# Patient Record
Sex: FEMALE | Race: OTHER | NOT HISPANIC OR LATINO
[De-identification: names, ages, dates, MRNs, and addresses within clinical notes are randomized per-mention and may not be internally consistent; named-entity substitution may affect disease eponyms.]

---

## 2017-07-17 ENCOUNTER — APPOINTMENT (OUTPATIENT)
Dept: PLASTIC SURGERY | Facility: CLINIC | Age: 67
End: 2017-07-17

## 2017-07-17 VITALS — WEIGHT: 185 LBS | BODY MASS INDEX: 33.84 KG/M2

## 2017-07-17 DIAGNOSIS — N62 HYPERTROPHY OF BREAST: ICD-10-CM

## 2019-04-06 ENCOUNTER — EMERGENCY (EMERGENCY)
Facility: HOSPITAL | Age: 69
LOS: 0 days | Discharge: HOME | End: 2019-04-07
Attending: STUDENT IN AN ORGANIZED HEALTH CARE EDUCATION/TRAINING PROGRAM
Payer: COMMERCIAL

## 2019-04-06 VITALS
OXYGEN SATURATION: 99 % | HEART RATE: 98 BPM | DIASTOLIC BLOOD PRESSURE: 74 MMHG | SYSTOLIC BLOOD PRESSURE: 151 MMHG | RESPIRATION RATE: 18 BRPM | TEMPERATURE: 97 F | HEIGHT: 62 IN | WEIGHT: 203.05 LBS

## 2019-04-06 VITALS
TEMPERATURE: 98 F | DIASTOLIC BLOOD PRESSURE: 75 MMHG | OXYGEN SATURATION: 96 % | RESPIRATION RATE: 16 BRPM | SYSTOLIC BLOOD PRESSURE: 127 MMHG | HEART RATE: 90 BPM

## 2019-04-06 DIAGNOSIS — N80.9 ENDOMETRIOSIS, UNSPECIFIED: ICD-10-CM

## 2019-04-06 DIAGNOSIS — E78.00 PURE HYPERCHOLESTEROLEMIA, UNSPECIFIED: ICD-10-CM

## 2019-04-06 DIAGNOSIS — R05 COUGH: ICD-10-CM

## 2019-04-06 DIAGNOSIS — I10 ESSENTIAL (PRIMARY) HYPERTENSION: ICD-10-CM

## 2019-04-06 DIAGNOSIS — F41.9 ANXIETY DISORDER, UNSPECIFIED: ICD-10-CM

## 2019-04-06 DIAGNOSIS — Z88.0 ALLERGY STATUS TO PENICILLIN: ICD-10-CM

## 2019-04-06 DIAGNOSIS — N39.0 URINARY TRACT INFECTION, SITE NOT SPECIFIED: ICD-10-CM

## 2019-04-06 DIAGNOSIS — R42 DIZZINESS AND GIDDINESS: ICD-10-CM

## 2019-04-06 DIAGNOSIS — R11.0 NAUSEA: ICD-10-CM

## 2019-04-06 LAB
ALBUMIN SERPL ELPH-MCNC: 3.9 G/DL — SIGNIFICANT CHANGE UP (ref 3.3–5)
ALP SERPL-CCNC: 102 U/L — SIGNIFICANT CHANGE UP (ref 40–120)
ALT FLD-CCNC: 27 U/L — SIGNIFICANT CHANGE UP (ref 12–78)
AMYLASE P1 CFR SERPL: 58 U/L — SIGNIFICANT CHANGE UP (ref 25–115)
ANION GAP SERPL CALC-SCNC: 9 MMOL/L — SIGNIFICANT CHANGE UP (ref 5–17)
APPEARANCE UR: CLEAR — SIGNIFICANT CHANGE UP
AST SERPL-CCNC: 16 U/L — SIGNIFICANT CHANGE UP (ref 15–37)
BACTERIA # UR AUTO: ABNORMAL
BASOPHILS # BLD AUTO: 0.02 K/UL — SIGNIFICANT CHANGE UP (ref 0–0.2)
BASOPHILS NFR BLD AUTO: 0.2 % — SIGNIFICANT CHANGE UP (ref 0–2)
BILIRUB SERPL-MCNC: 0.6 MG/DL — SIGNIFICANT CHANGE UP (ref 0.2–1.2)
BILIRUB UR-MCNC: NEGATIVE — SIGNIFICANT CHANGE UP
BUN SERPL-MCNC: 12 MG/DL — SIGNIFICANT CHANGE UP (ref 7–23)
CALCIUM SERPL-MCNC: 9.1 MG/DL — SIGNIFICANT CHANGE UP (ref 8.5–10.1)
CHLORIDE SERPL-SCNC: 94 MMOL/L — LOW (ref 96–108)
CO2 SERPL-SCNC: 36 MMOL/L — HIGH (ref 22–31)
COLOR SPEC: YELLOW — SIGNIFICANT CHANGE UP
CREAT SERPL-MCNC: 1.13 MG/DL — SIGNIFICANT CHANGE UP (ref 0.5–1.3)
DIFF PNL FLD: ABNORMAL
EOSINOPHIL # BLD AUTO: 0.1 K/UL — SIGNIFICANT CHANGE UP (ref 0–0.5)
EOSINOPHIL NFR BLD AUTO: 1.1 % — SIGNIFICANT CHANGE UP (ref 0–6)
EPI CELLS # UR: ABNORMAL
GLUCOSE SERPL-MCNC: 121 MG/DL — HIGH (ref 70–99)
GLUCOSE UR QL: NEGATIVE MG/DL — SIGNIFICANT CHANGE UP
HCT VFR BLD CALC: 45.9 % — HIGH (ref 34.5–45)
HGB BLD-MCNC: 15.5 G/DL — SIGNIFICANT CHANGE UP (ref 11.5–15.5)
IMM GRANULOCYTES NFR BLD AUTO: 0.2 % — SIGNIFICANT CHANGE UP (ref 0–1.5)
KETONES UR-MCNC: NEGATIVE — SIGNIFICANT CHANGE UP
LEUKOCYTE ESTERASE UR-ACNC: ABNORMAL
LIDOCAIN IGE QN: 197 U/L — SIGNIFICANT CHANGE UP (ref 73–393)
LYMPHOCYTES # BLD AUTO: 2.21 K/UL — SIGNIFICANT CHANGE UP (ref 1–3.3)
LYMPHOCYTES # BLD AUTO: 23.6 % — SIGNIFICANT CHANGE UP (ref 13–44)
MCHC RBC-ENTMCNC: 27.7 PG — SIGNIFICANT CHANGE UP (ref 27–34)
MCHC RBC-ENTMCNC: 33.8 GM/DL — SIGNIFICANT CHANGE UP (ref 32–36)
MCV RBC AUTO: 82 FL — SIGNIFICANT CHANGE UP (ref 80–100)
MONOCYTES # BLD AUTO: 0.57 K/UL — SIGNIFICANT CHANGE UP (ref 0–0.9)
MONOCYTES NFR BLD AUTO: 6.1 % — SIGNIFICANT CHANGE UP (ref 2–14)
NEUTROPHILS # BLD AUTO: 6.46 K/UL — SIGNIFICANT CHANGE UP (ref 1.8–7.4)
NEUTROPHILS NFR BLD AUTO: 68.8 % — SIGNIFICANT CHANGE UP (ref 43–77)
NITRITE UR-MCNC: NEGATIVE — SIGNIFICANT CHANGE UP
NRBC # BLD: 0 /100 WBCS — SIGNIFICANT CHANGE UP (ref 0–0)
PH UR: 7 — SIGNIFICANT CHANGE UP (ref 5–8)
PLATELET # BLD AUTO: 27 K/UL — LOW (ref 150–400)
POTASSIUM SERPL-MCNC: 2.6 MMOL/L — CRITICAL LOW (ref 3.5–5.3)
POTASSIUM SERPL-SCNC: 2.6 MMOL/L — CRITICAL LOW (ref 3.5–5.3)
PROT SERPL-MCNC: 7.2 GM/DL — SIGNIFICANT CHANGE UP (ref 6–8.3)
PROT UR-MCNC: NEGATIVE MG/DL — SIGNIFICANT CHANGE UP
RBC # BLD: 5.6 M/UL — HIGH (ref 3.8–5.2)
RBC # FLD: 12 % — SIGNIFICANT CHANGE UP (ref 10.3–14.5)
RBC CASTS # UR COMP ASSIST: ABNORMAL /HPF (ref 0–4)
SODIUM SERPL-SCNC: 139 MMOL/L — SIGNIFICANT CHANGE UP (ref 135–145)
SP GR SPEC: 1 — LOW (ref 1.01–1.02)
UROBILINOGEN FLD QL: NEGATIVE MG/DL — SIGNIFICANT CHANGE UP
WBC # BLD: 9.38 K/UL — SIGNIFICANT CHANGE UP (ref 3.8–10.5)
WBC # FLD AUTO: 9.38 K/UL — SIGNIFICANT CHANGE UP (ref 3.8–10.5)
WBC UR QL: ABNORMAL

## 2019-04-06 PROCEDURE — 99284 EMERGENCY DEPT VISIT MOD MDM: CPT

## 2019-04-06 PROCEDURE — 74177 CT ABD & PELVIS W/CONTRAST: CPT | Mod: 26

## 2019-04-06 PROCEDURE — 70450 CT HEAD/BRAIN W/O DYE: CPT | Mod: 26

## 2019-04-06 PROCEDURE — 93010 ELECTROCARDIOGRAM REPORT: CPT

## 2019-04-06 RX ORDER — MOXIFLOXACIN HYDROCHLORIDE TABLETS, 400 MG 400 MG/1
1 TABLET, FILM COATED ORAL
Qty: 14 | Refills: 0
Start: 2019-04-06 | End: 2019-04-12

## 2019-04-06 RX ORDER — SODIUM CHLORIDE 9 MG/ML
1000 INJECTION INTRAMUSCULAR; INTRAVENOUS; SUBCUTANEOUS ONCE
Qty: 0 | Refills: 0 | Status: COMPLETED | OUTPATIENT
Start: 2019-04-06 | End: 2019-04-06

## 2019-04-06 RX ORDER — POTASSIUM CHLORIDE 20 MEQ
10 PACKET (EA) ORAL
Qty: 0 | Refills: 0 | Status: COMPLETED | OUTPATIENT
Start: 2019-04-06 | End: 2019-04-06

## 2019-04-06 RX ORDER — CIPROFLOXACIN LACTATE 400MG/40ML
500 VIAL (ML) INTRAVENOUS ONCE
Qty: 0 | Refills: 0 | Status: COMPLETED | OUTPATIENT
Start: 2019-04-06 | End: 2019-04-06

## 2019-04-06 RX ORDER — METOCLOPRAMIDE HCL 10 MG
10 TABLET ORAL ONCE
Qty: 0 | Refills: 0 | Status: COMPLETED | OUTPATIENT
Start: 2019-04-06 | End: 2019-04-06

## 2019-04-06 RX ORDER — NITROFURANTOIN MACROCRYSTAL 50 MG
100 CAPSULE ORAL ONCE
Qty: 0 | Refills: 0 | Status: DISCONTINUED | OUTPATIENT
Start: 2019-04-06 | End: 2019-04-06

## 2019-04-06 RX ORDER — ONDANSETRON 8 MG/1
1 TABLET, FILM COATED ORAL
Qty: 6 | Refills: 0
Start: 2019-04-06 | End: 2019-04-08

## 2019-04-06 RX ORDER — ONDANSETRON 8 MG/1
8 TABLET, FILM COATED ORAL ONCE
Qty: 0 | Refills: 0 | Status: COMPLETED | OUTPATIENT
Start: 2019-04-06 | End: 2019-04-06

## 2019-04-06 RX ADMIN — Medication 10 MILLIEQUIVALENT(S): at 20:40

## 2019-04-06 RX ADMIN — Medication 100 MILLIEQUIVALENT(S): at 18:48

## 2019-04-06 RX ADMIN — Medication 10 MILLIGRAM(S): at 20:40

## 2019-04-06 RX ADMIN — Medication 100 MILLIEQUIVALENT(S): at 20:40

## 2019-04-06 RX ADMIN — SODIUM CHLORIDE 1000 MILLILITER(S): 9 INJECTION INTRAMUSCULAR; INTRAVENOUS; SUBCUTANEOUS at 18:13

## 2019-04-06 RX ADMIN — SODIUM CHLORIDE 1000 MILLILITER(S): 9 INJECTION INTRAMUSCULAR; INTRAVENOUS; SUBCUTANEOUS at 20:41

## 2019-04-06 NOTE — ED ADULT NURSE NOTE - OBJECTIVE STATEMENT
68 received in bed A c/o dizziness,  nausea, vomiting, weakness, and loss of appetite. pt states symptoms started after she took Zpack for bronchitis. x wednesday denies chest pain. history of anxiety, and htn. PCP prescribed Zofran states she still feels nauseous. Able to complete full sentences without any discomfort. No s/s of acute distress noted. Will continue to monitor and provide care as needed.

## 2019-04-06 NOTE — ED ADULT NURSE NOTE - ED STAT RN HANDOFF DETAILS
Pt laying comfortably on stretcher and in no distress. Pt receiving 1st potassium IV supplementation. Hand off given to KAZ Talavera

## 2019-04-06 NOTE — ED PROVIDER NOTE - OBJECTIVE STATEMENT
68 year old female presents today c/o nausea and decreased PO intake since Tuesday, she states that last week she developed a cough, she did contact her PMD at that time who prescribed her zithromax, the day she finished the medication her symptoms developed, +vomiting  +lack of appetite +vomiting, she was seen at an urgent care center twice and given zofran which she feels made her nausea worse, had an ekg which she was told was normal, (-) chest pain (-) sick contact s (-) abdominal pain

## 2019-04-06 NOTE — ED ADULT TRIAGE NOTE - CHIEF COMPLAINT QUOTE
pt complaining of nausea, vomiting, weakness, dizziness and loss of appetite. pt states symptoms started after she took Zpack for bronchitis. denies chest pain. history of anxiety, and htn.

## 2019-04-07 LAB
CULTURE RESULTS: SIGNIFICANT CHANGE UP
SPECIMEN SOURCE: SIGNIFICANT CHANGE UP

## 2019-04-07 RX ADMIN — Medication 100 MILLIEQUIVALENT(S): at 00:24

## 2019-04-07 RX ADMIN — Medication 500 MILLIGRAM(S): at 00:25

## 2019-04-07 RX ADMIN — ONDANSETRON 8 MILLIGRAM(S): 8 TABLET, FILM COATED ORAL at 00:26

## 2019-09-18 ENCOUNTER — EMERGENCY (EMERGENCY)
Facility: HOSPITAL | Age: 69
LOS: 1 days | Discharge: HOME | End: 2019-09-18
Attending: EMERGENCY MEDICINE
Payer: COMMERCIAL

## 2019-09-18 VITALS
SYSTOLIC BLOOD PRESSURE: 123 MMHG | HEART RATE: 73 BPM | OXYGEN SATURATION: 99 % | TEMPERATURE: 97 F | DIASTOLIC BLOOD PRESSURE: 73 MMHG

## 2019-09-18 VITALS
RESPIRATION RATE: 18 BRPM | DIASTOLIC BLOOD PRESSURE: 95 MMHG | HEIGHT: 62 IN | SYSTOLIC BLOOD PRESSURE: 160 MMHG | HEART RATE: 73 BPM | TEMPERATURE: 98 F | WEIGHT: 197.09 LBS | OXYGEN SATURATION: 97 %

## 2019-09-18 PROBLEM — I10 ESSENTIAL (PRIMARY) HYPERTENSION: Chronic | Status: ACTIVE | Noted: 2019-04-06

## 2019-09-18 PROBLEM — F41.9 ANXIETY DISORDER, UNSPECIFIED: Chronic | Status: ACTIVE | Noted: 2019-04-06

## 2019-09-18 PROBLEM — E78.00 PURE HYPERCHOLESTEROLEMIA, UNSPECIFIED: Chronic | Status: ACTIVE | Noted: 2019-04-06

## 2019-09-18 PROBLEM — N80.9 ENDOMETRIOSIS, UNSPECIFIED: Chronic | Status: ACTIVE | Noted: 2019-04-06

## 2019-09-18 LAB
ALBUMIN SERPL ELPH-MCNC: 4.8 G/DL — SIGNIFICANT CHANGE UP (ref 3.3–5)
ALP SERPL-CCNC: 101 U/L — SIGNIFICANT CHANGE UP (ref 40–120)
ALT FLD-CCNC: 16 U/L — SIGNIFICANT CHANGE UP (ref 10–45)
ANION GAP SERPL CALC-SCNC: 12 MMOL/L — SIGNIFICANT CHANGE UP (ref 5–17)
APAP SERPL-MCNC: <15 UG/ML — SIGNIFICANT CHANGE UP (ref 10–30)
APPEARANCE UR: CLEAR — SIGNIFICANT CHANGE UP
AST SERPL-CCNC: 11 U/L — SIGNIFICANT CHANGE UP (ref 10–40)
BASOPHILS # BLD AUTO: 0.1 K/UL — SIGNIFICANT CHANGE UP (ref 0–0.2)
BASOPHILS NFR BLD AUTO: 0.6 % — SIGNIFICANT CHANGE UP (ref 0–2)
BILIRUB SERPL-MCNC: 0.5 MG/DL — SIGNIFICANT CHANGE UP (ref 0.2–1.2)
BILIRUB UR-MCNC: NEGATIVE — SIGNIFICANT CHANGE UP
BUN SERPL-MCNC: 10 MG/DL — SIGNIFICANT CHANGE UP (ref 7–23)
CALCIUM SERPL-MCNC: 10.5 MG/DL — SIGNIFICANT CHANGE UP (ref 8.4–10.5)
CHLORIDE SERPL-SCNC: 100 MMOL/L — SIGNIFICANT CHANGE UP (ref 96–108)
CO2 SERPL-SCNC: 30 MMOL/L — SIGNIFICANT CHANGE UP (ref 22–31)
COLOR SPEC: COLORLESS — SIGNIFICANT CHANGE UP
CREAT SERPL-MCNC: 0.78 MG/DL — SIGNIFICANT CHANGE UP (ref 0.5–1.3)
DIFF PNL FLD: NEGATIVE — SIGNIFICANT CHANGE UP
EOSINOPHIL # BLD AUTO: 0.1 K/UL — SIGNIFICANT CHANGE UP (ref 0–0.5)
EOSINOPHIL NFR BLD AUTO: 1 % — SIGNIFICANT CHANGE UP (ref 0–6)
ETHANOL SERPL-MCNC: SIGNIFICANT CHANGE UP MG/DL (ref 0–10)
GLUCOSE SERPL-MCNC: 105 MG/DL — HIGH (ref 70–99)
GLUCOSE UR QL: NEGATIVE — SIGNIFICANT CHANGE UP
HCT VFR BLD CALC: 46.4 % — HIGH (ref 34.5–45)
HGB BLD-MCNC: 15.6 G/DL — HIGH (ref 11.5–15.5)
KETONES UR-MCNC: NEGATIVE — SIGNIFICANT CHANGE UP
LEUKOCYTE ESTERASE UR-ACNC: ABNORMAL
LYMPHOCYTES # BLD AUTO: 2.6 K/UL — SIGNIFICANT CHANGE UP (ref 1–3.3)
LYMPHOCYTES # BLD AUTO: 23.9 % — SIGNIFICANT CHANGE UP (ref 13–44)
MCHC RBC-ENTMCNC: 29.1 PG — SIGNIFICANT CHANGE UP (ref 27–34)
MCHC RBC-ENTMCNC: 33.6 GM/DL — SIGNIFICANT CHANGE UP (ref 32–36)
MCV RBC AUTO: 86.7 FL — SIGNIFICANT CHANGE UP (ref 80–100)
MONOCYTES # BLD AUTO: 0.5 K/UL — SIGNIFICANT CHANGE UP (ref 0–0.9)
MONOCYTES NFR BLD AUTO: 4.9 % — SIGNIFICANT CHANGE UP (ref 2–14)
NEUTROPHILS # BLD AUTO: 7.5 K/UL — HIGH (ref 1.8–7.4)
NEUTROPHILS NFR BLD AUTO: 69.6 % — SIGNIFICANT CHANGE UP (ref 43–77)
NITRITE UR-MCNC: NEGATIVE — SIGNIFICANT CHANGE UP
PCP SPEC-MCNC: SIGNIFICANT CHANGE UP
PH UR: 7.5 — SIGNIFICANT CHANGE UP (ref 5–8)
PLATELET # BLD AUTO: 169 K/UL — SIGNIFICANT CHANGE UP (ref 150–400)
POTASSIUM SERPL-MCNC: 3.9 MMOL/L — SIGNIFICANT CHANGE UP (ref 3.5–5.3)
POTASSIUM SERPL-SCNC: 3.9 MMOL/L — SIGNIFICANT CHANGE UP (ref 3.5–5.3)
PROT SERPL-MCNC: 7.4 G/DL — SIGNIFICANT CHANGE UP (ref 6–8.3)
PROT UR-MCNC: NEGATIVE — SIGNIFICANT CHANGE UP
RBC # BLD: 5.35 M/UL — HIGH (ref 3.8–5.2)
RBC # FLD: 11.9 % — SIGNIFICANT CHANGE UP (ref 10.3–14.5)
SALICYLATES SERPL-MCNC: <2 MG/DL — LOW (ref 15–30)
SODIUM SERPL-SCNC: 142 MMOL/L — SIGNIFICANT CHANGE UP (ref 135–145)
SP GR SPEC: 1.01 — LOW (ref 1.01–1.02)
TSH SERPL-MCNC: 1.57 UIU/ML — SIGNIFICANT CHANGE UP (ref 0.27–4.2)
UROBILINOGEN FLD QL: NEGATIVE — SIGNIFICANT CHANGE UP
WBC # BLD: 10.8 K/UL — HIGH (ref 3.8–10.5)
WBC # FLD AUTO: 10.8 K/UL — HIGH (ref 3.8–10.5)

## 2019-09-18 PROCEDURE — 84484 ASSAY OF TROPONIN QUANT: CPT

## 2019-09-18 PROCEDURE — 80053 COMPREHEN METABOLIC PANEL: CPT

## 2019-09-18 PROCEDURE — 93010 ELECTROCARDIOGRAM REPORT: CPT

## 2019-09-18 PROCEDURE — 80307 DRUG TEST PRSMV CHEM ANLYZR: CPT

## 2019-09-18 PROCEDURE — 99284 EMERGENCY DEPT VISIT MOD MDM: CPT

## 2019-09-18 PROCEDURE — 84443 ASSAY THYROID STIM HORMONE: CPT

## 2019-09-18 PROCEDURE — 85027 COMPLETE CBC AUTOMATED: CPT

## 2019-09-18 PROCEDURE — 81001 URINALYSIS AUTO W/SCOPE: CPT

## 2019-09-18 PROCEDURE — 93005 ELECTROCARDIOGRAM TRACING: CPT

## 2019-09-18 PROCEDURE — 99283 EMERGENCY DEPT VISIT LOW MDM: CPT

## 2019-09-18 RX ORDER — DIVALPROEX SODIUM 500 MG/1
250 TABLET, DELAYED RELEASE ORAL ONCE
Refills: 0 | Status: COMPLETED | OUTPATIENT
Start: 2019-09-18 | End: 2019-09-18

## 2019-09-18 RX ORDER — DIVALPROEX SODIUM 500 MG/1
1 TABLET, DELAYED RELEASE ORAL
Qty: 14 | Refills: 0
Start: 2019-09-18 | End: 2019-10-01

## 2019-09-18 RX ORDER — ONDANSETRON 8 MG/1
4 TABLET, FILM COATED ORAL ONCE
Refills: 0 | Status: COMPLETED | OUTPATIENT
Start: 2019-09-18 | End: 2019-09-18

## 2019-09-18 RX ORDER — QUETIAPINE FUMARATE 200 MG/1
1 TABLET, FILM COATED ORAL
Qty: 14 | Refills: 0
Start: 2019-09-18 | End: 2019-10-01

## 2019-09-18 RX ORDER — ALPRAZOLAM 0.25 MG
1 TABLET ORAL ONCE
Refills: 0 | Status: DISCONTINUED | OUTPATIENT
Start: 2019-09-18 | End: 2019-09-18

## 2019-09-18 RX ADMIN — DIVALPROEX SODIUM 250 MILLIGRAM(S): 500 TABLET, DELAYED RELEASE ORAL at 17:40

## 2019-09-18 RX ADMIN — ONDANSETRON 4 MILLIGRAM(S): 8 TABLET, FILM COATED ORAL at 16:55

## 2019-09-18 NOTE — ED PROVIDER NOTE - PATIENT PORTAL LINK FT
You can access the FollowMyHealth Patient Portal offered by [facility] by registering at the following website: [email or web address]. By joining [employer]’s FollowMyHealth portal, you will also be able to view your health information using other applications (apps) compatible with our system.
You can access the FollowMyHealth Patient Portal offered by [facility] by registering at the following website: [email or web address]. By joining [employer]’s FollowMyHealth portal, you will also be able to view your health information using other applications (apps) compatible with our system.

## 2019-09-18 NOTE — ED PROVIDER NOTE - ATTENDING CONTRIBUTION TO CARE
Pt with sister who collaborates with anxiety ongoing for years, worsening over past few weeks but also with insomnia.  No etoh, no SI/HI, 10lb weight loss due to poor appetite of late, had blood work recently but not sure about thyroid.  Exam unremarkable.  Will see PCP in few days and psychiatrist in 10 days.
Pt with insomnia and increasing anxiety, no SI/HI/AH/VH, no headache, fever, vomiting.  Appears anxious but stable, good insight, normal mood, clear lungs, RRR.  IN coordination with patient's outpt psychiatrist, will adjust medications, educate about sleep hygeine, and add mood stabilizer.

## 2019-09-18 NOTE — ED PROVIDER NOTE - CARE PLAN
Principal Discharge DX:	Anxiety  Secondary Diagnosis:	Insomnia
Principal Discharge DX:	Anxiety disorder

## 2019-09-18 NOTE — ED PROVIDER NOTE - PMH
Anxiety    Endometriosis    High cholesterol    HTN (hypertension)
Anxiety    Endometriosis    High cholesterol    HTN (hypertension)

## 2019-09-18 NOTE — ED PROVIDER NOTE - CHIEF COMPLAINT
The patient is a 69y Female complaining of anxiety.
The patient is a 69y Female complaining of anxiety.

## 2019-09-18 NOTE — ED PROVIDER NOTE - OBJECTIVE STATEMENT
68 y/o F with PMHx of HTN, anxiety and panic d/o on hydroxyzine, diazepam, and Effexor presents c/o anxiety and panic attacks. Pt poor historian in regards to timing of events, notes she has had about 3 weeks of worsening panic attacks seen by PCP and by her psychiatrist, pt psychiatrist changed meds from clonazepam and trazodone for sleep to diazepam and hydroxyzine. Pt notes episodes of nausea, diaphoresis, lightheadedness, and palpitations described as "feeling her heart beat" occurring frequently at 3am-5am and lasting most of the day. Pt notes frequently calling her psychiatrist and having the schedule of her meds altered to try and better control her sxs. Pt also worried she is withdrawing because she was not tapered off clonazepam before starting diazepam despite being told by her psychiatrist that they are in the same drug class. Pt denies any CP, f/c, recent illness, abd pain, vomiting, change in urine or stools, h/o cardiac dz. Pt had stress test 3 months ago for similar sxs which was negative, also notes prior ED visits for multiple somatic sxs getting "full work-ups" including "head to toe CAT scans" that are reportedly unremarkable. Pt denies SI/HI, AH/VH, delusions. Pt friend at bedside confirms hx and denies change in pt behavior oo disorganized speech. Pt denies etoh, smoking, drugs.  Psych: Dr. Ramos [phone]

## 2019-09-18 NOTE — ED ADULT NURSE NOTE - OBJECTIVE STATEMENT
69 year old female domiciled with  was BIB by her friend to the ER due to increasing symptoms of anxiety. Pt denied suicidal and homicidal ideations, auditory and visual hallucinations, Pt stated she has been waking up very anxious and it only gets better in the afternoon./ Pt reports she has been going to her psychiatrist if needed but today, the anxiety was intolerable. PMHx of HTN and high cholesterol.

## 2019-09-18 NOTE — ED PROVIDER NOTE - CLINICAL SUMMARY MEDICAL DECISION MAKING FREE TEXT BOX
Dr. Merida Note: anxiety d/o with insomnia without emergency psych condition...treat and outpt f/u, including outpt thyroid check
Dr. Merida Note: acute on chronic anxiety disorder without need for emergency psych consult/inpt, adjust medications and refer to outpt

## 2019-09-18 NOTE — ED PROVIDER NOTE - PHYSICAL EXAMINATION
GEN: Pt in NAD, A&O x3. Answers questions appropriately GCS 15.  PSYCH: Anxious, occasionally has issues with temporal order of events, otherwise clear sensorium, organized speech, no evidence of hallucinations or delusions, not endorsing suicidality or homicidal thoughts.  EYES: Sclera white w/o injection, PERRLA, EOMI.   ENT: Head NCAT. Nose w/o deformity. No auricular TTP. MMM. Neck supple FROM.   RESP: No chest wall tenderness, CTA b/l, no wheezes, rales, or rhonchi.   CARDIAC: RRR, clear distinct S1, S2, no appreciable murmurs.  ABD: Abdomen soft, non-tender. No CVAT b/l.  VASC: 2+ radial and dorsalis pedis pulses b/l. No edema or calf tenderness.  NEURO: Speech clear and coherent. CN2-12 grossly intact. Normal and equal sensation and 5/5 strength UE and LE b/l. Pronator drift negative. Normal gross cerebellar function.   SKIN: No rashes on the trunk. GEN: Pt in NAD, A&O x3. Answers questions appropriately GCS 15.  PSYCH: Anxious, occasionally has issues with temporal order of events, pt fixated on sxs, otherwise clear sensorium, organized speech, no evidence of hallucinations or delusions, not endorsing suicidality or homicidal thoughts.   EYES: Sclera white w/o injection, PERRLA, EOMI.   ENT: Head NCAT. Nose w/o deformity. No auricular TTP. MMM. Neck supple FROM.   RESP: No chest wall tenderness, CTA b/l, no wheezes, rales, or rhonchi.   CARDIAC: RRR, clear distinct S1, S2, no appreciable murmurs.  ABD: Abdomen soft, non-tender. No CVAT b/l.  VASC: 2+ radial and dorsalis pedis pulses b/l. No edema or calf tenderness.  NEURO: Speech clear and coherent. CN2-12 grossly intact. Normal and equal sensation and 5/5 strength UE and LE b/l. Pronator drift negative. Normal gross cerebellar function.   SKIN: No rashes on the trunk.

## 2019-09-18 NOTE — ED PROVIDER NOTE - NSFOLLOWUPINSTRUCTIONS_ED_ALL_ED_FT
1. Follow up PCP and psychiatrist for further care.  Recommend thyroid check if not done so recently.  2. Trial Seroquel 50mg every night along with melatonin 3-4mg and benadryl 25-50mg every night.  Practice good sleep hygeine: no caffeine after 3pm, use bed only for sleep, develop a sleep routine 30minutes before bed, no tv/screens/phones 1 hour before bed, if unable to sleep after 30 minutes, get up and read for 30 minutes and then try again, avoid any day time sleeping.  3. Return for any concerns.
1) Follow-up with your primary care provider in 1-2 days.     Follow-up with your psychiatrist within 1 week as scheduled and discuss further management.    2) Take Depakote daily as directed. Continue to take all medications as prescribed.    3) Rest and drink plenty of fluids.     4) Return to the ER for any new or worsening symptoms or concerns.

## 2019-09-18 NOTE — ED PROVIDER NOTE - PHYSICAL EXAMINATION
GEN: Pt in NAD, A&O x3.  PSYCH: Anxious, no obvious delusions or hallucinations, organized speech, clear sensorium, has some difficulty in determining the temporal order of events.  EYES: Sclera white w/o injection, PERRLA, EOMI.  ENT: Head NCAT. Nose w/o deformity. No auricular TTP. MMM. Neck supple FROM.   RESP: No chest wall tenderness, CTA b/l, no wheezes, rales, or rhonchi.   CARDIAC: RRR, clear distinct S1, S2, no appreciable murmurs.  ABD: Abdomen soft, non-tender. No CVAT b/l.  VASC: No edema or calf tenderness.  NEURO: CN2-12 grossly intact. Normal and equal sensation and 5/5 strength UE and LE b/l. Pronator drift negative. Normal gait and gross cerebellar function.   SKIN: No rashes on the trunk.

## 2019-09-18 NOTE — ED PROVIDER NOTE - INTERPRETATION
normal sinus rhythm normal sinus rhythm, Normal axis, Normal CT interval and QRS complex. There are no acute ischemic ST or T-wave changes.

## 2019-09-18 NOTE — ED ADULT NURSE NOTE - GROOMING
At [facility], we strive to deliver an exceptional experience to you, every time we see you.  If you receive a survey in the mail, please send us back your thoughts. We really do value your feedback.    Based on your medical history, these are the current health maintenance/preventive care services that you are due for (some may have been done at this visit.)  Health Maintenance Due   Topic Date Due     EYE EXAM Q1 YEAR  10/22/2013     ADVANCE DIRECTIVE PLANNING Q5 YRS  10/04/2016     A1C Q6 MO  04/25/2018     CREATININE Q1 YEAR  05/10/2018     LIPID MONITORING Q1 YEAR  05/10/2018     MICROALBUMIN Q1 YEAR  05/10/2018         Suggested websites for health information:  Www.[employer].org : Up to date and easily searchable information on multiple topics.  Www.[employer].gov : medication info, interactive tutorials, watch real surgeries online  Www.familydoctor.org : good info from the Academy of Family Physicians  Www.cdc.gov : public health info, travel advisories, epidemics (H1N1)  Www.aap.org : children's health info, normal development, vaccinations  Www.health.[facility].[place].us : [place] dept of health, public health issues in [place], N1N1    Your care team:                            Family Medicine Internal Medicine   MD Bishop Otto MD Shantel Branch-Fleming, MD Katya Georgiev PA-C Nam Ho, MD Pediatrics   LESLIE Santiago, MD Dinorah Sequeira CNP, MD Deborah Mielke, MD Kim Thein, APRN CNP      Clinic hours: Monday - Thursday 7 am-7 pm; Fridays 7 am-5 pm.   Urgent care: Monday - Friday 11 am-9 pm; Saturday and Sunday 9 am-5 pm.  Pharmacy : Monday -Thursday 8 am-8 pm; Friday 8 am-6 pm; Saturday and Sunday 9 am-5 pm.     Clinic: [phone]   Pharmacy: [phone]    
Good

## 2019-09-18 NOTE — ED PROVIDER NOTE - PROGRESS NOTE DETAILS
d/w pt psychiatrist, states pt has ANSON with panic d/o, frequently fixates on these somatic sxs, if medical w/u is negative he is ok with altering pt drug regimen just do not increase dose of benzodiazepines, pt can be seen in the office for f/u within 1 week, I gave phone to pt to d/w  who will schedule appointment. Will start pt on Depakote. -Raoul Hurst PA-C

## 2019-11-12 ENCOUNTER — EMERGENCY (EMERGENCY)
Facility: HOSPITAL | Age: 69
LOS: 1 days | Discharge: HOME | End: 2019-11-12
Attending: EMERGENCY MEDICINE | Admitting: EMERGENCY MEDICINE
Payer: COMMERCIAL

## 2019-11-12 VITALS
DIASTOLIC BLOOD PRESSURE: 67 MMHG | OXYGEN SATURATION: 100 % | RESPIRATION RATE: 16 BRPM | HEART RATE: 76 BPM | SYSTOLIC BLOOD PRESSURE: 133 MMHG | TEMPERATURE: 98 F

## 2019-11-12 VITALS
DIASTOLIC BLOOD PRESSURE: 73 MMHG | OXYGEN SATURATION: 97 % | HEART RATE: 65 BPM | SYSTOLIC BLOOD PRESSURE: 134 MMHG | TEMPERATURE: 98 F | RESPIRATION RATE: 16 BRPM

## 2019-11-12 DIAGNOSIS — F43.21 ADJUSTMENT DISORDER WITH DEPRESSED MOOD: ICD-10-CM

## 2019-11-12 LAB
ALBUMIN SERPL ELPH-MCNC: 4.4 G/DL — SIGNIFICANT CHANGE UP (ref 3.3–5)
ALBUMIN SERPL ELPH-MCNC: 4.6 G/DL — SIGNIFICANT CHANGE UP (ref 3.3–5)
ALP SERPL-CCNC: 103 U/L — SIGNIFICANT CHANGE UP (ref 40–120)
ALP SERPL-CCNC: 97 U/L — SIGNIFICANT CHANGE UP (ref 40–120)
ALT FLD-CCNC: 41 U/L — HIGH (ref 4–33)
ALT FLD-CCNC: 44 U/L — HIGH (ref 4–33)
ANION GAP SERPL CALC-SCNC: 11 MMO/L — SIGNIFICANT CHANGE UP (ref 7–14)
ANION GAP SERPL CALC-SCNC: 13 MMO/L — SIGNIFICANT CHANGE UP (ref 7–14)
APAP SERPL-MCNC: < 15 UG/ML — LOW (ref 15–25)
APPEARANCE UR: SIGNIFICANT CHANGE UP
AST SERPL-CCNC: 182 U/L — HIGH (ref 4–32)
AST SERPL-CCNC: 189 U/L — HIGH (ref 4–32)
BACTERIA # UR AUTO: HIGH
BASOPHILS # BLD AUTO: 0.03 K/UL — SIGNIFICANT CHANGE UP (ref 0–0.2)
BASOPHILS NFR BLD AUTO: 0.3 % — SIGNIFICANT CHANGE UP (ref 0–2)
BILIRUB SERPL-MCNC: 0.4 MG/DL — SIGNIFICANT CHANGE UP (ref 0.2–1.2)
BILIRUB SERPL-MCNC: 0.5 MG/DL — SIGNIFICANT CHANGE UP (ref 0.2–1.2)
BILIRUB UR-MCNC: NEGATIVE — SIGNIFICANT CHANGE UP
BLOOD UR QL VISUAL: SIGNIFICANT CHANGE UP
BUN SERPL-MCNC: 12 MG/DL — SIGNIFICANT CHANGE UP (ref 7–23)
BUN SERPL-MCNC: 12 MG/DL — SIGNIFICANT CHANGE UP (ref 7–23)
CALCIUM SERPL-MCNC: 9.5 MG/DL — SIGNIFICANT CHANGE UP (ref 8.4–10.5)
CALCIUM SERPL-MCNC: 9.9 MG/DL — SIGNIFICANT CHANGE UP (ref 8.4–10.5)
CHLORIDE SERPL-SCNC: 98 MMOL/L — SIGNIFICANT CHANGE UP (ref 98–107)
CHLORIDE SERPL-SCNC: 98 MMOL/L — SIGNIFICANT CHANGE UP (ref 98–107)
CO2 SERPL-SCNC: 30 MMOL/L — SIGNIFICANT CHANGE UP (ref 22–31)
CO2 SERPL-SCNC: 33 MMOL/L — HIGH (ref 22–31)
COLOR SPEC: YELLOW — SIGNIFICANT CHANGE UP
CREAT SERPL-MCNC: 0.77 MG/DL — SIGNIFICANT CHANGE UP (ref 0.5–1.3)
CREAT SERPL-MCNC: 0.81 MG/DL — SIGNIFICANT CHANGE UP (ref 0.5–1.3)
EOSINOPHIL # BLD AUTO: 0.25 K/UL — SIGNIFICANT CHANGE UP (ref 0–0.5)
EOSINOPHIL NFR BLD AUTO: 2.5 % — SIGNIFICANT CHANGE UP (ref 0–6)
ETHANOL BLD-MCNC: < 10 MG/DL — SIGNIFICANT CHANGE UP
GLUCOSE SERPL-MCNC: 121 MG/DL — HIGH (ref 70–99)
GLUCOSE SERPL-MCNC: 99 MG/DL — SIGNIFICANT CHANGE UP (ref 70–99)
GLUCOSE UR-MCNC: NEGATIVE — SIGNIFICANT CHANGE UP
HCT VFR BLD CALC: 44.6 % — SIGNIFICANT CHANGE UP (ref 34.5–45)
HGB BLD-MCNC: 14.3 G/DL — SIGNIFICANT CHANGE UP (ref 11.5–15.5)
HYALINE CASTS # UR AUTO: HIGH
IMM GRANULOCYTES NFR BLD AUTO: 0.4 % — SIGNIFICANT CHANGE UP (ref 0–1.5)
KETONES UR-MCNC: NEGATIVE — SIGNIFICANT CHANGE UP
LEUKOCYTE ESTERASE UR-ACNC: SIGNIFICANT CHANGE UP
LYMPHOCYTES # BLD AUTO: 3.06 K/UL — SIGNIFICANT CHANGE UP (ref 1–3.3)
LYMPHOCYTES # BLD AUTO: 30.6 % — SIGNIFICANT CHANGE UP (ref 13–44)
MCHC RBC-ENTMCNC: 28.3 PG — SIGNIFICANT CHANGE UP (ref 27–34)
MCHC RBC-ENTMCNC: 32.1 % — SIGNIFICANT CHANGE UP (ref 32–36)
MCV RBC AUTO: 88.1 FL — SIGNIFICANT CHANGE UP (ref 80–100)
MONOCYTES # BLD AUTO: 0.5 K/UL — SIGNIFICANT CHANGE UP (ref 0–0.9)
MONOCYTES NFR BLD AUTO: 5 % — SIGNIFICANT CHANGE UP (ref 2–14)
NEUTROPHILS # BLD AUTO: 6.11 K/UL — SIGNIFICANT CHANGE UP (ref 1.8–7.4)
NEUTROPHILS NFR BLD AUTO: 61.2 % — SIGNIFICANT CHANGE UP (ref 43–77)
NITRITE UR-MCNC: NEGATIVE — SIGNIFICANT CHANGE UP
NRBC # FLD: 0 K/UL — SIGNIFICANT CHANGE UP (ref 0–0)
PH UR: 6 — SIGNIFICANT CHANGE UP (ref 5–8)
PLATELET # BLD AUTO: 194 K/UL — SIGNIFICANT CHANGE UP (ref 150–400)
PMV BLD: 12.1 FL — SIGNIFICANT CHANGE UP (ref 7–13)
POTASSIUM SERPL-MCNC: 3.6 MMOL/L — SIGNIFICANT CHANGE UP (ref 3.5–5.3)
POTASSIUM SERPL-MCNC: 5.1 MMOL/L — SIGNIFICANT CHANGE UP (ref 3.5–5.3)
POTASSIUM SERPL-SCNC: 3.6 MMOL/L — SIGNIFICANT CHANGE UP (ref 3.5–5.3)
POTASSIUM SERPL-SCNC: 5.1 MMOL/L — SIGNIFICANT CHANGE UP (ref 3.5–5.3)
PROT SERPL-MCNC: 7 G/DL — SIGNIFICANT CHANGE UP (ref 6–8.3)
PROT SERPL-MCNC: 7.2 G/DL — SIGNIFICANT CHANGE UP (ref 6–8.3)
PROT UR-MCNC: 30 — SIGNIFICANT CHANGE UP
RBC # BLD: 5.06 M/UL — SIGNIFICANT CHANGE UP (ref 3.8–5.2)
RBC # FLD: 12.3 % — SIGNIFICANT CHANGE UP (ref 10.3–14.5)
RBC CASTS # UR COMP ASSIST: SIGNIFICANT CHANGE UP (ref 0–?)
SALICYLATES SERPL-MCNC: < 5 MG/DL — LOW (ref 15–30)
SODIUM SERPL-SCNC: 141 MMOL/L — SIGNIFICANT CHANGE UP (ref 135–145)
SODIUM SERPL-SCNC: 142 MMOL/L — SIGNIFICANT CHANGE UP (ref 135–145)
SP GR SPEC: 1.03 — SIGNIFICANT CHANGE UP (ref 1–1.04)
SQUAMOUS # UR AUTO: SIGNIFICANT CHANGE UP
TSH SERPL-MCNC: 1.54 UIU/ML — SIGNIFICANT CHANGE UP (ref 0.27–4.2)
UROBILINOGEN FLD QL: SIGNIFICANT CHANGE UP
WBC # BLD: 9.99 K/UL — SIGNIFICANT CHANGE UP (ref 3.8–10.5)
WBC # FLD AUTO: 9.99 K/UL — SIGNIFICANT CHANGE UP (ref 3.8–10.5)
WBC UR QL: >50 — HIGH (ref 0–?)

## 2019-11-12 PROCEDURE — 90792 PSYCH DIAG EVAL W/MED SRVCS: CPT | Mod: GC

## 2019-11-12 PROCEDURE — 99285 EMERGENCY DEPT VISIT HI MDM: CPT

## 2019-11-12 NOTE — ED ADULT NURSE NOTE - NSIMPLEMENTINTERV_GEN_ALL_ED
Implemented All Fall Risk Interventions:  [place] to call system. Call bell, personal items and telephone within reach. Instruct patient to call for assistance. Room bathroom lighting operational. Non-slip footwear when patient is off stretcher. Physically safe environment: no spills, clutter or unnecessary equipment. Stretcher in lowest position, wheels locked, appropriate side rails in place. Provide visual cue, wrist band, yellow gown, etc. Monitor gait and stability. Monitor for mental status changes and reorient to person, place, and time. Review medications for side effects contributing to fall risk. Reinforce activity limits and safety measures with patient and family.

## 2019-11-12 NOTE — ED PROVIDER NOTE - PATIENT PORTAL LINK FT
You can access the FollowMyHealth Patient Portal offered by [facility] by registering at the following website: [email or web address]. By joining [employer]’s FollowMyHealth portal, you will also be able to view your health information using other applications (apps) compatible with our system.

## 2019-11-12 NOTE — ED BEHAVIORAL HEALTH ASSESSMENT NOTE - SUICIDE RISK FACTORS
Hopelessness or despair/Cluster B Personality disorders or traits current/past/Mood Disorder current/past

## 2019-11-12 NOTE — ED ADULT TRIAGE NOTE - CHIEF COMPLAINT QUOTE
Pt took overdose of prescribed medication yesterday morning.  Spoke to psychiatrist today and she told the doctor who advised pt to go to ER.  On diazepam, venlafaxine, hydroxyzine, HCTZ, metoprolol, atorvastatin

## 2019-11-12 NOTE — ED BEHAVIORAL HEALTH ASSESSMENT NOTE - SUICIDE PROTECTIVE FACTORS
Has future plans/Cultural, spiritual and/or moral attitudes against suicide/Responsibility to family and others/Supportive social network of family or friends/Identifies reasons for living/Positive therapeutic relationships

## 2019-11-12 NOTE — ED BEHAVIORAL HEALTH ASSESSMENT NOTE - HPI (INCLUDE ILLNESS QUALITY, SEVERITY, DURATION, TIMING, CONTEXT, MODIFYING FACTORS, ASSOCIATED SIGNS AND SYMPTOMS)
Pt is a 70yo woman, , no dependents, retired , domiciled; no PMH; PPH of anxiety, depression, no previous hospitalizations, in outpt tx, no SAs, no SIB; no substance use, no legal/arrest/aggression hx, BIB self referred by psychiatrist after telling him she overdosed on 9 pills of unknown medication.     Pt is able to engage in the interview but is a poor historian. Pt reports numerous losses recently, including her parents a few years ago and her best friend last week. States she was incredibly close with her parents and has been affected since then. Pt states she was at a party on Sunday night and was struck by how lonely she feels. Reports her  is a workaholic and so feels she has lost her primary support systems, although she has many other friends. States she cannot remember whether she took the pills on Sunday after the party or last night but states she does not know why she did it or what pills they were. Pt reports having felt depressed and anxious on and off over the last few weeks. Otherwise has been sleeping appropriately, no changes in concentration, no changes in energy, still has been able to see her friends, do errands throughout the day. States that she has never attempted suicide and had not been thinking about overdosing previous to doing it, felt that it was an impulsive action. Emphatically denies feeling suicidal now, states that she does not want to shame the memory of her parents, and has friends and her  that she wants to live for. When asked if she has future plans, pt states "I want to get better." Denies HI. Denies VAH, manic symptoms. Pt declines voluntary admission.     Pt fully oriented to self, time, place, registration 3/3, able to spell WORLD forwards and backwards, able to repeat "no ifs, ands, or buts, recall 1/3.    Per psychiatrist, pt called him this morning and said that she had taken the pills because she didn't want to be alive anymore. Psychiatrist reports pt has no suicide history and is a very histrionic, dramatic personality. Psychiatrist wanted her to come to the ED fur further evaluation she has no suicidal history. Pt is a 68yo woman, , no dependents, retired , domiciled; no PMH; PPH of anxiety, depression, no previous hospitalizations, in outpt tx, no SAs, no SIB; no substance use, no legal/arrest/aggression hx, BIB self referred by psychiatrist after telling him she overdosed on 9 pills of unknown medication.     Pt is able to engage in the interview but is a poor historian. Pt reports numerous losses recently, including her parents a few years ago and her best friend last week. States she was incredibly close with her parents and has been affected since then. Pt states she was at a party on Sunday night and was struck by how lonely she feels. Reports her  is a workaholic and so feels she has lost her primary support systems, although she has many other friends. States she cannot remember whether she took the pills on Sunday after the party or last night but states she does not know why she did it or what pills they were. Pt reports having felt depressed and anxious on and off over the last few weeks. Otherwise has been sleeping appropriately, no changes in concentration, no changes in energy, still has been able to see her friends, do errands throughout the day. States that she has never attempted suicide and had not been thinking about overdosing previous to doing it, felt that it was an impulsive action. Emphatically denies feeling suicidal now, states that she does not want to shame the memory of her parents, and has friends and her  that she wants to live for. When asked if she has future plans, pt states "I want to get better." Denies HI. Denies VAH, manic symptoms. Pt declines voluntary admission.     Pt fully oriented to self, time, place, registration 3/3, able to spell WORLD forwards and backwards, able to repeat "no ifs, ands, or buts, recall 1/3.    Per psychiatrist, pt called him this morning and said that she had taken the pills because she didn't want to be alive anymore. Psychiatrist reports pt has no suicide history and is a very histrionic, dramatic personality. Psychiatrist wanted her to come to the ED fur further evaluation she has no suicidal history.    Per , pt has never endorsed suicidality to him. States pt normally goes out during the day to spend time with her friends, was surprised when he found her home yesterday. States he came home and she was on the floor, however was conscious. Did not explain to him how she came to be on the floor,  was not concerned. Went to work today, then came home when wife called him to take her to the ED.  states that pt "likes to play with pills sometimes," also feels that new medications have been making her more forgetful and confused.  does not have safety concerns.

## 2019-11-12 NOTE — ED PROVIDER NOTE - OBJECTIVE STATEMENT
70 y/o F w/ hx anxiety/depression sent in by psychiatrist to ER s/p suicide attempt yesterday (over 24 hours ago). Yesterday around 9 or 10am, patient took 9 extra pills, unsure of which ones. This was a suicide attempt due to patient's depression (argument with /not spending enough time with him, patient's friend recently passed away from cancer, patient's mother and father passed away a few years ago). Home meds include diazepam, venlafaxine, hydroxyzine, HCTZ, metoprolol, atorvastatin. In her purse 3 bottles were empty--hydroxyzine, metoprolol and diazepam. Denies nausea/vomiting, visual changes, abd pain, chest pain, shortness of breath, dizziness, fevers/chills, hyperthermia, tremors, difficulty ambulating. Denies prior SI attempt, no prior psychiatric admission, no hx of incarceration/arrest. Used to work as an , retired. Lives with , feels safe at home. Denies illicit drug use, alcohol abuse or cigarettes.

## 2019-11-12 NOTE — ED BEHAVIORAL HEALTH ASSESSMENT NOTE - DESCRIPTION
Pt is calm and cooperative in the ED.    Vital Signs Last 24 Hrs  T(C): 36.6 (12 Nov 2019 15:55), Max: 36.6 (12 Nov 2019 12:28)  T(F): 97.9 (12 Nov 2019 15:55), Max: 97.9 (12 Nov 2019 12:28)  HR: 76 (12 Nov 2019 15:55) (65 - 76)  BP: 133/67 (12 Nov 2019 15:55) (133/67 - 134/73)  BP(mean): --  RR: 16 (12 Nov 2019 15:55) (16 - 16)  SpO2: 100% (12 Nov 2019 15:55) (97% - 100%) HTN, HLD--HCTZ 25mg qAM, metoprolol 50mg qAM, atorvastatin 10mg qAM, losartan 100mg qAM retired , , no children

## 2019-11-12 NOTE — ED BEHAVIORAL HEALTH ASSESSMENT NOTE - RISK ASSESSMENT
Static risk factors include hx of depression, advanced age. Modifiable risk factors include recent loss. Protective factors include , domiciled, engaged in treatment, med compliant, no substance use. Overall low acute suicide risk, does not warrant involuntary psych admission. Low Acute Suicide Risk

## 2019-11-12 NOTE — ED BEHAVIORAL HEALTH ASSESSMENT NOTE - CURRENT ACTIVE IDEATION
None known
decreased weight-shifting ability/decreased jason/decreased velocity of limb motion/decreased step length/decreased stride length

## 2019-11-12 NOTE — ED PROVIDER NOTE - ATTENDING CONTRIBUTION TO CARE
I performed a face to face bedside interview with patient regarding history of present illness, review of symptoms and past medical history. I completed an independent physical exam.  I have discussed patient's plan of care.   I agree with note as stated above, having amended the EMR as needed to reflect my findings. I have discussed the assessment and plan of care.  This includes during the time I functioned as the attending physician for this patient.  Attending Contribution to Care: agree with plan of resident. 68 y/o F w/ hx anxiety/depression sent in by psychiatrist to ER s/p suicide attempt yesterday (over 24 hours ago). Yesterday around 9 or 10am, patient took 9 extra pills, unsure of which ones. This was a suicide attempt due to patient's depression (argument with /not spending enough time with him, patient's friend recently passed away from cancer, patient's mother and father passed away a few years ago). Home meds include diazepam, venlafaxine, hydroxyzine, HCTZ, metoprolol, atorvastatin. In her purse 3 bottles were empty--hydroxyzine, metoprolol and diazepam. Denies nausea/vomiting, visual changes, abd pain, chest pain, shortness of breath, dizziness, fevers/chills, hyperthermia, tremors, difficulty ambulating. Denies prior SI attempt, no prior psychiatric admission, no hx of incarceration/arrest. Used to work as an , retired. Lives with , feels safe at home. Denies illicit drug use, alcohol abuse or cigarettes.  pt self observed for 24 hours, medically cleared for psych eval. pt with extensive hx of psych history, will be evaluated by psych for dispo

## 2019-11-12 NOTE — ED BEHAVIORAL HEALTH ASSESSMENT NOTE - ACTIVATING EVENTS/STRESSORS
Current or pending social isolation/Triggering events leading to humiliation, shame, and/or despair (e.g. Loss of relationship, financial or health status) (real or anticipated)/Inadequate social supports

## 2019-11-12 NOTE — ED PROVIDER NOTE - PHYSICAL EXAMINATION
[Const] well-appearing, resting comfortably, no acute distress  [HEENT] PERRL, EOM, MMM  [Neck] Supple, trachea midline  [CV] +S1/S2, no m/r/g appreciated  [Lungs] CTABL, no adventitious lung sounds  [Abd] soft, non-tender, nondistended in all 4 quadrants  [MSK] 5/5 UE and LR str BL  [Skin] warm, dry, well-perfused  [Neuro] A&Ox3, CN II-XII intact  [Psych] Active SI, denies HI/AVH, speech is directable, rate is normal tone is appropriate, patient is cooperative but depressed, intact attention/concentration

## 2019-11-12 NOTE — ED ADULT NURSE REASSESSMENT NOTE - NS ED NURSE REASSESS COMMENT FT1
Pt a&ox4, ambulatory, with family at bedside. Pt has no complaints at this time. Pt states that "I didn't know what I was trying to do when I took the pills, I felt very lonely last night I cant get over my parents death:" Pt appears comfortable in bed. VSS. NSR on cardiac monitor.

## 2019-11-12 NOTE — ED BEHAVIORAL HEALTH ASSESSMENT NOTE - CASE SUMMARY
Pt is a 68yo woman, , no dependents, retired , domiciled; no PMH; PPH of anxiety, depression, no previous hospitalizations, in outpt tx, no SAs, no SIB; no substance use, no legal/arrest/aggression hx, BIB self referred by psychiatrist after telling him she overdosed on 9 pills of unknown medication. Pt reports no history of suicidality, impulsively took medication without previous intent or plan in context of multiple psychosocial stressors, including recent death of best friend. Pt also with histrionic traits per psychiatrist. Pt now emphatically denying suicidality, lists multiple protective factors, including friends, , spirituality, memory of her parents, and being future oriented. Pt declines voluntary psych admission. Pt not currently acute risk to herself or others, does not warrant inpt psychiatric admission. Pt can follow-up with outpt psychiatrist, safety plan discussed with pt and .

## 2019-11-12 NOTE — ED ADULT NURSE NOTE - OBJECTIVE STATEMENT
Patient presents to room 11 for possible overdose. Pt admits to and states "I took 9 of my pills I don't remember which one" in attempt to harm herself last night. Pt states she hasn't been able to cope with the passing of her parents and her friend and states she thinks she's bipolar "one day I'm  happy and the next day I'm really sad".  pt states she has been depressed since the passing of her parents a few years ago and states that before she came to the ER, her and her  got into an argument.  Pt placed on constant observation for safety, belongings secure with .  Vital signs as noted, pt placed on cardiac monitor NSR, breathing equal and unlabored no signs of distress at this time. EKG done, labs drawn and sent, 20G left forearm placed, flushes without difficulty. Will continue to monitor.

## 2019-11-12 NOTE — ED PROVIDER NOTE - PROGRESS NOTE DETAILS
Melly, PGY-1: Spoke with toxicology. Discussed patient's medication list and possible syndromes from each med. Patient medically stable from their perspective, recommend repeating LFT's given specimen was hemolyzed. stewart:  I spoke with pt. She is depressed, chronic and appears to have taken the meds in despair. At present, pt denies wanting to hurt self. She states that she took the medications in mistake and would never do this again. She states she will follow up with her psychiatrist. I discussed this with the ED psychiatrist and psychiatry resident.  They spoke with her psychiatrist in addition and pt given crisis center information.

## 2019-11-13 ENCOUNTER — INPATIENT (INPATIENT)
Facility: HOSPITAL | Age: 69
LOS: 8 days | Discharge: HOME | End: 2019-11-22
Attending: PSYCHIATRY & NEUROLOGY | Admitting: PSYCHIATRY & NEUROLOGY
Payer: COMMERCIAL

## 2019-11-13 VITALS
TEMPERATURE: 99 F | SYSTOLIC BLOOD PRESSURE: 116 MMHG | OXYGEN SATURATION: 100 % | HEART RATE: 87 BPM | DIASTOLIC BLOOD PRESSURE: 78 MMHG | RESPIRATION RATE: 16 BRPM

## 2019-11-13 DIAGNOSIS — F33.2 MAJOR DEPRESSIVE DISORDER, RECURRENT SEVERE WITHOUT PSYCHOTIC FEATURES: ICD-10-CM

## 2019-11-13 DIAGNOSIS — R69 ILLNESS, UNSPECIFIED: ICD-10-CM

## 2019-11-13 LAB
ALBUMIN SERPL ELPH-MCNC: 4.7 G/DL — SIGNIFICANT CHANGE UP (ref 3.3–5)
ALP SERPL-CCNC: 106 U/L — SIGNIFICANT CHANGE UP (ref 40–120)
ALT FLD-CCNC: 46 U/L — HIGH (ref 4–33)
ANION GAP SERPL CALC-SCNC: 16 MMO/L — HIGH (ref 7–14)
APAP SERPL-MCNC: < 15 UG/ML — LOW (ref 15–25)
AST SERPL-CCNC: 128 U/L — HIGH (ref 4–32)
BASOPHILS # BLD AUTO: 0.07 K/UL — SIGNIFICANT CHANGE UP (ref 0–0.2)
BASOPHILS NFR BLD AUTO: 0.6 % — SIGNIFICANT CHANGE UP (ref 0–2)
BILIRUB SERPL-MCNC: 0.3 MG/DL — SIGNIFICANT CHANGE UP (ref 0.2–1.2)
BUN SERPL-MCNC: 15 MG/DL — SIGNIFICANT CHANGE UP (ref 7–23)
CALCIUM SERPL-MCNC: 9.8 MG/DL — SIGNIFICANT CHANGE UP (ref 8.4–10.5)
CHLORIDE SERPL-SCNC: 99 MMOL/L — SIGNIFICANT CHANGE UP (ref 98–107)
CO2 SERPL-SCNC: 30 MMOL/L — SIGNIFICANT CHANGE UP (ref 22–31)
CREAT SERPL-MCNC: 0.71 MG/DL — SIGNIFICANT CHANGE UP (ref 0.5–1.3)
EOSINOPHIL # BLD AUTO: 0.29 K/UL — SIGNIFICANT CHANGE UP (ref 0–0.5)
EOSINOPHIL NFR BLD AUTO: 2.5 % — SIGNIFICANT CHANGE UP (ref 0–6)
ETHANOL BLD-MCNC: < 10 MG/DL — SIGNIFICANT CHANGE UP
GLUCOSE SERPL-MCNC: 69 MG/DL — LOW (ref 70–99)
HCG SERPL-ACNC: < 5 MIU/ML — SIGNIFICANT CHANGE UP
HCT VFR BLD CALC: 44.5 % — SIGNIFICANT CHANGE UP (ref 34.5–45)
HGB BLD-MCNC: 14.3 G/DL — SIGNIFICANT CHANGE UP (ref 11.5–15.5)
IMM GRANULOCYTES NFR BLD AUTO: 0.5 % — SIGNIFICANT CHANGE UP (ref 0–1.5)
LYMPHOCYTES # BLD AUTO: 2.89 K/UL — SIGNIFICANT CHANGE UP (ref 1–3.3)
LYMPHOCYTES # BLD AUTO: 24.9 % — SIGNIFICANT CHANGE UP (ref 13–44)
MCHC RBC-ENTMCNC: 27.8 PG — SIGNIFICANT CHANGE UP (ref 27–34)
MCHC RBC-ENTMCNC: 32.1 % — SIGNIFICANT CHANGE UP (ref 32–36)
MCV RBC AUTO: 86.4 FL — SIGNIFICANT CHANGE UP (ref 80–100)
MONOCYTES # BLD AUTO: 0.63 K/UL — SIGNIFICANT CHANGE UP (ref 0–0.9)
MONOCYTES NFR BLD AUTO: 5.4 % — SIGNIFICANT CHANGE UP (ref 2–14)
NEUTROPHILS # BLD AUTO: 7.68 K/UL — HIGH (ref 1.8–7.4)
NEUTROPHILS NFR BLD AUTO: 66.1 % — SIGNIFICANT CHANGE UP (ref 43–77)
NRBC # FLD: 0 K/UL — SIGNIFICANT CHANGE UP (ref 0–0)
PLATELET # BLD AUTO: 225 K/UL — SIGNIFICANT CHANGE UP (ref 150–400)
PMV BLD: 11.7 FL — SIGNIFICANT CHANGE UP (ref 7–13)
POTASSIUM SERPL-MCNC: 3.4 MMOL/L — LOW (ref 3.5–5.3)
POTASSIUM SERPL-SCNC: 3.4 MMOL/L — LOW (ref 3.5–5.3)
PROT SERPL-MCNC: 7.3 G/DL — SIGNIFICANT CHANGE UP (ref 6–8.3)
RBC # BLD: 5.15 M/UL — SIGNIFICANT CHANGE UP (ref 3.8–5.2)
RBC # FLD: 12.2 % — SIGNIFICANT CHANGE UP (ref 10.3–14.5)
SALICYLATES SERPL-MCNC: < 5 MG/DL — LOW (ref 15–30)
SODIUM SERPL-SCNC: 145 MMOL/L — SIGNIFICANT CHANGE UP (ref 135–145)
TSH SERPL-MCNC: 1.97 UIU/ML — SIGNIFICANT CHANGE UP (ref 0.27–4.2)
WBC # BLD: 11.62 K/UL — HIGH (ref 3.8–10.5)
WBC # FLD AUTO: 11.62 K/UL — HIGH (ref 3.8–10.5)

## 2019-11-13 PROCEDURE — 99285 EMERGENCY DEPT VISIT HI MDM: CPT

## 2019-11-13 RX ORDER — HYDROCHLOROTHIAZIDE 25 MG
25 TABLET ORAL AT BEDTIME
Refills: 0 | Status: DISCONTINUED | OUTPATIENT
Start: 2019-11-13 | End: 2019-11-13

## 2019-11-13 RX ORDER — HYDROCHLOROTHIAZIDE 25 MG
25 TABLET ORAL DAILY
Refills: 0 | Status: DISCONTINUED | OUTPATIENT
Start: 2019-11-13 | End: 2019-11-22

## 2019-11-13 RX ORDER — HYDROXYZINE HCL 10 MG
50 TABLET ORAL ONCE
Refills: 0 | Status: COMPLETED | OUTPATIENT
Start: 2019-11-13 | End: 2019-11-13

## 2019-11-13 RX ORDER — METOPROLOL TARTRATE 50 MG
1 TABLET ORAL
Qty: 0 | Refills: 0 | DISCHARGE

## 2019-11-13 RX ORDER — LOSARTAN POTASSIUM 100 MG/1
100 TABLET, FILM COATED ORAL DAILY
Refills: 0 | Status: DISCONTINUED | OUTPATIENT
Start: 2019-11-13 | End: 2019-11-22

## 2019-11-13 RX ORDER — METOPROLOL TARTRATE 50 MG
50 TABLET ORAL DAILY
Refills: 0 | Status: DISCONTINUED | OUTPATIENT
Start: 2019-11-13 | End: 2019-11-22

## 2019-11-13 RX ORDER — ATORVASTATIN CALCIUM 80 MG/1
10 TABLET, FILM COATED ORAL DAILY
Refills: 0 | Status: DISCONTINUED | OUTPATIENT
Start: 2019-11-13 | End: 2019-11-22

## 2019-11-13 RX ORDER — ONDANSETRON 8 MG/1
0 TABLET, FILM COATED ORAL
Qty: 0 | Refills: 0 | DISCHARGE

## 2019-11-13 RX ORDER — HYDROXYZINE HCL 10 MG
50 TABLET ORAL AT BEDTIME
Refills: 0 | Status: DISCONTINUED | OUTPATIENT
Start: 2019-11-13 | End: 2019-11-22

## 2019-11-13 RX ORDER — POTASSIUM CHLORIDE 20 MEQ
20 PACKET (EA) ORAL ONCE
Refills: 0 | Status: COMPLETED | OUTPATIENT
Start: 2019-11-13 | End: 2019-11-13

## 2019-11-13 RX ORDER — DIAZEPAM 5 MG
5 TABLET ORAL
Refills: 0 | Status: DISCONTINUED | OUTPATIENT
Start: 2019-11-13 | End: 2019-11-18

## 2019-11-13 RX ORDER — VENLAFAXINE HCL 75 MG
100 CAPSULE, EXT RELEASE 24 HR ORAL DAILY
Refills: 0 | Status: DISCONTINUED | OUTPATIENT
Start: 2019-11-13 | End: 2019-11-14

## 2019-11-13 RX ADMIN — Medication 20 MILLIEQUIVALENT(S): at 17:56

## 2019-11-13 RX ADMIN — Medication 5 MILLIGRAM(S): at 21:03

## 2019-11-13 RX ADMIN — Medication 50 MILLIGRAM(S): at 16:55

## 2019-11-13 NOTE — ED PROVIDER NOTE - ATTENDING CONTRIBUTION TO CARE
Patient is a 70 yo F with history of HTN, hyperlipidemia, anxiety, depression and panic attacks here for persistent anxiety. Patient was here yesterday for evaluation after overdose of her home meds, offered admission at that time but declined. She came back today because she continued to have symptoms of anxiety, palpitations and states she feels like she can't do anything at home. She states she feels trapped in her home because she is too anxious to leave. Denies chest pain, shortness of breath, nausea, vomiting, fevers, chills. No abdominal pain. Denies any ingestions today. No SI/HI/ hallucinations.     VS noted  Gen: anxious, tearful  HEENT: EOMI, mmm  Lungs: CTAB/L no C/ W /R   CVS: RRR   Abd; Soft non tender, non distended   Ext: no edema  Skin: no rash  Neuro AAOx3 non focal clear speech  a/p: anxiety/ panic attack - pt was offered psych admission yesterday but came back feeling as if she would benefit as she is not functioning well at home. Plan for labs, ekg, psych eval and likely psych admission.   - Nixon HARMAN

## 2019-11-13 NOTE — ED BEHAVIORAL HEALTH ASSESSMENT NOTE - CURRENT MEDICATION
diazepam 5mg BID, venlafaxine 100mg qAM, fluoxetine 10mg qAM, hydroxyzine 50mg qHS PRN diazepam 5mg BID, venlafaxine 100mg qAM, hydroxyzine 50mg qHS PRN, Hydrochlorothiazide 25 mg po QHS, Metoprolol ER 50 mg po daily, Losartan 100 mg po daily, Atorvastatin 10 mg po daily

## 2019-11-13 NOTE — ED BEHAVIORAL HEALTH ASSESSMENT NOTE - PRIMARY DX
Adjustment disorder with depressed mood Severe episode of recurrent major depressive disorder, without psychotic features Deferred condition on axis II

## 2019-11-13 NOTE — ED BEHAVIORAL HEALTH NOTE - BEHAVIORAL HEALTH NOTE
Worker met with  in the waiting room who provided medication list to worker. Worker provided medication list to EBER Brown.

## 2019-11-13 NOTE — ED BEHAVIORAL HEALTH ASSESSMENT NOTE - ACTIVATING EVENTS/STRESSORS
Inadequate social supports/Triggering events leading to humiliation, shame, and/or despair (e.g. Loss of relationship, financial or health status) (real or anticipated)/Current or pending social isolation

## 2019-11-13 NOTE — ED BEHAVIORAL HEALTH ASSESSMENT NOTE - HPI (INCLUDE ILLNESS QUALITY, SEVERITY, DURATION, TIMING, CONTEXT, MODIFYING FACTORS, ASSOCIATED SIGNS AND SYMPTOMS)
Pt is a 68yo woman, , no dependents, retired , domiciled; no PMH; PPH of anxiety, depression, no previous hospitalizations, in outpt tx, no SAs, no SIB; no substance use, no legal/arrest/aggression hx, self-presents for worsening anxiety and depression after being evaluated in [facility] ED yesterday (11/12/19) s/p overdose on 9 pills of unknown medication two days ago.      Pt is able to engage in the interview but is a poor historian. Pt reports numerous losses recently, including her parents a few years ago and her best friend last week. States she was incredibly close with her parents and has been affected since then. Pt states she was at a party on Sunday night and was struck by how lonely she feels. Reports her  is a workaholic and so feels she has lost her primary support systems, although she has many other friends. States she cannot remember whether she took the pills on Sunday after the party or last night but states she does not know why she did it or what pills they were. Pt reports having felt depressed and anxious on and off over the last few weeks. Otherwise has been sleeping appropriately, no changes in concentration, no changes in energy, still has been able to see her friends, do errands throughout the day. States that she has never attempted suicide and had not been thinking about overdosing previous to doing it, felt that it was an impulsive action. Emphatically denies feeling suicidal now, states that she does not want to shame the memory of her parents, and has friends and her  that she wants to live for. When asked if she has future plans, pt states "I want to get better." Denies HI. Denies VAH, manic symptoms. Pt declines voluntary admission.     Pt fully oriented to self, time, place, registration 3/3, able to spell WORLD forwards and backwards, able to repeat "no ifs, ands, or buts, recall 1/3.    Per psychiatrist, pt called him this morning and said that she had taken the pills because she didn't want to be alive anymore. Psychiatrist reports pt has no suicide history and is a very histrionic, dramatic personality. Psychiatrist wanted her to come to the ED fur further evaluation she has no suicidal history.    Per , pt has never endorsed suicidality to him. States pt normally goes out during the day to spend time with her friends, was surprised when he found her home yesterday. States he came home and she was on the floor, however was conscious. Did not explain to him how she came to be on the floor,  was not concerned. Went to work today, then came home when wife called him to take her to the ED.  states that pt "likes to play with pills sometimes," also feels that new medications have been making her more forgetful and confused.  does not have safety concerns. Pt is a 68yo woman, , no dependents, retired , domiciled; no PMH; PPH of anxiety, depression, no previous hospitalizations, in outpt tx, no SAs, no SIB; no substance use, no legal/arrest/aggression hx, self-presents for worsening anxiety and depression after being evaluated in [facility] ED yesterday (11/12/19) s/p overdose on 9 pills of unknown medication two days ago.      Patient provides history similar to yesterday (11/12/19): she reports numerous losses recently, including her parents a few years ago and her best friend last week. States she was incredibly close with her parents and has been affected since then. Pt states she was at a party on Sunday night and was struck by how lonely she feels. Reports her  is a workaholic and so feels she has lost her primary support systems, although she has many other friends. Pt reports feeling depressed and anxious on and off over the last few weeks. Today, she woke up feeling "incredibly anxious." Unknown acute triggers. She reports racing heart, panic attack, decreased energy, difficulty performing her daily activities. She does not want to leave her house or talk to or see anyone. Although she denies suicidal ideation, she feels she needs to be admitted to the hospital for her worsening anxiety and depression. She also reports memory problems and feels she needs her medications adjusted.   She denies manic or psychotic symptoms. She denies homicidal or violent ideation, intent, or plan. She reports medication compliance, denies substance use.

## 2019-11-13 NOTE — ED BEHAVIORAL HEALTH NOTE - BEHAVIORAL HEALTH NOTE
High Risk follow up:     Writer made outreach call to  left voicemail requesting a callback to social work phone.

## 2019-11-13 NOTE — ED PROVIDER NOTE - CLINICAL SUMMARY MEDICAL DECISION MAKING FREE TEXT BOX
This is a 69 yr old F, pmh anxiety, panic attack, htn, hld, with increased depression and panic attack.   Screening basic lab, ua tox serum.  Psych consult requested This is a 69 yr old F, pmh anxiety, panic attack, htn, hld, with increased depression and panic attack.   Screening basic lab, ua tox serum.- results unremarkable   Psych consult requested- inpatient treatment suggested.

## 2019-11-13 NOTE — ED PROVIDER NOTE - OBJECTIVE STATEMENT
This is a 69 yr old F, pmh anxiety, panic attack, htn, hld, with increased depression and panic attack. Pt was here yesterday due to od on her prescribed medications. Today pt came back, c/o racing heart, panic attack, less energy, unable to do daily activities, does not want to leave her house or to go out to the community, does not want to talk to and see anyone. Denies SI/HI/AH/VH. Denies falling, punching or kicking any objects. Denies pain, SOB, fever, chills, chest and abdominal discomfort. Denies recent use of alcohol or illicit drug. No evidence of physical injuries.

## 2019-11-13 NOTE — ED BEHAVIORAL HEALTH ASSESSMENT NOTE - SUICIDE PROTECTIVE FACTORS
Identifies reasons for living/Has future plans/Cultural, spiritual and/or moral attitudes against suicide/Supportive social network of family or friends/Responsibility to family and others/Positive therapeutic relationships Cultural, spiritual and/or moral attitudes against suicide/Responsibility to family and others/Supportive social network of family or friends/Identifies reasons for living

## 2019-11-13 NOTE — ED ADULT TRIAGE NOTE - CHIEF COMPLAINT QUOTE
Seen here yesterday after suicide attempt, took pills, was cleared medically and asked if she wants to stay but chose to go home, today woke up feeling very depressed and anxious. Denies SI today. Is calm and cooperative

## 2019-11-13 NOTE — ED BEHAVIORAL HEALTH ASSESSMENT NOTE - DESCRIPTION
Pt is calm and cooperative in the ED.    Vital Signs Last 24 Hrs  T(C): 36.6 (12 Nov 2019 15:55), Max: 36.6 (12 Nov 2019 12:28)  T(F): 97.9 (12 Nov 2019 15:55), Max: 97.9 (12 Nov 2019 12:28)  HR: 76 (12 Nov 2019 15:55) (65 - 76)  BP: 133/67 (12 Nov 2019 15:55) (133/67 - 134/73)  BP(mean): --  RR: 16 (12 Nov 2019 15:55) (16 - 16)  SpO2: 100% (12 Nov 2019 15:55) (97% - 100%) HTN, HLD--HCTZ 25mg qAM, metoprolol 50mg qAM, atorvastatin 10mg qAM, losartan 100mg qAM retired , , no children in behavioral control    Vital Signs Last 24 Hrs  T(C): 37 (13 Nov 2019 14:33), Max: 37 (13 Nov 2019 14:33)  T(F): 98.6 (13 Nov 2019 14:33), Max: 98.6 (13 Nov 2019 14:33)  HR: 87 (13 Nov 2019 14:33) (87 - 87)  BP: 116/78 (13 Nov 2019 14:33) (116/78 - 116/78)  BP(mean): --  RR: 16 (13 Nov 2019 14:33) (16 - 16)  SpO2: 100% (13 Nov 2019 14:33) (100% - 100%) HTN, HLD

## 2019-11-13 NOTE — ED BEHAVIORAL HEALTH ASSESSMENT NOTE - SUICIDE RISK FACTORS
Mood Disorder current/past/Cluster B Personality disorders or traits current/past/Hopelessness or despair Access to lethal methods (pills, firearm, etc.: Ask specifically about presence or absence of a firearm in the home or ease of accessing/Mood Disorder current/past/Cluster B Personality disorders or traits current/past/Current mood episode/Hopelessness or despair

## 2019-11-13 NOTE — ED BEHAVIORAL HEALTH ASSESSMENT NOTE - SUMMARY
Pt is a 70yo woman, , no dependents, retired , domiciled; no PMH; PPH of anxiety, depression, no previous hospitalizations, in outpt tx, no SAs, no SIB; no substance use, no legal/arrest/aggression hx, self-presents for worsening anxiety and depression after being evaluated in [facility] ED yesterday (11/12/19) s/p overdose on 9 pills of unknown medication two days ago. Pt is a 68yo woman, , no dependents, retired , domiciled; no PMH; PPH of anxiety, depression, no previous hospitalizations, in outpt tx, no SAs, no SIB; no substance use, no legal/arrest/aggression hx, self-presents for worsening anxiety and depression after being evaluated in [facility] ED yesterday (11/12/19) s/p overdose on 9 pills of unknown medication two days ago.  Patient requests and meets criteria for voluntary psychiatric admission for stabilization.

## 2019-11-13 NOTE — ED ADULT NURSE NOTE - OBJECTIVE STATEMENT
pt is in bed A and Ox  3 in NAD resting comfortably, pt reports woke up this morning and realized she has no pleasure in captivities of daily living, feels depressed and having shouts of suicide. denies any attempts today,  states sought help immediately. denies AVH or HI, denies drug or ETOH use PTA.  calm and cooperative, thought process is logical able to express her feeling and thoughts. Orders noted and completed.

## 2019-11-13 NOTE — ED BEHAVIORAL HEALTH ASSESSMENT NOTE - RISK ASSESSMENT
Static risk factors include hx of depression, advanced age. Modifiable risk factors include recent loss. Protective factors include , domiciled, engaged in treatment, med compliant, no substance use. Overall low acute suicide risk, does not warrant involuntary psych admission. Low Acute Suicide Risk pt is at acutely elevated risk of harm to self

## 2019-11-13 NOTE — ED BEHAVIORAL HEALTH ASSESSMENT NOTE - PSYCHIATRIC ISSUES AND PLAN (INCLUDE STANDING AND PRN MEDICATION)
Defer medication changes to inpatient team; continue diazepam 5mg BID, venlafaxine 100mg qAM, hydroxyzine 50mg qHS PRN

## 2019-11-14 PROCEDURE — 99222 1ST HOSP IP/OBS MODERATE 55: CPT

## 2019-11-14 RX ORDER — ESCITALOPRAM OXALATE 10 MG/1
5 TABLET, FILM COATED ORAL DAILY
Refills: 0 | Status: DISCONTINUED | OUTPATIENT
Start: 2019-11-14 | End: 2019-11-18

## 2019-11-14 RX ORDER — VENLAFAXINE HCL 75 MG
75 CAPSULE, EXT RELEASE 24 HR ORAL DAILY
Refills: 0 | Status: DISCONTINUED | OUTPATIENT
Start: 2019-11-15 | End: 2019-11-18

## 2019-11-14 RX ADMIN — Medication 100 MILLIGRAM(S): at 09:00

## 2019-11-14 RX ADMIN — ATORVASTATIN CALCIUM 10 MILLIGRAM(S): 80 TABLET, FILM COATED ORAL at 09:00

## 2019-11-14 RX ADMIN — ESCITALOPRAM OXALATE 5 MILLIGRAM(S): 10 TABLET, FILM COATED ORAL at 12:47

## 2019-11-14 RX ADMIN — Medication 5 MILLIGRAM(S): at 21:04

## 2019-11-14 RX ADMIN — Medication 25 MILLIGRAM(S): at 09:00

## 2019-11-14 RX ADMIN — Medication 5 MILLIGRAM(S): at 09:00

## 2019-11-14 RX ADMIN — Medication 50 MILLIGRAM(S): at 09:00

## 2019-11-14 RX ADMIN — LOSARTAN POTASSIUM 100 MILLIGRAM(S): 100 TABLET, FILM COATED ORAL at 09:00

## 2019-11-15 PROCEDURE — 99232 SBSQ HOSP IP/OBS MODERATE 35: CPT

## 2019-11-15 RX ADMIN — Medication 5 MILLIGRAM(S): at 20:21

## 2019-11-15 RX ADMIN — Medication 50 MILLIGRAM(S): at 09:32

## 2019-11-15 RX ADMIN — Medication 75 MILLIGRAM(S): at 09:32

## 2019-11-15 RX ADMIN — LOSARTAN POTASSIUM 100 MILLIGRAM(S): 100 TABLET, FILM COATED ORAL at 09:32

## 2019-11-15 RX ADMIN — Medication 25 MILLIGRAM(S): at 09:32

## 2019-11-15 RX ADMIN — ESCITALOPRAM OXALATE 5 MILLIGRAM(S): 10 TABLET, FILM COATED ORAL at 09:32

## 2019-11-15 RX ADMIN — Medication 5 MILLIGRAM(S): at 09:32

## 2019-11-15 RX ADMIN — ATORVASTATIN CALCIUM 10 MILLIGRAM(S): 80 TABLET, FILM COATED ORAL at 20:21

## 2019-11-16 PROCEDURE — 99231 SBSQ HOSP IP/OBS SF/LOW 25: CPT

## 2019-11-16 RX ADMIN — Medication 5 MILLIGRAM(S): at 09:14

## 2019-11-16 RX ADMIN — Medication 75 MILLIGRAM(S): at 09:14

## 2019-11-16 RX ADMIN — Medication 5 MILLIGRAM(S): at 21:24

## 2019-11-16 RX ADMIN — LOSARTAN POTASSIUM 100 MILLIGRAM(S): 100 TABLET, FILM COATED ORAL at 09:14

## 2019-11-16 RX ADMIN — ESCITALOPRAM OXALATE 5 MILLIGRAM(S): 10 TABLET, FILM COATED ORAL at 09:14

## 2019-11-16 RX ADMIN — Medication 50 MILLIGRAM(S): at 09:14

## 2019-11-16 RX ADMIN — Medication 25 MILLIGRAM(S): at 09:14

## 2019-11-16 RX ADMIN — ATORVASTATIN CALCIUM 10 MILLIGRAM(S): 80 TABLET, FILM COATED ORAL at 21:24

## 2019-11-17 PROCEDURE — 99231 SBSQ HOSP IP/OBS SF/LOW 25: CPT

## 2019-11-17 RX ADMIN — LOSARTAN POTASSIUM 100 MILLIGRAM(S): 100 TABLET, FILM COATED ORAL at 08:45

## 2019-11-17 RX ADMIN — Medication 5 MILLIGRAM(S): at 21:00

## 2019-11-17 RX ADMIN — Medication 5 MILLIGRAM(S): at 08:45

## 2019-11-17 RX ADMIN — ATORVASTATIN CALCIUM 10 MILLIGRAM(S): 80 TABLET, FILM COATED ORAL at 21:17

## 2019-11-17 RX ADMIN — Medication 75 MILLIGRAM(S): at 08:45

## 2019-11-17 RX ADMIN — Medication 5 MILLIGRAM(S): at 21:17

## 2019-11-17 RX ADMIN — ESCITALOPRAM OXALATE 5 MILLIGRAM(S): 10 TABLET, FILM COATED ORAL at 08:45

## 2019-11-17 RX ADMIN — Medication 25 MILLIGRAM(S): at 08:45

## 2019-11-17 RX ADMIN — Medication 50 MILLIGRAM(S): at 08:45

## 2019-11-18 PROCEDURE — 99232 SBSQ HOSP IP/OBS MODERATE 35: CPT

## 2019-11-18 RX ORDER — ESCITALOPRAM OXALATE 10 MG/1
10 TABLET, FILM COATED ORAL DAILY
Refills: 0 | Status: DISCONTINUED | OUTPATIENT
Start: 2019-11-18 | End: 2019-11-22

## 2019-11-18 RX ORDER — DIAZEPAM 5 MG
2.5 TABLET ORAL
Refills: 0 | Status: DISCONTINUED | OUTPATIENT
Start: 2019-11-18 | End: 2019-11-20

## 2019-11-18 RX ORDER — VENLAFAXINE HCL 75 MG
37.5 CAPSULE, EXT RELEASE 24 HR ORAL DAILY
Refills: 0 | Status: DISCONTINUED | OUTPATIENT
Start: 2019-11-18 | End: 2019-11-20

## 2019-11-18 RX ADMIN — ATORVASTATIN CALCIUM 10 MILLIGRAM(S): 80 TABLET, FILM COATED ORAL at 21:57

## 2019-11-18 RX ADMIN — Medication 75 MILLIGRAM(S): at 09:32

## 2019-11-18 RX ADMIN — Medication 2.5 MILLIGRAM(S): at 21:57

## 2019-11-18 RX ADMIN — Medication 50 MILLIGRAM(S): at 09:32

## 2019-11-18 RX ADMIN — Medication 25 MILLIGRAM(S): at 09:32

## 2019-11-18 RX ADMIN — ESCITALOPRAM OXALATE 5 MILLIGRAM(S): 10 TABLET, FILM COATED ORAL at 09:32

## 2019-11-18 RX ADMIN — LOSARTAN POTASSIUM 100 MILLIGRAM(S): 100 TABLET, FILM COATED ORAL at 09:32

## 2019-11-18 RX ADMIN — Medication 5 MILLIGRAM(S): at 21:57

## 2019-11-18 RX ADMIN — Medication 5 MILLIGRAM(S): at 09:32

## 2019-11-19 LAB
ALBUMIN SERPL ELPH-MCNC: 4.5 G/DL — SIGNIFICANT CHANGE UP (ref 3.3–5)
ALP SERPL-CCNC: 100 U/L — SIGNIFICANT CHANGE UP (ref 40–120)
ALT FLD-CCNC: 24 U/L — SIGNIFICANT CHANGE UP (ref 4–33)
ANION GAP SERPL CALC-SCNC: 11 MMO/L — SIGNIFICANT CHANGE UP (ref 7–14)
AST SERPL-CCNC: 20 U/L — SIGNIFICANT CHANGE UP (ref 4–32)
BILIRUB SERPL-MCNC: 0.3 MG/DL — SIGNIFICANT CHANGE UP (ref 0.2–1.2)
BUN SERPL-MCNC: 14 MG/DL — SIGNIFICANT CHANGE UP (ref 7–23)
CALCIUM SERPL-MCNC: 9.7 MG/DL — SIGNIFICANT CHANGE UP (ref 8.4–10.5)
CHLORIDE SERPL-SCNC: 99 MMOL/L — SIGNIFICANT CHANGE UP (ref 98–107)
CO2 SERPL-SCNC: 31 MMOL/L — SIGNIFICANT CHANGE UP (ref 22–31)
CREAT SERPL-MCNC: 0.77 MG/DL — SIGNIFICANT CHANGE UP (ref 0.5–1.3)
GLUCOSE SERPL-MCNC: 109 MG/DL — HIGH (ref 70–99)
POTASSIUM SERPL-MCNC: 4 MMOL/L — SIGNIFICANT CHANGE UP (ref 3.5–5.3)
POTASSIUM SERPL-SCNC: 4 MMOL/L — SIGNIFICANT CHANGE UP (ref 3.5–5.3)
PROT SERPL-MCNC: 6.9 G/DL — SIGNIFICANT CHANGE UP (ref 6–8.3)
SODIUM SERPL-SCNC: 141 MMOL/L — SIGNIFICANT CHANGE UP (ref 135–145)

## 2019-11-19 PROCEDURE — 99232 SBSQ HOSP IP/OBS MODERATE 35: CPT

## 2019-11-19 RX ORDER — ACETAMINOPHEN 500 MG
650 TABLET ORAL EVERY 6 HOURS
Refills: 0 | Status: DISCONTINUED | OUTPATIENT
Start: 2019-11-19 | End: 2019-11-22

## 2019-11-19 RX ADMIN — ATORVASTATIN CALCIUM 10 MILLIGRAM(S): 80 TABLET, FILM COATED ORAL at 20:49

## 2019-11-19 RX ADMIN — Medication 25 MILLIGRAM(S): at 08:32

## 2019-11-19 RX ADMIN — Medication 650 MILLIGRAM(S): at 14:45

## 2019-11-19 RX ADMIN — ESCITALOPRAM OXALATE 10 MILLIGRAM(S): 10 TABLET, FILM COATED ORAL at 08:24

## 2019-11-19 RX ADMIN — Medication 5 MILLIGRAM(S): at 20:49

## 2019-11-19 RX ADMIN — Medication 50 MILLIGRAM(S): at 08:32

## 2019-11-19 RX ADMIN — Medication 650 MILLIGRAM(S): at 14:10

## 2019-11-19 RX ADMIN — Medication 5 MILLIGRAM(S): at 08:24

## 2019-11-19 RX ADMIN — Medication 37.5 MILLIGRAM(S): at 08:24

## 2019-11-19 RX ADMIN — Medication 2.5 MILLIGRAM(S): at 08:24

## 2019-11-19 RX ADMIN — Medication 2.5 MILLIGRAM(S): at 20:49

## 2019-11-19 RX ADMIN — LOSARTAN POTASSIUM 100 MILLIGRAM(S): 100 TABLET, FILM COATED ORAL at 08:32

## 2019-11-20 PROCEDURE — 99232 SBSQ HOSP IP/OBS MODERATE 35: CPT

## 2019-11-20 RX ORDER — DIAZEPAM 5 MG
2.5 TABLET ORAL
Refills: 0 | Status: DISCONTINUED | OUTPATIENT
Start: 2019-11-20 | End: 2019-11-22

## 2019-11-20 RX ADMIN — ESCITALOPRAM OXALATE 10 MILLIGRAM(S): 10 TABLET, FILM COATED ORAL at 09:02

## 2019-11-20 RX ADMIN — LOSARTAN POTASSIUM 100 MILLIGRAM(S): 100 TABLET, FILM COATED ORAL at 09:02

## 2019-11-20 RX ADMIN — Medication 50 MILLIGRAM(S): at 09:02

## 2019-11-20 RX ADMIN — Medication 2.5 MILLIGRAM(S): at 21:25

## 2019-11-20 RX ADMIN — ATORVASTATIN CALCIUM 10 MILLIGRAM(S): 80 TABLET, FILM COATED ORAL at 21:25

## 2019-11-20 RX ADMIN — Medication 5 MILLIGRAM(S): at 09:01

## 2019-11-20 RX ADMIN — Medication 5 MILLIGRAM(S): at 21:25

## 2019-11-20 RX ADMIN — Medication 25 MILLIGRAM(S): at 09:02

## 2019-11-20 RX ADMIN — Medication 37.5 MILLIGRAM(S): at 09:02

## 2019-11-20 RX ADMIN — Medication 2.5 MILLIGRAM(S): at 09:02

## 2019-11-21 PROCEDURE — 99231 SBSQ HOSP IP/OBS SF/LOW 25: CPT

## 2019-11-21 RX ORDER — METOPROLOL TARTRATE 50 MG
1 TABLET ORAL
Qty: 30 | Refills: 0
Start: 2019-11-21 | End: 2019-12-20

## 2019-11-21 RX ORDER — ESCITALOPRAM OXALATE 10 MG/1
1 TABLET, FILM COATED ORAL
Qty: 30 | Refills: 0
Start: 2019-11-21 | End: 2019-12-20

## 2019-11-21 RX ORDER — POLYETHYLENE GLYCOL 3350 17 G/17G
17 POWDER, FOR SOLUTION ORAL ONCE
Refills: 0 | Status: COMPLETED | OUTPATIENT
Start: 2019-11-21 | End: 2019-11-21

## 2019-11-21 RX ORDER — LOSARTAN POTASSIUM 100 MG/1
1 TABLET, FILM COATED ORAL
Qty: 30 | Refills: 0
Start: 2019-11-21 | End: 2019-12-20

## 2019-11-21 RX ORDER — ATORVASTATIN CALCIUM 80 MG/1
1 TABLET, FILM COATED ORAL
Qty: 30 | Refills: 0
Start: 2019-11-21 | End: 2019-12-20

## 2019-11-21 RX ORDER — VENLAFAXINE HCL 75 MG
0 CAPSULE, EXT RELEASE 24 HR ORAL
Qty: 0 | Refills: 0 | DISCHARGE

## 2019-11-21 RX ORDER — ATORVASTATIN CALCIUM 80 MG/1
1 TABLET, FILM COATED ORAL
Qty: 0 | Refills: 0 | DISCHARGE

## 2019-11-21 RX ORDER — HYDROXYZINE HCL 10 MG
0 TABLET ORAL
Qty: 0 | Refills: 0 | DISCHARGE

## 2019-11-21 RX ORDER — LOSARTAN POTASSIUM 100 MG/1
1 TABLET, FILM COATED ORAL
Qty: 0 | Refills: 0 | DISCHARGE

## 2019-11-21 RX ORDER — METOPROLOL TARTRATE 50 MG
1 TABLET ORAL
Qty: 0 | Refills: 0 | DISCHARGE

## 2019-11-21 RX ORDER — DIAZEPAM 5 MG
0 TABLET ORAL
Qty: 0 | Refills: 0 | DISCHARGE

## 2019-11-21 RX ORDER — DIAZEPAM 5 MG
0.5 TABLET ORAL
Qty: 30 | Refills: 0
Start: 2019-11-21 | End: 2019-12-20

## 2019-11-21 RX ADMIN — Medication 2.5 MILLIGRAM(S): at 21:19

## 2019-11-21 RX ADMIN — ESCITALOPRAM OXALATE 10 MILLIGRAM(S): 10 TABLET, FILM COATED ORAL at 09:14

## 2019-11-21 RX ADMIN — ATORVASTATIN CALCIUM 10 MILLIGRAM(S): 80 TABLET, FILM COATED ORAL at 21:19

## 2019-11-21 RX ADMIN — LOSARTAN POTASSIUM 100 MILLIGRAM(S): 100 TABLET, FILM COATED ORAL at 09:14

## 2019-11-21 RX ADMIN — POLYETHYLENE GLYCOL 3350 17 GRAM(S): 17 POWDER, FOR SOLUTION ORAL at 17:34

## 2019-11-21 RX ADMIN — Medication 25 MILLIGRAM(S): at 09:14

## 2019-11-21 RX ADMIN — Medication 650 MILLIGRAM(S): at 09:57

## 2019-11-21 RX ADMIN — Medication 5 MILLIGRAM(S): at 21:19

## 2019-11-21 RX ADMIN — Medication 5 MILLIGRAM(S): at 09:14

## 2019-11-21 RX ADMIN — Medication 50 MILLIGRAM(S): at 09:14

## 2019-11-21 RX ADMIN — Medication 650 MILLIGRAM(S): at 07:53

## 2019-11-21 RX ADMIN — Medication 2.5 MILLIGRAM(S): at 09:14

## 2019-11-22 VITALS — TEMPERATURE: 99 F

## 2019-11-22 PROCEDURE — 99238 HOSP IP/OBS DSCHRG MGMT 30/<: CPT

## 2019-11-22 RX ADMIN — Medication 5 MILLIGRAM(S): at 09:04

## 2019-11-22 RX ADMIN — Medication 50 MILLIGRAM(S): at 09:05

## 2019-11-22 RX ADMIN — Medication 25 MILLIGRAM(S): at 09:05

## 2019-11-22 RX ADMIN — Medication 2.5 MILLIGRAM(S): at 09:05

## 2019-11-22 RX ADMIN — LOSARTAN POTASSIUM 100 MILLIGRAM(S): 100 TABLET, FILM COATED ORAL at 09:05

## 2019-11-22 RX ADMIN — ESCITALOPRAM OXALATE 10 MILLIGRAM(S): 10 TABLET, FILM COATED ORAL at 09:05

## 2019-12-18 ENCOUNTER — OUTPATIENT (OUTPATIENT)
Dept: OUTPATIENT SERVICES | Facility: HOSPITAL | Age: 69
LOS: 1 days | Discharge: STILL ADMITTED OR ADMINISTRATIVE | End: 2019-12-18
Payer: COMMERCIAL

## 2019-12-19 DIAGNOSIS — F41.9 ANXIETY DISORDER, UNSPECIFIED: ICD-10-CM

## 2020-01-06 ENCOUNTER — EMERGENCY (EMERGENCY)
Facility: HOSPITAL | Age: 70
LOS: 1 days | Discharge: HOME | End: 2020-01-06
Attending: EMERGENCY MEDICINE | Admitting: EMERGENCY MEDICINE
Payer: COMMERCIAL

## 2020-01-06 VITALS
OXYGEN SATURATION: 99 % | DIASTOLIC BLOOD PRESSURE: 90 MMHG | TEMPERATURE: 99 F | SYSTOLIC BLOOD PRESSURE: 157 MMHG | RESPIRATION RATE: 17 BRPM | HEART RATE: 99 BPM

## 2020-01-06 VITALS
TEMPERATURE: 100 F | SYSTOLIC BLOOD PRESSURE: 158 MMHG | RESPIRATION RATE: 18 BRPM | OXYGEN SATURATION: 98 % | HEART RATE: 97 BPM | DIASTOLIC BLOOD PRESSURE: 91 MMHG

## 2020-01-06 DIAGNOSIS — F33.0 MAJOR DEPRESSIVE DISORDER, RECURRENT, MILD: ICD-10-CM

## 2020-01-06 DIAGNOSIS — F41.1 GENERALIZED ANXIETY DISORDER: ICD-10-CM

## 2020-01-06 LAB
ALBUMIN SERPL ELPH-MCNC: 4.3 G/DL — SIGNIFICANT CHANGE UP (ref 3.3–5)
ALP SERPL-CCNC: 86 U/L — SIGNIFICANT CHANGE UP (ref 40–120)
ALT FLD-CCNC: 17 U/L — SIGNIFICANT CHANGE UP (ref 4–33)
ANION GAP SERPL CALC-SCNC: 13 MMO/L — SIGNIFICANT CHANGE UP (ref 7–14)
APAP SERPL-MCNC: < 15 UG/ML — LOW (ref 15–25)
AST SERPL-CCNC: 18 U/L — SIGNIFICANT CHANGE UP (ref 4–32)
BASOPHILS # BLD AUTO: 0.04 K/UL — SIGNIFICANT CHANGE UP (ref 0–0.2)
BASOPHILS NFR BLD AUTO: 0.3 % — SIGNIFICANT CHANGE UP (ref 0–2)
BILIRUB SERPL-MCNC: 0.5 MG/DL — SIGNIFICANT CHANGE UP (ref 0.2–1.2)
BUN SERPL-MCNC: 10 MG/DL — SIGNIFICANT CHANGE UP (ref 7–23)
CALCIUM SERPL-MCNC: 9.5 MG/DL — SIGNIFICANT CHANGE UP (ref 8.4–10.5)
CHLORIDE SERPL-SCNC: 102 MMOL/L — SIGNIFICANT CHANGE UP (ref 98–107)
CO2 SERPL-SCNC: 25 MMOL/L — SIGNIFICANT CHANGE UP (ref 22–31)
CREAT SERPL-MCNC: 0.67 MG/DL — SIGNIFICANT CHANGE UP (ref 0.5–1.3)
EOSINOPHIL # BLD AUTO: 0.06 K/UL — SIGNIFICANT CHANGE UP (ref 0–0.5)
EOSINOPHIL NFR BLD AUTO: 0.5 % — SIGNIFICANT CHANGE UP (ref 0–6)
ETHANOL BLD-MCNC: < 10 MG/DL — SIGNIFICANT CHANGE UP
GLUCOSE SERPL-MCNC: 113 MG/DL — HIGH (ref 70–99)
HCT VFR BLD CALC: 41.1 % — SIGNIFICANT CHANGE UP (ref 34.5–45)
HGB BLD-MCNC: 13.7 G/DL — SIGNIFICANT CHANGE UP (ref 11.5–15.5)
IMM GRANULOCYTES NFR BLD AUTO: 0.4 % — SIGNIFICANT CHANGE UP (ref 0–1.5)
LYMPHOCYTES # BLD AUTO: 1.44 K/UL — SIGNIFICANT CHANGE UP (ref 1–3.3)
LYMPHOCYTES # BLD AUTO: 12.3 % — LOW (ref 13–44)
MCHC RBC-ENTMCNC: 28.9 PG — SIGNIFICANT CHANGE UP (ref 27–34)
MCHC RBC-ENTMCNC: 33.3 % — SIGNIFICANT CHANGE UP (ref 32–36)
MCV RBC AUTO: 86.7 FL — SIGNIFICANT CHANGE UP (ref 80–100)
MONOCYTES # BLD AUTO: 0.58 K/UL — SIGNIFICANT CHANGE UP (ref 0–0.9)
MONOCYTES NFR BLD AUTO: 5 % — SIGNIFICANT CHANGE UP (ref 2–14)
NEUTROPHILS # BLD AUTO: 9.54 K/UL — HIGH (ref 1.8–7.4)
NEUTROPHILS NFR BLD AUTO: 81.5 % — HIGH (ref 43–77)
NRBC # FLD: 0 K/UL — SIGNIFICANT CHANGE UP (ref 0–0)
PLATELET # BLD AUTO: 223 K/UL — SIGNIFICANT CHANGE UP (ref 150–400)
PMV BLD: 12.2 FL — SIGNIFICANT CHANGE UP (ref 7–13)
POTASSIUM SERPL-MCNC: 4.6 MMOL/L — SIGNIFICANT CHANGE UP (ref 3.5–5.3)
POTASSIUM SERPL-SCNC: 4.6 MMOL/L — SIGNIFICANT CHANGE UP (ref 3.5–5.3)
PROT SERPL-MCNC: 6.9 G/DL — SIGNIFICANT CHANGE UP (ref 6–8.3)
RBC # BLD: 4.74 M/UL — SIGNIFICANT CHANGE UP (ref 3.8–5.2)
RBC # FLD: 13.6 % — SIGNIFICANT CHANGE UP (ref 10.3–14.5)
SALICYLATES SERPL-MCNC: < 5 MG/DL — LOW (ref 15–30)
SODIUM SERPL-SCNC: 140 MMOL/L — SIGNIFICANT CHANGE UP (ref 135–145)
TSH SERPL-MCNC: 1.06 UIU/ML — SIGNIFICANT CHANGE UP (ref 0.27–4.2)
WBC # BLD: 11.71 K/UL — HIGH (ref 3.8–10.5)
WBC # FLD AUTO: 11.71 K/UL — HIGH (ref 3.8–10.5)

## 2020-01-06 PROCEDURE — 93010 ELECTROCARDIOGRAM REPORT: CPT

## 2020-01-06 PROCEDURE — 99283 EMERGENCY DEPT VISIT LOW MDM: CPT | Mod: 25,GC

## 2020-01-06 PROCEDURE — 90792 PSYCH DIAG EVAL W/MED SRVCS: CPT

## 2020-01-06 NOTE — ED PROVIDER NOTE - PATIENT PORTAL LINK FT
You can access the FollowMyHealth Patient Portal offered by [facility] by registering at the following website: [email or web address]. By joining [employer]’s FollowMyHealth portal, you will also be able to view your health information using other applications (apps) compatible with our system.

## 2020-01-06 NOTE — ED PROVIDER NOTE - PROGRESS NOTE DETAILS
Resident Will: psych recommendations reviewed.  patient is re-evaluated. no new complaints. Labs results are discussed. All questions are answered. Discharge plan is discussed with the patient: patient reports understanding to follow up with his/her PCP within 5 days and psychiatrist tomorrow as scheduled, and to return to ER if patient develops chest pain, shortness of breath or any other alarming symptoms.

## 2020-01-06 NOTE — ED PROVIDER NOTE - CLINICAL SUMMARY MEDICAL DECISION MAKING FREE TEXT BOX
70 yo F with pmhx of HTN, HLD, anxiety, panic attack presents with 1 week of palpitations. EKG NSR. labs. Likely anxiety, eval for thyroid disease, anemia, toxicology. Psych consult. Will reassess.

## 2020-01-06 NOTE — ED BEHAVIORAL HEALTH ASSESSMENT NOTE - HPI (INCLUDE ILLNESS QUALITY, SEVERITY, DURATION, TIMING, CONTEXT, MODIFYING FACTORS, ASSOCIATED SIGNS AND SYMPTOMS)
70 y/o woman, , no dependents, retired , domiciled; PMH: HTN; PPH of anxiety, depression, 1 prior inpatient at [facility] Nov 2019; in outpt tx  with Dr Nagel, 1 prior suicide attempt Nov 13 via taking 9 pills; no SIB; no substance use, no legal/arrest/aggression hx, self-presents for worsening anxiety. She is not suicidal, homicidal, psychotic or manic. She is seeking medication management, despite having a psychiatrist apt tomorrow and another intake at [facility] Feb 11. No Safety concerns. Was fully medically cleared with ECHO/EKG/Doppler/CXR/Lab work.    Patient reports she is compliant on Lexapro 10 mg; Buspar 5 mg BID; Diazepam 2.5 mg BID; reports depression is completely resolved, but still anxious. Underlying death fear is triggering anxiety; she reports numerous losses recently, including her parents a few years ago and her best friend last week. States she was incredibly close with her parents and has been affected since then. Reports her  is a workaholic and so feels she has lost her primary support systems, although she has many other friends. Past few days she has been feeling "incredibly anxious." Unknown acute triggers. She reports racing heart, panic attack, and difficulty sleeping when worried.  She denies any suicidal ideation or depression currently. She denies manic or psychotic symptoms. She denies homicidal or violent ideation, intent, or plan. She reports medication compliance, denies substance use. Reports memory problems and feels she needs her medications adjusted.     Patient has a psychiatrist already Dr Nagel.  Has apt tmrw. Also has intake with [facility] Feb 11. Went over med recs: to take Buspar 5 mg q D, 10 mg q HS and titrate up to 10 mg BID by next week. Can also raise Lexapro to 15 mg q D next week. Has psychiatrist who can fill these medications. No firearm at home or intent to die. She is not in therapy; would benefit also to take diazepam 5 mg at night instead of 2.5 mg in AM as it is leading to memory issues.     Collateral from psychiatrist: Dr NAGEL: No safety concerns. Will see patient tmrw and refill medication with titrations in doses.  has no safety concerns.

## 2020-01-06 NOTE — ED BEHAVIORAL HEALTH ASSESSMENT NOTE - SAFETY PLAN DETAILS
Safety planning done with patient. Patient denies having any firearms at home. Advised to call 911 or return to the nearest ER if patient's behavior worsened or if there are any safety concerns. Patient verbalized understanding.

## 2020-01-06 NOTE — ED BEHAVIORAL HEALTH ASSESSMENT NOTE - DESCRIPTION
Patient was calm and cooperative in the ED and did not exhibit any aggression. Pt did not require any prn medications or any physical restraints.    Vital Signs Last 24 Hrs  T(C): 37.4 (06 Jan 2020 16:41), Max: 37.5 (06 Jan 2020 11:25)  T(F): 99.3 (06 Jan 2020 16:41), Max: 99.5 (06 Jan 2020 11:25)  HR: 99 (06 Jan 2020 16:41) (91 - 99)  BP: 157/90 (06 Jan 2020 16:41) (138/86 - 158/91)  BP(mean): --  RR: 17 (06 Jan 2020 16:41) (17 - 18)  SpO2: 99% (06 Jan 2020 16:41) (96% - 99%) HTN, HLD retired , , no children

## 2020-01-06 NOTE — ED BEHAVIORAL HEALTH ASSESSMENT NOTE - SUMMARY
70 y/o woman, , no dependents, retired , domiciled; PMH: HTN; PPH of anxiety, depression, 1 prior inpatient at [facility] Nov 2019; in outpt tx  with Dr Nagel, 1 prior suicide attempt Nov 13 via taking 9 pills; no SIB; no substance use, no legal/arrest/aggression hx, self-presents for worsening anxiety. She is not suicidal, homicidal, psychotic or manic. She is seeking medication management, despite having a psychiatrist apt tomorrow and another intake [facility]. No Safety concerns. Was fully medically cleared with ECHO/EKG/Doppler/CXR/Lab work.    Patient is not presenting as an imminent risk for harm to self, and does not meet criteria for involuntary in-patient hospitalization. Patient and  agreeable to discharge plan, and engaged in safety planning. Patient to follow-up with out-patient provider tomorrow.

## 2020-01-06 NOTE — ED PROVIDER NOTE - NSFOLLOWUPINSTRUCTIONS_ED_ALL_ED_FT
1. TAKE ALL MEDICATIONS AS DIRECTED.    2. FOR PAIN OR FEVER YOU CAN TAKE IBUPROFEN (MOTRIN, ADVIL) OR ACETAMINOPHEN (TYLENOL) AS NEEDED, AS DIRECTED ON PACKAGING.  3. FOLLOW UP WITH YOUR PRIMARY DOCTOR WITHIN 5 DAYS AS DIRECTED.  4. IF YOU HAD LABS OR IMAGING DONE, YOU WERE GIVEN COPIES OF ALL LABS AND/OR IMAGING RESULTS FROM YOUR ER VISIT--PLEASE TAKE THEM WITH YOU TO YOUR FOLLOW UP APPOINTMENTS.  5. IF NEEDED, CALL PATIENT ACCESS SERVICES AT [phone] TO FIND A PRIMARY CARE PHYSICIAN.  OR CALL [phone] TO MAKE AN APPOINTMENT WITH THE CLINIC.  6. RETURN TO THE ER FOR ANY WORSENING SYMPTOMS OR CONCERNS.

## 2020-01-06 NOTE — ED BEHAVIORAL HEALTH ASSESSMENT NOTE - SUICIDE RISK FACTORS
Current mood episode/Agitation/Severe Anxiety/Panic/Mood Disorder current/past/Cluster B Personality disorders or traits current/past

## 2020-01-06 NOTE — ED PROVIDER NOTE - PHYSICAL EXAMINATION
Gen: well developed and well nourished, NAD  Eyes: PERRL  ENT: airway patent, mmm  CV: RRR, +S1/S2, no M/R/G  Resp: CTAB, symmetric breath sounds, no W/R/R  GI: abdomen soft non-distended, NTTP  Back: no CVA tenderness  Extremities - no edema  Neuro: A&Ox3, following commands, speech clear, moving all four extremities spontaneously  Psych: appropriate mood, normal insight

## 2020-01-06 NOTE — ED BEHAVIORAL HEALTH ASSESSMENT NOTE - SUICIDE PROTECTIVE FACTORS
Cultural, spiritual and/or moral attitudes against suicide/Identifies reasons for living/Responsibility to family and others/Supportive social network of family or friends

## 2020-01-06 NOTE — ED BEHAVIORAL HEALTH ASSESSMENT NOTE - ADDITIONAL DETAILS ALL
1. I was told the name of the doctor(s) who took care of my child while in the hospital.    2. I have been told about any important findings on my child's physical exam and my child’s plan of care.    3. The doctor clearly explained my child's diagnosis and other possible diagnoses that were considered.    4. My child's doctor explained all the tests that were done and their results (if available). I understand that some of the test results may not be ready before we go home and I was told how I can get these results. I understand that a summary of my child's hospitalization and important test results will be shared with my child's outpatient doctor.    5. My child's doctor talked to me about what I need to do when we go home.    6. I understand what signs and symptoms to watch for. I understand what symptoms I would need to call my doctor for and/or return to the hospital.    7. I have the phone number to call the hospital for results and/or questions after I leave the hospital. impulsive OD on 9 pills from anxiety (11/11/19)

## 2020-01-06 NOTE — ED BEHAVIORAL HEALTH ASSESSMENT NOTE - RISK ASSESSMENT
Risk factors: past overdose; severe anxiety; limited social supports; loss of parents & best friend; death fear    Protective factors: no current suicidality; in treatment; treatment seeking; sense of responsibility to family, reality testing ability, does not want to die. The patient does not present an imminent risk of suicide at this time.    On homicidal risk assessment the patient denies assaultive or homicidal ideation/urges/lethal plan or history of such, denies access to weapons, denies history of homicide attempts or impulsive acting out, does not present with verbalized vindictiveness, denies history of current or recent substance abuse; satisfactory support system. Low Acute Suicide Risk

## 2020-01-06 NOTE — ED BEHAVIORAL HEALTH NOTE - BEHAVIORAL HEALTH NOTE
Worker called patient's spouse Yehuda Gross ([phone]) and left a message for call back on spectra link.

## 2020-01-06 NOTE — ED BEHAVIORAL HEALTH ASSESSMENT NOTE - OTHER PAST PSYCHIATRIC HISTORY (INCLUDE DETAILS REGARDING ONSET, COURSE OF ILLNESS, INPATIENT/OUTPATIENT TREATMENT)
1 inpatient for depression [facility] Nov 2019, in outpt tx on and off since teenage years

## 2020-01-06 NOTE — ED ADULT TRIAGE NOTE - CHIEF COMPLAINT QUOTE
Pt c/o palpitations this past week with nausea and generalized weakness. Hx: HTN, anxiety. Pt afebrile but warm to touch in triage.

## 2020-01-28 ENCOUNTER — OUTPATIENT (OUTPATIENT)
Dept: OUTPATIENT SERVICES | Facility: HOSPITAL | Age: 70
LOS: 1 days | Discharge: HOME | End: 2020-01-28

## 2020-01-29 DIAGNOSIS — F32.2 MAJOR DEPRESSIVE DISORDER, SINGLE EPISODE, SEVERE WITHOUT PSYCHOTIC FEATURES: ICD-10-CM

## 2020-01-29 DIAGNOSIS — I10 ESSENTIAL (PRIMARY) HYPERTENSION: ICD-10-CM

## 2020-02-12 NOTE — ED ADULT TRIAGE NOTE - NS ED NOTE AC HIGH RISK COUNTRIES
----- Message from Shannan Blanc sent at 2/12/2020 12:22 PM CST -----  Contact: Pt wife Rut  Pt having problems with medication furosemide (LASIX) 80 MG tablet,carvediloL (COREG) 12.5 MG tablet. Also he's very short tempered. Lv 1/31/20. L Heytens. Please call pt wife Rut @ [phone]. Thank you.      
Patient states feels temperamental with mood changes and feels related to increase of Lasix and Coreg. Patient described is losing focus and states lashes out at people. Denies dizziness, denies increase of shortness of breath, denies chest pain. Wt Sunday 221lb. Reports LE edema slightly better per wife. Patient has completed lab work today for Endocrinology post medication adjustment at last visit. Message routed to DANGELO Sosa NP for review and endo dept.   
No

## 2020-02-18 ENCOUNTER — INPATIENT (INPATIENT)
Facility: HOSPITAL | Age: 70
LOS: 8 days | Discharge: HOME | End: 2020-02-27
Attending: PSYCHIATRY & NEUROLOGY | Admitting: PSYCHIATRY & NEUROLOGY
Payer: COMMERCIAL

## 2020-02-18 VITALS
SYSTOLIC BLOOD PRESSURE: 150 MMHG | RESPIRATION RATE: 18 BRPM | OXYGEN SATURATION: 99 % | TEMPERATURE: 98 F | HEART RATE: 77 BPM | DIASTOLIC BLOOD PRESSURE: 100 MMHG

## 2020-02-18 DIAGNOSIS — F32.9 MAJOR DEPRESSIVE DISORDER, SINGLE EPISODE, UNSPECIFIED: ICD-10-CM

## 2020-02-18 DIAGNOSIS — F41.1 GENERALIZED ANXIETY DISORDER: ICD-10-CM

## 2020-02-18 DIAGNOSIS — F33.2 MAJOR DEPRESSIVE DISORDER, RECURRENT SEVERE WITHOUT PSYCHOTIC FEATURES: ICD-10-CM

## 2020-02-18 LAB
ALBUMIN SERPL ELPH-MCNC: 4.7 G/DL — SIGNIFICANT CHANGE UP (ref 3.3–5)
ALP SERPL-CCNC: 85 U/L — SIGNIFICANT CHANGE UP (ref 40–120)
ALT FLD-CCNC: 12 U/L — SIGNIFICANT CHANGE UP (ref 4–33)
AMPHET UR-MCNC: NEGATIVE — SIGNIFICANT CHANGE UP
ANION GAP SERPL CALC-SCNC: 14 MMO/L — SIGNIFICANT CHANGE UP (ref 7–14)
APAP SERPL-MCNC: < 15 UG/ML — LOW (ref 15–25)
APPEARANCE UR: CLEAR — SIGNIFICANT CHANGE UP
AST SERPL-CCNC: 15 U/L — SIGNIFICANT CHANGE UP (ref 4–32)
BACTERIA # UR AUTO: SIGNIFICANT CHANGE UP
BARBITURATES UR SCN-MCNC: NEGATIVE — SIGNIFICANT CHANGE UP
BASOPHILS # BLD AUTO: 0.03 K/UL — SIGNIFICANT CHANGE UP (ref 0–0.2)
BASOPHILS NFR BLD AUTO: 0.3 % — SIGNIFICANT CHANGE UP (ref 0–2)
BENZODIAZ UR-MCNC: POSITIVE — SIGNIFICANT CHANGE UP
BILIRUB SERPL-MCNC: 0.5 MG/DL — SIGNIFICANT CHANGE UP (ref 0.2–1.2)
BILIRUB UR-MCNC: NEGATIVE — SIGNIFICANT CHANGE UP
BLOOD UR QL VISUAL: SIGNIFICANT CHANGE UP
BUN SERPL-MCNC: 9 MG/DL — SIGNIFICANT CHANGE UP (ref 7–23)
CALCIUM SERPL-MCNC: 10 MG/DL — SIGNIFICANT CHANGE UP (ref 8.4–10.5)
CANNABINOIDS UR-MCNC: NEGATIVE — SIGNIFICANT CHANGE UP
CHLORIDE SERPL-SCNC: 98 MMOL/L — SIGNIFICANT CHANGE UP (ref 98–107)
CO2 SERPL-SCNC: 28 MMOL/L — SIGNIFICANT CHANGE UP (ref 22–31)
COCAINE METAB.OTHER UR-MCNC: NEGATIVE — SIGNIFICANT CHANGE UP
COLOR SPEC: YELLOW — SIGNIFICANT CHANGE UP
CREAT SERPL-MCNC: 0.79 MG/DL — SIGNIFICANT CHANGE UP (ref 0.5–1.3)
EOSINOPHIL # BLD AUTO: 0.08 K/UL — SIGNIFICANT CHANGE UP (ref 0–0.5)
EOSINOPHIL NFR BLD AUTO: 0.9 % — SIGNIFICANT CHANGE UP (ref 0–6)
ETHANOL BLD-MCNC: < 10 MG/DL — SIGNIFICANT CHANGE UP
GLUCOSE SERPL-MCNC: 128 MG/DL — HIGH (ref 70–99)
GLUCOSE UR-MCNC: NEGATIVE — SIGNIFICANT CHANGE UP
HCT VFR BLD CALC: 47.4 % — HIGH (ref 34.5–45)
HGB BLD-MCNC: 15.2 G/DL — SIGNIFICANT CHANGE UP (ref 11.5–15.5)
HYALINE CASTS # UR AUTO: SIGNIFICANT CHANGE UP
IMM GRANULOCYTES NFR BLD AUTO: 0.2 % — SIGNIFICANT CHANGE UP (ref 0–1.5)
KETONES UR-MCNC: NEGATIVE — SIGNIFICANT CHANGE UP
LEUKOCYTE ESTERASE UR-ACNC: SIGNIFICANT CHANGE UP
LYMPHOCYTES # BLD AUTO: 2.56 K/UL — SIGNIFICANT CHANGE UP (ref 1–3.3)
LYMPHOCYTES # BLD AUTO: 28.6 % — SIGNIFICANT CHANGE UP (ref 13–44)
MCHC RBC-ENTMCNC: 28 PG — SIGNIFICANT CHANGE UP (ref 27–34)
MCHC RBC-ENTMCNC: 32.1 % — SIGNIFICANT CHANGE UP (ref 32–36)
MCV RBC AUTO: 87.3 FL — SIGNIFICANT CHANGE UP (ref 80–100)
METHADONE UR-MCNC: NEGATIVE — SIGNIFICANT CHANGE UP
MONOCYTES # BLD AUTO: 0.42 K/UL — SIGNIFICANT CHANGE UP (ref 0–0.9)
MONOCYTES NFR BLD AUTO: 4.7 % — SIGNIFICANT CHANGE UP (ref 2–14)
NEUTROPHILS # BLD AUTO: 5.85 K/UL — SIGNIFICANT CHANGE UP (ref 1.8–7.4)
NEUTROPHILS NFR BLD AUTO: 65.3 % — SIGNIFICANT CHANGE UP (ref 43–77)
NITRITE UR-MCNC: NEGATIVE — SIGNIFICANT CHANGE UP
NRBC # FLD: 0 K/UL — SIGNIFICANT CHANGE UP (ref 0–0)
OPIATES UR-MCNC: NEGATIVE — SIGNIFICANT CHANGE UP
OXYCODONE UR-MCNC: NEGATIVE — SIGNIFICANT CHANGE UP
PCP UR-MCNC: NEGATIVE — SIGNIFICANT CHANGE UP
PH UR: 6.5 — SIGNIFICANT CHANGE UP (ref 5–8)
PLATELET # BLD AUTO: 213 K/UL — SIGNIFICANT CHANGE UP (ref 150–400)
PMV BLD: 11.8 FL — SIGNIFICANT CHANGE UP (ref 7–13)
POTASSIUM SERPL-MCNC: 3.5 MMOL/L — SIGNIFICANT CHANGE UP (ref 3.5–5.3)
POTASSIUM SERPL-SCNC: 3.5 MMOL/L — SIGNIFICANT CHANGE UP (ref 3.5–5.3)
PROT SERPL-MCNC: 7.3 G/DL — SIGNIFICANT CHANGE UP (ref 6–8.3)
PROT UR-MCNC: 10 — SIGNIFICANT CHANGE UP
RBC # BLD: 5.43 M/UL — HIGH (ref 3.8–5.2)
RBC # FLD: 12.7 % — SIGNIFICANT CHANGE UP (ref 10.3–14.5)
RBC CASTS # UR COMP ASSIST: SIGNIFICANT CHANGE UP (ref 0–?)
SALICYLATES SERPL-MCNC: < 5 MG/DL — LOW (ref 15–30)
SODIUM SERPL-SCNC: 140 MMOL/L — SIGNIFICANT CHANGE UP (ref 135–145)
SP GR SPEC: 1.01 — SIGNIFICANT CHANGE UP (ref 1–1.04)
SQUAMOUS # UR AUTO: SIGNIFICANT CHANGE UP
TSH SERPL-MCNC: 1.34 UIU/ML — SIGNIFICANT CHANGE UP (ref 0.27–4.2)
UROBILINOGEN FLD QL: NORMAL — SIGNIFICANT CHANGE UP
WBC # BLD: 8.96 K/UL — SIGNIFICANT CHANGE UP (ref 3.8–10.5)
WBC # FLD AUTO: 8.96 K/UL — SIGNIFICANT CHANGE UP (ref 3.8–10.5)
WBC UR QL: HIGH (ref 0–?)

## 2020-02-18 PROCEDURE — 99285 EMERGENCY DEPT VISIT HI MDM: CPT

## 2020-02-18 RX ORDER — ONDANSETRON 8 MG/1
4 TABLET, FILM COATED ORAL ONCE
Refills: 0 | Status: COMPLETED | OUTPATIENT
Start: 2020-02-18 | End: 2020-02-18

## 2020-02-18 RX ORDER — METOPROLOL TARTRATE 50 MG
50 TABLET ORAL DAILY
Refills: 0 | Status: DISCONTINUED | OUTPATIENT
Start: 2020-02-18 | End: 2020-02-27

## 2020-02-18 RX ORDER — CLONAZEPAM 1 MG
1 TABLET ORAL
Refills: 0 | Status: DISCONTINUED | OUTPATIENT
Start: 2020-02-18 | End: 2020-02-20

## 2020-02-18 RX ORDER — CLONAZEPAM 1 MG
1 TABLET ORAL ONCE
Refills: 0 | Status: DISCONTINUED | OUTPATIENT
Start: 2020-02-18 | End: 2020-02-18

## 2020-02-18 RX ORDER — LOSARTAN POTASSIUM 100 MG/1
100 TABLET, FILM COATED ORAL DAILY
Refills: 0 | Status: DISCONTINUED | OUTPATIENT
Start: 2020-02-18 | End: 2020-02-27

## 2020-02-18 RX ORDER — HYDROCHLOROTHIAZIDE 25 MG
25 TABLET ORAL DAILY
Refills: 0 | Status: DISCONTINUED | OUTPATIENT
Start: 2020-02-18 | End: 2020-02-27

## 2020-02-18 RX ADMIN — ONDANSETRON 4 MILLIGRAM(S): 8 TABLET, FILM COATED ORAL at 15:10

## 2020-02-18 RX ADMIN — Medication 1 MILLIGRAM(S): at 21:42

## 2020-02-18 NOTE — ED BEHAVIORAL HEALTH ASSESSMENT NOTE - HPI (INCLUDE ILLNESS QUALITY, SEVERITY, DURATION, TIMING, CONTEXT, MODIFYING FACTORS, ASSOCIATED SIGNS AND SYMPTOMS)
70 y/o woman, , no dependents, retired , domiciled; PMH: HTN; PPH of anxiety, depression, 1 prior inpatient at [facility] unit Nov 2019; in outpt tx  with Dr Nagel, also saw Dr. Saez at [facility] geriatric clinic on 2/11/20, 1 prior suicide attempt Nov 13 via taking 9 pills; no SIB; no substance use, no legal/arrest/aggression hx, self-presents for worsening anxiety, depression, and SI.     On assessment, pt is anxious and tearful. States she came to the ED today because "I need help." Pt reports severe anxiety over the past few weeks. Pt is unable to identify acute stressors. States she previously experienced anxiety primarily in the morning but now feels anxious all day. She reports frequent palpitations, nausea, and lightheadedness. She wakes up at 4:00 AM with palpitations. She reports reduced appetite, low energy, low motivation, difficulty concentrating. She is isolating and reports anhedonia. She reports intermittent SI with thoughts of overdosing or jumping off a bridge. States "I'm trying to push the thoughts aside." States she wants to act on these thoughts if she doesn't feel better. She denies current SI, intent, or plan. She reports intermittently depressed mood. She denies manic or psychotic symptoms. She denies homicidal or violent ideation, intent, or plan. She reports medication compliance but states she feels worse since Lexapro dose has been increased. She has been taking Klonopin 2-2.5 mg po daily rather than prescribed 1-1.5 mg po daily in an attempt to alleviate her anxiety. She denies illicit drug use.   See  note for collateral from .

## 2020-02-18 NOTE — ED BEHAVIORAL HEALTH ASSESSMENT NOTE - ELEVATED CHRONIC RISK
Kimo Christy is a 36 year old male presenting for CPX.  Discuss anxiety.    Denies known Latex allergy or symptoms of Latex sensitivity.  Medications verified, no changes.  History   Smoking Status   • Never Smoker   Smokeless Tobacco   • Never Used     Comment: works for [employer] Summary     Topic Due On Due Status Completed On    IMMUNIZATION - DTaP/Tdap/Td Oct 23, 2025 Not Due Oct 23, 2015    Immunization-Influenza  Completed Nov 18, 2017          Patient is up to date, no discussion needed .  Patient would like communication of their results via:        Cell Phone:   Telephone Information:   Mobile [phone]     Okay to leave a message containing results? Yes               No Yes

## 2020-02-18 NOTE — ED BEHAVIORAL HEALTH NOTE - BEHAVIORAL HEALTH NOTE
SW met with pt.’s , Yehuda Gross ([phone]), in order to obtain collateral on pt.  reports that he and wife live together and mentioned that he works a lot at “weird hours.”  reports that pt. came to hospital because, “With the medicines she’s on, she doesn’t feel right.” When SW asked for more clarification on changes in pt.’s behavior,  just kept stating that he works a lot. When further questioned,  stated that he has been home the past 4 days and noticed that pt. has not been going out (pt. typically goes shopping or out to lunch with her girlfriends) and has not been putting on her make up every day. He does report that he “believes” pt.’s hygiene and sleeping are at baseline and did state that pt. has been eating less.  denies any AH/VH/HI/SI/SA violence, aggression, agitation, and/or self-harm.  suggested that SW speak with pt. directly for more information. Team is aware of above. JOANN met with pt.’s , Yehuda Gross ([phone]), in order to obtain collateral on pt.  reports that he and wife live together and mentioned that he works a lot at “weird hours.”  reports that pt. came to hospital because, “With the medicines she’s on, she doesn’t feel right.” When SW asked for more clarification on changes in pt.’s behavior,  just kept stating that he works a lot. When further questioned,  stated that he has been home the past 4 days and noticed that pt. has not been going out (pt. typically goes shopping or out to lunch with her girlfriends) and has not been putting on her make up every day. He does report that he “believes” pt.’s hygiene and sleeping are at baseline and did state that pt. has been eating less.  denies any AH/VH/HI/SI/SA violence, aggression, agitation, and/or self-harm.  suggested that SW speak with pt. directly for more information. Team is aware of above.  JOANN informed by Team that pt. is for admission and is currently waiting for geriatric female bed- none available at [facility] or [facility]. SW informed pt.'s spouse of this.

## 2020-02-18 NOTE — ED BEHAVIORAL HEALTH ASSESSMENT NOTE - SUMMARY
68 y/o woman, , no dependents, retired , domiciled; PMH: HTN; PPH of anxiety, depression, 1 prior inpatient at [facility] unit Nov 2019; in outpt tx  with Dr Nagel, also saw Dr. Saez at [facility] geriatric clinic on 2/11/20, 1 prior suicide attempt Nov 13 via taking 9 pills; no SIB; no substance use, no legal/arrest/aggression hx, self-presents for worsening anxiety, depression, and SI.     She reports intermittent SI with thoughts of overdosing or jumping off a bridge. States "I'm trying to push the thoughts aside." States she wants to act on these thoughts if she doesn't feel better. 68 y/o woman, , no dependents, retired , domiciled; PMH: HTN; PPH of anxiety, depression, 1 prior inpatient at [facility] unit Nov 2019; in outpt tx  with Dr Nagel, also saw Dr. Saez at [facility] geriatric clinic on 2/11/20, 1 prior suicide attempt Nov 13 via taking 9 pills; no SIB; no substance use, no legal/arrest/aggression hx, self-presents for worsening anxiety, depression, and SI.   She reports intermittent SI with thoughts of overdosing or jumping off a bridge. States "I'm trying to push the thoughts aside." States she wants to act on these thoughts if she doesn't feel better.  Pt requests and meets criteria for voluntary psychiatric admission for safety and stabilization.

## 2020-02-18 NOTE — ED BEHAVIORAL HEALTH ASSESSMENT NOTE - DESCRIPTION
Patient was calm and cooperative in the ED and did not exhibit any aggression. Pt did not require any prn medications or any physical restraints.    Vital Signs Last 24 Hrs  T(C): 36.7 (18 Feb 2020 12:05), Max: 36.7 (18 Feb 2020 12:05)  T(F): 98.1 (18 Feb 2020 12:05), Max: 98.1 (18 Feb 2020 12:05)  HR: 77 (18 Feb 2020 12:05) (77 - 77)  BP: 150/100 (18 Feb 2020 12:05) (150/100 - 150/100)  BP(mean): --  RR: 18 (18 Feb 2020 12:05) (18 - 18)  SpO2: 99% (18 Feb 2020 12:05) (99% - 99%) HTN, HLD retired , , no children

## 2020-02-18 NOTE — ED ADULT NURSE NOTE - OBJECTIVE STATEMENT
PT received into  A&Ox4, ambulatory C/O increased depression, anxiety since November. PT currently denies SI but states she has previous suicide attempt in November 2019 and this feels similar. Was started on Lexapro at this time, feels like symptoms are worsening since then and is afraid she may hurt herself if continues. PT presently denies SI, HI, AH, VH, ETOH or drug use. PHX: HTN, anxiety and depression. MD evaluated, Awaiting Psych eval at this time. PT calm and cooperative on exam, appears anxious. Will continue to monitor for safety and comfort.

## 2020-02-18 NOTE — ED ADULT NURSE NOTE - CHIEF COMPLAINT QUOTE
Pt c/o she feels "sick all the time" after an increased dosage in her lexapro and states she has suicidal thoughts, denies a plan currently.

## 2020-02-18 NOTE — ED ADULT TRIAGE NOTE - CHIEF COMPLAINT QUOTE
pt SW called 911 because pt has not been going to his programs and is non-compliant with meds. Pt c/o she feels "sick all the time" after an increased dosage in her lexapro and states she has suicidal thoughts, denies a plan currently.

## 2020-02-18 NOTE — ED ADULT NURSE NOTE - NSIMPLEMENTINTERV_GEN_ALL_ED
Implemented All Universal Safety Interventions:  [place] to call system. Call bell, personal items and telephone within reach. Instruct patient to call for assistance. Room bathroom lighting operational. Non-slip footwear when patient is off stretcher. Physically safe environment: no spills, clutter or unnecessary equipment. Stretcher in lowest position, wheels locked, appropriate side rails in place.

## 2020-02-18 NOTE — ED PROVIDER NOTE - CLINICAL SUMMARY MEDICAL DECISION MAKING FREE TEXT BOX
pt with chronic depression/anxiety, worsening recently, inpatient [facility] admission 3 months ago, with plan to harm self; will send labs and re-eval

## 2020-02-18 NOTE — ED BEHAVIORAL HEALTH ASSESSMENT NOTE - DETAILS
impulsive OD on 9 pills from anxiety (11/11/19) father-depression informed  father-depression, maternal grandmother committed suicide EUFEMIA

## 2020-02-18 NOTE — ED BEHAVIORAL HEALTH ASSESSMENT NOTE - SUICIDE PROTECTIVE FACTORS
Identifies reasons for living/Supportive social network of family or friends/Cultural, spiritual and/or moral attitudes against suicide/Responsibility to family and others Supportive social network of family or friends/Identifies reasons for living/Responsibility to family and others

## 2020-02-18 NOTE — ED BEHAVIORAL HEALTH ASSESSMENT NOTE - RISK ASSESSMENT
Risk factors: past overdose; severe anxiety; limited social supports; loss of parents & best friend; death fear    Protective factors: no current suicidality; in treatment; treatment seeking; sense of responsibility to family, reality testing ability, does not want to die. The patient does not present an imminent risk of suicide at this time.    On homicidal risk assessment the patient denies assaultive or homicidal ideation/urges/lethal plan or history of such, denies access to weapons, denies history of homicide attempts or impulsive acting out, does not present with verbalized vindictiveness, denies history of current or recent substance abuse; satisfactory support system. Low Acute Suicide Risk Moderate Acute Suicide Risk

## 2020-02-18 NOTE — ED PROVIDER NOTE - OBJECTIVE STATEMENT
70 yo female with anxiety, depression, HTN, sees Dr. Ramos psychiatrist (on Lexapro 20 mg, recently titrated from 10 mg), inpatient [facility] admission 3 months ago after suicide attempt by overdose on 9 tabs clonazepam, in ED with worsening anxiety and depression, and desire to hurt self.  Pt states that because she did not die after recent overdose, she is thinking about driving to a bridge and jumping off.  She is concerned that someone would find her car and stop her before she jumped.  Today pt was brought to ED by her  because she is having decline in function--not eating, laying on couch for extended periods of time.  Pt endorses nausea and dizziness since starting Lexapro, but otherwise has no complaints.

## 2020-02-18 NOTE — ED PROVIDER NOTE - PROGRESS NOTE DETAILS
Dr. Mcelroy: EKG NSR, no ST/T changes, QTc 441 Dr. Mcelroy: pt seen by psychiatry and recommend admission for psychiatric care; awaiting bed TWAN:  Patient signed to me pending  evaluation.   plans to admit patient.

## 2020-02-18 NOTE — ED BEHAVIORAL HEALTH ASSESSMENT NOTE - SUICIDE RISK FACTORS
Mood Disorder current/past/Current mood episode/Cluster B Personality disorders or traits current/past/Agitation/Severe Anxiety/Panic/Anhedonia

## 2020-02-18 NOTE — ED BEHAVIORAL HEALTH ASSESSMENT NOTE - PRIMARY DX
ANSON (generalized anxiety disorder) Deferred condition on axis II Severe episode of recurrent major depressive disorder, without psychotic features

## 2020-02-18 NOTE — ED BEHAVIORAL HEALTH ASSESSMENT NOTE - PSYCHIATRIC ISSUES AND PLAN (INCLUDE STANDING AND PRN MEDICATION)
Defer medication changes to inpatient team; continue Lexapro 20 mg po QHS, Buspar 10 mg po BID, Klonopin 1 mg po BID Defer medication changes to inpatient team; will hold Lexapro tonight as pt declining; continue Buspar 10 mg po BID, Klonopin 1 mg po BID

## 2020-02-18 NOTE — ED BEHAVIORAL HEALTH ASSESSMENT NOTE - OTHER PAST PSYCHIATRIC HISTORY (INCLUDE DETAILS REGARDING ONSET, COURSE OF ILLNESS, INPATIENT/OUTPATIENT TREATMENT)
1 inpatient for depression [facility] Nov 2019, in outpt tx on and off since teenage years

## 2020-02-18 NOTE — ED BEHAVIORAL HEALTH ASSESSMENT NOTE - CURRENT MEDICATION
diazepam 5mg BID, Lexapro 10 mg; Buspar 5 mg BID; Hydrochlorothiazide 25 mg po QHS, Metoprolol ER 50 mg po daily, Losartan 100 mg po daily, Atorvastatin 10 mg po daily Lexapro 20 mg po QHS, Buspar 10 mg po BID, Klonopin 1 mg po daily, Klonopin 0.5 mg po daily prn anxiety, Metoprolol ER 50 mg po daily, Losartan 100 mg po daily, HCTZ 25 mg po daily

## 2020-02-19 PROCEDURE — 99222 1ST HOSP IP/OBS MODERATE 55: CPT | Mod: GC

## 2020-02-19 RX ORDER — HYDROXYZINE HCL 10 MG
25 TABLET ORAL EVERY 6 HOURS
Refills: 0 | Status: DISCONTINUED | OUTPATIENT
Start: 2020-02-19 | End: 2020-02-27

## 2020-02-19 RX ORDER — MIRTAZAPINE 45 MG/1
7.5 TABLET, ORALLY DISINTEGRATING ORAL AT BEDTIME
Refills: 0 | Status: DISCONTINUED | OUTPATIENT
Start: 2020-02-19 | End: 2020-02-21

## 2020-02-19 RX ADMIN — LOSARTAN POTASSIUM 100 MILLIGRAM(S): 100 TABLET, FILM COATED ORAL at 08:40

## 2020-02-19 RX ADMIN — Medication 50 MILLIGRAM(S): at 08:40

## 2020-02-19 RX ADMIN — Medication 25 MILLIGRAM(S): at 08:40

## 2020-02-19 RX ADMIN — MIRTAZAPINE 7.5 MILLIGRAM(S): 45 TABLET, ORALLY DISINTEGRATING ORAL at 20:30

## 2020-02-19 RX ADMIN — Medication 10 MILLIGRAM(S): at 20:30

## 2020-02-19 RX ADMIN — Medication 10 MILLIGRAM(S): at 08:40

## 2020-02-19 RX ADMIN — Medication 1 MILLIGRAM(S): at 20:30

## 2020-02-19 RX ADMIN — Medication 25 MILLIGRAM(S): at 14:37

## 2020-02-19 RX ADMIN — Medication 1 MILLIGRAM(S): at 08:40

## 2020-02-20 PROCEDURE — 90853 GROUP PSYCHOTHERAPY: CPT

## 2020-02-20 PROCEDURE — 99232 SBSQ HOSP IP/OBS MODERATE 35: CPT | Mod: GC

## 2020-02-20 RX ORDER — CLONAZEPAM 1 MG
0.5 TABLET ORAL EVERY 24 HOURS
Refills: 0 | Status: DISCONTINUED | OUTPATIENT
Start: 2020-02-20 | End: 2020-02-25

## 2020-02-20 RX ORDER — CLONAZEPAM 1 MG
0.5 TABLET ORAL
Refills: 0 | Status: DISCONTINUED | OUTPATIENT
Start: 2020-02-20 | End: 2020-02-24

## 2020-02-20 RX ADMIN — Medication 0.5 MILLIGRAM(S): at 21:11

## 2020-02-20 RX ADMIN — Medication 25 MILLIGRAM(S): at 08:52

## 2020-02-20 RX ADMIN — Medication 10 MILLIGRAM(S): at 08:52

## 2020-02-20 RX ADMIN — MIRTAZAPINE 7.5 MILLIGRAM(S): 45 TABLET, ORALLY DISINTEGRATING ORAL at 21:11

## 2020-02-20 RX ADMIN — Medication 1 MILLIGRAM(S): at 08:52

## 2020-02-20 RX ADMIN — Medication 50 MILLIGRAM(S): at 08:53

## 2020-02-20 RX ADMIN — Medication 10 MILLIGRAM(S): at 21:11

## 2020-02-20 RX ADMIN — LOSARTAN POTASSIUM 100 MILLIGRAM(S): 100 TABLET, FILM COATED ORAL at 08:52

## 2020-02-21 PROCEDURE — 99232 SBSQ HOSP IP/OBS MODERATE 35: CPT | Mod: GC

## 2020-02-21 PROCEDURE — 90853 GROUP PSYCHOTHERAPY: CPT

## 2020-02-21 RX ORDER — MIRTAZAPINE 45 MG/1
15 TABLET, ORALLY DISINTEGRATING ORAL AT BEDTIME
Refills: 0 | Status: DISCONTINUED | OUTPATIENT
Start: 2020-02-21 | End: 2020-02-22

## 2020-02-21 RX ADMIN — Medication 10 MILLIGRAM(S): at 08:36

## 2020-02-21 RX ADMIN — Medication 0.5 MILLIGRAM(S): at 20:34

## 2020-02-21 RX ADMIN — MIRTAZAPINE 15 MILLIGRAM(S): 45 TABLET, ORALLY DISINTEGRATING ORAL at 20:34

## 2020-02-21 RX ADMIN — Medication 25 MILLIGRAM(S): at 11:04

## 2020-02-21 RX ADMIN — Medication 25 MILLIGRAM(S): at 08:36

## 2020-02-21 RX ADMIN — Medication 10 MILLIGRAM(S): at 20:34

## 2020-02-21 RX ADMIN — Medication 0.5 MILLIGRAM(S): at 08:36

## 2020-02-21 RX ADMIN — LOSARTAN POTASSIUM 100 MILLIGRAM(S): 100 TABLET, FILM COATED ORAL at 08:37

## 2020-02-21 RX ADMIN — Medication 50 MILLIGRAM(S): at 08:37

## 2020-02-22 PROCEDURE — 99232 SBSQ HOSP IP/OBS MODERATE 35: CPT

## 2020-02-22 RX ORDER — MIRTAZAPINE 45 MG/1
22.5 TABLET, ORALLY DISINTEGRATING ORAL AT BEDTIME
Refills: 0 | Status: DISCONTINUED | OUTPATIENT
Start: 2020-02-22 | End: 2020-02-27

## 2020-02-22 RX ADMIN — Medication 50 MILLIGRAM(S): at 08:52

## 2020-02-22 RX ADMIN — Medication 25 MILLIGRAM(S): at 08:52

## 2020-02-22 RX ADMIN — Medication 0.5 MILLIGRAM(S): at 08:52

## 2020-02-22 RX ADMIN — Medication 10 MILLIGRAM(S): at 08:52

## 2020-02-22 RX ADMIN — Medication 0.5 MILLIGRAM(S): at 21:23

## 2020-02-22 RX ADMIN — Medication 10 MILLIGRAM(S): at 21:23

## 2020-02-22 RX ADMIN — LOSARTAN POTASSIUM 100 MILLIGRAM(S): 100 TABLET, FILM COATED ORAL at 08:52

## 2020-02-22 RX ADMIN — MIRTAZAPINE 22.5 MILLIGRAM(S): 45 TABLET, ORALLY DISINTEGRATING ORAL at 21:23

## 2020-02-23 PROCEDURE — 99232 SBSQ HOSP IP/OBS MODERATE 35: CPT

## 2020-02-23 RX ADMIN — Medication 0.5 MILLIGRAM(S): at 20:44

## 2020-02-23 RX ADMIN — Medication 50 MILLIGRAM(S): at 09:42

## 2020-02-23 RX ADMIN — LOSARTAN POTASSIUM 100 MILLIGRAM(S): 100 TABLET, FILM COATED ORAL at 09:42

## 2020-02-23 RX ADMIN — Medication 25 MILLIGRAM(S): at 09:42

## 2020-02-23 RX ADMIN — Medication 10 MILLIGRAM(S): at 09:42

## 2020-02-23 RX ADMIN — MIRTAZAPINE 22.5 MILLIGRAM(S): 45 TABLET, ORALLY DISINTEGRATING ORAL at 20:44

## 2020-02-23 RX ADMIN — Medication 10 MILLIGRAM(S): at 20:44

## 2020-02-23 RX ADMIN — Medication 0.5 MILLIGRAM(S): at 09:42

## 2020-02-24 PROCEDURE — 90853 GROUP PSYCHOTHERAPY: CPT

## 2020-02-24 PROCEDURE — 99232 SBSQ HOSP IP/OBS MODERATE 35: CPT

## 2020-02-24 RX ORDER — CLONAZEPAM 1 MG
0.5 TABLET ORAL AT BEDTIME
Refills: 0 | Status: DISCONTINUED | OUTPATIENT
Start: 2020-02-24 | End: 2020-02-27

## 2020-02-24 RX ADMIN — Medication 50 MILLIGRAM(S): at 08:44

## 2020-02-24 RX ADMIN — LOSARTAN POTASSIUM 100 MILLIGRAM(S): 100 TABLET, FILM COATED ORAL at 08:44

## 2020-02-24 RX ADMIN — Medication 0.5 MILLIGRAM(S): at 21:31

## 2020-02-24 RX ADMIN — Medication 0.5 MILLIGRAM(S): at 08:44

## 2020-02-24 RX ADMIN — Medication 10 MILLIGRAM(S): at 21:31

## 2020-02-24 RX ADMIN — MIRTAZAPINE 22.5 MILLIGRAM(S): 45 TABLET, ORALLY DISINTEGRATING ORAL at 21:32

## 2020-02-24 RX ADMIN — Medication 0.5 MILLIGRAM(S): at 14:12

## 2020-02-24 RX ADMIN — Medication 10 MILLIGRAM(S): at 08:44

## 2020-02-24 RX ADMIN — Medication 25 MILLIGRAM(S): at 08:44

## 2020-02-25 PROCEDURE — 99231 SBSQ HOSP IP/OBS SF/LOW 25: CPT

## 2020-02-25 RX ORDER — ACETAMINOPHEN 500 MG
650 TABLET ORAL EVERY 6 HOURS
Refills: 0 | Status: DISCONTINUED | OUTPATIENT
Start: 2020-02-25 | End: 2020-02-27

## 2020-02-25 RX ORDER — CLONAZEPAM 1 MG
0.5 TABLET ORAL EVERY 24 HOURS
Refills: 0 | Status: DISCONTINUED | OUTPATIENT
Start: 2020-02-25 | End: 2020-02-27

## 2020-02-25 RX ADMIN — Medication 50 MILLIGRAM(S): at 09:28

## 2020-02-25 RX ADMIN — LOSARTAN POTASSIUM 100 MILLIGRAM(S): 100 TABLET, FILM COATED ORAL at 09:28

## 2020-02-25 RX ADMIN — Medication 25 MILLIGRAM(S): at 09:28

## 2020-02-25 RX ADMIN — Medication 650 MILLIGRAM(S): at 15:45

## 2020-02-25 RX ADMIN — Medication 10 MILLIGRAM(S): at 09:28

## 2020-02-25 RX ADMIN — Medication 10 MILLIGRAM(S): at 20:59

## 2020-02-25 RX ADMIN — Medication 0.5 MILLIGRAM(S): at 20:59

## 2020-02-25 RX ADMIN — Medication 650 MILLIGRAM(S): at 15:06

## 2020-02-25 RX ADMIN — MIRTAZAPINE 22.5 MILLIGRAM(S): 45 TABLET, ORALLY DISINTEGRATING ORAL at 20:59

## 2020-02-26 PROCEDURE — 90853 GROUP PSYCHOTHERAPY: CPT

## 2020-02-26 PROCEDURE — 99231 SBSQ HOSP IP/OBS SF/LOW 25: CPT

## 2020-02-26 RX ORDER — CLONAZEPAM 1 MG
1 TABLET ORAL
Qty: 30 | Refills: 0
Start: 2020-02-26 | End: 2020-03-26

## 2020-02-26 RX ORDER — HYDROXYZINE HCL 10 MG
1 TABLET ORAL
Qty: 30 | Refills: 0
Start: 2020-02-26 | End: 2020-03-26

## 2020-02-26 RX ORDER — CLONAZEPAM 1 MG
1 TABLET ORAL
Qty: 0 | Refills: 0 | DISCHARGE

## 2020-02-26 RX ORDER — LOSARTAN POTASSIUM 100 MG/1
1 TABLET, FILM COATED ORAL
Qty: 14 | Refills: 0
Start: 2020-02-26 | End: 2020-03-10

## 2020-02-26 RX ORDER — METOPROLOL TARTRATE 50 MG
1 TABLET ORAL
Qty: 14 | Refills: 0
Start: 2020-02-26 | End: 2020-03-10

## 2020-02-26 RX ORDER — ESCITALOPRAM OXALATE 10 MG/1
1 TABLET, FILM COATED ORAL
Qty: 0 | Refills: 0 | DISCHARGE

## 2020-02-26 RX ORDER — MIRTAZAPINE 45 MG/1
1 TABLET, ORALLY DISINTEGRATING ORAL
Qty: 30 | Refills: 0
Start: 2020-02-26 | End: 2020-03-26

## 2020-02-26 RX ADMIN — LOSARTAN POTASSIUM 100 MILLIGRAM(S): 100 TABLET, FILM COATED ORAL at 09:44

## 2020-02-26 RX ADMIN — Medication 0.5 MILLIGRAM(S): at 20:39

## 2020-02-26 RX ADMIN — Medication 10 MILLIGRAM(S): at 20:39

## 2020-02-26 RX ADMIN — MIRTAZAPINE 22.5 MILLIGRAM(S): 45 TABLET, ORALLY DISINTEGRATING ORAL at 20:39

## 2020-02-26 RX ADMIN — Medication 10 MILLIGRAM(S): at 09:44

## 2020-02-26 RX ADMIN — Medication 50 MILLIGRAM(S): at 09:44

## 2020-02-26 RX ADMIN — Medication 25 MILLIGRAM(S): at 09:44

## 2020-02-27 VITALS — RESPIRATION RATE: 17 BRPM | TEMPERATURE: 98 F

## 2020-02-27 LAB
CHOLEST SERPL-MCNC: 256 MG/DL — HIGH (ref 120–199)
HBA1C BLD-MCNC: 5.5 % — SIGNIFICANT CHANGE UP (ref 4–5.6)
HDLC SERPL-MCNC: 38 MG/DL — LOW (ref 45–65)
LIPID PNL WITH DIRECT LDL SERPL: 186 MG/DL — SIGNIFICANT CHANGE UP
TRIGL SERPL-MCNC: 234 MG/DL — HIGH (ref 10–149)

## 2020-02-27 PROCEDURE — 99238 HOSP IP/OBS DSCHRG MGMT 30/<: CPT

## 2020-02-27 RX ADMIN — Medication 10 MILLIGRAM(S): at 08:41

## 2020-02-27 RX ADMIN — Medication 50 MILLIGRAM(S): at 08:41

## 2020-02-27 RX ADMIN — Medication 25 MILLIGRAM(S): at 08:41

## 2020-02-27 RX ADMIN — LOSARTAN POTASSIUM 100 MILLIGRAM(S): 100 TABLET, FILM COATED ORAL at 08:41

## 2020-02-28 ENCOUNTER — OUTPATIENT (OUTPATIENT)
Dept: OUTPATIENT SERVICES | Facility: HOSPITAL | Age: 70
LOS: 1 days | Discharge: HOME | End: 2020-02-28
Payer: COMMERCIAL

## 2020-03-01 DIAGNOSIS — E78.5 HYPERLIPIDEMIA, UNSPECIFIED: ICD-10-CM

## 2020-03-01 DIAGNOSIS — F33.2 MAJOR DEPRESSIVE DISORDER, RECURRENT SEVERE WITHOUT PSYCHOTIC FEATURES: ICD-10-CM

## 2020-03-01 DIAGNOSIS — I10 ESSENTIAL (PRIMARY) HYPERTENSION: ICD-10-CM

## 2020-05-20 ENCOUNTER — EMERGENCY (EMERGENCY)
Facility: HOSPITAL | Age: 70
LOS: 1 days | Discharge: HOME | End: 2020-05-20
Attending: EMERGENCY MEDICINE | Admitting: EMERGENCY MEDICINE
Payer: COMMERCIAL

## 2020-05-20 VITALS
SYSTOLIC BLOOD PRESSURE: 154 MMHG | DIASTOLIC BLOOD PRESSURE: 70 MMHG | OXYGEN SATURATION: 100 % | RESPIRATION RATE: 16 BRPM | HEART RATE: 64 BPM

## 2020-05-20 VITALS
HEART RATE: 69 BPM | DIASTOLIC BLOOD PRESSURE: 90 MMHG | SYSTOLIC BLOOD PRESSURE: 163 MMHG | TEMPERATURE: 98 F | RESPIRATION RATE: 16 BRPM | OXYGEN SATURATION: 99 %

## 2020-05-20 LAB
ALBUMIN SERPL ELPH-MCNC: 4.2 G/DL — SIGNIFICANT CHANGE UP (ref 3.3–5)
ALP SERPL-CCNC: 68 U/L — SIGNIFICANT CHANGE UP (ref 40–120)
ALT FLD-CCNC: 22 U/L — SIGNIFICANT CHANGE UP (ref 4–33)
AMORPH URATE CRY # URNS: SIGNIFICANT CHANGE UP
AMPHET UR-MCNC: NEGATIVE — SIGNIFICANT CHANGE UP
ANION GAP SERPL CALC-SCNC: 9 MMO/L — SIGNIFICANT CHANGE UP (ref 7–14)
APAP SERPL-MCNC: < 15 UG/ML — LOW (ref 15–25)
APPEARANCE UR: CLEAR — SIGNIFICANT CHANGE UP
AST SERPL-CCNC: 18 U/L — SIGNIFICANT CHANGE UP (ref 4–32)
BARBITURATES UR SCN-MCNC: NEGATIVE — SIGNIFICANT CHANGE UP
BASOPHILS # BLD AUTO: 0.03 K/UL — SIGNIFICANT CHANGE UP (ref 0–0.2)
BASOPHILS NFR BLD AUTO: 0.3 % — SIGNIFICANT CHANGE UP (ref 0–2)
BENZODIAZ UR-MCNC: NEGATIVE — SIGNIFICANT CHANGE UP
BILIRUB SERPL-MCNC: 0.5 MG/DL — SIGNIFICANT CHANGE UP (ref 0.2–1.2)
BILIRUB UR-MCNC: NEGATIVE — SIGNIFICANT CHANGE UP
BLOOD UR QL VISUAL: NEGATIVE — SIGNIFICANT CHANGE UP
BUN SERPL-MCNC: 10 MG/DL — SIGNIFICANT CHANGE UP (ref 7–23)
CALCIUM SERPL-MCNC: 9.4 MG/DL — SIGNIFICANT CHANGE UP (ref 8.4–10.5)
CANNABINOIDS UR-MCNC: NEGATIVE — SIGNIFICANT CHANGE UP
CHLORIDE SERPL-SCNC: 106 MMOL/L — SIGNIFICANT CHANGE UP (ref 98–107)
CO2 SERPL-SCNC: 27 MMOL/L — SIGNIFICANT CHANGE UP (ref 22–31)
COCAINE METAB.OTHER UR-MCNC: NEGATIVE — SIGNIFICANT CHANGE UP
COLOR SPEC: SIGNIFICANT CHANGE UP
CREAT SERPL-MCNC: 0.95 MG/DL — SIGNIFICANT CHANGE UP (ref 0.5–1.3)
EOSINOPHIL # BLD AUTO: 0.12 K/UL — SIGNIFICANT CHANGE UP (ref 0–0.5)
EOSINOPHIL NFR BLD AUTO: 1.3 % — SIGNIFICANT CHANGE UP (ref 0–6)
EPI CELLS # UR: SIGNIFICANT CHANGE UP
ETHANOL BLD-MCNC: < 10 MG/DL — SIGNIFICANT CHANGE UP
GLUCOSE SERPL-MCNC: 110 MG/DL — HIGH (ref 70–99)
GLUCOSE UR-MCNC: NEGATIVE — SIGNIFICANT CHANGE UP
GRAN CASTS # UR COMP ASSIST: SIGNIFICANT CHANGE UP
HCT VFR BLD CALC: 41.9 % — SIGNIFICANT CHANGE UP (ref 34.5–45)
HGB BLD-MCNC: 13.5 G/DL — SIGNIFICANT CHANGE UP (ref 11.5–15.5)
HYALINE CASTS # UR AUTO: SIGNIFICANT CHANGE UP
IMM GRANULOCYTES NFR BLD AUTO: 0.4 % — SIGNIFICANT CHANGE UP (ref 0–1.5)
KETONES UR-MCNC: NEGATIVE — SIGNIFICANT CHANGE UP
LEUKOCYTE ESTERASE UR-ACNC: NEGATIVE — SIGNIFICANT CHANGE UP
LYMPHOCYTES # BLD AUTO: 2.65 K/UL — SIGNIFICANT CHANGE UP (ref 1–3.3)
LYMPHOCYTES # BLD AUTO: 29.4 % — SIGNIFICANT CHANGE UP (ref 13–44)
MAGNESIUM SERPL-MCNC: 2.3 MG/DL — SIGNIFICANT CHANGE UP (ref 1.6–2.6)
MCHC RBC-ENTMCNC: 28.1 PG — SIGNIFICANT CHANGE UP (ref 27–34)
MCHC RBC-ENTMCNC: 32.2 % — SIGNIFICANT CHANGE UP (ref 32–36)
MCV RBC AUTO: 87.3 FL — SIGNIFICANT CHANGE UP (ref 80–100)
METHADONE UR-MCNC: NEGATIVE — SIGNIFICANT CHANGE UP
MONOCYTES # BLD AUTO: 0.4 K/UL — SIGNIFICANT CHANGE UP (ref 0–0.9)
MONOCYTES NFR BLD AUTO: 4.4 % — SIGNIFICANT CHANGE UP (ref 2–14)
NEUTROPHILS # BLD AUTO: 5.78 K/UL — SIGNIFICANT CHANGE UP (ref 1.8–7.4)
NEUTROPHILS NFR BLD AUTO: 64.2 % — SIGNIFICANT CHANGE UP (ref 43–77)
NITRITE UR-MCNC: NEGATIVE — SIGNIFICANT CHANGE UP
NRBC # FLD: 0 K/UL — SIGNIFICANT CHANGE UP (ref 0–0)
OPIATES UR-MCNC: NEGATIVE — SIGNIFICANT CHANGE UP
OXYCODONE UR-MCNC: NEGATIVE — SIGNIFICANT CHANGE UP
PCP UR-MCNC: NEGATIVE — SIGNIFICANT CHANGE UP
PH UR: 6.5 — SIGNIFICANT CHANGE UP (ref 5–8)
PHOSPHATE SERPL-MCNC: 2.7 MG/DL — SIGNIFICANT CHANGE UP (ref 2.5–4.5)
PLATELET # BLD AUTO: 205 K/UL — SIGNIFICANT CHANGE UP (ref 150–400)
PMV BLD: 10.5 FL — SIGNIFICANT CHANGE UP (ref 7–13)
POTASSIUM SERPL-MCNC: 4.4 MMOL/L — SIGNIFICANT CHANGE UP (ref 3.5–5.3)
POTASSIUM SERPL-SCNC: 4.4 MMOL/L — SIGNIFICANT CHANGE UP (ref 3.5–5.3)
PROT SERPL-MCNC: 6.9 G/DL — SIGNIFICANT CHANGE UP (ref 6–8.3)
PROT UR-MCNC: 20 — SIGNIFICANT CHANGE UP
RBC # BLD: 4.8 M/UL — SIGNIFICANT CHANGE UP (ref 3.8–5.2)
RBC # FLD: 12.9 % — SIGNIFICANT CHANGE UP (ref 10.3–14.5)
RBC CASTS # UR COMP ASSIST: SIGNIFICANT CHANGE UP (ref 0–?)
SALICYLATES SERPL-MCNC: < 5 MG/DL — LOW (ref 15–30)
SODIUM SERPL-SCNC: 142 MMOL/L — SIGNIFICANT CHANGE UP (ref 135–145)
SP GR SPEC: 1.01 — SIGNIFICANT CHANGE UP (ref 1–1.04)
TSH SERPL-MCNC: 1.13 UIU/ML — SIGNIFICANT CHANGE UP (ref 0.27–4.2)
UROBILINOGEN FLD QL: NORMAL — SIGNIFICANT CHANGE UP
WBC # BLD: 9.02 K/UL — SIGNIFICANT CHANGE UP (ref 3.8–10.5)
WBC # FLD AUTO: 9.02 K/UL — SIGNIFICANT CHANGE UP (ref 3.8–10.5)
WBC UR QL: SIGNIFICANT CHANGE UP (ref 0–?)

## 2020-05-20 PROCEDURE — 99284 EMERGENCY DEPT VISIT MOD MDM: CPT

## 2020-05-20 RX ORDER — METOCLOPRAMIDE HCL 10 MG
10 TABLET ORAL ONCE
Refills: 0 | Status: COMPLETED | OUTPATIENT
Start: 2020-05-20 | End: 2020-05-20

## 2020-05-20 RX ORDER — SODIUM CHLORIDE 9 MG/ML
1000 INJECTION INTRAMUSCULAR; INTRAVENOUS; SUBCUTANEOUS ONCE
Refills: 0 | Status: COMPLETED | OUTPATIENT
Start: 2020-05-20 | End: 2020-05-20

## 2020-05-20 RX ORDER — METOCLOPRAMIDE HCL 10 MG
1 TABLET ORAL
Qty: 21 | Refills: 0
Start: 2020-05-20 | End: 2020-05-26

## 2020-05-20 RX ADMIN — Medication 10 MILLIGRAM(S): at 14:21

## 2020-05-20 RX ADMIN — SODIUM CHLORIDE 1000 MILLILITER(S): 9 INJECTION INTRAMUSCULAR; INTRAVENOUS; SUBCUTANEOUS at 14:20

## 2020-05-20 NOTE — ED PROVIDER NOTE - PATIENT PORTAL LINK FT
You can access the FollowMyHealth Patient Portal offered by [facility] by registering at the following website: [email or web address]. By joining [facility]’s FollowMyHealth portal, you will also be able to view your health information using other applications (apps) compatible with our system.

## 2020-05-20 NOTE — ED PROVIDER NOTE - NSFOLLOWUPINSTRUCTIONS_ED_ALL_ED_FT
See your primary care doctor within 24-48 hours. Follow up with your psychiatrist this week for further evaluation. STOP taking Zofran. Take Reglan 1 tablet every 8 hours as needed for nausea. Drink plenty of fluids to stay hydrated. Eat multiple small meals throughout the day. Return to the ER for worsening symptoms or any other concerns.

## 2020-05-20 NOTE — ED ADULT TRIAGE NOTE - CHIEF COMPLAINT QUOTE
Pt c/o nausea, poor appetite, and loose bowel x 1 week.  Denies cough , abd pain,  chills.  Pt stated ,she was tested for Covid on Monday [id number]  Job#:  [id number]

## 2020-05-20 NOTE — ED PROVIDER NOTE - NS ED ROS FT
ROS:  GENERAL: No fever, no chills, +anorexia   EYES: no change in vision  HEENT: no trouble swallowing, no trouble speaking  CARDIAC: no chest pain  PULMONARY: no cough, no shortness of breath  GI: no abdominal pain, +nausea, no vomiting, no diarrhea, no constipation  : No dysuria, no frequency, no change in appearance, or odor of urine  SKIN: no rashes  NEURO: no headache, no weakness  MSK: No joint pain .  PSYCH: No homicidal or suicidal ideation.

## 2020-05-20 NOTE — ED PROVIDER NOTE - OBJECTIVE STATEMENT
71 y/o F PMHx HTN and ANSON w/ suicide attempt in the past here c/o nausea, anorexia and loose stool x 1-2 weeks. States she has had similar sx twice in the past month. Had her potassium repleted during the first episode, then was given abx by a GI doctor about 1 month ago. Pt has since finished the abx, however sx have recurred. Was seen by her medical doctor 1 week ago, given Zofran 8mg, Tums and Prilosec, which she has been taking w/o relief of sx. Has been drinking water, but unable to eat. Has 1 loose bowel movement a day. Questions whether these sx are due to her anxiety or new psych meds, but unable to f/u w/ her psychiatrist. Denies SI/HI. No fevers, chills, cough, shortness of breath, chest pain or vomiting.

## 2020-05-20 NOTE — ED PROVIDER NOTE - ATTENDING CONTRIBUTION TO CARE
I, Jennifer Cabot, MD, have performed a history and physical exam of the patient and discussed their management with the ACP.  I reviewed the PA's note and agree with the documented findings and plan of care. My medical decision making and observations are found above.    Cabot: 70F with PMH of ANSON with a prior suicide attempt, who comes in with 2 weeks of nausea similar to another episode 1-2 months ago.  She denies F/C/cough/SOB/CP/abd pain/V/D/urinary sx.  She was given zofran and H2B without effect.  She only started the H2b today.  SHe was given abx for the prior episode by a GI doctor, and she thinks this helped.  On exam, HDS, NAD, MMM, eyes clear, lungs CTAB, heart sounds normal, abd soft, NT, ND, no CVAT, LEs without edema, wwp, skin normal temperature and color, neuro: alert and oriented, no focal deficits.  Likely anxiety but will check basic labs, give reglan, reassess.

## 2020-05-20 NOTE — ED ADULT NURSE NOTE - CHIEF COMPLAINT QUOTE
Pt c/o nausea, poor appetite, and loose bowel x 1 week.  Denies cough , abd pain,  chills.  Pt stated ,she was tested for Covid on Monday

## 2020-05-20 NOTE — ED PROVIDER NOTE - PROGRESS NOTE DETAILS
DYLON Madsen: Pt reassessed, feeling better sp fluids and Reglan. Drinking water and eating crackers. States she feels better with the Reglan rather then Zofran. Would prefer stopping Zofran and trying Reglan instead. Has a follow up with her psychiatrist next week. No SI/HI at present time. Pleasant affect.  is waiting for the pt in the parking lot.

## 2020-05-20 NOTE — ED PROVIDER NOTE - CLINICAL SUMMARY MEDICAL DECISION MAKING FREE TEXT BOX
69 y/o F PMHx HTN and ANSON w/ suicide attempt in the past here c/o nausea, anorexia and loose stool x 1-2 weeks. Exam is unremarkable. VITALS normal. Will do labs to ensure no electrolyte abnl. Pt appears anxious, sx likely 2/2 depression. No SI/HI. Can f/u w/ crisis center.

## 2020-06-16 ENCOUNTER — APPOINTMENT (OUTPATIENT)
Dept: GASTROENTEROLOGY | Facility: CLINIC | Age: 70
End: 2020-06-16
Payer: COMMERCIAL

## 2020-06-16 VITALS
DIASTOLIC BLOOD PRESSURE: 85 MMHG | BODY MASS INDEX: 36.62 KG/M2 | HEIGHT: 62 IN | WEIGHT: 199 LBS | OXYGEN SATURATION: 92 % | SYSTOLIC BLOOD PRESSURE: 139 MMHG | HEART RATE: 76 BPM | TEMPERATURE: 98.5 F

## 2020-06-16 DIAGNOSIS — Z86.79 PERSONAL HISTORY OF OTHER DISEASES OF THE CIRCULATORY SYSTEM: ICD-10-CM

## 2020-06-16 DIAGNOSIS — Z83.3 FAMILY HISTORY OF DIABETES MELLITUS: ICD-10-CM

## 2020-06-16 DIAGNOSIS — Z82.69 FAMILY HISTORY OF OTHER DISEASES OF THE MUSCULOSKELETAL SYSTEM AND CONNECTIVE TISSUE: ICD-10-CM

## 2020-06-16 DIAGNOSIS — Z78.9 OTHER SPECIFIED HEALTH STATUS: ICD-10-CM

## 2020-06-16 DIAGNOSIS — R11.10 VOMITING, UNSPECIFIED: ICD-10-CM

## 2020-06-16 DIAGNOSIS — R19.7 DIARRHEA, UNSPECIFIED: ICD-10-CM

## 2020-06-16 PROCEDURE — 99204 OFFICE O/P NEW MOD 45 MIN: CPT

## 2020-06-16 RX ORDER — HYDROXYZINE PAMOATE 50 MG/1
50 CAPSULE ORAL
Refills: 0 | Status: ACTIVE | COMMUNITY

## 2020-06-16 RX ORDER — MIRTAZAPINE 15 MG/1
15 TABLET, FILM COATED ORAL
Refills: 0 | Status: ACTIVE | COMMUNITY

## 2020-06-16 NOTE — PHYSICAL EXAM
[General Appearance - Alert] : alert [General Appearance - In No Acute Distress] : in no acute distress [Sclera] : the sclera and conjunctiva were normal [PERRL With Normal Accommodation] : pupils were equal in size, round, and reactive to light [Extraocular Movements] : extraocular movements were intact [Outer Ear] : the ears and nose were normal in appearance [Oropharynx] : the oropharynx was normal [Neck Appearance] : the appearance of the neck was normal [Neck Cervical Mass (___cm)] : no neck mass was observed [Jugular Venous Distention Increased] : there was no jugular-venous distention [Thyroid Diffuse Enlargement] : the thyroid was not enlarged [Thyroid Nodule] : there were no palpable thyroid nodules [Auscultation Breath Sounds / Voice Sounds] : lungs were clear to auscultation bilaterally [Heart Rate And Rhythm] : heart rate was normal and rhythm regular [Heart Sounds] : normal S1 and S2 [Murmurs] : no murmurs [Heart Sounds Gallop] : no gallops [Bowel Sounds] : normal bowel sounds [Heart Sounds Pericardial Friction Rub] : no pericardial rub [] : no hepato-splenomegaly [Abdomen Soft] : soft [Abdomen Tenderness] : non-tender [Abdomen Mass (___ Cm)] : no abdominal mass palpated [No CVA Tenderness] : no ~M costovertebral angle tenderness [No Spinal Tenderness] : no spinal tenderness [Abnormal Walk] : normal gait [Musculoskeletal - Swelling] : no joint swelling seen [Nail Clubbing] : no clubbing  or cyanosis of the fingernails [Motor Tone] : muscle strength and tone were normal [Oriented To Time, Place, And Person] : oriented to person, place, and time [Affect] : the affect was normal [Impaired Insight] : insight and judgment were intact

## 2020-06-16 NOTE — HISTORY OF PRESENT ILLNESS
[FreeTextEntry1] : Patient is a 70-year-old female referred by Dr. Grover for complaints of severe nausea especially in the morning.  She has a marked generalized anxiety disorder for which she takes several medications.  She also takes Zofran on a as needed basis.  The symptoms are associated with decreased appetite.  Yesterday, she had some regurgitation and substernal burning.  She denies any dysphagia.\par Her bowel movements are also somewhat irregular with alternating diarrhea and constipation.  She denies seeing any blood in the stool and has no abdominal pain.  She apparently underwent a colonoscopy last year which was normal.

## 2020-06-16 NOTE — ASSESSMENT
[FreeTextEntry1] : Patient with anxiety disorder and symptoms of nausea usually in the morning associated with anorexia.  She does have bouts of regurgitation and substernal burning.  She will be started on famotidine 40 mg in the evening and Gaviscon at bedtime.  She will be scheduled for an upper endoscopy and an abdominal sonogram.

## 2020-06-21 ENCOUNTER — APPOINTMENT (OUTPATIENT)
Dept: DISASTER EMERGENCY | Facility: CLINIC | Age: 70
End: 2020-06-21

## 2020-06-21 DIAGNOSIS — Z01.818 ENCOUNTER FOR OTHER PREPROCEDURAL EXAMINATION: ICD-10-CM

## 2020-06-21 LAB — SARS-COV-2 N GENE NPH QL NAA+PROBE: NOT DETECTED

## 2020-06-24 ENCOUNTER — RESULT REVIEW (OUTPATIENT)
Age: 70
End: 2020-06-24

## 2020-06-24 ENCOUNTER — OUTPATIENT (OUTPATIENT)
Dept: OUTPATIENT SERVICES | Facility: HOSPITAL | Age: 70
LOS: 1 days | Discharge: HOME | End: 2020-06-24
Payer: COMMERCIAL

## 2020-06-24 ENCOUNTER — APPOINTMENT (OUTPATIENT)
Dept: GASTROENTEROLOGY | Facility: HOSPITAL | Age: 70
End: 2020-06-24
Payer: COMMERCIAL

## 2020-06-24 VITALS
DIASTOLIC BLOOD PRESSURE: 90 MMHG | SYSTOLIC BLOOD PRESSURE: 154 MMHG | OXYGEN SATURATION: 96 % | RESPIRATION RATE: 19 BRPM | HEART RATE: 66 BPM

## 2020-06-24 VITALS
DIASTOLIC BLOOD PRESSURE: 80 MMHG | OXYGEN SATURATION: 97 % | TEMPERATURE: 98 F | RESPIRATION RATE: 21 BRPM | SYSTOLIC BLOOD PRESSURE: 160 MMHG | HEART RATE: 70 BPM

## 2020-06-24 DIAGNOSIS — R11.0 NAUSEA: ICD-10-CM

## 2020-06-24 PROCEDURE — 88305 TISSUE EXAM BY PATHOLOGIST: CPT | Mod: 26

## 2020-06-24 PROCEDURE — 43239 EGD BIOPSY SINGLE/MULTIPLE: CPT

## 2020-06-24 PROCEDURE — 88312 SPECIAL STAINS GROUP 1: CPT | Mod: 26

## 2020-06-24 RX ORDER — SODIUM CHLORIDE 9 MG/ML
1000 INJECTION, SOLUTION INTRAVENOUS
Refills: 0 | Status: DISCONTINUED | OUTPATIENT
Start: 2020-06-24 | End: 2020-07-09

## 2020-06-24 NOTE — ASU PATIENT PROFILE, ADULT - PRO INTERPRETER NEED 2
Agree with assessment and plan as above by Dr. Gaines. Reviewed all pertinent labs, glucose values, and imaging studies. Modifications made as indicated above.     Drew Mendiola D.O  [phone] Patient seen/examined and agree with above. English

## 2020-06-26 ENCOUNTER — TRANSCRIPTION ENCOUNTER (OUTPATIENT)
Age: 70
End: 2020-06-26

## 2020-06-29 LAB — SURGICAL PATHOLOGY STUDY: SIGNIFICANT CHANGE UP

## 2020-07-14 ENCOUNTER — APPOINTMENT (OUTPATIENT)
Dept: GASTROENTEROLOGY | Facility: CLINIC | Age: 70
End: 2020-07-14

## 2020-08-23 ENCOUNTER — INPATIENT (INPATIENT)
Facility: HOSPITAL | Age: 70
LOS: 11 days | Discharge: HOME | End: 2020-09-04
Attending: PSYCHIATRY & NEUROLOGY | Admitting: PSYCHIATRY & NEUROLOGY
Payer: COMMERCIAL

## 2020-08-23 VITALS
RESPIRATION RATE: 16 BRPM | DIASTOLIC BLOOD PRESSURE: 99 MMHG | OXYGEN SATURATION: 99 % | TEMPERATURE: 98 F | HEART RATE: 89 BPM | SYSTOLIC BLOOD PRESSURE: 172 MMHG

## 2020-08-23 DIAGNOSIS — F32.9 MAJOR DEPRESSIVE DISORDER, SINGLE EPISODE, UNSPECIFIED: ICD-10-CM

## 2020-08-23 LAB
ALBUMIN SERPL ELPH-MCNC: 4.4 G/DL — SIGNIFICANT CHANGE UP (ref 3.3–5)
ALP SERPL-CCNC: 81 U/L — SIGNIFICANT CHANGE UP (ref 40–120)
ALT FLD-CCNC: 20 U/L — SIGNIFICANT CHANGE UP (ref 4–33)
ANION GAP SERPL CALC-SCNC: 14 MMO/L — SIGNIFICANT CHANGE UP (ref 7–14)
APAP SERPL-MCNC: < 15 UG/ML — LOW (ref 15–25)
APPEARANCE UR: CLEAR — SIGNIFICANT CHANGE UP
AST SERPL-CCNC: 20 U/L — SIGNIFICANT CHANGE UP (ref 4–32)
BACTERIA # UR AUTO: NEGATIVE — SIGNIFICANT CHANGE UP
BASOPHILS # BLD AUTO: 0.04 K/UL — SIGNIFICANT CHANGE UP (ref 0–0.2)
BASOPHILS NFR BLD AUTO: 0.4 % — SIGNIFICANT CHANGE UP (ref 0–2)
BILIRUB SERPL-MCNC: 0.5 MG/DL — SIGNIFICANT CHANGE UP (ref 0.2–1.2)
BILIRUB UR-MCNC: NEGATIVE — SIGNIFICANT CHANGE UP
BLOOD UR QL VISUAL: NEGATIVE — SIGNIFICANT CHANGE UP
BUN SERPL-MCNC: 8 MG/DL — SIGNIFICANT CHANGE UP (ref 7–23)
CALCIUM SERPL-MCNC: 9.7 MG/DL — SIGNIFICANT CHANGE UP (ref 8.4–10.5)
CHLORIDE SERPL-SCNC: 100 MMOL/L — SIGNIFICANT CHANGE UP (ref 98–107)
CO2 SERPL-SCNC: 27 MMOL/L — SIGNIFICANT CHANGE UP (ref 22–31)
COLOR SPEC: COLORLESS — SIGNIFICANT CHANGE UP
CREAT SERPL-MCNC: 0.84 MG/DL — SIGNIFICANT CHANGE UP (ref 0.5–1.3)
EOSINOPHIL # BLD AUTO: 0.05 K/UL — SIGNIFICANT CHANGE UP (ref 0–0.5)
EOSINOPHIL NFR BLD AUTO: 0.5 % — SIGNIFICANT CHANGE UP (ref 0–6)
ETHANOL BLD-MCNC: < 10 MG/DL — SIGNIFICANT CHANGE UP
GLUCOSE SERPL-MCNC: 123 MG/DL — HIGH (ref 70–99)
GLUCOSE UR-MCNC: NEGATIVE — SIGNIFICANT CHANGE UP
HCT VFR BLD CALC: 44.6 % — SIGNIFICANT CHANGE UP (ref 34.5–45)
HGB BLD-MCNC: 14.7 G/DL — SIGNIFICANT CHANGE UP (ref 11.5–15.5)
HYALINE CASTS # UR AUTO: NEGATIVE — SIGNIFICANT CHANGE UP
IMM GRANULOCYTES NFR BLD AUTO: 0.6 % — SIGNIFICANT CHANGE UP (ref 0–1.5)
KETONES UR-MCNC: NEGATIVE — SIGNIFICANT CHANGE UP
LEUKOCYTE ESTERASE UR-ACNC: SIGNIFICANT CHANGE UP
LYMPHOCYTES # BLD AUTO: 19.8 % — SIGNIFICANT CHANGE UP (ref 13–44)
LYMPHOCYTES # BLD AUTO: 2 K/UL — SIGNIFICANT CHANGE UP (ref 1–3.3)
MCHC RBC-ENTMCNC: 28.1 PG — SIGNIFICANT CHANGE UP (ref 27–34)
MCHC RBC-ENTMCNC: 33 % — SIGNIFICANT CHANGE UP (ref 32–36)
MCV RBC AUTO: 85.3 FL — SIGNIFICANT CHANGE UP (ref 80–100)
MONOCYTES # BLD AUTO: 0.36 K/UL — SIGNIFICANT CHANGE UP (ref 0–0.9)
MONOCYTES NFR BLD AUTO: 3.6 % — SIGNIFICANT CHANGE UP (ref 2–14)
NEUTROPHILS # BLD AUTO: 7.57 K/UL — HIGH (ref 1.8–7.4)
NEUTROPHILS NFR BLD AUTO: 75.1 % — SIGNIFICANT CHANGE UP (ref 43–77)
NITRITE UR-MCNC: NEGATIVE — SIGNIFICANT CHANGE UP
NRBC # FLD: 0 K/UL — SIGNIFICANT CHANGE UP (ref 0–0)
PH UR: 7 — SIGNIFICANT CHANGE UP (ref 5–8)
PLATELET # BLD AUTO: 228 K/UL — SIGNIFICANT CHANGE UP (ref 150–400)
PMV BLD: 10.4 FL — SIGNIFICANT CHANGE UP (ref 7–13)
POTASSIUM SERPL-MCNC: 4.2 MMOL/L — SIGNIFICANT CHANGE UP (ref 3.5–5.3)
POTASSIUM SERPL-SCNC: 4.2 MMOL/L — SIGNIFICANT CHANGE UP (ref 3.5–5.3)
PROT SERPL-MCNC: 6.6 G/DL — SIGNIFICANT CHANGE UP (ref 6–8.3)
PROT UR-MCNC: NEGATIVE — SIGNIFICANT CHANGE UP
RBC # BLD: 5.23 M/UL — HIGH (ref 3.8–5.2)
RBC # FLD: 12.4 % — SIGNIFICANT CHANGE UP (ref 10.3–14.5)
RBC CASTS # UR COMP ASSIST: SIGNIFICANT CHANGE UP (ref 0–?)
SALICYLATES SERPL-MCNC: < 5 MG/DL — LOW (ref 15–30)
SARS-COV-2 RNA SPEC QL NAA+PROBE: SIGNIFICANT CHANGE UP
SODIUM SERPL-SCNC: 141 MMOL/L — SIGNIFICANT CHANGE UP (ref 135–145)
SP GR SPEC: 1.01 — SIGNIFICANT CHANGE UP (ref 1–1.04)
SQUAMOUS # UR AUTO: SIGNIFICANT CHANGE UP
TSH SERPL-MCNC: 0.9 UIU/ML — SIGNIFICANT CHANGE UP (ref 0.27–4.2)
UROBILINOGEN FLD QL: NORMAL — SIGNIFICANT CHANGE UP
WBC # BLD: 10.08 K/UL — SIGNIFICANT CHANGE UP (ref 3.8–10.5)
WBC # FLD AUTO: 10.08 K/UL — SIGNIFICANT CHANGE UP (ref 3.8–10.5)
WBC UR QL: HIGH (ref 0–?)

## 2020-08-23 RX ORDER — ZOLPIDEM TARTRATE 10 MG/1
5 TABLET ORAL AT BEDTIME
Refills: 0 | Status: DISCONTINUED | OUTPATIENT
Start: 2020-08-23 | End: 2020-08-24

## 2020-08-23 RX ORDER — METOPROLOL TARTRATE 50 MG
50 TABLET ORAL ONCE
Refills: 0 | Status: COMPLETED | OUTPATIENT
Start: 2020-08-23 | End: 2020-08-23

## 2020-08-23 RX ORDER — LOSARTAN POTASSIUM 100 MG/1
100 TABLET, FILM COATED ORAL DAILY
Refills: 0 | Status: DISCONTINUED | OUTPATIENT
Start: 2020-08-23 | End: 2020-09-04

## 2020-08-23 RX ORDER — LOSARTAN POTASSIUM 100 MG/1
100 TABLET, FILM COATED ORAL ONCE
Refills: 0 | Status: DISCONTINUED | OUTPATIENT
Start: 2020-08-23 | End: 2020-08-23

## 2020-08-23 RX ORDER — HALOPERIDOL DECANOATE 100 MG/ML
2 INJECTION INTRAMUSCULAR EVERY 6 HOURS
Refills: 0 | Status: DISCONTINUED | OUTPATIENT
Start: 2020-08-23 | End: 2020-08-24

## 2020-08-23 RX ORDER — SODIUM CHLORIDE 9 MG/ML
1000 INJECTION INTRAMUSCULAR; INTRAVENOUS; SUBCUTANEOUS ONCE
Refills: 0 | Status: COMPLETED | OUTPATIENT
Start: 2020-08-23 | End: 2020-08-23

## 2020-08-23 RX ORDER — HYDROCHLOROTHIAZIDE 25 MG
12.5 TABLET ORAL DAILY
Refills: 0 | Status: DISCONTINUED | OUTPATIENT
Start: 2020-08-23 | End: 2020-09-04

## 2020-08-23 RX ORDER — ONDANSETRON 8 MG/1
4 TABLET, FILM COATED ORAL EVERY 8 HOURS
Refills: 0 | Status: DISCONTINUED | OUTPATIENT
Start: 2020-08-23 | End: 2020-08-24

## 2020-08-23 RX ORDER — CLONAZEPAM 1 MG
0.5 TABLET ORAL
Refills: 0 | Status: DISCONTINUED | OUTPATIENT
Start: 2020-08-23 | End: 2020-08-25

## 2020-08-23 RX ORDER — ONDANSETRON 8 MG/1
4 TABLET, FILM COATED ORAL ONCE
Refills: 0 | Status: COMPLETED | OUTPATIENT
Start: 2020-08-23 | End: 2020-08-23

## 2020-08-23 RX ORDER — HYDROXYZINE HCL 10 MG
25 TABLET ORAL ONCE
Refills: 0 | Status: COMPLETED | OUTPATIENT
Start: 2020-08-23 | End: 2020-08-23

## 2020-08-23 RX ORDER — METOCLOPRAMIDE HCL 10 MG
10 TABLET ORAL ONCE
Refills: 0 | Status: COMPLETED | OUTPATIENT
Start: 2020-08-23 | End: 2020-08-23

## 2020-08-23 RX ORDER — DIPHENHYDRAMINE HCL 50 MG
50 CAPSULE ORAL EVERY 6 HOURS
Refills: 0 | Status: DISCONTINUED | OUTPATIENT
Start: 2020-08-23 | End: 2020-08-24

## 2020-08-23 RX ORDER — METOPROLOL TARTRATE 50 MG
50 TABLET ORAL DAILY
Refills: 0 | Status: DISCONTINUED | OUTPATIENT
Start: 2020-08-23 | End: 2020-09-04

## 2020-08-23 RX ADMIN — ONDANSETRON 4 MILLIGRAM(S): 8 TABLET, FILM COATED ORAL at 12:39

## 2020-08-23 RX ADMIN — Medication 25 MILLIGRAM(S): at 22:42

## 2020-08-23 RX ADMIN — Medication 50 MILLIGRAM(S): at 22:41

## 2020-08-23 RX ADMIN — SODIUM CHLORIDE 1000 MILLILITER(S): 9 INJECTION INTRAMUSCULAR; INTRAVENOUS; SUBCUTANEOUS at 12:30

## 2020-08-23 RX ADMIN — Medication 10 MILLIGRAM(S): at 16:00

## 2020-08-23 RX ADMIN — Medication 0.5 MILLIGRAM(S): at 21:38

## 2020-08-23 NOTE — ED BEHAVIORAL HEALTH ASSESSMENT NOTE - ACTIVATING EVENTS/STRESSORS
Triggering events leading to humiliation, shame, and/or despair (e.g. Loss of relationship, financial or health status) (real or anticipated)/Current or pending social isolation/Change in provider or treatment (i.e., medications, psychotherapy, milieu)

## 2020-08-23 NOTE — ED PROVIDER NOTE - NS_EDPROVIDERDISPOUSERTYPE_ED_A_ED
Patient returned phone call. States she had a message left on her voicemail and wanted clarification. Office staff-  Could you please contact the patient. Thank you. Attending Attestation (For Attendings USE Only)...

## 2020-08-23 NOTE — ED ADULT NURSE NOTE - OBJECTIVE STATEMENT
Pt received to room 12 complaining of worsening anxiety. Pt states her psych doctor recently changed her from Mirtazepine to Effexor. Pt states she has been feeling more anxious and depressed. Pt states she has not been eating or drinking. Pt also complaining of intermittent nausea and palpitations. Pt denies chest pain, sob, fever or chills. Pt denies SI/HI at this time. Pt states in the past she has had thoughts of harming herself but does not at this time. IV access obtained, labs drawn and sent.

## 2020-08-23 NOTE — ED BEHAVIORAL HEALTH ASSESSMENT NOTE - OTHER PAST PSYCHIATRIC HISTORY (INCLUDE DETAILS REGARDING ONSET, COURSE OF ILLNESS, INPATIENT/OUTPATIENT TREATMENT)
Patient states that she has had a long history of depression and anxiety treated with multiple medications. Her first inpatient psychiatric admission was in November 2019 at [facility] for a suicide attempt via OD on unknown pills. She was hospitalized again at [facility] in Feb 2020 for worsening anxiety and depression and was discharged on Buspar, Mirtazapine, and Klonopin. She discontinued Buspar herself a month ago. She endorsed minimal improvement of her anxiety and her private outpatient psychiatrist Dr. Ramos advised her to discontinue Mirtazapine 15 mg 8/13/2020 for Effexor 50 mg. She endorses taking 1.5 mg Klonopin per day along with leftover Hydroxyzine 50 mg for symptom relief. Per chart review Dr. Ramos has noted that the patient has some dependent traits and is prone to dysphoria after fights with her . Denies any sadia symptoms, no HI/I/P, no AVT hallucinations, no substance abuse.

## 2020-08-23 NOTE — ED PROVIDER NOTE - OBJECTIVE STATEMENT
70F hx htn, ANSON, MDD (On Effexor, PRN Klonopin), p/w complaint of worsening depression and "wishing it would all just end".    Per pt, her psych doc recently switched her from Mirtazepine to Effexor after she complained about worsening anxiety. Pt says that since then her anxiety and depression have 'skyrocketed' - describes not eating or drinking, having intermittent nausea, and palpitations, mostly in the AM, no assoc cp/sob. Describes sometimes taking an 'extra' klonopin or hydroxyzine pill 'knowing full well' it 'could hurt me'. Pt concerned it's bc her doc did not ween her off the mirtazapine, instead stopped her 'cold turkey'. Denies etoh use, hallucinations. Smoked a joint 2 days ago.    Psychiatrist: Dr. Richard HARMAN

## 2020-08-23 NOTE — ED PROVIDER NOTE - CLINICAL SUMMARY MEDICAL DECISION MAKING FREE TEXT BOX
70F hx depression p/f worseing symptoms and suicidal ideation. AOx3. cooperative. will obtain psych labs and psych consult.

## 2020-08-23 NOTE — ED BEHAVIORAL HEALTH ASSESSMENT NOTE - SUICIDE RISK FACTORS
Family history of suicide/Anhedonia/Current mood episode/Family History of Suicidal behavior/Mood Disorder current/past/Access to lethal methods (pills, firearm, etc.: Ask specifically about presence or absence of a firearm in the home or ease of accessing/Insomnia/Agitation/Severe Anxiety/Panic

## 2020-08-23 NOTE — ED PROVIDER NOTE - ATTENDING CONTRIBUTION TO CARE
Awake, Alert, Conversant.  Resting comfortably.  Breath sounds clear in all lung fields.  Normal and regular heart rate without murmurs, rubs, or gallops.  Normal S1/S2.  Abdomen soft and nontender.  No lower extremity swelling or tenderness.  Oriented and conversant with fluent speech, moving all extremities with good strength.  Nervous affect, poor eye contact, does not appear to be responding to internal stimuli during exam.  Denies SI/HI but does endorse thoughts of "wishing it would all end."    Dr. Mendiola: I agree with the above provided history and exam and addend/modify it as follows.  70F hx HTN, ANSON, MDD, p/w anxiety which at times wakes her from sleep.  Denies attempts at self harm today.  Notes episodes of nausea when anxious, no chest pain.  No recent fever or chills.  Hypertensive.  Likely exacerbation of anxiety in the setting of change to psychiatric medications.  Will R/O ACS though unlikely.  Screen for pheochromocytoma although this is unlikely.    Plan for labs, EKG, psychiatric consult.    DEEPALI Mendiola MD performed a history and physical exam of the patient and discussed their management with the resident and /or advanced care provider. I reviewed the resident and /or ACP's note and agree with the documented findings and plan of care. My medical decision making and observations are found above.

## 2020-08-23 NOTE — ED BEHAVIORAL HEALTH ASSESSMENT NOTE - ADDITIONAL DETAILS ALL
Patient endorses chronic passive SI, occasionally active SI with intent and plan to escape her anxiety

## 2020-08-23 NOTE — ED ADULT NURSE REASSESSMENT NOTE - NS ED NURSE REASSESS COMMENT FT1
Pt received from KAZ Mendoza. Pt currently resting in bed, no sxs distress noted. A&Ox4, neg sob, neg chest pain, IV intact. Pending psych recommendations. Will continue to monitor.

## 2020-08-23 NOTE — ED BEHAVIORAL HEALTH ASSESSMENT NOTE - ADDITIONAL DETAILS / COMMENTS
Patient has a fair understanding of her illness and her need for care. Her judgment is poor regarding self managing and dosing medications inappropriately.

## 2020-08-23 NOTE — ED ADULT TRIAGE NOTE - CHIEF COMPLAINT QUOTE
pt c/o worsening depression/anxiety x 10 days after switching depression medications. pt reports "I am afraid if I do not start feeling better, Im going to take too many pills". pt reports n/v/d and palpitations x 10 days. pt denies SI/HI.  MD Recinos called and advised pt needs medical eval. PMH HTN, Depression, Anxiety.

## 2020-08-23 NOTE — ED BEHAVIORAL HEALTH ASSESSMENT NOTE - NS ED BHA MED ROS HEMATOLOGIC LYMPHATIC
Recommendation Preamble: The following recommendations were made during the visit: follow up with PCP
No complaints
Detail Level: Zone

## 2020-08-23 NOTE — ED BEHAVIORAL HEALTH ASSESSMENT NOTE - DETAILS
Nausea, diarrhea: medically cleared Patient endorses chronic passive SI, occasionally active SI with plan to escape her anxiety Penicillin Grandmother (Holocaust survivor) completed suicide at age 90. EUFEMIA Phone call and message recorded

## 2020-08-23 NOTE — ED BEHAVIORAL HEALTH ASSESSMENT NOTE - DESCRIPTION
Since her arrival in the ED, she has been pleasant and cooperative. No aggression, agitation, no PRNs needed. HTN, HLD Patient is a 70 F, retired,  non-caregiver status living in a private home in [place] with her . She states that her  is a workaholic and works almost daily. She states that she is still deeply affected by her parents passing a few years ago and a friend's death last year.

## 2020-08-23 NOTE — ED BEHAVIORAL HEALTH ASSESSMENT NOTE - SUMMARY
The pt is a 70 y F, , retired, non-caregiver domiciled at home with her  with a prior psychiatric diagnosis of ANSON and MDD, 2 previous inpatient psychiatric hospitalizations (most recently [facility] 2/2020), prior SA in 11/2019 by overdosing on pills, no history of NSSIB, no history of violence/aggression, no hx of legal issues, no active substance use, PMH of HTN, BIB self due to worsening depression and anxiety in the setting of recent medication change from mirtazapine 15 mg to Effexor 50 mg.    At this time patient is reporting feeling increasingly depressed and anxious. She endorses constant panic attacks, starting in the morning and lasting throughout the day with tremors, palpitations, and sweating. She feels as though she is dangerously out of control with her medications and is requesting voluntary admission to adjust her medications and address her worsening anxiety and depression. Given her multiple risk factors and desire to pursue treatment, she will be admitted psychiatrically. 70 y F, , retired, non-caregiver domiciled at home with her  with a prior psychiatric diagnosis of ANSON and MDD, 2 previous inpatient psychiatric hospitalizations (most recently [facility] 2/2020), prior SA in 11/2019 by overdosing on pills, no history of NSSIB, no history of violence/aggression, no hx of legal issues, no active substance use, PMH of HTN, BIB self due to worsening depression and anxiety in the setting of recent medication change from mirtazapine 15 mg to Effexor 50 mg with passive suicidality and fear of a OD, but no clear plan or desire in the setting of recurrent MDD and ANSON after stopping medications, that can benefit from voluntary inpatient admission.

## 2020-08-23 NOTE — ED BEHAVIORAL HEALTH ASSESSMENT NOTE - CURRENT MEDICATION
Effexor 50 mg, Klonopin 0.5 mg, Hydroxyzine 50 mg, Metoprolol 50 mg, Hydrochlorothiazide 12.5 mg, Losartan 100 mg, Zofran

## 2020-08-23 NOTE — ED BEHAVIORAL HEALTH ASSESSMENT NOTE - HPI (INCLUDE ILLNESS QUALITY, SEVERITY, DURATION, TIMING, CONTEXT, MODIFYING FACTORS, ASSOCIATED SIGNS AND SYMPTOMS)
The pt is a 70 y F, , retired, non-caregiver domiciled at home with her  with a prior psychiatric diagnosis of ANSON and MDD, 2 previous inpatient psychiatric hospitalizations (most recently [facility] 2/2020), prior SA in 11/2019 by overdosing on pills, no history of NSSIB, no history of violence/aggression, no hx of legal issues, no active substance use, PMH of HTN, BIB self due to worsening depression and anxiety in the setting of recent medication change from mirtazapine 15 mg to Effexor 50 mg.    She endorses worsening anxiety and depression since her psychiatrist Dr. Ramos [phone]) discontinued her Remeron 15 mg and placed her on Effexor 50 mg on 8/13/2020. She states she has been having severe nausea, daily diarrhea, and has induced self vomiting multiple times over the week along with waking up at 4 am with palpitations, tremors, and sweats. She states her anxiety felt so severe that she has not left her home in days. She also began fighting with her  Yehuda ([phone]) around this time over perceived hurtful comments regarding her mental health and appearance, which the patient states makes her "want to end it all". Along with her Effexor she has been taking Klonopin 0.5 mg. She states that as her symptoms were unrelenting she began taking leftover Hydroxyzine 50 mg to self-medicate. She endorses that lately she has been taking up to 3 Klonopin (1.5 mg) per day with no relief and feels like she is "out of control" with her medications and fears that she may overdose on her pills as she did in November 2019 as she cannot deal with feeling like this anymore. She states that she fears that she would overdose on her medications at home in an attempt to escape her symptoms and is afraid that she will die from a heart attack before her symptoms resolve.    Patient endorses a long history of depression and anxiety since her early teens. Her family history is positive for suicide (grandmother at age 90). Patient attempted suicide via OD on her pills in November 2019 and was admitted to [facility]. Of note, she was being tapered off of Effexor at that time. Per chart review, her psychiatrist Dr. 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 She denies any active SI, + chronic passive SI which she ascribes to wanting to escape her anxiety. Denies any manic symptoms, no HI/I/P, no AVT hallucinations. She is requesting voluntary admission to address her worsening anxiety and depression and adjust her medications. The pt is a 70 y F, , retired, non-caregiver domiciled at home with her  with a prior psychiatric diagnosis of ANSON and MDD, 2 previous inpatient psychiatric hospitalizations (most recently [facility] 2/2020), prior SA in 11/2019 by overdosing on pills, no history of NSSIB, no history of violence/aggression, no hx of legal issues, no active substance use, PMH of HTN, BIB self due to worsening depression and anxiety in the setting of recent medication change from mirtazapine 15 mg to Effexor 50 mg.    She endorses worsening anxiety and depression since her psychiatrist Dr. Ramos ([phone]) discontinued her Remeron 15 mg and placed her on Effexor 50 mg on 8/13/2020. She states she has been having severe nausea, daily diarrhea, and has induced self vomiting multiple times over the week along with waking up at 4 am with palpitations, tremors, and sweats. She states her anxiety felt so severe that she has not left her home in days. She also began fighting with her  Yehuda ([phone]) around this time over perceived hurtful comments regarding her mental health and appearance, which the patient states makes her "want to end it all". Along with her Effexor she has been taking Klonopin 0.5 mg. She states that as her symptoms were unrelenting she began taking leftover Hydroxyzine 50 mg to self-medicate. She endorses that lately she has been taking up to 3 Klonopin (1.5 mg) per day with no relief and feels like she is "out of control" with her medications and fears that she may overdose on her pills as she did in November 2019 as she cannot deal with feeling like this anymore. She states that she fears that she would overdose on her medications at home in an attempt to escape her symptoms and is afraid that she will die from a heart attack before her symptoms resolve.    Patient endorses a long history of depression and anxiety since her early teens. Her family history is positive for suicide (grandmother at age 90). Patient attempted suicide via OD on her pills in November 2019 and was admitted to [facility]. Of note, she was being tapered off of Effexor at that time. Per chart review, her psychiatrist Dr. 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 She has a history of self adjusting her medications prior to her admissions. She denies any active SI, + chronic passive SI which she ascribes to wanting to escape her anxiety. Denies any manic symptoms, no HI/I/P, no AVT hallucinations. She is requesting voluntary admission to address her worsening anxiety and depression and adjust her medications. The pt is a 70 y F, , retired, non-caregiver domiciled at home with her  with a prior psychiatric diagnosis of ANSON and MDD, 2 previous inpatient psychiatric hospitalizations (most recently [facility] 2/2020), prior SA in 11/2019 by overdosing on pills, no history of NSSIB, no history of violence/aggression, no hx of legal issues, no active substance use, PMH of HTN, BIB self due to worsening depression and anxiety in the setting of recent medication change from mirtazapine 15 mg to Effexor 50 mg.    She endorses worsening anxiety and depression since her psychiatrist Dr. Ramos [phone]) discontinued her Remeron 15 mg and placed her on Effexor 50 mg on 8/13/2020. She states she has been having severe nausea, daily diarrhea, and has induced self vomiting multiple times over the week along with waking up at 4 am with palpitations, tremors, and sweats. She states her anxiety felt so severe that she has not left her home in days. She also began fighting with her  Yehuda ([phone], unable to obtain collateral) around this time over perceived hurtful comments regarding her mental health and appearance, which the patient states makes her "want to end it all". Along with her Effexor she has been taking Klonopin 0.5 mg. She states that as her symptoms were unrelenting she began taking leftover Hydroxyzine 50 mg to self-medicate. She endorses that lately she has been taking up to 3 Klonopin (1.5 mg) per day with no relief and feels like she is "out of control" with her medications and fears that she may overdose on her pills as she did in November 2019 as she cannot deal with feeling like this anymore. She states that she fears that she would overdose on her medications at home in an attempt to escape her symptoms and is afraid that she will die from a heart attack before her symptoms resolve.    Patient endorses a long history of depression and anxiety since her early teens. Her family history is positive for suicide (grandmother at age 90). Patient attempted suicide via OD on her pills in November 2019 and was admitted to [facility]. Of note, she was being tapered off of Effexor at that time. Per chart review, her psychiatrist Dr. 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 She has a history of self adjusting her medications prior to her admissions. She denies any active SI, + chronic passive SI which she ascribes to wanting to escape her anxiety. Denies any manic symptoms, no HI/I/P, no AVT hallucinations. She is requesting voluntary admission to address her worsening anxiety and depression and adjust her medications.

## 2020-08-24 PROCEDURE — 90834 PSYTX W PT 45 MINUTES: CPT

## 2020-08-24 PROCEDURE — 99223 1ST HOSP IP/OBS HIGH 75: CPT | Mod: GC

## 2020-08-24 RX ORDER — LANOLIN ALCOHOL/MO/W.PET/CERES
3 CREAM (GRAM) TOPICAL AT BEDTIME
Refills: 0 | Status: DISCONTINUED | OUTPATIENT
Start: 2020-08-24 | End: 2020-09-04

## 2020-08-24 RX ORDER — TRAZODONE HCL 50 MG
25 TABLET ORAL AT BEDTIME
Refills: 0 | Status: DISCONTINUED | OUTPATIENT
Start: 2020-08-24 | End: 2020-09-04

## 2020-08-24 RX ORDER — MIRTAZAPINE 45 MG/1
7.5 TABLET, ORALLY DISINTEGRATING ORAL AT BEDTIME
Refills: 0 | Status: DISCONTINUED | OUTPATIENT
Start: 2020-08-24 | End: 2020-08-25

## 2020-08-24 RX ADMIN — LOSARTAN POTASSIUM 100 MILLIGRAM(S): 100 TABLET, FILM COATED ORAL at 09:16

## 2020-08-24 RX ADMIN — Medication 10 MILLIGRAM(S): at 12:21

## 2020-08-24 RX ADMIN — Medication 10 MILLIGRAM(S): at 21:34

## 2020-08-24 RX ADMIN — Medication 1 MILLIGRAM(S): at 10:12

## 2020-08-24 RX ADMIN — MIRTAZAPINE 7.5 MILLIGRAM(S): 45 TABLET, ORALLY DISINTEGRATING ORAL at 21:34

## 2020-08-24 RX ADMIN — Medication 3 MILLIGRAM(S): at 21:34

## 2020-08-24 RX ADMIN — Medication 0.5 MILLIGRAM(S): at 21:34

## 2020-08-24 RX ADMIN — Medication 0.5 MILLIGRAM(S): at 07:17

## 2020-08-24 RX ADMIN — Medication 12.5 MILLIGRAM(S): at 09:16

## 2020-08-24 RX ADMIN — Medication 50 MILLIGRAM(S): at 09:16

## 2020-08-25 PROCEDURE — 99232 SBSQ HOSP IP/OBS MODERATE 35: CPT | Mod: GC

## 2020-08-25 RX ORDER — CLONAZEPAM 1 MG
0.5 TABLET ORAL
Refills: 0 | Status: DISCONTINUED | OUTPATIENT
Start: 2020-08-25 | End: 2020-08-26

## 2020-08-25 RX ORDER — MIRTAZAPINE 45 MG/1
15 TABLET, ORALLY DISINTEGRATING ORAL AT BEDTIME
Refills: 0 | Status: DISCONTINUED | OUTPATIENT
Start: 2020-08-25 | End: 2020-08-27

## 2020-08-25 RX ORDER — ONDANSETRON 8 MG/1
4 TABLET, FILM COATED ORAL EVERY 8 HOURS
Refills: 0 | Status: DISCONTINUED | OUTPATIENT
Start: 2020-08-25 | End: 2020-08-27

## 2020-08-25 RX ADMIN — Medication 12.5 MILLIGRAM(S): at 10:12

## 2020-08-25 RX ADMIN — Medication 0.5 MILLIGRAM(S): at 20:50

## 2020-08-25 RX ADMIN — Medication 1 MILLIGRAM(S): at 07:13

## 2020-08-25 RX ADMIN — Medication 0.5 MILLIGRAM(S): at 09:55

## 2020-08-25 RX ADMIN — Medication 3 MILLIGRAM(S): at 20:50

## 2020-08-25 RX ADMIN — Medication 10 MILLIGRAM(S): at 09:55

## 2020-08-25 RX ADMIN — MIRTAZAPINE 15 MILLIGRAM(S): 45 TABLET, ORALLY DISINTEGRATING ORAL at 20:50

## 2020-08-25 RX ADMIN — Medication 10 MILLIGRAM(S): at 20:50

## 2020-08-25 RX ADMIN — Medication 50 MILLIGRAM(S): at 09:55

## 2020-08-25 RX ADMIN — LOSARTAN POTASSIUM 100 MILLIGRAM(S): 100 TABLET, FILM COATED ORAL at 09:55

## 2020-08-25 RX ADMIN — Medication 1 MILLIGRAM(S): at 13:40

## 2020-08-26 LAB
CHOLEST SERPL-MCNC: 254 MG/DL — HIGH (ref 120–199)
HBA1C BLD-MCNC: 5.6 % — SIGNIFICANT CHANGE UP (ref 4–5.6)
HDLC SERPL-MCNC: 42 MG/DL — LOW (ref 45–65)
LIPID PNL WITH DIRECT LDL SERPL: 195 MG/DL — SIGNIFICANT CHANGE UP
TRIGL SERPL-MCNC: 215 MG/DL — HIGH (ref 10–149)
VIT B12 SERPL-MCNC: 893 PG/ML — SIGNIFICANT CHANGE UP (ref 200–900)

## 2020-08-26 PROCEDURE — 99232 SBSQ HOSP IP/OBS MODERATE 35: CPT | Mod: GC

## 2020-08-26 PROCEDURE — 90832 PSYTX W PT 30 MINUTES: CPT

## 2020-08-26 RX ORDER — DULOXETINE HYDROCHLORIDE 30 MG/1
20 CAPSULE, DELAYED RELEASE ORAL DAILY
Refills: 0 | Status: DISCONTINUED | OUTPATIENT
Start: 2020-08-27 | End: 2020-08-28

## 2020-08-26 RX ORDER — OMEGA-3 ACID ETHYL ESTERS 1 G
4 CAPSULE ORAL DAILY
Refills: 0 | Status: DISCONTINUED | OUTPATIENT
Start: 2020-08-26 | End: 2020-09-04

## 2020-08-26 RX ORDER — CLONAZEPAM 1 MG
0.5 TABLET ORAL
Refills: 0 | Status: DISCONTINUED | OUTPATIENT
Start: 2020-08-26 | End: 2020-09-01

## 2020-08-26 RX ADMIN — Medication 0.5 MILLIGRAM(S): at 21:28

## 2020-08-26 RX ADMIN — MIRTAZAPINE 15 MILLIGRAM(S): 45 TABLET, ORALLY DISINTEGRATING ORAL at 21:28

## 2020-08-26 RX ADMIN — Medication 1 MILLIGRAM(S): at 15:17

## 2020-08-26 RX ADMIN — Medication 50 MILLIGRAM(S): at 09:52

## 2020-08-26 RX ADMIN — Medication 10 MILLIGRAM(S): at 21:28

## 2020-08-26 RX ADMIN — LOSARTAN POTASSIUM 100 MILLIGRAM(S): 100 TABLET, FILM COATED ORAL at 09:52

## 2020-08-26 RX ADMIN — Medication 10 MILLIGRAM(S): at 09:52

## 2020-08-26 RX ADMIN — Medication 3 MILLIGRAM(S): at 21:28

## 2020-08-26 RX ADMIN — Medication 0.5 MILLIGRAM(S): at 06:02

## 2020-08-26 RX ADMIN — Medication 12.5 MILLIGRAM(S): at 09:52

## 2020-08-27 PROCEDURE — 99232 SBSQ HOSP IP/OBS MODERATE 35: CPT | Mod: GC

## 2020-08-27 RX ORDER — MIRTAZAPINE 45 MG/1
22.5 TABLET, ORALLY DISINTEGRATING ORAL AT BEDTIME
Refills: 0 | Status: DISCONTINUED | OUTPATIENT
Start: 2020-08-27 | End: 2020-08-28

## 2020-08-27 RX ADMIN — Medication 0.5 MILLIGRAM(S): at 06:20

## 2020-08-27 RX ADMIN — Medication 50 MILLIGRAM(S): at 09:21

## 2020-08-27 RX ADMIN — Medication 4 GRAM(S): at 09:21

## 2020-08-27 RX ADMIN — Medication 12.5 MILLIGRAM(S): at 09:21

## 2020-08-27 RX ADMIN — DULOXETINE HYDROCHLORIDE 20 MILLIGRAM(S): 30 CAPSULE, DELAYED RELEASE ORAL at 09:21

## 2020-08-27 RX ADMIN — MIRTAZAPINE 22.5 MILLIGRAM(S): 45 TABLET, ORALLY DISINTEGRATING ORAL at 21:01

## 2020-08-27 RX ADMIN — Medication 10 MILLIGRAM(S): at 21:01

## 2020-08-27 RX ADMIN — Medication 10 MILLIGRAM(S): at 09:21

## 2020-08-27 RX ADMIN — Medication 3 MILLIGRAM(S): at 21:01

## 2020-08-27 RX ADMIN — Medication 0.5 MILLIGRAM(S): at 21:01

## 2020-08-27 RX ADMIN — Medication 0.5 MILLIGRAM(S): at 13:39

## 2020-08-27 RX ADMIN — LOSARTAN POTASSIUM 100 MILLIGRAM(S): 100 TABLET, FILM COATED ORAL at 09:21

## 2020-08-28 LAB — METANEPH UR-MCNC: SIGNIFICANT CHANGE UP

## 2020-08-28 PROCEDURE — 99232 SBSQ HOSP IP/OBS MODERATE 35: CPT

## 2020-08-28 RX ORDER — MIRTAZAPINE 45 MG/1
30 TABLET, ORALLY DISINTEGRATING ORAL AT BEDTIME
Refills: 0 | Status: DISCONTINUED | OUTPATIENT
Start: 2020-08-29 | End: 2020-09-04

## 2020-08-28 RX ORDER — DULOXETINE HYDROCHLORIDE 30 MG/1
40 CAPSULE, DELAYED RELEASE ORAL DAILY
Refills: 0 | Status: DISCONTINUED | OUTPATIENT
Start: 2020-08-29 | End: 2020-08-31

## 2020-08-28 RX ORDER — MIRTAZAPINE 45 MG/1
22.5 TABLET, ORALLY DISINTEGRATING ORAL AT BEDTIME
Refills: 0 | Status: COMPLETED | OUTPATIENT
Start: 2020-08-28 | End: 2020-08-28

## 2020-08-28 RX ADMIN — LOSARTAN POTASSIUM 100 MILLIGRAM(S): 100 TABLET, FILM COATED ORAL at 08:53

## 2020-08-28 RX ADMIN — DULOXETINE HYDROCHLORIDE 20 MILLIGRAM(S): 30 CAPSULE, DELAYED RELEASE ORAL at 08:53

## 2020-08-28 RX ADMIN — Medication 10 MILLIGRAM(S): at 21:21

## 2020-08-28 RX ADMIN — Medication 0.5 MILLIGRAM(S): at 12:40

## 2020-08-28 RX ADMIN — Medication 0.5 MILLIGRAM(S): at 06:37

## 2020-08-28 RX ADMIN — Medication 1 MILLIGRAM(S): at 16:05

## 2020-08-28 RX ADMIN — Medication 50 MILLIGRAM(S): at 08:53

## 2020-08-28 RX ADMIN — Medication 0.5 MILLIGRAM(S): at 21:21

## 2020-08-28 RX ADMIN — Medication 3 MILLIGRAM(S): at 21:21

## 2020-08-28 RX ADMIN — Medication 10 MILLIGRAM(S): at 08:53

## 2020-08-28 RX ADMIN — Medication 12.5 MILLIGRAM(S): at 08:53

## 2020-08-28 RX ADMIN — Medication 4 GRAM(S): at 12:19

## 2020-08-28 RX ADMIN — MIRTAZAPINE 22.5 MILLIGRAM(S): 45 TABLET, ORALLY DISINTEGRATING ORAL at 21:21

## 2020-08-29 PROCEDURE — 99231 SBSQ HOSP IP/OBS SF/LOW 25: CPT

## 2020-08-29 RX ADMIN — Medication 0.5 MILLIGRAM(S): at 06:59

## 2020-08-29 RX ADMIN — Medication 50 MILLIGRAM(S): at 09:22

## 2020-08-29 RX ADMIN — LOSARTAN POTASSIUM 100 MILLIGRAM(S): 100 TABLET, FILM COATED ORAL at 09:22

## 2020-08-29 RX ADMIN — DULOXETINE HYDROCHLORIDE 40 MILLIGRAM(S): 30 CAPSULE, DELAYED RELEASE ORAL at 09:22

## 2020-08-29 RX ADMIN — Medication 4 GRAM(S): at 09:22

## 2020-08-29 RX ADMIN — Medication 0.5 MILLIGRAM(S): at 13:27

## 2020-08-29 RX ADMIN — Medication 10 MILLIGRAM(S): at 21:12

## 2020-08-29 RX ADMIN — Medication 3 MILLIGRAM(S): at 21:12

## 2020-08-29 RX ADMIN — Medication 0.5 MILLIGRAM(S): at 21:12

## 2020-08-29 RX ADMIN — Medication 12.5 MILLIGRAM(S): at 09:22

## 2020-08-29 RX ADMIN — Medication 10 MILLIGRAM(S): at 09:22

## 2020-08-29 RX ADMIN — MIRTAZAPINE 30 MILLIGRAM(S): 45 TABLET, ORALLY DISINTEGRATING ORAL at 21:12

## 2020-08-30 RX ADMIN — DULOXETINE HYDROCHLORIDE 40 MILLIGRAM(S): 30 CAPSULE, DELAYED RELEASE ORAL at 09:16

## 2020-08-30 RX ADMIN — Medication 10 MILLIGRAM(S): at 20:59

## 2020-08-30 RX ADMIN — Medication 0.5 MILLIGRAM(S): at 06:55

## 2020-08-30 RX ADMIN — Medication 50 MILLIGRAM(S): at 09:16

## 2020-08-30 RX ADMIN — Medication 4 GRAM(S): at 09:16

## 2020-08-30 RX ADMIN — LOSARTAN POTASSIUM 100 MILLIGRAM(S): 100 TABLET, FILM COATED ORAL at 09:16

## 2020-08-30 RX ADMIN — Medication 3 MILLIGRAM(S): at 20:59

## 2020-08-30 RX ADMIN — Medication 0.5 MILLIGRAM(S): at 13:10

## 2020-08-30 RX ADMIN — MIRTAZAPINE 30 MILLIGRAM(S): 45 TABLET, ORALLY DISINTEGRATING ORAL at 20:59

## 2020-08-30 RX ADMIN — Medication 10 MILLIGRAM(S): at 09:16

## 2020-08-30 RX ADMIN — Medication 0.5 MILLIGRAM(S): at 20:59

## 2020-08-30 RX ADMIN — Medication 12.5 MILLIGRAM(S): at 09:16

## 2020-08-31 PROCEDURE — 99232 SBSQ HOSP IP/OBS MODERATE 35: CPT | Mod: GC

## 2020-08-31 RX ORDER — DULOXETINE HYDROCHLORIDE 30 MG/1
60 CAPSULE, DELAYED RELEASE ORAL DAILY
Refills: 0 | Status: DISCONTINUED | OUTPATIENT
Start: 2020-09-01 | End: 2020-09-04

## 2020-08-31 RX ADMIN — Medication 50 MILLIGRAM(S): at 09:15

## 2020-08-31 RX ADMIN — Medication 0.5 MILLIGRAM(S): at 06:42

## 2020-08-31 RX ADMIN — Medication 4 GRAM(S): at 09:14

## 2020-08-31 RX ADMIN — Medication 0.5 MILLIGRAM(S): at 12:18

## 2020-08-31 RX ADMIN — Medication 10 MILLIGRAM(S): at 09:15

## 2020-08-31 RX ADMIN — Medication 3 MILLIGRAM(S): at 20:56

## 2020-08-31 RX ADMIN — MIRTAZAPINE 30 MILLIGRAM(S): 45 TABLET, ORALLY DISINTEGRATING ORAL at 20:56

## 2020-08-31 RX ADMIN — Medication 10 MILLIGRAM(S): at 20:55

## 2020-08-31 RX ADMIN — DULOXETINE HYDROCHLORIDE 40 MILLIGRAM(S): 30 CAPSULE, DELAYED RELEASE ORAL at 09:15

## 2020-08-31 RX ADMIN — LOSARTAN POTASSIUM 100 MILLIGRAM(S): 100 TABLET, FILM COATED ORAL at 09:15

## 2020-08-31 RX ADMIN — Medication 12.5 MILLIGRAM(S): at 09:15

## 2020-08-31 RX ADMIN — Medication 0.5 MILLIGRAM(S): at 20:55

## 2020-09-01 PROCEDURE — 99232 SBSQ HOSP IP/OBS MODERATE 35: CPT

## 2020-09-01 PROCEDURE — 90834 PSYTX W PT 45 MINUTES: CPT

## 2020-09-01 RX ORDER — CLONAZEPAM 1 MG
0.5 TABLET ORAL
Refills: 0 | Status: DISCONTINUED | OUTPATIENT
Start: 2020-09-01 | End: 2020-09-04

## 2020-09-01 RX ADMIN — Medication 0.5 MILLIGRAM(S): at 14:25

## 2020-09-01 RX ADMIN — Medication 12.5 MILLIGRAM(S): at 09:45

## 2020-09-01 RX ADMIN — Medication 4 GRAM(S): at 09:45

## 2020-09-01 RX ADMIN — LOSARTAN POTASSIUM 100 MILLIGRAM(S): 100 TABLET, FILM COATED ORAL at 09:45

## 2020-09-01 RX ADMIN — Medication 50 MILLIGRAM(S): at 09:45

## 2020-09-01 RX ADMIN — DULOXETINE HYDROCHLORIDE 60 MILLIGRAM(S): 30 CAPSULE, DELAYED RELEASE ORAL at 09:46

## 2020-09-01 RX ADMIN — Medication 0.5 MILLIGRAM(S): at 06:25

## 2020-09-01 RX ADMIN — Medication 10 MILLIGRAM(S): at 09:46

## 2020-09-01 RX ADMIN — Medication 0.5 MILLIGRAM(S): at 20:51

## 2020-09-01 RX ADMIN — MIRTAZAPINE 30 MILLIGRAM(S): 45 TABLET, ORALLY DISINTEGRATING ORAL at 20:51

## 2020-09-01 RX ADMIN — Medication 3 MILLIGRAM(S): at 20:51

## 2020-09-02 PROCEDURE — 99232 SBSQ HOSP IP/OBS MODERATE 35: CPT | Mod: GC

## 2020-09-02 RX ADMIN — Medication 0.5 MILLIGRAM(S): at 14:13

## 2020-09-02 RX ADMIN — Medication 3 MILLIGRAM(S): at 20:28

## 2020-09-02 RX ADMIN — LOSARTAN POTASSIUM 100 MILLIGRAM(S): 100 TABLET, FILM COATED ORAL at 09:56

## 2020-09-02 RX ADMIN — Medication 12.5 MILLIGRAM(S): at 09:56

## 2020-09-02 RX ADMIN — Medication 50 MILLIGRAM(S): at 09:56

## 2020-09-02 RX ADMIN — MIRTAZAPINE 30 MILLIGRAM(S): 45 TABLET, ORALLY DISINTEGRATING ORAL at 20:28

## 2020-09-02 RX ADMIN — DULOXETINE HYDROCHLORIDE 60 MILLIGRAM(S): 30 CAPSULE, DELAYED RELEASE ORAL at 09:57

## 2020-09-02 RX ADMIN — Medication 0.5 MILLIGRAM(S): at 06:50

## 2020-09-02 RX ADMIN — Medication 5 MILLIGRAM(S): at 20:28

## 2020-09-02 RX ADMIN — Medication 0.5 MILLIGRAM(S): at 20:28

## 2020-09-02 RX ADMIN — Medication 4 GRAM(S): at 09:56

## 2020-09-02 RX ADMIN — Medication 5 MILLIGRAM(S): at 09:57

## 2020-09-03 PROCEDURE — 99232 SBSQ HOSP IP/OBS MODERATE 35: CPT | Mod: GC

## 2020-09-03 RX ORDER — LANOLIN ALCOHOL/MO/W.PET/CERES
1 CREAM (GRAM) TOPICAL
Qty: 30 | Refills: 0
Start: 2020-09-03 | End: 2020-10-02

## 2020-09-03 RX ORDER — OMEGA-3 ACID ETHYL ESTERS 1 G
4 CAPSULE ORAL
Qty: 120 | Refills: 0
Start: 2020-09-03 | End: 2020-10-02

## 2020-09-03 RX ORDER — DULOXETINE HYDROCHLORIDE 30 MG/1
1 CAPSULE, DELAYED RELEASE ORAL
Qty: 30 | Refills: 0
Start: 2020-09-03 | End: 2020-10-02

## 2020-09-03 RX ORDER — CLONAZEPAM 1 MG
1 TABLET ORAL
Qty: 90 | Refills: 0
Start: 2020-09-03 | End: 2020-10-02

## 2020-09-03 RX ORDER — METOPROLOL TARTRATE 50 MG
1 TABLET ORAL
Qty: 30 | Refills: 0
Start: 2020-09-03 | End: 2020-10-02

## 2020-09-03 RX ORDER — LOSARTAN POTASSIUM 100 MG/1
1 TABLET, FILM COATED ORAL
Qty: 30 | Refills: 0
Start: 2020-09-03 | End: 2020-10-02

## 2020-09-03 RX ORDER — MIRTAZAPINE 45 MG/1
1 TABLET, ORALLY DISINTEGRATING ORAL
Qty: 30 | Refills: 0
Start: 2020-09-03 | End: 2020-10-02

## 2020-09-03 RX ADMIN — LOSARTAN POTASSIUM 100 MILLIGRAM(S): 100 TABLET, FILM COATED ORAL at 10:47

## 2020-09-03 RX ADMIN — Medication 4 GRAM(S): at 10:47

## 2020-09-03 RX ADMIN — Medication 50 MILLIGRAM(S): at 10:47

## 2020-09-03 RX ADMIN — MIRTAZAPINE 30 MILLIGRAM(S): 45 TABLET, ORALLY DISINTEGRATING ORAL at 21:05

## 2020-09-03 RX ADMIN — Medication 3 MILLIGRAM(S): at 21:05

## 2020-09-03 RX ADMIN — Medication 12.5 MILLIGRAM(S): at 10:47

## 2020-09-03 RX ADMIN — Medication 0.5 MILLIGRAM(S): at 12:44

## 2020-09-03 RX ADMIN — Medication 0.5 MILLIGRAM(S): at 06:50

## 2020-09-03 RX ADMIN — Medication 0.5 MILLIGRAM(S): at 21:05

## 2020-09-03 RX ADMIN — DULOXETINE HYDROCHLORIDE 60 MILLIGRAM(S): 30 CAPSULE, DELAYED RELEASE ORAL at 10:47

## 2020-09-04 VITALS — TEMPERATURE: 98 F

## 2020-09-04 PROCEDURE — 99239 HOSP IP/OBS DSCHRG MGMT >30: CPT | Mod: GC

## 2020-09-04 PROCEDURE — 90834 PSYTX W PT 45 MINUTES: CPT

## 2020-09-04 RX ADMIN — DULOXETINE HYDROCHLORIDE 60 MILLIGRAM(S): 30 CAPSULE, DELAYED RELEASE ORAL at 10:44

## 2020-09-04 RX ADMIN — Medication 0.5 MILLIGRAM(S): at 13:45

## 2020-09-04 RX ADMIN — Medication 0.5 MILLIGRAM(S): at 07:04

## 2020-09-04 RX ADMIN — Medication 12.5 MILLIGRAM(S): at 10:44

## 2020-09-04 RX ADMIN — LOSARTAN POTASSIUM 100 MILLIGRAM(S): 100 TABLET, FILM COATED ORAL at 10:44

## 2020-09-04 RX ADMIN — Medication 50 MILLIGRAM(S): at 10:44

## 2020-09-04 RX ADMIN — Medication 4 GRAM(S): at 10:44

## 2020-09-09 ENCOUNTER — OUTPATIENT (OUTPATIENT)
Dept: OUTPATIENT SERVICES | Facility: HOSPITAL | Age: 70
LOS: 1 days | Discharge: HOME | End: 2020-09-09
Payer: COMMERCIAL

## 2020-10-26 DIAGNOSIS — F33.9 MAJOR DEPRESSIVE DISORDER, RECURRENT, UNSPECIFIED: ICD-10-CM

## 2020-11-07 ENCOUNTER — EMERGENCY (EMERGENCY)
Facility: HOSPITAL | Age: 70
LOS: 0 days | Discharge: HOME | End: 2020-11-07
Attending: EMERGENCY MEDICINE
Payer: COMMERCIAL

## 2020-11-07 VITALS
HEART RATE: 92 BPM | SYSTOLIC BLOOD PRESSURE: 160 MMHG | HEIGHT: 62 IN | RESPIRATION RATE: 18 BRPM | WEIGHT: 205.03 LBS | DIASTOLIC BLOOD PRESSURE: 80 MMHG | TEMPERATURE: 99 F | OXYGEN SATURATION: 98 %

## 2020-11-07 VITALS
DIASTOLIC BLOOD PRESSURE: 95 MMHG | TEMPERATURE: 98 F | RESPIRATION RATE: 17 BRPM | SYSTOLIC BLOOD PRESSURE: 151 MMHG | HEART RATE: 91 BPM | OXYGEN SATURATION: 97 %

## 2020-11-07 DIAGNOSIS — F41.8 OTHER SPECIFIED ANXIETY DISORDERS: ICD-10-CM

## 2020-11-07 DIAGNOSIS — N39.0 URINARY TRACT INFECTION, SITE NOT SPECIFIED: ICD-10-CM

## 2020-11-07 DIAGNOSIS — R35.0 FREQUENCY OF MICTURITION: ICD-10-CM

## 2020-11-07 DIAGNOSIS — Z91.5 PERSONAL HISTORY OF SELF-HARM: ICD-10-CM

## 2020-11-07 DIAGNOSIS — R10.9 UNSPECIFIED ABDOMINAL PAIN: ICD-10-CM

## 2020-11-07 DIAGNOSIS — R11.2 NAUSEA WITH VOMITING, UNSPECIFIED: ICD-10-CM

## 2020-11-07 DIAGNOSIS — Z88.0 ALLERGY STATUS TO PENICILLIN: ICD-10-CM

## 2020-11-07 DIAGNOSIS — N88.8 OTHER SPECIFIED NONINFLAMMATORY DISORDERS OF CERVIX UTERI: ICD-10-CM

## 2020-11-07 DIAGNOSIS — Z90.721 ACQUIRED ABSENCE OF OVARIES, UNILATERAL: ICD-10-CM

## 2020-11-07 DIAGNOSIS — E78.00 PURE HYPERCHOLESTEROLEMIA, UNSPECIFIED: ICD-10-CM

## 2020-11-07 DIAGNOSIS — N80.9 ENDOMETRIOSIS, UNSPECIFIED: ICD-10-CM

## 2020-11-07 DIAGNOSIS — K57.92 DIVERTICULITIS OF INTESTINE, PART UNSPECIFIED, WITHOUT PERFORATION OR ABSCESS WITHOUT BLEEDING: ICD-10-CM

## 2020-11-07 DIAGNOSIS — I10 ESSENTIAL (PRIMARY) HYPERTENSION: ICD-10-CM

## 2020-11-07 LAB
ALBUMIN SERPL ELPH-MCNC: 4.2 G/DL — SIGNIFICANT CHANGE UP (ref 3.3–5)
ALP SERPL-CCNC: 95 U/L — SIGNIFICANT CHANGE UP (ref 40–120)
ALT FLD-CCNC: 19 U/L — SIGNIFICANT CHANGE UP (ref 12–78)
AMYLASE P1 CFR SERPL: 81 U/L — SIGNIFICANT CHANGE UP (ref 25–115)
ANION GAP SERPL CALC-SCNC: 7 MMOL/L — SIGNIFICANT CHANGE UP (ref 5–17)
APPEARANCE UR: ABNORMAL
AST SERPL-CCNC: 13 U/L — LOW (ref 15–37)
BACTERIA # UR AUTO: ABNORMAL
BASOPHILS # BLD AUTO: 0.06 K/UL — SIGNIFICANT CHANGE UP (ref 0–0.2)
BASOPHILS NFR BLD AUTO: 0.5 % — SIGNIFICANT CHANGE UP (ref 0–2)
BILIRUB SERPL-MCNC: 0.5 MG/DL — SIGNIFICANT CHANGE UP (ref 0.2–1.2)
BILIRUB UR-MCNC: NEGATIVE — SIGNIFICANT CHANGE UP
BUN SERPL-MCNC: 10 MG/DL — SIGNIFICANT CHANGE UP (ref 7–23)
CALCIUM SERPL-MCNC: 9.8 MG/DL — SIGNIFICANT CHANGE UP (ref 8.5–10.1)
CHLORIDE SERPL-SCNC: 104 MMOL/L — SIGNIFICANT CHANGE UP (ref 96–108)
CO2 SERPL-SCNC: 29 MMOL/L — SIGNIFICANT CHANGE UP (ref 22–31)
COLOR SPEC: YELLOW — SIGNIFICANT CHANGE UP
CREAT SERPL-MCNC: 0.9 MG/DL — SIGNIFICANT CHANGE UP (ref 0.5–1.3)
DIFF PNL FLD: ABNORMAL
EOSINOPHIL # BLD AUTO: 0.21 K/UL — SIGNIFICANT CHANGE UP (ref 0–0.5)
EOSINOPHIL NFR BLD AUTO: 1.9 % — SIGNIFICANT CHANGE UP (ref 0–6)
EPI CELLS # UR: ABNORMAL
GLUCOSE SERPL-MCNC: 104 MG/DL — HIGH (ref 70–99)
GLUCOSE UR QL: NEGATIVE MG/DL — SIGNIFICANT CHANGE UP
HCT VFR BLD CALC: 48.4 % — HIGH (ref 34.5–45)
HGB BLD-MCNC: 15.9 G/DL — HIGH (ref 11.5–15.5)
IMM GRANULOCYTES NFR BLD AUTO: 0.4 % — SIGNIFICANT CHANGE UP (ref 0–1.5)
KETONES UR-MCNC: NEGATIVE — SIGNIFICANT CHANGE UP
LEUKOCYTE ESTERASE UR-ACNC: ABNORMAL
LIDOCAIN IGE QN: 262 U/L — SIGNIFICANT CHANGE UP (ref 73–393)
LYMPHOCYTES # BLD AUTO: 3.69 K/UL — HIGH (ref 1–3.3)
LYMPHOCYTES # BLD AUTO: 32.7 % — SIGNIFICANT CHANGE UP (ref 13–44)
MAGNESIUM SERPL-MCNC: 2.6 MG/DL — SIGNIFICANT CHANGE UP (ref 1.6–2.6)
MCHC RBC-ENTMCNC: 28.6 PG — SIGNIFICANT CHANGE UP (ref 27–34)
MCHC RBC-ENTMCNC: 32.9 GM/DL — SIGNIFICANT CHANGE UP (ref 32–36)
MCV RBC AUTO: 87.2 FL — SIGNIFICANT CHANGE UP (ref 80–100)
MONOCYTES # BLD AUTO: 0.59 K/UL — SIGNIFICANT CHANGE UP (ref 0–0.9)
MONOCYTES NFR BLD AUTO: 5.2 % — SIGNIFICANT CHANGE UP (ref 2–14)
NEUTROPHILS # BLD AUTO: 6.69 K/UL — SIGNIFICANT CHANGE UP (ref 1.8–7.4)
NEUTROPHILS NFR BLD AUTO: 59.3 % — SIGNIFICANT CHANGE UP (ref 43–77)
NITRITE UR-MCNC: NEGATIVE — SIGNIFICANT CHANGE UP
NRBC # BLD: 0 /100 WBCS — SIGNIFICANT CHANGE UP (ref 0–0)
PH UR: 6 — SIGNIFICANT CHANGE UP (ref 5–8)
PLATELET # BLD AUTO: 264 K/UL — SIGNIFICANT CHANGE UP (ref 150–400)
POTASSIUM SERPL-MCNC: 3.8 MMOL/L — SIGNIFICANT CHANGE UP (ref 3.5–5.3)
POTASSIUM SERPL-SCNC: 3.8 MMOL/L — SIGNIFICANT CHANGE UP (ref 3.5–5.3)
PROT SERPL-MCNC: 7.8 GM/DL — SIGNIFICANT CHANGE UP (ref 6–8.3)
PROT UR-MCNC: 30 MG/DL
RBC # BLD: 5.55 M/UL — HIGH (ref 3.8–5.2)
RBC # FLD: 12.6 % — SIGNIFICANT CHANGE UP (ref 10.3–14.5)
RBC CASTS # UR COMP ASSIST: SIGNIFICANT CHANGE UP /HPF (ref 0–4)
SODIUM SERPL-SCNC: 140 MMOL/L — SIGNIFICANT CHANGE UP (ref 135–145)
SP GR SPEC: 1.02 — SIGNIFICANT CHANGE UP (ref 1.01–1.02)
UROBILINOGEN FLD QL: NEGATIVE MG/DL — SIGNIFICANT CHANGE UP
WBC # BLD: 11.29 K/UL — HIGH (ref 3.8–10.5)
WBC # FLD AUTO: 11.29 K/UL — HIGH (ref 3.8–10.5)
WBC UR QL: ABNORMAL

## 2020-11-07 PROCEDURE — 99285 EMERGENCY DEPT VISIT HI MDM: CPT

## 2020-11-07 PROCEDURE — 74177 CT ABD & PELVIS W/CONTRAST: CPT | Mod: 26,MA

## 2020-11-07 RX ORDER — ONDANSETRON 8 MG/1
4 TABLET, FILM COATED ORAL ONCE
Refills: 0 | Status: COMPLETED | OUTPATIENT
Start: 2020-11-07 | End: 2020-11-07

## 2020-11-07 RX ORDER — METRONIDAZOLE 500 MG
1 TABLET ORAL
Qty: 21 | Refills: 0
Start: 2020-11-07 | End: 2020-11-13

## 2020-11-07 RX ORDER — CIPROFLOXACIN LACTATE 400MG/40ML
1 VIAL (ML) INTRAVENOUS
Qty: 14 | Refills: 0
Start: 2020-11-07 | End: 2020-11-13

## 2020-11-07 RX ORDER — ONDANSETRON 8 MG/1
1 TABLET, FILM COATED ORAL
Qty: 9 | Refills: 0
Start: 2020-11-07 | End: 2020-11-09

## 2020-11-07 RX ORDER — FAMOTIDINE 10 MG/ML
20 INJECTION INTRAVENOUS ONCE
Refills: 0 | Status: COMPLETED | OUTPATIENT
Start: 2020-11-07 | End: 2020-11-07

## 2020-11-07 RX ORDER — SODIUM CHLORIDE 9 MG/ML
1000 INJECTION INTRAMUSCULAR; INTRAVENOUS; SUBCUTANEOUS ONCE
Refills: 0 | Status: COMPLETED | OUTPATIENT
Start: 2020-11-07 | End: 2020-11-07

## 2020-11-07 RX ORDER — FAMOTIDINE 10 MG/ML
1 INJECTION INTRAVENOUS
Qty: 28 | Refills: 0
Start: 2020-11-07 | End: 2020-11-20

## 2020-11-07 RX ADMIN — ONDANSETRON 4 MILLIGRAM(S): 8 TABLET, FILM COATED ORAL at 19:58

## 2020-11-07 RX ADMIN — SODIUM CHLORIDE 1000 MILLILITER(S): 9 INJECTION INTRAMUSCULAR; INTRAVENOUS; SUBCUTANEOUS at 20:58

## 2020-11-07 RX ADMIN — SODIUM CHLORIDE 1000 MILLILITER(S): 9 INJECTION INTRAMUSCULAR; INTRAVENOUS; SUBCUTANEOUS at 19:58

## 2020-11-07 RX ADMIN — FAMOTIDINE 20 MILLIGRAM(S): 10 INJECTION INTRAVENOUS at 19:58

## 2020-11-07 RX ADMIN — Medication 1 TABLET(S): at 21:55

## 2020-11-07 NOTE — ED ADULT TRIAGE NOTE - CHIEF COMPLAINT QUOTE
c/o nausea x 2 weeks worse after weaning off cymbalta on tues c/o urinary frequency denies dysuria denies vomiting c/o loose stools

## 2020-11-07 NOTE — ED PROVIDER NOTE - CLINICAL SUMMARY MEDICAL DECISION MAKING FREE TEXT BOX
Patient well appearing with persistent n/v/loose stools since starting cymbalta. VSS.  Unsure if cymbalata related.  abdomen benign, patient able to tolerate PO.  CT imaging with suggested diverticulitis, has UTI based on sx and UA sample, bactrim given in ER prior to CT results, however will dc with cipro/flagyl.  Patient has gyn follow up with terence on Monday and already has GI.  D/w patient differential for med reaction vs gastritis vs IBS vs diverticulitis as cause of symptoms, patient advised to follow up with her GI as well as her gyn.  Discussed results and outcome of today's visit with the patient.  Patient advised to please follow up with another healthcare provider within the next 24 hours and return to the Emergency Department for worsening symptoms or any other concerns.  Patient advised that their doctor may call  to follow up on the specific results of the tests performed today in the emergency department.   Patient appears well on discharge.

## 2020-11-07 NOTE — ED PROVIDER NOTE - PHYSICAL EXAMINATION
Gen: Alert, NAD, well appearing  Head: NC, AT, EOMI, normal lids/conjunctiva  ENT: normal hearing, patent oropharynx without erythema/exudate, uvula midline  Neck: +supple, no tenderness/meningismus/JVD, +Trachea midline  Pulm: Bilateral BS, normal resp effort, no wheeze/stridor/retractions  CV: RRR, no M/R/G, +dist pulses  Abd: soft, +mild suprapubic discomfort, ND, Negative Clifton signs, +BS, no palpable masses  Mskel: no edema/erythema/cyanosis  Skin: no rash, warm/dry  Neuro: AAOx3, no apparent sensory/motor deficits, coordination intact

## 2020-11-07 NOTE — ED PROVIDER NOTE - OBJECTIVE STATEMENT
69 y/o F with pmhx HTN, HLD, depression, anxiety, prior suicide attempt. endometriosis, oophorectomy is presenting for persistent N/V and abdominal discomfort. Pt says that her symptoms started a month ago after she committed to a psych mohamud for depression and was then started on Cymbalta at this time. Pt has since had nausea, urinary frequency, and gurgling in stomach. This AM pt had loose stools. Pt has had symptoms ongoing so psychiatrist had been weening her down from Cymbalta and had officially stopped taking it x3 days ago and is now on Buspirone, Klonopin, and mirtazapine. Pt says she has had ongoing N/V and loose stools even after she stopped taking Cymbalta. she denies any HA, neck pain, or vertigo assc. with her nausea. Pt has been taking Zofran and home to help with nausea. Pt denies focal abdominal pain, blood in stool or vomiting. She denies any vaginal discharge or bleeding. She does report suprapubic pressure and increased urine frequently. Pt denies diarrhea or flank pain. Patient denies EtOH/tobacco/illicit substance use. 69 y/o F with pmhx HTN, HLD, depression, anxiety, prior suicide attempt. endometriosis, oophorectomy is presenting for persistent N/V and abdominal discomfort. Pt says that her symptoms started over a month ago after she was committed to a psych mohamud for depression and was then started on Cymbalta at this time. Pt has since had nausea, and frequent gurgling in stomach with occasional loose stools. Pt's psychiatrist had been weening her down from Cymbalta due to the GI upset and had officially stopped it x3 days ago, patient is now on Buspirone, Klonopin, and mirtazapine. Pt says she has had ongoing N/V and loose stools even after she stopped taking Cymbalta. she denies any HA, neck pain, or vertigo assc. with her nausea. Pt has been taking Zofran and home to help with nausea. Pt denies focal abdominal pain, blood in stool or vomiting. She denies any vaginal discharge or bleeding. She does report suprapubic pressure and increased urine frequently. Pt denies diarrhea or flank pain. She reports a colonoscopy by her GI 2 months ago which showed diverticulosis. Patient denies EtOH/tobacco/illicit substance use.

## 2020-11-07 NOTE — ED ADULT NURSE NOTE - OBJECTIVE STATEMENT
Pt alert and oriented, came c/o  nausea, pt stated, started on Cymbalta and since she started the medication she has been nauseated, when to her physician and medication was decrease but nausea persisted, then she went back and she was taking off the medication. Pt c/o dizziness, Pt has hx of anxiety. Pt c/o decrease appetite, and c/o  dysuria and pressure for about 2 week, but denies any foul smell or burning sensation. pt with  hx of polyps. Pt stated she forces herself to vomit and stated last time she vomited  was yesterday. C/o of move frequent stools per day. Pt denies any chest pain or fever, sob hematuria. Pt with  hx of dm, hyperlipidiam, htn.

## 2020-11-07 NOTE — ED PROVIDER NOTE - CARE PLAN
Principal Discharge DX:	Nausea & vomiting  Secondary Diagnosis:	UTI (urinary tract infection)  Secondary Diagnosis:	Diverticulitis  Secondary Diagnosis:	Cervical mass

## 2020-11-07 NOTE — ED PROVIDER NOTE - PATIENT PORTAL LINK FT
You can access the FollowMyHealth Patient Portal offered by [facility] by registering at the following website: [email or web address]. By joining [employer] [facility]’s FollowMyHealth portal, you will also be able to view your health information using other applications (apps) compatible with our system.

## 2020-11-07 NOTE — ED ADULT NURSE REASSESSMENT NOTE - NS ED NURSE REASSESS COMMENT FT1
Pt left ED without DC papers. MD Echevarria discussed with patient findings. Pt left without proper dc. MD Echevarria phoned pt to return. Awaiting pt arrival, stated they took out the IV. Will reassess upon arrival.
patient came back and I was able to reassess and do vital sand explain dc instructions, verbalized understanding, IV taken out
Pt received at 24 A&ox4, MD palm at [facility]. Pt states to MD that she has a hx of suicidal attempts back in November 2019. Denies suicidal ideation now  and denies pain/discomfort at this time.

## 2020-11-09 LAB
CULTURE RESULTS: SIGNIFICANT CHANGE UP
SPECIMEN SOURCE: SIGNIFICANT CHANGE UP

## 2020-11-27 ENCOUNTER — NON-APPOINTMENT (OUTPATIENT)
Age: 70
End: 2020-11-27

## 2020-11-27 RX ORDER — ONDANSETRON 4 MG/1
4 TABLET ORAL
Qty: 50 | Refills: 0 | Status: DISCONTINUED | COMMUNITY
Start: 2020-06-16 | End: 2020-11-27

## 2020-11-29 ENCOUNTER — EMERGENCY (EMERGENCY)
Facility: HOSPITAL | Age: 70
LOS: 0 days | Discharge: HOME | End: 2020-11-29
Attending: EMERGENCY MEDICINE
Payer: COMMERCIAL

## 2020-11-29 VITALS
HEART RATE: 86 BPM | SYSTOLIC BLOOD PRESSURE: 142 MMHG | RESPIRATION RATE: 16 BRPM | OXYGEN SATURATION: 95 % | DIASTOLIC BLOOD PRESSURE: 74 MMHG | HEIGHT: 62 IN | WEIGHT: 205.03 LBS | TEMPERATURE: 98 F

## 2020-11-29 VITALS
DIASTOLIC BLOOD PRESSURE: 58 MMHG | TEMPERATURE: 98 F | HEART RATE: 78 BPM | RESPIRATION RATE: 18 BRPM | SYSTOLIC BLOOD PRESSURE: 110 MMHG | OXYGEN SATURATION: 97 %

## 2020-11-29 DIAGNOSIS — R11.2 NAUSEA WITH VOMITING, UNSPECIFIED: ICD-10-CM

## 2020-11-29 DIAGNOSIS — N80.9 ENDOMETRIOSIS, UNSPECIFIED: ICD-10-CM

## 2020-11-29 DIAGNOSIS — F32.9 MAJOR DEPRESSIVE DISORDER, SINGLE EPISODE, UNSPECIFIED: ICD-10-CM

## 2020-11-29 DIAGNOSIS — R10.9 UNSPECIFIED ABDOMINAL PAIN: ICD-10-CM

## 2020-11-29 DIAGNOSIS — E78.00 PURE HYPERCHOLESTEROLEMIA, UNSPECIFIED: ICD-10-CM

## 2020-11-29 DIAGNOSIS — Z90.721 ACQUIRED ABSENCE OF OVARIES, UNILATERAL: ICD-10-CM

## 2020-11-29 DIAGNOSIS — I10 ESSENTIAL (PRIMARY) HYPERTENSION: ICD-10-CM

## 2020-11-29 DIAGNOSIS — F41.9 ANXIETY DISORDER, UNSPECIFIED: ICD-10-CM

## 2020-11-29 DIAGNOSIS — R11.0 NAUSEA: ICD-10-CM

## 2020-11-29 LAB
ALBUMIN SERPL ELPH-MCNC: 4.1 G/DL — SIGNIFICANT CHANGE UP (ref 3.3–5)
ALP SERPL-CCNC: 81 U/L — SIGNIFICANT CHANGE UP (ref 40–120)
ALT FLD-CCNC: 26 U/L — SIGNIFICANT CHANGE UP (ref 12–78)
ANION GAP SERPL CALC-SCNC: 5 MMOL/L — SIGNIFICANT CHANGE UP (ref 5–17)
APPEARANCE UR: ABNORMAL
AST SERPL-CCNC: 20 U/L — SIGNIFICANT CHANGE UP (ref 15–37)
BACTERIA # UR AUTO: ABNORMAL
BASOPHILS # BLD AUTO: 0.05 K/UL — SIGNIFICANT CHANGE UP (ref 0–0.2)
BASOPHILS NFR BLD AUTO: 0.5 % — SIGNIFICANT CHANGE UP (ref 0–2)
BILIRUB SERPL-MCNC: 0.5 MG/DL — SIGNIFICANT CHANGE UP (ref 0.2–1.2)
BILIRUB UR-MCNC: NEGATIVE — SIGNIFICANT CHANGE UP
BUN SERPL-MCNC: 9 MG/DL — SIGNIFICANT CHANGE UP (ref 7–23)
CALCIUM SERPL-MCNC: 9.8 MG/DL — SIGNIFICANT CHANGE UP (ref 8.5–10.1)
CHLORIDE SERPL-SCNC: 104 MMOL/L — SIGNIFICANT CHANGE UP (ref 96–108)
CO2 SERPL-SCNC: 33 MMOL/L — HIGH (ref 22–31)
COLOR SPEC: YELLOW — SIGNIFICANT CHANGE UP
CREAT SERPL-MCNC: 0.97 MG/DL — SIGNIFICANT CHANGE UP (ref 0.5–1.3)
DIFF PNL FLD: ABNORMAL
EOSINOPHIL # BLD AUTO: 0.14 K/UL — SIGNIFICANT CHANGE UP (ref 0–0.5)
EOSINOPHIL NFR BLD AUTO: 1.3 % — SIGNIFICANT CHANGE UP (ref 0–6)
EPI CELLS # UR: ABNORMAL
GLUCOSE SERPL-MCNC: 103 MG/DL — HIGH (ref 70–99)
GLUCOSE UR QL: NEGATIVE MG/DL — SIGNIFICANT CHANGE UP
HCT VFR BLD CALC: 45.2 % — HIGH (ref 34.5–45)
HGB BLD-MCNC: 14.9 G/DL — SIGNIFICANT CHANGE UP (ref 11.5–15.5)
IMM GRANULOCYTES NFR BLD AUTO: 0.3 % — SIGNIFICANT CHANGE UP (ref 0–1.5)
KETONES UR-MCNC: NEGATIVE — SIGNIFICANT CHANGE UP
LEUKOCYTE ESTERASE UR-ACNC: ABNORMAL
LIDOCAIN IGE QN: 141 U/L — SIGNIFICANT CHANGE UP (ref 73–393)
LYMPHOCYTES # BLD AUTO: 2.53 K/UL — SIGNIFICANT CHANGE UP (ref 1–3.3)
LYMPHOCYTES # BLD AUTO: 23.2 % — SIGNIFICANT CHANGE UP (ref 13–44)
MCHC RBC-ENTMCNC: 28.7 PG — SIGNIFICANT CHANGE UP (ref 27–34)
MCHC RBC-ENTMCNC: 33 GM/DL — SIGNIFICANT CHANGE UP (ref 32–36)
MCV RBC AUTO: 87.1 FL — SIGNIFICANT CHANGE UP (ref 80–100)
MONOCYTES # BLD AUTO: 0.55 K/UL — SIGNIFICANT CHANGE UP (ref 0–0.9)
MONOCYTES NFR BLD AUTO: 5 % — SIGNIFICANT CHANGE UP (ref 2–14)
NEUTROPHILS # BLD AUTO: 7.62 K/UL — HIGH (ref 1.8–7.4)
NEUTROPHILS NFR BLD AUTO: 69.7 % — SIGNIFICANT CHANGE UP (ref 43–77)
NITRITE UR-MCNC: NEGATIVE — SIGNIFICANT CHANGE UP
NRBC # BLD: 0 /100 WBCS — SIGNIFICANT CHANGE UP (ref 0–0)
PH UR: 6 — SIGNIFICANT CHANGE UP (ref 5–8)
PLATELET # BLD AUTO: 244 K/UL — SIGNIFICANT CHANGE UP (ref 150–400)
POTASSIUM SERPL-MCNC: 3.4 MMOL/L — LOW (ref 3.5–5.3)
POTASSIUM SERPL-SCNC: 3.4 MMOL/L — LOW (ref 3.5–5.3)
PROT SERPL-MCNC: 7.3 GM/DL — SIGNIFICANT CHANGE UP (ref 6–8.3)
PROT UR-MCNC: 15 MG/DL
RBC # BLD: 5.19 M/UL — SIGNIFICANT CHANGE UP (ref 3.8–5.2)
RBC # FLD: 12.7 % — SIGNIFICANT CHANGE UP (ref 10.3–14.5)
RBC CASTS # UR COMP ASSIST: SIGNIFICANT CHANGE UP /HPF (ref 0–4)
SARS-COV-2 IGG SERPL QL IA: NEGATIVE — SIGNIFICANT CHANGE UP
SARS-COV-2 IGM SERPL IA-ACNC: 1.3 INDEX — SIGNIFICANT CHANGE UP
SODIUM SERPL-SCNC: 142 MMOL/L — SIGNIFICANT CHANGE UP (ref 135–145)
SP GR SPEC: 1.01 — SIGNIFICANT CHANGE UP (ref 1.01–1.02)
UROBILINOGEN FLD QL: NEGATIVE MG/DL — SIGNIFICANT CHANGE UP
WBC # BLD: 10.92 K/UL — HIGH (ref 3.8–10.5)
WBC # FLD AUTO: 10.92 K/UL — HIGH (ref 3.8–10.5)
WBC UR QL: ABNORMAL

## 2020-11-29 PROCEDURE — 71045 X-RAY EXAM CHEST 1 VIEW: CPT | Mod: 26

## 2020-11-29 PROCEDURE — 93010 ELECTROCARDIOGRAM REPORT: CPT

## 2020-11-29 PROCEDURE — 99285 EMERGENCY DEPT VISIT HI MDM: CPT

## 2020-11-29 RX ORDER — NITROFURANTOIN MACROCRYSTAL 50 MG
1 CAPSULE ORAL
Qty: 10 | Refills: 0
Start: 2020-11-29 | End: 2020-12-03

## 2020-11-29 RX ORDER — METOCLOPRAMIDE HCL 10 MG
1 TABLET ORAL
Qty: 6 | Refills: 0
Start: 2020-11-29 | End: 2020-11-30

## 2020-11-29 RX ORDER — METOCLOPRAMIDE HCL 10 MG
10 TABLET ORAL ONCE
Refills: 0 | Status: COMPLETED | OUTPATIENT
Start: 2020-11-29 | End: 2020-11-29

## 2020-11-29 RX ADMIN — Medication 10 MILLIGRAM(S): at 14:09

## 2020-11-29 NOTE — ED PROVIDER NOTE - NSFOLLOWUPCLINICS_GEN_ALL_ED_FT
[facility] Gastroenterology  Gastroenterology  [address] [place] [id number]  Phone: [phone]  Fax:   Follow Up Time: 1-3 Days

## 2020-11-29 NOTE — ED ADULT TRIAGE NOTE - CHIEF COMPLAINT QUOTE
Pt c/o 1 week of dizziness with nausea, decreased appetite, pelvic pressure like she has to void, and a heaviness in her head. Had similar symptoms 3 weeks ago. Zofran does not help

## 2020-11-29 NOTE — ED PROVIDER NOTE - PROGRESS NOTE DETAILS
Patient no longer nauseous, reports feeling better, wants to go home. States will follow up with outpatient GI. Patient re-evaluated. Labs and imaging reviewed.  All labs and imaging results (if any), were reviewed with the patient. Patient understands to follow up with their regular doctor. Patient understands to return to the ED is symptoms worsen or progress. Discharge instructions were given to the patient and discussed with patient. Rx (if any) were electronically sent to patient's preferred pharmacy.

## 2020-11-29 NOTE — ED PROVIDER NOTE - OBJECTIVE STATEMENT
70F w/ with PMHx of HTN, HLD, depression, anxiety, prior suicide attempt. endometriosis, oophorectomy is presenting for persistent N/V and abdominal discomfort. States that she was seen in the ED a while back (chart review shows 11/7) was diagnosed with diverticulitis and sent home with Cipro/Flagyl. Reports nausea worse in the mornings, alleviates by night. States that since then she has been still feeling persistently nauseous, has been unable to tolerate much PO solids, states that she has no vomiting but lacks appetite. Patient denies any sick contacts/recent travel. Went to her GYN regarding findings on CT and was told she has a fibroid. Denies any urinary symptoms. Reports mild intermittent non-bloody diarrhea as well.

## 2020-11-29 NOTE — ED PROVIDER NOTE - NSPTACCESSSVCSAPPT_ED_ALL_ED
Addendum  created 11/09/20 0234 by Ricky Newman APRN CRNA    Intraprocedure Meds edited       PCP for Immediate Care

## 2020-11-29 NOTE — ED PROVIDER NOTE - CLINICAL SUMMARY MEDICAL DECISION MAKING FREE TEXT BOX
70F w/ vague symptoms of nausea without vomiting, had extensive work up done 11/7 (chart reviewed), has been unable to tolerate much PO, will do labs, imaging, monitoring, to evaluate for anemia, r/o electrolyte abnormalities, r/o UTI, r/o other etiologies

## 2020-11-29 NOTE — ED PROVIDER NOTE - NSFOLLOWUPINSTRUCTIONS_ED_ALL_ED_FT
Urinary Tract Infection    A urinary tract infection (UTI) is an infection of any part of the urinary tract, which includes the kidneys, ureters, bladder, and urethra. Risk factors include ignoring your need to urinate, wiping back to front if female, being an uncircumcised male, and having diabetes or a weak immune system. Symptoms include frequent urination, pain or burning with urination, foul smelling urine, cloudy urine, pain in the lower abdomen, blood in the urine, and fever. If you were prescribed an antibiotic medicine, take it as told by your health care provider. Do not stop taking the antibiotic even if you start to feel better.    SEEK IMMEDIATE MEDICAL CARE IF YOU HAVE ANY OF THE FOLLOWING SYMPTOMS: severe back or abdominal pain, fever, inability to keep fluids or medicine down, dizziness/lightheadedness, or a change in mental status.     Nausea / Vomiting    Nausea is the feeling that you have to vomit. As nausea gets worse, it can lead to vomiting. Vomiting puts you at an increased risk for dehydration. Older adults and people with other diseases or a weak immune system are at higher risk for dehydration. Drink clear fluids in small but frequent amounts as tolerated. Eat bland, easy-to-digest foods in small amounts as tolerated.    SEEK IMMEDIATE MEDICAL CARE IF YOU HAVE ANY OF THE FOLLOWING SYMPTOMS: fever, inability to keep sufficient fluids down, black or bloody vomitus, black or bloody stools, lightheadedness/dizziness, chest pain, severe headache, rash, shortness of breath, cold or clammy skin, confusion, pain with urination, or any signs of dehydration.     IMPORTANT MESSAGE FROM YOUR DOCTOR:  Although you have been discharged from the ER, this does not mean that you have a "clean bill of health".  If your symptoms persist or get worse or if any new symptoms develop, please return to the ER immediately for re-evaluation (especially if your symptoms include chest pain, difficulty breathing, abdominal pain, fever, headache, confusion, trouble seeing, or trouble walking).  It is also very important that you see a primary care doctor within the next few days to follow-up.

## 2020-11-29 NOTE — ED PROVIDER NOTE - PATIENT PORTAL LINK FT
You can access the FollowMyHealth Patient Portal offered by [facility] by registering at the following website: [email or web address]. By joining [employer]’s FollowMyHealth portal, you will also be able to view your health information using other applications (apps) compatible with our system.

## 2020-11-29 NOTE — ED PROVIDER NOTE - PHYSICAL EXAMINATION
PHYSICAL EXAMINATION:  VITALS REVIEWED.  Vs hypertensive, otherwise normal  GENERALIZED APPEARANCE:  Comfortable, no acute distress, ambulating without difficulty  SKIN:  Warm, dry, no cyanosis  HEAD:  No obvious scalp lesions  EYES:  Conjunctiva pink, no icterus  ENMT:  Mucus membranes moist, no stridor  NECK:  Supple, non-tender  CHEST AND RESPIRATORY:  Clear to auscultation B/L, good air entry B/L, equal chest expansion  HEART AND CARDIOVASCULAR:  Regular rate, no obvious murmur  ABDOMEN AND GI:  Soft, non-tender, non-distended.  No rebound, no guarding  EXTREMITIES:  No deformity, edema, or calf tenderness  NEURO: AAOx3, gross motor and sensory intact, CN-2-12 intact, cerebellar exam (finger to nose/FELICIA WNL)  PSYCH: Normal affect

## 2020-11-29 NOTE — ED PROVIDER NOTE - NS ED ROS FT
ROS:  GEN: (-) fevers/chills, (-) weight loss, (-) fatigue/malaise  HEENT: (-) visual change, (-) photophobia, (-) nasal congestion/rhinorrhea, (-) difficulty swallowing  NECK: (-) stiffness, (-) swelling  RESP: (-) shortness of breath, (-) cough, (-) sputum  CV: (-) chest pain, (-) palpitations  GI: (+) nausea, (-) vomiting, (-) pain, (-) constipation, (-) diarrhea  : (-) frequency/urgency, (-) hematuria, (-) dysuria, (-) incontinence, (-) discharge  EXT: (-) edema, (-) cyanosis  ENDO: (-) heat/cold intolerance, (-) polyuria  NEURO: (-) paresthesias, (-) weakness, (-) headache, (-) dizziness, (-) syncope, (+) lightheadedness  MSK: no recent trauma, no muscle pain

## 2020-11-29 NOTE — ED ADULT NURSE NOTE - OBJECTIVE STATEMENT
A&Ox4, ambulating. pt c/o nauseas, poor appetite. dizzy/lightheaded x1week. pt denies falls/injuires. pt denies sob/fevers/chills/C/D/vomiting. pt denies pain or discomfort.

## 2020-11-29 NOTE — ED ADULT NURSE NOTE - NSSEPSISNEWALTERMENTAL_ED_A_ED
I have personally performed a face to face diagnostic evaluation on this patient. I have reviewed the ACP note and agree with the history, exam and plan of care, except as noted.
No

## 2020-11-30 LAB
CULTURE RESULTS: SIGNIFICANT CHANGE UP
SPECIMEN SOURCE: SIGNIFICANT CHANGE UP

## 2020-12-01 DIAGNOSIS — R11.10 VOMITING, UNSPECIFIED: ICD-10-CM

## 2021-01-12 ENCOUNTER — APPOINTMENT (OUTPATIENT)
Dept: GASTROENTEROLOGY | Facility: CLINIC | Age: 71
End: 2021-01-12

## 2021-03-02 ENCOUNTER — APPOINTMENT (OUTPATIENT)
Dept: GASTROENTEROLOGY | Facility: CLINIC | Age: 71
End: 2021-03-02

## 2021-03-08 NOTE — ASU PREOP CHECKLIST - SKIN PREP
We received a medical record request from [address] [place] [address]. 
n/a

## 2021-04-22 ENCOUNTER — INPATIENT (INPATIENT)
Facility: HOSPITAL | Age: 71
LOS: 31 days | Discharge: HOME | End: 2021-05-24
Attending: PSYCHIATRY & NEUROLOGY | Admitting: PSYCHIATRY & NEUROLOGY
Payer: COMMERCIAL

## 2021-04-22 VITALS
SYSTOLIC BLOOD PRESSURE: 170 MMHG | HEART RATE: 86 BPM | TEMPERATURE: 98 F | OXYGEN SATURATION: 100 % | HEIGHT: 62 IN | RESPIRATION RATE: 18 BRPM | DIASTOLIC BLOOD PRESSURE: 82 MMHG

## 2021-04-22 DIAGNOSIS — F32.9 MAJOR DEPRESSIVE DISORDER, SINGLE EPISODE, UNSPECIFIED: ICD-10-CM

## 2021-04-22 DIAGNOSIS — F41.1 GENERALIZED ANXIETY DISORDER: ICD-10-CM

## 2021-04-22 LAB
ALBUMIN SERPL ELPH-MCNC: 4.6 G/DL — SIGNIFICANT CHANGE UP (ref 3.3–5)
ALP SERPL-CCNC: 83 U/L — SIGNIFICANT CHANGE UP (ref 40–120)
ALT FLD-CCNC: 15 U/L — SIGNIFICANT CHANGE UP (ref 4–33)
ANION GAP SERPL CALC-SCNC: 12 MMOL/L — SIGNIFICANT CHANGE UP (ref 7–14)
AST SERPL-CCNC: 15 U/L — SIGNIFICANT CHANGE UP (ref 4–32)
BASOPHILS # BLD AUTO: 0.05 K/UL — SIGNIFICANT CHANGE UP (ref 0–0.2)
BASOPHILS NFR BLD AUTO: 0.4 % — SIGNIFICANT CHANGE UP (ref 0–2)
BILIRUB SERPL-MCNC: 0.3 MG/DL — SIGNIFICANT CHANGE UP (ref 0.2–1.2)
BUN SERPL-MCNC: 10 MG/DL — SIGNIFICANT CHANGE UP (ref 7–23)
CALCIUM SERPL-MCNC: 9.7 MG/DL — SIGNIFICANT CHANGE UP (ref 8.4–10.5)
CHLORIDE SERPL-SCNC: 100 MMOL/L — SIGNIFICANT CHANGE UP (ref 98–107)
CO2 SERPL-SCNC: 27 MMOL/L — SIGNIFICANT CHANGE UP (ref 22–31)
CREAT SERPL-MCNC: 0.74 MG/DL — SIGNIFICANT CHANGE UP (ref 0.5–1.3)
EOSINOPHIL # BLD AUTO: 0.1 K/UL — SIGNIFICANT CHANGE UP (ref 0–0.5)
EOSINOPHIL NFR BLD AUTO: 0.9 % — SIGNIFICANT CHANGE UP (ref 0–6)
GLUCOSE SERPL-MCNC: 129 MG/DL — HIGH (ref 70–99)
HCT VFR BLD CALC: 46.8 % — HIGH (ref 34.5–45)
HGB BLD-MCNC: 15.3 G/DL — SIGNIFICANT CHANGE UP (ref 11.5–15.5)
IANC: 8.66 K/UL — HIGH (ref 1.5–8.5)
IMM GRANULOCYTES NFR BLD AUTO: 0.4 % — SIGNIFICANT CHANGE UP (ref 0–1.5)
LYMPHOCYTES # BLD AUTO: 18.6 % — SIGNIFICANT CHANGE UP (ref 13–44)
LYMPHOCYTES # BLD AUTO: 2.12 K/UL — SIGNIFICANT CHANGE UP (ref 1–3.3)
MCHC RBC-ENTMCNC: 28.2 PG — SIGNIFICANT CHANGE UP (ref 27–34)
MCHC RBC-ENTMCNC: 32.7 GM/DL — SIGNIFICANT CHANGE UP (ref 32–36)
MCV RBC AUTO: 86.2 FL — SIGNIFICANT CHANGE UP (ref 80–100)
MONOCYTES # BLD AUTO: 0.41 K/UL — SIGNIFICANT CHANGE UP (ref 0–0.9)
MONOCYTES NFR BLD AUTO: 3.6 % — SIGNIFICANT CHANGE UP (ref 2–14)
NEUTROPHILS # BLD AUTO: 8.66 K/UL — HIGH (ref 1.8–7.4)
NEUTROPHILS NFR BLD AUTO: 76.1 % — SIGNIFICANT CHANGE UP (ref 43–77)
NRBC # BLD: 0 /100 WBCS — SIGNIFICANT CHANGE UP
NRBC # FLD: 0 K/UL — SIGNIFICANT CHANGE UP
PLATELET # BLD AUTO: 232 K/UL — SIGNIFICANT CHANGE UP (ref 150–400)
POTASSIUM SERPL-MCNC: 3.7 MMOL/L — SIGNIFICANT CHANGE UP (ref 3.5–5.3)
POTASSIUM SERPL-SCNC: 3.7 MMOL/L — SIGNIFICANT CHANGE UP (ref 3.5–5.3)
PROT SERPL-MCNC: 7 G/DL — SIGNIFICANT CHANGE UP (ref 6–8.3)
RBC # BLD: 5.43 M/UL — HIGH (ref 3.8–5.2)
RBC # FLD: 12.8 % — SIGNIFICANT CHANGE UP (ref 10.3–14.5)
SARS-COV-2 RNA SPEC QL NAA+PROBE: SIGNIFICANT CHANGE UP
SODIUM SERPL-SCNC: 139 MMOL/L — SIGNIFICANT CHANGE UP (ref 135–145)
TOXICOLOGY SCREEN, DRUGS OF ABUSE, SERUM RESULT: SIGNIFICANT CHANGE UP
TSH SERPL-MCNC: 1.27 UIU/ML — SIGNIFICANT CHANGE UP (ref 0.27–4.2)
WBC # BLD: 11.39 K/UL — HIGH (ref 3.8–10.5)
WBC # FLD AUTO: 11.39 K/UL — HIGH (ref 3.8–10.5)

## 2021-04-22 PROCEDURE — 99285 EMERGENCY DEPT VISIT HI MDM: CPT

## 2021-04-22 PROCEDURE — 93010 ELECTROCARDIOGRAM REPORT: CPT

## 2021-04-22 PROCEDURE — 99285 EMERGENCY DEPT VISIT HI MDM: CPT | Mod: 25

## 2021-04-22 RX ORDER — HYDROCHLOROTHIAZIDE 25 MG
12.5 TABLET ORAL DAILY
Refills: 0 | Status: DISCONTINUED | OUTPATIENT
Start: 2021-04-22 | End: 2021-05-24

## 2021-04-22 RX ORDER — MIRTAZAPINE 45 MG/1
7.5 TABLET, ORALLY DISINTEGRATING ORAL AT BEDTIME
Refills: 0 | Status: DISCONTINUED | OUTPATIENT
Start: 2021-04-22 | End: 2021-04-24

## 2021-04-22 RX ORDER — LANOLIN ALCOHOL/MO/W.PET/CERES
6 CREAM (GRAM) TOPICAL AT BEDTIME
Refills: 0 | Status: DISCONTINUED | OUTPATIENT
Start: 2021-04-22 | End: 2021-04-23

## 2021-04-22 RX ORDER — CLONAZEPAM 1 MG
0.5 TABLET ORAL ONCE
Refills: 0 | Status: DISCONTINUED | OUTPATIENT
Start: 2021-04-22 | End: 2021-04-22

## 2021-04-22 RX ORDER — CLONAZEPAM 1 MG
0.5 TABLET ORAL THREE TIMES A DAY
Refills: 0 | Status: DISCONTINUED | OUTPATIENT
Start: 2021-04-22 | End: 2021-04-23

## 2021-04-22 RX ORDER — ONDANSETRON 8 MG/1
4 TABLET, FILM COATED ORAL ONCE
Refills: 0 | Status: COMPLETED | OUTPATIENT
Start: 2021-04-22 | End: 2021-04-22

## 2021-04-22 RX ORDER — METOPROLOL TARTRATE 50 MG
50 TABLET ORAL EVERY 12 HOURS
Refills: 0 | Status: DISCONTINUED | OUTPATIENT
Start: 2021-04-22 | End: 2021-04-22

## 2021-04-22 RX ORDER — METOPROLOL TARTRATE 50 MG
50 TABLET ORAL
Refills: 0 | Status: DISCONTINUED | OUTPATIENT
Start: 2021-04-22 | End: 2021-05-24

## 2021-04-22 RX ORDER — LOSARTAN POTASSIUM 100 MG/1
100 TABLET, FILM COATED ORAL DAILY
Refills: 0 | Status: DISCONTINUED | OUTPATIENT
Start: 2021-04-22 | End: 2021-05-24

## 2021-04-22 RX ADMIN — Medication 0.5 MILLIGRAM(S): at 21:31

## 2021-04-22 RX ADMIN — Medication 6 MILLIGRAM(S): at 21:31

## 2021-04-22 RX ADMIN — ONDANSETRON 4 MILLIGRAM(S): 8 TABLET, FILM COATED ORAL at 16:11

## 2021-04-22 RX ADMIN — Medication 0.5 MILLIGRAM(S): at 16:10

## 2021-04-22 RX ADMIN — Medication 0.5 MILLIGRAM(S): at 12:38

## 2021-04-22 RX ADMIN — MIRTAZAPINE 7.5 MILLIGRAM(S): 45 TABLET, ORALLY DISINTEGRATING ORAL at 21:31

## 2021-04-22 NOTE — BH INPATIENT PSYCHIATRY ASSESSMENT NOTE - HPI (INCLUDE ILLNESS QUALITY, SEVERITY, DURATION, TIMING, CONTEXT, MODIFYING FACTORS, ASSOCIATED SIGNS AND SYMPTOMS)
The pt. is a 71 y/o female with a history of depression and anxiety which have gotten worse over the last month since the death of her therapist who she had been seeing for the last 20 years.  She feels that all of her life story has been lost which is compounded by the loss of both of her parents approximately seven years ago.  The pt. related that she has never been the same since they .  She feels that a great piece of herself was lost with them.   She has two prior inpatient psychiatric admissions and one prior suicide attempt by OD.  She has a medical history positive for HTN, HLD and nausea.  She had been using marijuana intermittently but recently stopped this because it was not helping her.  She admits to having been self medicating herself and using more than the prescribed dosages of klonopin, remeron, lexapro and melatonin.  She related in the ER that she was also taking klonopin.

## 2021-04-22 NOTE — BH PATIENT PROFILE - HOME MEDICATIONS
Macrobid 100 mg oral capsule , 1 cap(s) orally 2 times a day   Reglan 10 mg oral tablet , 1 tab(s) orally every 8 hours as needed for nausea    ondansetron 4 mg oral tablet, disintegrating , 1 tab(s) orally 3 times a day only as needed for nausea, vomiting  BuSpar 10 mg oral tablet , 1 tab(s) orally 2 times a day  metoprolol succinate 25 mg oral tablet, extended release , 1 tab(s) orally once a day  Livalo 2 mg oral tablet , 1 tab(s) orally once a day  hydroCHLOROthiazide 12.5 mg oral capsule , 1 cap(s) orally once a day  metoprolol succinate 50 mg oral tablet, extended release , 1 tab(s) orally once a day  clonazePAM 0.5 mg oral tablet , 1 tab(s) orally 3 times a day (at 6am, 1pm, 9pm) MDD:3 tabs  mirtazapine 30 mg oral tablet , 1 tab(s) orally once a day (at bedtime)  losartan 100 mg oral tablet , 1 tab(s) orally once a day

## 2021-04-22 NOTE — ED BEHAVIORAL HEALTH NOTE - BEHAVIORAL HEALTH NOTE
COVID Exposure Screen- Patient    1.	*Have you had a COVID-19 test in the last 90 days?  (  ) Yes   ( x ) No   (  ) Unknown- Reason: _____  IF YES PROCEED TO QUESTION #2. IF NO OR UNKNOWN, PLEASE SKIP TO QUESTION #3.  2.	Date of test(s) and result(s): ________    3.	*Have you tested positive for COVID-19 antibodies? (  ) Yes   ( x ) No   (  ) Unknown- Reason: _____  IF YES PROCEED TO QUESTION #4. IF NO or UNKNOWN, PLEASE SKIP TO QUESTION #5.  4.	Date of positive antibody test: ________    5.	*Have you received 2 doses of the COVID-19 vaccine? (x) Yes   (  ) No   (  ) Unknown- Reason: _____   IF YES PROCEED TO QUESTION #6. IF NO or UNKNOWN, PLEASE SKIP TO QUESTION #7.  6.	Date of second dose: ________    7.	*In the past 10 days, have you been around anyone with a positive COVID-19 test?* (  ) Yes   (x) No   (  ) Unknown- Reason: ____  IF YES PROCEED TO QUESTION #8. IF NO or UNKNOWN, PLEASE SKIP TO QUESTION #13.  8.	Were you within 6 feet of them for at least 15 minutes? (  ) Yes   (  ) No   (  ) Unknown- Reason: _____  9.	Have you provided care for them? (  ) Yes   (  ) No   (  ) Unknown- Reason: ______  10.	Have you had direct physical contact with them (touched, hugged, or kissed them)? (  ) Yes   (  ) No    (  ) Unknown- Reason: _____  11.	Have you shared eating or drinking utensils with them? (  ) Yes   (  ) No    (  ) Unknown- Reason: ____  12.	Have they sneezed, coughed, or somehow gotten respiratory droplets on you? (  ) Yes   (  ) No    (  ) Unknown- Reason: ______    13.	*Have you been out of [place] within the past 10 days?* (  ) Yes   (x) No   (  ) Unknown- Reason: _____  IF YES PLEASE ANSWER THE FOLLOWING QUESTIONS:  14.	Which state/country have you been to? ______  15.	Were you there over 24 hours? (  ) Yes   (  ) No    (  ) Unknown- Reason: ______  16.	Date of return to [facility]: ______.

## 2021-04-22 NOTE — ED BEHAVIORAL HEALTH ASSESSMENT NOTE - DESCRIPTION
HTN, HLD Patient is a 70 F, retired (),  non-caregiver status living in a private home in [place] with her . She states that her  is a workaholic and works almost daily. She states that she is still deeply affected by her parents passing a few years ago, friend's death last year and recent death of therapist one month ago. She has been pleasant, tearful and cooperative. No aggression, agitation. [place] Cognitive Assessment score of 22/30, detailing mild cognitive impairment.  Vital Signs Last 24 Hrs  T(C): 37.1 (22 Apr 2021 10:25), Max: 37.1 (22 Apr 2021 10:25)  T(F): 98.8 (22 Apr 2021 10:25), Max: 98.8 (22 Apr 2021 10:25)  HR: 87 (22 Apr 2021 10:25) (86 - 87)  BP: 162/78 (22 Apr 2021 10:25) (162/78 - 170/82)  BP(mean): --  RR: 18 (22 Apr 2021 10:25) (18 - 18)  SpO2: 99% (22 Apr 2021 10:25) (99% - 100%)

## 2021-04-22 NOTE — BH INPATIENT PSYCHIATRY ASSESSMENT NOTE - NSBHASSESSSUMMFT_PSY_ALL_CORE
The pt. is a 69 y/o female with a history of depression and anxiety and two prior inpatient admissions.  She has become increasingly anxious and depressed since the recent death of her therapist who she had been seeing for 20 years.  She feels that she has not been whole since the death of her parents some 7 years ago.  She has felt unable to cope and has been having panic attacks.  She is sleeping poorly and feels that the remeron, klonopin, lexapro and mellatonin have not been working.  She admits to recently taking more that the prescribed dosages of these meds and also using marijuana.

## 2021-04-22 NOTE — ED PROVIDER NOTE - PHYSICAL EXAMINATION
CONSTITUTIONAL: Well-developed; well-nourished; in no acute distress.   SKIN: warm, dry  HEAD: Normocephalic; atraumatic.  EYES: no conjunctival injection.   ENT: No nasal discharge; airway clear.  NECK: Supple; non tender.  CARD: S1, S2 normal; no murmurs, gallops, or rubs. Regular rate and rhythm.   RESP: No wheezes, rales or rhonchi. Good air movement bilaterally.   ABD: soft ntnd, no guarding, no distention, no rigidity.   EXT: Ambulates independently.  No cyanosis or edema.   NEURO: Alert, oriented, grossly unremarkable. CN III-XII intact.   PSYCH: tearful withdrawn sad

## 2021-04-22 NOTE — BH INPATIENT PSYCHIATRY ASSESSMENT NOTE - CURRENT MEDICATION
MEDICATIONS  (STANDING):  clonazePAM  Tablet 0.5 milliGRAM(s) Oral three times a day  clonazePAM  Tablet 0.5 milliGRAM(s) Oral once  hydrochlorothiazide 12.5 milliGRAM(s) Oral daily  losartan 100 milliGRAM(s) Oral daily  metoprolol tartrate 50 milliGRAM(s) Oral two times a day  mirtazapine 7.5 milliGRAM(s) Oral at bedtime  ondansetron    Tablet 4 milliGRAM(s) Oral once  ondansetron Injectable 4 milliGRAM(s) IV Push once    MEDICATIONS  (PRN):

## 2021-04-22 NOTE — ED BEHAVIORAL HEALTH ASSESSMENT NOTE - ADDITIONAL DETAILS / COMMENTS
Patient has a fair understanding of her illness and her need for care. Her judgment is poor regarding self managing and dosing medications inappropriately. Mild cognitive impairment noted on [place] Cognitive Assessment (22/30).

## 2021-04-22 NOTE — BH INPATIENT PSYCHIATRY ASSESSMENT NOTE - NSBHMETABOLIC_PSY_ALL_CORE_FT
BMI: BMI (kg/m2): 36.3 (04-22-21 @ 15:25)  HbA1c: A1C with Estimated Average Glucose: 5.6 % (08-26-20 @ 09:00)    Glucose:   BP: 162/78 (04-22-21 @ 10:25) (162/78 - 170/82)  Lipid Panel: Date/Time: 08-26-20 @ 09:00  Cholesterol, Serum: 254  Direct LDL: 195  HDL Cholesterol, Serum: 42  Total Cholesterol/HDL Ration Measurement: --  Triglycerides, Serum: 215

## 2021-04-22 NOTE — BH INPATIENT PSYCHIATRY ASSESSMENT NOTE - NSBHSATHC_PSY_A_CORE FT
Used in her youth and has been using it recently on a daily basis but then stopped because it did not help her.

## 2021-04-22 NOTE — ED BEHAVIORAL HEALTH ASSESSMENT NOTE - PSYCHIATRIC ISSUES AND PLAN (INCLUDE STANDING AND PRN MEDICATION)
Continue current medication.  Defer changes to primary team.  PRNs, Benadryl and Ativan. Lexapro 10mg daily, Klonopin 0.5mg q6h but reports ineffective, Mirtazepine 7.5mg

## 2021-04-22 NOTE — BH INPATIENT PSYCHIATRY ASSESSMENT NOTE - DESCRIPTION
Patient is a 70 F, retired (),  non-caregiver status living in a private home in [place] with her . She states that her  is a workaholic and works almost daily. She states that she is still deeply affected by her parents passing a few years ago, friend's death last year and recent death of therapist one month ago.

## 2021-04-22 NOTE — BH INPATIENT PSYCHIATRY ASSESSMENT NOTE - OTHER PAST PSYCHIATRIC HISTORY (INCLUDE DETAILS REGARDING ONSET, COURSE OF ILLNESS, INPATIENT/OUTPATIENT TREATMENT)
Patient states that she has had a long history of depression and anxiety treated with multiple medications. As per her last admission note, her first inpatient psychiatric admission was in November 2019 at [facility] for a suicide attempt via OD on unknown pills. She was hospitalized again at [facility] in Feb 2020 for worsening anxiety and depression and was discharged on Buspar, Mirtazapine, and Klonopin. She discontinued Buspar herself a month ago. She endorsed minimal improvement of her anxiety and her private outpatient psychiatrist Dr. Ramos advised her to discontinue Mirtazapine 15 mg 8/13/2020 for Effexor 50 mg.   She endorses taking 2 mg Klonopin per day. Pt is currently taking Lexapro as of past 5 weeks and no longer on Effexor.

## 2021-04-22 NOTE — ED ADULT NURSE NOTE - OBJECTIVE STATEMENT
Patient alert and awake, oriented x4, breathing with ease on room air, skin intact, ambulates at baseline. Patient reports having suicidal ideations, feelings of depression, anxiety, and panic. Patient denies having a plan but "I know I am going to do it, I don't trust myself." Patient reports  having suicidal attempt in the last five years. Patient denies homicidal ideations, denies auditory, visual, or olfactory hallucinations. Patient denies consumption of alcohol or recreational drugs. Patient reports being compliant with medications, lives at home with spouse and feels safe at home. Patient placed on a 1:1 for suicidal ideations, PCA at bedside within reach, belongings (1 bag clothes, 1 bag shoes) placed in cabinets #21, valuable belongings x 1 sent to security office. Patient alert and awake, oriented x4, breathing with ease on room air, skin intact, ambulates at baseline. Patient reports having suicidal ideations, feelings of depression, anxiety, and panic. Patient denies having a plan but "I know I am going to do it, I don't trust myself." Patient reports  having suicidal attempt in the last five years. Patient denies homicidal ideations, denies auditory, visual, or olfactory hallucinations. Patient with previous suicidal attempt in November 2019. Patient denies consumption of alcohol or recreational drugs. Patient reports being compliant with medications, lives at home with spouse and feels safe at home. Patient placed on a 1:1 for suicidal ideations, PCA at bedside within reach, belongings (1 bag clothes, 1 bag shoes) placed in cabinets #21, valuable belongings x 1 sent to security office.

## 2021-04-22 NOTE — ED BEHAVIORAL HEALTH ASSESSMENT NOTE - SUMMARY
The pt is a 70 y female, , retired (, non-caregiver domiciled at home with her  with a prior psychiatric diagnosis of ANSON and MDD, 2 previous inpatient psychiatric hospitalizations (most recently [facility] 2/2020), prior SA in 11/2019 by overdosing on pills, no history of NSSIB, no history of violence/aggression, no hx of legal issues, Daily marijuana use, PMH of HTN, BIB self due to worsening depression and anxiety in the setting of death of long time therapist one month ago.     Pt is currently denying SI/HI at this time. Reporting that she will act on plan as she does not know what else to do. She endorsed self medicating as she has done in the past leading to prior overdose. She has been able to tend to ADLs but has not been able to participate in any interest or social activitieswithin this past month. The pt is a 70 y female, , retired (, non-caregiver domiciled at home with her  with a prior psychiatric diagnosis of ANSON and MDD, 2 previous inpatient psychiatric hospitalizations (most recently [facility] 2/2020), prior SA in 11/2019 by overdosing on pills, no history of NSSIB, no history of violence/aggression, no hx of legal issues, Daily marijuana use, PMH of HTN, BIB self due to worsening depression and anxiety in the setting of death of long time therapist one month ago.     Patient remains depressed and anxious Reporting that she will act on plan as she does not know what else to do. She endorsed self medicating as she has done in the past leading to prior overdose. She has been able to tend to ADLs but has not been able to participate in any interest or social activities within this past month.

## 2021-04-22 NOTE — BH INPATIENT PSYCHIATRY ASSESSMENT NOTE - NSBHCHARTREVIEWVS_PSY_A_CORE FT
Vital Signs Last 24 Hrs  T(C): 36.4 (22 Apr 2021 15:25), Max: 37.1 (22 Apr 2021 10:25)  T(F): 97.5 (22 Apr 2021 15:25), Max: 98.8 (22 Apr 2021 10:25)  HR: 87 (22 Apr 2021 10:25) (86 - 87)  BP: 162/78 (22 Apr 2021 10:25) (162/78 - 170/82)  BP(mean): --  RR: 18 (22 Apr 2021 15:25) (18 - 18)  SpO2: 99% (22 Apr 2021 10:25) (99% - 100%)

## 2021-04-22 NOTE — ED BEHAVIORAL HEALTH ASSESSMENT NOTE - DETAILS
Penicillin Patient endorses chronic passive SI, occasionally active SI with plan to escape her anxiety. Patient had a suicide attempt in 2019 where  found pt after overdosing on pills. Father () - PTSD Dr. Andrade patient aware of disposition and plans

## 2021-04-22 NOTE — BH INPATIENT PSYCHIATRY ASSESSMENT NOTE - MSE UNSTRUCTURED FT
The pt. is alert and cooperative.  Her mood is very anxious abd depressed with some lability of affect.  Her thoughts are clear and goal directed.  She is very somatic.  She becomes easily tearful.  She is not overtly psychotic and denies hallucinations.  She denies current suicidal ideation. She is oriented times three.  Her grooming and dress are good.  Her gait is steady.  Her insight and judgement are intact.

## 2021-04-22 NOTE — ED BEHAVIORAL HEALTH ASSESSMENT NOTE - CURRENT MEDICATION
Lexapro 10 mg, Klonopin 0.5 mg TID, Metoprolol 50 mg BID, Hydrochlorothiazide 12.5 mg, Losartan 100 mg, Zofran, Mirtazapine 7.5mg, Ambien 5mg, Melatonin 3 mg

## 2021-04-22 NOTE — ED ADULT TRIAGE NOTE - CHIEF COMPLAINT QUOTE
Pt c/o anxiety and depression and is suicidal--pt very anxious and crying at triage--pt wants to be admitted.  Pt very tearful--states her life is over. Pt having thoughts of killing herself with pills

## 2021-04-22 NOTE — ED BEHAVIORAL HEALTH ASSESSMENT NOTE - ADDITIONAL DETAILS ALL
Patient endorses chronic passive SI, occasionally active SI with intent and plan to escape her anxiety. Pt endorses taking additional pills with the hopes of not waking up.

## 2021-04-22 NOTE — ED BEHAVIORAL HEALTH ASSESSMENT NOTE - CASE SUMMARY
Patient is a 70 y female, , retired (, non-caregiver domiciled at home with her  with a prior psychiatric diagnosis of ANSON and MDD, 2 previous inpatient psychiatric hospitalizations (most recently [facility] 2/2020), prior SA in 11/2019 by overdosing on pills, no history of NSSIB, no history of violence/aggression, no hx of legal issues, Daily marijuana use, PMH of HTN, BIB self due to worsening depression and anxiety in the setting of death of long time therapist one month ago.     Patient remains depressed, anxious and hopeless.  She is unable to contract for safety.  Patient is an acute danger to self and others at this time.  Patient lacks insight and judgment into illness and remains an acute safety risk and warrants inpatient psychiatric hospitalization for safety and stabilization.

## 2021-04-22 NOTE — ED BEHAVIORAL HEALTH ASSESSMENT NOTE - HPI (INCLUDE ILLNESS QUALITY, SEVERITY, DURATION, TIMING, CONTEXT, MODIFYING FACTORS, ASSOCIATED SIGNS AND SYMPTOMS)
The pt is a 70 y female, , retired (, non-caregiver domiciled at home with her  with a prior psychiatric diagnosis of ANSON and MDD, 2 previous inpatient psychiatric hospitalizations (most recently [facility] 2/2020), prior SA in 11/2019 by overdosing on pills, no history of NSSIB, no history of violence/aggression, no hx of legal issues, Daily marijuana use, PMH of HTN, BIB self due to worsening depression and anxiety in the setting of death of long time therapist one month ago.     On interview, pt is tearful but cooperative. Patient describes the details of her present symptoms. Pt reports seeing a weekly therapist for the past twenty years up until a month ago. Pt reports calling her therapist on a weekly basis until she did not receive an answer. She was then notified a month ago that her therapist had passed away alone in his home at the age of 66 years of age. Pt reports feeling "terrified" that she will have a heart attack and die alone in her home. Pt reports increasing anxiety as she is approaching her 71nd birthday on May [birth year]. She endorses worsening anxiety and depression since her therapist has passed and feels she may be next. She has been having severe nausea (taking Zofran) daily, waking up with up from night sweats with chills. Pt denies nightmares but wakes up a at 4 am with palpitations, tremors, and sweats. She states her anxiety felt so severe that she has not left her home in days. She reports she has recently been unable to hang out with friends, go to the gym, or cook or clean home. She reports not feeling that anyone is there for her, as therapist is no longer alive and her  does not understand her mental health and often criticizes her. Pt states she is at the point where she does not know what else to do she states "I just don't want to wake up at all". Pt reports being prescribed (Ambien 5mg, Remeron 7.5mg, Melatonin 3mg PO for sleep) Pt states that she has been self medicating and adjusting her dosage when she felt that her symptoms persists without anyone's knowledge with the hope that she does not wake up. Along with her Lexapro (has been taking for past 5 weeks) she has been taking Klonopin 0.5 mg. She endorses that lately she has been taking up to 4 Klonopin (2 mg) per day, 2 Ambien (10mg) and 2 Remeron (15mg) with no relief and feels like she is "out of control" with her medications and fears that she may overdose on her pills as she did in November 2019 as she cannot deal with feeling like this anymore. She also reports to using marijuana to when medications are not helping. She states that the marijuana make her feel "loopy" but she still feels her anxiety "building up". She states that she fears that she would overdose on her medications at home in an attempt to escape her symptoms and is afraid that she will die from a heart attack before her symptoms resolve and no one will be able to find her in time.    Patient endorses a long history of depression and anxiety since her early teens. Pt reports trying multiple substances in her 20s and had not done anything other than marijuana since her 20s. Her family history is positive for suicide (grandmother at age 90). Patient attempted suicide via OD on her pills in November 2019 and was admitted to [facility]. Of note, she was been on Lexapro for past 5 weeks and reports no change in depressive symptoms. As per previous ED note, she has a history of self adjusting her medications prior to her admissions. She reports low energy with poor concentration when attempting to complete household duties. Pt denies auditory and visual hallucinations, Pt denies manic symptoms. She endorses anxiety with daily panic attacks including excessive worrying on having a heart attack. Pt denies active SI, but reports chronic passive SI which she ascribes to wanting to escape her debilitating anxiety. Denies access to firearms. She is requesting voluntary admission to address her worsening anxiety and depression and adjust her medications. The pt is a 70 y female, , retired (, non-caregiver domiciled at home with her  with a prior psychiatric diagnosis of ANSON and MDD, 2 previous inpatient psychiatric hospitalizations (most recently [facility] 2/2020), prior SA in 11/2019 by overdosing on pills, no history of NSSIB, no history of violence/aggression, no hx of legal issues, Daily marijuana use, PMH of HTN, BIB self due to worsening depression and anxiety in the setting of death of long time therapist one month ago.     On interview, pt is tearful but cooperative. Patient describes the details of her present symptoms. Pt reports seeing a weekly therapist for the past twenty years up until a month ago. Pt reports calling her therapist on a weekly basis until she did not receive an answer. She was then notified a month ago that her therapist had passed away alone in his home at the age of 66 years of age. Pt reports feeling "terrified" that she will have a heart attack and die alone in her home. Pt reports increasing anxiety as she is approaching her 71nd birthday on May [birth year]. She endorses worsening anxiety and depression since her therapist has passed and feels she may be next. She has been having severe nausea (taking Zofran) daily, waking up with up from night sweats with chills. Pt denies nightmares but wakes up a at 4 am with palpitations, tremors, and sweats. She states her anxiety felt so severe that she has not left her home in days. She reports she has recently been unable to hang out with friends, go to the gym, or cook or clean home. She reports not feeling that anyone is there for her, as therapist is no longer alive and her  does not understand her mental health and often criticizes her. Pt states she is at the point where she does not know what else to do she states "I just don't want to wake up at all". Pt reports being prescribed (Ambien 5mg, Remeron 7.5mg, Melatonin 3mg PO for sleep) Pt states that she has been self medicating and adjusting her dosage when she felt that her symptoms persists without anyone's knowledge with the hope that she does not wake up. Along with her Lexapro (has been taking for past 5 weeks) she has been taking Klonopin 0.5 mg. She endorses that lately she has been taking up to 4 Klonopin (2 mg) per day, 2 Ambien (10mg) and 2 Remeron (15mg) with no relief and feels like she is "out of control" with her medications and fears that she may overdose on her pills as she did in November 2019 as she cannot deal with feeling like this anymore. She also reports to using marijuana to when medications are not helping. She states that the marijuana make her feel "loopy" but she still feels her anxiety "building up". She states that she fears that she would overdose on her medications at home in an attempt to escape her symptoms and is afraid that she will die from a heart attack before her symptoms resolve and no one will be able to find her in time.    Patient endorses a long history of depression and anxiety since her early teens. Pt reports trying multiple substances in her 20s and had not done anything other than marijuana since her 20s. Her family history is positive for suicide (grandmother at age 90). Patient attempted suicide via OD on her pills in November 2019 and was admitted to [facility]. Of note, she was been on Lexapro for past 5 weeks and reports no change in depressive symptoms. As per previous ED note, she has a history of self adjusting her medications prior to her admissions. She reports low energy with poor concentration when attempting to complete household duties. Pt reports she is able to complete daily ADLs but has no motivation to doing any thing else besides "lay on the couch." Pt denies auditory and visual hallucinations, Pt denies manic symptoms. She endorses anxiety with daily panic attacks including excessive worrying on having a heart attack. Pt denies active SI, but reports chronic passive SI which she ascribes to wanting to escape her debilitating anxiety. Denies access to firearms. She is requesting voluntary admission to address her worsening anxiety and depression and adjust her medications.

## 2021-04-22 NOTE — ED PROVIDER NOTE - CLINICAL SUMMARY MEDICAL DECISION MAKING FREE TEXT BOX
O'Varsha DO PGY-1: pt presents w/ suicidal ideation, Will medically clear but will also screen for pheochromocytoma given palpitations and flushing in the mornings.

## 2021-04-22 NOTE — ED BEHAVIORAL HEALTH NOTE - BEHAVIORAL HEALTH NOTE
Worker called patient’s spouse Yehuda Gross ([phone]) for collateral information. All information is as per Mr. Gross:    Patient is a 70-year-old female, domiciled with spouse, with a diagnosis of anxiety and depression, with a last psychiatric admission last year at [facility], bib accompanied by spouse for worsening depression. Spouse reports that he dropped the patient to the hospital today because she was not feeling well. Spouse believes that pt’s medication needs to be adjusted because it is not working for her. He states that the patient has no appetite for the past week or so. He states that she is followed by psychiatrist Dr. Patricio who is now conducting sessions on the phone. Spouse reports that covid-19 was a big set back for the patient. He states that her therapist of 20 years  5 weeks ago from a heart attack. He states that this was a big loss for the patient. He reports that the patient has been medication compliant. Spouse states that the pt struggles with anxiety and has been getting up in the morning with anxiety. Spouse states that this morning the patient’s heart rate was elevated. He reports that the patient was last hospitalized last year. He states at that time he came home from work and found the pt on the floor and pills was on the table. Spouse denies that the patient is having HI/AH/VH/SIB. He denies that pt uses drugs or alcohol and is not violent or aggressive. He denies that the patient has a past sa. He states that the patient had the covid-19 vaccine a month ago (unsure name of vaccine). Patient has been showering regularly. Patient has no covid-19 exposure. Worker informed pt’s spouse that the patient will be admitted to [facility] l5.

## 2021-04-22 NOTE — ED BEHAVIORAL HEALTH ASSESSMENT NOTE - PATIENT'S CHIEF COMPLAINT
"I have been having terrible anxiety which wakes me out of my dreams and makes me not want to live anymore."

## 2021-04-22 NOTE — ED BEHAVIORAL HEALTH ASSESSMENT NOTE - OTHER PAST PSYCHIATRIC HISTORY (INCLUDE DETAILS REGARDING ONSET, COURSE OF ILLNESS, INPATIENT/OUTPATIENT TREATMENT)
Patient states that she has had a long history of depression and anxiety treated with multiple medications. As per her last admission note, her first inpatient psychiatric admission was in November 2019 at [facility] for a suicide attempt via OD on unknown pills. She was hospitalized again at [facility] in Feb 2020 for worsening anxiety and depression and was discharged on Buspar, Mirtazapine, and Klonopin. She discontinued Buspar herself a month ago. She endorsed minimal improvement of her anxiety and her private outpatient psychiatrist Dr. Ramos advised her to discontinue Mirtazapine 15 mg 8/13/2020 for Effexor 50 mg.   She endorses taking 2 mg Klonopin per day. Pt is currently taking Lexapro as of past 5 weeks and no longer on Effexor.

## 2021-04-22 NOTE — ED PROVIDER NOTE - NS ED ROS FT
CONSTITUTIONAL - No fever, No diaphoresis, No weight change  EYES - No eye pain, No blurred vision  ENT - No change in hearing, No sore throat, No neck pain, No rhinorrhea, No ear pain  RESPIRATORY - No shortness of breath, No cough  CARDIAC -No chest pain, +palpitations  GI - No abdominal pain, No nausea, No vomiting, No diarrhea, No constipation  - No dysuria  MUSCULOSKELETAL - No joint pain, No swelling, No back pain  NEUROLOGIC - No numbness, No focal weakness, No headache, No dizziness

## 2021-04-22 NOTE — ED BEHAVIORAL HEALTH ASSESSMENT NOTE - RISK ASSESSMENT
High Acute Suicide Risk Patient is at elevated imminent risk of harm to self. Prior history of a SA, and chronic passive SI. Pt is in need of voluntary hospitalization as she currently expresses death wish and has a plan warranting hospitalization at this time.     Pt chronic risk is elevated due to prior suicide attempt, current plan and substance use .   Protective factors include supportive friends, insight into need for treatment, help seeking behavior.

## 2021-04-22 NOTE — ED ADULT NURSE NOTE - CAS TRG GENERAL AIRWAY, MLM
Believe she is scheduled for an MRI of her cervical spine next week. Please also put an order to have cervical epidural by Dr. Desai after MRI is done   Patent

## 2021-04-22 NOTE — BH INPATIENT PSYCHIATRY ASSESSMENT NOTE - DETAILS
Nausea from cymbalta and zoloft Patient endorses chronic passive SI, occasionally active SI with plan to escape her anxiety. Patient had a suicide attempt in 2019 where  found pt after overdosing on pills. Father had PTSD.

## 2021-04-23 PROCEDURE — 99232 SBSQ HOSP IP/OBS MODERATE 35: CPT

## 2021-04-23 RX ORDER — ONDANSETRON 8 MG/1
4 TABLET, FILM COATED ORAL DAILY
Refills: 0 | Status: DISCONTINUED | OUTPATIENT
Start: 2021-04-23 | End: 2021-05-24

## 2021-04-23 RX ORDER — GABAPENTIN 400 MG/1
100 CAPSULE ORAL
Refills: 0 | Status: DISCONTINUED | OUTPATIENT
Start: 2021-04-23 | End: 2021-04-26

## 2021-04-23 RX ORDER — ESCITALOPRAM OXALATE 10 MG/1
10 TABLET, FILM COATED ORAL ONCE
Refills: 0 | Status: COMPLETED | OUTPATIENT
Start: 2021-04-23 | End: 2021-04-23

## 2021-04-23 RX ORDER — ONDANSETRON 8 MG/1
4 TABLET, FILM COATED ORAL ONCE
Refills: 0 | Status: COMPLETED | OUTPATIENT
Start: 2021-04-23 | End: 2021-04-23

## 2021-04-23 RX ORDER — GABAPENTIN 400 MG/1
100 CAPSULE ORAL
Refills: 0 | Status: DISCONTINUED | OUTPATIENT
Start: 2021-04-23 | End: 2021-04-23

## 2021-04-23 RX ORDER — ESCITALOPRAM OXALATE 10 MG/1
10 TABLET, FILM COATED ORAL
Refills: 0 | Status: DISCONTINUED | OUTPATIENT
Start: 2021-04-23 | End: 2021-04-26

## 2021-04-23 RX ORDER — LANOLIN ALCOHOL/MO/W.PET/CERES
5 CREAM (GRAM) TOPICAL AT BEDTIME
Refills: 0 | Status: DISCONTINUED | OUTPATIENT
Start: 2021-04-23 | End: 2021-05-24

## 2021-04-23 RX ADMIN — ONDANSETRON 4 MILLIGRAM(S): 8 TABLET, FILM COATED ORAL at 11:55

## 2021-04-23 RX ADMIN — Medication 50 MILLIGRAM(S): at 20:33

## 2021-04-23 RX ADMIN — ESCITALOPRAM OXALATE 10 MILLIGRAM(S): 10 TABLET, FILM COATED ORAL at 17:44

## 2021-04-23 RX ADMIN — ESCITALOPRAM OXALATE 10 MILLIGRAM(S): 10 TABLET, FILM COATED ORAL at 10:24

## 2021-04-23 RX ADMIN — GABAPENTIN 100 MILLIGRAM(S): 400 CAPSULE ORAL at 13:23

## 2021-04-23 RX ADMIN — Medication 5 MILLIGRAM(S): at 20:59

## 2021-04-23 RX ADMIN — LOSARTAN POTASSIUM 100 MILLIGRAM(S): 100 TABLET, FILM COATED ORAL at 08:23

## 2021-04-23 RX ADMIN — MIRTAZAPINE 7.5 MILLIGRAM(S): 45 TABLET, ORALLY DISINTEGRATING ORAL at 20:33

## 2021-04-23 RX ADMIN — ONDANSETRON 4 MILLIGRAM(S): 8 TABLET, FILM COATED ORAL at 05:59

## 2021-04-23 RX ADMIN — Medication 0.5 MILLIGRAM(S): at 20:33

## 2021-04-23 RX ADMIN — Medication 30 MILLILITER(S): at 10:24

## 2021-04-23 RX ADMIN — Medication 0.5 MILLIGRAM(S): at 06:01

## 2021-04-23 RX ADMIN — Medication 50 MILLIGRAM(S): at 08:23

## 2021-04-23 RX ADMIN — Medication 12.5 MILLIGRAM(S): at 08:23

## 2021-04-23 NOTE — BH INPATIENT PSYCHIATRY PROGRESS NOTE - NSBHASSESSSUMMFT_PSY_ALL_CORE
The patient is a 70-year-old female, , retired (, non-caregiver domiciled at home with her  with a prior psychiatric diagnosis of ANSON and MDD, 2 previous inpatient psychiatric hospitalizations (most recently [facility] 2/2020), prior SA in 11/2019 by overdosing on pills, no history of NSSIB, no history of violence/aggression, no hx of legal issues, daily marijuana use, PMH of HTN, BIB self due to worsening depression and anxiety in the setting of death of long time therapist one month ago.    On encounter today, patient continues to be anxious. She reports that she feels unwell as she missed her Lexapro yesterday, approaches provider multiple times about various medications and possible side effects. Denies SI/HI.  Plan: Will c/w home Lexapro, Mirtazapine and Melatonin and titrate meds based on response. Will switch to Ativan 0.5mg BID from Clonazepam. Will add Gabapentin 100mg at 2:00pm for anxiety.

## 2021-04-23 NOTE — PSYCHIATRIC REHAB INITIAL EVALUATION - NSBHSTRENGTHHOME_PSY_ALL_CORE
Pt ambulates independently. Her  is supportive. Pt reports no difficulty maintaining her ADLs. She hires help to clean and takes in food at baseline.

## 2021-04-23 NOTE — BH SOCIAL WORK INITIAL PSYCHOSOCIAL EVALUATION - OTHER PAST PSYCHIATRIC HISTORY (INCLUDE DETAILS REGARDING ONSET, COURSE OF ILLNESS, INPATIENT/OUTPATIENT TREATMENT)
Patient is a 71yo,female, , noted to be a retired , non-caregiver,  domiciled at home with her . Patient has a documented PPHx of  ANSON and MDD, with long history of depression and anxiety noted since her early teens. Pt has a h/o 2 previous inpatient psychiatric hospitalizations, both at [facility] (November 2019 at for a suicide attempt via OD, and in Feb 2020). Pt has h/o SA in 11/2019 via OD. Pt reported daily marijuana use, as a means of self-medicating,  and is reported to have a h/o “trying” multiple substances in her 20s. Pt has most recently been using more of her prescription drugs (klonopin, remeron, lexapro and melatonin) to cope with increase depression and anxiety, with noted passive SI of wanting to sleep and not wake up. Pt’s long time therapist recently passed away, triggering this most current episode. Pt is also noted to have private outpatient psychiatrist Dr. Ramos.

## 2021-04-23 NOTE — BH INPATIENT PSYCHIATRY PROGRESS NOTE - MSE UNSTRUCTURED FT
The patient appears stated age, is alert and cooperative. Reported mood "anxious" with some lability of affect, she becomes easily tearful. Her speech is wnl. No PMR/ PMA noted. Her gait is steady. Her thought process is clear and goal directed. Thought content--She is very somatic, no delusions, denies SI/HI. Perception: denies hallucinations. Her insight and judgement are partial. Impulse control intact at the time of exam. She is oriented X3.

## 2021-04-23 NOTE — BH INPATIENT PSYCHIATRY PROGRESS NOTE - NSBHFUPINTERVALHXFT_PSY_A_CORE
Patient seen for depression and anxiety. Chart reviewed and case discussed with interdisciplinary staff. Per staff, pt very anxious and needy. On encounter, patient reports that she is feeling unwell because she missed her Lexapro yesterday. She reports that she often wakes up anxious and the Clonazepam is not working for her anymore. I recommend switching to Ativan and reducing the frequency, which she is agreeable with. Patient also reports poor sleep and appetite and would like her Melatonin added back on. She denies any symptoms consistent with asdia or psychosis. Denies SI/HI.

## 2021-04-23 NOTE — PSYCHIATRIC REHAB INITIAL EVALUATION - NSBHPRRECOMMEND_PSY_ALL_CORE
Writer met with patient and introduced self, psychiatric rehabilitation staff and services.   Patient presents with worsening symptoms of depression and anxiety. Pt also endorses panic attacks. Patient demonstrated good impulse control during assessment, displayed poor insight and poor judgment into her symptoms and treatment at this time.  Per chart review, patient has been taking more medication than prescribed as well as self-medicating with marijuana. Patient was noted to be somatically preoccupied, contributing multiple physical complaints to her mental health (e.g. nausea). Patient was able to begin identifying coping skills given assistance.  Writer encouraged patient to attend psychiatric rehabilitation activities and engage in treatment.  Psychiatric Rehabilitation staff will continue to engage patient daily in order to develop therapeutic rapport. Writer and patient were able to establish a collaborative psychiatric rehabilitation goal.

## 2021-04-23 NOTE — PSYCHIATRIC REHAB INITIAL EVALUATION - NSBHALCSUBTREAT_PSY_ALL_CORE
Pt has a private out-patient psychiatrist, however, pt expressed interest in switching to a therapist and psychiatrist at the [facility]/Outpatient clinic (specify)

## 2021-04-23 NOTE — PSYCHIATRIC REHAB INITIAL EVALUATION - NSBHPRTOOLBOX_PSY_ALL_CORE
When well pt enjoys socializing and going out to eat with friends as well as going to the gym./Exercise/Family support/Friend support

## 2021-04-23 NOTE — BH SOCIAL WORK INITIAL PSYCHOSOCIAL EVALUATION - NSBHSATHC_PSY_A_CORE FT
As per EMR, Used in her youth and has been using it recently on a daily basis but then stopped because it did not help her.

## 2021-04-24 PROCEDURE — 99232 SBSQ HOSP IP/OBS MODERATE 35: CPT

## 2021-04-24 RX ORDER — MIRTAZAPINE 45 MG/1
15 TABLET, ORALLY DISINTEGRATING ORAL AT BEDTIME
Refills: 0 | Status: DISCONTINUED | OUTPATIENT
Start: 2021-04-24 | End: 2021-04-29

## 2021-04-24 RX ADMIN — Medication 0.5 MILLIGRAM(S): at 06:26

## 2021-04-24 RX ADMIN — Medication 12.5 MILLIGRAM(S): at 09:16

## 2021-04-24 RX ADMIN — Medication 50 MILLIGRAM(S): at 09:16

## 2021-04-24 RX ADMIN — GABAPENTIN 100 MILLIGRAM(S): 400 CAPSULE ORAL at 13:02

## 2021-04-24 RX ADMIN — LOSARTAN POTASSIUM 100 MILLIGRAM(S): 100 TABLET, FILM COATED ORAL at 09:16

## 2021-04-24 RX ADMIN — Medication 50 MILLIGRAM(S): at 20:05

## 2021-04-24 RX ADMIN — Medication 5 MILLIGRAM(S): at 22:16

## 2021-04-24 RX ADMIN — ESCITALOPRAM OXALATE 10 MILLIGRAM(S): 10 TABLET, FILM COATED ORAL at 16:40

## 2021-04-24 RX ADMIN — ONDANSETRON 4 MILLIGRAM(S): 8 TABLET, FILM COATED ORAL at 06:25

## 2021-04-24 RX ADMIN — Medication 0.5 MILLIGRAM(S): at 20:05

## 2021-04-24 RX ADMIN — MIRTAZAPINE 15 MILLIGRAM(S): 45 TABLET, ORALLY DISINTEGRATING ORAL at 20:06

## 2021-04-24 NOTE — BH INPATIENT PSYCHIATRY PROGRESS NOTE - NSBHASSESSSUMMFT_PSY_ALL_CORE
The patient is a 70-year-old female, , retired (, non-caregiver domiciled at home with her  with a prior psychiatric diagnosis of ANSON and MDD, 2 previous inpatient psychiatric hospitalizations (most recently [facility] 2/2020), prior SA in 11/2019 by overdosing on pills, no history of NSSIB, no history of violence/aggression, no hx of legal issues, daily marijuana use, PMH of HTN, BIB self due to worsening depression and anxiety in the setting of death of long time therapist one month ago.    On encounter today, patient continues to be anxious. She reports that she feels unwell as she missed her Lexapro yesterday, approaches provider multiple times about various medications and possible side effects. Denies SI/HI.    4/24: pt anxious, dysphoric, somatically focused, no SI/HI, poor sleep.    Plan: Will c/w home Lexapro, increase Mirtazapine to 15 mg qhs, Melatonin and titrate meds based on response. Ativan am dose to 6 am and continue 9 pm. Gabapentin 100mg at 2:00pm for anxiety.

## 2021-04-24 NOTE — BH INPATIENT PSYCHIATRY PROGRESS NOTE - NSBHFUPINTERVALHXFT_PSY_A_CORE
Chart reviewed and case discussed with nursing staff. Per staff, pt very anxious, somatic and needy. She is compliant with meds, received PRN melatonin last night for sleep.     On exam today, patient reports feeling anxious, cold, sweaty, mostly in the morning, she used to get her anxiety medication at 6 am at home. Also reports nauseas around bedtime and early morning. She reports that she often wakes up anxious and the Clonazepam is not working for her anymore. She is concerned about her klonopin switching to ativan and not receiving effexor any more. However she is willing to try ativan. Endorses low energy/motivation. She denies any sadia or psychosis. Denies SI/HI.

## 2021-04-25 PROCEDURE — 99231 SBSQ HOSP IP/OBS SF/LOW 25: CPT

## 2021-04-25 RX ORDER — ACETAMINOPHEN 500 MG
650 TABLET ORAL EVERY 6 HOURS
Refills: 0 | Status: DISCONTINUED | OUTPATIENT
Start: 2021-04-25 | End: 2021-05-24

## 2021-04-25 RX ORDER — BACITRACIN ZINC 500 UNIT/G
1 OINTMENT IN PACKET (EA) TOPICAL
Refills: 0 | Status: DISCONTINUED | OUTPATIENT
Start: 2021-04-25 | End: 2021-05-24

## 2021-04-25 RX ADMIN — Medication 650 MILLIGRAM(S): at 21:21

## 2021-04-25 RX ADMIN — ONDANSETRON 4 MILLIGRAM(S): 8 TABLET, FILM COATED ORAL at 11:01

## 2021-04-25 RX ADMIN — Medication 12.5 MILLIGRAM(S): at 08:42

## 2021-04-25 RX ADMIN — Medication 30 MILLILITER(S): at 13:05

## 2021-04-25 RX ADMIN — Medication 50 MILLIGRAM(S): at 08:42

## 2021-04-25 RX ADMIN — Medication 50 MILLIGRAM(S): at 20:52

## 2021-04-25 RX ADMIN — Medication 650 MILLIGRAM(S): at 12:00

## 2021-04-25 RX ADMIN — Medication 0.5 MILLIGRAM(S): at 20:52

## 2021-04-25 RX ADMIN — Medication 650 MILLIGRAM(S): at 11:01

## 2021-04-25 RX ADMIN — ESCITALOPRAM OXALATE 10 MILLIGRAM(S): 10 TABLET, FILM COATED ORAL at 16:48

## 2021-04-25 RX ADMIN — LOSARTAN POTASSIUM 100 MILLIGRAM(S): 100 TABLET, FILM COATED ORAL at 08:42

## 2021-04-25 RX ADMIN — MIRTAZAPINE 15 MILLIGRAM(S): 45 TABLET, ORALLY DISINTEGRATING ORAL at 20:53

## 2021-04-25 RX ADMIN — GABAPENTIN 100 MILLIGRAM(S): 400 CAPSULE ORAL at 14:15

## 2021-04-25 RX ADMIN — Medication 5 MILLIGRAM(S): at 21:21

## 2021-04-25 RX ADMIN — Medication 0.5 MILLIGRAM(S): at 06:38

## 2021-04-25 RX ADMIN — Medication 650 MILLIGRAM(S): at 22:25

## 2021-04-25 NOTE — BH INPATIENT PSYCHIATRY PROGRESS NOTE - NSBHFUPINTERVALHXFT_PSY_A_CORE
PT reports that her sleep is better. She still feels somewhat depressed. Occasional anxiety. Reports some sweating and loose stool, wonders whether   it may be meds.

## 2021-04-25 NOTE — CHART NOTE - NSCHARTNOTEFT_GEN_A_CORE
EUFEMIA called to see patient s/p fall. Patient reports she tripped when her shoe "hit the ground funny," landed on her hands and knees, denies impact to head. This description of events is corroborated by RN staff. On assessment, patient is found resting comfortably in reclining chair with legs elevated. AAOx3, appears in NAD. On exam, noted mild erythema and superficial non-bleeding abrasion overlying right elbow, mild erythema overlying left elbow, erythema overlying R patella and erythema overlying L patella with ecchymosis. Mild TTP to L patella, no deformities noted, patient denies any pain apart from L patella. Patient able to bend arms and legs. Additionally with full ROM of neck and EOMI. Low suspicion for fracture at this time, can continue conservative management with cold packs, Bacitracin, Tylenol for symptomatic relief. Patient reports mild headache and nausea, attributes this to "being shaken up by what just happened." Given no reported impact to head, and patient AAOx3, will hold off on further medical workup at this time. Continue conservative management with PRN Zofran and Tylenol. D/w RN staff- will notify EUFEMIA for any worsening of symptoms.

## 2021-04-25 NOTE — BH INPATIENT PSYCHIATRY PROGRESS NOTE - MSE UNSTRUCTURED FT
Patient is awake and alert. Affect is neutral, mildly anxious. SPeech fluent. TP coherent. Endorses sadness but improving. No suicidal ideations.

## 2021-04-26 PROCEDURE — 99232 SBSQ HOSP IP/OBS MODERATE 35: CPT

## 2021-04-26 RX ORDER — ESCITALOPRAM OXALATE 10 MG/1
5 TABLET, FILM COATED ORAL
Refills: 0 | Status: DISCONTINUED | OUTPATIENT
Start: 2021-04-26 | End: 2021-04-27

## 2021-04-26 RX ORDER — VENLAFAXINE HCL 75 MG
37.5 CAPSULE, EXT RELEASE 24 HR ORAL DAILY
Refills: 0 | Status: DISCONTINUED | OUTPATIENT
Start: 2021-04-27 | End: 2021-04-29

## 2021-04-26 RX ADMIN — Medication 5 MILLIGRAM(S): at 21:40

## 2021-04-26 RX ADMIN — Medication 50 MILLIGRAM(S): at 09:46

## 2021-04-26 RX ADMIN — MIRTAZAPINE 15 MILLIGRAM(S): 45 TABLET, ORALLY DISINTEGRATING ORAL at 20:33

## 2021-04-26 RX ADMIN — ONDANSETRON 4 MILLIGRAM(S): 8 TABLET, FILM COATED ORAL at 14:00

## 2021-04-26 RX ADMIN — Medication 30 MILLILITER(S): at 09:47

## 2021-04-26 RX ADMIN — LOSARTAN POTASSIUM 100 MILLIGRAM(S): 100 TABLET, FILM COATED ORAL at 09:46

## 2021-04-26 RX ADMIN — Medication 0.5 MILLIGRAM(S): at 20:34

## 2021-04-26 RX ADMIN — ESCITALOPRAM OXALATE 5 MILLIGRAM(S): 10 TABLET, FILM COATED ORAL at 18:47

## 2021-04-26 RX ADMIN — Medication 50 MILLIGRAM(S): at 20:33

## 2021-04-26 RX ADMIN — Medication 12.5 MILLIGRAM(S): at 09:45

## 2021-04-26 RX ADMIN — Medication 0.5 MILLIGRAM(S): at 09:46

## 2021-04-26 NOTE — PHYSICAL THERAPY INITIAL EVALUATION ADULT - DISCHARGE DISPOSITION, PT EVAL
Recommend patyient ambulate on unit with supervision without use of RW. Recommend pain patch/Hot pack for low back pain relief/home w/ home PT

## 2021-04-26 NOTE — BH INPATIENT PSYCHIATRY PROGRESS NOTE - MSE UNSTRUCTURED FT
The patient appears stated age, is alert and cooperative. Reported mood "anxious" with labile affect. Her speech is wnl. No PMR/ PMA noted. Her gait is steady. Her thought process is clear and goal directed. Thought content--She is somatic complaints, no delusions, denies SI/HI. Perception: denies hallucinations. Her insight and judgement are partial. Impulse control intact at the time of exam. She is oriented X3.

## 2021-04-26 NOTE — BH INPATIENT PSYCHIATRY PROGRESS NOTE - NSBHFUPINTERVALHXFT_PSY_A_CORE
Patient seen for depression and anxiety. Chart reviewed and case discussed with interdisciplinary staff. Pt had a mechanical fall yesterday, was evaluated and no intervention deemed necessary. On encounter, patient reports that she is feeling a little dizzy (BP checked--no orthostasis), believes Gabapentin is responsible for these symptoms, wants to hold off switching to Effexor until tomorrow. Patient once again reports poor sleep and appetite, however per staff, appetite and sleep are adequate. Patient denies any symptoms consistent with sadia or psychosis. Denies SI/HI.

## 2021-04-26 NOTE — PHYSICAL THERAPY INITIAL EVALUATION ADULT - PLANNED THERAPY INTERVENTIONS, PT EVAL
balance training/gait training/joint mobilization/lumbar stabilization/manual therapy techniques/neuromuscular re-education/postural re-education/strengthening

## 2021-04-26 NOTE — BH INPATIENT PSYCHIATRY PROGRESS NOTE - NSBHASSESSSUMMFT_PSY_ALL_CORE
The patient is a 70-year-old female, , retired (, non-caregiver domiciled at home with her  with a prior psychiatric diagnosis of ANSON and MDD, 2 previous inpatient psychiatric hospitalizations (most recently [facility] 2/2020), prior SA in 11/2019 by overdosing on pills, no history of NSSIB, no history of violence/aggression, no hx of legal issues, daily marijuana use, PMH of HTN, BIB self due to worsening depression and anxiety in the setting of death of long time therapist one month ago.    On encounter today, patient continues to be anxious and somatic. She reports that she feels weak/dizzy (no orthostatic drop on eval), pt believes the dizziness is due to her Gabapentin. Denies SI/HI.  Plan: Will d/c Gabapentin. Will start Venlafaxine ER 37.5mg tomorrow, d/c Lexapro starting tomorrow, and c/w Mirtazapine and Ativan at current dose.

## 2021-04-27 PROCEDURE — 90834 PSYTX W PT 45 MINUTES: CPT

## 2021-04-27 PROCEDURE — 99232 SBSQ HOSP IP/OBS MODERATE 35: CPT

## 2021-04-27 RX ORDER — PANTOPRAZOLE SODIUM 20 MG/1
40 TABLET, DELAYED RELEASE ORAL
Refills: 0 | Status: DISCONTINUED | OUTPATIENT
Start: 2021-04-27 | End: 2021-05-24

## 2021-04-27 RX ADMIN — MIRTAZAPINE 15 MILLIGRAM(S): 45 TABLET, ORALLY DISINTEGRATING ORAL at 21:29

## 2021-04-27 RX ADMIN — Medication 12.5 MILLIGRAM(S): at 09:20

## 2021-04-27 RX ADMIN — Medication 30 MILLILITER(S): at 09:22

## 2021-04-27 RX ADMIN — Medication 0.5 MILLIGRAM(S): at 21:28

## 2021-04-27 RX ADMIN — Medication 50 MILLIGRAM(S): at 09:20

## 2021-04-27 RX ADMIN — Medication 37.5 MILLIGRAM(S): at 09:20

## 2021-04-27 RX ADMIN — Medication 50 MILLIGRAM(S): at 21:29

## 2021-04-27 RX ADMIN — Medication 5 MILLIGRAM(S): at 21:30

## 2021-04-27 RX ADMIN — LOSARTAN POTASSIUM 100 MILLIGRAM(S): 100 TABLET, FILM COATED ORAL at 09:20

## 2021-04-27 RX ADMIN — Medication 0.5 MILLIGRAM(S): at 06:19

## 2021-04-27 NOTE — BH INPATIENT PSYCHIATRY PROGRESS NOTE - NSBHFUPINTERVALHXFT_PSY_A_CORE
Patient seen for depression and anxiety. Chart reviewed and case discussed with interdisciplinary staff. Per staff, patient is very somatic and med seeking. On encounter, patient reports that she feels nauseous, thinks her medications are not agreeing with her, requests something be changed. Patient continues to report poor sleep and appetite, however per staff, appetite and sleep are adequate. Patient denies any symptoms consistent with sadia or psychosis. Denies SI/HI.

## 2021-04-27 NOTE — BH INPATIENT PSYCHIATRY PROGRESS NOTE - MSE UNSTRUCTURED FT
The patient appears stated age, is alert and cooperative. Reported mood "not good" with labile affect. Her speech is wnl. No PMR/ PMA noted. Her gait is steady. Her thought process is clear and goal directed. Thought content--she has somatic complaints, no delusions, denies SI/HI. Perception: denies hallucinations. Her insight and judgement are partial. Impulse control intact at the time of exam. She is oriented X3.

## 2021-04-27 NOTE — BH INPATIENT PSYCHIATRY PROGRESS NOTE - NSBHASSESSSUMMFT_PSY_ALL_CORE
The patient is a 70-year-old female, , retired (, non-caregiver domiciled at home with her  with a prior psychiatric diagnosis of ANSON and MDD, 2 previous inpatient psychiatric hospitalizations (most recently [facility] 2/2020), prior SA in 11/2019 by overdosing on pills, no history of NSSIB, no history of violence/aggression, no hx of legal issues, daily marijuana use, PMH of HTN, BIB self due to worsening depression and anxiety in the setting of death of long time therapist one month ago.    On encounter today, patient continues to be anxious and somatic. She reports nausea on waking up. Denies SI/HI.  Plan: Will start Venlafaxine ER 37.5mg, and c/w Mirtazapine and Ativan at current dose. Lexapro discontinued today. Will start Pantoprazole 40mg before breakfast for GERD.

## 2021-04-28 PROCEDURE — 99232 SBSQ HOSP IP/OBS MODERATE 35: CPT

## 2021-04-28 RX ADMIN — Medication 5 MILLIGRAM(S): at 20:34

## 2021-04-28 RX ADMIN — PANTOPRAZOLE SODIUM 40 MILLIGRAM(S): 20 TABLET, DELAYED RELEASE ORAL at 07:04

## 2021-04-28 RX ADMIN — Medication 37.5 MILLIGRAM(S): at 09:10

## 2021-04-28 RX ADMIN — Medication 30 MILLILITER(S): at 17:54

## 2021-04-28 RX ADMIN — Medication 12.5 MILLIGRAM(S): at 08:57

## 2021-04-28 RX ADMIN — Medication 0.5 MILLIGRAM(S): at 07:04

## 2021-04-28 RX ADMIN — Medication 0.5 MILLIGRAM(S): at 20:32

## 2021-04-28 RX ADMIN — Medication 50 MILLIGRAM(S): at 08:58

## 2021-04-28 RX ADMIN — MIRTAZAPINE 15 MILLIGRAM(S): 45 TABLET, ORALLY DISINTEGRATING ORAL at 20:32

## 2021-04-28 RX ADMIN — LOSARTAN POTASSIUM 100 MILLIGRAM(S): 100 TABLET, FILM COATED ORAL at 08:58

## 2021-04-28 NOTE — BH INPATIENT PSYCHIATRY PROGRESS NOTE - NSBHASSESSSUMMFT_PSY_ALL_CORE
The patient is a 70-year-old female, , retired (, non-caregiver domiciled at home with her  with a prior psychiatric diagnosis of ANSON and MDD, 2 previous inpatient psychiatric hospitalizations (most recently [facility] 2/2020), prior SA in 11/2019 by overdosing on pills, no history of NSSIB, no history of violence/aggression, no hx of legal issues, daily marijuana use, PMH of HTN, BIB self due to worsening depression and anxiety in the setting of death of long time therapist one month ago.    On encounter today, patient continues to report anxiety, but reports less nausea; is happy/ hopeful with her current mediation regimen, however reports some tics at night, which she reports is what happened the last time after she started Venlafaxine, and they eventually went away. Denies SI/HI.  Plan: Will c/w Venlafaxine ER 37.5mg (will gradually titrate dose based on response), and c/w Mirtazapine and Ativan at current dose.

## 2021-04-28 NOTE — BH INPATIENT PSYCHIATRY PROGRESS NOTE - MSE UNSTRUCTURED FT
The patient appears stated age, is alert and cooperative. Reported mood "ok" with constricted but anxious affect. Her speech is wnl. No PMR/ PMA noted. Her gait is steady. Her thought process is linear and goal directed. Thought content--she has somatic complaints, no delusions, denies SI/HI. Perception: denies hallucinations. Her insight and judgement are partial. Impulse control intact at the time of exam. She is oriented X3.

## 2021-04-28 NOTE — BH INPATIENT PSYCHIATRY PROGRESS NOTE - NSBHFUPINTERVALHXFT_PSY_A_CORE
Patient seen for depression and anxiety. Chart reviewed and case discussed with interdisciplinary staff. Per staff, patient still very somatic. On encounter, patient reports that she feels a little anxious but states that her nausea is better, is happy/ hopeful with her current mediation regimen, however reports some tics at night, which she reports is what happened the last time after started Venlafaxine and they eventually went away. Patient expressed gratitude for having psychologist Dr. Jackson come meet with her yesterday and is looking forward to seeing him again on Thursday. Patient reports some improvement in appetite, but states she did not sleep well because of the "incident" last night. Patient denies any symptoms consistent with sadia or psychosis. Denies SI/HI.

## 2021-04-29 DIAGNOSIS — F60.3 BORDERLINE PERSONALITY DISORDER: ICD-10-CM

## 2021-04-29 PROCEDURE — 99232 SBSQ HOSP IP/OBS MODERATE 35: CPT

## 2021-04-29 PROCEDURE — 90834 PSYTX W PT 45 MINUTES: CPT

## 2021-04-29 RX ORDER — VENLAFAXINE HCL 75 MG
75 CAPSULE, EXT RELEASE 24 HR ORAL DAILY
Refills: 0 | Status: DISCONTINUED | OUTPATIENT
Start: 2021-04-30 | End: 2021-05-03

## 2021-04-29 RX ORDER — MIRTAZAPINE 45 MG/1
15 TABLET, ORALLY DISINTEGRATING ORAL
Refills: 0 | Status: DISCONTINUED | OUTPATIENT
Start: 2021-04-29 | End: 2021-05-05

## 2021-04-29 RX ADMIN — Medication 50 MILLIGRAM(S): at 08:52

## 2021-04-29 RX ADMIN — PANTOPRAZOLE SODIUM 40 MILLIGRAM(S): 20 TABLET, DELAYED RELEASE ORAL at 06:25

## 2021-04-29 RX ADMIN — Medication 12.5 MILLIGRAM(S): at 08:52

## 2021-04-29 RX ADMIN — Medication 37.5 MILLIGRAM(S): at 08:52

## 2021-04-29 RX ADMIN — Medication 0.5 MILLIGRAM(S): at 21:49

## 2021-04-29 RX ADMIN — Medication 0.5 MILLIGRAM(S): at 06:25

## 2021-04-29 RX ADMIN — LOSARTAN POTASSIUM 100 MILLIGRAM(S): 100 TABLET, FILM COATED ORAL at 08:53

## 2021-04-29 RX ADMIN — MIRTAZAPINE 15 MILLIGRAM(S): 45 TABLET, ORALLY DISINTEGRATING ORAL at 21:50

## 2021-04-29 RX ADMIN — Medication 5 MILLIGRAM(S): at 21:49

## 2021-04-29 RX ADMIN — Medication 0.5 MILLIGRAM(S): at 09:58

## 2021-04-29 RX ADMIN — Medication 50 MILLIGRAM(S): at 21:50

## 2021-04-29 RX ADMIN — ONDANSETRON 4 MILLIGRAM(S): 8 TABLET, FILM COATED ORAL at 06:27

## 2021-04-29 NOTE — BH INPATIENT PSYCHIATRY PROGRESS NOTE - MSE UNSTRUCTURED FT
The patient appears stated age, is alert and cooperative. Reported mood "not good" with constricted but anxious affect. Her speech is wnl. No PMR/ PMA noted. Her gait is steady. Her thought process mostly linear. Thought content--she has somatic complaints, no delusions, denies SI/HI. Perception: denies hallucinations. Her insight and judgement are partial. Impulse control intact at the time of exam. She is oriented X3.

## 2021-04-29 NOTE — BH INPATIENT PSYCHIATRY PROGRESS NOTE - NSBHASSESSSUMMFT_PSY_ALL_CORE
The patient is a 70-year-old female, , retired (, non-caregiver domiciled at home with her  with a prior psychiatric diagnosis of ANSON and MDD, 2 previous inpatient psychiatric hospitalizations (most recently [facility] 2/2020), prior SA in 11/2019 by overdosing on pills, no history of NSSIB, no history of violence/aggression, no hx of legal issues, daily marijuana use, PMH of HTN, BIB self due to worsening depression and anxiety in the setting of death of long time therapist one month ago.    On encounter today, patient continues to reports that she is having a bad morning, continues to report anxiety and nausea, requests PRN Ativan. Denies SI/HI.  Plan: Will increase Venlafaxine ER to 75mg (will gradually titrate dose based on response), and c/w Mirtazapine and Ativan at current dose. Will add PRN Ativan to current regimen.

## 2021-04-29 NOTE — BH INPATIENT PSYCHIATRY PROGRESS NOTE - NSBHFUPINTERVALHXFT_PSY_A_CORE
Patient seen for depression and anxiety. Chart reviewed and case discussed with interdisciplinary staff. Per staff, patient still very somatic and attention seeking. On encounter, patient reports that she had a bad evening yesterday and threw up her dinner in the bathroom and feels nauseous and anxious today; she received PRN Ativan with good effect. Pt also states she did not sleep well last night. Patient denies any symptoms consistent with sadia or psychosis. Denies SI/HI.

## 2021-04-29 NOTE — PROGRESS NOTE ADULT - ASSESSMENT
Assessment/plan:    Ingrown/in incurvated toenails bilateral.    Aseptic debridement of ingrown toenails on all 10 digits with Betadine applied.  Recommend continued routine podiatric nail care as an outpatient.  Thank you for consultation.

## 2021-04-29 NOTE — PROGRESS NOTE ADULT - SUBJECTIVE AND OBJECTIVE BOX
Podiatry pager #: [phone]/ [phone]    Patient is a 70y old  Female who presents with a chief complaint of  Painful tender ingrowing toenails of both feet aggravated by shoe gear ambulation and palpation.    INTERVAL HPI/OVERNIGHT EVENTS:  Patient seen and evaluated at bedside.  Pt is resting comfortable in NAD. Denies N/V/F/C.  Pain rated at X/10    Allergies    penicillins (Anaphylaxis)    Intolerances        Vital Signs Last 24 Hrs  T(C): 36.4 (30 Apr 2021 05:47), Max: 36.6 (29 Apr 2021 15:24)  T(F): 97.6 (30 Apr 2021 05:47), Max: 97.8 (29 Apr 2021 15:24)  HR: --  BP: --  BP(mean): --  RR: 18 (29 Apr 2021 20:17) (18 - 18)  SpO2: --    acetaminophen   Tablet .. 650 milliGRAM(s) Oral every 6 hours PRN  aluminum hydroxide/magnesium hydroxide/simethicone Suspension 30 milliLiter(s) Oral every 4 hours PRN  BACItracin   Ointment 1 Application(s) Topical two times a day PRN  hydrochlorothiazide 12.5 milliGRAM(s) Oral daily  LORazepam     Tablet 0.5 milliGRAM(s) Oral <User Schedule>  LORazepam     Tablet 0.5 milliGRAM(s) Oral three times a day PRN  losartan 100 milliGRAM(s) Oral daily  melatonin. 5 milliGRAM(s) Oral at bedtime PRN  metoprolol tartrate 50 milliGRAM(s) Oral two times a day  mirtazapine 15 milliGRAM(s) Oral <User Schedule>  ondansetron    Tablet 4 milliGRAM(s) Oral daily PRN  pantoprazole    Tablet 40 milliGRAM(s) Oral before breakfast  venlafaxine XR 75 milliGRAM(s) Oral daily      LABS:              CAPILLARY BLOOD GLUCOSE          Lower Extremity Physical Exam:  Vascular: DP/PT _1/4 B/L, CFT <2_sec x 10, Temp gradient_WNL B/L  Neurology: Epicritic sensation _Intact to level ofToes _, B/L  Musculoskeletal/Ortho: Bunion/hammertoe deformities bilateral.  Skin: Positive ingrown/incurvated dystrophic toenails all 10 toes.  No clinical signs of infection and/or ulcerations

## 2021-04-30 PROCEDURE — 99232 SBSQ HOSP IP/OBS MODERATE 35: CPT

## 2021-04-30 PROCEDURE — 90834 PSYTX W PT 45 MINUTES: CPT

## 2021-04-30 RX ADMIN — Medication 0.5 MILLIGRAM(S): at 21:32

## 2021-04-30 RX ADMIN — LOSARTAN POTASSIUM 100 MILLIGRAM(S): 100 TABLET, FILM COATED ORAL at 09:50

## 2021-04-30 RX ADMIN — ONDANSETRON 4 MILLIGRAM(S): 8 TABLET, FILM COATED ORAL at 09:50

## 2021-04-30 RX ADMIN — MIRTAZAPINE 15 MILLIGRAM(S): 45 TABLET, ORALLY DISINTEGRATING ORAL at 21:33

## 2021-04-30 RX ADMIN — Medication 50 MILLIGRAM(S): at 21:33

## 2021-04-30 RX ADMIN — Medication 0.5 MILLIGRAM(S): at 15:46

## 2021-04-30 RX ADMIN — Medication 12.5 MILLIGRAM(S): at 09:50

## 2021-04-30 RX ADMIN — Medication 5 MILLIGRAM(S): at 21:33

## 2021-04-30 RX ADMIN — PANTOPRAZOLE SODIUM 40 MILLIGRAM(S): 20 TABLET, DELAYED RELEASE ORAL at 05:53

## 2021-04-30 RX ADMIN — Medication 50 MILLIGRAM(S): at 09:50

## 2021-04-30 RX ADMIN — Medication 75 MILLIGRAM(S): at 09:50

## 2021-04-30 RX ADMIN — Medication 0.5 MILLIGRAM(S): at 06:28

## 2021-04-30 NOTE — BH INPATIENT PSYCHIATRY PROGRESS NOTE - NSBHASSESSSUMMFT_PSY_ALL_CORE
The patient is a 70-year-old female, , retired (, non-caregiver domiciled at home with her  with a prior psychiatric diagnosis of ANSON and MDD, 2 previous inpatient psychiatric hospitalizations (most recently [facility] 2/2020), prior SA in 11/2019 by overdosing on pills, no history of NSSIB, no history of violence/aggression, no hx of legal issues, daily marijuana use, PMH of HTN, BIB self due to worsening depression and anxiety in the setting of death of long time therapist one month ago.    On encounter today, patient continues to report anxiety and nausea, is perseverative about GI symptoms, requests PRN Ativan. Denies SI/HI.  Plan: Will c/w Venlafaxine ER to 75mg (will gradually titrate dose based on response), and c/w Mirtazapine and Ativan at current dose. Will also c/w PRN Ativan.

## 2021-04-30 NOTE — BH INPATIENT PSYCHIATRY PROGRESS NOTE - MSE UNSTRUCTURED FT
The patient appears stated age, is alert and cooperative. Reported mood "not good" with anxious affect. Her speech is wnl. No PMR/ PMA noted. Her gait is steady. Her thought process mostly linear. Thought content--she has somatic complaints, no delusions, denies SI/HI. Perception: denies hallucinations. Her insight and judgement are partial. Impulse control intact at the time of exam. She is oriented X3.

## 2021-04-30 NOTE — BH INPATIENT PSYCHIATRY PROGRESS NOTE - NSBHFUPINTERVALHXFT_PSY_A_CORE
Patient seen for depression and anxiety. Chart reviewed and case discussed with interdisciplinary staff. Per staff, patient still very somatic and attention seeking. On encounter, patient reports that still feels nauseous and anxious, inquires if she should go back on Clonazepam, is perseverative about her anxiety and GI symptoms.  Pt states she did not sleep well last night, per staff, pt slept from 11:00pm to 5:00am. Patient denies any symptoms consistent with sadia or psychosis. Denies SI/HI.

## 2021-05-01 RX ADMIN — Medication 12.5 MILLIGRAM(S): at 09:19

## 2021-05-01 RX ADMIN — Medication 50 MILLIGRAM(S): at 21:50

## 2021-05-01 RX ADMIN — Medication 0.5 MILLIGRAM(S): at 21:50

## 2021-05-01 RX ADMIN — Medication 0.5 MILLIGRAM(S): at 07:55

## 2021-05-01 RX ADMIN — Medication 5 MILLIGRAM(S): at 23:00

## 2021-05-01 RX ADMIN — LOSARTAN POTASSIUM 100 MILLIGRAM(S): 100 TABLET, FILM COATED ORAL at 09:19

## 2021-05-01 RX ADMIN — MIRTAZAPINE 15 MILLIGRAM(S): 45 TABLET, ORALLY DISINTEGRATING ORAL at 21:50

## 2021-05-01 RX ADMIN — Medication 50 MILLIGRAM(S): at 09:20

## 2021-05-01 RX ADMIN — Medication 0.5 MILLIGRAM(S): at 15:21

## 2021-05-01 RX ADMIN — ONDANSETRON 4 MILLIGRAM(S): 8 TABLET, FILM COATED ORAL at 12:29

## 2021-05-01 RX ADMIN — PANTOPRAZOLE SODIUM 40 MILLIGRAM(S): 20 TABLET, DELAYED RELEASE ORAL at 06:01

## 2021-05-01 RX ADMIN — Medication 75 MILLIGRAM(S): at 09:20

## 2021-05-02 RX ADMIN — Medication 0.5 MILLIGRAM(S): at 13:56

## 2021-05-02 RX ADMIN — Medication 650 MILLIGRAM(S): at 16:32

## 2021-05-02 RX ADMIN — PANTOPRAZOLE SODIUM 40 MILLIGRAM(S): 20 TABLET, DELAYED RELEASE ORAL at 06:30

## 2021-05-02 RX ADMIN — LOSARTAN POTASSIUM 100 MILLIGRAM(S): 100 TABLET, FILM COATED ORAL at 10:45

## 2021-05-02 RX ADMIN — Medication 12.5 MILLIGRAM(S): at 10:45

## 2021-05-02 RX ADMIN — Medication 50 MILLIGRAM(S): at 21:51

## 2021-05-02 RX ADMIN — Medication 650 MILLIGRAM(S): at 17:32

## 2021-05-02 RX ADMIN — ONDANSETRON 4 MILLIGRAM(S): 8 TABLET, FILM COATED ORAL at 10:46

## 2021-05-02 RX ADMIN — Medication 50 MILLIGRAM(S): at 10:46

## 2021-05-02 RX ADMIN — Medication 0.5 MILLIGRAM(S): at 07:44

## 2021-05-02 RX ADMIN — Medication 0.5 MILLIGRAM(S): at 21:51

## 2021-05-02 RX ADMIN — Medication 5 MILLIGRAM(S): at 21:51

## 2021-05-02 RX ADMIN — Medication 75 MILLIGRAM(S): at 10:46

## 2021-05-02 RX ADMIN — MIRTAZAPINE 15 MILLIGRAM(S): 45 TABLET, ORALLY DISINTEGRATING ORAL at 21:51

## 2021-05-03 PROCEDURE — 99232 SBSQ HOSP IP/OBS MODERATE 35: CPT

## 2021-05-03 PROCEDURE — 90834 PSYTX W PT 45 MINUTES: CPT

## 2021-05-03 RX ORDER — VENLAFAXINE HCL 75 MG
37.5 CAPSULE, EXT RELEASE 24 HR ORAL ONCE
Refills: 0 | Status: COMPLETED | OUTPATIENT
Start: 2021-05-03 | End: 2021-05-03

## 2021-05-03 RX ORDER — VENLAFAXINE HCL 75 MG
112.5 CAPSULE, EXT RELEASE 24 HR ORAL DAILY
Refills: 0 | Status: DISCONTINUED | OUTPATIENT
Start: 2021-05-03 | End: 2021-05-04

## 2021-05-03 RX ADMIN — Medication 0.5 MILLIGRAM(S): at 15:30

## 2021-05-03 RX ADMIN — Medication 50 MILLIGRAM(S): at 08:30

## 2021-05-03 RX ADMIN — ONDANSETRON 4 MILLIGRAM(S): 8 TABLET, FILM COATED ORAL at 08:29

## 2021-05-03 RX ADMIN — LOSARTAN POTASSIUM 100 MILLIGRAM(S): 100 TABLET, FILM COATED ORAL at 08:31

## 2021-05-03 RX ADMIN — PANTOPRAZOLE SODIUM 40 MILLIGRAM(S): 20 TABLET, DELAYED RELEASE ORAL at 06:16

## 2021-05-03 RX ADMIN — MIRTAZAPINE 15 MILLIGRAM(S): 45 TABLET, ORALLY DISINTEGRATING ORAL at 21:43

## 2021-05-03 RX ADMIN — Medication 5 MILLIGRAM(S): at 21:43

## 2021-05-03 RX ADMIN — Medication 75 MILLIGRAM(S): at 08:30

## 2021-05-03 RX ADMIN — Medication 37.5 MILLIGRAM(S): at 10:29

## 2021-05-03 RX ADMIN — Medication 12.5 MILLIGRAM(S): at 08:36

## 2021-05-03 RX ADMIN — Medication 50 MILLIGRAM(S): at 21:43

## 2021-05-03 RX ADMIN — Medication 0.5 MILLIGRAM(S): at 21:43

## 2021-05-03 RX ADMIN — Medication 0.5 MILLIGRAM(S): at 08:29

## 2021-05-03 NOTE — BH INPATIENT PSYCHIATRY PROGRESS NOTE - MSE UNSTRUCTURED FT
The patient appears stated age, is alert and cooperative. Reported mood "not good at all" with anxious affect. Her speech is wnl. No PMR/ PMA noted. Her gait is steady. Her thought process mostly linear. Thought content--she has somatic complaints, no delusions, denies active SI/HI but reports passive death wish. Perception: denies hallucinations. Her insight and judgement are partial. Impulse control intact at the time of exam. She is oriented X3.

## 2021-05-03 NOTE — BH INPATIENT PSYCHIATRY PROGRESS NOTE - NSBHASSESSSUMMFT_PSY_ALL_CORE
The patient is a 70-year-old female, , retired (, non-caregiver domiciled at home with her  with a prior psychiatric diagnosis of ANSON and MDD, 2 previous inpatient psychiatric hospitalizations (most recently [facility] 2/2020), prior SA in 11/2019 by overdosing on pills, no history of NSSIB, no history of violence/aggression, no hx of legal issues, daily marijuana use, PMH of HTN, BIB self due to worsening depression and anxiety in the setting of death of long time therapist one month ago.    On encounter today, patient reports chills, anxiety, nausea, is perseverative about GI symptoms, believes she is having withdrawals from the Lexapro. Denies active SI/HI, reports passive death wish.  Plan: Will increase Venlafaxine ER to 112.5mg (will continue to titrate dose based on response), and c/w Mirtazapine and Ativan at current dose. Will also c/w PRN Ativan.

## 2021-05-03 NOTE — BH INPATIENT PSYCHIATRY PROGRESS NOTE - NSBHFUPINTERVALHXFT_PSY_A_CORE
Patient seen for depression and anxiety. Chart reviewed and case discussed with interdisciplinary staff. Per staff, patient still very somatic and attention and med seeking. On encounter, patient reports that she feels cold, nauseous and anxious, inquires if she is having withdrawals from Lexapro, is worried about being discharged while feeling like this. Patient denies any symptoms consistent with sadia or psychosis. Denies active SI/HI but reports passive death wish because of the way she is feeling.

## 2021-05-04 PROCEDURE — 99232 SBSQ HOSP IP/OBS MODERATE 35: CPT

## 2021-05-04 RX ORDER — VENLAFAXINE HCL 75 MG
150 CAPSULE, EXT RELEASE 24 HR ORAL DAILY
Refills: 0 | Status: DISCONTINUED | OUTPATIENT
Start: 2021-05-05 | End: 2021-05-12

## 2021-05-04 RX ADMIN — Medication 112.5 MILLIGRAM(S): at 09:06

## 2021-05-04 RX ADMIN — PANTOPRAZOLE SODIUM 40 MILLIGRAM(S): 20 TABLET, DELAYED RELEASE ORAL at 06:37

## 2021-05-04 RX ADMIN — Medication 5 MILLIGRAM(S): at 21:55

## 2021-05-04 RX ADMIN — LOSARTAN POTASSIUM 100 MILLIGRAM(S): 100 TABLET, FILM COATED ORAL at 09:05

## 2021-05-04 RX ADMIN — Medication 50 MILLIGRAM(S): at 21:55

## 2021-05-04 RX ADMIN — ONDANSETRON 4 MILLIGRAM(S): 8 TABLET, FILM COATED ORAL at 06:37

## 2021-05-04 RX ADMIN — Medication 0.5 MILLIGRAM(S): at 21:54

## 2021-05-04 RX ADMIN — MIRTAZAPINE 15 MILLIGRAM(S): 45 TABLET, ORALLY DISINTEGRATING ORAL at 21:55

## 2021-05-04 RX ADMIN — Medication 12.5 MILLIGRAM(S): at 09:05

## 2021-05-04 RX ADMIN — Medication 0.5 MILLIGRAM(S): at 06:37

## 2021-05-04 RX ADMIN — Medication 50 MILLIGRAM(S): at 09:05

## 2021-05-04 RX ADMIN — Medication 0.5 MILLIGRAM(S): at 15:13

## 2021-05-04 NOTE — BH INPATIENT PSYCHIATRY PROGRESS NOTE - MSE UNSTRUCTURED FT
The patient appears stated age, is alert and cooperative. Reported mood "anxious" with anxious/ dysphoric affect. Her speech is wnl. No PMR/ PMA noted. Her gait is steady. Her thought process mostly linear. Thought content--she has somatic complaints, no delusions, denies active SI/HI but reports passive death wish. Perception: denies hallucinations. Her insight and judgement are partial. Impulse control intact at the time of exam. She is oriented X3.

## 2021-05-04 NOTE — BH INPATIENT PSYCHIATRY PROGRESS NOTE - NSBHFUPINTERVALHXFT_PSY_A_CORE
Patient seen for depression and anxiety. Chart reviewed and case discussed with interdisciplinary staff. Per staff, patient still very somatic and med seeking. On encounter, patient reports that she feels anxious, denies nausea, once again vocalizes her worry about being discharged while feeling like this and writer provides reassurance. Patient denies any symptoms consistent with sadia or psychosis; she denies active SI/HI. Patient met with therapist Dr. Jackson yesterday and found the session helpful.

## 2021-05-04 NOTE — BH INPATIENT PSYCHIATRY PROGRESS NOTE - NSBHASSESSSUMMFT_PSY_ALL_CORE
The patient is a 70-year-old female, , retired (, non-caregiver domiciled at home with her  with a prior psychiatric diagnosis of ANSON and MDD, 2 previous inpatient psychiatric hospitalizations (most recently [facility] 2/2020), prior SA in 11/2019 by overdosing on pills, no history of NSSIB, no history of violence/aggression, no hx of legal issues, daily marijuana use, PMH of HTN, BIB self due to worsening depression and anxiety in the setting of death of long time therapist one month ago.    On encounter today, patient continues to report anxiety, denies nausea, worries that she will never get better. Patient denies active SI/HI.  Plan: Will increase Venlafaxine ER to 150mg starting tomorrow, and c/w Mirtazapine and Ativan at current dose. Will also c/w PRN Ativan. patient encouraged to participate in group and individual therapy.

## 2021-05-05 PROCEDURE — 99232 SBSQ HOSP IP/OBS MODERATE 35: CPT

## 2021-05-05 PROCEDURE — 90834 PSYTX W PT 45 MINUTES: CPT

## 2021-05-05 RX ORDER — MIRTAZAPINE 45 MG/1
30 TABLET, ORALLY DISINTEGRATING ORAL
Refills: 0 | Status: DISCONTINUED | OUTPATIENT
Start: 2021-05-05 | End: 2021-05-24

## 2021-05-05 RX ORDER — CLONAZEPAM 1 MG
1 TABLET ORAL DAILY
Refills: 0 | Status: DISCONTINUED | OUTPATIENT
Start: 2021-05-05 | End: 2021-05-12

## 2021-05-05 RX ORDER — CLONAZEPAM 1 MG
0.5 TABLET ORAL AT BEDTIME
Refills: 0 | Status: DISCONTINUED | OUTPATIENT
Start: 2021-05-05 | End: 2021-05-07

## 2021-05-05 RX ORDER — CLONAZEPAM 1 MG
0.5 TABLET ORAL EVERY 24 HOURS
Refills: 0 | Status: DISCONTINUED | OUTPATIENT
Start: 2021-05-05 | End: 2021-05-07

## 2021-05-05 RX ADMIN — Medication 150 MILLIGRAM(S): at 09:17

## 2021-05-05 RX ADMIN — Medication 0.5 MILLIGRAM(S): at 21:46

## 2021-05-05 RX ADMIN — MIRTAZAPINE 30 MILLIGRAM(S): 45 TABLET, ORALLY DISINTEGRATING ORAL at 21:46

## 2021-05-05 RX ADMIN — Medication 0.5 MILLIGRAM(S): at 08:03

## 2021-05-05 RX ADMIN — Medication 5 MILLIGRAM(S): at 21:45

## 2021-05-05 RX ADMIN — Medication 0.5 MILLIGRAM(S): at 14:22

## 2021-05-05 RX ADMIN — Medication 12.5 MILLIGRAM(S): at 09:17

## 2021-05-05 RX ADMIN — LOSARTAN POTASSIUM 100 MILLIGRAM(S): 100 TABLET, FILM COATED ORAL at 09:18

## 2021-05-05 RX ADMIN — Medication 50 MILLIGRAM(S): at 09:17

## 2021-05-05 RX ADMIN — PANTOPRAZOLE SODIUM 40 MILLIGRAM(S): 20 TABLET, DELAYED RELEASE ORAL at 06:41

## 2021-05-05 RX ADMIN — Medication 50 MILLIGRAM(S): at 21:46

## 2021-05-05 NOTE — BH INPATIENT PSYCHIATRY PROGRESS NOTE - MSE UNSTRUCTURED FT
The patient appears stated age, is alert and cooperative. Reported mood "anxious" with anxious/ dysphoric affect. Her speech is wnl. No PMR/ PMA noted. Her gait is steady. Her thought process mostly linear. Thought content--she has somatic complaints, no delusions, denies active SI/HI but reports passive death wish. Perception: denies hallucinations. Her insight and judgement are partial. Impulse control intact at the time of exam. Patient is alert and oriented X3.

## 2021-05-05 NOTE — BH INPATIENT PSYCHIATRY PROGRESS NOTE - NSBHFUPINTERVALHXFT_PSY_A_CORE
Patient seen for depression and anxiety. Chart reviewed and case discussed with interdisciplinary staff. Per staff, patient still very somatic and med seeking. On encounter, patient reports that she feels anxious, denies nausea, states that Ativan is not helping and would like to go back on Clonazepam. Patient denies any symptoms consistent with sadia or psychosis; she denies active SI/HI. Patient remains fearful that she might be discharged while she is still feeling unwell.

## 2021-05-06 PROCEDURE — 99232 SBSQ HOSP IP/OBS MODERATE 35: CPT

## 2021-05-06 RX ADMIN — Medication 1 MILLIGRAM(S): at 08:20

## 2021-05-06 RX ADMIN — Medication 0.5 MILLIGRAM(S): at 14:33

## 2021-05-06 RX ADMIN — Medication 12.5 MILLIGRAM(S): at 09:10

## 2021-05-06 RX ADMIN — Medication 50 MILLIGRAM(S): at 09:10

## 2021-05-06 RX ADMIN — MIRTAZAPINE 30 MILLIGRAM(S): 45 TABLET, ORALLY DISINTEGRATING ORAL at 21:52

## 2021-05-06 RX ADMIN — Medication 50 MILLIGRAM(S): at 21:51

## 2021-05-06 RX ADMIN — Medication 0.5 MILLIGRAM(S): at 21:51

## 2021-05-06 RX ADMIN — Medication 150 MILLIGRAM(S): at 09:10

## 2021-05-06 RX ADMIN — PANTOPRAZOLE SODIUM 40 MILLIGRAM(S): 20 TABLET, DELAYED RELEASE ORAL at 06:46

## 2021-05-06 RX ADMIN — LOSARTAN POTASSIUM 100 MILLIGRAM(S): 100 TABLET, FILM COATED ORAL at 09:10

## 2021-05-06 RX ADMIN — Medication 5 MILLIGRAM(S): at 21:54

## 2021-05-06 NOTE — BH INPATIENT PSYCHIATRY PROGRESS NOTE - NSBHASSESSSUMMFT_PSY_ALL_CORE
The patient is a 70-year-old female, , retired (, non-caregiver domiciled at home with her  with a prior psychiatric diagnosis of ANSON and MDD, 2 previous inpatient psychiatric hospitalizations (most recently [facility] 2/2020), prior SA in 11/2019 by overdosing on pills, no history of NSSIB, no history of violence/aggression, no hx of legal issues, daily marijuana use, PMH of HTN, BIB self due to worsening depression and anxiety in the setting of death of long time therapist one month ago.    On encounter today, patient reports improvement in anxiety with med change, denies nausea/ dizziness/ stiffness. Patient denies active SI/HI.  Plan: Will c/w Mirtazapine 30mg at bedtime, Venlafaxine ER 150mg and  Clonazepam 1mg daily and 0.5mg at bedtime. Will also c/w PRN Clonazepam.

## 2021-05-06 NOTE — BH INPATIENT PSYCHIATRY PROGRESS NOTE - MSE UNSTRUCTURED FT
The patient appears stated age, is alert and cooperative. Reported mood "better" with mostly congruent affect. Her speech is wnl. No PMR/ PMA noted. Her gait is steady. Her thought process mostly linear. Thought content--she still has somatic complaints (but less often), no delusions, denies active SI/HI. Perception: denies hallucinations. Her insight and judgement are partial. Impulse control intact at the time of exam. Patient is alert and oriented X3.

## 2021-05-06 NOTE — BH INPATIENT PSYCHIATRY PROGRESS NOTE - NSBHFUPINTERVALHXFT_PSY_A_CORE
Patient seen for depression and anxiety. Chart reviewed and case discussed with interdisciplinary staff. Per staff, patient still very somatic and med seeking. On encounter, patient reports that she feels almost no anxiety this morning, denies nausea/ dizziness/ stiffness. Patient denies active SI/HI. Patient remains fearful that she might be discharged while she is still feeling unwell. Otherwise, no new concern reported. She remains med complaint.

## 2021-05-07 PROCEDURE — 90837 PSYTX W PT 60 MINUTES: CPT

## 2021-05-07 PROCEDURE — 99232 SBSQ HOSP IP/OBS MODERATE 35: CPT

## 2021-05-07 RX ORDER — CLONAZEPAM 1 MG
1 TABLET ORAL EVERY 24 HOURS
Refills: 0 | Status: DISCONTINUED | OUTPATIENT
Start: 2021-05-07 | End: 2021-05-12

## 2021-05-07 RX ADMIN — LOSARTAN POTASSIUM 100 MILLIGRAM(S): 100 TABLET, FILM COATED ORAL at 09:08

## 2021-05-07 RX ADMIN — Medication 12.5 MILLIGRAM(S): at 09:07

## 2021-05-07 RX ADMIN — Medication 50 MILLIGRAM(S): at 22:02

## 2021-05-07 RX ADMIN — Medication 1 MILLIGRAM(S): at 09:07

## 2021-05-07 RX ADMIN — Medication 50 MILLIGRAM(S): at 09:07

## 2021-05-07 RX ADMIN — Medication 150 MILLIGRAM(S): at 09:07

## 2021-05-07 RX ADMIN — MIRTAZAPINE 30 MILLIGRAM(S): 45 TABLET, ORALLY DISINTEGRATING ORAL at 22:03

## 2021-05-07 RX ADMIN — PANTOPRAZOLE SODIUM 40 MILLIGRAM(S): 20 TABLET, DELAYED RELEASE ORAL at 06:58

## 2021-05-07 RX ADMIN — Medication 5 MILLIGRAM(S): at 22:03

## 2021-05-07 RX ADMIN — Medication 1 MILLIGRAM(S): at 16:08

## 2021-05-07 NOTE — DIETITIAN INITIAL EVALUATION ADULT. - OTHER INFO
Patient admitted to [facility] with worsening depression and anxiety. Patient seen earlier in the hospitalization for food preferences which are in place. Eats plain type of foods d/t reflux. Denies any GI distress. Does not know usual weight. Discussed healthy meal plan, portion control and increasing physical activity for weight management.

## 2021-05-07 NOTE — BH INPATIENT PSYCHIATRY PROGRESS NOTE - NSBHFUPINTERVALHXFT_PSY_A_CORE
Patient seen for depression and anxiety. Chart reviewed and case discussed with interdisciplinary staff. Per staff, patient still very somatic and attention seeking. On encounter, . She remains med complaint.  Patient seen for depression and anxiety. Chart reviewed and case discussed with interdisciplinary staff. Per staff, patient still very somatic and attention seeking. On encounter, patient reports some improvement in her anxiety during the day, but states she begins to get anxious in the afternoon. No other concerns reported today. Patient denies SI/HI. Appetite and sleep are adequate. She remains med complaint.

## 2021-05-07 NOTE — BH INPATIENT PSYCHIATRY PROGRESS NOTE - MSE UNSTRUCTURED FT
The patient appears stated age, is alert and cooperative. Reported mood "better" with mostly congruent affect. Her speech is wnl. No PMR/ PMA noted. Her gait is steady. Her thought process mostly linear. Thought content--she still has somatic complaints (but less often), no delusions, denies active SI/HI. Perception: denies hallucinations. Her insight and judgement are partial. Impulse control intact at the time of exam. Patient is alert and oriented X3. The patient appears stated age, is alert and cooperative. Reported mood "better" with mostly congruent affect. Her speech is wnl. No PMR/ PMA noted. Her gait is steady. Her thought process mostly linear. Thought content-has somatic complaints--mostly GI, no delusions, denies active SI/HI. Perception: denies hallucinations. Her insight and judgement are partial. Impulse control intact at the time of exam. Patient is alert and oriented X3.

## 2021-05-07 NOTE — BH INPATIENT PSYCHIATRY PROGRESS NOTE - NSBHASSESSSUMMFT_PSY_ALL_CORE
The patient is a 70-year-old female, , retired (, non-caregiver domiciled at home with her  with a prior psychiatric diagnosis of ANSON and MDD, 2 previous inpatient psychiatric hospitalizations (most recently [facility] 2/2020), prior SA in 11/2019 by overdosing on pills, no history of NSSIB, no history of violence/aggression, no hx of legal issues, daily marijuana use, PMH of HTN, BIB self due to worsening depression and anxiety in the setting of death of long time therapist one month ago.    On encounter today, patient reports improvement in anxiety with med change, denies nausea/ dizziness/ stiffness. Patient denies active SI/HI.  Plan: Will c/w Mirtazapine 30mg at bedtime, Venlafaxine ER 150mg and  Clonazepam 1mg daily and 0.5mg at bedtime. Will also c/w PRN Clonazepam.  The patient is a 70-year-old female, , retired (, non-caregiver domiciled at home with her  with a prior psychiatric diagnosis of ANSON and MDD, 2 previous inpatient psychiatric hospitalizations (most recently [facility] 2/2020), prior SA in 11/2019 by overdosing on pills, no history of NSSIB, no history of violence/aggression, no hx of legal issues, daily marijuana use, PMH of HTN, BIB self due to worsening depression and anxiety in the setting of death of long time therapist one month ago.    On encounter today, patient reports improvement in anxiety in the day but states in the afternoon she begins to get anxious, denies nausea/ dizziness/ stiffness. Appetite and sleep are adequate. Patient denies active SI/HI.  Plan: Will c/w Mirtazapine 30mg at bedtime, Venlafaxine ER 150mg and  Clonazepam 1mg daily. Will d/c bedtime Clonazepam and increase PRN Clonazepam to 1mg daily PRN.

## 2021-05-08 RX ADMIN — PANTOPRAZOLE SODIUM 40 MILLIGRAM(S): 20 TABLET, DELAYED RELEASE ORAL at 06:45

## 2021-05-08 RX ADMIN — Medication 150 MILLIGRAM(S): at 09:05

## 2021-05-08 RX ADMIN — Medication 1 MILLIGRAM(S): at 15:53

## 2021-05-08 RX ADMIN — Medication 12.5 MILLIGRAM(S): at 09:06

## 2021-05-08 RX ADMIN — Medication 650 MILLIGRAM(S): at 14:26

## 2021-05-08 RX ADMIN — Medication 1 MILLIGRAM(S): at 09:05

## 2021-05-08 RX ADMIN — Medication 50 MILLIGRAM(S): at 21:50

## 2021-05-08 RX ADMIN — MIRTAZAPINE 30 MILLIGRAM(S): 45 TABLET, ORALLY DISINTEGRATING ORAL at 21:50

## 2021-05-08 RX ADMIN — Medication 50 MILLIGRAM(S): at 09:06

## 2021-05-08 RX ADMIN — LOSARTAN POTASSIUM 100 MILLIGRAM(S): 100 TABLET, FILM COATED ORAL at 09:06

## 2021-05-08 RX ADMIN — Medication 5 MILLIGRAM(S): at 21:50

## 2021-05-08 RX ADMIN — Medication 650 MILLIGRAM(S): at 15:53

## 2021-05-09 RX ADMIN — LOSARTAN POTASSIUM 100 MILLIGRAM(S): 100 TABLET, FILM COATED ORAL at 09:16

## 2021-05-09 RX ADMIN — Medication 12.5 MILLIGRAM(S): at 09:15

## 2021-05-09 RX ADMIN — Medication 50 MILLIGRAM(S): at 09:16

## 2021-05-09 RX ADMIN — Medication 1 MILLIGRAM(S): at 09:16

## 2021-05-09 RX ADMIN — PANTOPRAZOLE SODIUM 40 MILLIGRAM(S): 20 TABLET, DELAYED RELEASE ORAL at 06:42

## 2021-05-09 RX ADMIN — Medication 5 MILLIGRAM(S): at 22:02

## 2021-05-09 RX ADMIN — Medication 50 MILLIGRAM(S): at 22:03

## 2021-05-09 RX ADMIN — MIRTAZAPINE 30 MILLIGRAM(S): 45 TABLET, ORALLY DISINTEGRATING ORAL at 22:02

## 2021-05-09 RX ADMIN — Medication 150 MILLIGRAM(S): at 09:15

## 2021-05-09 RX ADMIN — Medication 1 MILLIGRAM(S): at 17:57

## 2021-05-10 PROCEDURE — 99232 SBSQ HOSP IP/OBS MODERATE 35: CPT

## 2021-05-10 PROCEDURE — 90832 PSYTX W PT 30 MINUTES: CPT

## 2021-05-10 RX ADMIN — MIRTAZAPINE 30 MILLIGRAM(S): 45 TABLET, ORALLY DISINTEGRATING ORAL at 21:30

## 2021-05-10 RX ADMIN — Medication 50 MILLIGRAM(S): at 09:40

## 2021-05-10 RX ADMIN — Medication 12.5 MILLIGRAM(S): at 09:40

## 2021-05-10 RX ADMIN — Medication 5 MILLIGRAM(S): at 21:30

## 2021-05-10 RX ADMIN — PANTOPRAZOLE SODIUM 40 MILLIGRAM(S): 20 TABLET, DELAYED RELEASE ORAL at 06:29

## 2021-05-10 RX ADMIN — LOSARTAN POTASSIUM 100 MILLIGRAM(S): 100 TABLET, FILM COATED ORAL at 09:40

## 2021-05-10 RX ADMIN — Medication 150 MILLIGRAM(S): at 09:40

## 2021-05-10 RX ADMIN — Medication 1 MILLIGRAM(S): at 17:10

## 2021-05-10 RX ADMIN — Medication 1 MILLIGRAM(S): at 09:40

## 2021-05-10 RX ADMIN — Medication 50 MILLIGRAM(S): at 21:30

## 2021-05-10 NOTE — BH INPATIENT PSYCHIATRY PROGRESS NOTE - MSE UNSTRUCTURED FT
The patient appears stated age, is alert and cooperative. Reported mood "better" with mostly congruent affect. Her speech is wnl. No PMR/ PMA noted. Her gait is steady. Her thought process mostly linear. Thought content-less somatic today, no delusions, denies active SI/HI. Perception: denies hallucinations. Her insight and judgement are partial. Impulse control intact at the time of exam. Patient is alert and oriented X3.

## 2021-05-10 NOTE — BH INPATIENT PSYCHIATRY PROGRESS NOTE - NSBHFUPINTERVALHXFT_PSY_A_CORE
Patient seen for depression and anxiety. Chart reviewed and case discussed with interdisciplinary staff. Per staff, patient less somatic over the weekend. On encounter, patient reports improvement in her anxiety, is however worried the effect won't last. No other concerns reported today. Patient denies SI/HI. Appetite and sleep are adequate. She remains med complaint.

## 2021-05-10 NOTE — BH INPATIENT PSYCHIATRY PROGRESS NOTE - NSBHASSESSSUMMFT_PSY_ALL_CORE
The patient is a 70-year-old female, , retired (, non-caregiver domiciled at home with her  with a prior psychiatric diagnosis of ANSON and MDD, 2 previous inpatient psychiatric hospitalizations (most recently [facility] 2/2020), prior SA in 11/2019 by overdosing on pills, no history of NSSIB, no history of violence/aggression, no hx of legal issues, daily marijuana use, PMH of HTN, BIB self due to worsening depression and anxiety in the setting of death of long time therapist one month ago.    On encounter today, patient reports improvement in anxiety, denies nausea/ dizziness/ stiffness. Appetite and sleep are adequate. Patient denies active SI/HI.  Plan: Will c/w Mirtazapine 30mg at bedtime, Venlafaxine ER 150mg and Clonazepam 1mg daily. Will also c/w PRN Clonazepam. Will conisder discahrge later this week if patient continues to remain stable and does not endorse any SI.

## 2021-05-11 PROCEDURE — 99232 SBSQ HOSP IP/OBS MODERATE 35: CPT

## 2021-05-11 RX ADMIN — Medication 50 MILLIGRAM(S): at 21:58

## 2021-05-11 RX ADMIN — Medication 50 MILLIGRAM(S): at 08:58

## 2021-05-11 RX ADMIN — Medication 650 MILLIGRAM(S): at 12:15

## 2021-05-11 RX ADMIN — PANTOPRAZOLE SODIUM 40 MILLIGRAM(S): 20 TABLET, DELAYED RELEASE ORAL at 06:43

## 2021-05-11 RX ADMIN — Medication 650 MILLIGRAM(S): at 11:16

## 2021-05-11 RX ADMIN — Medication 150 MILLIGRAM(S): at 08:58

## 2021-05-11 RX ADMIN — Medication 1 MILLIGRAM(S): at 17:12

## 2021-05-11 RX ADMIN — ONDANSETRON 4 MILLIGRAM(S): 8 TABLET, FILM COATED ORAL at 11:15

## 2021-05-11 RX ADMIN — LOSARTAN POTASSIUM 100 MILLIGRAM(S): 100 TABLET, FILM COATED ORAL at 08:58

## 2021-05-11 RX ADMIN — Medication 1 MILLIGRAM(S): at 08:19

## 2021-05-11 RX ADMIN — Medication 12.5 MILLIGRAM(S): at 08:58

## 2021-05-11 RX ADMIN — MIRTAZAPINE 30 MILLIGRAM(S): 45 TABLET, ORALLY DISINTEGRATING ORAL at 21:58

## 2021-05-11 RX ADMIN — Medication 5 MILLIGRAM(S): at 21:59

## 2021-05-11 NOTE — BH INPATIENT PSYCHIATRY PROGRESS NOTE - NSBHASSESSSUMMFT_PSY_ALL_CORE
The patient is a 70-year-old female, , retired (, non-caregiver domiciled at home with her  with a prior psychiatric diagnosis of ANSON and MDD, 2 previous inpatient psychiatric hospitalizations (most recently [facility] 2/2020), prior SA in 11/2019 by overdosing on pills, no history of NSSIB, no history of violence/aggression, no hx of legal issues, daily marijuana use, PMH of HTN, BIB self due to worsening depression and anxiety in the setting of death of long time therapist one month ago.    On encounter today, patient reports an "anxiety episode", is worried that she will never get better, denies nausea/ dizziness/ stiffness. Appetite and sleep are adequate. Patient denies active SI/HI.  Plan: Will c/w Mirtazapine 30mg at bedtime, Venlafaxine ER 150mg and Clonazepam 1mg daily. Will also c/w PRN Clonazepam. Will conisder increasing Venlafaxine if patient continues to endorse increased anxiety.

## 2021-05-11 NOTE — BH INPATIENT PSYCHIATRY PROGRESS NOTE - MSE UNSTRUCTURED FT
The patient appears stated age, is alert and cooperative. Reported mood "anxious" with mostly congruent affect. Her speech is wnl. No PMR/ PMA noted. Her gait is steady. Her thought process mostly linear. Thought content-less somatic today, no delusions, denies active SI/HI. Perception: denies hallucinations. Her insight and judgement are partial. Impulse control intact at the time of exam. Patient is alert and oriented X3.

## 2021-05-11 NOTE — BH INPATIENT PSYCHIATRY PROGRESS NOTE - NSBHFUPINTERVALHXFT_PSY_A_CORE
Patient seen for depression and anxiety. Chart reviewed and case discussed with interdisciplinary staff. Per staff, patient c/o anxiety this morning. On encounter, patient reports that she had an "anxiety episode" this morning and felt her heart beating, is worried that she is regressing though she admits that she is not as nervous and shaky even during this episode. Patient denies SI/HI. Appetite and sleep are adequate. She remains med complaint.

## 2021-05-12 PROCEDURE — 99232 SBSQ HOSP IP/OBS MODERATE 35: CPT

## 2021-05-12 PROCEDURE — 93010 ELECTROCARDIOGRAM REPORT: CPT

## 2021-05-12 PROCEDURE — 90837 PSYTX W PT 60 MINUTES: CPT

## 2021-05-12 RX ORDER — VENLAFAXINE HCL 75 MG
187.5 CAPSULE, EXT RELEASE 24 HR ORAL DAILY
Refills: 0 | Status: DISCONTINUED | OUTPATIENT
Start: 2021-05-13 | End: 2021-05-17

## 2021-05-12 RX ORDER — CLONAZEPAM 1 MG
1 TABLET ORAL EVERY 24 HOURS
Refills: 0 | Status: DISCONTINUED | OUTPATIENT
Start: 2021-05-12 | End: 2021-05-17

## 2021-05-12 RX ADMIN — Medication 50 MILLIGRAM(S): at 21:36

## 2021-05-12 RX ADMIN — LOSARTAN POTASSIUM 100 MILLIGRAM(S): 100 TABLET, FILM COATED ORAL at 08:54

## 2021-05-12 RX ADMIN — Medication 50 MILLIGRAM(S): at 08:54

## 2021-05-12 RX ADMIN — Medication 1 MILLIGRAM(S): at 08:54

## 2021-05-12 RX ADMIN — Medication 12.5 MILLIGRAM(S): at 08:54

## 2021-05-12 RX ADMIN — Medication 5 MILLIGRAM(S): at 21:35

## 2021-05-12 RX ADMIN — Medication 150 MILLIGRAM(S): at 08:54

## 2021-05-12 RX ADMIN — MIRTAZAPINE 30 MILLIGRAM(S): 45 TABLET, ORALLY DISINTEGRATING ORAL at 21:35

## 2021-05-12 RX ADMIN — PANTOPRAZOLE SODIUM 40 MILLIGRAM(S): 20 TABLET, DELAYED RELEASE ORAL at 06:49

## 2021-05-12 RX ADMIN — Medication 1 MILLIGRAM(S): at 18:29

## 2021-05-12 NOTE — BH INPATIENT PSYCHIATRY PROGRESS NOTE - NSBHFUPINTERVALHXFT_PSY_A_CORE
Patient seen for depression and anxiety. Chart reviewed and case discussed with interdisciplinary staff. Per staff, patient c/o anxiety this morning. On encounter, patient reports that she once again felt her heart pounding and is worried that she is regressing, denies tremors/ dizziness. Patient denies SI/HI. Appetite and sleep are adequate. She remains med complaint.

## 2021-05-12 NOTE — BH INPATIENT PSYCHIATRY PROGRESS NOTE - MSE UNSTRUCTURED FT
The patient appears stated age, is alert and cooperative. Reported mood "anxious" with anxious and congruent affect. Her speech is wnl. No PMR/ PMA noted. Her gait is steady. Her thought process mostly linear. Thought content-somatic complaints, no delusions, denies active SI/HI. Perception: denies hallucinations. Her insight and judgement are partial. Impulse control intact at the time of exam. Patient is alert and oriented X3.

## 2021-05-12 NOTE — BH INPATIENT PSYCHIATRY PROGRESS NOTE - NSBHASSESSSUMMFT_PSY_ALL_CORE
The patient is a 70-year-old female, , retired (, non-caregiver domiciled at home with her  with a prior psychiatric diagnosis of ANSON and MDD, 2 previous inpatient psychiatric hospitalizations (most recently [facility] 2/2020), prior SA in 11/2019 by overdosing on pills, no history of NSSIB, no history of violence/aggression, no hx of legal issues, daily marijuana use, PMH of HTN, BIB self due to worsening depression and anxiety in the setting of death of long time therapist one month ago.    On encounter today, patient reports that she felt her heart pounding again this morning and is worried that she is regressing, denies nausea/ dizziness/ stiffness. Appetite and sleep are adequate. Patient denies active SI/HI.  Plan: Will increase Venlafaxine ER to 187.5mg, will c/w Mirtazapine 30mg at bedtime, and Clonazepam 1mg daily. Will also c/w PRN Clonazepam.

## 2021-05-13 PROCEDURE — 99232 SBSQ HOSP IP/OBS MODERATE 35: CPT

## 2021-05-13 RX ORDER — CLONAZEPAM 1 MG
1 TABLET ORAL DAILY
Refills: 0 | Status: DISCONTINUED | OUTPATIENT
Start: 2021-05-13 | End: 2021-05-17

## 2021-05-13 RX ADMIN — MIRTAZAPINE 30 MILLIGRAM(S): 45 TABLET, ORALLY DISINTEGRATING ORAL at 21:35

## 2021-05-13 RX ADMIN — PANTOPRAZOLE SODIUM 40 MILLIGRAM(S): 20 TABLET, DELAYED RELEASE ORAL at 06:52

## 2021-05-13 RX ADMIN — Medication 5 MILLIGRAM(S): at 21:35

## 2021-05-13 RX ADMIN — Medication 1 MILLIGRAM(S): at 10:32

## 2021-05-13 RX ADMIN — LOSARTAN POTASSIUM 100 MILLIGRAM(S): 100 TABLET, FILM COATED ORAL at 09:07

## 2021-05-13 RX ADMIN — Medication 187.5 MILLIGRAM(S): at 09:07

## 2021-05-13 RX ADMIN — Medication 12.5 MILLIGRAM(S): at 09:08

## 2021-05-13 RX ADMIN — Medication 50 MILLIGRAM(S): at 09:08

## 2021-05-13 RX ADMIN — Medication 50 MILLIGRAM(S): at 21:35

## 2021-05-13 RX ADMIN — Medication 1 MILLIGRAM(S): at 19:05

## 2021-05-13 NOTE — BH INPATIENT PSYCHIATRY PROGRESS NOTE - NSBHASSESSSUMMFT_PSY_ALL_CORE
The patient is a 70-year-old female, , retired (, non-caregiver domiciled at home with her  with a prior psychiatric diagnosis of ANSON and MDD, 2 previous inpatient psychiatric hospitalizations (most recently [facility] 2/2020), prior SA in 11/2019 by overdosing on pills, no history of NSSIB, no history of violence/aggression, no hx of legal issues, daily marijuana use, PMH of HTN, BIB self due to worsening depression and anxiety in the setting of death of long time therapist one month ago.    On encounter today, patient reports that she feels better, denies nausea/ dizziness/ stiffness. Appetite and sleep are adequate. Patient denies active SI/HI.  Plan: Will c/w Venlafaxine .5mg, Mirtazapine 30mg at bedtime, and Clonazepam 1mg daily. Will also c/w PRN Clonazepam.

## 2021-05-13 NOTE — BH INPATIENT PSYCHIATRY PROGRESS NOTE - MSE UNSTRUCTURED FT
The patient appears stated age, is alert and cooperative. Reported mood "better" with anxious and congruent affect. Her speech is wnl. No PMR/ PMA noted. Her gait is steady. Her thought process mostly linear. Thought content-less somatic complaints today, no delusions, denies active SI/HI. Perception: denies hallucinations. Her insight and judgement are partial. Impulse control intact at the time of exam. Patient is alert and oriented X3.

## 2021-05-13 NOTE — BH INPATIENT PSYCHIATRY PROGRESS NOTE - NSBHFUPINTERVALHXFT_PSY_A_CORE
Patient seen for depression and anxiety. Chart reviewed and case discussed with interdisciplinary staff. Per staff, patient less anxious this morning. On encounter, patient reports that she feels better this morning, but worries that this might not loss; denies tremors/ dizziness. Patient denies SI/HI. Appetite and sleep are adequate. She remains med complaint.

## 2021-05-14 PROCEDURE — 90837 PSYTX W PT 60 MINUTES: CPT

## 2021-05-14 PROCEDURE — 99232 SBSQ HOSP IP/OBS MODERATE 35: CPT

## 2021-05-14 RX ADMIN — Medication 50 MILLIGRAM(S): at 09:01

## 2021-05-14 RX ADMIN — Medication 1 MILLIGRAM(S): at 18:23

## 2021-05-14 RX ADMIN — Medication 5 MILLIGRAM(S): at 21:37

## 2021-05-14 RX ADMIN — Medication 1 MILLIGRAM(S): at 08:59

## 2021-05-14 RX ADMIN — Medication 50 MILLIGRAM(S): at 21:38

## 2021-05-14 RX ADMIN — MIRTAZAPINE 30 MILLIGRAM(S): 45 TABLET, ORALLY DISINTEGRATING ORAL at 21:38

## 2021-05-14 RX ADMIN — Medication 12.5 MILLIGRAM(S): at 09:00

## 2021-05-14 RX ADMIN — Medication 187.5 MILLIGRAM(S): at 09:00

## 2021-05-14 RX ADMIN — LOSARTAN POTASSIUM 100 MILLIGRAM(S): 100 TABLET, FILM COATED ORAL at 08:59

## 2021-05-14 RX ADMIN — PANTOPRAZOLE SODIUM 40 MILLIGRAM(S): 20 TABLET, DELAYED RELEASE ORAL at 06:43

## 2021-05-14 NOTE — BH INPATIENT PSYCHIATRY PROGRESS NOTE - MSE UNSTRUCTURED FT
The patient appears stated age, is alert and cooperative. Reported mood "better" with congruent affect. Her speech is wnl. No PMR/ PMA noted. Her gait is steady. Her thought process mostly linear. Thought content-no complaints today, no delusions, denies active SI/HI. Perception: denies hallucinations. Her insight and judgement are partial. Impulse control intact at the time of exam. Patient is alert and oriented X3.

## 2021-05-14 NOTE — BH INPATIENT PSYCHIATRY PROGRESS NOTE - NSBHFUPINTERVALHXFT_PSY_A_CORE
Patient seen for depression and anxiety. Chart reviewed and case discussed with interdisciplinary staff. No overnight event. On encounter, patient reports that she feels better this morning, is hopeful that things are getting better, is still concerned about the weekend and things going wrong; she denies tremors/ dizziness. Patient denies SI/HI. Appetite and sleep are adequate. She remains med complaint.

## 2021-05-14 NOTE — BH INPATIENT PSYCHIATRY PROGRESS NOTE - NSBHASSESSSUMMFT_PSY_ALL_CORE
The patient is a 70-year-old female, , retired (, non-caregiver domiciled at home with her  with a prior psychiatric diagnosis of ANSON and MDD, 2 previous inpatient psychiatric hospitalizations (most recently [facility] 2/2020), prior SA in 11/2019 by overdosing on pills, no history of NSSIB, no history of violence/aggression, no hx of legal issues, daily marijuana use, PMH of HTN, BIB self due to worsening depression and anxiety in the setting of death of long time therapist one month ago.    On encounter today, patient reports that she feels better, denies nausea/ dizziness/ stiffness. Appetite and sleep are adequate. Patient denies active SI/HI.  Plan: Will c/w Venlafaxine .5mg with a plan to increase to 225mg next week, c/ w Mirtazapine 30mg at bedtime, and Clonazepam 1mg daily. Will also c/w PRN Clonazepam.

## 2021-05-15 RX ADMIN — Medication 5 MILLIGRAM(S): at 21:33

## 2021-05-15 RX ADMIN — Medication 187.5 MILLIGRAM(S): at 09:23

## 2021-05-15 RX ADMIN — Medication 650 MILLIGRAM(S): at 13:53

## 2021-05-15 RX ADMIN — Medication 1 MILLIGRAM(S): at 18:24

## 2021-05-15 RX ADMIN — LOSARTAN POTASSIUM 100 MILLIGRAM(S): 100 TABLET, FILM COATED ORAL at 09:23

## 2021-05-15 RX ADMIN — Medication 650 MILLIGRAM(S): at 14:49

## 2021-05-15 RX ADMIN — PANTOPRAZOLE SODIUM 40 MILLIGRAM(S): 20 TABLET, DELAYED RELEASE ORAL at 06:49

## 2021-05-15 RX ADMIN — Medication 1 MILLIGRAM(S): at 09:22

## 2021-05-15 RX ADMIN — Medication 50 MILLIGRAM(S): at 21:33

## 2021-05-15 RX ADMIN — Medication 50 MILLIGRAM(S): at 09:23

## 2021-05-15 RX ADMIN — MIRTAZAPINE 30 MILLIGRAM(S): 45 TABLET, ORALLY DISINTEGRATING ORAL at 21:33

## 2021-05-15 RX ADMIN — Medication 12.5 MILLIGRAM(S): at 09:22

## 2021-05-16 RX ADMIN — Medication 50 MILLIGRAM(S): at 10:56

## 2021-05-16 RX ADMIN — MIRTAZAPINE 30 MILLIGRAM(S): 45 TABLET, ORALLY DISINTEGRATING ORAL at 21:45

## 2021-05-16 RX ADMIN — LOSARTAN POTASSIUM 100 MILLIGRAM(S): 100 TABLET, FILM COATED ORAL at 10:56

## 2021-05-16 RX ADMIN — Medication 5 MILLIGRAM(S): at 21:45

## 2021-05-16 RX ADMIN — Medication 12.5 MILLIGRAM(S): at 10:55

## 2021-05-16 RX ADMIN — Medication 1 MILLIGRAM(S): at 18:24

## 2021-05-16 RX ADMIN — PANTOPRAZOLE SODIUM 40 MILLIGRAM(S): 20 TABLET, DELAYED RELEASE ORAL at 06:35

## 2021-05-16 RX ADMIN — Medication 187.5 MILLIGRAM(S): at 10:56

## 2021-05-16 RX ADMIN — Medication 50 MILLIGRAM(S): at 21:45

## 2021-05-16 RX ADMIN — Medication 1 MILLIGRAM(S): at 10:56

## 2021-05-17 PROCEDURE — 99232 SBSQ HOSP IP/OBS MODERATE 35: CPT

## 2021-05-17 RX ORDER — CLONAZEPAM 1 MG
1 TABLET ORAL DAILY
Refills: 0 | Status: DISCONTINUED | OUTPATIENT
Start: 2021-05-17 | End: 2021-05-24

## 2021-05-17 RX ORDER — VENLAFAXINE HCL 75 MG
225 CAPSULE, EXT RELEASE 24 HR ORAL DAILY
Refills: 0 | Status: DISCONTINUED | OUTPATIENT
Start: 2021-05-18 | End: 2021-05-24

## 2021-05-17 RX ORDER — CLONAZEPAM 1 MG
0.5 TABLET ORAL
Refills: 0 | Status: COMPLETED | OUTPATIENT
Start: 2021-05-17 | End: 2021-05-24

## 2021-05-17 RX ADMIN — Medication 0.5 MILLIGRAM(S): at 15:59

## 2021-05-17 RX ADMIN — Medication 187.5 MILLIGRAM(S): at 08:47

## 2021-05-17 RX ADMIN — MIRTAZAPINE 30 MILLIGRAM(S): 45 TABLET, ORALLY DISINTEGRATING ORAL at 21:41

## 2021-05-17 RX ADMIN — Medication 12.5 MILLIGRAM(S): at 08:47

## 2021-05-17 RX ADMIN — Medication 1 MILLIGRAM(S): at 08:47

## 2021-05-17 RX ADMIN — Medication 50 MILLIGRAM(S): at 21:41

## 2021-05-17 RX ADMIN — LOSARTAN POTASSIUM 100 MILLIGRAM(S): 100 TABLET, FILM COATED ORAL at 08:48

## 2021-05-17 RX ADMIN — Medication 5 MILLIGRAM(S): at 21:41

## 2021-05-17 RX ADMIN — Medication 50 MILLIGRAM(S): at 08:47

## 2021-05-17 RX ADMIN — PANTOPRAZOLE SODIUM 40 MILLIGRAM(S): 20 TABLET, DELAYED RELEASE ORAL at 06:32

## 2021-05-17 NOTE — BH DISCHARGE NOTE NURSING/SOCIAL WORK/PSYCH REHAB - NSDCPRGOAL_PSY_ALL_CORE
Pt has demonstrated significant progress towards psychiatric rehabilitation goals during the current hospitalization. Pt was able to identify yoga and relaxation as healthy coping strategies to better manage symptoms. Pt has been increasingly more receptive to skill development throughout hospitalization. Pt has been compliant with medications and is tolerating them well. Pt endorses improvement in mood, sleep, and appetite. Writer and pt engaged in safety planning which can be reviewed in the  Safety Plan document. Pt was provided with a copy prior to discharge. Pt denies any current AH/VH or paranoia. Pt also denies any SI/HI, intent, or plan at this time. Due to the COVID-19 pandemic, unit structure and activities are re-evaluated on a consistent basis in effort to maintain the safety of pts and staff. Pt has been increasingly receptive to 1:1 sessions and group activities throughout hospitalization. Pt has attended approximately 70% of daily psychiatric rehabilitation groups during the current hospitalization and demonstrated an increase in participation as her symptoms improved. Pt engaged in various groups including, symptom management, coping skills, peer advocate groups, and creative arts therapy groups. Pt was visible on the unit and demonstrated an increase in positive socialization with select peers. Pt did not require any redirection. Pt was provided with a Press Ganey survey to complete prior to discharge. Pt has demonstrated significant progress towards psychiatric rehabilitation goals during the current hospitalization. Pt was able to identify; exercise, getting fresh air, healthy use of social supports, and remembering values as healthy coping strategies to better manage symptoms. Pt has been increasingly more receptive to skill development throughout hospitalization. Pt has been compliant with medications and is tolerating them well. Pt endorses improvement in mood, sleep, and appetite. Writer and pt engaged in safety planning which can be reviewed in the  Safety Plan document. Pt was provided with a copy prior to discharge. Pt denies any current AH/VH or paranoia. Pt also denies any SI/HI, intent, or plan at this time. Due to the COVID-19 pandemic, unit structure and activities are re-evaluated on a consistent basis in effort to maintain the safety of pts and staff. Pt has been increasingly receptive to 1:1 sessions and group activities throughout hospitalization. Pt has attended approximately 70% of daily psychiatric rehabilitation groups during the current hospitalization and demonstrated an increase in participation as her symptoms improved. Pt engaged in various groups including, symptom management, coping skills, peer advocate groups, and creative arts therapy groups. Pt was visible on the unit and demonstrated an increase in positive socialization with select peers. Pt did not require any redirection. Pt was provided with a Press Ganey survey to complete prior to discharge.

## 2021-05-17 NOTE — BH INPATIENT PSYCHIATRY PROGRESS NOTE - NSBHASSESSSUMMFT_PSY_ALL_CORE
The patient is a 70-year-old female, , retired (, non-caregiver domiciled at home with her  with a prior psychiatric diagnosis of ANSON and MDD, 2 previous inpatient psychiatric hospitalizations (most recently [facility] 2/2020), prior SA in 11/2019 by overdosing on pills, no history of NSSIB, no history of violence/aggression, no hx of legal issues, daily marijuana use, PMH of HTN, BIB self due to worsening depression and anxiety in the setting of death of long time therapist one month ago.    On encounter today, patient reports that she feels better in general except for some anxiety/ chills around 4:00pm, denies nausea/ dizziness/ stiffness. Appetite and sleep are adequate. Patient denies active SI/HI.  Plan: Will increase Venlafaxine ER to 225mg, c/ w Mirtazapine 30mg at bedtime, and Clonazepam 1mg daily and add 0.5mg at 3:00pm. Will d/c PRN Clonazepam. .

## 2021-05-17 NOTE — BH DISCHARGE NOTE NURSING/SOCIAL WORK/PSYCH REHAB - DISCHARGE INSTRUCTIONS AFTERCARE APPOINTMENTS
In order to check the location, date, or time of your aftercare appointment, please refer to your Discharge Instructions Document given to you upon leaving the hospital.  If you have lost the instructions please call [phone]

## 2021-05-17 NOTE — BH DISCHARGE NOTE NURSING/SOCIAL WORK/PSYCH REHAB - NSCDUDCCRISIS_PSY_A_CORE
[facility]  1 [phone]) [facility]-WELL ([phone])  Text "WELL" to [phone]  Website: www.[employer]/.[facility] 1 [phone] 251-IXZF (8876) Website: www.safehorizon.org/.National Suicide Prevention Lifeline 4 (112) 602-1390/.  [facility]  1 (792) LIFENET (074-5760)/.  [facility]  [address] [place] [address] [phone]   Hours:  Monday through Friday from 9 AM to 3 PM/.  [employer] - [facility] Crisis Line  8 [phone], Option 1

## 2021-05-17 NOTE — BH INPATIENT PSYCHIATRY PROGRESS NOTE - NSBHFUPINTERVALHXFT_PSY_A_CORE
Patient seen for depression and anxiety. Chart reviewed and case discussed with interdisciplinary staff. No overnight event. On encounter, patient reports that she feels better in the mornings but still feels anxious and has chills around 4:0pm everyday, requests increase in Venlafaxine; denies tremors/ dizziness. Patient denies SI/HI. Appetite and sleep are adequate. She remains med complaint.  no

## 2021-05-17 NOTE — BH INPATIENT PSYCHIATRY PROGRESS NOTE - MSE UNSTRUCTURED FT
The patient appears stated age, is alert and cooperative. Reported mood "better" with congruent affect. Her speech is wnl. No PMR/ PMA noted. Her gait is steady. Her thought process mostly linear. Thought content-some somatic complaints today, no delusions, denies active SI/HI. Perception: denies hallucinations. Her insight and judgement are partial. Impulse control intact at the time of exam. Patient is alert and oriented X3.

## 2021-05-17 NOTE — BH DISCHARGE NOTE NURSING/SOCIAL WORK/PSYCH REHAB - PATIENT PORTAL LINK FT
You can access the FollowMyHealth Patient Portal offered by [facility] by registering at the following website: [email or web address]. By joining [facility]’s FollowMyHealth portal, you will also be able to view your health information using other applications (apps) compatible with our system.

## 2021-05-18 PROCEDURE — 99231 SBSQ HOSP IP/OBS SF/LOW 25: CPT

## 2021-05-18 RX ADMIN — Medication 5 MILLIGRAM(S): at 21:26

## 2021-05-18 RX ADMIN — MIRTAZAPINE 30 MILLIGRAM(S): 45 TABLET, ORALLY DISINTEGRATING ORAL at 21:26

## 2021-05-18 RX ADMIN — Medication 1 MILLIGRAM(S): at 09:17

## 2021-05-18 RX ADMIN — PANTOPRAZOLE SODIUM 40 MILLIGRAM(S): 20 TABLET, DELAYED RELEASE ORAL at 06:44

## 2021-05-18 RX ADMIN — Medication 12.5 MILLIGRAM(S): at 09:18

## 2021-05-18 RX ADMIN — Medication 0.5 MILLIGRAM(S): at 16:49

## 2021-05-18 RX ADMIN — Medication 50 MILLIGRAM(S): at 21:26

## 2021-05-18 RX ADMIN — Medication 50 MILLIGRAM(S): at 09:17

## 2021-05-18 RX ADMIN — Medication 150 MILLIGRAM(S): at 09:18

## 2021-05-18 RX ADMIN — LOSARTAN POTASSIUM 100 MILLIGRAM(S): 100 TABLET, FILM COATED ORAL at 09:17

## 2021-05-18 NOTE — BH INPATIENT PSYCHIATRY PROGRESS NOTE - MSE UNSTRUCTURED FT
The patient appears stated age, is alert and cooperative. Reported mood "better" with congruent affect. Her speech is wnl. No PMR/ PMA noted. Her gait is steady. Her thought process mostly linear. Thought content-fewer somatic complaints today, no delusions, denies active SI/HI. Perception: denies hallucinations. Her insight and judgement are partial. Impulse control intact at the time of exam. Patient is alert and oriented X3.

## 2021-05-18 NOTE — BH INPATIENT PSYCHIATRY PROGRESS NOTE - NSBHASSESSSUMMFT_PSY_ALL_CORE
The patient is a 70-year-old female, , retired (, non-caregiver domiciled at home with her  with a prior psychiatric diagnosis of ANSON and MDD, 2 previous inpatient psychiatric hospitalizations (most recently [facility] 2/2020), prior SA in 11/2019 by overdosing on pills, no history of NSSIB, no history of violence/aggression, no hx of legal issues, daily marijuana use, PMH of HTN, BIB self due to worsening depression and anxiety in the setting of death of long time therapist one month ago.    On encounter today, patient reports improvement in anxiety, denies nausea/ dizziness/ stiffness. Appetite and sleep are adequate. Patient denies active SI/HI.  Plan: Will c/w Venlafaxine ER to 225mg, Mirtazapine 30mg at bedtime, and Clonazepam 1mg daily and add 0.5mg at 3:00pm.

## 2021-05-18 NOTE — BH INPATIENT PSYCHIATRY PROGRESS NOTE - NSBHFUPINTERVALHXFT_PSY_A_CORE
Patient seen for depression and anxiety. Chart reviewed and case discussed with interdisciplinary staff. No overnight event. On encounter, patient reports that she feels better, and just has "little chills"; denies tremors/ dizziness. Patient denies SI/HI. Appetite and sleep are adequate. She remains med complaint.

## 2021-05-19 PROCEDURE — 99231 SBSQ HOSP IP/OBS SF/LOW 25: CPT

## 2021-05-19 RX ADMIN — Medication 1 MILLIGRAM(S): at 08:25

## 2021-05-19 RX ADMIN — Medication 50 MILLIGRAM(S): at 08:25

## 2021-05-19 RX ADMIN — LOSARTAN POTASSIUM 100 MILLIGRAM(S): 100 TABLET, FILM COATED ORAL at 08:25

## 2021-05-19 RX ADMIN — Medication 12.5 MILLIGRAM(S): at 08:24

## 2021-05-19 RX ADMIN — Medication 225 MILLIGRAM(S): at 08:24

## 2021-05-19 RX ADMIN — Medication 50 MILLIGRAM(S): at 21:12

## 2021-05-19 RX ADMIN — PANTOPRAZOLE SODIUM 40 MILLIGRAM(S): 20 TABLET, DELAYED RELEASE ORAL at 06:46

## 2021-05-19 RX ADMIN — Medication 0.5 MILLIGRAM(S): at 15:34

## 2021-05-19 RX ADMIN — MIRTAZAPINE 30 MILLIGRAM(S): 45 TABLET, ORALLY DISINTEGRATING ORAL at 21:12

## 2021-05-19 RX ADMIN — Medication 5 MILLIGRAM(S): at 21:22

## 2021-05-19 NOTE — BH TREATMENT PLAN - NSTXDCOPLKGOAL_PSY_ALL_CORE
Will agree to consider an appropriate level of outpatient care

## 2021-05-19 NOTE — BH TREATMENT PLAN - NSTXCOPEINTERMD_PSY_ALL_CORE
Will encourage pt to attend group activities to learn coping skills. 
Will encourage pt to continue to attend group activities to learn coping skills.

## 2021-05-19 NOTE — BH TREATMENT PLAN - NSTXDEPRESINTERMD_PSY_ALL_CORE
Will c/w Mirtazapine and Lexapro. 
Will c/w Mirtazapine and Venlafaxine. Will titrate dose based on response. 
Will c/w Mirtazapine and Venlafaxine.

## 2021-05-19 NOTE — UM REPORT PROGRESS NOTE - NSUMSWNOTE_GEN_A_CORE FT
Writer is covering SW for today - 05/19/2021 - per treatment team patient's symptoms are improving but still has anxiety and stated that she wasn't ready for discharge.  Tentative discharge date set for this coming Monday - 05/24/2021.   She reports having "little chills".  Appetite and sleep are adequate and she is med complaint.

## 2021-05-19 NOTE — BH TREATMENT PLAN - NSDCCRITERIA_PSY_ALL_CORE
Detail Level: Detailed
Depth Of Biopsy: dermis
The pt. will be discharged when her symptoms are decreased by 50%.
The pt. will be discharged when her symptoms are decreased by 50%.
Was A Bandage Applied: Yes
Size Of Lesion In Cm: 0
Biopsy Type: H and E
Biopsy Method: Dermablade
Anesthesia Type: 1% lidocaine with epinephrine
Anesthesia Volume In Cc: 0.5
Hemostasis: Drysol
Wound Care: Petrolatum
Dressing: bandage
Destruction After The Procedure: No
Type Of Destruction Used: Curettage
Curettage Text: The wound bed was treated with curettage after the biopsy was performed.
Cryotherapy Text: The wound bed was treated with cryotherapy after the biopsy was performed.
Electrodesiccation Text: The wound bed was treated with electrodesiccation after the biopsy was performed.
Electrodesiccation And Curettage Text: The wound bed was treated with electrodesiccation and curettage after the biopsy was performed.
Silver Nitrate Text: The wound bed was treated with silver nitrate after the biopsy was performed.
Lab: [id number]
Lab Facility: [id number]
The pt. will be discharged when her symptoms are decreased by 50%.
The pt. will be discharged when her symptoms are decreased by 50%.
Consent: Written consent was obtained and risks were reviewed including but not limited to scarring, infection, bleeding, scabbing, incomplete removal, nerve damage and allergy to anesthesia.
Post-Care Instructions: I reviewed with the patient in detail post-care instructions. Patient is to keep the biopsy site dry overnight, and then apply bacitracin twice daily until healed. Patient may apply hydrogen peroxide soaks to remove any crusting.
Notification Instructions: Patient will be notified of biopsy results. However, patient instructed to call the office if not contacted within 2 weeks.
Billing Type: Third-Party Bill
The pt. will be discharged when her symptoms are decreased by 50%.
Information: Selecting Yes will display possible errors in your note based on the variables you have selected. This validation is only offered as a suggestion for you. PLEASE NOTE THAT THE VALIDATION TEXT WILL BE REMOVED WHEN YOU FINALIZE YOUR NOTE. IF YOU WANT TO FAX A PRELIMINARY NOTE YOU WILL NEED TO TOGGLE THIS TO 'NO' IF YOU DO NOT WANT IT IN YOUR FAXED NOTE.

## 2021-05-19 NOTE — BH TREATMENT PLAN - NSTXCOPEINTERRN_PSY_ALL_CORE
Encouraged patient to verbalize her feelings and to utilize positive coping skills.
Establish a trusting/therapeutic relationship with pt.  Encourage pt to verbalize thoughts.   Help pt identify at least 2 coping mechanisms.  Encourage pt to utilize identified coping skills while hospitalized.
Establish a trusting and therapeutic relationship with pt.    Help pt identify and utilize at least 2 coping skills to deal with anxiety and depression.     Encourage  pt to verbalize thoughts.     Encourage group attendance and participation.     Teach breathing exercises.    Educate pt on importance of medication compliance.
Encouraged patient to verbalize feelings and concerns and participate in diversional activities.

## 2021-05-19 NOTE — BH TREATMENT PLAN - NSTXPLANTHERAPYSESSIONSFT_PSY_ALL_CORE
05-19-21  Type of therapy: Health and fitness,Leisure development,Mindfulness,Peer advocate,Spirituality,Symptom management  Type of session: Individual  Level of patient participation: Engaged  Duration of participation: 15 minutes  Therapy conducted by: Psych rehab  Therapy Summary: Writer and patient discussed current level of functioning, addressed concerns surrounding the current hospitalization, discharge, and engaged in skill development. Patient discussed group participation and staff support to be helpful assets to recovery. Patient discussed concerns surrounding returning to stressors post-hospitalization to which writer provided empathy, support, encouragement and engaged patient in coping ahead. Patient has been consistently compliant with standing medications and endorses improvements in regards to symptoms including overall mood, brightness of affect, SI, hopefulness, energy level, and motivation. Patient continues to endorse some anxiety with overall improvements in intensity. Patient is increasingly tolerable of anxious symptoms. Patient's insight is fair and judgement is improving. Patient denies current and active SI/HI, AH/VH.     Patient is visible on the unit, appropriately interactive with peers, and continues to maintain appropriate behavioral control. Patient is verbal, polite, and cooperative with staff. Patient has attended the majority of Psychiatric Rehabilitation groups offered over the past week with less encouragement needed. Patient is actively engaged, appropriate in groups and does not require redirection. Patient is able to tolerate group rules, structure and duration well.  
  04-29-21  Type of therapy: Creative arts therapy,Health and fitness,Leisure development,Mindfulness,Symptom management  Type of session: Individual  Level of patient participation: Resistance to participation  Duration of participation: Less than 15 minutes  Therapy conducted by: Psych rehab  Therapy Summary: As the session did not take place, progress summary will be written per chart, previous interactions over the past week, observation, and collateral from staff. Patient has been consistently compliant with standing medications, although endorses minimal improvements in regards to symptoms at this time. Patient continues to endorse labile mood, intermittent tearfulness, anxiety, difficulty sleeping, and presents with somatic complaints. Writer was unable to appropriately assess for SI/HI, AH/VH or obtain a CGI self-rating as the session did not take place.     Patient is visible on the unit, interactive with select peers and has maintained fair behavioral control over the past week. Patient is verbal and fairly cooperative with staff. Patient has attended some of the Psychiatric Rehabilitation groups offered over the past week with some encouragement. Patient is verbally participatory, appropriate in group and does not require redirection at this time. Patient is fairly able to tolerate group rules/structure.  
  05-12-21  Type of therapy: Creative arts therapy,Health and fitness,Leisure development,Peer advocate,Psychoeducation,Spirituality  Type of session: Individual  Level of patient participation: Resistance to participation  Duration of participation: Less than 15 minutes  Therapy conducted by: Psych rehab  Therapy Summary: As the session did not take place, progress summary will be written per chart, observation and previous interactions over the past week. Patient reports feeling that symptoms have much improved however, is now concerned with elevated heart rate. Writer provided empathy and support. Patient has been actively engaging in Psychiatric Rehabilitation groups, sometimes requiring encouragement to attend. Patient is appropriate in group and does not require redirection. Patient has been consistently compliant with standing medications and endorses some improvements in regards to symptoms including stability of mood, SI, and hopefulness. Patient continues to endorse some anxiety and difficulty with frustration tolerance with overall improvements in intensity and frequency. Patient's insight and judgement are improving. Although writer was unable to assess for SI/HI, AH/VH due to the session not taking place, patient has denied over the past week per chart. Patient is visible on the unit, appropriately interactive with select peers and has maintained good behavioral control over the past week. Patient is verbal, polite, and fairly cooperative with staff. Writer will continue to engage patient daily in order to provide ongoing support.

## 2021-05-19 NOTE — BH INPATIENT PSYCHIATRY PROGRESS NOTE - MSE UNSTRUCTURED FT
The patient appears stated age, is alert and cooperative. Reported mood "better, good" with congruent affect. Her speech is wnl. No PMR/ PMA noted. Her gait is steady. Her thought process mostly linear. Thought content-fewer somatic complaints today, no delusions, denies active SI/HI. Perception: denies hallucinations. Her insight and judgement are partial. Impulse control intact at the time of exam. Patient is alert and oriented X3.

## 2021-05-19 NOTE — BH TREATMENT PLAN - NSTXPATIENTPARTICIPATE_PSY_ALL_CORE
Patient participated in identification of needs/problems/goals for treatment

## 2021-05-19 NOTE — BH TREATMENT PLAN - NSTXPROBDCOPLK_PSY_ALL_CORE
DISCHARGE ISSUE - LACK OF APPROPRIATE OUTPATIENT SERVICES

## 2021-05-19 NOTE — BH TREATMENT PLAN - NSTXANXINTERMD_PSY_ALL_CORE
Med titration with Venlafaxine.
Med titration.
Med titration with Venlafaxine. Will continue Mirtazapine and Clonazepam at current dose
Will continue Mirtazapine, Venlafaxine and Clonazepam at current dose
Med titration with Venlafaxine.

## 2021-05-19 NOTE — BH TREATMENT PLAN - NSPTSTATEDGOAL_PSY_ALL_CORE
"I want to feel less anxious"
"I don't want to feel anxious all the time"
Reduction of depressive and anxious symptoms.

## 2021-05-19 NOTE — BH TREATMENT PLAN - NSTXDCOPLKINTERSW_PSY_ALL_CORE
SW will meet with pt throughout the week to provide psychoed and support towards pt's goals. SW will explored d/c planning options with pt and identified  appropriate referrals for continued outpt care.  JOANN will  conference with with multidisciplinary tx team daily to discuss pt's goal progress and readiness for d/c.
SW will collaborate with pt to identify appropriate outpatient services. SW will provide support and psychoeducation as needed.
SW provided patient with support, psychoeducation, and discharge planning.  Recommendations include the [facility] for outpatient treatment but patient is refusing.   SW coordinating patient care with interdisciplinary treatment team and collateral providers.

## 2021-05-19 NOTE — BH TREATMENT PLAN - NSTXDEPRESGOAL_PSY_ALL_CORE
Report using a coping skill to overcome sadness and worry in order to socialize with peers daily
Exhibit improvements in self-grooming, hygiene, sleep and appetite
Exhibit improvements in self-grooming, hygiene, sleep and appetite
Report using a coping skill to overcome sadness and worry in order to socialize with peers daily
Report using a coping skill to overcome sadness and worry in order to socialize with peers daily

## 2021-05-19 NOTE — UM REPORT PROGRESS NOTE - NSUMSWACTION_GEN_A_CORE FT
SW provided patient with support, psycho-education, and discussion of discharge plans.  Patient is refusing [facility] program but did agree to go to the [facility] for outpatient treatment.  JOANN commenced this referral in anticipation of Monday discharge.

## 2021-05-19 NOTE — BH INPATIENT PSYCHIATRY PROGRESS NOTE - NSBHFUPINTERVALHXFT_PSY_A_CORE
Patient seen for depression and anxiety. Chart reviewed and case discussed with interdisciplinary staff. No overnight event. On encounter, patient reports that she feels better and has no chills today; denies tremors/ dizziness. Patient denies SI/HI. Appetite and sleep are adequate. She remains med complaint.

## 2021-05-19 NOTE — BH TREATMENT PLAN - ANXIETY/PANIC/FEAR NURSING INTERVENTIONS/RECOMMENDATIONS
Establish a trusting and therapeutic relationship with pt.    Help pt identify and utilize at least 3 coping skills to deal with anxiety.     Encourage  pt to verbalize thoughts.     Encourage group attendance and participation.     Teach breathing exercises.
Encouraged pt to use positive coping skills. Give support and reassurance.
Support and reassurance provided. Medicated as per MD
Encourage pt to verbalize thoughts.  Assess need for PRN medication and administer accordingly.   Encourage group attendance and participation.

## 2021-05-19 NOTE — BH INPATIENT PSYCHIATRY PROGRESS NOTE - NSBHASSESSSUMMFT_PSY_ALL_CORE
The patient is a 70-year-old female, , retired (, non-caregiver domiciled at home with her  with a prior psychiatric diagnosis of ANSON and MDD, 2 previous inpatient psychiatric hospitalizations (most recently [facility] 2/2020), prior SA in 11/2019 by overdosing on pills, no history of NSSIB, no history of violence/aggression, no hx of legal issues, daily marijuana use, PMH of HTN, BIB self due to worsening depression and anxiety in the setting of death of long time therapist one month ago.    On encounter today, patient reports improvement in anxiety, is hopeful that she'll be ready to go home soon, denies nausea/ dizziness/ stiffness. Appetite and sleep are adequate. Patient denies active SI/HI.  Plan: Will c/w Venlafaxine ER to 225mg, Mirtazapine 30mg at bedtime, and Clonazepam 1mg daily and add 0.5mg at 3:00pm.  Discharge anticipated for Monday 05/24 if patient continues to make progress.

## 2021-05-19 NOTE — BH TREATMENT PLAN - NSBHPRIMARYDX_PSY_ALL_CORE
Generalized anxiety disorder    

## 2021-05-19 NOTE — BH TREATMENT PLAN - NSTXCOPEINTERPR_PSY_ALL_CORE
Writer attempted to meet with patient in order to discuss progress towards Psychiatric Rehabilitation goals over the past week however, despite encouragement, patient declined to meet with the writer at this time as she stated feeling 'burnt out' from having previously met with the unit psyhcologist earlier. Patient was unable to discuss coping skills as the session did not take place. Writer will continue to engage patient daily in order to provide support and assist patient in demonstrating progress towards Psychiatric Rehabilitation goals over the next 7 days.
Writer met with patient in order to discuss progress towards Psychiatric Rehabilitation goals to which patient has demonstrated steady progress over the past week. Over the past week, patient has been able to identify and discuss exercise, getting fresh air, healthy use of social supports, and remembering values as healthy coping skills. Patient is receptive to ongoing skill development and engagement in coping ahead to prepare for scheduled discharge next week. Writer will continue to engage patient daily in order to provide support, assist patient in demonstrating progress towards Psychiatric Rehabilitation goals and assist patient in discharge preparation.
Psych rehab staff will encourage daily group/activity engagement as well as facilitate goal attainment over the next 7 days for improved symptom management.
Writer attempted to meet with patient in order to discuss progress towards Psychiatric Rehabilitation goals over the past week, however, despite encouragement patient declined to meet at this time stating that she is too exhausted from meeting with the unit psychologist. Therefore, progress summary will be written per chart, previous interactions over the past week, observation and collateral from staff. Patient has demonstrated some progress towards Psychiatric Rehabilitation goals over the past week. Patient has been able to identify and utilize mindfulness and social supports as means of healthy coping. Patient is receptive to ongoing skill development. Writer will continue to engage patient daily in order to develop rapport, provide support, and assist patient in demonstrating progress towards Psychiatric Rehabilitation goals over the next week.
Psych rehab staff will encourage daily group/activity engagement as well as facilitate goal attainment over the next 7 days for improved symptom management.

## 2021-05-19 NOTE — BH TREATMENT PLAN - NSTXDEPRESINTERRN_PSY_ALL_CORE
Encouraged patient to participate in unit activities
Encouraged patient to participate in unit activities and to verbalize his feelings to staff members.
Establish a trusting/therapeutic relationship with pt.  Encourage pt to verbalize thoughts.   Help pt identify at least 2 coping mechanisms.  Encourage pt to utilize identified coping skills while hospitalized.
Encouraged patient to participate in unit activities and to verbalize his feelings to staff members.

## 2021-05-19 NOTE — BH TREATMENT PLAN - NSTXDCOPLKINTERMD_PSY_ALL_CORE
Will make appropriate outpatient appointments prior to discharge. 

## 2021-05-20 PROCEDURE — 99231 SBSQ HOSP IP/OBS SF/LOW 25: CPT

## 2021-05-20 PROCEDURE — 90832 PSYTX W PT 30 MINUTES: CPT

## 2021-05-20 RX ADMIN — MIRTAZAPINE 30 MILLIGRAM(S): 45 TABLET, ORALLY DISINTEGRATING ORAL at 21:41

## 2021-05-20 RX ADMIN — Medication 12.5 MILLIGRAM(S): at 08:54

## 2021-05-20 RX ADMIN — Medication 50 MILLIGRAM(S): at 21:41

## 2021-05-20 RX ADMIN — PANTOPRAZOLE SODIUM 40 MILLIGRAM(S): 20 TABLET, DELAYED RELEASE ORAL at 06:49

## 2021-05-20 RX ADMIN — Medication 50 MILLIGRAM(S): at 08:54

## 2021-05-20 RX ADMIN — LOSARTAN POTASSIUM 100 MILLIGRAM(S): 100 TABLET, FILM COATED ORAL at 08:54

## 2021-05-20 RX ADMIN — Medication 0.5 MILLIGRAM(S): at 15:21

## 2021-05-20 RX ADMIN — Medication 225 MILLIGRAM(S): at 08:54

## 2021-05-20 RX ADMIN — Medication 1 MILLIGRAM(S): at 08:54

## 2021-05-20 RX ADMIN — Medication 5 MILLIGRAM(S): at 21:41

## 2021-05-20 NOTE — BH INPATIENT PSYCHIATRY PROGRESS NOTE - NSBHFUPINTERVALHXFT_PSY_A_CORE
Patient seen for depression and anxiety. Chart reviewed and case discussed with interdisciplinary staff. No overnight event. On encounter, patient reports that she feels anxious this morning, agrees that it could be related to impending discharge, denies tremors/ dizziness. Patient denies SI/HI. Appetite and sleep are adequate. She remains med complaint.

## 2021-05-20 NOTE — BH INPATIENT PSYCHIATRY PROGRESS NOTE - MSE UNSTRUCTURED FT
The patient appears stated age, is alert and cooperative. Reported mood "anxious" with congruent affect. Her speech is wnl. No PMR/ PMA noted. Her gait is steady. Her thought process mostly linear. Thought content-fewer somatic complaints today, no delusions, denies active SI/HI. Perception: denies hallucinations. Her insight and judgement are partial. Impulse control intact at the time of exam. Patient is alert and oriented X3.

## 2021-05-21 LAB
A1C WITH ESTIMATED AVERAGE GLUCOSE RESULT: 5.5 % — SIGNIFICANT CHANGE UP (ref 4–5.6)
ALBUMIN SERPL ELPH-MCNC: 4.2 G/DL — SIGNIFICANT CHANGE UP (ref 3.3–5)
ALP SERPL-CCNC: 90 U/L — SIGNIFICANT CHANGE UP (ref 40–120)
ALT FLD-CCNC: 20 U/L — SIGNIFICANT CHANGE UP (ref 4–33)
ANION GAP SERPL CALC-SCNC: 11 MMOL/L — SIGNIFICANT CHANGE UP (ref 7–14)
AST SERPL-CCNC: 13 U/L — SIGNIFICANT CHANGE UP (ref 4–32)
BILIRUB SERPL-MCNC: 0.4 MG/DL — SIGNIFICANT CHANGE UP (ref 0.2–1.2)
BUN SERPL-MCNC: 13 MG/DL — SIGNIFICANT CHANGE UP (ref 7–23)
CALCIUM SERPL-MCNC: 9.8 MG/DL — SIGNIFICANT CHANGE UP (ref 8.4–10.5)
CHLORIDE SERPL-SCNC: 100 MMOL/L — SIGNIFICANT CHANGE UP (ref 98–107)
CHOLEST SERPL-MCNC: 279 MG/DL — HIGH
CO2 SERPL-SCNC: 30 MMOL/L — SIGNIFICANT CHANGE UP (ref 22–31)
CREAT SERPL-MCNC: 0.86 MG/DL — SIGNIFICANT CHANGE UP (ref 0.5–1.3)
ESTIMATED AVERAGE GLUCOSE: 111 MG/DL — SIGNIFICANT CHANGE UP (ref 68–114)
GLUCOSE SERPL-MCNC: 100 MG/DL — HIGH (ref 70–99)
HCT VFR BLD CALC: 45 % — SIGNIFICANT CHANGE UP (ref 34.5–45)
HDLC SERPL-MCNC: 40 MG/DL — LOW
HGB BLD-MCNC: 14.5 G/DL — SIGNIFICANT CHANGE UP (ref 11.5–15.5)
LIPID PNL WITH DIRECT LDL SERPL: 196 MG/DL — HIGH
MCHC RBC-ENTMCNC: 28 PG — SIGNIFICANT CHANGE UP (ref 27–34)
MCHC RBC-ENTMCNC: 32.2 GM/DL — SIGNIFICANT CHANGE UP (ref 32–36)
MCV RBC AUTO: 87 FL — SIGNIFICANT CHANGE UP (ref 80–100)
NON HDL CHOLESTEROL: 239 MG/DL — HIGH
NRBC # BLD: 0 /100 WBCS — SIGNIFICANT CHANGE UP
NRBC # FLD: 0 K/UL — SIGNIFICANT CHANGE UP
PLATELET # BLD AUTO: 237 K/UL — SIGNIFICANT CHANGE UP (ref 150–400)
POTASSIUM SERPL-MCNC: 3.8 MMOL/L — SIGNIFICANT CHANGE UP (ref 3.5–5.3)
POTASSIUM SERPL-SCNC: 3.8 MMOL/L — SIGNIFICANT CHANGE UP (ref 3.5–5.3)
PROT SERPL-MCNC: 6.7 G/DL — SIGNIFICANT CHANGE UP (ref 6–8.3)
RBC # BLD: 5.17 M/UL — SIGNIFICANT CHANGE UP (ref 3.8–5.2)
RBC # FLD: 12.9 % — SIGNIFICANT CHANGE UP (ref 10.3–14.5)
SODIUM SERPL-SCNC: 141 MMOL/L — SIGNIFICANT CHANGE UP (ref 135–145)
TRIGL SERPL-MCNC: 214 MG/DL — HIGH
WBC # BLD: 9.57 K/UL — SIGNIFICANT CHANGE UP (ref 3.8–10.5)
WBC # FLD AUTO: 9.57 K/UL — SIGNIFICANT CHANGE UP (ref 3.8–10.5)

## 2021-05-21 PROCEDURE — 90837 PSYTX W PT 60 MINUTES: CPT

## 2021-05-21 PROCEDURE — 99231 SBSQ HOSP IP/OBS SF/LOW 25: CPT

## 2021-05-21 RX ORDER — VENLAFAXINE HCL 75 MG
3 CAPSULE, EXT RELEASE 24 HR ORAL
Qty: 90 | Refills: 0
Start: 2021-05-21 | End: 2021-06-19

## 2021-05-21 RX ORDER — CLONAZEPAM 1 MG
1 TABLET ORAL
Qty: 30 | Refills: 0
Start: 2021-05-21 | End: 2021-06-19

## 2021-05-21 RX ORDER — PITAVASTATIN CALCIUM 1.04 MG/1
1 TABLET, FILM COATED ORAL
Qty: 0 | Refills: 0 | DISCHARGE

## 2021-05-21 RX ORDER — METOPROLOL TARTRATE 50 MG
1 TABLET ORAL
Qty: 60 | Refills: 0
Start: 2021-05-21 | End: 2021-06-19

## 2021-05-21 RX ORDER — LOSARTAN POTASSIUM 100 MG/1
1 TABLET, FILM COATED ORAL
Qty: 30 | Refills: 0
Start: 2021-05-21 | End: 2021-06-19

## 2021-05-21 RX ORDER — PANTOPRAZOLE SODIUM 20 MG/1
1 TABLET, DELAYED RELEASE ORAL
Qty: 30 | Refills: 0
Start: 2021-05-21 | End: 2021-06-19

## 2021-05-21 RX ORDER — METOPROLOL TARTRATE 50 MG
1 TABLET ORAL
Qty: 0 | Refills: 0 | DISCHARGE

## 2021-05-21 RX ORDER — MIRTAZAPINE 45 MG/1
1 TABLET, ORALLY DISINTEGRATING ORAL
Qty: 30 | Refills: 0
Start: 2021-05-21 | End: 2021-06-19

## 2021-05-21 RX ORDER — ONDANSETRON 8 MG/1
1 TABLET, FILM COATED ORAL
Qty: 0 | Refills: 0 | DISCHARGE
Start: 2021-05-21

## 2021-05-21 RX ADMIN — Medication 225 MILLIGRAM(S): at 09:21

## 2021-05-21 RX ADMIN — Medication 0.5 MILLIGRAM(S): at 15:16

## 2021-05-21 RX ADMIN — Medication 1 MILLIGRAM(S): at 08:22

## 2021-05-21 RX ADMIN — PANTOPRAZOLE SODIUM 40 MILLIGRAM(S): 20 TABLET, DELAYED RELEASE ORAL at 06:55

## 2021-05-21 RX ADMIN — Medication 50 MILLIGRAM(S): at 21:30

## 2021-05-21 RX ADMIN — Medication 5 MILLIGRAM(S): at 21:29

## 2021-05-21 RX ADMIN — MIRTAZAPINE 30 MILLIGRAM(S): 45 TABLET, ORALLY DISINTEGRATING ORAL at 21:30

## 2021-05-21 RX ADMIN — LOSARTAN POTASSIUM 100 MILLIGRAM(S): 100 TABLET, FILM COATED ORAL at 09:21

## 2021-05-21 RX ADMIN — Medication 50 MILLIGRAM(S): at 09:21

## 2021-05-21 RX ADMIN — Medication 12.5 MILLIGRAM(S): at 09:21

## 2021-05-21 NOTE — BH INPATIENT PSYCHIATRY PROGRESS NOTE - NSBHFUPINTERVALHXFT_PSY_A_CORE
Patient seen for depression and anxiety. Chart reviewed and case discussed with interdisciplinary staff. No overnight event. On encounter, patient reports that she feels less anxious and is looking forward to going home, denies chills/ tremors/ dizziness. Patient denies SI/HI. Appetite and sleep are adequate. She remains med complaint.

## 2021-05-21 NOTE — BH INPATIENT PSYCHIATRY PROGRESS NOTE - MSE UNSTRUCTURED FT
The patient appears stated age, is alert and cooperative. Reported mood "less anxious" with congruent affect. Her speech is wnl. No PMR/ PMA noted. Her gait is steady. Her thought process mostly linear. Thought content-fewer somatic complaints today, no delusions, denies active SI/HI. Perception: denies hallucinations. Her insight and judgement are partial. Impulse control intact at the time of exam. Patient is alert and oriented X3.

## 2021-05-21 NOTE — BH INPATIENT PSYCHIATRY DISCHARGE NOTE - HOSPITAL COURSE
Additionally, patient's symptoms have improved significantly on current management and patient has maximized her treatment in an inpatient setting, and is psychiatrically stable to continue her treatment in an outpatient setting. Patient's case was discussed with all members of interdisciplinary team and when everyone agreed that patient was stable and appropriate for discharge, she was discharged home with follow-up appointments. Prior to discharge, patient was informed of her medications and the importance of treatment compliance was reiterated. Patient verbalized understanding of the matter and agreed with her discharge plan.     Additionally, patient's symptoms have improved significantly on current management and patient has maximized her treatment in an inpatient setting, and is psychiatrically stable to continue her treatment in an outpatient setting. Patient's case was discussed with all members of interdisciplinary team and when everyone agreed that patient was stable and appropriate for discharge, she was discharged home with follow-up appointments. Prior to discharge, patient was informed of her medications and the importance of treatment compliance was reiterated. Patient verbalized understanding of the matter and agreed with her discharge plan. Patient was provided with a 30-day supply of medications, extensive psychoeducation on treatment options and motivational counseling targeting healthy lifestyle. Patient was instructed on actions for crisis situations, understood and agreed to follow instructions for handling crisis, including going to ER or calling 911.     Patient presented with worsening anxiety, depression and passive thoughts of dying. She reported that since her last hospitalization she had failed a series of medication trials and became increasingly depressed. A significant loss was the recent sudden death of her long-time therapist of 20 years after which she felt that she would never be able to replace that helpful relationship. She started becoming "panicky" as soon as she woke and would frequently remain that way all day. She started taking increased doses of her medications to be able to feel less anxious. She had become increasingly isolated from her friends. Lexapro was initiated and home Clonazepam was continued and home Mirtazapine was increased to better address her symptoms. Patient reported improved sleep and less anxiety at bedtime, but continued to report anxiety during the day in spite of increase in Lexapro. Lexapro was tapered down and Venlafaxine was started to address her anxiety and mood symptoms. Patient responded well to the change in medications and her symptoms were well managed on Mirtazapine 30mg at bedtime, Venlafaxine ER 225mg daily so much so that her Clonazepam was reduced to 1mg in the morning and 0.5mg in the afternoon. At present, patient's symptoms have improved significantly on current management and patient has maximized her treatment in an inpatient setting, and is psychiatrically stable to continue her treatment in an outpatient setting. Patient's case was discussed with all members of interdisciplinary team and when everyone agreed that patient was stable and appropriate for discharge, she was discharged home with follow-up appointments. Prior to discharge, patient was informed of her medications and the importance of treatment compliance was reiterated. Patient verbalized understanding of the matter and agreed with her discharge plan. Patient was provided with a 30-day supply of medications, extensive psychoeducation on treatment options and motivational counseling targeting healthy lifestyle. Patient was instructed on actions for crisis situations, understood and agreed to follow instructions for handling crisis, including going to ER or calling 911.

## 2021-05-21 NOTE — BH INPATIENT PSYCHIATRY DISCHARGE NOTE - DESCRIPTION
Patient is a 70 F, retired (),  non-caregiver status living in a private home in [place] with her . She states that her  is a workaholic and works almost daily. She states that she is still deeply affected by her parents passing a few years ago, friend's death last year and recent death of therapist one month ago. Patient is a 70 F, retired (),  non-caregiver status living in a private home in [place] with her . She states that her  is a workaholic and works almost daily. She states that she is still deeply affected by her parents passing a few years ago, friend's death last year and recent death of therapist one month prior to current admission.

## 2021-05-21 NOTE — BH INPATIENT PSYCHIATRY DISCHARGE NOTE - NSBHMETABOLIC_PSY_ALL_CORE_FT
BMI: BMI (kg/m2): 37.2 (04-26-21 @ 14:07)  HbA1c: A1C with Estimated Average Glucose Result: 5.5 % (05-21-21 @ 09:47)    Glucose:   BP: 128/63 (05-19-21 @ 20:12) (128/63 - 128/63)  Lipid Panel: Date/Time: 05-21-21 @ 09:47  Cholesterol, Serum: 279  Direct LDL: --  HDL Cholesterol, Serum: 40  Total Cholesterol/HDL Ration Measurement: --  Triglycerides, Serum: 214   BMI: BMI (kg/m2): 37.2 (04-26-21 @ 14:07)  HbA1c: A1C with Estimated Average Glucose Result: 5.5 % (05-21-21 @ 09:47)   BP: 128/63 (05-19-21 @ 20:12) (128/63 - 128/63)  Lipid Panel: Date/Time: 05-21-21 @ 09:47  Cholesterol, Serum: 279  HDL Cholesterol, Serum: 40  Triglycerides, Serum: 214

## 2021-05-21 NOTE — BH INPATIENT PSYCHIATRY DISCHARGE NOTE - OTHER PAST PSYCHIATRIC HISTORY (INCLUDE DETAILS REGARDING ONSET, COURSE OF ILLNESS, INPATIENT/OUTPATIENT TREATMENT)
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 Patient has a documented PPHx of  ANSON and MDD, with long history of depression and anxiety noted since her early teens. Pt has a h/o 2 previous inpatient psychiatric hospitalizations, both at [facility] (November 2019 at for a suicide attempt via OD, and in Feb 2020). Pt has h/o SA in 11/2019 via OD. Pt reported daily marijuana use, as a means of self-medicating,  and is reported to have a h/o “trying” multiple substances in her 20s. Pt has most recently been using more of her prescription drugs (klonopin, remeron, lexapro and melatonin) to cope with increase depression and anxiety, with noted passive SI of wanting to sleep and not wake up. Pt’s long time therapist recently passed away, triggering this most current episode. Pt is also noted to have private outpatient psychiatrist Dr. Ramos.  Patient has a documented PPHx of  ANSON and MDD, with long history of depression and anxiety noted since her early teens. Pt has a h/o 2 previous inpatient psychiatric hospitalizations, both at [facility] (November 2019 at for a suicide attempt via OD, and in Feb 2020). Pt has h/o SA in 11/2019 via OD. Pt reported daily marijuana use, as a means of self-medicating, and is reported to have a h/o “trying” multiple substances in her 20s.

## 2021-05-21 NOTE — BH INPATIENT PSYCHIATRY DISCHARGE NOTE - NSBHDCHANDOFFFT_PSY_ALL_CORE
Dr. Kamara [facility] contacted for verbal handoff regarding inpatient stay and treatment.

## 2021-05-21 NOTE — BH INPATIENT PSYCHIATRY DISCHARGE NOTE - NSDCCPCAREPLAN_GEN_ALL_CORE_FT
PRINCIPAL DISCHARGE DIAGNOSIS  Diagnosis: Generalized anxiety disorder  Assessment and Plan of Treatment:        PRINCIPAL DISCHARGE DIAGNOSIS  Diagnosis: Generalized anxiety disorder  Assessment and Plan of Treatment: Continue current medications and follow-up with your outpatient psychiatrist.

## 2021-05-21 NOTE — BH INPATIENT PSYCHIATRY DISCHARGE NOTE - HPI (INCLUDE ILLNESS QUALITY, SEVERITY, DURATION, TIMING, CONTEXT, MODIFYING FACTORS, ASSOCIATED SIGNS AND SYMPTOMS)
The pt. is a 69 y/o female with a history of depression and anxiety which have gotten worse over the last month since the death of her therapist who she had been seeing for the last 20 years.  She feels that all of her life story has been lost which is compounded by the loss of both of her parents approximately seven years ago.  The pt. related that she has never been the same since they .  She feels that a great piece of herself was lost with them.   She has two prior inpatient psychiatric admissions and one prior suicide attempt by OD.  She has a medical history positive for HTN, HLD and nausea.  She had been using marijuana intermittently but recently stopped this because it was not helping her.  She admits to having been self medicating herself and using more than the prescribed dosages of klonopin, remeron, lexapro and melatonin.  She related in the ER that she was also taking klonopin.  The patient is a 70-year-old female, , retired , non-caregiver,  domiciled at home with her  with a history of depression and anxiety who presented with worsening symptoms and poor self-care over the last month since the death of her therapist who she had been seeing for the last 20 years. At the time of initial presentation, she related that she felt as though with the death of her therapist all of her life story had been lost which is compounded by the loss of both of her parents approximately seven years ago. The patient related that she has never been the same since they . She has had two prior inpatient psychiatric admissions and one prior suicide attempt by OD. She has a medical history positive for HTN, hyperlipidemia and nausea. She had been using marijuana intermittently but recently stopped this because it was not helping her. She admitted to self-medicating herself and using more than the prescribed dosages of Klonopin, Remeron, Lexapro and Melatonin. Patient reported passive SI of wanting to sleep and not wake up.

## 2021-05-21 NOTE — BH INPATIENT PSYCHIATRY DISCHARGE NOTE - NSDCMRMEDTOKEN_GEN_ALL_CORE_FT
clonazePAM 0.5 mg oral tablet: 1 tab(s) orally once a day MDD:1.5mg  clonazePAM 1 mg oral tablet: 1 tab(s) orally once a day MDD:1.5mg  hydroCHLOROthiazide 12.5 mg oral capsule: 1 cap(s) orally once a day MDD:12.5mg  losartan 100 mg oral tablet: 1 tab(s) orally once a day MDD:100mg  metoprolol tartrate 50 mg oral tablet: 1 tab(s) orally 2 times a day MDD:100mg  mirtazapine 30 mg oral tablet: 1 tab(s) orally once a day (at bedtime) MDD:30mg  ondansetron 4 mg oral tablet: 1 tab(s) orally once a day, As needed, Nausea  pantoprazole 40 mg oral delayed release tablet: 1 tab(s) orally once a day (before a meal) MDD:40mg  venlafaxine 75 mg oral capsule, extended release: 3 cap(s) orally once a day MDD:225mg

## 2021-05-21 NOTE — BH INPATIENT PSYCHIATRY PROGRESS NOTE - NSBHASSESSSUMMFT_PSY_ALL_CORE
The patient is a 70-year-old female, , retired (, non-caregiver domiciled at home with her  with a prior psychiatric diagnosis of ANSON and MDD, 2 previous inpatient psychiatric hospitalizations (most recently [facility] 2/2020), prior SA in 11/2019 by overdosing on pills, no history of NSSIB, no history of violence/aggression, no hx of legal issues, daily marijuana use, PMH of HTN, BIB self due to worsening depression and anxiety in the setting of death of long time therapist one month ago.    Patient less anxious today, is looking for discharge home on Monday, declined further med change. Appetite and sleep remain adequate. Patient denies active SI/HI.  Plan: Will c/w Venlafaxine ER 225mg, Mirtazapine 30mg at bedtime (offered to increase to 45mg but patient declined), and Clonazepam 1mg daily and 0.5mg at 3:00pm. Discharge planned for Monday 05/24.

## 2021-05-21 NOTE — BH INPATIENT PSYCHIATRY DISCHARGE NOTE - REASON FOR ADMISSION
70-year-old female with a history of depression and anxiety, admitted for worsening anxiety and inability to care for self over the last month since the death of her therapist who she had been seeing for the last 20 years.

## 2021-05-22 RX ADMIN — MIRTAZAPINE 30 MILLIGRAM(S): 45 TABLET, ORALLY DISINTEGRATING ORAL at 21:39

## 2021-05-22 RX ADMIN — Medication 0.5 MILLIGRAM(S): at 15:04

## 2021-05-22 RX ADMIN — LOSARTAN POTASSIUM 100 MILLIGRAM(S): 100 TABLET, FILM COATED ORAL at 08:14

## 2021-05-22 RX ADMIN — Medication 50 MILLIGRAM(S): at 21:39

## 2021-05-22 RX ADMIN — Medication 650 MILLIGRAM(S): at 14:19

## 2021-05-22 RX ADMIN — Medication 50 MILLIGRAM(S): at 08:14

## 2021-05-22 RX ADMIN — Medication 225 MILLIGRAM(S): at 08:14

## 2021-05-22 RX ADMIN — Medication 5 MILLIGRAM(S): at 21:40

## 2021-05-22 RX ADMIN — Medication 650 MILLIGRAM(S): at 13:17

## 2021-05-22 RX ADMIN — Medication 1 MILLIGRAM(S): at 08:15

## 2021-05-22 RX ADMIN — Medication 12.5 MILLIGRAM(S): at 08:14

## 2021-05-22 RX ADMIN — PANTOPRAZOLE SODIUM 40 MILLIGRAM(S): 20 TABLET, DELAYED RELEASE ORAL at 06:33

## 2021-05-23 RX ADMIN — PANTOPRAZOLE SODIUM 40 MILLIGRAM(S): 20 TABLET, DELAYED RELEASE ORAL at 06:47

## 2021-05-23 RX ADMIN — LOSARTAN POTASSIUM 100 MILLIGRAM(S): 100 TABLET, FILM COATED ORAL at 08:35

## 2021-05-23 RX ADMIN — Medication 5 MILLIGRAM(S): at 21:23

## 2021-05-23 RX ADMIN — Medication 50 MILLIGRAM(S): at 08:35

## 2021-05-23 RX ADMIN — Medication 12.5 MILLIGRAM(S): at 08:35

## 2021-05-23 RX ADMIN — Medication 1 MILLIGRAM(S): at 08:35

## 2021-05-23 RX ADMIN — MIRTAZAPINE 30 MILLIGRAM(S): 45 TABLET, ORALLY DISINTEGRATING ORAL at 21:23

## 2021-05-23 RX ADMIN — Medication 50 MILLIGRAM(S): at 21:23

## 2021-05-23 RX ADMIN — Medication 225 MILLIGRAM(S): at 08:35

## 2021-05-23 RX ADMIN — Medication 0.5 MILLIGRAM(S): at 14:59

## 2021-05-24 VITALS — TEMPERATURE: 97 F

## 2021-05-24 PROCEDURE — 90853 GROUP PSYCHOTHERAPY: CPT

## 2021-05-24 PROCEDURE — 99238 HOSP IP/OBS DSCHRG MGMT 30/<: CPT

## 2021-05-24 RX ADMIN — Medication 225 MILLIGRAM(S): at 09:11

## 2021-05-24 RX ADMIN — Medication 1 MILLIGRAM(S): at 09:11

## 2021-05-24 RX ADMIN — Medication 12.5 MILLIGRAM(S): at 09:11

## 2021-05-24 RX ADMIN — PANTOPRAZOLE SODIUM 40 MILLIGRAM(S): 20 TABLET, DELAYED RELEASE ORAL at 06:32

## 2021-05-24 RX ADMIN — LOSARTAN POTASSIUM 100 MILLIGRAM(S): 100 TABLET, FILM COATED ORAL at 09:12

## 2021-05-24 RX ADMIN — Medication 50 MILLIGRAM(S): at 09:11

## 2021-05-24 NOTE — BH PSYCHOLOGY - CLINICIAN PSYCHOTHERAPY NOTE - NSBHPSYCHOLNARRATIVE_PSY_A_CORE FT
The patient was seen individually today at the team's request and with her consent. She reported sustained progress over the weekend; she has had some anxiety but not a full-blown panic attack, palpitations or nausea. She does not experience somatic symptoms.    The patient is still uncertain of which treatment option to choose following discharge. She is still concerned that her private psychiatrist will change her medication regimen as he has voiced reservations about combining Remeron with Effexor. She also recalled that he was not readily available and discouraged her from going to the hospital when she was in crisis. Writer suggested that the discharge to the clinic may be smoother and afford her all the services (meds and therapy) in one system.     Patient is somewhat apprehensive about returning home being especially worried about conflict with her  in which her anxiety gets triggered. She reviewed some recent examples and showed insight about her role in precipitating escalations, particularly by persevering in checking, correcting and controlling his actions. We discussed ways she can be aware of that tendency in real time and find ways to let go at the moment in favor of a better relationship. Patient accepted support and encouragement and agreed to meet again.
Patient was seen individually at the team's request and with her consent. Speech was of normal rate and volume and there were no verbal irregularities. Patient's mood was upset and affect constricted and not labile. She was coherent and logical. Patient denied suicidal ideation.      Patient stated that she again had palpitations followed by panic and nausea. She is no longer scared that she is having a heart attack as she recognizes this pattern and is reassured when her vitals are measured. She again focused on her somatic symptoms and believes she is undergoing withdrawal from Lexapro despite her doctor's advice to the contrary. Patient acknowledged spontaneously that she is still being titrated up on the Effexor and that it is too early to  its effect. She expressed worry that she will never feel like her self again but did accept some reassurance and encouragement.     Patient recounted a significant argument with her  that lasted most of the weekend; she had insisted that he deposit money into their joint account (which he was supposed to do a week ago) in order for her rent check to the landlord not to bounce on Monday. He had resisted and had a more casual approach, arguing that he could do it on Sunday or later. The argument escalated as the patient grew angrier and more anxious until he finally took an Uber to the bank and made the deposit at the last minute before it closed. In the course of this quarrel they had both become verbally aggressive and hurtful. When her  said he was too upset to continue talking,  the patient feared being abandoned and started begging him to talk to her again. The patient reflected that this is their pattern when there is a disagreement - it reaches a crescendo of verbal attack followed by her need for reconciliation and appeasement to quiet her abandonment fear. (On a smaller scale this been enacted on the unit between the patient and nursing staff.) Patient showed some insight into the possibility that her recent exacerbation of panic and anxiety may be related to her unhappiness in the marriage and her thoughts about alternatives. We discussed the source of this unhappiness and the patient expressed her observation that her  has changed from being warm and affectionate to being cold and distanced.     Patient responded to support but still maintains a passive approach to treatment, feeling that she must only wait for the medication to work and resists more active efforts to manage her anxiety (such as interacting with others, participating in activities, challenging her assumptions). She admits that her preoccupation with panic only fosters it more but feels unable to switch gears. Nevertheless, patient did seem calmer overall than previously.
The patient was seen individually today at the team's request and with her consent. She was pleased to report the second consecutive day without sever anxiety. She still voices some apprehension about returning home and experiencing a relapse. The increased stay is helping to raise her confidence in the permanence of her gains. Her normal EKG also has reassured her that her symptoms were related to anxiety.     The patient is interested in attending the [facility] following discharge. She declined the STEP program, insisting that she has not had good experiences with group sessions and that need the “one-on-one” work with a therapist. She appeared calmer and less ruminative. She reported having to tolerate an obnoxious roommate and gets validation in this regard from other patients and staff. Patient accepted support and encouragement and agreed to meet again.  
Patient was seen individually at the team's request and with her consent. Speech was of normal rate and volume and there were no verbal irregularities. Patient's mood was distressed and affect fluctuated but was not labile. She was coherent and logical. Patient denied suicidal ideation.      Patient reported severe anxiety yesterday with “tingling and hot flashes” but no palpitations after the morning and no nausea. She again worried fleetingly about having a heart attack and about going home and experiencing the same symptoms. She is glad that her Ativan was switched to Klonopin since she felt that the Ativan wasn’t helping. Patient is currently at the target dose of Effexor and was encouraged to give it some time. She stated “if only I could have one good day!” Patient reviewed relaxation and distraction techniques and said she found Progressive Muscle Relaxation useful.    Patient reported better interactions with her  who has been expressing more support and affection and plans to visit her this weekend. She noted that during the entire three month period when he could not go into work because of the pandemic, they had a wonderful time at home together and she was anxiety free; the anxiety and panic returned when he resumed work. The connection between her anxiety and her feeling abandoned and alone was emphasized.     Patient voiced interest in finding a therapist upon discharge; she had not connected with anyone since the death of her long-time therapist a few months ago and does not want to [facility]. Patient noted that her mood was calm when she discussed details of her outpatient providers and she was encouraged to find ways of directing her thinking away from scanning for anxiety symptoms and towards benign or practical topics. Patient reported benefit and agreed to meet again.
Patient was seen individually at the team's request and with her consent. She was eager to talk. Speech was of normal rate and volume and there were no verbal irregularities. Patient's mood was anxious and affect constricted and not labile. She was coherent and logical. Patient denied suicidal ideation.      Patient reported experiencing a good evening last night and she was able to delay bedtime until 10:30. Nevertheless, she woke at 5:30 again and followed the routine outlined yesterday (protonix, followed by food 20 minutes later followed by Ativan one hour later if needed. She actually had to wake up to do each of these and then fell back to sleep and awoke with severe anxiety (no palpitations) 2 hours after the Ativan. The distress lasted until about 10am. She tried to reduce the anxiety using breathing, activity, showering and self-talk. Patient was praised for using behavioral approaches during the anxiety event and was encouraged to continue to find ways they can work. Patient observed that she can bring on symptoms and nausea if she focuses on distressing experiences - thus causing her to re-experience the same symptoms. (She was talking about an occasion where she fainted seeing her father code in the ICU and started geeting very anxious). She also commented that she is a "catastrophic thinker... I always think negatively" and she resisted the idea that she could modify that. Nevertheless, writer continued to propose that she can change the way she thinks about events and predicts the future.     Patient again required reassurance that she will be able to feel relatively  anxiety-free but she was slightly ess desperate. Patient was again appreciative of the session and is interested in meeting again. 
Patient was seen individually at the team's request and with her consent. She spoke at a normal rate and volume and there were no verbal irregularities. Patient's mood was good and affect was of normal range and mood congruent. She was coherent, logical and not labile. She was excited to report to this writer that she has had five consecutive days without panic and is looking forward to discharge on Monday 5/24/21.     She has a good appetite and good sleep and is optimistic about the future. She spoke about plans to catch up on bills, shopping, messages, etc. and looks forward to rejoining her friends and . Patient has been navigating interactions well with other patients. Writer reviewed some of the emotional hot points and related cognitions and coping strategies. She identified fears of loss/abandonment as prominent triggers for action, as when she was not able to reach her  on the phone for two hours in the evening. Patient acknowledged dysfunctional communications with her  and recognized the importance of de-escalation. She has received assurance from her  that he will try to avoid triggering her. She looks to her friends for contact and warmth. Patient showed insight into some of the origins of familiar problems. Patient accepted support and reported benefit from the sessions; she said she is committed to continuing with the treatment plan after discharge.
Patient was seen individually at the team's request and with her consent. She was relieved to meet with the writer who she remembered working with during her previous hospitalization. Speech was of normal rate and volume and there were no verbal  irregularities. Patient's mood was anxious/depressed and affect was constricted and mood congruent. She was coherent and logical  and touched on her recent distress, medication history, losses, marriage and support and affiliation needs. Patient  endorsed anhedonia, isolation, lethargy, sad mood, and anxiety but denied current suicidal ideation.     Patient reported that since her last hospitalization she had failed a series of medication trials and became increasingly depressed. A significant loss was the recent sudden death of her long-time psychotherapist (of 20 years) after which she felt that she would never be able to replace that helpful relationship. She started becoming "panicky" as soon as she woke and would frequently remain that way all day. She started taking increased doses of her medications to be able to feel less anxious.  She had become increasingly isolated from her friends.      Patient again emphasized the theme of nurturance and affection in her life and in her emotional regulation. She misses her parents intensely and knows "that they would be visiting and making [her] feel better" if they were alive. She referred to her pattern of conflict with her  who does not have patience for her moods although he supported her seeking treatment at this time and is supportive.     Patient recalled much of our earlier work including relaxation exercises, meditation and ways of de-escalating marital conflict. She was eager for encouragement but found it hard to accept an optimistic view. She resorts very rapidly to somatic concerns and to worries that a medical catastrophe will occur even though she knows this unlikely in light of her medical work-ups.     Patient was reminded  that prior hospitalizations were brief and highly effective. She recalled leaving the hospital feeling happy both  times but said she quickly had a bad reaction to the Cymbalta upon her last discharge. Patient was appreciative of the session and is interested in  meeting again. She was encouraged to continue to participate in therapeutic activities on the unit.   
Patient was seen individually at the team's request and with her consent. She spoke at a normal rate and volume and there were no verbal irregularities. Patient's mood was good and affect was of normal range and mood congruent. She was coherent, logical and not labile. She is looking forward to discharge with some caution but feels much improved.     Patient reviewed her improved mood over the past week and described better ability to regulate her emotions. Appetite and sleep are good. She discussed plans for the next week as well as leisure activities she anticipates and friends she will reconnect with. She is pleased that her previous clinic psychiatrist will be available.   Patient reflected on stressors she faced and coping behavior she has implemented and showed insight into interpersonal patterns. Goals in continued therapy include improved emotional regulation in response to conflict, better ability to back away from provocation and better communication and closeness in marriage. She accepted support and reported benefit from the sessions. 
The patient was seen individually today at the team's request and with her consent. She reported feeling calm the last few days except for a panic episode yesterday in response to a conversation with her private psychiatrist, but even that was not a full-blown panic attack. She is satisfied with the switch back to Klonopin feeling that it is more effective than Ativan for her. She is not experiencing somatic symptoms or nausea at this time and appears less focused on her bodily sensations.    The patient is concerned that her private psychiatrist will change her medication regimen which she feels is working well. She telephoned him and he said he "could not swear" that he won't and reportedly expressed his dislike for the combination of meds she is taking now. This concerned her and she is leaning toward returning to our clinic, hoping she could get a previous MD she liked (this was shared with te team). She also said she doesn’t care if her therapist is male as ?long as they have a “strong” voice.     Patient is thinking more about returning home, visualizing what that would feel like. She agreed that she seems to be turning the corner on the panic/nausea/anxiety ?episode but will feel more confident with a few calm days under her belt. ?She has also been visualizing her mother and getting urges to call her. These experiences make her feel good, as if her mother is nearby. Patient spoke about her role reversal when her parents became infirm and the capacity she had to take control of their care. This was reflected back to her as evidence of her own strengths. Patient accepted support and encouragement and agreed to meet again.
The patient was seen individually today at the team's request and with her consent. She feels that she has improved significantly since admission but remains anxious and worried about returning home. She had “panic” yesterday but it responded to medication. She reported that her MD is increasing her Effexor and she feels “discouraged” that she has taken so long to respond. She also expects to receive an echocardiogram to rule cardiac problems. She does not experience somatic symptoms other than palpitations.    The patient is leaning towards treatment at the [facility] following discharge. She is apprehensive about returning home, particularly focusing on her negative experiences when alone at home or when trying to sleep. She was able to catch herself “overthinking”, was able to “talk herself down” and was redirected to thoughts about things she is looking forward to resuming (such as her beach club membership). Patient accepted support and encouragement and agreed to meet again.
Patient was seen individually at the team's request and with her consent. She readily accompanied the writer to the group room to talk. Speech was of normal rate and volume and there were no verbal irregularities. Patient's mood was anxious and constricted affect with some tearfulness towards the end of the session when speaking about how she misses her parents. She was coherent and logical. Patient denied suicidal ideation.      Patient reported experiencing a good morning yesterday followed by high anxiety levels and vomiting last night and anxiety/palpitations this morning. She said that she used the relaxation breathing this morning and it helped a little. She was very preoccupied with her medications and worries that she may be having withdrawal symptoms from the prior antidepressant. Dr. Arguello was invited in for a few minutes at the patient’s request and clarified that she should take the protonix upon awakening followed by some food and if after an hour she needed Ativan she could request it. She was also encouraged to go to sleep later (10:30 instead of 9pm) so that it would be easier to sleep through to the morning.     Patient required a lot of reassurance that she will not remain in the hospital “forever” and that she will feel better. She cried as she stated that her parents always made her feel better even after horrible breakups with men and that she was always very sensitive to abandonment.     Patient was appreciative of the session and is interested in meeting again. She was encouraged to continue to participate in therapeutic activities on the unit.
Patient was seen individually at the team's request and with her consent for one last session before her planned discharge. Patient's mood was good and affect was of normal range and mood-congruent. She was coherent, logical and not labile.     Patient reported an anxious weekend, with a return of somatic worries (e.g., fear of cardiac problem despite normal recent findings). She acknowledged that she is worried about returning home since she has such vivid depressive and anxious associations to the setting. When she voiced this anxiety to her  he became concerned and told her that he hopes she only comes back when she is ready (reinforcing her doubts). Writer reviewed her steady and sustained progress and normalized her anxiety, advising her that would happen any time she is discharged home and the only way to feel confident is to step through it. She was reminded of her support network, her plan for continued treatment and her plans to structure her days.    Patient accepted support and was appreciative of her treatment.

## 2021-05-24 NOTE — BH INPATIENT PSYCHIATRY PROGRESS NOTE - NSBHFUPINTERVALHXFT_PSY_A_CORE
Patient seen for depression and anxiety. Chart reviewed and case discussed with interdisciplinary staff. No overnight event. On encounter, patient reports that she feels anxious this morning though she is looking forward to going home, denies chills/ tremors/ dizziness. Patient denies SI/HI. Appetite and sleep are adequate. She has been adherent to all medications while on the unit and has had no behavioral concerns.

## 2021-05-24 NOTE — BH PSYCHOLOGY - CLINICIAN PSYCHOTHERAPY NOTE - NSBHPSYCHOLDISCUSS_PSY_A_CORE
Discussion with collaborating staff took place since patient's last visit

## 2021-05-24 NOTE — BH INPATIENT PSYCHIATRY PROGRESS NOTE - NSTXCOPEPROGRES_PSY_ALL_CORE
Improving
No Change
Improving
No Change
Improving
Improving
No Change
Improving
No Change
Improving
Improving

## 2021-05-24 NOTE — BH INPATIENT PSYCHIATRY PROGRESS NOTE - NSTXDCOPLKPROGRES_PSY_ALL_CORE
No Change
Improving
No Change
No Change

## 2021-05-24 NOTE — BH INPATIENT PSYCHIATRY PROGRESS NOTE - NSTXANXGOAL_PSY_ALL_CORE
Report a reduction in panic attacks and improving mood and confidence
Identify and practice 3 coping skills to manage anxiety
Report a reduction in panic attacks and improving mood and confidence
Identify and practice 3 coping skills to manage anxiety
Report a reduction in panic attacks and improving mood and confidence
Report a reduction in panic attacks and improving mood and confidence
Identify and practice 3 coping skills to manage anxiety
Report a reduction in panic attacks and improving mood and confidence
Report a reduction in panic attacks and improving mood and confidence
Identify and practice 3 coping skills to manage anxiety
Identify and practice 3 coping skills to manage anxiety
Report a reduction in panic attacks and improving mood and confidence
Identify and practice 3 coping skills to manage anxiety
Identify and practice 3 coping skills to manage anxiety
Report a reduction in panic attacks and improving mood and confidence
Report a reduction in panic attacks and improving mood and confidence
Identify and practice 3 coping skills to manage anxiety
Identify and practice 3 coping skills to manage anxiety

## 2021-05-24 NOTE — BH INPATIENT PSYCHIATRY PROGRESS NOTE - NSTXDEPRESDATENEW_PSY_ALL_CORE
27-Apr-2021

## 2021-05-24 NOTE — BH PSYCHOLOGY - CLINICIAN PSYCHOTHERAPY NOTE - NSTXPROBANX_PSY_ALL_CORE
ANXIETY/PANIC/FEAR

## 2021-05-24 NOTE — BH PSYCHOLOGY - CLINICIAN PSYCHOTHERAPY NOTE - NSTXCOPEPROGRES_PSY_ALL_CORE
No Change
Improving
Improving
No Change
Improving
Met - goal discontinued
Improving
Improving

## 2021-05-24 NOTE — BH INPATIENT PSYCHIATRY PROGRESS NOTE - NSTXDEPRESGOAL_PSY_ALL_CORE
Report using a coping skill to overcome sadness and worry in order to socialize with peers daily
Exhibit improvements in self-grooming, hygiene, sleep and appetite
Report using a coping skill to overcome sadness and worry in order to socialize with peers daily
Exhibit improvements in self-grooming, hygiene, sleep and appetite
Report using a coping skill to overcome sadness and worry in order to socialize with peers daily
Exhibit improvements in self-grooming, hygiene, sleep and appetite
Report using a coping skill to overcome sadness and worry in order to socialize with peers daily
Exhibit improvements in self-grooming, hygiene, sleep and appetite
Report using a coping skill to overcome sadness and worry in order to socialize with peers daily
Exhibit improvements in self-grooming, hygiene, sleep and appetite
Report using a coping skill to overcome sadness and worry in order to socialize with peers daily
Report using a coping skill to overcome sadness and worry in order to socialize with peers daily
Exhibit improvements in self-grooming, hygiene, sleep and appetite
Exhibit improvements in self-grooming, hygiene, sleep and appetite

## 2021-05-24 NOTE — BH INPATIENT PSYCHIATRY PROGRESS NOTE - NSICDXBHSECONDARYDX_PSY_ALL_CORE
Borderline personality disorder   F60.3  
Depressive disorder   F32.9  Generalized anxiety disorder   F41.1  
Borderline personality disorder   F60.3  
Depressive disorder   F32.9  Generalized anxiety disorder   F41.1  
Borderline personality disorder   F60.3  
Borderline personality disorder   F60.3  
Depressive disorder   F32.9  Generalized anxiety disorder   F41.1  
Borderline personality disorder   F60.3  
Depressive disorder   F32.9  Generalized anxiety disorder   F41.1  
Borderline personality disorder   F60.3  
Depressive disorder   F32.9  Generalized anxiety disorder   F41.1  
Borderline personality disorder   F60.3  
Depressive disorder   F32.9  Generalized anxiety disorder   F41.1  
Borderline personality disorder   F60.3  
Borderline personality disorder   F60.3

## 2021-05-24 NOTE — BH INPATIENT PSYCHIATRY PROGRESS NOTE - NSTXPROBANX_PSY_ALL_CORE
ANXIETY/PANIC/FEAR

## 2021-05-24 NOTE — BH INPATIENT PSYCHIATRY PROGRESS NOTE - NSBHMETABOLIC_PSY_ALL_CORE_FT
BMI: BMI (kg/m2): 37.2 (04-26-21 @ 14:07)  HbA1c: A1C with Estimated Average Glucose: 5.6 % (08-26-20 @ 09:00)   BP: 132/54 (04-25-21 @ 10:17) (132/54 - 132/54)  Lipid Panel: Date/Time: 08-26-20 @ 09:00  Cholesterol, Serum: 254  Direct LDL: 195  HDL Cholesterol, Serum: 42  Triglycerides, Serum: 215  
BMI: BMI (kg/m2): 37.2 (04-26-21 @ 14:07)  HbA1c: A1C with Estimated Average Glucose: 5.6 % (08-26-20 @ 09:00)    Glucose:   BP: 122/63 (05-17-21 @ 20:48) (122/63 - 122/63)  Lipid Panel: Date/Time: 08-26-20 @ 09:00  Cholesterol, Serum: 254  Direct LDL: 195  HDL Cholesterol, Serum: 42  Total Cholesterol/HDL Ration Measurement: --  Triglycerides, Serum: 215  
BMI: BMI (kg/m2): 37.2 (04-26-21 @ 14:07)  HbA1c: A1C with Estimated Average Glucose: 5.6 % (08-26-20 @ 09:00)   BP: 132/54 (04-25-21 @ 10:17) (132/54 - 132/54)  Lipid Panel: Date/Time: 08-26-20 @ 09:00  Cholesterol, Serum: 254  Direct LDL: 195  HDL Cholesterol, Serum: 42  Triglycerides, Serum: 215  
BMI: BMI (kg/m2): 37.2 (04-26-21 @ 14:07)  HbA1c: A1C with Estimated Average Glucose: 5.6 % (08-26-20 @ 09:00)  BP: 155/79 (05-02-21 @ 08:02) (135/76 - 155/79)  Lipid Panel: Date/Time: 08-26-20 @ 09:00  Cholesterol, Serum: 254  Direct LDL: 195  HDL Cholesterol, Serum: 42  Triglycerides, Serum: 215  
BMI: BMI (kg/m2): 37.2 (04-26-21 @ 14:07)  HbA1c: A1C with Estimated Average Glucose: 5.6 % (08-26-20 @ 09:00)  BP: 127/78 (05-10-21 @ 21:27) (127/78 - 144/74)  Lipid Panel: Date/Time: 08-26-20 @ 09:00  Cholesterol, Serum: 254  Direct LDL: 195  HDL Cholesterol, Serum: 42  Total Cholesterol/HDL Ration Measurement: --  Triglycerides, Serum: 215  
BMI: BMI (kg/m2): 37.2 (04-26-21 @ 14:07)  HbA1c: A1C with Estimated Average Glucose: 5.6 % (08-26-20 @ 09:00)  Lipid Panel: Date/Time: 08-26-20 @ 09:00  Cholesterol, Serum: 254  Direct LDL: 195  HDL Cholesterol, Serum: 42  Total Cholesterol/HDL Ration Measurement: --  Triglycerides, Serum: 215  
BMI: BMI (kg/m2): 36.3 (04-22-21 @ 15:25)  HbA1c: A1C with Estimated Average Glucose: 5.6 % (08-26-20 @ 09:00)    Glucose:   BP: 96/64 (04-22-21 @ 20:34) (96/64 - 162/78)  Lipid Panel: Date/Time: 08-26-20 @ 09:00  Cholesterol, Serum: 254  Direct LDL: 195  HDL Cholesterol, Serum: 42  Total Cholesterol/HDL Ration Measurement: --  Triglycerides, Serum: 215  
BMI: BMI (kg/m2): 37.2 (04-26-21 @ 14:07)  HbA1c: A1C with Estimated Average Glucose: 5.6 % (08-26-20 @ 09:00)   BP: 155/79 (05-02-21 @ 08:02) (155/79 - 155/79)  Lipid Panel: Date/Time: 08-26-20 @ 09:00  Cholesterol, Serum: 254  Direct LDL: 195  HDL Cholesterol, Serum: 42  Total Cholesterol/HDL Ration Measurement: --  Triglycerides, Serum: 215  
BMI: BMI (kg/m2): 37.2 (04-26-21 @ 14:07)  HbA1c: A1C with Estimated Average Glucose: 5.6 % (08-26-20 @ 09:00)   BP: 128/63 (05-19-21 @ 20:12) (122/63 - 128/63)  Lipid Panel: Date/Time: 08-26-20 @ 09:00  Cholesterol, Serum: 254  Direct LDL: 195  HDL Cholesterol, Serum: 42  Total Cholesterol/HDL Ration Measurement: --  Triglycerides, Serum: 215  
BMI: BMI (kg/m2): 37.2 (04-26-21 @ 14:07)  HbA1c: A1C with Estimated Average Glucose: 5.6 % (08-26-20 @ 09:00)  BP: 121/74 (05-04-21 @ 20:24) (121/74 - 121/74)  Lipid Panel: Date/Time: 08-26-20 @ 09:00  Cholesterol, Serum: 254  Direct LDL: 195  HDL Cholesterol, Serum: 42  Total Cholesterol/HDL Ration Measurement: --  Triglycerides, Serum: 215  
BMI: BMI (kg/m2): 37.2 (04-26-21 @ 14:07)  HbA1c: A1C with Estimated Average Glucose: 5.6 % (08-26-20 @ 09:00)    Glucose:   BP: 122/63 (05-17-21 @ 20:48) (122/63 - 122/63)  Lipid Panel: Date/Time: 08-26-20 @ 09:00  Cholesterol, Serum: 254  Direct LDL: 195  HDL Cholesterol, Serum: 42  Total Cholesterol/HDL Ration Measurement: --  Triglycerides, Serum: 215  
BMI: BMI (kg/m2): 37.2 (04-26-21 @ 14:07)  HbA1c: A1C with Estimated Average Glucose Result: 5.5 % (05-21-21 @ 09:47)  BP: 130/64 (05-22-21 @ 20:25) (130/64 - 130/64)  Lipid Panel: Date/Time: 05-21-21 @ 09:47  Cholesterol, Serum: 279  HDL Cholesterol, Serum: 40  Triglycerides, Serum: 214  
BMI: BMI (kg/m2): 37.2 (04-26-21 @ 14:07)  HbA1c: A1C with Estimated Average Glucose: 5.6 % (08-26-20 @ 09:00)  BP: 144/74 (05-10-21 @ 08:18) (112/66 - 144/74)  Lipid Panel: Date/Time: 08-26-20 @ 09:00  Cholesterol, Serum: 254  Direct LDL: 195  HDL Cholesterol, Serum: 42  Triglycerides, Serum: 215  
BMI: BMI (kg/m2): 37.2 (04-26-21 @ 14:07)  HbA1c: A1C with Estimated Average Glucose Result: 5.5 % (05-21-21 @ 09:47)   BP: 128/63 (05-19-21 @ 20:12) (128/63 - 128/63)  Lipid Panel: Date/Time: 05-21-21 @ 09:47  Cholesterol, Serum: 279  HDL Cholesterol, Serum: 40  Triglycerides, Serum: 214  
BMI: BMI (kg/m2): 36.3 (04-22-21 @ 15:25)  HbA1c: A1C with Estimated Average Glucose: 5.6 % (08-26-20 @ 09:00)    Glucose:   BP: 96/64 (04-22-21 @ 20:34) (96/64 - 170/82)  Lipid Panel: Date/Time: 08-26-20 @ 09:00  Cholesterol, Serum: 254  Direct LDL: 195  HDL Cholesterol, Serum: 42  Total Cholesterol/HDL Ration Measurement: --  Triglycerides, Serum: 215  
BMI: BMI (kg/m2): 36.3 (04-22-21 @ 15:25)  HbA1c: A1C with Estimated Average Glucose: 5.6 % (08-26-20 @ 09:00)   BP: 96/64 (04-22-21 @ 20:34) (96/64 - 170/82)  Lipid Panel: Date/Time: 08-26-20 @ 09:00  Cholesterol, Serum: 254  Direct LDL: 195  HDL Cholesterol, Serum: 42  Triglycerides, Serum: 215  
BMI: BMI (kg/m2): 37.2 (04-26-21 @ 14:07)  HbA1c: A1C with Estimated Average Glucose: 5.6 % (08-26-20 @ 09:00)  Lipid Panel: Date/Time: 08-26-20 @ 09:00  Cholesterol, Serum: 254  Direct LDL: 195  HDL Cholesterol, Serum: 42  Triglycerides, Serum: 215  
BMI: BMI (kg/m2): 37.2 (04-26-21 @ 14:07)  HbA1c: A1C with Estimated Average Glucose: 5.6 % (08-26-20 @ 09:00)  BP: 121/74 (05-04-21 @ 20:24) (121/74 - 121/74)  Lipid Panel: Date/Time: 08-26-20 @ 09:00  Cholesterol, Serum: 254  Direct LDL: 195  HDL Cholesterol, Serum: 42  Triglycerides, Serum: 215  
BMI: BMI (kg/m2): 37.2 (04-26-21 @ 14:07)  HbA1c: A1C with Estimated Average Glucose: 5.6 % (08-26-20 @ 09:00)  BP: 132/54 (04-25-21 @ 10:17) (132/54 - 132/54)  Lipid Panel: Date/Time: 08-26-20 @ 09:00  Cholesterol, Serum: 254  Direct LDL: 195  HDL Cholesterol, Serum: 42  Total Cholesterol/HDL Ration Measurement: --  Triglycerides, Serum: 215  
BMI: BMI (kg/m2): 37.2 (04-26-21 @ 14:07)  HbA1c: A1C with Estimated Average Glucose: 5.6 % (08-26-20 @ 09:00)    Glucose:   BP: --  Lipid Panel: Date/Time: 08-26-20 @ 09:00  Cholesterol, Serum: 254  Direct LDL: 195  HDL Cholesterol, Serum: 42  Total Cholesterol/HDL Ration Measurement: --  Triglycerides, Serum: 215  
BMI: BMI (kg/m2): 37.2 (04-26-21 @ 14:07)  HbA1c: A1C with Estimated Average Glucose: 5.6 % (08-26-20 @ 09:00)    Glucose:   BP: --  Lipid Panel: Date/Time: 08-26-20 @ 09:00  Cholesterol, Serum: 254  Direct LDL: 195  HDL Cholesterol, Serum: 42  Total Cholesterol/HDL Ration Measurement: --  Triglycerides, Serum: 215  
BMI: BMI (kg/m2): 37.2 (04-26-21 @ 14:07)  HbA1c: A1C with Estimated Average Glucose: 5.6 % (08-26-20 @ 09:00)  BP: 127/78 (05-10-21 @ 21:27) (127/78 - 144/74)  Lipid Panel: Date/Time: 08-26-20 @ 09:00  Cholesterol, Serum: 254  Direct LDL: 195  HDL Cholesterol, Serum: 42  Triglycerides, Serum: 215  
BMI: BMI (kg/m2): 37.2 (04-26-21 @ 14:07)  HbA1c: A1C with Estimated Average Glucose: 5.6 % (08-26-20 @ 09:00)   BP: 127/78 (05-10-21 @ 21:27) (127/78 - 127/78)  Lipid Panel: Date/Time: 08-26-20 @ 09:00  Cholesterol, Serum: 254  Direct LDL: 195  HDL Cholesterol, Serum: 42  Total Cholesterol/HDL Ration Measurement: --  Triglycerides, Serum: 215  
BMI: BMI (kg/m2): 37.2 (04-26-21 @ 14:07)  HbA1c: A1C with Estimated Average Glucose: 5.6 % (08-26-20 @ 09:00)  BP: 121/74 (05-04-21 @ 20:24) (121/74 - 121/74)  Lipid Panel: Date/Time: 08-26-20 @ 09:00  Cholesterol, Serum: 254  Direct LDL: 195  HDL Cholesterol, Serum: 42  Triglycerides, Serum: 215

## 2021-05-24 NOTE — BH PSYCHOLOGY - CLINICIAN PSYCHOTHERAPY NOTE - NSBHPSYCHOLPARTICIP_PSY_A_CORE
Partially engaged
Partially engaged
Fully engaged
Fully engaged
Partially engaged
Fully engaged

## 2021-05-24 NOTE — BH INPATIENT PSYCHIATRY PROGRESS NOTE - NSBHASSESSSUMMFT_PSY_ALL_CORE
The patient is a 70-year-old female, , retired (, non-caregiver domiciled at home with her  with a prior psychiatric diagnosis of ANSON and MDD, 2 previous inpatient psychiatric hospitalizations (most recently [facility] 2/2020), prior SA in 11/2019 by overdosing on pills, no history of NSSIB, no history of violence/aggression, no hx of legal issues, daily marijuana use, PMH of HTN, BIB self due to worsening depression and anxiety in the setting of death of long time therapist one month ago.    Patient appears anxious today, though is looking forward to returning home. Appetite and sleep remain adequate. Patient denies active SI/HI and feels safe returning home. Outpatient f/u appointment scheduled for 5/26. Discharge plan explained and patient expressed understanding of the discharge plan. Patient was provided with a 30-day supply of medications, extensive psychoeducation on treatment options and motivational counseling targeting healthy lifestyle. Patient was instructed on actions for crisis situations, understood and agreed to follow instructions for handling crisis, including going to ER or calling 911.  Plan: Will c/w Venlafaxine ER 225mg, Mirtazapine 30mg at bedtime, and Clonazepam 1mg daily and 0.5mg at 3:00pm. Discharge planned for later this morning.

## 2021-05-24 NOTE — BH PSYCHOLOGY - CLINICIAN PSYCHOTHERAPY NOTE - NSTXDCOPLKDATEEST_PSY_ALL_CORE
23-Apr-2021
06-May-2021
23-Apr-2021
06-May-2021

## 2021-05-24 NOTE — BH PSYCHOLOGY - CLINICIAN PSYCHOTHERAPY NOTE - NSTXANXDATETRGT_PSY_ALL_CORE
12-May-2021
12-May-2021
26-May-2021
30-Apr-2021
05-May-2021
12-May-2021
19-May-2021
19-May-2021
26-May-2021
05-May-2021
05-May-2021
26-May-2021

## 2021-05-24 NOTE — BH INPATIENT PSYCHIATRY PROGRESS NOTE - NSBHADMITMEDEDUDETAILS_PSY_A_CORE FT
Discussed with patient

## 2021-05-24 NOTE — BH PSYCHOLOGY - CLINICIAN PSYCHOTHERAPY NOTE - NSTXPROBCOPE_PSY_ALL_CORE
COPING, INEFFECTIVE

## 2021-05-24 NOTE — BH INPATIENT PSYCHIATRY PROGRESS NOTE - NSTXDEPRESINTERMD_PSY_ALL_CORE
Will c/w Mirtazapine and Venlafaxine. Will titrate dose based on response. 
Will c/w Mirtazapine and Venlafaxine. Will titrate dose based on response. 
Will c/w Mirtazapine and Venlafaxine.  
Will c/w Mirtazapine and Venlafaxine.  
Will c/w Mirtazapine and Venlafaxine. Will titrate dose based on response. 
Will c/w Mirtazapine and Lexapro. 
Will c/w Mirtazapine and Lexapro. 
Will c/w Mirtazapine and Venlafaxine.  
Will c/w Mirtazapine and Venlafaxine. Will titrate dose based on response. 
Will c/w Mirtazapine and Lexapro. 
Will c/w Mirtazapine and Venlafaxine. Will titrate dose based on response. 
Will c/w Mirtazapine and Lexapro. 
Will c/w Mirtazapine and Venlafaxine. Will titrate dose based on response. 
Will c/w Mirtazapine and Venlafaxine. Will titrate dose based on response. 
Will c/w Mirtazapine and Lexapro. 
Will c/w Mirtazapine and Venlafaxine. Will titrate dose based on response.

## 2021-05-24 NOTE — BH PSYCHOLOGY - CLINICIAN PSYCHOTHERAPY NOTE - NSTXDEPRESDATENEW_PSY_ALL_CORE
27-Apr-2021

## 2021-05-24 NOTE — BH PSYCHOLOGY - CLINICIAN PSYCHOTHERAPY NOTE - NSTXDEPRESPROGRES_PSY_ALL_CORE
Improving
Met - goal discontinued
Improving
Met - goal discontinued
Met - goal discontinued
Improving
Improving

## 2021-05-24 NOTE — BH INPATIENT PSYCHIATRY PROGRESS NOTE - NSBHCONSBHPROVDETAILS_PSY_A_CORE  FT
Contact made with Dr. Williamson at [phone] who reports that patient has chronic somatic symptoms and he has diagnosed her with cluster-b personality disorder. Patient reportedly was doing well on a combination of venlafaxine and Clonazepam until a couple of months ago. 
Contact made with Dr. Williamson at [phone] who reports that patient has chronic somatic symptoms and he has diagnosed her with cluster-b personality disorder. Patient reportedly was doing well on a combination of venlafaxine and Clonazepam until a couple of months ago. 
Contact made with Dr. Williamson at [phone] who reports that patient has chronic somatic symptoms and he has diagnosed her with cluster-b personality disorder. Patient reportedly was doing well on a combination of venlafaxine and Clonazepam until a couple of months ago. 
Contact made with Dr. Williamson at [phone] who reports that patient has chronic somatic symptoms and he has diagnosed her with cluster-b personality disorder. Patient reportedly was doing well on a combination of venlafaxine and Clonazepam until a couple of months ago. 
Contact made with Dr. Williamson at [phone] who reports that patient has chronic somatic symptoms and he has diagnosed her with cluster-b personality disorder. Patient reportedly was doing well on a combination of venlafaxine and Clonazepam until a couple of months ago. 
Contact made with Dr. Williamson at [phone] who reports that patient has chronic somatic symptoms and he has diagnosed her with cluster-b personality disorder. Patient reportedly was doing well on a combination of venlafaxine and Clonazepam until a couple of months ago. 
Contact made with Dr. Williamson at [phone] who reports that patient has chronic somatic symptoms and he has diagnosed her with cluster-b personality disorder. Patient reportedly was doing well on a combination of venlafaxine and Clonazepam until a couple of months ago. 
Contact made with Dr. Williamson at [phone] who reports that patient has chronic somatic symptoms and he has diagnosed her with cluster-b personality disorder. Patient reportedly was doing well on a combination of venlafaxine and Clonazepam until a couple of months ago. 
Contact made with Dr. Williamson at [phone] who reports that patient has chronic somatic symptoms and he has diagnosed her with cluster-b personality disorder. Patient reportedly was doing well on a combination of venlafaxine and Clonazepam until a couple of months ago. 
Contact made with Dr. Williamson at [phone] who reports that patient has chronic somatic symptoms and he has diagnosed her with cluster-b personality disorder. Patient reportedly was doing well on a combination of venlafaxine and Clonazepam until a couple of months ago. 
Contact made with Dr. Williamson at [phone] who reports that patient has chronic somatic symptoms and he has diagnosed her with cluster-b personality disorder. Patient reportedly was doing well on a combination of venlafaxine and Clonazepam until a couple of months ago. 
Contact made with Dr. Williamson at [phone] who reports that patient has chronic somatic symptoms and he has diagnosed her with cluster-b personality disorder. Patient reportedly was doing well on a combination of venlafaxine and Clonazepam until a couple of months ago. 
Contact made with Dr. Williamson at [phone] who reports that patient has chronic somatic symptoms and he has diagnosed her with cluster-b personality disorder. Patient reportedly was doing well on a combination of venlafaxine and Clonazepam until a couple of months ago. 
Contact made with Dr. Williamson at [phone] who reports that patient has chronic somatic symptoms and he has diagnosed her with cluster-b personality disorder. Patient reportedly was doing well on a combination of venlafaxine and Clonazepam until a couple of months ago. 
Contact made with Dr. Williamson at [phone] who reports that patient has chronic somatic symptoms and he has diagnosed her with cluster-b personality disorder. Patient reportedly was doing well on a combination of venlafaxine and Clonazepam until a couple of months ago. 
Contact made with Dr. Williamson at [phone] who reports that patient has chronic somatic symptoms and he has diagnosed her with cluster-B personality disorder. Patient reportedly was doing well on a combination of venlafaxine and Clonazepam until a couple of months ago. 
Contact made with Dr. Williamson at [phone] who reports that patient has chronic somatic symptoms and he has diagnosed her with cluster-b personality disorder. Patient reportedly was doing well on a combination of venlafaxine and Clonazepam until a couple of months ago. 
Contact made with Dr. Williamson at [phone] who reports that patient has chronic somatic symptoms and he has diagnosed her with cluster-b personality disorder. Patient reportedly was doing well on a combination of venlafaxine and Clonazepam until a couple of months ago. 
Contact made with Dr. Williamson at [phone] who reports that patient has chronic somatic symptoms and he has diagnosed her with cluster-b personality disorder. Patient reportedly was doing well on a combination of venlafaxine and Clonazepam until a couple of months ago. 
Contact made with Dr. Williamson at [phone] who reports that patient has chronic somatic symptoms and he has diagnosed her with cluster-b personality disorder. Patient reportedly was doing well on a combination of venlafaxine and Clonazepam until a couple of months ago. 
Contact made with Dr. Williamson at [phone] who reports that patient has chronic somatic symptoms and he has diagnosed her with cluster-b personality disorder. Patient reportedly was doing well on a combination of venlafaxine and Clonazepam until a couple of months ago. 
Contact made with Dr. Williamson at [phone] who reports that patient has chronic somatic symptoms and he has diagnosed her with cluster-b personality disorder. Patient reportedly was doing well on a combination of venlafaxine and Clonazepam until a couple of months ago. 
Contact made with Dr. Williamson at [phone] who reports that patient has chronic somatic symptoms and he has diagnosed her with cluster-b personality disorder. Patient reportedly was doing well on a combination of venlafaxine and Clonazepam until a couple of months ago. 
Contact made with Dr. Williamson at [phone] who reports that patient has chronic somatic symptoms and he has diagnosed her with cluster-b personality disorder. Patient reportedly was doing well on a combination of venlafaxine and Clonazepam until a couple of months ago.

## 2021-05-24 NOTE — BH PSYCHOLOGY - CLINICIAN PSYCHOTHERAPY NOTE - NSTXDCOPLKDATETRGT_PSY_ALL_CORE
13-May-2021
13-May-2021
30-Apr-2021
21-May-2021
13-May-2021
30-Apr-2021
13-May-2021
21-May-2021
21-May-2021

## 2021-05-24 NOTE — BH PSYCHOLOGY - CLINICIAN PSYCHOTHERAPY NOTE - NSTXCOPEGOAL_PSY_ALL_CORE
Identify and utilize 2 coping skills that meet their needs

## 2021-05-24 NOTE — BH PSYCHOLOGY - CLINICIAN PSYCHOTHERAPY NOTE - NSTXDEPRESGOAL_PSY_ALL_CORE
Report using a coping skill to overcome sadness and worry in order to socialize with peers daily
Report using a coping skill to overcome sadness and worry in order to socialize with peers daily
Exhibit improvements in self-grooming, hygiene, sleep and appetite
Report using a coping skill to overcome sadness and worry in order to socialize with peers daily
Exhibit improvements in self-grooming, hygiene, sleep and appetite
Exhibit improvements in self-grooming, hygiene, sleep and appetite
Report using a coping skill to overcome sadness and worry in order to socialize with peers daily
Exhibit improvements in self-grooming, hygiene, sleep and appetite
Report using a coping skill to overcome sadness and worry in order to socialize with peers daily
Exhibit improvements in self-grooming, hygiene, sleep and appetite

## 2021-05-24 NOTE — BH INPATIENT PSYCHIATRY PROGRESS NOTE - NSTXPROBDCOPLK_PSY_ALL_CORE
DISCHARGE ISSUE - LACK OF APPROPRIATE OUTPATIENT SERVICES

## 2021-05-24 NOTE — BH PSYCHOLOGY - CLINICIAN PSYCHOTHERAPY NOTE - NSBHPTASSESSDT_PSY_A_CORE
24-May-2021 10:40
07-May-2021 11:10
03-May-2021 11:40
29-Apr-2021 11:10
05-May-2021 15:30
19-May-2021 11:30
14-May-2021 10:45
10-May-2021 11:45
30-Apr-2021 11:00
27-Apr-2021 14:00
21-May-2021 10:40
12-May-2021 13:35

## 2021-05-24 NOTE — BH INPATIENT PSYCHIATRY PROGRESS NOTE - NSBHCHARTREVIEWLAB_PSY_A_CORE FT
04-22    139  |  100  |  10  ----------------------------<  129<H>  3.7   |  27  |  0.74      

## 2021-05-24 NOTE — BH INPATIENT PSYCHIATRY PROGRESS NOTE - NSBHFUPINTERVALCCFT_PSY_A_CORE
Patient seen for depression, anxiety disorder.
"I felt my heart beating again this morning "
"I feel sweaty and I have diarrhea some times, but I sleep better"
"I feel better in the morning"
"I'm feeling a little dizzy this morning, I think it's the Gabapentin"
"I feel better today"
"I'm feeling a nauseous"
"I'm feel a little anxious this morning"
"I'm so anxious"
"I'm still anxious but I'm ready to go home"
"I just feel so anxious"
"I did not have q good weekend"
"I'm feeling very anxious"
"I just feel so anxious, the Ativan is not working for me"
"I feel better in the morning"
"I feel better in the morning, I think I'll be ready to go home soon"
"I feel better today, that's two days in row"
"I felt my heart pounding again this morning "
"I feel better today but I'm afraid it might not last "
"I'm still feel very anxious"
"I feel a little better, "
"I feel anxious this morning"
"I'm not having a good morning"
"I feel a little better"

## 2021-05-24 NOTE — BH INPATIENT PSYCHIATRY PROGRESS NOTE - CURRENT MEDICATION
MEDICATIONS  (STANDING):  clonazePAM  Tablet 0.5 milliGRAM(s) Oral <User Schedule>  clonazePAM  Tablet 1 milliGRAM(s) Oral daily  hydrochlorothiazide 12.5 milliGRAM(s) Oral daily  losartan 100 milliGRAM(s) Oral daily  metoprolol tartrate 50 milliGRAM(s) Oral two times a day  mirtazapine 30 milliGRAM(s) Oral <User Schedule>  pantoprazole    Tablet 40 milliGRAM(s) Oral before breakfast  venlafaxine  milliGRAM(s) Oral daily    MEDICATIONS  (PRN):  acetaminophen   Tablet .. 650 milliGRAM(s) Oral every 6 hours PRN Temp greater or equal to 38C (100.4F), Mild Pain (1 - 3), Moderate Pain (4 - 6), Severe Pain (7 - 10)  aluminum hydroxide/magnesium hydroxide/simethicone Suspension 30 milliLiter(s) Oral every 4 hours PRN Dyspepsia  BACItracin   Ointment 1 Application(s) Topical two times a day PRN skin abrasion  melatonin. 5 milliGRAM(s) Oral at bedtime PRN Insomnia  ondansetron    Tablet 4 milliGRAM(s) Oral daily PRN Nausea  
MEDICATIONS  (STANDING):  escitalopram 10 milliGRAM(s) Oral <User Schedule>  gabapentin 100 milliGRAM(s) Oral <User Schedule>  hydrochlorothiazide 12.5 milliGRAM(s) Oral daily  LORazepam Tablet 0.5 milliGRAM(s) Oral two times a day  losartan 100 milliGRAM(s) Oral daily  metoprolol tartrate 50 milliGRAM(s) Oral two times a day  mirtazapine 7.5 milliGRAM(s) Oral at bedtime    MEDICATIONS  (PRN):  aluminum hydroxide/magnesium hydroxide/simethicone Suspension 30 milliLiter(s) Oral every 4 hours PRN Dyspepsia  melatonin. 5 milliGRAM(s) Oral at bedtime PRN Insomnia  ondansetron Tablet 4 milliGRAM(s) Oral daily PRN Nausea  
MEDICATIONS  (STANDING):  clonazePAM Tablet 0.5 milliGRAM(s) Oral <User Schedule>  clonazePAM Tablet 1 milliGRAM(s) Oral daily  hydrochlorothiazide 12.5 milliGRAM(s) Oral daily  losartan 100 milliGRAM(s) Oral daily  metoprolol tartrate 50 milliGRAM(s) Oral two times a day  mirtazapine 30 milliGRAM(s) Oral <User Schedule>  pantoprazole Tablet 40 milliGRAM(s) Oral before breakfast    MEDICATIONS  (PRN):  acetaminophen Tablet 650 milliGRAM(s) Oral every 6 hours PRN Temp greater or equal to 38C (100.4F), Mild Pain (1 - 3), Moderate Pain (4 - 6), Severe Pain (7 - 10)  aluminum hydroxide/magnesium hydroxide/simethicone Suspension 30 milliLiter(s) Oral every 4 hours PRN Dyspepsia  BACItracin Ointment 1 Application(s) Topical two times a day PRN skin abrasion  melatonin. 5 milliGRAM(s) Oral at bedtime PRN Insomnia  ondansetron Tablet 4 milliGRAM(s) Oral daily PRN Nausea  
MEDICATIONS  (STANDING):  clonazePAM  Tablet 1 milliGRAM(s) Oral daily  hydrochlorothiazide 12.5 milliGRAM(s) Oral daily  losartan 100 milliGRAM(s) Oral daily  metoprolol tartrate 50 milliGRAM(s) Oral two times a day  mirtazapine 30 milliGRAM(s) Oral <User Schedule>  pantoprazole    Tablet 40 milliGRAM(s) Oral before breakfast  venlafaxine  milliGRAM(s) Oral daily    MEDICATIONS  (PRN):  acetaminophen   Tablet .. 650 milliGRAM(s) Oral every 6 hours PRN Temp greater or equal to 38C (100.4F), Mild Pain (1 - 3), Moderate Pain (4 - 6), Severe Pain (7 - 10)  aluminum hydroxide/magnesium hydroxide/simethicone Suspension 30 milliLiter(s) Oral every 4 hours PRN Dyspepsia  BACItracin   Ointment 1 Application(s) Topical two times a day PRN skin abrasion  clonazePAM  Tablet 1 milliGRAM(s) Oral every 24 hours PRN Anxiety  melatonin. 5 milliGRAM(s) Oral at bedtime PRN Insomnia  ondansetron    Tablet 4 milliGRAM(s) Oral daily PRN Nausea  
MEDICATIONS  (STANDING):  escitalopram 10 milliGRAM(s) Oral <User Schedule>  hydrochlorothiazide 12.5 milliGRAM(s) Oral daily  LORazepam Tablet 0.5 milliGRAM(s) Oral <User Schedule>  losartan 100 milliGRAM(s) Oral daily  metoprolol tartrate 50 milliGRAM(s) Oral two times a day  mirtazapine 15 milliGRAM(s) Oral at bedtime    MEDICATIONS  (PRN):  acetaminophen   Tablet .. 650 milliGRAM(s) Oral every 6 hours PRN Temp greater or equal to 38C (100.4F), Mild Pain (1 - 3), Moderate Pain (4 - 6), Severe Pain (7 - 10)  aluminum hydroxide/magnesium hydroxide/simethicone Suspension 30 milliLiter(s) Oral every 4 hours PRN Dyspepsia  BACItracin   Ointment 1 Application(s) Topical two times a day PRN skin abrasion  melatonin. 5 milliGRAM(s) Oral at bedtime PRN Insomnia  ondansetron    Tablet 4 milliGRAM(s) Oral daily PRN Nausea  
MEDICATIONS  (STANDING):  hydrochlorothiazide 12.5 milliGRAM(s) Oral daily  LORazepam     Tablet 0.5 milliGRAM(s) Oral <User Schedule>  losartan 100 milliGRAM(s) Oral daily  metoprolol tartrate 50 milliGRAM(s) Oral two times a day  mirtazapine 15 milliGRAM(s) Oral <User Schedule>  pantoprazole    Tablet 40 milliGRAM(s) Oral before breakfast  venlafaxine XR 37.5 milliGRAM(s) Oral once  venlafaxine .5 milliGRAM(s) Oral daily    MEDICATIONS  (PRN):  acetaminophen   Tablet .. 650 milliGRAM(s) Oral every 6 hours PRN Temp greater or equal to 38C (100.4F), Mild Pain (1 - 3), Moderate Pain (4 - 6), Severe Pain (7 - 10)  aluminum hydroxide/magnesium hydroxide/simethicone Suspension 30 milliLiter(s) Oral every 4 hours PRN Dyspepsia  BACItracin   Ointment 1 Application(s) Topical two times a day PRN skin abrasion  LORazepam     Tablet 0.5 milliGRAM(s) Oral three times a day PRN Anxiety  melatonin. 5 milliGRAM(s) Oral at bedtime PRN Insomnia  ondansetron    Tablet 4 milliGRAM(s) Oral daily PRN Nausea  
MEDICATIONS  (STANDING):  hydrochlorothiazide 12.5 milliGRAM(s) Oral daily  LORazepam     Tablet 0.5 milliGRAM(s) Oral <User Schedule>  losartan 100 milliGRAM(s) Oral daily  metoprolol tartrate 50 milliGRAM(s) Oral two times a day  mirtazapine 15 milliGRAM(s) Oral <User Schedule>  pantoprazole    Tablet 40 milliGRAM(s) Oral before breakfast  venlafaxine XR 37.5 milliGRAM(s) Oral daily    MEDICATIONS  (PRN):  acetaminophen   Tablet .. 650 milliGRAM(s) Oral every 6 hours PRN Temp greater or equal to 38C (100.4F), Mild Pain (1 - 3), Moderate Pain (4 - 6), Severe Pain (7 - 10)  aluminum hydroxide/magnesium hydroxide/simethicone Suspension 30 milliLiter(s) Oral every 4 hours PRN Dyspepsia  BACItracin   Ointment 1 Application(s) Topical two times a day PRN skin abrasion  LORazepam     Tablet 0.5 milliGRAM(s) Oral three times a day PRN Anxiety  melatonin. 5 milliGRAM(s) Oral at bedtime PRN Insomnia  ondansetron    Tablet 4 milliGRAM(s) Oral daily PRN Nausea  
MEDICATIONS  (STANDING):  clonazePAM  Tablet 0.5 milliGRAM(s) Oral <User Schedule>  clonazePAM  Tablet 1 milliGRAM(s) Oral daily  hydrochlorothiazide 12.5 milliGRAM(s) Oral daily  losartan 100 milliGRAM(s) Oral daily  metoprolol tartrate 50 milliGRAM(s) Oral two times a day  mirtazapine 30 milliGRAM(s) Oral <User Schedule>  pantoprazole    Tablet 40 milliGRAM(s) Oral before breakfast  venlafaxine  milliGRAM(s) Oral daily    MEDICATIONS  (PRN):  acetaminophen   Tablet .. 650 milliGRAM(s) Oral every 6 hours PRN Temp greater or equal to 38C (100.4F), Mild Pain (1 - 3), Moderate Pain (4 - 6), Severe Pain (7 - 10)  aluminum hydroxide/magnesium hydroxide/simethicone Suspension 30 milliLiter(s) Oral every 4 hours PRN Dyspepsia  BACItracin   Ointment 1 Application(s) Topical two times a day PRN skin abrasion  melatonin. 5 milliGRAM(s) Oral at bedtime PRN Insomnia  ondansetron    Tablet 4 milliGRAM(s) Oral daily PRN Nausea  
MEDICATIONS  (STANDING):  clonazePAM  Tablet 1 milliGRAM(s) Oral daily  hydrochlorothiazide 12.5 milliGRAM(s) Oral daily  losartan 100 milliGRAM(s) Oral daily  metoprolol tartrate 50 milliGRAM(s) Oral two times a day  mirtazapine 30 milliGRAM(s) Oral <User Schedule>  pantoprazole    Tablet 40 milliGRAM(s) Oral before breakfast  venlafaxine .5 milliGRAM(s) Oral daily    MEDICATIONS  (PRN):  acetaminophen   Tablet .. 650 milliGRAM(s) Oral every 6 hours PRN Temp greater or equal to 38C (100.4F), Mild Pain (1 - 3), Moderate Pain (4 - 6), Severe Pain (7 - 10)  aluminum hydroxide/magnesium hydroxide/simethicone Suspension 30 milliLiter(s) Oral every 4 hours PRN Dyspepsia  BACItracin   Ointment 1 Application(s) Topical two times a day PRN skin abrasion  clonazePAM  Tablet 1 milliGRAM(s) Oral every 24 hours PRN Anxiety  melatonin. 5 milliGRAM(s) Oral at bedtime PRN Insomnia  ondansetron    Tablet 4 milliGRAM(s) Oral daily PRN Nausea  
MEDICATIONS  (STANDING):  clonazePAM  Tablet 0.5 milliGRAM(s) Oral <User Schedule>  clonazePAM  Tablet 1 milliGRAM(s) Oral daily  hydrochlorothiazide 12.5 milliGRAM(s) Oral daily  losartan 100 milliGRAM(s) Oral daily  metoprolol tartrate 50 milliGRAM(s) Oral two times a day  mirtazapine 30 milliGRAM(s) Oral <User Schedule>  pantoprazole    Tablet 40 milliGRAM(s) Oral before breakfast  venlafaxine  milliGRAM(s) Oral daily    MEDICATIONS  (PRN):  acetaminophen   Tablet .. 650 milliGRAM(s) Oral every 6 hours PRN Temp greater or equal to 38C (100.4F), Mild Pain (1 - 3), Moderate Pain (4 - 6), Severe Pain (7 - 10)  aluminum hydroxide/magnesium hydroxide/simethicone Suspension 30 milliLiter(s) Oral every 4 hours PRN Dyspepsia  BACItracin   Ointment 1 Application(s) Topical two times a day PRN skin abrasion  melatonin. 5 milliGRAM(s) Oral at bedtime PRN Insomnia  ondansetron    Tablet 4 milliGRAM(s) Oral daily PRN Nausea  
MEDICATIONS  (STANDING):  clonazePAM  Tablet 0.5 milliGRAM(s) Oral <User Schedule>  clonazePAM  Tablet 1 milliGRAM(s) Oral daily  hydrochlorothiazide 12.5 milliGRAM(s) Oral daily  losartan 100 milliGRAM(s) Oral daily  metoprolol tartrate 50 milliGRAM(s) Oral two times a day  mirtazapine 30 milliGRAM(s) Oral <User Schedule>  pantoprazole    Tablet 40 milliGRAM(s) Oral before breakfast  venlafaxine  milliGRAM(s) Oral daily    MEDICATIONS  (PRN):  acetaminophen   Tablet .. 650 milliGRAM(s) Oral every 6 hours PRN Temp greater or equal to 38C (100.4F), Mild Pain (1 - 3), Moderate Pain (4 - 6), Severe Pain (7 - 10)  aluminum hydroxide/magnesium hydroxide/simethicone Suspension 30 milliLiter(s) Oral every 4 hours PRN Dyspepsia  BACItracin   Ointment 1 Application(s) Topical two times a day PRN skin abrasion  melatonin. 5 milliGRAM(s) Oral at bedtime PRN Insomnia  ondansetron    Tablet 4 milliGRAM(s) Oral daily PRN Nausea  
MEDICATIONS  (STANDING):  clonazePAM Tablet 1 milliGRAM(s) Oral daily  hydrochlorothiazide 12.5 milliGRAM(s) Oral daily  losartan 100 milliGRAM(s) Oral daily  metoprolol tartrate 50 milliGRAM(s) Oral two times a day  mirtazapine 30 milliGRAM(s) Oral <User Schedule>  pantoprazole Tablet 40 milliGRAM(s) Oral before breakfast  venlafaxine .5 milliGRAM(s) Oral daily    MEDICATIONS  (PRN):  acetaminophen Tablet 650 milliGRAM(s) Oral every 6 hours PRN Temp greater or equal to 38C (100.4F), Mild Pain (1 - 3), Moderate Pain (4 - 6), Severe Pain (7 - 10)  aluminum hydroxide/magnesium hydroxide/simethicone Suspension 30 milliLiter(s) Oral every 4 hours PRN Dyspepsia  BACItracin   Ointment 1 Application(s) Topical two times a day PRN skin abrasion  clonazePAM  Tablet 1 milliGRAM(s) Oral every 24 hours PRN Anxiety  melatonin. 5 milliGRAM(s) Oral at bedtime PRN Insomnia  ondansetron    Tablet 4 milliGRAM(s) Oral daily PRN Nausea  
MEDICATIONS  (STANDING):  escitalopram 10 milliGRAM(s) Oral <User Schedule>  gabapentin 100 milliGRAM(s) Oral <User Schedule>  hydrochlorothiazide 12.5 milliGRAM(s) Oral daily  LORazepam     Tablet 0.5 milliGRAM(s) Oral <User Schedule>  losartan 100 milliGRAM(s) Oral daily  metoprolol tartrate 50 milliGRAM(s) Oral two times a day  mirtazapine 15 milliGRAM(s) Oral at bedtime    MEDICATIONS  (PRN):  aluminum hydroxide/magnesium hydroxide/simethicone Suspension 30 milliLiter(s) Oral every 4 hours PRN Dyspepsia  melatonin. 5 milliGRAM(s) Oral at bedtime PRN Insomnia  ondansetron    Tablet 4 milliGRAM(s) Oral daily PRN Nausea  
MEDICATIONS  (STANDING):  hydrochlorothiazide 12.5 milliGRAM(s) Oral daily  losartan 100 milliGRAM(s) Oral daily  metoprolol tartrate 50 milliGRAM(s) Oral two times a day  mirtazapine 30 milliGRAM(s) Oral <User Schedule>  pantoprazole Tablet 40 milliGRAM(s) Oral before breakfast    MEDICATIONS (PRN):  acetaminophen Tablet 650 milliGRAM(s) Oral every 6 hours PRN Temp greater or equal to 38C (100.4F), Mild Pain (1 - 3), Moderate Pain (4 - 6), Severe Pain (7 - 10)  aluminum hydroxide/magnesium hydroxide/simethicone Suspension 30 milliLiter(s) Oral every 4 hours PRN Dyspepsia  BACItracin   Ointment 1 Application(s) Topical two times a day PRN skin abrasion  clonazePAM  Tablet 1 milliGRAM(s) Oral every 24 hours PRN Anxiety  melatonin. 5 milliGRAM(s) Oral at bedtime PRN Insomnia  ondansetron    Tablet 4 milliGRAM(s) Oral daily PRN Nausea  
MEDICATIONS  (STANDING):  escitalopram 10 milliGRAM(s) Oral <User Schedule>  gabapentin 100 milliGRAM(s) Oral <User Schedule>  hydrochlorothiazide 12.5 milliGRAM(s) Oral daily  LORazepam     Tablet 0.5 milliGRAM(s) Oral <User Schedule>  losartan 100 milliGRAM(s) Oral daily  metoprolol tartrate 50 milliGRAM(s) Oral two times a day  mirtazapine 7.5 milliGRAM(s) Oral at bedtime    MEDICATIONS  (PRN):  aluminum hydroxide/magnesium hydroxide/simethicone Suspension 30 milliLiter(s) Oral every 4 hours PRN Dyspepsia  melatonin. 5 milliGRAM(s) Oral at bedtime PRN Insomnia  ondansetron    Tablet 4 milliGRAM(s) Oral daily PRN Nausea  
MEDICATIONS  (STANDING):  hydrochlorothiazide 12.5 milliGRAM(s) Oral daily  LORazepam     Tablet 0.5 milliGRAM(s) Oral <User Schedule>  losartan 100 milliGRAM(s) Oral daily  metoprolol tartrate 50 milliGRAM(s) Oral two times a day  mirtazapine 15 milliGRAM(s) Oral <User Schedule>  pantoprazole    Tablet 40 milliGRAM(s) Oral before breakfast  venlafaxine .5 milliGRAM(s) Oral daily    MEDICATIONS  (PRN):  acetaminophen   Tablet .. 650 milliGRAM(s) Oral every 6 hours PRN Temp greater or equal to 38C (100.4F), Mild Pain (1 - 3), Moderate Pain (4 - 6), Severe Pain (7 - 10)  aluminum hydroxide/magnesium hydroxide/simethicone Suspension 30 milliLiter(s) Oral every 4 hours PRN Dyspepsia  BACItracin   Ointment 1 Application(s) Topical two times a day PRN skin abrasion  LORazepam     Tablet 0.5 milliGRAM(s) Oral three times a day PRN Anxiety  melatonin. 5 milliGRAM(s) Oral at bedtime PRN Insomnia  ondansetron    Tablet 4 milliGRAM(s) Oral daily PRN Nausea  
MEDICATIONS  (STANDING):  clonazePAM  Tablet 1 milliGRAM(s) Oral daily  clonazePAM  Tablet 0.5 milliGRAM(s) Oral at bedtime  hydrochlorothiazide 12.5 milliGRAM(s) Oral daily  losartan 100 milliGRAM(s) Oral daily  metoprolol tartrate 50 milliGRAM(s) Oral two times a day  mirtazapine 30 milliGRAM(s) Oral <User Schedule>  pantoprazole Tablet 40 milliGRAM(s) Oral before breakfast  venlafaxine  milliGRAM(s) Oral daily    MEDICATIONS  (PRN):  acetaminophen Tablet .. 650 milliGRAM(s) Oral every 6 hours PRN Temp greater or equal to 38C (100.4F), Mild Pain (1 - 3), Moderate Pain (4 - 6), Severe Pain (7 - 10)  aluminum hydroxide/magnesium hydroxide/simethicone Suspension 30 milliLiter(s) Oral every 4 hours PRN Dyspepsia  BACItracin   Ointment 1 Application(s) Topical two times a day PRN skin abrasion  clonazePAM  Tablet 0.5 milliGRAM(s) Oral every 24 hours PRN Anxiety  melatonin. 5 milliGRAM(s) Oral at bedtime PRN Insomnia  ondansetron    Tablet 4 milliGRAM(s) Oral daily PRN Nausea  
MEDICATIONS  (STANDING):  hydrochlorothiazide 12.5 milliGRAM(s) Oral daily  LORazepam Tablet 0.5 milliGRAM(s) Oral <User Schedule>  losartan 100 milliGRAM(s) Oral daily  metoprolol tartrate 50 milliGRAM(s) Oral two times a day  mirtazapine 15 milliGRAM(s) Oral <User Schedule>  pantoprazole Tablet 40 milliGRAM(s) Oral before breakfast  venlafaxine XR 75 milliGRAM(s) Oral daily    MEDICATIONS  (PRN):  acetaminophen   Tablet .. 650 milliGRAM(s) Oral every 6 hours PRN Temp greater or equal to 38C (100.4F), Mild Pain (1 - 3), Moderate Pain (4 - 6), Severe Pain (7 - 10)  aluminum hydroxide/magnesium hydroxide/simethicone Suspension 30 milliLiter(s) Oral every 4 hours PRN Dyspepsia  BACItracin   Ointment 1 Application(s) Topical two times a day PRN skin abrasion  LORazepam     Tablet 0.5 milliGRAM(s) Oral three times a day PRN Anxiety  melatonin. 5 milliGRAM(s) Oral at bedtime PRN Insomnia  ondansetron    Tablet 4 milliGRAM(s) Oral daily PRN Nausea  
MEDICATIONS  (STANDING):  clonazePAM  Tablet 1 milliGRAM(s) Oral daily  clonazePAM  Tablet 0.5 milliGRAM(s) Oral at bedtime  hydrochlorothiazide 12.5 milliGRAM(s) Oral daily  losartan 100 milliGRAM(s) Oral daily  metoprolol tartrate 50 milliGRAM(s) Oral two times a day  mirtazapine 30 milliGRAM(s) Oral <User Schedule>  pantoprazole    Tablet 40 milliGRAM(s) Oral before breakfast  venlafaxine  milliGRAM(s) Oral daily    MEDICATIONS  (PRN):  acetaminophen   Tablet .. 650 milliGRAM(s) Oral every 6 hours PRN Temp greater or equal to 38C (100.4F), Mild Pain (1 - 3), Moderate Pain (4 - 6), Severe Pain (7 - 10)  aluminum hydroxide/magnesium hydroxide/simethicone Suspension 30 milliLiter(s) Oral every 4 hours PRN Dyspepsia  BACItracin   Ointment 1 Application(s) Topical two times a day PRN skin abrasion  clonazePAM  Tablet 0.5 milliGRAM(s) Oral every 24 hours PRN Anxiety  melatonin. 5 milliGRAM(s) Oral at bedtime PRN Insomnia  ondansetron    Tablet 4 milliGRAM(s) Oral daily PRN Nausea  
MEDICATIONS  (STANDING):  clonazePAM  Tablet 0.5 milliGRAM(s) Oral <User Schedule>  hydrochlorothiazide 12.5 milliGRAM(s) Oral daily  losartan 100 milliGRAM(s) Oral daily  metoprolol tartrate 50 milliGRAM(s) Oral two times a day  mirtazapine 30 milliGRAM(s) Oral <User Schedule>  pantoprazole    Tablet 40 milliGRAM(s) Oral before breakfast  venlafaxine  milliGRAM(s) Oral daily    MEDICATIONS  (PRN):  acetaminophen   Tablet .. 650 milliGRAM(s) Oral every 6 hours PRN Temp greater or equal to 38C (100.4F), Mild Pain (1 - 3), Moderate Pain (4 - 6), Severe Pain (7 - 10)  aluminum hydroxide/magnesium hydroxide/simethicone Suspension 30 milliLiter(s) Oral every 4 hours PRN Dyspepsia  BACItracin   Ointment 1 Application(s) Topical two times a day PRN skin abrasion  melatonin. 5 milliGRAM(s) Oral at bedtime PRN Insomnia  ondansetron    Tablet 4 milliGRAM(s) Oral daily PRN Nausea  
MEDICATIONS  (STANDING):  hydrochlorothiazide 12.5 milliGRAM(s) Oral daily  LORazepam Tablet 0.5 milliGRAM(s) Oral <User Schedule>  losartan 100 milliGRAM(s) Oral daily  metoprolol tartrate 50 milliGRAM(s) Oral two times a day  mirtazapine 15 milliGRAM(s) Oral at bedtime  pantoprazole Tablet 40 milliGRAM(s) Oral before breakfast  venlafaxine XR 37.5 milliGRAM(s) Oral daily    MEDICATIONS  (PRN):  acetaminophen Tablet 650 milliGRAM(s) Oral every 6 hours PRN Temp greater or equal to 38C (100.4F), Mild Pain (1 - 3), Moderate Pain (4 - 6), Severe Pain (7 - 10)  aluminum hydroxide/magnesium hydroxide/simethicone Suspension 30 milliLiter(s) Oral every 4 hours PRN Dyspepsia  BACItracin Ointment 1 Application(s) Topical two times a day PRN skin abrasion  melatonin 5 milliGRAM(s) Oral at bedtime PRN Insomnia  ondansetron Tablet 4 milliGRAM(s) Oral daily PRN Nausea  
MEDICATIONS  (STANDING):  escitalopram 5 milliGRAM(s) Oral <User Schedule>  hydrochlorothiazide 12.5 milliGRAM(s) Oral daily  LORazepam Tablet 0.5 milliGRAM(s) Oral <User Schedule>  losartan 100 milliGRAM(s) Oral daily  metoprolol tartrate 50 milliGRAM(s) Oral two times a day  mirtazapine 15 milliGRAM(s) Oral at bedtime  venlafaxine XR 37.5 milliGRAM(s) Oral daily    MEDICATIONS  (PRN):  acetaminophen Tablet .. 650 milliGRAM(s) Oral every 6 hours PRN Temp greater or equal to 38C (100.4F), Mild Pain (1 - 3), Moderate Pain (4 - 6), Severe Pain (7 - 10)  aluminum hydroxide/magnesium hydroxide/simethicone Suspension 30 milliLiter(s) Oral every 4 hours PRN Dyspepsia  BACItracin Ointment 1 Application(s) Topical two times a day PRN skin abrasion  melatonin. 5 milliGRAM(s) Oral at bedtime PRN Insomnia  ondansetron Tablet 4 milliGRAM(s) Oral daily PRN Nausea  
MEDICATIONS  (STANDING):  clonazePAM  Tablet 1 milliGRAM(s) Oral daily  clonazePAM  Tablet 0.5 milliGRAM(s) Oral at bedtime  hydrochlorothiazide 12.5 milliGRAM(s) Oral daily  losartan 100 milliGRAM(s) Oral daily  metoprolol tartrate 50 milliGRAM(s) Oral two times a day  mirtazapine 30 milliGRAM(s) Oral <User Schedule>  pantoprazole    Tablet 40 milliGRAM(s) Oral before breakfast  venlafaxine  milliGRAM(s) Oral daily    MEDICATIONS  (PRN):  acetaminophen   Tablet .. 650 milliGRAM(s) Oral every 6 hours PRN Temp greater or equal to 38C (100.4F), Mild Pain (1 - 3), Moderate Pain (4 - 6), Severe Pain (7 - 10)  aluminum hydroxide/magnesium hydroxide/simethicone Suspension 30 milliLiter(s) Oral every 4 hours PRN Dyspepsia  BACItracin   Ointment 1 Application(s) Topical two times a day PRN skin abrasion  clonazePAM  Tablet 0.5 milliGRAM(s) Oral every 24 hours PRN Anxiety  melatonin. 5 milliGRAM(s) Oral at bedtime PRN Insomnia  ondansetron    Tablet 4 milliGRAM(s) Oral daily PRN Nausea  
MEDICATIONS  (STANDING):  clonazePAM  Tablet 1 milliGRAM(s) Oral daily  hydrochlorothiazide 12.5 milliGRAM(s) Oral daily  losartan 100 milliGRAM(s) Oral daily  metoprolol tartrate 50 milliGRAM(s) Oral two times a day  mirtazapine 30 milliGRAM(s) Oral <User Schedule>  pantoprazole    Tablet 40 milliGRAM(s) Oral before breakfast  venlafaxine  milliGRAM(s) Oral daily    MEDICATIONS  (PRN):  acetaminophen   Tablet .. 650 milliGRAM(s) Oral every 6 hours PRN Temp greater or equal to 38C (100.4F), Mild Pain (1 - 3), Moderate Pain (4 - 6), Severe Pain (7 - 10)  aluminum hydroxide/magnesium hydroxide/simethicone Suspension 30 milliLiter(s) Oral every 4 hours PRN Dyspepsia  BACItracin   Ointment 1 Application(s) Topical two times a day PRN skin abrasion  clonazePAM  Tablet 1 milliGRAM(s) Oral every 24 hours PRN Anxiety  melatonin. 5 milliGRAM(s) Oral at bedtime PRN Insomnia  ondansetron    Tablet 4 milliGRAM(s) Oral daily PRN Nausea

## 2021-05-24 NOTE — BH INPATIENT PSYCHIATRY PROGRESS NOTE - NSCGISEVERILLNESS_PSY_ALL_CORE
4 = Moderately ill – overt symptoms causing noticeable, but modest, functional impairment or distress; symptom level may warrant medication
5 = Markedly ill - intrusive symptoms that distinctly impair social/occupational function or cause intrusive levels of distress
4 = Moderately ill – overt symptoms causing noticeable, but modest, functional impairment or distress; symptom level may warrant medication
5 = Markedly ill - intrusive symptoms that distinctly impair social/occupational function or cause intrusive levels of distress
4 = Moderately ill – overt symptoms causing noticeable, but modest, functional impairment or distress; symptom level may warrant medication

## 2021-05-24 NOTE — BH INPATIENT PSYCHIATRY PROGRESS NOTE - NSTXPROBCOPE_PSY_ALL_CORE
COPING, INEFFECTIVE

## 2021-05-24 NOTE — BH INPATIENT PSYCHIATRY PROGRESS NOTE - NSTXANXDATETRGT_PSY_ALL_CORE
12-May-2021
30-Apr-2021
05-May-2021
19-May-2021
30-Apr-2021
19-May-2021
12-May-2021
30-Apr-2021
30-Apr-2021
19-May-2021
12-May-2021
05-May-2021
19-May-2021
30-Apr-2021
26-May-2021
05-May-2021
26-May-2021
05-May-2021
12-May-2021
26-May-2021
19-May-2021
26-May-2021
12-May-2021
05-May-2021

## 2021-05-24 NOTE — BH PSYCHOLOGY - CLINICIAN PSYCHOTHERAPY NOTE - NSBHPSYCHOLPROBS_PSY_ALL_CORE
Anxiety/Depression
Anxiety/Depression
Anxiety/Depression/Suicidality
Anxiety
Anxiety/Depression/Suicidality
Anxiety/Depression
Anxiety/Depression/Suicidality
Anxiety/Depression
Anxiety/Depression/Suicidality
Anxiety/Depression/Suicidality
Anxiety/Depression
Anxiety/Depression

## 2021-05-24 NOTE — BH PSYCHOLOGY - CLINICIAN PSYCHOTHERAPY NOTE - NSTXCOPEDATETRGT_PSY_ALL_CORE
30-Apr-2021
06-May-2021
12-May-2021
19-May-2021
19-May-2021
12-May-2021
12-May-2021
06-May-2021
26-May-2021
24-May-2021
06-May-2021
26-May-2021

## 2021-05-24 NOTE — BH INPATIENT PSYCHIATRY PROGRESS NOTE - NSTXDCOPLKINTERMD_PSY_ALL_CORE
Will make appropriate outpatient appointments prior to discharge. 

## 2021-05-24 NOTE — BH PSYCHOLOGY - CLINICIAN PSYCHOTHERAPY NOTE - NSTXANXPROGRES_PSY_ALL_CORE
Improving

## 2021-05-24 NOTE — BH INPATIENT PSYCHIATRY PROGRESS NOTE - NSTXCOPEDATEEST_PSY_ALL_CORE
23-Apr-2021

## 2021-05-24 NOTE — BH INPATIENT PSYCHIATRY PROGRESS NOTE - NSDCCRITERIA_PSY_ALL_CORE
The pt. will be discharged when her symptoms are decreased by 50%.

## 2021-05-24 NOTE — BH INPATIENT PSYCHIATRY PROGRESS NOTE - NSBHCHARTREVIEWVS_PSY_A_CORE FT
Vital Signs Last 24 Hrs  T(C): 36.2 (19 May 2021 05:41), Max: 36.2 (19 May 2021 05:41)  T(F): 97.1 (19 May 2021 05:41), Max: 97.1 (19 May 2021 05:41)  Orthostatic VS  05-19-21 @ 09:16  Sitting BP: 126/68 HR: 81  Standing BP: 120/72 HR: 89  
Vital Signs Last 24 Hrs  T(C): 35.8 (04 May 2021 05:48), Max: 36.7 (03 May 2021 15:31)  T(F): 96.4 (04 May 2021 05:48), Max: 98 (03 May 2021 15:31)  Orthostatic VS  05-04-21 @ 05:48  Sitting BP: 143/86 HR: 87  Standing BP: 149/78 HR: 92          
Vital Signs Last 24 Hrs  T(C): 36.3 (24 May 2021 08:02), Max: 36.8 (23 May 2021 15:52)  T(F): 97.4 (24 May 2021 08:02), Max: 98.3 (23 May 2021 15:52)  Orthostatic VS  05-24-21 @ 08:02  Sitting BP: 136/80 HR: 81  Standing BP: 121/71 HR: 87  
Vital Signs Last 24 Hrs  T(C): 36.4 (03 May 2021 06:00), Max: 36.8 (02 May 2021 15:30)  T(F): 97.5 (03 May 2021 06:00), Max: 98.3 (02 May 2021 15:30)  Orthostatic VS  05-03-21 @ 06:00  Sitting BP: 156/89 HR: 80  Standing BP: 153/84 HR: 80  Site: upper left arm   Mode: electronic    05-02-21 @ 20:33   Sitting BP: 124/77 HR: 82          
Vital Signs Last 24 Hrs  T(C): 37.1 (13 May 2021 08:40), Max: 37.1 (13 May 2021 08:40)  T(F): 98.7 (13 May 2021 08:40), Max: 98.7 (13 May 2021 08:40)  Orthostatic VS  05-13-21 @ 08:40  Sitting BP: 134/74 HR: 77  Standing BP: 125/76 HR: 87        
Vital Signs Last 24 Hrs  T(C): 36.7 (26 Apr 2021 05:45), Max: 36.9 (25 Apr 2021 15:35)  T(F): 98.1 (26 Apr 2021 05:45), Max: 98.4 (25 Apr 2021 15:35)  Orthostatic VS  04-26-21 @ 09:04  Lying BP: 143/63 HR: 76   Sitting BP: 137/69 HR: 74    04-26-21 @ 05:45  Lying BP: 112/63 HR: 62   Sitting BP: 116/69 HR: 67  Site: upper right arm   Mode: electronic    
Vital Signs Last 24 Hrs  T(C): 36.4 (30 Apr 2021 05:47), Max: 36.6 (29 Apr 2021 15:24)  T(F): 97.6 (30 Apr 2021 05:47), Max: 97.8 (29 Apr 2021 15:24)  RR: 18 (29 Apr 2021 20:17) (18 - 18)  Orthostatic VS  04-30-21 @ 05:47   Sitting BP: 145/81 HR: 69  Standing BP: 132/74 HR: 73  Site: upper left arm   Mode: electronic    
Vital Signs Last 24 Hrs  T(C): 36.4 (24 Apr 2021 07:49), Max: 37 (23 Apr 2021 15:47)  T(F): 97.6 (24 Apr 2021 07:49), Max: 98.6 (23 Apr 2021 15:47)  HR: --73  BP: --128/68  BP(mean): --  RR: --  SpO2: --
Vital Signs Last 24 Hrs  T(C): 36.6 (05 May 2021 07:47), Max: 36.7 (04 May 2021 15:35)  T(F): 97.9 (05 May 2021 07:47), Max: 98.1 (04 May 2021 15:35)  HR: 82 (04 May 2021 20:24) (82 - 82)  BP: 121/74 (04 May 2021 20:24) (121/74 - 121/74)  Orthostatic VS  05-05-21 @ 07:47  Sitting BP: 143/79 HR: 75  Standing BP: 138/80 HR: 79    
Vital Signs Last 24 Hrs  T(C): 36.8 (28 Apr 2021 05:47), Max: 37.1 (27 Apr 2021 15:29)  T(F): 98.2 (28 Apr 2021 05:47), Max: 98.7 (27 Apr 2021 15:29)  RR: 12 (28 Apr 2021 05:47) (12 - 12)  Orthostatic VS  04-28-21 @ 05:47  Lying BP: 108/58 HR: 65   Sitting BP: 118/59 HR: 69  Site: upper left arm   Mode: electronic    
Vital Signs Last 24 Hrs  T(C): 36.7 (14 May 2021 08:17), Max: 36.8 (13 May 2021 15:59)  T(F): 98.1 (14 May 2021 08:17), Max: 98.2 (13 May 2021 15:59)  Orthostatic VS  05-14-21 @ 08:17  Sitting BP: 127/80 HR: 76  Standing BP: 129/68 HR: 86  
Vital Signs Last 24 Hrs  T(C): 36.7 (25 Apr 2021 07:53), Max: 36.7 (24 Apr 2021 15:29)  T(F): 98.1 (25 Apr 2021 07:53), Max: 98.1 (24 Apr 2021 15:29)  HR: --  BP: --  BP(mean): --  RR: 17 (25 Apr 2021 07:53) (17 - 17)  SpO2: --
Vital Signs Last 24 Hrs  T(C): 36.4 (06 May 2021 05:19), Max: 36.9 (05 May 2021 19:59)  T(F): 97.5 (06 May 2021 05:19), Max: 98.5 (05 May 2021 19:59)  RR: 18 (06 May 2021 05:19) (18 - 18)  Orthostatic VS  05-06-21 @ 05:19  Lying BP: 136/73 HR: 71   Sitting BP: 128/70 HR: 65  Site: upper left arm   Mode: electronic
Vital Signs Last 24 Hrs  T(C): 36.7 (27 Apr 2021 05:53), Max: 37.2 (26 Apr 2021 15:35)  T(F): 98 (27 Apr 2021 05:53), Max: 99 (26 Apr 2021 15:35)  Orthostatic VS  04-27-21 @ 05:53  Sitting BP: 147/83 HR: 83  Standing BP: 135/80 HR: 83  Site: upper left arm   Mode: electronic
Vital Signs Last 24 Hrs  T(C): 35.9 (20 May 2021 05:22), Max: 35.9 (20 May 2021 05:22)  T(F): 96.7 (20 May 2021 05:22), Max: 96.7 (20 May 2021 05:22)  RR: 18 (20 May 2021 05:22) (18 - 18)  Orthostatic VS  05-20-21 @ 05:22  Lying BP: 123/69 HR: 75   Sitting BP: 111/65 HR: 67  Site: upper left arm   Mode: electronic      
Vital Signs Last 24 Hrs  T(C): 36.4 (11 May 2021 05:51), Max: 36.8 (10 May 2021 15:30)  T(F): 97.6 (11 May 2021 05:51), Max: 98.3 (10 May 2021 15:30)  Orthostatic VS  05-11-21 @ 05:51  Sitting BP: 135/67 HR: 78  Standing BP: 121/68 HR: 84  
Vital Signs Last 24 Hrs  T(C): 36.7 (10 May 2021 08:18), Max: 36.7 (10 May 2021 08:18)  T(F): 98 (10 May 2021 08:18), Max: 98 (10 May 2021 08:18)  HR: 75 (10 May 2021 08:18) (75 - 75)  BP: 144/74 (10 May 2021 08:18) (144/74 - 144/74)  RR: 17 (10 May 2021 08:18) (17 - 17)  
Vital Signs Last 24 Hrs  T(C): 36.6 (17 May 2021 05:44), Max: 37 (16 May 2021 15:22)  T(F): 97.8 (17 May 2021 05:44), Max: 98.6 (16 May 2021 15:22)  Orthostatic VS  05-17-21 @ 05:44  Lying BP: 101/60 HR: 74   Sitting BP: 107/63 HR: 71  Site: upper left arm   Mode: electronic  
Vital Signs Last 24 Hrs  T(C): 37 (20 May 2021 15:35), Max: 37 (20 May 2021 15:35)  T(F): 98.6 (20 May 2021 15:35), Max: 98.6 (20 May 2021 15:35)  RR: 16 (21 May 2021 06:13) (16 - 16)  Orthostatic VS  05-21-21 @ 06:13  Lying BP: 143/78 HR: 74   Sitting BP: 136/76 HR: 75        
Vital Signs Last 24 Hrs  T(C): 36.6 (23 Apr 2021 08:33), Max: 36.6 (23 Apr 2021 08:33)  T(F): 97.8 (23 Apr 2021 08:33), Max: 97.8 (23 Apr 2021 08:33)  Orthostatic VS  04-23-21 @ 08:33  Sitting BP: 133/76 HR: 80  Standing BP: 146/74 HR: 85              
Vital Signs Last 24 Hrs  T(C): 36.6 (29 Apr 2021 05:19), Max: 36.9 (28 Apr 2021 15:34)  T(F): 97.8 (29 Apr 2021 05:19), Max: 98.5 (28 Apr 2021 15:34)  RR: 18 (29 Apr 2021 05:19) (18 - 18)  Orthostatic VS  04-29-21 @ 05:19  Lying BP: 146/75 HR: 87   Sitting BP: 132/72 HR: 78  Site: upper left arm   Mode: electronic
Vital Signs Last 24 Hrs  T(C): 36.4 (07 May 2021 08:10), Max: 36.4 (07 May 2021 08:10)  T(F): 97.6 (07 May 2021 08:10), Max: 97.6 (07 May 2021 08:10)  Orthostatic VS  05-07-21 @ 08:10  Lying BP: 140/89 HR: 91   Sitting BP: 139/77 HR: 93
Vital Signs Last 24 Hrs  T(C): 36.8 (12 May 2021 07:43), Max: 37.1 (11 May 2021 15:24)  T(F): 98.3 (12 May 2021 07:43), Max: 98.8 (11 May 2021 15:24)  Orthostatic VS  05-12-21 @ 07:43  Sitting BP: 124/73 HR: 81  Standing BP: 130/70 HR: 88        
Vital Signs Last 24 Hrs  T(C): 36.8 (18 May 2021 07:53), Max: 36.8 (18 May 2021 07:53)  T(F): 98.3 (18 May 2021 07:53), Max: 98.3 (18 May 2021 07:53)  Orthostatic VS  05-18-21 @ 07:53   Sitting BP: 130/71 HR: 78  Standing BP: 124/70 HR: 84

## 2021-05-24 NOTE — BH INPATIENT PSYCHIATRY PROGRESS NOTE - NSTXDEPRESDATETRGT_PSY_ALL_CORE
30-Apr-2021
12-May-2021
19-May-2021
12-May-2021
19-May-2021
19-May-2021
30-Apr-2021
19-May-2021
12-May-2021
19-May-2021
19-May-2021
12-May-2021
30-Apr-2021
30-Apr-2021
05-May-2021
30-Apr-2021
05-May-2021
19-May-2021
05-May-2021
05-May-2021
12-May-2021
19-May-2021
05-May-2021
19-May-2021

## 2021-05-24 NOTE — BH PSYCHOLOGY - CLINICIAN PSYCHOTHERAPY NOTE - NSBHPSYCHOLBILLFAM_PSY_A_CORE
82015 - 38 to 52 minutes
42409 - 53 minutes and above
30926 - 16 to 37 minutes
05946 - 53 minutes and above
61896 - 16 to 37 minutes
42790 - 38 to 52 minutes
19504 - 38 to 52 minutes
21854 - 38 to 52 minutes
06070 - 53 minutes and above
11754 - 53 minutes and above
17905 - 38 to 52 minutes
02220 - 16 to 37 minutes

## 2021-05-24 NOTE — BH PSYCHOLOGY - CLINICIAN PSYCHOTHERAPY NOTE - NSBHPSYCHOLRESPONSE_PSY_A_CORE
Insight displayed/Accepted support
Symptoms reduced/Coping skills acquired/Insight displayed/Accepted support
Insight displayed/Accepted support
Insight displayed/Accepted support
Symptoms reduced/Coping skills acquired/Insight displayed/Accepted support
Symptoms reduced/Insight displayed/Accepted support
Symptoms reduced/Coping skills acquired/Insight displayed/Accepted support
Insight displayed/Accepted support
Accepted support
Symptoms reduced/Coping skills acquired/Insight displayed/Accepted support
Insight displayed/Accepted support

## 2021-05-24 NOTE — BH INPATIENT PSYCHIATRY PROGRESS NOTE - NSTXDEPRESDATEEST_PSY_ALL_CORE
22-Apr-2021
23-Apr-2021
22-Apr-2021
23-Apr-2021
22-Apr-2021
22-Apr-2021
23-Apr-2021
22-Apr-2021
23-Apr-2021
22-Apr-2021
23-Apr-2021
22-Apr-2021

## 2021-05-24 NOTE — BH INPATIENT PSYCHIATRY PROGRESS NOTE - NSBHCONSDANGERSELF_PSY_A_CORE
unable to care for self

## 2021-05-24 NOTE — BH PSYCHOLOGY - CLINICIAN PSYCHOTHERAPY NOTE - NSBHPSYCHOLGOALS_PSY_A_CORE
Decrease symptoms/Improve level of independent functioning/Treatment compliance
Decrease symptoms/Improve family functioning/Improve level of independent functioning/Treatment compliance
Decrease symptoms/Improve level of independent functioning/Prevent relapse/Treatment compliance
Decrease symptoms/Improve level of independent functioning/Treatment compliance
Decrease symptoms/Improve family functioning/Improve level of independent functioning/Prevent relapse/Treatment compliance
Decrease symptoms/Improve level of independent functioning/Treatment compliance
Decrease symptoms/Treatment compliance

## 2021-05-24 NOTE — BH PSYCHOLOGY - CLINICIAN PSYCHOTHERAPY NOTE - NSBHPSYCHOLINT_PSY_A_CORE
Cognitive/behavioral therapy/Encourage medication compliance/Problem-solving techniques discussed/Supported coping skills/Treatment compliance encouraged
Cognitive/behavioral therapy/Encourage medication compliance/Problem-solving techniques discussed/Supportive therapy/Treatment compliance encouraged
Cognitive/behavioral therapy/Encourage medication compliance/Problem-solving techniques discussed/Supported coping skills/Supportive therapy/Treatment compliance encouraged
Cognitive/behavioral therapy/Encourage medication compliance/Problem-solving techniques discussed/Supported coping skills/Treatment compliance encouraged
Cognitive/behavioral therapy/Encourage medication compliance/Problem-solving techniques discussed/Supported coping skills/Supportive therapy/Treatment compliance encouraged
Cognitive/behavioral therapy/Encourage medication compliance/Problem-solving techniques discussed/Supported coping skills/Treatment compliance encouraged
Cognitive/behavioral therapy/Encourage medication compliance/Problem-solving techniques discussed/Supportive therapy/Treatment compliance encouraged
Cognitive/behavioral therapy/Encourage medication compliance/Problem-solving techniques discussed/Supported coping skills/Supportive therapy/Treatment compliance encouraged
Cognitive/behavioral therapy/Encourage medication compliance/Problem-solving techniques discussed/Supported coping skills/Treatment compliance encouraged
Cognitive/behavioral therapy/Encourage medication compliance/Supported coping skills/Supportive therapy/Treatment compliance encouraged
Cognitive/behavioral therapy/Encourage medication compliance/Problem-solving techniques discussed/Supportive therapy/Treatment compliance encouraged
Cognitive/behavioral therapy/Encourage medication compliance/Problem-solving techniques discussed/Supportive therapy/Treatment compliance encouraged

## 2021-05-24 NOTE — BH INPATIENT PSYCHIATRY PROGRESS NOTE - NSTXANXINTERMD_PSY_ALL_CORE
Med titration with Venlafaxine.
Med titration with Venlafaxine.
Med titration with Venlafaxine. Will continue Mirtazapine and Clonazepam at current dose
Will continue Mirtazapine, Venlafaxine and Clonazepam at current dose
Med titration with Venlafaxine. Will continue Mirtazapine and Clonazepam at current dose
Will continue Mirtazapine, Venlafaxine and Clonazepam at current dose
Med titration with Venlafaxine.
Med titration with Venlafaxine.
Med titration.
Med titration with Venlafaxine.
Med titration with Venlafaxine.
Med titration.
Med titration with Venlafaxine.
Med titration with Venlafaxine. Will continue Mirtazapine and Clonazepam at current dose
Med titration.
Med titration with Venlafaxine.
Med titration.
Will continue Mirtazapine, Venlafaxine and Clonazepam at current dose
Med titration with Venlafaxine.
Med titration with Venlafaxine. Will continue Mirtazapine and Clonazepam at current dose

## 2021-05-24 NOTE — BH INPATIENT PSYCHIATRY PROGRESS NOTE - NSBHINPTBILLING_PSY_ALL_CORE
16454 - Inpatient Low Complexity
81784 - Inpatient Moderate Complexity
17405 - Inpatient Moderate Complexity
50922 - Inpatient Moderate Complexity
82158 - Inpatient Moderate Complexity
34813 - Inpatient Moderate Complexity
64062 - Inpatient Moderate Complexity
19830 - Inpatient Moderate Complexity
47015 - Inpatient Moderate Complexity
09763 - Inpatient Moderate Complexity
67129 - Inpatient Moderate Complexity
04219 - Inpatient Low Complexity
84180 - Inpatient Moderate Complexity
67928 - Inpatient Moderate Complexity
92640 - Inpatient Moderate Complexity
60119 - Inpatient Moderate Complexity
89920 - Inpatient Low Complexity
49631 - Inpatient Low Complexity
76880 - Inpatient Moderate Complexity
10277 - Inpatient Moderate Complexity
83589 - Hospital Discharge Day Management; 30 min or less
89924 - Inpatient Moderate Complexity
93598 - Inpatient Moderate Complexity
49131 - Inpatient Low Complexity

## 2021-05-24 NOTE — BH PSYCHOLOGY - CLINICIAN PSYCHOTHERAPY NOTE - NSBHPSYCHOLSERV_PSY_A_CORE
Individual psychotherapy

## 2021-05-24 NOTE — BH INPATIENT PSYCHIATRY PROGRESS NOTE - NSTXCOPEGOAL_PSY_ALL_CORE
Identify and utilize 2 coping skills that meet their needs

## 2021-05-24 NOTE — BH INPATIENT PSYCHIATRY PROGRESS NOTE - NSICDXBHPRIMARYDX_PSY_ALL_CORE
Generalized anxiety disorder   F41.1  

## 2021-05-24 NOTE — BH INPATIENT PSYCHIATRY PROGRESS NOTE - NSTXDEPRESPROGRES_PSY_ALL_CORE
Improving
Met - goal discontinued
Improving
Met - goal discontinued
Improving
Met - goal discontinued
Improving
Improving
Met - goal discontinued

## 2021-05-24 NOTE — BH PSYCHOLOGY - CLINICIAN PSYCHOTHERAPY NOTE - NSTXANXDATEEST_PSY_ALL_CORE
23-Apr-2021
22-Apr-2021
23-Apr-2021
22-Apr-2021
23-Apr-2021
22-Apr-2021
22-Apr-2021
23-Apr-2021
23-Apr-2021

## 2021-05-24 NOTE — BH PSYCHOLOGY - CLINICIAN PSYCHOTHERAPY NOTE - NSTXDEPRESDATEEST_PSY_ALL_CORE
23-Apr-2021
22-Apr-2021
23-Apr-2021
22-Apr-2021
23-Apr-2021
22-Apr-2021
23-Apr-2021
22-Apr-2021

## 2021-05-24 NOTE — BH INPATIENT PSYCHIATRY PROGRESS NOTE - NSCGIIMPROVESX_PSY_ALL_CORE
3 = Minimally improved - slightly better with little or no clinically meaningful reduction of symptoms.  Represents very little change in basic clinical status, level of care, or functional capacity.
4 = No change - symptoms remain essentially unchanged
2 = Much improved - notably better with signficant reduction of symptoms; increase in the level of functioning but some symptoms remain
4 = No change - symptoms remain essentially unchanged
4 = No change - symptoms remain essentially unchanged
2 = Much improved - notably better with signficant reduction of symptoms; increase in the level of functioning but some symptoms remain
4 = No change - symptoms remain essentially unchanged
2 = Much improved - notably better with signficant reduction of symptoms; increase in the level of functioning but some symptoms remain
3 = Minimally improved - slightly better with little or no clinically meaningful reduction of symptoms.  Represents very little change in basic clinical status, level of care, or functional capacity.
2 = Much improved - notably better with signficant reduction of symptoms; increase in the level of functioning but some symptoms remain
4 = No change - symptoms remain essentially unchanged
4 = No change - symptoms remain essentially unchanged
2 = Much improved - notably better with signficant reduction of symptoms; increase in the level of functioning but some symptoms remain
4 = No change - symptoms remain essentially unchanged
3 = Minimally improved - slightly better with little or no clinically meaningful reduction of symptoms.  Represents very little change in basic clinical status, level of care, or functional capacity.
4 = No change - symptoms remain essentially unchanged
3 = Minimally improved - slightly better with little or no clinically meaningful reduction of symptoms.  Represents very little change in basic clinical status, level of care, or functional capacity.
4 = No change - symptoms remain essentially unchanged

## 2021-05-24 NOTE — BH INPATIENT PSYCHIATRY PROGRESS NOTE - NSTXDCOPLKDATEEST_PSY_ALL_CORE
06-May-2021
23-Apr-2021
06-May-2021
23-Apr-2021
06-May-2021
23-Apr-2021
06-May-2021
23-Apr-2021
30-Apr-2021
06-May-2021

## 2021-05-24 NOTE — BH INPATIENT PSYCHIATRY PROGRESS NOTE - NSTXCOPEDATETRGT_PSY_ALL_CORE
12-May-2021
19-May-2021
26-May-2021
30-Apr-2021
06-May-2021
26-May-2021
30-Apr-2021
19-May-2021
12-May-2021
30-Apr-2021
19-May-2021
30-Apr-2021
19-May-2021
26-May-2021
30-Apr-2021
05-May-2021
05-May-2021
12-May-2021
19-May-2021
12-May-2021
06-May-2021
26-May-2021
12-May-2021
06-May-2021

## 2021-05-24 NOTE — BH INPATIENT PSYCHIATRY PROGRESS NOTE - NSTXANXDATEEST_PSY_ALL_CORE
22-Apr-2021
23-Apr-2021
22-Apr-2021
23-Apr-2021
23-Apr-2021
22-Apr-2021
23-Apr-2021
23-Apr-2021
22-Apr-2021
22-Apr-2021
23-Apr-2021
22-Apr-2021
23-Apr-2021
23-Apr-2021

## 2021-05-24 NOTE — BH PSYCHOLOGY - CLINICIAN PSYCHOTHERAPY NOTE - NSTXDEPRESDATETRGT_PSY_ALL_CORE
19-May-2021
30-Apr-2021
05-May-2021
05-May-2021
19-May-2021
19-May-2021
12-May-2021
19-May-2021
05-May-2021
12-May-2021
12-May-2021
19-May-2021

## 2021-05-24 NOTE — BH PSYCHOLOGY - CLINICIAN PSYCHOTHERAPY NOTE - NSBHPSYCHOLDURATION_PSY_A_CORE
45 minutes
60 minutes
60 minutes
30 minutes
45 minutes
20 minutes
60 minutes
30 minutes
60 minutes

## 2021-05-24 NOTE — BH INPATIENT PSYCHIATRY PROGRESS NOTE - NSTXDCOPLKDATETRGT_PSY_ALL_CORE
07-May-2021
13-May-2021
21-May-2021
21-May-2021
13-May-2021
30-Apr-2021
13-May-2021
21-May-2021
13-May-2021
21-May-2021
30-Apr-2021
13-May-2021
13-May-2021
21-May-2021
21-May-2021
30-Apr-2021
30-Apr-2021
13-May-2021
30-Apr-2021

## 2021-05-24 NOTE — BH INPATIENT PSYCHIATRY PROGRESS NOTE - NSTXCOPEINTERMD_PSY_ALL_CORE
Will encourage pt to attend group activities to learn coping skills. 
Will encourage pt to continue to attend group activities to learn coping skills. 
Will encourage pt to attend group activities to learn coping skills. 
Will encourage pt to continue to attend group activities to learn coping skills. 
Will encourage pt to attend group activities to learn coping skills. 
Will encourage pt to continue to attend group activities to learn coping skills.

## 2021-05-24 NOTE — BH INPATIENT PSYCHIATRY PROGRESS NOTE - PRN MEDS
MEDICATIONS  (PRN):  acetaminophen Tablet .. 650 milliGRAM(s) Oral every 6 hours PRN Temp greater or equal to 38C (100.4F), Mild Pain (1 - 3), Moderate Pain (4 - 6), Severe Pain (7 - 10)  aluminum hydroxide/magnesium hydroxide/simethicone Suspension 30 milliLiter(s) Oral every 4 hours PRN Dyspepsia  BACItracin   Ointment 1 Application(s) Topical two times a day PRN skin abrasion  melatonin. 5 milliGRAM(s) Oral at bedtime PRN Insomnia  ondansetron    Tablet 4 milliGRAM(s) Oral daily PRN Nausea  
MEDICATIONS  (PRN):  acetaminophen Tablet 650 milliGRAM(s) Oral every 6 hours PRN Temp greater or equal to 38C (100.4F), Mild Pain (1 - 3), Moderate Pain (4 - 6), Severe Pain (7 - 10)  aluminum hydroxide/magnesium hydroxide/simethicone Suspension 30 milliLiter(s) Oral every 4 hours PRN Dyspepsia  BACItracin   Ointment 1 Application(s) Topical two times a day PRN skin abrasion  clonazePAM  Tablet 1 milliGRAM(s) Oral every 24 hours PRN Anxiety  melatonin. 5 milliGRAM(s) Oral at bedtime PRN Insomnia  ondansetron    Tablet 4 milliGRAM(s) Oral daily PRN Nausea  
MEDICATIONS  (PRN):  acetaminophen Tablet 650 milliGRAM(s) Oral every 6 hours PRN Temp greater or equal to 38C (100.4F), Mild Pain (1 - 3), Moderate Pain (4 - 6), Severe Pain (7 - 10)  aluminum hydroxide/magnesium hydroxide/simethicone Suspension 30 milliLiter(s) Oral every 4 hours PRN Dyspepsia  BACItracin   Ointment 1 Application(s) Topical two times a day PRN skin abrasion  clonazePAM  Tablet 0.5 milliGRAM(s) Oral every 24 hours PRN Anxiety  melatonin. 5 milliGRAM(s) Oral at bedtime PRN Insomnia  ondansetron    Tablet 4 milliGRAM(s) Oral daily PRN Nausea  
MEDICATIONS  (PRN):  acetaminophen   Tablet .. 650 milliGRAM(s) Oral every 6 hours PRN Temp greater or equal to 38C (100.4F), Mild Pain (1 - 3), Moderate Pain (4 - 6), Severe Pain (7 - 10)  aluminum hydroxide/magnesium hydroxide/simethicone Suspension 30 milliLiter(s) Oral every 4 hours PRN Dyspepsia  BACItracin   Ointment 1 Application(s) Topical two times a day PRN skin abrasion  clonazePAM  Tablet 1 milliGRAM(s) Oral every 24 hours PRN Anxiety  melatonin. 5 milliGRAM(s) Oral at bedtime PRN Insomnia  ondansetron    Tablet 4 milliGRAM(s) Oral daily PRN Nausea  
MEDICATIONS  (PRN):  acetaminophen Tablet 650 milliGRAM(s) Oral every 6 hours PRN Temp greater or equal to 38C (100.4F), Mild Pain (1 - 3), Moderate Pain (4 - 6), Severe Pain (7 - 10)  aluminum hydroxide/magnesium hydroxide/simethicone Suspension 30 milliLiter(s) Oral every 4 hours PRN Dyspepsia  BACItracin   Ointment 1 Application(s) Topical two times a day PRN skin abrasion  clonazePAM  Tablet 1 milliGRAM(s) Oral every 24 hours PRN Anxiety  melatonin. 5 milliGRAM(s) Oral at bedtime PRN Insomnia  ondansetron    Tablet 4 milliGRAM(s) Oral daily PRN Nausea  
MEDICATIONS  (PRN):  acetaminophen   Tablet .. 650 milliGRAM(s) Oral every 6 hours PRN Temp greater or equal to 38C (100.4F), Mild Pain (1 - 3), Moderate Pain (4 - 6), Severe Pain (7 - 10)  aluminum hydroxide/magnesium hydroxide/simethicone Suspension 30 milliLiter(s) Oral every 4 hours PRN Dyspepsia  BACItracin   Ointment 1 Application(s) Topical two times a day PRN skin abrasion  LORazepam     Tablet 0.5 milliGRAM(s) Oral three times a day PRN Anxiety  melatonin. 5 milliGRAM(s) Oral at bedtime PRN Insomnia  ondansetron    Tablet 4 milliGRAM(s) Oral daily PRN Nausea  
MEDICATIONS  (PRN):  acetaminophen   Tablet .. 650 milliGRAM(s) Oral every 6 hours PRN Temp greater or equal to 38C (100.4F), Mild Pain (1 - 3), Moderate Pain (4 - 6), Severe Pain (7 - 10)  aluminum hydroxide/magnesium hydroxide/simethicone Suspension 30 milliLiter(s) Oral every 4 hours PRN Dyspepsia  BACItracin   Ointment 1 Application(s) Topical two times a day PRN skin abrasion  clonazePAM  Tablet 0.5 milliGRAM(s) Oral every 24 hours PRN Anxiety  melatonin. 5 milliGRAM(s) Oral at bedtime PRN Insomnia  ondansetron    Tablet 4 milliGRAM(s) Oral daily PRN Nausea  
MEDICATIONS  (PRN):  acetaminophen   Tablet .. 650 milliGRAM(s) Oral every 6 hours PRN Temp greater or equal to 38C (100.4F), Mild Pain (1 - 3), Moderate Pain (4 - 6), Severe Pain (7 - 10)  aluminum hydroxide/magnesium hydroxide/simethicone Suspension 30 milliLiter(s) Oral every 4 hours PRN Dyspepsia  BACItracin   Ointment 1 Application(s) Topical two times a day PRN skin abrasion  melatonin. 5 milliGRAM(s) Oral at bedtime PRN Insomnia  ondansetron    Tablet 4 milliGRAM(s) Oral daily PRN Nausea  
MEDICATIONS  (PRN):  acetaminophen Tablet 650 milliGRAM(s) Oral every 6 hours PRN Temp greater or equal to 38C (100.4F), Mild Pain (1 - 3), Moderate Pain (4 - 6), Severe Pain (7 - 10)  aluminum hydroxide/magnesium hydroxide/simethicone Suspension 30 milliLiter(s) Oral every 4 hours PRN Dyspepsia  BACItracin   Ointment 1 Application(s) Topical two times a day PRN skin abrasion  melatonin. 5 milliGRAM(s) Oral at bedtime PRN Insomnia  ondansetron    Tablet 4 milliGRAM(s) Oral daily PRN Nausea  
MEDICATIONS  (PRN):  acetaminophen   Tablet .. 650 milliGRAM(s) Oral every 6 hours PRN Temp greater or equal to 38C (100.4F), Mild Pain (1 - 3), Moderate Pain (4 - 6), Severe Pain (7 - 10)  aluminum hydroxide/magnesium hydroxide/simethicone Suspension 30 milliLiter(s) Oral every 4 hours PRN Dyspepsia  BACItracin   Ointment 1 Application(s) Topical two times a day PRN skin abrasion  clonazePAM  Tablet 1 milliGRAM(s) Oral every 24 hours PRN Anxiety  melatonin. 5 milliGRAM(s) Oral at bedtime PRN Insomnia  ondansetron    Tablet 4 milliGRAM(s) Oral daily PRN Nausea  
MEDICATIONS  (PRN):  acetaminophen   Tablet .. 650 milliGRAM(s) Oral every 6 hours PRN Temp greater or equal to 38C (100.4F), Mild Pain (1 - 3), Moderate Pain (4 - 6), Severe Pain (7 - 10)  aluminum hydroxide/magnesium hydroxide/simethicone Suspension 30 milliLiter(s) Oral every 4 hours PRN Dyspepsia  BACItracin   Ointment 1 Application(s) Topical two times a day PRN skin abrasion  LORazepam     Tablet 0.5 milliGRAM(s) Oral three times a day PRN Anxiety  melatonin. 5 milliGRAM(s) Oral at bedtime PRN Insomnia  ondansetron    Tablet 4 milliGRAM(s) Oral daily PRN Nausea  
MEDICATIONS  (PRN):  acetaminophen   Tablet .. 650 milliGRAM(s) Oral every 6 hours PRN Temp greater or equal to 38C (100.4F), Mild Pain (1 - 3), Moderate Pain (4 - 6), Severe Pain (7 - 10)  aluminum hydroxide/magnesium hydroxide/simethicone Suspension 30 milliLiter(s) Oral every 4 hours PRN Dyspepsia  BACItracin   Ointment 1 Application(s) Topical two times a day PRN skin abrasion  melatonin. 5 milliGRAM(s) Oral at bedtime PRN Insomnia  ondansetron    Tablet 4 milliGRAM(s) Oral daily PRN Nausea  
MEDICATIONS  (PRN):  acetaminophen   Tablet .. 650 milliGRAM(s) Oral every 6 hours PRN Temp greater or equal to 38C (100.4F), Mild Pain (1 - 3), Moderate Pain (4 - 6), Severe Pain (7 - 10)  aluminum hydroxide/magnesium hydroxide/simethicone Suspension 30 milliLiter(s) Oral every 4 hours PRN Dyspepsia  BACItracin   Ointment 1 Application(s) Topical two times a day PRN skin abrasion  LORazepam     Tablet 0.5 milliGRAM(s) Oral three times a day PRN Anxiety  melatonin. 5 milliGRAM(s) Oral at bedtime PRN Insomnia  ondansetron    Tablet 4 milliGRAM(s) Oral daily PRN Nausea  
MEDICATIONS  (PRN):  acetaminophen   Tablet .. 650 milliGRAM(s) Oral every 6 hours PRN Temp greater or equal to 38C (100.4F), Mild Pain (1 - 3), Moderate Pain (4 - 6), Severe Pain (7 - 10)  aluminum hydroxide/magnesium hydroxide/simethicone Suspension 30 milliLiter(s) Oral every 4 hours PRN Dyspepsia  BACItracin   Ointment 1 Application(s) Topical two times a day PRN skin abrasion  melatonin. 5 milliGRAM(s) Oral at bedtime PRN Insomnia  ondansetron    Tablet 4 milliGRAM(s) Oral daily PRN Nausea  
MEDICATIONS  (PRN):  acetaminophen   Tablet .. 650 milliGRAM(s) Oral every 6 hours PRN Temp greater or equal to 38C (100.4F), Mild Pain (1 - 3), Moderate Pain (4 - 6), Severe Pain (7 - 10)  aluminum hydroxide/magnesium hydroxide/simethicone Suspension 30 milliLiter(s) Oral every 4 hours PRN Dyspepsia  BACItracin   Ointment 1 Application(s) Topical two times a day PRN skin abrasion  melatonin. 5 milliGRAM(s) Oral at bedtime PRN Insomnia  ondansetron    Tablet 4 milliGRAM(s) Oral daily PRN Nausea  
MEDICATIONS  (PRN):  aluminum hydroxide/magnesium hydroxide/simethicone Suspension 30 milliLiter(s) Oral every 4 hours PRN Dyspepsia  melatonin. 5 milliGRAM(s) Oral at bedtime PRN Insomnia  ondansetron    Tablet 4 milliGRAM(s) Oral daily PRN Nausea  
MEDICATIONS  (PRN):  aluminum hydroxide/magnesium hydroxide/simethicone Suspension 30 milliLiter(s) Oral every 4 hours PRN Dyspepsia  melatonin. 5 milliGRAM(s) Oral at bedtime PRN Insomnia  ondansetron    Tablet 4 milliGRAM(s) Oral daily PRN Nausea  
MEDICATIONS  (PRN):  acetaminophen   Tablet .. 650 milliGRAM(s) Oral every 6 hours PRN Temp greater or equal to 38C (100.4F), Mild Pain (1 - 3), Moderate Pain (4 - 6), Severe Pain (7 - 10)  aluminum hydroxide/magnesium hydroxide/simethicone Suspension 30 milliLiter(s) Oral every 4 hours PRN Dyspepsia  BACItracin   Ointment 1 Application(s) Topical two times a day PRN skin abrasion  melatonin. 5 milliGRAM(s) Oral at bedtime PRN Insomnia  ondansetron    Tablet 4 milliGRAM(s) Oral daily PRN Nausea  
MEDICATIONS  (PRN):  aluminum hydroxide/magnesium hydroxide/simethicone Suspension 30 milliLiter(s) Oral every 4 hours PRN Dyspepsia  melatonin. 5 milliGRAM(s) Oral at bedtime PRN Insomnia  ondansetron    Tablet 4 milliGRAM(s) Oral daily PRN Nausea  
MEDICATIONS  (PRN):  acetaminophen   Tablet .. 650 milliGRAM(s) Oral every 6 hours PRN Temp greater or equal to 38C (100.4F), Mild Pain (1 - 3), Moderate Pain (4 - 6), Severe Pain (7 - 10)  aluminum hydroxide/magnesium hydroxide/simethicone Suspension 30 milliLiter(s) Oral every 4 hours PRN Dyspepsia  BACItracin   Ointment 1 Application(s) Topical two times a day PRN skin abrasion  clonazePAM  Tablet 1 milliGRAM(s) Oral every 24 hours PRN Anxiety  melatonin. 5 milliGRAM(s) Oral at bedtime PRN Insomnia  ondansetron    Tablet 4 milliGRAM(s) Oral daily PRN Nausea  
MEDICATIONS  (PRN):  acetaminophen Tablet 650 milliGRAM(s) Oral every 6 hours PRN Temp greater or equal to 38C (100.4F), Mild Pain (1 - 3), Moderate Pain (4 - 6), Severe Pain (7 - 10)  aluminum hydroxide/magnesium hydroxide/simethicone Suspension 30 milliLiter(s) Oral every 4 hours PRN Dyspepsia  BACItracin   Ointment 1 Application(s) Topical two times a day PRN skin abrasion  melatonin. 5 milliGRAM(s) Oral at bedtime PRN Insomnia  ondansetron    Tablet 4 milliGRAM(s) Oral daily PRN Nausea  
MEDICATIONS  (PRN):  acetaminophen   Tablet .. 650 milliGRAM(s) Oral every 6 hours PRN Temp greater or equal to 38C (100.4F), Mild Pain (1 - 3), Moderate Pain (4 - 6), Severe Pain (7 - 10)  aluminum hydroxide/magnesium hydroxide/simethicone Suspension 30 milliLiter(s) Oral every 4 hours PRN Dyspepsia  BACItracin   Ointment 1 Application(s) Topical two times a day PRN skin abrasion  melatonin. 5 milliGRAM(s) Oral at bedtime PRN Insomnia  ondansetron    Tablet 4 milliGRAM(s) Oral daily PRN Nausea  
MEDICATIONS  (PRN):  acetaminophen   Tablet .. 650 milliGRAM(s) Oral every 6 hours PRN Temp greater or equal to 38C (100.4F), Mild Pain (1 - 3), Moderate Pain (4 - 6), Severe Pain (7 - 10)  aluminum hydroxide/magnesium hydroxide/simethicone Suspension 30 milliLiter(s) Oral every 4 hours PRN Dyspepsia  BACItracin   Ointment 1 Application(s) Topical two times a day PRN skin abrasion  clonazePAM  Tablet 0.5 milliGRAM(s) Oral every 24 hours PRN Anxiety  melatonin. 5 milliGRAM(s) Oral at bedtime PRN Insomnia  ondansetron    Tablet 4 milliGRAM(s) Oral daily PRN Nausea  
MEDICATIONS  (PRN):  acetaminophen   Tablet .. 650 milliGRAM(s) Oral every 6 hours PRN Temp greater or equal to 38C (100.4F), Mild Pain (1 - 3), Moderate Pain (4 - 6), Severe Pain (7 - 10)  aluminum hydroxide/magnesium hydroxide/simethicone Suspension 30 milliLiter(s) Oral every 4 hours PRN Dyspepsia  BACItracin   Ointment 1 Application(s) Topical two times a day PRN skin abrasion  LORazepam     Tablet 0.5 milliGRAM(s) Oral three times a day PRN Anxiety  melatonin. 5 milliGRAM(s) Oral at bedtime PRN Insomnia  ondansetron    Tablet 4 milliGRAM(s) Oral daily PRN Nausea

## 2021-05-24 NOTE — BH PSYCHOLOGY - CLINICIAN PSYCHOTHERAPY NOTE - NSTXCOPEDATEEST_PSY_ALL_CORE
23-Apr-2021

## 2021-05-24 NOTE — BH INPATIENT PSYCHIATRY PROGRESS NOTE - MSE OPTIONS
Unstructured MSE

## 2021-05-26 ENCOUNTER — OUTPATIENT (OUTPATIENT)
Dept: OUTPATIENT SERVICES | Facility: HOSPITAL | Age: 71
LOS: 1 days | Discharge: HOME | End: 2021-05-26
Payer: COMMERCIAL

## 2021-05-26 PROCEDURE — 90792 PSYCH DIAG EVAL W/MED SRVCS: CPT | Mod: 95

## 2021-05-27 DIAGNOSIS — F33.1 MAJOR DEPRESSIVE DISORDER, RECURRENT, MODERATE: ICD-10-CM

## 2021-05-27 DIAGNOSIS — F41.1 GENERALIZED ANXIETY DISORDER: ICD-10-CM

## 2021-05-27 DIAGNOSIS — I10 ESSENTIAL (PRIMARY) HYPERTENSION: ICD-10-CM

## 2021-05-27 DIAGNOSIS — E78.5 HYPERLIPIDEMIA, UNSPECIFIED: ICD-10-CM

## 2021-06-03 PROCEDURE — 99214 OFFICE O/P EST MOD 30 MIN: CPT | Mod: 95

## 2021-06-16 PROCEDURE — 99214 OFFICE O/P EST MOD 30 MIN: CPT | Mod: 95

## 2021-06-22 PROCEDURE — 99443: CPT

## 2021-07-02 PROCEDURE — 99215 OFFICE O/P EST HI 40 MIN: CPT | Mod: 95

## 2021-07-05 ENCOUNTER — INPATIENT (INPATIENT)
Facility: HOSPITAL | Age: 71
LOS: 17 days | Discharge: HOME | End: 2021-07-23
Attending: PSYCHIATRY & NEUROLOGY | Admitting: PSYCHIATRY & NEUROLOGY
Payer: COMMERCIAL

## 2021-07-05 VITALS
HEIGHT: 63 IN | TEMPERATURE: 99 F | HEART RATE: 84 BPM | RESPIRATION RATE: 16 BRPM | OXYGEN SATURATION: 100 % | DIASTOLIC BLOOD PRESSURE: 81 MMHG | SYSTOLIC BLOOD PRESSURE: 156 MMHG

## 2021-07-05 DIAGNOSIS — F32.9 MAJOR DEPRESSIVE DISORDER, SINGLE EPISODE, UNSPECIFIED: ICD-10-CM

## 2021-07-05 DIAGNOSIS — F41.9 ANXIETY DISORDER, UNSPECIFIED: ICD-10-CM

## 2021-07-05 DIAGNOSIS — F41.1 GENERALIZED ANXIETY DISORDER: ICD-10-CM

## 2021-07-05 DIAGNOSIS — F12.10 CANNABIS ABUSE, UNCOMPLICATED: ICD-10-CM

## 2021-07-05 LAB
ALBUMIN SERPL ELPH-MCNC: 4.7 G/DL — SIGNIFICANT CHANGE UP (ref 3.3–5)
ALP SERPL-CCNC: 88 U/L — SIGNIFICANT CHANGE UP (ref 40–120)
ALT FLD-CCNC: 20 U/L — SIGNIFICANT CHANGE UP (ref 4–33)
ANION GAP SERPL CALC-SCNC: 17 MMOL/L — HIGH (ref 7–14)
APPEARANCE UR: ABNORMAL
AST SERPL-CCNC: 17 U/L — SIGNIFICANT CHANGE UP (ref 4–32)
BACTERIA # UR AUTO: ABNORMAL
BASOPHILS # BLD AUTO: 0.05 K/UL — SIGNIFICANT CHANGE UP (ref 0–0.2)
BASOPHILS NFR BLD AUTO: 0.5 % — SIGNIFICANT CHANGE UP (ref 0–2)
BILIRUB SERPL-MCNC: 0.4 MG/DL — SIGNIFICANT CHANGE UP (ref 0.2–1.2)
BILIRUB UR-MCNC: NEGATIVE — SIGNIFICANT CHANGE UP
BUN SERPL-MCNC: 12 MG/DL — SIGNIFICANT CHANGE UP (ref 7–23)
CALCIUM SERPL-MCNC: 10.2 MG/DL — SIGNIFICANT CHANGE UP (ref 8.4–10.5)
CHLORIDE SERPL-SCNC: 98 MMOL/L — SIGNIFICANT CHANGE UP (ref 98–107)
CO2 SERPL-SCNC: 24 MMOL/L — SIGNIFICANT CHANGE UP (ref 22–31)
COLOR SPEC: SIGNIFICANT CHANGE UP
COVID-19 SPIKE DOMAIN AB INTERP: POSITIVE
COVID-19 SPIKE DOMAIN ANTIBODY RESULT: >250 U/ML — HIGH
CREAT SERPL-MCNC: 0.94 MG/DL — SIGNIFICANT CHANGE UP (ref 0.5–1.3)
DIFF PNL FLD: ABNORMAL
EOSINOPHIL # BLD AUTO: 0.15 K/UL — SIGNIFICANT CHANGE UP (ref 0–0.5)
EOSINOPHIL NFR BLD AUTO: 1.5 % — SIGNIFICANT CHANGE UP (ref 0–6)
EPI CELLS # UR: 10 /HPF — HIGH (ref 0–5)
GLUCOSE SERPL-MCNC: 145 MG/DL — HIGH (ref 70–99)
GLUCOSE UR QL: NEGATIVE — SIGNIFICANT CHANGE UP
HCT VFR BLD CALC: 45.8 % — HIGH (ref 34.5–45)
HGB BLD-MCNC: 15 G/DL — SIGNIFICANT CHANGE UP (ref 11.5–15.5)
HYALINE CASTS # UR AUTO: 2 /LPF — SIGNIFICANT CHANGE UP (ref 0–7)
IANC: 7.33 K/UL — SIGNIFICANT CHANGE UP (ref 1.5–8.5)
IMM GRANULOCYTES NFR BLD AUTO: 0.4 % — SIGNIFICANT CHANGE UP (ref 0–1.5)
KETONES UR-MCNC: NEGATIVE — SIGNIFICANT CHANGE UP
LEUKOCYTE ESTERASE UR-ACNC: ABNORMAL
LYMPHOCYTES # BLD AUTO: 2.23 K/UL — SIGNIFICANT CHANGE UP (ref 1–3.3)
LYMPHOCYTES # BLD AUTO: 21.8 % — SIGNIFICANT CHANGE UP (ref 13–44)
MCHC RBC-ENTMCNC: 28.5 PG — SIGNIFICANT CHANGE UP (ref 27–34)
MCHC RBC-ENTMCNC: 32.8 GM/DL — SIGNIFICANT CHANGE UP (ref 32–36)
MCV RBC AUTO: 86.9 FL — SIGNIFICANT CHANGE UP (ref 80–100)
MONOCYTES # BLD AUTO: 0.45 K/UL — SIGNIFICANT CHANGE UP (ref 0–0.9)
MONOCYTES NFR BLD AUTO: 4.4 % — SIGNIFICANT CHANGE UP (ref 2–14)
NEUTROPHILS # BLD AUTO: 7.33 K/UL — SIGNIFICANT CHANGE UP (ref 1.8–7.4)
NEUTROPHILS NFR BLD AUTO: 71.4 % — SIGNIFICANT CHANGE UP (ref 43–77)
NITRITE UR-MCNC: NEGATIVE — SIGNIFICANT CHANGE UP
NRBC # BLD: 0 /100 WBCS — SIGNIFICANT CHANGE UP
NRBC # FLD: 0 K/UL — SIGNIFICANT CHANGE UP
PCP SPEC-MCNC: SIGNIFICANT CHANGE UP
PH UR: 6 — SIGNIFICANT CHANGE UP (ref 5–8)
PLATELET # BLD AUTO: 272 K/UL — SIGNIFICANT CHANGE UP (ref 150–400)
POTASSIUM SERPL-MCNC: 3.9 MMOL/L — SIGNIFICANT CHANGE UP (ref 3.5–5.3)
POTASSIUM SERPL-SCNC: 3.9 MMOL/L — SIGNIFICANT CHANGE UP (ref 3.5–5.3)
PROT SERPL-MCNC: 7.1 G/DL — SIGNIFICANT CHANGE UP (ref 6–8.3)
PROT UR-MCNC: NEGATIVE — SIGNIFICANT CHANGE UP
RBC # BLD: 5.27 M/UL — HIGH (ref 3.8–5.2)
RBC # FLD: 12.7 % — SIGNIFICANT CHANGE UP (ref 10.3–14.5)
RBC CASTS # UR COMP ASSIST: 2 /HPF — SIGNIFICANT CHANGE UP (ref 0–4)
SARS-COV-2 IGG+IGM SERPL QL IA: >250 U/ML — HIGH
SARS-COV-2 IGG+IGM SERPL QL IA: POSITIVE
SARS-COV-2 RNA SPEC QL NAA+PROBE: SIGNIFICANT CHANGE UP
SODIUM SERPL-SCNC: 139 MMOL/L — SIGNIFICANT CHANGE UP (ref 135–145)
SP GR SPEC: 1.01 — SIGNIFICANT CHANGE UP (ref 1.01–1.02)
TOXICOLOGY SCREEN, DRUGS OF ABUSE, SERUM RESULT: SIGNIFICANT CHANGE UP
TSH SERPL-MCNC: 1.28 UIU/ML — SIGNIFICANT CHANGE UP (ref 0.27–4.2)
UROBILINOGEN FLD QL: SIGNIFICANT CHANGE UP
WBC # BLD: 10.25 K/UL — SIGNIFICANT CHANGE UP (ref 3.8–10.5)
WBC # FLD AUTO: 10.25 K/UL — SIGNIFICANT CHANGE UP (ref 3.8–10.5)
WBC UR QL: 14 /HPF — HIGH (ref 0–5)

## 2021-07-05 PROCEDURE — 99284 EMERGENCY DEPT VISIT MOD MDM: CPT

## 2021-07-05 PROCEDURE — 99285 EMERGENCY DEPT VISIT HI MDM: CPT

## 2021-07-05 RX ORDER — VENLAFAXINE HCL 75 MG
187.5 CAPSULE, EXT RELEASE 24 HR ORAL DAILY
Refills: 0 | Status: DISCONTINUED | OUTPATIENT
Start: 2021-07-06 | End: 2021-07-06

## 2021-07-05 RX ORDER — LANOLIN ALCOHOL/MO/W.PET/CERES
5 CREAM (GRAM) TOPICAL AT BEDTIME
Refills: 0 | Status: DISCONTINUED | OUTPATIENT
Start: 2021-07-05 | End: 2021-07-23

## 2021-07-05 RX ORDER — CLONAZEPAM 1 MG
0.5 TABLET ORAL
Refills: 0 | Status: DISCONTINUED | OUTPATIENT
Start: 2021-07-05 | End: 2021-07-12

## 2021-07-05 RX ORDER — ACETAMINOPHEN 500 MG
650 TABLET ORAL ONCE
Refills: 0 | Status: COMPLETED | OUTPATIENT
Start: 2021-07-05 | End: 2021-07-05

## 2021-07-05 RX ORDER — MIRTAZAPINE 45 MG/1
30 TABLET, ORALLY DISINTEGRATING ORAL AT BEDTIME
Refills: 0 | Status: DISCONTINUED | OUTPATIENT
Start: 2021-07-05 | End: 2021-07-05

## 2021-07-05 RX ORDER — ONDANSETRON 8 MG/1
4 TABLET, FILM COATED ORAL EVERY 6 HOURS
Refills: 0 | Status: DISCONTINUED | OUTPATIENT
Start: 2021-07-05 | End: 2021-07-23

## 2021-07-05 RX ORDER — CLONAZEPAM 1 MG
1 TABLET ORAL DAILY
Refills: 0 | Status: DISCONTINUED | OUTPATIENT
Start: 2021-07-06 | End: 2021-07-13

## 2021-07-05 RX ORDER — QUETIAPINE FUMARATE 200 MG/1
12.5 TABLET, FILM COATED ORAL EVERY 6 HOURS
Refills: 0 | Status: DISCONTINUED | OUTPATIENT
Start: 2021-07-05 | End: 2021-07-23

## 2021-07-05 RX ORDER — METOPROLOL TARTRATE 50 MG
50 TABLET ORAL
Refills: 0 | Status: DISCONTINUED | OUTPATIENT
Start: 2021-07-05 | End: 2021-07-23

## 2021-07-05 RX ORDER — MIRTAZAPINE 45 MG/1
30 TABLET, ORALLY DISINTEGRATING ORAL AT BEDTIME
Refills: 0 | Status: DISCONTINUED | OUTPATIENT
Start: 2021-07-05 | End: 2021-07-23

## 2021-07-05 RX ORDER — PANTOPRAZOLE SODIUM 20 MG/1
40 TABLET, DELAYED RELEASE ORAL
Refills: 0 | Status: DISCONTINUED | OUTPATIENT
Start: 2021-07-06 | End: 2021-07-10

## 2021-07-05 RX ORDER — CLONAZEPAM 1 MG
0.5 TABLET ORAL ONCE
Refills: 0 | Status: DISCONTINUED | OUTPATIENT
Start: 2021-07-05 | End: 2021-07-05

## 2021-07-05 RX ORDER — LOSARTAN POTASSIUM 100 MG/1
100 TABLET, FILM COATED ORAL DAILY
Refills: 0 | Status: DISCONTINUED | OUTPATIENT
Start: 2021-07-06 | End: 2021-07-23

## 2021-07-05 RX ORDER — METOPROLOL TARTRATE 50 MG
50 TABLET ORAL
Refills: 0 | Status: DISCONTINUED | OUTPATIENT
Start: 2021-07-05 | End: 2021-07-05

## 2021-07-05 RX ORDER — HYDROCHLOROTHIAZIDE 25 MG
12.5 TABLET ORAL DAILY
Refills: 0 | Status: DISCONTINUED | OUTPATIENT
Start: 2021-07-06 | End: 2021-07-23

## 2021-07-05 RX ADMIN — Medication 0.5 MILLIGRAM(S): at 21:57

## 2021-07-05 RX ADMIN — MIRTAZAPINE 30 MILLIGRAM(S): 45 TABLET, ORALLY DISINTEGRATING ORAL at 21:58

## 2021-07-05 RX ADMIN — Medication 5 MILLIGRAM(S): at 21:57

## 2021-07-05 RX ADMIN — Medication 0.5 MILLIGRAM(S): at 15:17

## 2021-07-05 RX ADMIN — Medication 50 MILLIGRAM(S): at 21:58

## 2021-07-05 RX ADMIN — Medication 650 MILLIGRAM(S): at 15:19

## 2021-07-05 NOTE — ED BEHAVIORAL HEALTH NOTE - BEHAVIORAL HEALTH NOTE
COVID Exposure Screen- Patient  1.	*Have you had a COVID-19 test in the last 90 days?  ( X ) Yes   (  ) No   (  ) Unknown- Reason: _____  IF YES PROCEED TO QUESTION #2. IF NO OR UNKNOWN, PLEASE SKIP TO QUESTION #3.  2.	Date of test(s) and result(s): ________ last 4/22/2021, NEGATIVE   3.	*Have you tested positive for COVID-19 antibodies? ( X ) Yes   (  ) No   (  ) Unknown- Reason: _____  IF YES PROCEED TO QUESTION #4. IF NO or UNKNOWN, PLEASE SKIP TO QUESTION #5.  4.	Date of positive antibody test: ________ she was told that she was + for covid Abs last yr   5.	*Have you received 2 doses of the COVID-19 vaccine? ( X ) Yes   (  ) No   (  ) Unknown- Reason: _____   IF YES PROCEED TO QUESTION #6. IF NO or UNKNOWN, PLEASE SKIP TO QUESTION #7.  6.	Date of second dose: ________RECEIVED MODERNA VACCINE last 3/2021  7.	*In the past 10 days, have you been around anyone with a positive COVID-19 test?* (  ) Yes   ( X ) No   (  ) Unknown- Reason: ____  IF YES PROCEED TO QUESTION #8. IF NO or UNKNOWN, PLEASE SKIP TO QUESTION #13.  8.	Were you within 6 feet of them for at least 15 minutes? (  ) Yes   (  ) No   (  ) Unknown- Reason: _____  9.	Have you provided care for them? (  ) Yes   (  ) No   (  ) Unknown- Reason: ______  10.	Have you had direct physical contact with them (touched, hugged, or kissed them)? (  ) Yes   (  ) No    (  ) Unknown- Reason: _____  11.	Have you shared eating or drinking utensils with them? (  ) Yes   (  ) No    (  ) Unknown- Reason: ____  12.	Have they sneezed, coughed, or somehow gotten respiratory droplets on you? (  ) Yes   (  ) No    (  ) Unknown- Reason: ______  13.	*Have you been out of [place] within the past 10 days?* (  ) Yes   ( X ) No   (  ) Unknown- Reason: _____  IF YES PLEASE ANSWER THE FOLLOWING QUESTIONS:  14.	Which state/country have you been to? ______  15.	Were you there over 24 hours? (  ) Yes   (  ) No    (  ) Unknown- Reason: ______  16.	Date of return to [facility]: ______

## 2021-07-05 NOTE — ED BEHAVIORAL HEALTH ASSESSMENT NOTE - MEDICAL ISSUES AND PLAN (INCLUDE STANDING AND PRN MEDICATION)
HTN (Resume home medications) follow vitals. HTN (Resume home medications) follow vitals.  Metoprolol 50 mg BID, Hydrochlorothiazide 12.5 mg daily, Losartan 100 mg daily

## 2021-07-05 NOTE — ED BEHAVIORAL HEALTH NOTE - BEHAVIORAL HEALTH NOTE
JOANN informed patient will require inpatient admission. Bed obtained at [facility] on [address].    JOANN contacted [facility] after hours line, [phone] for pre-certification. Message states "office is closed in observance of holiday". Unable to obtain authorization at this time.

## 2021-07-05 NOTE — ED BEHAVIORAL HEALTH ASSESSMENT NOTE - RISK ASSESSMENT
Patient with elevated chronic risk of SI, but currently low acute risk. Risk factors: mood d/o, history of a SA, multiple prior inpt admission, h/o cannabis use, poor social support, and chronic passive SI. Protective factors: is future orientated, help seeking, responsibility to self and , identifies Jew beliefs go against suicide, and no access. Imminent risk may be minimize by inpatient admission to a safe environment with appropriate supervision and limited access to lethal means. Low Acute Suicide Risk

## 2021-07-05 NOTE — ED BEHAVIORAL HEALTH ASSESSMENT NOTE - NSACTIVEVENT_PSY_ALL_CORE
Triggering events leading to humiliation, shame, and/or despair (e.g., Loss of relationship, financial or health status) (real or anticipated)/Inadequate social supports/Perceived burden on family or others

## 2021-07-05 NOTE — ED ADULT TRIAGE NOTE - CHIEF COMPLAINT QUOTE
C/o anxiety and depression. Pt states, "I just want to die, that 's it , that's it, I just want to die." "I need my medications adjusted , it's not fair, it's just not fair, I can't take it anymore, I don't trust myself anymore." Pt crying in triage. States [facility]. Hx previous suicide attempt.

## 2021-07-05 NOTE — ED PROVIDER NOTE - CLINICAL SUMMARY MEDICAL DECISION MAKING FREE TEXT BOX
this is a 71 y.o female with a PMhx of HTN, HLD, depression, anxiety, h/o suicidal attempt- presented to the ED for suicidal ideations, patient has been feeling suicidal ideations over the past few weeks. suicidal ideation-labs, ekg, psych eval

## 2021-07-05 NOTE — ED BEHAVIORAL HEALTH ASSESSMENT NOTE - OTHER PAST PSYCHIATRIC HISTORY (INCLUDE DETAILS REGARDING ONSET, COURSE OF ILLNESS, INPATIENT/OUTPATIENT TREATMENT)
Patient states that she has had a long history of depression and anxiety treated with multiple medications. As per her last admission note, her first inpatient psychiatric admission was in November 2019 at [facility] for a suicide attempt via OD on unknown pills. She was hospitalized again at [facility] in Feb 2020 for worsening anxiety and depression and was discharged on Buspar, Mirtazapine, and Klonopin. Pt was again hospitalized for depression and anxiety in April 2021, at which point she stated her private outpatient psychiatrist Dr. Ramos advised her to discontinue Mirtazapine 15 mg 8/13/2020 for Effexor 50 mg. At that point, she was also taking 2 mg Klonopin per day and Lexapro. Today, pt reports she has been experiencing nausea and chills for about 2 weeks 2/2 to Effexor which is why her outpt provider  decreased Effexor 225mg to 187.5mg about a week ago.

## 2021-07-05 NOTE — ED BEHAVIORAL HEALTH ASSESSMENT NOTE - DETAILS
Penicillin Patient endorses chronic passive SI. Patient had a suicide attempt in 2019 where  found pt after overdosing on pills. Father () - PTSD patient aware of disposition and plans EUFEMIA

## 2021-07-05 NOTE — ED BEHAVIORAL HEALTH ASSESSMENT NOTE - PSYCHIATRIC ISSUES AND PLAN (INCLUDE STANDING AND PRN MEDICATION)
see PLAN Effexor 187.5mg po daily to avoid precipitating withdrawal symptoms, primary team to adjust dose as needed. mirtazapine 30mg po qHS and Klonopin 1mg in the AM and 0.5mg at 3PM, melatonin 5mg po qHS.  PRNs: Ativan 1mg po q6h for anxiety/agitation; Seroquel 12.5mg po qh6 hrs for anxiety/agitation

## 2021-07-05 NOTE — ED BEHAVIORAL HEALTH ASSESSMENT NOTE - DESCRIPTION
No episodes of agitation/aggression. Pt anxious and tearful, but verbally redirectable. She received her Klonopin 0.5mg po afternoon dose.       T(C): 37.3 (05 Jul 2021 13:08), Max: 37.3 (05 Jul 2021 13:08)  T(F): 99.1 (05 Jul 2021 13:08), Max: 99.1 (05 Jul 2021 13:08)  HR: 84 (05 Jul 2021 13:08) (84 - 84)  BP: 156/81 (05 Jul 2021 13:08) (156/81 - 156/81)  BP(mean): --  ABP: --  ABP(mean): --  RR: 16 (05 Jul 2021 13:08) (16 - 16)  SpO2: 100% (05 Jul 2021 13:08) (100% - 100%) Patient is a 70 F, retired (),  non-caregiver status living in a private home in [place] with her . She states that her  is a workaholic and works almost daily. She states that she is still deeply affected by her parents passing a few years ago, friend's death last year and recent death of therapist one month ago. HTN, HLD

## 2021-07-05 NOTE — ED BEHAVIORAL HEALTH ASSESSMENT NOTE - HPI (INCLUDE ILLNESS QUALITY, SEVERITY, DURATION, TIMING, CONTEXT, MODIFYING FACTORS, ASSOCIATED SIGNS AND SYMPTOMS)
70yo female, , retired (, non-caregiver domiciled at home with her  with a prior psychiatric diagnosis of ANSON and MDD, 3 previous inpatient psychiatric hospitalizations at [facility] in 2019, 2/2020, and most recently 4/22-5/24/2021, prior SA in 11/2019 by overdosing on pills, no history of NSSIB, no history of violence/aggression, no hx of legal issues, daily marijuana use, PMHx of HTN, hyperlipidemia, and nausea, BIB self due to passive SI and worsening depression and anxiety at home.     On interview, pt is tearful but cooperative. Reports that she improved during recent hospitalization at [facility] on regimen of Effexor 225mg, Remeron 30ng qHS, and Klonopin 1mg in the AM and 0.5mg at 3PM. She was doing well at home until about two weeks ago when she started feeling "nausea and chills" s/p taking the Effexor in the morning, with outpt provider decreasing the Effexor to 187mg. Further, endorses worsening anxiety that seems to happen "whenever" she takes the Effexor. Over the last weeks, she endorses poor appetite, low energy, poor concentration, depressed mood, and feelings of guilt. "I don't know who I am? I am not myself!" States that she no longer finds karla in her regular activities such as going to the gym, seeing friends, going out for Friday dinners, or going with friends to the beach. Further, states that she finds herself at home unable to do anything: doesn't clean, cook. Her  has noticed her declined and told her "he doesn't recognize me." Patient becomes tearful as she is anxious her  will abandon her. "I can't blame him if he wants to divorce me." The patient got  at 48yo, and this is her first marriage. Although this is the 2nd marriage for her , none of them have any kids. No close relatives live  nearby. Patient reports her  works the night shift and comes home in the AM. However, he sleeps most of the day, leaving the patient to feel "all alone" at home. States she lost her parents about 5yrs ago and this has been very challenging on her. "I was very close to them, I spoke with them about 6x per day." While she acknowledges the relationship may not have been "health" for  her, she now feels alone and can't seem go back to being her "happy, friendly" self.       Patient endorses a long history of depression and anxiety since her early teens. Pt reports trying multiple substances in her 20s and had not done anything other than marijuana since her 20s. Her family history is positive for suicide (grandmother at age 90). Patient attempted suicide via OD on her pills in November 2019 and was admitted to [facility]. Of note, pt states she has been on several anti-depressants in the past, but they don't work for long. Denies any AVH, paranoia, IOR, thought insertion/deletion, or manic symptoms. Endorses passive SI but denies any intent or plan. "I promise God I wouldn't try to kill myself again." She is requesting voluntary admission to address her worsening anxiety and depression and adjust her medications.    Multiple attempts were made to contact pt's , Mr. Yehuda Gross, no response. Left .    [facility] Dr[facility] Rohini [facility], was informed of pt's admission. 70yo female, , retired (, non-caregiver domiciled at home with her  with a prior psychiatric diagnosis of ANSON and MDD, 3 previous inpatient psychiatric hospitalizations at [facility] in 2019, 2/2020, and most recently 4/22-5/24/2021, prior SA in 11/2019 by overdosing on pills, daily MJ user, no history of NSSIB, no history of violence/aggression, no hx of legal issues, daily marijuana use, PMHx of HTN, hyperlipidemia, and nausea, BIB self due to passive SI and worsening depression and anxiety at home.     On interview, pt is tearful but cooperative. Reports that she improved during recent hospitalization at [facility] on regimen of Effexor 225mg, Remeron 30ng qHS, and Klonopin 1mg in the AM and 0.5mg at 3PM. She was doing well at home until about two weeks ago when she started feeling "nausea and chills" s/p taking the Effexor in the morning, with outpt provider decreasing the Effexor to 187mg. Further, endorses worsening anxiety that seems to happen "whenever" she takes the Effexor. Over the last weeks, she endorses poor appetite, low energy, poor concentration, depressed mood, and feelings of guilt. "I don't know who I am? I am not myself!" States that she no longer finds karla in her regular activities such as going to the gym, seeing friends, going out for Friday dinners, or going with friends to the beach. Further, states that she finds herself at home unable to do anything: doesn't clean, cook. Her  has noticed her declined and told her "he doesn't recognize me." Patient becomes tearful as she is anxious her  will abandon her. "I can't blame him if he wants to divorce me." The patient got  at 48yo, and this is her first marriage. Although this is the 2nd marriage for her , none of them have any kids. No close relatives live  nearby. Patient reports her  works the night shift and comes home in the AM. However, he sleeps most of the day, leaving the patient to feel "all alone" at home. States she lost her parents about 5yrs ago and this has been very challenging on her. "I was very close to them, I spoke with them about 6x per day." While she acknowledges the relationship may not have been "health" for  her, she now feels alone and can't seem go back to being her "happy, friendly" self.       Patient endorses a long history of depression and anxiety since her early teens. Pt reports trying multiple substances in her 20s and had not done anything other than marijuana since her 20s. Her family history is positive for suicide (grandmother at age 90). Patient attempted suicide via OD on her pills in November 2019 and was admitted to [facility]. Of note, pt states she has been on several anti-depressants in the past, but they don't work for long. Denies any AVH, paranoia, IOR, thought insertion/deletion, or manic symptoms. Endorses passive SI but denies any intent or plan. "I promise God I wouldn't try to kill myself again." She is requesting voluntary admission to address her worsening anxiety and depression and adjust her medications.    Multiple attempts were made to contact pt's , Mr. Yehuda Gross, no response. Left .    [facility] psychiatrist, Dr. Rohini Kamara, was informed of pt's admission.

## 2021-07-05 NOTE — ED ADULT NURSE NOTE - OBJECTIVE STATEMENT
Pt arrived to  reporting depressive symptoms including anhedonia, lack of motivation and passive S/I. Pt reports prior S/A by OD. Pt tearful during intake, denies current S/I H/I A/H V/H. Denies drug and alcohol use. Pt changed into  clothing. Personal property collected and logged.

## 2021-07-05 NOTE — ED BEHAVIORAL HEALTH ASSESSMENT NOTE - ADDITIONAL DETAILS ALL
Patient endorses chronic passive SI, denies any current intent or plan. Pt endorses taking additional pills with the hopes of not waking up.

## 2021-07-05 NOTE — ED BEHAVIORAL HEALTH ASSESSMENT NOTE - NSSUICPROTFACT_PSY_ALL_CORE
Identifies reasons for living/Cultural, spiritual and/or moral attitudes against suicide/Holiness beliefs

## 2021-07-05 NOTE — ED BEHAVIORAL HEALTH ASSESSMENT NOTE - CURRENT MEDICATION
Lexapro 10 mg, Klonopin 0.5 mg TID, Metoprolol 50 mg BID, Hydrochlorothiazide 12.5 mg, Losartan 100 mg, Zofran, Mirtazapine 7.5mg, Ambien 5mg, Melatonin 3 mg Lexapro 10 mg, Klonopin 0.5 mg TID, Metoprolol 50 mg BID, Hydrochlorothiazide 12.5 mg, Losartan 100 mg, Zofran, Mirtazapine 30mg, Ambien 5mg, Melatonin 3 mg

## 2021-07-05 NOTE — ED BEHAVIORAL HEALTH ASSESSMENT NOTE - CASE SUMMARY
71/F with hx of MDD and ANSON, 3 previous in-Pt psych admissions, had prior SA in 11/2019 by overdosing on pills, no hx of self injurious behaviors and hx of being a daily cannabis user.  Pertinent medical issues include: HTN, hyperlipidemia, and nausea.  Today, self presented to the ED due to worsening depression, anxiety and passive SI (no intents or plans).      At this time, she endorses worsening depression and anxiety as precipitated and perpetuated by ongoing marital conflict.  Pt is fearful that her  will divorce her given her underlying mental health issues. Pt has limited social support.  In addition, Pt's somatic preoccupation with Effexor as causes for making her nauseous - plays a role towards perpetuating her ongoing anxiety symptoms.  Pt feels hopelessness as well as having intermittent/ passive SI (no intent or plans).  Pt's current presentation has caused some degree of functional impairment.  At this time, she is unable to partake towards safety planning enough to justify for a safe discharge back to the community.  Pt is help seeking and requests in-Pt psych admission.  given current presentation, this Pt will greatly benefit from psych admission with aim for mood stabilization, med management, and ensuring safety.     RECOMMENDATIONS:   1. for now, to continue with her Effexor 187.5mg daily to avoid precipitating withdrawal symptoms.  Primary treatment team to adjust dose as deemed necessary; continue with her Remeron 30mg HS and Klonopin 1mg in the AM and 0.5mg at 3PM; melatonin 5mg HS.   2. PRN: Ativan 1mg PO q6hrs for anxiety  3. PRN: Seroquel 12.5mg PO qh6rs for agitation  4. once medically cleared, facilitate transfer to [facility] on 9.13 status

## 2021-07-05 NOTE — ED PROVIDER NOTE - OBJECTIVE STATEMENT
this is a 71 y.o female with a PMhx of HTN, HLD, depression, anxiety, h/o suicidal attempt- presented to the ED for suicidal ideations, patient has been feeling suicidal ideations over the past few weeks. Pt was admitted to the hospital in may for suicidal ideation as well. Pt had a attempt approx 1 and half years ago when she attempted to OD on medication and then was brought to the ED. patient states that she doesn't feel like she has any thing to live for, states that she has been sleeping and depressed. Patient hasn't had a attempt to commit suicide in the past several days. Pt denies having any CP, SOB, JARAMILLO, headaches, dizziness, fever, chills, bodyaches, headaches, or dizziness.

## 2021-07-06 PROCEDURE — 99222 1ST HOSP IP/OBS MODERATE 55: CPT

## 2021-07-06 RX ORDER — VENLAFAXINE HCL 75 MG
187.5 CAPSULE, EXT RELEASE 24 HR ORAL DAILY
Refills: 0 | Status: DISCONTINUED | OUTPATIENT
Start: 2021-07-06 | End: 2021-07-23

## 2021-07-06 RX ORDER — ACETAMINOPHEN 500 MG
650 TABLET ORAL ONCE
Refills: 0 | Status: COMPLETED | OUTPATIENT
Start: 2021-07-06 | End: 2021-07-06

## 2021-07-06 RX ORDER — GABAPENTIN 400 MG/1
100 CAPSULE ORAL THREE TIMES A DAY
Refills: 0 | Status: DISCONTINUED | OUTPATIENT
Start: 2021-07-06 | End: 2021-07-06

## 2021-07-06 RX ORDER — GABAPENTIN 400 MG/1
100 CAPSULE ORAL THREE TIMES A DAY
Refills: 0 | Status: DISCONTINUED | OUTPATIENT
Start: 2021-07-06 | End: 2021-07-13

## 2021-07-06 RX ADMIN — Medication 650 MILLIGRAM(S): at 20:45

## 2021-07-06 RX ADMIN — PANTOPRAZOLE SODIUM 40 MILLIGRAM(S): 20 TABLET, DELAYED RELEASE ORAL at 08:19

## 2021-07-06 RX ADMIN — Medication 5 MILLIGRAM(S): at 20:28

## 2021-07-06 RX ADMIN — Medication 50 MILLIGRAM(S): at 08:19

## 2021-07-06 RX ADMIN — Medication 0.5 MILLIGRAM(S): at 14:43

## 2021-07-06 RX ADMIN — GABAPENTIN 100 MILLIGRAM(S): 400 CAPSULE ORAL at 20:27

## 2021-07-06 RX ADMIN — Medication 50 MILLIGRAM(S): at 20:27

## 2021-07-06 RX ADMIN — MIRTAZAPINE 30 MILLIGRAM(S): 45 TABLET, ORALLY DISINTEGRATING ORAL at 20:28

## 2021-07-06 RX ADMIN — ONDANSETRON 4 MILLIGRAM(S): 8 TABLET, FILM COATED ORAL at 08:19

## 2021-07-06 RX ADMIN — Medication 12.5 MILLIGRAM(S): at 08:19

## 2021-07-06 RX ADMIN — Medication 1 MILLIGRAM(S): at 08:19

## 2021-07-06 RX ADMIN — Medication 187.5 MILLIGRAM(S): at 09:42

## 2021-07-06 RX ADMIN — GABAPENTIN 100 MILLIGRAM(S): 400 CAPSULE ORAL at 14:43

## 2021-07-06 RX ADMIN — LOSARTAN POTASSIUM 100 MILLIGRAM(S): 100 TABLET, FILM COATED ORAL at 08:19

## 2021-07-06 NOTE — BH INPATIENT PSYCHIATRY ASSESSMENT NOTE - HPI (INCLUDE ILLNESS QUALITY, SEVERITY, DURATION, TIMING, CONTEXT, MODIFYING FACTORS, ASSOCIATED SIGNS AND SYMPTOMS)
Upon evaluation, patient is very anxious, depressed and tearful at times during interview. Patient feels hopeless, helpless, "like a failure", poor appetite, low energy, anhedonia, and amotivation. Sleep has been good with the Remeron. Patient also reported that she has "chills" frequently which she states is due to the increased anxiety. Patient reported worsening depressive symptoms since one week after discharge from [facility] (5/24). Patient endorses passive SI with no intent or plan. Patient denies any substance use except for marijuana, last smoked about one week ago. Patient has hx of multiple past medication trials and stated that she has been depressed since childhood. In the past, patient would stop medications when she felt better. Patient was doing well prior to the passing of both her parents about 5yrs ago. Patient is in agreement to start Gabapentin 100mg TID for anxiety. Also discussed ECT as possible treatment option and initially she refused but then stated she will consider it if medication adjustment does not help.        As per [facility] ED Assessment:  "70yo female, , retired (, non-caregiver domiciled at home with her  with a prior psychiatric diagnosis of ANSON and MDD, 3 previous inpatient psychiatric hospitalizations at [facility] in 2019, 2/2020, and most recently 4/22-5/24/2021, prior SA in 11/2019 by overdosing on pills, daily MJ user, no history of NSSIB, no history of violence/aggression, no hx of legal issues, daily marijuana use, PMHx of HTN, hyperlipidemia, and nausea, BIB self due to passive SI and worsening depression and anxiety at home.     On interview, pt is tearful but cooperative. Reports that she improved during recent hospitalization at [facility] on regimen of Effexor 225mg, Remeron 30ng qHS, and Klonopin 1mg in the AM and 0.5mg at 3PM. She was doing well at home until about two weeks ago when she started feeling "nausea and chills" s/p taking the Effexor in the morning, with outpt provider decreasing the Effexor to 187mg. Further, endorses worsening anxiety that seems to happen "whenever" she takes the Effexor. Over the last weeks, she endorses poor appetite, low energy, poor concentration, depressed mood, and feelings of guilt. "I don't know who I am? I am not myself!" States that she no longer finds karla in her regular activities such as going to the gym, seeing friends, going out for Friday dinners, or going with friends to the beach. Further, states that she finds herself at home unable to do anything: doesn't clean, cook. Her  has noticed her declined and told her "he doesn't recognize me." Patient becomes tearful as she is anxious her  will abandon her. "I can't blame him if he wants to divorce me." The patient got  at 48yo, and this is her first marriage. Although this is the 2nd marriage for her , none of them have any kids. No close relatives live  nearby. Patient reports her  works the night shift and comes home in the AM. However, he sleeps most of the day, leaving the patient to feel "all alone" at home. States she lost her parents about 5yrs ago and this has been very challenging on her. "I was very close to them, I spoke with them about 6x per day." While she acknowledges the relationship may not have been "health" for  her, she now feels alone and can't seem go back to being her "happy, friendly" self.       Patient endorses a long history of depression and anxiety since her early teens. Pt reports trying multiple substances in her 20s and had not done anything other than marijuana since her 20s. Her family history is positive for suicide (grandmother at age 90). Patient attempted suicide via OD on her pills in November 2019 and was admitted to [facility]. Of note, pt states she has been on several anti-depressants in the past, but they don't work for long. Denies any AVH, paranoia, IOR, thought insertion/deletion, or manic symptoms. Endorses passive SI but denies any intent or plan. "I promise God I wouldn't try to kill myself again." She is requesting voluntary admission to address her worsening anxiety and depression and adjust her medications.    Multiple attempts were made to contact pt's , Mr. Yehuda Gross, no response. Left .    [facility] psychiatrist, Dr. Rohini Kamara, was informed of pt's admission."

## 2021-07-06 NOTE — BH INPATIENT PSYCHIATRY ASSESSMENT NOTE - CASE SUMMARY
72yo female, , retired (, non-caregiver domiciled at home with her  with a prior psychiatric diagnosis of ANSON and MDD, 3 previous inpatient psychiatric hospitalizations at [facility] in 2019, 2/2020, and most recently 4/22-5/24/2021, prior SA in 11/2019 by overdosing on pills, no history of NSSIB, no history of violence/aggression, no hx of legal issues, with PMHx of HTN, hyperlipidemia, and nausea. Patient BIB self due to passive SI and worsening depression and anxiety at home. She endorses passive SI on the unit, no intent or plan.  Patient requires psychiatric hospitalization for safety and stabilization.

## 2021-07-06 NOTE — PSYCHIATRIC REHAB INITIAL EVALUATION - NSBHALCSUBTREAT_PSY_ALL_CORE
Pt is engaged in outpatient treatment with a psychiatrist. Pt reports having had a therapist, however therapist unexpectedly  last month./Outpatient clinic (specify)

## 2021-07-06 NOTE — BH INPATIENT PSYCHIATRY ASSESSMENT NOTE - CURRENT MEDICATION
MEDICATIONS  (STANDING):  clonazePAM  Tablet 1 milliGRAM(s) Oral daily  clonazePAM  Tablet 0.5 milliGRAM(s) Oral <User Schedule>  gabapentin 100 milliGRAM(s) Oral three times a day  hydrochlorothiazide 12.5 milliGRAM(s) Oral daily  losartan 100 milliGRAM(s) Oral daily  melatonin 5 milliGRAM(s) Oral at bedtime  metoprolol tartrate 50 milliGRAM(s) Oral two times a day  mirtazapine 30 milliGRAM(s) Oral at bedtime  pantoprazole    Tablet 40 milliGRAM(s) Oral before breakfast  venlafaxine .5 milliGRAM(s) Oral daily    MEDICATIONS  (PRN):  LORazepam     Tablet 1 milliGRAM(s) Oral every 4 hours PRN anxiety/agitation  ondansetron    Tablet 4 milliGRAM(s) Oral every 6 hours PRN Nausea  QUEtiapine 12.5 milliGRAM(s) Oral every 6 hours PRN anxiety/agitation

## 2021-07-06 NOTE — BH INPATIENT PSYCHIATRY ASSESSMENT NOTE - DESCRIPTION
Patient is a 70 F, retired (),  non-caregiver status living in a private home in [place] with her . She states that her  is a workaholic and works almost daily. She states that she is still deeply affected by her parents passing a few years ago, friend's death last year and recent death of therapist one month ago.

## 2021-07-06 NOTE — BH PATIENT PROFILE - HOME MEDICATIONS
pantoprazole 40 mg oral delayed release tablet , 1 tab(s) orally once a day (before a meal) MDD:40mg  hydroCHLOROthiazide 12.5 mg oral capsule , 1 cap(s) orally once a day MDD:12.5mg  metoprolol tartrate 50 mg oral tablet , 1 tab(s) orally 2 times a day MDD:100mg  clonazePAM 0.5 mg oral tablet , 1 tab(s) orally once a day MDD:1.5mg  clonazePAM 1 mg oral tablet , 1 tab(s) orally once a day MDD:1.5mg  ondansetron 4 mg oral tablet , 1 tab(s) orally once a day, As needed, Nausea  venlafaxine 75 mg oral capsule, extended release , 3 cap(s) orally once a day MDD:225mg  mirtazapine 30 mg oral tablet , 1 tab(s) orally once a day (at bedtime) MDD:30mg  losartan 100 mg oral tablet , 1 tab(s) orally once a day MDD:100mg

## 2021-07-06 NOTE — BH INPATIENT PSYCHIATRY ASSESSMENT NOTE - DETAILS
Penicillin Patient endorses chronic passive SI. Patient had a suicide attempt in 2019 where  found pt after overdosing on pills. Father () - PTSD

## 2021-07-06 NOTE — BH INPATIENT PSYCHIATRY ASSESSMENT NOTE - NSBHMETABOLIC_PSY_ALL_CORE_FT
BMI: BMI (kg/m2): 41 (07-05-21 @ 21:25)  HbA1c: A1C with Estimated Average Glucose Result: 5.5 % (05-21-21 @ 09:47)    Glucose:   BP: 122/80 (07-05-21 @ 17:22) (122/80 - 156/81)  Lipid Panel: Date/Time: 05-21-21 @ 09:47  Cholesterol, Serum: 279  Direct LDL: --  HDL Cholesterol, Serum: 40  Total Cholesterol/HDL Ration Measurement: --  Triglycerides, Serum: 214

## 2021-07-06 NOTE — BH INPATIENT PSYCHIATRY ASSESSMENT NOTE - NSSUICPROTFACT_PSY_ALL_CORE
Identifies reasons for living/Cultural, spiritual and/or moral attitudes against suicide/Hindu beliefs

## 2021-07-06 NOTE — BH INPATIENT PSYCHIATRY ASSESSMENT NOTE - RISK ASSESSMENT
Patient with elevated chronic risk of SI, but currently low acute risk. Risk factors: mood d/o, history of a SA, multiple prior inpt admission, h/o cannabis use, poor social support, and chronic passive SI. Protective factors: is future orientated, help seeking, responsibility to self and , identifies Congregational beliefs go against suicide, and no access. Imminent risk may be minimize by inpatient admission to a safe environment with appropriate supervision and limited access to lethal means.

## 2021-07-06 NOTE — BH INPATIENT PSYCHIATRY ASSESSMENT NOTE - NSBHCHARTREVIEWVS_PSY_A_CORE FT
Vital Signs Last 24 Hrs  T(C): 36.4 (06 Jul 2021 08:21), Max: 37.3 (05 Jul 2021 13:08)  T(F): 97.6 (06 Jul 2021 08:21), Max: 99.1 (05 Jul 2021 13:08)  HR: 86 (05 Jul 2021 21:25) (84 - 91)  BP: 122/80 (05 Jul 2021 17:22) (122/80 - 156/81)  BP(mean): --  RR: 17 (06 Jul 2021 08:21) (16 - 18)  SpO2: 96% (05 Jul 2021 17:22) (96% - 100%)

## 2021-07-06 NOTE — BH INPATIENT PSYCHIATRY ASSESSMENT NOTE - OTHER PAST PSYCHIATRIC HISTORY (INCLUDE DETAILS REGARDING ONSET, COURSE OF ILLNESS, INPATIENT/OUTPATIENT TREATMENT)
Patient states that she has had a long history of depression and anxiety treated with multiple medications. As per her last admission note, her first inpatient psychiatric admission was in November 2019 at [facility] for a suicide attempt via OD on unknown pills. She was hospitalized again at [facility] in Feb 2020 for worsening anxiety and depression and was discharged on Buspar, Mirtazapine, and Klonopin. Pt was again hospitalized for depression and anxiety in April 2021, at which point she stated her private outpatient psychiatrist Dr. Ramos advised her to discontinue Mirtazapine 15 mg 8/13/2020 for Effexor 50 mg. At that point, she was also taking 2 mg Klonopin per day and Lexapro. Today, pt reports she has been experiencing nausea and chills for about 2 weeks 2/2 to Effexor which is why her outpt provider  decreased Effexor 225mg to 187.5mg about a week ago.

## 2021-07-06 NOTE — PSYCHIATRIC REHAB INITIAL EVALUATION - NSBHPRRECOMMEND_PSY_ALL_CORE
Writer met with pt for initial assessment, oriented pt to daily unit activities, and psychiatric rehabilitation programming. Writer provided pt with a welcome packet and was encouraged to attend groups. In response to COVID-19, unit programming will be re-evaluated on a consistent basis in effort to maintain safety guidelines. Pt was polite, verbal and cooperative during initial assessment. Pt was forthcoming with personal data and information surrounding admitting circumstances. Pt was admitted due to worsening depression, anxiety and passive SI. Pt reported noticing worsening symptoms and decline in functioning over the last month and is unsure why medications do not seem to provide much relief from symptoms despite compliance. Pt was able to build rapport with writer and able to identify psychiatric rehabilitation goal to address during current hospitalization. Psychiatric rehabilitation staff will continue to engage patient daily in order to develop therapeutic rapport. Pt’s insight and judgement are fair. Pt has remained in good behavior control and engaged appropriately with peers. Pt denied active SI, HI, AH and VH.

## 2021-07-06 NOTE — BH INPATIENT PSYCHIATRY ASSESSMENT NOTE - NSBHASSESSSUMMFT_PSY_ALL_CORE
Patient is a 72yo female, , retired (, non-caregiver domiciled at home with her  with a prior psychiatric diagnosis of ANSON and MDD, 3 previous inpatient psychiatric hospitalizations at [facility] in 2019, 2/2020, and most recently 4/22-5/24/2021, prior SA in 11/2019 by overdosing on pills, no history of NSSIB, no history of violence/aggression, no hx of legal issues, with PMHx of HTN, hyperlipidemia, and nausea. Patient BIB self due to passive SI and worsening depression and anxiety at home. She endorses passive SI on the unit, no intent or plan.    1. Admit to 2W, 9.13  2. Transfer to L5 if bed available   3. No CO needed  4. Start Gabapentin and continue all other home medications

## 2021-07-07 PROCEDURE — 99232 SBSQ HOSP IP/OBS MODERATE 35: CPT

## 2021-07-07 RX ORDER — ACETAMINOPHEN 500 MG
650 TABLET ORAL ONCE
Refills: 0 | Status: COMPLETED | OUTPATIENT
Start: 2021-07-07 | End: 2021-07-07

## 2021-07-07 RX ADMIN — Medication 650 MILLIGRAM(S): at 23:21

## 2021-07-07 RX ADMIN — GABAPENTIN 100 MILLIGRAM(S): 400 CAPSULE ORAL at 08:53

## 2021-07-07 RX ADMIN — GABAPENTIN 100 MILLIGRAM(S): 400 CAPSULE ORAL at 12:29

## 2021-07-07 RX ADMIN — ONDANSETRON 4 MILLIGRAM(S): 8 TABLET, FILM COATED ORAL at 10:00

## 2021-07-07 RX ADMIN — Medication 1 MILLIGRAM(S): at 08:53

## 2021-07-07 RX ADMIN — Medication 5 MILLIGRAM(S): at 21:44

## 2021-07-07 RX ADMIN — PANTOPRAZOLE SODIUM 40 MILLIGRAM(S): 20 TABLET, DELAYED RELEASE ORAL at 07:44

## 2021-07-07 RX ADMIN — Medication 12.5 MILLIGRAM(S): at 08:53

## 2021-07-07 RX ADMIN — Medication 650 MILLIGRAM(S): at 20:15

## 2021-07-07 RX ADMIN — GABAPENTIN 100 MILLIGRAM(S): 400 CAPSULE ORAL at 21:43

## 2021-07-07 RX ADMIN — Medication 187.5 MILLIGRAM(S): at 08:53

## 2021-07-07 RX ADMIN — LOSARTAN POTASSIUM 100 MILLIGRAM(S): 100 TABLET, FILM COATED ORAL at 08:54

## 2021-07-07 RX ADMIN — Medication 0.5 MILLIGRAM(S): at 15:09

## 2021-07-07 RX ADMIN — MIRTAZAPINE 30 MILLIGRAM(S): 45 TABLET, ORALLY DISINTEGRATING ORAL at 21:43

## 2021-07-07 RX ADMIN — Medication 50 MILLIGRAM(S): at 08:54

## 2021-07-07 RX ADMIN — Medication 50 MILLIGRAM(S): at 21:43

## 2021-07-07 NOTE — BH INPATIENT PSYCHIATRY PROGRESS NOTE - NSBHASSESSSUMMFT_PSY_ALL_CORE
Patient reported feeling less anxious and depressed in the hospital. She stated this has nothing to do with anything in her home but then discussed how she wants her  to be more affectionate towards her like her parents used to be. She stated she just wants to be "daddy's little girl" again. 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    c/w current medications and titrate Gabapentin as tolerated

## 2021-07-07 NOTE — BH INPATIENT PSYCHIATRY PROGRESS NOTE - NSBHCHARTREVIEWVS_PSY_A_CORE FT
Vital Signs Last 24 Hrs  T(C): 36.4 (07 Jul 2021 07:53), Max: 36.8 (06 Jul 2021 20:28)  T(F): 97.6 (07 Jul 2021 07:53), Max: 98.3 (06 Jul 2021 20:28)  HR: 93 (06 Jul 2021 20:28) (93 - 93)  BP: 128/65 (06 Jul 2021 20:28) (128/65 - 128/65)  BP(mean): --  RR: 18 (07 Jul 2021 07:53) (18 - 18)  SpO2: --

## 2021-07-07 NOTE — BH INPATIENT PSYCHIATRY PROGRESS NOTE - NSBHFUPINTERVALHXFT_PSY_A_CORE
Chart reviewed and case discussed with treatment team. No events reported overnight. Sleep is "great" with the mirtazapine and appetite is good. Patient denies any current SI, stated "I feel safe here, I have hope". Patient stated that at home she wants her  to be more affectionate towards her like her parents used to be. She has been tolerating the Gabapentin, stated feeling less anxious but still feels at times she'll never be better. Patient is compliant with medications, no adverse effects reported.

## 2021-07-07 NOTE — BH INPATIENT PSYCHIATRY PROGRESS NOTE - NSBHMETABOLIC_PSY_ALL_CORE_FT
BMI: BMI (kg/m2): 41 (07-05-21 @ 21:25)  HbA1c: A1C with Estimated Average Glucose Result: 5.5 % (05-21-21 @ 09:47)    Glucose:   BP: 128/65 (07-06-21 @ 20:28) (122/80 - 156/81)  Lipid Panel: Date/Time: 05-21-21 @ 09:47  Cholesterol, Serum: 279  Direct LDL: --  HDL Cholesterol, Serum: 40  Total Cholesterol/HDL Ration Measurement: --  Triglycerides, Serum: 214

## 2021-07-08 PROCEDURE — 99232 SBSQ HOSP IP/OBS MODERATE 35: CPT

## 2021-07-08 RX ADMIN — Medication 12.5 MILLIGRAM(S): at 08:48

## 2021-07-08 RX ADMIN — GABAPENTIN 100 MILLIGRAM(S): 400 CAPSULE ORAL at 08:47

## 2021-07-08 RX ADMIN — Medication 0.5 MILLIGRAM(S): at 15:14

## 2021-07-08 RX ADMIN — Medication 1 MILLIGRAM(S): at 08:46

## 2021-07-08 RX ADMIN — GABAPENTIN 100 MILLIGRAM(S): 400 CAPSULE ORAL at 13:21

## 2021-07-08 RX ADMIN — Medication 5 MILLIGRAM(S): at 20:52

## 2021-07-08 RX ADMIN — Medication 50 MILLIGRAM(S): at 08:47

## 2021-07-08 RX ADMIN — Medication 50 MILLIGRAM(S): at 20:53

## 2021-07-08 RX ADMIN — PANTOPRAZOLE SODIUM 40 MILLIGRAM(S): 20 TABLET, DELAYED RELEASE ORAL at 06:43

## 2021-07-08 RX ADMIN — Medication 187.5 MILLIGRAM(S): at 08:47

## 2021-07-08 RX ADMIN — MIRTAZAPINE 30 MILLIGRAM(S): 45 TABLET, ORALLY DISINTEGRATING ORAL at 20:52

## 2021-07-08 RX ADMIN — GABAPENTIN 100 MILLIGRAM(S): 400 CAPSULE ORAL at 20:52

## 2021-07-08 RX ADMIN — LOSARTAN POTASSIUM 100 MILLIGRAM(S): 100 TABLET, FILM COATED ORAL at 08:48

## 2021-07-08 NOTE — BH INPATIENT PSYCHIATRY PROGRESS NOTE - NSBHMETABOLIC_PSY_ALL_CORE_FT
BMI: BMI (kg/m2): 41 (07-05-21 @ 21:25)  HbA1c: A1C with Estimated Average Glucose Result: 5.5 % (05-21-21 @ 09:47  BP: 112/61 (07-07-21 @ 20:41) (112/61 - 156/81)  Lipid Panel: Date/Time: 05-21-21 @ 09:47  Cholesterol, Serum: 279  Direct LDL: --  HDL Cholesterol, Serum: 40  Total Cholesterol/HDL Ration Measurement: --  Triglycerides, Serum: 214

## 2021-07-08 NOTE — BH SOCIAL WORK INITIAL PSYCHOSOCIAL EVALUATION - OTHER PAST PSYCHIATRIC HISTORY (INCLUDE DETAILS REGARDING ONSET, COURSE OF ILLNESS, INPATIENT/OUTPATIENT TREATMENT)
Patient is a 71yo,female, , noted to be a retired , non-caregiver,  domiciled at home with her . Patient has a documented PPHx of  ANSON and MDD, with long history of depression and anxiety noted since her early teens. Pt has a h/o 2 previous inpatient psychiatric hospitalizations, both at [facility] (November 2019 at for a suicide attempt via OD, and in Feb 2020). Pt has h/o SA in 11/2019 via OD. Pt reported daily marijuana use, as a means of self-medicating,  and is reported to have a h/o “trying” multiple substances in her 20s. Pt has most recently been using more of her prescription drugs (klonopin, remeron, lexapro and melatonin) to cope with increase depression and anxiety, with noted passive SI of wanting to sleep and not wake up. Pt’s long time therapist recently passed away, triggering this most current episode. Pt is also noted to have private outpatient psychiatrist Dr. Ramos.

## 2021-07-08 NOTE — BH INPATIENT PSYCHIATRY PROGRESS NOTE - NSBHCHARTREVIEWVS_PSY_A_CORE FT
Vital Signs Last 24 Hrs  T(C): 36.2 (08 Jul 2021 05:41), Max: 36.2 (07 Jul 2021 16:01)  T(F): 97.2 (08 Jul 2021 05:41), Max: 97.2 (08 Jul 2021 05:41)    Orthostatic VS  07-08-21 @ 05:41  Sitting BP: 123/69 HR: 69  Standing BP: 151/75 HR: 77  Site: upper left arm   Mode: electronic

## 2021-07-08 NOTE — BH INPATIENT PSYCHIATRY PROGRESS NOTE - NSBHFUPINTERVALHXFT_PSY_A_CORE
Patient seen for follow up for anxiety and depression. Chart reviewed and case discussed with interdisciplinary staff team. No overnight event reported. On encounter, patient reports mood as "anxious", states she has no idea why she regressed as she was doing well for a few weeks. Patient denies any current SI, but continues to report low mood, low motivation and anhedonia. States she is worried that her  will leave her if she "carries on like this". She has been tolerating the Gabapentin; denies dizziness; denies headaches. Patient's appetite is fair but she reports disturbed sleep. No constipation reported.

## 2021-07-08 NOTE — BH INPATIENT PSYCHIATRY PROGRESS NOTE - NSBHASSESSSUMMFT_PSY_ALL_CORE
71-year-old female, , retired (), non-caregiver domiciled at home with her  with a prior psychiatric diagnosis of ANSON and MDD, 3 previous inpatient psychiatric hospitalizations at [facility] in 2019, 2/2020, and most recently 4/22-5/24/2021, prior SA in 11/2019 by overdosing on pills, no history of NSSIB, no history of violence/aggression, no hx of legal issues, with PMHx of HTN, hyperlipidemia, and nausea. Patient BIB self for passive SI and worsening depression and anxiety at home.     On encounter, patient continues to report increased anxiety and exhibiting catastrophic thinking--thinks  might leave her. Tolerating recent addition of Gabapentin well.   Plan: Will c/w current medications--Clonazepam 1mg BID, Gabapentin 100mg TID, Mirtazapine 30mg at bedtime and venlafaxine .5 mg daily (doses will be titrated based on symptoms and tolerability. Will continue rest of the current meds.

## 2021-07-09 PROCEDURE — 99232 SBSQ HOSP IP/OBS MODERATE 35: CPT

## 2021-07-09 RX ORDER — ACETAMINOPHEN 500 MG
650 TABLET ORAL EVERY 6 HOURS
Refills: 0 | Status: DISCONTINUED | OUTPATIENT
Start: 2021-07-09 | End: 2021-07-23

## 2021-07-09 RX ADMIN — Medication 0.5 MILLIGRAM(S): at 15:03

## 2021-07-09 RX ADMIN — Medication 650 MILLIGRAM(S): at 14:03

## 2021-07-09 RX ADMIN — MIRTAZAPINE 30 MILLIGRAM(S): 45 TABLET, ORALLY DISINTEGRATING ORAL at 21:37

## 2021-07-09 RX ADMIN — Medication 50 MILLIGRAM(S): at 21:37

## 2021-07-09 RX ADMIN — Medication 650 MILLIGRAM(S): at 14:09

## 2021-07-09 RX ADMIN — Medication 1 MILLIGRAM(S): at 08:43

## 2021-07-09 RX ADMIN — Medication 187.5 MILLIGRAM(S): at 08:42

## 2021-07-09 RX ADMIN — GABAPENTIN 100 MILLIGRAM(S): 400 CAPSULE ORAL at 12:40

## 2021-07-09 RX ADMIN — Medication 12.5 MILLIGRAM(S): at 08:42

## 2021-07-09 RX ADMIN — GABAPENTIN 100 MILLIGRAM(S): 400 CAPSULE ORAL at 21:37

## 2021-07-09 RX ADMIN — Medication 5 MILLIGRAM(S): at 21:37

## 2021-07-09 RX ADMIN — GABAPENTIN 100 MILLIGRAM(S): 400 CAPSULE ORAL at 08:42

## 2021-07-09 RX ADMIN — LOSARTAN POTASSIUM 100 MILLIGRAM(S): 100 TABLET, FILM COATED ORAL at 08:43

## 2021-07-09 RX ADMIN — PANTOPRAZOLE SODIUM 40 MILLIGRAM(S): 20 TABLET, DELAYED RELEASE ORAL at 06:48

## 2021-07-09 RX ADMIN — Medication 50 MILLIGRAM(S): at 08:41

## 2021-07-09 NOTE — BH INPATIENT PSYCHIATRY PROGRESS NOTE - NSBHCHARTREVIEWVS_PSY_A_CORE FT
Vital Signs Last 24 Hrs  T(C): 36.4 (09 Jul 2021 07:41), Max: 36.8 (08 Jul 2021 15:42)  T(F): 97.5 (09 Jul 2021 07:41), Max: 98.2 (08 Jul 2021 15:42)  HR: --  BP: --  BP(mean): --  RR: 18 (09 Jul 2021 07:41) (18 - 18)  SpO2: --

## 2021-07-09 NOTE — BH INPATIENT PSYCHIATRY PROGRESS NOTE - NSBHMETABOLIC_PSY_ALL_CORE_FT
BMI: BMI (kg/m2): 41 (07-05-21 @ 21:25)  HbA1c: A1C with Estimated Average Glucose Result: 5.5 % (05-21-21 @ 09:47)    Glucose:   BP: 112/61 (07-07-21 @ 20:41) (112/61 - 128/65)  Lipid Panel: Date/Time: 05-21-21 @ 09:47  Cholesterol, Serum: 279  Direct LDL: --  HDL Cholesterol, Serum: 40  Total Cholesterol/HDL Ration Measurement: --  Triglycerides, Serum: 214

## 2021-07-09 NOTE — BH INPATIENT PSYCHIATRY PROGRESS NOTE - NSBHFUPINTERVALHXFT_PSY_A_CORE
The pt. continues to be depressed and anxious with a constricted affect.  She is not overtly psychotic and is not suicidal.  Her grooming and dress are adequate.  Her gait is steady.  Her insight and judgement are improving.

## 2021-07-10 PROCEDURE — 99232 SBSQ HOSP IP/OBS MODERATE 35: CPT

## 2021-07-10 RX ORDER — PANTOPRAZOLE SODIUM 20 MG/1
40 TABLET, DELAYED RELEASE ORAL
Refills: 0 | Status: DISCONTINUED | OUTPATIENT
Start: 2021-07-10 | End: 2021-07-23

## 2021-07-10 RX ADMIN — Medication 187.5 MILLIGRAM(S): at 09:13

## 2021-07-10 RX ADMIN — GABAPENTIN 100 MILLIGRAM(S): 400 CAPSULE ORAL at 09:12

## 2021-07-10 RX ADMIN — Medication 5 MILLIGRAM(S): at 21:15

## 2021-07-10 RX ADMIN — Medication 0.5 MILLIGRAM(S): at 15:11

## 2021-07-10 RX ADMIN — MIRTAZAPINE 30 MILLIGRAM(S): 45 TABLET, ORALLY DISINTEGRATING ORAL at 21:15

## 2021-07-10 RX ADMIN — PANTOPRAZOLE SODIUM 40 MILLIGRAM(S): 20 TABLET, DELAYED RELEASE ORAL at 06:50

## 2021-07-10 RX ADMIN — Medication 650 MILLIGRAM(S): at 11:49

## 2021-07-10 RX ADMIN — Medication 1 MILLIGRAM(S): at 09:11

## 2021-07-10 RX ADMIN — LOSARTAN POTASSIUM 100 MILLIGRAM(S): 100 TABLET, FILM COATED ORAL at 09:12

## 2021-07-10 RX ADMIN — GABAPENTIN 100 MILLIGRAM(S): 400 CAPSULE ORAL at 21:15

## 2021-07-10 RX ADMIN — Medication 50 MILLIGRAM(S): at 09:11

## 2021-07-10 RX ADMIN — GABAPENTIN 100 MILLIGRAM(S): 400 CAPSULE ORAL at 12:49

## 2021-07-10 RX ADMIN — Medication 50 MILLIGRAM(S): at 21:14

## 2021-07-10 RX ADMIN — Medication 12.5 MILLIGRAM(S): at 09:11

## 2021-07-10 RX ADMIN — Medication 650 MILLIGRAM(S): at 12:50

## 2021-07-10 NOTE — BH INPATIENT PSYCHIATRY PROGRESS NOTE - NSBHCHARTREVIEWVS_PSY_A_CORE FT
Vital Signs Last 24 Hrs  T(C): 36.1 (10 Jul 2021 05:20), Max: 36.8 (09 Jul 2021 15:37)  T(F): 97 (10 Jul 2021 05:20), Max: 98.3 (09 Jul 2021 15:37)  HR: 72 (09 Jul 2021 20:33) (72 - 72)  BP: 121/61 (09 Jul 2021 20:33) (121/61 - 121/61)  BP(mean): --  RR: 17 (10 Jul 2021 05:20) (17 - 17)  SpO2: --

## 2021-07-10 NOTE — BH INPATIENT PSYCHIATRY PROGRESS NOTE - NSBHMETABOLIC_PSY_ALL_CORE_FT
BMI: BMI (kg/m2): 41 (07-05-21 @ 21:25)  HbA1c: A1C with Estimated Average Glucose Result: 5.5 % (05-21-21 @ 09:47)    Glucose:   BP: 121/61 (07-09-21 @ 20:33) (112/61 - 121/61)  Lipid Panel: Date/Time: 05-21-21 @ 09:47  Cholesterol, Serum: 279  Direct LDL: --  HDL Cholesterol, Serum: 40  Total Cholesterol/HDL Ration Measurement: --  Triglycerides, Serum: 214

## 2021-07-11 PROCEDURE — 99232 SBSQ HOSP IP/OBS MODERATE 35: CPT

## 2021-07-11 RX ORDER — BENZOCAINE AND MENTHOL 5; 1 G/100ML; G/100ML
1 LIQUID ORAL EVERY 6 HOURS
Refills: 0 | Status: DISCONTINUED | OUTPATIENT
Start: 2021-07-11 | End: 2021-07-23

## 2021-07-11 RX ADMIN — Medication 650 MILLIGRAM(S): at 14:20

## 2021-07-11 RX ADMIN — PANTOPRAZOLE SODIUM 40 MILLIGRAM(S): 20 TABLET, DELAYED RELEASE ORAL at 06:47

## 2021-07-11 RX ADMIN — Medication 50 MILLIGRAM(S): at 20:20

## 2021-07-11 RX ADMIN — BENZOCAINE AND MENTHOL 1 LOZENGE: 5; 1 LIQUID ORAL at 15:14

## 2021-07-11 RX ADMIN — Medication 187.5 MILLIGRAM(S): at 08:57

## 2021-07-11 RX ADMIN — BENZOCAINE AND MENTHOL 1 LOZENGE: 5; 1 LIQUID ORAL at 21:09

## 2021-07-11 RX ADMIN — Medication 650 MILLIGRAM(S): at 13:20

## 2021-07-11 RX ADMIN — GABAPENTIN 100 MILLIGRAM(S): 400 CAPSULE ORAL at 09:01

## 2021-07-11 RX ADMIN — Medication 50 MILLIGRAM(S): at 08:57

## 2021-07-11 RX ADMIN — Medication 1 MILLIGRAM(S): at 08:55

## 2021-07-11 RX ADMIN — Medication 5 MILLIGRAM(S): at 20:20

## 2021-07-11 RX ADMIN — Medication 12.5 MILLIGRAM(S): at 08:55

## 2021-07-11 RX ADMIN — GABAPENTIN 100 MILLIGRAM(S): 400 CAPSULE ORAL at 20:19

## 2021-07-11 RX ADMIN — Medication 650 MILLIGRAM(S): at 21:16

## 2021-07-11 RX ADMIN — GABAPENTIN 100 MILLIGRAM(S): 400 CAPSULE ORAL at 12:50

## 2021-07-11 RX ADMIN — Medication 650 MILLIGRAM(S): at 21:09

## 2021-07-11 RX ADMIN — LOSARTAN POTASSIUM 100 MILLIGRAM(S): 100 TABLET, FILM COATED ORAL at 08:56

## 2021-07-11 RX ADMIN — MIRTAZAPINE 30 MILLIGRAM(S): 45 TABLET, ORALLY DISINTEGRATING ORAL at 20:19

## 2021-07-11 RX ADMIN — Medication 0.5 MILLIGRAM(S): at 15:14

## 2021-07-11 NOTE — BH INPATIENT PSYCHIATRY PROGRESS NOTE - NSBHMETABOLIC_PSY_ALL_CORE_FT
BMI: BMI (kg/m2): 41 (07-05-21 @ 21:25)  HbA1c: A1C with Estimated Average Glucose Result: 5.5 % (05-21-21 @ 09:47)    Glucose:   BP: 121/61 (07-09-21 @ 20:33) (121/61 - 121/61)  Lipid Panel: Date/Time: 05-21-21 @ 09:47  Cholesterol, Serum: 279  Direct LDL: --  HDL Cholesterol, Serum: 40  Total Cholesterol/HDL Ration Measurement: --  Triglycerides, Serum: 214

## 2021-07-11 NOTE — BH INPATIENT PSYCHIATRY PROGRESS NOTE - NSBHCHARTREVIEWVS_PSY_A_CORE FT
Vital Signs Last 24 Hrs  T(C): 36.8 (11 Jul 2021 06:28), Max: 36.8 (10 Jul 2021 16:31)  T(F): 98.3 (11 Jul 2021 06:28), Max: 98.3 (11 Jul 2021 06:28)  HR: --  BP: --  BP(mean): --  RR: --  SpO2: --

## 2021-07-11 NOTE — BH INPATIENT PSYCHIATRY PROGRESS NOTE - NSBHFUPINTERVALHXFT_PSY_A_CORE
The pt. continues to be anxious but is somewhat better on neurontin.  She remains med seeking.  She is not suicidal.

## 2021-07-12 LAB
B PERT DNA SPEC QL NAA+PROBE: SIGNIFICANT CHANGE UP
C PNEUM DNA SPEC QL NAA+PROBE: SIGNIFICANT CHANGE UP
FLUAV SUBTYP SPEC NAA+PROBE: SIGNIFICANT CHANGE UP
FLUBV RNA SPEC QL NAA+PROBE: SIGNIFICANT CHANGE UP
HADV DNA SPEC QL NAA+PROBE: SIGNIFICANT CHANGE UP
HCOV 229E RNA SPEC QL NAA+PROBE: SIGNIFICANT CHANGE UP
HCOV HKU1 RNA SPEC QL NAA+PROBE: SIGNIFICANT CHANGE UP
HCOV NL63 RNA SPEC QL NAA+PROBE: SIGNIFICANT CHANGE UP
HCOV OC43 RNA SPEC QL NAA+PROBE: SIGNIFICANT CHANGE UP
HMPV RNA SPEC QL NAA+PROBE: SIGNIFICANT CHANGE UP
HPIV1 RNA SPEC QL NAA+PROBE: SIGNIFICANT CHANGE UP
HPIV2 RNA SPEC QL NAA+PROBE: SIGNIFICANT CHANGE UP
HPIV3 RNA SPEC QL NAA+PROBE: SIGNIFICANT CHANGE UP
HPIV4 RNA SPEC QL NAA+PROBE: SIGNIFICANT CHANGE UP
RAPID RVP RESULT: DETECTED
RSV RNA SPEC QL NAA+PROBE: SIGNIFICANT CHANGE UP
RV+EV RNA SPEC QL NAA+PROBE: DETECTED
SARS-COV-2 RNA SPEC QL NAA+PROBE: SIGNIFICANT CHANGE UP

## 2021-07-12 PROCEDURE — 99232 SBSQ HOSP IP/OBS MODERATE 35: CPT

## 2021-07-12 PROCEDURE — 90837 PSYTX W PT 60 MINUTES: CPT

## 2021-07-12 RX ADMIN — Medication 187.5 MILLIGRAM(S): at 08:57

## 2021-07-12 RX ADMIN — PANTOPRAZOLE SODIUM 40 MILLIGRAM(S): 20 TABLET, DELAYED RELEASE ORAL at 06:09

## 2021-07-12 RX ADMIN — GABAPENTIN 100 MILLIGRAM(S): 400 CAPSULE ORAL at 12:35

## 2021-07-12 RX ADMIN — Medication 50 MILLIGRAM(S): at 20:34

## 2021-07-12 RX ADMIN — LOSARTAN POTASSIUM 100 MILLIGRAM(S): 100 TABLET, FILM COATED ORAL at 08:58

## 2021-07-12 RX ADMIN — Medication 5 MILLIGRAM(S): at 20:34

## 2021-07-12 RX ADMIN — Medication 12.5 MILLIGRAM(S): at 08:58

## 2021-07-12 RX ADMIN — BENZOCAINE AND MENTHOL 1 LOZENGE: 5; 1 LIQUID ORAL at 12:43

## 2021-07-12 RX ADMIN — Medication 1 MILLIGRAM(S): at 08:57

## 2021-07-12 RX ADMIN — GABAPENTIN 100 MILLIGRAM(S): 400 CAPSULE ORAL at 08:58

## 2021-07-12 RX ADMIN — MIRTAZAPINE 30 MILLIGRAM(S): 45 TABLET, ORALLY DISINTEGRATING ORAL at 20:34

## 2021-07-12 RX ADMIN — Medication 650 MILLIGRAM(S): at 12:40

## 2021-07-12 RX ADMIN — Medication 0.5 MILLIGRAM(S): at 15:21

## 2021-07-12 RX ADMIN — Medication 50 MILLIGRAM(S): at 08:58

## 2021-07-12 RX ADMIN — GABAPENTIN 100 MILLIGRAM(S): 400 CAPSULE ORAL at 20:34

## 2021-07-12 NOTE — BH INPATIENT PSYCHIATRY PROGRESS NOTE - NSBHASSESSSUMMFT_PSY_ALL_CORE
71-year-old female, , retired (), non-caregiver domiciled at home with her  with a prior psychiatric diagnosis of ANSON and MDD, 3 previous inpatient psychiatric hospitalizations at [facility] in 2019, 2/2020, and most recently 4/22-5/24/2021, prior SA in 11/2019 by overdosing on pills, no history of NSSIB, no history of violence/aggression, no hx of legal issues, with PMHx of HTN, hyperlipidemia, and nausea. Patient BIB self for passive SI and worsening depression and anxiety at home.     On encounter, patient continues to report increased anxiety and low mood. Also reports feeling lethargic today. Temp elevated at 99.   Plan: Will c/w current medications--Clonazepam 1mg BID, Gabapentin 100mg TID, Mirtazapine 30mg at bedtime and venlafaxine .5 mg daily (doses will be titrated based on symptoms and tolerability. Will continue rest of the current meds. Will monitor vitals closely. RVP ordered--will f/u results.

## 2021-07-12 NOTE — BH INPATIENT PSYCHIATRY PROGRESS NOTE - NSBHMETABOLIC_PSY_ALL_CORE_FT
BMI: BMI (kg/m2): 41 (07-05-21 @ 21:25)  HbA1c: A1C with Estimated Average Glucose Result: 5.5 % (05-21-21 @ 09:47)  BP: 121/61 (07-09-21 @ 20:33) (121/61 - 121/61)  Lipid Panel: Date/Time: 05-21-21 @ 09:47  Cholesterol, Serum: 279  Direct LDL: --  HDL Cholesterol, Serum: 40  Total Cholesterol/HDL Ration Measurement: --  Triglycerides, Serum: 214

## 2021-07-12 NOTE — BH INPATIENT PSYCHIATRY PROGRESS NOTE - NSBHFUPINTERVALHXFT_PSY_A_CORE
Patient seen for follow up for anxiety and depression. Chart reviewed and case discussed with interdisciplinary team. No events over the weekend. On encounter, patient reports mood as "anxious", states she starts to get "the chills" around 4:00pm and belives it's her anxiety. Patient also reports feeling lethargic today, continues to report low mood, low motivation and anhedonia. Patient denies SI; denies dizziness; denies headaches. Patient's appetite and sleep are improving.

## 2021-07-12 NOTE — BH INPATIENT PSYCHIATRY PROGRESS NOTE - NSBHCHARTREVIEWVS_PSY_A_CORE FT
Vital Signs Last 24 Hrs  T(C): 37.2 (12 Jul 2021 16:00), Max: 37.2 (12 Jul 2021 16:00)  T(F): 99 (12 Jul 2021 16:00), Max: 99 (12 Jul 2021 16:00)  Orthostatic VS  07-12-21 @ 08:25  Sitting BP: 153/86 HR: 85  Standing BP: 147/76 HR: 70

## 2021-07-13 PROCEDURE — 99232 SBSQ HOSP IP/OBS MODERATE 35: CPT

## 2021-07-13 PROCEDURE — 99223 1ST HOSP IP/OBS HIGH 75: CPT

## 2021-07-13 RX ORDER — CLONAZEPAM 1 MG
0.5 TABLET ORAL
Refills: 0 | Status: DISCONTINUED | OUTPATIENT
Start: 2021-07-13 | End: 2021-07-20

## 2021-07-13 RX ORDER — GABAPENTIN 400 MG/1
200 CAPSULE ORAL THREE TIMES A DAY
Refills: 0 | Status: DISCONTINUED | OUTPATIENT
Start: 2021-07-14 | End: 2021-07-23

## 2021-07-13 RX ORDER — GABAPENTIN 400 MG/1
100 CAPSULE ORAL THREE TIMES A DAY
Refills: 0 | Status: DISCONTINUED | OUTPATIENT
Start: 2021-07-13 | End: 2021-07-14

## 2021-07-13 RX ORDER — CLONAZEPAM 1 MG
1 TABLET ORAL DAILY
Refills: 0 | Status: DISCONTINUED | OUTPATIENT
Start: 2021-07-13 | End: 2021-07-20

## 2021-07-13 RX ORDER — ATORVASTATIN CALCIUM 80 MG/1
10 TABLET, FILM COATED ORAL AT BEDTIME
Refills: 0 | Status: DISCONTINUED | OUTPATIENT
Start: 2021-07-13 | End: 2021-07-23

## 2021-07-13 RX ADMIN — MIRTAZAPINE 30 MILLIGRAM(S): 45 TABLET, ORALLY DISINTEGRATING ORAL at 20:24

## 2021-07-13 RX ADMIN — Medication 50 MILLIGRAM(S): at 08:51

## 2021-07-13 RX ADMIN — Medication 0.5 MILLIGRAM(S): at 14:34

## 2021-07-13 RX ADMIN — Medication 187.5 MILLIGRAM(S): at 08:50

## 2021-07-13 RX ADMIN — GABAPENTIN 100 MILLIGRAM(S): 400 CAPSULE ORAL at 20:24

## 2021-07-13 RX ADMIN — BENZOCAINE AND MENTHOL 1 LOZENGE: 5; 1 LIQUID ORAL at 06:30

## 2021-07-13 RX ADMIN — GABAPENTIN 100 MILLIGRAM(S): 400 CAPSULE ORAL at 14:33

## 2021-07-13 RX ADMIN — GABAPENTIN 100 MILLIGRAM(S): 400 CAPSULE ORAL at 08:49

## 2021-07-13 RX ADMIN — Medication 5 MILLIGRAM(S): at 20:23

## 2021-07-13 RX ADMIN — Medication 1 MILLIGRAM(S): at 08:49

## 2021-07-13 RX ADMIN — ATORVASTATIN CALCIUM 10 MILLIGRAM(S): 80 TABLET, FILM COATED ORAL at 20:23

## 2021-07-13 RX ADMIN — PANTOPRAZOLE SODIUM 40 MILLIGRAM(S): 20 TABLET, DELAYED RELEASE ORAL at 06:15

## 2021-07-13 RX ADMIN — LOSARTAN POTASSIUM 100 MILLIGRAM(S): 100 TABLET, FILM COATED ORAL at 08:51

## 2021-07-13 RX ADMIN — Medication 12.5 MILLIGRAM(S): at 08:51

## 2021-07-13 RX ADMIN — Medication 650 MILLIGRAM(S): at 08:49

## 2021-07-13 RX ADMIN — Medication 50 MILLIGRAM(S): at 20:23

## 2021-07-13 NOTE — BH INPATIENT PSYCHIATRY PROGRESS NOTE - NSBHMETABOLIC_PSY_ALL_CORE_FT
BMI: BMI (kg/m2): 41 (07-05-21 @ 21:25)  HbA1c: A1C with Estimated Average Glucose Result: 5.5 % (05-21-21 @ 09:47)    Glucose:   BP: --  Lipid Panel: Date/Time: 05-21-21 @ 09:47  Cholesterol, Serum: 279  Direct LDL: --  HDL Cholesterol, Serum: 40  Total Cholesterol/HDL Ration Measurement: --  Triglycerides, Serum: 214   BMI: BMI (kg/m2): 41 (07-05-21 @ 21:25)  HbA1c: A1C with Estimated Average Glucose Result: 5.5 % (05-21-21 @ 09:47)  Lipid Panel: Date/Time: 05-21-21 @ 09:47  Cholesterol, Serum: 279  HDL Cholesterol, Serum: 40  Triglycerides, Serum: 214

## 2021-07-13 NOTE — BH INPATIENT PSYCHIATRY PROGRESS NOTE - NSBHFUPINTERVALHXFT_PSY_A_CORE
Patient seen for follow up for anxiety and depression. Chart reviewed and case discussed with interdisciplinary team. Patient denies SI; denies dizziness; denies headaches. Patient's appetite and sleep are improving.    Patient seen for follow-up for anxiety and depression. Chart reviewed and case discussed with interdisciplinary team. On encounter, patient is dysphoric and anxious. Reports her anxiety has worsened since she developed a sore throat and has not been sleeping well. Patient reports feeling depressed and rather hopeless. She however denies any SI. No hallucinations reported. patient denies dizziness; denies headaches. Patient's appetite is adequate.

## 2021-07-13 NOTE — BH INPATIENT PSYCHIATRY PROGRESS NOTE - NSDCCRITERIA_PSY_ALL_CORE
symptom management, safety planning, care coordination Symptom management, safety planning, care coordination

## 2021-07-13 NOTE — CONSULT NOTE ADULT - SUBJECTIVE AND OBJECTIVE BOX
HPI: Pt is 71F admitted to [facility] 7/5/21 for depression.  She has known hyperlipidemia and took simvastatin for years, then developed BLE myalgias and statin was stopped.  She has since tried at least two other statins and stopped them, but she is bnot entiorely certain that she had myalgias from both of them.  Another lipid lowering med was recommended but her cardiologist was unable to get insurance to pay for it.  She has not tried atorvastatin.  She recentkly had a cardiac w/u including nuclear stress test and everything was normal.    PAST MEDICAL & SURGICAL HISTORY:  Anxiety    High cholesterol    HTN (hypertension)    Endometriosis    No significant past surgical history        Review of Systems:   CONSTITUTIONAL: No fever, weight loss, or fatigue  EYES: No eye pain, visual disturbances, or discharge  ENMT:  No difficulty hearing, tinnitus, vertigo; No sinus or throat pain  NECK: No pain or stiffness  RESPIRATORY: No cough, wheezing, chills or hemoptysis; No shortness of breath  CARDIOVASCULAR: No chest pain, palpitations, dizziness, or leg swelling  GASTROINTESTINAL: No abdominal or epigastric pain. No nausea, vomiting, or hematemesis; No diarrhea or constipation. No melena or hematochezia.  GENITOURINARY: No dysuria, frequency, hematuria, or incontinence  NEUROLOGICAL: No headaches, memory loss, loss of strength, numbness, or tremors  SKIN: No itching, burning, rashes, or lesions   LYMPH NODES: No enlarged glands  ENDOCRINE: No heat or cold intolerance; No hair loss  MUSCULOSKELETAL: No joint pain or swelling; No muscle, back, or extremity pain  HEME/LYMPH: No easy bruising, or bleeding gums  ALLERY AND IMMUNOLOGIC: No hives or eczema    Allergies    penicillins (Anaphylaxis)    Intolerances        Social History: denies etoh tob idu    FAMILY HISTORY: noncontributory      MEDICATIONS  (STANDING):  atorvastatin 10 milliGRAM(s) Oral at bedtime  clonazePAM  Tablet 0.5 milliGRAM(s) Oral <User Schedule>  clonazePAM  Tablet 1 milliGRAM(s) Oral daily  gabapentin 100 milliGRAM(s) Oral three times a day  hydrochlorothiazide 12.5 milliGRAM(s) Oral daily  losartan 100 milliGRAM(s) Oral daily  melatonin 5 milliGRAM(s) Oral at bedtime  metoprolol tartrate 50 milliGRAM(s) Oral two times a day  mirtazapine 30 milliGRAM(s) Oral at bedtime  pantoprazole    Tablet 40 milliGRAM(s) Oral <User Schedule>  venlafaxine .5 milliGRAM(s) Oral daily    MEDICATIONS  (PRN):  acetaminophen   Tablet .. 650 milliGRAM(s) Oral every 6 hours PRN Mild Pain (1 - 3)  benzocaine 15 mG/menthol 3.6 mG (Sugar-Free) Lozenge 1 Lozenge Oral every 6 hours PRN sore throat  ondansetron    Tablet 4 milliGRAM(s) Oral every 6 hours PRN Nausea  QUEtiapine 12.5 milliGRAM(s) Oral every 6 hours PRN anxiety/agitation      Vital Signs Last 24 Hrs  T(C): 35.9 (13 Jul 2021 06:20), Max: 37.2 (12 Jul 2021 16:00)  T(F): 96.7 (13 Jul 2021 06:20), Max: 99 (12 Jul 2021 16:00)  HR: 76  BP: 150/72  BP(mean): --  RR: 16            PHYSICAL EXAM:  GENERAL: NAD, well-developed  HEAD:  Atraumatic, Normocephalic  EYES: EOMI, conjunctiva and sclera clear  NECK: Supple, No JVD  CHEST/LUNG: Clear to auscultation bilaterally; No wheeze  HEART: Regular rate and rhythm; No murmurs, rubs, or gallops  ABDOMEN: Soft, Nontender, Nondistended; Bowel sounds present  EXTREMITIES:  2+ Peripheral Pulses, No clubbing, cyanosis, or edema  NEUROLOGY: non-focal  SKIN: No rashes or lesions    LABS:      Rapid RVP Result: Detected (07.12.21 @ 17:44)  Entero/rhinovirus    5/21/21 [id number]          Care Discussed with Consultants/Other Providers: Dr Drummond

## 2021-07-13 NOTE — BH INPATIENT PSYCHIATRY PROGRESS NOTE - NSBHASSESSSUMMFT_PSY_ALL_CORE
71-year-old female, , retired (), non-caregiver domiciled at home with her  with a prior psychiatric diagnosis of ANSON and MDD, 3 previous inpatient psychiatric hospitalizations at [facility] in 2019, 2/2020, and most recently 4/22-5/24/2021, prior SA in 11/2019 by overdosing on pills, no history of NSSIB, no history of violence/aggression, no hx of legal issues, with PMHx of HTN, hyperlipidemia, and nausea. Patient BIB self for passive SI and worsening depression and anxiety at home.     On encounter, patient continues to report increased anxiety and low mood. Also reports feeling lethargic today. Temp elevated at 99.   Plan: Will c/w current medications--Clonazepam 1mg BID, Gabapentin 100mg TID, Mirtazapine 30mg at bedtime and venlafaxine .5 mg daily (doses will be titrated based on symptoms and tolerability. Will continue rest of the current meds. Will monitor vitals closely. RVP ordered--will f/u results. 71-year-old female, , retired (), non-caregiver domiciled at home with her  with a prior psychiatric diagnosis of ANSON and MDD, 3 previous inpatient psychiatric hospitalizations at [facility] in 2019, 2/2020, and most recently 4/22-5/24/2021, prior SA in 11/2019 by overdosing on pills, no history of NSSIB, no history of violence/aggression, no hx of legal issues, with PMHx of HTN, hyperlipidemia, and nausea. Patient BIB self for passive SI and worsening depression and anxiety at home.     On encounter, patient remains dysphoric and anxious with catastrophic thought process. Denies SI. Taking meds. RVP positive for Entero/ Rhinovirus.  Plan: Will c/w current medications--Clonazepam 1mg BID, Gabapentin 100mg TID, Mirtazapine 30mg at bedtime and Venlafaxine .5 mg daily (doses will be titrated based on symptoms and tolerability. Will continue rest of the current meds.

## 2021-07-13 NOTE — BH SOCIAL WORK INITIAL PSYCHOSOCIAL EVALUATION - MONEY FOR FOOD
[FreeTextEntry1] : Family h/o breast ca - maternal aunt, 40s - ? tested for BRCA, pt's mother not tested\par Paternal GM, 80s\par Discussed genetic testing with pt - will think about; does not desire at this time no

## 2021-07-13 NOTE — CONSULT NOTE ADULT - ASSESSMENT
71F admitted for depression with HTN, HLD    Plan:  HLD: Pt wishes to try atorvastatin, high LDL is indication for statin.  Will start with low dose (10mg) and monitor for any SE.  If she develops SE, defer further lipid management to outpatient for options unavailable while admitted.    HTN: Controlled on losartan, metoprolol    Depression: management per primary team

## 2021-07-14 PROCEDURE — 99232 SBSQ HOSP IP/OBS MODERATE 35: CPT

## 2021-07-14 RX ADMIN — Medication 50 MILLIGRAM(S): at 20:21

## 2021-07-14 RX ADMIN — LOSARTAN POTASSIUM 100 MILLIGRAM(S): 100 TABLET, FILM COATED ORAL at 09:35

## 2021-07-14 RX ADMIN — PANTOPRAZOLE SODIUM 40 MILLIGRAM(S): 20 TABLET, DELAYED RELEASE ORAL at 06:34

## 2021-07-14 RX ADMIN — GABAPENTIN 200 MILLIGRAM(S): 400 CAPSULE ORAL at 13:27

## 2021-07-14 RX ADMIN — GABAPENTIN 200 MILLIGRAM(S): 400 CAPSULE ORAL at 20:21

## 2021-07-14 RX ADMIN — Medication 1 MILLIGRAM(S): at 09:35

## 2021-07-14 RX ADMIN — Medication 50 MILLIGRAM(S): at 09:35

## 2021-07-14 RX ADMIN — ATORVASTATIN CALCIUM 10 MILLIGRAM(S): 80 TABLET, FILM COATED ORAL at 20:22

## 2021-07-14 RX ADMIN — Medication 650 MILLIGRAM(S): at 20:22

## 2021-07-14 RX ADMIN — Medication 187.5 MILLIGRAM(S): at 09:34

## 2021-07-14 RX ADMIN — Medication 5 MILLIGRAM(S): at 20:22

## 2021-07-14 RX ADMIN — Medication 12.5 MILLIGRAM(S): at 09:34

## 2021-07-14 RX ADMIN — MIRTAZAPINE 30 MILLIGRAM(S): 45 TABLET, ORALLY DISINTEGRATING ORAL at 20:22

## 2021-07-14 RX ADMIN — Medication 650 MILLIGRAM(S): at 20:57

## 2021-07-14 RX ADMIN — Medication 0.5 MILLIGRAM(S): at 16:27

## 2021-07-14 RX ADMIN — GABAPENTIN 200 MILLIGRAM(S): 400 CAPSULE ORAL at 09:35

## 2021-07-14 NOTE — BH INPATIENT PSYCHIATRY PROGRESS NOTE - NSBHMETABOLIC_PSY_ALL_CORE_FT
BMI: BMI (kg/m2): 41 (07-05-21 @ 21:25)  HbA1c: A1C with Estimated Average Glucose Result: 5.5 % (05-21-21 @ 09:47)  BP: 121/70 (07-13-21 @ 20:23) (121/70 - 121/70)  Lipid Panel: Date/Time: 05-21-21 @ 09:47  Cholesterol, Serum: 279  HDL Cholesterol, Serum: 40  Triglycerides, Serum: 214

## 2021-07-14 NOTE — BH INPATIENT PSYCHIATRY PROGRESS NOTE - NSBHASSESSSUMMFT_PSY_ALL_CORE
71-year-old female, , retired (), non-caregiver domiciled at home with her  with a prior psychiatric diagnosis of ANSON and MDD, 3 previous inpatient psychiatric hospitalizations at [facility] in 2019, 2/2020, and most recently 4/22-5/24/2021, prior SA in 11/2019 by overdosing on pills, no history of NSSIB, no history of violence/aggression, no hx of legal issues, with PMHx of HTN, hyperlipidemia, and nausea. Patient BIB self for passive SI and worsening depression and anxiety at home.     On exam, patient is in a good mood, reports no anxiety this morning, no catastrophic thoughts expressed. Denies SI. Taking meds.   Plan: Gabapentin increased to 200mg TID. Will c/w rest of the current medications including Clonazepam 1mg in the morning and 0.5mg at 3:00pm, Mirtazapine 30mg at bedtime and Venlafaxine .5 mg daily.

## 2021-07-14 NOTE — BH INPATIENT PSYCHIATRY PROGRESS NOTE - NSBHFUPINTERVALHXFT_PSY_A_CORE
Patient seen for follow-up for anxiety and depression. Chart reviewed and case discussed with interdisciplinary team. Per staff, patient remains med focused. On encounter, patient reports improved anxiety and appears relaxed, feels hopeful. She denies any SI today. No hallucinations reported. Patient denies dizziness; denies headaches. Patient's appetite and sleep are adequate.

## 2021-07-14 NOTE — BH INPATIENT PSYCHIATRY PROGRESS NOTE - NSBHCHARTREVIEWVS_PSY_A_CORE FT
Vital Signs Last 24 Hrs  T(C): 36.3 (14 Jul 2021 05:25), Max: 37.1 (13 Jul 2021 15:42)  T(F): 97.4 (14 Jul 2021 05:25), Max: 98.8 (13 Jul 2021 15:42)  RR: 18 (14 Jul 2021 05:25) (18 - 18)    Orthostatic VS  07-14-21 @ 05:25  Lying BP: 129/60 HR: 68   Sitting BP: 130/66 HR: 74  Site: upper left arm   Mode: electronic

## 2021-07-15 PROCEDURE — 90834 PSYTX W PT 45 MINUTES: CPT

## 2021-07-15 PROCEDURE — 99232 SBSQ HOSP IP/OBS MODERATE 35: CPT

## 2021-07-15 RX ADMIN — GABAPENTIN 200 MILLIGRAM(S): 400 CAPSULE ORAL at 12:45

## 2021-07-15 RX ADMIN — Medication 12.5 MILLIGRAM(S): at 08:41

## 2021-07-15 RX ADMIN — GABAPENTIN 200 MILLIGRAM(S): 400 CAPSULE ORAL at 21:25

## 2021-07-15 RX ADMIN — Medication 50 MILLIGRAM(S): at 08:41

## 2021-07-15 RX ADMIN — GABAPENTIN 200 MILLIGRAM(S): 400 CAPSULE ORAL at 08:41

## 2021-07-15 RX ADMIN — ATORVASTATIN CALCIUM 10 MILLIGRAM(S): 80 TABLET, FILM COATED ORAL at 21:25

## 2021-07-15 RX ADMIN — LOSARTAN POTASSIUM 100 MILLIGRAM(S): 100 TABLET, FILM COATED ORAL at 08:41

## 2021-07-15 RX ADMIN — PANTOPRAZOLE SODIUM 40 MILLIGRAM(S): 20 TABLET, DELAYED RELEASE ORAL at 06:47

## 2021-07-15 RX ADMIN — MIRTAZAPINE 30 MILLIGRAM(S): 45 TABLET, ORALLY DISINTEGRATING ORAL at 21:25

## 2021-07-15 RX ADMIN — Medication 1 MILLIGRAM(S): at 08:41

## 2021-07-15 RX ADMIN — Medication 50 MILLIGRAM(S): at 21:25

## 2021-07-15 RX ADMIN — Medication 5 MILLIGRAM(S): at 21:25

## 2021-07-15 RX ADMIN — Medication 187.5 MILLIGRAM(S): at 08:40

## 2021-07-15 NOTE — BH INPATIENT PSYCHIATRY PROGRESS NOTE - NSBHASSESSSUMMFT_PSY_ALL_CORE
71-year-old female, , retired (), non-caregiver domiciled at home with her  with a prior psychiatric diagnosis of ANSON and MDD, 3 previous inpatient psychiatric hospitalizations at [facility] in 2019, 2/2020, and most recently 4/22-5/24/2021, prior SA in 11/2019 by overdosing on pills, no history of NSSIB, no history of violence/aggression, no hx of legal issues, with PMHx of HTN, hyperlipidemia, and nausea. Patient BIB self for passive SI and worsening depression and anxiety at home.     On exam, patient reports she had a bad evening but feels better now, reports no anxiety this morning, feels "unloved" by staff but at the same time states that it could be just her perception. Denies SI. Taking meds.   Plan: Will continue Gabapentin 200mg TID, Clonazepam 1mg in the morning and 0.5mg at 3:00pm, Mirtazapine 30mg at bedtime and Venlafaxine .5 mg daily.

## 2021-07-15 NOTE — BH INPATIENT PSYCHIATRY PROGRESS NOTE - NSBHCHARTREVIEWVS_PSY_A_CORE FT
Vital Signs Last 24 Hrs  T(C): 36.4 (15 Jul 2021 05:13), Max: 36.9 (14 Jul 2021 15:54)  T(F): 97.5 (15 Jul 2021 05:13), Max: 98.4 (14 Jul 2021 15:54)  RR: 17 (15 Jul 2021 05:13) (17 - 17)    Orthostatic VS  07-15-21 @ 05:13  Lying BP: 124/65 HR: 61   Sitting BP: 137/70 HR: 70  Site: upper left arm   Mode: electronic

## 2021-07-15 NOTE — BH INPATIENT PSYCHIATRY PROGRESS NOTE - NSBHMETABOLIC_PSY_ALL_CORE_FT
BMI: BMI (kg/m2): 41 (07-05-21 @ 21:25)  HbA1c: A1C with Estimated Average Glucose Result: 5.5 % (05-21-21 @ 09:47)    Glucose:   BP: 121/70 (07-13-21 @ 20:23) (121/70 - 121/70)  Lipid Panel: Date/Time: 05-21-21 @ 09:47  Cholesterol, Serum: 279  Direct LDL: --  HDL Cholesterol, Serum: 40  Total Cholesterol/HDL Ration Measurement: --  Triglycerides, Serum: 214

## 2021-07-15 NOTE — BH INPATIENT PSYCHIATRY PROGRESS NOTE - NSBHFUPINTERVALHXFT_PSY_A_CORE
Patient seen for follow-up for anxiety and depression. Chart reviewed and case discussed with interdisciplinary team. Per staff, patient remains med focused and has been taking with other patients about their medications. On encounter, patient reports her mood as "okay now", states she had a bad evening after one of the staff members asked her to stop interfering with other patient's care, reports feeling rejected and "unloved". She however reports she has come around today; denies current SI. No hallucinations reported. Patient denies dizziness; denies headaches. Patient's appetite and sleep are adequate.

## 2021-07-16 PROCEDURE — 99232 SBSQ HOSP IP/OBS MODERATE 35: CPT

## 2021-07-16 RX ADMIN — Medication 12.5 MILLIGRAM(S): at 09:47

## 2021-07-16 RX ADMIN — Medication 1 MILLIGRAM(S): at 09:46

## 2021-07-16 RX ADMIN — MIRTAZAPINE 30 MILLIGRAM(S): 45 TABLET, ORALLY DISINTEGRATING ORAL at 20:30

## 2021-07-16 RX ADMIN — LOSARTAN POTASSIUM 100 MILLIGRAM(S): 100 TABLET, FILM COATED ORAL at 09:46

## 2021-07-16 RX ADMIN — GABAPENTIN 200 MILLIGRAM(S): 400 CAPSULE ORAL at 20:30

## 2021-07-16 RX ADMIN — Medication 5 MILLIGRAM(S): at 20:31

## 2021-07-16 RX ADMIN — Medication 0.5 MILLIGRAM(S): at 14:55

## 2021-07-16 RX ADMIN — GABAPENTIN 200 MILLIGRAM(S): 400 CAPSULE ORAL at 09:46

## 2021-07-16 RX ADMIN — PANTOPRAZOLE SODIUM 40 MILLIGRAM(S): 20 TABLET, DELAYED RELEASE ORAL at 06:54

## 2021-07-16 RX ADMIN — Medication 50 MILLIGRAM(S): at 09:46

## 2021-07-16 RX ADMIN — Medication 50 MILLIGRAM(S): at 20:31

## 2021-07-16 RX ADMIN — GABAPENTIN 200 MILLIGRAM(S): 400 CAPSULE ORAL at 12:47

## 2021-07-16 RX ADMIN — ATORVASTATIN CALCIUM 10 MILLIGRAM(S): 80 TABLET, FILM COATED ORAL at 20:31

## 2021-07-16 RX ADMIN — Medication 187.5 MILLIGRAM(S): at 09:46

## 2021-07-16 NOTE — BH INPATIENT PSYCHIATRY PROGRESS NOTE - NSBHCHARTREVIEWVS_PSY_A_CORE FT
Vital Signs Last 24 Hrs  T(C): 36.7 (16 Jul 2021 07:45), Max: 37.5 (15 Jul 2021 15:38)  T(F): 98 (16 Jul 2021 07:45), Max: 99.5 (15 Jul 2021 15:38)  HR: --  BP: --  BP(mean): --  RR: --  SpO2: --

## 2021-07-16 NOTE — BH INPATIENT PSYCHIATRY PROGRESS NOTE - NSBHFUPINTERVALHXFT_PSY_A_CORE
The pt. continues to have periods of anxiety but these are fewer in number and last less time.  She is not depressed.  She is not overtly psychotic and denies suicidal ideation.  She is adequately groomed and dressed.  Her gait is steady.   Her insight and judgement are improving.

## 2021-07-17 RX ADMIN — GABAPENTIN 200 MILLIGRAM(S): 400 CAPSULE ORAL at 09:26

## 2021-07-17 RX ADMIN — LOSARTAN POTASSIUM 100 MILLIGRAM(S): 100 TABLET, FILM COATED ORAL at 09:27

## 2021-07-17 RX ADMIN — MIRTAZAPINE 30 MILLIGRAM(S): 45 TABLET, ORALLY DISINTEGRATING ORAL at 20:50

## 2021-07-17 RX ADMIN — GABAPENTIN 200 MILLIGRAM(S): 400 CAPSULE ORAL at 12:44

## 2021-07-17 RX ADMIN — Medication 5 MILLIGRAM(S): at 20:50

## 2021-07-17 RX ADMIN — ATORVASTATIN CALCIUM 10 MILLIGRAM(S): 80 TABLET, FILM COATED ORAL at 20:50

## 2021-07-17 RX ADMIN — GABAPENTIN 200 MILLIGRAM(S): 400 CAPSULE ORAL at 20:49

## 2021-07-17 RX ADMIN — Medication 12.5 MILLIGRAM(S): at 09:27

## 2021-07-17 RX ADMIN — Medication 50 MILLIGRAM(S): at 20:50

## 2021-07-17 RX ADMIN — Medication 50 MILLIGRAM(S): at 09:27

## 2021-07-17 RX ADMIN — PANTOPRAZOLE SODIUM 40 MILLIGRAM(S): 20 TABLET, DELAYED RELEASE ORAL at 06:49

## 2021-07-17 RX ADMIN — Medication 0.5 MILLIGRAM(S): at 15:35

## 2021-07-17 RX ADMIN — Medication 1 MILLIGRAM(S): at 09:25

## 2021-07-17 RX ADMIN — Medication 187.5 MILLIGRAM(S): at 09:26

## 2021-07-18 RX ADMIN — GABAPENTIN 200 MILLIGRAM(S): 400 CAPSULE ORAL at 08:56

## 2021-07-18 RX ADMIN — Medication 650 MILLIGRAM(S): at 20:50

## 2021-07-18 RX ADMIN — GABAPENTIN 200 MILLIGRAM(S): 400 CAPSULE ORAL at 12:34

## 2021-07-18 RX ADMIN — Medication 1 MILLIGRAM(S): at 08:56

## 2021-07-18 RX ADMIN — GABAPENTIN 200 MILLIGRAM(S): 400 CAPSULE ORAL at 20:50

## 2021-07-18 RX ADMIN — Medication 187.5 MILLIGRAM(S): at 08:58

## 2021-07-18 RX ADMIN — PANTOPRAZOLE SODIUM 40 MILLIGRAM(S): 20 TABLET, DELAYED RELEASE ORAL at 06:27

## 2021-07-18 RX ADMIN — Medication 50 MILLIGRAM(S): at 08:59

## 2021-07-18 RX ADMIN — Medication 650 MILLIGRAM(S): at 21:30

## 2021-07-18 RX ADMIN — Medication 0.5 MILLIGRAM(S): at 15:38

## 2021-07-18 RX ADMIN — Medication 50 MILLIGRAM(S): at 20:50

## 2021-07-18 RX ADMIN — ATORVASTATIN CALCIUM 10 MILLIGRAM(S): 80 TABLET, FILM COATED ORAL at 20:50

## 2021-07-18 RX ADMIN — Medication 5 MILLIGRAM(S): at 20:52

## 2021-07-18 RX ADMIN — LOSARTAN POTASSIUM 100 MILLIGRAM(S): 100 TABLET, FILM COATED ORAL at 08:58

## 2021-07-18 RX ADMIN — MIRTAZAPINE 30 MILLIGRAM(S): 45 TABLET, ORALLY DISINTEGRATING ORAL at 20:50

## 2021-07-18 RX ADMIN — Medication 12.5 MILLIGRAM(S): at 08:58

## 2021-07-19 PROCEDURE — 90834 PSYTX W PT 45 MINUTES: CPT

## 2021-07-19 PROCEDURE — 99232 SBSQ HOSP IP/OBS MODERATE 35: CPT

## 2021-07-19 RX ADMIN — Medication 650 MILLIGRAM(S): at 17:22

## 2021-07-19 RX ADMIN — Medication 187.5 MILLIGRAM(S): at 08:38

## 2021-07-19 RX ADMIN — PANTOPRAZOLE SODIUM 40 MILLIGRAM(S): 20 TABLET, DELAYED RELEASE ORAL at 06:44

## 2021-07-19 RX ADMIN — Medication 50 MILLIGRAM(S): at 20:58

## 2021-07-19 RX ADMIN — Medication 0.5 MILLIGRAM(S): at 15:23

## 2021-07-19 RX ADMIN — GABAPENTIN 200 MILLIGRAM(S): 400 CAPSULE ORAL at 12:08

## 2021-07-19 RX ADMIN — Medication 12.5 MILLIGRAM(S): at 08:38

## 2021-07-19 RX ADMIN — GABAPENTIN 200 MILLIGRAM(S): 400 CAPSULE ORAL at 20:58

## 2021-07-19 RX ADMIN — MIRTAZAPINE 30 MILLIGRAM(S): 45 TABLET, ORALLY DISINTEGRATING ORAL at 20:59

## 2021-07-19 RX ADMIN — GABAPENTIN 200 MILLIGRAM(S): 400 CAPSULE ORAL at 08:38

## 2021-07-19 RX ADMIN — Medication 50 MILLIGRAM(S): at 08:38

## 2021-07-19 RX ADMIN — LOSARTAN POTASSIUM 100 MILLIGRAM(S): 100 TABLET, FILM COATED ORAL at 08:38

## 2021-07-19 RX ADMIN — Medication 1 MILLIGRAM(S): at 08:38

## 2021-07-19 RX ADMIN — ATORVASTATIN CALCIUM 10 MILLIGRAM(S): 80 TABLET, FILM COATED ORAL at 21:00

## 2021-07-19 RX ADMIN — Medication 650 MILLIGRAM(S): at 18:55

## 2021-07-19 RX ADMIN — Medication 5 MILLIGRAM(S): at 20:59

## 2021-07-19 NOTE — BH INPATIENT PSYCHIATRY PROGRESS NOTE - NSBHMETABOLIC_PSY_ALL_CORE_FT
BMI: BMI (kg/m2): 41 (07-05-21 @ 21:25)  HbA1c: A1C with Estimated Average Glucose Result: 5.5 % (05-21-21 @ 09:47)    Glucose:   BP: 129/66 (07-18-21 @ 20:22) (129/66 - 147/86)  Lipid Panel: Date/Time: 05-21-21 @ 09:47  Cholesterol, Serum: 279  Direct LDL: --  HDL Cholesterol, Serum: 40  Total Cholesterol/HDL Ration Measurement: --  Triglycerides, Serum: 214

## 2021-07-19 NOTE — BH INPATIENT PSYCHIATRY PROGRESS NOTE - NSBHCHARTREVIEWVS_PSY_A_CORE FT
Vital Signs Last 24 Hrs  T(C): 36.2 (19 Jul 2021 05:50), Max: 37.4 (18 Jul 2021 20:22)  T(F): 97.1 (19 Jul 2021 05:50), Max: 99.4 (18 Jul 2021 20:22)  HR: 83 (18 Jul 2021 20:22) (83 - 83)  BP: 129/66 (18 Jul 2021 20:22) (129/66 - 129/66)  BP(mean): --  RR: --  SpO2: --

## 2021-07-19 NOTE — BH INPATIENT PSYCHIATRY PROGRESS NOTE - NSBHMSESPABN_PSY_A_CORE
Increased productivity

## 2021-07-19 NOTE — BH INPATIENT PSYCHIATRY PROGRESS NOTE - NSBHFUPINTERVALHXFT_PSY_A_CORE
Chart reviewed and case discussed with treatment team. No events reported over the weekend. Sleep and appetite is fair. Patient sated that she feels "happy" and is much less anxious. She expressed some concerns regarding her current gabapentin dose, but was in agreement it is helping her anxiety. She stated "I have no more chills or shivers" and is looking forward to going to the pool and Friday night dinners. Patient is compliant with medications, no adverse effects reported.

## 2021-07-19 NOTE — BH INPATIENT PSYCHIATRY PROGRESS NOTE - NSBHASSESSSUMMFT_PSY_ALL_CORE
Patient remains with catastrophic thinking but is less anxious and able to reality test. She believes it will all fall apart when she leaves because the medications stop being as effective. Medication education and psychoeducation provided. She denies any SI and is future oriented.    c/w current medications

## 2021-07-20 PROCEDURE — 99232 SBSQ HOSP IP/OBS MODERATE 35: CPT

## 2021-07-20 RX ADMIN — GABAPENTIN 200 MILLIGRAM(S): 400 CAPSULE ORAL at 21:35

## 2021-07-20 RX ADMIN — LOSARTAN POTASSIUM 100 MILLIGRAM(S): 100 TABLET, FILM COATED ORAL at 08:34

## 2021-07-20 RX ADMIN — Medication 187.5 MILLIGRAM(S): at 08:35

## 2021-07-20 RX ADMIN — Medication 5 MILLIGRAM(S): at 21:35

## 2021-07-20 RX ADMIN — Medication 50 MILLIGRAM(S): at 21:34

## 2021-07-20 RX ADMIN — MIRTAZAPINE 30 MILLIGRAM(S): 45 TABLET, ORALLY DISINTEGRATING ORAL at 21:36

## 2021-07-20 RX ADMIN — GABAPENTIN 200 MILLIGRAM(S): 400 CAPSULE ORAL at 08:34

## 2021-07-20 RX ADMIN — Medication 1 MILLIGRAM(S): at 08:33

## 2021-07-20 RX ADMIN — GABAPENTIN 200 MILLIGRAM(S): 400 CAPSULE ORAL at 13:06

## 2021-07-20 RX ADMIN — Medication 12.5 MILLIGRAM(S): at 08:34

## 2021-07-20 RX ADMIN — ATORVASTATIN CALCIUM 10 MILLIGRAM(S): 80 TABLET, FILM COATED ORAL at 21:34

## 2021-07-20 RX ADMIN — PANTOPRAZOLE SODIUM 40 MILLIGRAM(S): 20 TABLET, DELAYED RELEASE ORAL at 06:46

## 2021-07-20 RX ADMIN — Medication 50 MILLIGRAM(S): at 08:33

## 2021-07-20 RX ADMIN — Medication 0.5 MILLIGRAM(S): at 16:04

## 2021-07-20 NOTE — BH INPATIENT PSYCHIATRY PROGRESS NOTE - NSBHCHARTREVIEWVS_PSY_A_CORE FT
Vital Signs Last 24 Hrs  T(C): 37.2 (20 Jul 2021 15:29), Max: 37.2 (20 Jul 2021 15:29)  T(F): 98.9 (20 Jul 2021 15:29), Max: 98.9 (20 Jul 2021 15:29)  Orthostatic VS  07-20-21 @ 05:49  Lying BP: 126/63 HR: 66   Sitting BP: 133/76 HR: 75  Site: upper left arm   Mode: electronic

## 2021-07-20 NOTE — BH INPATIENT PSYCHIATRY PROGRESS NOTE - NSBHFUPINTERVALHXFT_PSY_A_CORE
Patient seen for follow-up for anxiety and depression. Chart reviewed and case discussed with interdisciplinary team. No significant overnight event reported. On encounter, patient lying in bed, reports she was doing well until she spoke to her  and he said some unkind things to her, states she feels like he is not really there for her. Patient then inquires about going up on her Gabapentin but is agreeable with waiting a day or two before next dose change. Denies current SI. No hallucinations reported. Patient denies dizziness; denies headaches. Patient's appetite and sleep are adequate.

## 2021-07-20 NOTE — BH INPATIENT PSYCHIATRY PROGRESS NOTE - NSBHASSESSSUMMFT_PSY_ALL_CORE
71-year-old female, , retired (), non-caregiver domiciled at home with her  with a prior psychiatric diagnosis of ANSON and MDD, 3 previous inpatient psychiatric hospitalizations at [facility] in 2019, 2/2020, and most recently 4/22-5/24/2021, prior SA in 11/2019 by overdosing on pills, no history of NSSIB, no history of violence/aggression, no hx of legal issues, with PMHx of HTN, hyperlipidemia, and nausea. Patient BIB self for passive SI and worsening depression and anxiety at home.     On exam, patient reports feeling well until an argument with ; remains med focused; denies SI.    Plan: Will continue current meds and consider increasing Gabapentin if patient remains anxious. Patient encouraged to participate in therapy to learn coping skills when anxious.

## 2021-07-21 PROCEDURE — 99232 SBSQ HOSP IP/OBS MODERATE 35: CPT

## 2021-07-21 PROCEDURE — 90837 PSYTX W PT 60 MINUTES: CPT

## 2021-07-21 RX ORDER — GABAPENTIN 400 MG/1
2 CAPSULE ORAL
Qty: 180 | Refills: 0
Start: 2021-07-21 | End: 2021-08-19

## 2021-07-21 RX ORDER — LANOLIN ALCOHOL/MO/W.PET/CERES
1 CREAM (GRAM) TOPICAL
Qty: 0 | Refills: 0 | DISCHARGE
Start: 2021-07-21

## 2021-07-21 RX ORDER — METOPROLOL TARTRATE 50 MG
1 TABLET ORAL
Qty: 0 | Refills: 0 | DISCHARGE
Start: 2021-07-21

## 2021-07-21 RX ORDER — PANTOPRAZOLE SODIUM 20 MG/1
1 TABLET, DELAYED RELEASE ORAL
Qty: 0 | Refills: 0 | DISCHARGE
Start: 2021-07-21

## 2021-07-21 RX ORDER — LOSARTAN POTASSIUM 100 MG/1
1 TABLET, FILM COATED ORAL
Qty: 0 | Refills: 0 | DISCHARGE
Start: 2021-07-21

## 2021-07-21 RX ORDER — ATORVASTATIN CALCIUM 80 MG/1
1 TABLET, FILM COATED ORAL
Qty: 30 | Refills: 0
Start: 2021-07-21 | End: 2021-08-19

## 2021-07-21 RX ORDER — CLONAZEPAM 1 MG
1 TABLET ORAL
Qty: 0 | Refills: 0 | DISCHARGE
Start: 2021-07-21

## 2021-07-21 RX ORDER — CLONAZEPAM 1 MG
1 TABLET ORAL DAILY
Refills: 0 | Status: DISCONTINUED | OUTPATIENT
Start: 2021-07-21 | End: 2021-07-23

## 2021-07-21 RX ORDER — HYDROCHLOROTHIAZIDE 25 MG
1 TABLET ORAL
Qty: 0 | Refills: 0 | DISCHARGE
Start: 2021-07-21

## 2021-07-21 RX ORDER — VENLAFAXINE HCL 75 MG
5 CAPSULE, EXT RELEASE 24 HR ORAL
Qty: 0 | Refills: 0 | DISCHARGE
Start: 2021-07-21

## 2021-07-21 RX ORDER — CLONAZEPAM 1 MG
0.5 TABLET ORAL
Refills: 0 | Status: DISCONTINUED | OUTPATIENT
Start: 2021-07-21 | End: 2021-07-23

## 2021-07-21 RX ORDER — MIRTAZAPINE 45 MG/1
1 TABLET, ORALLY DISINTEGRATING ORAL
Qty: 0 | Refills: 0 | DISCHARGE
Start: 2021-07-21

## 2021-07-21 RX ADMIN — LOSARTAN POTASSIUM 100 MILLIGRAM(S): 100 TABLET, FILM COATED ORAL at 09:05

## 2021-07-21 RX ADMIN — ATORVASTATIN CALCIUM 10 MILLIGRAM(S): 80 TABLET, FILM COATED ORAL at 20:47

## 2021-07-21 RX ADMIN — Medication 0.5 MILLIGRAM(S): at 15:54

## 2021-07-21 RX ADMIN — MIRTAZAPINE 30 MILLIGRAM(S): 45 TABLET, ORALLY DISINTEGRATING ORAL at 20:48

## 2021-07-21 RX ADMIN — Medication 187.5 MILLIGRAM(S): at 09:04

## 2021-07-21 RX ADMIN — Medication 50 MILLIGRAM(S): at 20:47

## 2021-07-21 RX ADMIN — GABAPENTIN 200 MILLIGRAM(S): 400 CAPSULE ORAL at 20:47

## 2021-07-21 RX ADMIN — PANTOPRAZOLE SODIUM 40 MILLIGRAM(S): 20 TABLET, DELAYED RELEASE ORAL at 06:44

## 2021-07-21 RX ADMIN — Medication 1 MILLIGRAM(S): at 09:58

## 2021-07-21 RX ADMIN — Medication 12.5 MILLIGRAM(S): at 09:05

## 2021-07-21 RX ADMIN — Medication 5 MILLIGRAM(S): at 20:47

## 2021-07-21 RX ADMIN — Medication 50 MILLIGRAM(S): at 09:05

## 2021-07-21 RX ADMIN — Medication 650 MILLIGRAM(S): at 06:48

## 2021-07-21 RX ADMIN — GABAPENTIN 200 MILLIGRAM(S): 400 CAPSULE ORAL at 12:34

## 2021-07-21 RX ADMIN — Medication 650 MILLIGRAM(S): at 07:42

## 2021-07-21 RX ADMIN — GABAPENTIN 200 MILLIGRAM(S): 400 CAPSULE ORAL at 09:04

## 2021-07-21 NOTE — BH TREATMENT PLAN - NSTXSUICIDGOAL_PSY_ALL_CORE
Be able to state 3 reasons for living
Talk to staff and ask for assistance when having suicidal wishes instead of acting out
Be able to state 3 reasons for living

## 2021-07-21 NOTE — BH INPATIENT PSYCHIATRY DISCHARGE NOTE - NSBHMETABOLIC_PSY_ALL_CORE_FT
BMI: BMI (kg/m2): 41 (07-05-21 @ 21:25)  HbA1c: A1C with Estimated Average Glucose Result: 5.5 % (05-21-21 @ 09:47)    Glucose:   BP: 129/66 (07-18-21 @ 20:22) (129/66 - 129/66)  Lipid Panel: Date/Time: 05-21-21 @ 09:47  Cholesterol, Serum: 279  Direct LDL: --  HDL Cholesterol, Serum: 40  Total Cholesterol/HDL Ration Measurement: --  Triglycerides, Serum: 214   BMI: BMI (kg/m2): 41 (07-05-21 @ 21:25)  HbA1c: A1C with Estimated Average Glucose Result: 5.5 % (05-21-21 @ 09:47)   BP: 129/66 (07-18-21 @ 20:22) (129/66 - 129/66)  Lipid Panel: Date/Time: 05-21-21 @ 09:47  Cholesterol, Serum: 279  HDL Cholesterol, Serum: 40  Triglycerides, Serum: 214

## 2021-07-21 NOTE — BH PSYCHOLOGY - CLINICIAN PSYCHOTHERAPY NOTE - NSBHPSYCHOLINT_PSY_A_CORE
Cognitive/behavioral therapy/Dialectical  Behavioral Therapy (DBT)/Encourage medication compliance/Problem-solving techniques discussed/Supportive therapy/Treatment compliance encouraged
Cognitive/behavioral therapy/Encourage medication compliance/Problem-solving techniques discussed/Supportive therapy/Treatment compliance encouraged
Cognitive/behavioral therapy/Encourage medication compliance/Problem-solving techniques discussed/Supportive therapy/Treatment compliance encouraged
Cognitive/behavioral therapy/Encourage medication compliance/Supported coping skills/Supportive therapy/Treatment compliance encouraged
Cognitive/behavioral therapy/Encourage medication compliance/Supported coping skills/Supportive therapy/Treatment compliance encouraged
Cognitive/behavioral therapy/Encourage medication compliance/Problem-solving techniques discussed/Supported coping skills/Treatment compliance encouraged

## 2021-07-21 NOTE — BH PSYCHOLOGY - CLINICIAN PSYCHOTHERAPY NOTE - NSBHPSYCHOLBILLFAM_PSY_A_CORE
04901 - 38 to 52 minutes
[id number] - 53 minutes and above
[id number] - 53 minutes and above
[id number] - 16 to 37 minutes
[id number] - 38 to 52 minutes
[id number] - 38 to 52 minutes

## 2021-07-21 NOTE — BH INPATIENT PSYCHIATRY DISCHARGE NOTE - NSBHDCHANDOFFFT_PSY_ALL_CORE
Contact made with Dr. Kamara at the [facility] at [phone] regarding hospital course and med changes

## 2021-07-21 NOTE — BH INPATIENT PSYCHIATRY PROGRESS NOTE - NSBHASSESSSUMMFT_PSY_ALL_CORE
71-year-old female, , retired (), non-caregiver domiciled at home with her  with a prior psychiatric diagnosis of ANSON and MDD, 3 previous inpatient psychiatric hospitalizations at [facility] in 2019, 2/2020, and most recently 4/22-5/24/2021, prior SA in 11/2019 by overdosing on pills, no history of NSSIB, no history of violence/aggression, no hx of legal issues, with PMHx of HTN, hyperlipidemia, and nausea. Patient BIB self for passive SI and worsening depression and anxiety at home.     On exam, patient reports feeling well until an argument with ; remains med focused; denies SI.    Plan: Will continue current meds and consider increasing Gabapentin if patient remains anxious. Patient encouraged to participate in therapy to learn coping skills when anxious.  71-year-old female, , retired (), non-caregiver domiciled at home with her  with a prior psychiatric diagnosis of ANSON and MDD, 3 previous inpatient psychiatric hospitalizations at [facility] in 2019, 2/2020, and most recently 4/22-5/24/2021, prior SA in 11/2019 by overdosing on pills, no history of NSSIB, no history of violence/aggression, no hx of legal issues, with PMHx of HTN, hyperlipidemia, and nausea. Patient BIB self for passive SI and worsening depression and anxiety at home.     On exam, patient reports feeling "better" but remains med focused, is however able to discuss discharge plans; patient denies SI.    Plan: Will continue current meds and aim to discharge on Friday 07/23 if patient remains stable. Patient once again encouraged to participate in therapy to learn coping skills when anxious.

## 2021-07-21 NOTE — BH PSYCHOLOGY - CLINICIAN PSYCHOTHERAPY NOTE - NSBHPTASSESSDT_PSY_A_CORE
07-Jul-2021 11:00
12-Jul-2021 10:45
14-Jul-2021 15:30
19-Jul-2021 10:45
15-Jul-2021 11:45
21-Jul-2021 15:50

## 2021-07-21 NOTE — BH TREATMENT PLAN - NSDCCRITERIA_PSY_ALL_CORE
Symptom management, safety planning, care coordination

## 2021-07-21 NOTE — BH PSYCHOLOGY - CLINICIAN PSYCHOTHERAPY NOTE - NSTXANXGOAL_PSY_ALL_CORE
Identify and practice 3 coping skills to manage anxiety
Identify and practice 3 coping skills to manage anxiety
Be able to perform ADLs and maintain safety despite anxiety/panic daily
Identify and practice 3 coping skills to manage anxiety
Be able to perform ADLs and maintain safety despite anxiety/panic daily
Identify and practice 3 coping skills to manage anxiety

## 2021-07-21 NOTE — BH INPATIENT PSYCHIATRY PROGRESS NOTE - NSBHMETABOLIC_PSY_ALL_CORE_FT
BMI: BMI (kg/m2): 41 (07-05-21 @ 21:25)  HbA1c: A1C with Estimated Average Glucose Result: 5.5 % (05-21-21 @ 09:47)  BP: 129/66 (07-18-21 @ 20:22) (129/66 - 129/66)  Lipid Panel: Date/Time: 05-21-21 @ 09:47  Cholesterol, Serum: 279  HDL Cholesterol, Serum: 40  Triglycerides, Serum: 214

## 2021-07-21 NOTE — BH INPATIENT PSYCHIATRY DISCHARGE NOTE - NSDCMRMEDTOKEN_GEN_ALL_CORE_FT
clonazePAM 0.5 mg oral tablet: 1 tab(s) orally once a day MDD:1.5mg  clonazePAM 1 mg oral tablet: 1 tab(s) orally once a day MDD:1.5mg  hydroCHLOROthiazide 12.5 mg oral capsule: 1 cap(s) orally once a day MDD:12.5mg  losartan 100 mg oral tablet: 1 tab(s) orally once a day MDD:100mg  metoprolol tartrate 50 mg oral tablet: 1 tab(s) orally 2 times a day MDD:100mg  mirtazapine 30 mg oral tablet: 1 tab(s) orally once a day (at bedtime) MDD:30mg  ondansetron 4 mg oral tablet: 1 tab(s) orally once a day, As needed, Nausea  pantoprazole 40 mg oral delayed release tablet: 1 tab(s) orally once a day (before a meal) MDD:40mg  venlafaxine 75 mg oral capsule, extended release: 3 cap(s) orally once a day MDD:225mg   atorvastatin 10 mg oral tablet: 1 tab(s) orally once a day (at bedtime)  clonazePAM 0.5 mg oral tablet: 1 tab(s) orally   clonazePAM 1 mg oral tablet: 1 tab(s) orally once a day  gabapentin 100 mg oral capsule: 2 cap(s) orally 3 times a day  hydroCHLOROthiazide 12.5 mg oral capsule: 1 cap(s) orally once a day  losartan 100 mg oral tablet: 1 tab(s) orally once a day  melatonin 5 mg oral tablet: 1 tab(s) orally once a day (at bedtime)  metoprolol tartrate 50 mg oral tablet: 1 tab(s) orally 2 times a day  mirtazapine 30 mg oral tablet: 1 tab(s) orally once a day (at bedtime)  pantoprazole 40 mg oral delayed release tablet: 1 tab(s) orally   venlafaxine 37.5 mg oral capsule, extended release: 5 cap(s) orally once a day

## 2021-07-21 NOTE — BH INPATIENT PSYCHIATRY DISCHARGE NOTE - DESCRIPTION
Patient is a 70 F, retired (),  non-caregiver status living in a private home in [place] with her . She states that her  is a workaholic and works almost daily. She states that she is still deeply affected by her parents passing a few years ago, friend's death last year and recent death of therapist one month ago. Patient is a 70 F, retired (),  non-caregiver status living in a private home in [place] with her .

## 2021-07-21 NOTE — BH INPATIENT PSYCHIATRY DISCHARGE NOTE - NSDCPROCEDURESFT_PSY_ALL_CORE
SARS-CoV-2: NotDetec (12 Jul 2021 17:44)  COVID-19 PCR: NotDetec (05 Jul 2021 14:51)  COVID-19 PCR: NotDetec (22 Apr 2021 10:37)

## 2021-07-21 NOTE — BH PSYCHOLOGY - CLINICIAN PSYCHOTHERAPY NOTE - NSTXDEPRESGOAL_PSY_ALL_CORE
Will identify 2 coping skills that assist in improving mood
Exhibit improvements in self-grooming, hygiene, sleep and appetite
Will identify 2 coping skills that assist in improving mood

## 2021-07-21 NOTE — BH INPATIENT PSYCHIATRY DISCHARGE NOTE - HPI (INCLUDE ILLNESS QUALITY, SEVERITY, DURATION, TIMING, CONTEXT, MODIFYING FACTORS, ASSOCIATED SIGNS AND SYMPTOMS)
70yo female, , retired (, non-caregiver domiciled at home with her  with a prior psychiatric diagnosis of ANSON and MDD, 3 previous inpatient psychiatric hospitalizations at [facility] in 2019, 2/2020, and most recently 4/22-5/24/2021, prior SA in 11/2019 by overdosing on pills, daily MJ user, no history of NSSIB, no history of violence/aggression, no hx of legal issues, daily marijuana use, PMHx of HTN, hyperlipidemia, and nausea, BIB self due to passive SI and worsening depression and anxiety at home.     On interview, pt is tearful but cooperative. Reports that she improved during recent hospitalization at [facility] on regimen of Effexor 225mg, Remeron 30ng qHS, and Klonopin 1mg in the AM and 0.5mg at 3PM. She was doing well at home until about two weeks ago when she started feeling "nausea and chills" s/p taking the Effexor in the morning, with outpt provider decreasing the Effexor to 187mg. Further, endorses worsening anxiety that seems to happen "whenever" she takes the Effexor. Over the last weeks, she endorses poor appetite, low energy, poor concentration, depressed mood, and feelings of guilt. "I don't know who I am? I am not myself!" States that she no longer finds karla in her regular activities such as going to the gym, seeing friends, going out for Friday dinners, or going with friends to the beach. Further, states that she finds herself at home unable to do anything: doesn't clean, cook. Her  has noticed her declined and told her "he doesn't recognize me." Patient becomes tearful as she is anxious her  will abandon her. "I can't blame him if he wants to divorce me." The patient got  at 48yo, and this is her first marriage. Although this is the 2nd marriage for her , none of them have any kids. No close relatives live  nearby. Patient reports her  works the night shift and comes home in the AM. However, he sleeps most of the day, leaving the patient to feel "all alone" at home. States she lost her parents about 5yrs ago and this has been very challenging on her. "I was very close to them, I spoke with them about 6x per day." While she acknowledges the relationship may not have been "health" for  her, she now feels alone and can't seem go back to being her "happy, friendly" self.       Patient endorses a long history of depression and anxiety since her early teens. Pt reports trying multiple substances in her 20s and had not done anything other than marijuana since her 20s. Her family history is positive for suicide (grandmother at age 90). Patient attempted suicide via OD on her pills in November 2019 and was admitted to [facility]. Of note, pt states she has been on several anti-depressants in the past, but they don't work for long. Denies any AVH, paranoia, IOR, thought insertion/deletion, or manic symptoms. Endorses passive SI but denies any intent or plan. "I promise God I wouldn't try to kill myself again." She is requesting voluntary admission to address her worsening anxiety and depression and adjust her medications. 72yo female, , retired (, non-caregiver domiciled at home with her  with a prior psychiatric diagnosis of ANSON and MDD, 3 previous inpatient psychiatric hospitalizations at [facility] in 2019, 2/2020, and most recently 4/22-5/24/2021, prior SA in 11/2019 by overdosing on pills, daily MJ user, no history of NSSIB, no history of violence/aggression, no hx of legal issues, daily marijuana use, PMHx of HTN, hyperlipidemia, and nausea, BIB self due to passive SI and worsening depression and anxiety at home.     On interview, pt was tearful but cooperative. She reported that she improved during recent hospitalization at [facility] on regimen of Effexor 225mg, Remeron 30mg qHS, and Klonopin 1mg in the AM and 0.5mg at 3PM. She was doing well at home until about two weeks ago when she started feeling "nausea and chills" s/p taking the Effexor in the morning, with outpt provider decreasing the Effexor to 187.5mg. She further, endorsed worsening anxiety that seemed to happen "whenever" she takes the Effexor. Over the last weeks, she endorsed poor appetite, low energy, poor concentration, depressed mood, and feelings of guilt. "I don't know who I am? I am not myself!" Stated that she no longer finds karla in her regular activities such as going to the gym, seeing friends, going out for Friday dinners, or going with friends to the beach. Further, stated that she finds herself at home unable to do anything: doesn't clean, cook. Her  has noticed her declined and told her "he doesn't recognize me." Patient got tearful and as she stated that she is worried that her  will abandon her. "I can't blame him if he wants to divorce me." The patient got  at 48yo, and this is her first marriage. Although this is the 2nd marriage for her , none of them have any kids. No close relatives live nearby. Patient reported her  works the night shift and comes home in the AM. However, he sleeps most of the day, leaving the patient to feel "all alone" at home. Patient stated that she lost her parents about 5yrs ago and this has been very challenging for her. "I was very close to them, I spoke with them about 6x per day." While she acknowledges the relationship may not have been "healthy" for  her, she now feels alone and can't seem go back to being her "happy, friendly" self.       Patient endorsed a long history of depression and anxiety since her early teens. Pt reported trying multiple substances in her 20s and had not done anything other than marijuana since her 20s. Her family history is positive for suicide (grandmother at age 90). Patient attempted suicide via OD on her pills in November 2019 and was admitted to [facility]. Of note, pt stated she has been on several anti-depressants in the past, but they don't work for long. Denied any AVH, paranoia, IOR, thought insertion/deletion, or manic symptoms. Endorsed passive SI but denies any intent or plan. "I promise God I wouldn't try to kill myself again." She requested voluntary admission to address her worsening anxiety and depression and adjust her medications.

## 2021-07-21 NOTE — BH PSYCHOLOGY - CLINICIAN PSYCHOTHERAPY NOTE - NSBHPSYCHOLSERV_PSY_A_CORE
Individual psychotherapy

## 2021-07-21 NOTE — BH TREATMENT PLAN - NSTXPLANTHERAPYSESSIONSFT_PSY_ALL_CORE
07-13-21  Type of therapy: Leisure development,Peer advocate,Symptom management  Type of session: Individual  Level of patient participation: Engaged  Duration of participation: 20 minutes  Therapy conducted by: Psych rehab  Therapy Summary: Writer and patient discussed current level of functioning, addressed concerns surrounding the current hospitalization and engaged in skill development. Patient is noted to be fixated on effectiveness of medications. Patient discussed events leading to current hospitalization with writer to which writer provided empathy, support, encouragement and engaged patient in skill development. Patient has been consistently compliant with standing medications over the past week and endorses some, although minimal improvements in regards to symptoms. Patient denies SI. Patient reports some improvements in mood in the context of staff support. Patient continues to endorse moderate anxiety, difficulty sleeping, excessive worry, and fearfulness. Patient denied HI, AH/VH. Patient's insight and judgement are partial.     Patient is partially visible on the unit, interactive with peers and has maintained appropriate behavioral control over the past week. Patient is verbal, polite and cooperative with staff. Patient has attended a few of the Psychiatric Rehabilitation groups offered over the past week. Patient is actively engaged, appropriate in group and does not require redirection. Patient is able to tolerate group rules/structure, topics and duration well, although requires some encouragement to attend group programming at this time.  
  07-19-21  Type of therapy: Creative arts therapy,Leisure development,Spirituality  Type of session: Individual  Level of patient participation: Engaged  Duration of participation: 15 minutes  Therapy conducted by: Psych rehab  Therapy Summary: Writer and patient discussed current level of functioning, addressed concerns surrounding the current hospitalization, discharge, and engaged in skill development. Patient discussed fearfulness of relapse to which writer provided empathy, support and engaged patient in coping ahead. Patient has been consistently compliant with standing medications over the past week and endorses some improvements in regards to symptoms including overall mood, affect, SI, hopefulness, energy, anhedonia and motivation. Patient continues to endorse some anxiety with overall improvements in intensity/frequency. Patient denies current and active SI/HI, AH/VH. Patient's insight is fair and judgement has improved. Patient's thought content is future oriented.     Patient is less visible on the unit and has attended less of the Psychiatric Rehabilitation groups offered over the past week in the context of stressors on the unit. Patient is fairly engaged, appropriate and requires minimal redirection during group programming. Patient has maintained good behavioral control over the past week. Patient is verbal, polite and fairly cooperative with staff. Patient is appropriately interactive with select peers.

## 2021-07-21 NOTE — BH TREATMENT PLAN - NSTXANXGOAL_PSY_ALL_CORE
Identify and practice 3 coping skills to manage anxiety
Be able to participate in activities despite lingering anxiety/panic
Identify and practice 3 coping skills to manage anxiety

## 2021-07-21 NOTE — BH INPATIENT PSYCHIATRY DISCHARGE NOTE - NSBHDCMEDICALFT_PSY_A_CORE
HLD: High LDL is indication for statin and patient started with low dose (10mg) and monitored for any SE.      HTN: Controlled on losartan, metoprolol

## 2021-07-21 NOTE — BH PSYCHOLOGY - CLINICIAN PSYCHOTHERAPY NOTE - NSTXPROBANX_PSY_ALL_CORE
ANXIETY/PANIC/FEAR

## 2021-07-21 NOTE — BH TREATMENT PLAN - NSTXDCOPLKINTERMD_PSY_ALL_CORE
Will coordinate discharge with family. Considering discharge to a step-down program
Will coordinate discharge with family. Considering discharge to a step-down program

## 2021-07-21 NOTE — BH PSYCHOLOGY - CLINICIAN PSYCHOTHERAPY NOTE - NSBHPSYCHOLDISCUSS_PSY_A_CORE
Discussion with collaborating staff took place since patient's last visit

## 2021-07-21 NOTE — BH PSYCHOLOGY - CLINICIAN PSYCHOTHERAPY NOTE - NSTXPROBDEPRES_PSY_ALL_CORE
DEPRESSIVE SYMPTOMS
Simple: Patient demonstrates quick and easy understanding

## 2021-07-21 NOTE — BH INPATIENT PSYCHIATRY DISCHARGE NOTE - NSDCCPCAREPLAN_GEN_ALL_CORE_FT
PRINCIPAL DISCHARGE DIAGNOSIS  Diagnosis: ANSON (generalized anxiety disorder)  Assessment and Plan of Treatment: Continue current medications and follow-up with your outpatient provider

## 2021-07-21 NOTE — BH PSYCHOLOGY - CLINICIAN PSYCHOTHERAPY NOTE - NSTXSUICIDGOAL_PSY_ALL_CORE
Be able to state 3 reasons for living

## 2021-07-21 NOTE — BH PSYCHOLOGY - CLINICIAN PSYCHOTHERAPY NOTE - NSBHPSYCHOLRESPONSE_PSY_A_CORE
Symptoms reduced/Coping skills acquired/Insight displayed/Accepted support
Symptoms reduced/Coping skills acquired/Insight displayed/Accepted support
Symptoms reduced/Insight displayed/Accepted support
Symptoms reduced/Coping skills acquired/Insight displayed/Accepted support
Symptoms reduced/Coping skills acquired/Insight displayed/Accepted support
Coping skills acquired/Insight displayed/Accepted support

## 2021-07-21 NOTE — BH TREATMENT PLAN - NSTXDEPRESGOAL_PSY_ALL_CORE
Will identify 2 coping skills that assist in improving mood
Attend and participate in at least 2 groups daily despite low mood/energy
Will identify 2 coping skills that assist in improving mood

## 2021-07-21 NOTE — BH TREATMENT PLAN - NSTXDEPRESINTERMD_PSY_ALL_CORE
medication management
Medication management with Venlafaxine and Mirtazapine.
Medication management with Venlafaxine and Mirtazapine.

## 2021-07-21 NOTE — BH TREATMENT PLAN - NSTXDCOPLKINTERSW_PSY_ALL_CORE
SW will provide education and support regarding increased services
SW will provide education and support regarding increased services

## 2021-07-21 NOTE — BH PSYCHOLOGY - CLINICIAN PSYCHOTHERAPY NOTE - NSBHPSYCHOLNARRATIVE_PSY_A_CORE FT
Patient was seen individually at the team's request and with her consent. Patient's mood was “ok” and affect was of normal range and mood-congruent. She was coherent, logical and not labile.     Patient reported no panic attacked but continued experience of “the shivers” especially in the afternoon. She understands that this is anxiety and is not worried about medical issues as she had been in the past. She expressed concern that she would need ECT of which she is afraid. She was assured that this was not the plan. Writer reviewed her progress and discussed coping mechanisms and healthy and unhealthy “self-talk”. Patient participated actively in the session. She is also participating in group activities on the unit. Patient accepted support and agreed to meet again.
Patient was seen individually at the team's request and with her consent. Patient's mood was good and affect was of normal range and mood-congruent. Eye contact was good. Thinking was logical and organized.     Patient reports first day so far of no “shivers” or tingling sensations and is optimistic about the medication working. She is no longer ruminating about ECT or gabapentin. She was encouraged to attend groups several times a week in the clinic after discharge in order to have more support during her transition. She resisted this because it would deprive her of socialization which she believes is crucial to her emotional well-being. Writer tried to show that she would still have a lot of time to herself and that to stress the importance in avoiding relapse. She said she would think about it and writer will try to ascertain the days and times of the groups.  Later the same day, writer encountered the patient crying intensely and met with her for another 15 minutes. She explained that she had met with her psychiatrist and misunderstood her as saying that she would not be accepted back in the hospital if she relapsed; this set off a catastrophic reaction. Even though it was clarified that this was not what was meant, she remained highly emotional and experienced other encounters (e.g., a patient became angry with her; a nurse directed her away from another patient) as emotional injuries. Writer spent time reviewing her reactions as related to her rejection-sensitivity and pointed to her past strengths and accomplishments. By the end of the session she had regained her composure.
Patient was seen individually at the team's request and with her consent. Patient's mood was good and affect was of normal range and mood-congruent. Eye contact was good. Thinking was logical and organized.     Patient appeared calm and in a good mood, having recovered from yesterday’s emotional reaction. She reviewed the issue of attending groups in the clinic and appeared torn; she did not recall a good experience in group and is afraid it will diego her of time for socialization while she also recognized that she will still have a lot of time for herself. Writer indicated that we try to find out when the groups occur which may help with her decision. She acknowledged that she also does not respond well to being pressured to do something, it makes her feel imprisoned and she resists. She related historical events in which that rebellious side had been more prominent. She identified the goal of being able to hold back when she feels like confronting someone angrily and writer again emphasized that more therapy as an outpatient may advance her toward that goal if she is committed to it. 
Patient was seen individually at the team's request and with her consent. Patient's mood was good and affect was of normal range and mood-congruent. Eye contact was good. Thinking was logical and organized. Patient denied suicidality, anxiety and depression.    Patient was excited that her discharge date was moved up to this Friday. She smiled and said she finally feels “like Cammie again” and that she was not feeling this confident the past time she was discharged. She was planning out her activities when she returns home and happily anticipated seeing her friends again. She asked about the nature of the groups in the intensive outpatient, hoping that she would get a chance to talk with others about the things that bother her. Writer discussed relapse prevention, identifying triggers and coping skills. Patient expressed appreciation and benefit from the therapy.  
Patient was seen individually at the team's request and with her consent. Patient's mood was good and affect was of normal range and mood-congruent. Eye contact was good. Thinking was logical and organized.     Patient still reports no panic attacks and much milder experiences of shivering which she now describes more like “tingling sensations”. She has remained on gabapentin and was worried why it was not increased, fearing that the plan would now change to ECT. She was reassured again that this was not the plan. She acknowledged that she “catastrophizes” and tends to "over-think", needing a lot of reassurance to stop fixating on worst case scenarios. She evidences insight about the manifestation of her anxiety but finds it hard to change her way of thinking once she is anxious. Nevertheless, she feels better than the last time she was admitted and was able to accept the idea that this hospitalization is focusing on tweaking her medications to help her improve the “last 10-20%”. Patient again participated actively in the session and participates in group activities on the unit. Patient accepted support and agreed to meet again.
Patient was seen individually at the team's request and with her consent. Patient's mood was good and affect was of normal range and mood-congruent. Eye contact was good. Thinking was logical and organized.     Patient again appeared calm and in a good mood. She agreed to attend the groups in the intensive outpatient program. She expressed hesitation about leaving the hospital and relapsing but acknowledged that she didn’t feel this well when she left the last time. She discussed an upcoming event with her ’s family which she has strongly mixed feelings about and we sorted out the pros and cons as well as ways she could minimize discomfort.   Patient expressed appreciation and agreed to meet again.

## 2021-07-21 NOTE — BH INPATIENT PSYCHIATRY PROGRESS NOTE - NSBHCHARTREVIEWVS_PSY_A_CORE FT
Vital Signs Last 24 Hrs  T(C): 36.2 (21 Jul 2021 05:54), Max: 37.2 (20 Jul 2021 15:29)  T(F): 97.2 (21 Jul 2021 05:54), Max: 98.9 (20 Jul 2021 15:29)  RR: 18 (21 Jul 2021 05:54) (18 - 18)  Orthostatic VS  07-21-21 @ 05:54  Lying BP: 121/68 HR: 71   Sitting BP: 127/74 HR: 63

## 2021-07-21 NOTE — BH PSYCHOLOGY - CLINICIAN PSYCHOTHERAPY NOTE - NSBHPSYCHOLPROBS_PSY_ALL_CORE
Anxiety/Depression
Anxiety/Depression/Suicidality
Anxiety/Depression/Suicidality
Anxiety/Depression
Anxiety/Depression
Anxiety

## 2021-07-21 NOTE — BH PSYCHOLOGY - CLINICIAN PSYCHOTHERAPY NOTE - NSBHPSYCHOLASSESSPROV_PSY_A_CORE
Licensed Staff Psychologist

## 2021-07-21 NOTE — BH TREATMENT PLAN - NSCMSPTSTRENGTHS_PSY_ALL_CORE
Intelligence/Motivated/Strong support system/Supportive family

## 2021-07-21 NOTE — BH TREATMENT PLAN - NSTXANXINTERPR_PSY_ALL_CORE
Writer met with patient in order to discuss progress towards Psychiatric Rehabilitation goals over the past week to which patient has demonstrated some progress. Patient was able to identify several strategies to address anxiety including checking the facts, noting catastrophic thinking and taking things one day at a time. Patient was able to engage in coping ahead with some assistance. Patient is receptive to ongoing skill development. Writer will continue to engage patient daily in order to maintain rapport, provide support and assist patient in demonstrating progress towards Psychiatric Rehabilitation goals over the next 7 days.
Writer met with patient in order to discuss progress towards Psychiatric Rehabilitation goals over the past week to which patient has demonstrated some progress. Patient was able to reflect on coping strategies used since last admission. Patient was able to identify specific scenarios where coping strategies are ineffective including when she is alone. Patient explored socialization and self-validation as healthy means of coping with loneliness. Patient is receptive to ongoing skill development. Writer will continue to engage patient daily in order to provide support and assist patient in demonstrating progress towards Psychiatric Rehabilitation goals over the next 7 days.

## 2021-07-21 NOTE — BH TREATMENT PLAN - NSPTSTATEDGOAL_PSY_ALL_CORE
"To feel like myself again"
wants to have reduction of depressive and anxious sxs. 
wants to have reduction of depressive and anxious sxs.

## 2021-07-21 NOTE — BH INPATIENT PSYCHIATRY PROGRESS NOTE - NSBHFUPINTERVALHXFT_PSY_A_CORE
Patient seen for follow-up for anxiety and depression. Chart reviewed and case discussed with interdisciplinary team. No significant overnight event reported. On encounter, patient reports her mood as "better", states she feels less anxious and slept well last night. She still voices concerns about her anxiety "coming back" but overall reports a sense of wellbeing today. Denies current SI. No hallucinations reported. Patient denies dizziness; denies headaches. Patient's appetite and sleep are adequate.

## 2021-07-21 NOTE — BH INPATIENT PSYCHIATRY DISCHARGE NOTE - REASON FOR ADMISSION
71-year-old female with a prior psychiatric diagnosis of ANSON and MDD, 3 previous inpatient psychiatric hospitalizations at [facility] in 2019, 2/2020, and most recently 4/22-5/24/2021, prior SA in 11/2019 by overdosing on pills,  PMHx of HTN, hyperlipidemia, and nausea, BIB self due to passive SI and worsening depression and anxiety at home.

## 2021-07-21 NOTE — BH TREATMENT PLAN - NSTXANXINTERMD_PSY_ALL_CORE
medication management
Medication management with Venlafaxine, Gabapentin and Mirtazapine.
Medication management with Venlafaxine, Gabapentin and Mirtazapine.

## 2021-07-21 NOTE — BH INPATIENT PSYCHIATRY DISCHARGE NOTE - OTHER PAST PSYCHIATRIC HISTORY (INCLUDE DETAILS REGARDING ONSET, COURSE OF ILLNESS, INPATIENT/OUTPATIENT TREATMENT)
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 Patient has a documented PPHx of  ANSON and MDD, with long history of depression and anxiety noted since her early teens. Pt has a h/o 2 previous inpatient psychiatric hospitalizations, both at [facility] (November 2019 at for a suicide attempt via OD, and in Feb 2020). Pt has h/o SA in 11/2019 via OD. Pt reported daily marijuana use, as a means of self-medicating,  and is reported to have a h/o “trying” multiple substances in her 20s. Pt has most recently been using more of her prescription drugs (klonopin, remeron, lexapro and melatonin) to cope with increase depression and anxiety, with noted passive SI of wanting to sleep and not wake up. Pt’s long time therapist recently passed away, triggering this most current episode. Pt is also noted to have private outpatient psychiatrist Dr. Ramos.  Patient has a documented PPHx of  ANSON and MDD, with long history of depression and anxiety noted since her early teens. Pt has a h/o 2 previous inpatient psychiatric hospitalizations, both at [facility] (November 2019 at for a suicide attempt via OD, and in Feb 2020). Pt has h/o SA in 11/2019 via OD. Pt reported daily marijuana use, as a means of self-medicating,  and is reported to have a h/o “trying” multiple substances in her 20s. Pt has most recently been using more of her prescription drugs (klonopin, remeron, lexapro and melatonin) to cope with increase depression and anxiety, with noted passive SI of wanting to sleep and not wake up. Pt’s long time therapist recently passed away, triggering this most current episode. Pt is also noted to have private outpatient psychiatrist Dr. Kamara.

## 2021-07-21 NOTE — BH TREATMENT PLAN - NSTXSUICIDINTERMD_PSY_ALL_CORE
Medication management; will encourage patient to participate in group and individual therapy; regular safety check. 
medication management
Medication management; will encourage patient to participate in group and individual therapy; regular safety check.

## 2021-07-21 NOTE — BH TREATMENT PLAN - NSTXDEPRESINTERRN_PSY_ALL_CORE
Encourage pt to verbalize feelings and concerns.
Encourage compliance with medications. Encourage participation in therapeutic groups.

## 2021-07-21 NOTE — BH PSYCHOLOGY - CLINICIAN PSYCHOTHERAPY NOTE - NSBHPSYCHOLGOALS_PSY_A_CORE
Decrease symptoms/Improve level of independent functioning/Prevent relapse/Treatment compliance
Decrease symptoms/Improve level of independent functioning/Prevent relapse/Treatment compliance
Decrease symptoms/Improve level of independent functioning/Treatment compliance
Decrease symptoms/Improve level of independent functioning/Treatment compliance
Decrease symptoms/Improve level of independent functioning/Prevent relapse/Treatment compliance
Decrease symptoms/Improve level of independent functioning/Prevent relapse/Treatment compliance

## 2021-07-21 NOTE — BH INPATIENT PSYCHIATRY DISCHARGE NOTE - HOSPITAL COURSE
Upon evaluation, patient is very anxious, depressed and tearful at times during interview. Patient feels hopeless, helpless, "like a failure", poor appetite, low energy, anhedonia, and amotivation. Sleep has been good with the Remeron. Patient also reported that she has "chills" frequently which she states is due to the increased anxiety. Patient reported worsening depressive symptoms since one week after discharge from [facility] (5/24). Patient endorses passive SI with no intent or plan. Patient denies any substance use except for marijuana, last smoked about one week ago. Patient has hx of multiple past medication trials and stated that she has been depressed since childhood. In the past, patient would stop medications when she felt better. Patient was doing well prior to the passing of both her parents about 5yrs ago. Patient is in agreement to start Gabapentin 100mg TID for anxiety. Also discussed ECT as possible treatment option and initially she refused but then stated she will consider it if medication adjustment does not help. On initial evaluation, patient presented as depressed and tearful during interview. She reported feeling hopeless, helpless, "like a failure", poor appetite, low energy, anhedonia, and amotivation. Sleep has been good with the Remeron. Patient also reported that she has "chills" frequently which she states is due to the increased anxiety. Patient reported worsening depressive symptoms that started one week after discharge from the psych inpatient unit (5/24). Patient endorsed passive SI with no intent or plan. Patient denied any substance use except for marijuana, last smoked about one week ago. Patient reported a hx of multiple past medication trials and stated that she has been depressed since childhood. In the past, patient would stop medications when she felt better, but understands that she needs to continue taking her medications consistently for symptom stabilization. Medication changes were discussed and patient was in agreement to start Gabapentin 100mg TID for anxiety. Patient tolerated medication well and dose was increased to 200mg TID with good clinical response. Home medications Clonazepam, Venlafaxine XR and Mirtazapine were continued and no dose change made. Of note, patient was seen by hospitalist for dyslipidemia and started on Atorvastatin 10mg at bedtime. At present, patient's symptoms have improved significantly on current management and patient has maximized her treatment in an inpatient setting, and is psychiatrically stable to continue her treatment in an outpatient setting. Patient's case was discussed with all members of interdisciplinary team and when everyone agreed that patient was stable and appropriate for discharge, she was discharged home with follow-up appointments. Prior to discharge, patient given a 30-day supply of Atorvastatin and Gabapentin; extensive psychoeducation and motivational counseling targeting healthy lifestyle was provided. Patient was instructed on actions for crisis situations, understood and agreed to follow instructions for handling crisis, including coming to ER or calling 911.    Psychotropic medications: Mirtazapine 30mg at bedtime, Gabapentin 200mg TID, Clonazepam 1mg at 9:00am and  0.5mg at 3:00pm, and Venlafaxine .5mg daily.

## 2021-07-22 PROCEDURE — 99231 SBSQ HOSP IP/OBS SF/LOW 25: CPT

## 2021-07-22 RX ADMIN — PANTOPRAZOLE SODIUM 40 MILLIGRAM(S): 20 TABLET, DELAYED RELEASE ORAL at 06:38

## 2021-07-22 RX ADMIN — MIRTAZAPINE 30 MILLIGRAM(S): 45 TABLET, ORALLY DISINTEGRATING ORAL at 20:20

## 2021-07-22 RX ADMIN — LOSARTAN POTASSIUM 100 MILLIGRAM(S): 100 TABLET, FILM COATED ORAL at 08:44

## 2021-07-22 RX ADMIN — GABAPENTIN 200 MILLIGRAM(S): 400 CAPSULE ORAL at 12:50

## 2021-07-22 RX ADMIN — Medication 650 MILLIGRAM(S): at 21:25

## 2021-07-22 RX ADMIN — Medication 650 MILLIGRAM(S): at 20:20

## 2021-07-22 RX ADMIN — Medication 1 MILLIGRAM(S): at 08:45

## 2021-07-22 RX ADMIN — Medication 187.5 MILLIGRAM(S): at 08:44

## 2021-07-22 RX ADMIN — Medication 50 MILLIGRAM(S): at 08:44

## 2021-07-22 RX ADMIN — Medication 12.5 MILLIGRAM(S): at 08:43

## 2021-07-22 RX ADMIN — Medication 0.5 MILLIGRAM(S): at 15:07

## 2021-07-22 RX ADMIN — Medication 5 MILLIGRAM(S): at 20:20

## 2021-07-22 RX ADMIN — GABAPENTIN 200 MILLIGRAM(S): 400 CAPSULE ORAL at 08:44

## 2021-07-22 RX ADMIN — GABAPENTIN 200 MILLIGRAM(S): 400 CAPSULE ORAL at 20:20

## 2021-07-22 RX ADMIN — ATORVASTATIN CALCIUM 10 MILLIGRAM(S): 80 TABLET, FILM COATED ORAL at 20:21

## 2021-07-22 RX ADMIN — Medication 50 MILLIGRAM(S): at 20:21

## 2021-07-22 NOTE — BH INPATIENT PSYCHIATRY PROGRESS NOTE - NSBHFUPINTERVALHXFT_PSY_A_CORE
Patient seen for follow-up for anxiety and depression. Chart reviewed and case discussed with interdisciplinary team. No significant overnight event reported. On encounter, patient reports her mood as "much better", states she is not anxious this morning and is ready to go home. Patient slept well last night. Denies current SI. No hallucinations reported. Patient denies dizziness; denies headaches. Patient's appetite and sleep are adequate. Taking meds. No side effects reported or in evidence.

## 2021-07-22 NOTE — BH INPATIENT PSYCHIATRY PROGRESS NOTE - NSBHCHARTREVIEWVS_PSY_A_CORE FT
Vital Signs Last 24 Hrs  T(C): 36.3 (22 Jul 2021 05:38), Max: 37.4 (21 Jul 2021 15:45)  T(F): 97.3 (22 Jul 2021 05:38), Max: 99.3 (21 Jul 2021 15:45)    Orthostatic VS  07-22-21 @ 05:38  Lying BP: 141/70 HR: 68   Sitting BP: 145/76 HR: 74

## 2021-07-22 NOTE — BH INPATIENT PSYCHIATRY PROGRESS NOTE - NSBHMETABOLIC_PSY_ALL_CORE_FT
BMI: BMI (kg/m2): 41 (07-05-21 @ 21:25)  HbA1c: A1C with Estimated Average Glucose Result: 5.5 % (05-21-21 @ 09:47)  Lipid Panel: Date/Time: 05-21-21 @ 09:47  Cholesterol, Serum: 279  HDL Cholesterol, Serum: 40  Triglycerides, Serum: 214

## 2021-07-22 NOTE — BH INPATIENT PSYCHIATRY PROGRESS NOTE - NSBHASSESSSUMMFT_PSY_ALL_CORE
71-year-old female, , retired (), non-caregiver domiciled at home with her  with a prior psychiatric diagnosis of ANSON and MDD, 3 previous inpatient psychiatric hospitalizations at [facility] in 2019, 2/2020, and most recently 4/22-5/24/2021, prior SA in 11/2019 by overdosing on pills, no history of NSSIB, no history of violence/aggression, no hx of legal issues, with PMHx of HTN, hyperlipidemia, and nausea. Patient BIB self for passive SI and worsening depression and anxiety at home.     On exam, patient reports improved anxiety and mood, denies SI, feels ready to go home and is already making appointments to get her nails and hair done.   Plan: Will continue current meds. Discharge scheduled for tomorrow 07/23.

## 2021-07-23 VITALS — WEIGHT: 205.91 LBS

## 2021-07-23 PROCEDURE — 99238 HOSP IP/OBS DSCHRG MGMT 30/<: CPT

## 2021-07-23 RX ADMIN — Medication 1 MILLIGRAM(S): at 08:39

## 2021-07-23 RX ADMIN — Medication 12.5 MILLIGRAM(S): at 08:41

## 2021-07-23 RX ADMIN — GABAPENTIN 200 MILLIGRAM(S): 400 CAPSULE ORAL at 08:38

## 2021-07-23 RX ADMIN — PANTOPRAZOLE SODIUM 40 MILLIGRAM(S): 20 TABLET, DELAYED RELEASE ORAL at 06:46

## 2021-07-23 RX ADMIN — Medication 50 MILLIGRAM(S): at 08:39

## 2021-07-23 RX ADMIN — LOSARTAN POTASSIUM 100 MILLIGRAM(S): 100 TABLET, FILM COATED ORAL at 08:39

## 2021-07-23 RX ADMIN — Medication 187.5 MILLIGRAM(S): at 08:39

## 2021-07-23 NOTE — BH INPATIENT PSYCHIATRY PROGRESS NOTE - NSTXPROBDCOPLK_PSY_ALL_CORE
DISCHARGE ISSUE - LACK OF APPROPRIATE OUTPATIENT SERVICES

## 2021-07-23 NOTE — BH INPATIENT PSYCHIATRY PROGRESS NOTE - NSTXANXINTERMD_PSY_ALL_CORE
medication management
Medication management with Venlafaxine, Gabapentin and Mirtazapine.
medication management
medication management
Medication management with Venlafaxine, Gabapentin and Mirtazapine.
medication management
Medication management with Venlafaxine, Gabapentin and Mirtazapine.
Medication management with Venlafaxine, Gabapentin and Mirtazapine.
medication management
Medication management with Venlafaxine, Gabapentin and Mirtazapine.
medication management
medication management
Medication management with Venlafaxine, Gabapentin and Mirtazapine.
Medication management with Venlafaxine, Gabapentin and Mirtazapine.
medication management

## 2021-07-23 NOTE — BH INPATIENT PSYCHIATRY PROGRESS NOTE - NSTXANXDATETRGT_PSY_ALL_CORE
28-Jul-2021
20-Jul-2021
13-Jul-2021
28-Jul-2021
13-Jul-2021
13-Jul-2021
28-Jul-2021
20-Jul-2021
13-Jul-2021
13-Jul-2021
20-Jul-2021
13-Jul-2021
20-Jul-2021
13-Jul-2021
26-Jul-2021

## 2021-07-23 NOTE — BH INPATIENT PSYCHIATRY PROGRESS NOTE - NSBHFUPINTERVALCCFT_PSY_A_CORE
"I feel ready to go"
"I feel so much better today"
"I had a bad evening"
Patient seen for follow up for depression and anxiety.
The pt. related that she is feeling less anxious due to the gabapentin but is still having anxiety at times.
"I feel so anxious all the time"
"I don't feel well at all"
"I feel better today"
The pt. related that she is doing somewhat better on the neurontin but feels that she may need more medication.
Patient seen for follow up for depression and anxiety.
"I get the chills when I'm anxious"
"I'm so excited that I told my girlfriends
"I was doing well until I spoke with my "
The pt. related that she is still anxious and she thinks she needs more neurontin.
The pt. stated that she is less anxious on the neurontin but may need more of it to further help her.

## 2021-07-23 NOTE — BH INPATIENT PSYCHIATRY PROGRESS NOTE - NSBHFUPINTERVALHXFT_PSY_A_CORE
Patient seen for follow-up for anxiety and depression. Chart reviewed and case discussed with interdisciplinary team. No significant overnight event reported. On encounter, patient reports that she is excited to go home and is looking forward to seeing her  and friends, admits that she is a little apprehensive but understands "it's just nerves".  Patient denies current SI. No hallucinations reported. Patient denies dizziness; denies headaches. Patient's appetite and sleep are adequate. Taking meds. No side effects reported or in evidence. Patient denies any thought of self-harm and is forward thinking.

## 2021-07-23 NOTE — BH INPATIENT PSYCHIATRY PROGRESS NOTE - NSBHCONSULTIPREASON_PSY_A_CORE
danger to self; mental illness expected to respond to inpatient care

## 2021-07-23 NOTE — BH INPATIENT PSYCHIATRY PROGRESS NOTE - NSTXSUICIDGOAL_PSY_ALL_CORE
Be able to state 3 reasons for living

## 2021-07-23 NOTE — BH INPATIENT PSYCHIATRY PROGRESS NOTE - NSBHATTESTSEENBY_PSY_A_CORE
attending Psychiatrist without NP/Trainee
attending Psychiatrist without NP/Trainee
NP without Attending Psychiatrist
attending Psychiatrist without NP/Trainee
attending Psychiatrist without NP/Trainee
NP without Attending Psychiatrist
attending Psychiatrist without NP/Trainee

## 2021-07-23 NOTE — BH INPATIENT PSYCHIATRY PROGRESS NOTE - NSTXDEPRESINTERMD_PSY_ALL_CORE
Medication management with Venlafaxine and Mirtazapine.
Medication management with Venlafaxine and Mirtazapine.
medication management
medication management
Medication management with Venlafaxine and Mirtazapine.
medication management
Medication management with Venlafaxine and Mirtazapine.
medication management
Medication management with Venlafaxine and Mirtazapine.
medication management
medication management
Medication management with Venlafaxine and Mirtazapine.
Medication management with Venlafaxine and Mirtazapine.
medication management
medication management

## 2021-07-23 NOTE — BH INPATIENT PSYCHIATRY PROGRESS NOTE - NSTXDCOPLKDATEEST_PSY_ALL_CORE
08-Jul-2021

## 2021-07-23 NOTE — BH DISCHARGE NOTE NURSING/SOCIAL WORK/PSYCH REHAB - NSBHREFERPURPOSE1_PSY_ALL_CORE
Group will be on Wed at 2:30 - 4 and Fri 10:30 - 12.  You will continue with Shyla for individual on Tues at 9:15./Mental Health Treatment

## 2021-07-23 NOTE — BH INPATIENT PSYCHIATRY PROGRESS NOTE - NSBHMSETHTPROC_PSY_A_CORE
Linear/Overinclusive

## 2021-07-23 NOTE — DIETITIAN INITIAL EVALUATION ADULT. - REASON FOR ADMISSION
71-year-old female with a prior psychiatric diagnosis of ANSON and MDD, 3 previous inpatient psychiatric hospitalizations at [facility] in 2019, 2/2020, and most recently 4/22-5/24/2021, prior SA in 11/2019 by overdosing on pills,  PMHx of HTN, hyperlipidemia, and nausea, BIB self due to passive SI and worsening depression and anxiety at home.

## 2021-07-23 NOTE — BH INPATIENT PSYCHIATRY PROGRESS NOTE - NSTXDCOPLKGOAL_PSY_ALL_CORE
Will agree to consider an appropriate level of outpatient care

## 2021-07-23 NOTE — BH DISCHARGE NOTE NURSING/SOCIAL WORK/PSYCH REHAB - NSDCPRGOAL_PSY_ALL_CORE
Patient initially presented with symptoms of depression, anxiety and decreased daily functioning. Patient's initial goals included increasing her hope in recovery, increasing her coping skills, decreasing anxiety and increasing daily functioning. Patient made some gains towards her rehab goals as evidenced by patient's brighter affect, utilization of coping skills, decreased anxiety and increased daily functioning. Patient also attended rehab activities and interacted with her peers.    ***Patient completed a self-rating and a [employer] survey.

## 2021-07-23 NOTE — BH INPATIENT PSYCHIATRY PROGRESS NOTE - NSBHCHARTREVIEWVS_PSY_A_CORE FT
Vital Signs Last 24 Hrs  T(C): 36 (23 Jul 2021 06:00), Max: 36.4 (22 Jul 2021 15:33)  T(F): 96.8 (23 Jul 2021 06:00), Max: 97.5 (22 Jul 2021 15:33)    Orthostatic VS  07-23-21 @ 06:00  Lying BP: 113/62 HR: 61   Sitting BP: 131/68 HR: 68  Site: upper left arm   Mode: electronic

## 2021-07-23 NOTE — BH INPATIENT PSYCHIATRY PROGRESS NOTE - PRN MEDS
MEDICATIONS  (PRN):  LORazepam     Tablet 1 milliGRAM(s) Oral every 4 hours PRN anxiety/agitation  ondansetron    Tablet 4 milliGRAM(s) Oral every 6 hours PRN Nausea  QUEtiapine 12.5 milliGRAM(s) Oral every 6 hours PRN anxiety/agitation  
MEDICATIONS  (PRN):  acetaminophen   Tablet .. 650 milliGRAM(s) Oral every 6 hours PRN Mild Pain (1 - 3)  LORazepam     Tablet 1 milliGRAM(s) Oral every 4 hours PRN anxiety/agitation  ondansetron    Tablet 4 milliGRAM(s) Oral every 6 hours PRN Nausea  QUEtiapine 12.5 milliGRAM(s) Oral every 6 hours PRN anxiety/agitation  
MEDICATIONS  (PRN):  acetaminophen   Tablet .. 650 milliGRAM(s) Oral every 6 hours PRN Mild Pain (1 - 3)  benzocaine 15 mG/menthol 3.6 mG (Sugar-Free) Lozenge 1 Lozenge Oral every 6 hours PRN sore throat  ondansetron    Tablet 4 milliGRAM(s) Oral every 6 hours PRN Nausea  QUEtiapine 12.5 milliGRAM(s) Oral every 6 hours PRN anxiety/agitation  
MEDICATIONS  (PRN):  acetaminophen   Tablet .. 650 milliGRAM(s) Oral every 6 hours PRN Mild Pain (1 - 3)  benzocaine 15 mG/menthol 3.6 mG (Sugar-Free) Lozenge 1 Lozenge Oral every 6 hours PRN sore throat  ondansetron    Tablet 4 milliGRAM(s) Oral every 6 hours PRN Nausea  QUEtiapine 12.5 milliGRAM(s) Oral every 6 hours PRN anxiety/agitation  
MEDICATIONS  (PRN):  LORazepam     Tablet 1 milliGRAM(s) Oral every 4 hours PRN anxiety/agitation  ondansetron    Tablet 4 milliGRAM(s) Oral every 6 hours PRN Nausea  QUEtiapine 12.5 milliGRAM(s) Oral every 6 hours PRN anxiety/agitation  
MEDICATIONS  (PRN):  acetaminophen   Tablet .. 650 milliGRAM(s) Oral every 6 hours PRN Mild Pain (1 - 3)  LORazepam     Tablet 1 milliGRAM(s) Oral every 4 hours PRN anxiety/agitation  ondansetron    Tablet 4 milliGRAM(s) Oral every 6 hours PRN Nausea  QUEtiapine 12.5 milliGRAM(s) Oral every 6 hours PRN anxiety/agitation  
MEDICATIONS  (PRN):  acetaminophen   Tablet .. 650 milliGRAM(s) Oral every 6 hours PRN Mild Pain (1 - 3)  benzocaine 15 mG/menthol 3.6 mG (Sugar-Free) Lozenge 1 Lozenge Oral every 6 hours PRN sore throat  ondansetron    Tablet 4 milliGRAM(s) Oral every 6 hours PRN Nausea  QUEtiapine 12.5 milliGRAM(s) Oral every 6 hours PRN anxiety/agitation  
MEDICATIONS  (PRN):  LORazepam     Tablet 1 milliGRAM(s) Oral every 4 hours PRN anxiety/agitation  ondansetron    Tablet 4 milliGRAM(s) Oral every 6 hours PRN Nausea  QUEtiapine 12.5 milliGRAM(s) Oral every 6 hours PRN anxiety/agitation  
MEDICATIONS  (PRN):  acetaminophen   Tablet .. 650 milliGRAM(s) Oral every 6 hours PRN Mild Pain (1 - 3)  benzocaine 15 mG/menthol 3.6 mG (Sugar-Free) Lozenge 1 Lozenge Oral every 6 hours PRN sore throat  ondansetron    Tablet 4 milliGRAM(s) Oral every 6 hours PRN Nausea  QUEtiapine 12.5 milliGRAM(s) Oral every 6 hours PRN anxiety/agitation  
MEDICATIONS  (PRN):  acetaminophen   Tablet .. 650 milliGRAM(s) Oral every 6 hours PRN Mild Pain (1 - 3)  benzocaine 15 mG/menthol 3.6 mG (Sugar-Free) Lozenge 1 Lozenge Oral every 6 hours PRN sore throat  ondansetron    Tablet 4 milliGRAM(s) Oral every 6 hours PRN Nausea  QUEtiapine 12.5 milliGRAM(s) Oral every 6 hours PRN anxiety/agitation  
MEDICATIONS  (PRN):  acetaminophen   Tablet .. 650 milliGRAM(s) Oral every 6 hours PRN Mild Pain (1 - 3)  benzocaine 15 mG/menthol 3.6 mG (Sugar-Free) Lozenge 1 Lozenge Oral every 6 hours PRN sore throat  LORazepam     Tablet 1 milliGRAM(s) Oral every 4 hours PRN anxiety/agitation  ondansetron    Tablet 4 milliGRAM(s) Oral every 6 hours PRN Nausea  QUEtiapine 12.5 milliGRAM(s) Oral every 6 hours PRN anxiety/agitation  
MEDICATIONS  (PRN):  acetaminophen   Tablet .. 650 milliGRAM(s) Oral every 6 hours PRN Mild Pain (1 - 3)  benzocaine 15 mG/menthol 3.6 mG (Sugar-Free) Lozenge 1 Lozenge Oral every 6 hours PRN sore throat  ondansetron    Tablet 4 milliGRAM(s) Oral every 6 hours PRN Nausea  QUEtiapine 12.5 milliGRAM(s) Oral every 6 hours PRN anxiety/agitation  
MEDICATIONS  (PRN):  acetaminophen   Tablet .. 650 milliGRAM(s) Oral every 6 hours PRN Mild Pain (1 - 3)  benzocaine 15 mG/menthol 3.6 mG (Sugar-Free) Lozenge 1 Lozenge Oral every 6 hours PRN sore throat  ondansetron    Tablet 4 milliGRAM(s) Oral every 6 hours PRN Nausea  QUEtiapine 12.5 milliGRAM(s) Oral every 6 hours PRN anxiety/agitation

## 2021-07-23 NOTE — BH INPATIENT PSYCHIATRY PROGRESS NOTE - NSTXSUICIDDATETRGT_PSY_ALL_CORE
12-Jul-2021

## 2021-07-23 NOTE — BH INPATIENT PSYCHIATRY PROGRESS NOTE - NSTREATMENTCERTY_PSY_ALL_CORE

## 2021-07-23 NOTE — BH INPATIENT PSYCHIATRY PROGRESS NOTE - NSTXDEPRESPROGRES_PSY_ALL_CORE
No Change
No Change
Improving
Improving
No Change
No Change
Improving
No Change
Improving
No Change
No Change
Improving

## 2021-07-23 NOTE — BH INPATIENT PSYCHIATRY PROGRESS NOTE - NSTXDEPRESGOAL_PSY_ALL_CORE
Exhibit improvements in self-grooming, hygiene, sleep and appetite
Will identify 2 coping skills that assist in improving mood
Will identify 2 coping skills that assist in improving mood
Exhibit improvements in self-grooming, hygiene, sleep and appetite
Exhibit improvements in self-grooming, hygiene, sleep and appetite
Will identify 2 coping skills that assist in improving mood
Exhibit improvements in self-grooming, hygiene, sleep and appetite
Will identify 2 coping skills that assist in improving mood
Exhibit improvements in self-grooming, hygiene, sleep and appetite
Will identify 2 coping skills that assist in improving mood
Will identify 2 coping skills that assist in improving mood
Exhibit improvements in self-grooming, hygiene, sleep and appetite
Exhibit improvements in self-grooming, hygiene, sleep and appetite

## 2021-07-23 NOTE — BH INPATIENT PSYCHIATRY PROGRESS NOTE - NSBHMSESPEECH_PSY_A_CORE
Abnormal as indicated, otherwise normal...
Normal volume, rate, productivity, spontaneity and articulation
Abnormal as indicated, otherwise normal...
Abnormal as indicated, otherwise normal...
Normal volume, rate, productivity, spontaneity and articulation
Abnormal as indicated, otherwise normal...
Normal volume, rate, productivity, spontaneity and articulation
Abnormal as indicated, otherwise normal...
Abnormal as indicated, otherwise normal...
Normal volume, rate, productivity, spontaneity and articulation

## 2021-07-23 NOTE — BH INPATIENT PSYCHIATRY PROGRESS NOTE - NSICDXBHSECONDARYDX_PSY_ALL_CORE
ANSON (generalized anxiety disorder)   F41.1  Cannabis abuse   F12.10  

## 2021-07-23 NOTE — BH DISCHARGE NOTE NURSING/SOCIAL WORK/PSYCH REHAB - NSDCPRRECOMMEND_PSY_ALL_CORE
Psychiatric rehabilitation staff recommends that patient follow-up with aftercare as well as continue to explore and utilize effective coping skills to manage symptoms.

## 2021-07-23 NOTE — BH INPATIENT PSYCHIATRY PROGRESS NOTE - NSTXSUICIDDATEEST_PSY_ALL_CORE
05-Jul-2021

## 2021-07-23 NOTE — BH INPATIENT PSYCHIATRY PROGRESS NOTE - NSBHMSEBODY_PSY_A_CORE
Overweight

## 2021-07-23 NOTE — BH INPATIENT PSYCHIATRY PROGRESS NOTE - NSTXDCOPLKINTERMD_PSY_ALL_CORE
Will coordinate discharge with family. Considering discharge to a step-down program

## 2021-07-23 NOTE — BH INPATIENT PSYCHIATRY PROGRESS NOTE - NSTXPROBANX_PSY_ALL_CORE
ANXIETY/PANIC/FEAR

## 2021-07-23 NOTE — BH INPATIENT PSYCHIATRY PROGRESS NOTE - NSTXDEPRESDATETRGT_PSY_ALL_CORE
12-Jul-2021
21-Jul-2021
28-Jul-2021
28-Jul-2021
21-Jul-2021
21-Jul-2021
12-Jul-2021
12-Jul-2021
21-Jul-2021
12-Jul-2021
12-Jul-2021
28-Jul-2021
21-Jul-2021

## 2021-07-23 NOTE — BH INPATIENT PSYCHIATRY PROGRESS NOTE - NSTXDEPRESDATEEST_PSY_ALL_CORE
05-Jul-2021
14-Jul-2021
05-Jul-2021
05-Jul-2021
14-Jul-2021
05-Jul-2021
05-Jul-2021
14-Jul-2021

## 2021-07-23 NOTE — BH INPATIENT PSYCHIATRY PROGRESS NOTE - NSBHMSEKNOWHOW_PSY_ALL_CORE
Vocabulary

## 2021-07-23 NOTE — BH INPATIENT PSYCHIATRY PROGRESS NOTE - NSTXSUICIDINTERMD_PSY_ALL_CORE
Medication management; will encourage patient to participate in group and individual therapy; regular safety check. 
medication management
medication management
Medication management; will encourage patient to participate in group and individual therapy; regular safety check. 
medication management
medication management
Medication management; will encourage patient to participate in group and individual therapy; regular safety check. 
Medication management; will encourage patient to participate in group and individual therapy; regular safety check. 
medication management
medication management
Medication management; will encourage patient to participate in group and individual therapy; regular safety check. 
medication management
medication management
Medication management; will encourage patient to participate in group and individual therapy; regular safety check. 
Medication management; will encourage patient to participate in group and individual therapy; regular safety check.

## 2021-07-23 NOTE — BH INPATIENT PSYCHIATRY PROGRESS NOTE - NSTXANXGOAL_PSY_ALL_CORE
Be able to perform ADLs and maintain safety despite anxiety/panic daily
Identify and practice 3 coping skills to manage anxiety
Be able to perform ADLs and maintain safety despite anxiety/panic daily
Be able to perform ADLs and maintain safety despite anxiety/panic daily
Identify and practice 3 coping skills to manage anxiety
Be able to perform ADLs and maintain safety despite anxiety/panic daily
Identify and practice 3 coping skills to manage anxiety
Be able to perform ADLs and maintain safety despite anxiety/panic daily
Identify and practice 3 coping skills to manage anxiety

## 2021-07-23 NOTE — BH INPATIENT PSYCHIATRY PROGRESS NOTE - NSBHCONSDANGERSELF_PSY_A_CORE
suicidal behavior/unable to care for self

## 2021-07-23 NOTE — BH INPATIENT PSYCHIATRY PROGRESS NOTE - NSTXSUICIDPROGRES_PSY_ALL_CORE
No Change

## 2021-07-23 NOTE — BH INPATIENT PSYCHIATRY PROGRESS NOTE - NSTXPROBSUICID_PSY_ALL_CORE
SUICIDE/SELF-INJURIOUS BEHAVIOR

## 2021-07-23 NOTE — BH INPATIENT PSYCHIATRY PROGRESS NOTE - NSTXANXPROGRES_PSY_ALL_CORE
Improving
No Change
Improving
No Change
No Change
Improving
No Change
Improving
No Change
No Change
Improving
Improving
No Change

## 2021-07-23 NOTE — BH INPATIENT PSYCHIATRY PROGRESS NOTE - NSTXPROBDEPRES_PSY_ALL_CORE
DEPRESSIVE SYMPTOMS

## 2021-07-23 NOTE — BH INPATIENT PSYCHIATRY PROGRESS NOTE - CURRENT MEDICATION
MEDICATIONS  (STANDING):  atorvastatin 10 milliGRAM(s) Oral at bedtime  clonazePAM  Tablet 1 milliGRAM(s) Oral daily  clonazePAM  Tablet 0.5 milliGRAM(s) Oral <User Schedule>  gabapentin 200 milliGRAM(s) Oral three times a day  hydrochlorothiazide 12.5 milliGRAM(s) Oral daily  losartan 100 milliGRAM(s) Oral daily  melatonin 5 milliGRAM(s) Oral at bedtime  metoprolol tartrate 50 milliGRAM(s) Oral two times a day  mirtazapine 30 milliGRAM(s) Oral at bedtime  pantoprazole    Tablet 40 milliGRAM(s) Oral <User Schedule>  venlafaxine .5 milliGRAM(s) Oral daily    MEDICATIONS  (PRN):  acetaminophen   Tablet .. 650 milliGRAM(s) Oral every 6 hours PRN Mild Pain (1 - 3)  benzocaine 15 mG/menthol 3.6 mG (Sugar-Free) Lozenge 1 Lozenge Oral every 6 hours PRN sore throat  ondansetron    Tablet 4 milliGRAM(s) Oral every 6 hours PRN Nausea  QUEtiapine 12.5 milliGRAM(s) Oral every 6 hours PRN anxiety/agitation  
MEDICATIONS  (STANDING):  clonazePAM  Tablet 1 milliGRAM(s) Oral daily  clonazePAM  Tablet 0.5 milliGRAM(s) Oral <User Schedule>  gabapentin 100 milliGRAM(s) Oral three times a day  hydrochlorothiazide 12.5 milliGRAM(s) Oral daily  losartan 100 milliGRAM(s) Oral daily  melatonin 5 milliGRAM(s) Oral at bedtime  metoprolol tartrate 50 milliGRAM(s) Oral two times a day  mirtazapine 30 milliGRAM(s) Oral at bedtime  pantoprazole    Tablet 40 milliGRAM(s) Oral before breakfast  venlafaxine .5 milliGRAM(s) Oral daily    MEDICATIONS  (PRN):  LORazepam     Tablet 1 milliGRAM(s) Oral every 4 hours PRN anxiety/agitation  ondansetron    Tablet 4 milliGRAM(s) Oral every 6 hours PRN Nausea  QUEtiapine 12.5 milliGRAM(s) Oral every 6 hours PRN anxiety/agitation  
MEDICATIONS  (STANDING):  clonazePAM  Tablet 1 milliGRAM(s) Oral daily  clonazePAM  Tablet 0.5 milliGRAM(s) Oral <User Schedule>  gabapentin 100 milliGRAM(s) Oral three times a day  hydrochlorothiazide 12.5 milliGRAM(s) Oral daily  losartan 100 milliGRAM(s) Oral daily  melatonin 5 milliGRAM(s) Oral at bedtime  metoprolol tartrate 50 milliGRAM(s) Oral two times a day  mirtazapine 30 milliGRAM(s) Oral at bedtime  pantoprazole    Tablet 40 milliGRAM(s) Oral before breakfast  venlafaxine .5 milliGRAM(s) Oral daily    MEDICATIONS  (PRN):  LORazepam     Tablet 1 milliGRAM(s) Oral every 4 hours PRN anxiety/agitation  ondansetron    Tablet 4 milliGRAM(s) Oral every 6 hours PRN Nausea  QUEtiapine 12.5 milliGRAM(s) Oral every 6 hours PRN anxiety/agitation  
MEDICATIONS  (STANDING):  atorvastatin 10 milliGRAM(s) Oral at bedtime  clonazePAM  Tablet 0.5 milliGRAM(s) Oral <User Schedule>  clonazePAM  Tablet 1 milliGRAM(s) Oral daily  gabapentin 200 milliGRAM(s) Oral three times a day  hydrochlorothiazide 12.5 milliGRAM(s) Oral daily  losartan 100 milliGRAM(s) Oral daily  melatonin 5 milliGRAM(s) Oral at bedtime  metoprolol tartrate 50 milliGRAM(s) Oral two times a day  mirtazapine 30 milliGRAM(s) Oral at bedtime  pantoprazole    Tablet 40 milliGRAM(s) Oral <User Schedule>  venlafaxine .5 milliGRAM(s) Oral daily    MEDICATIONS  (PRN):  acetaminophen   Tablet .. 650 milliGRAM(s) Oral every 6 hours PRN Mild Pain (1 - 3)  benzocaine 15 mG/menthol 3.6 mG (Sugar-Free) Lozenge 1 Lozenge Oral every 6 hours PRN sore throat  ondansetron    Tablet 4 milliGRAM(s) Oral every 6 hours PRN Nausea  QUEtiapine 12.5 milliGRAM(s) Oral every 6 hours PRN anxiety/agitation  
MEDICATIONS  (STANDING):  clonazePAM  Tablet 1 milliGRAM(s) Oral daily  clonazePAM  Tablet 0.5 milliGRAM(s) Oral <User Schedule>  gabapentin 100 milliGRAM(s) Oral three times a day  hydrochlorothiazide 12.5 milliGRAM(s) Oral daily  losartan 100 milliGRAM(s) Oral daily  melatonin 5 milliGRAM(s) Oral at bedtime  metoprolol tartrate 50 milliGRAM(s) Oral two times a day  mirtazapine 30 milliGRAM(s) Oral at bedtime  pantoprazole    Tablet 40 milliGRAM(s) Oral before breakfast  venlafaxine .5 milliGRAM(s) Oral daily    MEDICATIONS  (PRN):  LORazepam     Tablet 1 milliGRAM(s) Oral every 4 hours PRN anxiety/agitation  ondansetron    Tablet 4 milliGRAM(s) Oral every 6 hours PRN Nausea  QUEtiapine 12.5 milliGRAM(s) Oral every 6 hours PRN anxiety/agitation  
MEDICATIONS  (STANDING):  atorvastatin 10 milliGRAM(s) Oral at bedtime  clonazePAM  Tablet 0.5 milliGRAM(s) Oral <User Schedule>  clonazePAM  Tablet 1 milliGRAM(s) Oral daily  gabapentin 200 milliGRAM(s) Oral three times a day  hydrochlorothiazide 12.5 milliGRAM(s) Oral daily  losartan 100 milliGRAM(s) Oral daily  melatonin 5 milliGRAM(s) Oral at bedtime  metoprolol tartrate 50 milliGRAM(s) Oral two times a day  mirtazapine 30 milliGRAM(s) Oral at bedtime  pantoprazole    Tablet 40 milliGRAM(s) Oral <User Schedule>  venlafaxine .5 milliGRAM(s) Oral daily    MEDICATIONS  (PRN):  acetaminophen   Tablet .. 650 milliGRAM(s) Oral every 6 hours PRN Mild Pain (1 - 3)  benzocaine 15 mG/menthol 3.6 mG (Sugar-Free) Lozenge 1 Lozenge Oral every 6 hours PRN sore throat  ondansetron    Tablet 4 milliGRAM(s) Oral every 6 hours PRN Nausea  QUEtiapine 12.5 milliGRAM(s) Oral every 6 hours PRN anxiety/agitation  
MEDICATIONS  (STANDING):  clonazePAM  Tablet 1 milliGRAM(s) Oral daily  clonazePAM  Tablet 0.5 milliGRAM(s) Oral <User Schedule>  gabapentin 100 milliGRAM(s) Oral three times a day  hydrochlorothiazide 12.5 milliGRAM(s) Oral daily  losartan 100 milliGRAM(s) Oral daily  melatonin 5 milliGRAM(s) Oral at bedtime  metoprolol tartrate 50 milliGRAM(s) Oral two times a day  mirtazapine 30 milliGRAM(s) Oral at bedtime  pantoprazole    Tablet 40 milliGRAM(s) Oral <User Schedule>  venlafaxine .5 milliGRAM(s) Oral daily    MEDICATIONS  (PRN):  acetaminophen   Tablet .. 650 milliGRAM(s) Oral every 6 hours PRN Mild Pain (1 - 3)  LORazepam     Tablet 1 milliGRAM(s) Oral every 4 hours PRN anxiety/agitation  ondansetron    Tablet 4 milliGRAM(s) Oral every 6 hours PRN Nausea  QUEtiapine 12.5 milliGRAM(s) Oral every 6 hours PRN anxiety/agitation  
MEDICATIONS  (STANDING):  atorvastatin 10 milliGRAM(s) Oral at bedtime  clonazePAM  Tablet 0.5 milliGRAM(s) Oral <User Schedule>  clonazePAM  Tablet 1 milliGRAM(s) Oral daily  gabapentin 200 milliGRAM(s) Oral three times a day  hydrochlorothiazide 12.5 milliGRAM(s) Oral daily  losartan 100 milliGRAM(s) Oral daily  melatonin 5 milliGRAM(s) Oral at bedtime  metoprolol tartrate 50 milliGRAM(s) Oral two times a day  mirtazapine 30 milliGRAM(s) Oral at bedtime  pantoprazole    Tablet 40 milliGRAM(s) Oral <User Schedule>  venlafaxine .5 milliGRAM(s) Oral daily    MEDICATIONS  (PRN):  acetaminophen   Tablet .. 650 milliGRAM(s) Oral every 6 hours PRN Mild Pain (1 - 3)  benzocaine 15 mG/menthol 3.6 mG (Sugar-Free) Lozenge 1 Lozenge Oral every 6 hours PRN sore throat  ondansetron    Tablet 4 milliGRAM(s) Oral every 6 hours PRN Nausea  QUEtiapine 12.5 milliGRAM(s) Oral every 6 hours PRN anxiety/agitation  
MEDICATIONS  (STANDING):  atorvastatin 10 milliGRAM(s) Oral at bedtime  clonazePAM  Tablet 0.5 milliGRAM(s) Oral <User Schedule>  clonazePAM  Tablet 1 milliGRAM(s) Oral daily  gabapentin 200 milliGRAM(s) Oral three times a day  hydrochlorothiazide 12.5 milliGRAM(s) Oral daily  losartan 100 milliGRAM(s) Oral daily  melatonin 5 milliGRAM(s) Oral at bedtime  metoprolol tartrate 50 milliGRAM(s) Oral two times a day  mirtazapine 30 milliGRAM(s) Oral at bedtime  pantoprazole    Tablet 40 milliGRAM(s) Oral <User Schedule>  venlafaxine .5 milliGRAM(s) Oral daily    MEDICATIONS  (PRN):  acetaminophen   Tablet .. 650 milliGRAM(s) Oral every 6 hours PRN Mild Pain (1 - 3)  benzocaine 15 mG/menthol 3.6 mG (Sugar-Free) Lozenge 1 Lozenge Oral every 6 hours PRN sore throat  ondansetron    Tablet 4 milliGRAM(s) Oral every 6 hours PRN Nausea  QUEtiapine 12.5 milliGRAM(s) Oral every 6 hours PRN anxiety/agitation  
MEDICATIONS  (STANDING):  atorvastatin 10 milliGRAM(s) Oral at bedtime  clonazePAM  Tablet 0.5 milliGRAM(s) Oral <User Schedule>  gabapentin 200 milliGRAM(s) Oral three times a day  hydrochlorothiazide 12.5 milliGRAM(s) Oral daily  losartan 100 milliGRAM(s) Oral daily  melatonin 5 milliGRAM(s) Oral at bedtime  metoprolol tartrate 50 milliGRAM(s) Oral two times a day  mirtazapine 30 milliGRAM(s) Oral at bedtime  pantoprazole    Tablet 40 milliGRAM(s) Oral <User Schedule>  venlafaxine .5 milliGRAM(s) Oral daily    MEDICATIONS  (PRN):  acetaminophen   Tablet .. 650 milliGRAM(s) Oral every 6 hours PRN Mild Pain (1 - 3)  benzocaine 15 mG/menthol 3.6 mG (Sugar-Free) Lozenge 1 Lozenge Oral every 6 hours PRN sore throat  ondansetron    Tablet 4 milliGRAM(s) Oral every 6 hours PRN Nausea  QUEtiapine 12.5 milliGRAM(s) Oral every 6 hours PRN anxiety/agitation  
MEDICATIONS  (STANDING):  clonazePAM  Tablet 1 milliGRAM(s) Oral daily  gabapentin 100 milliGRAM(s) Oral three times a day  hydrochlorothiazide 12.5 milliGRAM(s) Oral daily  losartan 100 milliGRAM(s) Oral daily  melatonin 5 milliGRAM(s) Oral at bedtime  metoprolol tartrate 50 milliGRAM(s) Oral two times a day  mirtazapine 30 milliGRAM(s) Oral at bedtime  pantoprazole    Tablet 40 milliGRAM(s) Oral <User Schedule>  venlafaxine .5 milliGRAM(s) Oral daily    MEDICATIONS  (PRN):  acetaminophen   Tablet .. 650 milliGRAM(s) Oral every 6 hours PRN Mild Pain (1 - 3)  benzocaine 15 mG/menthol 3.6 mG (Sugar-Free) Lozenge 1 Lozenge Oral every 6 hours PRN sore throat  LORazepam     Tablet 1 milliGRAM(s) Oral every 4 hours PRN anxiety/agitation  ondansetron    Tablet 4 milliGRAM(s) Oral every 6 hours PRN Nausea  QUEtiapine 12.5 milliGRAM(s) Oral every 6 hours PRN anxiety/agitation  
MEDICATIONS  (STANDING):  atorvastatin 10 milliGRAM(s) Oral at bedtime  clonazePAM  Tablet 0.5 milliGRAM(s) Oral <User Schedule>  clonazePAM  Tablet 1 milliGRAM(s) Oral daily  gabapentin 200 milliGRAM(s) Oral three times a day  hydrochlorothiazide 12.5 milliGRAM(s) Oral daily  losartan 100 milliGRAM(s) Oral daily  melatonin 5 milliGRAM(s) Oral at bedtime  metoprolol tartrate 50 milliGRAM(s) Oral two times a day  mirtazapine 30 milliGRAM(s) Oral at bedtime  pantoprazole    Tablet 40 milliGRAM(s) Oral <User Schedule>  venlafaxine .5 milliGRAM(s) Oral daily    MEDICATIONS  (PRN):  acetaminophen   Tablet .. 650 milliGRAM(s) Oral every 6 hours PRN Mild Pain (1 - 3)  benzocaine 15 mG/menthol 3.6 mG (Sugar-Free) Lozenge 1 Lozenge Oral every 6 hours PRN sore throat  ondansetron    Tablet 4 milliGRAM(s) Oral every 6 hours PRN Nausea  QUEtiapine 12.5 milliGRAM(s) Oral every 6 hours PRN anxiety/agitation  
MEDICATIONS  (STANDING):  clonazePAM  Tablet 1 milliGRAM(s) Oral daily  clonazePAM  Tablet 0.5 milliGRAM(s) Oral <User Schedule>  gabapentin 100 milliGRAM(s) Oral three times a day  hydrochlorothiazide 12.5 milliGRAM(s) Oral daily  losartan 100 milliGRAM(s) Oral daily  melatonin 5 milliGRAM(s) Oral at bedtime  metoprolol tartrate 50 milliGRAM(s) Oral two times a day  mirtazapine 30 milliGRAM(s) Oral at bedtime  pantoprazole    Tablet 40 milliGRAM(s) Oral <User Schedule>  venlafaxine .5 milliGRAM(s) Oral daily    MEDICATIONS  (PRN):  acetaminophen   Tablet .. 650 milliGRAM(s) Oral every 6 hours PRN Mild Pain (1 - 3)  LORazepam     Tablet 1 milliGRAM(s) Oral every 4 hours PRN anxiety/agitation  ondansetron    Tablet 4 milliGRAM(s) Oral every 6 hours PRN Nausea  QUEtiapine 12.5 milliGRAM(s) Oral every 6 hours PRN anxiety/agitation  
MEDICATIONS  (STANDING):  atorvastatin 10 milliGRAM(s) Oral at bedtime  gabapentin 100 milliGRAM(s) Oral three times a day  hydrochlorothiazide 12.5 milliGRAM(s) Oral daily  losartan 100 milliGRAM(s) Oral daily  melatonin 5 milliGRAM(s) Oral at bedtime  metoprolol tartrate 50 milliGRAM(s) Oral two times a day  mirtazapine 30 milliGRAM(s) Oral at bedtime  pantoprazole    Tablet 40 milliGRAM(s) Oral <User Schedule>  venlafaxine .5 milliGRAM(s) Oral daily    MEDICATIONS  (PRN):  acetaminophen   Tablet .. 650 milliGRAM(s) Oral every 6 hours PRN Mild Pain (1 - 3)  benzocaine 15 mG/menthol 3.6 mG (Sugar-Free) Lozenge 1 Lozenge Oral every 6 hours PRN sore throat  ondansetron    Tablet 4 milliGRAM(s) Oral every 6 hours PRN Nausea  QUEtiapine 12.5 milliGRAM(s) Oral every 6 hours PRN anxiety/agitation  
MEDICATIONS  (STANDING):  atorvastatin 10 milliGRAM(s) Oral at bedtime  clonazePAM  Tablet 1 milliGRAM(s) Oral daily  clonazePAM  Tablet 0.5 milliGRAM(s) Oral <User Schedule>  gabapentin 200 milliGRAM(s) Oral three times a day  hydrochlorothiazide 12.5 milliGRAM(s) Oral daily  losartan 100 milliGRAM(s) Oral daily  melatonin 5 milliGRAM(s) Oral at bedtime  metoprolol tartrate 50 milliGRAM(s) Oral two times a day  mirtazapine 30 milliGRAM(s) Oral at bedtime  pantoprazole    Tablet 40 milliGRAM(s) Oral <User Schedule>  venlafaxine .5 milliGRAM(s) Oral daily    MEDICATIONS  (PRN):  acetaminophen   Tablet .. 650 milliGRAM(s) Oral every 6 hours PRN Mild Pain (1 - 3)  benzocaine 15 mG/menthol 3.6 mG (Sugar-Free) Lozenge 1 Lozenge Oral every 6 hours PRN sore throat  ondansetron    Tablet 4 milliGRAM(s) Oral every 6 hours PRN Nausea  QUEtiapine 12.5 milliGRAM(s) Oral every 6 hours PRN anxiety/agitation

## 2021-07-23 NOTE — BH DISCHARGE NOTE NURSING/SOCIAL WORK/PSYCH REHAB - PATIENT PORTAL LINK FT
You can access the FollowMyHealth Patient Portal offered by [facility] by registering at the following website: [email or web address]. By joining [employer]’s FollowMyHealth portal, you will also be able to view your health information using other applications (apps) compatible with our system.

## 2021-07-23 NOTE — BH INPATIENT PSYCHIATRY PROGRESS NOTE - NSBHASSESSSUMMFT_PSY_ALL_CORE
71-year-old female, , retired (), non-caregiver domiciled at home with her  with a prior psychiatric diagnosis of ANSON and MDD, 3 previous inpatient psychiatric hospitalizations at [facility] in 2019, 2/2020, and most recently 4/22-5/24/2021, prior SA in 11/2019 by overdosing on pills, no history of NSSIB, no history of violence/aggression, no hx of legal issues, with PMHx of HTN, hyperlipidemia, and nausea. Patient BIB self for passive SI and worsening depression and anxiety at home.     On exam, patient reports improved anxiety and mood, denies SI, feels ready to go home and is already making appointments to get her nails and hair done.   Plan: Will continue current meds. Discharge scheduled for tomorrow 07/23.  71-year-old female, , retired (), non-caregiver domiciled at home with her  with a prior psychiatric diagnosis of ANSON and MDD, 3 previous inpatient psychiatric hospitalizations at [facility] in 2019, 2/2020, and most recently 4/22-5/24/2021, prior SA in 11/2019 by overdosing on pills, no history of NSSIB, no history of violence/aggression, no hx of legal issues, with PMHx of HTN, hyperlipidemia, and nausea. Patient BIB self for passive SI and worsening depression and anxiety at home.     On exam, patient is in a good mood, appears comfortable, states she is a little apprehensive but otherwise anxiety well managed on current meds, denies SI, feels ready to go home today.   Plan: Will continue current meds. Discharge scheduled for later this morning. Patient instructed on actions for crisis situations, understands and agrees to follow instructions for handling crisis, including coming to ER or calling 911

## 2021-07-23 NOTE — BH INPATIENT PSYCHIATRY PROGRESS NOTE - NSTXANXDATEEST_PSY_ALL_CORE
06-Jul-2021
05-Jul-2021
06-Jul-2021
05-Jul-2021
06-Jul-2021
05-Jul-2021
06-Jul-2021
06-Jul-2021

## 2021-07-23 NOTE — BH INPATIENT PSYCHIATRY PROGRESS NOTE - NSBHMETABOLIC_PSY_ALL_CORE_FT
BMI: BMI (kg/m2): 41 (07-05-21 @ 21:25)  HbA1c: A1C with Estimated Average Glucose Result: 5.5 % (05-21-21 @ 09:47)  Lipid Panel: Date/Time: 05-21-21 @ 09:47  Cholesterol, Serum: 279  Direct LDL: --  HDL Cholesterol, Serum: 40  Total Cholesterol/HDL Ration Measurement: --  Triglycerides, Serum: 214

## 2021-07-23 NOTE — BH INPATIENT PSYCHIATRY PROGRESS NOTE - NSBHMSETHTCONTENT_PSY_A_CORE
Unremarkable/Other
Unremarkable
Unremarkable/Other
Unremarkable/Other
Unremarkable
Unremarkable/Other
Unremarkable

## 2021-07-23 NOTE — BH INPATIENT PSYCHIATRY PROGRESS NOTE - GENERAL APPEARANCE
No deformities present

## 2021-07-23 NOTE — BH INPATIENT PSYCHIATRY PROGRESS NOTE - NSBHCONSBHPROVDETAILS_PSY_A_CORE  FT
Spoke with Dr. Kamara about past medication trials and treatment

## 2021-07-23 NOTE — BH INPATIENT PSYCHIATRY PROGRESS NOTE - NSBHPSYCHOLCOGORIENT_PSY_A_CORE
Oriented to time, place, person, situation

## 2021-07-23 NOTE — BH INPATIENT PSYCHIATRY PROGRESS NOTE - NSICDXBHPRIMARYDX_PSY_ALL_CORE
Major depressive disorder   F32.9  

## 2021-07-23 NOTE — BH INPATIENT PSYCHIATRY PROGRESS NOTE - NSCGISEVERILLNESS_PSY_ALL_CORE
5 = Markedly ill - intrusive symptoms that distinctly impair social/occupational function or cause intrusive levels of distress
4 = Moderately ill – overt symptoms causing noticeable, but modest, functional impairment or distress; symptom level may warrant medication
5 = Markedly ill - intrusive symptoms that distinctly impair social/occupational function or cause intrusive levels of distress
4 = Moderately ill – overt symptoms causing noticeable, but modest, functional impairment or distress; symptom level may warrant medication
5 = Markedly ill - intrusive symptoms that distinctly impair social/occupational function or cause intrusive levels of distress
4 = Moderately ill – overt symptoms causing noticeable, but modest, functional impairment or distress; symptom level may warrant medication
5 = Markedly ill - intrusive symptoms that distinctly impair social/occupational function or cause intrusive levels of distress
4 = Moderately ill – overt symptoms causing noticeable, but modest, functional impairment or distress; symptom level may warrant medication
5 = Markedly ill - intrusive symptoms that distinctly impair social/occupational function or cause intrusive levels of distress
5 = Markedly ill - intrusive symptoms that distinctly impair social/occupational function or cause intrusive levels of distress

## 2021-07-23 NOTE — BH DISCHARGE NOTE NURSING/SOCIAL WORK/PSYCH REHAB - NSCDUDCCRISIS_PSY_A_CORE
[facility] Well  1 ([phone]) [facility]-WELL ([phone])  Text "WELL" to 38125  Website: www.[employer]/.[employer] 1 (937) 021-IHYO (5307) Website: www.safehorizon.org/.National Suicide Prevention Lifeline 9 (195) 788-9809/.  [employer]  1 (195) LIF[employer]ET (500-4258)/.  [facility]’s [facility]  [address] [place] [address] [phone]   Hours:  Monday through Friday from 9 AM to 3 PM/.  [employer] - [facility] Crisis Line  [phone], Option 1

## 2021-07-23 NOTE — BH DISCHARGE NOTE NURSING/SOCIAL WORK/PSYCH REHAB - DISCHARGE INSTRUCTIONS AFTERCARE APPOINTMENTS
In order to check the location, date, or time of your aftercare appointment, please refer to your Discharge Instructions Document given to you upon leaving the hospital.  If you have lost the instructions please call [phone]

## 2021-07-23 NOTE — DIETITIAN INITIAL EVALUATION ADULT. - OTHER INFO
Patient admitted to [facility] with worsening anxiety/depression. Patient seen earlier in admission for food preferences. Appetite is good. Eats plain foods d/t reflux. Denies any GI distress at current time. Follows a low salt, low cholesterol diet at home. Reinforced healthy diet, portion control, and increasing physical activity for weight management.

## 2021-07-23 NOTE — BH INPATIENT PSYCHIATRY PROGRESS NOTE - NSTXDCOPLKDATETRGT_PSY_ALL_CORE
15-Jul-2021

## 2021-07-23 NOTE — BH INPATIENT PSYCHIATRY PROGRESS NOTE - MSE OPTIONS
Structured MSE

## 2021-07-29 ENCOUNTER — OUTPATIENT (OUTPATIENT)
Dept: OUTPATIENT SERVICES | Facility: HOSPITAL | Age: 71
LOS: 1 days | Discharge: HOME | End: 2021-07-29
Payer: COMMERCIAL

## 2021-07-29 PROCEDURE — 90792 PSYCH DIAG EVAL W/MED SRVCS: CPT | Mod: 95

## 2021-07-30 DIAGNOSIS — F33.1 MAJOR DEPRESSIVE DISORDER, RECURRENT, MODERATE: ICD-10-CM

## 2021-07-30 DIAGNOSIS — I10 ESSENTIAL (PRIMARY) HYPERTENSION: ICD-10-CM

## 2021-07-30 DIAGNOSIS — K21.9 GASTRO-ESOPHAGEAL REFLUX DISEASE WITHOUT ESOPHAGITIS: ICD-10-CM

## 2021-07-30 DIAGNOSIS — F41.1 GENERALIZED ANXIETY DISORDER: ICD-10-CM

## 2021-07-30 DIAGNOSIS — E78.5 HYPERLIPIDEMIA, UNSPECIFIED: ICD-10-CM

## 2021-08-11 PROCEDURE — 99214 OFFICE O/P EST MOD 30 MIN: CPT | Mod: 95

## 2021-09-03 PROCEDURE — 99214 OFFICE O/P EST MOD 30 MIN: CPT | Mod: 95

## 2021-10-01 PROCEDURE — 99214 OFFICE O/P EST MOD 30 MIN: CPT | Mod: 95

## 2021-10-14 PROCEDURE — 99214 OFFICE O/P EST MOD 30 MIN: CPT | Mod: 95

## 2021-10-29 PROCEDURE — 99214 OFFICE O/P EST MOD 30 MIN: CPT | Mod: 95

## 2021-11-15 ENCOUNTER — APPOINTMENT (OUTPATIENT)
Dept: UROLOGY | Facility: CLINIC | Age: 71
End: 2021-11-15

## 2021-11-16 PROCEDURE — 90834 PSYTX W PT 45 MINUTES: CPT | Mod: 95

## 2021-11-23 PROCEDURE — 90834 PSYTX W PT 45 MINUTES: CPT | Mod: 95

## 2021-11-24 ENCOUNTER — APPOINTMENT (OUTPATIENT)
Dept: UROLOGY | Facility: CLINIC | Age: 71
End: 2021-11-24

## 2021-11-26 PROCEDURE — 99214 OFFICE O/P EST MOD 30 MIN: CPT | Mod: 95

## 2021-11-30 PROCEDURE — 90834 PSYTX W PT 45 MINUTES: CPT | Mod: 95

## 2021-12-07 PROCEDURE — 90837 PSYTX W PT 60 MINUTES: CPT | Mod: 95

## 2021-12-20 PROCEDURE — 90834 PSYTX W PT 45 MINUTES: CPT | Mod: 95

## 2021-12-27 PROCEDURE — 99214 OFFICE O/P EST MOD 30 MIN: CPT | Mod: 95

## 2022-01-04 PROCEDURE — 90834 PSYTX W PT 45 MINUTES: CPT | Mod: 95

## 2022-01-11 PROCEDURE — 90834 PSYTX W PT 45 MINUTES: CPT | Mod: 95

## 2022-01-18 ENCOUNTER — APPOINTMENT (OUTPATIENT)
Dept: GASTROENTEROLOGY | Facility: CLINIC | Age: 72
End: 2022-01-18

## 2022-01-18 PROCEDURE — 90834 PSYTX W PT 45 MINUTES: CPT | Mod: 95

## 2022-01-24 PROCEDURE — 99214 OFFICE O/P EST MOD 30 MIN: CPT | Mod: 95

## 2022-01-27 NOTE — ED ADULT NURSE NOTE - DOES PATIENT HAVE ADVANCE DIRECTIVE
CM received call from Lieutenant Chan at [employer]. Chair time at [facility] outpatient facility to [facility] with a start time of 11:30am.     Deborah Cortes at [facility] skilled nursing [facility] updated. CM also informed [address] [facility] is listed as in-network with [employer] on the [facility]      Kash GARAY RN  Case Management  [phone]    Electronically signed by Kash Schneider RN on 1/27/2022 at 9:35 AM unknown

## 2022-02-01 PROCEDURE — 90834 PSYTX W PT 45 MINUTES: CPT | Mod: 95

## 2022-02-02 NOTE — BH INPATIENT PSYCHIATRY DISCHARGE NOTE - SUBSTANCE USE
Yes
PAST MEDICAL HISTORY:  Asthma, unspecified asthma severity, unspecified whether complicated, unspecified whether persistent     Gastroesophageal reflux disease, esophagitis presence not specified     Malignant neoplasm of lung, unspecified laterality, unspecified part of lung     No pertinent past medical history

## 2022-02-08 PROCEDURE — 90834 PSYTX W PT 45 MINUTES: CPT | Mod: 95

## 2022-02-15 PROCEDURE — 90834 PSYTX W PT 45 MINUTES: CPT

## 2022-02-22 PROCEDURE — 90834 PSYTX W PT 45 MINUTES: CPT | Mod: 95

## 2022-02-22 PROCEDURE — 99214 OFFICE O/P EST MOD 30 MIN: CPT | Mod: 95

## 2022-02-28 PROCEDURE — 99442: CPT

## 2022-03-01 PROCEDURE — 90834 PSYTX W PT 45 MINUTES: CPT | Mod: 95

## 2022-03-08 PROCEDURE — 90834 PSYTX W PT 45 MINUTES: CPT | Mod: 95

## 2022-03-08 NOTE — BH INPATIENT PSYCHIATRY DISCHARGE NOTE - VETERAN
Department of Anesthesiology  Postprocedure Note    Patient: Kusum Alarcon  MRN: [id number]  YOB: 1937  Date of evaluation: 3/8/2022  Time:  7:51 AM     Procedure Summary     Date: 03/08/22 Room / Location: [address]    Anesthesia Start: 5937 Anesthesia Stop: 2607    Procedure: LEFT EYE CATARACT EXTRACTION (Left Eye) Diagnosis:       Combined forms of age-related cataract of left eye      (Combined forms of age-related cataract of left eye [H25.812])    Surgeons: Tomas Novoa MD Responsible Provider: HAZEL Ríos CRNA    Anesthesia Type: MAC ASA Status: 3          Anesthesia Type: MAC    Rodriguez Phase I:      Rodriguez Phase II:      Last vitals: Reviewed and per EMR flowsheets.        Anesthesia Post Evaluation    Patient location during evaluation: bedside  Patient participation: complete - patient participated  Level of consciousness: awake and alert  Pain score: 0  Airway patency: patent  Nausea & Vomiting: no vomiting and no nausea  Complications: no  Cardiovascular status: hemodynamically stable  Respiratory status: room air  Hydration status: stable No

## 2022-03-10 PROCEDURE — 99214 OFFICE O/P EST MOD 30 MIN: CPT | Mod: 95

## 2022-03-22 ENCOUNTER — APPOINTMENT (OUTPATIENT)
Dept: GASTROENTEROLOGY | Facility: CLINIC | Age: 72
End: 2022-03-22
Payer: COMMERCIAL

## 2022-03-22 VITALS
HEIGHT: 62 IN | SYSTOLIC BLOOD PRESSURE: 141 MMHG | BODY MASS INDEX: 38.83 KG/M2 | DIASTOLIC BLOOD PRESSURE: 81 MMHG | OXYGEN SATURATION: 93 % | TEMPERATURE: 94 F | WEIGHT: 211 LBS | HEART RATE: 86 BPM

## 2022-03-22 PROCEDURE — 99214 OFFICE O/P EST MOD 30 MIN: CPT

## 2022-03-22 PROCEDURE — 90834 PSYTX W PT 45 MINUTES: CPT | Mod: 95

## 2022-03-22 RX ORDER — FAMOTIDINE 40 MG/1
40 TABLET, FILM COATED ORAL DAILY
Qty: 30 | Refills: 3 | Status: ACTIVE | COMMUNITY
Start: 2022-03-22 | End: 1900-01-01

## 2022-03-22 RX ORDER — LACTOBACILLUS RHAMNOSUS GG 10B CELL
CAPSULE ORAL
Qty: 30 | Refills: 0 | Status: ACTIVE | OUTPATIENT
Start: 2022-03-22

## 2022-03-22 NOTE — HISTORY OF PRESENT ILLNESS
[FreeTextEntry1] : Patient is a 71-year-old female referred by Dr. Grover for complaints of  nausea especially in the morning.  She has a marked generalized anxiety disorder for which she takes several medications including Effexor, mirtazapine, gabapentin, and Klonopin.  She also takes Zofran 8mg on a as needed basis.  The symptoms are associated with decreased appetite.  She had an upper endoscopy in 6/2020 that was essentially normal.  There was no hiatus hernia or evidence of esophagitis or gastritis.  Pathology revealed chronic nonspecific gastritis with negative H. pylori.  Esophageal biopsies revealed reflux type esophagitis.    She denies any dysphagia.\par 1 month ago, patient developed abdominal pain and was referred for a CAT scan which revealed noncomplicated acute diverticulitis.  She was treated with Cipro/Flagyl with relief of her symptoms and resolution of her diverticulitis.  She no longer has any abdominal pain and her bowel movements are regular.  She denies seeing any blood in the stool and has no abdominal pain.  She apparently underwent a colonoscopy 2-3 years ago which was normal.

## 2022-03-22 NOTE — ASSESSMENT
[FreeTextEntry1] : Patient is status post an attack of acute uncomplicated diverticulitis confirmed by CT scan.  This was treated with antibiotics and the symptoms resolved.\par She still complains of some nausea.  Protonix has not helped.  She will be given famotidine.  The nausea may be related to her medication.  She continues to take probiotics.  She will be seen in follow-up in 3 months.

## 2022-03-24 PROCEDURE — 99214 OFFICE O/P EST MOD 30 MIN: CPT | Mod: 95

## 2022-03-29 PROCEDURE — 90834 PSYTX W PT 45 MINUTES: CPT | Mod: 95

## 2022-04-01 ENCOUNTER — APPOINTMENT (OUTPATIENT)
Dept: GASTROENTEROLOGY | Facility: CLINIC | Age: 72
End: 2022-04-01
Payer: COMMERCIAL

## 2022-04-01 VITALS
SYSTOLIC BLOOD PRESSURE: 130 MMHG | HEIGHT: 62 IN | TEMPERATURE: 96.7 F | DIASTOLIC BLOOD PRESSURE: 73 MMHG | OXYGEN SATURATION: 95 % | WEIGHT: 214 LBS | HEART RATE: 87 BPM | BODY MASS INDEX: 39.38 KG/M2 | RESPIRATION RATE: 17 BRPM

## 2022-04-01 PROCEDURE — 99214 OFFICE O/P EST MOD 30 MIN: CPT

## 2022-04-04 RX ORDER — ONDANSETRON 8 MG/1
8 TABLET ORAL
Qty: 90 | Refills: 1 | Status: DISCONTINUED | COMMUNITY
Start: 2020-11-27 | End: 2022-04-04

## 2022-04-06 NOTE — REVIEW OF SYSTEMS
[Feeling Poorly] : feeling poorly [As Noted in HPI] : as noted in HPI [Anxiety] : anxiety [Negative] : Heme/Lymph

## 2022-04-06 NOTE — HISTORY OF PRESENT ILLNESS
[FreeTextEntry1] : Patient is a 71-year-old female referred by Dr. Grover for complaints of  nausea especially in the morning.  She has a marked generalized anxiety disorder for which she takes several medications including Effexor, mirtazapine, gabapentin, and Klonopin.  She also takes Zofran 8mg on an as needed basis.  The symptoms are associated with decreased appetite.  She had an upper endoscopy in 6/2020 that was essentially normal.  There was no hiatus hernia or evidence of esophagitis or gastritis.  Pathology revealed chronic nonspecific gastritis with negative H. pylori.  Esophageal biopsies revealed reflux type esophagitis.    She denies any dysphagia.\par 2 months ago, patient developed abdominal pain and was referred for a CAT scan which revealed noncomplicated acute diverticulitis.  She was treated with Cipro/Flagyl with relief of her symptoms and resolution of her diverticulitis.  She no longer has any abdominal pain and her bowel movements are regular.  She denies seeing any blood in the stool and has no abdominal pain.  She apparently underwent a colonoscopy 2-3 years ago which was normal.\par \par For the past week patient has been complaining of intractable nausea which is present all day.  She was starting to taper her gabapentin and her midday dose was eliminated.  She did not have any relief with pantoprazole or famotidine or Zofran.  The symptoms were associated with regurgitation.

## 2022-04-06 NOTE — PHYSICAL EXAM
[General Appearance - Alert] : alert [General Appearance - In No Acute Distress] : in no acute distress [Sclera] : the sclera and conjunctiva were normal [PERRL With Normal Accommodation] : pupils were equal in size, round, and reactive to light [Extraocular Movements] : extraocular movements were intact [Outer Ear] : the ears and nose were normal in appearance [Oropharynx] : the oropharynx was normal [Neck Appearance] : the appearance of the neck was normal [Neck Cervical Mass (___cm)] : no neck mass was observed [Jugular Venous Distention Increased] : there was no jugular-venous distention [Thyroid Diffuse Enlargement] : the thyroid was not enlarged [Thyroid Nodule] : there were no palpable thyroid nodules [Auscultation Breath Sounds / Voice Sounds] : lungs were clear to auscultation bilaterally [Heart Rate And Rhythm] : heart rate was normal and rhythm regular [Heart Sounds] : normal S1 and S2 [Murmurs] : no murmurs [Heart Sounds Gallop] : no gallops [Heart Sounds Pericardial Friction Rub] : no pericardial rub [Bowel Sounds] : normal bowel sounds [Abdomen Soft] : soft [Abdomen Tenderness] : non-tender [] : no hepato-splenomegaly [Abdomen Mass (___ Cm)] : no abdominal mass palpated [No CVA Tenderness] : no ~M costovertebral angle tenderness [No Spinal Tenderness] : no spinal tenderness [Abnormal Walk] : normal gait [Nail Clubbing] : no clubbing  or cyanosis of the fingernails [Musculoskeletal - Swelling] : no joint swelling seen [Motor Tone] : muscle strength and tone were normal [Oriented To Time, Place, And Person] : oriented to person, place, and time [Impaired Insight] : insight and judgment were intact [Affect] : the affect was normal

## 2022-04-06 NOTE — ASSESSMENT
[FreeTextEntry1] : Patient with anxiety disorder.  She is status post an attack of acute diverticulitis.  This has resolved.\par She is complaining of intractable nausea and decreased appetite.  Her gabapentin is being tapered and her midday dose was eliminated.  The symptoms may be due to withdrawal type of symptoms.  She will be given Zofran in the meantime and will restart her gabapentin midday dose.

## 2022-04-07 ENCOUNTER — EMERGENCY (EMERGENCY)
Facility: HOSPITAL | Age: 72
LOS: 1 days | Discharge: HOME | End: 2022-04-07
Attending: STUDENT IN AN ORGANIZED HEALTH CARE EDUCATION/TRAINING PROGRAM | Admitting: EMERGENCY MEDICINE
Payer: COMMERCIAL

## 2022-04-07 VITALS
OXYGEN SATURATION: 100 % | RESPIRATION RATE: 16 BRPM | DIASTOLIC BLOOD PRESSURE: 89 MMHG | HEART RATE: 87 BPM | SYSTOLIC BLOOD PRESSURE: 126 MMHG | HEIGHT: 62 IN | TEMPERATURE: 99 F

## 2022-04-07 LAB
ALBUMIN SERPL ELPH-MCNC: 4.2 G/DL — SIGNIFICANT CHANGE UP (ref 3.3–5)
ALP SERPL-CCNC: 92 U/L — SIGNIFICANT CHANGE UP (ref 40–120)
ALT FLD-CCNC: 14 U/L — SIGNIFICANT CHANGE UP (ref 4–33)
ANION GAP SERPL CALC-SCNC: 11 MMOL/L — SIGNIFICANT CHANGE UP (ref 7–14)
APPEARANCE UR: CLEAR — SIGNIFICANT CHANGE UP
AST SERPL-CCNC: 12 U/L — SIGNIFICANT CHANGE UP (ref 4–32)
BACTERIA # UR AUTO: NEGATIVE — SIGNIFICANT CHANGE UP
BASE EXCESS BLDV CALC-SCNC: 7.3 MMOL/L — HIGH (ref -2–3)
BASOPHILS # BLD AUTO: 0.04 K/UL — SIGNIFICANT CHANGE UP (ref 0–0.2)
BASOPHILS NFR BLD AUTO: 0.5 % — SIGNIFICANT CHANGE UP (ref 0–2)
BILIRUB SERPL-MCNC: 0.4 MG/DL — SIGNIFICANT CHANGE UP (ref 0.2–1.2)
BILIRUB UR-MCNC: NEGATIVE — SIGNIFICANT CHANGE UP
BLOOD GAS VENOUS COMPREHENSIVE RESULT: SIGNIFICANT CHANGE UP
BUN SERPL-MCNC: 10 MG/DL — SIGNIFICANT CHANGE UP (ref 7–23)
CALCIUM SERPL-MCNC: 9.2 MG/DL — SIGNIFICANT CHANGE UP (ref 8.4–10.5)
CHLORIDE BLDV-SCNC: 103 MMOL/L — SIGNIFICANT CHANGE UP (ref 96–108)
CHLORIDE SERPL-SCNC: 103 MMOL/L — SIGNIFICANT CHANGE UP (ref 98–107)
CO2 BLDV-SCNC: 36.5 MMOL/L — HIGH (ref 22–26)
CO2 SERPL-SCNC: 28 MMOL/L — SIGNIFICANT CHANGE UP (ref 22–31)
COLOR SPEC: SIGNIFICANT CHANGE UP
CREAT SERPL-MCNC: 0.77 MG/DL — SIGNIFICANT CHANGE UP (ref 0.5–1.3)
DIFF PNL FLD: NEGATIVE — SIGNIFICANT CHANGE UP
EGFR: 82 ML/MIN/1.73M2 — SIGNIFICANT CHANGE UP
EOSINOPHIL # BLD AUTO: 0.13 K/UL — SIGNIFICANT CHANGE UP (ref 0–0.5)
EOSINOPHIL NFR BLD AUTO: 1.5 % — SIGNIFICANT CHANGE UP (ref 0–6)
EPI CELLS # UR: 2 /HPF — SIGNIFICANT CHANGE UP (ref 0–5)
FLUAV AG NPH QL: SIGNIFICANT CHANGE UP
FLUBV AG NPH QL: SIGNIFICANT CHANGE UP
GAS PNL BLDV: 139 MMOL/L — SIGNIFICANT CHANGE UP (ref 136–145)
GLUCOSE BLDV-MCNC: 103 MG/DL — HIGH (ref 70–99)
GLUCOSE SERPL-MCNC: 106 MG/DL — HIGH (ref 70–99)
GLUCOSE UR QL: NEGATIVE — SIGNIFICANT CHANGE UP
HCO3 BLDV-SCNC: 35 MMOL/L — HIGH (ref 22–29)
HCT VFR BLD CALC: 43.3 % — SIGNIFICANT CHANGE UP (ref 34.5–45)
HCT VFR BLDA CALC: 42 % — SIGNIFICANT CHANGE UP (ref 34.5–46.5)
HGB BLD CALC-MCNC: 14 G/DL — SIGNIFICANT CHANGE UP (ref 11.5–15.5)
HGB BLD-MCNC: 13.8 G/DL — SIGNIFICANT CHANGE UP (ref 11.5–15.5)
HYALINE CASTS # UR AUTO: 1 /LPF — SIGNIFICANT CHANGE UP (ref 0–7)
IANC: 5.83 K/UL — SIGNIFICANT CHANGE UP (ref 1.8–7.4)
IMM GRANULOCYTES NFR BLD AUTO: 0.2 % — SIGNIFICANT CHANGE UP (ref 0–1.5)
KETONES UR-MCNC: NEGATIVE — SIGNIFICANT CHANGE UP
LACTATE BLDV-MCNC: 1.9 MMOL/L — SIGNIFICANT CHANGE UP (ref 0.5–2)
LEUKOCYTE ESTERASE UR-ACNC: ABNORMAL
LYMPHOCYTES # BLD AUTO: 2.03 K/UL — SIGNIFICANT CHANGE UP (ref 1–3.3)
LYMPHOCYTES # BLD AUTO: 23.9 % — SIGNIFICANT CHANGE UP (ref 13–44)
MCHC RBC-ENTMCNC: 28.2 PG — SIGNIFICANT CHANGE UP (ref 27–34)
MCHC RBC-ENTMCNC: 31.9 GM/DL — LOW (ref 32–36)
MCV RBC AUTO: 88.5 FL — SIGNIFICANT CHANGE UP (ref 80–100)
MONOCYTES # BLD AUTO: 0.46 K/UL — SIGNIFICANT CHANGE UP (ref 0–0.9)
MONOCYTES NFR BLD AUTO: 5.4 % — SIGNIFICANT CHANGE UP (ref 2–14)
NEUTROPHILS # BLD AUTO: 5.83 K/UL — SIGNIFICANT CHANGE UP (ref 1.8–7.4)
NEUTROPHILS NFR BLD AUTO: 68.5 % — SIGNIFICANT CHANGE UP (ref 43–77)
NITRITE UR-MCNC: NEGATIVE — SIGNIFICANT CHANGE UP
NRBC # BLD: 0 /100 WBCS — SIGNIFICANT CHANGE UP
NRBC # FLD: 0 K/UL — SIGNIFICANT CHANGE UP
PCO2 BLDV: 60 MMHG — HIGH (ref 39–42)
PH BLDV: 7.37 — SIGNIFICANT CHANGE UP (ref 7.32–7.43)
PH UR: 7 — SIGNIFICANT CHANGE UP (ref 5–8)
PLATELET # BLD AUTO: 212 K/UL — SIGNIFICANT CHANGE UP (ref 150–400)
PO2 BLDV: 30 MMHG — SIGNIFICANT CHANGE UP
POTASSIUM BLDV-SCNC: 3.6 MMOL/L — SIGNIFICANT CHANGE UP (ref 3.5–5.1)
POTASSIUM SERPL-MCNC: 3.6 MMOL/L — SIGNIFICANT CHANGE UP (ref 3.5–5.3)
POTASSIUM SERPL-SCNC: 3.6 MMOL/L — SIGNIFICANT CHANGE UP (ref 3.5–5.3)
PROT SERPL-MCNC: 6.3 G/DL — SIGNIFICANT CHANGE UP (ref 6–8.3)
PROT UR-MCNC: ABNORMAL
RBC # BLD: 4.89 M/UL — SIGNIFICANT CHANGE UP (ref 3.8–5.2)
RBC # FLD: 13.1 % — SIGNIFICANT CHANGE UP (ref 10.3–14.5)
RBC CASTS # UR COMP ASSIST: 1 /HPF — SIGNIFICANT CHANGE UP (ref 0–4)
RSV RNA NPH QL NAA+NON-PROBE: SIGNIFICANT CHANGE UP
SAO2 % BLDV: 55.6 % — SIGNIFICANT CHANGE UP
SARS-COV-2 RNA SPEC QL NAA+PROBE: SIGNIFICANT CHANGE UP
SODIUM SERPL-SCNC: 142 MMOL/L — SIGNIFICANT CHANGE UP (ref 135–145)
SP GR SPEC: 1.01 — SIGNIFICANT CHANGE UP (ref 1–1.05)
TROPONIN T, HIGH SENSITIVITY RESULT: 7 NG/L — SIGNIFICANT CHANGE UP
UROBILINOGEN FLD QL: SIGNIFICANT CHANGE UP
WBC # BLD: 8.51 K/UL — SIGNIFICANT CHANGE UP (ref 3.8–10.5)
WBC # FLD AUTO: 8.51 K/UL — SIGNIFICANT CHANGE UP (ref 3.8–10.5)
WBC UR QL: 3 /HPF — SIGNIFICANT CHANGE UP (ref 0–5)

## 2022-04-07 PROCEDURE — 99220: CPT

## 2022-04-07 PROCEDURE — 70553 MRI BRAIN STEM W/O & W/DYE: CPT | Mod: 26,MA

## 2022-04-07 PROCEDURE — 70450 CT HEAD/BRAIN W/O DYE: CPT | Mod: 26,MA

## 2022-04-07 PROCEDURE — 93306 TTE W/DOPPLER COMPLETE: CPT | Mod: 26

## 2022-04-07 RX ORDER — HYDROCHLOROTHIAZIDE 25 MG
12.5 TABLET ORAL DAILY
Refills: 0 | Status: DISCONTINUED | OUTPATIENT
Start: 2022-04-07 | End: 2022-04-10

## 2022-04-07 RX ORDER — LANOLIN ALCOHOL/MO/W.PET/CERES
6 CREAM (GRAM) TOPICAL ONCE
Refills: 0 | Status: COMPLETED | OUTPATIENT
Start: 2022-04-07 | End: 2022-04-07

## 2022-04-07 RX ORDER — VENLAFAXINE HCL 75 MG
187.5 CAPSULE, EXT RELEASE 24 HR ORAL DAILY
Refills: 0 | Status: DISCONTINUED | OUTPATIENT
Start: 2022-04-07 | End: 2022-04-07

## 2022-04-07 RX ORDER — VENLAFAXINE HCL 75 MG
187.5 CAPSULE, EXT RELEASE 24 HR ORAL DAILY
Refills: 0 | Status: DISCONTINUED | OUTPATIENT
Start: 2022-04-07 | End: 2022-04-10

## 2022-04-07 RX ORDER — CLONAZEPAM 1 MG
2 TABLET ORAL ONCE
Refills: 0 | Status: DISCONTINUED | OUTPATIENT
Start: 2022-04-07 | End: 2022-04-07

## 2022-04-07 RX ORDER — DIPHENHYDRAMINE HCL 50 MG
25 CAPSULE ORAL ONCE
Refills: 0 | Status: COMPLETED | OUTPATIENT
Start: 2022-04-07 | End: 2022-04-07

## 2022-04-07 RX ORDER — METOCLOPRAMIDE HCL 10 MG
10 TABLET ORAL ONCE
Refills: 0 | Status: COMPLETED | OUTPATIENT
Start: 2022-04-07 | End: 2022-04-07

## 2022-04-07 RX ORDER — MIRTAZAPINE 45 MG/1
37.5 TABLET, ORALLY DISINTEGRATING ORAL DAILY
Refills: 0 | Status: DISCONTINUED | OUTPATIENT
Start: 2022-04-07 | End: 2022-04-07

## 2022-04-07 RX ORDER — METOPROLOL TARTRATE 50 MG
50 TABLET ORAL
Refills: 0 | Status: DISCONTINUED | OUTPATIENT
Start: 2022-04-07 | End: 2022-04-10

## 2022-04-07 RX ORDER — UBIDECARENONE 100 MG
200 CAPSULE ORAL AT BEDTIME
Refills: 0 | Status: DISCONTINUED | OUTPATIENT
Start: 2022-04-07 | End: 2022-04-10

## 2022-04-07 RX ORDER — LOSARTAN POTASSIUM 100 MG/1
100 TABLET, FILM COATED ORAL DAILY
Refills: 0 | Status: DISCONTINUED | OUTPATIENT
Start: 2022-04-07 | End: 2022-04-10

## 2022-04-07 RX ORDER — GABAPENTIN 400 MG/1
200 CAPSULE ORAL THREE TIMES A DAY
Refills: 0 | Status: DISCONTINUED | OUTPATIENT
Start: 2022-04-07 | End: 2022-04-10

## 2022-04-07 RX ORDER — GABAPENTIN 400 MG/1
200 CAPSULE ORAL
Refills: 0 | Status: DISCONTINUED | OUTPATIENT
Start: 2022-04-07 | End: 2022-04-07

## 2022-04-07 RX ORDER — MIRTAZAPINE 45 MG/1
37.5 TABLET, ORALLY DISINTEGRATING ORAL AT BEDTIME
Refills: 0 | Status: DISCONTINUED | OUTPATIENT
Start: 2022-04-07 | End: 2022-04-10

## 2022-04-07 RX ORDER — ATORVASTATIN CALCIUM 80 MG/1
10 TABLET, FILM COATED ORAL AT BEDTIME
Refills: 0 | Status: DISCONTINUED | OUTPATIENT
Start: 2022-04-07 | End: 2022-04-10

## 2022-04-07 RX ORDER — CLONAZEPAM 1 MG
1 TABLET ORAL DAILY
Refills: 0 | Status: COMPLETED | OUTPATIENT
Start: 2022-04-07 | End: 2022-04-14

## 2022-04-07 RX ADMIN — Medication 1 MILLIGRAM(S): at 18:44

## 2022-04-07 RX ADMIN — ATORVASTATIN CALCIUM 10 MILLIGRAM(S): 80 TABLET, FILM COATED ORAL at 22:00

## 2022-04-07 RX ADMIN — GABAPENTIN 200 MILLIGRAM(S): 400 CAPSULE ORAL at 21:57

## 2022-04-07 RX ADMIN — Medication 50 MILLIGRAM(S): at 18:44

## 2022-04-07 RX ADMIN — Medication 25 MILLIGRAM(S): at 12:15

## 2022-04-07 RX ADMIN — Medication 2 MILLIGRAM(S): at 12:14

## 2022-04-07 RX ADMIN — Medication 187.5 MILLIGRAM(S): at 22:03

## 2022-04-07 RX ADMIN — Medication 6 MILLIGRAM(S): at 21:58

## 2022-04-07 RX ADMIN — GABAPENTIN 200 MILLIGRAM(S): 400 CAPSULE ORAL at 17:46

## 2022-04-07 RX ADMIN — MIRTAZAPINE 37.5 MILLIGRAM(S): 45 TABLET, ORALLY DISINTEGRATING ORAL at 22:00

## 2022-04-07 RX ADMIN — Medication 10 MILLIGRAM(S): at 12:15

## 2022-04-07 RX ADMIN — Medication 12.5 MILLIGRAM(S): at 18:44

## 2022-04-07 NOTE — ED ADULT NURSE REASSESSMENT NOTE - SKIN INTEGRITY
CMICU DAILY GOALS       A: Awake    RASS: Goal - RASS Goal: 0-->alert and calm  Actual - RASS (Mcmillan Agitation-Sedation Scale): 0-->alert and calm   Restraint necessity: Clinical Justification: Removing medical devices  B: Breath   SBT: Not intubated   C: Coordinate A & B, analgesics/sedatives   Pain: managed    SAT: Not intubated  D: Delirium   CAM-ICU: Overall CAM-ICU: Negative  E: Early Mobility   MOVE Screen: Pass   Activity: Activity Management: activity adjusted per tolerance  FAS: Feeding/Nutrition   Diet order: Diet/Nutrition Received: consistent carb/diabetic diet,   Fluid restriction: Fluid Requirement: 1500  T: Thrombus   DVT prophylaxis: VTE Required Core Measure: Pharmacological prophylaxis initiated/maintained  H: HOB Elevation   Head of Bed (HOB): HOB at 30-45 degrees  U: Ulcer Prophylaxis   GI: yes  G: Glucose control   managed    S: Skin   Bundle compliance: yes   Bathing/Skin Care: bath, chlorhexidine, bath, complete, dressed/undressed, linen changed Date: 1/25  B: Bowel Function   no issues   I: Indwelling Catheters   Mc necessity:      Urethral Catheter 01/15/20 2030 Straight-tip 16 Fr.-Reason for Continuing Urinary Catheterization: Critically ill in ICU requiring intensive monitoring   CVC necessity: Yes   IPAD offered: Yes  D: De-escalation Antibx   Yes  Plan for the day   RHC Monday  Family/Goals of care/Code Status   Code Status: Full Code     No acute events throughout day, VS and assessment per flow sheet, patient progressing towards goals as tolerated, plan of care reviewed with Saba Lott and family, all concerns addressed, will continue to monitor.       intact

## 2022-04-07 NOTE — ED CDU PROVIDER INITIAL DAY NOTE - OBJECTIVE STATEMENT
70 y/o female with pmhx of HTN, anxiety, depression, SI 6 years ago, follows up with psych, presents to ED c/o nausea and lightheadedness x 1 week. Pt states she has had chronic nausea and anxiety in the mornings x years. Had a normal MRI brain 3 years ago for nausea. States she gets anxious in the morning and as a result gets nauseous. However this week her symptoms are accompanied with lightheadedness. No dizziness, no room spinning. Denies cp or sob or palpitations. No syncope. No abd pain. Pt had a CT abdomen in February 2022 which showed diverticulitis (pt presented with LLQ pain and nausea) and incidental calcified lung nodule. Nonsmoker. Pt states she has seen 3+ GI doctors for nausea and work up negative.  states pt is paranoid something is wrong and gets anxious. No fevers, chills, dysuria. Pt compliant with taking her home medications. States her doctor took her off gabapentin last week due to chronic fatigue x 1 year and then put her back on. Pt states "too much was going on at once." Pt has been taking Zofran 10mg at home with no relief. Pt denies any SI.

## 2022-04-07 NOTE — ED PROVIDER NOTE - PHYSICAL EXAMINATION
General: NAD  HEENT: NCAT, PERRL  Cardiac: RRR, 2+ peripheral pulses  Chest: CTA  Abdomen: soft, non-distended, bowel sounds present, no ttp, no rebound or guarding  Extremities: no peripheral edema, calf tenderness, or leg size discrepancies  Skin: no rashes  Neuro: AAOx3, cns intact, 5+motor and sensory grossly intact, no dysmetria  Psych: anxious

## 2022-04-07 NOTE — ED PROVIDER NOTE - PROGRESS NOTE DETAILS
ERENDIRA Johnson (PGY-2) - pt w/ small lucency on CTH, otherwise has progressive involution. Will place in CDU for MRI w/ w/o.

## 2022-04-07 NOTE — ED ADULT TRIAGE NOTE - CHIEF COMPLAINT QUOTE
Pt. c/o nausea and headache x 1wk. States she has been on several medications for the nausea with no relief. Denies vomiting, abdominal pain or fevers.

## 2022-04-07 NOTE — ED PROVIDER NOTE - CLINICAL SUMMARY MEDICAL DECISION MAKING FREE TEXT BOX
72 y/o F with PMH HTN, HLD, depression, anxiety, h/o suicidal attempt p/w headache and nausea x 2 week. pt w/ nausea, and intermittent lightheadedness . pt w/ normal neuro exam, denies headache, head trauma, vomiting. Possibility of medication side effect vs atypical migraine vs Brain mass.  cbc, cmp, vbg, ct head.

## 2022-04-07 NOTE — ED CDU PROVIDER INITIAL DAY NOTE - MEDICAL DECISION MAKING DETAILS
72 y/o female with pmhx of HTN, anxiety, depression, SI 6 years ago, follows up with psych, presents to ED c/o nausea and lightheadedness x 1 week. Pt states she has had chronic nausea and anxiety in the mornings x years. Had a normal MRI brain 3 years ago for nausea. States she gets anxious in the morning and as a result gets nauseous. However this week her symptoms are accompanied with lightheadedness. No dizziness, no room spinning. Denies cp or sob or palpitations. No syncope. No abd pain. Pt had a CT abdomen in February 2022 which showed diverticulitis (pt presented with LLQ pain and nausea) and incidental calcified lung nodule. Nonsmoker. Pt states she has seen 3+ GI doctors for nausea and work up negative.  states pt is paranoid something is wrong and gets anxious. No fevers, chills, dysuria. Pt compliant with taking her home medications. States her doctor took her off gabapentin last week due to chronic fatigue x 1 year and then put her back on. Pt states "too much was going on at once." Pt has been taking Zofran 10mg at home with no relief. Pt denies any SI. on exam pt tearful and anxious, verbally deescalated. sent to cdu for MRI brain w/wo IV contrast r/o brain mass. will check echo, TSH.

## 2022-04-07 NOTE — ED PROVIDER NOTE - OBJECTIVE STATEMENT
70yo M hx HTN, anxiety, depression, diverticulitis here for nausea and lightheadedness. Pt having 2 weeks of worsening nausea and lightheadedness. Denies ha, changes in vision, fever, cp, sob, abdominal pain, change in bowel or urine. Had a CT abd 2/22 for diverticulitis, showed an incidental calfified lung nodule. Pt is concerened she is withdrawing from gabapentin but she states that she has been taking 200mg BID from 200mg TID but is not back to 200mg TID.

## 2022-04-07 NOTE — ED ADULT NURSE NOTE - OBJECTIVE STATEMENT
pt received in rm 28 AAO x 3. pt reports nausea and lightheadedness for the past two weeks with worsening symptoms. pt denies sob, chest pain, v/d, fevers, chills, cough. respirations even and unlabored. no neuro deficits noted. 20g iv placed to right ac.

## 2022-04-07 NOTE — ED CDU PROVIDER INITIAL DAY NOTE - NS ED ATTENDING STATEMENT MOD
Attending with This was a shared visit with the GIFTY. I reviewed and verified the documentation and independently performed the documented:

## 2022-04-08 VITALS
TEMPERATURE: 99 F | RESPIRATION RATE: 20 BRPM | HEART RATE: 79 BPM | SYSTOLIC BLOOD PRESSURE: 123 MMHG | OXYGEN SATURATION: 97 % | DIASTOLIC BLOOD PRESSURE: 65 MMHG

## 2022-04-08 LAB
ANION GAP SERPL CALC-SCNC: 13 MMOL/L — SIGNIFICANT CHANGE UP (ref 7–14)
BASOPHILS # BLD AUTO: 0.01 K/UL — SIGNIFICANT CHANGE UP (ref 0–0.2)
BASOPHILS NFR BLD AUTO: 0.1 % — SIGNIFICANT CHANGE UP (ref 0–2)
BUN SERPL-MCNC: 16 MG/DL — SIGNIFICANT CHANGE UP (ref 7–23)
CALCIUM SERPL-MCNC: 8.8 MG/DL — SIGNIFICANT CHANGE UP (ref 8.4–10.5)
CHLORIDE SERPL-SCNC: 104 MMOL/L — SIGNIFICANT CHANGE UP (ref 98–107)
CO2 SERPL-SCNC: 21 MMOL/L — LOW (ref 22–31)
CREAT SERPL-MCNC: 1.05 MG/DL — SIGNIFICANT CHANGE UP (ref 0.5–1.3)
EGFR: 56 ML/MIN/1.73M2 — LOW
EOSINOPHIL # BLD AUTO: 0 K/UL — SIGNIFICANT CHANGE UP (ref 0–0.5)
EOSINOPHIL NFR BLD AUTO: 0 % — SIGNIFICANT CHANGE UP (ref 0–6)
GLUCOSE SERPL-MCNC: 122 MG/DL — HIGH (ref 70–99)
HCT VFR BLD CALC: 45.8 % — HIGH (ref 34.5–45)
HGB BLD-MCNC: 15.6 G/DL — HIGH (ref 11.5–15.5)
IANC: 6.11 K/UL — SIGNIFICANT CHANGE UP (ref 1.8–7.4)
IMM GRANULOCYTES NFR BLD AUTO: 0.1 % — SIGNIFICANT CHANGE UP (ref 0–1.5)
LYMPHOCYTES # BLD AUTO: 0.7 K/UL — LOW (ref 1–3.3)
LYMPHOCYTES # BLD AUTO: 10.2 % — LOW (ref 13–44)
MCHC RBC-ENTMCNC: 29.7 PG — SIGNIFICANT CHANGE UP (ref 27–34)
MCHC RBC-ENTMCNC: 34.1 GM/DL — SIGNIFICANT CHANGE UP (ref 32–36)
MCV RBC AUTO: 87.2 FL — SIGNIFICANT CHANGE UP (ref 80–100)
MONOCYTES # BLD AUTO: 0.05 K/UL — SIGNIFICANT CHANGE UP (ref 0–0.9)
MONOCYTES NFR BLD AUTO: 0.7 % — LOW (ref 2–14)
NEUTROPHILS # BLD AUTO: 6.11 K/UL — SIGNIFICANT CHANGE UP (ref 1.8–7.4)
NEUTROPHILS NFR BLD AUTO: 88.9 % — HIGH (ref 43–77)
NRBC # BLD: 0 /100 WBCS — SIGNIFICANT CHANGE UP
NRBC # FLD: 0 K/UL — SIGNIFICANT CHANGE UP
PLATELET # BLD AUTO: 233 K/UL — SIGNIFICANT CHANGE UP (ref 150–400)
POTASSIUM SERPL-MCNC: 4.3 MMOL/L — SIGNIFICANT CHANGE UP (ref 3.5–5.3)
POTASSIUM SERPL-SCNC: 4.3 MMOL/L — SIGNIFICANT CHANGE UP (ref 3.5–5.3)
RBC # BLD: 5.25 M/UL — HIGH (ref 3.8–5.2)
RBC # FLD: 11.5 % — SIGNIFICANT CHANGE UP (ref 10.3–14.5)
SODIUM SERPL-SCNC: 138 MMOL/L — SIGNIFICANT CHANGE UP (ref 135–145)
TROPONIN T, HIGH SENSITIVITY RESULT: <6 NG/L — SIGNIFICANT CHANGE UP
WBC # BLD: 6.88 K/UL — SIGNIFICANT CHANGE UP (ref 3.8–10.5)
WBC # FLD AUTO: 6.88 K/UL — SIGNIFICANT CHANGE UP (ref 3.8–10.5)

## 2022-04-08 PROCEDURE — 99217: CPT | Mod: FS

## 2022-04-08 PROCEDURE — 71250 CT THORAX DX C-: CPT | Mod: 26,MA

## 2022-04-08 RX ORDER — ONDANSETRON 8 MG/1
1 TABLET, FILM COATED ORAL
Qty: 9 | Refills: 0
Start: 2022-04-08 | End: 2022-04-10

## 2022-04-08 RX ADMIN — Medication 12.5 MILLIGRAM(S): at 06:38

## 2022-04-08 RX ADMIN — Medication 1 MILLIGRAM(S): at 09:08

## 2022-04-08 RX ADMIN — Medication 50 MILLIGRAM(S): at 06:49

## 2022-04-08 RX ADMIN — GABAPENTIN 200 MILLIGRAM(S): 400 CAPSULE ORAL at 06:37

## 2022-04-08 RX ADMIN — LOSARTAN POTASSIUM 100 MILLIGRAM(S): 100 TABLET, FILM COATED ORAL at 06:37

## 2022-04-08 NOTE — ED CDU PROVIDER SUBSEQUENT DAY NOTE - PROGRESS NOTE DETAILS
Pt reports feeling significantly better.  Pt denies any recurrent lightheadedness or nausea.  Pt tolerating PO.  Echo nonactionable.  MRI with following impression: "Developmental venous anomaly is identified as described above."  Pt evaluated by neurology; they recommend outpatient neuro follow up.  Pt medically stable for discharge.  Strict return precautions given.  Pt to follow up with PMD, Neuro and GI.  Reassessment performed and plan for discharge discussed with Dr. Palacios who agrees with disposition and discharge plan.

## 2022-04-08 NOTE — ED CDU PROVIDER SUBSEQUENT DAY NOTE - NS ED ATTENDING STATEMENT MOD
This was a shared visit with the GIFTY. I reviewed and verified the documentation and independently performed the documented:

## 2022-04-08 NOTE — ED CDU PROVIDER DISPOSITION NOTE - ATTENDING CONTRIBUTION TO CARE
MD Palacios:  I have personally performed a face to face diagnostic evaluation on this patient with the PA.  I have reviewed the ACP note and agree with the history, exam, and plan of care, except as noted.  History and Exam by me shows a 70 yo F, sent to CDU for evaluation of nausea and light-headedness x 1wk.   MHx of chronic nausea x 6yrs.  Associated Sx: no CP/SOB, no f/c  Quality of episodes: light-headed episodes.   VS: wnl.  Physical Exam: adult F, obese, NAD, NCAT, PERRL, EOMI, neck supple, RRR, CTA B, Abd: s/nd/nt, Ext: no edema, Neuro: AAOx3, ambulates w/o diff, strength 5/5 & symmetric throughout.  CDU events:  Trop 7 --> <6,  TTE unremarkable, CT head and CT chest unremarkable.  MRI brain showed abnormal enhancement seen involving the right posterior temporal/frontal region which is compatible with a developmental venous anomaly.  The latter finding d/w Neurology, who believe this is a chronic finding that does not account for her symptoms.    Impression:  resolved light-headedness, chronic nausea of unk etiology.  Patient has a normal exam and is feeling well, albeit with persistent unchanged chronic nausea.    Plan:  dc home, f/u PMD, strict return precautions.  Spoke with patient extensively regarding current differential diagnosis for ongoing symptoms, and patient acknowledged understanding. All questions and concerns have been addressed with the patient. I have discussed the plan for care and patient is in agreement. Patient is instructed to follow up with & Primary Care Provider, and has been given strict return precautions.

## 2022-04-08 NOTE — ED CDU PROVIDER DISPOSITION NOTE - PATIENT PORTAL LINK FT
You can access the FollowMyHealth Patient Portal offered by [facility] by registering at the following website: [email or web address]. By joining [employer]’s FollowMyHealth portal, you will also be able to view your health information using other applications (apps) compatible with our system.

## 2022-04-08 NOTE — ED CDU PROVIDER SUBSEQUENT DAY NOTE - ATTENDING CONTRIBUTION TO CARE
MD Palacios:  I have personally performed a face to face diagnostic evaluation on this patient with the PA.  I have reviewed the ACP note and agree with the history, exam, and plan of care, except as noted.  History and Exam by me shows a 72 yo F, sent to CDU for evaluation of nausea and light-headedness x 1wk.   MHx of chronic nausea x 6yrs.  Associated Sx: no CP/SOB, no f/c  Quality of episodes: light-headed episodes.   VS: wnl.  Physical Exam: adult F, obese, NAD, NCAT, PERRL, EOMI, neck supple, RRR, CTA B, Abd: s/nd/nt, Ext: no edema, Neuro: AAOx3, ambulates w/o diff, strength 5/5 & symmetric throughout.  CDU events:  Trop 7 --> <6,  TTE unremarkable, CT head and CT chest unremarkable.  MRI brain showed abnormal enhancement seen involving the right posterior temporal/frontal region which is compatible with a developmental venous anomaly.  The latter finding d/w Neurology, who believe this is a chronic finding that does not account for her symptoms.    Impression:  resolved light-headedness, chronic nausea of unk etiology.  Patient has a normal exam and is feeling well, albeit with persistent unchanged chronic nausea.    Plan:  dc home, f/u PMD, strict return precautions.  Spoke with patient extensively regarding current differential diagnosis for ongoing symptoms, and patient acknowledged understanding. All questions and concerns have been addressed with the patient. I have discussed the plan for care and patient is in agreement. Patient is instructed to follow up with & Primary Care Provider, and has been given strict return precautions.

## 2022-04-08 NOTE — ED CDU PROVIDER DISPOSITION NOTE - NSFOLLOWUPINSTRUCTIONS_ED_ALL_ED_FT
Advance activity as tolerated.  Continue all previously prescribed medications as directed unless otherwise instructed.  Take Zofran 4 mg ODT every 8 hours as needed for nausea and vomiting.  Follow up with your primary care physician, gastroenterologist and neurology (referral list provided or you may follow up in the clinic, please call [phone]) in 48-72 hours- bring copies of your results.  Return to the ER for worsening or persistent symptoms, including but not limited to worsening/persistent lightheadedness, passing out, headaches, numbness, weakness, vomiting, abdominal pain, and/or ANY NEW OR CONCERNING SYMPTOMS. If you have issues obtaining follow up, please call: [phone] to obtain a doctor or specialist who takes your insurance in your area.  You may call [phone] to make an appointment with the internal medicine clinic.

## 2022-04-08 NOTE — CONSULT NOTE ADULT - ASSESSMENT
71 y.o. F with PMH of HTN, anxiety, depression, prior SA (s/p geriatric psychiatry admission 1 year ago), diverticulitis presented to the ED on 4/7 due to nausea, light headedness, and anxiety. Neurology consulted for developmental venous anomaly on overnight MRI brain. At this time,  71 y.o. F with PMH of HTN, anxiety, depression, prior SA (s/p geriatric psychiatry admission 1 year ago), diverticulitis presented to the ED on 4/7 due to nausea, light headedness, and anxiety. Neurology consulted for developmental venous anomaly on overnight MRI brain. At this time, neuro exam non-focal. Resolved nausea and light-headedness. MRI brain did not show any acute stroke or masses. Noted that she had developmental venous anomaly in temporal and occipital (linear enhancements). Usually management is conservative in pt without any sxs.     Impression: Likely chronic, developmental venous anomaly of unclear significance. Since it does not correlate with her clinical sx, no further in-hospital work up indicated at this time    Recommendations:  [] No further imaging or inpt work up indicated at this time  [] Nausea management per ED  [] Neurocheck and vital per unit protocol  [] Patient can follow up with general neurology for any new headache, ringing in the ear, visual deficits at [facility] 1-2 weeks after discharge. Please instruct the patient to call [phone] to schedule this appointment.     Case discussed with stroke fellow Dr. Andrade under supervision of stroke attending Dr. Painter  If remains in hospital, to be seen with general neurology attending in AM     71 y.o. F with PMH of HTN, anxiety, depression, prior SA (s/p geriatric psychiatry admission 1 year ago), diverticulitis presented to the ED on 4/7 due to nausea, light headedness, and anxiety. Neurology consulted for developmental venous anomaly on overnight MRI brain. At this time, neuro exam non-focal. Resolved nausea and light-headedness. MRI brain did not show any acute stroke or masses. Noted that she had developmental venous anomaly in temporal and occipital (linear enhancements). Usually management is conservative in pt without any sxs.     Impression: Likely chronic, developmental venous anomaly of unclear significance. Since it does not correlate with her clinical sx, no further in-hospital work up indicated at this time    Recommendations:  [] No further imaging or inpt work up indicated at this time  [] Nausea management per ED  [] Neurocheck and vital per unit protocol  [] Patient can follow up with general neurology for any new headache, ringing in the ear, visual deficits at [facility] 1-2 weeks after discharge. Please instruct the patient to call [phone] to schedule this appointment.     Case discussed with stroke fellow Dr. Andrade under supervision of stroke attending Dr. Painter  If remains in hospital, to be seen with general neurology attending in AM    *Patient discharged prior to attending evaluation*

## 2022-04-08 NOTE — ED CDU PROVIDER SUBSEQUENT DAY NOTE - HISTORY
70 y/o female with pmhx of HTN, anxiety, depression, SI 6 years ago, follows up with psych, presents to ED c/o nausea and lightheadedness x 1 week. Pt states she has had chronic nausea and anxiety in the mornings x years. Had a normal MRI brain 3 years ago for nausea. States she gets anxious in the morning and as a result gets nauseous. However this week her symptoms are accompanied with lightheadedness. No dizziness, no room spinning. Denies cp or sob or palpitations. No syncope. No abd pain. Pt had a CT abdomen in February 2022 which showed diverticulitis (pt presented with LLQ pain and nausea) and incidental calcified lung nodule. Nonsmoker. Pt states she has seen 3+ GI doctors for nausea and work up negative.  states pt is paranoid something is wrong and gets anxious. No fevers, chills, dysuria. Pt compliant with taking her home medications. States her doctor took her off gabapentin last week due to chronic fatigue x 1 year and then put her back on. Pt states "too much was going on at once." Pt has been taking Zofran 10mg at home with no relief. Pt denies any SI. Pt ordered for a CT chest, was found to have a calcified nodule on a prior CT abdomen at [facility]. Pt endorses having known fibroids (pelvic mass was also identified), awaiting MRI results. Pt denies acute symptoms, endorses symptoms of a non-specific dizz (denies pre-syncopal dizz) but states she had reduced her Gapentin at the time.

## 2022-04-08 NOTE — ED CDU PROVIDER SUBSEQUENT DAY NOTE - MEDICAL DECISION MAKING DETAILS
70 y/o female with pmhx of HTN, anxiety, depression, SI 6 years ago, follows up with psych, presents to ED c/o nausea and lightheadedness x 1 week. Pt states she has had chronic nausea and anxiety in the mornings x years. Had a normal MRI brain 3 years ago for nausea. States she gets anxious in the morning and as a result gets nauseous. However this week her symptoms are accompanied with lightheadedness. No dizziness, no room spinning. Denies cp or sob or palpitations. No syncope. No abd pain. Pt had a CT abdomen in February 2022 which showed diverticulitis (pt presented with LLQ pain and nausea) and incidental calcified lung nodule. Nonsmoker. Pt states she has seen 3+ GI doctors for nausea and work up negative.  states pt is paranoid something is wrong and gets anxious. No fevers, chills, dysuria. Pt compliant with taking her home medications. States her doctor took her off gabapentin last week due to chronic fatigue x 1 year and then put her back on. Pt states "too much was going on at once." Pt has been taking Zofran 10mg at home with no relief. Pt denies any SI. on exam pt tearful and anxious, verbally deescalated. sent to cdu for MRI brain w/wo IV contrast r/o brain mass. will check echo, TSH, CT chest to eval for additional pulmonary nodules or an interval change.

## 2022-04-08 NOTE — CONSULT NOTE ADULT - SUBJECTIVE AND OBJECTIVE BOX
HPI:  71 y.o. F with PMH of HTN, anxiety, depression, prior SA (s/p geriatric psychiatry admission 1 year ago), diverticulitis presented to the ED on 4/7 due to nausea, light headedness, and anxiety. Neurology consulted for developmental venous anomaly on overnight MRI brain. At the time of the exam, light-headedness and nausea has resolved. For many years, pt had austin of anxiety in the morning upon waking up, then developed nausea. For the past 2 weeks, usual symptoms were accompanied by light headedness that came on when she started to go down on gabapentin. Pt takes Gabapentin 200 mg TID for mood per her psychiatrist, but it was making her fatigued and sleepy. Decided to go down with outpt psychiatrist and was brought down to gabapentin 200 mg BID. Ever since that change, pt has been having light headedness and was concerned for withdrawal. Lives at home with . Does not smoke, EtOH use, or illicit drug use. Independent with ADLs. Denies any headache, light-headedness, vertigo, double vision, blurry vision, chest pain, numbness/tingling/weakness in all extremities.     (Stroke only)  NIHSS: 0   MRS: 0    REVIEW OF SYSTEMS    A 10-system ROS was performed and is negative except for those items noted above and/or in the HPI.    PAST MEDICAL & SURGICAL HISTORY:  Anxiety    High cholesterol    HTN (hypertension)    Endometriosis    No significant past surgical history      FAMILY HISTORY:  No pertinent family history in first degree relatives      SOCIAL HISTORY: as noted in HPI    MEDICATIONS (HOME):  Home Medications:  clonazePAM 0.5 mg oral tablet: 1 tab(s) orally  (21 Jul 2021 16:16)  clonazePAM 1 mg oral tablet: 1 tab(s) orally once a day (21 Jul 2021 16:16)  hydroCHLOROthiazide 12.5 mg oral capsule: 1 cap(s) orally once a day (21 Jul 2021 16:16)  losartan 100 mg oral tablet: 1 tab(s) orally once a day (21 Jul 2021 16:16)  melatonin 5 mg oral tablet: 1 tab(s) orally once a day (at bedtime) (21 Jul 2021 16:16)  metoprolol tartrate 50 mg oral tablet: 1 tab(s) orally 2 times a day (21 Jul 2021 16:16)  mirtazapine 30 mg oral tablet: 1 tab(s) orally once a day (at bedtime) (21 Jul 2021 16:16)  pantoprazole 40 mg oral delayed release tablet: 1 tab(s) orally  (21 Jul 2021 16:16)  venlafaxine 37.5 mg oral capsule, extended release: 5 cap(s) orally once a day (21 Jul 2021 16:16)    MEDICATIONS  (STANDING):  atorvastatin 10 milliGRAM(s) Oral at bedtime  clonazePAM  Tablet 1 milliGRAM(s) Oral daily  gabapentin 200 milliGRAM(s) Oral three times a day  hydrochlorothiazide 12.5 milliGRAM(s) Oral daily  losartan 100 milliGRAM(s) Oral daily  metoprolol succinate ER 50 milliGRAM(s) Oral two times a day  mirtazapine 37.5 milliGRAM(s) Oral at bedtime  ubiquinone 200 milliGRAM(s) Oral at bedtime  venlafaxine XR. 187.5 milliGRAM(s) Oral daily    MEDICATIONS  (PRN):    ALLERGIES/INTOLERANCES:  Allergies  penicillins (Anaphylaxis)    Intolerances    VITALS & EXAMINATION:  Vital Signs Last 24 Hrs  T(C): 37 (08 Apr 2022 10:00), Max: 37.1 (07 Apr 2022 11:13)  T(F): 98.6 (08 Apr 2022 10:00), Max: 98.7 (07 Apr 2022 11:13)  HR: 79 (08 Apr 2022 10:00) (65 - 87)  BP: 123/65 (08 Apr 2022 10:00) (115/73 - 126/89)  BP(mean): --  RR: 20 (08 Apr 2022 10:00) (16 - 20)  SpO2: 97% (08 Apr 2022 10:00) (95% - 100%)    General:  Constitutional: Obese Female, appears stated age, in no apparent distress including pain  Head: Normocephalic & atraumatic.    Neurological (>12):  MS: Eyes open. Responds well to verbal stimuli. Awake, alert, oriented to person, place, situation, time. Normal affect. Follows all commands.    Language: Speech is clear, fluent with good repetition & comprehension (able to name objects cell phone, paper)    CNs: B/l cataract surgery. 3mm pupils not reactive to light b/l. VFF. EOMI no nystagmus, no diplopia. V1-3 intact to LT, well developed masseter muscles b/l. No facial asymmetry b/l, full eye closure strength b/l. Hearing grossly normal (rubbing fingers) b/l. Symmetric palate elevation in midline. Gag reflex deferred. Head turning & shoulder shrug intact b/l. Tongue midline, normal movements, no atrophy.    Motor: Normal muscle bulk & tone. No noticeable tremor or seizure. No pronator drift.              Deltoid	Biceps	Triceps	   R	5	5	5	5	  L	5	5	5	5	    	H-Flex	H-Ext	K-Flex	K-Ext	D-Flex	P-Flex  R	5	5	5	5	5	5	   L	5	5	5	5	5	5		     Sensation: Intact to LT throughout.     Cortical: Extinction on DSS (neglect): none    Reflexes:              Biceps(C5)       BR(C6)     Patellar(L4)    Achilles(S1)    Plantar Resp  R	2	          2	             2		    2		Down   L	2	          2	             2		    2		Down     Coordination:  No dysmetria to FTN    Gait: No postural instability. Normal stance and tandem gait.     LABORATORY:  CBC                       15.6   6.88  )-----------( 233      ( 08 Apr 2022 06:42 )             45.8     Chem 04-08    138  |  104  |  16  ----------------------------<  122<H>  4.3   |  21<L>  |  1.05    Ca    8.8      08 Apr 2022 06:42    TPro  6.3  /  Alb  4.2  /  TBili  0.4  /  DBili  x   /  AST  12  /  ALT  14  /  AlkPhos  92  04-07    LFTs LIVER FUNCTIONS - ( 07 Apr 2022 12:47 )  Alb: 4.2 g/dL / Pro: 6.3 g/dL / ALK PHOS: 92 U/L / ALT: 14 U/L / AST: 12 U/L / GGT: x           Coagulopathy   Lipid Panel   A1c   Cardiac enzymes     U/A Urinalysis Basic - ( 07 Apr 2022 12:47 )    Color: x / Appearance: x / SG: x / pH: x  Gluc: x / Ketone: x  / Bili: x / Urobili: x   Blood: x / Protein: x / Nitrite: x   Leuk Esterase: x / RBC: 1 /HPF / WBC 3 /HPF   Sq Epi: x / Non Sq Epi: 2 /HPF / Bacteria: Negative      CSF  Immunological  Other    STUDIES & IMAGING:  Studies (EKG, EEG, EMG, etc):     Radiology (XR, CT, MR, U/S, TTE/CATALINO):  < from: MR Head w/wo IV Cont (04.07.22 @ 20:13) >  IMPRESSION: Developmental venous anomaly is identified as described above.    --- End of Report ---    < end of copied text >   HPI:  71 y.o. F with PMH of HTN, anxiety, depression, prior SA (s/p geriatric psychiatry admission 1 year ago), diverticulitis presented to the ED on 4/7 due to nausea, light headedness, and anxiety. Neurology consulted for developmental venous anomaly on overnight MRI brain. At the time of the exam, light-headedness and nausea has resolved. For many years, pt had austin of anxiety in the morning upon waking up, then developed nausea. For the past 2 weeks, usual symptoms were accompanied by light headedness that came on when she started to go down on gabapentin. Pt takes Gabapentin 200 mg TID for mood per her psychiatrist, but it was making her fatigued and sleepy. Decided to go down with outpt psychiatrist and was brought down to gabapentin 200 mg BID. Ever since that change, pt has been having light headedness and was concerned for withdrawal. Lives at home with . Does not smoke, EtOH use, or illicit drug use. Independent with ADLs. Denies any headache, light-headedness, vertigo, double vision, blurry vision, chest pain, numbness/tingling/weakness in all extremities. Does not feel depressed or feeling of hurting herself at this time.     (Stroke only)  NIHSS: 0   MRS: 0    REVIEW OF SYSTEMS    A 10-system ROS was performed and is negative except for those items noted above and/or in the HPI.    PAST MEDICAL & SURGICAL HISTORY:  Anxiety    High cholesterol    HTN (hypertension)    Endometriosis    No significant past surgical history      FAMILY HISTORY:  No pertinent family history in first degree relatives      SOCIAL HISTORY: as noted in HPI    MEDICATIONS (HOME):  Home Medications:  clonazePAM 0.5 mg oral tablet: 1 tab(s) orally  (21 Jul 2021 16:16)  clonazePAM 1 mg oral tablet: 1 tab(s) orally once a day (21 Jul 2021 16:16)  hydroCHLOROthiazide 12.5 mg oral capsule: 1 cap(s) orally once a day (21 Jul 2021 16:16)  losartan 100 mg oral tablet: 1 tab(s) orally once a day (21 Jul 2021 16:16)  melatonin 5 mg oral tablet: 1 tab(s) orally once a day (at bedtime) (21 Jul 2021 16:16)  metoprolol tartrate 50 mg oral tablet: 1 tab(s) orally 2 times a day (21 Jul 2021 16:16)  mirtazapine 30 mg oral tablet: 1 tab(s) orally once a day (at bedtime) (21 Jul 2021 16:16)  pantoprazole 40 mg oral delayed release tablet: 1 tab(s) orally  (21 Jul 2021 16:16)  venlafaxine 37.5 mg oral capsule, extended release: 5 cap(s) orally once a day (21 Jul 2021 16:16)    MEDICATIONS  (STANDING):  atorvastatin 10 milliGRAM(s) Oral at bedtime  clonazePAM  Tablet 1 milliGRAM(s) Oral daily  gabapentin 200 milliGRAM(s) Oral three times a day  hydrochlorothiazide 12.5 milliGRAM(s) Oral daily  losartan 100 milliGRAM(s) Oral daily  metoprolol succinate ER 50 milliGRAM(s) Oral two times a day  mirtazapine 37.5 milliGRAM(s) Oral at bedtime  ubiquinone 200 milliGRAM(s) Oral at bedtime  venlafaxine XR. 187.5 milliGRAM(s) Oral daily    MEDICATIONS  (PRN):    ALLERGIES/INTOLERANCES:  Allergies  penicillins (Anaphylaxis)    Intolerances    VITALS & EXAMINATION:  Vital Signs Last 24 Hrs  T(C): 37 (08 Apr 2022 10:00), Max: 37.1 (07 Apr 2022 11:13)  T(F): 98.6 (08 Apr 2022 10:00), Max: 98.7 (07 Apr 2022 11:13)  HR: 79 (08 Apr 2022 10:00) (65 - 87)  BP: 123/65 (08 Apr 2022 10:00) (115/73 - 126/89)  BP(mean): --  RR: 20 (08 Apr 2022 10:00) (16 - 20)  SpO2: 97% (08 Apr 2022 10:00) (95% - 100%)    General:  Constitutional: Obese Female, appears stated age, in no apparent distress including pain  Head: Normocephalic & atraumatic.    Neurological (>12):  MS: Eyes open. Responds well to verbal stimuli. Awake, alert, oriented to person, place, situation, time. Normal affect. Follows all commands.    Language: Speech is clear, fluent with good repetition & comprehension (able to name objects cell phone, paper)    CNs: B/l cataract surgery. 3mm pupils not reactive to light b/l. VFF. EOMI no nystagmus, no diplopia. V1-3 intact to LT, well developed masseter muscles b/l. No facial asymmetry b/l, full eye closure strength b/l. Hearing grossly normal (rubbing fingers) b/l. Symmetric palate elevation in midline. Gag reflex deferred. Head turning & shoulder shrug intact b/l. Tongue midline, normal movements, no atrophy.    Motor: Normal muscle bulk & tone. No noticeable tremor or seizure. No pronator drift.              Deltoid	Biceps	Triceps	   R	5	5	5	5	  L	5	5	5	5	    	H-Flex	H-Ext	K-Flex	K-Ext	D-Flex	P-Flex  R	5	5	5	5	5	5	   L	5	5	5	5	5	5		     Sensation: Intact to LT throughout.     Cortical: Extinction on DSS (neglect): none    Reflexes:              Biceps(C5)       BR(C6)     Patellar(L4)    Achilles(S1)    Plantar Resp  R	2	          2	             2		    2		Down   L	2	          2	             2		    2		Down     Coordination:  No dysmetria to FTN    Gait: No postural instability. Normal stance and tandem gait.     LABORATORY:  CBC                       15.6   6.88  )-----------( 233      ( 08 Apr 2022 06:42 )             45.8     Chem 04-08    138  |  104  |  16  ----------------------------<  122<H>  4.3   |  21<L>  |  1.05    Ca    8.8      08 Apr 2022 06:42    TPro  6.3  /  Alb  4.2  /  TBili  0.4  /  DBili  x   /  AST  12  /  ALT  14  /  AlkPhos  92  04-07    LFTs LIVER FUNCTIONS - ( 07 Apr 2022 12:47 )  Alb: 4.2 g/dL / Pro: 6.3 g/dL / ALK PHOS: 92 U/L / ALT: 14 U/L / AST: 12 U/L / GGT: x           Coagulopathy   Lipid Panel   A1c   Cardiac enzymes     U/A Urinalysis Basic - ( 07 Apr 2022 12:47 )    Color: x / Appearance: x / SG: x / pH: x  Gluc: x / Ketone: x  / Bili: x / Urobili: x   Blood: x / Protein: x / Nitrite: x   Leuk Esterase: x / RBC: 1 /HPF / WBC 3 /HPF   Sq Epi: x / Non Sq Epi: 2 /HPF / Bacteria: Negative      CSF  Immunological  Other    STUDIES & IMAGING:  Studies (EKG, EEG, EMG, etc):     Radiology (XR, CT, MR, U/S, TTE/CATALINO):  < from: MR Head w/wo IV Cont (04.07.22 @ 20:13) >  IMPRESSION: Developmental venous anomaly is identified as described above.    --- End of Report ---    < end of copied text >

## 2022-04-08 NOTE — ED CDU PROVIDER DISPOSITION NOTE - CLINICAL COURSE
CDU events:  Trop 7 --> <6,  TTE unremarkable, CT head and CT chest unremarkable.  MRI brain showed abnormal enhancement seen involving the right posterior temporal/frontal region which is compatible with a developmental venous anomaly.  The latter finding d/w Neurology, who believe this is a chronic finding that does not account for her symptoms.    Impression:  resolved light-headedness, chronic nausea of unk etiology.  Patient has a normal exam and is feeling well, albeit with persistent unchanged chronic nausea.    Plan:  dc home, f/u PMD, strict return precautions.  Spoke with patient extensively regarding current differential diagnosis for ongoing symptoms, and patient acknowledged understanding. All questions and concerns have been addressed with the patient. I have discussed the plan for care and patient is in agreement. Patient is instructed to follow up with & Primary Care Provider, and has been given strict return precautions.

## 2022-04-08 NOTE — ED CDU PROVIDER SUBSEQUENT DAY NOTE - NSICDXPASTSURGICALHX_GEN_ALL_CORE_FT
PAST SURGICAL HISTORY:  No significant past surgical history      Implemented All Fall with Harm Risk Interventions:  [place] to call system. Call bell, personal items and telephone within reach. Instruct patient to call for assistance. Room bathroom lighting operational. Non-slip footwear when patient is off stretcher. Physically safe environment: no spills, clutter or unnecessary equipment. Stretcher in lowest position, wheels locked, appropriate side rails in place. Provide visual cue, wrist band, yellow gown, etc. Monitor gait and stability. Monitor for mental status changes and reorient to person, place, and time. Review medications for side effects contributing to fall risk. Reinforce activity limits and safety measures with patient and family. Provide visual clues: red socks.

## 2022-04-09 LAB
CULTURE RESULTS: SIGNIFICANT CHANGE UP
SPECIMEN SOURCE: SIGNIFICANT CHANGE UP

## 2022-04-12 PROCEDURE — 90834 PSYTX W PT 45 MINUTES: CPT | Mod: 95

## 2022-04-20 PROCEDURE — 99443: CPT | Mod: 95

## 2022-04-21 NOTE — BH TREATMENT PLAN - NSTXANXGOAL_PSY_ALL_CORE
Identify and practice 3 coping skills to manage anxiety
Report a reduction in panic attacks and improving mood and confidence
Identify and practice 3 coping skills to manage anxiety
Report a reduction in panic attacks and improving mood and confidence
Report a reduction in panic attacks and improving mood and confidence
Female

## 2022-04-24 NOTE — ED ADULT TRIAGE NOTE - NS ED NURSE BANDS TYPE
Behavioral Health IP Nursing Progress Note    Suicidal Ideation:  denies     Current C-SSRS score: Negative screen= no ideation, behaviors or history (04/23/22 1900)      Protective Factors / Reason for Living: Social supports, [employer] beliefs    Interventions:   · Implemented the Safety / Recovery Plan    Other Interventions Implemented:  · Visual inspection of patient's environment completed. Items removed: none    Subjective: Patient denies all symptoms of concern at this time.     Objective:   Mental Health: Patient behavior observed to be up on unit, interactive with peers. Superficial, brief responses to questions during assessments. Accepted scheduled medications with prompting. Appears distracted and impatient at times but overall cooperative and self-directed on the unit.      Medical:   • None this shift     Assessment / Actions:   PRN Medications given?   No    Plan:   Treatment Plan reviewed.     Name band;

## 2022-04-26 PROCEDURE — 90834 PSYTX W PT 45 MINUTES: CPT | Mod: 95

## 2022-05-03 PROCEDURE — 90834 PSYTX W PT 45 MINUTES: CPT | Mod: 95

## 2022-05-03 PROCEDURE — 99215 OFFICE O/P EST HI 40 MIN: CPT | Mod: 95

## 2022-05-10 ENCOUNTER — APPOINTMENT (OUTPATIENT)
Dept: GASTROENTEROLOGY | Facility: CLINIC | Age: 72
End: 2022-05-10
Payer: COMMERCIAL

## 2022-05-10 PROCEDURE — 99443: CPT

## 2022-05-10 PROCEDURE — 90834 PSYTX W PT 45 MINUTES: CPT | Mod: 95

## 2022-05-10 RX ORDER — ONDANSETRON 8 MG/1
8 TABLET, ORALLY DISINTEGRATING ORAL
Qty: 60 | Refills: 1 | Status: ACTIVE | COMMUNITY
Start: 2022-05-10 | End: 1900-01-01

## 2022-05-10 RX ORDER — DEXLANSOPRAZOLE 30 MG/1
30 CAPSULE, DELAYED RELEASE ORAL
Qty: 60 | Refills: 3 | Status: DISCONTINUED | COMMUNITY
Start: 2022-04-01 | End: 2022-05-10

## 2022-05-10 RX ORDER — METOCLOPRAMIDE 10 MG/1
10 TABLET ORAL
Qty: 30 | Refills: 1 | Status: DISCONTINUED | COMMUNITY
Start: 2020-12-01 | End: 2022-05-10

## 2022-05-10 NOTE — HISTORY OF PRESENT ILLNESS
[Home] : at home, [unfilled] , at the time of the visit. [Other Location: e.g. Home (Enter Location, City,State)___] : at [unfilled] [Verbal consent obtained from patient] : the patient, [unfilled] [FreeTextEntry1] : Patient is a 72 year-old female referred by Dr. Grover for complaints of  nausea especially in the morning.  She has a marked generalized anxiety disorder for which she takes several medications including Effexor, mirtazapine, gabapentin, and Klonopin.  She also takes Zofran 8mg on an as needed basis.  The symptoms are associated with decreased appetite.  She had an upper endoscopy in 6/2020 that was essentially normal.  There was no hiatus hernia or evidence of esophagitis or gastritis.  Pathology revealed chronic nonspecific gastritis with negative H. pylori.  Esophageal biopsies revealed reflux type esophagitis.    She denies any dysphagia.\par 4 months ago, patient developed abdominal pain and was referred for a CAT scan which revealed noncomplicated acute diverticulitis.  She was treated with Cipro/Flagyl with relief of her symptoms and resolution of her diverticulitis.  She no longer has any abdominal pain and her bowel movements are regular.  She denies seeing any blood in the stool and has no abdominal pain.  She apparently underwent a colonoscopy 2-3 years ago which was normal.\par \par Patient is still complaining of nausea in the morning but the symptoms seem to be much better since she was started on Abilify and her gabapentin has been continued.  She seems to get the nausea have to taking medication so she does not take her medication all at once.  She takes Zofran ODT which seems to work better in the morning with fairly good control of her symptoms.\par She did not have any relief with Dexilant or probiotics.  She does take papaya several times a day which she claims has helped her symptoms.

## 2022-05-10 NOTE — ASSESSMENT
[FreeTextEntry1] : Patient's anxiety disorder seems to be under much better control with Abilify and continued gabapentin.  She still experiences bouts of nausea in the morning after taking her medication.  She takes Zofran ODT 8 mg in the morning with fairly good control of her symptoms.\par She is continuing to see her therapist.\par Time spent in counseling 25 min.

## 2022-05-17 PROCEDURE — 90834 PSYTX W PT 45 MINUTES: CPT | Mod: 95

## 2022-05-23 PROCEDURE — 99214 OFFICE O/P EST MOD 30 MIN: CPT | Mod: 95

## 2022-05-24 PROCEDURE — 90834 PSYTX W PT 45 MINUTES: CPT | Mod: 95

## 2022-05-31 PROCEDURE — 90834 PSYTX W PT 45 MINUTES: CPT | Mod: 95

## 2022-06-10 PROCEDURE — 99214 OFFICE O/P EST MOD 30 MIN: CPT | Mod: 95

## 2022-06-11 NOTE — ED ADULT TRIAGE NOTE - NS_BH TRG Q4YES_ED_ALL_ED
No
pt comes to ED with fever since last night. crying a lot. pt is sleeping on arrival. breaths equal and non-labored b/l  tylenol at 1000. reports less output, 8 wet diapers in 24 hours, wet diaper in triage   up to date on vaccinations auscultated hr consistent with v/s machine

## 2022-06-21 ENCOUNTER — APPOINTMENT (OUTPATIENT)
Dept: GASTROENTEROLOGY | Facility: CLINIC | Age: 72
End: 2022-06-21

## 2022-06-28 ENCOUNTER — APPOINTMENT (OUTPATIENT)
Dept: GASTROENTEROLOGY | Facility: CLINIC | Age: 72
End: 2022-06-28
Payer: COMMERCIAL

## 2022-06-28 VITALS
HEART RATE: 85 BPM | SYSTOLIC BLOOD PRESSURE: 142 MMHG | RESPIRATION RATE: 17 BRPM | OXYGEN SATURATION: 96 % | TEMPERATURE: 96.2 F | DIASTOLIC BLOOD PRESSURE: 82 MMHG

## 2022-06-28 DIAGNOSIS — R63.0 ANOREXIA: ICD-10-CM

## 2022-06-28 PROCEDURE — 99213 OFFICE O/P EST LOW 20 MIN: CPT

## 2022-06-28 NOTE — HISTORY OF PRESENT ILLNESS
[de-identified] : Patient is a 72 year-old female referred by Dr. Grover for complaints of nausea especially in the morning. She has a marked generalized anxiety disorder for which she takes several medications including Effexor, mirtazapine, gabapentin, and Klonopin. She also takes Zofran 8mg on an as needed basis. The symptoms are associated with decreased appetite. She had an upper endoscopy in 6/2020 that was essentially normal. There was no hiatus hernia or evidence of esophagitis or gastritis. Pathology revealed chronic nonspecific gastritis with negative H. pylori. Esophageal biopsies revealed reflux type esophagitis. She denies any dysphagia.\par 4 months ago, patient developed abdominal pain and was referred for a CAT scan which revealed noncomplicated acute diverticulitis. She was treated with Cipro/Flagyl with relief of her symptoms and resolution of her diverticulitis. She no longer has any abdominal pain and her bowel movements are regular. She denies seeing any blood in the stool and has no abdominal pain. She apparently underwent a colonoscopy 2-3 years ago which was normal.\par \par Patient is still complaining of nausea in the morning but the symptoms seem to be much better since she was started on Abilify and her gabapentin has been continued. She seems to get the nausea have to taking medication so she does not take her medication all at once. She takes Zofran ODT which seems to work better in the morning with fairly good control of her symptoms.\par She did not have any relief with Dexilant or probiotics. She does take papaya several times a day which she claims has helped her symptoms. \par

## 2022-06-28 NOTE — PHYSICAL EXAM
[General Appearance - Alert] : alert [General Appearance - In No Acute Distress] : in no acute distress [Sclera] : the sclera and conjunctiva were normal [PERRL With Normal Accommodation] : pupils were equal in size, round, and reactive to light [Extraocular Movements] : extraocular movements were intact [Outer Ear] : the ears and nose were normal in appearance [Oropharynx] : the oropharynx was normal [Neck Appearance] : the appearance of the neck was normal [Neck Cervical Mass (___cm)] : no neck mass was observed [Jugular Venous Distention Increased] : there was no jugular-venous distention [Thyroid Diffuse Enlargement] : the thyroid was not enlarged [Thyroid Nodule] : there were no palpable thyroid nodules [] : no respiratory distress [Auscultation Breath Sounds / Voice Sounds] : lungs were clear to auscultation bilaterally [Heart Rate And Rhythm] : heart rate was normal and rhythm regular [Heart Sounds] : normal S1 and S2 [Heart Sounds Gallop] : no gallops [Murmurs] : no murmurs [Heart Sounds Pericardial Friction Rub] : no pericardial rub [Flat] : flat [Obese] : obese [Soft, Nontender] : the abdomen was soft and nontender [Normal] : normal to percussion [Cervical Lymph Nodes Enlarged Posterior Bilaterally] : posterior cervical [Cervical Lymph Nodes Enlarged Anterior Bilaterally] : anterior cervical [Supraclavicular Lymph Nodes Enlarged Bilaterally] : supraclavicular [Axillary Lymph Nodes Enlarged Bilaterally] : axillary [Femoral Lymph Nodes Enlarged Bilaterally] : femoral [Inguinal Lymph Nodes Enlarged Bilaterally] : inguinal [No CVA Tenderness] : no ~M costovertebral angle tenderness [No Spinal Tenderness] : no spinal tenderness [Abnormal Walk] : normal gait [Nail Clubbing] : no clubbing  or cyanosis of the fingernails [Musculoskeletal - Swelling] : no joint swelling seen [Motor Tone] : muscle strength and tone were normal [Deep Tendon Reflexes (DTR)] : deep tendon reflexes were 2+ and symmetric [Sensation] : the sensory exam was normal to light touch and pinprick [No Focal Deficits] : no focal deficits [Oriented To Time, Place, And Person] : oriented to person, place, and time [Impaired Insight] : insight and judgment were intact [Affect] : the affect was normal [Firm] : not firm [Rigid] : not rigid [Rebound] : no rebound [Guarding] : no guarding [Torres's] : a negative Torres's sign

## 2022-06-28 NOTE — ASSESSMENT
[FreeTextEntry1] : Attending Attestation \par \par Patient's anxiety disorder seems to be exacerbated and she is not feeling in control. She still experiences bouts of nausea in the morning after taking her medication. She takes Zofran ODT 8 mg in the morning with acute poor response regarding her symptoms.\par She is continuing to see her therapist.\par \par \par As per  Dr Her patient will take Zofran 4 mg PO daily, Famotidine 40 mg PO at 6pm and Gaviscon 11pm. Medication reviewed side effects and adverse reactions. \par \par She will f/u with Dr Her in 6 weeks \par \par Time spent with patient 25 min \par \par Patient verbalized understanding of all information provided. All questions answered and reviewed.

## 2022-06-29 RX ORDER — ONDANSETRON 4 MG/1
4 TABLET ORAL
Qty: 30 | Refills: 2 | Status: ACTIVE | COMMUNITY
Start: 2022-06-29

## 2022-06-29 RX ORDER — FAMOTIDINE 40 MG/1
40 TABLET, FILM COATED ORAL
Qty: 30 | Refills: 5 | Status: ACTIVE | COMMUNITY
Start: 2020-06-16 | End: 1900-01-01

## 2022-06-29 RX ORDER — ONDANSETRON 4 MG/1
4 TABLET ORAL
Qty: 60 | Refills: 3 | Status: ACTIVE | COMMUNITY
Start: 2022-04-04 | End: 1900-01-01

## 2022-07-01 PROCEDURE — 99214 OFFICE O/P EST MOD 30 MIN: CPT | Mod: 95

## 2022-07-12 PROCEDURE — 90834 PSYTX W PT 45 MINUTES: CPT | Mod: 95

## 2022-07-17 ENCOUNTER — TRANSCRIPTION ENCOUNTER (OUTPATIENT)
Age: 72
End: 2022-07-17

## 2022-07-26 PROCEDURE — 90834 PSYTX W PT 45 MINUTES: CPT | Mod: 95

## 2022-08-02 PROCEDURE — 99443: CPT

## 2022-08-05 PROCEDURE — 99214 OFFICE O/P EST MOD 30 MIN: CPT | Mod: 95

## 2022-08-06 NOTE — ED PROVIDER NOTE - CPE EDP RESP NORM
· [place] noted on initial admission to [facility] with elevated Cr 1 64, now resolved  · Baseline Cr being 0 9-1 0  · Continue to monitor closely   Losartan on hold  · Avoid nephrotoxic agents normal...

## 2022-08-09 ENCOUNTER — APPOINTMENT (OUTPATIENT)
Dept: GASTROENTEROLOGY | Facility: CLINIC | Age: 72
End: 2022-08-09

## 2022-08-09 VITALS
SYSTOLIC BLOOD PRESSURE: 121 MMHG | BODY MASS INDEX: 41.41 KG/M2 | HEIGHT: 62 IN | HEART RATE: 85 BPM | WEIGHT: 225 LBS | TEMPERATURE: 97.6 F | OXYGEN SATURATION: 95 % | DIASTOLIC BLOOD PRESSURE: 85 MMHG

## 2022-08-09 DIAGNOSIS — Z09 ENCOUNTER FOR FOLLOW-UP EXAMINATION AFTER COMPLETED TREATMENT FOR CONDITIONS OTHER THAN MALIGNANT NEOPLASM: ICD-10-CM

## 2022-08-09 DIAGNOSIS — Z87.19 PERSONAL HISTORY OF OTHER DISEASES OF THE DIGESTIVE SYSTEM: ICD-10-CM

## 2022-08-09 PROCEDURE — 99214 OFFICE O/P EST MOD 30 MIN: CPT

## 2022-08-09 PROCEDURE — 90834 PSYTX W PT 45 MINUTES: CPT | Mod: 95

## 2022-08-09 RX ORDER — GABAPENTIN 100 MG/1
100 CAPSULE ORAL
Qty: 180 | Refills: 0 | Status: ACTIVE | COMMUNITY
Start: 2022-02-22

## 2022-08-09 RX ORDER — HYDROCHLOROTHIAZIDE 12.5 MG/1
12.5 CAPSULE ORAL
Qty: 30 | Refills: 0 | Status: ACTIVE | COMMUNITY
Start: 2022-03-01

## 2022-08-09 RX ORDER — AZELASTINE HYDROCHLORIDE 137 UG/1
0.1 SPRAY, METERED NASAL
Qty: 30 | Refills: 0 | Status: ACTIVE | COMMUNITY
Start: 2022-03-15

## 2022-08-09 RX ORDER — METOPROLOL SUCCINATE 50 MG/1
50 TABLET, EXTENDED RELEASE ORAL
Qty: 180 | Refills: 0 | Status: ACTIVE | COMMUNITY
Start: 2022-05-27

## 2022-08-09 RX ORDER — MAG/ALUMINUM/SOD BICARB/ALGINC 20 MG-80MG
80-20 TABLET,CHEWABLE ORAL
Qty: 60 | Refills: 0 | Status: ACTIVE | COMMUNITY
Start: 2020-06-16

## 2022-08-09 RX ORDER — MIRTAZAPINE 7.5 MG/1
7.5 TABLET, FILM COATED ORAL
Qty: 30 | Refills: 0 | Status: ACTIVE | COMMUNITY
Start: 2022-03-24

## 2022-08-09 RX ORDER — ONDANSETRON 4 MG/1
4 TABLET, ORALLY DISINTEGRATING ORAL
Qty: 9 | Refills: 0 | Status: ACTIVE | COMMUNITY
Start: 2022-04-08

## 2022-08-09 RX ORDER — FLUTICASONE PROPIONATE 50 UG/1
50 SPRAY, METERED NASAL
Qty: 16 | Refills: 0 | Status: ACTIVE | COMMUNITY
Start: 2022-03-15

## 2022-08-09 RX ORDER — ATORVASTATIN CALCIUM 10 MG/1
10 TABLET, FILM COATED ORAL
Qty: 90 | Refills: 0 | Status: ACTIVE | COMMUNITY
Start: 2022-02-14

## 2022-08-09 RX ORDER — BRIMONIDINE TARTRATE 2 MG/MG
0.2 SOLUTION/ DROPS OPHTHALMIC
Qty: 5 | Refills: 0 | Status: ACTIVE | COMMUNITY
Start: 2022-05-05

## 2022-08-09 RX ORDER — CIPROFLOXACIN HYDROCHLORIDE 500 MG/1
500 TABLET, FILM COATED ORAL
Qty: 20 | Refills: 0 | Status: ACTIVE | COMMUNITY
Start: 2022-02-16

## 2022-08-09 RX ORDER — MIRTAZAPINE 45 MG/1
45 TABLET, FILM COATED ORAL
Qty: 30 | Refills: 0 | Status: ACTIVE | COMMUNITY
Start: 2022-04-20

## 2022-08-09 RX ORDER — MIRTAZAPINE 30 MG/1
30 TABLET, FILM COATED ORAL
Qty: 30 | Refills: 0 | Status: ACTIVE | COMMUNITY
Start: 2022-02-21

## 2022-08-09 NOTE — HISTORY OF PRESENT ILLNESS
[FreeTextEntry1] : Patient is a 72 year-old female referred by Dr. Grover for complaints of  nausea especially in the morning.  She has a marked generalized anxiety disorder for which she takes several medications including Effexor, mirtazapine, gabapentin, and Klonopin.  She also takes Zofran 8mg on an as needed basis.  The symptoms are associated with decreased appetite.  She had an upper endoscopy in 6/2020 that was essentially normal.  There was no hiatus hernia or evidence of esophagitis or gastritis.  Pathology revealed chronic nonspecific gastritis with negative H. pylori.  Esophageal biopsies revealed reflux type esophagitis.    She denies any dysphagia.\par Months ago, patient developed abdominal pain and was referred for a CAT scan which revealed noncomplicated acute diverticulitis.  She was treated with Cipro/Flagyl with relief of her symptoms and resolution of her diverticulitis.  She no longer has any abdominal pain and her bowel movements are regular.  She denies seeing any blood in the stool and has no abdominal pain.  She apparently underwent a colonoscopy 2-3 years ago which was normal.\par \par Patient has had multiple attacks of anxiety/depression.  She was hospitalized on several occasions.  She is under psychiatric care at this time.  Her antidepressant was changed from Abilify to Rexulti.  She seems to be doing better on this regimen.  She continues to take gabapentin, metoprolol, mirtazapine, Effexor, and Klonopin.\par She still gets occasional nausea in the morning which is relieved with Zofran 8 mg.  She has no episodes of heartburn or vomiting.

## 2022-08-09 NOTE — REVIEW OF SYSTEMS
[Feeling Poorly] : feeling poorly [Palpitations] : palpitations [As Noted in HPI] : as noted in HPI [Anxiety] : anxiety [Depression] : depression [Negative] : Psychiatric

## 2022-08-09 NOTE — ASSESSMENT
[FreeTextEntry1] : Patient seems to be doing better as far as her psychiatric disorder is concerned.  She is under a psychiatric care.  She was taken off Abilify and started on Rexulti.\par As far as her GI symptoms are concerned, she is off famotidine and pantoprazole.  She continues Zofran and Gaviscon with good relief of her symptoms.  Her nausea seems to be under better control.

## 2022-08-16 PROCEDURE — 99214 OFFICE O/P EST MOD 30 MIN: CPT | Mod: 95

## 2022-09-06 PROCEDURE — 90834 PSYTX W PT 45 MINUTES: CPT | Mod: 95

## 2022-09-22 PROCEDURE — 90834 PSYTX W PT 45 MINUTES: CPT | Mod: 95

## 2022-10-04 PROCEDURE — 90834 PSYTX W PT 45 MINUTES: CPT | Mod: 95

## 2022-10-11 PROCEDURE — 99442: CPT

## 2022-10-13 PROCEDURE — 99214 OFFICE O/P EST MOD 30 MIN: CPT | Mod: 95

## 2022-10-17 NOTE — ED ADULT NURSE NOTE - CAS TRG GEN SKIN COLOR
Pink/Normal for race Tazorac Pregnancy And Lactation Text: This medication is not safe during pregnancy. It is unknown if this medication is excreted in breast milk.

## 2022-11-01 PROCEDURE — 90834 PSYTX W PT 45 MINUTES: CPT | Mod: 95

## 2022-11-15 PROCEDURE — 90834 PSYTX W PT 45 MINUTES: CPT | Mod: 95

## 2022-11-29 PROCEDURE — 90834 PSYTX W PT 45 MINUTES: CPT | Mod: 95

## 2022-12-05 PROCEDURE — 99214 OFFICE O/P EST MOD 30 MIN: CPT | Mod: 95

## 2022-12-13 PROCEDURE — 90834 PSYTX W PT 45 MINUTES: CPT | Mod: 95

## 2023-01-06 PROCEDURE — 99214 OFFICE O/P EST MOD 30 MIN: CPT | Mod: 95

## 2023-01-19 NOTE — ED PROVIDER NOTE - INPATIENT RESIDENT/ACP NOTIFIED DATE
"Subjective:       Patient ID: Terrence Marino is a 10 y.o. male.    Vitals:  height is 4' 5.54" (1.36 m) and weight is 26.8 kg (59 lb). His temperature is 98.4 °F (36.9 °C). His blood pressure is 99/58 (abnormal) and his pulse is 74. His respiration is 20 and oxygen saturation is 100%.     Chief Complaint: Fever, Fatigue, Sore Throat, Abdominal Pain, and Headache    An 11 y/o male presents to the clinic with c/o throat pain, fatigue, stomach pain, HA, fever 100.2* x yesterday morning. Mother and patient deny any history of asthma, wheezing, sob, cp, n/v/d, abdominal complaints, rash, difficulty swallowing, neck stiffness, or changes in intake or output. She reports multiple illnesses since November. Discussed follow up with the pediatrician to discuss if further work up is needed. Mother verbalized understanding.         Fever  Associated symptoms include abdominal pain, congestion, fatigue, a fever, headaches and a sore throat.   Fatigue  Associated symptoms include abdominal pain, congestion, fatigue, a fever, headaches and a sore throat.   Sore Throat  Associated symptoms include abdominal pain, congestion, fatigue, a fever, headaches and a sore throat.   Abdominal Pain  Associated symptoms include a fever, headaches and a sore throat.   Headache  Associated symptoms include abdominal pain, a fever and a sore throat.     Constitution: Positive for fatigue and fever.   HENT:  Positive for congestion and sore throat. Negative for trouble swallowing.    Neck: neck negative.   Cardiovascular: Negative.    Eyes: Negative.    Gastrointestinal:  Positive for abdominal pain.   Endocrine: negative.   Skin: Negative.    Neurological:  Positive for headaches.     Objective:      Physical Exam   Constitutional: He appears well-developed. He is active and cooperative.  Non-toxic appearance. He does not appear ill. No distress.   HENT:   Head: Normocephalic and atraumatic. No signs of injury. There is normal jaw occlusion. "   Ears:   Right Ear: Tympanic membrane and external ear normal.   Left Ear: Tympanic membrane and external ear normal.   Nose: Nose normal. No rhinorrhea (post nasal drip present) or congestion. No signs of injury. No epistaxis in the right nostril. No epistaxis in the left nostril.   Mouth/Throat: Mucous membranes are moist. Posterior oropharyngeal erythema present. No oropharyngeal exudate.   Eyes: Conjunctivae and lids are normal. Visual tracking is normal. Right eye exhibits no discharge and no exudate. Left eye exhibits no discharge and no exudate. No scleral icterus.   Neck: Trachea normal. Neck supple. No neck rigidity present.   Cardiovascular: Normal rate and regular rhythm. Pulses are strong.   Pulmonary/Chest: Effort normal and breath sounds normal. No stridor. No respiratory distress. He has no wheezes. He exhibits no retraction.   Abdominal: Normal appearance and bowel sounds are normal. He exhibits no distension. Soft. flat abdomen There is generalized abdominal tenderness. There is no rebound, no guarding and negative Rovsing's sign.   Musculoskeletal:      Cervical back: He exhibits no tenderness.   Lymphadenopathy:     He has cervical adenopathy.   Neurological: He is alert.   Skin: Skin is warm, dry, not diaphoretic and no rash. Capillary refill takes less than 2 seconds. No abrasion, No burn and No bruising   Psychiatric: His speech is normal and behavior is normal. Mood normal.   Nursing note and vitals reviewed.         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      No current outpatient medications  Past Surgical History:   Procedure Laterality Date    REDUCTION OF TESTICULAR TORSION Right      Family History   Problem Relation Age of Onset    No Known Problems Mother     No Known Problems Father     No Known Problems Sister     No Known Problems Brother        Social History     Tobacco  "Use    Smoking status: Never    Smokeless tobacco: Never   Substance Use Topics    Alcohol use: Never    Drug use: Never        Physical Exam      Vital Signs Reviewed   BP (!) 99/58   Pulse 74   Temp 98.4 °F (36.9 °C)   Resp 20   Ht 4' 5.54" (1.36 m)   Wt 26.8 kg (59 lb)   SpO2 100%   BMI 14.47 kg/m²        Procedures    Procedures     Labs     Results for orders placed or performed in visit on 01/19/23   POCT COVID-19 Rapid Screening   Result Value Ref Range    POC Rapid COVID Negative Negative     Acceptable Yes    POCT Influenza A/B MOLECULAR   Result Value Ref Range    POC Molecular Influenza A Ag Negative Negative, Not Reported    POC Molecular Influenza B Ag Negative Negative, Not Reported     Acceptable Yes    POCT Strep A, Molecular   Result Value Ref Range    Molecular Strep A, POC Negative Negative     Acceptable Yes        Assessment:       1. Sore throat          Plan:         Sore throat  -     POCT COVID-19 Rapid Screening  -     POCT Influenza A/B MOLECULAR  -     POCT Strep A, Molecular  -     POCT Infectious mononucleosis antibody    Start taking an allergy pill daily such as claritin, zyrtec, allegrea or xyzal.  Monitor for fever. Take tylenol/acetaminophen or ibuprofen as needed. Rest and hydrate. If symptoms persist or worsen, return to clinic or seek medical attention immediately.     As discussed it may be to early to test. If symptoms persist or worsen return to clinic tomorrow for reswab as a nurse visit.   Follow up with your pediatrician for further work up.                    " 07-Apr-2022 15:07

## 2023-01-25 NOTE — ED ADULT NURSE NOTE - NSFALLRSKOUTCOME_ED_ALL_ED
New referral from Yue Howard MD for available MD. Please review and advise.    Universal Safety Interventions

## 2023-02-06 PROCEDURE — 99214 OFFICE O/P EST MOD 30 MIN: CPT | Mod: 95

## 2023-02-14 ENCOUNTER — APPOINTMENT (OUTPATIENT)
Dept: GASTROENTEROLOGY | Facility: CLINIC | Age: 73
End: 2023-02-14

## 2023-03-07 ENCOUNTER — EMERGENCY (EMERGENCY)
Facility: HOSPITAL | Age: 73
LOS: 0 days | Discharge: HOME | End: 2023-03-07
Attending: STUDENT IN AN ORGANIZED HEALTH CARE EDUCATION/TRAINING PROGRAM
Payer: COMMERCIAL

## 2023-03-07 VITALS
RESPIRATION RATE: 18 BRPM | HEART RATE: 81 BPM | TEMPERATURE: 98 F | DIASTOLIC BLOOD PRESSURE: 69 MMHG | OXYGEN SATURATION: 98 % | SYSTOLIC BLOOD PRESSURE: 133 MMHG

## 2023-03-07 VITALS
OXYGEN SATURATION: 98 % | WEIGHT: 225.97 LBS | TEMPERATURE: 98 F | DIASTOLIC BLOOD PRESSURE: 79 MMHG | SYSTOLIC BLOOD PRESSURE: 146 MMHG | HEART RATE: 75 BPM | HEIGHT: 62 IN | RESPIRATION RATE: 18 BRPM

## 2023-03-07 DIAGNOSIS — Z88.0 ALLERGY STATUS TO PENICILLIN: ICD-10-CM

## 2023-03-07 DIAGNOSIS — E78.00 PURE HYPERCHOLESTEROLEMIA, UNSPECIFIED: ICD-10-CM

## 2023-03-07 DIAGNOSIS — Z87.42 PERSONAL HISTORY OF OTHER DISEASES OF THE FEMALE GENITAL TRACT: ICD-10-CM

## 2023-03-07 DIAGNOSIS — I10 ESSENTIAL (PRIMARY) HYPERTENSION: ICD-10-CM

## 2023-03-07 DIAGNOSIS — R11.0 NAUSEA: ICD-10-CM

## 2023-03-07 DIAGNOSIS — F41.8 OTHER SPECIFIED ANXIETY DISORDERS: ICD-10-CM

## 2023-03-07 DIAGNOSIS — Z88.2 ALLERGY STATUS TO SULFONAMIDES: ICD-10-CM

## 2023-03-07 LAB
ALBUMIN SERPL ELPH-MCNC: 3.3 G/DL — SIGNIFICANT CHANGE UP (ref 3.3–5)
ALP SERPL-CCNC: 87 U/L — SIGNIFICANT CHANGE UP (ref 40–120)
ALT FLD-CCNC: 25 U/L — SIGNIFICANT CHANGE UP (ref 12–78)
ANION GAP SERPL CALC-SCNC: 7 MMOL/L — SIGNIFICANT CHANGE UP (ref 5–17)
AST SERPL-CCNC: 9 U/L — LOW (ref 15–37)
BASOPHILS # BLD AUTO: 0.04 K/UL — SIGNIFICANT CHANGE UP (ref 0–0.2)
BASOPHILS NFR BLD AUTO: 0.4 % — SIGNIFICANT CHANGE UP (ref 0–2)
BILIRUB SERPL-MCNC: 0.4 MG/DL — SIGNIFICANT CHANGE UP (ref 0.2–1.2)
BUN SERPL-MCNC: 10 MG/DL — SIGNIFICANT CHANGE UP (ref 7–23)
CALCIUM SERPL-MCNC: 8.8 MG/DL — SIGNIFICANT CHANGE UP (ref 8.5–10.1)
CHLORIDE SERPL-SCNC: 105 MMOL/L — SIGNIFICANT CHANGE UP (ref 96–108)
CO2 SERPL-SCNC: 30 MMOL/L — SIGNIFICANT CHANGE UP (ref 22–31)
CREAT SERPL-MCNC: 0.71 MG/DL — SIGNIFICANT CHANGE UP (ref 0.5–1.3)
EGFR: 90 ML/MIN/1.73M2 — SIGNIFICANT CHANGE UP
EOSINOPHIL # BLD AUTO: 0.18 K/UL — SIGNIFICANT CHANGE UP (ref 0–0.5)
EOSINOPHIL NFR BLD AUTO: 1.8 % — SIGNIFICANT CHANGE UP (ref 0–6)
GLUCOSE SERPL-MCNC: 90 MG/DL — SIGNIFICANT CHANGE UP (ref 70–99)
HCT VFR BLD CALC: 40.8 % — SIGNIFICANT CHANGE UP (ref 34.5–45)
HGB BLD-MCNC: 12.9 G/DL — SIGNIFICANT CHANGE UP (ref 11.5–15.5)
IMM GRANULOCYTES NFR BLD AUTO: 0.5 % — SIGNIFICANT CHANGE UP (ref 0–0.9)
LIDOCAIN IGE QN: 146 U/L — SIGNIFICANT CHANGE UP (ref 73–393)
LYMPHOCYTES # BLD AUTO: 2.75 K/UL — SIGNIFICANT CHANGE UP (ref 1–3.3)
LYMPHOCYTES # BLD AUTO: 28.1 % — SIGNIFICANT CHANGE UP (ref 13–44)
MAGNESIUM SERPL-MCNC: 2.3 MG/DL — SIGNIFICANT CHANGE UP (ref 1.6–2.6)
MCHC RBC-ENTMCNC: 28.2 PG — SIGNIFICANT CHANGE UP (ref 27–34)
MCHC RBC-ENTMCNC: 31.6 G/DL — LOW (ref 32–36)
MCV RBC AUTO: 89.3 FL — SIGNIFICANT CHANGE UP (ref 80–100)
MONOCYTES # BLD AUTO: 0.45 K/UL — SIGNIFICANT CHANGE UP (ref 0–0.9)
MONOCYTES NFR BLD AUTO: 4.6 % — SIGNIFICANT CHANGE UP (ref 2–14)
NEUTROPHILS # BLD AUTO: 6.33 K/UL — SIGNIFICANT CHANGE UP (ref 1.8–7.4)
NEUTROPHILS NFR BLD AUTO: 64.6 % — SIGNIFICANT CHANGE UP (ref 43–77)
NRBC # BLD: 0 /100 WBCS — SIGNIFICANT CHANGE UP (ref 0–0)
PLATELET # BLD AUTO: 196 K/UL — SIGNIFICANT CHANGE UP (ref 150–400)
POTASSIUM SERPL-MCNC: 3.5 MMOL/L — SIGNIFICANT CHANGE UP (ref 3.5–5.3)
POTASSIUM SERPL-SCNC: 3.5 MMOL/L — SIGNIFICANT CHANGE UP (ref 3.5–5.3)
PROT SERPL-MCNC: 6.3 GM/DL — SIGNIFICANT CHANGE UP (ref 6–8.3)
RBC # BLD: 4.57 M/UL — SIGNIFICANT CHANGE UP (ref 3.8–5.2)
RBC # FLD: 13.3 % — SIGNIFICANT CHANGE UP (ref 10.3–14.5)
SODIUM SERPL-SCNC: 142 MMOL/L — SIGNIFICANT CHANGE UP (ref 135–145)
TROPONIN I, HIGH SENSITIVITY RESULT: 4.2 NG/L — SIGNIFICANT CHANGE UP
WBC # BLD: 9.8 K/UL — SIGNIFICANT CHANGE UP (ref 3.8–10.5)
WBC # FLD AUTO: 9.8 K/UL — SIGNIFICANT CHANGE UP (ref 3.8–10.5)

## 2023-03-07 PROCEDURE — 99285 EMERGENCY DEPT VISIT HI MDM: CPT

## 2023-03-07 PROCEDURE — 93010 ELECTROCARDIOGRAM REPORT: CPT

## 2023-03-07 RX ORDER — SODIUM CHLORIDE 9 MG/ML
1000 INJECTION INTRAMUSCULAR; INTRAVENOUS; SUBCUTANEOUS ONCE
Refills: 0 | Status: COMPLETED | OUTPATIENT
Start: 2023-03-07 | End: 2023-03-07

## 2023-03-07 RX ORDER — METOCLOPRAMIDE HCL 10 MG
1 TABLET ORAL
Qty: 12 | Refills: 0
Start: 2023-03-07 | End: 2023-03-10

## 2023-03-07 RX ORDER — METOCLOPRAMIDE HCL 10 MG
10 TABLET ORAL ONCE
Refills: 0 | Status: COMPLETED | OUTPATIENT
Start: 2023-03-07 | End: 2023-03-07

## 2023-03-07 RX ORDER — PANTOPRAZOLE SODIUM 20 MG/1
1 TABLET, DELAYED RELEASE ORAL
Qty: 14 | Refills: 0
Start: 2023-03-07 | End: 2023-03-20

## 2023-03-07 RX ORDER — FAMOTIDINE 10 MG/ML
20 INJECTION INTRAVENOUS ONCE
Refills: 0 | Status: COMPLETED | OUTPATIENT
Start: 2023-03-07 | End: 2023-03-07

## 2023-03-07 RX ORDER — PROCHLORPERAZINE MALEATE 5 MG
10 TABLET ORAL ONCE
Refills: 0 | Status: COMPLETED | OUTPATIENT
Start: 2023-03-07 | End: 2023-03-07

## 2023-03-07 RX ADMIN — Medication 10 MILLIGRAM(S): at 21:03

## 2023-03-07 RX ADMIN — Medication 30 MILLILITER(S): at 18:33

## 2023-03-07 RX ADMIN — SODIUM CHLORIDE 1000 MILLILITER(S): 9 INJECTION INTRAMUSCULAR; INTRAVENOUS; SUBCUTANEOUS at 20:43

## 2023-03-07 RX ADMIN — FAMOTIDINE 20 MILLIGRAM(S): 10 INJECTION INTRAVENOUS at 18:33

## 2023-03-07 RX ADMIN — Medication 10 MILLIGRAM(S): at 18:33

## 2023-03-07 NOTE — ED ADULT NURSE NOTE - OBJECTIVE STATEMENT
AAOx3 pt presents to ED c/o nausea and vomiting x 6 days. Pt reports taking Zofran at home with no relief. Pt denies SOB, fever, chills, dizziness, diarrhea, and blurry vision. PMH: HTN, HLD, Anxiety/depression

## 2023-03-07 NOTE — ED PROVIDER NOTE - ATTENDING APP SHARED VISIT CONTRIBUTION OF CARE
no nasal congestion/no ear pain/no hearing difficulty 73 y/o F with PMH  HTN, HLD, anxiety, and depression presents to the ED w/ c/o nausea x 6 days.  Patient states her symptoms began after starting a nutrisystem diet. She reports feeling unwell, but denies vomiting, diarrhea, abdominal pain, fever, chills, or other acute symptoms. On exam, her vitals are stable. She has no significant abdominal tenderness. Will obtain labs to evaluate for metabolic abnormality. Will defer imaging given no abd tenderness. Attempt to treat with GI cocktail.

## 2023-03-07 NOTE — ED PROVIDER NOTE - NSFOLLOWUPINSTRUCTIONS_ED_ALL_ED_FT
You were seen in the ED for nausea.    Your labs were within normal limits.    You can take reglan as needed for nausea relief.    You should take protonix daily as symptoms may be due to acid reflux.    Follow up with your GI doctor.    SEEK IMMEDIATE MEDICAL CARE IF YOU HAVE ANY OF THE FOLLOWING SYMPTOMS: fever, inability to keep sufficient fluids down, black or bloody vomitus, black or bloody stools, lightheadedness/dizziness, chest pain, severe headache, rash, shortness of breath, cold or clammy skin, confusion, pain with urination, or any signs of dehydration.

## 2023-03-07 NOTE — ED PROVIDER NOTE - OBJECTIVE STATEMENT
72 y.o. female w/ pmhx of HTN, HLD, anxiety, and depression presents to the ED w/ c/o nausea x 6 days.  Pt reports generalized feeling of "unwellness."  No vomiting, diarrhea, or abdominal pain despite "severe nausea."  No fever, chills, chest pain, SOB, dizziness, or syncope.

## 2023-03-07 NOTE — ED PROVIDER NOTE - PATIENT PORTAL LINK FT
You can access the FollowMyHealth Patient Portal offered by [facility] by registering at the following website: [email or web address]. By joining [employer] [facility] [employer]’s FollowMyHealth portal, you will also be able to view your health information using other applications (apps) compatible with our system.

## 2023-03-07 NOTE — ED PROVIDER NOTE - PROGRESS NOTE DETAILS
TYSHAWN Gabriel NP:  Pt reports improvement in symptoms but still endorsing nausea.  Will order addt'l meds, start fluids.  Pending labs. Patient reports feeling improved. Labs WNL. Will send reglan and protonix to pharmacy. She has a GI doctor to f/u with. She is HD stable for discharge. Dawit Gentile DO

## 2023-03-07 NOTE — ED PROVIDER NOTE - PHYSICAL EXAMINATION
GEN: Pt in NAD, A&O x4  HEAD: Head NCAT, Neck supple FROM.  EYES: Sclera white w/o injection.   ENT: No nasal d/c. MMM. uvula midline, no oral lesions.  RESP: CTA b/l, no wheezes, rales, or rhonchi.   CARDIAC: RRR, clear distinct S1, S2, no appreciable murmurs.  ABD: Abdomen soft, non-tender. No CVAT b/l.  VASC: 2+ radial pulses b/l.  SKIN: No rashes, lacerations, or ecchymoses on the trunk.

## 2023-03-07 NOTE — ED ADULT TRIAGE NOTE - CHIEF COMPLAINT QUOTE
pt c/o nausea, loss of appetite and lightheaded for 5 days. denies vomiting or abdominal pain.  states, "I started a new diet recently.  these symptoms feel like the last time I had a UTI. " history of anxiety and depression. denies SI or HI

## 2023-03-08 LAB
APPEARANCE UR: CLEAR — SIGNIFICANT CHANGE UP
BACTERIA # UR AUTO: ABNORMAL
BILIRUB UR-MCNC: NEGATIVE — SIGNIFICANT CHANGE UP
COLOR SPEC: YELLOW — SIGNIFICANT CHANGE UP
DIFF PNL FLD: NEGATIVE — SIGNIFICANT CHANGE UP
EPI CELLS # UR: SIGNIFICANT CHANGE UP
GLUCOSE UR QL: NEGATIVE MG/DL — SIGNIFICANT CHANGE UP
HYALINE CASTS # UR AUTO: ABNORMAL /LPF
KETONES UR-MCNC: NEGATIVE — SIGNIFICANT CHANGE UP
LEUKOCYTE ESTERASE UR-ACNC: ABNORMAL
NITRITE UR-MCNC: NEGATIVE — SIGNIFICANT CHANGE UP
PH UR: 8 — SIGNIFICANT CHANGE UP (ref 5–8)
PROT UR-MCNC: 15 MG/DL
RBC CASTS # UR COMP ASSIST: SIGNIFICANT CHANGE UP /HPF (ref 0–4)
SP GR SPEC: 1.01 — SIGNIFICANT CHANGE UP (ref 1.01–1.02)
UROBILINOGEN FLD QL: NEGATIVE MG/DL — SIGNIFICANT CHANGE UP
WBC UR QL: SIGNIFICANT CHANGE UP

## 2023-03-09 LAB
CULTURE RESULTS: SIGNIFICANT CHANGE UP
SPECIMEN SOURCE: SIGNIFICANT CHANGE UP

## 2023-03-09 PROCEDURE — 99443: CPT

## 2023-03-15 PROCEDURE — 99214 OFFICE O/P EST MOD 30 MIN: CPT | Mod: 95

## 2023-03-21 ENCOUNTER — APPOINTMENT (OUTPATIENT)
Dept: GASTROENTEROLOGY | Facility: CLINIC | Age: 73
End: 2023-03-21
Payer: COMMERCIAL

## 2023-03-21 VITALS
HEIGHT: 62 IN | HEART RATE: 68 BPM | BODY MASS INDEX: 41.41 KG/M2 | DIASTOLIC BLOOD PRESSURE: 78 MMHG | OXYGEN SATURATION: 96 % | TEMPERATURE: 96.6 F | WEIGHT: 225 LBS | SYSTOLIC BLOOD PRESSURE: 133 MMHG

## 2023-03-21 PROCEDURE — 99214 OFFICE O/P EST MOD 30 MIN: CPT

## 2023-03-21 RX ORDER — LORAZEPAM 0.5 MG/1
0.5 TABLET ORAL
Qty: 90 | Refills: 0 | Status: DISCONTINUED | COMMUNITY
Start: 2023-03-21 | End: 2023-03-21

## 2023-03-21 RX ORDER — FAMOTIDINE 20 MG/1
20 TABLET, FILM COATED ORAL TWICE DAILY
Qty: 60 | Refills: 3 | Status: ACTIVE | COMMUNITY
Start: 2023-03-21 | End: 1900-01-01

## 2023-03-21 NOTE — ASSESSMENT
[FreeTextEntry1] : Patient with anxiety disorder and depression.  She is on multiple medications.  She does have nausea in the morning and was seen in the emergency room twice.  Her last endoscopy was 3 years ago.  It was essentially normal but biopsies of the GE junction did reveal some inflammation and intestinal metaplasia.  She is not able to tolerate PPI medication.  She will be given famotidine 20 mg twice daily.\par She does suffer from an anxiety disorder and lorazepam 0.5 mg 3 times daily on a as needed basis will be given.  She will be scheduled for an upper endoscopy.  She will also be referred for a gastric emptying study.

## 2023-03-21 NOTE — PHYSICAL EXAM
[Alert] : alert [Normal Voice/Communication] : normal voice/communication [Healthy Appearing] : healthy appearing [No Acute Distress] : no acute distress [Sclera] : the sclera and conjunctiva were normal [Hearing Threshold Finger Rub Not [facility]] : hearing was normal [Normal Lips/Gums] : the lips and gums were normal [Oropharynx] : the oropharynx was normal [Normal Appearance] : the appearance of the neck was normal [No Neck Mass] : no neck mass was observed [No Respiratory Distress] : no respiratory distress [No Acc Muscle Use] : no accessory muscle use [Respiration, Rhythm And Depth] : normal respiratory rhythm and effort [Auscultation Breath Sounds / Voice Sounds] : lungs were clear to auscultation bilaterally [Heart Rate And Rhythm] : heart rate was normal and rhythm regular [Normal S1, S2] : normal S1 and S2 [Murmurs] : no murmurs [Bowel Sounds] : normal bowel sounds [Abdomen Tenderness] : non-tender [No Masses] : no abdominal mass palpated [Abdomen Soft] : soft [] : no hepatosplenomegaly [No CVA Tenderness] : no CVA  tenderness [No Spinal Tenderness] : no spinal tenderness [Abnormal Walk] : normal gait [Oriented To Time, Place, And Person] : oriented to person, place, and time

## 2023-03-21 NOTE — HISTORY OF PRESENT ILLNESS
[FreeTextEntry1] : Patient is a 72 year-old female referred by Dr. Grover for complaints of  nausea especially in the morning.  She has a marked generalized anxiety disorder for which she takes several medications including Effexor, mirtazapine, gabapentin, and Klonopin.  She also takes Zofran 8mg on an as needed basis.  The symptoms are associated with decreased appetite.  She had an upper endoscopy in 6/2020 that was essentially normal.  There was no hiatus hernia or evidence of esophagitis or gastritis.  Pathology revealed chronic nonspecific gastritis with negative H. pylori.  Esophageal biopsies revealed reflux type esophagitis with some intestinal metaplasia.    She denies any dysphagia.\par Months ago, patient developed abdominal pain and was referred for a CAT scan which revealed noncomplicated acute diverticulitis.  She was treated with Cipro/Flagyl with relief of her symptoms and resolution of her diverticulitis.  She no longer has any abdominal pain and her bowel movements are regular.  She denies seeing any blood in the stool and has no abdominal pain.  She apparently underwent a colonoscopy 2-3 years ago which was normal.\par \par Patient has had multiple attacks of anxiety/depression.  She was hospitalized on several occasions.  She is under psychiatric care at this time.  Her antidepressant was changed from Abilify to Rexulti.  She seems to be doing better on this regimen.  She continues to take gabapentin, metoprolol, mirtazapine, Effexor, and Klonopin.\par She still gets occasional nausea in the morning which is relieved with Zofran 8 mg.  She has no episodes of heartburn or vomiting.\par \par 3/21/2023-patient developed a recurrence of her nausea which is severe.  This usually occurs in the morning and is associated with some gurgling in the abdomen.  She was seen in the emergency room on 2 occasions.  She was prescribed Protonix which gives her side effects and also Reglan which she did not take.  The nausea occurs when she first gets up in the morning.  She is not able to tolerate Zofran 8 mg.  4 mg is not strong enough to relieve her symptoms.  Blood work from the emergency room revealed H/H13.8/44.6, CHEM screen profile was normal, hemoglobin A1c C was 5.9.

## 2023-03-21 NOTE — REVIEW OF SYSTEMS
[Feeling Poorly] : feeling poorly [Feeling Tired] : feeling tired [As Noted in HPI] : as noted in HPI [Anxiety] : anxiety [Emotional Problems] : emotional problems [Negative] : Heme/Lymph

## 2023-03-26 ENCOUNTER — EMERGENCY (EMERGENCY)
Facility: HOSPITAL | Age: 73
LOS: 1 days | Discharge: ANOTHER HOSPITAL | End: 2023-03-26
Admitting: STUDENT IN AN ORGANIZED HEALTH CARE EDUCATION/TRAINING PROGRAM
Payer: COMMERCIAL

## 2023-03-26 VITALS
HEIGHT: 62 IN | RESPIRATION RATE: 18 BRPM | HEART RATE: 96 BPM | DIASTOLIC BLOOD PRESSURE: 89 MMHG | SYSTOLIC BLOOD PRESSURE: 158 MMHG | TEMPERATURE: 98 F | OXYGEN SATURATION: 100 %

## 2023-03-26 DIAGNOSIS — F41.1 GENERALIZED ANXIETY DISORDER: ICD-10-CM

## 2023-03-26 LAB
ALBUMIN SERPL ELPH-MCNC: 4.5 G/DL — SIGNIFICANT CHANGE UP (ref 3.3–5)
ALP SERPL-CCNC: 106 U/L — SIGNIFICANT CHANGE UP (ref 40–120)
ALT FLD-CCNC: 16 U/L — SIGNIFICANT CHANGE UP (ref 4–33)
AMPHET UR-MCNC: NEGATIVE — SIGNIFICANT CHANGE UP
ANION GAP SERPL CALC-SCNC: 13 MMOL/L — SIGNIFICANT CHANGE UP (ref 7–14)
APAP SERPL-MCNC: <10 UG/ML — LOW (ref 15–25)
APPEARANCE UR: CLEAR — SIGNIFICANT CHANGE UP
AST SERPL-CCNC: 14 U/L — SIGNIFICANT CHANGE UP (ref 4–32)
B PERT DNA SPEC QL NAA+PROBE: SIGNIFICANT CHANGE UP
B PERT+PARAPERT DNA PNL SPEC NAA+PROBE: SIGNIFICANT CHANGE UP
BACTERIA # UR AUTO: NEGATIVE — SIGNIFICANT CHANGE UP
BARBITURATES UR SCN-MCNC: NEGATIVE — SIGNIFICANT CHANGE UP
BASOPHILS # BLD AUTO: 0.05 K/UL — SIGNIFICANT CHANGE UP (ref 0–0.2)
BASOPHILS NFR BLD AUTO: 0.6 % — SIGNIFICANT CHANGE UP (ref 0–2)
BENZODIAZ UR-MCNC: NEGATIVE — SIGNIFICANT CHANGE UP
BILIRUB SERPL-MCNC: 0.3 MG/DL — SIGNIFICANT CHANGE UP (ref 0.2–1.2)
BILIRUB UR-MCNC: NEGATIVE — SIGNIFICANT CHANGE UP
BORDETELLA PARAPERTUSSIS (RAPRVP): SIGNIFICANT CHANGE UP
BUN SERPL-MCNC: 8 MG/DL — SIGNIFICANT CHANGE UP (ref 7–23)
C PNEUM DNA SPEC QL NAA+PROBE: SIGNIFICANT CHANGE UP
CALCIUM SERPL-MCNC: 9.4 MG/DL — SIGNIFICANT CHANGE UP (ref 8.4–10.5)
CHLORIDE SERPL-SCNC: 102 MMOL/L — SIGNIFICANT CHANGE UP (ref 98–107)
CO2 SERPL-SCNC: 28 MMOL/L — SIGNIFICANT CHANGE UP (ref 22–31)
COCAINE METAB.OTHER UR-MCNC: NEGATIVE — SIGNIFICANT CHANGE UP
COLOR SPEC: YELLOW — SIGNIFICANT CHANGE UP
COVID-19 SPIKE DOMAIN AB INTERP: POSITIVE
COVID-19 SPIKE DOMAIN ANTIBODY RESULT: >250 U/ML — HIGH
CREAT SERPL-MCNC: 0.81 MG/DL — SIGNIFICANT CHANGE UP (ref 0.5–1.3)
CREATININE URINE RESULT, DAU: 165 MG/DL — SIGNIFICANT CHANGE UP
DIFF PNL FLD: NEGATIVE — SIGNIFICANT CHANGE UP
EGFR: 77 ML/MIN/1.73M2 — SIGNIFICANT CHANGE UP
EOSINOPHIL # BLD AUTO: 0.09 K/UL — SIGNIFICANT CHANGE UP (ref 0–0.5)
EOSINOPHIL NFR BLD AUTO: 1 % — SIGNIFICANT CHANGE UP (ref 0–6)
EPI CELLS # UR: 11 /HPF — HIGH (ref 0–5)
ETHANOL SERPL-MCNC: <10 MG/DL — SIGNIFICANT CHANGE UP
FLUAV SUBTYP SPEC NAA+PROBE: SIGNIFICANT CHANGE UP
FLUBV RNA SPEC QL NAA+PROBE: SIGNIFICANT CHANGE UP
GLUCOSE SERPL-MCNC: 134 MG/DL — HIGH (ref 70–99)
GLUCOSE UR QL: NEGATIVE — SIGNIFICANT CHANGE UP
HADV DNA SPEC QL NAA+PROBE: SIGNIFICANT CHANGE UP
HCOV 229E RNA SPEC QL NAA+PROBE: SIGNIFICANT CHANGE UP
HCOV HKU1 RNA SPEC QL NAA+PROBE: SIGNIFICANT CHANGE UP
HCOV NL63 RNA SPEC QL NAA+PROBE: SIGNIFICANT CHANGE UP
HCOV OC43 RNA SPEC QL NAA+PROBE: SIGNIFICANT CHANGE UP
HCT VFR BLD CALC: 44.4 % — SIGNIFICANT CHANGE UP (ref 34.5–45)
HGB BLD-MCNC: 13.9 G/DL — SIGNIFICANT CHANGE UP (ref 11.5–15.5)
HMPV RNA SPEC QL NAA+PROBE: SIGNIFICANT CHANGE UP
HPIV1 RNA SPEC QL NAA+PROBE: SIGNIFICANT CHANGE UP
HPIV2 RNA SPEC QL NAA+PROBE: SIGNIFICANT CHANGE UP
HPIV3 RNA SPEC QL NAA+PROBE: SIGNIFICANT CHANGE UP
HPIV4 RNA SPEC QL NAA+PROBE: SIGNIFICANT CHANGE UP
HYALINE CASTS # UR AUTO: 3 /LPF — SIGNIFICANT CHANGE UP (ref 0–7)
IANC: 6.64 K/UL — SIGNIFICANT CHANGE UP (ref 1.8–7.4)
IMM GRANULOCYTES NFR BLD AUTO: 0.3 % — SIGNIFICANT CHANGE UP (ref 0–0.9)
KETONES UR-MCNC: NEGATIVE — SIGNIFICANT CHANGE UP
LEUKOCYTE ESTERASE UR-ACNC: ABNORMAL
LYMPHOCYTES # BLD AUTO: 1.65 K/UL — SIGNIFICANT CHANGE UP (ref 1–3.3)
LYMPHOCYTES # BLD AUTO: 18.6 % — SIGNIFICANT CHANGE UP (ref 13–44)
M PNEUMO DNA SPEC QL NAA+PROBE: SIGNIFICANT CHANGE UP
MCHC RBC-ENTMCNC: 27.6 PG — SIGNIFICANT CHANGE UP (ref 27–34)
MCHC RBC-ENTMCNC: 31.3 GM/DL — LOW (ref 32–36)
MCV RBC AUTO: 88.3 FL — SIGNIFICANT CHANGE UP (ref 80–100)
METHADONE UR-MCNC: NEGATIVE — SIGNIFICANT CHANGE UP
MONOCYTES # BLD AUTO: 0.41 K/UL — SIGNIFICANT CHANGE UP (ref 0–0.9)
MONOCYTES NFR BLD AUTO: 4.6 % — SIGNIFICANT CHANGE UP (ref 2–14)
NEUTROPHILS # BLD AUTO: 6.64 K/UL — SIGNIFICANT CHANGE UP (ref 1.8–7.4)
NEUTROPHILS NFR BLD AUTO: 74.9 % — SIGNIFICANT CHANGE UP (ref 43–77)
NITRITE UR-MCNC: NEGATIVE — SIGNIFICANT CHANGE UP
NRBC # BLD: 0 /100 WBCS — SIGNIFICANT CHANGE UP (ref 0–0)
NRBC # FLD: 0 K/UL — SIGNIFICANT CHANGE UP (ref 0–0)
OPIATES UR-MCNC: NEGATIVE — SIGNIFICANT CHANGE UP
OXYCODONE UR-MCNC: NEGATIVE — SIGNIFICANT CHANGE UP
PCP SPEC-MCNC: SIGNIFICANT CHANGE UP
PCP UR-MCNC: NEGATIVE — SIGNIFICANT CHANGE UP
PH UR: 6.5 — SIGNIFICANT CHANGE UP (ref 5–8)
PLATELET # BLD AUTO: 225 K/UL — SIGNIFICANT CHANGE UP (ref 150–400)
POTASSIUM SERPL-MCNC: 3.1 MMOL/L — LOW (ref 3.5–5.3)
POTASSIUM SERPL-SCNC: 3.1 MMOL/L — LOW (ref 3.5–5.3)
PROT SERPL-MCNC: 6.8 G/DL — SIGNIFICANT CHANGE UP (ref 6–8.3)
PROT UR-MCNC: ABNORMAL
RAPID RVP RESULT: SIGNIFICANT CHANGE UP
RBC # BLD: 5.03 M/UL — SIGNIFICANT CHANGE UP (ref 3.8–5.2)
RBC # FLD: 12.9 % — SIGNIFICANT CHANGE UP (ref 10.3–14.5)
RBC CASTS # UR COMP ASSIST: 5 /HPF — HIGH (ref 0–4)
RSV RNA SPEC QL NAA+PROBE: SIGNIFICANT CHANGE UP
RV+EV RNA SPEC QL NAA+PROBE: SIGNIFICANT CHANGE UP
SALICYLATES SERPL-MCNC: <0.3 MG/DL — LOW (ref 15–30)
SARS-COV-2 IGG+IGM SERPL QL IA: >250 U/ML — HIGH
SARS-COV-2 IGG+IGM SERPL QL IA: POSITIVE
SARS-COV-2 RNA SPEC QL NAA+PROBE: SIGNIFICANT CHANGE UP
SODIUM SERPL-SCNC: 143 MMOL/L — SIGNIFICANT CHANGE UP (ref 135–145)
SP GR SPEC: 1.02 — SIGNIFICANT CHANGE UP (ref 1.01–1.05)
THC UR QL: NEGATIVE — SIGNIFICANT CHANGE UP
TOXICOLOGY SCREEN, DRUGS OF ABUSE, SERUM RESULT: SIGNIFICANT CHANGE UP
TSH SERPL-MCNC: 1.39 UIU/ML — SIGNIFICANT CHANGE UP (ref 0.27–4.2)
UROBILINOGEN FLD QL: SIGNIFICANT CHANGE UP
WBC # BLD: 8.87 K/UL — SIGNIFICANT CHANGE UP (ref 3.8–10.5)
WBC # FLD AUTO: 8.87 K/UL — SIGNIFICANT CHANGE UP (ref 3.8–10.5)
WBC UR QL: 8 /HPF — HIGH (ref 0–5)

## 2023-03-26 PROCEDURE — 99285 EMERGENCY DEPT VISIT HI MDM: CPT

## 2023-03-26 PROCEDURE — 93010 ELECTROCARDIOGRAM REPORT: CPT

## 2023-03-26 RX ORDER — VENLAFAXINE HCL 75 MG
225 CAPSULE, EXT RELEASE 24 HR ORAL DAILY
Refills: 0 | Status: DISCONTINUED | OUTPATIENT
Start: 2023-03-26 | End: 2023-03-30

## 2023-03-26 RX ORDER — METOPROLOL TARTRATE 50 MG
50 TABLET ORAL
Refills: 0 | Status: DISCONTINUED | OUTPATIENT
Start: 2023-03-26 | End: 2023-03-30

## 2023-03-26 RX ORDER — MIRTAZAPINE 45 MG/1
30 TABLET, ORALLY DISINTEGRATING ORAL AT BEDTIME
Refills: 0 | Status: DISCONTINUED | OUTPATIENT
Start: 2023-03-26 | End: 2023-03-30

## 2023-03-26 RX ORDER — GABAPENTIN 400 MG/1
200 CAPSULE ORAL
Refills: 0 | Status: DISCONTINUED | OUTPATIENT
Start: 2023-03-26 | End: 2023-03-30

## 2023-03-26 RX ORDER — POTASSIUM CHLORIDE 20 MEQ
40 PACKET (EA) ORAL ONCE
Refills: 0 | Status: DISCONTINUED | OUTPATIENT
Start: 2023-03-26 | End: 2023-03-26

## 2023-03-26 RX ORDER — POTASSIUM CHLORIDE 20 MEQ
40 PACKET (EA) ORAL ONCE
Refills: 0 | Status: COMPLETED | OUTPATIENT
Start: 2023-03-26 | End: 2023-03-26

## 2023-03-26 RX ORDER — LOSARTAN POTASSIUM 100 MG/1
100 TABLET, FILM COATED ORAL DAILY
Refills: 0 | Status: DISCONTINUED | OUTPATIENT
Start: 2023-03-26 | End: 2023-03-30

## 2023-03-26 RX ORDER — ATORVASTATIN CALCIUM 80 MG/1
10 TABLET, FILM COATED ORAL AT BEDTIME
Refills: 0 | Status: DISCONTINUED | OUTPATIENT
Start: 2023-03-26 | End: 2023-03-30

## 2023-03-26 RX ADMIN — Medication 40 MILLIEQUIVALENT(S): at 17:03

## 2023-03-26 RX ADMIN — Medication 1 MILLIGRAM(S): at 15:53

## 2023-03-26 NOTE — ED BEHAVIORAL HEALTH ASSESSMENT NOTE - HPI (INCLUDE ILLNESS QUALITY, SEVERITY, DURATION, TIMING, CONTEXT, MODIFYING FACTORS, ASSOCIATED SIGNS AND SYMPTOMS)
Patient is a 74yo female, , retired (), non-caregiver, domiciled at home with her ,  with a prior psychiatric diagnosis of ANSON and MDD, 4 previous inpatient psychiatric hospitalizations at [facility] in 2, and most recently 7/5/2021-7/3/2021 prior SA in 11/2019 by overdosing on pills, no history of NSSIB, no history of violence/aggression, no hx of legal issues, daily marijuana use, PMHx of HTN, hyperlipidemia, BIB spouse for worsening anxiety.    Patient seen in private room and was cooperative with interview. She reports her  brought her to the ED today because of severe anxiety. Patient reports excessive anxiety and worry occurring more days  and finds it difficult to control the worry.  Anxiety and worry are associated with restlessness, being easily fatigued, difficulty concentrating, irritability, muscle tension, and sleep disturbances.   Patient has long h/o anxiety and depression and  is in treatment with Dr. Luz at [facility] She is prescribed Effexor 225 mg daily, Klonopin 1mg in the am and 0.5mg in the afternoon, Gabapentin 200 mg bid, and Mirtazapine 30 mg hs. She reports Dr. Luz is aware of her anxiety but does not want to change her medication because it was effective for the past several years.  Patient reports she was last hospitalized in 2021 and discharged on this current regimen. As far as other symptoms, patient denies recent or depressed mood but her anxiety is preventing her from enjoying the things she normally enjoys. Patient denies changes in appetite or sleep and however reports her energy is low and she has feelings of guilt and intermittent passive suicidal ideation, stating, " I would never do anything because I would not want to hurt my ." Patient reports her  is very supportive however, he will be working late the next week which adds to her anxiety. Patient denies any symptoms of psychosis including auditory/visual hallucinations, thoughts of paranoia or ideas of reference.       See  note for collateral. Patient is a 71yo female, , retired (), non-caregiver, domiciled at home with her ,  with a prior psychiatric diagnosis of ANSON and MDD, 4 previous inpatient psychiatric hospitalizations at [facility] in 2, and most recently 7/5/2021-7/3/2021 prior SA in 11/2019 by overdosing on pills, no history of NSSIB, no history of violence/aggression, no hx of legal issues, daily marijuana use, PMHx of HTN, hyperlipidemia, BIB spouse for worsening anxiety.    Patient seen in private room and was cooperative with interview. She reports her  brought her to the ED today because of severe anxiety. Patient reports excessive anxiety and worry occurring more days  and finds it difficult to control the worry.  Anxiety and worry are associated with restlessness, being easily fatigued, difficulty concentrating,  muscle tension, and poor functioning.   Patient has long h/o anxiety and depression and  is in treatment with Dr. Luz at [facility] She is prescribed Effexor 225 mg daily, Klonopin 1mg in the am and 0.5mg in the afternoon, Gabapentin 200 mg bid, and Mirtazapine 30 mg hs. She reports Dr. Luz is aware of her anxiety but does not want to change her medication because it was effective for the past several years. Patient reports she came to the ED today seeking admission and requires a medication adjustment. She does not want to go home today because her spouse is working and she feels she will feel worse if she is home alone.  Patient reports she was last hospitalized in 2021 and discharged on this current regimen. As far as other symptoms, patient denies recent or depressed mood but her anxiety is preventing her from enjoying the things she normally enjoys. Patient denies changes in appetite or sleep and however reports her energy is low and she has feelings of guilt and intermittent passive suicidal ideation, stating, " I would never do anything because I would not want to hurt my ." Patient reports her  is very supportive however, he will be working late the next week which adds to her anxiety. Patient denies any symptoms of psychosis including auditory/visual hallucinations, thoughts of paranoia or ideas of reference.       See  note for collateral. Patient is a 71yo female, , retired (), non-caregiver, domiciled at home with her ,  with a prior psychiatric diagnosis of ANSON and MDD, 4 previous inpatient psychiatric hospitalizations at [facility] in 2, and most recently 7/5/2021-7/3/2021 prior SA in 11/2019 by overdosing on pills, no history of NSSIB, no history of violence/aggression, no hx of legal issues, daily marijuana use, PMHx of HTN, hyperlipidemia, treated in outpatient geropsych clinic by Dr. Stovall (as below, with last visit 3/15/2023 and at that time documented to be at baseline with chronic anxiety, complaints of nausea, seen twice monthly with mediation mgt and psychotherapy)  BIB spouse for "worsening anxiety."    Patient seen in private room and was cooperative with interview. She reports her  brought her to the ED today because of severe anxiety. Patient reports excessive anxiety and worry occurring more days  and finds it difficult to control the worry.  Anxiety and worry are associated with restlessness, being easily fatigued, difficulty concentrating,  muscle tension, and poor functioning.   Patient has long h/o anxiety and depression and  is in treatment with Dr. Luz at [facility] She is prescribed Effexor 225 mg daily, Klonopin 1mg in the am and 0.5mg in the afternoon, Gabapentin 200 mg bid, and Mirtazapine 30 mg hs. She reports Dr. Luz is aware of her anxiety but does not want to change her medication because it was effective for the past several years. Patient reports she came to the ED today seeking admission and requires a medication adjustment. She does not want to go home today because her spouse is working and she feels she will feel worse if she is home alone.  Patient reports she was last hospitalized in 2021 and discharged on this current regimen. As far as other symptoms, patient denies recent or depressed mood but her anxiety is preventing her from enjoying the things she normally enjoys. Patient denies changes in appetite or sleep and however reports her energy is low and she has feelings of guilt and intermittent passive suicidal ideation, stating, " I would never do anything because I would not want to hurt my ." Patient reports her  is very supportive however, he will be working late the next week which adds to her anxiety. Patient denies any symptoms of psychosis including auditory/visual hallucinations, thoughts of paranoia or ideas of reference. Patient reports that she presented to the ED today as she feels she won't be able to be alone this week as her 's schedule changed, believes that if she is alone "I don't know what I could do," presented requesting voluntary admission.      See  note for collateral. Patient is a 73yo female, , retired (), non-caregiver, domiciled at home with her ,  with a prior psychiatric diagnosis of ANSON and MDD, 4 previous inpatient psychiatric hospitalizations at [facility] in 2, and most recently 7/5/2021-7/3/2021 prior SA in 11/2019 by overdosing on pills, no history of NSSIB, no history of violence/aggression, no hx of legal issues, daily marijuana use, PMHx of HTN, hyperlipidemia, treated in outpatient [facility] clinic by Dr. Stovall (as below, with last visit 3/15/2023 and at that time documented to be at baseline with chronic anxiety, complaints of nausea, seen twice monthly with mediation mgt and psychotherapy)  BIB spouse for "worsening anxiety."    Patient seen in private room and was cooperative with interview. She reports her  brought her to the ED today because of severe anxiety. Patient reports excessive anxiety and worry occurring more days  and finds it difficult to control the worry.  Anxiety and worry are associated with restlessness, being easily fatigued, difficulty concentrating,  muscle tension, and poor functioning. Patient reports over the past several weeks she has been waking up fearful. Although patient is less anxious in the afternoon and evening, she reports feeling "exhausted the rest of day" triggered by the morning episode. Patient has tried waking up at 5 am and taking Klonopin with poor results.   Patient has long h/o anxiety and depression and  is in treatment with Dr. Stovall at [facility] clinic. She is prescribed Effexor 225 mg daily, Klonopin 1mg in the am and 0.5mg in the afternoon, Gabapentin 200 mg bid, and Mirtazapine 30 mg hs. She reports Dr. Stovall is aware of her anxiety but does not want to change her medication "because it was effective for the past several years." Patient reports she was last hospitalized in 2021 and discharged on this current regimen.   Patient reports she came to the ED today seeking admission because she " needs her medication adjusted."  She does not want to go home today because her spouse is working early in the morning fears she will feel worse if she is home alone.  As far as other symptoms, patient denies current or recent depressed mood but states her anxiety is preventing her from enjoying the things she normally enjoys. Patient denies changes in appetite or sleep but reports her energy is low and she has feelings of guilt and intermittent passive suicidal ideation, stating, " I would never do anything because I would not want to hurt my ." Patient reports her  is very supportive however, he often works late or early in the morning which contributes to her anxiety. Patient denies any symptoms of psychosis including auditory/visual hallucinations, thoughts of paranoia or ideas of reference. Patient reports that she presented to the ED today as she feels she won't be able to be alone this week as her 's schedule changed, believes that if she is alone "I don't know what I could do," presented requesting voluntary admission.      See  note for collateral.

## 2023-03-26 NOTE — ED BEHAVIORAL HEALTH ASSESSMENT NOTE - NSBHATTESTCOMMENTATTENDFT_PSY_A_CORE
72 year-old with history of increased lipids, htn, history of suicide attempts in the past, well cared for in [facility] clinic, chronic anxiety, somatic complaints, seen recently in [facility], presenting to the ED with complaints that are not new but a fear of being alone, insisting that she thinks that she will hurt herself if she goes home and is alone. As she does have suicide attempts in the past, this statement is concerning.   Patient offered medications- lorazepam, with minimal effect- insisting on being admitted to psychiatry with conditional suicidal ideations.  has no specific safety concerns but patient continues to insists on admission. . Currently no beds are available, plan is to hold in ED for admission but with reassessment and possible discharge in the AM if clinical condition changes.

## 2023-03-26 NOTE — ED BEHAVIORAL HEALTH ASSESSMENT NOTE - NSBHATTESTTYPEVISIT_PSY_A_CORE
On-site Attending with Resident/Fellow/Student and GIFTY ([id number] codes) Attending evaluating patient with GIFTY ([id number] code)

## 2023-03-26 NOTE — ED BEHAVIORAL HEALTH ASSESSMENT NOTE - SUMMARY
70yo female, , retired (, non-caregiver domiciled at home with her  with a prior psychiatric diagnosis of ANSON and MDD, 3 previous inpatient psychiatric hospitalizations at [facility] in , 2020, and most recently -2021, prior SA in 2019 by overdosing on pills, no history of NSSIB, no history of violence/aggression, no hx of legal issues, with PMHx of HTN, hyperlipidemia, and nausea, BIB self due to passive SI and worsening depression and anxiety at home.     Patient Endorses worsening depression and anxiety in the context of fear that her  will divorce her given her mental health issues, which is triggering for her as she has no other family members that live nearby (her parents  about 5yrs ago and she has no children). Further, pt fixated that Effexor is making her nauseous, which is a possible side effect of med but per chart review, pt with  hx of nausea complain. She also describes pattern of worsening anxiety bouts after taking Effexor in the AM. Markedly depressed on interview with report of anhedonia and hopelessness as well as ongoing passive SI. Pt requires inpatient hospitalization for stabilization, med management, and safety.       Plan:  - admit to [facility]   - C/w Effexor 187.5mg po daily to avoid precipitating withdrawal symptoms, primary team to adjust dose as needed   - C/w with other home meds: mirtazapine 30mg po qHS and Klonopin 1mg in the AM and 0.5mg at 3PM, melatonin 5mg po qHS   - c/w home meds for HTN: hydrochlorothiazide 12.5 mg po daily, losartan 100mg po daily, metoprolol tartrate 50mg po BID  - PRNs: Ativan 1mg po q6h for anxiety/agitation; Seroquel 12.5mg po qh6 hrs for anxiety/agitation Patient is a 73yo female, , retired (), non-caregiver, domiciled at home with her ,  with a prior psychiatric diagnosis of ANSON and MDD, 4 previous inpatient psychiatric hospitalizations at [facility] in 2, and most recently 7/5/2021-7/3/2021 prior SA in 11/2019 by overdosing on pills, no history of NSSIB, no history of violence/aggression, no hx of legal issues, daily marijuana use, PMHx of HTN, hyperlipidemia, BIB spouse for worsening anxiety.    Patient seen and evaluated. She presented to ED seeking admisssion for severe anxiety, anhdonia and intermittent passive suicidal ideation, despite compliance with medication. Patient does not appear psychotic, manic and denies any changes in appetite or sleep. She appeared well groomed with good eye contact. Patient was made aware there are no lucho-beds and would need to board in the ED overnight. Case discussed with Dr. Zuniga who recommended discharge with f/u with outpatient provider. Patient refused to leave and stated, " I will kill myself if you send me home tonight." Patient reports her spouse is working tonight and she cannot stay home alone. Patient will remain in the ED overnight and reassessed in the am. Patient is a 73yo female, , retired (), non-caregiver, domiciled at home with her ,  with a prior psychiatric diagnosis of ANSON and MDD, 4 previous inpatient psychiatric hospitalizations at [facility] in 2, and most recently 7/5/2021-7/3/2021 prior SA in 11/2019 by overdosing on pills, no history of NSSIB, no history of violence/aggression, no hx of legal issues, daily marijuana use, PMHx of HTN, hyperlipidemia, treated in outpatient geropsych clinic by Dr. Stovall (as below, with last visit 3/15/2023 and at that time documented to be at baseline with chronic anxiety, complaints of nausea, seen twice monthly with mediation mgt and psychotherapy)  BIB spouse for "worsening anxiety."      Patient seen and evaluated. She presented to ED seeking admisssion for severe anxiety, anhdonia and intermittent passive suicidal ideation, despite compliance with medication. Patient does not appear psychotic, manic and denies any changes in appetite or sleep. She appeared well groomed with good eye contact. Patient was made aware there are no [facility] beds and would need to board in the ED overnight. Case discussed with Dr. Zuniga who recommended discharge with f/u with outpatient provider. Patient refused to leave and stated, " I will kill myself if you send me home tonight." Patient reports her spouse is working tonight and she cannot stay home alone. Patient will be admitted on a voluntary status as she cannot engage in safety planning. Patient is a 73yo female, , retired (), non-caregiver, domiciled at home with her ,  with a prior psychiatric diagnosis of ANSON and MDD, 4 previous inpatient psychiatric hospitalizations at [facility] in 2, and most recently 7/5/2021-7/3/2021 prior SA in 11/2019 by overdosing on pills, no history of NSSIB, no history of violence/aggression, no hx of legal issues, daily marijuana use, PMHx of HTN, hyperlipidemia, [facility] (as below, with last visit 3/15/2023 and at that time documented to be at baseline with chronic anxiety, complaints of nausea, seen twice monthly with mediation mgt and psychotherapy)  BIB spouse for "worsening anxiety."      Patient seen and evaluated. She presented to ED seeking admisssion for severe anxiety, anhdonia and intermittent passive suicidal ideation, despite compliance with medication. Patient does not appear psychotic, manic and denies any changes in appetite or sleep. She appeared well groomed with good eye contact. Patient was made aware there are no [facility] beds and would need to board in the ED overnight. Case discussed with Dr. Zuniga who recommended discharge with f/u with outpatient provider. Patient refused to leave and stated, " I will kill myself if you send me home tonight." Patient reports her spouse is working tonight and early in the morning, and cannot stay home alone. Patient will be admitted on a voluntary status as she cannot engage in safety planning. Patient is a 71yo female, , retired (), non-caregiver, domiciled at home with her ,  with a prior psychiatric diagnosis of ANSON and MDD, 4 previous inpatient psychiatric hospitalizations at [facility] in 2, and most recently 7/5/2021-7/3/2021 prior SA in 11/2019 by overdosing on pills, no history of NSSIB, no history of violence/aggression, no hx of legal issues, daily marijuana use, PMHx of HTN, hyperlipidemia, [facility] (as below, with last visit 3/15/2023 and at that time documented to be at baseline with chronic anxiety, complaints of nausea, seen twice monthly with mediation mgt and psychotherapy)  BIB spouse for "worsening anxiety."      Patient seen and evaluated. She presented to ED seeking admisssion for severe anxiety, anhdonia and intermittent passive suicidal ideation, despite compliance with medication. Patient does not appear psychotic, manic and denies any changes in appetite or sleep. She appeared well groomed with good eye contact. Patient was made aware there are no [facility] beds and would need to board in the ED overnight. Case discussed with Dr. Zuniga who recommended discharge with f/u with outpatient provider. Patient refused to leave and stated, " I will kill myself if you send me home tonight." Patient reports her spouse is working tonight and early in the morning, and cannot stay home alone. Patient will be admitted on a voluntary status as she cannot engage in safety planning. Discussed urinalysis with medical NP- will send urine culture- does not recommend starting antibiotics at this time.

## 2023-03-26 NOTE — ED PROVIDER NOTE - OBJECTIVE STATEMENT
71 y/o F hx MDD. HTN, HLD BIBA secondary to depression and suicidal behaviour. Admits that she has an appointment  in about 2 months to see her psychiatrist, and wants her prescription medications filled. Admits  to multiple social stressors.  Denies AH/VH/HI .  Denies pain, SOB, fever, chills, chest/ abdominal discomfort.  Denies falling, punching or kicking any objects. Denies use of alcohol or illicit drugs.  No evidence of physical injures, broken  skin or deformities.

## 2023-03-26 NOTE — ED ADULT NURSE NOTE - OBJECTIVE STATEMENT
Pt presents to , alert&orientedx4, ambulatory, pmhx HLD, HTN, and Anxiety, coming to ED for increased anxiety, pt states "every time I feel like this I don't want to live". Pt woke up extremely anxious, took Clonazepam and verbalized no relief. Hx of prior SA by OD on pills. Cooperative on arrival, denies any HI/AH/VH at this time. Safety measures maintained. Pt placed closer to nursing station. Pending psych eval.

## 2023-03-26 NOTE — ED BEHAVIORAL HEALTH ASSESSMENT NOTE - RISK ASSESSMENT
Patient with elevated chronic risk of SI, but currently low acute risk. Risk factors: mood d/o, history of a SA, multiple prior inpt admission, h/o cannabis use, poor social support, and chronic passive SI. Protective factors: is future orientated, help seeking, responsibility to self and , identifies Taoism beliefs go against suicide, and no access. Imminent risk may be minimize by inpatient admission to a safe environment with appropriate supervision and limited access to lethal means. risk- past suicide attempts, current mood symptoms, severe anxiety,   protective- compliant with treatment, supportive spouse, futuristic,

## 2023-03-26 NOTE — ED BEHAVIORAL HEALTH ASSESSMENT NOTE - OTHER PAST PSYCHIATRIC HISTORY (INCLUDE DETAILS REGARDING ONSET, COURSE OF ILLNESS, INPATIENT/OUTPATIENT TREATMENT)
Patient states that she has had a long history of depression and anxiety treated with multiple medications. As per her last admission note, her first inpatient psychiatric admission was in November 2019 at [facility] for a suicide attempt via OD on unknown pills. She was hospitalized again at [facility] in Feb 2020 for worsening anxiety and depression and was discharged on Buspar, Mirtazapine, and Klonopin. Pt was again hospitalized for depression and anxiety in April 2021, at which point she stated her private outpatient psychiatrist Dr. Ramos advised her to discontinue Mirtazapine 15 mg 8/13/2020 for Effexor 50 mg. At that point, she was also taking 2 mg Klonopin per day and Lexapro. Today, pt reports she has been experiencing nausea and chills for about 2 weeks 2/2 to Effexor which is why her outpt provider  decreased Effexor 225mg to 187.5mg about a week ago. Multiple [facility] admission ( last 7/5/2021-7/23/2021) in current treatment at [facility]

## 2023-03-26 NOTE — ED PROVIDER NOTE - PROGRESS NOTE DETAILS
TRAMAINE: I was signed out this pt pending admission to [facility] for MDD/SI. Labs are unremarkable. UA shows WBC but epithelial cells so likely contaminant. Will monitor pending admission to [facility]. Med cleared.

## 2023-03-26 NOTE — ED BEHAVIORAL HEALTH ASSESSMENT NOTE - DESCRIPTION
Patient is a 70 F, retired (),  non-caregiver status living in a private home in [place] with her . She states that her  is a workaholic and works almost daily. She states that she is still deeply affected by her parents passing a few years ago, friend's death last year and recent death of therapist one month ago. HTN, HLD Cooperative, no agitation    ICU Vital Signs Last 24 Hrs  T(C): 36.7 (26 Mar 2023 13:35), Max: 36.7 (26 Mar 2023 13:35)  T(F): 98 (26 Mar 2023 13:35), Max: 98 (26 Mar 2023 13:35)  HR: 96 (26 Mar 2023 13:35) (96 - 96)  BP: 158/89 (26 Mar 2023 13:35) (158/89 - 158/89)  BP(mean): --  ABP: --  ABP(mean): --  RR: 18 (26 Mar 2023 13:35) (18 - 18)  SpO2: 100% (26 Mar 2023 13:35) (100% - 100%)    O2 Parameters below as of 26 Mar 2023 13:35  Patient On (Oxygen Delivery Method): room air Patient is a 72 F, retired (),  non-caregiver status living in a private home in [place] with her . Reports  often works nights and she is home alone. no agitation, some anxiety, treated with lorazepam for anxiety no agitation, some anxiety, treated with lorazepam for anxiety  ICU Vital Signs Last 24 Hrs  T(C): 36.8 (26 Mar 2023 16:02), Max: 36.8 (26 Mar 2023 16:02)  T(F): 98.3 (26 Mar 2023 16:02), Max: 98.3 (26 Mar 2023 16:02)  HR: 87 (26 Mar 2023 16:02) (87 - 96)  BP: 110/87 (26 Mar 2023 16:02) (110/87 - 158/89)  BP(mean): --  ABP: --  ABP(mean): --  RR: 18 (26 Mar 2023 16:02) (18 - 18)  SpO2: 97% (26 Mar 2023 16:02) (97% - 100%)    O2 Parameters below as of 26 Mar 2023 16:02  Patient On (Oxygen Delivery Method): room air

## 2023-03-26 NOTE — ED BEHAVIORAL HEALTH ASSESSMENT NOTE - CURRENT MEDICATION
Lexapro 10 mg, Klonopin 0.5 mg TID, Metoprolol 50 mg BID, Hydrochlorothiazide 12.5 mg, Losartan 100 mg, Zofran, Mirtazapine 30mg, Ambien 5mg, Melatonin 3 mg Effexor 225 mg daily, Klonopin 1mg in the am and 0.5mg in the afternoon, Gabapentin 200 mg bid, and Mirtazapine 30 mg hs HCT 12.5 mg daily, Losartan 100 mg daily, Atorvastatin 10 mg hs,

## 2023-03-26 NOTE — ED BEHAVIORAL HEALTH ASSESSMENT NOTE - NSBHATTESTAPPAMEND_PSY_A_CORE
I have personally seen and examined this patient. I fully participated in the care of this patient. I have made amendments to the documentation where appropriate and otherwise agree with the history, physical exam, and plan as documented by the GIFTY

## 2023-03-26 NOTE — ED ADULT TRIAGE NOTE - CHIEF COMPLAINT QUOTE
Pt AOX4 c/o anxiety and needs a medication adjustment; she's been feeling more and more anxious and depressed; pt hasn't had a medication adjustment in 2 years, she is extremely anxious, has floating thoughts of hurting herself because she doesn't want to feel "this way" every day of her life  : Yehuda - cell: [phone] (he's at work - but will get messages and can call back) Pt AOX4 c/o anxiety and needs a medication adjustment; she's been feeling more and more anxious; pt hasn't had a medication adjustment in 2 years, she is extremely anxious, has floating thoughts of hurting herself because she doesn't want to feel "this way" every day of her life  : Yehuda - cell: [phone] (he's at work - but will get messages and can call back)  Takes Effexor 75mg; Gabapentin 200mg 2x/day; Clonazepam 0.5mg 2x/day; Mirtazapine 30mg   Also takes Losartan, Metoprolol, Atorvastatin, HCTZ

## 2023-03-26 NOTE — ED PROVIDER NOTE - CLINICAL SUMMARY MEDICAL DECISION MAKING FREE TEXT BOX
59 y/o M hx DM, Schizophrenia , HTN    Labs, Urine Tox/UA, EKG  Medical evaluation performed. There is no clinical evidence of intoxication or any acute medical problem requiring immediate intervention. Patient is awaiting psychiatric consultation. Final disposition will be determined by psychiatrist.

## 2023-03-26 NOTE — ED BEHAVIORAL HEALTH NOTE - BEHAVIORAL HEALTH NOTE
SW called patient's spouse Yehuda Gross [phone] for collateral information. Patient is a 73yo female, lives at home with spouse. Spouse informs he drove patient to hospital due to anxiety and depression. He stated patient is worse in the morning but gets better in the afternoon. Spouse stated he thinks she is on too much medication but was not able to provide a concrete reason for patient coming to ER today. Spouse informs he has no knowledge of current of past SI/HI, or exhibiting self-injurious behavior. Patient has a psychiatrist she sees monthly and therapist weekly at [facility] but spouse did not have their names. Spouse reported he works night and day so he cannot say if patient is compliant with treatment and medication. Patient was psychiatrically admitted 2 years ago at [facility] for depression and anxiety but the spouse was unable to describe the nature of episode leading to psych admission. Patient was able to complete her ADLs without assistance. Patient is not employed and receives disability income. Patient is not violent/aggressive.  According to the spouse patient recently had blood work and labs completed with GI and cardiologist and results were normal. Patient does not have any issues with drugs or alcohol usage. Spouse stated he wants patient's symptoms of depression and anxiety to improve. Team made aware. SW called patient's spouse Yehuda Gross [phone] for collateral information. Patient is a 71yo female, lives at home with spouse. Spouse informs he drove patient to hospital due to anxiety and depression. He stated patient is worse in the morning but gets better in the afternoon. Spouse stated he thinks she is on too much medication but was not able to provide a concrete reason for patient coming to ER today. Spouse informs he has no knowledge of current of past SI/HI, or exhibiting self-injurious behavior. Patient has a psychiatrist she sees every 3 weeks or monthly and therapist weekly or biweekly at [facility] but spouse did not have the providers names. Spouse reported he works night and day so he cannot say if patient is compliant with treatment and medication. Patient was psychiatrically admitted 2 years ago at [facility] for depression and anxiety but the spouse was unable to describe the nature of episode leading to psych admission. Patient was able to complete her ADLs without assistance. Patient is not employed and receives disability income. Patient is not violent/aggressive.  According to the spouse patient recently had blood work and labs completed with GI and cardiologist and results were normal. Patient does not have any issues with drugs or alcohol usage. Spouse stated he wants patient's symptoms of depression and anxiety to improve. Team made aware.

## 2023-03-26 NOTE — ED BEHAVIORAL HEALTH ASSESSMENT NOTE - ADDITIONAL DETAILS ALL
Patient endorses chronic passive SI, denies any current intent or plan. Past h/o suicide attempt by overdose in 2019

## 2023-03-26 NOTE — ED ADULT NURSE NOTE - CHIEF COMPLAINT
Patient's mother, Ericka calling regarding double checking if patient can go to swim lessons with her sutures in.    Please advise.   The patient is a 72y Female complaining of psychiatric evaluation.

## 2023-03-26 NOTE — ED ADULT TRIAGE NOTE - NS_BH TRG QUESTION7_ED_ALL_ED
Depression (without Suicidality or Psychosis)/Anxiety (includes Panic, OCD) Anxiety (includes Panic, OCD)

## 2023-03-26 NOTE — ED BEHAVIORAL HEALTH ASSESSMENT NOTE - NSSUICPROTFACT_PSY_ALL_CORE
Identifies reasons for living/Cultural, spiritual and/or moral attitudes against suicide/Positive therapeutic relationships/Temple beliefs

## 2023-03-26 NOTE — ED BEHAVIORAL HEALTH ASSESSMENT NOTE - DIFFERENTIAL
MDD vs ANSON vs substance induced mood/ anxiety disorder r/o panic disorder r/o panic disorder  r/o personality disorder.

## 2023-03-26 NOTE — ED BEHAVIORAL HEALTH ASSESSMENT NOTE - PSYCHIATRIC ISSUES AND PLAN (INCLUDE STANDING AND PRN MEDICATION)
continue Effexor 225 mg daily, Gabapentin 200 mg bid, Mirtazapine 30 mg hs, clonazepam 1 mg in the morning and 0.5 mg q afternoon. continue home medications for now Klonopin 1mg daily and 0.5mg q afternoon, Gabapentin 200 mg bid, Mirtazapine 30 mg daily,

## 2023-03-26 NOTE — ED BEHAVIORAL HEALTH ASSESSMENT NOTE - MEDICAL ISSUES AND PLAN (INCLUDE STANDING AND PRN MEDICATION)
HTN, HLD continue home medications,. Losartan 100 mg daily, Atorvastatin 10 mg hs, HTC 12.5 mg daily, Metoprolol 50 mg am and hs continue Famotidine, HCT 12.5 mg dialy, Metoprolol 50 mg am and hs, Losartan 100 mg daily, Atorvastatin 10 mg daily Received in ED, KCL 40 meq for hypokalemia. continue Famotidine 20 mg daily, HCT 12.5 mg dialy, Metoprolol 50 mg am and hs, Losartan 100 mg daily, Atorvastatin 10 mg daily Received  KCL 40 meq for hypokalemia., urine culture sent, continue Famotidine 20 mg daily, HCT 12.5 mg dialy, Metoprolol 50 mg am and hs, Losartan 100 mg daily, Atorvastatin 10 mg daily

## 2023-03-26 NOTE — ED ADULT NURSE NOTE - CHIEF COMPLAINT QUOTE
Pt AOX4 c/o anxiety and needs a medication adjustment; she's been feeling more and more anxious; pt hasn't had a medication adjustment in 2 years, she is extremely anxious, has floating thoughts of hurting herself because she doesn't want to feel "this way" every day of her life  : Yehuda - cell: [phone] (he's at work - but will get messages and can call back)  Takes Effexor 75mg; Gabapentin 200mg 2x/day; Clonazepam 0.5mg 2x/day; Mirtazapine 30mg   Also takes Losartan, Metoprolol, Atorvastatin, HCTZ

## 2023-03-27 LAB
CULTURE RESULTS: SIGNIFICANT CHANGE UP
SPECIMEN SOURCE: SIGNIFICANT CHANGE UP

## 2023-03-27 PROCEDURE — 99214 OFFICE O/P EST MOD 30 MIN: CPT

## 2023-03-27 RX ORDER — CLONAZEPAM 1 MG
1 TABLET ORAL ONCE
Refills: 0 | Status: DISCONTINUED | OUTPATIENT
Start: 2023-03-27 | End: 2023-03-27

## 2023-03-27 RX ORDER — HYDROXYZINE HCL 10 MG
25 TABLET ORAL ONCE
Refills: 0 | Status: COMPLETED | OUTPATIENT
Start: 2023-03-27 | End: 2023-03-27

## 2023-03-27 RX ADMIN — Medication 1 MILLIGRAM(S): at 09:32

## 2023-03-27 RX ADMIN — LOSARTAN POTASSIUM 100 MILLIGRAM(S): 100 TABLET, FILM COATED ORAL at 06:22

## 2023-03-27 RX ADMIN — Medication 25 MILLIGRAM(S): at 11:36

## 2023-03-27 RX ADMIN — MIRTAZAPINE 30 MILLIGRAM(S): 45 TABLET, ORALLY DISINTEGRATING ORAL at 00:14

## 2023-03-27 RX ADMIN — ATORVASTATIN CALCIUM 10 MILLIGRAM(S): 80 TABLET, FILM COATED ORAL at 00:14

## 2023-03-27 RX ADMIN — Medication 1 MILLIGRAM(S): at 11:36

## 2023-03-27 RX ADMIN — Medication 1 MILLIGRAM(S): at 16:25

## 2023-03-27 RX ADMIN — Medication 50 MILLIGRAM(S): at 06:19

## 2023-03-27 RX ADMIN — GABAPENTIN 200 MILLIGRAM(S): 400 CAPSULE ORAL at 06:19

## 2023-03-27 NOTE — ED ADULT NURSE REASSESSMENT NOTE - NS ED NURSE REASSESS COMMENT FT1
Patient sleeping comfortably at this time. Respirations even and unlabored. Safety maintained. Pending bed assignment.
Pt resting comfortably in bed, vitals as charted, respirations are even and unlabored. Pt endorsing no complaints at this time, calm and cooperative. AM meds given. Awaiting bed assignment

## 2023-03-27 NOTE — ED BEHAVIORAL HEALTH NOTE - BEHAVIORAL HEALTH NOTE
seen Pt bedside.   discussed alternative option of transferring her to other [facility] affiliated in-Pt psych units like [facility] (also including [facility]). Pt is fixated on pursuing [facility] admission only. explained to her that there is no assurance for an appropriate [facility] bed to become available for her tomorrow.. Pt still expressed willingness to wait til a [facility] bed opens up     ED staff informed of this boarder  continue all meds as prescribed

## 2023-03-27 NOTE — REVIEW OF SYSTEMS
Patient meets the requirements of refill protocol. Refill processed.      LOV: 11/23/22  Recommended follow up time:  in 6 month(s)  Next appt:Appointment scheduled for 5/30/23     [As Noted in HPI] : as noted in HPI [Negative] : Heme/Lymph [FreeTextEntry7] : Nausea  [de-identified] : Anxiety

## 2023-03-28 ENCOUNTER — INPATIENT (INPATIENT)
Facility: HOSPITAL | Age: 73
LOS: 21 days | Discharge: HOME | DRG: 880 | End: 2023-04-19
Attending: PSYCHIATRY & NEUROLOGY | Admitting: PSYCHIATRY & NEUROLOGY
Payer: COMMERCIAL

## 2023-03-28 VITALS
HEART RATE: 79 BPM | SYSTOLIC BLOOD PRESSURE: 111 MMHG | DIASTOLIC BLOOD PRESSURE: 63 MMHG | RESPIRATION RATE: 16 BRPM | OXYGEN SATURATION: 99 % | TEMPERATURE: 98 F

## 2023-03-28 VITALS
HEIGHT: 62 IN | TEMPERATURE: 98 F | OXYGEN SATURATION: 98 % | WEIGHT: 223.11 LBS | HEART RATE: 86 BPM | RESPIRATION RATE: 18 BRPM | DIASTOLIC BLOOD PRESSURE: 66 MMHG | SYSTOLIC BLOOD PRESSURE: 149 MMHG

## 2023-03-28 DIAGNOSIS — Z98.890 OTHER SPECIFIED POSTPROCEDURAL STATES: Chronic | ICD-10-CM

## 2023-03-28 PROCEDURE — 99211 OFF/OP EST MAY X REQ PHY/QHP: CPT | Mod: GC

## 2023-03-28 RX ORDER — FAMOTIDINE 10 MG/ML
20 INJECTION INTRAVENOUS DAILY
Refills: 0 | Status: DISCONTINUED | OUTPATIENT
Start: 2023-03-28 | End: 2023-03-30

## 2023-03-28 RX ORDER — GABAPENTIN 400 MG/1
200 CAPSULE ORAL
Refills: 0 | Status: DISCONTINUED | OUTPATIENT
Start: 2023-03-28 | End: 2023-03-30

## 2023-03-28 RX ORDER — METOPROLOL TARTRATE 50 MG
50 TABLET ORAL
Refills: 0 | Status: DISCONTINUED | OUTPATIENT
Start: 2023-03-28 | End: 2023-04-19

## 2023-03-28 RX ORDER — CLONAZEPAM 1 MG
1 TABLET ORAL DAILY
Refills: 0 | Status: DISCONTINUED | OUTPATIENT
Start: 2023-03-28 | End: 2023-03-30

## 2023-03-28 RX ORDER — MIRTAZAPINE 45 MG/1
30 TABLET, ORALLY DISINTEGRATING ORAL AT BEDTIME
Refills: 0 | Status: DISCONTINUED | OUTPATIENT
Start: 2023-03-28 | End: 2023-03-29

## 2023-03-28 RX ORDER — ATORVASTATIN CALCIUM 80 MG/1
10 TABLET, FILM COATED ORAL AT BEDTIME
Refills: 0 | Status: DISCONTINUED | OUTPATIENT
Start: 2023-03-28 | End: 2023-04-19

## 2023-03-28 RX ORDER — CLONAZEPAM 1 MG
0.5 TABLET ORAL AT BEDTIME
Refills: 0 | Status: DISCONTINUED | OUTPATIENT
Start: 2023-03-28 | End: 2023-04-03

## 2023-03-28 RX ORDER — ACETAMINOPHEN 500 MG
650 TABLET ORAL ONCE
Refills: 0 | Status: COMPLETED | OUTPATIENT
Start: 2023-03-28 | End: 2023-03-28

## 2023-03-28 RX ORDER — LOSARTAN POTASSIUM 100 MG/1
100 TABLET, FILM COATED ORAL DAILY
Refills: 0 | Status: DISCONTINUED | OUTPATIENT
Start: 2023-03-28 | End: 2023-04-19

## 2023-03-28 RX ADMIN — Medication 650 MILLIGRAM(S): at 16:43

## 2023-03-28 RX ADMIN — MIRTAZAPINE 30 MILLIGRAM(S): 45 TABLET, ORALLY DISINTEGRATING ORAL at 21:22

## 2023-03-28 RX ADMIN — GABAPENTIN 200 MILLIGRAM(S): 400 CAPSULE ORAL at 21:22

## 2023-03-28 RX ADMIN — Medication 50 MILLIGRAM(S): at 21:22

## 2023-03-28 RX ADMIN — ATORVASTATIN CALCIUM 10 MILLIGRAM(S): 80 TABLET, FILM COATED ORAL at 21:23

## 2023-03-28 RX ADMIN — Medication 0.5 MILLIGRAM(S): at 21:22

## 2023-03-28 NOTE — BH PATIENT PROFILE - HOME MEDICATIONS
hydroCHLOROthiazide 12.5 mg oral capsule , 1 cap(s) orally once a day  metoprolol tartrate 50 mg oral tablet , 1 tab(s) orally 2 times a day  clonazePAM 1 mg oral tablet , 1 tab(s) orally once a day  clonazePAM 0.5 mg oral tablet , 1 tab(s) orally   melatonin 5 mg oral tablet , 1 tab(s) orally once a day (at bedtime)  atorvastatin 10 mg oral tablet , 1 tab(s) orally once a day (at bedtime)  venlafaxine 37.5 mg oral capsule, extended release , 5 cap(s) orally once a day  mirtazapine 30 mg oral tablet , 1 tab(s) orally once a day (at bedtime)  gabapentin 100 mg oral capsule , 2 cap(s) orally 3 times a day  losartan 100 mg oral tablet , 1 tab(s) orally once a day

## 2023-03-28 NOTE — ED BEHAVIORAL HEALTH PROGRESS NOTE - STANDING MEDS RECEIVED IN ED DETAILS FREE TEXT
Lipitor 10mg QHS 21:30, Remeron 30mg QHS 21:30, Gabapentin 200mg BID 21:30, Lopressor 50mg BID 21:30
Potassium 40meq 17:03, Lipitor 10mg QHS 00:14, Remeron 30mg QHS 00:14, Gabapentin 200mg BID 06:19, HCTZ 12.5mg QD 06:19, Lopressor 50mg BID 06:19, Cozaar 100mg QD 06:22

## 2023-03-28 NOTE — ED BEHAVIORAL HEALTH PROGRESS NOTE - CASE SUMMARY/FORMULATION (CLEARLY DOCUMENT RATIONALE FOR DISPOSITION CHANGE)
Patient is a 73yo female, , retired (), non-caregiver, domiciled at home with her ,  with a prior psychiatric diagnosis of ANSON and MDD, 4 previous inpatient psychiatric hospitalizations at [facility] in 2, and most recently 7/5/2021-7/3/2021 prior SA in 11/2019 by overdosing on pills, no history of NSSIB, no history of violence/aggression, no hx of legal issues, daily marijuana use, PMHx of HTN, hyperlipidemia, treated in outpatient geropsych clinic by Dr. Stovall (as below, with last visit 3/15/2023 and at that time documented to be at baseline with chronic anxiety, complaints of nausea, seen twice monthly with mediation mgt and psychotherapy)  BIB spouse for "worsening anxiety."    Patient seen and evaluated. She presented to ED seeking admission for severe anxiety, anhedonia and intermittent passive suicidal ideation, despite compliance with medication. Patient does not appear psychotic, manic and denies any changes in appetite or sleep. She appeared well groomed with good eye contact. Patient was made aware there are no [facility] beds and would need to board in the ED overnight. Case discussed with Dr. Zuniga who recommended discharge with f/u with outpatient provider. Patient refused to leave and stated, " I will kill myself if you send me home tonight." Patient reports her spouse is working tonight and early in the morning, and cannot stay home alone. Patient will be admitted on a voluntary status as she cannot engage in safety planning. Discussed urinalysis with medical NP- will send urine culture- does not recommend starting antibiotics at this time.    Patient continues to report severe anxiety and is seeking admission voluntarily.  
Discharged
Patient is a 73yo female, , retired (), non-caregiver, domiciled at home with her ,  with a prior psychiatric diagnosis of ANSON and MDD, 4 previous inpatient psychiatric hospitalizations at [facility] in 2, and most recently 7/5/2021-7/3/2021 prior SA in 11/2019 by overdosing on pills, no history of NSSIB, no history of violence/aggression, no hx of legal issues, daily marijuana use, PMHx of HTN, hyperlipidemia, treated in outpatient geropsych clinic by Dr. Stovall (as below, with last visit 3/15/2023 and at that time documented to be at baseline with chronic anxiety, complaints of nausea, seen twice monthly with mediation mgt and psychotherapy)  BIB spouse for "worsening anxiety."    On initial assessment:   Patient seen and evaluated. She presented to ED seeking admission for severe anxiety, anhedonia and intermittent passive suicidal ideation, despite compliance with medication. Patient does not appear psychotic, manic and denies any changes in appetite or sleep. She appeared well groomed with good eye contact. Patient was made aware there are no [facility] beds and would need to board in the ED overnight. Case discussed with Dr. Zuniga who recommended discharge with f/u with outpatient provider. Patient refused to leave and stated, " I will kill myself if you send me home tonight." Patient reports her spouse is working tonight and early in the morning, and cannot stay home alone. Patient will be admitted on a voluntary status as she cannot engage in safety planning. Discussed urinalysis with medical NP- will send urine culture- does not recommend starting antibiotics at this time.    Today, pt reports improvement in mood and denies current anxiety or suicidality. However, states severe anxiety and passive SI yesterday. Continues to request voluntary admission. Amenable to transfer to any hospital with bed availability.

## 2023-03-28 NOTE — ED BEHAVIORAL HEALTH PROGRESS NOTE - NSBHATTESTCOMMENTATTENDFT_PSY_A_CORE
Patient is a 73yo female, , retired (), non-caregiver, domiciled at home with her ,  with a prior psychiatric diagnosis of ANSON and MDD, 4 previous inpatient psychiatric hospitalizations at [facility] in 2, and most recently 7/5/2021-7/3/2021 prior SA in 11/2019 by overdosing on pills, no history of NSSIB, no history of violence/aggression, no hx of legal issues, daily marijuana use, PMHx of HTN, hyperlipidemia, treated in outpatient geropsych clinic by Dr. Stovall (as below, with last visit 3/15/2023 and at that time documented to be at baseline with chronic anxiety, complaints of nausea, seen twice monthly with mediation mgt and psychotherapy)  BIB spouse for "worsening anxiety."  Today, pt reports improvement in mood and denies current anxiety or suicidality. However, states severe anxiety and passive SI yesterday. Continues to request voluntary admission. Amenable to transfer to any hospital with bed availability.

## 2023-03-28 NOTE — ED BEHAVIORAL HEALTH PROGRESS NOTE - MEDICAL ISSUES AND PLAN (INCLUDE STANDING AND PRN MEDICATION)
Received  KCL 40 meq for hypokalemia., urine culture sent, continue Famotidine 20 mg daily, HCT 12.5 mg dialy, Metoprolol 50 mg am and hs, Losartan 100 mg daily, Atorvastatin 10 mg daily    
continue Famotidine 20 mg daily, HCT 12.5 mg daily, Metoprolol 50 mg am and hs, Losartan 100 mg daily, Atorvastatin 10 mg daily

## 2023-03-28 NOTE — ED BEHAVIORAL HEALTH PROGRESS NOTE - NSBHMSETHTPROC_PSY_A_CORE
Quick Note:    Please call the patient with these results:  Influenza was negative.   Margie Ashford PAC  ______  
Linear
Linear

## 2023-03-28 NOTE — ED BEHAVIORAL HEALTH NOTE - BEHAVIORAL HEALTH NOTE
Writer contacted [employer] ([phone]) to obtain authorization. Writer spoke w/ Leighton GURROLA who provided authorization for 7 days from 03/28/2023 – 04/03/2023 w/ review on 04/03/2023 Auth #[id number] reviewer assigned is sepideh PHILLIPS ([phone]).

## 2023-03-28 NOTE — ED BEHAVIORAL HEALTH PROGRESS NOTE - SUMMARY
risk- past suicide attempts, current mood symptoms, severe anxiety,   protective- compliant with treatment, supportive spouse, futuristic,
risk- past suicide attempts, current mood symptoms, severe anxiety,   protective- compliant with treatment, supportive spouse, futuristic,

## 2023-03-28 NOTE — BH PATIENT PROFILE - NSVRISKLVLREC_PSY_ALL_CORE
Last 6 Patient Entered Readings                                          Most Recent A1c: 6.2% on 1/19/2019  (Goal: 8%)     Recent Readings 3/28/2019 3/27/2019 3/26/2019 3/26/2019 3/11/2019    Blood Glucose (mg/dL) 143 147 125 238 184        LMB to discuss his upward trend.     Minimal (Score 0-3)

## 2023-03-28 NOTE — ED BEHAVIORAL HEALTH PROGRESS NOTE - TRANSFER ATTEMPTS TO INPATIENT BH SINCE LAST ASSESSMENT
[facility].../[facility].../[facility].../[facility].../[place].../[place].../[facility]...
[facility].../[facility].../[employer].../[facility]erissa Sandoval.../[facility].../[place].../[place].../[facility]...

## 2023-03-28 NOTE — ED BEHAVIORAL HEALTH PROGRESS NOTE - NS ED BHA PLAN INPATIENT UNIT SELECTION YN
He is under the care of DR. Montana. He has had a stress test. No cardiac catherization. He is on rosuvastatin 10 mg and metoprolol XL 25 mg daily. He also is on aspirin. He will continue with DR. Montana.   
No
No

## 2023-03-28 NOTE — ED BEHAVIORAL HEALTH PROGRESS NOTE - THERAPEUTIC INTERVENTIONS RECEIVED IN ED
Relaxation Techniques.../Diversionary Activities.../Supportive Therapy...
Relaxation Techniques.../Diversionary Activities.../Supportive Therapy...

## 2023-03-28 NOTE — BH CHART NOTE - NSEVENTNOTEFT_PSY_ALL_CORE
Patient is a 73yo female, , retired (), non-caregiver, domiciled at home with her ,  with a prior psychiatric diagnosis of ANSON and MDD, 4 previous inpatient psychiatric hospitalizations at [facility] in 2, and most recently 7/5/2021-7/3/2021 prior SA in 11/2019 by overdosing on pills, no history of NSSIB, no history of violence/aggression, no hx of legal issues, daily marijuana use, PMHx of HTN, hyperlipidemia, treated in outpatient geropsych clinic by Dr. Stovall (as below, with last visit 3/15/2023 and at that time documented to be at baseline with chronic anxiety, complaints of nausea, seen twice monthly with mediation mgt and psychotherapy)  BIB spouse for "worsening anxiety."  Patient admitted on voluntary basis and transferred from [facility] to [facility]. Arrived to the unit without issues.     Plan  Famotidine 20 mg daily  HCT 12.5 mg daily  Metoprolol 50 mg am and hs  Losartan 100 mg daily  Atorvastatin 10 mg daily  Klonopin 1mg daily and 0.5mg q afternoon  Gabapentin 200 mg bid  Mirtazapine 30 mg daily

## 2023-03-28 NOTE — BH PATIENT PROFILE - NSPROEDAABILITYLEARN_GEN_A_NUR
We have given you Zostrix cream to the abdomen as well as nausea medication please use these at home.  Continue Zofran and antiemetics and oral hydration.  Please follow-up with primary care in try to abstain the use of marijuana as this complicates the picture of your daily diabetic gastroparesis.  
anxiety

## 2023-03-28 NOTE — ED BEHAVIORAL HEALTH PROGRESS NOTE - BILLING CODES
ambulatory
50624-[facility] care - moderate complexity 30-39 minutes
99281-Emergency department visit - straightforward complexity

## 2023-03-28 NOTE — BH PATIENT PROFILE - STATED REASON FOR ADMISSION
"I've been having terrible anxiety. It feels like a shot of adrenaline hits me every time I wake up in the morning. I don't know what the fear is. I try to wake up 3 hours early to take the klonopin to catch it, but it's not working the way it used to."

## 2023-03-28 NOTE — ED BEHAVIORAL HEALTH PROGRESS NOTE - PSYCHIATRIC ISSUES AND PLAN (INCLUDE STANDING AND PRN MEDICATION)
continue home medications for now Klonopin 1mg daily and 0.5mg q afternoon, Gabapentin 200 mg bid, Mirtazapine 30 mg daily,
continue home medications for now Klonopin 1mg daily and 0.5mg q afternoon, Gabapentin 200 mg bid, Mirtazapine 30 mg daily

## 2023-03-28 NOTE — BH PATIENT PROFILE - FALL HARM RISK - UNIVERSAL INTERVENTIONS
Bed in lowest position, wheels locked, appropriate side rails in place/Call bell, personal items and telephone in reach/Instruct patient to call for assistance before getting out of bed or chair/Non-slip footwear when patient is out of bed/[place] to call system/Physically safe environment - no spills, clutter or unnecessary equipment/Purposeful Proactive Rounding/Room/bathroom lighting operational, light cord in reach

## 2023-03-29 DIAGNOSIS — F41.9 ANXIETY DISORDER, UNSPECIFIED: ICD-10-CM

## 2023-03-29 PROCEDURE — 99223 1ST HOSP IP/OBS HIGH 75: CPT

## 2023-03-29 RX ORDER — MIRTAZAPINE 45 MG/1
30 TABLET, ORALLY DISINTEGRATING ORAL AT BEDTIME
Refills: 0 | Status: DISCONTINUED | OUTPATIENT
Start: 2023-03-29 | End: 2023-03-29

## 2023-03-29 RX ORDER — MIRTAZAPINE 45 MG/1
15 TABLET, ORALLY DISINTEGRATING ORAL AT BEDTIME
Refills: 0 | Status: DISCONTINUED | OUTPATIENT
Start: 2023-03-29 | End: 2023-04-10

## 2023-03-29 RX ORDER — MIRTAZAPINE 45 MG/1
15 TABLET, ORALLY DISINTEGRATING ORAL AT BEDTIME
Refills: 0 | Status: DISCONTINUED | OUTPATIENT
Start: 2023-03-29 | End: 2023-03-29

## 2023-03-29 RX ADMIN — Medication 0.5 MILLIGRAM(S): at 21:56

## 2023-03-29 RX ADMIN — Medication 50 MILLIGRAM(S): at 10:16

## 2023-03-29 RX ADMIN — ATORVASTATIN CALCIUM 10 MILLIGRAM(S): 80 TABLET, FILM COATED ORAL at 21:55

## 2023-03-29 RX ADMIN — Medication 50 MILLIGRAM(S): at 21:55

## 2023-03-29 RX ADMIN — GABAPENTIN 200 MILLIGRAM(S): 400 CAPSULE ORAL at 10:15

## 2023-03-29 RX ADMIN — Medication 1 MILLIGRAM(S): at 10:15

## 2023-03-29 RX ADMIN — MIRTAZAPINE 30 MILLIGRAM(S): 45 TABLET, ORALLY DISINTEGRATING ORAL at 23:38

## 2023-03-29 RX ADMIN — LOSARTAN POTASSIUM 100 MILLIGRAM(S): 100 TABLET, FILM COATED ORAL at 10:16

## 2023-03-29 RX ADMIN — FAMOTIDINE 20 MILLIGRAM(S): 10 INJECTION INTRAVENOUS at 10:15

## 2023-03-29 RX ADMIN — GABAPENTIN 200 MILLIGRAM(S): 400 CAPSULE ORAL at 21:55

## 2023-03-29 RX ADMIN — Medication 1 MILLIGRAM(S): at 19:09

## 2023-03-29 NOTE — BH INPATIENT PSYCHIATRY ASSESSMENT NOTE - RISK ASSESSMENT
risk- past suicide attempts, current mood symptoms, severe anxiety,   protective- compliant with treatment, supportive spouse, futuristic, Pt's acute suicide risk is moderate given her severe anxiety and current mood symptoms. Pt's chronic risk remains elevated due to her previous history of a suicide attempt and historical diagnoses. Protective factors include the pt's treatment adherence, supportive spouse and the fact that she is future-oriented.

## 2023-03-29 NOTE — BH INPATIENT PSYCHIATRY ASSESSMENT NOTE - NSBHPHYSICALEXAM_PSY_ALL_CORE
To be performed by resident ; to be documented  General: In no acute distress, non-labored breathing, sitting comfortably in chair  Chest: clear to auscultation bilaterally  Cardiac: normal rate, rhythm; no murmurs, rubs or gallops appreciated  GI: soft, nondistended, no masses or organomegaly appreciated  Neuro: normal tone, 5/5 strength in upper and lower extremities; CN 2-12 intact

## 2023-03-29 NOTE — BH INPATIENT PSYCHIATRY ASSESSMENT NOTE - NSTXDEPRESINTERMD_PSY_ALL_CORE
Pharmacologically: will work on optimizing the pt's regimen to effectively target pt's depressive symptoms.

## 2023-03-29 NOTE — BH INPATIENT PSYCHIATRY ASSESSMENT NOTE - OTHER PAST PSYCHIATRIC HISTORY (INCLUDE DETAILS REGARDING ONSET, COURSE OF ILLNESS, INPATIENT/OUTPATIENT TREATMENT)
Multiple [facility] admission ( last 7/5/2021-7/23/2021) in current treatment at [facility] Multiple [facility] admissions, (last 7/5/2021-7/23/2021) in current treatment at [facility]

## 2023-03-29 NOTE — BH SOCIAL WORK INITIAL PSYCHOSOCIAL EVALUATION - NSBHTREATHX_PSY_ALL_CORE
The pt reports she last saw this provider 2 weeks ago, and meets with her every 2 weeks via Zoom./Yes...

## 2023-03-29 NOTE — BH SOCIAL WORK INITIAL PSYCHOSOCIAL EVALUATION - NSBHCHILDEVENTS_PSY_ALL_CORE
The patient spoke very highly of her parents, and reported having a "wonderful" childhood./Other (specify)

## 2023-03-29 NOTE — BH INPATIENT PSYCHIATRY ASSESSMENT NOTE - NSBHMETABOLIC_PSY_ALL_CORE_FT
BMI: BMI (kg/m2): 40.8 (03-28-23 @ 19:10)  HbA1c:   Glucose:   BP: 121/84 (03-29-23 @ 16:42) (121/84 - 165/94)  Lipid Panel:
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
## 2023-03-29 NOTE — BH INPATIENT PSYCHIATRY ASSESSMENT NOTE - NSDCCRITERIA_PSY_ALL_CORE
Improvement in mood, demonstrates an ability to safely care for her needs, engaged w/ treatment team

## 2023-03-29 NOTE — BH SOCIAL WORK INITIAL PSYCHOSOCIAL EVALUATION - NSBHFINANCE_PSY_ALL_CORE
The pt reports that she has the following sources of income: inheritance, 2 pensions, her social security, her 's social security, and her 's employment income./Financially stable/Pension income/Social Security Income (SSI)

## 2023-03-29 NOTE — BH SOCIAL WORK INITIAL PSYCHOSOCIAL EVALUATION - NSPTSTATEDGOAL_PSY_ALL_CORE
"I would like to be myself again... to be able to get up without fear, nausea, to take my meds, and do what I enjoy."

## 2023-03-29 NOTE — BH INPATIENT PSYCHIATRY ASSESSMENT NOTE - NSBHCHARTREVIEWVS_PSY_A_CORE FT
Vital Signs Last 24 Hrs  T(C): 37.3 (03-29-23 @ 16:42), Max: 37.3 (03-29-23 @ 16:42)  T(F): 99.2 (03-29-23 @ 16:42), Max: 99.2 (03-29-23 @ 16:42)  HR: 100 (03-29-23 @ 16:42) (98 - 100)  BP: 121/84 (03-29-23 @ 16:42) (121/84 - 165/94)  BP(mean): --  RR: 20 (03-29-23 @ 16:42) (17 - 20)  SpO2: 95% (03-29-23 @ 16:42) (95% - 97%)     Vital Signs Last 24 Hrs  T(C): 37.3 (03-29-23 @ 16:42), Max: 37.3 (03-29-23 @ 16:42)  T(F): 99.2 (03-29-23 @ 16:42), Max: 99.2 (03-29-23 @ 16:42)  HR: 100 (03-29-23 @ 16:42) (100 - 100)  BP: 121/84 (03-29-23 @ 16:42) (121/84 - 121/84)  BP(mean): --  RR: 20 (03-29-23 @ 16:42) (20 - 20)  SpO2: 95% (03-29-23 @ 16:42) (95% - 95%)

## 2023-03-29 NOTE — BH SOCIAL WORK INITIAL PSYCHOSOCIAL EVALUATION - NSBHCHILDBEHAVIOR_PSY_ALL_CORE
Jesica Harkins Patient Age: 54 year old  MESSAGE:   Left message for patient that Gisele Trinidad will no longer be doing telephone visits and the appointment on 4/17/21 needs to be converted to Video or rescheduled to an In person appointment on Wednesday or Friday       WEIGHT AND HEIGHT:   Wt Readings from Last 1 Encounters:   No data found for Wt     Ht Readings from Last 1 Encounters:   No data found for Ht     BMI Readings from Last 1 Encounters:   No data found for BMI       ALLERGIES:  Patient has no known allergies.  Current Outpatient Medications   Medication   • ALPRAZolam (XANAX) 0.5 MG tablet   • Fexofenadine-Pseudoephedrine (ALLEGRA-D 12 HOUR PO)   • escitalopram (LEXAPRO) 20 MG tablet   • lamoTRIgine (LaMICtal) 100 MG tablet   • fluticasone (FLONASE) 50 MCG/ACT nasal spray   • omeprazole (PRILOSEC) 40 MG capsule     No current facility-administered medications for this visit.      PHARMACY to use:            Pharmacy preference(s) on file:   [facility]/pharmacy #[id number] - [place], [place] - [address] [place] [id number]  Phone: [phone] Fax: [phone]      CALL BACK INFO: DO NOT LEAVE A MESSAGE - contact patient directly  ROUTING: Patient's physician/staff        PCP: Lamar Blanco MD         INS: Payor: [employer] [place] / Plan: PPO [id number] / Product Type: PPO MISC   PATIENT ADDRESS:  [address] [place] [id number]     PCP for Immediate Care The pt did not describe any notable adolescent behaviors./Other

## 2023-03-29 NOTE — BH SOCIAL WORK INITIAL PSYCHOSOCIAL EVALUATION - OTHER PAST PSYCHIATRIC HISTORY (INCLUDE DETAILS REGARDING ONSET, COURSE OF ILLNESS, INPATIENT/OUTPATIENT TREATMENT)
Per [facility] chart review: Prior psychiatric diagnosis of ANSON and MDD, 4 previous inpatient psychiatric hospitalizations at [facility] in 2, and most recently 7/5/2021 - 7/3/2021 prior SA in 11/2019 by overdosing on pills, no history of NSSIB, no history of aggression, no hx of legal issues, daily marijuana use, PMH of HTN, hyperlipidemia, treated in outpatient geropsych clinic by Dr. Stovall (as below, with last visit 3/15/23) and at that time documented to be at baseline with chronic anxiety, complaints of nausea, seen twice monthly with mediation mgt and psychotherapy) BIB spouse for "worsening anxiety."

## 2023-03-29 NOTE — BH INPATIENT PSYCHIATRY ASSESSMENT NOTE - NSTXSUICIDINTERMD_PSY_ALL_CORE
Pharmacologically: will work on optimizing the pt's regimen to effectively target pt's depressive symptoms and thereby his suicidality.

## 2023-03-29 NOTE — BH INPATIENT PSYCHIATRY ASSESSMENT NOTE - DESCRIPTION
Patient is a 72 F, retired (),  non-caregiver status living in a private home in [place] with her . Reports  often works nights and she is home alone. See HPI above

## 2023-03-29 NOTE — BH INPATIENT PSYCHIATRY ASSESSMENT NOTE - NSSUICPROTFACT_PSY_ALL_CORE
Identifies reasons for living/Cultural, spiritual and/or moral attitudes against suicide/Positive therapeutic relationships/Mandaen beliefs

## 2023-03-29 NOTE — BH SOCIAL WORK INITIAL PSYCHOSOCIAL EVALUATION - NSBHTREATHXNAME2FT_PSY_ALL_CORE
[facility]  [employer] (Group Therapist)  Phone: [phone]

## 2023-03-29 NOTE — BH INPATIENT PSYCHIATRY ASSESSMENT NOTE - NSBHATTESTCOMMENTATTENDFT_PSY_A_CORE
72 year-old with history of increased lipids, htn, history of suicide attempts in the past, well cared [facility] in [facility], chronic anxiety, somatic complaints, seen recently in [facility], presenting to the ED with complaints that are not new but a fear of being alone, insisting that she thinks that she will hurt herself if she goes home and is alone. As she does have suicide attempts in the past, this statement is concerning.   Patient offered medications- lorazepam, with minimal effect- insisting on being admitted to psychiatry with conditional suicidal ideations.  has no specific safety concerns but patient continues to insists on admission. . Currently no beds are available, plan is to hold in ED for admission but with reassessment and possible discharge in the AM if clinical condition changes.    ;;03/29: Fund of knowledge intact; registers 3/3 and recalls 2/3;  oriented x 3; recalls past events; alert;oriented x3; Not endorsing  suicidal or homicidal ideation intent or plans; no mention of auditory or visual hallucinations but has a lot of anxiety and while denies sleep issues or appetite issues has persistent anxiety especially in the morning.   Reviewed meds and Remeron needs to lowered at night to 15mg and Klonopin needs tapering.  c/o R knee instability; reflux sxs and has h/o HLD and HTN.   Lives with  but sad about parents death about 8 years ago ; attempted suicide about 7 years ago but no SI now.  Uses canabis for anxiety but found it useless.

## 2023-03-29 NOTE — BH INPATIENT PSYCHIATRY ASSESSMENT NOTE - NSCURPASTPSYDX_PSY_ALL_CORE
Mood disorder
additional history taking/direct patient care (not related to procedure)/documentation/interpretation of diagnostic studies/consultation with other physicians/consult w/ pt's family directly relating to pts condition

## 2023-03-29 NOTE — BH INPATIENT PSYCHIATRY ASSESSMENT NOTE - DETAILS
Father () - PTSD Patient endorses chronic passive SI. Patient had a suicide attempt in 2019 where  found pt after overdosing on pills. HTN AGNES GERD Penicillin See HPI above. Penicillin & Sulfa drugs

## 2023-03-29 NOTE — BH INPATIENT PSYCHIATRY ASSESSMENT NOTE - CURRENT MEDICATION
MEDICATIONS  (STANDING):  atorvastatin 10 milliGRAM(s) Oral at bedtime  clonazePAM  Tablet 1 milliGRAM(s) Oral daily  clonazePAM  Tablet 0.5 milliGRAM(s) Oral at bedtime  famotidine    Tablet 20 milliGRAM(s) Oral daily  gabapentin 200 milliGRAM(s) Oral two times a day  hydrochlorothiazide 12.5 milliGRAM(s) Oral daily  losartan 100 milliGRAM(s) Oral daily  metoprolol tartrate 50 milliGRAM(s) Oral two times a day  mirtazapine 15 milliGRAM(s) Oral at bedtime    MEDICATIONS  (PRN):   MEDICATIONS  (STANDING):  atorvastatin 10 milliGRAM(s) Oral at bedtime  clonazePAM  Tablet 1 milliGRAM(s) Oral daily  clonazePAM  Tablet 0.5 milliGRAM(s) Oral at bedtime  famotidine    Tablet 20 milliGRAM(s) Oral daily  gabapentin 200 milliGRAM(s) Oral two times a day  hydrochlorothiazide 12.5 milliGRAM(s) Oral daily  losartan 100 milliGRAM(s) Oral daily  metoprolol tartrate 50 milliGRAM(s) Oral two times a day    MEDICATIONS  (PRN):

## 2023-03-29 NOTE — BH SOCIAL WORK INITIAL PSYCHOSOCIAL EVALUATION - DETAILS
During this interview, pt reported that a cousin told her that her father and paternal aunt suffered from anxiety.     Per [facility] chart review: Father () - PTSD

## 2023-03-29 NOTE — BH INPATIENT PSYCHIATRY ASSESSMENT NOTE - NSBHASSESSSUMMFT_PSY_ALL_CORE
Patient is a 73 yo female, , retired (), non-caregiver, domiciled at home with her , with a prior psychiatric diagnosis of ANSON and MDD, 4 previous inpatient psychiatric hospitalizations at [facility], most recently 7/5/2021-7/3/2021, prior SA in 11/2019 by overdosing on pills, no history of NSSIB, no history of violence/aggression, no hx of legal issues, daily marijuana use, PMHx of HTN, hyperlipidemia, treated in outpatient geropsych clinic by Dr. Stovall (as below, with last visit 3/15/2023 and at that time documented to be at baseline with chronic anxiety, complaints of nausea, seen twice monthly with mediation mgt and psychotherapy)  BIB spouse for "worsening anxiety." Pt currently denies passive and active SI, intent and plan. She also denies symptoms of psychosis and sadia. The plan while she is hospitalized on the inpatient unit includes medication optimization.    #Depression:  - Venlafaxine  mg daily  - Mirtazapine 15 mg nightly    #Anxiety:  - Clonazepam 1 mg AM, 0.5 mg qhs  - Gabapentin 200 mg twice daily    #Health maintenance:  - Losartan 100 mg daily  - Hydrochlorothiazide 12.5 mg daily  - Metoprolol tartrate 50 mg twice daily   - Famotidine 20 mg daily   Patient is a 71 yo female, , retired (), non-caregiver, domiciled at home with her , with a prior psychiatric diagnosis of ANSON and MDD, 4 previous inpatient psychiatric hospitalizations at [facility], most recently 7/5/2021-7/3/2021, prior SA in 11/2019 by overdosing on pills, no history of NSSIB, no history of violence/aggression, no hx of legal issues, daily marijuana use, PMHx of HTN, hyperlipidemia, treated in outpatient geropsych clinic by Dr. Stovall (as below, with last visit 3/15/2023 and at that time documented to be at baseline with chronic anxiety, complaints of nausea, seen twice monthly with mediation mgt and psychotherapy)  BIB spouse for "worsening anxiety." Pt currently denies passive and active SI, intent and plan. She also denies symptoms of psychosis and sadia. The plan while she is hospitalized on the inpatient unit includes medication optimization.    #Depression:  - Venlafaxine  mg daily  - Decrease mirtazapine from 30 mg to 15 mg nightly    #Anxiety:  - Clonazepam 1 mg AM, 0.5 mg qhs  - Gabapentin 200 mg twice daily    #Health maintenance:  - Losartan 100 mg daily  - Hydrochlorothiazide 12.5 mg daily  - Metoprolol tartrate 50 mg twice daily   - Famotidine 20 mg daily

## 2023-03-29 NOTE — BH INPATIENT PSYCHIATRY ASSESSMENT NOTE - NSTXANXINTERMD_PSY_ALL_CORE
Pharmacologically: will work on optimizing the pt's regimen to effectively reduce anxiety. We've reduced mirtazapine to 15 mg nightly and will possibly work on reducing the pt's clonazepam dose.

## 2023-03-30 PROCEDURE — 99231 SBSQ HOSP IP/OBS SF/LOW 25: CPT

## 2023-03-30 RX ORDER — CLONAZEPAM 1 MG
0.5 TABLET ORAL DAILY
Refills: 0 | Status: DISCONTINUED | OUTPATIENT
Start: 2023-03-31 | End: 2023-04-01

## 2023-03-30 RX ORDER — PANTOPRAZOLE SODIUM 20 MG/1
40 TABLET, DELAYED RELEASE ORAL
Refills: 0 | Status: DISCONTINUED | OUTPATIENT
Start: 2023-03-30 | End: 2023-04-19

## 2023-03-30 RX ORDER — VENLAFAXINE HCL 75 MG
75 CAPSULE, EXT RELEASE 24 HR ORAL DAILY
Refills: 0 | Status: DISCONTINUED | OUTPATIENT
Start: 2023-03-31 | End: 2023-03-31

## 2023-03-30 RX ORDER — GABAPENTIN 400 MG/1
100 CAPSULE ORAL THREE TIMES A DAY
Refills: 0 | Status: DISCONTINUED | OUTPATIENT
Start: 2023-03-30 | End: 2023-04-05

## 2023-03-30 RX ADMIN — Medication 50 MILLIGRAM(S): at 21:05

## 2023-03-30 RX ADMIN — Medication 1 MILLIGRAM(S): at 14:25

## 2023-03-30 RX ADMIN — Medication 1 MILLIGRAM(S): at 10:45

## 2023-03-30 RX ADMIN — GABAPENTIN 200 MILLIGRAM(S): 400 CAPSULE ORAL at 10:44

## 2023-03-30 RX ADMIN — GABAPENTIN 100 MILLIGRAM(S): 400 CAPSULE ORAL at 21:05

## 2023-03-30 RX ADMIN — MIRTAZAPINE 15 MILLIGRAM(S): 45 TABLET, ORALLY DISINTEGRATING ORAL at 21:05

## 2023-03-30 RX ADMIN — FAMOTIDINE 20 MILLIGRAM(S): 10 INJECTION INTRAVENOUS at 10:44

## 2023-03-30 RX ADMIN — ATORVASTATIN CALCIUM 10 MILLIGRAM(S): 80 TABLET, FILM COATED ORAL at 21:05

## 2023-03-30 RX ADMIN — Medication 50 MILLIGRAM(S): at 10:44

## 2023-03-30 RX ADMIN — LOSARTAN POTASSIUM 100 MILLIGRAM(S): 100 TABLET, FILM COATED ORAL at 10:44

## 2023-03-30 RX ADMIN — Medication 0.5 MILLIGRAM(S): at 21:05

## 2023-03-30 NOTE — PHYSICAL THERAPY INITIAL EVALUATION ADULT - PREDICTED DURATION OF THERAPY (DAYS/WKS), PT EVAL
Pt. remains functionally independent and does not require further in-patient PT f/u. Pt's R ankle instability is chronic, discussed with [facility] staff to allow pt. to wear supportive sandal with back strap for added ankle stability as PTA.

## 2023-03-30 NOTE — BH INPATIENT PSYCHIATRY PROGRESS NOTE - CURRENT MEDICATION
MEDICATIONS  (STANDING):  atorvastatin 10 milliGRAM(s) Oral at bedtime  clonazePAM  Tablet 0.5 milliGRAM(s) Oral at bedtime  gabapentin 100 milliGRAM(s) Oral three times a day  hydrochlorothiazide 12.5 milliGRAM(s) Oral daily  losartan 100 milliGRAM(s) Oral daily  metoprolol tartrate 50 milliGRAM(s) Oral two times a day  mirtazapine 15 milliGRAM(s) Oral at bedtime  pantoprazole    Tablet 40 milliGRAM(s) Oral before breakfast    MEDICATIONS  (PRN):

## 2023-03-30 NOTE — BH INPATIENT PSYCHIATRY PROGRESS NOTE - NSBHCHARTREVIEWVS_PSY_A_CORE FT
Vital Signs Last 24 Hrs  T(C): 36.3 (03-30-23 @ 09:43), Max: 37.3 (03-29-23 @ 16:42)  T(F): 97.4 (03-30-23 @ 09:43), Max: 99.2 (03-29-23 @ 16:42)  HR: 95 (03-30-23 @ 09:43) (95 - 100)  BP: 141/84 (03-30-23 @ 09:43) (121/84 - 141/84)  BP(mean): --  RR: 18 (03-30-23 @ 09:43) (18 - 20)  SpO2: 98% (03-30-23 @ 09:43) (95% - 98%)

## 2023-03-30 NOTE — BH INPATIENT PSYCHIATRY PROGRESS NOTE - NSBHASSESSSUMMFT_PSY_ALL_CORE
Patient is a 71 yo female, , retired (), non-caregiver, domiciled at home with her , with a prior psychiatric diagnosis of ANSON and MDD, 4 previous inpatient psychiatric hospitalizations at [facility], most recently 7/5/2021-7/3/2021, prior SA in 11/2019 by overdosing on pills, no history of NSSIB, no history of violence/aggression, no hx of legal issues, daily marijuana use, PMHx of HTN, hyperlipidemia, [facility] (as below, with last visit 3/15/2023 and at that time documented to be at baseline with chronic anxiety, complaints of nausea, seen twice monthly with mediation mgt and psychotherapy)  BIB spouse for "worsening anxiety." Pt currently denies passive and active SI, intent and plan. She also denies symptoms of psychosis and sadia. The plan while she is hospitalized on the inpatient unit includes medication optimization.    #Depression:  - Venlafaxine  mg daily  - Decrease mirtazapine from 30 mg to 15 mg nightly    #Anxiety:  - Clonazepam 1 mg AM, 0.5 mg qhs  - Gabapentin 200 mg twice daily    #Health maintenance:  - Losartan 100 mg daily  - Hydrochlorothiazide 12.5 mg daily  - Metoprolol tartrate 50 mg twice daily   - Famotidine 20 mg daily    ;;03/30: Not endorsing  suicidal or homicidal ideation intent or plans; no mention of auditory or visual hallucinations but asked for help falling asleep last night and was given additional Remeron.  Thinking is congruent with affect; no peculiarities of thinking or language use. Alert; oriented; cognition intact; speech clear; no tremor or evidence of movement impairment.  Discussed at length medication changes (Protonix substituting for Famotidine for GERD;  low dose Effexor in am at 75mg; Remeron to remain at 15mg at night (activating at higher doses); Klonopin tapered to 0.5mg po bid; and Neurontin modified to 100mg po tid to avoid GI distress.  Patient continues sad and anxious.

## 2023-03-30 NOTE — PHYSICAL THERAPY INITIAL EVALUATION ADULT - ADDITIONAL COMMENTS
Pt. denies prior use of assistive device; she uses high-top, tightly laced shoes + support stocking for ambulation outdoors and supportive sandal hope to prevent R ankle recurrent sprains. Pt. reports no recent falls. No steps to enter. No adaptive equipment; no home care services.

## 2023-03-30 NOTE — PHYSICAL THERAPY INITIAL EVALUATION ADULT - PERTINENT HX OF CURRENT PROBLEM, REHAB EVAL
Pt. is a 72 y.o female w/ h/o MDD and prior SA, currently admitted for increased anxiety and worsening depression. Pt. has been referred for PT due to h/o R ankle instability/recurrent sprains.

## 2023-03-30 NOTE — BH INPATIENT PSYCHIATRY PROGRESS NOTE - NSBHFUPINTERVALCCFT_PSY_A_CORE
admitted for severe anxiety ; at time of admission suicide risk was MODERATE because of chronic and worsening anxiety and preoccupation with death of parents years ago; currently risk is LOW to MODERATE attenuated by the therapeutic milieu and a variety of medications adjustments to reduce somatic distress and anxiety and worse sleep.

## 2023-03-30 NOTE — BH INPATIENT PSYCHIATRY PROGRESS NOTE - NSBHFUPINTERVALHXFT_PSY_A_CORE
Not endorsing  suicidal or homicidal ideation intent or plans; no mention of auditory or visual hallucinations but asked for help falling asleep last night and was given additional remeron.  Thinking is congruent with affect; no pecularities of thinking or language use. Alert; oriented; cognition intact; speech clear; no tremor or evidence of movement impairment.  Discussed at length medication changes (Protonix substituting for Famotidine for GERD;  low dose Efffexor in am at 75mg; Remeron to remain at 15mg at night (activating at higher doses); Klonopin tapered to 0.5mg po bid; and Neurontin modified to 100mg po tid to avoid GI distress.  Patient continues sad and anxious.

## 2023-03-30 NOTE — BH INPATIENT PSYCHIATRY PROGRESS NOTE - NSBHMETABOLIC_PSY_ALL_CORE_FT
BMI: BMI (kg/m2): 40.8 (03-28-23 @ 19:10)  HbA1c:   Glucose:   BP: 141/84 (03-30-23 @ 09:43) (121/84 - 165/94)  Lipid Panel:

## 2023-03-31 PROCEDURE — 99232 SBSQ HOSP IP/OBS MODERATE 35: CPT

## 2023-03-31 RX ORDER — ONDANSETRON 8 MG/1
4 TABLET, FILM COATED ORAL ONCE
Refills: 0 | Status: COMPLETED | OUTPATIENT
Start: 2023-03-31 | End: 2023-04-01

## 2023-03-31 RX ORDER — VENLAFAXINE HCL 75 MG
112.5 CAPSULE, EXT RELEASE 24 HR ORAL DAILY
Refills: 0 | Status: DISCONTINUED | OUTPATIENT
Start: 2023-03-31 | End: 2023-03-31

## 2023-03-31 RX ORDER — VENLAFAXINE HCL 75 MG
112.5 CAPSULE, EXT RELEASE 24 HR ORAL DAILY
Refills: 0 | Status: DISCONTINUED | OUTPATIENT
Start: 2023-04-01 | End: 2023-04-03

## 2023-03-31 RX ADMIN — Medication 0.5 MILLIGRAM(S): at 22:11

## 2023-03-31 RX ADMIN — GABAPENTIN 100 MILLIGRAM(S): 400 CAPSULE ORAL at 22:11

## 2023-03-31 RX ADMIN — Medication 50 MILLIGRAM(S): at 11:13

## 2023-03-31 RX ADMIN — GABAPENTIN 100 MILLIGRAM(S): 400 CAPSULE ORAL at 14:18

## 2023-03-31 RX ADMIN — PANTOPRAZOLE SODIUM 40 MILLIGRAM(S): 20 TABLET, DELAYED RELEASE ORAL at 07:16

## 2023-03-31 RX ADMIN — Medication 0.5 MILLIGRAM(S): at 11:14

## 2023-03-31 RX ADMIN — LOSARTAN POTASSIUM 100 MILLIGRAM(S): 100 TABLET, FILM COATED ORAL at 11:13

## 2023-03-31 RX ADMIN — Medication 50 MILLIGRAM(S): at 22:12

## 2023-03-31 RX ADMIN — MIRTAZAPINE 15 MILLIGRAM(S): 45 TABLET, ORALLY DISINTEGRATING ORAL at 22:11

## 2023-03-31 RX ADMIN — GABAPENTIN 100 MILLIGRAM(S): 400 CAPSULE ORAL at 11:14

## 2023-03-31 RX ADMIN — ATORVASTATIN CALCIUM 10 MILLIGRAM(S): 80 TABLET, FILM COATED ORAL at 22:11

## 2023-03-31 RX ADMIN — Medication 75 MILLIGRAM(S): at 11:17

## 2023-03-31 NOTE — BH INPATIENT PSYCHIATRY PROGRESS NOTE - CURRENT MEDICATION
MEDICATIONS  (STANDING):  atorvastatin 10 milliGRAM(s) Oral at bedtime  clonazePAM  Tablet 0.5 milliGRAM(s) Oral at bedtime  clonazePAM  Tablet 0.5 milliGRAM(s) Oral daily  gabapentin 100 milliGRAM(s) Oral three times a day  hydrochlorothiazide 12.5 milliGRAM(s) Oral daily  losartan 100 milliGRAM(s) Oral daily  metoprolol tartrate 50 milliGRAM(s) Oral two times a day  mirtazapine 15 milliGRAM(s) Oral at bedtime  pantoprazole    Tablet 40 milliGRAM(s) Oral before breakfast    MEDICATIONS  (PRN):   MEDICATIONS  (STANDING):  atorvastatin 10 milliGRAM(s) Oral at bedtime  clonazePAM  Tablet 0.5 milliGRAM(s) Oral at bedtime  clonazePAM  Tablet 0.5 milliGRAM(s) Oral <User Schedule>  gabapentin 100 milliGRAM(s) Oral three times a day  hydrochlorothiazide 12.5 milliGRAM(s) Oral daily  losartan 100 milliGRAM(s) Oral daily  metoprolol tartrate 50 milliGRAM(s) Oral two times a day  mirtazapine 15 milliGRAM(s) Oral at bedtime  pantoprazole    Tablet 40 milliGRAM(s) Oral before breakfast  venlafaxine 112.5 milliGRAM(s) Oral daily    MEDICATIONS  (PRN):  aluminum hydroxide/magnesium hydroxide/simethicone Suspension 30 milliLiter(s) Oral every 4 hours PRN Dyspepsia  ondansetron   Disintegrating Tablet 8 milliGRAM(s) Oral every 8 hours PRN nausea

## 2023-03-31 NOTE — BH INPATIENT PSYCHIATRY PROGRESS NOTE - NSBHMETABOLIC_PSY_ALL_CORE_FT
BMI: BMI (kg/m2): 40.8 (03-28-23 @ 19:10)  HbA1c:   Glucose:   BP: 130/75 (03-31-23 @ 08:45) (117/72 - 165/94)  Lipid Panel:  BMI: BMI (kg/m2): 40.8 (03-28-23 @ 19:10)  HbA1c:   Glucose:   BP: 143/81 (04-02-23 @ 10:22) (117/72 - 143/81)  Lipid Panel:

## 2023-03-31 NOTE — BH INPATIENT PSYCHIATRY PROGRESS NOTE - NSBHASSESSSUMMFT_PSY_ALL_CORE
Patient is a 71 yo female, , retired (), non-caregiver, domiciled at home with her , with a prior psychiatric diagnosis of ANSON and MDD, 4 previous inpatient psychiatric hospitalizations at [facility], most recently 7/5/2021-7/3/2021, prior SA in 11/2019 by overdosing on pills, no history of NSSIB, no history of violence/aggression, no hx of legal issues, daily marijuana use, PMHx of HTN, hyperlipidemia, [facility] (as below, with last visit 3/15/2023 and at that time documented to be at baseline with chronic anxiety, complaints of nausea, seen twice monthly with mediation mgt and psychotherapy)  BIB spouse for "worsening anxiety." Pt currently denies passive and active SI, intent and plan. She also denies symptoms of psychosis and sadia. The plan while she is hospitalized on the inpatient unit includes medication optimization.    #Depression:  - Venlafaxine  mg daily  - Decrease mirtazapine from 30 mg to 15 mg nightly    #Anxiety:  - Clonazepam 1 mg AM, 0.5 mg qhs  - Gabapentin 200 mg twice daily    #Health maintenance:  - Losartan 100 mg daily  - Hydrochlorothiazide 12.5 mg daily  - Metoprolol tartrate 50 mg twice daily   - Famotidine 20 mg daily    ;;03/30: Not endorsing  suicidal or homicidal ideation intent or plans; no mention of auditory or visual hallucinations but asked for help falling asleep last night and was given additional Remeron.  Thinking is congruent with affect; no peculiarities of thinking or language use. Alert; oriented; cognition intact; speech clear; no tremor or evidence of movement impairment.  Discussed at length medication changes (Protonix substituting for Famotidine for GERD;  low dose Effexor in am at 75mg; Remeron to remain at 15mg at night (activating at higher doses); Klonopin tapered to 0.5mg po bid; and Neurontin modified to 100mg po tid to avoid GI distress.  Patient continues sad and anxious.     Patient is a 73 yo female, , retired (), non-caregiver, domiciled at home with her , with a prior psychiatric diagnosis of ANSON and MDD, 4 previous inpatient psychiatric hospitalizations at [facility], most recently 7/5/2021-7/3/2021, prior SA in 11/2019 by overdosing on pills, no history of NSSIB, no history of violence/aggression, no hx of legal issues, daily marijuana use, PMHx of HTN, hyperlipidemia, [facility] (as below, with last visit 3/15/2023 and at that time documented to be at baseline with chronic anxiety, complaints of nausea, seen twice monthly with mediation mgt and psychotherapy)  BIB spouse for "worsening anxiety." Pt currently denies passive and active SI, intent and plan. She also denies symptoms of psychosis and sadia. The plan while she is hospitalized on the inpatient unit includes medication optimization.    #Depression:  - Venlafaxine  mg daily  - Decrease mirtazapine from 30 mg to 15 mg nightly    #Anxiety:  - Clonazepam 1 mg AM, 0.5 mg qhs  - Gabapentin 200 mg twice daily    #Health maintenance:  - Losartan 100 mg daily  - Hydrochlorothiazide 12.5 mg daily  - Metoprolol tartrate 50 mg twice daily   - Famotidine 20 mg daily    ;;03/30: Not endorsing  suicidal or homicidal ideation intent or plans; no mention of auditory or visual hallucinations but asked for help falling asleep last night and was given additional Remeron.  Thinking is congruent with affect; no peculiarities of thinking or language use. Alert; oriented; cognition intact; speech clear; no tremor or evidence of movement impairment.  Discussed at length medication changes (Protonix substituting for Famotidine for GERD;  low dose Effexor in am at 75mg; Remeron to remain at 15mg at night (activating at higher doses); Klonopin tapered to 0.5mg po bid; and Neurontin modified to 100mg po tid to avoid GI distress.  Patient continues sad and anxious.    ;;03/31: while notable not complaining of Sleep  appetite ok; no pain issues.  nor any mention of GI upset or nausea; stated that she was feeling tearful and hopeless after dinner.  Alert; oriented; cognition intact; speech clear; no tremor or evidence of movement impairment. Not endorsing  suicidal or homicidal ideation intent or plans; no mention of auditory or visual hallucinations .  Plan to raise Effexor to 112.5mg; monitor vs and also EKG as dose increased.  Monitor mood; encourage therapeutic intervention to deal with chronic mourning or parents death years ago.

## 2023-03-31 NOTE — BH INPATIENT PSYCHIATRY PROGRESS NOTE - NSBHCHARTREVIEWVS_PSY_A_CORE FT
Vital Signs Last 24 Hrs  T(C): 37.2 (03-31-23 @ 08:45), Max: 37.2 (03-31-23 @ 08:45)  T(F): 98.9 (03-31-23 @ 08:45), Max: 98.9 (03-31-23 @ 08:45)  HR: 100 (03-31-23 @ 08:45) (95 - 100)  BP: 130/75 (03-31-23 @ 08:45) (117/72 - 130/75)  BP(mean): --  RR: 18 (03-31-23 @ 08:45) (18 - 18)  SpO2: 98% (03-31-23 @ 08:45) (95% - 98%)     Vital Signs Last 24 Hrs  T(C): 36.5 (04-02-23 @ 10:22), Max: 36.7 (04-01-23 @ 16:33)  T(F): 97.7 (04-02-23 @ 10:22), Max: 98.1 (04-01-23 @ 16:33)  HR: 98 (04-02-23 @ 10:22) (88 - 98)  BP: 143/81 (04-02-23 @ 10:22) (118/69 - 143/81)  BP(mean): --  RR: 18 (04-01-23 @ 16:33) (18 - 18)  SpO2: 94% (04-02-23 @ 10:22) (94% - 95%)

## 2023-03-31 NOTE — BH INPATIENT PSYCHIATRY PROGRESS NOTE - PRN MEDS
MEDICATIONS  (PRN):   MEDICATIONS  (PRN):  aluminum hydroxide/magnesium hydroxide/simethicone Suspension 30 milliLiter(s) Oral every 4 hours PRN Dyspepsia  ondansetron   Disintegrating Tablet 8 milliGRAM(s) Oral every 8 hours PRN nausea

## 2023-04-01 PROCEDURE — 99231 SBSQ HOSP IP/OBS SF/LOW 25: CPT | Mod: GC

## 2023-04-01 RX ORDER — ONDANSETRON 8 MG/1
8 TABLET, FILM COATED ORAL EVERY 8 HOURS
Refills: 0 | Status: DISCONTINUED | OUTPATIENT
Start: 2023-04-01 | End: 2023-04-19

## 2023-04-01 RX ORDER — CLONAZEPAM 1 MG
0.5 TABLET ORAL
Refills: 0 | Status: DISCONTINUED | OUTPATIENT
Start: 2023-04-02 | End: 2023-04-03

## 2023-04-01 RX ADMIN — Medication 0.5 MILLIGRAM(S): at 10:25

## 2023-04-01 RX ADMIN — ATORVASTATIN CALCIUM 10 MILLIGRAM(S): 80 TABLET, FILM COATED ORAL at 21:29

## 2023-04-01 RX ADMIN — ONDANSETRON 8 MILLIGRAM(S): 8 TABLET, FILM COATED ORAL at 19:09

## 2023-04-01 RX ADMIN — MIRTAZAPINE 15 MILLIGRAM(S): 45 TABLET, ORALLY DISINTEGRATING ORAL at 21:30

## 2023-04-01 RX ADMIN — GABAPENTIN 100 MILLIGRAM(S): 400 CAPSULE ORAL at 10:24

## 2023-04-01 RX ADMIN — ONDANSETRON 4 MILLIGRAM(S): 8 TABLET, FILM COATED ORAL at 08:59

## 2023-04-01 RX ADMIN — LOSARTAN POTASSIUM 100 MILLIGRAM(S): 100 TABLET, FILM COATED ORAL at 10:24

## 2023-04-01 RX ADMIN — Medication 112.5 MILLIGRAM(S): at 10:24

## 2023-04-01 RX ADMIN — Medication 50 MILLIGRAM(S): at 10:25

## 2023-04-01 RX ADMIN — PANTOPRAZOLE SODIUM 40 MILLIGRAM(S): 20 TABLET, DELAYED RELEASE ORAL at 07:13

## 2023-04-01 RX ADMIN — Medication 50 MILLIGRAM(S): at 21:29

## 2023-04-01 RX ADMIN — GABAPENTIN 100 MILLIGRAM(S): 400 CAPSULE ORAL at 21:29

## 2023-04-01 RX ADMIN — Medication 0.5 MILLIGRAM(S): at 21:30

## 2023-04-01 RX ADMIN — GABAPENTIN 100 MILLIGRAM(S): 400 CAPSULE ORAL at 14:54

## 2023-04-01 NOTE — BH INPATIENT PSYCHIATRY PROGRESS NOTE - NSBHCHARTREVIEWVS_PSY_A_CORE FT
Vital Signs Last 24 Hrs  T(C): 36.7 (04-01-23 @ 16:33), Max: 36.7 (04-01-23 @ 16:33)  T(F): 98.1 (04-01-23 @ 16:33), Max: 98.1 (04-01-23 @ 16:33)  HR: 88 (04-01-23 @ 16:33) (85 - 88)  BP: 118/69 (04-01-23 @ 16:33) (118/69 - 139/80)  BP(mean): --  RR: 18 (04-01-23 @ 16:33) (18 - 18)  SpO2: 95% (04-01-23 @ 16:33) (95% - 96%)

## 2023-04-01 NOTE — BH INPATIENT PSYCHIATRY PROGRESS NOTE - NSBHMETABOLIC_PSY_ALL_CORE_FT
BMI: BMI (kg/m2): 40.8 (03-28-23 @ 19:10)  HbA1c:   Glucose:   BP: 118/69 (04-01-23 @ 16:33) (117/72 - 141/84)  Lipid Panel:

## 2023-04-01 NOTE — BH INPATIENT PSYCHIATRY PROGRESS NOTE - NSBHASSESSSUMMFT_PSY_ALL_CORE
Patient is a 73 yo female, , retired (), non-caregiver, domiciled at home with her , with a prior psychiatric diagnosis of ANSON and MDD, 4 previous inpatient psychiatric hospitalizations at [facility], most recently 7/5/2021-7/3/2021, prior SA in 11/2019 by overdosing on pills, no history of NSSIB, no history of violence/aggression, no hx of legal issues, daily marijuana use, PMHx of HTN, hyperlipidemia, treated in outpatient geropsych clinic by Dr. Stovall (as below, with last visit 3/15/2023 and at that time documented to be at baseline with chronic anxiety, complaints of nausea, seen twice monthly with mediation mgt and psychotherapy)  BIB spouse for "worsening anxiety." Pt currently denies passive and active SI, intent and plan. She also denies symptoms of psychosis and sadia. The plan while she is hospitalized on the inpatient unit includes medication optimization.    #Depression:  - Venlafaxine .5 mg daily  - Continue mirtazapine 15 mg nightly    #Anxiety:  - Clonazepam 0.5 mg qAM (6AM per pt preference) and 0.5 mg qhs  - Gabapentin 100 mg TID    #Health maintenance:  - Losartan 100 mg daily  - Hydrochlorothiazide 12.5 mg daily  - Metoprolol tartrate 50 mg twice daily   - Famotidine 20 mg daily    ;;03/30: Not endorsing  suicidal or homicidal ideation intent or plans; no mention of auditory or visual hallucinations but asked for help falling asleep last night and was given additional Remeron.  Thinking is congruent with affect; no peculiarities of thinking or language use. Alert; oriented; cognition intact; speech clear; no tremor or evidence of movement impairment.  Discussed at length medication changes (Protonix substituting for Famotidine for GERD;  low dose Effexor in am at 75mg; Remeron to remain at 15mg at night (activating at higher doses); Klonopin tapered to 0.5mg po bid; and Neurontin modified to 100mg po tid to avoid GI distress.  Patient continues sad and anxious.      ;;04/01: Pt continuing to report depressed mood; denies SI/HI/AH/VH. Pt tolerating reduction in mirtazapine with good effect on improving sleep. Pt with nausea d/t Effexor titration; Zofran PRN required.

## 2023-04-01 NOTE — BH INPATIENT PSYCHIATRY PROGRESS NOTE - PRN MEDS
MEDICATIONS  (PRN):  aluminum hydroxide/magnesium hydroxide/simethicone Suspension 30 milliLiter(s) Oral every 4 hours PRN Dyspepsia  ondansetron   Disintegrating Tablet 8 milliGRAM(s) Oral every 8 hours PRN nausea

## 2023-04-01 NOTE — BH INPATIENT PSYCHIATRY PROGRESS NOTE - NSBHFUPINTERVALHXFT_PSY_A_CORE
No acute interval events. No episodes of behavioral agitation requiring emergent PRN medications. Case d/w nursing staff. Pt adherent to all medications and tolerating well without any AEs unless otherwise mentioned below. Pt with intact sleep and appetite. Pt denying SI/HI/AH/VH.    Pt evaluated today with primary complaint of nausea. She required ondansetron 4 mg early this morning at 9AM. Pt states that she typically takes ondansetron ODT 8 mg PRN at home as prescribed by her outpatient physician. Pt EKG reviewed and QTc on admission ~450 ms; ordered ondansetron ODT 8 mg q8h PRN. Psychoeducation provided for patient regarding common AE of nausea during dose titration of venlafaxine, and expected time course/resolution of symptoms. Pt otherwise reporting that mood is still depressed; reassurance provided that medications take time to work. She is tolerating dose reduction in mirtazapine with improvements in sleep.

## 2023-04-02 LAB
APPEARANCE UR: CLEAR — SIGNIFICANT CHANGE UP
BACTERIA # UR AUTO: PRESENT /HPF
BILIRUB UR-MCNC: NEGATIVE — SIGNIFICANT CHANGE UP
COLOR SPEC: YELLOW — SIGNIFICANT CHANGE UP
COMMENT - URINE: SIGNIFICANT CHANGE UP
DIFF PNL FLD: ABNORMAL
EPI CELLS # UR: SIGNIFICANT CHANGE UP /HPF (ref 0–5)
GLUCOSE UR QL: NEGATIVE — SIGNIFICANT CHANGE UP
KETONES UR-MCNC: NEGATIVE — SIGNIFICANT CHANGE UP
LEUKOCYTE ESTERASE UR-ACNC: ABNORMAL
NITRITE UR-MCNC: NEGATIVE — SIGNIFICANT CHANGE UP
PH UR: 7 — SIGNIFICANT CHANGE UP (ref 5–8)
PROT UR-MCNC: NEGATIVE MG/DL — SIGNIFICANT CHANGE UP
RBC CASTS # UR COMP ASSIST: < 5 /HPF — SIGNIFICANT CHANGE UP
SP GR SPEC: 1.01 — SIGNIFICANT CHANGE UP (ref 1–1.03)
UROBILINOGEN FLD QL: 0.2 E.U./DL — SIGNIFICANT CHANGE UP
WBC UR QL: ABNORMAL /HPF

## 2023-04-02 RX ADMIN — Medication 50 MILLIGRAM(S): at 21:16

## 2023-04-02 RX ADMIN — GABAPENTIN 100 MILLIGRAM(S): 400 CAPSULE ORAL at 10:18

## 2023-04-02 RX ADMIN — MIRTAZAPINE 15 MILLIGRAM(S): 45 TABLET, ORALLY DISINTEGRATING ORAL at 21:16

## 2023-04-02 RX ADMIN — ONDANSETRON 8 MILLIGRAM(S): 8 TABLET, FILM COATED ORAL at 18:21

## 2023-04-02 RX ADMIN — Medication 0.5 MILLIGRAM(S): at 18:20

## 2023-04-02 RX ADMIN — Medication 0.5 MILLIGRAM(S): at 07:04

## 2023-04-02 RX ADMIN — GABAPENTIN 100 MILLIGRAM(S): 400 CAPSULE ORAL at 21:16

## 2023-04-02 RX ADMIN — Medication 30 MILLILITER(S): at 15:01

## 2023-04-02 RX ADMIN — Medication 50 MILLIGRAM(S): at 10:18

## 2023-04-02 RX ADMIN — ATORVASTATIN CALCIUM 10 MILLIGRAM(S): 80 TABLET, FILM COATED ORAL at 21:16

## 2023-04-02 RX ADMIN — Medication 112.5 MILLIGRAM(S): at 10:18

## 2023-04-02 RX ADMIN — LOSARTAN POTASSIUM 100 MILLIGRAM(S): 100 TABLET, FILM COATED ORAL at 10:18

## 2023-04-02 RX ADMIN — ONDANSETRON 8 MILLIGRAM(S): 8 TABLET, FILM COATED ORAL at 10:19

## 2023-04-02 RX ADMIN — GABAPENTIN 100 MILLIGRAM(S): 400 CAPSULE ORAL at 14:58

## 2023-04-02 RX ADMIN — PANTOPRAZOLE SODIUM 40 MILLIGRAM(S): 20 TABLET, DELAYED RELEASE ORAL at 07:04

## 2023-04-03 PROCEDURE — 99231 SBSQ HOSP IP/OBS SF/LOW 25: CPT

## 2023-04-03 PROCEDURE — 90832 PSYTX W PT 30 MINUTES: CPT

## 2023-04-03 RX ORDER — VENLAFAXINE HCL 75 MG
150 CAPSULE, EXT RELEASE 24 HR ORAL DAILY
Refills: 0 | Status: DISCONTINUED | OUTPATIENT
Start: 2023-04-03 | End: 2023-04-10

## 2023-04-03 RX ORDER — CLONAZEPAM 1 MG
0.5 TABLET ORAL
Refills: 0 | Status: DISCONTINUED | OUTPATIENT
Start: 2023-04-03 | End: 2023-04-07

## 2023-04-03 RX ORDER — POLYETHYLENE GLYCOL 3350 17 G/17G
17 POWDER, FOR SOLUTION ORAL DAILY
Refills: 0 | Status: DISCONTINUED | OUTPATIENT
Start: 2023-04-03 | End: 2023-04-19

## 2023-04-03 RX ORDER — CLONAZEPAM 1 MG
0.5 TABLET ORAL AT BEDTIME
Refills: 0 | Status: DISCONTINUED | OUTPATIENT
Start: 2023-04-03 | End: 2023-04-03

## 2023-04-03 RX ORDER — CLONAZEPAM 1 MG
0.5 TABLET ORAL ONCE
Refills: 0 | Status: DISCONTINUED | OUTPATIENT
Start: 2023-04-03 | End: 2023-04-03

## 2023-04-03 RX ADMIN — MIRTAZAPINE 15 MILLIGRAM(S): 45 TABLET, ORALLY DISINTEGRATING ORAL at 21:32

## 2023-04-03 RX ADMIN — Medication 50 MILLIGRAM(S): at 11:13

## 2023-04-03 RX ADMIN — PANTOPRAZOLE SODIUM 40 MILLIGRAM(S): 20 TABLET, DELAYED RELEASE ORAL at 06:55

## 2023-04-03 RX ADMIN — GABAPENTIN 100 MILLIGRAM(S): 400 CAPSULE ORAL at 15:39

## 2023-04-03 RX ADMIN — GABAPENTIN 100 MILLIGRAM(S): 400 CAPSULE ORAL at 11:13

## 2023-04-03 RX ADMIN — Medication 0.5 MILLIGRAM(S): at 15:39

## 2023-04-03 RX ADMIN — LOSARTAN POTASSIUM 100 MILLIGRAM(S): 100 TABLET, FILM COATED ORAL at 11:13

## 2023-04-03 RX ADMIN — Medication 0.5 MILLIGRAM(S): at 21:32

## 2023-04-03 RX ADMIN — Medication 112.5 MILLIGRAM(S): at 11:13

## 2023-04-03 RX ADMIN — Medication 50 MILLIGRAM(S): at 21:32

## 2023-04-03 RX ADMIN — Medication 0.5 MILLIGRAM(S): at 06:55

## 2023-04-03 RX ADMIN — ONDANSETRON 8 MILLIGRAM(S): 8 TABLET, FILM COATED ORAL at 15:46

## 2023-04-03 RX ADMIN — GABAPENTIN 100 MILLIGRAM(S): 400 CAPSULE ORAL at 21:31

## 2023-04-03 RX ADMIN — ATORVASTATIN CALCIUM 10 MILLIGRAM(S): 80 TABLET, FILM COATED ORAL at 21:33

## 2023-04-03 NOTE — BH INPATIENT PSYCHIATRY PROGRESS NOTE - NSBHFUPINTERVALCCFT_PSY_A_CORE
"I feel less anxious now."     Suicide risk assessment: at time of admission suicide risk was MODERATE because of chronic and worsening anxiety and preoccupation with death of parents years ago; currently risk is LOW to MODERATE attenuated by the therapeutic milieu and a variety of medications adjustments to reduce somatic distress and anxiety and worse sleep.

## 2023-04-03 NOTE — BH INPATIENT PSYCHIATRY PROGRESS NOTE - NSBHFUPINTERVALHXFT_PSY_A_CORE
No acute interval events. No episodes of behavioral agitation requiring emergent PRN medications.     Upon approach, pt was found resting comfortably in her bed. She related feeling less anxious over the weekend and reports that her nausea has lessened. Pt remarks that she doesn't want to leave the hospital just yet. No new complaints or concerns were expressed. Pt denies any issues with sleep and appetite.     Per Dr. Vincent's note over the weekend: "she required ondansetron 4 mg early this morning at 9AM. Pt states that she typically takes ondansetron ODT 8 mg PRN at home as prescribed by her outpatient physician. Pt EKG reviewed and QTc on admission ~450 ms; ordered ondansetron ODT 8 mg q8h PRN. Psychoeducation provided for patient regarding common AE of nausea during dose titration of venlafaxine, and expected time course/resolution of symptoms. Pt otherwise reporting that mood is still depressed; reassurance provided that medications take time to work. She is tolerating dose reduction in mirtazapine with improvements in sleep."

## 2023-04-03 NOTE — BH INPATIENT PSYCHIATRY PROGRESS NOTE - NSBHCHARTREVIEWVS_PSY_A_CORE FT
Vital Signs Last 24 Hrs  T(C): 36.9 (04-03-23 @ 17:09), Max: 37.1 (04-03-23 @ 08:25)  T(F): 98.4 (04-03-23 @ 17:09), Max: 98.7 (04-03-23 @ 08:25)  HR: 77 (04-03-23 @ 17:09) (77 - 82)  BP: 117/82 (04-03-23 @ 17:09) (117/82 - 135/79)  BP(mean): --  RR: 18 (04-03-23 @ 08:25) (18 - 18)  SpO2: 94% (04-03-23 @ 17:09) (94% - 94%)

## 2023-04-03 NOTE — BH INPATIENT PSYCHIATRY PROGRESS NOTE - NSBHMETABOLIC_PSY_ALL_CORE_FT
BMI: BMI (kg/m2): 40.8 (03-28-23 @ 19:10)  HbA1c:   Glucose:   BP: 117/82 (04-03-23 @ 17:09) (110/67 - 143/81)  Lipid Panel:

## 2023-04-03 NOTE — BH INPATIENT PSYCHIATRY PROGRESS NOTE - NSBHASSESSSUMMFT_PSY_ALL_CORE
Patient is a 71 yo female, , retired (), non-caregiver, domiciled at home with her , with a prior psychiatric diagnosis of ANSON and MDD, 4 previous inpatient psychiatric hospitalizations at [facility], most recently 7/5/2021-7/3/2021, prior SA in 11/2019 by overdosing on pills, no history of NSSIB, no history of violence/aggression, no hx of legal issues, daily marijuana use, PMHx of HTN, hyperlipidemia, treated in outpatient geropsych clinic by Dr. Stovall (as below, with last visit 3/15/2023 and at that time documented to be at baseline with chronic anxiety, complaints of nausea, seen twice monthly with mediation mgt and psychotherapy)  BIB spouse for "worsening anxiety." Pt currently denies passive and active SI, intent and plan. She also denies symptoms of psychosis and sadia. The plan while she is hospitalized on the inpatient unit includes medication optimization.    #Depression:  - Venlafaxine .5 mg daily  - Continue mirtazapine 15 mg nightly    #Anxiety:  - Clonazepam 0.5 mg qAM (6AM per pt preference) and 0.5 mg qhs  - Gabapentin 100 mg TID    #Health maintenance:  - Losartan 100 mg daily  - Hydrochlorothiazide 12.5 mg daily  - Metoprolol tartrate 50 mg twice daily   - Famotidine 20 mg daily    ;;03/30: Not endorsing  suicidal or homicidal ideation intent or plans; no mention of auditory or visual hallucinations but asked for help falling asleep last night and was given additional Remeron.  Thinking is congruent with affect; no peculiarities of thinking or language use. Alert; oriented; cognition intact; speech clear; no tremor or evidence of movement impairment.  Discussed at length medication changes (Protonix substituting for Famotidine for GERD;  low dose Effexor in am at 75mg; Remeron to remain at 15mg at night (activating at higher doses); Klonopin tapered to 0.5mg po bid; and Neurontin modified to 100mg po tid to avoid GI distress.  Patient continues sad and anxious.      ;;04/01: Pt continuing to report depressed mood; denies SI/HI/AH/VH. Pt tolerating reduction in mirtazapine with good effect on improving sleep. Pt with nausea d/t Effexor titration; Zofran PRN required.

## 2023-04-03 NOTE — BH INPATIENT PSYCHIATRY PROGRESS NOTE - NSBHCHARTREVIEWVS_PSY_A_CORE FT
Vital Signs Last 24 Hrs  T(C): 36.9 (04-03-23 @ 17:09), Max: 37.1 (04-03-23 @ 08:25)  T(F): 98.4 (04-03-23 @ 17:09), Max: 98.7 (04-03-23 @ 08:25)  HR: 77 (04-03-23 @ 17:09) (77 - 82)  BP: 117/82 (04-03-23 @ 17:09) (117/82 - 135/79)  BP(mean): 89 (04-02-23 @ 21:00) (89 - 89)  RR: 18 (04-03-23 @ 08:25) (18 - 18)  SpO2: 94% (04-03-23 @ 17:09) (94% - 97%)

## 2023-04-03 NOTE — BH INPATIENT PSYCHIATRY PROGRESS NOTE - NSBHASSESSSUMMFT_PSY_ALL_CORE
Patient is a 71 yo female, , retired (), non-caregiver, domiciled at home with her , with a prior psychiatric diagnosis of ANSON and MDD, 4 previous inpatient psychiatric hospitalizations at [facility], most recently 7/5/2021-7/3/2021, prior SA in 11/2019 by overdosing on pills, no history of NSSIB, no history of violence/aggression, no hx of legal issues, daily marijuana use, PMHx of HTN, hyperlipidemia, [facility] (as below, with last visit 3/15/2023 and at that time documented to be at baseline with chronic anxiety, complaints of nausea, seen twice monthly with mediation mgt and psychotherapy)  BIB spouse for "worsening anxiety." Pt currently denies passive and active SI, intent and plan. She also denies symptoms of psychosis and sadia. The plan while she is hospitalized on the inpatient unit includes medication optimization.    #Depression:  - Venlafaxine  mg daily  - Continue mirtazapine 15 mg nightly    #Anxiety:  - Clonazepam 0.5 mg qAM (6AM per pt preference) and 0.5 mg qhs  - Gabapentin 100 mg TID    #Health maintenance:  - Losartan 100 mg daily  - Hydrochlorothiazide 12.5 mg daily  - Metoprolol tartrate 50 mg twice daily   - Famotidine 20 mg daily    ;;03/30: Not endorsing  suicidal or homicidal ideation intent or plans; no mention of auditory or visual hallucinations but asked for help falling asleep last night and was given additional Remeron.  Thinking is congruent with affect; no peculiarities of thinking or language use. Alert; oriented; cognition intact; speech clear; no tremor or evidence of movement impairment.  Discussed at length medication changes (Protonix substituting for Famotidine for GERD;  low dose Effexor in am at 75mg; Remeron to remain at 15mg at night (activating at higher doses); Klonopin tapered to 0.5mg po bid; and Neurontin modified to 100mg po tid to avoid GI distress.  Patient continues sad and anxious.      ;;04/01: Pt continuing to report depressed mood; denies SI/HI/AH/VH. Pt tolerating reduction in mirtazapine with good effect on improving sleep. Pt with nausea d/t Effexor titration; Zofran PRN required.

## 2023-04-03 NOTE — BH INPATIENT PSYCHIATRY PROGRESS NOTE - CURRENT MEDICATION
MEDICATIONS  (STANDING):  atorvastatin 10 milliGRAM(s) Oral at bedtime  clonazePAM  Tablet 0.5 milliGRAM(s) Oral <User Schedule>  gabapentin 100 milliGRAM(s) Oral three times a day  hydrochlorothiazide 12.5 milliGRAM(s) Oral daily  losartan 100 milliGRAM(s) Oral daily  metoprolol tartrate 50 milliGRAM(s) Oral two times a day  mirtazapine 15 milliGRAM(s) Oral at bedtime  pantoprazole    Tablet 40 milliGRAM(s) Oral before breakfast  polyethylene glycol 3350 17 Gram(s) Oral daily  venlafaxine 150 milliGRAM(s) Oral daily    MEDICATIONS  (PRN):  aluminum hydroxide/magnesium hydroxide/simethicone Suspension 30 milliLiter(s) Oral every 4 hours PRN Dyspepsia  ondansetron   Disintegrating Tablet 8 milliGRAM(s) Oral every 8 hours PRN nausea

## 2023-04-04 RX ADMIN — Medication 50 MILLIGRAM(S): at 21:14

## 2023-04-04 RX ADMIN — PANTOPRAZOLE SODIUM 40 MILLIGRAM(S): 20 TABLET, DELAYED RELEASE ORAL at 07:31

## 2023-04-04 RX ADMIN — GABAPENTIN 100 MILLIGRAM(S): 400 CAPSULE ORAL at 15:34

## 2023-04-04 RX ADMIN — Medication 50 MILLIGRAM(S): at 10:12

## 2023-04-04 RX ADMIN — ATORVASTATIN CALCIUM 10 MILLIGRAM(S): 80 TABLET, FILM COATED ORAL at 21:15

## 2023-04-04 RX ADMIN — GABAPENTIN 100 MILLIGRAM(S): 400 CAPSULE ORAL at 21:14

## 2023-04-04 RX ADMIN — Medication 0.5 MILLIGRAM(S): at 18:00

## 2023-04-04 RX ADMIN — GABAPENTIN 100 MILLIGRAM(S): 400 CAPSULE ORAL at 09:00

## 2023-04-04 RX ADMIN — LOSARTAN POTASSIUM 100 MILLIGRAM(S): 100 TABLET, FILM COATED ORAL at 10:12

## 2023-04-04 RX ADMIN — MIRTAZAPINE 15 MILLIGRAM(S): 45 TABLET, ORALLY DISINTEGRATING ORAL at 21:14

## 2023-04-04 RX ADMIN — Medication 150 MILLIGRAM(S): at 10:12

## 2023-04-04 NOTE — BH INPATIENT PSYCHIATRY PROGRESS NOTE - NSBHCHARTREVIEWVS_PSY_A_CORE FT
Vital Signs Last 24 Hrs  T(C): 37.1 (04-04-23 @ 17:16), Max: 37.1 (04-04-23 @ 17:16)  T(F): 98.7 (04-04-23 @ 17:16), Max: 98.7 (04-04-23 @ 17:16)  HR: 75 (04-04-23 @ 17:16) (75 - 81)  BP: 119/61 (04-04-23 @ 17:16) (119/61 - 127/71)  BP(mean): --  RR: 18 (04-04-23 @ 17:16) (18 - 18)  SpO2: 94% (04-04-23 @ 17:16) (94% - 95%)     Vital Signs Last 24 Hrs  T(C): 37.1 (04-05-23 @ 17:04), Max: 37.1 (04-05-23 @ 17:04)  T(F): 98.7 (04-05-23 @ 17:04), Max: 98.7 (04-05-23 @ 17:04)  HR: 84 (04-05-23 @ 19:54) (80 - 84)  BP: 140/88 (04-05-23 @ 19:54) (140/88 - 152/74)  BP(mean): --  RR: 18 (04-05-23 @ 19:54) (18 - 18)  SpO2: 98% (04-05-23 @ 19:54) (96% - 98%)

## 2023-04-04 NOTE — BH PSYCHOLOGY - CLINICIAN PSYCHOTHERAPY NOTE - NSBHPSYCHOLPROBS_PSY_ALL_CORE
Suicidality
Academic/Vocational/Social Dysfunction/Anxiety/Depression/Family Dysfunction/Substance Abuse/Suicidality

## 2023-04-04 NOTE — BH INPATIENT PSYCHIATRY PROGRESS NOTE - NSBHASSESSSUMMFT_PSY_ALL_CORE
Patient is a 73 yo female, , retired (), non-caregiver, domiciled at home with her , with a prior psychiatric diagnosis of ANSON and MDD, 4 previous inpatient psychiatric hospitalizations at [facility], most recently 7/5/2021-7/3/2021, prior SA in 11/2019 by overdosing on pills, no history of NSSIB, no history of violence/aggression, no hx of legal issues, daily marijuana use, PMHx of HTN, hyperlipidemia, treated in outpatient geropsych clinic by Dr. Stovall (as below, with last visit 3/15/2023 and at that time documented to be at baseline with chronic anxiety, complaints of nausea, seen twice monthly with mediation mgt and psychotherapy)  BIB spouse for "worsening anxiety." Pt currently denies passive and active SI, intent and plan. She also denies symptoms of psychosis and sadia. The plan while she is hospitalized on the inpatient unit includes medication optimization.    #Depression:  - Venlafaxine .5 mg daily  - Continue mirtazapine 15 mg nightly    #Anxiety:  - Clonazepam 0.5 mg qAM (6AM per pt preference) and 0.5 mg qhs  - Gabapentin 100 mg TID    #Health maintenance:  - Losartan 100 mg daily  - Hydrochlorothiazide 12.5 mg daily  - Metoprolol tartrate 50 mg twice daily   - Famotidine 20 mg daily    ;;03/30: Not endorsing  suicidal or homicidal ideation intent or plans; no mention of auditory or visual hallucinations but asked for help falling asleep last night and was given additional Remeron.  Thinking is congruent with affect; no peculiarities of thinking or language use. Alert; oriented; cognition intact; speech clear; no tremor or evidence of movement impairment.  Discussed at length medication changes (Protonix substituting for Famotidine for GERD;  low dose Effexor in am at 75mg; Remeron to remain at 15mg at night (activating at higher doses); Klonopin tapered to 0.5mg po bid; and Neurontin modified to 100mg po tid to avoid GI distress.  Patient continues sad and anxious.      ;;04/01: Pt continuing to report depressed mood; denies SI/HI/AH/VH. Pt tolerating reduction in mirtazapine with good effect on improving sleep. Pt with nausea d/t Effexor titration; Zofran PRN required.

## 2023-04-04 NOTE — BH PSYCHOLOGY - CLINICIAN PSYCHOTHERAPY NOTE - NSBHPSYCHOLGOALS_PSY_A_CORE
Improve social/vocational/coping skills
Decrease symptoms/Improve level of independent functioning/Psychoeducation/Treatment compliance

## 2023-04-04 NOTE — BH PSYCHOLOGY - CLINICIAN PSYCHOTHERAPY NOTE - NSTXANXGOALOTHER_PSY_ALL_CORE
Pt will participate in groups and milieu treatment structure of the unit
Pt will participate in groups and milieu treatment structure of the unit
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
## 2023-04-04 NOTE — BH PSYCHOLOGY - CLINICIAN PSYCHOTHERAPY NOTE - TOKEN PULL-DIAGNOSIS
Primary Diagnosis:  Recurrent major depression [F33.9]        Problem Dx:   Anxiety disorder [F41.9]      
Primary Diagnosis:  Recurrent major depression [F33.9]        Problem Dx:   Anxiety disorder [F41.9]

## 2023-04-04 NOTE — BH INPATIENT PSYCHIATRY PROGRESS NOTE - NSBHFUPINTERVALHXFT_PSY_A_CORE
No acute interval events. No episodes of behavioral agitation requiring emergent PRN medications.     Upon approach, pt was found resting comfortably in her bed. Pt reports feeling relieved at the fact that her anxiety was well-controlled today. She relates that PRN Gabapentin was effective as a breakthrough medication. Pt also relates that she is pleased at the fact that she did not experience significant nausea today. Pt remarked that she participated in three group activities today and that she was particularly fond of the individual therapy session she received today. Pt describes this session as very cathartic for her. No new complaints or concerns were expressed. Pt denies any issues with sleep and appetite.

## 2023-04-04 NOTE — BH INPATIENT PSYCHIATRY PROGRESS NOTE - NSTXSUICIDGOALOTHER_PSY_ALL_CORE
Patient will stabilize mood & alleviate sx of SI to engage with discharge planning.

## 2023-04-04 NOTE — BH INPATIENT PSYCHIATRY PROGRESS NOTE - CURRENT MEDICATION
MEDICATIONS  (STANDING):  atorvastatin 10 milliGRAM(s) Oral at bedtime  clonazePAM  Tablet 0.5 milliGRAM(s) Oral <User Schedule>  gabapentin 100 milliGRAM(s) Oral three times a day  hydrochlorothiazide 12.5 milliGRAM(s) Oral daily  losartan 100 milliGRAM(s) Oral daily  metoprolol tartrate 50 milliGRAM(s) Oral two times a day  mirtazapine 15 milliGRAM(s) Oral at bedtime  pantoprazole    Tablet 40 milliGRAM(s) Oral before breakfast  polyethylene glycol 3350 17 Gram(s) Oral daily  venlafaxine 150 milliGRAM(s) Oral daily    MEDICATIONS  (PRN):  aluminum hydroxide/magnesium hydroxide/simethicone Suspension 30 milliLiter(s) Oral every 4 hours PRN Dyspepsia  ondansetron   Disintegrating Tablet 8 milliGRAM(s) Oral every 8 hours PRN nausea   MEDICATIONS  (STANDING):  atorvastatin 10 milliGRAM(s) Oral at bedtime  clonazePAM  Tablet 0.5 milliGRAM(s) Oral <User Schedule>  hydrochlorothiazide 12.5 milliGRAM(s) Oral daily  losartan 100 milliGRAM(s) Oral daily  metoprolol tartrate 50 milliGRAM(s) Oral two times a day  mirtazapine 15 milliGRAM(s) Oral at bedtime  pantoprazole    Tablet 40 milliGRAM(s) Oral before breakfast  polyethylene glycol 3350 17 Gram(s) Oral daily  venlafaxine 150 milliGRAM(s) Oral daily    MEDICATIONS  (PRN):  aluminum hydroxide/magnesium hydroxide/simethicone Suspension 30 milliLiter(s) Oral every 4 hours PRN Dyspepsia  ondansetron   Disintegrating Tablet 8 milliGRAM(s) Oral every 8 hours PRN nausea

## 2023-04-04 NOTE — BH INPATIENT PSYCHIATRY PROGRESS NOTE - NSBHMETABOLIC_PSY_ALL_CORE_FT
BMI: BMI (kg/m2): 40.8 (03-28-23 @ 19:10)  HbA1c:   Glucose:   BP: 119/61 (04-04-23 @ 17:16) (110/67 - 143/81)  Lipid Panel:  BMI: BMI (kg/m2): 40.8 (03-28-23 @ 19:10)  HbA1c:   Glucose:   BP: 140/88 (04-05-23 @ 19:54) (117/82 - 152/74)  Lipid Panel:

## 2023-04-05 RX ADMIN — ONDANSETRON 8 MILLIGRAM(S): 8 TABLET, FILM COATED ORAL at 18:10

## 2023-04-05 RX ADMIN — Medication 50 MILLIGRAM(S): at 21:30

## 2023-04-05 RX ADMIN — PANTOPRAZOLE SODIUM 40 MILLIGRAM(S): 20 TABLET, DELAYED RELEASE ORAL at 07:05

## 2023-04-05 RX ADMIN — MIRTAZAPINE 15 MILLIGRAM(S): 45 TABLET, ORALLY DISINTEGRATING ORAL at 21:30

## 2023-04-05 RX ADMIN — POLYETHYLENE GLYCOL 3350 17 GRAM(S): 17 POWDER, FOR SOLUTION ORAL at 10:18

## 2023-04-05 RX ADMIN — ATORVASTATIN CALCIUM 10 MILLIGRAM(S): 80 TABLET, FILM COATED ORAL at 21:30

## 2023-04-05 RX ADMIN — Medication 0.5 MILLIGRAM(S): at 13:55

## 2023-04-05 RX ADMIN — Medication 1 MILLIGRAM(S): at 10:37

## 2023-04-05 RX ADMIN — Medication 50 MILLIGRAM(S): at 10:18

## 2023-04-05 RX ADMIN — Medication 0.5 MILLIGRAM(S): at 21:30

## 2023-04-05 RX ADMIN — Medication 150 MILLIGRAM(S): at 10:17

## 2023-04-05 RX ADMIN — GABAPENTIN 100 MILLIGRAM(S): 400 CAPSULE ORAL at 10:18

## 2023-04-05 RX ADMIN — LOSARTAN POTASSIUM 100 MILLIGRAM(S): 100 TABLET, FILM COATED ORAL at 10:18

## 2023-04-05 NOTE — BH INPATIENT PSYCHIATRY PROGRESS NOTE - NSBHCHARTREVIEWVS_PSY_A_CORE FT
negative... Vital Signs Last 24 Hrs  T(C): 36.6 (04-05-23 @ 08:40), Max: 37.1 (04-04-23 @ 17:16)  T(F): 97.8 (04-05-23 @ 08:40), Max: 98.7 (04-04-23 @ 17:16)  HR: 80 (04-05-23 @ 08:40) (75 - 81)  BP: 126/71 (04-05-23 @ 08:40) (119/61 - 126/71)  BP(mean): --  RR: 18 (04-05-23 @ 08:40) (18 - 18)  SpO2: 98% (04-05-23 @ 08:40) (94% - 98%)     Vital Signs Last 24 Hrs  T(C): 37.1 (04-05-23 @ 17:04), Max: 37.1 (04-05-23 @ 17:04)  T(F): 98.7 (04-05-23 @ 17:04), Max: 98.7 (04-05-23 @ 17:04)  HR: 84 (04-05-23 @ 19:54) (80 - 84)  BP: 140/88 (04-05-23 @ 19:54) (126/71 - 152/74)  BP(mean): --  RR: 18 (04-05-23 @ 19:54) (18 - 18)  SpO2: 98% (04-05-23 @ 19:54) (96% - 98%)

## 2023-04-05 NOTE — BH INPATIENT PSYCHIATRY PROGRESS NOTE - NSBHFUPINTERVALCCFT_PSY_A_CORE
admitted for severe anxiety ; at time of admission suicide risk was MODERATE because of chronic and worsening anxiety and preoccupation with death of parents years ago; currently risk is LOW to MODERATE attenuated by the therapeutic milieu and a variety of medications adjustments to reduce somatic distress and anxiety and worse sleep.   Admitted for severe anxiety; at time of admission, suicide risk was MODERATE because of chronic and worsening anxiety and preoccupation with death of parents years ago; currently risk is LOW to MODERATE attenuated by the therapeutic milieu and a variety of medications adjustments to reduce somatic distress and anxiety and worse sleep.

## 2023-04-05 NOTE — BH INPATIENT PSYCHIATRY PROGRESS NOTE - NSBHFUPINTERVALHXFT_PSY_A_CORE
No acute interval events. No episodes of behavioral agitation requiring emergent PRN medications.     Upon approach, pt was found resting comfortably in her bed. Pt denies sleep and appetite disturbances. Today, pt reports that her anxiety is worse in the morning and afternoon and that she would like medication to address panic attacks during these times of the day. Pt expressed that she felt medications were stopped without replacements. Writer explained that the plan in the near future is to cross-taper venlafaxine with escitalopram and that Gabapentin was discontinued to reduce her nausea. Pt was preoccupied with fear that her anxiety will not be adequately addressed.

## 2023-04-05 NOTE — BH INPATIENT PSYCHIATRY PROGRESS NOTE - NSBHASSESSSUMMFT_PSY_ALL_CORE
Patient is a 73 yo female, , retired (), non-caregiver, domiciled at home with her , with a prior psychiatric diagnosis of ANSON and MDD, 4 previous inpatient psychiatric hospitalizations at [facility], most recently 7/5/2021-7/3/2021, prior SA in 11/2019 by overdosing on pills, no history of NSSIB, no history of violence/aggression, no hx of legal issues, daily marijuana use, PMHx of HTN, hyperlipidemia, treated in outpatient geropsych clinic by Dr. Stovall (as below, with last visit 3/15/2023 and at that time documented to be at baseline with chronic anxiety, complaints of nausea, seen twice monthly with mediation mgt and psychotherapy)  BIB spouse for "worsening anxiety." Pt currently denies passive and active SI, intent and plan. She also denies symptoms of psychosis and sadia. The plan while she is hospitalized on the inpatient unit includes medication optimization.    #Depression:  - Venlafaxine .5 mg daily  - Continue mirtazapine 15 mg nightly    #Anxiety:  - Clonazepam 0.5 mg qAM (6AM per pt preference) and 0.5 mg qhs  - Gabapentin 100 mg TID    #Health maintenance:  - Losartan 100 mg daily  - Hydrochlorothiazide 12.5 mg daily  - Metoprolol tartrate 50 mg twice daily   - Famotidine 20 mg daily    ;;03/30: Not endorsing  suicidal or homicidal ideation intent or plans; no mention of auditory or visual hallucinations but asked for help falling asleep last night and was given additional Remeron.  Thinking is congruent with affect; no peculiarities of thinking or language use. Alert; oriented; cognition intact; speech clear; no tremor or evidence of movement impairment.  Discussed at length medication changes (Protonix substituting for Famotidine for GERD;  low dose Effexor in am at 75mg; Remeron to remain at 15mg at night (activating at higher doses); Klonopin tapered to 0.5mg po bid; and Neurontin modified to 100mg po tid to avoid GI distress.  Patient continues sad and anxious.      ;;04/01: Pt continuing to report depressed mood; denies SI/HI/AH/VH. Pt tolerating reduction in mirtazapine with good effect on improving sleep. Pt with nausea d/t Effexor titration; Zofran PRN required.

## 2023-04-05 NOTE — BH INPATIENT PSYCHIATRY PROGRESS NOTE - NSBHMETABOLIC_PSY_ALL_CORE_FT
BMI: BMI (kg/m2): 40.8 (03-28-23 @ 19:10)  HbA1c:   Glucose:   BP: 126/71 (04-05-23 @ 08:40) (110/67 - 135/79)  Lipid Panel:  BMI: BMI (kg/m2): 40.8 (03-28-23 @ 19:10)  HbA1c:   Glucose:   BP: 140/88 (04-05-23 @ 19:54) (117/82 - 152/74)  Lipid Panel:

## 2023-04-06 PROCEDURE — 99232 SBSQ HOSP IP/OBS MODERATE 35: CPT

## 2023-04-06 RX ADMIN — ONDANSETRON 8 MILLIGRAM(S): 8 TABLET, FILM COATED ORAL at 09:08

## 2023-04-06 RX ADMIN — Medication 0.5 MILLIGRAM(S): at 14:09

## 2023-04-06 RX ADMIN — ONDANSETRON 8 MILLIGRAM(S): 8 TABLET, FILM COATED ORAL at 17:09

## 2023-04-06 RX ADMIN — Medication 50 MILLIGRAM(S): at 10:57

## 2023-04-06 RX ADMIN — Medication 150 MILLIGRAM(S): at 10:57

## 2023-04-06 RX ADMIN — LOSARTAN POTASSIUM 100 MILLIGRAM(S): 100 TABLET, FILM COATED ORAL at 10:57

## 2023-04-06 RX ADMIN — PANTOPRAZOLE SODIUM 40 MILLIGRAM(S): 20 TABLET, DELAYED RELEASE ORAL at 07:52

## 2023-04-06 NOTE — BH INPATIENT PSYCHIATRY PROGRESS NOTE - CURRENT MEDICATION
MEDICATIONS  (STANDING):  atorvastatin 10 milliGRAM(s) Oral at bedtime  clonazePAM  Tablet 0.5 milliGRAM(s) Oral <User Schedule>  hydrochlorothiazide 12.5 milliGRAM(s) Oral daily  losartan 100 milliGRAM(s) Oral daily  metoprolol tartrate 50 milliGRAM(s) Oral two times a day  mirtazapine 15 milliGRAM(s) Oral at bedtime  pantoprazole    Tablet 40 milliGRAM(s) Oral before breakfast  polyethylene glycol 3350 17 Gram(s) Oral daily  venlafaxine 150 milliGRAM(s) Oral daily    MEDICATIONS  (PRN):  aluminum hydroxide/magnesium hydroxide/simethicone Suspension 30 milliLiter(s) Oral every 4 hours PRN Dyspepsia  ondansetron   Disintegrating Tablet 8 milliGRAM(s) Oral every 8 hours PRN nausea

## 2023-04-06 NOTE — BH INPATIENT PSYCHIATRY PROGRESS NOTE - NSBHFUPINTERVALCCFT_PSY_A_CORE
Admitted for severe anxiety; at time of admission, suicide risk was MODERATE because of chronic and worsening anxiety and preoccupation with death of parents years ago; currently risk is LOW to MODERATE attenuated by the therapeutic milieu and a variety of medications adjustments to reduce somatic distress and anxiety and worse sleep.

## 2023-04-06 NOTE — BH INPATIENT PSYCHIATRY PROGRESS NOTE - NSBHASSESSSUMMFT_PSY_ALL_CORE
Patient is a 73 yo female, , retired (), non-caregiver, domiciled at home with her , with a prior psychiatric diagnosis of ANSON and MDD, 4 previous inpatient psychiatric hospitalizations at [facility], most recently 7/5/2021-7/3/2021, prior SA in 11/2019 by overdosing on pills, no history of NSSIB, no history of violence/aggression, no hx of legal issues, daily marijuana use, PMHx of HTN, hyperlipidemia, treated in outpatient geropsych clinic by Dr. Stovall (as below, with last visit 3/15/2023 and at that time documented to be at baseline with chronic anxiety, complaints of nausea, seen twice monthly with mediation mgt and psychotherapy)  BIB spouse for "worsening anxiety." Pt currently denies passive and active SI, intent and plan. She also denies symptoms of psychosis and sadia. The plan while she is hospitalized on the inpatient unit includes medication optimization.    #Depression:  - Venlafaxine .5 mg daily  - Continue mirtazapine 15 mg nightly    #Anxiety:  - Clonazepam 0.5 mg midday 0.5 mg qhs  - Gabapentin 100 mg TID discontinued    #Health maintenance:  - Losartan 100 mg daily  - Hydrochlorothiazide 12.5 mg daily  - Metoprolol tartrate 50 mg twice daily   - Famotidine 20 mg daily    ;;03/30: Not endorsing  suicidal or homicidal ideation intent or plans; no mention of auditory or visual hallucinations but asked for help falling asleep last night and was given additional Remeron.  Thinking is congruent with affect; no peculiarities of thinking or language use. Alert; oriented; cognition intact; speech clear; no tremor or evidence of movement impairment.  Discussed at length medication changes (Protonix substituting for Famotidine for GERD;  low dose Effexor in am at 75mg; Remeron to remain at 15mg at night (activating at higher doses); Klonopin tapered to 0.5mg po bid; and Neurontin modified to 100mg po tid to avoid GI distress.  Patient continues sad and anxious.      ;;04/01: Pt continuing to report depressed mood; denies SI/HI/AH/VH. Pt tolerating reduction in mirtazapine with good effect on improving sleep. Pt with nausea d/t Effexor titration; Zofran PRN required.   4/6 Pt highly anxious, reports worsening anxiety in the morning with no available prns. Would recommend low dose anxiolytic in am

## 2023-04-06 NOTE — BH INPATIENT PSYCHIATRY PROGRESS NOTE - NSBHCHARTREVIEWVS_PSY_A_CORE FT
Vital Signs Last 24 Hrs  T(C): 36.4 (04-06-23 @ 09:00), Max: 37.1 (04-05-23 @ 17:04)  T(F): 97.6 (04-06-23 @ 09:00), Max: 98.7 (04-05-23 @ 17:04)  HR: 75 (04-06-23 @ 09:00) (75 - 84)  BP: 139/90 (04-06-23 @ 09:00) (139/90 - 152/74)  BP(mean): --  RR: 18 (04-06-23 @ 09:00) (18 - 18)  SpO2: 96% (04-06-23 @ 09:00) (96% - 98%)

## 2023-04-06 NOTE — BH INPATIENT PSYCHIATRY PROGRESS NOTE - NSBHFUPINTERVALHXFT_PSY_A_CORE
Patient visible on the unit, seen today in the milieu. Anxious on approach, reports dissatisfaction with current med regimen, stating that things have been taken away and nothing has been added for her anxiety. She reports most recently that gabapentin was discontinued and seroquel was to be added, but she has not yet received it. She requests to have klonopin times changed from midday and qhs to morning and midday when she feels that her anxiety has been most high. She states that today her anxiety was so high that she was unable to eat breakfast. She does state that sleep has improved with decrease in remeron. No si/hi/avh or PI endorsed. No acute medical concerns.

## 2023-04-06 NOTE — BH INPATIENT PSYCHIATRY PROGRESS NOTE - NSBHMETABOLIC_PSY_ALL_CORE_FT
BMI: BMI (kg/m2): 40.8 (03-28-23 @ 19:10)  HbA1c:   Glucose:   BP: 139/90 (04-06-23 @ 09:00) (117/82 - 152/74)  Lipid Panel:

## 2023-04-07 DIAGNOSIS — F33.9 MAJOR DEPRESSIVE DISORDER, RECURRENT, UNSPECIFIED: ICD-10-CM

## 2023-04-07 PROCEDURE — 99233 SBSQ HOSP IP/OBS HIGH 50: CPT

## 2023-04-07 RX ORDER — CLONAZEPAM 1 MG
0.5 TABLET ORAL
Refills: 0 | Status: DISCONTINUED | OUTPATIENT
Start: 2023-04-07 | End: 2023-04-10

## 2023-04-07 RX ORDER — CLONAZEPAM 1 MG
0.5 TABLET ORAL
Refills: 0 | Status: DISCONTINUED | OUTPATIENT
Start: 2023-04-07 | End: 2023-04-07

## 2023-04-07 RX ORDER — QUETIAPINE FUMARATE 200 MG/1
25 TABLET, FILM COATED ORAL AT BEDTIME
Refills: 0 | Status: DISCONTINUED | OUTPATIENT
Start: 2023-04-07 | End: 2023-04-11

## 2023-04-07 RX ADMIN — PANTOPRAZOLE SODIUM 40 MILLIGRAM(S): 20 TABLET, DELAYED RELEASE ORAL at 07:55

## 2023-04-07 RX ADMIN — ATORVASTATIN CALCIUM 10 MILLIGRAM(S): 80 TABLET, FILM COATED ORAL at 21:51

## 2023-04-07 RX ADMIN — Medication 150 MILLIGRAM(S): at 09:14

## 2023-04-07 RX ADMIN — Medication 50 MILLIGRAM(S): at 21:50

## 2023-04-07 RX ADMIN — ONDANSETRON 8 MILLIGRAM(S): 8 TABLET, FILM COATED ORAL at 09:39

## 2023-04-07 RX ADMIN — QUETIAPINE FUMARATE 25 MILLIGRAM(S): 200 TABLET, FILM COATED ORAL at 21:51

## 2023-04-07 RX ADMIN — Medication 0.5 MILLIGRAM(S): at 10:57

## 2023-04-07 RX ADMIN — ONDANSETRON 8 MILLIGRAM(S): 8 TABLET, FILM COATED ORAL at 17:47

## 2023-04-07 RX ADMIN — Medication 0.5 MILLIGRAM(S): at 17:46

## 2023-04-07 RX ADMIN — Medication 50 MILLIGRAM(S): at 09:14

## 2023-04-07 RX ADMIN — LOSARTAN POTASSIUM 100 MILLIGRAM(S): 100 TABLET, FILM COATED ORAL at 09:14

## 2023-04-07 RX ADMIN — MIRTAZAPINE 15 MILLIGRAM(S): 45 TABLET, ORALLY DISINTEGRATING ORAL at 21:50

## 2023-04-07 NOTE — BH INPATIENT PSYCHIATRY PROGRESS NOTE - NSBHCHARTREVIEWVS_PSY_A_CORE FT
Vital Signs Last 24 Hrs  T(C): 36.6 (04-07-23 @ 09:12), Max: 36.7 (04-06-23 @ 16:49)  T(F): 97.9 (04-07-23 @ 09:12), Max: 98 (04-06-23 @ 16:49)  HR: 84 (04-07-23 @ 09:12) (71 - 84)  BP: 152/79 (04-07-23 @ 09:12) (139/82 - 152/79)  BP(mean): --  RR: 18 (04-07-23 @ 09:12) (18 - 18)  SpO2: 96% (04-07-23 @ 09:12) (96% - 96%)

## 2023-04-07 NOTE — BH INPATIENT PSYCHIATRY PROGRESS NOTE - NSBHMETABOLIC_PSY_ALL_CORE_FT
BMI: BMI (kg/m2): 40.8 (03-28-23 @ 19:10)  HbA1c:   Glucose:   BP: 152/79 (04-07-23 @ 09:12) (119/61 - 152/79)  Lipid Panel:

## 2023-04-07 NOTE — BH INPATIENT PSYCHIATRY PROGRESS NOTE - NSBHATTESTAPPBILLTIME_PSY_A_CORE
I attest my time as GIFTY is greater than 50% of the total combined time spent on qualifying patient care activities. I have reviewed and verified the documentation.
I attest my time as GIFTY is greater than 50% of the total combined time spent on qualifying patient care activities. I have reviewed and verified the documentation.

## 2023-04-07 NOTE — BH INPATIENT PSYCHIATRY PROGRESS NOTE - NSBHASSESSSUMMFT_PSY_ALL_CORE
Patient is a 71 yo female, , retired (), non-caregiver, domiciled at home with her , with a prior psychiatric diagnosis of ANSON and MDD, 4 previous inpatient psychiatric hospitalizations at [facility], most recently 7/5/2021-7/3/2021, prior SA in 11/2019 by overdosing on pills, no history of NSSIB, no history of violence/aggression, no hx of legal issues, daily marijuana use, PMHx of HTN, hyperlipidemia, [facility] (as below, with last visit 3/15/2023 and at that time documented to be at baseline with chronic anxiety, complaints of nausea, seen twice monthly with mediation mgt and psychotherapy)  BIB spouse for "worsening anxiety." Pt currently denies passive and active SI, intent and plan. She also denies symptoms of psychosis and sadia. The plan while she is hospitalized on the inpatient unit includes medication optimization.    #Depression:  - Venlafaxine .5 mg daily  - Continue mirtazapine 15 mg nightly    #Anxiety:  - Clonazepam 0.5 mg qam/ midday   -seroquel 25mg qhs  - Gabapentin 100 mg TID discontinued    #Health maintenance:  - Losartan 100 mg daily  - Hydrochlorothiazide 12.5 mg daily  - Metoprolol tartrate 50 mg twice daily   - Famotidine 20 mg daily    ;;03/30: Not endorsing  suicidal or homicidal ideation intent or plans; no mention of auditory or visual hallucinations but asked for help falling asleep last night and was given additional Remeron.  Thinking is congruent with affect; no peculiarities of thinking or language use. Alert; oriented; cognition intact; speech clear; no tremor or evidence of movement impairment.  Discussed at length medication changes (Protonix substituting for Famotidine for GERD;  low dose Effexor in am at 75mg; Remeron to remain at 15mg at night (activating at higher doses); Klonopin tapered to 0.5mg po bid; and Neurontin modified to 100mg po tid to avoid GI distress.  Patient continues sad and anxious.      ;;04/01: Pt continuing to report depressed mood; denies SI/HI/AH/VH. Pt tolerating reduction in mirtazapine with good effect on improving sleep. Pt with nausea d/t Effexor titration; Zofran PRN required.   4/6 Pt highly anxious, reports worsening anxiety in the morning with no available prns. Would recommend low dose anxiolytic in am   4/7 Klonopin timing changed to qam/qmidday, seroquel 25mg qhs added to regimen, primary team to continue to adjust regimen

## 2023-04-07 NOTE — BH INPATIENT PSYCHIATRY PROGRESS NOTE - CURRENT MEDICATION
MEDICATIONS  (STANDING):  atorvastatin 10 milliGRAM(s) Oral at bedtime  clonazePAM  Tablet 0.5 milliGRAM(s) Oral <User Schedule>  hydrochlorothiazide 12.5 milliGRAM(s) Oral daily  losartan 100 milliGRAM(s) Oral daily  metoprolol tartrate 50 milliGRAM(s) Oral two times a day  mirtazapine 15 milliGRAM(s) Oral at bedtime  pantoprazole    Tablet 40 milliGRAM(s) Oral before breakfast  polyethylene glycol 3350 17 Gram(s) Oral daily  QUEtiapine 25 milliGRAM(s) Oral at bedtime  venlafaxine 150 milliGRAM(s) Oral daily    MEDICATIONS  (PRN):  aluminum hydroxide/magnesium hydroxide/simethicone Suspension 30 milliLiter(s) Oral every 4 hours PRN Dyspepsia  ondansetron   Disintegrating Tablet 8 milliGRAM(s) Oral every 8 hours PRN nausea

## 2023-04-07 NOTE — BH INPATIENT PSYCHIATRY PROGRESS NOTE - NSBHFUPINTERVALHXFT_PSY_A_CORE
Patient visible on the unit, seen today in the milieu. Anxious on approach, again discussing changes made to regimen as well as perceived long hospitalization with no changes in level of anxiety. Needing much encouragement and support. Agreeable to low dose seroquel and states that previous psychiatrist had discussed a trial of this in the past but she wasn't willing. Reports feeling desperate now. No si/hi/avh or PI. States that her anxiety causes her to feel nauseous at times not previously discontinued gabapentin.   No acute medical concerns

## 2023-04-08 RX ORDER — HYDROXYZINE HCL 10 MG
25 TABLET ORAL THREE TIMES A DAY
Refills: 0 | Status: DISCONTINUED | OUTPATIENT
Start: 2023-04-08 | End: 2023-04-19

## 2023-04-08 RX ADMIN — Medication 50 MILLIGRAM(S): at 21:48

## 2023-04-08 RX ADMIN — Medication 150 MILLIGRAM(S): at 10:38

## 2023-04-08 RX ADMIN — LOSARTAN POTASSIUM 100 MILLIGRAM(S): 100 TABLET, FILM COATED ORAL at 10:37

## 2023-04-08 RX ADMIN — MIRTAZAPINE 15 MILLIGRAM(S): 45 TABLET, ORALLY DISINTEGRATING ORAL at 21:48

## 2023-04-08 RX ADMIN — Medication 0.5 MILLIGRAM(S): at 07:06

## 2023-04-08 RX ADMIN — PANTOPRAZOLE SODIUM 40 MILLIGRAM(S): 20 TABLET, DELAYED RELEASE ORAL at 07:06

## 2023-04-08 RX ADMIN — ONDANSETRON 8 MILLIGRAM(S): 8 TABLET, FILM COATED ORAL at 10:38

## 2023-04-08 RX ADMIN — QUETIAPINE FUMARATE 25 MILLIGRAM(S): 200 TABLET, FILM COATED ORAL at 21:49

## 2023-04-08 RX ADMIN — Medication 50 MILLIGRAM(S): at 10:38

## 2023-04-08 RX ADMIN — Medication 0.5 MILLIGRAM(S): at 13:47

## 2023-04-08 RX ADMIN — ATORVASTATIN CALCIUM 10 MILLIGRAM(S): 80 TABLET, FILM COATED ORAL at 21:48

## 2023-04-09 RX ADMIN — Medication 150 MILLIGRAM(S): at 10:29

## 2023-04-09 RX ADMIN — ATORVASTATIN CALCIUM 10 MILLIGRAM(S): 80 TABLET, FILM COATED ORAL at 21:35

## 2023-04-09 RX ADMIN — ONDANSETRON 8 MILLIGRAM(S): 8 TABLET, FILM COATED ORAL at 13:00

## 2023-04-09 RX ADMIN — Medication 25 MILLIGRAM(S): at 10:32

## 2023-04-09 RX ADMIN — Medication 50 MILLIGRAM(S): at 21:36

## 2023-04-09 RX ADMIN — Medication 25 MILLIGRAM(S): at 13:53

## 2023-04-09 RX ADMIN — PANTOPRAZOLE SODIUM 40 MILLIGRAM(S): 20 TABLET, DELAYED RELEASE ORAL at 07:16

## 2023-04-09 RX ADMIN — Medication 0.5 MILLIGRAM(S): at 13:53

## 2023-04-09 RX ADMIN — QUETIAPINE FUMARATE 25 MILLIGRAM(S): 200 TABLET, FILM COATED ORAL at 21:35

## 2023-04-09 RX ADMIN — Medication 0.5 MILLIGRAM(S): at 07:16

## 2023-04-09 RX ADMIN — Medication 50 MILLIGRAM(S): at 10:30

## 2023-04-09 RX ADMIN — LOSARTAN POTASSIUM 100 MILLIGRAM(S): 100 TABLET, FILM COATED ORAL at 11:51

## 2023-04-09 RX ADMIN — Medication 25 MILLIGRAM(S): at 18:32

## 2023-04-09 RX ADMIN — MIRTAZAPINE 15 MILLIGRAM(S): 45 TABLET, ORALLY DISINTEGRATING ORAL at 21:35

## 2023-04-10 PROCEDURE — 99232 SBSQ HOSP IP/OBS MODERATE 35: CPT

## 2023-04-10 RX ORDER — MIRTAZAPINE 45 MG/1
15 TABLET, ORALLY DISINTEGRATING ORAL AT BEDTIME
Refills: 0 | Status: DISCONTINUED | OUTPATIENT
Start: 2023-04-10 | End: 2023-04-19

## 2023-04-10 RX ORDER — VENLAFAXINE HCL 75 MG
112.5 CAPSULE, EXT RELEASE 24 HR ORAL DAILY
Refills: 0 | Status: DISCONTINUED | OUTPATIENT
Start: 2023-04-11 | End: 2023-04-11

## 2023-04-10 RX ORDER — ESCITALOPRAM OXALATE 10 MG/1
5 TABLET, FILM COATED ORAL DAILY
Refills: 0 | Status: DISCONTINUED | OUTPATIENT
Start: 2023-04-11 | End: 2023-04-11

## 2023-04-10 RX ADMIN — MIRTAZAPINE 15 MILLIGRAM(S): 45 TABLET, ORALLY DISINTEGRATING ORAL at 22:19

## 2023-04-10 RX ADMIN — Medication 50 MILLIGRAM(S): at 21:50

## 2023-04-10 RX ADMIN — Medication 0.5 MILLIGRAM(S): at 14:14

## 2023-04-10 RX ADMIN — PANTOPRAZOLE SODIUM 40 MILLIGRAM(S): 20 TABLET, DELAYED RELEASE ORAL at 07:24

## 2023-04-10 RX ADMIN — Medication 50 MILLIGRAM(S): at 10:12

## 2023-04-10 RX ADMIN — Medication 25 MILLIGRAM(S): at 07:24

## 2023-04-10 RX ADMIN — QUETIAPINE FUMARATE 25 MILLIGRAM(S): 200 TABLET, FILM COATED ORAL at 21:39

## 2023-04-10 RX ADMIN — ONDANSETRON 8 MILLIGRAM(S): 8 TABLET, FILM COATED ORAL at 10:15

## 2023-04-10 RX ADMIN — Medication 25 MILLIGRAM(S): at 17:44

## 2023-04-10 RX ADMIN — LOSARTAN POTASSIUM 100 MILLIGRAM(S): 100 TABLET, FILM COATED ORAL at 10:11

## 2023-04-10 RX ADMIN — ATORVASTATIN CALCIUM 10 MILLIGRAM(S): 80 TABLET, FILM COATED ORAL at 21:39

## 2023-04-10 RX ADMIN — Medication 150 MILLIGRAM(S): at 10:11

## 2023-04-10 RX ADMIN — Medication 0.5 MILLIGRAM(S): at 07:24

## 2023-04-10 NOTE — BH INPATIENT PSYCHIATRY PROGRESS NOTE - NSBHFUPINTERVALHXFT_PSY_A_CORE
See attending note.  Case discussed with treating resident.  No acute interval events. Pt's gababentin discontinued. Cross taper Effexor w/ venlax

## 2023-04-10 NOTE — BH INPATIENT PSYCHIATRY PROGRESS NOTE - PRN MEDS
MEDICATIONS  (PRN):  aluminum hydroxide/magnesium hydroxide/simethicone Suspension 30 milliLiter(s) Oral every 4 hours PRN Dyspepsia  hydrOXYzine hydrochloride 25 milliGRAM(s) Oral three times a day PRN anxiety  mirtazapine 15 milliGRAM(s) Oral at bedtime PRN insomnia  ondansetron   Disintegrating Tablet 8 milliGRAM(s) Oral every 8 hours PRN nausea

## 2023-04-10 NOTE — BH INPATIENT PSYCHIATRY PROGRESS NOTE - NSBHMETABOLIC_PSY_ALL_CORE_FT
BMI: BMI (kg/m2): 40.8 (03-28-23 @ 19:10)  HbA1c:   Glucose:   BP: 122/80 (04-10-23 @ 20:38) (113/72 - 151/81)  Lipid Panel:

## 2023-04-10 NOTE — ED BEHAVIORAL HEALTH NOTE - BEHAVIORAL HEALTH NOTE
COVID Exposure Screen- Patient  1.	*Have you had a COVID-19 test in the last 90 days?  (  ) Yes   ( x ) No   (  ) Unknown- Reason: _____  IF YES PROCEED TO QUESTION #2. IF NO OR UNKNOWN, PLEASE SKIP TO QUESTION #3.  2.	Date of test(s) and result(s): ________  3.	*Have you tested positive for COVID-19 antibodies? (  ) Yes   (  ) No   ( x ) Unknown- Reason: _____  IF YES PROCEED TO QUESTION #4. IF NO or UNKNOWN, PLEASE SKIP TO QUESTION #5.  4.	Date of positive antibody test: ________  5.	*Have you received 2 doses of the COVID-19 vaccine? ( x ) Yes   (  ) No   (  ) Unknown- Reason: _____   IF YES PROCEED TO QUESTION #6. IF NO or UNKNOWN, PLEASE SKIP TO QUESTION #7.  6.	Date of second dose: _2021_______  7.	*In the past 10 days, have you been around anyone with a positive COVID-19 test?* (  ) Yes   (  x) No   (  ) Unknown- Reason: ____  IF YES PROCEED TO QUESTION #8. IF NO or UNKNOWN, PLEASE SKIP TO QUESTION #13.  8.	Were you within 6 feet of them for at least 15 minutes? (  ) Yes   (  ) No   (  ) Unknown- Reason: _____  9.	Have you provided care for them? (  ) Yes   (  ) No   (  ) Unknown- Reason: ______  10.	Have you had direct physical contact with them (touched, hugged, or kissed them)? (  ) Yes   (  ) No    (  ) Unknown- Reason: _____  11.	Have you shared eating or drinking utensils with them? (  ) Yes   (  ) No    (  ) Unknown- Reason: ____  12.	Have they sneezed, coughed, or somehow gotten respiratory droplets on you? (  ) Yes   (  ) No    (  ) Unknown- Reason: ______  13.	*Have you been out of [place] within the past 10 days?* (  ) Yes   ( x ) No   (  ) Unknown- Reason: _____  IF YES PLEASE ANSWER THE FOLLOWING QUESTIONS:  14.	Which state/country have you been to? ______  15.	Were you there over 24 hours? (  ) Yes   (  ) No    (  ) Unknown- Reason: ______  16.	Date of return to [facility]: ______ PROGRESS NOTE    Subjective   Chief complaint: Amber De La Rosa is a 79 y.o. male who is an acute skilled patient being seen and evaluated for weakness    HPI:  3/28/2023 79 years old male with past medical history of brain tumor, polyp, GERD, hyperlipidemia, hypertension.  Patient admitted to skilled nursing facility for therapy due to weakness after recent hospitalization for new onset left-sided weakness.  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  CT chest with 12 mm pulmonary nodule in the left lower lobe and 6 mm nodule in the right middle lobe.  Patient was admitted to the hospital and seen by neurology and neurosurgery.  He underwent surgery on brain and is now admitted to skilled nursing facility for therapy.    3/29/2023 patient is working in PT OT and ST.  He is able to walk up to 10 feet in the parallel bars with moderate to maximum assist with close wheelchair follow.  He requires moderate to maximum assist for sit to stand with pulling up on the parallel bars.  Speech therapy is working with him on safe swallowing and cognition.  Daughter at bedside and reviewed medication regimen.  No new concerns today.    3/30/2023 patient is at skilled nursing facility for therapy.  He had follow-up with specialist yesterday and returned with new orders for memantine.  Nurse reported that the patient's labs were obtained at appointment and showed sodium 124.  Patient was placed on a fluid restriction and sent back to nursing facility.  Family at bedside today to discuss plan of care.  Family states patient has been more sleepy than usual today although notes that he did not sleep well last night.  Patient is awake and talking/interacting in conversation.      3/31/23 Patient working in therapy. He is doing well and in good spirits. He is getting stronger. Patient  able to walk up to 10 feet in the parallel bars with moderate to maximum assist with close wheelchair follow. No acute  distress at this time.     4/3/23 Patient is at SNF for therapy.  He is able to walk short distances up to 10 feet in parallel bars with moderate to maximum assist.  He is also working with speech therapy for dysphagia.  Patient states he feels well and has no new concerns today.    4/4/23 Continues working in therapy due to weakness.   Patient requires moderate assistance for transfers ADLs and mobility at this time.  Denies constitutional complaints.  No acute distress.    4/5/23  Patient has been working in therapy to improve strength, endurance, and ADLs.  Patient continues to work toward goals.  No new concerns today.  Denies n/v/f/c pain.      4/6/23  Therapy has been working with the patient to improve strength and endurance with ADLs, transfers, and mobility.  Patient continues to work toward goals.  Patient is stable and has no new complaints.  Nursing staff voices no new concerns today.    4/7/23  patient working in therapy due to weakness and debility.  Does not ambulate, requires moderate assistance for transfers. No acute distress or new concerns today.     4/10/2023 patient working in PT OT and ST.  He is able to walk 25 feet with use of haney rail with moderate to maximum assist.  He is working on gross motor coordination and weight shifting to improve safety with unsupported sit to stand.  Patient requires maximum assist for pivots.  He has no new concerns today.  Denies constitutional symptoms.      Objective   Vital signs: 149/80    Physical Exam  Constitutional:       General: He is not in acute distress.  Eyes:      Extraocular Movements: Extraocular movements intact.   Cardiovascular:      Rate and Rhythm: Regular rhythm.   Pulmonary:      Effort: Pulmonary effort is normal.      Breath sounds: Normal breath sounds.   Abdominal:      General: Bowel sounds are normal.      Palpations: Abdomen is soft.   Musculoskeletal:      Cervical back: Neck supple.      Right lower leg: No edema.      Left  lower leg: No edema.      Comments: Left-sided weakness   Neurological:      Mental Status: He is alert.   Psychiatric:         Mood and Affect: Mood normal.         Behavior: Behavior is cooperative.         Assessment/Plan   Problem List Items Addressed This Visit       Brain mass     Follow-up with specialist           Dysphagia     Soft diet  ST         Hypertension, essential     Continue antihypertensives  Monitor blood pressure         Weakness - Primary     Continue therapy           Medications, treatments, and labs reviewed  Continue medications and treatments as listed in PCC    Rachel Gaitan, APRN-CNP

## 2023-04-10 NOTE — BH INPATIENT PSYCHIATRY PROGRESS NOTE - NSBHCHARTREVIEWVS_PSY_A_CORE FT
Vital Signs Last 24 Hrs  T(C): 36.7 (04-10-23 @ 20:38), Max: 36.7 (04-10-23 @ 08:30)  T(F): 98.1 (04-10-23 @ 20:38), Max: 98.1 (04-10-23 @ 08:30)  HR: 83 (04-10-23 @ 20:38) (73 - 83)  BP: 122/80 (04-10-23 @ 20:38) (113/72 - 143/84)  BP(mean): --  RR: 17 (04-10-23 @ 20:38) (17 - 18)  SpO2: 95% (04-10-23 @ 20:38) (94% - 98%)     Vital Signs Last 24 Hrs  T(C): 36.7 (04-10-23 @ 20:38), Max: 36.7 (04-10-23 @ 08:30)  T(F): 98.1 (04-10-23 @ 20:38), Max: 98.1 (04-10-23 @ 08:30)  HR: 83 (04-10-23 @ 20:38) (78 - 83)  BP: 122/80 (04-10-23 @ 20:38) (122/80 - 143/84)  BP(mean): --  RR: 17 (04-10-23 @ 20:38) (17 - 18)  SpO2: 95% (04-10-23 @ 20:38) (95% - 98%)

## 2023-04-10 NOTE — BH INPATIENT PSYCHIATRY PROGRESS NOTE - CURRENT MEDICATION
MEDICATIONS  (STANDING):  atorvastatin 10 milliGRAM(s) Oral at bedtime  hydrochlorothiazide 12.5 milliGRAM(s) Oral daily  LORazepam     Tablet 1 milliGRAM(s) Oral <User Schedule>  losartan 100 milliGRAM(s) Oral daily  metoprolol tartrate 50 milliGRAM(s) Oral two times a day  pantoprazole    Tablet 40 milliGRAM(s) Oral before breakfast  polyethylene glycol 3350 17 Gram(s) Oral daily  QUEtiapine 25 milliGRAM(s) Oral at bedtime    MEDICATIONS  (PRN):  aluminum hydroxide/magnesium hydroxide/simethicone Suspension 30 milliLiter(s) Oral every 4 hours PRN Dyspepsia  hydrOXYzine hydrochloride 25 milliGRAM(s) Oral three times a day PRN anxiety  mirtazapine 15 milliGRAM(s) Oral at bedtime PRN insomnia  ondansetron   Disintegrating Tablet 8 milliGRAM(s) Oral every 8 hours PRN nausea   MEDICATIONS  (STANDING):  atorvastatin 10 milliGRAM(s) Oral at bedtime  escitalopram 5 milliGRAM(s) Oral daily  hydrochlorothiazide 12.5 milliGRAM(s) Oral daily  LORazepam     Tablet 1 milliGRAM(s) Oral <User Schedule>  losartan 100 milliGRAM(s) Oral daily  metoprolol tartrate 50 milliGRAM(s) Oral two times a day  pantoprazole    Tablet 40 milliGRAM(s) Oral before breakfast  polyethylene glycol 3350 17 Gram(s) Oral daily  QUEtiapine 25 milliGRAM(s) Oral at bedtime  venlafaxine 112.5 milliGRAM(s) Oral daily    MEDICATIONS  (PRN):  aluminum hydroxide/magnesium hydroxide/simethicone Suspension 30 milliLiter(s) Oral every 4 hours PRN Dyspepsia  hydrOXYzine hydrochloride 25 milliGRAM(s) Oral three times a day PRN anxiety  mirtazapine 15 milliGRAM(s) Oral at bedtime PRN insomnia  ondansetron   Disintegrating Tablet 8 milliGRAM(s) Oral every 8 hours PRN nausea

## 2023-04-10 NOTE — BH INPATIENT PSYCHIATRY PROGRESS NOTE - NSBHASSESSSUMMFT_PSY_ALL_CORE
Patient is a 73 yo female, , retired (), non-caregiver, domiciled at home with her , with a prior psychiatric diagnosis of ANSON and MDD, 4 previous inpatient psychiatric hospitalizations at [facility], most recently 7/5/2021-7/3/2021, prior SA in 11/2019 by overdosing on pills, no history of NSSIB, no history of violence/aggression, no hx of legal issues, daily marijuana use, PMHx of HTN, hyperlipidemia, [facility] (as below, with last visit 3/15/2023 and at that time documented to be at baseline with chronic anxiety, complaints of nausea, seen twice monthly with mediation mgt and psychotherapy)  BIB spouse for "worsening anxiety." Pt currently denies passive and active SI, intent and plan. She also denies symptoms of psychosis and sadia. The plan while she is hospitalized on the inpatient unit includes medication optimization.    #Depression:  - Venlafaxine .5 mg daily  - Continue mirtazapine 15 mg nightly    #Anxiety:  - Clonazepam 0.5 mg qam/ midday   -seroquel 25mg qhs  - Gabapentin 100 mg TID discontinued    #Health maintenance:  - Losartan 100 mg daily  - Hydrochlorothiazide 12.5 mg daily  - Metoprolol tartrate 50 mg twice daily   - Famotidine 20 mg daily    ;;03/30: Not endorsing  suicidal or homicidal ideation intent or plans; no mention of auditory or visual hallucinations but asked for help falling asleep last night and was given additional Remeron.  Thinking is congruent with affect; no peculiarities of thinking or language use. Alert; oriented; cognition intact; speech clear; no tremor or evidence of movement impairment.  Discussed at length medication changes (Protonix substituting for Famotidine for GERD;  low dose Effexor in am at 75mg; Remeron to remain at 15mg at night (activating at higher doses); Klonopin tapered to 0.5mg po bid; and Neurontin modified to 100mg po tid to avoid GI distress.  Patient continues sad and anxious.      ;;04/01: Pt continuing to report depressed mood; denies SI/HI/AH/VH. Pt tolerating reduction in mirtazapine with good effect on improving sleep. Pt with nausea d/t Effexor titration; Zofran PRN required.   4/6 Pt highly anxious, reports worsening anxiety in the morning with no available prns. Would recommend low dose anxiolytic in am   4/7 Klonopin timing changed to qam/qmidday, seroquel 25mg qhs added to regimen, primary team to continue to adjust regimen

## 2023-04-11 PROCEDURE — 99232 SBSQ HOSP IP/OBS MODERATE 35: CPT

## 2023-04-11 RX ORDER — QUETIAPINE FUMARATE 200 MG/1
50 TABLET, FILM COATED ORAL AT BEDTIME
Refills: 0 | Status: DISCONTINUED | OUTPATIENT
Start: 2023-04-11 | End: 2023-04-12

## 2023-04-11 RX ORDER — VENLAFAXINE HCL 75 MG
75 CAPSULE, EXT RELEASE 24 HR ORAL DAILY
Refills: 0 | Status: DISCONTINUED | OUTPATIENT
Start: 2023-04-11 | End: 2023-04-13

## 2023-04-11 RX ORDER — ESCITALOPRAM OXALATE 10 MG/1
10 TABLET, FILM COATED ORAL DAILY
Refills: 0 | Status: DISCONTINUED | OUTPATIENT
Start: 2023-04-11 | End: 2023-04-19

## 2023-04-11 RX ADMIN — PANTOPRAZOLE SODIUM 40 MILLIGRAM(S): 20 TABLET, DELAYED RELEASE ORAL at 07:26

## 2023-04-11 RX ADMIN — Medication 50 MILLIGRAM(S): at 21:27

## 2023-04-11 RX ADMIN — Medication 1 MILLIGRAM(S): at 15:09

## 2023-04-11 RX ADMIN — QUETIAPINE FUMARATE 50 MILLIGRAM(S): 200 TABLET, FILM COATED ORAL at 21:26

## 2023-04-11 RX ADMIN — ATORVASTATIN CALCIUM 10 MILLIGRAM(S): 80 TABLET, FILM COATED ORAL at 21:27

## 2023-04-11 RX ADMIN — Medication 50 MILLIGRAM(S): at 09:47

## 2023-04-11 RX ADMIN — Medication 25 MILLIGRAM(S): at 16:31

## 2023-04-11 RX ADMIN — MIRTAZAPINE 15 MILLIGRAM(S): 45 TABLET, ORALLY DISINTEGRATING ORAL at 23:10

## 2023-04-11 RX ADMIN — LOSARTAN POTASSIUM 100 MILLIGRAM(S): 100 TABLET, FILM COATED ORAL at 09:48

## 2023-04-11 RX ADMIN — ESCITALOPRAM OXALATE 5 MILLIGRAM(S): 10 TABLET, FILM COATED ORAL at 09:48

## 2023-04-11 RX ADMIN — ONDANSETRON 8 MILLIGRAM(S): 8 TABLET, FILM COATED ORAL at 15:11

## 2023-04-11 RX ADMIN — Medication 1 MILLIGRAM(S): at 09:47

## 2023-04-11 RX ADMIN — Medication 112.5 MILLIGRAM(S): at 09:48

## 2023-04-11 NOTE — BH INPATIENT PSYCHIATRY PROGRESS NOTE - NSBHFUPINTERVALHXFT_PSY_A_CORE
No acute interval events. Pt continues to complain about anxiety in the morning and concerns that her medication changes will worsen her anxiety. Pt's Ativan 1 mg dose was moved earlier in the morning to 7 AM to address her concerns of "an anxiety attack" in the morning. Cross-taper between venlafaxine and escitalopram is in progress (venlafaxine dose reduced from 150 mg to 75 mg daily) and escitalopram dose increased from 5 mg to 10 mg daily. Pt's Seroquel dose was increased from 25 mg to 50 mg nightly to augment her current regimen for anxiety.    Throughout the day, pt stopped the writer multiple times to confirm whether the morning Ativan dose was moved earlier to 7 AM. Pt was intrusive and porous with her boundaries. She expressed being fearful about having multiple medication changes at once multiple times throughout the day.

## 2023-04-11 NOTE — BH INPATIENT PSYCHIATRY PROGRESS NOTE - NSBHCHARTREVIEWVS_PSY_A_CORE FT
Vital Signs Last 24 Hrs  T(C): 36.7 (04-11-23 @ 09:00), Max: 36.7 (04-10-23 @ 16:58)  T(F): 98 (04-11-23 @ 09:00), Max: 98.1 (04-10-23 @ 16:58)  HR: 80 (04-11-23 @ 09:00) (78 - 83)  BP: 154/84 (04-11-23 @ 09:00) (122/80 - 154/84)  BP(mean): --  RR: 18 (04-11-23 @ 09:00) (17 - 18)  SpO2: 98% (04-11-23 @ 09:00) (95% - 98%)     Vital Signs Last 24 Hrs  T(C): 36.7 (04-11-23 @ 18:22), Max: 36.7 (04-10-23 @ 20:38)  T(F): 98 (04-11-23 @ 18:22), Max: 98.1 (04-10-23 @ 20:38)  HR: 67 (04-11-23 @ 18:22) (67 - 83)  BP: 135/85 (04-11-23 @ 18:22) (122/80 - 154/84)  BP(mean): --  RR: 18 (04-11-23 @ 09:00) (17 - 18)  SpO2: 96% (04-11-23 @ 18:22) (95% - 98%)     Vital Signs Last 24 Hrs  T(C): 36.7 (04-11-23 @ 18:22), Max: 36.7 (04-11-23 @ 09:00)  T(F): 98 (04-11-23 @ 18:22), Max: 98 (04-11-23 @ 09:00)  HR: 67 (04-11-23 @ 18:22) (67 - 80)  BP: 135/85 (04-11-23 @ 18:22) (135/85 - 154/84)  BP(mean): --  RR: 18 (04-11-23 @ 09:00) (18 - 18)  SpO2: 96% (04-11-23 @ 18:22) (96% - 98%)

## 2023-04-11 NOTE — BH INPATIENT PSYCHIATRY PROGRESS NOTE - NSBHMETABOLIC_PSY_ALL_CORE_FT
BMI: BMI (kg/m2): 40.8 (03-28-23 @ 19:10)  HbA1c:   Glucose:   BP: 154/84 (04-11-23 @ 09:00) (113/72 - 154/84)  Lipid Panel:  BMI: BMI (kg/m2): 40.8 (03-28-23 @ 19:10)  HbA1c:   Glucose:   BP: 135/85 (04-11-23 @ 18:22) (113/72 - 154/84)  Lipid Panel:

## 2023-04-11 NOTE — BH INPATIENT PSYCHIATRY PROGRESS NOTE - NSBHASSESSSUMMFT_PSY_ALL_CORE
Patient is a 73 yo female, , retired (), non-caregiver, domiciled at home with her , with a prior psychiatric diagnosis of ANSON and MDD, 4 previous inpatient psychiatric hospitalizations at [facility], most recently 7/5/2021-7/3/2021, prior SA in 11/2019 by overdosing on pills, no history of NSSIB, no history of violence/aggression, no hx of legal issues, daily marijuana use, PMHx of HTN, hyperlipidemia, [facility] (as below, with last visit 3/15/2023 and at that time documented to be at baseline with chronic anxiety, complaints of nausea, seen twice monthly with mediation mgt and psychotherapy)  BIB spouse for "worsening anxiety." Pt currently denies passive and active SI, intent and plan. She also denies symptoms of psychosis and sadia. The plan while she is hospitalized on the inpatient unit includes medication optimization.    #Depression:  - Venlafaxine .5 mg daily  - Continue mirtazapine 15 mg nightly    #Anxiety:  - Clonazepam 0.5 mg qam/ midday   -seroquel 25mg qhs  - Gabapentin 100 mg TID discontinued    #Health maintenance:  - Losartan 100 mg daily  - Hydrochlorothiazide 12.5 mg daily  - Metoprolol tartrate 50 mg twice daily   - Famotidine 20 mg daily    ;;03/30: Not endorsing  suicidal or homicidal ideation intent or plans; no mention of auditory or visual hallucinations but asked for help falling asleep last night and was given additional Remeron.  Thinking is congruent with affect; no peculiarities of thinking or language use. Alert; oriented; cognition intact; speech clear; no tremor or evidence of movement impairment.  Discussed at length medication changes (Protonix substituting for Famotidine for GERD;  low dose Effexor in am at 75mg; Remeron to remain at 15mg at night (activating at higher doses); Klonopin tapered to 0.5mg po bid; and Neurontin modified to 100mg po tid to avoid GI distress.  Patient continues sad and anxious.      ;;04/01: Pt continuing to report depressed mood; denies SI/HI/AH/VH. Pt tolerating reduction in mirtazapine with good effect on improving sleep. Pt with nausea d/t Effexor titration; Zofran PRN required.   4/6 Pt highly anxious, reports worsening anxiety in the morning with no available prns. Would recommend low dose anxiolytic in am   4/7 Klonopin timing changed to qam/qmidday, seroquel 25mg qhs added to regimen, primary team to continue to adjust regimen Patient is a 73 yo female, , retired (), non-caregiver, domiciled at home with her , with a prior psychiatric diagnosis of ANSON and MDD, 4 previous inpatient psychiatric hospitalizations at [facility], most recently 7/5/2021-7/3/2021, prior SA in 11/2019 by overdosing on pills, no history of NSSIB, no history of violence/aggression, no hx of legal issues, daily marijuana use, PMHx of HTN, hyperlipidemia[facility] (as below, with last visit 3/15/2023 and at that time documented to be at baseline with chronic anxiety, complaints of nausea, seen twice monthly with mediation mgt and psychotherapy)  BIB spouse for "worsening anxiety." Pt currently denies passive and active SI, intent and plan. She also denies symptoms of psychosis and sadia. The plan while she is hospitalized on the inpatient unit includes medication optimization.    #Depression:  - Venlafaxine ER 75 mg daily  - Escitalopram 10 mg daily    #Anxiety:  - Ativan 1 mg twice daily (7 AM and 3 PM)  - Seroquel 50 mg qhs (also for insomnia)    #Insomnia:  - Seroquel 50 mg qhs  - Mirtazapine 15 mg nightly PRN    #Health maintenance:  - Losartan 100 mg daily  - Hydrochlorothiazide 12.5 mg daily  - Metoprolol tartrate 50 mg twice daily   - Famotidine 20 mg daily    ;;03/30: Not endorsing  suicidal or homicidal ideation intent or plans; no mention of auditory or visual hallucinations but asked for help falling asleep last night and was given additional Remeron.  Thinking is congruent with affect; no peculiarities of thinking or language use. Alert; oriented; cognition intact; speech clear; no tremor or evidence of movement impairment.  Discussed at length medication changes (Protonix substituting for Famotidine for GERD;  low dose Effexor in am at 75mg; Remeron to remain at 15mg at night (activating at higher doses); Klonopin tapered to 0.5mg po bid; and Neurontin modified to 100mg po tid to avoid GI distress.  Patient continues sad and anxious.      ;;04/01: Pt continuing to report depressed mood; denies SI/HI/AH/VH. Pt tolerating reduction in mirtazapine with good effect on improving sleep. Pt with nausea d/t Effexor titration; Zofran PRN required.   4/6 Pt highly anxious, reports worsening anxiety in the morning with no available prns. Would recommend low dose anxiolytic in am   4/7 Klonopin timing changed to qam/qmidday, seroquel 25mg qhs added to regimen, primary team to continue to adjust regimen

## 2023-04-11 NOTE — BH INPATIENT PSYCHIATRY PROGRESS NOTE - CURRENT MEDICATION
MEDICATIONS  (STANDING):  atorvastatin 10 milliGRAM(s) Oral at bedtime  escitalopram 5 milliGRAM(s) Oral daily  hydrochlorothiazide 12.5 milliGRAM(s) Oral daily  LORazepam     Tablet 1 milliGRAM(s) Oral <User Schedule>  losartan 100 milliGRAM(s) Oral daily  metoprolol tartrate 50 milliGRAM(s) Oral two times a day  pantoprazole    Tablet 40 milliGRAM(s) Oral before breakfast  polyethylene glycol 3350 17 Gram(s) Oral daily  QUEtiapine 50 milliGRAM(s) Oral at bedtime  venlafaxine 75 milliGRAM(s) Oral daily    MEDICATIONS  (PRN):  aluminum hydroxide/magnesium hydroxide/simethicone Suspension 30 milliLiter(s) Oral every 4 hours PRN Dyspepsia  hydrOXYzine hydrochloride 25 milliGRAM(s) Oral three times a day PRN anxiety  mirtazapine 15 milliGRAM(s) Oral at bedtime PRN insomnia  ondansetron   Disintegrating Tablet 8 milliGRAM(s) Oral every 8 hours PRN nausea   MEDICATIONS  (STANDING):  atorvastatin 10 milliGRAM(s) Oral at bedtime  escitalopram 10 milliGRAM(s) Oral daily  hydrochlorothiazide 12.5 milliGRAM(s) Oral daily  LORazepam     Tablet 1 milliGRAM(s) Oral <User Schedule>  losartan 100 milliGRAM(s) Oral daily  metoprolol tartrate 50 milliGRAM(s) Oral two times a day  pantoprazole    Tablet 40 milliGRAM(s) Oral before breakfast  polyethylene glycol 3350 17 Gram(s) Oral daily  QUEtiapine 50 milliGRAM(s) Oral at bedtime  venlafaxine 75 milliGRAM(s) Oral daily    MEDICATIONS  (PRN):  aluminum hydroxide/magnesium hydroxide/simethicone Suspension 30 milliLiter(s) Oral every 4 hours PRN Dyspepsia  hydrOXYzine hydrochloride 25 milliGRAM(s) Oral three times a day PRN anxiety  mirtazapine 15 milliGRAM(s) Oral at bedtime PRN insomnia  ondansetron   Disintegrating Tablet 8 milliGRAM(s) Oral every 8 hours PRN nausea

## 2023-04-12 PROCEDURE — 99232 SBSQ HOSP IP/OBS MODERATE 35: CPT

## 2023-04-12 RX ORDER — QUETIAPINE FUMARATE 200 MG/1
75 TABLET, FILM COATED ORAL AT BEDTIME
Refills: 0 | Status: DISCONTINUED | OUTPATIENT
Start: 2023-04-12 | End: 2023-04-13

## 2023-04-12 RX ADMIN — Medication 1 MILLIGRAM(S): at 07:11

## 2023-04-12 RX ADMIN — Medication 25 MILLIGRAM(S): at 18:14

## 2023-04-12 RX ADMIN — ONDANSETRON 8 MILLIGRAM(S): 8 TABLET, FILM COATED ORAL at 10:26

## 2023-04-12 RX ADMIN — LOSARTAN POTASSIUM 100 MILLIGRAM(S): 100 TABLET, FILM COATED ORAL at 10:23

## 2023-04-12 RX ADMIN — ESCITALOPRAM OXALATE 10 MILLIGRAM(S): 10 TABLET, FILM COATED ORAL at 10:25

## 2023-04-12 RX ADMIN — Medication 25 MILLIGRAM(S): at 13:26

## 2023-04-12 RX ADMIN — ATORVASTATIN CALCIUM 10 MILLIGRAM(S): 80 TABLET, FILM COATED ORAL at 21:36

## 2023-04-12 RX ADMIN — MIRTAZAPINE 15 MILLIGRAM(S): 45 TABLET, ORALLY DISINTEGRATING ORAL at 21:36

## 2023-04-12 RX ADMIN — QUETIAPINE FUMARATE 75 MILLIGRAM(S): 200 TABLET, FILM COATED ORAL at 21:36

## 2023-04-12 RX ADMIN — Medication 75 MILLIGRAM(S): at 10:23

## 2023-04-12 RX ADMIN — Medication 25 MILLIGRAM(S): at 10:25

## 2023-04-12 RX ADMIN — Medication 1 MILLIGRAM(S): at 15:56

## 2023-04-12 RX ADMIN — Medication 50 MILLIGRAM(S): at 21:36

## 2023-04-12 RX ADMIN — Medication 50 MILLIGRAM(S): at 10:24

## 2023-04-12 RX ADMIN — PANTOPRAZOLE SODIUM 40 MILLIGRAM(S): 20 TABLET, DELAYED RELEASE ORAL at 07:11

## 2023-04-12 NOTE — BH INPATIENT PSYCHIATRY PROGRESS NOTE - CURRENT MEDICATION
MEDICATIONS  (STANDING):  atorvastatin 10 milliGRAM(s) Oral at bedtime  escitalopram 10 milliGRAM(s) Oral daily  hydrochlorothiazide 12.5 milliGRAM(s) Oral daily  LORazepam     Tablet 1 milliGRAM(s) Oral <User Schedule>  losartan 100 milliGRAM(s) Oral daily  metoprolol tartrate 50 milliGRAM(s) Oral two times a day  pantoprazole    Tablet 40 milliGRAM(s) Oral before breakfast  polyethylene glycol 3350 17 Gram(s) Oral daily  QUEtiapine 50 milliGRAM(s) Oral at bedtime  venlafaxine 75 milliGRAM(s) Oral daily    MEDICATIONS  (PRN):  aluminum hydroxide/magnesium hydroxide/simethicone Suspension 30 milliLiter(s) Oral every 4 hours PRN Dyspepsia  hydrOXYzine hydrochloride 25 milliGRAM(s) Oral three times a day PRN anxiety  mirtazapine 15 milliGRAM(s) Oral at bedtime PRN insomnia  ondansetron   Disintegrating Tablet 8 milliGRAM(s) Oral every 8 hours PRN nausea   MEDICATIONS  (STANDING):  atorvastatin 10 milliGRAM(s) Oral at bedtime  escitalopram 10 milliGRAM(s) Oral daily  hydrochlorothiazide 12.5 milliGRAM(s) Oral daily  LORazepam     Tablet 1 milliGRAM(s) Oral <User Schedule>  losartan 100 milliGRAM(s) Oral daily  metoprolol tartrate 50 milliGRAM(s) Oral two times a day  pantoprazole    Tablet 40 milliGRAM(s) Oral before breakfast  polyethylene glycol 3350 17 Gram(s) Oral daily  QUEtiapine 75 milliGRAM(s) Oral at bedtime  venlafaxine 75 milliGRAM(s) Oral daily    MEDICATIONS  (PRN):  aluminum hydroxide/magnesium hydroxide/simethicone Suspension 30 milliLiter(s) Oral every 4 hours PRN Dyspepsia  hydrOXYzine hydrochloride 25 milliGRAM(s) Oral three times a day PRN anxiety  mirtazapine 15 milliGRAM(s) Oral at bedtime PRN insomnia  ondansetron   Disintegrating Tablet 8 milliGRAM(s) Oral every 8 hours PRN nausea   MEDICATIONS  (STANDING):  atorvastatin 10 milliGRAM(s) Oral at bedtime  escitalopram 10 milliGRAM(s) Oral daily  hydrochlorothiazide 12.5 milliGRAM(s) Oral daily  LORazepam     Tablet 1 milliGRAM(s) Oral <User Schedule>  losartan 100 milliGRAM(s) Oral daily  metoprolol tartrate 50 milliGRAM(s) Oral two times a day  pantoprazole    Tablet 40 milliGRAM(s) Oral before breakfast  polyethylene glycol 3350 17 Gram(s) Oral daily  QUEtiapine 100 milliGRAM(s) Oral at bedtime  venlafaxine 37.5 milliGRAM(s) Oral daily    MEDICATIONS  (PRN):  aluminum hydroxide/magnesium hydroxide/simethicone Suspension 30 milliLiter(s) Oral every 4 hours PRN Dyspepsia  hydrOXYzine hydrochloride 25 milliGRAM(s) Oral three times a day PRN anxiety  mirtazapine 15 milliGRAM(s) Oral at bedtime PRN insomnia  ondansetron   Disintegrating Tablet 8 milliGRAM(s) Oral every 8 hours PRN nausea

## 2023-04-12 NOTE — BH INPATIENT PSYCHIATRY PROGRESS NOTE - NSBHFUPINTERVALHXFT_PSY_A_CORE
No acute interval events. Pt reports that her anxiety was well-controlled in the morning but feels that the Ativan wore off quickly and that her anxiety was not well-controlled in the afternoon. Writer explained that the escitalopram and Seroquel will address the pt's chronic anxiety but that it takes time for this to take into effect. Cross-taper between venlafaxine and escitalopram is in progress (venlafaxine dose reduced from 150 mg to 75 mg daily) and escitalopram dose increased from 5 mg to 10 mg daily. Pt's Seroquel dose was increased from 25 mg to 50 mg nightly to augment her current regimen for anxiety. Seroquel dose will be increased to 75 mg tonight.    Throughout the day, pt stopped the writer multiple times to confirm whether the morning Ativan dose was moved earlier to 7 AM. Pt was intrusive and porous with her boundaries. She expressed being fearful about having multiple medication changes at once multiple times throughout the day. No acute interval events. Pt reports that her anxiety was well-controlled in the morning but feels that the Ativan wore off quickly and that her anxiety was not well-controlled in the afternoon. Writer explained that the escitalopram and Seroquel will address the pt's chronic anxiety but that it takes time for this to take into effect. Cross-taper between venlafaxine and escitalopram is in progress (venlafaxine dose reduced from 150 mg to 75 mg daily) and escitalopram dose increased from 5 mg to 10 mg daily. Pt's Seroquel dose was increased from 25 mg to 50 mg nightly to augment her current regimen for anxiety. Seroquel dose will be increased to 75 mg tonight.    Throughout the day, pt stopped the writer multiple times to express her concerns of Ativan wearing off quickly. Pt was intrusive and porous with her boundaries. She expressed being fearful about having multiple medication changes at once multiple times throughout the day.

## 2023-04-12 NOTE — BH INPATIENT PSYCHIATRY PROGRESS NOTE - NSBHASSESSSUMMFT_PSY_ALL_CORE
Patient is a 71 yo female, , retired (), non-caregiver, domiciled at home with her , with a prior psychiatric diagnosis of ANSON and MDD, 4 previous inpatient psychiatric hospitalizations at [facility], most recently 7/5/2021-7/3/2021, prior SA in 11/2019 by overdosing on pills, no history of NSSIB, no history of violence/aggression, no hx of legal issues, daily marijuana use, PMHx of HTN, hyperlipidemia, [facility] (as below, with last visit 3/15/2023 and at that time documented to be at baseline with chronic anxiety, complaints of nausea, seen twice monthly with mediation mgt and psychotherapy)  BIB spouse for "worsening anxiety." Pt currently denies passive and active SI, intent and plan. She also denies symptoms of psychosis and sadia. The plan while she is hospitalized on the inpatient unit includes medication optimization.    #Depression:  - Venlafaxine ER 75 mg daily  - Escitalopram 10 mg daily    #Anxiety:  - Ativan 1 mg twice daily (7 AM and 3 PM)  - Seroquel 50 mg qhs (also for insomnia)    #Insomnia:  - Seroquel 50 mg qhs  - Mirtazapine 15 mg nightly PRN    #Health maintenance:  - Losartan 100 mg daily  - Hydrochlorothiazide 12.5 mg daily  - Metoprolol tartrate 50 mg twice daily   - Famotidine 20 mg daily    ;;03/30: Not endorsing  suicidal or homicidal ideation intent or plans; no mention of auditory or visual hallucinations but asked for help falling asleep last night and was given additional Remeron.  Thinking is congruent with affect; no peculiarities of thinking or language use. Alert; oriented; cognition intact; speech clear; no tremor or evidence of movement impairment.  Discussed at length medication changes (Protonix substituting for Famotidine for GERD;  low dose Effexor in am at 75mg; Remeron to remain at 15mg at night (activating at higher doses); Klonopin tapered to 0.5mg po bid; and Neurontin modified to 100mg po tid to avoid GI distress.  Patient continues sad and anxious.      ;;04/01: Pt continuing to report depressed mood; denies SI/HI/AH/VH. Pt tolerating reduction in mirtazapine with good effect on improving sleep. Pt with nausea d/t Effexor titration; Zofran PRN required.   4/6 Pt highly anxious, reports worsening anxiety in the morning with no available prns. Would recommend low dose anxiolytic in am   4/7 Klonopin timing changed to qam/qmidday, seroquel 25mg qhs added to regimen, primary team to continue to adjust regimen Patient is a 73 yo female, , retired (), non-caregiver, domiciled at home with her , with a prior psychiatric diagnosis of ANSON and MDD, 4 previous inpatient psychiatric hospitalizations at [facility], most recently 7/5/2021-7/3/2021, prior SA in 11/2019 by overdosing on pills, no history of NSSIB, no history of violence/aggression, no hx of legal issues, daily marijuana use, PMHx of HTN, hyperlipidemia, [facility] (as below, with last visit 3/15/2023 and at that time documented to be at baseline with chronic anxiety, complaints of nausea, seen twice monthly with mediation mgt and psychotherapy)  BIB spouse for "worsening anxiety." Pt currently denies passive and active SI, intent and plan. She also denies symptoms of psychosis and sadia. The plan while she is hospitalized on the inpatient unit includes medication optimization.    #Depression:  - Venlafaxine ER 75 mg daily  - Escitalopram 10 mg daily    #Anxiety:  - Ativan 1 mg twice daily (7 AM and 3 PM)  - Seroquel 75 mg qhs (also for insomnia)    #Insomnia:  - Seroquel 75 mg qhs  - Mirtazapine 15 mg nightly PRN    #Health maintenance:  - Losartan 100 mg daily  - Hydrochlorothiazide 12.5 mg daily  - Metoprolol tartrate 50 mg twice daily   - Famotidine 20 mg daily    ;;03/30: Not endorsing  suicidal or homicidal ideation intent or plans; no mention of auditory or visual hallucinations but asked for help falling asleep last night and was given additional Remeron.  Thinking is congruent with affect; no peculiarities of thinking or language use. Alert; oriented; cognition intact; speech clear; no tremor or evidence of movement impairment.  Discussed at length medication changes (Protonix substituting for Famotidine for GERD;  low dose Effexor in am at 75mg; Remeron to remain at 15mg at night (activating at higher doses); Klonopin tapered to 0.5mg po bid; and Neurontin modified to 100mg po tid to avoid GI distress.  Patient continues sad and anxious.      ;;04/01: Pt continuing to report depressed mood; denies SI/HI/AH/VH. Pt tolerating reduction in mirtazapine with good effect on improving sleep. Pt with nausea d/t Effexor titration; Zofran PRN required.   4/6 Pt highly anxious, reports worsening anxiety in the morning with no available prns. Would recommend low dose anxiolytic in am   4/7 Klonopin timing changed to qam/qmidday, seroquel 25mg qhs added to regimen, primary team to continue to adjust regimen

## 2023-04-12 NOTE — BH INPATIENT PSYCHIATRY PROGRESS NOTE - NSBHCHARTREVIEWVS_PSY_A_CORE FT
Vital Signs Last 24 Hrs  T(C): 36.7 (04-12-23 @ 11:29), Max: 36.7 (04-11-23 @ 18:22)  T(F): 98 (04-12-23 @ 11:29), Max: 98 (04-11-23 @ 18:22)  HR: 89 (04-12-23 @ 11:29) (67 - 89)  BP: 134/81 (04-12-23 @ 11:29) (134/81 - 135/85)  BP(mean): --  RR: 18 (04-12-23 @ 11:29) (18 - 18)  SpO2: 97% (04-12-23 @ 11:29) (96% - 97%)     Vital Signs Last 24 Hrs  T(C): 36.4 (04-12-23 @ 17:25), Max: 36.7 (04-12-23 @ 11:29)  T(F): 97.6 (04-12-23 @ 17:25), Max: 98 (04-12-23 @ 11:29)  HR: 71 (04-12-23 @ 17:25) (71 - 89)  BP: 137/78 (04-12-23 @ 17:25) (134/81 - 137/78)  BP(mean): --  RR: 17 (04-12-23 @ 17:25) (17 - 18)  SpO2: 99% (04-12-23 @ 17:25) (97% - 99%)

## 2023-04-12 NOTE — BH INPATIENT PSYCHIATRY PROGRESS NOTE - NSBHMETABOLIC_PSY_ALL_CORE_FT
BMI: BMI (kg/m2): 40.8 (03-28-23 @ 19:10)  HbA1c:   Glucose:   BP: 134/81 (04-12-23 @ 11:29) (113/72 - 154/84)  Lipid Panel:  BMI: BMI (kg/m2): 40.8 (03-28-23 @ 19:10)  HbA1c:   Glucose:   BP: 137/78 (04-12-23 @ 17:25) (113/72 - 154/84)  Lipid Panel:  BMI: BMI (kg/m2): 40.8 (03-28-23 @ 19:10)  HbA1c:   Glucose:   BP: 137/78 (04-12-23 @ 17:25) (122/80 - 154/84)  Lipid Panel:

## 2023-04-13 PROCEDURE — 99231 SBSQ HOSP IP/OBS SF/LOW 25: CPT

## 2023-04-13 RX ORDER — VENLAFAXINE HCL 75 MG
37.5 CAPSULE, EXT RELEASE 24 HR ORAL DAILY
Refills: 0 | Status: DISCONTINUED | OUTPATIENT
Start: 2023-04-13 | End: 2023-04-17

## 2023-04-13 RX ORDER — QUETIAPINE FUMARATE 200 MG/1
100 TABLET, FILM COATED ORAL AT BEDTIME
Refills: 0 | Status: DISCONTINUED | OUTPATIENT
Start: 2023-04-13 | End: 2023-04-15

## 2023-04-13 RX ADMIN — MIRTAZAPINE 15 MILLIGRAM(S): 45 TABLET, ORALLY DISINTEGRATING ORAL at 22:38

## 2023-04-13 RX ADMIN — QUETIAPINE FUMARATE 100 MILLIGRAM(S): 200 TABLET, FILM COATED ORAL at 21:17

## 2023-04-13 RX ADMIN — ONDANSETRON 8 MILLIGRAM(S): 8 TABLET, FILM COATED ORAL at 18:06

## 2023-04-13 RX ADMIN — ONDANSETRON 8 MILLIGRAM(S): 8 TABLET, FILM COATED ORAL at 09:47

## 2023-04-13 RX ADMIN — ESCITALOPRAM OXALATE 10 MILLIGRAM(S): 10 TABLET, FILM COATED ORAL at 09:43

## 2023-04-13 RX ADMIN — Medication 25 MILLIGRAM(S): at 09:45

## 2023-04-13 RX ADMIN — Medication 50 MILLIGRAM(S): at 09:43

## 2023-04-13 RX ADMIN — Medication 1 MILLIGRAM(S): at 07:20

## 2023-04-13 RX ADMIN — Medication 37.5 MILLIGRAM(S): at 09:46

## 2023-04-13 RX ADMIN — Medication 50 MILLIGRAM(S): at 21:18

## 2023-04-13 RX ADMIN — Medication 25 MILLIGRAM(S): at 18:06

## 2023-04-13 RX ADMIN — LOSARTAN POTASSIUM 100 MILLIGRAM(S): 100 TABLET, FILM COATED ORAL at 09:43

## 2023-04-13 RX ADMIN — Medication 1 MILLIGRAM(S): at 14:35

## 2023-04-13 RX ADMIN — PANTOPRAZOLE SODIUM 40 MILLIGRAM(S): 20 TABLET, DELAYED RELEASE ORAL at 07:20

## 2023-04-13 RX ADMIN — ATORVASTATIN CALCIUM 10 MILLIGRAM(S): 80 TABLET, FILM COATED ORAL at 21:17

## 2023-04-13 NOTE — BH INPATIENT PSYCHIATRY PROGRESS NOTE - NSBHASSESSSUMMFT_PSY_ALL_CORE
;;04/13: Not endorsing  suicidal or homicidal ideation intent or plans; no mention of auditory or visual hallucinations but becomes panicky at any mention of leaving the hospital for aftercare;  wants to switch back to Klonopin saying it works better but actually patient has been free of intense anxiety (panic ) for a day or so; c/o drowsiness with Seroquel and Remeron.  Suggest not to use Remeron unless has severe insomnia.   Raising Seroquel to 100mg at night for mood ; Effexor lowered to 37.5mg as contributing to anxiety; will attempt to transition to after care.   Should patient express SI would consider ECT for TRD but not thought likely at this time.  Would like to coordinate with aftercare provider. consider IOP.

## 2023-04-13 NOTE — BH INPATIENT PSYCHIATRY PROGRESS NOTE - CURRENT MEDICATION
MEDICATIONS  (STANDING):  atorvastatin 10 milliGRAM(s) Oral at bedtime  escitalopram 10 milliGRAM(s) Oral daily  hydrochlorothiazide 12.5 milliGRAM(s) Oral daily  LORazepam     Tablet 1 milliGRAM(s) Oral <User Schedule>  losartan 100 milliGRAM(s) Oral daily  metoprolol tartrate 50 milliGRAM(s) Oral two times a day  pantoprazole    Tablet 40 milliGRAM(s) Oral before breakfast  polyethylene glycol 3350 17 Gram(s) Oral daily  QUEtiapine 100 milliGRAM(s) Oral at bedtime  venlafaxine 37.5 milliGRAM(s) Oral daily    MEDICATIONS  (PRN):  aluminum hydroxide/magnesium hydroxide/simethicone Suspension 30 milliLiter(s) Oral every 4 hours PRN Dyspepsia  hydrOXYzine hydrochloride 25 milliGRAM(s) Oral three times a day PRN anxiety  mirtazapine 15 milliGRAM(s) Oral at bedtime PRN insomnia  ondansetron   Disintegrating Tablet 8 milliGRAM(s) Oral every 8 hours PRN nausea

## 2023-04-13 NOTE — BH INPATIENT PSYCHIATRY PROGRESS NOTE - NSBHMETABOLIC_PSY_ALL_CORE_FT
BMI: BMI (kg/m2): 40.8 (03-28-23 @ 19:10)  HbA1c:   Glucose:   BP: 137/78 (04-12-23 @ 17:25) (122/80 - 154/84)  Lipid Panel:  BMI: BMI (kg/m2): 40.8 (03-28-23 @ 19:10)  HbA1c:   Glucose:   BP: 135/81 (04-13-23 @ 19:57) (134/77 - 170/81)  Lipid Panel:

## 2023-04-13 NOTE — BH INPATIENT PSYCHIATRY PROGRESS NOTE - NSBHFUPINTERVALHXFT_PSY_A_CORE
;;04/13: Not endorsing  suicidal or homicidal ideation intent or plans; no mention of auditory or visual hallucinations but becomes panicky at any mention of leaving the hospital for aftercare;  wants to switch back to Klonopin saying it works better but actually patient has been free of intense anxiety (panic ) for a day or so; c/o drowsiness with Seroquel and Remeron.  Suggest not to use Remeron unless has severe insomnia.

## 2023-04-13 NOTE — BH INPATIENT PSYCHIATRY PROGRESS NOTE - NSBHCHARTREVIEWVS_PSY_A_CORE FT
Vital Signs Last 24 Hrs  T(C): 36.4 (04-12-23 @ 17:25), Max: 36.7 (04-12-23 @ 11:29)  T(F): 97.6 (04-12-23 @ 17:25), Max: 98 (04-12-23 @ 11:29)  HR: 71 (04-12-23 @ 17:25) (71 - 89)  BP: 137/78 (04-12-23 @ 17:25) (134/81 - 137/78)  BP(mean): --  RR: 17 (04-12-23 @ 17:25) (17 - 18)  SpO2: 99% (04-12-23 @ 17:25) (97% - 99%)     Vital Signs Last 24 Hrs  T(C): 36.7 (04-13-23 @ 19:57), Max: 36.8 (04-13-23 @ 09:00)  T(F): 98.1 (04-13-23 @ 19:57), Max: 98.2 (04-13-23 @ 09:00)  HR: 82 (04-13-23 @ 19:57) (74 - 86)  BP: 135/81 (04-13-23 @ 19:57) (134/77 - 170/81)  BP(mean): --  RR: 16 (04-13-23 @ 19:57) (16 - 18)  SpO2: 95% (04-13-23 @ 19:57) (93% - 96%)

## 2023-04-14 LAB
A1C WITH ESTIMATED AVERAGE GLUCOSE RESULT: 5.7 % — HIGH (ref 4–5.6)
CHOLEST SERPL-MCNC: 163 MG/DL — SIGNIFICANT CHANGE UP
ESTIMATED AVERAGE GLUCOSE: 117 MG/DL — HIGH (ref 68–114)
HDLC SERPL-MCNC: 36 MG/DL — LOW
LIPID PNL WITH DIRECT LDL SERPL: 87 MG/DL — SIGNIFICANT CHANGE UP
NON HDL CHOLESTEROL: 127 MG/DL — SIGNIFICANT CHANGE UP
TRIGL SERPL-MCNC: 200 MG/DL — HIGH

## 2023-04-14 PROCEDURE — 99232 SBSQ HOSP IP/OBS MODERATE 35: CPT

## 2023-04-14 RX ADMIN — ATORVASTATIN CALCIUM 10 MILLIGRAM(S): 80 TABLET, FILM COATED ORAL at 21:39

## 2023-04-14 RX ADMIN — ONDANSETRON 8 MILLIGRAM(S): 8 TABLET, FILM COATED ORAL at 10:36

## 2023-04-14 RX ADMIN — Medication 37.5 MILLIGRAM(S): at 10:31

## 2023-04-14 RX ADMIN — Medication 25 MILLIGRAM(S): at 10:36

## 2023-04-14 RX ADMIN — Medication 25 MILLIGRAM(S): at 19:19

## 2023-04-14 RX ADMIN — ESCITALOPRAM OXALATE 10 MILLIGRAM(S): 10 TABLET, FILM COATED ORAL at 10:31

## 2023-04-14 RX ADMIN — Medication 1 MILLIGRAM(S): at 07:01

## 2023-04-14 RX ADMIN — Medication 50 MILLIGRAM(S): at 10:32

## 2023-04-14 RX ADMIN — Medication 50 MILLIGRAM(S): at 21:39

## 2023-04-14 RX ADMIN — Medication 30 MILLILITER(S): at 13:29

## 2023-04-14 RX ADMIN — QUETIAPINE FUMARATE 100 MILLIGRAM(S): 200 TABLET, FILM COATED ORAL at 21:39

## 2023-04-14 RX ADMIN — PANTOPRAZOLE SODIUM 40 MILLIGRAM(S): 20 TABLET, DELAYED RELEASE ORAL at 07:01

## 2023-04-14 RX ADMIN — Medication 1 MILLIGRAM(S): at 14:14

## 2023-04-14 RX ADMIN — LOSARTAN POTASSIUM 100 MILLIGRAM(S): 100 TABLET, FILM COATED ORAL at 10:32

## 2023-04-14 NOTE — BH INPATIENT PSYCHIATRY PROGRESS NOTE - NSBHCHARTREVIEWVS_PSY_A_CORE FT
Vital Signs Last 24 Hrs  T(C): 37.1 (04-14-23 @ 16:48), Max: 37.1 (04-14-23 @ 16:48)  T(F): 98.7 (04-14-23 @ 16:48), Max: 98.7 (04-14-23 @ 16:48)  HR: 78 (04-14-23 @ 16:48) (78 - 92)  BP: 127/74 (04-14-23 @ 16:48) (127/74 - 142/85)  BP(mean): --  RR: 18 (04-14-23 @ 16:48) (16 - 18)  SpO2: 91% (04-14-23 @ 16:48) (91% - 95%)     Vital Signs Last 24 Hrs  T(C): 37.1 (04-14-23 @ 16:48), Max: 37.1 (04-14-23 @ 16:48)  T(F): 98.7 (04-14-23 @ 16:48), Max: 98.7 (04-14-23 @ 16:48)  HR: 78 (04-14-23 @ 16:48) (78 - 92)  BP: 127/74 (04-14-23 @ 16:48) (127/74 - 142/85)  BP(mean): --  RR: 18 (04-14-23 @ 16:48) (18 - 18)  SpO2: 91% (04-14-23 @ 16:48) (91% - 95%)

## 2023-04-14 NOTE — BH INPATIENT PSYCHIATRY PROGRESS NOTE - NSBHMETABOLIC_PSY_ALL_CORE_FT
BMI: BMI (kg/m2): 40.8 (03-28-23 @ 19:10)  HbA1c: A1C with Estimated Average Glucose Result: 5.7 % (04-14-23 @ 07:32)    Glucose:   BP: 135/81 (04-13-23 @ 19:57) (134/77 - 170/81)  Lipid Panel: Date/Time: 04-14-23 @ 07:32  Cholesterol, Serum: 163  Direct LDL: --  HDL Cholesterol, Serum: 36  Total Cholesterol/HDL Ration Measurement: --  Triglycerides, Serum: 200   BMI: BMI (kg/m2): 40.8 (03-28-23 @ 19:10)  HbA1c: A1C with Estimated Average Glucose Result: 5.7 % (04-14-23 @ 07:32)    Glucose:   BP: 142/85 (04-14-23 @ 09:40) (134/77 - 170/81)  Lipid Panel: Date/Time: 04-14-23 @ 07:32  Cholesterol, Serum: 163  Direct LDL: --  HDL Cholesterol, Serum: 36  Total Cholesterol/HDL Ration Measurement: --  Triglycerides, Serum: 200   BMI: BMI (kg/m2): 40.8 (03-28-23 @ 19:10)  HbA1c: A1C with Estimated Average Glucose Result: 5.7 % (04-14-23 @ 07:32)    Glucose:   BP: 143/84 (04-17-23 @ 20:11) (119/77 - 143/84)  Lipid Panel: Date/Time: 04-14-23 @ 07:32  Cholesterol, Serum: 163  Direct LDL: --  HDL Cholesterol, Serum: 36  Total Cholesterol/HDL Ration Measurement: --  Triglycerides, Serum: 200

## 2023-04-14 NOTE — BH INPATIENT PSYCHIATRY PROGRESS NOTE - NSBHMETABOLIC_PSY_ALL_CORE_FT
BMI: BMI (kg/m2): 40.8 (03-28-23 @ 19:10)  HbA1c: A1C with Estimated Average Glucose Result: 5.7 % (04-14-23 @ 07:32)    Glucose:   BP: 127/74 (04-14-23 @ 16:48) (127/74 - 170/81)  Lipid Panel: Date/Time: 04-14-23 @ 07:32  Cholesterol, Serum: 163  Direct LDL: --  HDL Cholesterol, Serum: 36  Total Cholesterol/HDL Ration Measurement: --  Triglycerides, Serum: 200

## 2023-04-14 NOTE — BH INPATIENT PSYCHIATRY PROGRESS NOTE - NSBHASSESSSUMMFT_PSY_ALL_CORE
Patient is a 73 yo female, , retired (), non-caregiver, domiciled at home with her , with a prior psychiatric diagnosis of ANSON and MDD, 4 previous inpatient psychiatric hospitalizations at [facility], most recently 7/5/2021-7/3/2021, prior SA in 11/2019 by overdosing on pills, no history of NSSIB, no history of violence/aggression, no hx of legal issues, daily marijuana use, PMHx of HTN, hyperlipidemia, [facility] (as below, with last visit 3/15/2023 and at that time documented to be at baseline with chronic anxiety, complaints of nausea, seen twice monthly with mediation mgt and psychotherapy)  BIB spouse for "worsening anxiety." Pt currently denies passive and active SI, intent and plan. She also denies symptoms of psychosis and sadia. The plan while she is hospitalized on the inpatient unit includes medication optimization.    #Depression:  - Venlafaxine ER 37.5 mg daily  - Escitalopram 10 mg daily    #Anxiety:  - Ativan 1 mg twice daily (7 AM and 3 PM)  - Seroquel 50 mg qhs (also for insomnia)    #Insomnia:  - Seroquel 100 mg qhs  - Mirtazapine 15 mg nightly PRN    #Health maintenance:  - Losartan 100 mg daily  - Hydrochlorothiazide 12.5 mg daily  - Metoprolol tartrate 50 mg twice daily   - Famotidine 20 mg daily    04/13: Not endorsing  suicidal or homicidal ideation intent or plans; no mention of auditory or visual hallucinations but becomes panicky at any mention of leaving the hospital for aftercare;  wants to switch back to Klonopin saying it works better but actually patient has been free of intense anxiety (panic ) for a day or so; c/o drowsiness with Seroquel and Remeron.  Suggest not to use Remeron unless has severe insomnia.   Raising Seroquel to 100mg at night for mood ; Effexor lowered to 37.5mg as contributing to anxiety; will attempt to transition to after care.   Should patient express SI would consider ECT for TRD but not thought likely at this time.  Would like to coordinate with aftercare provider. consider IOP.    04/14: Pt reports improvement in her mood and anxiety and is less anxious and intrusive throughout the day. Pt appears to be less anxious of the thought of leaving the hospital.

## 2023-04-14 NOTE — BH INPATIENT PSYCHIATRY PROGRESS NOTE - CURRENT MEDICATION
MEDICATIONS  (STANDING):  atorvastatin 10 milliGRAM(s) Oral at bedtime  escitalopram 10 milliGRAM(s) Oral daily  hydrochlorothiazide 12.5 milliGRAM(s) Oral daily  LORazepam     Tablet 1 milliGRAM(s) Oral <User Schedule>  losartan 100 milliGRAM(s) Oral daily  metoprolol tartrate 50 milliGRAM(s) Oral two times a day  pantoprazole    Tablet 40 milliGRAM(s) Oral before breakfast  polyethylene glycol 3350 17 Gram(s) Oral daily  QUEtiapine 100 milliGRAM(s) Oral at bedtime  venlafaxine 37.5 milliGRAM(s) Oral daily    MEDICATIONS  (PRN):  aluminum hydroxide/magnesium hydroxide/simethicone Suspension 30 milliLiter(s) Oral every 4 hours PRN Dyspepsia  hydrOXYzine hydrochloride 25 milliGRAM(s) Oral three times a day PRN anxiety  mirtazapine 15 milliGRAM(s) Oral at bedtime PRN insomnia  ondansetron   Disintegrating Tablet 8 milliGRAM(s) Oral every 8 hours PRN nausea   MEDICATIONS  (STANDING):  atorvastatin 10 milliGRAM(s) Oral at bedtime  escitalopram 10 milliGRAM(s) Oral daily  hydrochlorothiazide 12.5 milliGRAM(s) Oral daily  losartan 100 milliGRAM(s) Oral daily  metoprolol tartrate 50 milliGRAM(s) Oral two times a day  pantoprazole    Tablet 40 milliGRAM(s) Oral before breakfast  polyethylene glycol 3350 17 Gram(s) Oral daily  QUEtiapine 100 milliGRAM(s) Oral at bedtime  venlafaxine 37.5 milliGRAM(s) Oral daily    MEDICATIONS  (PRN):  aluminum hydroxide/magnesium hydroxide/simethicone Suspension 30 milliLiter(s) Oral every 4 hours PRN Dyspepsia  hydrOXYzine hydrochloride 25 milliGRAM(s) Oral three times a day PRN anxiety  mirtazapine 15 milliGRAM(s) Oral at bedtime PRN insomnia  ondansetron   Disintegrating Tablet 8 milliGRAM(s) Oral every 8 hours PRN nausea   MEDICATIONS  (STANDING):  atorvastatin 10 milliGRAM(s) Oral at bedtime  escitalopram 10 milliGRAM(s) Oral daily  hydrochlorothiazide 12.5 milliGRAM(s) Oral daily  LORazepam     Tablet 1 milliGRAM(s) Oral <User Schedule>  losartan 100 milliGRAM(s) Oral daily  metoprolol tartrate 50 milliGRAM(s) Oral two times a day  pantoprazole    Tablet 40 milliGRAM(s) Oral before breakfast  polyethylene glycol 3350 17 Gram(s) Oral daily  QUEtiapine 75 milliGRAM(s) Oral at bedtime    MEDICATIONS  (PRN):  diphenhydrAMINE 50 milliGRAM(s) Oral once PRN EPS (Extrapyramidal symptoms)  hydrOXYzine hydrochloride 25 milliGRAM(s) Oral three times a day PRN anxiety  metoclopramide 10 milliGRAM(s) Oral two times a day PRN Nausea/vomiting  mirtazapine 15 milliGRAM(s) Oral at bedtime PRN insomnia  ondansetron   Disintegrating Tablet 8 milliGRAM(s) Oral every 8 hours PRN nausea  simethicone 80 milliGRAM(s) Chew daily PRN Indigestion/Nausea

## 2023-04-14 NOTE — BH INPATIENT PSYCHIATRY PROGRESS NOTE - NSBHCHARTREVIEWVS_PSY_A_CORE FT
Vital Signs Last 24 Hrs  T(C): 36.7 (04-13-23 @ 19:57), Max: 36.8 (04-13-23 @ 09:00)  T(F): 98.1 (04-13-23 @ 19:57), Max: 98.2 (04-13-23 @ 09:00)  HR: 82 (04-13-23 @ 19:57) (74 - 86)  BP: 135/81 (04-13-23 @ 19:57) (134/77 - 170/81)  BP(mean): --  RR: 16 (04-13-23 @ 19:57) (16 - 18)  SpO2: 95% (04-13-23 @ 19:57) (93% - 96%)     Vital Signs Last 24 Hrs  T(C): 36.9 (04-14-23 @ 09:40), Max: 36.9 (04-14-23 @ 09:40)  T(F): 98.5 (04-14-23 @ 09:40), Max: 98.5 (04-14-23 @ 09:40)  HR: 92 (04-14-23 @ 09:40) (74 - 92)  BP: 142/85 (04-14-23 @ 09:40) (134/77 - 142/85)  BP(mean): --  RR: 18 (04-14-23 @ 09:40) (16 - 18)  SpO2: 95% (04-14-23 @ 09:40) (95% - 96%)     Vital Signs Last 24 Hrs  T(C): 37.1 (04-17-23 @ 17:02), Max: 37.1 (04-17-23 @ 17:02)  T(F): 98.8 (04-17-23 @ 17:02), Max: 98.8 (04-17-23 @ 17:02)  HR: 82 (04-17-23 @ 20:11) (74 - 82)  BP: 143/84 (04-17-23 @ 20:11) (119/77 - 143/84)  BP(mean): --  RR: 17 (04-17-23 @ 20:11) (17 - 18)  SpO2: 95% (04-17-23 @ 20:11) (95% - 96%)

## 2023-04-14 NOTE — BH INPATIENT PSYCHIATRY PROGRESS NOTE - NSBHFUPINTERVALHXFT_PSY_A_CORE
No acute interval events. Pt relates that she notices a small improvement in her mood and anxiety. She reports persistent issues with falling asleep and states that she often requires PRN mirtazapine but states that once she falls asleep, she sleeps through the night and wakes up well-rested. Pt reports that she still has anxiety and would like a stronger breakthrough medication than lorazepam to treat her anxiety. Pt is still a little perseverative on Klonopin being effective for her. Pt relates that her mother's 7-year death anniversary is in four days. Writer will assess how the pt coped with this in the near future.    Pt was less intrusive, was more calm when the writer could not immediately see her throughout the day as compared to previous days. Writer explained that with more time taking escitalopram and quetiapine that her anxiety will likely continue to improve. Pt expressed understanding of her medication regimen. She denied having other complaints and concerns.

## 2023-04-14 NOTE — BH INPATIENT PSYCHIATRY PROGRESS NOTE - PRN MEDS
MEDICATIONS  (PRN):  aluminum hydroxide/magnesium hydroxide/simethicone Suspension 30 milliLiter(s) Oral every 4 hours PRN Dyspepsia  hydrOXYzine hydrochloride 25 milliGRAM(s) Oral three times a day PRN anxiety  mirtazapine 15 milliGRAM(s) Oral at bedtime PRN insomnia  ondansetron   Disintegrating Tablet 8 milliGRAM(s) Oral every 8 hours PRN nausea   MEDICATIONS  (PRN):  diphenhydrAMINE 50 milliGRAM(s) Oral once PRN EPS (Extrapyramidal symptoms)  hydrOXYzine hydrochloride 25 milliGRAM(s) Oral three times a day PRN anxiety  metoclopramide 10 milliGRAM(s) Oral two times a day PRN Nausea/vomiting  mirtazapine 15 milliGRAM(s) Oral at bedtime PRN insomnia  ondansetron   Disintegrating Tablet 8 milliGRAM(s) Oral every 8 hours PRN nausea  simethicone 80 milliGRAM(s) Chew daily PRN Indigestion/Nausea

## 2023-04-14 NOTE — BH INPATIENT PSYCHIATRY PROGRESS NOTE - NSBHATTESTBILLING_PSY_A_CORE
77795-Gxciozrfht OBS or IP - moderate complexity OR 35-49 mins
60432-Xojdtsegjv OBS or IP - moderate complexity OR 35-49 mins
56362-Nummglrzgt OBS or IP - high complexity OR 50-79 mins
12539-Wnslqphtrh OBS or IP - high complexity OR 50-79 mins
18113-Urrltgcnis OBS or IP - high complexity OR 50-79 mins
85218-Gqcwemqktb OBS or IP - moderate complexity OR 35-49 mins
04966-Nckqrslxwz - moderate complexity OR 30-39 mins
72942-Vxecmurtbz OBS or IP - high complexity OR 50-79 mins
60149-Qsdkqcdlkh OBS or IP - moderate complexity OR 35-49 mins
Non-billable
79002-Xiuampenbg OBS or IP - high complexity OR 50-79 mins
41848-Uyshwcrgzm OBS or IP - high complexity OR 50-79 mins

## 2023-04-14 NOTE — BH INPATIENT PSYCHIATRY PROGRESS NOTE - NSBHMSEKNOWHOW_PSY_ALL_CORE
General Sunscreen Counseling: I recommended a broad spectrum sunscreen with a SPF of 30 or higher specifically one with a zinc oxide. I explained that SPF 30 sunscreens block approximately 97 percent of the sun's harmful rays.  Sunscreens should be applied at least 15 minutes prior to expected sun exposure and then every 2 hours after that as long as sun exposure continues. If swimming or exercising sunscreen should be reapplied every 45 minutes to an hour after getting wet or sweating.  One ounce, or the equivalent of a shot glass full of sunscreen, is adequate to protect the skin not covered by a bathing suit. I also recommended a lip balm with a sunscreen as well. Sun protective clothing can be used in lieu of sunscreen but must be worn the entire time you are exposed to the sun's rays. Products Recommended: Elta MD Detail Level: Generalized Educational attainment/Vocabulary

## 2023-04-14 NOTE — BH INPATIENT PSYCHIATRY PROGRESS NOTE - CURRENT MEDICATION
MEDICATIONS  (STANDING):  atorvastatin 10 milliGRAM(s) Oral at bedtime  escitalopram 10 milliGRAM(s) Oral daily  hydrochlorothiazide 12.5 milliGRAM(s) Oral daily  losartan 100 milliGRAM(s) Oral daily  metoprolol tartrate 50 milliGRAM(s) Oral two times a day  pantoprazole    Tablet 40 milliGRAM(s) Oral before breakfast  polyethylene glycol 3350 17 Gram(s) Oral daily  QUEtiapine 100 milliGRAM(s) Oral at bedtime  venlafaxine 37.5 milliGRAM(s) Oral daily    MEDICATIONS  (PRN):  aluminum hydroxide/magnesium hydroxide/simethicone Suspension 30 milliLiter(s) Oral every 4 hours PRN Dyspepsia  hydrOXYzine hydrochloride 25 milliGRAM(s) Oral three times a day PRN anxiety  mirtazapine 15 milliGRAM(s) Oral at bedtime PRN insomnia  ondansetron   Disintegrating Tablet 8 milliGRAM(s) Oral every 8 hours PRN nausea

## 2023-04-15 RX ORDER — QUETIAPINE FUMARATE 200 MG/1
75 TABLET, FILM COATED ORAL DAILY
Refills: 0 | Status: DISCONTINUED | OUTPATIENT
Start: 2023-04-15 | End: 2023-04-15

## 2023-04-15 RX ORDER — QUETIAPINE FUMARATE 200 MG/1
75 TABLET, FILM COATED ORAL AT BEDTIME
Refills: 0 | Status: DISCONTINUED | OUTPATIENT
Start: 2023-04-15 | End: 2023-04-17

## 2023-04-15 RX ADMIN — ONDANSETRON 8 MILLIGRAM(S): 8 TABLET, FILM COATED ORAL at 17:37

## 2023-04-15 RX ADMIN — LOSARTAN POTASSIUM 100 MILLIGRAM(S): 100 TABLET, FILM COATED ORAL at 09:31

## 2023-04-15 RX ADMIN — Medication 37.5 MILLIGRAM(S): at 09:31

## 2023-04-15 RX ADMIN — Medication 50 MILLIGRAM(S): at 21:28

## 2023-04-15 RX ADMIN — QUETIAPINE FUMARATE 75 MILLIGRAM(S): 200 TABLET, FILM COATED ORAL at 21:28

## 2023-04-15 RX ADMIN — ESCITALOPRAM OXALATE 10 MILLIGRAM(S): 10 TABLET, FILM COATED ORAL at 09:31

## 2023-04-15 RX ADMIN — Medication 1 MILLIGRAM(S): at 07:17

## 2023-04-15 RX ADMIN — ATORVASTATIN CALCIUM 10 MILLIGRAM(S): 80 TABLET, FILM COATED ORAL at 21:28

## 2023-04-15 RX ADMIN — Medication 1 MILLIGRAM(S): at 15:26

## 2023-04-15 RX ADMIN — Medication 25 MILLIGRAM(S): at 09:32

## 2023-04-15 RX ADMIN — Medication 50 MILLIGRAM(S): at 09:32

## 2023-04-15 RX ADMIN — Medication 25 MILLIGRAM(S): at 17:39

## 2023-04-15 RX ADMIN — ONDANSETRON 8 MILLIGRAM(S): 8 TABLET, FILM COATED ORAL at 09:32

## 2023-04-15 RX ADMIN — PANTOPRAZOLE SODIUM 40 MILLIGRAM(S): 20 TABLET, DELAYED RELEASE ORAL at 07:17

## 2023-04-16 RX ADMIN — PANTOPRAZOLE SODIUM 40 MILLIGRAM(S): 20 TABLET, DELAYED RELEASE ORAL at 07:20

## 2023-04-16 RX ADMIN — ESCITALOPRAM OXALATE 10 MILLIGRAM(S): 10 TABLET, FILM COATED ORAL at 10:06

## 2023-04-16 RX ADMIN — Medication 50 MILLIGRAM(S): at 10:06

## 2023-04-16 RX ADMIN — Medication 30 MILLILITER(S): at 15:40

## 2023-04-16 RX ADMIN — ATORVASTATIN CALCIUM 10 MILLIGRAM(S): 80 TABLET, FILM COATED ORAL at 21:39

## 2023-04-16 RX ADMIN — Medication 1 MILLIGRAM(S): at 07:20

## 2023-04-16 RX ADMIN — Medication 37.5 MILLIGRAM(S): at 10:06

## 2023-04-16 RX ADMIN — ONDANSETRON 8 MILLIGRAM(S): 8 TABLET, FILM COATED ORAL at 18:04

## 2023-04-16 RX ADMIN — Medication 25 MILLIGRAM(S): at 10:06

## 2023-04-16 RX ADMIN — QUETIAPINE FUMARATE 75 MILLIGRAM(S): 200 TABLET, FILM COATED ORAL at 21:39

## 2023-04-16 RX ADMIN — Medication 50 MILLIGRAM(S): at 21:40

## 2023-04-16 RX ADMIN — LOSARTAN POTASSIUM 100 MILLIGRAM(S): 100 TABLET, FILM COATED ORAL at 10:06

## 2023-04-16 RX ADMIN — Medication 1 MILLIGRAM(S): at 14:18

## 2023-04-16 RX ADMIN — ONDANSETRON 8 MILLIGRAM(S): 8 TABLET, FILM COATED ORAL at 10:06

## 2023-04-17 PROCEDURE — 99253 IP/OBS CNSLTJ NEW/EST LOW 45: CPT | Mod: GC

## 2023-04-17 PROCEDURE — 99232 SBSQ HOSP IP/OBS MODERATE 35: CPT | Mod: GC

## 2023-04-17 RX ORDER — DIPHENHYDRAMINE HCL 50 MG
50 CAPSULE ORAL ONCE
Refills: 0 | Status: DISCONTINUED | OUTPATIENT
Start: 2023-04-17 | End: 2023-04-19

## 2023-04-17 RX ORDER — QUETIAPINE FUMARATE 200 MG/1
100 TABLET, FILM COATED ORAL AT BEDTIME
Refills: 0 | Status: DISCONTINUED | OUTPATIENT
Start: 2023-04-17 | End: 2023-04-19

## 2023-04-17 RX ORDER — SIMETHICONE 80 MG/1
80 TABLET, CHEWABLE ORAL DAILY
Refills: 0 | Status: DISCONTINUED | OUTPATIENT
Start: 2023-04-17 | End: 2023-04-19

## 2023-04-17 RX ORDER — METOCLOPRAMIDE HCL 10 MG
10 TABLET ORAL
Refills: 0 | Status: DISCONTINUED | OUTPATIENT
Start: 2023-04-17 | End: 2023-04-19

## 2023-04-17 RX ADMIN — LOSARTAN POTASSIUM 100 MILLIGRAM(S): 100 TABLET, FILM COATED ORAL at 10:08

## 2023-04-17 RX ADMIN — Medication 37.5 MILLIGRAM(S): at 10:09

## 2023-04-17 RX ADMIN — Medication 50 MILLIGRAM(S): at 10:08

## 2023-04-17 RX ADMIN — ONDANSETRON 8 MILLIGRAM(S): 8 TABLET, FILM COATED ORAL at 10:11

## 2023-04-17 RX ADMIN — Medication 1 MILLIGRAM(S): at 07:16

## 2023-04-17 RX ADMIN — Medication 30 MILLILITER(S): at 16:00

## 2023-04-17 RX ADMIN — ATORVASTATIN CALCIUM 10 MILLIGRAM(S): 80 TABLET, FILM COATED ORAL at 21:32

## 2023-04-17 RX ADMIN — ESCITALOPRAM OXALATE 10 MILLIGRAM(S): 10 TABLET, FILM COATED ORAL at 10:09

## 2023-04-17 RX ADMIN — Medication 50 MILLIGRAM(S): at 21:32

## 2023-04-17 RX ADMIN — PANTOPRAZOLE SODIUM 40 MILLIGRAM(S): 20 TABLET, DELAYED RELEASE ORAL at 07:15

## 2023-04-17 RX ADMIN — Medication 1 MILLIGRAM(S): at 14:19

## 2023-04-17 RX ADMIN — Medication 25 MILLIGRAM(S): at 11:59

## 2023-04-17 RX ADMIN — QUETIAPINE FUMARATE 75 MILLIGRAM(S): 200 TABLET, FILM COATED ORAL at 21:32

## 2023-04-17 NOTE — CONSULT NOTE ADULT - ATTENDING COMMENTS
Patient is a 71 yo female, , retired (), non-caregiver, domiciled at home with her , with a prior psychiatric diagnosis of ANSON and MDD, 4 previous inpatient psychiatric hospitalizations  most recently 7/5/2021-7/3/2021, prior SA in 11/2019 by overdosing on pills, no history of NSSIB, no history of violence/aggression, no hx of legal issues, daily marijuana use, PMHx of HTN, hyperlipidemia, treated in outpatient geropsych clinic by Dr. Stovall (as below, with last visit 3/15/2023 and at that time documented to be at baseline with chronic anxiety, complaints of nausea, seen twice monthly with mediation mgt and psychotherapy)  BIB spouse for "worsening anxiety."  Medicine consulted for nausea, diarrhea, and decreased appetite for 2 days.    pt seen and examined with Dr. Schoenfeld.  she felt nausea started after seroquel dose increased and other psy meds stopped.  she like to retry Reglan ( given by GI in the past )    - diarrhea x 1- ( pt on maalox ) stop maalox,   can give gas X prn,   if more diarrhea- to get GI PCR and C diff    - GERD- continue protonix daily before breakfast  - nausea- can try Reglan 10 mg po bid prn -( to check QTC while on zofran and seroquel)    thank you for allowing medicine to participate in the care. dw Dr. Schoenfeld.

## 2023-04-17 NOTE — BH INPATIENT PSYCHIATRY PROGRESS NOTE - NSBHMETABOLIC_PSY_ALL_CORE_FT
BMI: BMI (kg/m2): 40.8 (03-28-23 @ 19:10)  HbA1c: A1C with Estimated Average Glucose Result: 5.7 % (04-14-23 @ 07:32)    Glucose:   BP: 124/81 (04-17-23 @ 09:35) (124/81 - 144/52)  Lipid Panel: Date/Time: 04-14-23 @ 07:32  Cholesterol, Serum: 163  Direct LDL: --  HDL Cholesterol, Serum: 36  Total Cholesterol/HDL Ration Measurement: --  Triglycerides, Serum: 200

## 2023-04-17 NOTE — BH INPATIENT PSYCHIATRY PROGRESS NOTE - NSBHFUPINTERVALHXFT_PSY_A_CORE
No acute interval events. Pt continues to complain about anxiety in the morning and relates concerns that her medication regimen is being pared down too much and too quickly. Pt reported having two episodes of copious diarrhea associated with loss of appetite and generalized weakness over the past two days; medicine service was consulted for this reason. Recommendations appreciated. Cross-taper between venlafaxine and escitalopram is in progress. Pt continues to perseverate on Klonopin being a more effective benzodiazepine than Ativan and her wishes to restart Klonopin. Over the weekend, the pt's Seroquel dose was decreased from 100 mg to 75 mg due to concerns that this may be contributing to her diarrhea but given that this is unlikely the case; plan is to increase the Seroquel dose back to 100 mg nightly for augmentation to treat anxiety.

## 2023-04-17 NOTE — BH INPATIENT PSYCHIATRY PROGRESS NOTE - NSBHCHARTREVIEWVS_PSY_A_CORE FT
Vital Signs Last 24 Hrs  T(C): 36.7 (04-17-23 @ 09:35), Max: 36.8 (04-16-23 @ 16:26)  T(F): 98.1 (04-17-23 @ 09:35), Max: 98.2 (04-16-23 @ 16:26)  HR: 76 (04-17-23 @ 09:35) (73 - 76)  BP: 124/81 (04-17-23 @ 09:35) (124/81 - 137/71)  BP(mean): --  RR: 18 (04-17-23 @ 09:35) (18 - 18)  SpO2: 96% (04-17-23 @ 09:35) (93% - 96%)

## 2023-04-17 NOTE — CONSULT NOTE ADULT - ASSESSMENT
Patient is a 71 yo female, , retired (), non-caregiver, domiciled at home with her , with a prior psychiatric diagnosis of ANSON and MDD, 4 previous inpatient psychiatric hospitalizations at [facility], most recently 7/5/2021-7/3/2021, prior SA in 11/2019 by overdosing on pills, no history of NSSIB, no history of violence/aggression, no hx of legal issues, daily marijuana use, PMHx of HTN, hyperlipidemia, [facility] (as below, with last visit 3/15/2023 and at that time documented to be at baseline with chronic anxiety, complaints of nausea, seen twice monthly with mediation mgt and psychotherapy)  BIB spouse for "worsening anxiety."  Medicine consulted for nausea, diarrhea, and decreased appetite for 2 days.    #Diarrhea  #Nausea  Patient reports nausea and one episode of vomiting since Saturday (2 days ago) when her Seroquel was increased to 100 mg and her other psych medications were adjusted or stopped. She then had one episode of loose, large diarrhea this morning, and was not able to tolerate much food or drink throughout the day. She has not had another episode of diarrhea since. She reports continued nausea - reports she has chronic nausea in the past and was given Reglan. Denies abdominal pain.   - Diarrhea and nausea may be 2/2 psychiatric medication adjustments (Seroquel increase, stopping other medications). Will defer psychiatric medication management to primary team   - As Reglan has helped patient before, can discontinue Zofran and instead give Reglan 10 mg PO BID PRN for nausea or vomiting   - Please obtain an EKG to monitor QTC while receiving Zofran/Reglan in addition to psych meds   - Recommend stopping Maalox as it can cause diarrhea  - Can give Simethicone (Gas-X) PRN for indigestion/nausea instead of Maalox   - If patient has another episode of diarrhea, please obtain GI PCR and C.diff testing     Rest of care per primary team    Seen and discussed with attending Dr. Reno.  Note not final until attested by attending.

## 2023-04-17 NOTE — CONSULT NOTE ADULT - SUBJECTIVE AND OBJECTIVE BOX
INTERNAL MEDICINE INITIAL CONSULT NOTE    HPI:  Patient is a 73 yo female, , retired (), non-caregiver, domiciled at home with her , with a prior psychiatric diagnosis of ANSON and MDD, 4 previous inpatient psychiatric hospitalizations at [facility], most recently 7/5/2021-7/3/2021, prior SA in 11/2019 by overdosing on pills, no history of NSSIB, no history of violence/aggression, no hx of legal issues, daily marijuana use, PMHx of HTN, hyperlipidemia, treated in outpatient geropsych clinic by Dr. Stovall (as below, with last visit 3/15/2023 and at that time documented to be at baseline with chronic anxiety, complaints of nausea, seen twice monthly with mediation mgt and psychotherapy)  BIB spouse for "worsening anxiety." Pt currently denies passive and active SI, intent and plan. She also denies symptoms of psychosis and sadia. The plan while she is hospitalized on the inpatient unit includes medication optimization.    Medicine consulted for diarrhea, nausea and decreased appetite. She reports this has been going on for 2 days when her Seroquel was increased to 100 mg and her other medications were stopped. After that, she felt nauseous and vomited once. This morning, she reports a very large and loose bowel movement. She did not eat breakfast or lunch but was able to tolerate a donut and some juice. She has not had diarrhea since, but reports feeling nauseous still. Patient says she has had nausea for a long time - in the past, her doctors have given her Reglan with benefit. She reports seeing a GI doctor and has an endoscopy planned for May and another study "to evaluate swallowing." Denies abdominal pain. Rest of ROS negative a this time.      PAST MEDICAL & SURGICAL HISTORY:  Anxiety      High cholesterol      HTN (hypertension)      Endometriosis      GERD (gastroesophageal reflux disease)      History of bilateral breast reduction surgery            Review of Systems:   Constitutional, eyes, ENT, cardiovascular, respiratory, gastrointestinal, genitourinary, integumentary, neurological, psychiatric and heme/lymph are otherwise negative other than noted above       ANTIBIOTICS:  MEDICATIONS  (STANDING):  atorvastatin 10 milliGRAM(s) Oral at bedtime  escitalopram 10 milliGRAM(s) Oral daily  hydrochlorothiazide 12.5 milliGRAM(s) Oral daily  LORazepam     Tablet 1 milliGRAM(s) Oral <User Schedule>  losartan 100 milliGRAM(s) Oral daily  metoprolol tartrate 50 milliGRAM(s) Oral two times a day  pantoprazole    Tablet 40 milliGRAM(s) Oral before breakfast  polyethylene glycol 3350 17 Gram(s) Oral daily  QUEtiapine 75 milliGRAM(s) Oral at bedtime    MEDICATIONS  (PRN):  aluminum hydroxide/magnesium hydroxide/simethicone Suspension 30 milliLiter(s) Oral every 4 hours PRN Dyspepsia  hydrOXYzine hydrochloride 25 milliGRAM(s) Oral three times a day PRN anxiety  mirtazapine 15 milliGRAM(s) Oral at bedtime PRN insomnia  ondansetron   Disintegrating Tablet 8 milliGRAM(s) Oral every 8 hours PRN nausea      Allergies    sulfa drugs (Anaphylaxis)  penicillins (Anaphylaxis)    Intolerances        SOCIAL HISTORY:    FAMILY HISTORY:  No pertinent family history in first degree relatives     no FH leading to current infection    Vital Signs Last 24 Hrs  T(C): 36.7 (17 Apr 2023 09:35), Max: 36.7 (16 Apr 2023 20:23)  T(F): 98.1 (17 Apr 2023 09:35), Max: 98.1 (16 Apr 2023 20:23)  HR: 76 (17 Apr 2023 09:35) (73 - 76)  BP: 124/81 (17 Apr 2023 09:35) (124/81 - 135/68)  BP(mean): --  RR: 18 (17 Apr 2023 09:35) (18 - 18)  SpO2: 96% (17 Apr 2023 09:35) (95% - 96%)    Parameters below as of 17 Apr 2023 09:35  Patient On (Oxygen Delivery Method): room air    PHYSICAL EXAM:  Constitutional: resting in bed, in NAD.  Eyes: the sclera and conjunctiva were normal.   ENT: the ears and nose were normal in appearance.   Neck: supple  Pulmonary: no respiratory distress and lungs were clear to auscultation bilaterally.   Heart: heart rate was normal and rhythm regular, normal S1 and S2  Abdomen: obese, soft, NTND. Mild discomfort on deep palpation in the lower left. No rebound or guarding.   Neurological: no focal deficits.     LABS:                MICROBIOLOGY:    RADIOLOGY & ADDITIONAL STUDIES:

## 2023-04-17 NOTE — BH INPATIENT PSYCHIATRY PROGRESS NOTE - CURRENT MEDICATION
MEDICATIONS  (STANDING):  atorvastatin 10 milliGRAM(s) Oral at bedtime  escitalopram 10 milliGRAM(s) Oral daily  hydrochlorothiazide 12.5 milliGRAM(s) Oral daily  LORazepam     Tablet 1 milliGRAM(s) Oral <User Schedule>  losartan 100 milliGRAM(s) Oral daily  metoprolol tartrate 50 milliGRAM(s) Oral two times a day  pantoprazole    Tablet 40 milliGRAM(s) Oral before breakfast  polyethylene glycol 3350 17 Gram(s) Oral daily  QUEtiapine 75 milliGRAM(s) Oral at bedtime  venlafaxine 37.5 milliGRAM(s) Oral daily    MEDICATIONS  (PRN):  aluminum hydroxide/magnesium hydroxide/simethicone Suspension 30 milliLiter(s) Oral every 4 hours PRN Dyspepsia  hydrOXYzine hydrochloride 25 milliGRAM(s) Oral three times a day PRN anxiety  mirtazapine 15 milliGRAM(s) Oral at bedtime PRN insomnia  ondansetron   Disintegrating Tablet 8 milliGRAM(s) Oral every 8 hours PRN nausea

## 2023-04-17 NOTE — BH INPATIENT PSYCHIATRY PROGRESS NOTE - NSBHMETABOLIC_PSY_ALL_CORE_FT
BMI: BMI (kg/m2): 40.8 (03-28-23 @ 19:10)  HbA1c: A1C with Estimated Average Glucose Result: 5.7 % (04-14-23 @ 07:32)    Glucose:   BP: 119/77 (04-17-23 @ 17:02) (119/77 - 144/52)  Lipid Panel: Date/Time: 04-14-23 @ 07:32  Cholesterol, Serum: 163  Direct LDL: --  HDL Cholesterol, Serum: 36  Total Cholesterol/HDL Ration Measurement: --  Triglycerides, Serum: 200   BMI: BMI (kg/m2): 40.8 (03-28-23 @ 19:10)  HbA1c: A1C with Estimated Average Glucose Result: 5.7 % (04-14-23 @ 07:32)    Glucose:   BP: 143/84 (04-17-23 @ 20:11) (119/77 - 143/84)  Lipid Panel: Date/Time: 04-14-23 @ 07:32  Cholesterol, Serum: 163  Direct LDL: --  HDL Cholesterol, Serum: 36  Total Cholesterol/HDL Ration Measurement: --  Triglycerides, Serum: 200

## 2023-04-17 NOTE — BH INPATIENT PSYCHIATRY PROGRESS NOTE - NSBHASSESSSUMMFT_PSY_ALL_CORE
Patient is a 73 yo female, , retired (), non-caregiver, domiciled at home with her , with a prior psychiatric diagnosis of ANSON and MDD, 4 previous inpatient psychiatric hospitalizations at [facility], most recently 7/5/2021-7/3/2021, prior SA in 11/2019 by overdosing on pills, no history of NSSIB, no history of violence/aggression, no hx of legal issues, daily marijuana use, PMHx of HTN, hyperlipidemia, [facility] (as below, with last visit 3/15/2023 and at that time documented to be at baseline with chronic anxiety, complaints of nausea, seen twice monthly with medication mgt and psychotherapy)  BIB spouse for "worsening anxiety." Pt currently denies passive and active SI, intent and plan. She also denies symptoms of psychosis and sadia. The plan while she is hospitalized on the inpatient unit includes medication optimization.    #Depression:  - Venlafaxine ER 37.5 mg daily  - Escitalopram 10 mg daily    #Anxiety:  - Ativan 1 mg twice daily (7 AM and 3 PM)  - Seroquel 50 mg qhs (also for insomnia)    #Nausea/Diarrhea:  - Reglan 10 mg twice daily PRN  - Simethicone daily PRN     #Insomnia:  - Seroquel 100 mg qhs  - Mirtazapine 15 mg nightly PRN    #Health maintenance:  - Losartan 100 mg daily  - Hydrochlorothiazide 12.5 mg daily  - Metoprolol tartrate 50 mg twice daily   - Famotidine 20 mg daily    04/13: Not endorsing  suicidal or homicidal ideation intent or plans; no mention of auditory or visual hallucinations but becomes panicky at any mention of leaving the hospital for aftercare;  wants to switch back to Klonopin saying it works better but actually patient has been free of intense anxiety (panic ) for a day or so; c/o drowsiness with Seroquel and Remeron.  Suggest not to use Remeron unless has severe insomnia.   Raising Seroquel to 100mg at night for mood ; Effexor lowered to 37.5mg as contributing to anxiety; will attempt to transition to after care.   Should patient express SI would consider ECT for TRD but not thought likely at this time.  Would like to coordinate with aftercare provider. consider IOP.    04/14: Pt reports improvement in her mood and anxiety and is less anxious and intrusive throughout the day. Pt appears to be less anxious of the thought of leaving the hospital.

## 2023-04-17 NOTE — BH INPATIENT PSYCHIATRY PROGRESS NOTE - PRN MEDS
MEDICATIONS  (PRN):  hydrOXYzine hydrochloride 25 milliGRAM(s) Oral three times a day PRN anxiety  mirtazapine 15 milliGRAM(s) Oral at bedtime PRN insomnia  ondansetron   Disintegrating Tablet 8 milliGRAM(s) Oral every 8 hours PRN nausea  simethicone 80 milliGRAM(s) Chew daily PRN Indigestion/Nausea   MEDICATIONS  (PRN):  hydrOXYzine hydrochloride 25 milliGRAM(s) Oral three times a day PRN anxiety  metoclopramide 10 milliGRAM(s) Oral two times a day PRN Nausea/vomiting  mirtazapine 15 milliGRAM(s) Oral at bedtime PRN insomnia  ondansetron   Disintegrating Tablet 8 milliGRAM(s) Oral every 8 hours PRN nausea  simethicone 80 milliGRAM(s) Chew daily PRN Indigestion/Nausea   MEDICATIONS  (PRN):  diphenhydrAMINE 50 milliGRAM(s) Oral once PRN EPS (Extrapyramidal symptoms)  hydrOXYzine hydrochloride 25 milliGRAM(s) Oral three times a day PRN anxiety  metoclopramide 10 milliGRAM(s) Oral two times a day PRN Nausea/vomiting  mirtazapine 15 milliGRAM(s) Oral at bedtime PRN insomnia  ondansetron   Disintegrating Tablet 8 milliGRAM(s) Oral every 8 hours PRN nausea  simethicone 80 milliGRAM(s) Chew daily PRN Indigestion/Nausea

## 2023-04-17 NOTE — BH INPATIENT PSYCHIATRY PROGRESS NOTE - NSBHCHARTREVIEWVS_PSY_A_CORE FT
Vital Signs Last 24 Hrs  T(C): 37.1 (04-17-23 @ 17:02), Max: 37.1 (04-17-23 @ 17:02)  T(F): 98.8 (04-17-23 @ 17:02), Max: 98.8 (04-17-23 @ 17:02)  HR: 74 (04-17-23 @ 17:02) (73 - 76)  BP: 119/77 (04-17-23 @ 17:02) (119/77 - 135/68)  BP(mean): --  RR: 17 (04-17-23 @ 17:02) (17 - 18)  SpO2: 96% (04-17-23 @ 17:02) (95% - 96%)     Vital Signs Last 24 Hrs  T(C): 37.1 (04-17-23 @ 17:02), Max: 37.1 (04-17-23 @ 17:02)  T(F): 98.8 (04-17-23 @ 17:02), Max: 98.8 (04-17-23 @ 17:02)  HR: 82 (04-17-23 @ 20:11) (74 - 82)  BP: 143/84 (04-17-23 @ 20:11) (119/77 - 143/84)  BP(mean): --  RR: 17 (04-17-23 @ 20:11) (17 - 18)  SpO2: 95% (04-17-23 @ 20:11) (95% - 96%)

## 2023-04-17 NOTE — BH INPATIENT PSYCHIATRY PROGRESS NOTE - NSBHFUPINTERVALCCFT_PSY_A_CORE
Admitted for severe anxiety; at time of admission, suicide risk was MODERATE because of chronic and worsening anxiety and preoccupation with death of parents years ago; currently risk is LOW to MODERATE attenuated by the therapeutic milieu and a variety of medications adjustments to reduce somatic distress and anxiety and worse sleep.   "I don't know if the medications are working for me."    Admitted for severe anxiety; at time of admission, suicide risk was MODERATE because of chronic and worsening anxiety and preoccupation with death of parents years ago; currently risk is LOW to MODERATE attenuated by the therapeutic milieu and a variety of medications adjustments to reduce somatic distress and anxiety and worse sleep.

## 2023-04-17 NOTE — BH INPATIENT PSYCHIATRY PROGRESS NOTE - CURRENT MEDICATION
MEDICATIONS  (STANDING):  atorvastatin 10 milliGRAM(s) Oral at bedtime  escitalopram 10 milliGRAM(s) Oral daily  hydrochlorothiazide 12.5 milliGRAM(s) Oral daily  LORazepam     Tablet 1 milliGRAM(s) Oral <User Schedule>  losartan 100 milliGRAM(s) Oral daily  metoprolol tartrate 50 milliGRAM(s) Oral two times a day  pantoprazole    Tablet 40 milliGRAM(s) Oral before breakfast  polyethylene glycol 3350 17 Gram(s) Oral daily  QUEtiapine 75 milliGRAM(s) Oral at bedtime    MEDICATIONS  (PRN):  hydrOXYzine hydrochloride 25 milliGRAM(s) Oral three times a day PRN anxiety  mirtazapine 15 milliGRAM(s) Oral at bedtime PRN insomnia  ondansetron   Disintegrating Tablet 8 milliGRAM(s) Oral every 8 hours PRN nausea  simethicone 80 milliGRAM(s) Chew daily PRN Indigestion/Nausea   MEDICATIONS  (STANDING):  atorvastatin 10 milliGRAM(s) Oral at bedtime  escitalopram 10 milliGRAM(s) Oral daily  hydrochlorothiazide 12.5 milliGRAM(s) Oral daily  LORazepam     Tablet 1 milliGRAM(s) Oral <User Schedule>  losartan 100 milliGRAM(s) Oral daily  metoprolol tartrate 50 milliGRAM(s) Oral two times a day  pantoprazole    Tablet 40 milliGRAM(s) Oral before breakfast  polyethylene glycol 3350 17 Gram(s) Oral daily  QUEtiapine 75 milliGRAM(s) Oral at bedtime    MEDICATIONS  (PRN):  hydrOXYzine hydrochloride 25 milliGRAM(s) Oral three times a day PRN anxiety  metoclopramide 10 milliGRAM(s) Oral two times a day PRN Nausea/vomiting  mirtazapine 15 milliGRAM(s) Oral at bedtime PRN insomnia  ondansetron   Disintegrating Tablet 8 milliGRAM(s) Oral every 8 hours PRN nausea  simethicone 80 milliGRAM(s) Chew daily PRN Indigestion/Nausea   MEDICATIONS  (STANDING):  atorvastatin 10 milliGRAM(s) Oral at bedtime  escitalopram 10 milliGRAM(s) Oral daily  hydrochlorothiazide 12.5 milliGRAM(s) Oral daily  LORazepam     Tablet 1 milliGRAM(s) Oral <User Schedule>  losartan 100 milliGRAM(s) Oral daily  metoprolol tartrate 50 milliGRAM(s) Oral two times a day  pantoprazole    Tablet 40 milliGRAM(s) Oral before breakfast  polyethylene glycol 3350 17 Gram(s) Oral daily  QUEtiapine 75 milliGRAM(s) Oral at bedtime    MEDICATIONS  (PRN):  diphenhydrAMINE 50 milliGRAM(s) Oral once PRN EPS (Extrapyramidal symptoms)  hydrOXYzine hydrochloride 25 milliGRAM(s) Oral three times a day PRN anxiety  metoclopramide 10 milliGRAM(s) Oral two times a day PRN Nausea/vomiting  mirtazapine 15 milliGRAM(s) Oral at bedtime PRN insomnia  ondansetron   Disintegrating Tablet 8 milliGRAM(s) Oral every 8 hours PRN nausea  simethicone 80 milliGRAM(s) Chew daily PRN Indigestion/Nausea

## 2023-04-18 PROCEDURE — 99232 SBSQ HOSP IP/OBS MODERATE 35: CPT

## 2023-04-18 RX ADMIN — ESCITALOPRAM OXALATE 10 MILLIGRAM(S): 10 TABLET, FILM COATED ORAL at 10:37

## 2023-04-18 RX ADMIN — ONDANSETRON 8 MILLIGRAM(S): 8 TABLET, FILM COATED ORAL at 10:36

## 2023-04-18 RX ADMIN — LOSARTAN POTASSIUM 100 MILLIGRAM(S): 100 TABLET, FILM COATED ORAL at 10:36

## 2023-04-18 RX ADMIN — Medication 25 MILLIGRAM(S): at 11:46

## 2023-04-18 RX ADMIN — Medication 50 MILLIGRAM(S): at 21:39

## 2023-04-18 RX ADMIN — Medication 25 MILLIGRAM(S): at 18:01

## 2023-04-18 RX ADMIN — Medication 10 MILLIGRAM(S): at 18:02

## 2023-04-18 RX ADMIN — ATORVASTATIN CALCIUM 10 MILLIGRAM(S): 80 TABLET, FILM COATED ORAL at 21:39

## 2023-04-18 RX ADMIN — Medication 10 MILLIGRAM(S): at 11:42

## 2023-04-18 RX ADMIN — Medication 50 MILLIGRAM(S): at 10:36

## 2023-04-18 RX ADMIN — QUETIAPINE FUMARATE 100 MILLIGRAM(S): 200 TABLET, FILM COATED ORAL at 21:39

## 2023-04-18 RX ADMIN — Medication 1 MILLIGRAM(S): at 15:18

## 2023-04-18 NOTE — BH INPATIENT PSYCHIATRY PROGRESS NOTE - NSTXSUICIDDATEEST_PSY_ALL_CORE
29-Mar-2023
11-Apr-2023
04-Apr-2023
11-Apr-2023
04-Apr-2023
29-Mar-2023
04-Apr-2023
11-Apr-2023
04-Apr-2023
29-Mar-2023
11-Apr-2023
11-Apr-2023
Patient with T of 95.6 and lethargy. Also accompanied by constipation and urinary retention. DDx includes sepsis v. hypothyroidism.   - Check BCx, UCx, c/w abx for now until culture negative  - Bear huzev PRN  - Check Free T3, T4 given elevated TSH
11-Apr-2023
29-Mar-2023
04-Apr-2023
29-Mar-2023

## 2023-04-18 NOTE — BH INPATIENT PSYCHIATRY PROGRESS NOTE - CURRENT MEDICATION
MEDICATIONS  (STANDING):  atorvastatin 10 milliGRAM(s) Oral at bedtime  escitalopram 10 milliGRAM(s) Oral daily  hydrochlorothiazide 12.5 milliGRAM(s) Oral daily  LORazepam     Tablet 1 milliGRAM(s) Oral <User Schedule>  losartan 100 milliGRAM(s) Oral daily  metoprolol tartrate 50 milliGRAM(s) Oral two times a day  pantoprazole    Tablet 40 milliGRAM(s) Oral before breakfast  polyethylene glycol 3350 17 Gram(s) Oral daily  QUEtiapine 100 milliGRAM(s) Oral at bedtime    MEDICATIONS  (PRN):  diphenhydrAMINE 50 milliGRAM(s) Oral once PRN EPS (Extrapyramidal symptoms)  hydrOXYzine hydrochloride 25 milliGRAM(s) Oral three times a day PRN anxiety  metoclopramide 10 milliGRAM(s) Oral two times a day PRN Nausea/vomiting  mirtazapine 15 milliGRAM(s) Oral at bedtime PRN insomnia  ondansetron   Disintegrating Tablet 8 milliGRAM(s) Oral every 8 hours PRN nausea  simethicone 80 milliGRAM(s) Chew daily PRN Indigestion/Nausea

## 2023-04-18 NOTE — BH DISCHARGE NOTE NURSING/SOCIAL WORK/PSYCH REHAB - NSDCADDINFO1FT_PSY_ALL_CORE
You are scheduled to meet VIRTUALLY with your psychiatrist, Dr. Kamara, on MONDAY, April 24, 2023 at 3:30PM. If you have any follow-up questions, please call the clinic at: [phone].

## 2023-04-18 NOTE — BH INPATIENT PSYCHIATRY PROGRESS NOTE - NSBHMSEINTELL_PSY_A_CORE
Above average

## 2023-04-18 NOTE — BH INPATIENT PSYCHIATRY PROGRESS NOTE - NSTXDEPRESGOAL_PSY_ALL_CORE
Will identify 2 coping skills that assist in improving mood

## 2023-04-18 NOTE — BH INPATIENT PSYCHIATRY PROGRESS NOTE - NSTXDEPRESDATETRGT_PSY_ALL_CORE
12-Apr-2023
05-Apr-2023
05-Apr-2023
18-Apr-2023
05-Apr-2023
18-Apr-2023
18-Apr-2023
05-Apr-2023
18-Apr-2023
12-Apr-2023
05-Apr-2023
05-Apr-2023
12-Apr-2023
05-Apr-2023

## 2023-04-18 NOTE — BH TREATMENT PLAN - NSTXSUICIDINTERRN_PSY_ALL_CORE
Recognize past and present achievements, successes and personal strengths.   Empower participation in planning care and activities, as well as development of attainable goals, to increase self-esteem and feelings of control.
Assess and monitor risk for suicide, including but not limited to thoughts and ideation as patient may not disclose voluntarily; consider need for safety measures.
Assess and monitor risk for suicide, including but not limited to thoughts and ideation as patient may not disclose voluntarily; consider need for safety measures.
Recognize past and present achievements, successes and personal strengths.   Empower participation in planning care and activities, as well as development of attainable goals, to increase self-esteem and feelings of control.
encourage patient to verbalize feelings and associated with suicide ideation

## 2023-04-18 NOTE — BH INPATIENT PSYCHIATRY PROGRESS NOTE - MSE OPTIONS
Structured MSE

## 2023-04-18 NOTE — BH TREATMENT PLAN - NSTXANXINTERMD_PSY_ALL_CORE
Cross from Effexor to Lexapro with Seroquel as augmentation; psychopharmacology 15 minutes a day 
Pharmacologically: will work on optimizing the pt's regimen to effectively reduce anxiety. We've reduced mirtazapine to 15 mg nightly and will possibly work on reducing the pt's clonazepam dose.
Pharmacologically: will work on optimizing the pt's regimen to effectively reduce anxiety. We've reduced mirtazapine to 15 mg nightly and will possibly work on reducing the pt's clonazepam dose. Concern about effexor. 
Cross from Effexor to Lexapro with Seroquel as augmentation; psychopharmacology 15 minutes a day 
Cross from Effexor to Lexapro with Seroquel as augmentation; psychopharmacology 15 minutes a day

## 2023-04-18 NOTE — BH TREATMENT PLAN - NSTXANXINTERSW_PSY_ALL_CORE
Discharge planning.  to liaison with collateral contacts & family as needed.

## 2023-04-18 NOTE — BH INPATIENT PSYCHIATRY PROGRESS NOTE - NSICDXBHSECONDARYDX_PSY_ALL_CORE
Recurrent major depression   F33.9  
Anxiety disorder   F41.9  
Recurrent major depression   F33.9  
Anxiety disorder   F41.9  
Anxiety disorder   F41.9  
Recurrent major depression   F33.9  
Anxiety disorder   F41.9  
Recurrent major depression   F33.9  
Anxiety disorder   F41.9  
Anxiety disorder   F41.9  
Recurrent major depression   F33.9  
Anxiety disorder   F41.9

## 2023-04-18 NOTE — BH TREATMENT PLAN - ANXIETY/PANIC/FEAR NURSING INTERVENTIONS/RECOMMENDATIONS
ambulatory
encourage patient to continue to attend and participate in group therapy despite lingering feelings of anxiety.
Support expression and identification of feelings and worries; compassionately acknowledge and validate concerns. Utilize existing coping strategies and assist in developing new strategies (e.g., music, deep breathing, relaxation techniques, massage, meditation).   Identify thoughts and feelings that led to current anxiety onset to enhance understanding of triggers.
encourage patient to continue to attend and participate in group therapy despite lingering feelings of anxiety.
encourage patient to continue to attend and participate in group therapy despite lingering feelings of anxiety.
Help patient identify triggers or situations that results in anxious behavior. Encourage patient to verbalize feelings. Note cultural factors that may influence anxiety. Observe behaviors that can point to the client's level of anxiety. Assist the client in developing self-awareness of verbal and nonverbal behaviors. Encourage use of relaxation techniques to decrease anxiety. Request medication as needed if symptoms are unresloved.

## 2023-04-18 NOTE — BH INPATIENT PSYCHIATRY PROGRESS NOTE - NSCGISEVERILLNESS_PSY_ALL_CORE
4 = Moderately ill – overt symptoms causing noticeable, but modest, functional impairment or distress; symptom level may warrant medication
4 = Moderately ill – overt symptoms causing noticeable, but modest, functional impairment or distress; symptom level may warrant medication
5 = Markedly ill - intrusive symptoms that distinctly impair social/occupational function or cause intrusive levels of distress
4 = Moderately ill – overt symptoms causing noticeable, but modest, functional impairment or distress; symptom level may warrant medication
5 = Markedly ill - intrusive symptoms that distinctly impair social/occupational function or cause intrusive levels of distress
4 = Moderately ill – overt symptoms causing noticeable, but modest, functional impairment or distress; symptom level may warrant medication
5 = Markedly ill - intrusive symptoms that distinctly impair social/occupational function or cause intrusive levels of distress

## 2023-04-18 NOTE — BH TREATMENT PLAN - NSTXSUICIDINTERMD_PSY_ALL_CORE
Pharmacologically: will work on optimizing the pt's regimen to effectively target pt's depressive symptoms and thereby his suicidality. Recommend she goes to group therapy
Cross from Effexor to Lexapro with Seroquel as augmentation; psychopharmacology 15 minutes a day 
Cross from Effexor to Lexapro with Seroquel as augmentation; psychopharmacology 15 minutes a day 
Pharmacologically: will work on optimizing the pt's regimen to effectively target pt's depressive symptoms and thereby his suicidality. 
Cross from Effexor to Lexapro with Seroquel as augmentation; psychopharmacology 15 minutes a day

## 2023-04-18 NOTE — BH INPATIENT PSYCHIATRY PROGRESS NOTE - NSTXANXDATEEST_PSY_ALL_CORE
17-Apr-2023
10-Apr-2023
03-Apr-2023
10-Apr-2023
03-Apr-2023
10-Apr-2023
03-Apr-2023
03-Apr-2023
10-Apr-2023
29-Mar-2023
03-Apr-2023
10-Apr-2023
29-Mar-2023
03-Apr-2023
29-Mar-2023
10-Apr-2023

## 2023-04-18 NOTE — BH DISCHARGE NOTE NURSING/SOCIAL WORK/PSYCH REHAB - NSDCPRGOAL_PSY_ALL_CORE
Pt attended select groups during admission.  Pt has demonstrated moderate range of affect, appears flat at times, but is often brighter when engaged with peers.  Pt has had an irritable edge with staff and peers at times and has frequently been medication-seeking.

## 2023-04-18 NOTE — BH TREATMENT PLAN - NSDCCRITERIA_PSY_ALL_CORE
Improvement in mood, demonstrates an ability to safely care for her needs, engaged w/ treatment team
Improvement in mood, demonstrates an ability to safely care for her needs, engaged w/ treatment team. 
Improvement in mood, demonstrates an ability to safely care for her needs, engaged w/ treatment team. 
Improvement in mood, demonstrates an ability to safely care for her needs, engaged w/ treatment team

## 2023-04-18 NOTE — BH INPATIENT PSYCHIATRY PROGRESS NOTE - NSTXDEPRESDATENEW_PSY_ALL_CORE
12-Apr-2023

## 2023-04-18 NOTE — BH TREATMENT PLAN - NSTXANXGOALOTHER_PSY_ALL_CORE
Pt will participate in groups and milieu treatment structure of the unit
Patient will stabilize mood & alleviate sx of anxiety to engage with discharge planning.
Pt will participate in groups and milieu treatment structure of the unit
Pt. will participate in groups and milieu structure of the unit.
Pt will participate in groups and milieu treatment structure of the unit

## 2023-04-18 NOTE — BH INPATIENT PSYCHIATRY PROGRESS NOTE - NSBHMSETHTCONTENT_PSY_A_CORE
Unremarkable

## 2023-04-18 NOTE — BH INPATIENT PSYCHIATRY PROGRESS NOTE - NSBHCHARTREVIEWVS_PSY_A_CORE FT
Vital Signs Last 24 Hrs  T(C): 37.1 (04-18-23 @ 18:00), Max: 37.1 (04-18-23 @ 18:00)  T(F): 98.7 (04-18-23 @ 18:00), Max: 98.7 (04-18-23 @ 18:00)  HR: 78 (04-18-23 @ 18:00) (78 - 82)  BP: 141/78 (04-18-23 @ 18:00) (141/78 - 148/83)  BP(mean): --  RR: 17 (04-18-23 @ 18:00) (17 - 18)  SpO2: 96% (04-18-23 @ 18:00) (95% - 96%)     Vital Signs Last 24 Hrs  T(C): 37.1 (04-18-23 @ 18:00), Max: 37.1 (04-18-23 @ 18:00)  T(F): 98.7 (04-18-23 @ 18:00), Max: 98.7 (04-18-23 @ 18:00)  HR: 78 (04-18-23 @ 20:02) (78 - 80)  BP: 121/81 (04-18-23 @ 20:02) (121/81 - 148/83)  BP(mean): --  RR: 17 (04-18-23 @ 20:02) (17 - 18)  SpO2: 96% (04-18-23 @ 20:02) (96% - 96%)     Vital Signs Last 24 Hrs  T(C): 36.7 (04-19-23 @ 09:30), Max: 37.1 (04-18-23 @ 18:00)  T(F): 98 (04-19-23 @ 09:30), Max: 98.7 (04-18-23 @ 18:00)  HR: 83 (04-19-23 @ 09:30) (78 - 83)  BP: 141/84 (04-19-23 @ 09:30) (121/81 - 141/84)  BP(mean): --  RR: 18 (04-19-23 @ 09:30) (17 - 18)  SpO2: 96% (04-19-23 @ 09:30) (96% - 96%)

## 2023-04-18 NOTE — BH INPATIENT PSYCHIATRY PROGRESS NOTE - NSBHATTESTTYPEVISIT_PSY_A_CORE
Attending with Resident/Fellow/Student
On-site Attending supervising GIFTY (99XXX codes)
On-site Attending supervising GIFTY (99XXX codes)
Attending with Resident/Fellow/Student
Attending with Resident/Fellow/Student
Attending Only
Attending with Resident/Fellow/Student
Attending with Resident/Fellow/Student
Attending Only
Attending with Resident/Fellow/Student
Attending with Resident/Fellow/Student
Resident/Fellow with telephonic supervision
Attending with Resident/Fellow/Student

## 2023-04-18 NOTE — BH DISCHARGE NOTE NURSING/SOCIAL WORK/PSYCH REHAB - NSCDUDCCRISIS_PSY_A_CORE
[facility]  1 (751) [facility] ([phone])  Text "WELL" to 95912  Website: www.[employer]/.National Suicide Prevention Lifeline 4 (462) 621-3703/.  [facility]  1 (356) LIFEN[facility] (963-9957)/.  [facility]  [address] [address] [phone]   Hours:  Monday through Friday from 9 AM to 3 PM/988 Suicide and Crisis Lifeline

## 2023-04-18 NOTE — BH DISCHARGE NOTE NURSING/SOCIAL WORK/PSYCH REHAB - PATIENT PORTAL LINK FT
You can access the FollowMyHealth Patient Portal offered by [facility] by registering at the following website: [email or web address]. By joining [facility]’s FollowMyHealth portal, you will also be able to view your health information using other applications (apps) compatible with our system.

## 2023-04-18 NOTE — BH TREATMENT PLAN - NSTXPATIENTPARTICIPATE_PSY_ALL_CORE
Patient participated in identification of needs/problems/goals for treatment
Patient participated in identification of needs/problems/goals for treatment/Patient participated in defining interventions
Patient participated in identification of needs/problems/goals for treatment

## 2023-04-18 NOTE — BH TREATMENT PLAN - NSBHPRIMARYDX_PSY_ALL_CORE
Anxiety disorder    
Recurrent major depression    

## 2023-04-18 NOTE — PROGRESS NOTE ADULT - ATTENDING COMMENTS
Patient is a 71 yo female, , retired (), non-caregiver, domiciled at home with her , with a prior psychiatric diagnosis of ANSON and MDD, 4 previous inpatient psychiatric hospitalizations  most recently 7/5/2021-7/3/2021, prior SA in 11/2019 by overdosing on pills, no history of NSSIB, no history of violence/aggression, no hx of legal issues, daily marijuana use, PMHx of HTN, hyperlipidemia, treated in outpatient geropsych clinic by Dr. Stovall (as below, with last visit 3/15/2023 and at that time documented to be at baseline with chronic anxiety, complaints of nausea, seen twice monthly with mediation mgt and psychotherapy)  BIB spouse for "worsening anxiety."  Medicine consulted for nausea, diarrhea, and decreased appetite for 2 days.    pt seen and examined with Dr. Rodriguez.  no more diarrhoea  still felt nausea.   she like to retry Reglan ( given by GI in the past )    - diarrhea x 1- ( pt on maalox ) stop maalox,   can give gas X prn,   if more diarrhea- to get GI PCR and C diff    - GERD- continue protonix daily before breakfast  - nausea- can try Reglan 10 mg po bid prn -( to check QTC while on zofran and seroquel)    thank you for allowing medicine to participate in the care. dw Dr. Ortiz.

## 2023-04-18 NOTE — BH INPATIENT PSYCHIATRY PROGRESS NOTE - NSBHFUPINTERVALCCFT_PSY_A_CORE
"I don't know if the medications are working for me."    Admitted for severe anxiety; at time of admission, suicide risk was MODERATE because of chronic and worsening anxiety and preoccupation with death of parents years ago; currently risk is LOW to MODERATE attenuated by the therapeutic milieu and a variety of medications adjustments to reduce somatic distress and anxiety and worse sleep.   "I don't know if the medications are working for me."    Admitted for severe anxiety; at time of admission, suicide risk was MODERATE because of chronic and worsening anxiety and preoccupation with death of parents years ago; currently risk is LOW to MODERATE attenuated by the therapeutic milieu and a variety of medication adjustments to reduce somatic distress and anxiety and worse sleep.

## 2023-04-18 NOTE — BH INPATIENT PSYCHIATRY PROGRESS NOTE - NSBHCHARTREVIEWINVESTIGATE_PSY_A_CORE FT
QTc ~450 ms on admission.

## 2023-04-18 NOTE — BH INPATIENT PSYCHIATRY PROGRESS NOTE - NSTXSUICIDGOAL_PSY_ALL_CORE
Other...
Other...
Talk to staff and ask for assistance when having suicidal wishes instead of acting out
Talk to staff and ask for assistance when having suicidal wishes instead of acting out
Other...
Talk to staff and ask for assistance when having suicidal wishes instead of acting out
Other...
Talk to staff and ask for assistance when having suicidal wishes instead of acting out
Other...
Talk to staff and ask for assistance when having suicidal wishes instead of acting out
Other...

## 2023-04-18 NOTE — BH INPATIENT PSYCHIATRY PROGRESS NOTE - NSTXDEPRESINTERMD_PSY_ALL_CORE
Cross from Effexor to Lexapro with Seroquel as augmentation; psychopharmacology 15 minutes a day 
Pharmacologically: will work on optimizing the pt's regimen to effectively target pt's depressive symptoms. 
Cross from Effexor to Lexapro with Seroquel as augmentation; psychopharmacology 15 minutes a day 
Pharmacologically: will work on optimizing the pt's regimen to effectively target pt's depressive symptoms. 
Cross from Effexor to Lexapro with Seroquel as augmentation; psychopharmacology 15 minutes a day 
Pharmacologically: will work on optimizing the pt's regimen to effectively target pt's depressive symptoms. 
Cross from Effexor to Lexapro with Seroquel as augmentation; psychopharmacology 15 minutes a day 
Cross from Effexor to Lexapro with Seroquel as augmentation; psychopharmacology 15 minutes a day 
Pharmacologically: will work on optimizing the pt's regimen to effectively target pt's depressive symptoms. 
Cross from Effexor to Lexapro with Seroquel as augmentation; psychopharmacology 15 minutes a day 
Cross from Effexor to Lexapro with Seroquel as augmentation; psychopharmacology 15 minutes a day 
Pharmacologically: will work on optimizing the pt's regimen to effectively target pt's depressive symptoms. 
Pharmacologically: will work on optimizing the pt's regimen to effectively target pt's depressive symptoms. 
Cross from Effexor to Lexapro with Seroquel as augmentation; psychopharmacology 15 minutes a day 
Pharmacologically: will work on optimizing the pt's regimen to effectively target pt's depressive symptoms. 
Pharmacologically: will work on optimizing the pt's regimen to effectively target pt's depressive symptoms.

## 2023-04-18 NOTE — BH INPATIENT PSYCHIATRY PROGRESS NOTE - NSBHMSESPEECH_PSY_A_CORE
Normal volume, rate, productivity, spontaneity and articulation

## 2023-04-18 NOTE — BH INPATIENT PSYCHIATRY PROGRESS NOTE - PRN MEDS
MEDICATIONS  (PRN):  diphenhydrAMINE 50 milliGRAM(s) Oral once PRN EPS (Extrapyramidal symptoms)  hydrOXYzine hydrochloride 25 milliGRAM(s) Oral three times a day PRN anxiety  metoclopramide 10 milliGRAM(s) Oral two times a day PRN Nausea/vomiting  mirtazapine 15 milliGRAM(s) Oral at bedtime PRN insomnia  ondansetron   Disintegrating Tablet 8 milliGRAM(s) Oral every 8 hours PRN nausea  simethicone 80 milliGRAM(s) Chew daily PRN Indigestion/Nausea

## 2023-04-18 NOTE — BH INPATIENT PSYCHIATRY PROGRESS NOTE - NSICDXBHPRIMARYDX_PSY_ALL_CORE
Recurrent major depression   F33.9  
Anxiety disorder   F41.9  
Anxiety disorder   F41.9  
Recurrent major depression   F33.9  
Anxiety disorder   F41.9  
Recurrent major depression   F33.9  
Anxiety disorder   F41.9  
Recurrent major depression   F33.9  
Anxiety disorder   F41.9  
Recurrent major depression   F33.9  
Anxiety disorder   F41.9

## 2023-04-18 NOTE — BH TREATMENT PLAN - NSTXDEPRESINTERMD_PSY_ALL_CORE
Pharmacologically: will work on optimizing the pt's regimen to effectively target pt's depressive symptoms. 
Cross from Effexor to Lexapro with Seroquel as augmentation; psychopharmacology 15 minutes a day 
Cross from Effexor to Lexapro with Seroquel as augmentation; psychopharmacology 15 minutes a day 
Pharmacologically: will work on optimizing the pt's regimen to effectively target pt's depressive symptoms. 
Cross from Effexor to Lexapro with Seroquel as augmentation; psychopharmacology 15 minutes a day

## 2023-04-18 NOTE — BH TREATMENT PLAN - NSTXANXINTERPR_PSY_ALL_CORE
Pt will continue to be invited and encouraged to attend all offered groups
Pt will be invited and encouraged to attend all offered groups
Pt will continue to be invited and encouraged to attend all offered groups
Pt. will continue to be invited and encouraged to attend all offered groups.
Pt will continue to be invited and encouraged to attend all offered groups

## 2023-04-18 NOTE — BH INPATIENT PSYCHIATRY PROGRESS NOTE - NSBHATTESTCOMMENTATTENDFT_PSY_A_CORE
;;04/11:Demonstatres a little more positive affects but still med seeking. Not endorsing  suicidal or homicidal ideation intent or plans; no mention of auditory or visual hallucinations but anxious med seeking; Effexor lowered to 75mg a day; Seroquel raised to 50mg at night; continue Lexapro 10mg po daily .  Atvian am and afternoon for panic.  
;;04/10: Not endorsing  suicidal or homicidal ideation intent or plans; no mention of auditory or visual hallucinations but anxious med seeking; plan to cross from Effexor to Lexapro ; eliminate Neurontin (nausea); use Ativan 10ma and 3pm when patient tends to have panic; Seroquel at night as augmentation; prn Remeron.  
;;03/31: while notable not complaining of Sleep  appetite ok; no pain issues.  nor any mention of GI upset or nausea; stated that she was feeling tearful and hopeless after dinner.  Alert; oriented; cognition intact; speech clear; no tremor or evidence of movement impairment. Not endorsing  suicidal or homicidal ideation intent or plans; no mention of auditory or visual hallucinations .  Plan to raise Effexor to 112.5mg; monitor vs and also EKG as dose increased.  Monitor mood; encourage therapeutic intervention to deal with chronic mourning or parents death years ago.  
;;04/14: Alert; oriented; cognition intact; speech clear; no tremor or evidence of movement impairment. Not endorsing  suicidal or homicidal ideation intent or plans; no mention of auditory or visual hallucinations ; slept much better but still needs prn Remeron "I think I have turned a corner" ; calmer and less panicky and mood seems improved;  Effexor stopped  on Seroquel 100mg po at night; Lexapro at 10mg po daily for depression
;;04/05: Attends some groups and is appropriate; social with some peers. "I went to 3 groups!"  but later flagged down the writer jovanni anxious worried about being discharged and seeking benzodiazepines.  "I want my Klonopin in the morning not the evening!";  received Ativan 1mg po for panic.  Yet mood seemed improved yesterday afternoon and there is a possiblity of diurnal variation with mood improving over the day.  Staff notes significant personality issues.  Will continue with current course of Effexor but neurontin to be discontinued as it appears to cause nausea.  May need to consider switch to an SSRI from SNRI.  Also a medication such as Seroquel may prove a useful role.   
;;04/12:slept better but needed prn Remeron with Seroquel 50mg; suggestion of less labile; less dramatic; less anxious; able to restrain self from intense and almost impulsive behavior.  Not endorsing  suicidal or homicidal ideation intent or plans; no mention of auditory or visual hallucinations Sleep  appetite ok; no pain issues. i&j fair to poor : aware of medications and acknowledges symptoms but not reflective in a meaningful way  on issues that impact on symptoms.  Would raise Seroquel to 75mg po at night and maintain all other meds at current level: i.e. Remeron prn; Effexor; Lexapro ; Ativan.  
;;04/03: Not endorsing  suicidal or homicidal ideation intent or plans; no mention of auditory or visual hallucinations but preoccupied with staying in the hospital; had episode of nausea but no vomitting had Zofran over the week end; will raise Effexor to 150mg daily for mood and give Klonopin at 0.5mg midday but demostrates features of BPD and need to consider value of mood stabilizers.    Current medications aluminum hydroxide/magnesium hydroxide/simethicone Suspension 30 milliLiter(s) Oral every 4 hours PRN  atorvastatin 10 milliGRAM(s) Oral at bedtime  clonazePAM  Tablet 0.5 milliGRAM(s) Oral <User Schedule>  gabapentin 100 milliGRAM(s) Oral three times a day  hydrochlorothiazide 12.5 milliGRAM(s) Oral daily  losartan 100 milliGRAM(s) Oral daily  metoprolol tartrate 50 milliGRAM(s) Oral two times a day  mirtazapine 15 milliGRAM(s) Oral at bedtime; no c/o insomnia  ondansetron   Disintegrating Tablet 8 milliGRAM(s) Oral every 8 hours PRN  pantoprazole    Tablet 40 milliGRAM(s) Oral before breakfast  polyethylene glycol 3350 17 Gram(s) Oral daily  venlafaxine 150 milliGRAM(s) Oral daily does for tomorrow for mood   
';;04/05: prone to panic at 10am and 3pm; but Not endorsing  suicidal or homicidal ideation intent or plans; no mention of auditory or visual hallucinations but threatens harm if leaves the hospital at this time; Alert; oriented; cognition intact; speech clear; no tremor or evidence of movement impairment.  Med seeking; i&J: poor: limited if any awareness of medications; guarded or minimally acknowledging sxs; not reflective on issues that impact on symptoms   Would recommend:  1. Stop Neurontin (associated with nausea).  2. Substitute Ativan 1mg po tid and stop Klonopin as less effective for panic  Begin cross from Effexor to Lexapro but do not discontinue Effexor rather taper while adding Lexapro 10mg po daily and monitor.  Monitor for panic; nausea; GI complaints; irritability; 3. continue engagement of psychology to learn coping skills and some insight into mourning loss of parents and possible resentment regarding leaving job to care for  as discussed in Team.    
;;04/18: Not endorsing  suicidal or homicidal ideation intent or plans; no mention of auditory or visual hallucinations acknowledges better mood; staff notes improved socialization; c/o nausea; likely dc in am; patient aware. Patient wants neurontin but would worsen nas[place]; wants Klonopin but in past has failed to stop panic.  Current medications atorvastatin 10 milliGRAM(s) Oral at bedtime  diphenhydrAMINE 50 milliGRAM(s) Oral once PRN  escitalopram 10 milliGRAM(s) Oral daily for anxiety and mood   hydrochlorothiazide 12.5 milliGRAM(s) Oral daily  hydrOXYzine hydrochloride 25 milliGRAM(s) Oral three times a day PRN  LORazepam     Tablet 1 milliGRAM(s) Oral <User Schedule> for panic.  losartan 100 milliGRAM(s) Oral daily  metoclopramide 10 milliGRAM(s) Oral two times a day PRN  metoprolol tartrate 50 milliGRAM(s) Oral two times a day  mirtazapine 15 milliGRAM(s) Oral at bedtime PRN  ondansetron   Disintegrating Tablet 8 milliGRAM(s) Oral every 8 hours PRN for nausea  pantoprazole    Tablet 40 milliGRAM(s) Oral before breakfast  polyethylene glycol 3350 17 Gram(s) Oral daily  QUEtiapine 100 milliGRAM(s) Oral at bedtime augmentation for depression/anxiety  simethicone 80 milliGRAM(s) Chew daily PRN  
;;04/17: Not endorsing  suicidal or homicidal ideation intent or plans; no mention of auditory or visual hallucinations ; focused on Klonopin; then nausea then GI issues; possible med consult re nausea but not appears brighter and may be responding to meds;  attempting to transition to IOP;  Effexor dc'd today.

## 2023-04-18 NOTE — BH INPATIENT PSYCHIATRY PROGRESS NOTE - NSTXPROBANX_PSY_ALL_CORE
ANXIETY/PANIC/FEAR

## 2023-04-18 NOTE — BH INPATIENT PSYCHIATRY PROGRESS NOTE - NSBHMETABOLIC_PSY_ALL_CORE_FT
BMI: BMI (kg/m2): 40.8 (03-28-23 @ 19:10)  HbA1c: A1C with Estimated Average Glucose Result: 5.7 % (04-14-23 @ 07:32)    Glucose:   BP: 141/78 (04-18-23 @ 18:00) (119/77 - 148/83)  Lipid Panel: Date/Time: 04-14-23 @ 07:32  Cholesterol, Serum: 163  Direct LDL: --  HDL Cholesterol, Serum: 36  Total Cholesterol/HDL Ration Measurement: --  Triglycerides, Serum: 200   BMI: BMI (kg/m2): 40.8 (03-28-23 @ 19:10)  HbA1c: A1C with Estimated Average Glucose Result: 5.7 % (04-14-23 @ 07:32)    Glucose:   BP: 121/81 (04-18-23 @ 20:02) (119/77 - 148/83)  Lipid Panel: Date/Time: 04-14-23 @ 07:32  Cholesterol, Serum: 163  Direct LDL: --  HDL Cholesterol, Serum: 36  Total Cholesterol/HDL Ration Measurement: --  Triglycerides, Serum: 200   BMI: BMI (kg/m2): 40.8 (03-28-23 @ 19:10)  HbA1c: A1C with Estimated Average Glucose Result: 5.7 % (04-14-23 @ 07:32)    Glucose:   BP: 141/84 (04-19-23 @ 09:30) (119/77 - 148/83)  Lipid Panel: Date/Time: 04-14-23 @ 07:32  Cholesterol, Serum: 163  Direct LDL: --  HDL Cholesterol, Serum: 36  Total Cholesterol/HDL Ration Measurement: --  Triglycerides, Serum: 200

## 2023-04-18 NOTE — BH INPATIENT PSYCHIATRY PROGRESS NOTE - NSTXANXPROGRES_PSY_ALL_CORE
Improving
No Change
Improving
No Change
Improving
No Change
No Change
Improving
Improving
No Change
Improving
Improving
No Change
Improving

## 2023-04-18 NOTE — BH INPATIENT PSYCHIATRY PROGRESS NOTE - NSTXSUICIDDATETRGT_PSY_ALL_CORE
05-Apr-2023
11-Apr-2023
05-Apr-2023
11-Apr-2023
18-Apr-2023
11-Apr-2023
18-Apr-2023
11-Apr-2023
11-Apr-2023
05-Apr-2023

## 2023-04-18 NOTE — BH INPATIENT PSYCHIATRY PROGRESS NOTE - NSDCCRITERIA_PSY_ALL_CORE
Improvement in mood, demonstrates an ability to safely care for her needs, engaged w/ treatment team. 
Improvement in mood, demonstrates an ability to safely care for her needs, engaged w/ treatment team
Improvement in mood, demonstrates an ability to safely care for her needs, engaged w/ treatment team. 
Improvement in mood, demonstrates an ability to safely care for her needs, engaged w/ treatment team
Improvement in mood, demonstrates an ability to safely care for her needs, engaged w/ treatment team. 
Improvement in mood, demonstrates an ability to safely care for her needs, engaged w/ treatment team. 
Improvement in mood, demonstrates an ability to safely care for her needs, engaged w/ treatment team
Improvement in mood, demonstrates an ability to safely care for her needs, engaged w/ treatment team
Improvement in mood, demonstrates an ability to safely care for her needs, engaged w/ treatment team. 
Improvement in mood, demonstrates an ability to safely care for her needs, engaged w/ treatment team

## 2023-04-18 NOTE — BH INPATIENT PSYCHIATRY PROGRESS NOTE - NSTXDEPRESPROGRES_PSY_ALL_CORE
Improving
No Change
Improving
No Change
Improving
No Change
Improving
No Change
No Change
Improving

## 2023-04-18 NOTE — PROGRESS NOTE ADULT - ASSESSMENT
Patient is a 71 yo F with a prior psychiatric diagnosis of ANSON and MDD, 4 previous inpatient psychiatric hospitalizations most recently 7/5/2021-7/3/2021, prior SA in 11/2019 by overdosing on pills, no history of NSSIB, no history of violence/aggression, no hx of legal issues, daily marijuana use, PMHx of HTN, hyperlipidemia, treated in outpatient [facility] clinic by Dr. Stvoall (as below, with last visit 3/15/2023 and at that time documented to be at baseline with chronic anxiety, complaints of nausea, seen twice monthly with mediation mgt and psychotherapy)  BIB spouse for "worsening anxiety."  Medicine consulted for nausea, diarrhea, and decreased appetite for 2 days.    #Diarrhea  #Nausea  Patient reports nausea and one episode of vomiting since Saturday (2 days ago) when her Seroquel was increased to 100 mg and her other psych medications were adjusted or stopped. She then had one episode of loose, large diarrhea this morning, and was not able to tolerate much food or drink throughout the day. She has not had another episode of diarrhea since. She reports continued nausea - reports she has chronic nausea in the past and was given Reglan. Denies abdominal pain.   - Diarrhea and nausea may be 2/2 psychiatric medication adjustments (Seroquel increase, stopping other medications). Will defer psychiatric medication management to primary team   - As Reglan has helped patient before, can discontinue Zofran and instead give Reglan 10 mg PO BID PRN for nausea or vomiting   - Please obtain an EKG to monitor QTC while receiving Zofran/Reglan in addition to psych meds   - Recommend stopping Maalox as it can cause diarrhea  - Can give Simethicone (Gas-X) PRN for indigestion/nausea instead of Maalox   - If patient has another episode of diarrhea, please obtain GI PCR and C.diff testing     Rest of care per primary team    Seen and discussed with attending Dr. Reno.  Note not final until attested by attending. Patient is a 71 yo F with a prior psychiatric diagnosis of ANSON and MDD, 4 previous inpatient psychiatric hospitalizations most recently 7/5/2021-7/3/2021, prior SA in 11/2019 by overdosing on pills, no history of NSSIB, no history of violence/aggression, no hx of legal issues, daily marijuana use, PMHx of HTN, hyperlipidemia, [facility] (as below, with last visit 3/15/2023 and at that time documented to be at baseline with chronic anxiety, complaints of nausea, seen twice monthly with mediation mgt and psychotherapy)  BIB spouse for "worsening anxiety."  Medicine consulted for nausea, diarrhea, and decreased appetite for 2 days.    #Diarrhea  #Nausea  Patient reports nausea and one episode of vomiting since Saturday (2 days ago) when her Seroquel was increased to 100 mg and her other psych medications were adjusted or stopped. She has intermittently vomited her food, but per nursing staff and pt she regularly eats quickly then lies down. She has not had another episode of diarrhea since but has had loose stools. She reports continued nausea - reports she has chronic nausea in the past and was given Reglan. Denies abdominal pain.   - Diarrhea and nausea may be 2/2 psychiatric medication adjustments (Seroquel increase, stopping other medications). Will defer psychiatric medication management to primary team.  - Reasonable to continue zofran as first line agent for nausea given recent diarrhea, but would include reglan as second line and allow patient to receive if not noting improvement within 1 hour of zofran.  - Please obtain daily EKG to monitor QTC while receiving Zofran/Reglan in addition to psych meds  - Recommend stopping Maalox as it can cause diarrhea  - Can give Simethicone (Gas-X) PRN for indigestion/nausea instead of Maalox  - If patient has another episode of diarrhea, please obtain GI PCR and C.diff testing     Rest of care per primary team    Seen and discussed with attending Dr. Reno.  Note not final until attested by attending.

## 2023-04-18 NOTE — BH INPATIENT PSYCHIATRY PROGRESS NOTE - NSTXDEPRESDATEEST_PSY_ALL_CORE
28-Mar-2023
29-Mar-2023
11-Apr-2023
29-Mar-2023
11-Apr-2023
29-Mar-2023
11-Apr-2023
11-Apr-2023
29-Mar-2023
28-Mar-2023
11-Apr-2023
29-Mar-2023
28-Mar-2023
11-Apr-2023
28-Mar-2023
29-Mar-2023

## 2023-04-18 NOTE — BH INPATIENT PSYCHIATRY PROGRESS NOTE - NSTXANXGOALOTHER_PSY_ALL_CORE
Patient will stabilize mood & alleviate sx of anxiety to engage with discharge planning.
Pt will participate in groups and milieu treatment structure of the unit
Patient will stabilize mood & alleviate sx of anxiety to engage with discharge planning.
Pt. will participate in groups and milieu structure of the unit.
Pt will participate in groups and milieu treatment structure of the unit
Pt. will participate in groups and milieu structure of the unit.
Pt will participate in groups and milieu treatment structure of the unit
Patient will stabilize mood & alleviate sx of anxiety to engage with discharge planning.
Pt. will participate in groups and milieu structure of the unit.
Pt will participate in groups and milieu treatment structure of the unit
Pt. will participate in groups and milieu structure of the unit.
Pt will participate in groups and milieu treatment structure of the unit
Pt. will participate in groups and milieu structure of the unit.
Pt. will participate in groups and milieu structure of the unit.
Pt will participate in groups and milieu treatment structure of the unit

## 2023-04-18 NOTE — BH TREATMENT PLAN - NSCMSPTSTRENGTHS_PSY_ALL_CORE
Financial stability/Future/goal oriented/Interpersonal skills/Motivated/Supportive family

## 2023-04-18 NOTE — BH TREATMENT PLAN - NSPTSTATEDGOAL_PSY_ALL_CORE
"I am too anxious"  
"I am too anxious"  
"I would like to be myself again... to be able to get up without fear, nausea, to take my meds, and do what I enjoy."
"I would like to be myself again... to be able to get up without fear, nausea, to take my meds, and do what I enjoy."
"I am too anxious"

## 2023-04-18 NOTE — BH TREATMENT PLAN - NSTXSUICIDGOAL_PSY_ALL_CORE
Talk to staff and ask for assistance when having suicidal wishes instead of acting out
Talk to staff and ask for assistance when having suicidal wishes instead of acting out
Other...
Other...
Talk to staff and ask for assistance when having suicidal wishes instead of acting out

## 2023-04-18 NOTE — BH INPATIENT PSYCHIATRY PROGRESS NOTE - NSBHFUPINTERVALHXFT_PSY_A_CORE
No acute interval events. Pt continues to complain about anxiety in the morning and relates concerns that her medication regimen is being pared down too much and too quickly. Pt reported having two episodes of copious diarrhea associated with loss of appetite and generalized weakness over the past two days; medicine service was consulted for this reason. Recommendations appreciated. Cross-taper between venlafaxine and escitalopram is in progress. Pt continues to perseverate on Klonopin being a more effective benzodiazepine than Ativan and her wishes to restart Klonopin. Over the weekend, the pt's Seroquel dose was decreased from 100 mg to 75 mg due to concerns that this may be contributing to her diarrhea but given that this is unlikely the case; plan is to increase the Seroquel dose back to 100 mg nightly for augmentation to treat anxiety.     No acute interval events. Pt continues to complain about anxiety in the morning and states that Ativan is not treating her anxiety adequately enough and that she would prefer to have Gabapentin added to her current medication regimen. Writer explained to the pt (as related by the attending psychiatrist) that having medication changes done so close to discharge is not advised. While pt expressed hesitancy about being discharged, she adamantly denied currently harboring SI and also denied ever expressing to ED staff that she would attempt to harm herself if she was not to be admitted. Pt is agreeable to being discharged tomorrow and her  is aware of this plan.    Pt reports having lose stool this morning. Pt was seen by medicine; recommendations appreciated. Seroquel dose back to 100 mg nightly for augmentation to treat anxiety. Pt is seen throughout the day and observed by nursing staff to be calm, interacting with peers and in good behavioral control.

## 2023-04-18 NOTE — BH INPATIENT PSYCHIATRY PROGRESS NOTE - NSTXANXDATETRGT_PSY_ALL_CORE
10-Apr-2023
24-Apr-2023
10-Apr-2023
24-Apr-2023
24-Apr-2023
05-Apr-2023
17-Apr-2023
10-Apr-2023
17-Apr-2023
17-Apr-2023
10-Apr-2023
17-Apr-2023
17-Apr-2023
05-Apr-2023
10-Apr-2023
17-Apr-2023
05-Apr-2023
10-Apr-2023

## 2023-04-18 NOTE — BH INPATIENT PSYCHIATRY PROGRESS NOTE - NSTXANXGOAL_PSY_ALL_CORE
Other...
Report a reduction in panic attacks and improving mood and confidence
Other...

## 2023-04-18 NOTE — BH INPATIENT PSYCHIATRY PROGRESS NOTE - NSBHCONSDANGERSELF_PSY_A_CORE
suicidal behavior

## 2023-04-18 NOTE — BH INPATIENT PSYCHIATRY PROGRESS NOTE - NSBHCONSBHPROVCNTCTNOFT_PSY_A_CORE
Will contact pt's outpatient psychiatrist in the near future.

## 2023-04-18 NOTE — BH INPATIENT PSYCHIATRY PROGRESS NOTE - NSTXSUICIDINTERMD_PSY_ALL_CORE
Cross from Effexor to Lexapro with Seroquel as augmentation; psychopharmacology 15 minutes a day 
Pharmacologically: will work on optimizing the pt's regimen to effectively target pt's depressive symptoms and thereby his suicidality. 
Pharmacologically: will work on optimizing the pt's regimen to effectively target pt's depressive symptoms and thereby his suicidality. 
Cross from Effexor to Lexapro with Seroquel as augmentation; psychopharmacology 15 minutes a day 
Cross from Effexor to Lexapro with Seroquel as augmentation; psychopharmacology 15 minutes a day 
Pharmacologically: will work on optimizing the pt's regimen to effectively target pt's depressive symptoms and thereby his suicidality. Recommend she goes to group therapy
Cross from Effexor to Lexapro with Seroquel as augmentation; psychopharmacology 15 minutes a day 
Pharmacologically: will work on optimizing the pt's regimen to effectively target pt's depressive symptoms and thereby his suicidality. 
Cross from Effexor to Lexapro with Seroquel as augmentation; psychopharmacology 15 minutes a day 
Pharmacologically: will work on optimizing the pt's regimen to effectively target pt's depressive symptoms and thereby his suicidality. Recommend she goes to group therapy
Pharmacologically: will work on optimizing the pt's regimen to effectively target pt's depressive symptoms and thereby his suicidality. Recommend she goes to group therapy
Cross from Effexor to Lexapro with Seroquel as augmentation; psychopharmacology 15 minutes a day 
Pharmacologically: will work on optimizing the pt's regimen to effectively target pt's depressive symptoms and thereby his suicidality. 
Pharmacologically: will work on optimizing the pt's regimen to effectively target pt's depressive symptoms and thereby his suicidality.

## 2023-04-18 NOTE — BH INPATIENT PSYCHIATRY PROGRESS NOTE - NSBHASSESSSUMMFT_PSY_ALL_CORE
Patient is a 71 yo female, , retired (), non-caregiver, domiciled at home with her , with a prior psychiatric diagnosis of ANSON and MDD, 4 previous inpatient psychiatric hospitalizations at [facility], most recently 7/5/2021-7/3/2021, prior SA in 11/2019 by overdosing on pills, no history of NSSIB, no history of violence/aggression, no hx of legal issues, daily marijuana use, PMHx of HTN, hyperlipidemia, [facility] (as below, with last visit 3/15/2023 and at that time documented to be at baseline with chronic anxiety, complaints of nausea, seen twice monthly with medication mgt and psychotherapy)  BIB spouse for "worsening anxiety." Pt currently denies passive and active SI, intent and plan. She also denies symptoms of psychosis and sadia. The plan while she is hospitalized on the inpatient unit includes medication optimization.    #Depression:  - Venlafaxine ER 37.5 mg daily  - Escitalopram 10 mg daily    #Anxiety:  - Ativan 1 mg twice daily (7 AM and 3 PM)  - Seroquel 50 mg qhs (also for insomnia)    #Nausea/Diarrhea:  - Reglan 10 mg twice daily PRN  - Simethicone daily PRN     #Insomnia:  - Seroquel 100 mg qhs  - Mirtazapine 15 mg nightly PRN    #Health maintenance:  - Losartan 100 mg daily  - Hydrochlorothiazide 12.5 mg daily  - Metoprolol tartrate 50 mg twice daily   - Famotidine 20 mg daily    04/13: Not endorsing  suicidal or homicidal ideation intent or plans; no mention of auditory or visual hallucinations but becomes panicky at any mention of leaving the hospital for aftercare;  wants to switch back to Klonopin saying it works better but actually patient has been free of intense anxiety (panic ) for a day or so; c/o drowsiness with Seroquel and Remeron.  Suggest not to use Remeron unless has severe insomnia.   Raising Seroquel to 100mg at night for mood ; Effexor lowered to 37.5mg as contributing to anxiety; will attempt to transition to after care.   Should patient express SI would consider ECT for TRD but not thought likely at this time.  Would like to coordinate with aftercare provider. consider IOP.    04/14: Pt reports improvement in her mood and anxiety and is less anxious and intrusive throughout the day. Pt appears to be less anxious of the thought of leaving the hospital.   Patient is a 71 yo female, , retired (), non-caregiver, domiciled at home with her , with a prior psychiatric diagnosis of ANSON and MDD, 4 previous inpatient psychiatric hospitalizations at [facility], most recently 7/5/2021-7/3/2021, prior SA in 11/2019 by overdosing on pills, no history of NSSIB, no history of violence/aggression, no hx of legal issues, daily marijuana use, PMHx of HTN, hyperlipidemia, [facility] (as below, with last visit 3/15/2023 and at that time documented to be at baseline with chronic anxiety, complaints of nausea, seen twice monthly with medication mgt and psychotherapy)  BIB spouse for "worsening anxiety." Pt currently denies passive and active SI, intent and plan. She also denies symptoms of psychosis and sadia. The plan while she is hospitalized on the inpatient unit includes medication optimization.    #Depression:  - Escitalopram 10 mg daily    #Anxiety:  - Ativan 1 mg twice daily (7 AM and 3 PM)  - Seroquel 100 mg qhs (also for insomnia)    #Nausea/Diarrhea:  - Reglan 10 mg twice daily PRN  - Simethicone daily PRN     #Insomnia:  - Seroquel 100 mg qhs  - Mirtazapine 15 mg nightly PRN    #Health maintenance:  - Losartan 100 mg daily  - Hydrochlorothiazide 12.5 mg daily  - Metoprolol tartrate 50 mg twice daily   - Famotidine 20 mg daily    04/13: Not endorsing  suicidal or homicidal ideation intent or plans; no mention of auditory or visual hallucinations but becomes panicky at any mention of leaving the hospital for aftercare;  wants to switch back to Klonopin saying it works better but actually patient has been free of intense anxiety (panic ) for a day or so; c/o drowsiness with Seroquel and Remeron.  Suggest not to use Remeron unless has severe insomnia.   Raising Seroquel to 100mg at night for mood ; Effexor lowered to 37.5mg as contributing to anxiety; will attempt to transition to after care.   Should patient express SI would consider ECT for TRD but not thought likely at this time.  Would like to coordinate with aftercare provider. consider IOP.    04/14: Pt reports improvement in her mood and anxiety and is less anxious and intrusive throughout the day. Pt appears to be less anxious of the thought of leaving the hospital.

## 2023-04-18 NOTE — BH TREATMENT PLAN - NSTXDEPRESINTERRN_PSY_ALL_CORE
encourage patient to report negative and disruptive thoughts to staff
encourage patient to report negative and disruptive thoughts to staff
Encourage pt. to express feelings and verbalize concerns. Assess for worsening depressive symptoms and SI.  Help pt. identify effcetive coping strategies previously used, and apply to current situation, if effectiveness then.  Encourage medication compliance and participation in the melieu.
encourage patient to report negative and disruptive thoughts to staff

## 2023-04-18 NOTE — BH INPATIENT PSYCHIATRY PROGRESS NOTE - NSTXANXINTERMD_PSY_ALL_CORE
Pharmacologically: will work on optimizing the pt's regimen to effectively reduce anxiety. We've reduced mirtazapine to 15 mg nightly and will possibly work on reducing the pt's clonazepam dose.
Pharmacologically: will work on optimizing the pt's regimen to effectively reduce anxiety. We've reduced mirtazapine to 15 mg nightly and will possibly work on reducing the pt's clonazepam dose. Concern about effexor. 
Pharmacologically: will work on optimizing the pt's regimen to effectively reduce anxiety. We've reduced mirtazapine to 15 mg nightly and will possibly work on reducing the pt's clonazepam dose.
Cross from Effexor to Lexapro with Seroquel as augmentation; psychopharmacology 15 minutes a day 
Cross from Effexor to Lexapro with Seroquel as augmentation; psychopharmacology 15 minutes a day 
Pharmacologically: will work on optimizing the pt's regimen to effectively reduce anxiety. We've reduced mirtazapine to 15 mg nightly and will possibly work on reducing the pt's clonazepam dose.
Cross from Effexor to Lexapro with Seroquel as augmentation; psychopharmacology 15 minutes a day 
Pharmacologically: will work on optimizing the pt's regimen to effectively reduce anxiety. We've reduced mirtazapine to 15 mg nightly and will possibly work on reducing the pt's clonazepam dose.
Cross from Effexor to Lexapro with Seroquel as augmentation; psychopharmacology 15 minutes a day 
Pharmacologically: will work on optimizing the pt's regimen to effectively reduce anxiety. We've reduced mirtazapine to 15 mg nightly and will possibly work on reducing the pt's clonazepam dose. Concern about effexor. 
Pharmacologically: will work on optimizing the pt's regimen to effectively reduce anxiety. We've reduced mirtazapine to 15 mg nightly and will possibly work on reducing the pt's clonazepam dose. Concern about effexor. 
Cross from Effexor to Lexapro with Seroquel as augmentation; psychopharmacology 15 minutes a day 
Pharmacologically: will work on optimizing the pt's regimen to effectively reduce anxiety. We've reduced mirtazapine to 15 mg nightly and will possibly work on reducing the pt's clonazepam dose. Concern about effexor. 
Cross from Effexor to Lexapro with Seroquel as augmentation; psychopharmacology 15 minutes a day 
Pharmacologically: will work on optimizing the pt's regimen to effectively reduce anxiety. We've reduced mirtazapine to 15 mg nightly and will possibly work on reducing the pt's clonazepam dose.
Pharmacologically: will work on optimizing the pt's regimen to effectively reduce anxiety. We've reduced mirtazapine to 15 mg nightly and will possibly work on reducing the pt's clonazepam dose. Concern about effexor.

## 2023-04-19 VITALS
RESPIRATION RATE: 18 BRPM | SYSTOLIC BLOOD PRESSURE: 141 MMHG | DIASTOLIC BLOOD PRESSURE: 84 MMHG | TEMPERATURE: 98 F | OXYGEN SATURATION: 96 % | HEART RATE: 83 BPM

## 2023-04-19 PROCEDURE — 99238 HOSP IP/OBS DSCHRG MGMT 30/<: CPT

## 2023-04-19 PROCEDURE — 36415 COLL VENOUS BLD VENIPUNCTURE: CPT

## 2023-04-19 PROCEDURE — 97161 PT EVAL LOW COMPLEX 20 MIN: CPT

## 2023-04-19 PROCEDURE — 99232 SBSQ HOSP IP/OBS MODERATE 35: CPT | Mod: GC

## 2023-04-19 PROCEDURE — 83036 HEMOGLOBIN GLYCOSYLATED A1C: CPT

## 2023-04-19 PROCEDURE — 81001 URINALYSIS AUTO W/SCOPE: CPT

## 2023-04-19 PROCEDURE — 80061 LIPID PANEL: CPT

## 2023-04-19 RX ORDER — SIMETHICONE 80 MG/1
0 TABLET, CHEWABLE ORAL
Qty: 30 | Refills: 0
Start: 2023-04-19

## 2023-04-19 RX ORDER — HYDROCHLOROTHIAZIDE 25 MG
1 TABLET ORAL
Qty: 30 | Refills: 0
Start: 2023-04-19 | End: 2023-05-18

## 2023-04-19 RX ORDER — ESCITALOPRAM OXALATE 10 MG/1
1 TABLET, FILM COATED ORAL
Qty: 0 | Refills: 0 | DISCHARGE
Start: 2023-04-19

## 2023-04-19 RX ORDER — SIMETHICONE 80 MG/1
1 TABLET, CHEWABLE ORAL
Qty: 120 | Refills: 0
Start: 2023-04-19 | End: 2023-05-18

## 2023-04-19 RX ORDER — METOPROLOL TARTRATE 50 MG
1 TABLET ORAL
Qty: 60 | Refills: 0
Start: 2023-04-19 | End: 2023-05-18

## 2023-04-19 RX ORDER — ONDANSETRON 8 MG/1
1 TABLET, FILM COATED ORAL
Qty: 0 | Refills: 0 | DISCHARGE
Start: 2023-04-19

## 2023-04-19 RX ORDER — LOSARTAN POTASSIUM 100 MG/1
1 TABLET, FILM COATED ORAL
Qty: 0 | Refills: 0 | DISCHARGE
Start: 2023-04-19

## 2023-04-19 RX ORDER — SIMETHICONE 80 MG/1
1 TABLET, CHEWABLE ORAL
Qty: 0 | Refills: 0 | DISCHARGE
Start: 2023-04-19

## 2023-04-19 RX ORDER — ATORVASTATIN CALCIUM 80 MG/1
1 TABLET, FILM COATED ORAL
Qty: 30 | Refills: 0
Start: 2023-04-19 | End: 2023-05-18

## 2023-04-19 RX ORDER — QUETIAPINE FUMARATE 200 MG/1
1 TABLET, FILM COATED ORAL
Qty: 0 | Refills: 0 | DISCHARGE
Start: 2023-04-19

## 2023-04-19 RX ORDER — SIMETHICONE 80 MG/1
80 TABLET, CHEWABLE ORAL
Qty: 0 | Refills: 0 | DISCHARGE
Start: 2023-04-19

## 2023-04-19 RX ORDER — PANTOPRAZOLE SODIUM 20 MG/1
1 TABLET, DELAYED RELEASE ORAL
Qty: 30 | Refills: 0
Start: 2023-04-19 | End: 2023-05-18

## 2023-04-19 RX ORDER — ESCITALOPRAM OXALATE 10 MG/1
1 TABLET, FILM COATED ORAL
Qty: 30 | Refills: 0
Start: 2023-04-19 | End: 2023-05-18

## 2023-04-19 RX ORDER — HYDROCHLOROTHIAZIDE 25 MG
1 TABLET ORAL
Qty: 0 | Refills: 0 | DISCHARGE
Start: 2023-04-19

## 2023-04-19 RX ORDER — LOSARTAN POTASSIUM 100 MG/1
1 TABLET, FILM COATED ORAL
Qty: 30 | Refills: 0
Start: 2023-04-19 | End: 2023-05-18

## 2023-04-19 RX ORDER — ATORVASTATIN CALCIUM 80 MG/1
1 TABLET, FILM COATED ORAL
Qty: 0 | Refills: 0 | DISCHARGE
Start: 2023-04-19

## 2023-04-19 RX ORDER — ONDANSETRON 8 MG/1
1 TABLET, FILM COATED ORAL
Qty: 42 | Refills: 0
Start: 2023-04-19 | End: 2023-05-02

## 2023-04-19 RX ORDER — ONDANSETRON 8 MG/1
0 TABLET, FILM COATED ORAL
Qty: 30 | Refills: 0
Start: 2023-04-19

## 2023-04-19 RX ORDER — QUETIAPINE FUMARATE 200 MG/1
1 TABLET, FILM COATED ORAL
Qty: 30 | Refills: 0
Start: 2023-04-19 | End: 2023-05-18

## 2023-04-19 RX ADMIN — Medication 1 MILLIGRAM(S): at 07:12

## 2023-04-19 RX ADMIN — PANTOPRAZOLE SODIUM 40 MILLIGRAM(S): 20 TABLET, DELAYED RELEASE ORAL at 07:12

## 2023-04-19 RX ADMIN — Medication 50 MILLIGRAM(S): at 10:24

## 2023-04-19 RX ADMIN — ESCITALOPRAM OXALATE 10 MILLIGRAM(S): 10 TABLET, FILM COATED ORAL at 10:24

## 2023-04-19 RX ADMIN — Medication 10 MILLIGRAM(S): at 14:22

## 2023-04-19 RX ADMIN — Medication 25 MILLIGRAM(S): at 10:29

## 2023-04-19 RX ADMIN — ONDANSETRON 8 MILLIGRAM(S): 8 TABLET, FILM COATED ORAL at 10:30

## 2023-04-19 RX ADMIN — Medication 1 MILLIGRAM(S): at 15:27

## 2023-04-19 RX ADMIN — LOSARTAN POTASSIUM 100 MILLIGRAM(S): 100 TABLET, FILM COATED ORAL at 10:25

## 2023-04-19 NOTE — BH INPATIENT PSYCHIATRY DISCHARGE NOTE - NSBHDCMEDICALFT_PSY_A_CORE
GERD: pantoprazole   Nausea: Zofran  Hyperlipidemia: Atorvastatin   Hypertension: Losartan and Metoprolol

## 2023-04-19 NOTE — BH INPATIENT PSYCHIATRY DISCHARGE NOTE - NSDCMRMEDTOKEN_GEN_ALL_CORE_FT
atorvastatin 10 mg oral tablet: 1 tab(s) orally once a day (at bedtime) MDD: 10  escitalopram 10 mg oral tablet: 1 tab(s) orally once a day  LORazepam 1 mg oral tablet: 1 tab(s) orally 2 times a day MDD: 2 mg  losartan 100 mg oral tablet: 1 tab(s) orally once a day MDD: 100  metoprolol tartrate 50 mg oral tablet: 1 tab(s) orally 2 times a day MDD: 100 mg  ondansetron 8 mg oral tablet, disintegrating: MDD: 24 mg  pantoprazole 40 mg oral delayed release tablet: 1 tab(s) orally once a day (before a meal) MDD: 40  QUEtiapine 100 mg oral tablet: 1 tab(s) orally once a day (at bedtime)  QUEtiapine 100 mg oral tablet: 1 tab(s) orally once a day (at bedtime) MDD: 100  simethicone 80 mg oral tablet, chewable: MDD: 80 mg   atorvastatin 10 mg oral tablet: 1 tab(s) orally once a day (at bedtime) MDD: 10  escitalopram 10 mg oral tablet: 1 tab(s) orally once a day MDD: 10 mg  LORazepam 1 mg oral tablet: 1 tab(s) orally 2 times a day MDD: 2 mg  losartan 100 mg oral tablet: 1 tab(s) orally once a day MDD: 100  metoprolol tartrate 50 mg oral tablet: 1 tab(s) orally 2 times a day MDD: 100 mg  ondansetron 8 mg oral tablet, disintegrating: MDD: 24 mg  pantoprazole 40 mg oral delayed release tablet: 1 tab(s) orally once a day (before a meal) MDD: 40  QUEtiapine 100 mg oral tablet: 1 tab(s) orally once a day (at bedtime)  QUEtiapine 100 mg oral tablet: 1 tab(s) orally once a day (at bedtime) MDD: 100  simethicone 80 mg oral tablet, chewable: MDD: 80 mg   atorvastatin 10 mg oral tablet: 1 tab(s) orally once a day (at bedtime) MDD: 10  escitalopram 10 mg oral tablet: 1 tab(s) orally once a day MDD: 10 mg  LORazepam 1 mg oral tablet: 1 tab(s) orally 2 times a day MDD: 2 mg  losartan 100 mg oral tablet: 1 tab(s) orally once a day MDD: 100  metoprolol tartrate 50 mg oral tablet: 1 tab(s) orally 2 times a day MDD: 100 mg  Mi-Acid Gas Relief 80 mg oral tablet, chewable: 1 tab(s) orally every 6 hours as needed for  nausea MDD: 320 mg  Mi-Acid Gas Relief 80 mg oral tablet, chewable: 80 orally every 6 hours as needed for Nausea  ondansetron 4 mg oral tablet, disintegratin tab(s) orally every 8 hours as needed for  nausea MDD: 12 mg  pantoprazole 40 mg oral delayed release tablet: 1 tab(s) orally once a day (before a meal) MDD: 40  QUEtiapine 100 mg oral tablet: 1 tab(s) orally once a day (at bedtime)  QUEtiapine 100 mg oral tablet: 1 tab(s) orally once a day (at bedtime) MDD: 100   atorvastatin 10 mg oral tablet: 1 tab(s) orally once a day (at bedtime) MDD: 10  escitalopram 10 mg oral tablet: 1 tab(s) orally once a day MDD: 10 mg  hydroCHLOROthiazide 12.5 mg oral capsule: 1 cap(s) orally once a day  hydroCHLOROthiazide 12.5 mg oral capsule: 1 cap(s) orally once a day MDD: 12.5  LORazepam 1 mg oral tablet: 1 tab(s) orally 2 times a day MDD: 2 mg  losartan 100 mg oral tablet: 1 tab(s) orally once a day MDD: 100  metoprolol tartrate 50 mg oral tablet: 1 tab(s) orally 2 times a day MDD: 100 mg  Mi-Acid Gas Relief 80 mg oral tablet, chewable: 1 tab(s) orally every 6 hours as needed for  nausea MDD: 320 mg  Mi-Acid Gas Relief 80 mg oral tablet, chewable: 80 orally every 6 hours as needed for Nausea  ondansetron 4 mg oral tablet, disintegratin tab(s) orally every 8 hours as needed for  nausea MDD: 12 mg  pantoprazole 40 mg oral delayed release tablet: 1 tab(s) orally once a day (before a meal) MDD: 40  QUEtiapine 100 mg oral tablet: 1 tab(s) orally once a day (at bedtime)  QUEtiapine 100 mg oral tablet: 1 tab(s) orally once a day (at bedtime) MDD: 100

## 2023-04-19 NOTE — PROGRESS NOTE ADULT - ASSESSMENT
Patient is a 71 yo F with a prior psychiatric diagnosis of ANSON and MDD, 4 previous inpatient psychiatric hospitalizations most recently 7/5/2021-7/3/2021, prior SA in 11/2019 by overdosing on pills, no history of NSSIB, no history of violence/aggression, no hx of legal issues, daily marijuana use, PMHx of HTN, hyperlipidemia, treated in outpatient geropsych clinic by Dr. Stovall (as below, with last visit 3/15/2023 and at that time documented to be at baseline with chronic anxiety, complaints of nausea, seen twice monthly with mediation mgt and psychotherapy)  BIB spouse for "worsening anxiety."  Medicine consulted for nausea, diarrhea, and decreased appetite for 2 days.    #Diarrhea  #Nausea  Patient reports nausea and one episode of vomiting since Saturday when her Seroquel was increased to 100 mg and her other psych medications were adjusted or stopped. She has intermittently vomited her food, but per nursing staff and pt she regularly eats quickly then lies down. She has not had another episode of diarrhea since but has had loose stools. She reports continued nausea - reports she has chronic nausea in the past and was given Reglan. Denies abdominal pain.   This is more likely to be somatic symptom disorder as her nausea is worst in beginning of the day regardless of PO. She tolerated dinner 4/18 without complications which is less consistent with idiopathic gastroparesis.  - Reasonable to continue zofran as outpatient. Refraining from reglan due to risk of diarrhea from frequent use. Defer to outpatient GI.  - Please ensure patient has prescription for standing pantoprazole daily standing, prn zofran 4mg (preferably disintegrating table) q8 prn for nausea, simethicone chewable 80mg q6 prn for gas  - no internal medicine contraindication to discharge    Rest of care per primary team    Seen and discussed with attending Dr. Morris.  Note not final until attested by attending.

## 2023-04-19 NOTE — BH INPATIENT PSYCHIATRY DISCHARGE NOTE - NSBHMETABOLIC_PSY_ALL_CORE_FT
BMI: BMI (kg/m2): 40.8 (03-28-23 @ 19:10)  HbA1c: A1C with Estimated Average Glucose Result: 5.7 % (04-14-23 @ 07:32)    Glucose:   BP: 141/84 (04-19-23 @ 09:30) (119/77 - 148/83)  Lipid Panel: Date/Time: 04-14-23 @ 07:32  Cholesterol, Serum: 163  Direct LDL: --  HDL Cholesterol, Serum: 36  Total Cholesterol/HDL Ration Measurement: --  Triglycerides, Serum: 200

## 2023-04-19 NOTE — BH INPATIENT PSYCHIATRY DISCHARGE NOTE - OTHER PAST PSYCHIATRIC HISTORY (INCLUDE DETAILS REGARDING ONSET, COURSE OF ILLNESS, INPATIENT/OUTPATIENT TREATMENT)
Multiple [facility] admissions, (last 7/5/2021-7/23/2021) in current treatment at [facility]

## 2023-04-19 NOTE — BH INPATIENT PSYCHIATRY DISCHARGE NOTE - HOSPITAL COURSE
Pt is a 72-year-old woman with a past psychiatric history of Generalized Anxiety Disorder and Major Depressive Disorder and past medical history of hypertension and hyperlipidemia who presented to the hospital for worsening anxiety in the context of escalating arguments with her . In the ED, pt expressed that she would be home alone due to a change in her ’s work schedule which she reported she could not tolerate, stating “I don't know what I could do," and requested voluntary admission. During her inpatient stay, the pt was engaged with her treatment team, group therapy activities and with her peers. Pt was cross tapered from venlafaxine to escitalopram (venlafaxine was thought to be activating and contributing to her anxiety because of its noradrenergic effects). Similarly, mirtazapine was discontinued because of its noradrenergic effects. Gabapentin was discontinued as it was thought to contribute to the patient's longstanding complaints of nausea. Quetiapine was added as an augmentation treatment for anxiety. Klonipin was switched to Ativan as it was thought Ativan was a more appropriate medication given its shorter half life. Throughout her inpatient stay, pt would often perseverate on the need for Klonopin and complain about her nausea. She was treated with PRN Zofran and PRN Reglan (as a secondline medication when Zofran was not effetive). Pt also reported two episodes of diarrhea; medicine consult service recommended discontinuing her PRN Maalox. Today, pt is affectively stable, denies depressed mood and SI, intent and plan. She is well related and future-oriented. Pt is psychiatrically and medically stable for discharge.

## 2023-04-19 NOTE — BH INPATIENT PSYCHIATRY DISCHARGE NOTE - NSDCCPCAREPLAN_GEN_ALL_CORE_FT
PRINCIPAL DISCHARGE DIAGNOSIS  Diagnosis: Anxiety  Assessment and Plan of Treatment:       SECONDARY DISCHARGE DIAGNOSES  Diagnosis: GERD (gastroesophageal reflux disease)  Assessment and Plan of Treatment:     Diagnosis: HLD (hyperlipidemia)  Assessment and Plan of Treatment:     Diagnosis: Nausea with vomiting  Assessment and Plan of Treatment:

## 2023-04-19 NOTE — BH INPATIENT PSYCHIATRY DISCHARGE NOTE - REASON FOR ADMISSION
Ms. Gross is a 72-year-old woman with a past psychiatric history of Generalized Anxiety Disorder and Major Depressive Disorder and past medica history of hypertension and hyperlipidemia who presented to the hospital for worsening anxiety.   Ms. Gross is a 72-year-old woman with a past psychiatric history of Generalized Anxiety Disorder and Major Depressive Disorder and past medical history of hypertension and hyperlipidemia who presented to the hospital for worsening anxiety.

## 2023-04-19 NOTE — BH INPATIENT PSYCHIATRY DISCHARGE NOTE - NSCORESITESY/N_GEN_A_CORE_RD
[FreeTextEntry1] : \par Sharon presents for eval of recurrent UTI, no cx available, does not appear to be IC or recurrent UTI. Discussed bladder sensitive diet, treating only positive cx, cran tabs/dmannose, calling if UTI symptoms. will try pyridium PRn and call if any UTIS, all questions answered with her mother.\par \par [] pyridium\par [] cranberry tablets\par [] call if UTI symptoms No

## 2023-04-19 NOTE — BH INPATIENT PSYCHIATRY DISCHARGE NOTE - ATTENDING DISCHARGE PHYSICAL EXAMINATION:
;;04/19: "better"; the characterization of how the patient feels now despite making other statements to other staff members; Not endorsing  suicidal or homicidal ideation intent or plans; no mention of auditory or visual hallucinations Alert; oriented; cognition intact; speech clear; no tremor or evidence of movement impairment. i&j fair to poor : aware of medications and acknowledges symptoms but not reflective in a meaningful way  on issues that impact on symptoms. Thinking is congruent with affect; no peculiarities of thinking or language use. Fair to good eye contact; speech clear spontaneous and normal volume .  Future oriented; looks forward to seeing friends and family.
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
## 2023-04-19 NOTE — PROGRESS NOTE ADULT - ATTENDING COMMENTS
Patient is a 71 yo female, , retired (), non-caregiver, domiciled at home with her , with a prior psychiatric diagnosis of ANSON and MDD, 4 previous inpatient psychiatric hospitalizations  most recently 7/5/2021-7/3/2021, prior SA in 11/2019 by overdosing on pills, no history of NSSIB, no history of violence/aggression, no hx of legal issues, daily marijuana use, PMHx of HTN, hyperlipidemia, treated in outpatient geropsych clinic by Dr. Stovall (as below, with last visit 3/15/2023 and at that time documented to be at baseline with chronic anxiety, complaints of nausea, seen twice monthly with mediation mgt and psychotherapy)  BIB spouse for "worsening anxiety."  Medicine consulted for nausea, diarrhea, and decreased appetite for 2 days.    pt seen and examined with Dr. Ortiz.  still felt nausea.   she likes to retry Reglan ( given by GI in the past )  Simethicone prn    pt also advised to try gingerale and saltine crackers for nausea symptosm     - diarrhea x 1- ( pt on maalox ) stop maalox,   can give gas X prn,   if more diarrhea- to get GI PCR and C diff    - GERD- continue protonix daily before breakfast  - nausea- zofran prn     d/c plan noted and d/w Psych team     thank you for allowing medicine to participate in the care. dw Dr. Ortiz.

## 2023-04-19 NOTE — PROGRESS NOTE ADULT - SUBJECTIVE AND OBJECTIVE BOX
INTERVAL HPI/OVERNIGHT EVENTS:  Patient was seen and examined at bedside. As per nurse and patient, no o/n events, patient resting comfortably. Complaining of panic attack this morning then progressive nausea leading to emesis of breakfast. Patient denies: fever, chills, lightheadedness, weakness, CP, palpitations, SOB, cough. ROS otherwise negative.    VITAL SIGNS:  T(F): 98.8 (04-17-23 @ 17:02)  HR: 82 (04-17-23 @ 20:11)  BP: 143/84 (04-17-23 @ 20:11)  RR: 17 (04-17-23 @ 20:11)  SpO2: 95% (04-17-23 @ 20:11)  Wt(kg): --        PHYSICAL EXAM:    Constitutional: resting comfortably in bed; NAD  HEENT: PER, anicteric sclera, no nasal discharge; MMM  Neck: supple  Respiratory: work of breathing normal  Cardiac: +S1/S2; RRR; no M/R/G  Gastrointestinal: soft, NT/ND; no rebound or guarding; bowel sounds slightly hypoactive  Extremities: WWP, no clubbing or cyanosis; no peripheral edema  Vascular: 2+ radial, DP/PT pulses B/L  Dermatologic: skin warm, dry and intact; no rashes, wounds, or scars  Neurologic: CNII-XII grossly intact; no focal deficits  Psychiatric: affect and characteristics of appearance, verbalizations, behaviors are appropriate    MEDICATIONS  (STANDING):  atorvastatin 10 milliGRAM(s) Oral at bedtime  escitalopram 10 milliGRAM(s) Oral daily  hydrochlorothiazide 12.5 milliGRAM(s) Oral daily  LORazepam     Tablet 1 milliGRAM(s) Oral <User Schedule>  losartan 100 milliGRAM(s) Oral daily  metoprolol tartrate 50 milliGRAM(s) Oral two times a day  pantoprazole    Tablet 40 milliGRAM(s) Oral before breakfast  polyethylene glycol 3350 17 Gram(s) Oral daily  QUEtiapine 100 milliGRAM(s) Oral at bedtime    MEDICATIONS  (PRN):  diphenhydrAMINE 50 milliGRAM(s) Oral once PRN EPS (Extrapyramidal symptoms)  hydrOXYzine hydrochloride 25 milliGRAM(s) Oral three times a day PRN anxiety  metoclopramide 10 milliGRAM(s) Oral two times a day PRN Nausea/vomiting  mirtazapine 15 milliGRAM(s) Oral at bedtime PRN insomnia  ondansetron   Disintegrating Tablet 8 milliGRAM(s) Oral every 8 hours PRN nausea  simethicone 80 milliGRAM(s) Chew daily PRN Indigestion/Nausea      Allergies    sulfa drugs (Anaphylaxis)  penicillins (Anaphylaxis)    Intolerances        LABS:                RADIOLOGY & ADDITIONAL TESTS:  Reviewed
INTERVAL HPI/OVERNIGHT EVENTS:  Patient was seen and examined at bedside. As per nurse and patient, no o/n events, patient resting comfortably. Complaining of persistent emesis after learning of her discharge. Tolerated dinner 4/19 and watched TV sitting upright after. Patient denies: fever, chills, lightheadedness, weakness, CP, palpitations, SOB, cough, N/V. ROS otherwise negative.    VITAL SIGNS:  T(F): 98 (04-19-23 @ 09:30)  HR: 83 (04-19-23 @ 09:30)  BP: 141/84 (04-19-23 @ 09:30)  RR: 18 (04-19-23 @ 09:30)  SpO2: 96% (04-19-23 @ 09:30)  Wt(kg): --        PHYSICAL EXAM:    Constitutional: sitting on bed; NAD  HEENT: PER, anicteric sclera, no nasal discharge; MMM  Neck: supple  Respiratory: wob normal  Musculoskeletal: moving all extremities normally  Neurologic: CNII-XII grossly intact; no focal deficits  Psychiatric: anxious affect, speech normal    MEDICATIONS  (STANDING):  atorvastatin 10 milliGRAM(s) Oral at bedtime  escitalopram 10 milliGRAM(s) Oral daily  hydrochlorothiazide 12.5 milliGRAM(s) Oral daily  LORazepam     Tablet 1 milliGRAM(s) Oral <User Schedule>  losartan 100 milliGRAM(s) Oral daily  metoprolol tartrate 50 milliGRAM(s) Oral two times a day  pantoprazole    Tablet 40 milliGRAM(s) Oral before breakfast  polyethylene glycol 3350 17 Gram(s) Oral daily  QUEtiapine 100 milliGRAM(s) Oral at bedtime    MEDICATIONS  (PRN):  diphenhydrAMINE 50 milliGRAM(s) Oral once PRN EPS (Extrapyramidal symptoms)  hydrOXYzine hydrochloride 25 milliGRAM(s) Oral three times a day PRN anxiety  metoclopramide 10 milliGRAM(s) Oral two times a day PRN Nausea/vomiting  mirtazapine 15 milliGRAM(s) Oral at bedtime PRN insomnia  ondansetron   Disintegrating Tablet 8 milliGRAM(s) Oral every 8 hours PRN nausea  simethicone 80 milliGRAM(s) Chew daily PRN Indigestion/Nausea      Allergies    sulfa drugs (Anaphylaxis)  penicillins (Anaphylaxis)    Intolerances        LABS:                RADIOLOGY & ADDITIONAL TESTS:  Reviewed

## 2023-04-20 NOTE — BH SOCIAL WORK CONFIRMATION FOLLOW UP NOTE - NSLINKEDTOLOC_PSY_ALL_CORE
[facility]   Dr. Kamara (Psychiatrist)  Phone: [phone]

## 2023-04-21 DIAGNOSIS — F33.9 MAJOR DEPRESSIVE DISORDER, RECURRENT, UNSPECIFIED: ICD-10-CM

## 2023-04-21 DIAGNOSIS — I10 ESSENTIAL (PRIMARY) HYPERTENSION: ICD-10-CM

## 2023-04-21 DIAGNOSIS — F41.9 ANXIETY DISORDER, UNSPECIFIED: ICD-10-CM

## 2023-04-21 DIAGNOSIS — R11.0 NAUSEA: ICD-10-CM

## 2023-04-21 DIAGNOSIS — G47.00 INSOMNIA, UNSPECIFIED: ICD-10-CM

## 2023-04-21 DIAGNOSIS — E78.5 HYPERLIPIDEMIA, UNSPECIFIED: ICD-10-CM

## 2023-04-21 DIAGNOSIS — Z88.2 ALLERGY STATUS TO SULFONAMIDES: ICD-10-CM

## 2023-04-21 DIAGNOSIS — F12.99 CANNABIS USE, UNSPECIFIED WITH UNSPECIFIED CANNABIS-INDUCED DISORDER: ICD-10-CM

## 2023-04-21 DIAGNOSIS — Z88.0 ALLERGY STATUS TO PENICILLIN: ICD-10-CM

## 2023-04-21 DIAGNOSIS — K21.9 GASTRO-ESOPHAGEAL REFLUX DISEASE WITHOUT ESOPHAGITIS: ICD-10-CM

## 2023-04-21 DIAGNOSIS — Z91.51 PERSONAL HISTORY OF SUICIDAL BEHAVIOR: ICD-10-CM

## 2023-04-21 DIAGNOSIS — R19.7 DIARRHEA, UNSPECIFIED: ICD-10-CM

## 2023-04-24 PROCEDURE — 99215 OFFICE O/P EST HI 40 MIN: CPT | Mod: 95

## 2023-05-04 PROCEDURE — 99442: CPT

## 2023-05-10 ENCOUNTER — INPATIENT (INPATIENT)
Facility: HOSPITAL | Age: 73
LOS: 32 days | Discharge: HOME | End: 2023-06-12
Attending: PSYCHIATRY & NEUROLOGY | Admitting: PSYCHIATRY & NEUROLOGY
Payer: COMMERCIAL

## 2023-05-10 VITALS — HEIGHT: 62 IN | RESPIRATION RATE: 18 BRPM | OXYGEN SATURATION: 97 % | WEIGHT: 222.01 LBS | TEMPERATURE: 99 F

## 2023-05-10 DIAGNOSIS — Z98.890 OTHER SPECIFIED POSTPROCEDURAL STATES: Chronic | ICD-10-CM

## 2023-05-10 DIAGNOSIS — F33.9 MAJOR DEPRESSIVE DISORDER, RECURRENT, UNSPECIFIED: ICD-10-CM

## 2023-05-10 PROBLEM — R11.0 NAUSEA: Chronic | Status: ACTIVE | Noted: 2023-04-19

## 2023-05-10 PROBLEM — K21.9 GASTRO-ESOPHAGEAL REFLUX DISEASE WITHOUT ESOPHAGITIS: Chronic | Status: ACTIVE | Noted: 2023-03-31

## 2023-05-10 LAB
APPEARANCE UR: CLEAR — SIGNIFICANT CHANGE UP
B PERT DNA SPEC QL NAA+PROBE: SIGNIFICANT CHANGE UP
B PERT+PARAPERT DNA PNL SPEC NAA+PROBE: SIGNIFICANT CHANGE UP
BILIRUB UR-MCNC: NEGATIVE — SIGNIFICANT CHANGE UP
BORDETELLA PARAPERTUSSIS (RAPRVP): SIGNIFICANT CHANGE UP
C PNEUM DNA SPEC QL NAA+PROBE: SIGNIFICANT CHANGE UP
COLOR SPEC: SIGNIFICANT CHANGE UP
DIFF PNL FLD: NEGATIVE — SIGNIFICANT CHANGE UP
FLUAV SUBTYP SPEC NAA+PROBE: SIGNIFICANT CHANGE UP
FLUBV RNA SPEC QL NAA+PROBE: SIGNIFICANT CHANGE UP
GLUCOSE UR QL: NEGATIVE — SIGNIFICANT CHANGE UP
HADV DNA SPEC QL NAA+PROBE: SIGNIFICANT CHANGE UP
HCOV 229E RNA SPEC QL NAA+PROBE: SIGNIFICANT CHANGE UP
HCOV HKU1 RNA SPEC QL NAA+PROBE: SIGNIFICANT CHANGE UP
HCOV NL63 RNA SPEC QL NAA+PROBE: SIGNIFICANT CHANGE UP
HCOV OC43 RNA SPEC QL NAA+PROBE: SIGNIFICANT CHANGE UP
HMPV RNA SPEC QL NAA+PROBE: SIGNIFICANT CHANGE UP
HPIV1 RNA SPEC QL NAA+PROBE: SIGNIFICANT CHANGE UP
HPIV2 RNA SPEC QL NAA+PROBE: SIGNIFICANT CHANGE UP
HPIV3 RNA SPEC QL NAA+PROBE: SIGNIFICANT CHANGE UP
HPIV4 RNA SPEC QL NAA+PROBE: SIGNIFICANT CHANGE UP
KETONES UR-MCNC: NEGATIVE — SIGNIFICANT CHANGE UP
LEUKOCYTE ESTERASE UR-ACNC: NEGATIVE — SIGNIFICANT CHANGE UP
M PNEUMO DNA SPEC QL NAA+PROBE: SIGNIFICANT CHANGE UP
NITRITE UR-MCNC: NEGATIVE — SIGNIFICANT CHANGE UP
PH UR: 7 — SIGNIFICANT CHANGE UP (ref 5–8)
PROT UR-MCNC: NEGATIVE — SIGNIFICANT CHANGE UP
RAPID RVP RESULT: SIGNIFICANT CHANGE UP
RSV RNA SPEC QL NAA+PROBE: SIGNIFICANT CHANGE UP
RV+EV RNA SPEC QL NAA+PROBE: SIGNIFICANT CHANGE UP
SARS-COV-2 RNA SPEC QL NAA+PROBE: SIGNIFICANT CHANGE UP
SP GR SPEC: 1.01 — LOW (ref 1.01–1.05)
UROBILINOGEN FLD QL: SIGNIFICANT CHANGE UP

## 2023-05-10 PROCEDURE — 99222 1ST HOSP IP/OBS MODERATE 55: CPT

## 2023-05-10 PROCEDURE — 99215 OFFICE O/P EST HI 40 MIN: CPT

## 2023-05-10 RX ORDER — GABAPENTIN 400 MG/1
100 CAPSULE ORAL THREE TIMES A DAY
Refills: 0 | Status: DISCONTINUED | OUTPATIENT
Start: 2023-05-10 | End: 2023-05-15

## 2023-05-10 RX ORDER — CLONAZEPAM 1 MG
1 TABLET ORAL DAILY
Refills: 0 | Status: DISCONTINUED | OUTPATIENT
Start: 2023-05-10 | End: 2023-05-11

## 2023-05-10 RX ORDER — ESCITALOPRAM OXALATE 10 MG/1
15 TABLET, FILM COATED ORAL DAILY
Refills: 0 | Status: DISCONTINUED | OUTPATIENT
Start: 2023-05-10 | End: 2023-06-05

## 2023-05-10 RX ORDER — ONDANSETRON 8 MG/1
8 TABLET, FILM COATED ORAL EVERY 8 HOURS
Refills: 0 | Status: DISCONTINUED | OUTPATIENT
Start: 2023-05-10 | End: 2023-06-12

## 2023-05-10 RX ORDER — PANTOPRAZOLE SODIUM 20 MG/1
40 TABLET, DELAYED RELEASE ORAL
Refills: 0 | Status: DISCONTINUED | OUTPATIENT
Start: 2023-05-10 | End: 2023-06-12

## 2023-05-10 RX ORDER — MIRTAZAPINE 45 MG/1
7.5 TABLET, ORALLY DISINTEGRATING ORAL AT BEDTIME
Refills: 0 | Status: DISCONTINUED | OUTPATIENT
Start: 2023-05-10 | End: 2023-05-11

## 2023-05-10 RX ORDER — METOPROLOL TARTRATE 50 MG
50 TABLET ORAL
Refills: 0 | Status: DISCONTINUED | OUTPATIENT
Start: 2023-05-10 | End: 2023-05-14

## 2023-05-10 RX ORDER — CLONAZEPAM 1 MG
0.5 TABLET ORAL AT BEDTIME
Refills: 0 | Status: DISCONTINUED | OUTPATIENT
Start: 2023-05-10 | End: 2023-05-11

## 2023-05-10 RX ORDER — LOSARTAN POTASSIUM 100 MG/1
100 TABLET, FILM COATED ORAL DAILY
Refills: 0 | Status: DISCONTINUED | OUTPATIENT
Start: 2023-05-10 | End: 2023-05-14

## 2023-05-10 RX ORDER — HALOPERIDOL DECANOATE 100 MG/ML
2 INJECTION INTRAMUSCULAR ONCE
Refills: 0 | Status: DISCONTINUED | OUTPATIENT
Start: 2023-05-10 | End: 2023-06-12

## 2023-05-10 RX ORDER — ATORVASTATIN CALCIUM 80 MG/1
10 TABLET, FILM COATED ORAL AT BEDTIME
Refills: 0 | Status: DISCONTINUED | OUTPATIENT
Start: 2023-05-10 | End: 2023-06-12

## 2023-05-10 RX ORDER — HALOPERIDOL DECANOATE 100 MG/ML
2 INJECTION INTRAMUSCULAR EVERY 6 HOURS
Refills: 0 | Status: DISCONTINUED | OUTPATIENT
Start: 2023-05-10 | End: 2023-06-12

## 2023-05-10 RX ADMIN — ATORVASTATIN CALCIUM 10 MILLIGRAM(S): 80 TABLET, FILM COATED ORAL at 21:37

## 2023-05-10 RX ADMIN — GABAPENTIN 100 MILLIGRAM(S): 400 CAPSULE ORAL at 21:34

## 2023-05-10 RX ADMIN — MIRTAZAPINE 7.5 MILLIGRAM(S): 45 TABLET, ORALLY DISINTEGRATING ORAL at 21:35

## 2023-05-10 RX ADMIN — Medication 50 MILLIGRAM(S): at 21:35

## 2023-05-10 NOTE — BH PATIENT PROFILE - NSICDXPASTMEDICALHX_GEN_ALL_CORE_FT
PAST MEDICAL HISTORY:  Anxiety     Endometriosis     GERD (gastroesophageal reflux disease)     High cholesterol     HTN (hypertension)     Nausea

## 2023-05-10 NOTE — CHART NOTE - NSCHARTNOTEFT_GEN_A_CORE
Screening Medical Evaluation    Patient Admitted from: [facility] admitting diagnosis: Recurrent major depressive disorder      PAST MEDICAL & SURGICAL HISTORY:  Anxiety      High cholesterol      HTN (hypertension)      Endometriosis      GERD (gastroesophageal reflux disease)      Nausea      History of bilateral breast reduction surgery            Allergies    penicillins (Anaphylaxis)  sulfa drugs (Anaphylaxis)    Intolerances          Social History:       FAMILY HISTORY:  No pertinent family history in first degree relatives          MEDICATIONS  (STANDING):  atorvastatin 10 milliGRAM(s) Oral at bedtime  clonazePAM  Tablet 0.5 milliGRAM(s) Oral at bedtime  clonazePAM  Tablet 1 milliGRAM(s) Oral daily  escitalopram 15 milliGRAM(s) Oral daily  gabapentin 100 milliGRAM(s) Oral three times a day  hydrochlorothiazide 12.5 milliGRAM(s) Oral daily  losartan 100 milliGRAM(s) Oral daily  metoprolol succinate ER 50 milliGRAM(s) Oral two times a day  mirtazapine 7.5 milliGRAM(s) Oral at bedtime  pantoprazole    Tablet 40 milliGRAM(s) Oral before breakfast    MEDICATIONS  (PRN):  haloperidol     Tablet 2 milliGRAM(s) Oral every 6 hours PRN agitation  haloperidol    Injectable 2 milliGRAM(s) IntraMuscular once PRN severe agitation  LORazepam     Tablet 1 milliGRAM(s) Oral every 6 hours PRN anxiety  LORazepam   Injectable 1 milliGRAM(s) IntraMuscular once PRN severe agitation  ondansetron    Tablet 8 milliGRAM(s) Oral every 8 hours PRN nausea        Vital Signs Last 24 Hrs  T(C): 37.1 (10 May 2023 16:29), Max: 37.1 (10 May 2023 16:29)  T(F): 98.8 (10 May 2023 16:29), Max: 98.8 (10 May 2023 16:29)  HR: -- 83b/ min.  BP: -- 127/ 62  BP(mean): -- 18b/ min   RR: 18 (10 May 2023 16:29) (18 - 18)  SpO2: 97% (10 May 2023 16:29) (97% - 97%)    Parameters below as of 10 May 2023 16:29  Patient On (Oxygen Delivery Method): room air      CAPILLARY BLOOD GLUCOSE            PHYSICAL EXAM:  GENERAL: NAD.  HEAD:  Atraumatic, Normocephalic  EYES: EOMI, conjunctiva and sclera clear  NECK: Supple, No JVD  CHEST/LUNG: Clear to auscultation bilaterally; No wheeze  HEART: Regular rate and rhythm; No murmurs, rubs, or gallops  ABDOMEN: Positive BS, Soft, Nontender.   EXTREMITIES:  2+ Peripheral Pulses, No clubbing, cyanosis, or edema  PSYCH:   NEUROLOGY: non-focal  SKIN: No rashes or lesions seen on exposed skin.     LABS:                Urinalysis Basic - ( 10 May 2023 17:08 )    Color: Light Yellow / Appearance: Clear / S.007 / pH: x  Gluc: x / Ketone: Negative  / Bili: Negative / Urobili: <2 mg/dL   Blood: x / Protein: Negative / Nitrite: Negative   Leuk Esterase: Negative / RBC: x / WBC x   Sq Epi: x / Non Sq Epi: x / Bacteria: x        RADIOLOGY & ADDITIONAL TESTS:      Assessment and Plan:  73F admitted to [facility] for Recurrent major depressive disorder. PMHx of HTN, Hyperlipidemia, GERD, Endometriosis. Pt seen for medical screening evaluation. Patient has no acute complaints at this time. Patient denies fever, chills, headache, dizziness, lightheadedness, N/V, SOB, cough, congestion, chest pain, abdominal pain, dysuria, hematuria, diarrhea, constipation. Physical exam unremarkable, VSS. Labs as above. EKG pending. .     1.) HTN. / 62. Low salty diet. Continue BP meds.   2.) Hyperlipidemia: Continue Statin.  3.) GERD: Continue PPI. Aspiration precautions.   4.) Endometriosis: Pt 74yo, pass menopause.   5.) Recurrent major depressive disorder: F/U EKG to assess QT/ QTC. Fall precautions, PT eval for gait assessment. Plan per primary team. Screening Medical Evaluation    Patient Admitted from: [facility] admitting diagnosis: Recurrent major depressive disorder      PAST MEDICAL & SURGICAL HISTORY:  Anxiety      High cholesterol      HTN (hypertension)      Endometriosis      GERD (gastroesophageal reflux disease)      Nausea      History of bilateral breast reduction surgery            Allergies    penicillins (Anaphylaxis)  sulfa drugs (Anaphylaxis)    Intolerances          Social History:       FAMILY HISTORY:  No pertinent family history in first degree relatives          MEDICATIONS  (STANDING):  atorvastatin 10 milliGRAM(s) Oral at bedtime  clonazePAM  Tablet 0.5 milliGRAM(s) Oral at bedtime  clonazePAM  Tablet 1 milliGRAM(s) Oral daily  escitalopram 15 milliGRAM(s) Oral daily  gabapentin 100 milliGRAM(s) Oral three times a day  hydrochlorothiazide 12.5 milliGRAM(s) Oral daily  losartan 100 milliGRAM(s) Oral daily  metoprolol succinate ER 50 milliGRAM(s) Oral two times a day  mirtazapine 7.5 milliGRAM(s) Oral at bedtime  pantoprazole    Tablet 40 milliGRAM(s) Oral before breakfast    MEDICATIONS  (PRN):  haloperidol     Tablet 2 milliGRAM(s) Oral every 6 hours PRN agitation  haloperidol    Injectable 2 milliGRAM(s) IntraMuscular once PRN severe agitation  LORazepam     Tablet 1 milliGRAM(s) Oral every 6 hours PRN anxiety  LORazepam   Injectable 1 milliGRAM(s) IntraMuscular once PRN severe agitation  ondansetron    Tablet 8 milliGRAM(s) Oral every 8 hours PRN nausea        Vital Signs Last 24 Hrs  T(C): 37.1 (10 May 2023 16:29), Max: 37.1 (10 May 2023 16:29)  T(F): 98.8 (10 May 2023 16:29), Max: 98.8 (10 May 2023 16:29)  HR: -- 83b/ min.  BP: -- 127/ 62  BP(mean): -- 18b/ min   RR: 18 (10 May 2023 16:29) (18 - 18)  SpO2: 97% (10 May 2023 16:29) (97% - 97%)    Parameters below as of 10 May 2023 16:29  Patient On (Oxygen Delivery Method): room air      CAPILLARY BLOOD GLUCOSE            PHYSICAL EXAM:  GENERAL: NAD.  HEAD:  Atraumatic, Normocephalic  EYES: EOMI, conjunctiva and sclera clear  NECK: Supple, No JVD  CHEST/LUNG: Clear to auscultation bilaterally; No wheeze  HEART: Regular rate and rhythm; No murmurs, rubs, or gallops  ABDOMEN: Positive BS, Soft, Nontender.   EXTREMITIES:  2+ Peripheral Pulses, No clubbing, cyanosis, or edema  PSYCH: Calm and cooperative.   NEUROLOGY: non-focal  SKIN: No rashes or lesions seen on exposed skin.     LABS:                Urinalysis Basic - ( 10 May 2023 17:08 )    Color: Light Yellow / Appearance: Clear / S.007 / pH: x  Gluc: x / Ketone: Negative  / Bili: Negative / Urobili: <2 mg/dL   Blood: x / Protein: Negative / Nitrite: Negative   Leuk Esterase: Negative / RBC: x / WBC x   Sq Epi: x / Non Sq Epi: x / Bacteria: x        RADIOLOGY & ADDITIONAL TESTS:      Assessment and Plan:  73F admitted to [facility] for Recurrent major depressive disorder. PMHx of HTN, Hyperlipidemia, GERD, Endometriosis. Pt seen for medical screening evaluation. Patient has no acute complaints at this time. Patient denies fever, chills, headache, dizziness, lightheadedness, N/V, SOB, cough, congestion, chest pain, abdominal pain, dysuria, hematuria, diarrhea, constipation. Physical exam unremarkable, VSS. Labs as above. EKG pending. .     1.) HTN. / 62. Low salty diet. Continue BP meds.   2.) Hyperlipidemia: Continue Statin.  3.) GERD: Continue PPI. Aspiration precautions.   4.) Endometriosis: Pt 74yo, pass menopause.   5.) Recurrent major depressive disorder: F/U EKG to assess QT/ QTC. Fall precautions, PT eval for gait assessment. Plan per primary team.

## 2023-05-10 NOTE — BH INPATIENT PSYCHIATRY ASSESSMENT NOTE - PAST PSYCHOTROPIC MEDICATION
Seroquel, Mirtazapine, Effexor, Klonopin; outpatient summary review document also mentions Abilify, Olanzapine and Rexulti Seroquel, Mirtazapine, Effexor, Klonopin; outpatient summary review document also mentions Abilify, Olanzapine and Rexulti (poor tolerance to these three)

## 2023-05-10 NOTE — BH INPATIENT PSYCHIATRY ASSESSMENT NOTE - RISK ASSESSMENT
Pt's acute suicide risk is moderate given her severe anxiety and current mood symptoms. Pt's chronic risk remains elevated due to her previous history of a suicide attempt and historical diagnoses. Protective factors include the pt's treatment adherence, supportive spouse and the fact that she is future-oriented.

## 2023-05-10 NOTE — BH INPATIENT PSYCHIATRY ASSESSMENT NOTE - DESCRIPTION
pt lives with her  of 26 yrs in a private apartment. She previously worked as an  and . She has a younger brother (8 yrs younger). Pt grew up with both her parents in [place], [place] and reports being very close to both her parents and that she is strongly grieving their loss. Pt’s father passed away 9 yrs ago and pt’s mother passed away 7 yrs ago. Pt reports being very close to her parents growing up and having a rebellious teenage and young adult phase where she experimented with drugs. Additionally, per DEMIAN notes, pt reports smoking cannabis regularly.

## 2023-05-10 NOTE — BH INPATIENT PSYCHIATRY ASSESSMENT NOTE - NSBHPHYSICALEXAM_PSY_ALL_CORE
General: In no acute distress, non-labored breathing, sitting comfortably in chair  Chest: clear to auscultation bilaterally  Cardiac: normal rate, rhythm; no murmurs, rubs or gallops appreciated  GI: soft, nondistended, no masses or organomegaly appreciated  Neuro: normal tone, 5/5 strength in upper and lower extremities; CN 2-12 intact

## 2023-05-10 NOTE — BH INPATIENT PSYCHIATRY ASSESSMENT NOTE - ADDITIONAL DETAILS ALL
Per chart review: Patient endorses chronic passive SI, Past h/o suicide attempt by overdose in 2019  Denies any current SI, intent or plan.

## 2023-05-10 NOTE — BH PATIENT PROFILE - HOME MEDICATIONS
hydroCHLOROthiazide 12.5 mg oral capsule , 1 cap(s) orally once a day MDD: 12.5  hydroCHLOROthiazide 12.5 mg oral capsule , 1 cap(s) orally once a day  Mi-Acid Gas Relief 80 mg oral tablet, chewable , 80 orally every 6 hours as needed for Nausea  Mi-Acid Gas Relief 80 mg oral tablet, chewable , 1 tab(s) orally every 6 hours as needed for  nausea MDD: 320 mg  ondansetron 4 mg oral tablet, disintegrating , 1 tab(s) orally every 8 hours as needed for  nausea MDD: 12 mg  escitalopram 10 mg oral tablet , 1 tab(s) orally once a day MDD: 10 mg  QUEtiapine 100 mg oral tablet , 1 tab(s) orally once a day (at bedtime) MDD: 100  QUEtiapine 100 mg oral tablet , 1 tab(s) orally once a day (at bedtime)  atorvastatin 10 mg oral tablet , 1 tab(s) orally once a day (at bedtime) MDD: 10  pantoprazole 40 mg oral delayed release tablet , 1 tab(s) orally once a day (before a meal) MDD: 40  losartan 100 mg oral tablet , 1 tab(s) orally once a day MDD: 100  metoprolol tartrate 50 mg oral tablet , 1 tab(s) orally 2 times a day MDD: 100 mg  LORazepam 1 mg oral tablet , 1 tab(s) orally 2 times a day MDD: 2 mg
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
## 2023-05-10 NOTE — BH INPATIENT PSYCHIATRY ASSESSMENT NOTE - HPI (INCLUDE ILLNESS QUALITY, SEVERITY, DURATION, TIMING, CONTEXT, MODIFYING FACTORS, ASSOCIATED SIGNS AND SYMPTOMS)
Patient is a 72yo female, , retired (), non-caregiver, domiciled at home with her ,  with a prior psychiatric diagnosis of ANSON and MDD, 4 previous inpatient psychiatric hospitalizations at [facility] and most recently at [facility] from 3/28/23-4/29/23,  prior SA in 11/2019 by overdosing on pills, no history of NSSIB, no history of violence/aggression, no hx of legal issues, hx of daily marijuana use, PMHx of HTN, hyperlipidemia, treated in outpatient geropsych clinic by Dr. Stovall. Patient brought in for worsening anxiety and SI at home.     On assessment on the unit, patient endorses worsening anxiety since medication changes from previous hospitalization. She stated that she wakes up in a panic (increased HR, trouble breathing) and then feels "shivers" throughout the whole day until evening when the anxiety is much less. Patient stated that she feels hopeless, helpless, low appetite and poor sleep without Remeron. When asked about SI she replied, "I rather not answer that". She denies that she is depressed, "My mood is good". She denies any SI on the unit, endorses feeling more hopeful since being admitted to [facility] [place]. Denies any HI. Patient denies any hx of psychosis or sadia. She denies any alcohol or cigarette use, but endorses marijuana use (last time was couple of months ago). She reported one past SA about 7yrs ago and denies any hx of NSSIB. Patient stated that she has been taking all medications as prescribed and is in agreement to continue current medications. No CO needed.

## 2023-05-10 NOTE — BH INPATIENT PSYCHIATRY ASSESSMENT NOTE - ATTENDING COMMENTS
Agree with NP assessment and plan with the exception of the following addendum/modification:    Patient presents as anxious, ruminative, overinclusive, obsessive pattern of thinking. Characterologic deficits noted. Will continue current medications as is for now. Will consider retitrating remeron as per outpatient psychiatrist, patient did well on remeron 30mg, effexor 187.5mg and klonopin prn for over a year. Patient complains of UTI symptoms -will send for UA, culture. Will also need utox.

## 2023-05-10 NOTE — BH INPATIENT PSYCHIATRY ASSESSMENT NOTE - NSBHATTESTAPPBILLTIME_PSY_A_CORE
I attest my time as GIFTY is greater than 50% of the total combined time spent on qualifying patient care activities. I have reviewed and verified the documentation.

## 2023-05-10 NOTE — BH INPATIENT PSYCHIATRY ASSESSMENT NOTE - NSBHMETABOLIC_PSY_ALL_CORE_FT
BMI: BMI (kg/m2): 40.8 (03-28-23 @ 19:10)  HbA1c: A1C with Estimated Average Glucose Result: 5.7 % (04-14-23 @ 07:32)    Glucose:   BP: --  Lipid Panel: Date/Time: 04-14-23 @ 07:32  Cholesterol, Serum: 163  Direct LDL: --  HDL Cholesterol, Serum: 36  Total Cholesterol/HDL Ration Measurement: --  Triglycerides, Serum: 200   BMI: BMI (kg/m2): 40.6 (05-10-23 @ 16:29)  HbA1c: A1C with Estimated Average Glucose Result: 5.7 % (04-14-23 @ 07:32)    Glucose:   BP: --  Lipid Panel: Date/Time: 04-14-23 @ 07:32  Cholesterol, Serum: 163  Direct LDL: --  HDL Cholesterol, Serum: 36  Total Cholesterol/HDL Ration Measurement: --  Triglycerides, Serum: 200
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
## 2023-05-10 NOTE — BH INPATIENT PSYCHIATRY ASSESSMENT NOTE - NSBHCHARTREVIEWVS_PSY_A_CORE FT
Vital Signs Last 24 Hrs  T(C): --  T(F): --  HR: --  BP: --  BP(mean): --  RR: --  SpO2: --     Vital Signs Last 24 Hrs  T(C): 37.1 (05-10-23 @ 16:29), Max: 37.1 (05-10-23 @ 16:29)  T(F): 98.8 (05-10-23 @ 16:29), Max: 98.8 (05-10-23 @ 16:29)  HR: --  BP: --  BP(mean): --  RR: 18 (05-10-23 @ 16:29) (18 - 18)  SpO2: 97% (05-10-23 @ 16:29) (97% - 97%)    Orthostatic VS  05-10-23 @ 16:29  Lying BP: --/-- HR: --  Sitting BP: 126/58 HR: 77  Standing BP: 127/62 HR: 83  Site: --  Mode: --

## 2023-05-10 NOTE — BH PATIENT PROFILE - LIVES WITH
Erythromycin Pregnancy And Lactation Text: This medication is Pregnancy Category B and is considered safe during pregnancy. It is also excreted in breast milk. Dapsone Counseling: I discussed with the patient the risks of dapsone including but not limited to hemolytic anemia, agranulocytosis, rashes, methemoglobinemia, kidney failure, peripheral neuropathy, headaches, GI upset, and liver toxicity.  Patients who start dapsone require monitoring including baseline LFTs and weekly CBCs for the first month, then every month thereafter.  The patient verbalized understanding of the proper use and possible adverse effects of dapsone.  All of the patient's questions and concerns were addressed. Detail Level: Zone Azithromycin Pregnancy And Lactation Text: This medication is considered safe during pregnancy and is also secreted in breast milk. Topical Retinoid Pregnancy And Lactation Text: This medication is Pregnancy Category C. It is unknown if this medication is excreted in breast milk. Tetracycline Counseling: Patient counseled regarding possible photosensitivity and increased risk for sunburn.  Patient instructed to avoid sunlight, if possible.  When exposed to sunlight, patients should wear protective clothing, sunglasses, and sunscreen.  The patient was instructed to call the office immediately if the following severe adverse effects occur:  hearing changes, easy bruising/bleeding, severe headache, or vision changes.  The patient verbalized understanding of the proper use and possible adverse effects of tetracycline.  All of the patient's questions and concerns were addressed. Patient understands to avoid pregnancy while on therapy due to potential birth defects. Topical Clindamycin Pregnancy And Lactation Text: This medication is Pregnancy Category B and is considered safe during pregnancy. It is unknown if it is excreted in breast milk. High Dose Vitamin A Pregnancy And Lactation Text: High dose vitamin A therapy is contraindicated during pregnancy and breast feeding. Erythromycin Counseling:  I discussed with the patient the risks of erythromycin including but not limited to GI upset, allergic reaction, drug rash, diarrhea, increase in liver enzymes, and yeast infections. Birth Control Pills Pregnancy And Lactation Text: This medication should be avoided if pregnant and for the first 30 days post-partum. Azithromycin Counseling:  I discussed with the patient the risks of azithromycin including but not limited to GI upset, allergic reaction, drug rash, diarrhea, and yeast infections. Topical Retinoid counseling:  Patient advised to apply a pea-sized amount only at bedtime and wait 30 minutes after washing their face before applying.  If too drying, patient may add a non-comedogenic moisturizer. The patient verbalized understanding of the proper use and possible adverse effects of retinoids.  All of the patient's questions and concerns were addressed. Use Enhanced Medication Counseling?: No Spironolactone Pregnancy And Lactation Text: This medication can cause feminization of the male fetus and should be avoided during pregnancy. The active metabolite is also found in breast milk. High Dose Vitamin A Counseling: Side effects reviewed, pt to contact office should one occur. Doxycycline Pregnancy And Lactation Text: This medication is Pregnancy Category D and not consider safe during pregnancy. It is also excreted in breast milk but is considered safe for shorter treatment courses. Birth Control Pills Counseling: Birth Control Pill Counseling: I discussed with the patient the potential side effects of OCPs including but not limited to increased risk of stroke, heart attack, thrombophlebitis, deep venous thrombosis, hepatic adenomas, breast changes, GI upset, headaches, and depression.  The patient verbalized understanding of the proper use and possible adverse effects of OCPs. All of the patient's questions and concerns were addressed. Topical Clindamycin Counseling: Patient counseled that this medication may cause skin irritation or allergic reactions.  In the event of skin irritation, the patient was advised to reduce the amount of the drug applied or use it less frequently.   The patient verbalized understanding of the proper use and possible adverse effects of clindamycin.  All of the patient's questions and concerns were addressed. Benzoyl Peroxide Pregnancy And Lactation Text: This medication is Pregnancy Category C. It is unknown if benzoyl peroxide is excreted in breast milk. Spironolactone Counseling: Patient advised regarding risks of diarrhea, abdominal pain, hyperkalemia, birth defects (for female patients), liver toxicity and renal toxicity. The patient may need blood work to monitor liver and kidney function and potassium levels while on therapy. The patient verbalized understanding of the proper use and possible adverse effects of spironolactone.  All of the patient's questions and concerns were addressed. Isotretinoin Pregnancy And Lactation Text: This medication is Pregnancy Category X and is considered extremely dangerous during pregnancy. It is unknown if it is excreted in breast milk. Doxycycline Counseling:  Patient counseled regarding possible photosensitivity and increased risk for sunburn.  Patient instructed to avoid sunlight, if possible.  When exposed to sunlight, patients should wear protective clothing, sunglasses, and sunscreen.  The patient was instructed to call the office immediately if the following severe adverse effects occur:  hearing changes, easy bruising/bleeding, severe headache, or vision changes.  The patient verbalized understanding of the proper use and possible adverse effects of doxycycline.  All of the patient's questions and concerns were addressed. Bactrim Pregnancy And Lactation Text: This medication is Pregnancy Category D and is known to cause fetal risk.  It is also excreted in breast milk. Tazorac Pregnancy And Lactation Text: This medication is not safe during pregnancy. It is unknown if this medication is excreted in breast milk. Benzoyl Peroxide Counseling: Patient counseled that medicine may cause skin irritation and bleach clothing.  In the event of skin irritation, the patient was advised to reduce the amount of the drug applied or use it less frequently.   The patient verbalized understanding of the proper use and possible adverse effects of benzoyl peroxide.  All of the patient's questions and concerns were addressed. Minocycline Pregnancy And Lactation Text: This medication is Pregnancy Category D and not consider safe during pregnancy. It is also excreted in breast milk. Topical Sulfur Applications Pregnancy And Lactation Text: This medication is Pregnancy Category C and has an unknown safety profile during pregnancy. It is unknown if this topical medication is excreted in breast milk. Isotretinoin Counseling: Patient should get monthly blood tests, not donate blood, not drive at night if vision affected, not share medication, and not undergo elective surgery for 6 months after tx completed. Side effects reviewed, pt to contact office should one occur. Dapsone Pregnancy And Lactation Text: This medication is Pregnancy Category C and is not considered safe during pregnancy or breast feeding. Bactrim Counseling:  I discussed with the patient the risks of sulfa antibiotics including but not limited to GI upset, allergic reaction, drug rash, diarrhea, dizziness, photosensitivity, and yeast infections.  Rarely, more serious reactions can occur including but not limited to aplastic anemia, agranulocytosis, methemoglobinemia, blood dyscrasias, liver or kidney failure, lung infiltrates or desquamative/blistering drug rashes. Tazorac Counseling:  Patient advised that medication is irritating and drying.  Patient may need to apply sparingly and wash off after an hour before eventually leaving it on overnight.  The patient verbalized understanding of the proper use and possible adverse effects of tazorac.  All of the patient's questions and concerns were addressed. Topical Sulfur Applications Counseling: Topical Sulfur Counseling: Patient counseled that this medication may cause skin irritation or allergic reactions.  In the event of skin irritation, the patient was advised to reduce the amount of the drug applied or use it less frequently.   The patient verbalized understanding of the proper use and possible adverse effects of topical sulfur application.  All of the patient's questions and concerns were addressed. Minocycline Counseling: Patient advised regarding possible photosensitivity and discoloration of the teeth, skin, lips, tongue and gums.  Patient instructed to avoid sunlight, if possible.  When exposed to sunlight, patients should wear protective clothing, sunglasses, and sunscreen.  The patient was instructed to call the office immediately if the following severe adverse effects occur:  hearing changes, easy bruising/bleeding, severe headache, or vision changes.  The patient verbalized understanding of the proper use and possible adverse effects of minocycline.  All of the patient's questions and concerns were addressed. spouse

## 2023-05-10 NOTE — BH PATIENT PROFILE - FALL HARM RISK - HARM RISK INTERVENTIONS
Assistance with ambulation/Assistance OOB with selected safe patient handling equipment/Communicate Risk of Fall with Harm to all staff/Discuss with provider need for PT consult/Monitor gait and stability/Orthostatic vital signs/Reinforce activity limits and safety measures with patient and family/Tailored Fall Risk Interventions/Visual Cue: Yellow wristband and red socks/Instruct patient to call for assistance before getting out of bed or chair/Non-slip footwear when patient is out of bed/Physically safe environment - no spills, clutter or unnecessary equipment/Purposeful Proactive Rounding/Room/bathroom lighting operational, light cord in reach

## 2023-05-10 NOTE — BH INPATIENT PSYCHIATRY ASSESSMENT NOTE - NSBHASSESSSUMMFT_PSY_ALL_CORE
Patient is a 72yo female, , retired (), non-caregiver, domiciled at home with her ,  with a prior psychiatric diagnosis of ANSON and MDD, 4 previous inpatient psychiatric hospitalizations at [facility] and most recently at [facility] from 3/28/23-4/29/23,  prior SA in 11/2019 by overdosing on pills, no history of NSSIB, no history of violence/aggression, no hx of legal issues, hx of daily marijuana use, PMHx of HTN, hyperlipidemia, treated in outpatient geropsych clinic by Dr. Stovall. Patient brought in for worsening anxiety and SI at home.     On assessment on the unit, patient denies any current SI and is hopeful to find right medication regimen fro her severe anxiety.    1. Admit to Low[facility], 9.13  2. No CO needed  3. Continue home medications:  	Psych- Remeron 7.5mg QD, Lexapro 15mg QD, Clonopin 1mg QD and 0.5mg QHS, Gabapentin 100mg TID  	Medical- Atorvastatin 10mg QD, Losartan 100 mg daily, Hydrochlorothiazide 12.5 mg daily, Metoprolol tartrate 50 mg twice daily, Pantoprazole 20 mg daily  4. Labs ordered for am  5. Dispo planning per  pending clinical improvement

## 2023-05-10 NOTE — BH PATIENT PROFILE - NSDASASENSITIVE_PSY_ALL_CORE
I called [facility] regarding the Diabetic kit and they said they are able to give it to them but they must pick it up, no delivery  I called and spoke to Emily Macias at the group home and she is checking for other places where we can order it and it can be delivered to them  She will call me back  No

## 2023-05-10 NOTE — BH INPATIENT PSYCHIATRY ASSESSMENT NOTE - CURRENT MEDICATION
MEDICATIONS  (STANDING):  atorvastatin 10 milliGRAM(s) Oral at bedtime  clonazePAM  Tablet 0.5 milliGRAM(s) Oral at bedtime  clonazePAM  Tablet 1 milliGRAM(s) Oral daily  escitalopram 15 milliGRAM(s) Oral daily  gabapentin 100 milliGRAM(s) Oral three times a day  hydrochlorothiazide 12.5 milliGRAM(s) Oral daily  losartan 100 milliGRAM(s) Oral daily  metoprolol succinate ER 50 milliGRAM(s) Oral two times a day  mirtazapine 7.5 milliGRAM(s) Oral at bedtime  pantoprazole    Tablet 40 milliGRAM(s) Oral before breakfast    MEDICATIONS  (PRN):  haloperidol     Tablet 2 milliGRAM(s) Oral every 6 hours PRN agitation  haloperidol    Injectable 2 milliGRAM(s) IntraMuscular once PRN severe agitation  LORazepam     Tablet 1 milliGRAM(s) Oral every 6 hours PRN anxiety  LORazepam   Injectable 1 milliGRAM(s) IntraMuscular once PRN severe agitation  ondansetron    Tablet 8 milliGRAM(s) Oral every 8 hours PRN nausea

## 2023-05-10 NOTE — BH INPATIENT PSYCHIATRY ASSESSMENT NOTE - OTHER PAST PSYCHIATRIC HISTORY (INCLUDE DETAILS REGARDING ONSET, COURSE OF ILLNESS, INPATIENT/OUTPATIENT TREATMENT)
Multiple [facility] admissions, (last 7/5/2021-7/23/2021), last hosp at [facility] from 3/28/23-4/19/23  In current treatment at [facility]

## 2023-05-11 ENCOUNTER — APPOINTMENT (OUTPATIENT)
Dept: GASTROENTEROLOGY | Facility: AMBULATORY MEDICAL SERVICES | Age: 73
End: 2023-05-11

## 2023-05-11 LAB
A1C WITH ESTIMATED AVERAGE GLUCOSE RESULT: 5.8 % — HIGH (ref 4–5.6)
ALBUMIN SERPL ELPH-MCNC: 4.1 G/DL — SIGNIFICANT CHANGE UP (ref 3.3–5)
ALP SERPL-CCNC: 93 U/L — SIGNIFICANT CHANGE UP (ref 40–120)
ALT FLD-CCNC: 13 U/L — SIGNIFICANT CHANGE UP (ref 4–33)
ANION GAP SERPL CALC-SCNC: 13 MMOL/L — SIGNIFICANT CHANGE UP (ref 7–14)
AST SERPL-CCNC: 13 U/L — SIGNIFICANT CHANGE UP (ref 4–32)
BASOPHILS # BLD AUTO: 0.04 K/UL — SIGNIFICANT CHANGE UP (ref 0–0.2)
BASOPHILS NFR BLD AUTO: 0.6 % — SIGNIFICANT CHANGE UP (ref 0–2)
BILIRUB SERPL-MCNC: 0.6 MG/DL — SIGNIFICANT CHANGE UP (ref 0.2–1.2)
BUN SERPL-MCNC: 6 MG/DL — LOW (ref 7–23)
CALCIUM SERPL-MCNC: 10 MG/DL — SIGNIFICANT CHANGE UP (ref 8.4–10.5)
CHLORIDE SERPL-SCNC: 102 MMOL/L — SIGNIFICANT CHANGE UP (ref 98–107)
CHOLEST SERPL-MCNC: 175 MG/DL — SIGNIFICANT CHANGE UP
CO2 SERPL-SCNC: 30 MMOL/L — SIGNIFICANT CHANGE UP (ref 22–31)
COVID-19 SPIKE DOMAIN AB INTERP: POSITIVE
COVID-19 SPIKE DOMAIN ANTIBODY RESULT: >250 U/ML — HIGH
CREAT SERPL-MCNC: 1.05 MG/DL — SIGNIFICANT CHANGE UP (ref 0.5–1.3)
EGFR: 56 ML/MIN/1.73M2 — LOW
EOSINOPHIL # BLD AUTO: 0.14 K/UL — SIGNIFICANT CHANGE UP (ref 0–0.5)
EOSINOPHIL NFR BLD AUTO: 2.1 % — SIGNIFICANT CHANGE UP (ref 0–6)
ESTIMATED AVERAGE GLUCOSE: 120 — SIGNIFICANT CHANGE UP
GLUCOSE SERPL-MCNC: 120 MG/DL — HIGH (ref 70–99)
HCT VFR BLD CALC: 43.4 % — SIGNIFICANT CHANGE UP (ref 34.5–45)
HDLC SERPL-MCNC: 38 MG/DL — LOW
HGB BLD-MCNC: 14 G/DL — SIGNIFICANT CHANGE UP (ref 11.5–15.5)
IANC: 3.51 K/UL — SIGNIFICANT CHANGE UP (ref 1.8–7.4)
IMM GRANULOCYTES NFR BLD AUTO: 0.3 % — SIGNIFICANT CHANGE UP (ref 0–0.9)
LIPID PNL WITH DIRECT LDL SERPL: 97 MG/DL — SIGNIFICANT CHANGE UP
LYMPHOCYTES # BLD AUTO: 2.61 K/UL — SIGNIFICANT CHANGE UP (ref 1–3.3)
LYMPHOCYTES # BLD AUTO: 39.5 % — SIGNIFICANT CHANGE UP (ref 13–44)
MCHC RBC-ENTMCNC: 28.3 PG — SIGNIFICANT CHANGE UP (ref 27–34)
MCHC RBC-ENTMCNC: 32.3 GM/DL — SIGNIFICANT CHANGE UP (ref 32–36)
MCV RBC AUTO: 87.7 FL — SIGNIFICANT CHANGE UP (ref 80–100)
MONOCYTES # BLD AUTO: 0.29 K/UL — SIGNIFICANT CHANGE UP (ref 0–0.9)
MONOCYTES NFR BLD AUTO: 4.4 % — SIGNIFICANT CHANGE UP (ref 2–14)
NEUTROPHILS # BLD AUTO: 3.51 K/UL — SIGNIFICANT CHANGE UP (ref 1.8–7.4)
NEUTROPHILS NFR BLD AUTO: 53.1 % — SIGNIFICANT CHANGE UP (ref 43–77)
NON HDL CHOLESTEROL: 137 MG/DL — HIGH
NRBC # BLD: 0 /100 WBCS — SIGNIFICANT CHANGE UP (ref 0–0)
NRBC # FLD: 0 K/UL — SIGNIFICANT CHANGE UP (ref 0–0)
PCP SPEC-MCNC: SIGNIFICANT CHANGE UP
PLATELET # BLD AUTO: 213 K/UL — SIGNIFICANT CHANGE UP (ref 150–400)
POTASSIUM SERPL-MCNC: 3.7 MMOL/L — SIGNIFICANT CHANGE UP (ref 3.5–5.3)
POTASSIUM SERPL-SCNC: 3.7 MMOL/L — SIGNIFICANT CHANGE UP (ref 3.5–5.3)
PROT SERPL-MCNC: 6.5 G/DL — SIGNIFICANT CHANGE UP (ref 6–8.3)
RBC # BLD: 4.95 M/UL — SIGNIFICANT CHANGE UP (ref 3.8–5.2)
RBC # FLD: 12.8 % — SIGNIFICANT CHANGE UP (ref 10.3–14.5)
SARS-COV-2 IGG+IGM SERPL QL IA: >250 U/ML — HIGH
SARS-COV-2 IGG+IGM SERPL QL IA: POSITIVE
SODIUM SERPL-SCNC: 145 MMOL/L — SIGNIFICANT CHANGE UP (ref 135–145)
TRIGL SERPL-MCNC: 198 MG/DL — HIGH
TSH SERPL-MCNC: 2.12 UIU/ML — SIGNIFICANT CHANGE UP (ref 0.27–4.2)
WBC # BLD: 6.61 K/UL — SIGNIFICANT CHANGE UP (ref 3.8–10.5)
WBC # FLD AUTO: 6.61 K/UL — SIGNIFICANT CHANGE UP (ref 3.8–10.5)

## 2023-05-11 PROCEDURE — 99232 SBSQ HOSP IP/OBS MODERATE 35: CPT

## 2023-05-11 RX ORDER — MIRTAZAPINE 45 MG/1
15 TABLET, ORALLY DISINTEGRATING ORAL AT BEDTIME
Refills: 0 | Status: DISCONTINUED | OUTPATIENT
Start: 2023-05-11 | End: 2023-05-13

## 2023-05-11 RX ORDER — ALPRAZOLAM 0.25 MG
0.5 TABLET ORAL
Refills: 0 | Status: DISCONTINUED | OUTPATIENT
Start: 2023-05-11 | End: 2023-05-15

## 2023-05-11 RX ORDER — CLONAZEPAM 1 MG
0.5 TABLET ORAL
Refills: 0 | Status: DISCONTINUED | OUTPATIENT
Start: 2023-05-11 | End: 2023-05-15

## 2023-05-11 RX ADMIN — Medication 50 MILLIGRAM(S): at 10:50

## 2023-05-11 RX ADMIN — GABAPENTIN 100 MILLIGRAM(S): 400 CAPSULE ORAL at 21:14

## 2023-05-11 RX ADMIN — MIRTAZAPINE 15 MILLIGRAM(S): 45 TABLET, ORALLY DISINTEGRATING ORAL at 21:13

## 2023-05-11 RX ADMIN — ESCITALOPRAM OXALATE 15 MILLIGRAM(S): 10 TABLET, FILM COATED ORAL at 10:49

## 2023-05-11 RX ADMIN — GABAPENTIN 100 MILLIGRAM(S): 400 CAPSULE ORAL at 13:12

## 2023-05-11 RX ADMIN — GABAPENTIN 100 MILLIGRAM(S): 400 CAPSULE ORAL at 10:53

## 2023-05-11 RX ADMIN — ONDANSETRON 8 MILLIGRAM(S): 8 TABLET, FILM COATED ORAL at 10:14

## 2023-05-11 RX ADMIN — ATORVASTATIN CALCIUM 10 MILLIGRAM(S): 80 TABLET, FILM COATED ORAL at 21:13

## 2023-05-11 RX ADMIN — LOSARTAN POTASSIUM 100 MILLIGRAM(S): 100 TABLET, FILM COATED ORAL at 10:50

## 2023-05-11 RX ADMIN — Medication 0.5 MILLIGRAM(S): at 15:58

## 2023-05-11 RX ADMIN — Medication 0.5 MILLIGRAM(S): at 21:14

## 2023-05-11 RX ADMIN — PANTOPRAZOLE SODIUM 40 MILLIGRAM(S): 20 TABLET, DELAYED RELEASE ORAL at 09:00

## 2023-05-11 RX ADMIN — Medication 1 MILLIGRAM(S): at 10:49

## 2023-05-11 NOTE — BH INPATIENT PSYCHIATRY PROGRESS NOTE - PRN MEDS
MEDICATIONS  (PRN):  ALPRAZolam 0.5 milliGRAM(s) Oral two times a day PRN anxiety  haloperidol     Tablet 2 milliGRAM(s) Oral every 6 hours PRN agitation  haloperidol    Injectable 2 milliGRAM(s) IntraMuscular once PRN severe agitation  LORazepam   Injectable 1 milliGRAM(s) IntraMuscular once PRN severe agitation  ondansetron    Tablet 8 milliGRAM(s) Oral every 8 hours PRN nausea

## 2023-05-11 NOTE — BH SOCIAL WORK INITIAL PSYCHOSOCIAL EVALUATION - NSBHTREATHXNAME2FT_PSY_ALL_CORE
[facility]  [employer] (Group Therapist)  Phone: [phone]

## 2023-05-11 NOTE — BH INPATIENT PSYCHIATRY PROGRESS NOTE - NSBHFUPINTERVALHXFT_PSY_A_CORE
Chart reviewed and case discussed with treatment team. Staff report patient has been very anxious. Complained of nausea and received zofran with good results. Patient is preoccupied with fears that she'll be discharged before she feels better. She states the staff at [facility] "made me leave" eventhough she was still feeling anxious. Patient states that "if you kick me out I'm going to kill myself."

## 2023-05-11 NOTE — BH INPATIENT PSYCHIATRY PROGRESS NOTE - NSBHCHARTREVIEWVS_PSY_A_CORE FT
Vital Signs Last 24 Hrs  T(C): 36.9 (05-11-23 @ 16:03), Max: 36.9 (05-11-23 @ 16:03)  T(F): 98.4 (05-11-23 @ 16:03), Max: 98.4 (05-11-23 @ 16:03)  HR: --  BP: --  BP(mean): --  RR: --  SpO2: 95% (05-11-23 @ 16:03) (95% - 95%)    Orthostatic VS  05-11-23 @ 08:18  Lying BP: --/-- HR: --  Sitting BP: 125/65 HR: 67  Standing BP: 116/58 HR: 70  Site: --  Mode: --  Orthostatic VS  05-10-23 @ 16:29  Lying BP: --/-- HR: --  Sitting BP: 126/58 HR: 77  Standing BP: 127/62 HR: 83  Site: --  Mode: --
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
## 2023-05-11 NOTE — BH SOCIAL WORK INITIAL PSYCHOSOCIAL EVALUATION - OTHER PAST PSYCHIATRIC HISTORY (INCLUDE DETAILS REGARDING ONSET, COURSE OF ILLNESS, INPATIENT/OUTPATIENT TREATMENT)
Updated: 5/11/2023  Pt  is a 73 year old  woman. Pt was discharged from [facility] on 4/19/23 with resumption of care at [facility] - virtually with Dr Washington  on April 24.   upon inquiry pt stated she has not smoked 'medical marijuana' for several months as she was frightened by possible impact of use by an MD.  Pt readmitted due to worsening depression and anxiety with statement to ED that she took two pills with suicidal ideation. Pt now minimizes behavior.     May 2023 [facility]  note: Per [facility] chart review: Prior psychiatric diagnosis of ANSON and MDD, 4 previous inpatient psychiatric hospitalizations at [facility] in 2, and most recently 7/5/2021 - 7/3/2021 prior SA in 11/2019 by overdosing on pills, no history of NSSIB, no history of aggression, no hx of legal issues, daily marijuana use, PMH of HTN, hyperlipidemia, treated in outpatient geropsych clinic by Dr. Stovall (as below, with last visit 3/15/23) and at that time documented to be at baseline with chronic anxiety, complaints of nausea, seen twice monthly with mediation mgt and psychotherapy) BIB spouse for "worsening anxiety."

## 2023-05-11 NOTE — BH INPATIENT PSYCHIATRY PROGRESS NOTE - MSE UNSTRUCTURED FT
Appearance: limited grooming, obese habitus, stable but slow gait; no abnormal involuntary movements noted  Behavior: intensely related, no psychomotor agitation/retardation, good eye contact  Speech: wnl, very productive  Mood: anxious  Affect: congruent, intense, constricted  Thought process: tangential, overinclusive  Thought content: no delusions elicited; reports SI contingent on being discharged  Perceptual disturbances: denies AH  Cognition: orientation intact; has difficulty keeping track of medication names and doses  Abstraction: fair  Fund of knowledge: fair  Insight: partially intact  Judgement: partially intact

## 2023-05-11 NOTE — BH INPATIENT PSYCHIATRY PROGRESS NOTE - CURRENT MEDICATION
MEDICATIONS  (STANDING):  atorvastatin 10 milliGRAM(s) Oral at bedtime  clonazePAM  Tablet 0.5 milliGRAM(s) Oral two times a day  escitalopram 15 milliGRAM(s) Oral daily  gabapentin 100 milliGRAM(s) Oral three times a day  hydrochlorothiazide 12.5 milliGRAM(s) Oral daily  losartan 100 milliGRAM(s) Oral daily  metoprolol succinate ER 50 milliGRAM(s) Oral two times a day  mirtazapine 15 milliGRAM(s) Oral at bedtime  pantoprazole    Tablet 40 milliGRAM(s) Oral before breakfast    MEDICATIONS  (PRN):  ALPRAZolam 0.5 milliGRAM(s) Oral two times a day PRN anxiety  haloperidol     Tablet 2 milliGRAM(s) Oral every 6 hours PRN agitation  haloperidol    Injectable 2 milliGRAM(s) IntraMuscular once PRN severe agitation  LORazepam   Injectable 1 milliGRAM(s) IntraMuscular once PRN severe agitation  ondansetron    Tablet 8 milliGRAM(s) Oral every 8 hours PRN nausea

## 2023-05-11 NOTE — BH SOCIAL WORK INITIAL PSYCHOSOCIAL EVALUATION - NSHIGHRISKBEHFT_PSY_ALL_CORE
prior SA in 11/2019 by overdosing on pills.  May 2023 reportedly took 'two extra pills' with  suicidal ideation.

## 2023-05-12 LAB
CULTURE RESULTS: SIGNIFICANT CHANGE UP
SPECIMEN SOURCE: SIGNIFICANT CHANGE UP

## 2023-05-12 PROCEDURE — 90832 PSYTX W PT 30 MINUTES: CPT

## 2023-05-12 PROCEDURE — 99231 SBSQ HOSP IP/OBS SF/LOW 25: CPT

## 2023-05-12 RX ADMIN — LOSARTAN POTASSIUM 100 MILLIGRAM(S): 100 TABLET, FILM COATED ORAL at 09:04

## 2023-05-12 RX ADMIN — GABAPENTIN 100 MILLIGRAM(S): 400 CAPSULE ORAL at 12:49

## 2023-05-12 RX ADMIN — MIRTAZAPINE 15 MILLIGRAM(S): 45 TABLET, ORALLY DISINTEGRATING ORAL at 20:35

## 2023-05-12 RX ADMIN — Medication 50 MILLIGRAM(S): at 09:04

## 2023-05-12 RX ADMIN — ESCITALOPRAM OXALATE 15 MILLIGRAM(S): 10 TABLET, FILM COATED ORAL at 09:03

## 2023-05-12 RX ADMIN — Medication 1 DROP(S): at 22:42

## 2023-05-12 RX ADMIN — ATORVASTATIN CALCIUM 10 MILLIGRAM(S): 80 TABLET, FILM COATED ORAL at 20:35

## 2023-05-12 RX ADMIN — Medication 30 MILLILITER(S): at 15:47

## 2023-05-12 RX ADMIN — Medication 0.5 MILLIGRAM(S): at 14:35

## 2023-05-12 RX ADMIN — Medication 0.5 MILLIGRAM(S): at 20:34

## 2023-05-12 RX ADMIN — ONDANSETRON 8 MILLIGRAM(S): 8 TABLET, FILM COATED ORAL at 11:13

## 2023-05-12 RX ADMIN — PANTOPRAZOLE SODIUM 40 MILLIGRAM(S): 20 TABLET, DELAYED RELEASE ORAL at 07:40

## 2023-05-12 RX ADMIN — GABAPENTIN 100 MILLIGRAM(S): 400 CAPSULE ORAL at 20:35

## 2023-05-12 RX ADMIN — Medication 0.5 MILLIGRAM(S): at 09:04

## 2023-05-12 RX ADMIN — GABAPENTIN 100 MILLIGRAM(S): 400 CAPSULE ORAL at 09:04

## 2023-05-12 NOTE — PHYSICAL THERAPY INITIAL EVALUATION ADULT - PLANNED THERAPY INTERVENTIONS, PT EVAL
balance training/gait training/joint mobilization/manual therapy techniques/neuromuscular re-education/orthotic fitting/training/strengthening/transfer training

## 2023-05-12 NOTE — BH INPATIENT PSYCHIATRY PROGRESS NOTE - NSBHCHARTREVIEWVS_PSY_A_CORE FT
Vital Signs Last 24 Hrs  T(C): 36.4 (05-12-23 @ 15:51), Max: 36.9 (05-12-23 @ 06:31)  T(F): 97.6 (05-12-23 @ 15:51), Max: 98.4 (05-12-23 @ 06:31)  HR: 60 (05-11-23 @ 20:43) (60 - 62)  BP: 90/60 (05-11-23 @ 20:43) (86/56 - 90/60)  BP(mean): --  RR: 18 (05-11-23 @ 20:43) (18 - 18)  SpO2: 96% (05-12-23 @ 15:51) (96% - 96%)    Orthostatic VS  05-12-23 @ 08:35  Lying BP: --/-- HR: --  Sitting BP: 134/64 HR: 75  Standing BP: 131/75 HR: 78  Site: --  Mode: --  Orthostatic VS  05-12-23 @ 06:31  Lying BP: --/-- HR: --  Sitting BP: 131/59 HR: 77  Standing BP: 135/62 HR: 84  Site: --  Mode: --  Orthostatic VS  05-11-23 @ 08:18  Lying BP: --/-- HR: --  Sitting BP: 125/65 HR: 67  Standing BP: 116/58 HR: 70  Site: --  Mode: --

## 2023-05-12 NOTE — BH INPATIENT PSYCHIATRY PROGRESS NOTE - PRN MEDS
MEDICATIONS  (PRN):  ALPRAZolam 0.5 milliGRAM(s) Oral two times a day PRN anxiety  aluminum hydroxide/magnesium hydroxide/simethicone Suspension 30 milliLiter(s) Oral every 4 hours PRN Dyspepsia  artificial tears (preservative free) Ophthalmic Solution 1 Drop(s) Both EYES four times a day PRN dry eyes  haloperidol     Tablet 2 milliGRAM(s) Oral every 6 hours PRN agitation  haloperidol    Injectable 2 milliGRAM(s) IntraMuscular once PRN severe agitation  LORazepam   Injectable 1 milliGRAM(s) IntraMuscular once PRN severe agitation  ondansetron    Tablet 8 milliGRAM(s) Oral every 8 hours PRN nausea

## 2023-05-12 NOTE — PHYSICAL THERAPY INITIAL EVALUATION ADULT - PERTINENT HX OF CURRENT PROBLEM, REHAB EVAL
Patient is a 74yo female, , retired (), non-caregiver, domiciled at home with her ,  with a prior psychiatric diagnosis of ANSON and MDD, 4 previous inpatient psychiatric hospitalizations at [facility] and most recently at [facility] from 3/28/23-4/29/23,  prior SA in 11/2019 by overdosing on pills, no history of NSSIB, no history of violence/aggression, no hx of legal issues, hx of daily marijuana use, PMHx of HTN, hyperlipidemia, treated in outpatient geropsych clinic by Dr. Stovall. Patient brought in for worsening anxiety and SI at home. Patient referred to physical therapy secondary to unsteady gait.

## 2023-05-12 NOTE — BH INPATIENT PSYCHIATRY PROGRESS NOTE - CURRENT MEDICATION
MEDICATIONS  (STANDING):  atorvastatin 10 milliGRAM(s) Oral at bedtime  clonazePAM  Tablet 0.5 milliGRAM(s) Oral two times a day  escitalopram 15 milliGRAM(s) Oral daily  gabapentin 100 milliGRAM(s) Oral three times a day  hydrochlorothiazide 12.5 milliGRAM(s) Oral daily  losartan 100 milliGRAM(s) Oral daily  metoprolol succinate ER 50 milliGRAM(s) Oral two times a day  mirtazapine 15 milliGRAM(s) Oral at bedtime  pantoprazole    Tablet 40 milliGRAM(s) Oral before breakfast    MEDICATIONS  (PRN):  ALPRAZolam 0.5 milliGRAM(s) Oral two times a day PRN anxiety  aluminum hydroxide/magnesium hydroxide/simethicone Suspension 30 milliLiter(s) Oral every 4 hours PRN Dyspepsia  artificial tears (preservative free) Ophthalmic Solution 1 Drop(s) Both EYES four times a day PRN dry eyes  haloperidol     Tablet 2 milliGRAM(s) Oral every 6 hours PRN agitation  haloperidol    Injectable 2 milliGRAM(s) IntraMuscular once PRN severe agitation  LORazepam   Injectable 1 milliGRAM(s) IntraMuscular once PRN severe agitation  ondansetron    Tablet 8 milliGRAM(s) Oral every 8 hours PRN nausea

## 2023-05-12 NOTE — BH INPATIENT PSYCHIATRY PROGRESS NOTE - MSE UNSTRUCTURED FT
Appearance: casual grooming, obese habitus, stable but slow gait; no abnormal involuntary movements noted  Behavior: less intensely related, no psychomotor agitation/retardation, good eye contact  Speech: wnl, productivity better controlled  Mood: depressed, anxious  Affect: congruent, intense, stable  Thought process: tangential, overinclusive  Thought content: no delusions elicited but with catastrophic preoccupation; denies SI  Perceptual disturbances: denies AH  Cognition: orientation intact; has difficulty keeping track of medication names and doses  Abstraction: fair  Fund of knowledge: fair  Insight: partially intact  Judgement: partially intact

## 2023-05-12 NOTE — BH INPATIENT PSYCHIATRY PROGRESS NOTE - NSBHFUPINTERVALHXFT_PSY_A_CORE
Chart reviewed and case discussed with treatment team. Staff report patient has been anxious, dependent. Patient reports having slept well and appetite is intact. Reports she wakes up with a panic attack in the morning then later has fluctuating periods of anxiety consisting of heart racing, feeling shivers, hot flushing and facial diaphoresis.

## 2023-05-13 PROCEDURE — 99231 SBSQ HOSP IP/OBS SF/LOW 25: CPT

## 2023-05-13 PROCEDURE — 93010 ELECTROCARDIOGRAM REPORT: CPT

## 2023-05-13 RX ORDER — MIRTAZAPINE 45 MG/1
30 TABLET, ORALLY DISINTEGRATING ORAL AT BEDTIME
Refills: 0 | Status: DISCONTINUED | OUTPATIENT
Start: 2023-05-13 | End: 2023-05-13

## 2023-05-13 RX ORDER — MIRTAZAPINE 45 MG/1
15 TABLET, ORALLY DISINTEGRATING ORAL AT BEDTIME
Refills: 0 | Status: DISCONTINUED | OUTPATIENT
Start: 2023-05-13 | End: 2023-05-16

## 2023-05-13 RX ADMIN — ATORVASTATIN CALCIUM 10 MILLIGRAM(S): 80 TABLET, FILM COATED ORAL at 21:31

## 2023-05-13 RX ADMIN — MIRTAZAPINE 15 MILLIGRAM(S): 45 TABLET, ORALLY DISINTEGRATING ORAL at 21:31

## 2023-05-13 RX ADMIN — GABAPENTIN 100 MILLIGRAM(S): 400 CAPSULE ORAL at 09:13

## 2023-05-13 RX ADMIN — Medication 30 MILLILITER(S): at 16:15

## 2023-05-13 RX ADMIN — GABAPENTIN 100 MILLIGRAM(S): 400 CAPSULE ORAL at 21:31

## 2023-05-13 RX ADMIN — LOSARTAN POTASSIUM 100 MILLIGRAM(S): 100 TABLET, FILM COATED ORAL at 09:13

## 2023-05-13 RX ADMIN — ONDANSETRON 8 MILLIGRAM(S): 8 TABLET, FILM COATED ORAL at 12:19

## 2023-05-13 RX ADMIN — Medication 0.5 MILLIGRAM(S): at 09:13

## 2023-05-13 RX ADMIN — PANTOPRAZOLE SODIUM 40 MILLIGRAM(S): 20 TABLET, DELAYED RELEASE ORAL at 08:32

## 2023-05-13 RX ADMIN — Medication 0.5 MILLIGRAM(S): at 17:21

## 2023-05-13 RX ADMIN — Medication 50 MILLIGRAM(S): at 09:13

## 2023-05-13 RX ADMIN — ESCITALOPRAM OXALATE 15 MILLIGRAM(S): 10 TABLET, FILM COATED ORAL at 09:13

## 2023-05-13 RX ADMIN — Medication 0.5 MILLIGRAM(S): at 21:31

## 2023-05-13 RX ADMIN — GABAPENTIN 100 MILLIGRAM(S): 400 CAPSULE ORAL at 14:11

## 2023-05-13 NOTE — BH INPATIENT PSYCHIATRY PROGRESS NOTE - NSBHCHARTREVIEWVS_PSY_A_CORE FT
Vital Signs Last 24 Hrs  T(C): 36.3 (05-13-23 @ 05:45), Max: 36.4 (05-12-23 @ 15:51)  T(F): 97.4 (05-13-23 @ 05:45), Max: 97.6 (05-12-23 @ 15:51)  HR: --  BP: --  BP(mean): --  RR: --  SpO2: 95% (05-13-23 @ 05:45) (95% - 99%)    Orthostatic VS  05-13-23 @ 05:45  Lying BP: --/-- HR: --  Sitting BP: 115/18 HR: 72  Standing BP: 127/58 HR: 76  Site: --  Mode: --  Orthostatic VS  05-12-23 @ 19:45  Lying BP: --/-- HR: --  Sitting BP: 105/55 HR: 78  Standing BP: --/-- HR: --  Site: upper right arm  Mode: electronic  Orthostatic VS  05-12-23 @ 08:35  Lying BP: --/-- HR: --  Sitting BP: 134/64 HR: 75  Standing BP: 131/75 HR: 78  Site: --  Mode: --  Orthostatic VS  05-12-23 @ 06:31  Lying BP: --/-- HR: --  Sitting BP: 131/59 HR: 77  Standing BP: 135/62 HR: 84  Site: --  Mode: --

## 2023-05-13 NOTE — BH INPATIENT PSYCHIATRY PROGRESS NOTE - CURRENT MEDICATION
MEDICATIONS  (STANDING):  atorvastatin 10 milliGRAM(s) Oral at bedtime  clonazePAM  Tablet 0.5 milliGRAM(s) Oral two times a day  escitalopram 15 milliGRAM(s) Oral daily  gabapentin 100 milliGRAM(s) Oral three times a day  hydrochlorothiazide 12.5 milliGRAM(s) Oral daily  losartan 100 milliGRAM(s) Oral daily  metoprolol succinate ER 50 milliGRAM(s) Oral two times a day  mirtazapine 30 milliGRAM(s) Oral at bedtime  pantoprazole    Tablet 40 milliGRAM(s) Oral before breakfast    MEDICATIONS  (PRN):  ALPRAZolam 0.5 milliGRAM(s) Oral two times a day PRN anxiety  aluminum hydroxide/magnesium hydroxide/simethicone Suspension 30 milliLiter(s) Oral every 4 hours PRN Dyspepsia  artificial tears (preservative free) Ophthalmic Solution 1 Drop(s) Both EYES four times a day PRN dry eyes  haloperidol     Tablet 2 milliGRAM(s) Oral every 6 hours PRN agitation  haloperidol    Injectable 2 milliGRAM(s) IntraMuscular once PRN severe agitation  LORazepam   Injectable 1 milliGRAM(s) IntraMuscular once PRN severe agitation  ondansetron    Tablet 8 milliGRAM(s) Oral every 8 hours PRN nausea   MEDICATIONS  (STANDING):  atorvastatin 10 milliGRAM(s) Oral at bedtime  clonazePAM  Tablet 0.5 milliGRAM(s) Oral two times a day  escitalopram 15 milliGRAM(s) Oral daily  gabapentin 100 milliGRAM(s) Oral three times a day  hydrochlorothiazide 12.5 milliGRAM(s) Oral daily  losartan 100 milliGRAM(s) Oral daily  metoprolol succinate ER 50 milliGRAM(s) Oral two times a day  mirtazapine 15 milliGRAM(s) Oral at bedtime  pantoprazole    Tablet 40 milliGRAM(s) Oral before breakfast    MEDICATIONS  (PRN):  ALPRAZolam 0.5 milliGRAM(s) Oral two times a day PRN anxiety  aluminum hydroxide/magnesium hydroxide/simethicone Suspension 30 milliLiter(s) Oral every 4 hours PRN Dyspepsia  artificial tears (preservative free) Ophthalmic Solution 1 Drop(s) Both EYES four times a day PRN dry eyes  haloperidol     Tablet 2 milliGRAM(s) Oral every 6 hours PRN agitation  haloperidol    Injectable 2 milliGRAM(s) IntraMuscular once PRN severe agitation  LORazepam   Injectable 1 milliGRAM(s) IntraMuscular once PRN severe agitation  ondansetron    Tablet 8 milliGRAM(s) Oral every 8 hours PRN nausea

## 2023-05-13 NOTE — BH INPATIENT PSYCHIATRY PROGRESS NOTE - NSBHFUPINTERVALHXFT_PSY_A_CORE
Chart reviewed and case discussed with treatment team. No behavioral issues overnight. Staff reports pt continues to be anxious and dependent. On encounter she was seen at bedside. Endorsed feeling anxious today. Ate breakfast and took her medications. Denies side effects to her medications such as excessive sedation, dizziness or any other complaints. Denies SIIP.    Chart reviewed and case discussed with treatment team. No behavioral issues overnight. Staff reports pt continues to be anxious and dependent. On encounter she was seen at bedside. Endorsed feeling anxious today. Ate breakfast and took her medications. Denies side effects to her medications such as excessive sedation, dizziness or any other complaints. Denies SIIP. Reviewed EKG, normal sinus rhythm, no ST elevations.

## 2023-05-14 PROCEDURE — 99222 1ST HOSP IP/OBS MODERATE 55: CPT

## 2023-05-14 PROCEDURE — 99231 SBSQ HOSP IP/OBS SF/LOW 25: CPT

## 2023-05-14 RX ORDER — METOPROLOL TARTRATE 50 MG
50 TABLET ORAL
Refills: 0 | Status: DISCONTINUED | OUTPATIENT
Start: 2023-05-14 | End: 2023-05-22

## 2023-05-14 RX ADMIN — Medication 0.5 MILLIGRAM(S): at 17:57

## 2023-05-14 RX ADMIN — PANTOPRAZOLE SODIUM 40 MILLIGRAM(S): 20 TABLET, DELAYED RELEASE ORAL at 06:39

## 2023-05-14 RX ADMIN — Medication 0.5 MILLIGRAM(S): at 21:37

## 2023-05-14 RX ADMIN — Medication 0.5 MILLIGRAM(S): at 08:57

## 2023-05-14 RX ADMIN — ESCITALOPRAM OXALATE 15 MILLIGRAM(S): 10 TABLET, FILM COATED ORAL at 08:58

## 2023-05-14 RX ADMIN — MIRTAZAPINE 15 MILLIGRAM(S): 45 TABLET, ORALLY DISINTEGRATING ORAL at 21:37

## 2023-05-14 RX ADMIN — GABAPENTIN 100 MILLIGRAM(S): 400 CAPSULE ORAL at 13:29

## 2023-05-14 RX ADMIN — Medication 30 MILLILITER(S): at 10:49

## 2023-05-14 RX ADMIN — Medication 50 MILLIGRAM(S): at 08:59

## 2023-05-14 RX ADMIN — ATORVASTATIN CALCIUM 10 MILLIGRAM(S): 80 TABLET, FILM COATED ORAL at 21:37

## 2023-05-14 RX ADMIN — Medication 0.5 MILLIGRAM(S): at 10:48

## 2023-05-14 RX ADMIN — GABAPENTIN 100 MILLIGRAM(S): 400 CAPSULE ORAL at 21:36

## 2023-05-14 RX ADMIN — Medication 30 MILLILITER(S): at 17:58

## 2023-05-14 RX ADMIN — GABAPENTIN 100 MILLIGRAM(S): 400 CAPSULE ORAL at 08:58

## 2023-05-14 NOTE — BH INPATIENT PSYCHIATRY PROGRESS NOTE - NSBHCHARTREVIEWVS_PSY_A_CORE FT
Vital Signs Last 24 Hrs  T(C): 36.8 (05-14-23 @ 08:38), Max: 36.8 (05-13-23 @ 15:48)  T(F): 98.3 (05-14-23 @ 08:38), Max: 98.3 (05-13-23 @ 15:48)  HR: --  BP: 116/55 (05-13-23 @ 20:00) (90/43 - 116/55)  BP(mean): 69 (05-13-23 @ 20:00) (61 - 69)  RR: 18 (05-14-23 @ 08:38) (18 - 18)  SpO2: 98% (05-14-23 @ 08:38) (96% - 98%)    Orthostatic VS  05-14-23 @ 08:38  Lying BP: 103/58 HR: 67  Sitting BP: 110/68 HR: 77  Standing BP: --/-- HR: --  Site: upper left arm  Mode: electronic  Orthostatic VS  05-13-23 @ 05:45  Lying BP: --/-- HR: --  Sitting BP: 115/18 HR: 72  Standing BP: 127/58 HR: 76  Site: --  Mode: --  Orthostatic VS  05-12-23 @ 19:45  Lying BP: --/-- HR: --  Sitting BP: 105/55 HR: 78  Standing BP: --/-- HR: --  Site: upper right arm  Mode: electronic

## 2023-05-14 NOTE — BH INPATIENT PSYCHIATRY PROGRESS NOTE - CURRENT MEDICATION
MEDICATIONS  (STANDING):  atorvastatin 10 milliGRAM(s) Oral at bedtime  clonazePAM  Tablet 0.5 milliGRAM(s) Oral two times a day  escitalopram 15 milliGRAM(s) Oral daily  gabapentin 100 milliGRAM(s) Oral three times a day  metoprolol succinate ER 50 milliGRAM(s) Oral two times a day  mirtazapine 15 milliGRAM(s) Oral at bedtime  pantoprazole    Tablet 40 milliGRAM(s) Oral before breakfast    MEDICATIONS  (PRN):  ALPRAZolam 0.5 milliGRAM(s) Oral two times a day PRN anxiety  aluminum hydroxide/magnesium hydroxide/simethicone Suspension 30 milliLiter(s) Oral every 4 hours PRN Dyspepsia  artificial tears (preservative free) Ophthalmic Solution 1 Drop(s) Both EYES four times a day PRN dry eyes  haloperidol     Tablet 2 milliGRAM(s) Oral every 6 hours PRN agitation  haloperidol    Injectable 2 milliGRAM(s) IntraMuscular once PRN severe agitation  LORazepam   Injectable 1 milliGRAM(s) IntraMuscular once PRN severe agitation  ondansetron    Tablet 8 milliGRAM(s) Oral every 8 hours PRN nausea

## 2023-05-14 NOTE — CONSULT NOTE ADULT - ASSESSMENT
71 yo female, , retired (), non-caregiver, domiciled at home with her , with a prior psychiatric diagnosis of ANSON and MDD, 5 previous inpatient psychiatric hospitalizations at [facility], most recently at [facility] in April of 2023, prior SA in 11/2019 by overdosing on pills, no history of NSSIB, no history of violence/aggression, no hx of legal issues, daily marijuana use, PMHx of HTN, and  hyperlipidemia who was admitted to [facility] for worsening anxiety and SI. Medicine was consulted due to concern for hypotension.       #Hypertension:   -pt on 3 different anti-hypertensives, Losartan 100  mg daily, HCTZ 12.5 mg daily, and Toprol 50 mg BID  -given soft BPs, will hold Losartan and HCTZ  -unclear why pt is on Toprol BID; no hx of Afib as per pt. Pt states her doctor prescribed this to her bc her heart "was fast in the morning"  -encourage PO intake    #Diarrhea:   -pt had one large episode of loose stool yesterday  -continue to monitor; if persists, would send GI PCR  -monitor Cr; pt's baseline is approximately 0.8, now 1.05, almost bordering on CAMRON. Holding Losartan as above. Encourage hydration.     #GERD  -c/w protonix  -Maalox PRN     #Depression:   -management as per psych

## 2023-05-14 NOTE — CONSULT NOTE ADULT - SUBJECTIVE AND OBJECTIVE BOX
HPI:     71 yo female, , retired (), non-caregiver, domiciled at home with her , with a prior psychiatric diagnosis of ANSON and MDD, 5 previous inpatient psychiatric hospitalizations at [facility], most recently at [facility] in April of 2023, prior SA in 11/2019 by overdosing on pills, no history of NSSIB, no history of violence/aggression, no hx of legal issues, daily marijuana use, PMHx of HTN, and  hyperlipidemia who was admitted to [facility] for worsening anxiety and SI. Medicine was consulted due to concern for hypotension.     Patient is sitting in chair in common area. She states she feels anxious. She was prescribed several antihypertensives outpatient, including Losartan 100 mg, HCTZ 12.5 mg, and Toprol 25 mg BID. Her SBPs have ranged from 87 -100s. Pt denies every being told she was hypotensive prior. She had one episode of loose stools yesterday and is endorsing dyspepsia. No fevers, chills, dysuria, hematuria, or cough.       PAST MEDICAL & SURGICAL HISTORY:  Anxiety      High cholesterol      HTN (hypertension)      Endometriosis      GERD (gastroesophageal reflux disease)      Nausea      History of bilateral breast reduction surgery          Review of Systems:   CONSTITUTIONAL: No fever, weight loss, or fatigue  EYES: No eye pain, visual disturbances, or discharge  ENMT:  No difficulty hearing, tinnitus, vertigo; No sinus or throat pain  NECK: No pain or stiffness  RESPIRATORY: No cough, wheezing, chills or hemoptysis; No shortness of breath  CARDIOVASCULAR: No chest pain, palpitations, dizziness, or leg swelling  GASTROINTESTINAL: No abdominal or epigastric pain. No nausea, vomiting, or hematemesis; No diarrhea or constipation. No melena or hematochezia.  GENITOURINARY: No dysuria, frequency, hematuria, or incontinence  NEUROLOGICAL: No headaches, memory loss, loss of strength, numbness, or tremors  SKIN: No itching, burning, rashes, or lesions   LYMPH NODES: No enlarged glands  ENDOCRINE: No heat or cold intolerance; No hair loss  MUSCULOSKELETAL: No joint pain or swelling; No muscle, back, or extremity pain  HEME/LYMPH: No easy bruising, or bleeding gums  ALLERGY AND IMMUNOLOGIC: No hives or eczema    Allergies    penicillins (Anaphylaxis)  sulfa drugs (Anaphylaxis)    Intolerances        Social History:   Lives at home with . Smokes marijuana.     FAMILY HISTORY:  No pertinent family history in first degree relatives        MEDICATIONS  (STANDING):  atorvastatin 10 milliGRAM(s) Oral at bedtime  clonazePAM  Tablet 0.5 milliGRAM(s) Oral two times a day  escitalopram 15 milliGRAM(s) Oral daily  gabapentin 100 milliGRAM(s) Oral three times a day  metoprolol succinate ER 50 milliGRAM(s) Oral two times a day  mirtazapine 15 milliGRAM(s) Oral at bedtime  pantoprazole    Tablet 40 milliGRAM(s) Oral before breakfast    MEDICATIONS  (PRN):  ALPRAZolam 0.5 milliGRAM(s) Oral two times a day PRN anxiety  aluminum hydroxide/magnesium hydroxide/simethicone Suspension 30 milliLiter(s) Oral every 4 hours PRN Dyspepsia  artificial tears (preservative free) Ophthalmic Solution 1 Drop(s) Both EYES four times a day PRN dry eyes  haloperidol     Tablet 2 milliGRAM(s) Oral every 6 hours PRN agitation  haloperidol    Injectable 2 milliGRAM(s) IntraMuscular once PRN severe agitation  LORazepam   Injectable 1 milliGRAM(s) IntraMuscular once PRN severe agitation  ondansetron    Tablet 8 milliGRAM(s) Oral every 8 hours PRN nausea      Vital Signs Last 24 Hrs  T(C): 36.8 (14 May 2023 08:38), Max: 36.8 (13 May 2023 15:48)  T(F): 98.3 (14 May 2023 08:38), Max: 98.3 (13 May 2023 15:48)  HR: --  BP: 116/55 (13 May 2023 20:00) (90/43 - 116/55)  BP(mean): 69 (13 May 2023 20:00) (61 - 69)  RR: 18 (14 May 2023 08:38) (18 - 18)  SpO2: 98% (14 May 2023 08:38) (96% - 98%)      CAPILLARY BLOOD GLUCOSE            PHYSICAL EXAM:  GENERAL: NAD, well-developed  HEAD:  Atraumatic, Normocephalic  EYES: EOMI, conjunctiva and sclera clear  NECK: Supple, No JVD  CHEST/LUNG: Clear to auscultation bilaterally; No wheeze  HEART: Regular rate and rhythm; No murmurs, rubs, or gallops  ABDOMEN: Soft, Nontender, Nondistended; Bowel sounds present  EXTREMITIES:  2+ Peripheral Pulses, No clubbing, cyanosis, or edema  NEUROLOGY: non-focal  SKIN: No rashes or lesions    LABS:    .    Hgb Trend: 14.0<--        Creatinine Trend: 1.05<--        Imaging reviewed personally.                EKG(personally reviewed):    RADIOLOGY & ADDITIONAL TESTS:    Imaging Personally Reviewed:    Consultant(s) Notes Reviewed:      Care Discussed with Consultants/Other Providers:

## 2023-05-14 NOTE — BH INPATIENT PSYCHIATRY PROGRESS NOTE - NSBHFUPINTERVALHXFT_PSY_A_CORE
Chart reviewed and case discussed with treatment team. No behavioral issues overnight. Staff reports pt continues to be anxious and dependent. On encounter she was seen at bedside. Endorsed feeling anxious today. Ate breakfast and took her medications. Denies side effects to her medications such as excessive sedation, dizziness or any other complaints. Denies SIIP. Reviewed EKG, normal sinus rhythm, no ST elevations.   5/14 Patient seen for anxious depression no overnight events. BP in low nl range Eating sleeping fair

## 2023-05-15 LAB
ANION GAP SERPL CALC-SCNC: 14 MMOL/L — SIGNIFICANT CHANGE UP (ref 7–14)
BUN SERPL-MCNC: 8 MG/DL — SIGNIFICANT CHANGE UP (ref 7–23)
CALCIUM SERPL-MCNC: 9.3 MG/DL — SIGNIFICANT CHANGE UP (ref 8.4–10.5)
CHLORIDE SERPL-SCNC: 99 MMOL/L — SIGNIFICANT CHANGE UP (ref 98–107)
CO2 SERPL-SCNC: 29 MMOL/L — SIGNIFICANT CHANGE UP (ref 22–31)
CORTIS AM PEAK SERPL-MCNC: 16 UG/DL — SIGNIFICANT CHANGE UP (ref 6–18.4)
CREAT SERPL-MCNC: 0.92 MG/DL — SIGNIFICANT CHANGE UP (ref 0.5–1.3)
EGFR: 65 ML/MIN/1.73M2 — SIGNIFICANT CHANGE UP
GLUCOSE SERPL-MCNC: 197 MG/DL — HIGH (ref 70–99)
POTASSIUM SERPL-MCNC: 3.5 MMOL/L — SIGNIFICANT CHANGE UP (ref 3.5–5.3)
POTASSIUM SERPL-SCNC: 3.5 MMOL/L — SIGNIFICANT CHANGE UP (ref 3.5–5.3)
SODIUM SERPL-SCNC: 142 MMOL/L — SIGNIFICANT CHANGE UP (ref 135–145)
TSH SERPL-MCNC: 1.78 UIU/ML — SIGNIFICANT CHANGE UP (ref 0.27–4.2)

## 2023-05-15 PROCEDURE — 99232 SBSQ HOSP IP/OBS MODERATE 35: CPT

## 2023-05-15 PROCEDURE — 99231 SBSQ HOSP IP/OBS SF/LOW 25: CPT

## 2023-05-15 RX ORDER — GABAPENTIN 400 MG/1
200 CAPSULE ORAL THREE TIMES A DAY
Refills: 0 | Status: DISCONTINUED | OUTPATIENT
Start: 2023-05-15 | End: 2023-05-22

## 2023-05-15 RX ORDER — ALPRAZOLAM 0.25 MG
0.5 TABLET ORAL
Refills: 0 | Status: DISCONTINUED | OUTPATIENT
Start: 2023-05-15 | End: 2023-05-22

## 2023-05-15 RX ORDER — CLONAZEPAM 1 MG
0.25 TABLET ORAL
Refills: 0 | Status: DISCONTINUED | OUTPATIENT
Start: 2023-05-15 | End: 2023-05-17

## 2023-05-15 RX ADMIN — GABAPENTIN 100 MILLIGRAM(S): 400 CAPSULE ORAL at 12:59

## 2023-05-15 RX ADMIN — Medication 1 DROP(S): at 21:15

## 2023-05-15 RX ADMIN — GABAPENTIN 100 MILLIGRAM(S): 400 CAPSULE ORAL at 09:07

## 2023-05-15 RX ADMIN — ATORVASTATIN CALCIUM 10 MILLIGRAM(S): 80 TABLET, FILM COATED ORAL at 20:43

## 2023-05-15 RX ADMIN — PANTOPRAZOLE SODIUM 40 MILLIGRAM(S): 20 TABLET, DELAYED RELEASE ORAL at 06:26

## 2023-05-15 RX ADMIN — MIRTAZAPINE 15 MILLIGRAM(S): 45 TABLET, ORALLY DISINTEGRATING ORAL at 20:44

## 2023-05-15 RX ADMIN — Medication 0.25 MILLIGRAM(S): at 20:44

## 2023-05-15 RX ADMIN — Medication 0.5 MILLIGRAM(S): at 16:17

## 2023-05-15 RX ADMIN — ESCITALOPRAM OXALATE 15 MILLIGRAM(S): 10 TABLET, FILM COATED ORAL at 09:07

## 2023-05-15 RX ADMIN — Medication 50 MILLIGRAM(S): at 09:07

## 2023-05-15 RX ADMIN — GABAPENTIN 200 MILLIGRAM(S): 400 CAPSULE ORAL at 20:44

## 2023-05-15 RX ADMIN — Medication 0.5 MILLIGRAM(S): at 09:07

## 2023-05-15 NOTE — BH INPATIENT PSYCHIATRY PROGRESS NOTE - CURRENT MEDICATION
MEDICATIONS  (STANDING):  atorvastatin 10 milliGRAM(s) Oral at bedtime  clonazePAM  Tablet 0.25 milliGRAM(s) Oral two times a day  escitalopram 15 milliGRAM(s) Oral daily  gabapentin 100 milliGRAM(s) Oral three times a day  metoprolol succinate ER 50 milliGRAM(s) Oral two times a day  mirtazapine 15 milliGRAM(s) Oral at bedtime  pantoprazole    Tablet 40 milliGRAM(s) Oral before breakfast    MEDICATIONS  (PRN):  ALPRAZolam 0.5 milliGRAM(s) Oral two times a day PRN anxiety  aluminum hydroxide/magnesium hydroxide/simethicone Suspension 30 milliLiter(s) Oral every 4 hours PRN Dyspepsia  artificial tears (preservative free) Ophthalmic Solution 1 Drop(s) Both EYES four times a day PRN dry eyes  haloperidol     Tablet 2 milliGRAM(s) Oral every 6 hours PRN agitation  haloperidol    Injectable 2 milliGRAM(s) IntraMuscular once PRN severe agitation  LORazepam   Injectable 1 milliGRAM(s) IntraMuscular once PRN severe agitation  ondansetron    Tablet 8 milliGRAM(s) Oral every 8 hours PRN nausea

## 2023-05-15 NOTE — BH INPATIENT PSYCHIATRY PROGRESS NOTE - NSBHCHARTREVIEWVS_PSY_A_CORE FT
Vital Signs Last 24 Hrs  T(C): 36.7 (05-15-23 @ 08:09), Max: 37 (05-14-23 @ 16:00)  T(F): 98.1 (05-15-23 @ 08:09), Max: 98.6 (05-14-23 @ 16:00)  HR: --  BP: --  BP(mean): --  RR: 18 (05-15-23 @ 08:09) (18 - 18)  SpO2: 97% (05-15-23 @ 08:09) (96% - 97%)    Orthostatic VS  05-15-23 @ 08:09  Lying BP: 125/59 HR: 67  Sitting BP: 119/53 HR: 71  Standing BP: --/-- HR: --  Site: upper right arm  Mode: electronic  Orthostatic VS  05-14-23 @ 20:39  Lying BP: --/-- HR: --  Sitting BP: 114/52 HR: 71  Standing BP: --/-- HR: --  Site: --  Mode: --  Orthostatic VS  05-14-23 @ 19:48  Lying BP: --/-- HR: --  Sitting BP: 85/50 HR: 68  Standing BP: --/-- HR: --  Site: --  Mode: --  Orthostatic VS  05-14-23 @ 08:38  Lying BP: 103/58 HR: 67  Sitting BP: 110/68 HR: 77  Standing BP: --/-- HR: --  Site: upper left arm  Mode: electronic

## 2023-05-15 NOTE — BH INPATIENT PSYCHIATRY PROGRESS NOTE - NSBHFUPINTERVALHXFT_PSY_A_CORE
Chart reviewed and case discussed with treatment team. Staff report no adverse vents overnight. Patient slept ok however she became extremely anxious after bloodwork this morning due to fears that there might be something wrong with her heart. She has been having catastrophizations due to intermittently low BP that required holding her BP meds. Hospitalist has discontinued 2 of 3 antihypertensives as a result and ordered bloodwork. Her hands are shaking during this assessment due to anxiety. BMP, TSH wnl. Regarding SI, patient states she vacillates between passive SI if anxiety were to be resistant to treatment and fear of death, morbidity as a result of some new medical illness. Reports no diarrhea or nausea today. States Maalox helped with nausea.

## 2023-05-15 NOTE — BH INPATIENT PSYCHIATRY PROGRESS NOTE - MSE UNSTRUCTURED FT
Appearance: adequate hygiene, obese habitus, stable but slow gait; tremulous but likely psychogenic  Behavior: well related, fair eye contact, mild psychomotor agitation  Speech: wnl  Mood: depressed, anxious  Affect: congruent, less intense, stable  Thought process: tangential, overinclusive  Thought content: no delusions elicited but with catastrophic preoccupation; denies SI  Perceptual disturbances: denies AH  Cognition: orientation intact; has difficulty keeping track of medication names and doses  Abstraction: fair  Fund of knowledge: fair  Insight: partially intact  Judgement: partially intact

## 2023-05-15 NOTE — PROGRESS NOTE ADULT - SUBJECTIVE AND OBJECTIVE BOX
Patient is a 73y old  Female who presents with a chief complaint of low BP     SUBJECTIVE / OVERNIGHT EVENTS: no events or complaints     ROS:  No HA/DZ  No Vision changes   No CP, SOB  No N/V/D  No Edema  No Rash  NO weakness, numbness    MEDICATIONS  (STANDING):  atorvastatin 10 milliGRAM(s) Oral at bedtime  clonazePAM  Tablet 0.5 milliGRAM(s) Oral two times a day  escitalopram 15 milliGRAM(s) Oral daily  gabapentin 100 milliGRAM(s) Oral three times a day  metoprolol succinate ER 50 milliGRAM(s) Oral two times a day  mirtazapine 15 milliGRAM(s) Oral at bedtime  pantoprazole    Tablet 40 milliGRAM(s) Oral before breakfast    MEDICATIONS  (PRN):  ALPRAZolam 0.5 milliGRAM(s) Oral two times a day PRN anxiety  aluminum hydroxide/magnesium hydroxide/simethicone Suspension 30 milliLiter(s) Oral every 4 hours PRN Dyspepsia  artificial tears (preservative free) Ophthalmic Solution 1 Drop(s) Both EYES four times a day PRN dry eyes  haloperidol     Tablet 2 milliGRAM(s) Oral every 6 hours PRN agitation  haloperidol    Injectable 2 milliGRAM(s) IntraMuscular once PRN severe agitation  LORazepam   Injectable 1 milliGRAM(s) IntraMuscular once PRN severe agitation  ondansetron    Tablet 8 milliGRAM(s) Oral every 8 hours PRN nausea      T(C): 36.7 (05-15-23 @ 08:09)  HR: --67  BP: --125/59  RR: 12 (05-15-23 @ 08:09)  SpO2: 97% (05-15-23 @ 08:09)  CAPILLARY BLOOD GLUCOSE        I&O's Summary      PHYSICAL EXAM:  GENERAL: NAD  HEAD:  Atraumatic, Normocephalic  EYES: EOMI, PERRL, conjunctiva and sclera clear  NECK: Supple, No JVD  CHEST/LUNG: Clear to auscultation bilaterally  HEART: Regular rate and rhythm; No murmurs, rubs, or gallops, No Edema  ABDOMEN: Soft, Nontender, Nondistended; Bowel sounds present  EXTREMITIES:  2+ Peripheral Pulses, No clubbing, cyanosis  PSYCH: No SI/HI  NEUROLOGY: non-focal  SKIN: No rashes or lesions    LABS:            cr 1.05            RADIOLOGY & ADDITIONAL TESTS:    Imaging Personally Reviewed:    Consultant(s) Notes Reviewed:      Care Discussed with Consultants/Other Providers:

## 2023-05-15 NOTE — PROGRESS NOTE ADULT - ASSESSMENT
73 yo female, , retired (), non-caregiver, domiciled at home with her , with a prior psychiatric diagnosis of ANSON and MDD, 5 previous inpatient psychiatric hospitalizations at [facility], most recently at [facility] in April of 2023, prior SA in 11/2019 by overdosing on pills, no history of NSSIB, no history of violence/aggression, no hx of legal issues, daily marijuana use, PMHx of HTN, and  hyperlipidemia who was admitted to [facility] for worsening anxiety and SI. Medicine was consulted due to concern for hypotension.       #Hypertension:   -pt on 3 different anti-hypertensives, Losartan 100  mg daily, HCTZ 12.5 mg daily, and Toprol 50 mg BID  -given soft BPs, Losartan and HCTZ were held  -unclear why pt is on Toprol BID; no hx of Afib as per pt. Pt states her doctor prescribed this to her bc her heart "was fast in the morning"  -encourage PO intake  - BP now stabilized wo further hypotension - likely medication induced hypotension     #Diarrhea:   -pt had one large episode of loose stool yesterday  -continue to monitor; if persists, would send GI PCR  -monitor Cr; pt's baseline is approximately 0.8, now 1.05, almost bordering on CARMON. Holding Losartan as above. Encourage hydration.     #GERD  -c/w protonix  -Maalox PRN     #Depression:   -management as per psych

## 2023-05-16 PROCEDURE — 99231 SBSQ HOSP IP/OBS SF/LOW 25: CPT

## 2023-05-16 PROCEDURE — 90834 PSYTX W PT 45 MINUTES: CPT

## 2023-05-16 RX ORDER — MIRTAZAPINE 45 MG/1
22.5 TABLET, ORALLY DISINTEGRATING ORAL AT BEDTIME
Refills: 0 | Status: DISCONTINUED | OUTPATIENT
Start: 2023-05-16 | End: 2023-05-19

## 2023-05-16 RX ADMIN — GABAPENTIN 200 MILLIGRAM(S): 400 CAPSULE ORAL at 20:15

## 2023-05-16 RX ADMIN — GABAPENTIN 200 MILLIGRAM(S): 400 CAPSULE ORAL at 08:56

## 2023-05-16 RX ADMIN — ATORVASTATIN CALCIUM 10 MILLIGRAM(S): 80 TABLET, FILM COATED ORAL at 20:16

## 2023-05-16 RX ADMIN — GABAPENTIN 200 MILLIGRAM(S): 400 CAPSULE ORAL at 14:16

## 2023-05-16 RX ADMIN — Medication 0.5 MILLIGRAM(S): at 10:42

## 2023-05-16 RX ADMIN — Medication 50 MILLIGRAM(S): at 11:08

## 2023-05-16 RX ADMIN — MIRTAZAPINE 22.5 MILLIGRAM(S): 45 TABLET, ORALLY DISINTEGRATING ORAL at 20:16

## 2023-05-16 RX ADMIN — Medication 0.25 MILLIGRAM(S): at 08:56

## 2023-05-16 RX ADMIN — ESCITALOPRAM OXALATE 15 MILLIGRAM(S): 10 TABLET, FILM COATED ORAL at 08:56

## 2023-05-16 RX ADMIN — Medication 0.25 MILLIGRAM(S): at 20:17

## 2023-05-16 RX ADMIN — PANTOPRAZOLE SODIUM 40 MILLIGRAM(S): 20 TABLET, DELAYED RELEASE ORAL at 08:14

## 2023-05-16 NOTE — BH INPATIENT PSYCHIATRY PROGRESS NOTE - NSBHCHARTREVIEWVS_PSY_A_CORE FT
Vital Signs Last 24 Hrs  T(C): 36.9 (05-16-23 @ 15:55), Max: 36.9 (05-16-23 @ 05:44)  T(F): 98.4 (05-16-23 @ 15:55), Max: 98.4 (05-16-23 @ 05:44)  HR: --  BP: --  BP(mean): --  RR: --  SpO2: 95% (05-16-23 @ 15:55) (95% - 98%)    Orthostatic VS  05-16-23 @ 05:44  Lying BP: --/-- HR: --  Sitting BP: 126/76 HR: 70  Standing BP: 130/85 HR: 67  Site: --  Mode: --  Orthostatic VS  05-15-23 @ 08:09  Lying BP: 125/59 HR: 67  Sitting BP: 119/53 HR: 71  Standing BP: --/-- HR: --  Site: upper right arm  Mode: electronic  Orthostatic VS  05-14-23 @ 20:39  Lying BP: --/-- HR: --  Sitting BP: 114/52 HR: 71  Standing BP: --/-- HR: --  Site: --  Mode: --  Orthostatic VS  05-14-23 @ 19:48  Lying BP: --/-- HR: --  Sitting BP: 85/50 HR: 68  Standing BP: --/-- HR: --  Site: --  Mode: --

## 2023-05-16 NOTE — BH INPATIENT PSYCHIATRY PROGRESS NOTE - NSBHFUPINTERVALHXFT_PSY_A_CORE
Chart reviewed and case discussed with treatment team. Staff report patient has been calm but anxious, depressed. Patient reports no longer having any GI issues . Slept well. Anxiety remains. Catastrophizes about never getting better. Still reporting "shivers" and her head feeling "hot".

## 2023-05-16 NOTE — BH INPATIENT PSYCHIATRY PROGRESS NOTE - MSE UNSTRUCTURED FT
Appearance: adequate hygiene with limited grooming; obese habitus, stable but slow gait; no longer tremulous  Behavior: well related, fair eye contact, no psychomotor agitation/retardation  Speech: wnl  Mood: depressed, anxious  Affect: congruent, mildly intense, stable  Thought process: less tangential or overinclusive  Thought content: no delusions elicited but with catastrophic preoccupation; denies SI  Perceptual disturbances: denies AH  Cognition: orientation intact; has difficulty keeping track of medication names and doses  Abstraction: fair  Fund of knowledge: fair  Insight: partially intact  Judgement: partially intact

## 2023-05-16 NOTE — BH INPATIENT PSYCHIATRY PROGRESS NOTE - CURRENT MEDICATION
MEDICATIONS  (STANDING):  atorvastatin 10 milliGRAM(s) Oral at bedtime  clonazePAM  Tablet 0.25 milliGRAM(s) Oral two times a day  escitalopram 15 milliGRAM(s) Oral daily  gabapentin 200 milliGRAM(s) Oral three times a day  metoprolol succinate ER 50 milliGRAM(s) Oral two times a day  mirtazapine 22.5 milliGRAM(s) Oral at bedtime  pantoprazole    Tablet 40 milliGRAM(s) Oral before breakfast    MEDICATIONS  (PRN):  ALPRAZolam 0.5 milliGRAM(s) Oral two times a day PRN anxiety  aluminum hydroxide/magnesium hydroxide/simethicone Suspension 30 milliLiter(s) Oral every 4 hours PRN Dyspepsia  artificial tears (preservative free) Ophthalmic Solution 1 Drop(s) Both EYES four times a day PRN dry eyes  haloperidol     Tablet 2 milliGRAM(s) Oral every 6 hours PRN agitation  haloperidol    Injectable 2 milliGRAM(s) IntraMuscular once PRN severe agitation  LORazepam   Injectable 1 milliGRAM(s) IntraMuscular once PRN severe agitation  ondansetron    Tablet 8 milliGRAM(s) Oral every 8 hours PRN nausea

## 2023-05-17 PROCEDURE — 99231 SBSQ HOSP IP/OBS SF/LOW 25: CPT

## 2023-05-17 RX ORDER — ACETAMINOPHEN 500 MG
650 TABLET ORAL EVERY 6 HOURS
Refills: 0 | Status: DISCONTINUED | OUTPATIENT
Start: 2023-05-17 | End: 2023-06-12

## 2023-05-17 RX ORDER — CLONAZEPAM 1 MG
0.25 TABLET ORAL AT BEDTIME
Refills: 0 | Status: DISCONTINUED | OUTPATIENT
Start: 2023-05-17 | End: 2023-05-17

## 2023-05-17 RX ADMIN — Medication 0.25 MILLIGRAM(S): at 08:52

## 2023-05-17 RX ADMIN — ATORVASTATIN CALCIUM 10 MILLIGRAM(S): 80 TABLET, FILM COATED ORAL at 20:35

## 2023-05-17 RX ADMIN — Medication 650 MILLIGRAM(S): at 20:10

## 2023-05-17 RX ADMIN — Medication 650 MILLIGRAM(S): at 19:31

## 2023-05-17 RX ADMIN — Medication 0.5 MILLIGRAM(S): at 11:47

## 2023-05-17 RX ADMIN — MIRTAZAPINE 22.5 MILLIGRAM(S): 45 TABLET, ORALLY DISINTEGRATING ORAL at 20:34

## 2023-05-17 RX ADMIN — Medication 50 MILLIGRAM(S): at 20:35

## 2023-05-17 RX ADMIN — Medication 30 MILLILITER(S): at 18:44

## 2023-05-17 RX ADMIN — Medication 50 MILLIGRAM(S): at 08:52

## 2023-05-17 RX ADMIN — ONDANSETRON 8 MILLIGRAM(S): 8 TABLET, FILM COATED ORAL at 18:42

## 2023-05-17 RX ADMIN — Medication 0.25 MILLIGRAM(S): at 20:36

## 2023-05-17 RX ADMIN — GABAPENTIN 200 MILLIGRAM(S): 400 CAPSULE ORAL at 13:19

## 2023-05-17 RX ADMIN — Medication 30 MILLILITER(S): at 09:34

## 2023-05-17 RX ADMIN — GABAPENTIN 200 MILLIGRAM(S): 400 CAPSULE ORAL at 20:35

## 2023-05-17 RX ADMIN — GABAPENTIN 200 MILLIGRAM(S): 400 CAPSULE ORAL at 08:52

## 2023-05-17 RX ADMIN — ESCITALOPRAM OXALATE 15 MILLIGRAM(S): 10 TABLET, FILM COATED ORAL at 08:52

## 2023-05-17 RX ADMIN — PANTOPRAZOLE SODIUM 40 MILLIGRAM(S): 20 TABLET, DELAYED RELEASE ORAL at 07:47

## 2023-05-17 NOTE — BH INPATIENT PSYCHIATRY PROGRESS NOTE - MSE UNSTRUCTURED FT
Appearance: adequate hygiene with limited grooming; obese habitus, stable but slow gait; not tremulous  Behavior: well related, fair eye contact, somewhat regressed, no psychomotor agitation/retardation  Speech: sounds tremulous at times but otherwise wnl  Mood: depressed, anxious  Affect: congruent, intense, stable  Thought process: mildly tangential, overinclusive  Thought content: no delusions elicited but with catastrophic preoccupation; denies SI  Perceptual disturbances: denies AH  Cognition: orientation intact; has difficulty keeping track of medication names and doses  Abstraction: fair  Fund of knowledge: fair  Insight: partially intact  Judgement: partially intact

## 2023-05-17 NOTE — DIETITIAN INITIAL EVALUATION ADULT - ADD RECOMMEND
Encourage healthy food choices and honor food preferences PRN. Monitor PO intake/tolerance, weights, labs, BM's, and skin integrity.

## 2023-05-17 NOTE — BH INPATIENT PSYCHIATRY PROGRESS NOTE - NSBHCHARTREVIEWVS_PSY_A_CORE FT
Vital Signs Last 24 Hrs  T(C): 36.6 (05-17-23 @ 15:35), Max: 37.4 (05-16-23 @ 19:45)  T(F): 97.8 (05-17-23 @ 15:35), Max: 99.3 (05-16-23 @ 19:45)  HR: --  BP: --  BP(mean): --  RR: --  SpO2: 96% (05-17-23 @ 15:35) (95% - 96%)    Orthostatic VS  05-17-23 @ 05:44  Lying BP: --/-- HR: --  Sitting BP: 124/73 HR: 87  Standing BP: 127/66 HR: 82  Site: --  Mode: --  Orthostatic VS  05-16-23 @ 19:45  Lying BP: --/-- HR: --  Sitting BP: 125/58 HR: 83  Standing BP: --/-- HR: --  Site: --  Mode: --  Orthostatic VS  05-16-23 @ 05:44  Lying BP: --/-- HR: --  Sitting BP: 126/76 HR: 70  Standing BP: 130/85 HR: 67  Site: --  Mode: --

## 2023-05-17 NOTE — DIETITIAN INITIAL EVALUATION ADULT - PERTINENT LABORATORY DATA
05-15 Na142 mmol/L Glu 197 mg/dL<H> K+ 3.5 mmol/L Cr  0.92 mg/dL BUN 8 mg/dL 05-11 Alb 4.1 g/dL 05-11 Chol 175 mg/dL LDL --    HDL 38 mg/dL<L> Trig 198 mg/dL<H>    A1C with Estimated Average Glucose Result: 5.8 % (05-11-23 @ 08:36)  A1C with Estimated Average Glucose Result: 5.7 % (04-14-23 @ 07:32)

## 2023-05-17 NOTE — BH INPATIENT PSYCHIATRY PROGRESS NOTE - NSBHFUPINTERVALHXFT_PSY_A_CORE
Chart reviewed and case discussed with treatment team. Staff report patient remains anxious. Slept well but anxious on awakening. Patient complaining of nausea again. Reports it's chronic and responds well to zofran, maalox. Patient reports feeling shaky, has "shivers" due to anxiety. Denies SI.

## 2023-05-17 NOTE — DIETITIAN INITIAL EVALUATION ADULT - ORAL INTAKE PTA/DIET HISTORY
Patient reports good appetite PTA. NKFA reported, but avoids spicy or citrus foods (including orange, OJ, cranberry juice) due to hx of GERD. Does not take milk/yogurt due to stomach upset but ok with cheese. Endorses she has been trying to follow a low sat, low fat diet at home. Not on any po nutrition supplements PTA.

## 2023-05-18 DIAGNOSIS — I10 ESSENTIAL (PRIMARY) HYPERTENSION: ICD-10-CM

## 2023-05-18 DIAGNOSIS — K21.9 GASTRO-ESOPHAGEAL REFLUX DISEASE WITHOUT ESOPHAGITIS: ICD-10-CM

## 2023-05-18 DIAGNOSIS — E78.5 HYPERLIPIDEMIA, UNSPECIFIED: ICD-10-CM

## 2023-05-18 LAB
T4 FREE SERPL-MCNC: 0.9 NG/DL — SIGNIFICANT CHANGE UP (ref 0.9–1.8)
TSH SERPL-MCNC: 1.84 UIU/ML — SIGNIFICANT CHANGE UP (ref 0.27–4.2)

## 2023-05-18 PROCEDURE — 99232 SBSQ HOSP IP/OBS MODERATE 35: CPT

## 2023-05-18 PROCEDURE — 90834 PSYTX W PT 45 MINUTES: CPT

## 2023-05-18 RX ADMIN — Medication 30 MILLILITER(S): at 09:19

## 2023-05-18 RX ADMIN — Medication 650 MILLIGRAM(S): at 09:17

## 2023-05-18 RX ADMIN — Medication 50 MILLIGRAM(S): at 09:15

## 2023-05-18 RX ADMIN — ONDANSETRON 8 MILLIGRAM(S): 8 TABLET, FILM COATED ORAL at 16:17

## 2023-05-18 RX ADMIN — PANTOPRAZOLE SODIUM 40 MILLIGRAM(S): 20 TABLET, DELAYED RELEASE ORAL at 08:10

## 2023-05-18 RX ADMIN — MIRTAZAPINE 22.5 MILLIGRAM(S): 45 TABLET, ORALLY DISINTEGRATING ORAL at 20:35

## 2023-05-18 RX ADMIN — GABAPENTIN 200 MILLIGRAM(S): 400 CAPSULE ORAL at 20:35

## 2023-05-18 RX ADMIN — GABAPENTIN 200 MILLIGRAM(S): 400 CAPSULE ORAL at 13:03

## 2023-05-18 RX ADMIN — GABAPENTIN 200 MILLIGRAM(S): 400 CAPSULE ORAL at 09:14

## 2023-05-18 RX ADMIN — ESCITALOPRAM OXALATE 15 MILLIGRAM(S): 10 TABLET, FILM COATED ORAL at 09:16

## 2023-05-18 RX ADMIN — ATORVASTATIN CALCIUM 10 MILLIGRAM(S): 80 TABLET, FILM COATED ORAL at 20:35

## 2023-05-18 NOTE — BH INPATIENT PSYCHIATRY PROGRESS NOTE - MSE UNSTRUCTURED FT
Appearance: adequate hygiene with limited grooming; obese habitus, stable gait  Behavior: well related, fair eye contact, somewhat regressed, no psychomotor agitation/retardation  Speech:  wnl  Mood: depressed, anxious  Affect: congruent, intense, stable  Thought process: mildly tangential, overinclusive  Thought content: no delusions elicited but with catastrophic preoccupation; denies SI  Perceptual disturbances: denies AH  Cognition: orientation intact; has difficulty keeping track of medication names and doses  Abstraction: fair  Fund of knowledge: fair  Insight: partially intact  Judgement: partially intact

## 2023-05-18 NOTE — PROGRESS NOTE ADULT - SUBJECTIVE AND OBJECTIVE BOX
Patient is a 73y old  Female who presents with a chief complaint of Recurrent major depressive disorder     (17 May 2023 14:30)      SUBJECTIVE / OVERNIGHT EVENTS: called about LE edema - pt states she has some edema baseline, just a bit worse now = denies CP or COB, no PND - had recent NST (2 months ago) which was normal, non hx of CHF. has been using pressure stocking for some time     ROS:  No HA/DZ  No Vision changes   No CP, SOB  No N/V/D  No Rash  NO weakness, numbness    MEDICATIONS  (STANDING):  atorvastatin 10 milliGRAM(s) Oral at bedtime  escitalopram 15 milliGRAM(s) Oral daily  gabapentin 200 milliGRAM(s) Oral three times a day  metoprolol succinate ER 50 milliGRAM(s) Oral two times a day  mirtazapine 22.5 milliGRAM(s) Oral at bedtime  pantoprazole    Tablet 40 milliGRAM(s) Oral before breakfast    MEDICATIONS  (PRN):  acetaminophen     Tablet .. 650 milliGRAM(s) Oral every 6 hours PRN Mild Pain (1 - 3)  ALPRAZolam 0.5 milliGRAM(s) Oral two times a day PRN anxiety  aluminum hydroxide/magnesium hydroxide/simethicone Suspension 30 milliLiter(s) Oral every 4 hours PRN Dyspepsia  artificial tears (preservative free) Ophthalmic Solution 1 Drop(s) Both EYES four times a day PRN dry eyes  haloperidol     Tablet 2 milliGRAM(s) Oral every 6 hours PRN agitation  haloperidol    Injectable 2 milliGRAM(s) IntraMuscular once PRN severe agitation  ondansetron    Tablet 8 milliGRAM(s) Oral every 8 hours PRN nausea      T(C): 36.5 (05-18-23 @ 08:17)  HR: --76  BP: --134/78  RR: 18 (05-18-23 @ 08:17)  SpO2: 96% (05-18-23 @ 08:17)  CAPILLARY BLOOD GLUCOSE        I&O's Summary      PHYSICAL EXAM:  GENERAL: NAD, well-developed, AOx3  HEAD:  Atraumatic, Normocephalic  EYES: EOMI, PERRL, conjunctiva and sclera clear  NECK: Supple, No JVD  CHEST/LUNG: Clear to auscultation bilaterally  HEART: Regular rate and rhythm; No murmurs, rubs, or gallops, trace-1 edema b/l LE   ABDOMEN: Soft, Nontender, Nondistended; Bowel sounds present  EXTREMITIES:  2+ Peripheral Pulses, No clubbing, cyanosis  PSYCH: No SI/HI  NEUROLOGY: non-focal  SKIN: No rashes or lesions    LABS:            Albumin 4.1            RADIOLOGY & ADDITIONAL TESTS:    Imaging Personally Reviewed:    Consultant(s) Notes Reviewed:      Care Discussed with Consultants/Other Providers:

## 2023-05-18 NOTE — PROGRESS NOTE ADULT - ASSESSMENT
71 yo female, , retired (), non-caregiver, domiciled at home with her , with a prior psychiatric diagnosis of ANSON and MDD, 5 previous inpatient psychiatric hospitalizations at [facility], most recently at [facility] in April of 2023, prior SA in 11/2019 by overdosing on pills, no history of NSSIB, no history of violence/aggression, no hx of legal issues, daily marijuana use, PMHx of HTN, and  hyperlipidemia who was admitted to [facility] for worsening anxiety and SI. Medicine was consulted due to concern for hypotension and now Le edema       #Hypertension:   -pt on 3 different anti-hypertensives, Losartan 100  mg daily, HCTZ 12.5 mg daily, and Toprol 50 mg BID  -given soft BPs, Losartan and HCTZ were held  -unclear why pt is on Toprol BID; no hx of Afib as per pt. Pt states her doctor prescribed this to her bc her heart "was fast in the morning"  -encourage PO intake  - BP now stabilized wo further hypotension - likely medication induced hypotension     #Diarrhea:   -pt had one large episode of loose stool days ago  - now resolved    #GERD  -c/w protonix  -Maalox PRN     # Edema - symmetrical - pt states she has some edema baseline, just a bit worse now - denies CP or COB, no PND - had recent NST (2 months ago) which was normal, non hx of CHF. has been using pressure stocking for some time - prior TTE w normal cardiac fx, no other s.s of CHF, albumin, LFTs normal - already has baseline LE edema and had been given pressure stockings as out-pt so this is not a new findings - presentation compatible w dependent edema likely from peripheral vascular insuffiencey. cw pressure stockings and leg elevation       #Depression:   -management as per psych

## 2023-05-18 NOTE — BH INPATIENT PSYCHIATRY PROGRESS NOTE - CURRENT MEDICATION
MEDICATIONS  (STANDING):  atorvastatin 10 milliGRAM(s) Oral at bedtime  escitalopram 15 milliGRAM(s) Oral daily  gabapentin 200 milliGRAM(s) Oral three times a day  metoprolol succinate ER 50 milliGRAM(s) Oral two times a day  mirtazapine 22.5 milliGRAM(s) Oral at bedtime  pantoprazole    Tablet 40 milliGRAM(s) Oral before breakfast    MEDICATIONS  (PRN):  acetaminophen     Tablet .. 650 milliGRAM(s) Oral every 6 hours PRN Mild Pain (1 - 3)  ALPRAZolam 0.5 milliGRAM(s) Oral two times a day PRN anxiety  aluminum hydroxide/magnesium hydroxide/simethicone Suspension 30 milliLiter(s) Oral every 4 hours PRN Dyspepsia  artificial tears (preservative free) Ophthalmic Solution 1 Drop(s) Both EYES four times a day PRN dry eyes  haloperidol     Tablet 2 milliGRAM(s) Oral every 6 hours PRN agitation  haloperidol    Injectable 2 milliGRAM(s) IntraMuscular once PRN severe agitation  ondansetron    Tablet 8 milliGRAM(s) Oral every 8 hours PRN nausea

## 2023-05-18 NOTE — BH INPATIENT PSYCHIATRY PROGRESS NOTE - NSBHCHARTREVIEWVS_PSY_A_CORE FT
Vital Signs Last 24 Hrs  T(C): 36.5 (05-18-23 @ 08:17), Max: 37.5 (05-17-23 @ 19:26)  T(F): 97.7 (05-18-23 @ 08:17), Max: 99.5 (05-17-23 @ 19:26)  HR: --  BP: --  BP(mean): --  RR: 18 (05-18-23 @ 08:17) (18 - 18)  SpO2: 96% (05-18-23 @ 08:17) (96% - 96%)    Orthostatic VS  05-18-23 @ 08:17  Lying BP: 143/68 HR: 65  Sitting BP: 130/75 HR: 68  Standing BP: --/-- HR: --  Site: upper left arm  Mode: electronic  Orthostatic VS  05-17-23 @ 19:26  Lying BP: --/-- HR: --  Sitting BP: 132/71 HR: 81  Standing BP: --/-- HR: --  Site: --  Mode: --  Orthostatic VS  05-17-23 @ 05:44  Lying BP: --/-- HR: --  Sitting BP: 124/73 HR: 87  Standing BP: 127/66 HR: 82  Site: --  Mode: --  Orthostatic VS  05-16-23 @ 19:45  Lying BP: --/-- HR: --  Sitting BP: 125/58 HR: 83  Standing BP: --/-- HR: --  Site: --  Mode: --

## 2023-05-18 NOTE — BH INPATIENT PSYCHIATRY PROGRESS NOTE - PRN MEDS
MEDICATIONS  (PRN):  acetaminophen     Tablet .. 650 milliGRAM(s) Oral every 6 hours PRN Mild Pain (1 - 3)  ALPRAZolam 0.5 milliGRAM(s) Oral two times a day PRN anxiety  aluminum hydroxide/magnesium hydroxide/simethicone Suspension 30 milliLiter(s) Oral every 4 hours PRN Dyspepsia  artificial tears (preservative free) Ophthalmic Solution 1 Drop(s) Both EYES four times a day PRN dry eyes  haloperidol     Tablet 2 milliGRAM(s) Oral every 6 hours PRN agitation  haloperidol    Injectable 2 milliGRAM(s) IntraMuscular once PRN severe agitation  ondansetron    Tablet 8 milliGRAM(s) Oral every 8 hours PRN nausea

## 2023-05-18 NOTE — BH INPATIENT PSYCHIATRY PROGRESS NOTE - NSBHFUPINTERVALHXFT_PSY_A_CORE
Chart reviewed and case discussed with treatment team. Staff report patient remains anxious, somatically preoccupied. Patient reports anxiety improved but distressed by GI symptoms and now worries that she might have some terrible disease like pancreatic cancer.

## 2023-05-19 LAB — GI PCR PANEL: SIGNIFICANT CHANGE UP

## 2023-05-19 PROCEDURE — 99232 SBSQ HOSP IP/OBS MODERATE 35: CPT

## 2023-05-19 PROCEDURE — 90832 PSYTX W PT 30 MINUTES: CPT

## 2023-05-19 RX ORDER — MIRTAZAPINE 45 MG/1
22.5 TABLET, ORALLY DISINTEGRATING ORAL AT BEDTIME
Refills: 0 | Status: COMPLETED | OUTPATIENT
Start: 2023-05-19 | End: 2023-05-19

## 2023-05-19 RX ORDER — MIRTAZAPINE 45 MG/1
30 TABLET, ORALLY DISINTEGRATING ORAL AT BEDTIME
Refills: 0 | Status: DISCONTINUED | OUTPATIENT
Start: 2023-05-20 | End: 2023-05-24

## 2023-05-19 RX ADMIN — Medication 0.5 MILLIGRAM(S): at 10:16

## 2023-05-19 RX ADMIN — GABAPENTIN 200 MILLIGRAM(S): 400 CAPSULE ORAL at 20:24

## 2023-05-19 RX ADMIN — Medication 650 MILLIGRAM(S): at 20:22

## 2023-05-19 RX ADMIN — Medication 650 MILLIGRAM(S): at 22:07

## 2023-05-19 RX ADMIN — Medication 650 MILLIGRAM(S): at 08:08

## 2023-05-19 RX ADMIN — ONDANSETRON 8 MILLIGRAM(S): 8 TABLET, FILM COATED ORAL at 16:48

## 2023-05-19 RX ADMIN — Medication 50 MILLIGRAM(S): at 20:24

## 2023-05-19 RX ADMIN — Medication 50 MILLIGRAM(S): at 08:54

## 2023-05-19 RX ADMIN — GABAPENTIN 200 MILLIGRAM(S): 400 CAPSULE ORAL at 08:54

## 2023-05-19 RX ADMIN — ESCITALOPRAM OXALATE 15 MILLIGRAM(S): 10 TABLET, FILM COATED ORAL at 08:53

## 2023-05-19 RX ADMIN — GABAPENTIN 200 MILLIGRAM(S): 400 CAPSULE ORAL at 13:09

## 2023-05-19 RX ADMIN — Medication 0.5 MILLIGRAM(S): at 16:47

## 2023-05-19 RX ADMIN — PANTOPRAZOLE SODIUM 40 MILLIGRAM(S): 20 TABLET, DELAYED RELEASE ORAL at 08:04

## 2023-05-19 RX ADMIN — MIRTAZAPINE 22.5 MILLIGRAM(S): 45 TABLET, ORALLY DISINTEGRATING ORAL at 20:24

## 2023-05-19 RX ADMIN — ATORVASTATIN CALCIUM 10 MILLIGRAM(S): 80 TABLET, FILM COATED ORAL at 20:24

## 2023-05-19 NOTE — BH INPATIENT PSYCHIATRY PROGRESS NOTE - NSBHFUPINTERVALHXFT_PSY_A_CORE
Chart reviewed and case discussed with treatment team. Staff report patient continues to be anxious. Patient report return of nausea, diarrhea and it seems to have worsened her anxiety. Patient has fears about having some serious illness as a cause of her symptoms. GI PCR panel is pending. She was recommended to try restricting to clear liquids for the rest of the day. Denies SI.

## 2023-05-19 NOTE — BH INPATIENT PSYCHIATRY PROGRESS NOTE - MSE UNSTRUCTURED FT
Appearance: fair hygiene and grooming; obese habitus, stable gait  Behavior: well related, cooperative, somewhat regressed, no psychomotor agitation/retardation  Speech:  wnl  Mood: depressed, anxious  Affect: congruent, intense, stable  Thought process: ruminativel, overinclusive  Thought content: no delusions elicited but with catastrophic preoccupation; denies SI  Perceptual disturbances: denies AH  Cognition: orientation intact; has difficulty keeping track of medication names, people's names  Abstraction: fair  Fund of knowledge: fair  Insight: partially intact  Judgement: partially intact

## 2023-05-19 NOTE — BH INPATIENT PSYCHIATRY PROGRESS NOTE - NSBHCHARTREVIEWVS_PSY_A_CORE FT
Vital Signs Last 24 Hrs  T(C): 36.9 (05-19-23 @ 15:55), Max: 36.9 (05-19-23 @ 15:55)  T(F): 98.5 (05-19-23 @ 15:55), Max: 98.5 (05-19-23 @ 15:55)  HR: --  BP: --  BP(mean): --  RR: --  SpO2: 95% (05-19-23 @ 15:55) (95% - 95%)    Orthostatic VS  05-19-23 @ 08:48  Lying BP: --/-- HR: --  Sitting BP: 129/63 HR: 81  Standing BP: 138/74 HR: 87  Site: --  Mode: --  Orthostatic VS  05-18-23 @ 20:49  Lying BP: --/-- HR: --  Sitting BP: 109/68 HR: 72  Standing BP: --/-- HR: --  Site: --  Mode: --  Orthostatic VS  05-18-23 @ 08:17  Lying BP: 143/68 HR: 65  Sitting BP: 130/75 HR: 68  Standing BP: --/-- HR: --  Site: upper left arm  Mode: electronic  Orthostatic VS  05-17-23 @ 19:26  Lying BP: --/-- HR: --  Sitting BP: 132/71 HR: 81  Standing BP: --/-- HR: --  Site: --  Mode: --

## 2023-05-20 RX ADMIN — MIRTAZAPINE 30 MILLIGRAM(S): 45 TABLET, ORALLY DISINTEGRATING ORAL at 20:57

## 2023-05-20 RX ADMIN — GABAPENTIN 200 MILLIGRAM(S): 400 CAPSULE ORAL at 09:05

## 2023-05-20 RX ADMIN — Medication 50 MILLIGRAM(S): at 20:57

## 2023-05-20 RX ADMIN — ONDANSETRON 8 MILLIGRAM(S): 8 TABLET, FILM COATED ORAL at 10:35

## 2023-05-20 RX ADMIN — GABAPENTIN 200 MILLIGRAM(S): 400 CAPSULE ORAL at 20:57

## 2023-05-20 RX ADMIN — Medication 1 DROP(S): at 21:03

## 2023-05-20 RX ADMIN — GABAPENTIN 200 MILLIGRAM(S): 400 CAPSULE ORAL at 12:29

## 2023-05-20 RX ADMIN — ATORVASTATIN CALCIUM 10 MILLIGRAM(S): 80 TABLET, FILM COATED ORAL at 20:57

## 2023-05-20 RX ADMIN — Medication 0.5 MILLIGRAM(S): at 10:34

## 2023-05-20 RX ADMIN — ESCITALOPRAM OXALATE 15 MILLIGRAM(S): 10 TABLET, FILM COATED ORAL at 09:04

## 2023-05-20 RX ADMIN — Medication 50 MILLIGRAM(S): at 09:05

## 2023-05-20 RX ADMIN — PANTOPRAZOLE SODIUM 40 MILLIGRAM(S): 20 TABLET, DELAYED RELEASE ORAL at 08:20

## 2023-05-21 RX ADMIN — ATORVASTATIN CALCIUM 10 MILLIGRAM(S): 80 TABLET, FILM COATED ORAL at 20:38

## 2023-05-21 RX ADMIN — GABAPENTIN 200 MILLIGRAM(S): 400 CAPSULE ORAL at 13:02

## 2023-05-21 RX ADMIN — Medication 30 MILLILITER(S): at 14:09

## 2023-05-21 RX ADMIN — PANTOPRAZOLE SODIUM 40 MILLIGRAM(S): 20 TABLET, DELAYED RELEASE ORAL at 08:04

## 2023-05-21 RX ADMIN — Medication 50 MILLIGRAM(S): at 08:32

## 2023-05-21 RX ADMIN — Medication 50 MILLIGRAM(S): at 20:40

## 2023-05-21 RX ADMIN — GABAPENTIN 200 MILLIGRAM(S): 400 CAPSULE ORAL at 08:30

## 2023-05-21 RX ADMIN — GABAPENTIN 200 MILLIGRAM(S): 400 CAPSULE ORAL at 20:38

## 2023-05-21 RX ADMIN — ONDANSETRON 8 MILLIGRAM(S): 8 TABLET, FILM COATED ORAL at 08:30

## 2023-05-21 RX ADMIN — MIRTAZAPINE 30 MILLIGRAM(S): 45 TABLET, ORALLY DISINTEGRATING ORAL at 20:41

## 2023-05-21 RX ADMIN — ESCITALOPRAM OXALATE 15 MILLIGRAM(S): 10 TABLET, FILM COATED ORAL at 08:31

## 2023-05-21 RX ADMIN — Medication 0.5 MILLIGRAM(S): at 14:09

## 2023-05-21 RX ADMIN — Medication 1 DROP(S): at 20:46

## 2023-05-22 PROCEDURE — 99232 SBSQ HOSP IP/OBS MODERATE 35: CPT | Mod: GC

## 2023-05-22 PROCEDURE — 90832 PSYTX W PT 30 MINUTES: CPT

## 2023-05-22 RX ORDER — VORTIOXETINE 5 MG/1
1 TABLET, FILM COATED ORAL
Qty: 30 | Refills: 0
Start: 2023-05-22 | End: 2023-06-20

## 2023-05-22 RX ORDER — METOPROLOL TARTRATE 50 MG
25 TABLET ORAL
Refills: 0 | Status: DISCONTINUED | OUTPATIENT
Start: 2023-05-22 | End: 2023-06-12

## 2023-05-22 RX ORDER — GABAPENTIN 400 MG/1
300 CAPSULE ORAL THREE TIMES A DAY
Refills: 0 | Status: DISCONTINUED | OUTPATIENT
Start: 2023-05-22 | End: 2023-05-23

## 2023-05-22 RX ADMIN — PANTOPRAZOLE SODIUM 40 MILLIGRAM(S): 20 TABLET, DELAYED RELEASE ORAL at 07:53

## 2023-05-22 RX ADMIN — Medication 0.5 MILLIGRAM(S): at 17:13

## 2023-05-22 RX ADMIN — Medication 50 MILLIGRAM(S): at 08:34

## 2023-05-22 RX ADMIN — ATORVASTATIN CALCIUM 10 MILLIGRAM(S): 80 TABLET, FILM COATED ORAL at 21:31

## 2023-05-22 RX ADMIN — Medication 25 MILLIGRAM(S): at 21:30

## 2023-05-22 RX ADMIN — GABAPENTIN 200 MILLIGRAM(S): 400 CAPSULE ORAL at 13:20

## 2023-05-22 RX ADMIN — ESCITALOPRAM OXALATE 15 MILLIGRAM(S): 10 TABLET, FILM COATED ORAL at 08:35

## 2023-05-22 RX ADMIN — ONDANSETRON 8 MILLIGRAM(S): 8 TABLET, FILM COATED ORAL at 08:34

## 2023-05-22 RX ADMIN — Medication 1 DROP(S): at 22:46

## 2023-05-22 RX ADMIN — GABAPENTIN 200 MILLIGRAM(S): 400 CAPSULE ORAL at 08:35

## 2023-05-22 RX ADMIN — MIRTAZAPINE 30 MILLIGRAM(S): 45 TABLET, ORALLY DISINTEGRATING ORAL at 21:31

## 2023-05-22 RX ADMIN — Medication 30 MILLILITER(S): at 14:01

## 2023-05-22 RX ADMIN — GABAPENTIN 300 MILLIGRAM(S): 400 CAPSULE ORAL at 21:32

## 2023-05-22 NOTE — BH INPATIENT PSYCHIATRY PROGRESS NOTE - NSBHFUPINTERVALHXFT_PSY_A_CORE
Pt was assessed and in no apparent distress. Anxious but cooperative with assessment. She informed that antipsychotics cause her LE pain hence why she does not take them. She also questioned why she was not on an antipsychotic. She reports some ongoing nausea and states her GI doctor informed that her nausea is related to her anxiety. Meds reviewed.  In the past 24H she has required 1 dose of prn Xanax 0.5mg for anxiety and 16mg Zofran for nausea.

## 2023-05-22 NOTE — BH INPATIENT PSYCHIATRY PROGRESS NOTE - NSBHCHARTREVIEWVS_PSY_A_CORE FT
Vital Signs Last 24 Hrs  T(C): 36.8 (05-22-23 @ 05:50), Max: 37.2 (05-21-23 @ 16:15)  T(F): 98.2 (05-22-23 @ 05:50), Max: 99 (05-21-23 @ 16:15)  HR: --  BP: --  BP(mean): --  RR: --  SpO2: 98% (05-21-23 @ 20:46) (94% - 98%)    Orthostatic VS  05-22-23 @ 05:50  Lying BP: 127/65 HR: 57  Sitting BP: 143/75 HR: 61  Standing BP: --/-- HR: --  Site: --  Mode: --  Orthostatic VS  05-21-23 @ 20:46  Lying BP: --/-- HR: --  Sitting BP: 144/65 HR: 70  Standing BP: --/-- HR: --  Site: upper right arm  Mode: electronic  Orthostatic VS  05-21-23 @ 08:23  Lying BP: 141/62 HR: 81  Sitting BP: 142/70 HR: 88  Standing BP: --/-- HR: --  Site: --  Mode: --   Vital Signs Last 24 Hrs  T(C): 37.2 (05-22-23 @ 15:51), Max: 37.2 (05-22-23 @ 15:51)  T(F): 98.9 (05-22-23 @ 15:51), Max: 98.9 (05-22-23 @ 15:51)  HR: --  BP: --  BP(mean): --  RR: --  SpO2: 98% (05-21-23 @ 20:46) (98% - 98%)    Orthostatic VS  05-22-23 @ 05:50  Lying BP: 127/65 HR: 57  Sitting BP: 143/75 HR: 61  Standing BP: --/-- HR: --  Site: --  Mode: --  Orthostatic VS  05-21-23 @ 20:46  Lying BP: --/-- HR: --  Sitting BP: 144/65 HR: 70  Standing BP: --/-- HR: --  Site: upper right arm  Mode: electronic  Orthostatic VS  05-21-23 @ 08:23  Lying BP: 141/62 HR: 81  Sitting BP: 142/70 HR: 88  Standing BP: --/-- HR: --  Site: --  Mode: --

## 2023-05-22 NOTE — BH INPATIENT PSYCHIATRY PROGRESS NOTE - CURRENT MEDICATION
MEDICATIONS  (STANDING):  atorvastatin 10 milliGRAM(s) Oral at bedtime  escitalopram 15 milliGRAM(s) Oral daily  gabapentin 300 milliGRAM(s) Oral three times a day  metoprolol succinate ER 50 milliGRAM(s) Oral two times a day  mirtazapine 30 milliGRAM(s) Oral at bedtime  pantoprazole    Tablet 40 milliGRAM(s) Oral before breakfast    MEDICATIONS  (PRN):  acetaminophen     Tablet .. 650 milliGRAM(s) Oral every 6 hours PRN Mild Pain (1 - 3)  ALPRAZolam 0.5 milliGRAM(s) Oral two times a day PRN anxiety  aluminum hydroxide/magnesium hydroxide/simethicone Suspension 30 milliLiter(s) Oral every 4 hours PRN Dyspepsia  artificial tears (preservative free) Ophthalmic Solution 1 Drop(s) Both EYES four times a day PRN dry eyes  haloperidol     Tablet 2 milliGRAM(s) Oral every 6 hours PRN agitation  haloperidol    Injectable 2 milliGRAM(s) IntraMuscular once PRN severe agitation  ondansetron    Tablet 8 milliGRAM(s) Oral every 8 hours PRN nausea   MEDICATIONS  (STANDING):  atorvastatin 10 milliGRAM(s) Oral at bedtime  escitalopram 15 milliGRAM(s) Oral daily  gabapentin 300 milliGRAM(s) Oral three times a day  metoprolol tartrate 25 milliGRAM(s) Oral two times a day  mirtazapine 30 milliGRAM(s) Oral at bedtime  pantoprazole    Tablet 40 milliGRAM(s) Oral before breakfast    MEDICATIONS  (PRN):  acetaminophen     Tablet .. 650 milliGRAM(s) Oral every 6 hours PRN Mild Pain (1 - 3)  ALPRAZolam 0.5 milliGRAM(s) Oral two times a day PRN anxiety  aluminum hydroxide/magnesium hydroxide/simethicone Suspension 30 milliLiter(s) Oral every 4 hours PRN Dyspepsia  artificial tears (preservative free) Ophthalmic Solution 1 Drop(s) Both EYES four times a day PRN dry eyes  haloperidol     Tablet 2 milliGRAM(s) Oral every 6 hours PRN agitation  haloperidol    Injectable 2 milliGRAM(s) IntraMuscular once PRN severe agitation  ondansetron    Tablet 8 milliGRAM(s) Oral every 8 hours PRN nausea

## 2023-05-22 NOTE — BH INPATIENT PSYCHIATRY PROGRESS NOTE - MSE UNSTRUCTURED FT
Appearance: fair hygiene and grooming; obese habitus, stable gait  Behavior: well related, cooperative, somewhat regressed, no psychomotor agitation/retardation  Speech:  wnl  Mood: depressed, anxious  Affect: congruent, intense, stable  Thought process: ruminative, overinclusive  Thought content: no delusions elicited but with catastrophic preoccupation; denies SI or HI  Perceptual disturbances: denies AH  Cognition: orientation intact; has difficulty keeping track of medication names, people's names  Abstraction: fair  Fund of knowledge: fair  Insight: partially intact  Judgement: partially intact

## 2023-05-23 PROCEDURE — 99232 SBSQ HOSP IP/OBS MODERATE 35: CPT

## 2023-05-23 PROCEDURE — 90834 PSYTX W PT 45 MINUTES: CPT

## 2023-05-23 RX ORDER — ALPRAZOLAM 0.25 MG
0.5 TABLET ORAL
Refills: 0 | Status: DISCONTINUED | OUTPATIENT
Start: 2023-05-23 | End: 2023-05-30

## 2023-05-23 RX ORDER — GABAPENTIN 400 MG/1
300 CAPSULE ORAL
Refills: 0 | Status: DISCONTINUED | OUTPATIENT
Start: 2023-05-23 | End: 2023-05-30

## 2023-05-23 RX ORDER — GABAPENTIN 400 MG/1
200 CAPSULE ORAL
Refills: 0 | Status: DISCONTINUED | OUTPATIENT
Start: 2023-05-24 | End: 2023-05-31

## 2023-05-23 RX ADMIN — Medication 25 MILLIGRAM(S): at 20:13

## 2023-05-23 RX ADMIN — GABAPENTIN 300 MILLIGRAM(S): 400 CAPSULE ORAL at 08:58

## 2023-05-23 RX ADMIN — Medication 0.5 MILLIGRAM(S): at 18:05

## 2023-05-23 RX ADMIN — MIRTAZAPINE 30 MILLIGRAM(S): 45 TABLET, ORALLY DISINTEGRATING ORAL at 20:12

## 2023-05-23 RX ADMIN — ATORVASTATIN CALCIUM 10 MILLIGRAM(S): 80 TABLET, FILM COATED ORAL at 20:13

## 2023-05-23 RX ADMIN — PANTOPRAZOLE SODIUM 40 MILLIGRAM(S): 20 TABLET, DELAYED RELEASE ORAL at 07:38

## 2023-05-23 RX ADMIN — Medication 25 MILLIGRAM(S): at 08:58

## 2023-05-23 RX ADMIN — ESCITALOPRAM OXALATE 15 MILLIGRAM(S): 10 TABLET, FILM COATED ORAL at 08:58

## 2023-05-23 RX ADMIN — GABAPENTIN 300 MILLIGRAM(S): 400 CAPSULE ORAL at 22:26

## 2023-05-23 RX ADMIN — GABAPENTIN 300 MILLIGRAM(S): 400 CAPSULE ORAL at 12:22

## 2023-05-23 NOTE — BH INPATIENT PSYCHIATRY PROGRESS NOTE - NSBHFUPINTERVALHXFT_PSY_A_CORE
Chart reviewed and case discussed with treatment team. Staff report ongoing anxiety. Patient reports feeling better today. Catastrophization is ongoing. Denies SI. Was more visible and had better group participation. Still relying on xanax prn. Reports gabapentin dose increase caused drowsiness but that it's helped with anxiety.

## 2023-05-23 NOTE — BH INPATIENT PSYCHIATRY PROGRESS NOTE - NSBHCHARTREVIEWVS_PSY_A_CORE FT
Vital Signs Last 24 Hrs  T(C): 37.1 (05-23-23 @ 15:33), Max: 37.1 (05-23-23 @ 15:33)  T(F): 98.7 (05-23-23 @ 15:33), Max: 98.7 (05-23-23 @ 15:33)  HR: --  BP: --  BP(mean): --  RR: --  SpO2: 97% (05-23-23 @ 19:26) (96% - 97%)    Orthostatic VS  05-23-23 @ 19:26  Lying BP: --/-- HR: --  Sitting BP: 127/56 HR: 79  Standing BP: --/-- HR: --  Site: --  Mode: --  Orthostatic VS  05-23-23 @ 08:08  Lying BP: --/-- HR: --  Sitting BP: 139/82 HR: 75  Standing BP: 144/79 HR: 79  Site: --  Mode: --  Orthostatic VS  05-22-23 @ 19:46  Lying BP: --/-- HR: --  Sitting BP: 133/59 HR: 72  Standing BP: --/-- HR: --  Site: --  Mode: --  Orthostatic VS  05-22-23 @ 05:50  Lying BP: 127/65 HR: 57  Sitting BP: 143/75 HR: 61  Standing BP: --/-- HR: --  Site: --  Mode: --  Orthostatic VS  05-21-23 @ 20:46  Lying BP: --/-- HR: --  Sitting BP: 144/65 HR: 70  Standing BP: --/-- HR: --  Site: upper right arm  Mode: electronic

## 2023-05-23 NOTE — BH SAFETY PLAN - INTERNAL COPING STRATEGY 2
Word searches  positive self-talk:  'I'm struggling, but I'll be OK'     'you can handle this'  positive self-talk:  'I'm struggling, but I'll be OK'     'you can handle this'      ' thought is not fact'

## 2023-05-23 NOTE — BH INPATIENT PSYCHIATRY PROGRESS NOTE - MSE UNSTRUCTURED FT
After Visit Summary   7/2/2018    Jacqueline Banegas    MRN: [id number]           Patient Information     Date Of Birth          1956        Visit Information        Provider Department      7/2/2018 7:40 AM Garima Sultana MD [facility]        Today's Diagnoses     History of basal cell carcinoma    -  1    History of squamous cell carcinoma in situ of skin        Neoplasm of uncertain behavior of skin          Care Instructions    FUTURE APPOINTMENTS    Follow up for a 40 minute appointment for wide excision of nodular basal cell carcinoma on the right upper chest. Stitches will have to be in place for 2 weeks with corresponding modification of activity.    Follow up every 3 month(s) for a full-body skin cancer screening.    TOPICAL MEDICATION  Begin treatment in September 2018.   Apply fluorouracil (Efudex) 5% cream (comes as a 40 g tube).  Rotate treatment twice daily for 2 consecutive weeks to each extremity, to chest, to back.      Apply to entire limb or side of trunk.    Use a Q tip for application.    Make sure to wash your hands after application.    The medication is used to treat abnormal cells, so you will develop irritation at the affected areas. It reacts with abnormal skin cells but avoids normal skin cells.    However, stop applying the cream if the area becomes excessively irritated (i.e. painful or weepy).    If you notice excessive irritation, call us at [phone] and ask to have me or my colleague paged. We will call you back to discuss.    Consider application of Vaseline only for skin irritation when applying this medication.    WOUND CARE INSTRUCTIONS  1. Wash hands before every dressing change.  2. After 24 hours, change dressing daily.  3. Wash the wound area with a mild soap, then rinse.  4. Gently pat dry with a sterile gauze or Q-tip.  5. Using a Q-tip, apply Vaseline or Aquaphor only over entire wound. Do NOT use Neosporin - as many  "people react to neomycin.  6. Finally, cover with a bandage or sterile non-stick gauze with micropore paper tape.  7. Repeat once daily until wound has healed.      Soap, water and shampoo will not hurt this area.    Do not go swimming or take baths, but showering is encouraged.    Limit use of the area where the procedure was done for a few days to allow for optimal healing.    If you experience bleeding:  Wash hands and hold firm pressure on the area for 10 minutes without checking to see if the bleeding has stopped. \"Checking\" pulls off the protective wound clot and restarts the bleeding all over again. Re-apply pressure for 10 minutes if necessary to stop bleeding.  Use additional sterile gauze and tape to maintain pressure once bleeding has stopped.  If bleeding continues, then call back to clinic at [phone].    Signs of Infection:  Infection can occur in any area where skin has been disrupted.  If you notice persistent redness, swelling, colored drainage, increasing pain, fever or other signs of infection, please call us at: [phone] and ask to have me or my colleague paged. We will call you back to discuss.    Pathology Results:  You will be notified, generally via letter or MyChart, in approximately 10 days. If there is anything we need to discuss or further treatment needed, I will call you to discuss it.    PATIENT INFORMATION : WOUNDS  During the healing process you will notice a number of changes. All wounds develop a small halo of redness surrounding the wound.  This means healing is occurring. Severe itching with extensive redness usually indicates sensitivity to the ointment or bandage tape used to dress the wound.  You should call our office if this develops.      Swelling  and/or discoloration around your surgical site is common, particularly when performed around the eye.    All wounds normally drain.  The larger the wound the more drainage there will be.  After 7-10 days, you will " "notice the wound beginning to shrink and new skin will begin to grow.  The wound is healed when you can see skin has formed over the entire area.  A healed wound has a healthy, shiny look to the surface and is red to dark pink in color to normalize.  Wounds may take approximately 4-6 weeks to heal.  Larger wounds may take 6-8 weeks. After the wound is healed you may discontinue dressing changes.    You may experience a sensation of tightness as your wound heals. This is normal and will gradually subside.    Your healed wound may be sensitive to temperature changes. This sensitivity improves with time, but if you re having a lot of discomfort, try to avoid temperature extremes.    Patients frequently experience itching after their wound appears to have healed because of the continue healing under the skin.  Plain Vaseline will help relieve the itching.    SUN PROTECTION INSTRUCTIONS  Sun damage can lead to skin cancer and premature aging of the skin.      The best way to protect from sun damage to your skin is to avoid the sun during peak hours (10 am - 2 pm) even on overcast days.      Use UPF sun-protective clothing, which while more expensive initially provides longer lasting coverage without having to worry about remembering to re-apply.  1. Wear a wide-brimmed hat and sunglasses.   2. Wear sun-protective clothing.  [employer]icus and other [employer] make sun protective clothing that are stylish, comfortable and cool. Gevo and other [employer] make UV arm sleeves suitable for golfing, gardening and other activities.      Sunscreen instructions:  1. Use sunscreens with Zinc Oxide, Titanium Dioxide or Avobenzone to protect from UVA rays.  2. Use SPF 30-50+ to protect from UVB rays.  3. Re-apply every 2 hours even if water resistant.  4. Apply on your face every day even when cloudy and even in the winter. UVA \"aging rays\" penetrate window glass and are just as strong in the winter as in the " "summer.    Product Recommendations:    Good examples include: Blue Lizard, EltaMD, Solbar    Good daily moisturizers with SPF: Vanicream, CeraVe.    For sensitive skin, consider : SkinMedica Essential Defense Mineral Shield Broad Spectrum SPF 35      Never use tanning beds. Tanning beds are associated with much higher risks of skin cancer.    All tanning damages the skin. Aim for ivory skin year round and you will have less trouble with your skin in years to come. There is no merit in getting \"a base tan\" before a warm weather vacation, as any tanning indicates your body's response to sun damage.    Stop smoking. Smokers have higher rates of skin cancer and also have premature skin wrinkling.    FYI  You should use about 3 tablespoons of sunscreen to protect your whole body. Thus a typical eight ounce bottle of sunscreen should last 4 applications. Remember, that the SPF rating is compromised if you don t apply enough. Most people only apply 1/2 - 1/3 of the amount they need. Also don t forget areas such as your ears, feet, upper back and harder to reach places. Keep in mind that these amounts should be increased for larger body sizes.    Sunscreens with titanium dioxide and/or zinc oxide in the active ingredients are physical blockers as opposed to chemical blockers. Chemical-free sunscreens should not irritate the skin.    Spray-on sunscreens may be used for touch-up application only, not as a base layer. Also, use with caution around small children due to inhalation risk.    Avoid retinyl palmitate products.    Avoid combination products that include both sunscreen and insect repellant, as sunscreen should be applied every 2 hours, but insect repellant should not be applied as frequently.    SPF means sun protection factor, which is just the degree to which the sunscreen can protect against UVB rays. There is no rating system for UVA rays. SPF is calculated as the time skin will burn when sunscreen is applied vs. " skin without sunscreen.    Water resistant sunscreens should be re-applied every 1-2 hours.    For more information:  http://www.skincancer.org/prevention/sun-protection/sunscreen/sunscreens-safe-and-effective    SKIN CANCER SELF-EXAM INSTRUCTIONS  Check every month in the mirror or with a household member. Be aware of any changes, especially bleeding or tenderness. Also, make sure to check your nails for color changes after removal of nail polish.    For melanoma, check for:  A - Asymmetry. One half unlike the other half.  B - Border. Irregular, scalloped, ragged, notched, blurred or poorly defined borders.  C - Color. Color variations from one area to another, with shades of tan, brown and/or black present. Sometimes white, red or blue.  D - Diameter. Greater than 6 mm (about the size of a pencil eraser). Any new growth of a mole should be concerning and be evaluated.  E - Evolving. A mole or skin lesion that looks different from the rest or is changing in size, shape or color.    For basal cell carcinoma and squamous cell carcinoma, check for:    Sores, shiny bumps, nodules, scaly lesions, or wart-like growths that are itchy, tender, crusting, scabbing, eroding, oozing or bleeding.    Open sores/wounds or reddish/irritated areas that do not heal within 2-3 weeks.    Scar-like areas that are white, yellow or waxy in color.          Follow-ups after your visit        Who to contact     If you have questions or need follow up information about today's clinic visit or your schedule please contact [facility] directly at [phone].  Normal or non-critical lab and imaging results will be communicated to you by MyChart, letter or phone within 4 business days after the clinic has received the results. If you do not hear from us within 7 days, please contact the clinic through MyChart or phone. If you have a critical or abnormal lab result, we will notify you by phone as soon as possible.  Submit  refill requests through [employer] or call your pharmacy and they will forward the refill request to us. Please allow 3 business days for your refill to be completed.          Additional Information About Your Visit        [employer] Information     [employer] gives you secure access to your electronic health record. If you see a primary care provider, you can also send messages to your care team and make appointments. If you have questions, please call your primary care clinic.  If you do not have a primary care provider, please call [phone] and they will assist you.        Care EveryWhere ID     This is your Care EveryWhere ID. This could be used by other organizations to access your [facility] medical records  [id number]        Your Vitals Were     Pulse                   76            Blood Pressure from Last 3 Encounters:   07/02/18 132/83   06/18/18 146/87   12/05/17 125/90    Weight from Last 3 Encounters:   11/30/17 158 lb (71.7 kg)   10/31/17 159 lb (72.1 kg)   07/11/17 158 lb (71.7 kg)              Today, you had the following     No orders found for display       Primary Care Provider Office Phone # Fax #    Venessa Carter [phone]       [address] [place] [id number]        Equal Access to Services     MAX ALEMAN AH: Hadii aad ku hadasho Soomaali, waaxda luqadaha, qaybta kaalmada adeegyada, waxay radhain haydorindan giovani heck latanja neil. So [facility] [phone].    ATENCIÓN: Si habla español, tiene a [place] disposición servicios gratuitos de asistencia lingüística. Llame al [phone].    We comply with applicable federal civil rights laws and [place] laws. We do not discriminate on the basis of race, color, national origin, age, disability, sex, sexual orientation, or gender identity.            Thank you!     Thank you for choosing [facility] [place]  for your care. Our goal is always to provide you with excellent care. Hearing back from our patients is one way we can  continue to improve our services. Please take a few minutes to complete the written survey that you may receive in the mail after your visit with us. Thank you!             Your Updated Medication List - Protect others around you: Learn how to safely use, store and throw away your medicines at www.disposemymeds.org.          This list is accurate as of 7/2/18  8:08 AM.  Always use your most recent med list.                   Brand Name Dispense Instructions for use Diagnosis    ALLEGRA PO      Take by mouth as needed for allergies Reported on 5/9/2017        aspirin 81 MG tablet      Take 81 mg by mouth daily Reported on 5/9/2017        simvastatin 40 MG tablet    ZOCOR    90 tablet    Take 1 tablet by mouth daily    Hyperlipidemia with target LDL less than 130       SUMAtriptan 50 MG tablet    IMITREX    9 tablet    Take 1-2 tablets ( mg) by mouth at onset of headache for migraine May repeat in 2 hours if needed: max 2/day    Migraine without status migrainosus, not intractable, unspecified migraine type       SYNTHROID 112 MCG tablet   Generic drug:  levothyroxine     90 tablet    Take 1 tablet by mouth daily    Acquired hypothyroidism          Appearance: intact hygiene and grooming; obese habitus, stable gait  Behavior: well related, attentive, somewhat regressed, no psychomotor agitation/retardation  Speech:  wnl  Mood: depressed, anxious  Affect: congruent, intense, stable  Thought process: ruminative, overinclusive  Thought content: no delusions elicited but with catastrophic preoccupation; denies SI or HI  Perceptual disturbances: denies AH  Cognition: orientation intact; has difficulty keeping track of medication names, people's names  Abstraction: fair  Fund of knowledge: fair  Insight: partially intact  Judgement: partially intact

## 2023-05-23 NOTE — BH INPATIENT PSYCHIATRY PROGRESS NOTE - CURRENT MEDICATION
MEDICATIONS  (STANDING):  atorvastatin 10 milliGRAM(s) Oral at bedtime  escitalopram 15 milliGRAM(s) Oral daily  gabapentin 300 milliGRAM(s) Oral <User Schedule>  metoprolol tartrate 25 milliGRAM(s) Oral two times a day  mirtazapine 30 milliGRAM(s) Oral at bedtime  pantoprazole    Tablet 40 milliGRAM(s) Oral before breakfast    MEDICATIONS  (PRN):  acetaminophen     Tablet .. 650 milliGRAM(s) Oral every 6 hours PRN Mild Pain (1 - 3)  ALPRAZolam 0.5 milliGRAM(s) Oral two times a day PRN anxiety  aluminum hydroxide/magnesium hydroxide/simethicone Suspension 30 milliLiter(s) Oral every 4 hours PRN Dyspepsia  artificial tears (preservative free) Ophthalmic Solution 1 Drop(s) Both EYES four times a day PRN dry eyes  haloperidol     Tablet 2 milliGRAM(s) Oral every 6 hours PRN agitation  haloperidol    Injectable 2 milliGRAM(s) IntraMuscular once PRN severe agitation  ondansetron    Tablet 8 milliGRAM(s) Oral every 8 hours PRN nausea

## 2023-05-24 PROCEDURE — 99232 SBSQ HOSP IP/OBS MODERATE 35: CPT | Mod: GC

## 2023-05-24 RX ORDER — MIRTAZAPINE 45 MG/1
37.5 TABLET, ORALLY DISINTEGRATING ORAL AT BEDTIME
Refills: 0 | Status: DISCONTINUED | OUTPATIENT
Start: 2023-05-24 | End: 2023-05-26

## 2023-05-24 RX ADMIN — ESCITALOPRAM OXALATE 15 MILLIGRAM(S): 10 TABLET, FILM COATED ORAL at 08:31

## 2023-05-24 RX ADMIN — Medication 0.5 MILLIGRAM(S): at 11:29

## 2023-05-24 RX ADMIN — ATORVASTATIN CALCIUM 10 MILLIGRAM(S): 80 TABLET, FILM COATED ORAL at 20:59

## 2023-05-24 RX ADMIN — PANTOPRAZOLE SODIUM 40 MILLIGRAM(S): 20 TABLET, DELAYED RELEASE ORAL at 07:59

## 2023-05-24 RX ADMIN — Medication 25 MILLIGRAM(S): at 20:58

## 2023-05-24 RX ADMIN — GABAPENTIN 300 MILLIGRAM(S): 400 CAPSULE ORAL at 20:58

## 2023-05-24 RX ADMIN — Medication 25 MILLIGRAM(S): at 08:31

## 2023-05-24 RX ADMIN — MIRTAZAPINE 37.5 MILLIGRAM(S): 45 TABLET, ORALLY DISINTEGRATING ORAL at 20:59

## 2023-05-24 RX ADMIN — GABAPENTIN 300 MILLIGRAM(S): 400 CAPSULE ORAL at 05:50

## 2023-05-24 RX ADMIN — ONDANSETRON 8 MILLIGRAM(S): 8 TABLET, FILM COATED ORAL at 11:29

## 2023-05-24 RX ADMIN — GABAPENTIN 200 MILLIGRAM(S): 400 CAPSULE ORAL at 14:01

## 2023-05-24 NOTE — BH INPATIENT PSYCHIATRY PROGRESS NOTE - CURRENT MEDICATION
MEDICATIONS  (STANDING):  atorvastatin 10 milliGRAM(s) Oral at bedtime  escitalopram 15 milliGRAM(s) Oral daily  gabapentin 300 milliGRAM(s) Oral <User Schedule>  gabapentin 200 milliGRAM(s) Oral <User Schedule>  metoprolol tartrate 25 milliGRAM(s) Oral two times a day  mirtazapine 37.5 milliGRAM(s) Oral at bedtime  pantoprazole    Tablet 40 milliGRAM(s) Oral before breakfast    MEDICATIONS  (PRN):  acetaminophen     Tablet .. 650 milliGRAM(s) Oral every 6 hours PRN Mild Pain (1 - 3)  ALPRAZolam 0.5 milliGRAM(s) Oral two times a day PRN anxiety  aluminum hydroxide/magnesium hydroxide/simethicone Suspension 30 milliLiter(s) Oral every 4 hours PRN Dyspepsia  artificial tears (preservative free) Ophthalmic Solution 1 Drop(s) Both EYES four times a day PRN dry eyes  haloperidol     Tablet 2 milliGRAM(s) Oral every 6 hours PRN agitation  haloperidol    Injectable 2 milliGRAM(s) IntraMuscular once PRN severe agitation  ondansetron    Tablet 8 milliGRAM(s) Oral every 8 hours PRN nausea

## 2023-05-24 NOTE — BH INPATIENT PSYCHIATRY PROGRESS NOTE - NSBHCHARTREVIEWVS_PSY_A_CORE FT
Vital Signs Last 24 Hrs  T(C): 36.5 (05-24-23 @ 05:47), Max: 37.1 (05-23-23 @ 15:33)  T(F): 97.7 (05-24-23 @ 05:47), Max: 98.7 (05-23-23 @ 15:33)  HR: --  BP: --  BP(mean): --  RR: 18 (05-24-23 @ 05:47) (18 - 18)  SpO2: 95% (05-24-23 @ 05:47) (95% - 97%)    Orthostatic VS  05-24-23 @ 05:47  Lying BP: 125/54 HR: 74  Sitting BP: 130/63 HR: 62  Standing BP: --/-- HR: --  Site: --  Mode: --  Orthostatic VS  05-23-23 @ 19:26  Lying BP: --/-- HR: --  Sitting BP: 127/56 HR: 79  Standing BP: --/-- HR: --  Site: --  Mode: --  Orthostatic VS  05-23-23 @ 08:08  Lying BP: --/-- HR: --  Sitting BP: 139/82 HR: 75  Standing BP: 144/79 HR: 79  Site: --  Mode: --  Orthostatic VS  05-22-23 @ 19:46  Lying BP: --/-- HR: --  Sitting BP: 133/59 HR: 72  Standing BP: --/-- HR: --  Site: --  Mode: --   Vital Signs Last 24 Hrs  T(C): 37.1 (05-24-23 @ 16:01), Max: 37.1 (05-24-23 @ 16:01)  T(F): 98.8 (05-24-23 @ 16:01), Max: 98.8 (05-24-23 @ 16:01)  HR: --  BP: --  BP(mean): --  RR: 18 (05-24-23 @ 05:47) (18 - 18)  SpO2: 98% (05-24-23 @ 16:01) (95% - 98%)    Orthostatic VS  05-24-23 @ 05:47  Lying BP: 125/54 HR: 74  Sitting BP: 130/63 HR: 62  Standing BP: --/-- HR: --  Site: --  Mode: --  Orthostatic VS  05-23-23 @ 19:26  Lying BP: --/-- HR: --  Sitting BP: 127/56 HR: 79  Standing BP: --/-- HR: --  Site: --  Mode: --  Orthostatic VS  05-23-23 @ 08:08  Lying BP: --/-- HR: --  Sitting BP: 139/82 HR: 75  Standing BP: 144/79 HR: 79  Site: --  Mode: --  Orthostatic VS  05-22-23 @ 19:46  Lying BP: --/-- HR: --  Sitting BP: 133/59 HR: 72  Standing BP: --/-- HR: --  Site: --  Mode: --

## 2023-05-24 NOTE — BH INPATIENT PSYCHIATRY PROGRESS NOTE - NSBHFUPINTERVALHXFT_PSY_A_CORE
Pt assessed at bedside. Pt and staff report ongoing anxiety. Had a episode requiring stat dose of Xanax yesterday afternoon. Also reports feeling drowsy yesterday after Gabapentin [new dose of 300mg], however states ut helped with anxiety. Requested Gabapentin early this morning [6AM] with the hopes that if she becomes drowsy, it will wear off by breakfast. She denies feeling drowsy on my encounter today. She also denies SI/HI. Remains with catastrophic preoccupations about health and medications. Willing to continue attending group sessions and is hopeful for the future.

## 2023-05-24 NOTE — BH INPATIENT PSYCHIATRY PROGRESS NOTE - MSE UNSTRUCTURED FT
Appearance: intact hygiene and grooming; obese habitus, stable gait  Behavior: well related, attentive, somewhat regressed, no psychomotor agitation/retardation  Speech:  wnl  Mood: Anxious  Affect: congruent, intense, stable  Thought process: ruminative, overinclusive  Thought content: no delusions elicited but with catastrophic preoccupation; denies SI or HI  Perceptual disturbances: denies AH  Cognition: orientation intact; has difficulty keeping track of medication names, people's names  Abstraction: fair  Fund of knowledge: fair  Insight: partially intact  Judgement: partially intact

## 2023-05-25 LAB — T3 SERPL-MCNC: 89 NG/DL — SIGNIFICANT CHANGE UP

## 2023-05-25 PROCEDURE — 99232 SBSQ HOSP IP/OBS MODERATE 35: CPT | Mod: GC

## 2023-05-25 PROCEDURE — 90832 PSYTX W PT 30 MINUTES: CPT

## 2023-05-25 RX ADMIN — ATORVASTATIN CALCIUM 10 MILLIGRAM(S): 80 TABLET, FILM COATED ORAL at 20:21

## 2023-05-25 RX ADMIN — ONDANSETRON 8 MILLIGRAM(S): 8 TABLET, FILM COATED ORAL at 15:33

## 2023-05-25 RX ADMIN — PANTOPRAZOLE SODIUM 40 MILLIGRAM(S): 20 TABLET, DELAYED RELEASE ORAL at 07:32

## 2023-05-25 RX ADMIN — GABAPENTIN 300 MILLIGRAM(S): 400 CAPSULE ORAL at 20:21

## 2023-05-25 RX ADMIN — Medication 25 MILLIGRAM(S): at 20:59

## 2023-05-25 RX ADMIN — GABAPENTIN 200 MILLIGRAM(S): 400 CAPSULE ORAL at 13:10

## 2023-05-25 RX ADMIN — ESCITALOPRAM OXALATE 15 MILLIGRAM(S): 10 TABLET, FILM COATED ORAL at 08:15

## 2023-05-25 RX ADMIN — Medication 25 MILLIGRAM(S): at 08:15

## 2023-05-25 RX ADMIN — MIRTAZAPINE 37.5 MILLIGRAM(S): 45 TABLET, ORALLY DISINTEGRATING ORAL at 20:21

## 2023-05-25 RX ADMIN — GABAPENTIN 300 MILLIGRAM(S): 400 CAPSULE ORAL at 06:04

## 2023-05-25 NOTE — BH INPATIENT PSYCHIATRY PROGRESS NOTE - MSE UNSTRUCTURED FT
Appearance: obese habitus; intact hygiene and grooming; stable gait  Behavior: well related, attentive, somewhat regressed, no psychomotor agitation/retardation  Speech:  wnl  Mood: Anxious  Affect: congruent, intense, stable  Thought process: ruminative, overinclusive  Thought content: no delusions elicited but with catastrophic preoccupation; denies SI or HI  Perceptual disturbances: denies AH  Cognition: orientation intact; has difficulty keeping track of medication names, people's names  Abstraction: fair  Fund of knowledge: fair  Insight: partially intact  Judgement: partially intact

## 2023-05-25 NOTE — BH INPATIENT PSYCHIATRY PROGRESS NOTE - NSBHCHARTREVIEWVS_PSY_A_CORE FT
Vital Signs Last 24 Hrs  T(C): 36.6 (05-25-23 @ 07:59), Max: 37.3 (05-24-23 @ 19:50)  T(F): 97.9 (05-25-23 @ 07:59), Max: 99.1 (05-24-23 @ 19:50)  HR: --  BP: --  BP(mean): --  RR: 18 (05-25-23 @ 07:59) (18 - 18)  SpO2: 97% (05-25-23 @ 07:59) (96% - 98%)    Orthostatic VS  05-25-23 @ 07:59  Lying BP: 130/71 HR: 67  Sitting BP: 138/74 HR: 70  Standing BP: --/-- HR: --  Site: upper left arm  Mode: electronic  Orthostatic VS  05-24-23 @ 19:50  Lying BP: --/-- HR: --  Sitting BP: 130/67 HR: 75  Standing BP: --/-- HR: --  Site: --  Mode: --  Orthostatic VS  05-24-23 @ 05:47  Lying BP: 125/54 HR: 74  Sitting BP: 130/63 HR: 62  Standing BP: --/-- HR: --  Site: --  Mode: --  Orthostatic VS  05-23-23 @ 19:26  Lying BP: --/-- HR: --  Sitting BP: 127/56 HR: 79  Standing BP: --/-- HR: --  Site: --  Mode: --   Vital Signs Last 24 Hrs  T(C): 37.1 (05-25-23 @ 16:21), Max: 37.3 (05-24-23 @ 19:50)  T(F): 98.7 (05-25-23 @ 16:21), Max: 99.1 (05-24-23 @ 19:50)  HR: --  BP: --  BP(mean): --  RR: 18 (05-25-23 @ 07:59) (18 - 18)  SpO2: 96% (05-25-23 @ 16:21) (96% - 97%)    Orthostatic VS  05-25-23 @ 07:59  Lying BP: 130/71 HR: 67  Sitting BP: 138/74 HR: 70  Standing BP: --/-- HR: --  Site: upper left arm  Mode: electronic  Orthostatic VS  05-24-23 @ 19:50  Lying BP: --/-- HR: --  Sitting BP: 130/67 HR: 75  Standing BP: --/-- HR: --  Site: --  Mode: --  Orthostatic VS  05-24-23 @ 05:47  Lying BP: 125/54 HR: 74  Sitting BP: 130/63 HR: 62  Standing BP: --/-- HR: --  Site: --  Mode: --

## 2023-05-25 NOTE — BH INPATIENT PSYCHIATRY PROGRESS NOTE - NSBHFUPINTERVALHXFT_PSY_A_CORE
Meds reviewed. Pt received 1 dose of PRN Xanax in the past 24H 8AM - 8AM [yesterday lunchtime]. States Gabapentin is helping with anxiety and is willing to go up on dose if needed. Remains willing to try Trintellix if indicated. Also willing to re-try Abilify again despite adverse effect of leg heaviness as previously reported. Remains preoccupied with next steps in the event of medication failure but is hopeful that she will get better. Case reviewed and discussed with treatment team.

## 2023-05-26 PROCEDURE — 99214 OFFICE O/P EST MOD 30 MIN: CPT | Mod: GC

## 2023-05-26 RX ORDER — BENZTROPINE MESYLATE 1 MG
1 TABLET ORAL
Refills: 0 | Status: DISCONTINUED | OUTPATIENT
Start: 2023-05-26 | End: 2023-06-05

## 2023-05-26 RX ORDER — MIRTAZAPINE 45 MG/1
37.5 TABLET, ORALLY DISINTEGRATING ORAL AT BEDTIME
Refills: 0 | Status: COMPLETED | OUTPATIENT
Start: 2023-05-26 | End: 2023-05-27

## 2023-05-26 RX ORDER — ARIPIPRAZOLE 15 MG/1
1 TABLET ORAL DAILY
Refills: 0 | Status: DISCONTINUED | OUTPATIENT
Start: 2023-05-27 | End: 2023-06-06

## 2023-05-26 RX ORDER — MIRTAZAPINE 45 MG/1
45 TABLET, ORALLY DISINTEGRATING ORAL AT BEDTIME
Refills: 0 | Status: DISCONTINUED | OUTPATIENT
Start: 2023-05-28 | End: 2023-06-12

## 2023-05-26 RX ADMIN — Medication 25 MILLIGRAM(S): at 21:01

## 2023-05-26 RX ADMIN — GABAPENTIN 200 MILLIGRAM(S): 400 CAPSULE ORAL at 13:13

## 2023-05-26 RX ADMIN — Medication 25 MILLIGRAM(S): at 08:22

## 2023-05-26 RX ADMIN — PANTOPRAZOLE SODIUM 40 MILLIGRAM(S): 20 TABLET, DELAYED RELEASE ORAL at 07:38

## 2023-05-26 RX ADMIN — GABAPENTIN 300 MILLIGRAM(S): 400 CAPSULE ORAL at 05:45

## 2023-05-26 RX ADMIN — ATORVASTATIN CALCIUM 10 MILLIGRAM(S): 80 TABLET, FILM COATED ORAL at 21:03

## 2023-05-26 RX ADMIN — GABAPENTIN 300 MILLIGRAM(S): 400 CAPSULE ORAL at 21:03

## 2023-05-26 RX ADMIN — ESCITALOPRAM OXALATE 15 MILLIGRAM(S): 10 TABLET, FILM COATED ORAL at 08:21

## 2023-05-26 RX ADMIN — ONDANSETRON 8 MILLIGRAM(S): 8 TABLET, FILM COATED ORAL at 10:24

## 2023-05-26 RX ADMIN — MIRTAZAPINE 37.5 MILLIGRAM(S): 45 TABLET, ORALLY DISINTEGRATING ORAL at 21:01

## 2023-05-26 RX ADMIN — Medication 0.5 MILLIGRAM(S): at 10:25

## 2023-05-26 NOTE — BH INPATIENT PSYCHIATRY PROGRESS NOTE - CURRENT MEDICATION
MEDICATIONS  (STANDING):  atorvastatin 10 milliGRAM(s) Oral at bedtime  escitalopram 15 milliGRAM(s) Oral daily  gabapentin 300 milliGRAM(s) Oral <User Schedule>  gabapentin 200 milliGRAM(s) Oral <User Schedule>  metoprolol tartrate 25 milliGRAM(s) Oral two times a day  mirtazapine 37.5 milliGRAM(s) Oral at bedtime  pantoprazole    Tablet 40 milliGRAM(s) Oral before breakfast    MEDICATIONS  (PRN):  acetaminophen     Tablet .. 650 milliGRAM(s) Oral every 6 hours PRN Mild Pain (1 - 3)  ALPRAZolam 0.5 milliGRAM(s) Oral two times a day PRN anxiety  aluminum hydroxide/magnesium hydroxide/simethicone Suspension 30 milliLiter(s) Oral every 4 hours PRN Dyspepsia  artificial tears (preservative free) Ophthalmic Solution 1 Drop(s) Both EYES four times a day PRN dry eyes  haloperidol     Tablet 2 milliGRAM(s) Oral every 6 hours PRN agitation  haloperidol    Injectable 2 milliGRAM(s) IntraMuscular once PRN severe agitation  ondansetron    Tablet 8 milliGRAM(s) Oral every 8 hours PRN nausea   MEDICATIONS  (STANDING):  atorvastatin 10 milliGRAM(s) Oral at bedtime  escitalopram 15 milliGRAM(s) Oral daily  gabapentin 300 milliGRAM(s) Oral <User Schedule>  gabapentin 200 milliGRAM(s) Oral <User Schedule>  metoprolol tartrate 25 milliGRAM(s) Oral two times a day  mirtazapine 37.5 milliGRAM(s) Oral at bedtime  pantoprazole    Tablet 40 milliGRAM(s) Oral before breakfast    MEDICATIONS  (PRN):  acetaminophen     Tablet .. 650 milliGRAM(s) Oral every 6 hours PRN Mild Pain (1 - 3)  ALPRAZolam 0.5 milliGRAM(s) Oral two times a day PRN anxiety  aluminum hydroxide/magnesium hydroxide/simethicone Suspension 30 milliLiter(s) Oral every 4 hours PRN Dyspepsia  artificial tears (preservative free) Ophthalmic Solution 1 Drop(s) Both EYES four times a day PRN dry eyes  benztropine 1 milliGRAM(s) Oral two times a day PRN Extrapyramidal symptoms due to abilify  haloperidol     Tablet 2 milliGRAM(s) Oral every 6 hours PRN agitation  haloperidol    Injectable 2 milliGRAM(s) IntraMuscular once PRN severe agitation  ondansetron    Tablet 8 milliGRAM(s) Oral every 8 hours PRN nausea

## 2023-05-26 NOTE — BH INPATIENT PSYCHIATRY PROGRESS NOTE - NSBHCHARTREVIEWVS_PSY_A_CORE FT
Vital Signs Last 24 Hrs  T(C): 36.6 (05-26-23 @ 05:59), Max: 37.1 (05-25-23 @ 16:21)  T(F): 97.8 (05-26-23 @ 05:59), Max: 98.7 (05-25-23 @ 16:21)  HR: 70 (05-25-23 @ 20:55) (70 - 70)  BP: 126/63 (05-25-23 @ 20:55) (117/57 - 126/63)  BP(mean): 65 (05-25-23 @ 20:28) (65 - 65)  RR: 18 (05-26-23 @ 09:02) (18 - 18)  SpO2: 97% (05-26-23 @ 09:02) (96% - 97%)    Orthostatic VS  05-26-23 @ 09:02  Lying BP: --/-- HR: --  Sitting BP: 144/75 HR: 68  Standing BP: 152/81 HR: 76  Site: upper left arm  Mode: electronic  Orthostatic VS  05-26-23 @ 05:59  Lying BP: 126/54 HR: 65  Sitting BP: 152/75 HR: 67  Standing BP: --/-- HR: --  Site: --  Mode: --  Orthostatic VS  05-25-23 @ 07:59  Lying BP: 130/71 HR: 67  Sitting BP: 138/74 HR: 70  Standing BP: --/-- HR: --  Site: upper left arm  Mode: electronic  Orthostatic VS  05-24-23 @ 19:50  Lying BP: --/-- HR: --  Sitting BP: 130/67 HR: 75  Standing BP: --/-- HR: --  Site: --  Mode: --   Vital Signs Last 24 Hrs  T(C): 37.1 (05-26-23 @ 19:48), Max: 37.1 (05-26-23 @ 19:48)  T(F): 98.8 (05-26-23 @ 19:48), Max: 98.8 (05-26-23 @ 19:48)  HR: --  BP: --  BP(mean): --  RR: 18 (05-26-23 @ 09:02) (18 - 18)  SpO2: 97% (05-26-23 @ 19:48) (96% - 97%)    Orthostatic VS  05-26-23 @ 19:48  Lying BP: --/-- HR: --  Sitting BP: 114/57 HR: 75  Standing BP: --/-- HR: --  Site: --  Mode: --  Orthostatic VS  05-26-23 @ 09:02  Lying BP: --/-- HR: --  Sitting BP: 144/75 HR: 68  Standing BP: 152/81 HR: 76  Site: upper left arm  Mode: electronic  Orthostatic VS  05-26-23 @ 05:59  Lying BP: 126/54 HR: 65  Sitting BP: 152/75 HR: 67  Standing BP: --/-- HR: --  Site: --  Mode: --  Orthostatic VS  05-25-23 @ 07:59  Lying BP: 130/71 HR: 67  Sitting BP: 138/74 HR: 70  Standing BP: --/-- HR: --  Site: upper left arm  Mode: electronic

## 2023-05-26 NOTE — BH INPATIENT PSYCHIATRY PROGRESS NOTE - PRN MEDS
MEDICATIONS  (PRN):  acetaminophen     Tablet .. 650 milliGRAM(s) Oral every 6 hours PRN Mild Pain (1 - 3)  ALPRAZolam 0.5 milliGRAM(s) Oral two times a day PRN anxiety  aluminum hydroxide/magnesium hydroxide/simethicone Suspension 30 milliLiter(s) Oral every 4 hours PRN Dyspepsia  artificial tears (preservative free) Ophthalmic Solution 1 Drop(s) Both EYES four times a day PRN dry eyes  haloperidol     Tablet 2 milliGRAM(s) Oral every 6 hours PRN agitation  haloperidol    Injectable 2 milliGRAM(s) IntraMuscular once PRN severe agitation  ondansetron    Tablet 8 milliGRAM(s) Oral every 8 hours PRN nausea   MEDICATIONS  (PRN):  acetaminophen     Tablet .. 650 milliGRAM(s) Oral every 6 hours PRN Mild Pain (1 - 3)  ALPRAZolam 0.5 milliGRAM(s) Oral two times a day PRN anxiety  aluminum hydroxide/magnesium hydroxide/simethicone Suspension 30 milliLiter(s) Oral every 4 hours PRN Dyspepsia  artificial tears (preservative free) Ophthalmic Solution 1 Drop(s) Both EYES four times a day PRN dry eyes  benztropine 1 milliGRAM(s) Oral two times a day PRN Extrapyramidal symptoms due to abilify  haloperidol     Tablet 2 milliGRAM(s) Oral every 6 hours PRN agitation  haloperidol    Injectable 2 milliGRAM(s) IntraMuscular once PRN severe agitation  ondansetron    Tablet 8 milliGRAM(s) Oral every 8 hours PRN nausea

## 2023-05-26 NOTE — BH INPATIENT PSYCHIATRY PROGRESS NOTE - NSBHFUPINTERVALHXFT_PSY_A_CORE
Pt was assessed in day room. In no acute distress.  Pt was assessed today. In no acute distress. Pt denies SI/HI. Cooperative, engages, hopeful, but still preoccupied with fear of becoming anxious. States she tried hard to reassure herself that she will be better yesterday so did not ask for PRN Xanax overnight. States she feels well but thinks she still needs reassurance from MD that she will get better. Also pre-occupied with knowing if her meds would be available since it's a holiday weekend. Meds reviewed. ATC meds given. Case reviewed with treatment team.

## 2023-05-26 NOTE — BH INPATIENT PSYCHIATRY PROGRESS NOTE - MSE UNSTRUCTURED FT
Appearance: obese habitus; intact hygiene and grooming; stable gait  Behavior: well related, attentive, somewhat regressed, no psychomotor agitation/retardation  Speech:  wnl  Mood: Anxious  Affect: congruent, intense, stable  Thought process: ruminative, overinclusive  Thought content: no delusions elicited but with catastrophic preoccupation; denies SI or HI  Perceptual disturbances: denies AH  Cognition: orientation intact; has difficulty keeping track of medication names, people's names  Abstraction: fair  Fund of knowledge: fair  Insight: partially intact  Judgement: partially intact   Appearance: obese habitus; intact hygiene and grooming; stable gait  Behavior: well related, attentive, somewhat regressed, no psychomotor agitation/retardation  Speech:  wnl  Mood: Anxious  Affect: congruent, intense, stable  Thought process: ruminative, preoccupied  Thought content: no delusions elicited but with catastrophic preoccupation; denies SI or HI  Perceptual disturbances: denies AVH  Cognition: orientation intact; has difficulty keeping track of medication names  Abstraction: fair  Fund of knowledge: fair  Insight: partially intact  Judgement: partially intact

## 2023-05-26 NOTE — BH TREATMENT PLAN - NSTXANXPRIOR_PSY_ALL_CORE
9
Hypertension  Hypertension, commonly called high blood pressure, is when the force of blood pumping through your arteries is too strong. Your arteries are the blood vessels that carry blood from your heart throughout your body. A blood pressure reading consists of a higher number over a lower number, such as 110/72. The higher number (systolic) is the pressure inside your arteries when your heart pumps. The lower number (diastolic) is the pressure inside your arteries when your heart relaxes. Ideally you want your blood pressure below 120/80.  Hypertension forces your heart to work harder to pump blood. Your arteries may become narrow or stiff. Having untreated or uncontrolled hypertension can cause heart attack, stroke, kidney disease, and other problems.  RISK FACTORS  Some risk factors for high blood pressure are controllable. Others are not.   Risk factors you cannot control include:   · Race. You may be at higher risk if you are .  · Age. Risk increases with age.  · Gender. Men are at higher risk than women before age 45 years. After age 65, women are at higher risk than men.  Risk factors you can control include:  · Not getting enough exercise or physical activity.  · Being overweight.  · Getting too much fat, sugar, calories, or salt in your diet.  · Drinking too much alcohol.  SIGNS AND SYMPTOMS  Hypertension does not usually cause signs or symptoms. Extremely high blood pressure (hypertensive crisis) may cause headache, anxiety, shortness of breath, and nosebleed.  DIAGNOSIS  To check if you have hypertension, your health care provider will measure your blood pressure while you are seated, with your arm held at the level of your heart. It should be measured at least twice using the same arm. Certain conditions can cause a difference in blood pressure between your right and left arms. A blood pressure reading that is higher than normal on one occasion does not mean that you need treatment. If 
Will  a vit d at the pharmacy and start taken   If has issues taken it , will call back  
it is not clear whether you have high blood pressure, you may be asked to return on a different day to have your blood pressure checked again. Or, you may be asked to monitor your blood pressure at home for 1 or more weeks.  TREATMENT  Treating high blood pressure includes making lifestyle changes and possibly taking medicine. Living a healthy lifestyle can help lower high blood pressure. You may need to change some of your habits.  Lifestyle changes may include:  · Following the DASH diet. This diet is high in fruits, vegetables, and whole grains. It is low in salt, red meat, and added sugars.  · Keep your sodium intake below 2,300 mg per day.  · Getting at least 30-45 minutes of aerobic exercise at least 4 times per week.  · Losing weight if necessary.  · Not smoking.  · Limiting alcoholic beverages.  · Learning ways to reduce stress.  Your health care provider may prescribe medicine if lifestyle changes are not enough to get your blood pressure under control, and if one of the following is true:  · You are 18-59 years of age and your systolic blood pressure is above 140.  · You are 60 years of age or older, and your systolic blood pressure is above 150.  · Your diastolic blood pressure is above 90.  · You have diabetes, and your systolic blood pressure is over 140 or your diastolic blood pressure is over 90.  · You have kidney disease and your blood pressure is above 140/90.  · You have heart disease and your blood pressure is above 140/90.  Your personal target blood pressure may vary depending on your medical conditions, your age, and other factors.  HOME CARE INSTRUCTIONS  · Have your blood pressure rechecked as directed by your health care provider.    · Take medicines only as directed by your health care provider. Follow the directions carefully. Blood pressure medicines must be taken as prescribed. The medicine does not work as well when you skip doses. Skipping doses also puts you at risk for 
problems.  · Do not smoke.    · Monitor your blood pressure at home as directed by your health care provider.   SEEK MEDICAL CARE IF:   · You think you are having a reaction to medicines taken.  · You have recurrent headaches or feel dizzy.  · You have swelling in your ankles.  · You have trouble with your vision.  SEEK IMMEDIATE MEDICAL CARE IF:  · You develop a severe headache or confusion.  · You have unusual weakness, numbness, or feel faint.  · You have severe chest or abdominal pain.  · You vomit repeatedly.  · You have trouble breathing.  MAKE SURE YOU:   · Understand these instructions.  · Will watch your condition.  · Will get help right away if you are not doing well or get worse.     This information is not intended to replace advice given to you by your health care provider. Make sure you discuss any questions you have with your health care provider.     Document Released: 12/18/2006 Document Revised: 05/03/2016 Document Reviewed: 10/10/2014  Vocab Interactive Patient Education ©2016 [employer]    
9

## 2023-05-26 NOTE — BH INPATIENT PSYCHIATRY PROGRESS NOTE - NSBHATTESTCOMMENTATTENDFT_PSY_A_CORE
Patient reports feeling somewhat better today. Patient confronted this writer about being "short" with her yesterday when limits were set. Writer provided psychoeducation and cognitive redirection. If patient does not have sustained improvement, will add abilify 1mg po daily. 
Patient continues to feel anxious, has catastrophic thinking, requires frequent reassurance. Agree with mirtazapine increase. 
Continue current medications. Plan to increase remeron to 37.5mg po qhs tomorrow. Possibly replace lexpro with trintellix. 
Patient has been experiencing fluctuating levels of anxiety. Does seem improved overall. Scheduled mirtazapine dose increase for evening of 5/28/23. Will add abilify 1mg po daily. Patient reports some h/o vague leg pain from antipsychotics so will add cogentin prn in case she had EPS. Patient was informed she can decline medication if poorly tolerated and writer will reassess on return on 5/30/23.

## 2023-05-27 RX ADMIN — Medication 25 MILLIGRAM(S): at 08:25

## 2023-05-27 RX ADMIN — Medication 650 MILLIGRAM(S): at 12:26

## 2023-05-27 RX ADMIN — Medication 1 DROP(S): at 22:15

## 2023-05-27 RX ADMIN — PANTOPRAZOLE SODIUM 40 MILLIGRAM(S): 20 TABLET, DELAYED RELEASE ORAL at 08:24

## 2023-05-27 RX ADMIN — MIRTAZAPINE 37.5 MILLIGRAM(S): 45 TABLET, ORALLY DISINTEGRATING ORAL at 20:36

## 2023-05-27 RX ADMIN — GABAPENTIN 300 MILLIGRAM(S): 400 CAPSULE ORAL at 05:14

## 2023-05-27 RX ADMIN — ARIPIPRAZOLE 1 MILLIGRAM(S): 15 TABLET ORAL at 08:24

## 2023-05-27 RX ADMIN — Medication 1 DROP(S): at 05:15

## 2023-05-27 RX ADMIN — ONDANSETRON 8 MILLIGRAM(S): 8 TABLET, FILM COATED ORAL at 08:25

## 2023-05-27 RX ADMIN — Medication 650 MILLIGRAM(S): at 13:26

## 2023-05-27 RX ADMIN — ESCITALOPRAM OXALATE 15 MILLIGRAM(S): 10 TABLET, FILM COATED ORAL at 08:25

## 2023-05-27 RX ADMIN — GABAPENTIN 300 MILLIGRAM(S): 400 CAPSULE ORAL at 20:36

## 2023-05-27 RX ADMIN — Medication 25 MILLIGRAM(S): at 20:37

## 2023-05-27 RX ADMIN — Medication 0.5 MILLIGRAM(S): at 13:09

## 2023-05-27 RX ADMIN — Medication 30 MILLILITER(S): at 15:50

## 2023-05-27 RX ADMIN — ATORVASTATIN CALCIUM 10 MILLIGRAM(S): 80 TABLET, FILM COATED ORAL at 20:37

## 2023-05-27 RX ADMIN — GABAPENTIN 200 MILLIGRAM(S): 400 CAPSULE ORAL at 14:14

## 2023-05-28 RX ADMIN — GABAPENTIN 300 MILLIGRAM(S): 400 CAPSULE ORAL at 06:10

## 2023-05-28 RX ADMIN — Medication 0.5 MILLIGRAM(S): at 15:35

## 2023-05-28 RX ADMIN — Medication 25 MILLIGRAM(S): at 08:39

## 2023-05-28 RX ADMIN — ATORVASTATIN CALCIUM 10 MILLIGRAM(S): 80 TABLET, FILM COATED ORAL at 20:06

## 2023-05-28 RX ADMIN — GABAPENTIN 200 MILLIGRAM(S): 400 CAPSULE ORAL at 14:08

## 2023-05-28 RX ADMIN — Medication 25 MILLIGRAM(S): at 20:05

## 2023-05-28 RX ADMIN — MIRTAZAPINE 45 MILLIGRAM(S): 45 TABLET, ORALLY DISINTEGRATING ORAL at 20:05

## 2023-05-28 RX ADMIN — PANTOPRAZOLE SODIUM 40 MILLIGRAM(S): 20 TABLET, DELAYED RELEASE ORAL at 06:49

## 2023-05-28 RX ADMIN — GABAPENTIN 300 MILLIGRAM(S): 400 CAPSULE ORAL at 20:05

## 2023-05-28 RX ADMIN — ESCITALOPRAM OXALATE 15 MILLIGRAM(S): 10 TABLET, FILM COATED ORAL at 08:39

## 2023-05-28 RX ADMIN — ARIPIPRAZOLE 1 MILLIGRAM(S): 15 TABLET ORAL at 08:39

## 2023-05-29 RX ADMIN — ONDANSETRON 8 MILLIGRAM(S): 8 TABLET, FILM COATED ORAL at 08:06

## 2023-05-29 RX ADMIN — Medication 25 MILLIGRAM(S): at 08:03

## 2023-05-29 RX ADMIN — ESCITALOPRAM OXALATE 15 MILLIGRAM(S): 10 TABLET, FILM COATED ORAL at 08:02

## 2023-05-29 RX ADMIN — GABAPENTIN 300 MILLIGRAM(S): 400 CAPSULE ORAL at 20:36

## 2023-05-29 RX ADMIN — MIRTAZAPINE 45 MILLIGRAM(S): 45 TABLET, ORALLY DISINTEGRATING ORAL at 20:36

## 2023-05-29 RX ADMIN — Medication 25 MILLIGRAM(S): at 20:36

## 2023-05-29 RX ADMIN — GABAPENTIN 200 MILLIGRAM(S): 400 CAPSULE ORAL at 13:08

## 2023-05-29 RX ADMIN — PANTOPRAZOLE SODIUM 40 MILLIGRAM(S): 20 TABLET, DELAYED RELEASE ORAL at 06:19

## 2023-05-29 RX ADMIN — ARIPIPRAZOLE 1 MILLIGRAM(S): 15 TABLET ORAL at 08:03

## 2023-05-29 RX ADMIN — GABAPENTIN 300 MILLIGRAM(S): 400 CAPSULE ORAL at 06:03

## 2023-05-29 RX ADMIN — ATORVASTATIN CALCIUM 10 MILLIGRAM(S): 80 TABLET, FILM COATED ORAL at 20:36

## 2023-05-30 PROCEDURE — 90832 PSYTX W PT 30 MINUTES: CPT

## 2023-05-30 PROCEDURE — 99232 SBSQ HOSP IP/OBS MODERATE 35: CPT

## 2023-05-30 RX ORDER — GABAPENTIN 400 MG/1
300 CAPSULE ORAL
Refills: 0 | Status: DISCONTINUED | OUTPATIENT
Start: 2023-05-30 | End: 2023-05-31

## 2023-05-30 RX ORDER — ALPRAZOLAM 0.25 MG
0.5 TABLET ORAL
Refills: 0 | Status: DISCONTINUED | OUTPATIENT
Start: 2023-05-30 | End: 2023-06-05

## 2023-05-30 RX ADMIN — Medication 25 MILLIGRAM(S): at 20:40

## 2023-05-30 RX ADMIN — Medication 0.5 MILLIGRAM(S): at 13:53

## 2023-05-30 RX ADMIN — Medication 650 MILLIGRAM(S): at 20:00

## 2023-05-30 RX ADMIN — ARIPIPRAZOLE 1 MILLIGRAM(S): 15 TABLET ORAL at 08:45

## 2023-05-30 RX ADMIN — GABAPENTIN 300 MILLIGRAM(S): 400 CAPSULE ORAL at 08:46

## 2023-05-30 RX ADMIN — ESCITALOPRAM OXALATE 15 MILLIGRAM(S): 10 TABLET, FILM COATED ORAL at 08:45

## 2023-05-30 RX ADMIN — PANTOPRAZOLE SODIUM 40 MILLIGRAM(S): 20 TABLET, DELAYED RELEASE ORAL at 07:00

## 2023-05-30 RX ADMIN — Medication 25 MILLIGRAM(S): at 08:45

## 2023-05-30 RX ADMIN — Medication 650 MILLIGRAM(S): at 19:26

## 2023-05-30 RX ADMIN — GABAPENTIN 300 MILLIGRAM(S): 400 CAPSULE ORAL at 20:40

## 2023-05-30 RX ADMIN — ATORVASTATIN CALCIUM 10 MILLIGRAM(S): 80 TABLET, FILM COATED ORAL at 20:40

## 2023-05-30 RX ADMIN — GABAPENTIN 200 MILLIGRAM(S): 400 CAPSULE ORAL at 13:14

## 2023-05-30 RX ADMIN — MIRTAZAPINE 45 MILLIGRAM(S): 45 TABLET, ORALLY DISINTEGRATING ORAL at 20:39

## 2023-05-30 NOTE — BH INPATIENT PSYCHIATRY PROGRESS NOTE - CURRENT MEDICATION
MEDICATIONS  (STANDING):  ARIPiprazole 1 milliGRAM(s) Oral daily  atorvastatin 10 milliGRAM(s) Oral at bedtime  escitalopram 15 milliGRAM(s) Oral daily  gabapentin 300 milliGRAM(s) Oral <User Schedule>  gabapentin 200 milliGRAM(s) Oral <User Schedule>  metoprolol tartrate 25 milliGRAM(s) Oral two times a day  mirtazapine 45 milliGRAM(s) Oral at bedtime  pantoprazole    Tablet 40 milliGRAM(s) Oral before breakfast    MEDICATIONS  (PRN):  acetaminophen     Tablet .. 650 milliGRAM(s) Oral every 6 hours PRN Mild Pain (1 - 3)  ALPRAZolam 0.5 milliGRAM(s) Oral two times a day PRN anxiety  aluminum hydroxide/magnesium hydroxide/simethicone Suspension 30 milliLiter(s) Oral every 4 hours PRN Dyspepsia  artificial tears (preservative free) Ophthalmic Solution 1 Drop(s) Both EYES four times a day PRN dry eyes  benztropine 1 milliGRAM(s) Oral two times a day PRN Extrapyramidal symptoms due to abilify  haloperidol     Tablet 2 milliGRAM(s) Oral every 6 hours PRN agitation  haloperidol    Injectable 2 milliGRAM(s) IntraMuscular once PRN severe agitation  ondansetron    Tablet 8 milliGRAM(s) Oral every 8 hours PRN nausea

## 2023-05-30 NOTE — BH INPATIENT PSYCHIATRY PROGRESS NOTE - NSBHCHARTREVIEWVS_PSY_A_CORE FT
Vital Signs Last 24 Hrs  T(C): 36.8 (05-30-23 @ 05:49), Max: 37.1 (05-29-23 @ 20:33)  T(F): 98.2 (05-30-23 @ 05:49), Max: 98.8 (05-29-23 @ 20:33)  HR: 87 (05-30-23 @ 17:07) (78 - 87)  BP: 136/62 (05-30-23 @ 17:07) (132/73 - 136/62)  BP(mean): --  RR: 16 (05-30-23 @ 05:49) (16 - 16)  SpO2: 92% (05-30-23 @ 05:49) (92% - 92%)    Orthostatic VS  05-30-23 @ 05:49  Lying BP: 142/59 HR: 68  Sitting BP: 129/59 HR: 73  Standing BP: --/-- HR: --  Site: --  Mode: --  Orthostatic VS  05-29-23 @ 05:55  Lying BP: --/-- HR: --  Sitting BP: 155/77 HR: 63  Standing BP: 146/74 HR: 70  Site: --  Mode: --  Orthostatic VS  05-28-23 @ 19:29  Lying BP: --/-- HR: --  Sitting BP: 151/72 HR: 72  Standing BP: --/-- HR: --  Site: upper right arm  Mode: electronic

## 2023-05-30 NOTE — BH INPATIENT PSYCHIATRY PROGRESS NOTE - MSE UNSTRUCTURED FT
Appearance: obese habitus; intact hygiene and grooming; stable gait  Behavior: well related, attentive, somewhat regressed, no psychomotor agitation/retardation  Speech:  wnl  Mood: Anxious  Affect: congruent, intense, stable  Thought process: ruminative, preoccupied  Thought content: no delusions elicited but with catastrophic preoccupation; denies SI or HI  Perceptual disturbances: denies AVH  Cognition: orientation intact; has difficulty keeping track of medication names  Abstraction: fair  Fund of knowledge: fair  Insight: partially intact  Judgement: partially intact

## 2023-05-30 NOTE — BH INPATIENT PSYCHIATRY PROGRESS NOTE - PRN MEDS
MEDICATIONS  (PRN):  acetaminophen     Tablet .. 650 milliGRAM(s) Oral every 6 hours PRN Mild Pain (1 - 3)  ALPRAZolam 0.5 milliGRAM(s) Oral two times a day PRN anxiety  aluminum hydroxide/magnesium hydroxide/simethicone Suspension 30 milliLiter(s) Oral every 4 hours PRN Dyspepsia  artificial tears (preservative free) Ophthalmic Solution 1 Drop(s) Both EYES four times a day PRN dry eyes  benztropine 1 milliGRAM(s) Oral two times a day PRN Extrapyramidal symptoms due to abilify  haloperidol     Tablet 2 milliGRAM(s) Oral every 6 hours PRN agitation  haloperidol    Injectable 2 milliGRAM(s) IntraMuscular once PRN severe agitation  ondansetron    Tablet 8 milliGRAM(s) Oral every 8 hours PRN nausea

## 2023-05-30 NOTE — BH INPATIENT PSYCHIATRY PROGRESS NOTE - NSBHFUPINTERVALHXFT_PSY_A_CORE
Chart reviewed and case discussed with treatment team. Staff report patient has had improved mood. Patient reports having had improved mood for the past 2 days. Tolerating Abilify well. Denies SI.

## 2023-05-31 PROCEDURE — 99231 SBSQ HOSP IP/OBS SF/LOW 25: CPT

## 2023-05-31 RX ORDER — GABAPENTIN 400 MG/1
200 CAPSULE ORAL
Refills: 0 | Status: DISCONTINUED | OUTPATIENT
Start: 2023-05-31 | End: 2023-06-01

## 2023-05-31 RX ORDER — BACITRACIN ZINC 500 UNIT/G
1 OINTMENT IN PACKET (EA) TOPICAL
Refills: 0 | Status: DISCONTINUED | OUTPATIENT
Start: 2023-05-31 | End: 2023-06-12

## 2023-05-31 RX ADMIN — PANTOPRAZOLE SODIUM 40 MILLIGRAM(S): 20 TABLET, DELAYED RELEASE ORAL at 07:29

## 2023-05-31 RX ADMIN — Medication 1 DROP(S): at 20:28

## 2023-05-31 RX ADMIN — Medication 25 MILLIGRAM(S): at 08:36

## 2023-05-31 RX ADMIN — Medication 1 APPLICATION(S): at 20:27

## 2023-05-31 RX ADMIN — ESCITALOPRAM OXALATE 15 MILLIGRAM(S): 10 TABLET, FILM COATED ORAL at 08:36

## 2023-05-31 RX ADMIN — Medication 1 DROP(S): at 12:18

## 2023-05-31 RX ADMIN — GABAPENTIN 200 MILLIGRAM(S): 400 CAPSULE ORAL at 20:21

## 2023-05-31 RX ADMIN — ATORVASTATIN CALCIUM 10 MILLIGRAM(S): 80 TABLET, FILM COATED ORAL at 20:22

## 2023-05-31 RX ADMIN — GABAPENTIN 200 MILLIGRAM(S): 400 CAPSULE ORAL at 14:16

## 2023-05-31 RX ADMIN — ARIPIPRAZOLE 1 MILLIGRAM(S): 15 TABLET ORAL at 08:36

## 2023-05-31 RX ADMIN — MIRTAZAPINE 45 MILLIGRAM(S): 45 TABLET, ORALLY DISINTEGRATING ORAL at 20:22

## 2023-05-31 RX ADMIN — GABAPENTIN 300 MILLIGRAM(S): 400 CAPSULE ORAL at 08:36

## 2023-05-31 RX ADMIN — Medication 25 MILLIGRAM(S): at 20:22

## 2023-05-31 NOTE — BH INPATIENT PSYCHIATRY PROGRESS NOTE - CURRENT MEDICATION
MEDICATIONS  (STANDING):  ARIPiprazole 1 milliGRAM(s) Oral daily  atorvastatin 10 milliGRAM(s) Oral at bedtime  bacitracin   Ointment 1 Application(s) Topical two times a day  escitalopram 15 milliGRAM(s) Oral daily  gabapentin 200 milliGRAM(s) Oral <User Schedule>  hydrochlorothiazide 12.5 milliGRAM(s) Oral daily  metoprolol tartrate 25 milliGRAM(s) Oral two times a day  mirtazapine 45 milliGRAM(s) Oral at bedtime  pantoprazole    Tablet 40 milliGRAM(s) Oral before breakfast    MEDICATIONS  (PRN):  acetaminophen     Tablet .. 650 milliGRAM(s) Oral every 6 hours PRN Mild Pain (1 - 3)  ALPRAZolam 0.5 milliGRAM(s) Oral two times a day PRN anxiety  aluminum hydroxide/magnesium hydroxide/simethicone Suspension 30 milliLiter(s) Oral every 4 hours PRN Dyspepsia  artificial tears (preservative free) Ophthalmic Solution 1 Drop(s) Both EYES four times a day PRN dry eyes  benztropine 1 milliGRAM(s) Oral two times a day PRN Extrapyramidal symptoms due to abilify  haloperidol     Tablet 2 milliGRAM(s) Oral every 6 hours PRN agitation  haloperidol    Injectable 2 milliGRAM(s) IntraMuscular once PRN severe agitation  ondansetron    Tablet 8 milliGRAM(s) Oral every 8 hours PRN nausea

## 2023-05-31 NOTE — BH INPATIENT PSYCHIATRY PROGRESS NOTE - MSE UNSTRUCTURED FT
Appearance:  intact hygiene and grooming; stable gait; no abnormal involuntary movements noted  Behavior: well related, attentive, less regressed, no psychomotor agitation/retardation  Speech:  wnl  Mood: "better"  Affect: congruent, intense, stable  Thought process: ruminative, preoccupied  Thought content: no delusions elicited but with catastrophic preoccupation; denies SI or HI  Perceptual disturbances: denies AVH  Cognition: orientation intact; has difficulty keeping track of medication names  Abstraction: fair  Fund of knowledge: fair  Insight: partially intact  Judgement: partially intact

## 2023-05-31 NOTE — BH INPATIENT PSYCHIATRY PROGRESS NOTE - NSBHCHARTREVIEWVS_PSY_A_CORE FT
Vital Signs Last 24 Hrs  T(C): 36.9 (05-31-23 @ 15:45), Max: 37.2 (05-30-23 @ 19:43)  T(F): 98.4 (05-31-23 @ 15:45), Max: 99 (05-30-23 @ 19:43)  HR: 84 (05-31-23 @ 09:36) (84 - 87)  BP: 132/65 (05-31-23 @ 09:36) (132/65 - 136/62)  BP(mean): --  RR: 18 (05-31-23 @ 09:36) (18 - 18)  SpO2: 95% (05-31-23 @ 15:45) (95% - 96%)    Orthostatic VS  05-31-23 @ 05:52  Lying BP: 121/52 HR: 62  Sitting BP: 132/60 HR: 68  Standing BP: --/-- HR: --  Site: --  Mode: --  Orthostatic VS  05-30-23 @ 19:43  Lying BP: --/-- HR: --  Sitting BP: 140/73 HR: 82  Standing BP: --/-- HR: --  Site: upper right arm  Mode: electronic  Orthostatic VS  05-30-23 @ 05:49  Lying BP: 142/59 HR: 68  Sitting BP: 129/59 HR: 73  Standing BP: --/-- HR: --  Site: --  Mode: --

## 2023-05-31 NOTE — BH INPATIENT PSYCHIATRY PROGRESS NOTE - NSBHFUPINTERVALHXFT_PSY_A_CORE
Chart reviewed and case discussed with treatment team. Staff report patient has been anxious but improving overall. Patient reports adequate sleep. Denies having had any panic attacks or need for xanax today. Denies SI. Reports GI issues continue - not nauseous anymore but having diarrhea. Requests to decrease gabapentin because she wonders if they're associated. She was taking the medication at home at a lower dose so it's very unlikely.

## 2023-06-01 PROCEDURE — 99232 SBSQ HOSP IP/OBS MODERATE 35: CPT

## 2023-06-01 PROCEDURE — 90832 PSYTX W PT 30 MINUTES: CPT

## 2023-06-01 RX ORDER — GABAPENTIN 400 MG/1
200 CAPSULE ORAL
Refills: 0 | Status: DISCONTINUED | OUTPATIENT
Start: 2023-06-01 | End: 2023-06-12

## 2023-06-01 RX ORDER — GABAPENTIN 400 MG/1
300 CAPSULE ORAL
Refills: 0 | Status: DISCONTINUED | OUTPATIENT
Start: 2023-06-01 | End: 2023-06-12

## 2023-06-01 RX ADMIN — Medication 1 APPLICATION(S): at 08:25

## 2023-06-01 RX ADMIN — Medication 25 MILLIGRAM(S): at 08:23

## 2023-06-01 RX ADMIN — Medication 25 MILLIGRAM(S): at 20:56

## 2023-06-01 RX ADMIN — GABAPENTIN 200 MILLIGRAM(S): 400 CAPSULE ORAL at 08:24

## 2023-06-01 RX ADMIN — ARIPIPRAZOLE 1 MILLIGRAM(S): 15 TABLET ORAL at 08:23

## 2023-06-01 RX ADMIN — ONDANSETRON 8 MILLIGRAM(S): 8 TABLET, FILM COATED ORAL at 09:49

## 2023-06-01 RX ADMIN — MIRTAZAPINE 45 MILLIGRAM(S): 45 TABLET, ORALLY DISINTEGRATING ORAL at 20:55

## 2023-06-01 RX ADMIN — ESCITALOPRAM OXALATE 15 MILLIGRAM(S): 10 TABLET, FILM COATED ORAL at 08:23

## 2023-06-01 RX ADMIN — GABAPENTIN 200 MILLIGRAM(S): 400 CAPSULE ORAL at 13:37

## 2023-06-01 RX ADMIN — Medication 1 APPLICATION(S): at 20:56

## 2023-06-01 RX ADMIN — PANTOPRAZOLE SODIUM 40 MILLIGRAM(S): 20 TABLET, DELAYED RELEASE ORAL at 08:22

## 2023-06-01 RX ADMIN — Medication 0.5 MILLIGRAM(S): at 15:39

## 2023-06-01 RX ADMIN — GABAPENTIN 300 MILLIGRAM(S): 400 CAPSULE ORAL at 20:55

## 2023-06-01 RX ADMIN — ATORVASTATIN CALCIUM 10 MILLIGRAM(S): 80 TABLET, FILM COATED ORAL at 20:55

## 2023-06-01 NOTE — BH INPATIENT PSYCHIATRY PROGRESS NOTE - MSE UNSTRUCTURED FT
Appearance: adequate hygiene and grooming; stable gait; no abnormal involuntary movements noted  Behavior: well related, attentive, no psychomotor agitation/retardation  Speech:  wnl  Mood: "more anxious"  Affect: congruent, intense, stable  Thought process: ruminative, preoccupied  Thought content: no delusions elicited; denies SI or HI  Perceptual disturbances: denies AVH  Cognition: orientation intact; has difficulty keeping track of medication names  Abstraction: fair  Fund of knowledge: fair  Insight: partially intact  Judgement: improving

## 2023-06-01 NOTE — BH INPATIENT PSYCHIATRY PROGRESS NOTE - CURRENT MEDICATION
MEDICATIONS  (STANDING):  ARIPiprazole 1 milliGRAM(s) Oral daily  atorvastatin 10 milliGRAM(s) Oral at bedtime  bacitracin   Ointment 1 Application(s) Topical two times a day  escitalopram 15 milliGRAM(s) Oral daily  gabapentin 200 milliGRAM(s) Oral <User Schedule>  gabapentin 300 milliGRAM(s) Oral two times a day  hydrochlorothiazide 12.5 milliGRAM(s) Oral daily  metoprolol tartrate 25 milliGRAM(s) Oral two times a day  mirtazapine 45 milliGRAM(s) Oral at bedtime  pantoprazole    Tablet 40 milliGRAM(s) Oral before breakfast    MEDICATIONS  (PRN):  acetaminophen     Tablet .. 650 milliGRAM(s) Oral every 6 hours PRN Mild Pain (1 - 3)  ALPRAZolam 0.5 milliGRAM(s) Oral two times a day PRN anxiety  aluminum hydroxide/magnesium hydroxide/simethicone Suspension 30 milliLiter(s) Oral every 4 hours PRN Dyspepsia  artificial tears (preservative free) Ophthalmic Solution 1 Drop(s) Both EYES four times a day PRN dry eyes  benztropine 1 milliGRAM(s) Oral two times a day PRN Extrapyramidal symptoms due to abilify  haloperidol     Tablet 2 milliGRAM(s) Oral every 6 hours PRN agitation  haloperidol    Injectable 2 milliGRAM(s) IntraMuscular once PRN severe agitation  ondansetron    Tablet 8 milliGRAM(s) Oral every 8 hours PRN nausea

## 2023-06-01 NOTE — BH TREATMENT PLAN - NSTXSUICIDINTERRN_PSY_ALL_CORE
Assess pt for suicidal ideation, intent, plan, and self-injurious behavior, provide support, maintain safety, explore healthy coping skills and protective factors, identify triggers, encourage pt to participate in groups and unit activities, administer and educate on ordered medications
Provide a safe environment. Present opportunities for the patient to express thoughts, and feelings in a nonjudgmental environment.
Assessed for suicidalty.pt is dening.
Assess suicidal intent on a scale of 0 to 10 or by asking directly if the client is thinking of killing themself, or has plans and means.

## 2023-06-01 NOTE — BH INPATIENT PSYCHIATRY PROGRESS NOTE - NSBHCHARTREVIEWVS_PSY_A_CORE FT
Vital Signs Last 24 Hrs  T(C): 36.7 (06-01-23 @ 05:24), Max: 37.2 (05-31-23 @ 19:58)  T(F): 98.1 (06-01-23 @ 05:24), Max: 99 (05-31-23 @ 19:58)  HR: --  BP: --  BP(mean): --  RR: 18 (06-01-23 @ 05:24) (18 - 18)  SpO2: 96% (06-01-23 @ 05:24) (95% - 97%)    Orthostatic VS  06-01-23 @ 05:24  Lying BP: 123/62 HR: 71  Sitting BP: 127/96 HR: 76  Standing BP: --/-- HR: --  Site: upper left arm  Mode: electronic  Orthostatic VS  05-31-23 @ 19:58  Lying BP: --/-- HR: --  Sitting BP: 140/78 HR: 82  Standing BP: --/-- HR: --  Site: upper left arm  Mode: electronic  Orthostatic VS  05-31-23 @ 05:52  Lying BP: 121/52 HR: 62  Sitting BP: 132/60 HR: 68  Standing BP: --/-- HR: --  Site: --  Mode: --  Orthostatic VS  05-30-23 @ 19:43  Lying BP: --/-- HR: --  Sitting BP: 140/73 HR: 82  Standing BP: --/-- HR: --  Site: upper right arm  Mode: electronic

## 2023-06-01 NOTE — BH INPATIENT PSYCHIATRY PROGRESS NOTE - NSBHFUPINTERVALHXFT_PSY_A_CORE
Chart reviewed and case discussed with treatment team. Staff report patient continues to have anxiety, which seems to fluctuate with somatic issues. She states her LE are less swollen and that nausea has been less severe but for the past few days she has had frequent BMs. She asked to reduce gabapentin because she noted an association but now regrets it and wonders if the gabapentin dose reduction we decided on yesterday is contributing to feeling anxious today. Patient reports she doesn't feel ready to go home tomorrow stating she feels like she'll have to come right back to the hospital.

## 2023-06-02 PROCEDURE — 90832 PSYTX W PT 30 MINUTES: CPT

## 2023-06-02 PROCEDURE — 99232 SBSQ HOSP IP/OBS MODERATE 35: CPT

## 2023-06-02 PROCEDURE — 90834 PSYTX W PT 45 MINUTES: CPT

## 2023-06-02 RX ADMIN — PANTOPRAZOLE SODIUM 40 MILLIGRAM(S): 20 TABLET, DELAYED RELEASE ORAL at 07:37

## 2023-06-02 RX ADMIN — Medication 30 MILLILITER(S): at 18:16

## 2023-06-02 RX ADMIN — Medication 25 MILLIGRAM(S): at 20:41

## 2023-06-02 RX ADMIN — Medication 25 MILLIGRAM(S): at 08:21

## 2023-06-02 RX ADMIN — Medication 1 APPLICATION(S): at 08:21

## 2023-06-02 RX ADMIN — GABAPENTIN 200 MILLIGRAM(S): 400 CAPSULE ORAL at 14:01

## 2023-06-02 RX ADMIN — GABAPENTIN 300 MILLIGRAM(S): 400 CAPSULE ORAL at 08:21

## 2023-06-02 RX ADMIN — ARIPIPRAZOLE 1 MILLIGRAM(S): 15 TABLET ORAL at 08:22

## 2023-06-02 RX ADMIN — ATORVASTATIN CALCIUM 10 MILLIGRAM(S): 80 TABLET, FILM COATED ORAL at 20:41

## 2023-06-02 RX ADMIN — ESCITALOPRAM OXALATE 15 MILLIGRAM(S): 10 TABLET, FILM COATED ORAL at 08:21

## 2023-06-02 RX ADMIN — MIRTAZAPINE 45 MILLIGRAM(S): 45 TABLET, ORALLY DISINTEGRATING ORAL at 20:41

## 2023-06-02 RX ADMIN — GABAPENTIN 300 MILLIGRAM(S): 400 CAPSULE ORAL at 20:41

## 2023-06-02 NOTE — BH INPATIENT PSYCHIATRY PROGRESS NOTE - NSBHCHARTREVIEWVS_PSY_A_CORE FT
Vital Signs Last 24 Hrs  T(C): 36.5 (06-02-23 @ 16:27), Max: 36.7 (06-01-23 @ 20:33)  T(F): 97.7 (06-02-23 @ 16:27), Max: 98 (06-01-23 @ 20:33)  HR: --  BP: --  BP(mean): --  RR: --  SpO2: 97% (06-02-23 @ 16:27) (97% - 99%)    Orthostatic VS  06-02-23 @ 08:18  Lying BP: --/-- HR: --  Sitting BP: 143/80 HR: 86  Standing BP: 132/80 HR: 90  Site: --  Mode: --  Orthostatic VS  06-01-23 @ 20:33  Lying BP: --/-- HR: --  Sitting BP: 122/55 HR: 89  Standing BP: --/-- HR: --  Site: --  Mode: --  Orthostatic VS  06-01-23 @ 05:24  Lying BP: 123/62 HR: 71  Sitting BP: 127/96 HR: 76  Standing BP: --/-- HR: --  Site: upper left arm  Mode: electronic  Orthostatic VS  05-31-23 @ 19:58  Lying BP: --/-- HR: --  Sitting BP: 140/78 HR: 82  Standing BP: --/-- HR: --  Site: upper left arm  Mode: electronic

## 2023-06-02 NOTE — BH DISCHARGE NOTE NURSING/SOCIAL WORK/PSYCH REHAB - MODE OF TRANSPORTATION
pt requested sw arrange for a cab. [facility].  cost 50.25 without tip ( tip at your discretion) [facility] to your home. You have payment with you and will pay the . [facility]  [phone] (say  for a person)/Taxi

## 2023-06-02 NOTE — BH DISCHARGE NOTE NURSING/SOCIAL WORK/PSYCH REHAB - PATIENT PORTAL LINK FT
You can access the FollowMyHealth Patient Portal offered by [facility] by registering at the following website: [email or web address]. By joining [employer]’s FollowMyHealth portal, you will also be able to view your health information using other applications (apps) compatible with our system.

## 2023-06-02 NOTE — BH DISCHARGE NOTE NURSING/SOCIAL WORK/PSYCH REHAB - NSCDUDCCRISIS_PSY_A_CORE
.National Suicide Prevention Lifeline 3 [phone] 033-5550/.  [facility]  [address] [place], [place] [address] [phone]   Hours:  Monday through Friday from 9 AM to 3 PM/988 Suicide and Crisis Lifeline

## 2023-06-02 NOTE — BH DISCHARGE NOTE NURSING/SOCIAL WORK/PSYCH REHAB - CAREGIVER NAME
----- Message from Franko Hunt M.D. sent at 1/2/2019  5:29 PM PST -----  Renal ultrasound, renal function tests are all normal    pc   Yehuda Gross

## 2023-06-02 NOTE — BH DISCHARGE NOTE NURSING/SOCIAL WORK/PSYCH REHAB - NSDCADDINFO1FT_PSY_ALL_CORE
please arrive 15 minutes early for paperwork    Please discuss with Dr Kamara about resumption of group therapy with [facility]

## 2023-06-02 NOTE — BH DISCHARGE NOTE NURSING/SOCIAL WORK/PSYCH REHAB - NSDCPRRECOMMEND_PSY_ALL_CORE
Psychiatric Rehabilitation staff recommends that patient returns to treatment with [facility] for ongoing medication management, support, and psychotherapy. In addition, psych rehab recommends patient continue to demonstrate identifying and utilizing 3 coping skills related to anxiety for improved symptom management.

## 2023-06-02 NOTE — BH INPATIENT PSYCHIATRY PROGRESS NOTE - NSBHFUPINTERVALHXFT_PSY_A_CORE
Chart reviewed and case discussed with treatment team. Staff report patient remains somatically preoccupied. Group participation better. Patient reports improvement. Denies SI.

## 2023-06-02 NOTE — BH INPATIENT PSYCHIATRY PROGRESS NOTE - MSE UNSTRUCTURED FT
Appearance: adequate hygiene and grooming; stable gait; no abnormal involuntary movements noted  Behavior: well related, attentive, no psychomotor agitation/retardation  Speech:  wnl  Mood: ok  Affect: congruent, intense, stable  Thought process: ruminative, preoccupied  Thought content: no delusions elicited; denies SI or HI  Perceptual disturbances: denies AVH  Cognition: orientation intact; has difficulty keeping track of medication names  Abstraction: fair  Fund of knowledge: fair  Insight: partially intact  Judgement: improving

## 2023-06-02 NOTE — BH DISCHARGE NOTE NURSING/SOCIAL WORK/PSYCH REHAB - NSDCPRGOAL_PSY_ALL_CORE
Writer met with patient to discuss patient’s discharge and progress towards rehabilitation goals. Patient was pleasant and cooperative to meet with writer. Patient was able to engage in safety planning and was given a survey prior to discharge. Patient was admitted to [facility] on 05/10/2023 due to worsening anxiety and SI at home. During the current hospitalization, the patient reported improvements in mood and decreased anxiety. This is evidenced by her participation in group therapy and progress towards her individual goals. Patient denied suicidal/homicidal ideations. Patient demonstrated fair progress towards psychiatric rehabilitation goal is to identify and utilize 3 coping skills related to anxiety for improved symptom management. The patient reported that she identifies using her stress ball, deep breathing, imagery, and using her support system are helpful when mitigating her symptoms of anxiety. The patient reported using deep breathing while in the hospital to decrease her symptoms of anxiety. Due to the COVID-19 pandemic, unit structure and activities are re-evaluated on a consistent basis in effort to maintain the safety of patients and staff. Patient participated in psychiatric rehabilitation groups and was engaged during sessions. Patient attended dance/movement therapy, leisure groups, and psychoeducation groups. Patient was visible on the unit and maintained fair behavioral control. Patient was social with peers and engaged appropriately.

## 2023-06-02 NOTE — CHART NOTE - NSCHARTNOTEFT_GEN_A_CORE
Nutrition f/u note:  Patient seen d/t patient request.  Patient continues treatment at [facility] for anxiety and SI.     Diet : Diet, Regular:   Low Fat (LOWFAT)  Low Sodium  Lactose Restricted (Milk Sugar Intoler.) (06-01-23 @ 11:31)        Patient reports [ ] nausea  [ ] vomiting [ ] diarrhea [ ] constipation  [ ]chewing problems [ ] swallowing issues  [ ] other:     PO intake:  < 50% [ ] 50-75% [ ]   % [ x]  other :    Source for PO intake [x ] Patient [ ] family [ x chart [ x] staff [ ] other    Patient states appetite is good. Reports having frequent bm's (before breakfast, after breakfast and lunch)- not diarrhea. States she is carefully monitoring her food intake, making sure she does not consume any dairy. Has food preferences in place which were reviewed with changes made.       Current Weight: no new weights  % Weight Change    Pertinent Medications: MEDICATIONS  (STANDING):  ARIPiprazole 1 milliGRAM(s) Oral daily  atorvastatin 10 milliGRAM(s) Oral at bedtime  bacitracin   Ointment 1 Application(s) Topical two times a day  escitalopram 15 milliGRAM(s) Oral daily  gabapentin 200 milliGRAM(s) Oral <User Schedule>  gabapentin 300 milliGRAM(s) Oral two times a day  hydrochlorothiazide 12.5 milliGRAM(s) Oral daily  metoprolol tartrate 25 milliGRAM(s) Oral two times a day  mirtazapine 45 milliGRAM(s) Oral at bedtime  pantoprazole    Tablet 40 milliGRAM(s) Oral before breakfast    MEDICATIONS  (PRN):  acetaminophen     Tablet .. 650 milliGRAM(s) Oral every 6 hours PRN Mild Pain (1 - 3)  ALPRAZolam 0.5 milliGRAM(s) Oral two times a day PRN anxiety  aluminum hydroxide/magnesium hydroxide/simethicone Suspension 30 milliLiter(s) Oral every 4 hours PRN Dyspepsia  artificial tears (preservative free) Ophthalmic Solution 1 Drop(s) Both EYES four times a day PRN dry eyes  benztropine 1 milliGRAM(s) Oral two times a day PRN Extrapyramidal symptoms due to abilify  haloperidol     Tablet 2 milliGRAM(s) Oral every 6 hours PRN agitation  haloperidol    Injectable 2 milliGRAM(s) IntraMuscular once PRN severe agitation  ondansetron    Tablet 8 milliGRAM(s) Oral every 8 hours PRN nausea    Pertinent Labs:   05-11 Chol 175 mg/dL LDL --    HDL 38 mg/dL<L> Trig 198 mg/dL<H>      Skin: trace 1+ edema b/l LE    Estimated Needs:   [ x] no change since previous assessment  [ ] recalculated:       Previous Nutrition Diagnosis:     [ ] Inadequate Energy Intake [ ]Inadequate Oral Intake [ ] Excessive Energy Intake     [ ] Underweight [ ] Increased Nutrient Needs [ x] Overweight/Obesity     [ ] Altered GI Function [ ] Unintended Weight Loss [ ] Food & Nutrition Related Knowledge Deficit [ ] Malnutrition          Nutrition Diagnosis is [ x] ongoing  [ ] resolved [ ] not applicable         Recommend/Plan:    [x ] Maintain prescribed diet    [ x] Provide food preferences    [x ] Encourage healthy food choices and portion control    [ x] RD remains available prn       Monitoring and Evaluation:     [ x] PO intake [ ] Tolerance to diet prescription [x ] weights [x ] follow up per protocol    Linsey Russell MS RDN  Pager #[phone]

## 2023-06-02 NOTE — BH DISCHARGE NOTE NURSING/SOCIAL WORK/PSYCH REHAB - DISCHARGE INSTRUCTIONS AFTERCARE APPOINTMENTS
In order to check the location, date, or time of your aftercare appointment, please refer to your Discharge Instructions Document given to you upon leaving the hospital.  If you have lost the instructions please call [phone]

## 2023-06-03 ENCOUNTER — OUTPATIENT (OUTPATIENT)
Dept: OUTPATIENT SERVICES | Facility: HOSPITAL | Age: 73
LOS: 1 days | Discharge: HOME | End: 2023-06-03
Payer: COMMERCIAL

## 2023-06-03 DIAGNOSIS — Z98.890 OTHER SPECIFIED POSTPROCEDURAL STATES: Chronic | ICD-10-CM

## 2023-06-03 RX ADMIN — GABAPENTIN 200 MILLIGRAM(S): 400 CAPSULE ORAL at 14:11

## 2023-06-03 RX ADMIN — MIRTAZAPINE 45 MILLIGRAM(S): 45 TABLET, ORALLY DISINTEGRATING ORAL at 20:46

## 2023-06-03 RX ADMIN — ATORVASTATIN CALCIUM 10 MILLIGRAM(S): 80 TABLET, FILM COATED ORAL at 20:45

## 2023-06-03 RX ADMIN — ONDANSETRON 8 MILLIGRAM(S): 8 TABLET, FILM COATED ORAL at 09:37

## 2023-06-03 RX ADMIN — ARIPIPRAZOLE 1 MILLIGRAM(S): 15 TABLET ORAL at 09:37

## 2023-06-03 RX ADMIN — GABAPENTIN 300 MILLIGRAM(S): 400 CAPSULE ORAL at 09:37

## 2023-06-03 RX ADMIN — Medication 1 APPLICATION(S): at 09:37

## 2023-06-03 RX ADMIN — Medication 1 APPLICATION(S): at 20:44

## 2023-06-03 RX ADMIN — GABAPENTIN 300 MILLIGRAM(S): 400 CAPSULE ORAL at 20:45

## 2023-06-03 RX ADMIN — ESCITALOPRAM OXALATE 15 MILLIGRAM(S): 10 TABLET, FILM COATED ORAL at 09:36

## 2023-06-03 RX ADMIN — Medication 25 MILLIGRAM(S): at 09:36

## 2023-06-03 RX ADMIN — Medication 1 MILLIGRAM(S): at 20:44

## 2023-06-03 RX ADMIN — Medication 25 MILLIGRAM(S): at 20:45

## 2023-06-03 RX ADMIN — PANTOPRAZOLE SODIUM 40 MILLIGRAM(S): 20 TABLET, DELAYED RELEASE ORAL at 07:22

## 2023-06-04 PROCEDURE — 99231 SBSQ HOSP IP/OBS SF/LOW 25: CPT

## 2023-06-04 RX ADMIN — Medication 1 APPLICATION(S): at 20:29

## 2023-06-04 RX ADMIN — Medication 0.5 MILLIGRAM(S): at 15:34

## 2023-06-04 RX ADMIN — ATORVASTATIN CALCIUM 10 MILLIGRAM(S): 80 TABLET, FILM COATED ORAL at 20:25

## 2023-06-04 RX ADMIN — Medication 25 MILLIGRAM(S): at 20:25

## 2023-06-04 RX ADMIN — MIRTAZAPINE 45 MILLIGRAM(S): 45 TABLET, ORALLY DISINTEGRATING ORAL at 20:25

## 2023-06-04 RX ADMIN — GABAPENTIN 300 MILLIGRAM(S): 400 CAPSULE ORAL at 08:52

## 2023-06-04 RX ADMIN — Medication 1 DROP(S): at 20:29

## 2023-06-04 RX ADMIN — Medication 25 MILLIGRAM(S): at 08:52

## 2023-06-04 RX ADMIN — GABAPENTIN 200 MILLIGRAM(S): 400 CAPSULE ORAL at 14:09

## 2023-06-04 RX ADMIN — ARIPIPRAZOLE 1 MILLIGRAM(S): 15 TABLET ORAL at 08:51

## 2023-06-04 RX ADMIN — ESCITALOPRAM OXALATE 15 MILLIGRAM(S): 10 TABLET, FILM COATED ORAL at 08:52

## 2023-06-04 RX ADMIN — Medication 1 APPLICATION(S): at 08:52

## 2023-06-04 RX ADMIN — GABAPENTIN 300 MILLIGRAM(S): 400 CAPSULE ORAL at 20:25

## 2023-06-04 RX ADMIN — PANTOPRAZOLE SODIUM 40 MILLIGRAM(S): 20 TABLET, DELAYED RELEASE ORAL at 06:35

## 2023-06-04 RX ADMIN — Medication 1 MILLIGRAM(S): at 10:57

## 2023-06-04 NOTE — BH INPATIENT PSYCHIATRY PROGRESS NOTE - NSBHFUPINTERVALHXFT_PSY_A_CORE
Patient followed for depression. Insists that her body "moves involuntarily from my shoulders to my toes," general somatic focus, anxious. AVSS

## 2023-06-04 NOTE — BH INPATIENT PSYCHIATRY PROGRESS NOTE - NSBHCHARTREVIEWVS_PSY_A_CORE FT
Vital Signs Last 24 Hrs  T(C): 36.7 (06-04-23 @ 05:13), Max: 37.3 (06-03-23 @ 16:16)  T(F): 98 (06-04-23 @ 05:13), Max: 99.1 (06-03-23 @ 16:16)  HR: 88 (06-03-23 @ 20:34) (88 - 88)  BP: 125/74 (06-03-23 @ 20:34) (125/74 - 125/74)  BP(mean): --  RR: 17 (06-04-23 @ 05:13) (17 - 17)  SpO2: 98% (06-03-23 @ 20:34) (95% - 98%)    Orthostatic VS  06-04-23 @ 05:13  Lying BP: 141/66 HR: 75  Sitting BP: 143/62 HR: 75  Standing BP: --/-- HR: --  Site: upper left arm  Mode: electronic  Orthostatic VS  06-03-23 @ 05:50  Lying BP: 132/60 HR: 79  Sitting BP: 131/59 HR: 81  Standing BP: --/-- HR: --  Site: --  Mode: --  Orthostatic VS  06-02-23 @ 19:51  Lying BP: --/-- HR: --  Sitting BP: 127/63 HR: 87  Standing BP: --/-- HR: --  Site: --  Mode: --

## 2023-06-04 NOTE — BH INPATIENT PSYCHIATRY PROGRESS NOTE - MSE UNSTRUCTURED FT
Patient is awake and alert. Affect is anxious. Speech fluent. TP logical and coherent. +anxious ideations. No delusions. No abnormal movements observed. Motor exam without any rigidity. Fair insight. No suicidal ideations.

## 2023-06-05 LAB
ALBUMIN SERPL ELPH-MCNC: 4.4 G/DL — SIGNIFICANT CHANGE UP (ref 3.3–5)
ALP SERPL-CCNC: 105 U/L — SIGNIFICANT CHANGE UP (ref 40–120)
ALT FLD-CCNC: 12 U/L — SIGNIFICANT CHANGE UP (ref 4–33)
ANION GAP SERPL CALC-SCNC: 10 MMOL/L — SIGNIFICANT CHANGE UP (ref 7–14)
AST SERPL-CCNC: 15 U/L — SIGNIFICANT CHANGE UP (ref 4–32)
BILIRUB SERPL-MCNC: 0.4 MG/DL — SIGNIFICANT CHANGE UP (ref 0.2–1.2)
BUN SERPL-MCNC: 11 MG/DL — SIGNIFICANT CHANGE UP (ref 7–23)
CALCIUM SERPL-MCNC: 9.7 MG/DL — SIGNIFICANT CHANGE UP (ref 8.4–10.5)
CHLORIDE SERPL-SCNC: 97 MMOL/L — LOW (ref 98–107)
CO2 SERPL-SCNC: 33 MMOL/L — HIGH (ref 22–31)
CREAT SERPL-MCNC: 0.91 MG/DL — SIGNIFICANT CHANGE UP (ref 0.5–1.3)
EGFR: 66 ML/MIN/1.73M2 — SIGNIFICANT CHANGE UP
GLUCOSE SERPL-MCNC: 102 MG/DL — HIGH (ref 70–99)
MAGNESIUM SERPL-MCNC: 2.2 MG/DL — SIGNIFICANT CHANGE UP (ref 1.6–2.6)
PHOSPHATE SERPL-MCNC: 3.5 MG/DL — SIGNIFICANT CHANGE UP (ref 2.5–4.5)
POTASSIUM SERPL-MCNC: 3.4 MMOL/L — LOW (ref 3.5–5.3)
POTASSIUM SERPL-SCNC: 3.4 MMOL/L — LOW (ref 3.5–5.3)
PROT SERPL-MCNC: 7 G/DL — SIGNIFICANT CHANGE UP (ref 6–8.3)
SODIUM SERPL-SCNC: 140 MMOL/L — SIGNIFICANT CHANGE UP (ref 135–145)

## 2023-06-05 PROCEDURE — 99232 SBSQ HOSP IP/OBS MODERATE 35: CPT

## 2023-06-05 PROCEDURE — 90834 PSYTX W PT 45 MINUTES: CPT

## 2023-06-05 RX ORDER — ESCITALOPRAM OXALATE 10 MG/1
10 TABLET, FILM COATED ORAL DAILY
Refills: 0 | Status: DISCONTINUED | OUTPATIENT
Start: 2023-06-06 | End: 2023-06-12

## 2023-06-05 RX ORDER — BENZTROPINE MESYLATE 1 MG
1 TABLET ORAL
Refills: 0 | Status: DISCONTINUED | OUTPATIENT
Start: 2023-06-05 | End: 2023-06-06

## 2023-06-05 RX ORDER — ALPRAZOLAM 0.25 MG
0.5 TABLET ORAL
Refills: 0 | Status: DISCONTINUED | OUTPATIENT
Start: 2023-06-05 | End: 2023-06-12

## 2023-06-05 RX ADMIN — Medication 1 MILLIGRAM(S): at 09:50

## 2023-06-05 RX ADMIN — GABAPENTIN 200 MILLIGRAM(S): 400 CAPSULE ORAL at 14:23

## 2023-06-05 RX ADMIN — PANTOPRAZOLE SODIUM 40 MILLIGRAM(S): 20 TABLET, DELAYED RELEASE ORAL at 06:37

## 2023-06-05 RX ADMIN — Medication 1 DROP(S): at 21:00

## 2023-06-05 RX ADMIN — Medication 1 APPLICATION(S): at 08:21

## 2023-06-05 RX ADMIN — ESCITALOPRAM OXALATE 15 MILLIGRAM(S): 10 TABLET, FILM COATED ORAL at 08:21

## 2023-06-05 RX ADMIN — ATORVASTATIN CALCIUM 10 MILLIGRAM(S): 80 TABLET, FILM COATED ORAL at 20:25

## 2023-06-05 RX ADMIN — Medication 1 APPLICATION(S): at 20:27

## 2023-06-05 RX ADMIN — Medication 25 MILLIGRAM(S): at 09:49

## 2023-06-05 RX ADMIN — Medication 25 MILLIGRAM(S): at 20:26

## 2023-06-05 RX ADMIN — Medication 1 MILLIGRAM(S): at 20:25

## 2023-06-05 RX ADMIN — GABAPENTIN 300 MILLIGRAM(S): 400 CAPSULE ORAL at 20:26

## 2023-06-05 RX ADMIN — MIRTAZAPINE 45 MILLIGRAM(S): 45 TABLET, ORALLY DISINTEGRATING ORAL at 20:25

## 2023-06-05 RX ADMIN — GABAPENTIN 300 MILLIGRAM(S): 400 CAPSULE ORAL at 08:21

## 2023-06-05 RX ADMIN — ARIPIPRAZOLE 1 MILLIGRAM(S): 15 TABLET ORAL at 08:21

## 2023-06-05 NOTE — BH INPATIENT PSYCHIATRY PROGRESS NOTE - MSE UNSTRUCTURED FT
Appearance: intact hygiene and grooming; stable gait; appears to be somewhat like myclonic jerks of upper girdle muscles  Behavior: calm, somewhat intensely related, attentive, no psychomotor agitation/retardation  Speech:  wnl  Mood: "I'm not anxious"  Affect: anxious, intense, stable  Thought process: ruminative, obsessive  Thought content: no delusions elicited; denies SI or HI  Perceptual disturbances: denies AVH  Cognition: orientation intact; has difficulty keeping track of medication names  Abstraction: fair  Fund of knowledge: fair  Insight: partially intact  Judgement: improving

## 2023-06-05 NOTE — BH INPATIENT PSYCHIATRY PROGRESS NOTE - NSBHFUPINTERVALHXFT_PSY_A_CORE
Chart reviewed and case discussed with treatment team. Staff report patient has been reporting "tic" or "twitching." Patient wonders if it's a side effect of abilify. She appears to have possible myoclonic jerks though not quite typical. She states this has been occurring for a little over a week. It's unclear why patient is reporting this now, the day before her discharge. Reports having taken cogentin 0.5mg prn without success. Denies worsening anxiety though she does say that her  can not longer take time off to pick her up.

## 2023-06-05 NOTE — BH INPATIENT PSYCHIATRY PROGRESS NOTE - CURRENT MEDICATION
MEDICATIONS  (STANDING):  ARIPiprazole 1 milliGRAM(s) Oral daily  atorvastatin 10 milliGRAM(s) Oral at bedtime  bacitracin   Ointment 1 Application(s) Topical two times a day  benztropine 1 milliGRAM(s) Oral two times a day  gabapentin 300 milliGRAM(s) Oral two times a day  gabapentin 200 milliGRAM(s) Oral <User Schedule>  hydrochlorothiazide 12.5 milliGRAM(s) Oral daily  metoprolol tartrate 25 milliGRAM(s) Oral two times a day  mirtazapine 45 milliGRAM(s) Oral at bedtime  pantoprazole    Tablet 40 milliGRAM(s) Oral before breakfast    MEDICATIONS  (PRN):  acetaminophen     Tablet .. 650 milliGRAM(s) Oral every 6 hours PRN Mild Pain (1 - 3)  ALPRAZolam 0.5 milliGRAM(s) Oral two times a day PRN anxiety  aluminum hydroxide/magnesium hydroxide/simethicone Suspension 30 milliLiter(s) Oral every 4 hours PRN Dyspepsia  artificial tears (preservative free) Ophthalmic Solution 1 Drop(s) Both EYES four times a day PRN dry eyes  haloperidol     Tablet 2 milliGRAM(s) Oral every 6 hours PRN agitation  haloperidol    Injectable 2 milliGRAM(s) IntraMuscular once PRN severe agitation  ondansetron    Tablet 8 milliGRAM(s) Oral every 8 hours PRN nausea

## 2023-06-05 NOTE — BH INPATIENT PSYCHIATRY PROGRESS NOTE - NSBHCHARTREVIEWVS_PSY_A_CORE FT
Vital Signs Last 24 Hrs  T(C): 37.1 (06-05-23 @ 15:41), Max: 37.1 (06-05-23 @ 15:41)  T(F): 98.7 (06-05-23 @ 15:41), Max: 98.7 (06-05-23 @ 15:41)  HR: 87 (06-05-23 @ 09:49) (87 - 87)  BP: 149/83 (06-05-23 @ 09:49) (149/83 - 149/83)  BP(mean): --  RR: 17 (06-05-23 @ 09:49) (16 - 17)  SpO2: 96% (06-05-23 @ 15:41) (96% - 96%)    Orthostatic VS  06-05-23 @ 05:46  Lying BP: 103/77 HR: 82  Sitting BP: 121/52 HR: 74  Standing BP: --/-- HR: --  Site: --  Mode: --  Orthostatic VS  06-04-23 @ 19:57  Lying BP: --/-- HR: --  Sitting BP: 135/67 HR: 80  Standing BP: 123/72 HR: 90  Site: upper right arm  Mode: electronic  Orthostatic VS  06-04-23 @ 05:13  Lying BP: 141/66 HR: 75  Sitting BP: 143/62 HR: 75  Standing BP: --/-- HR: --  Site: upper left arm  Mode: electronic

## 2023-06-06 PROCEDURE — 99232 SBSQ HOSP IP/OBS MODERATE 35: CPT

## 2023-06-06 RX ADMIN — Medication 25 MILLIGRAM(S): at 08:33

## 2023-06-06 RX ADMIN — Medication 1 MILLIGRAM(S): at 08:24

## 2023-06-06 RX ADMIN — Medication 25 MILLIGRAM(S): at 20:01

## 2023-06-06 RX ADMIN — Medication 1 APPLICATION(S): at 20:01

## 2023-06-06 RX ADMIN — PANTOPRAZOLE SODIUM 40 MILLIGRAM(S): 20 TABLET, DELAYED RELEASE ORAL at 07:35

## 2023-06-06 RX ADMIN — ONDANSETRON 8 MILLIGRAM(S): 8 TABLET, FILM COATED ORAL at 16:33

## 2023-06-06 RX ADMIN — Medication 1 DROP(S): at 20:29

## 2023-06-06 RX ADMIN — Medication 1 APPLICATION(S): at 08:24

## 2023-06-06 RX ADMIN — GABAPENTIN 300 MILLIGRAM(S): 400 CAPSULE ORAL at 20:01

## 2023-06-06 RX ADMIN — GABAPENTIN 300 MILLIGRAM(S): 400 CAPSULE ORAL at 08:24

## 2023-06-06 RX ADMIN — GABAPENTIN 200 MILLIGRAM(S): 400 CAPSULE ORAL at 14:08

## 2023-06-06 RX ADMIN — ATORVASTATIN CALCIUM 10 MILLIGRAM(S): 80 TABLET, FILM COATED ORAL at 20:01

## 2023-06-06 RX ADMIN — Medication 0.5 MILLIGRAM(S): at 16:33

## 2023-06-06 RX ADMIN — MIRTAZAPINE 45 MILLIGRAM(S): 45 TABLET, ORALLY DISINTEGRATING ORAL at 20:01

## 2023-06-06 RX ADMIN — ESCITALOPRAM OXALATE 10 MILLIGRAM(S): 10 TABLET, FILM COATED ORAL at 08:24

## 2023-06-06 NOTE — BH INPATIENT PSYCHIATRY PROGRESS NOTE - MSE UNSTRUCTURED FT
Appearance: adequate hygiene and grooming; stable gait; continues to have myclonus of shoulders, UE but varies in amplitude and location  Behavior: intensely related, attentive, no psychomotor agitation/retardation  Speech:  wnl  Mood: "upset"  Affect: anxious, intense, stable, appropriate  Thought process: ruminative, obsessive  Thought content: no delusions elicited; denies SI or HI  Perceptual disturbances: denies AVH  Cognition: orientation intact; has difficulty keeping track of medication names and provider names  Abstraction: fair  Fund of knowledge: fair  Insight: partially intact  Judgement: improving

## 2023-06-06 NOTE — BH INPATIENT PSYCHIATRY PROGRESS NOTE - NSBHFUPINTERVALHXFT_PSY_A_CORE
Chart reviewed and case discussed with treatment team. Staff report patient has been anxious about new abnormal movements. Declined abilify this morning due to her belief that it's the cause. Patient denies having any of her usual anxiety and has managed to go without taking xanax but is feeling disappointed and frustrated about the delay in discharge.

## 2023-06-06 NOTE — BH INPATIENT PSYCHIATRY PROGRESS NOTE - NSBHCHARTREVIEWVS_PSY_A_CORE FT
Vital Signs Last 24 Hrs  T(C): 37.1 (06-06-23 @ 15:55), Max: 37.1 (06-06-23 @ 15:55)  T(F): 98.7 (06-06-23 @ 15:55), Max: 98.7 (06-06-23 @ 15:55)  HR: --  BP: --  BP(mean): --  RR: 16 (06-06-23 @ 06:13) (16 - 16)  SpO2: 98% (06-06-23 @ 15:55) (95% - 98%)    Orthostatic VS  06-06-23 @ 06:13  Lying BP: 139/73 HR: 70  Sitting BP: 127/93 HR: 77  Standing BP: --/-- HR: --  Site: --  Mode: --  Orthostatic VS  06-05-23 @ 20:29  Lying BP: --/-- HR: --  Sitting BP: 129/67 HR: 90  Standing BP: --/-- HR: --  Site: --  Mode: --  Orthostatic VS  06-05-23 @ 05:46  Lying BP: 103/77 HR: 82  Sitting BP: 121/52 HR: 74  Standing BP: --/-- HR: --  Site: --  Mode: --  Orthostatic VS  06-04-23 @ 19:57  Lying BP: --/-- HR: --  Sitting BP: 135/67 HR: 80  Standing BP: 123/72 HR: 90  Site: upper right arm  Mode: electronic

## 2023-06-06 NOTE — BH INPATIENT PSYCHIATRY PROGRESS NOTE - CURRENT MEDICATION
MEDICATIONS  (STANDING):  atorvastatin 10 milliGRAM(s) Oral at bedtime  bacitracin   Ointment 1 Application(s) Topical two times a day  escitalopram 10 milliGRAM(s) Oral daily  gabapentin 200 milliGRAM(s) Oral <User Schedule>  gabapentin 300 milliGRAM(s) Oral two times a day  hydrochlorothiazide 12.5 milliGRAM(s) Oral daily  metoprolol tartrate 25 milliGRAM(s) Oral two times a day  mirtazapine 45 milliGRAM(s) Oral at bedtime  pantoprazole    Tablet 40 milliGRAM(s) Oral before breakfast    MEDICATIONS  (PRN):  acetaminophen     Tablet .. 650 milliGRAM(s) Oral every 6 hours PRN Mild Pain (1 - 3)  ALPRAZolam 0.5 milliGRAM(s) Oral two times a day PRN anxiety  aluminum hydroxide/magnesium hydroxide/simethicone Suspension 30 milliLiter(s) Oral every 4 hours PRN Dyspepsia  artificial tears (preservative free) Ophthalmic Solution 1 Drop(s) Both EYES four times a day PRN dry eyes  haloperidol     Tablet 2 milliGRAM(s) Oral every 6 hours PRN agitation  haloperidol    Injectable 2 milliGRAM(s) IntraMuscular once PRN severe agitation  ondansetron    Tablet 8 milliGRAM(s) Oral every 8 hours PRN nausea

## 2023-06-07 PROCEDURE — 90832 PSYTX W PT 30 MINUTES: CPT

## 2023-06-07 PROCEDURE — 99232 SBSQ HOSP IP/OBS MODERATE 35: CPT

## 2023-06-07 RX ADMIN — Medication 1 DROP(S): at 20:48

## 2023-06-07 RX ADMIN — GABAPENTIN 300 MILLIGRAM(S): 400 CAPSULE ORAL at 20:39

## 2023-06-07 RX ADMIN — Medication 25 MILLIGRAM(S): at 20:39

## 2023-06-07 RX ADMIN — ATORVASTATIN CALCIUM 10 MILLIGRAM(S): 80 TABLET, FILM COATED ORAL at 20:39

## 2023-06-07 RX ADMIN — GABAPENTIN 300 MILLIGRAM(S): 400 CAPSULE ORAL at 08:48

## 2023-06-07 RX ADMIN — Medication 0.5 MILLIGRAM(S): at 18:45

## 2023-06-07 RX ADMIN — ONDANSETRON 8 MILLIGRAM(S): 8 TABLET, FILM COATED ORAL at 14:00

## 2023-06-07 RX ADMIN — ESCITALOPRAM OXALATE 10 MILLIGRAM(S): 10 TABLET, FILM COATED ORAL at 08:47

## 2023-06-07 RX ADMIN — MIRTAZAPINE 45 MILLIGRAM(S): 45 TABLET, ORALLY DISINTEGRATING ORAL at 20:38

## 2023-06-07 RX ADMIN — Medication 25 MILLIGRAM(S): at 08:48

## 2023-06-07 RX ADMIN — PANTOPRAZOLE SODIUM 40 MILLIGRAM(S): 20 TABLET, DELAYED RELEASE ORAL at 07:16

## 2023-06-07 RX ADMIN — GABAPENTIN 200 MILLIGRAM(S): 400 CAPSULE ORAL at 14:00

## 2023-06-07 NOTE — BH INPATIENT PSYCHIATRY PROGRESS NOTE - MSE UNSTRUCTURED FT
Appearance: adequate hygiene and grooming; stable gait; no myoclonus noted today  Behavior: intensely related, attentive, no psychomotor agitation/retardation  Speech:  wnl  Mood: "frustrated"  Affect: anxious, intense, stable, appropriate  Thought process: ruminative, obsessive  Thought content: no delusions elicited; denies SI or HI  Perceptual disturbances: denies AVH  Cognition: orientation intact; has difficulty keeping track of medication names and provider names  Abstraction: fair  Fund of knowledge: fair  Insight: partially intact  Judgement: improving

## 2023-06-07 NOTE — BH INPATIENT PSYCHIATRY PROGRESS NOTE - NSBHFUPINTERVALHXFT_PSY_A_CORE
Chart reviewed and case discussed with treatment team. Staff report patient has been med compliant, cooperative. Patient reports frustration with abnormal movements (likely myoclonus). She denies recurrence of any anxiety since abilify d/c and lexapro decrease. No SI reported. Myoclonus appears to have decreased in frequency.

## 2023-06-08 DIAGNOSIS — G25.3 MYOCLONUS: ICD-10-CM

## 2023-06-08 DIAGNOSIS — E66.9 OBESITY, UNSPECIFIED: ICD-10-CM

## 2023-06-08 PROCEDURE — 99232 SBSQ HOSP IP/OBS MODERATE 35: CPT

## 2023-06-08 RX ADMIN — PANTOPRAZOLE SODIUM 40 MILLIGRAM(S): 20 TABLET, DELAYED RELEASE ORAL at 05:56

## 2023-06-08 RX ADMIN — GABAPENTIN 200 MILLIGRAM(S): 400 CAPSULE ORAL at 13:10

## 2023-06-08 RX ADMIN — Medication 0.5 MILLIGRAM(S): at 15:22

## 2023-06-08 RX ADMIN — Medication 1 APPLICATION(S): at 08:38

## 2023-06-08 RX ADMIN — Medication 25 MILLIGRAM(S): at 08:37

## 2023-06-08 RX ADMIN — GABAPENTIN 300 MILLIGRAM(S): 400 CAPSULE ORAL at 21:38

## 2023-06-08 RX ADMIN — Medication 650 MILLIGRAM(S): at 15:00

## 2023-06-08 RX ADMIN — GABAPENTIN 300 MILLIGRAM(S): 400 CAPSULE ORAL at 08:37

## 2023-06-08 RX ADMIN — ATORVASTATIN CALCIUM 10 MILLIGRAM(S): 80 TABLET, FILM COATED ORAL at 21:38

## 2023-06-08 RX ADMIN — ESCITALOPRAM OXALATE 10 MILLIGRAM(S): 10 TABLET, FILM COATED ORAL at 08:37

## 2023-06-08 RX ADMIN — Medication 650 MILLIGRAM(S): at 14:30

## 2023-06-08 RX ADMIN — MIRTAZAPINE 45 MILLIGRAM(S): 45 TABLET, ORALLY DISINTEGRATING ORAL at 21:39

## 2023-06-08 NOTE — BH TREATMENT PLAN - ANXIETY/PANIC/FEAR NURSING INTERVENTIONS/RECOMMENDATIONS
Teach signs and symptoms of escalating anxiety and ways to interrupt its progression. Encourage the client’s participation in relaxation exercises such as deep breathing, progressive muscle relaxation, guided imagery, meditation.
Assess for signs and symptoms anxiety, redirect and reorient as necessary, provide support, maintain safety, explore healthy coping skills, identify triggers, encourage pt to participate in groups and unit activities, administer and educate on ordered medications, ensure adequate PO intake
Remain with the client at all times when levels of anxiety are high reassure the client of his or her safety and security.
Assessed the gait and stability.
Encouraged pt to use positive coping skills.

## 2023-06-08 NOTE — BH TREATMENT PLAN - NSCMSPTSTRENGTHS_PSY_ALL_CORE
Leisure interest/Motivated/Physically healthy/Supportive family
Leisure interest/Motivated/Physically healthy/Supportive family
Financial stability/Future/goal oriented/Interpersonal skills/Motivated/Supportive family
Financial stability/Future/goal oriented/Interpersonal skills/Motivated/Supportive family
Future/goal oriented/Highly motivated for treatment/Supportive family

## 2023-06-08 NOTE — BH TREATMENT PLAN - NSTXDEPRESINTERMD_PSY_ALL_CORE
Medication titration

## 2023-06-08 NOTE — BH TREATMENT PLAN - NSTXDCOTHRINTERSW_PSY_ALL_CORE
Pt has expressed concern about medication changes. sw provides support and encourage continued compliance with medication as well as dc planning. Pt is able to engage with dc planning. plan to return home with  and to prior treatment at [facility] with Dr Kamara.
sw met with pt. pt would like to return to [facility]. team is exploring verbal therapy options with pt.   STEP does not seem viable as pt does not want multiple virtual therapy - if at all.
team is considering offering pt STEP  (virtual) via [facility], in addition to bounce back  to Dr Alvarado and in lieu of her private therapist.   sw will arrange as determined and agreed to by pt.
sw met with pt. pt is willing to engage with team. pt feels guilty impact to her . pt signed release of info naming him. pt plans to return home and to prior provider  [facility]>
Pt would benefit from compliance with Tx team recommendations.

## 2023-06-08 NOTE — BH TREATMENT PLAN - NSTXSUICIDGOAL_PSY_ALL_CORE
Will identify and utilize 2 coping skills
Will identify and utilize 2 coping skills
Will identify 1 trigger / stressor that exacerbates suicidal
Will identify and utilize 2 coping skills
Will identify and utilize 2 coping skills

## 2023-06-08 NOTE — BH TREATMENT PLAN - NSDCCRITERIA_PSY_ALL_CORE
50% reduction in anxiety
symptom management, safety planning, care coordination 
symptom management, safety planning, care coordination 
50% reduction in anxiety
50% reduction in anxiety

## 2023-06-08 NOTE — BH TREATMENT PLAN - NSTXANXINTERPR_PSY_ALL_CORE
Pt was seen 1:1. Pt made good progress towards goal attainment. Given assistance, she was able to review and identify novel coping skills for anxiety including positive self-talk statements. Pt has been using a deep breathing strategy with fair effect. She will benefit from continuing to work on utilizing and independently identifying coping skills for carryover to her home environment.
Psychiatric rehabilitation staff will provide encouragement, support, psychotherapy, and psychoeducation to assist the patient in the progression of her treatment goal.
Pt was seen 1:1. She stated today was the first day she has not experienced anxiety since admission. Pt has met her current psych rehab goal.   Over the next week,  Pt will identify and utilize 3 coping skills related to anxiety for improved symptom management
Patient has met her psychiatric rehabilitation goal of participating in activities despite lingering fear/anxiety. Patient has been attending psychiatric rehabilitation groups and when present, is appropriately engaged. Patient’s goal will be changed to completing a safety plan to sustain recovery in the community.
Pt was seen 1:1. Pt made good progress towards goal attainment. Pt was able to independently identify three coping skills for anxiety. Pt has been able to consistently use 2 coping skills for anxiety (deep breathing, and meditation). She is working on utilizing a third coping skill consistently including positive self-talk and progress muscle relaxation. Psych rehab staff will encourage daily group/activity engagement as well as facilitate goal attainment over the next 7 days for improved symptom management.

## 2023-06-08 NOTE — BH INPATIENT PSYCHIATRY PROGRESS NOTE - MSE UNSTRUCTURED FT
Appearance: intact hygiene and grooming; stable gait; no myoclonus noted today  Behavior: intensely related, attentive, no psychomotor agitation/retardation  Speech:  wnl  Mood: anxious  Affect: congruent, less intense, stable, appropriate  Thought process: ruminative, obsessive  Thought content: no delusions elicited; denies SI or HI  Perceptual disturbances: denies AVH  Cognition: orientation intact; has difficulty keeping track of medication names and provider names  Abstraction: fair  Fund of knowledge: fair  Insight: partially intact  Judgement: improving

## 2023-06-08 NOTE — BH INPATIENT PSYCHIATRY PROGRESS NOTE - NSBHCHARTREVIEWVS_PSY_A_CORE FT
Vital Signs Last 24 Hrs  T(C): 37.1 (06-08-23 @ 16:01), Max: 37.1 (06-08-23 @ 16:01)  T(F): 98.8 (06-08-23 @ 16:01), Max: 98.8 (06-08-23 @ 16:01)  HR: 78 (06-07-23 @ 19:56) (78 - 78)  BP: 121/66 (06-07-23 @ 19:56) (121/66 - 121/66)  BP(mean): --  RR: 18 (06-08-23 @ 05:31) (18 - 18)  SpO2: 97% (06-08-23 @ 16:01) (93% - 97%)    Orthostatic VS  06-08-23 @ 05:31  Lying BP: 128/67 HR: 78  Sitting BP: 129/72 HR: 82  Standing BP: --/-- HR: --  Site: upper left arm  Mode: electronic  Orthostatic VS  06-07-23 @ 07:30  Lying BP: --/-- HR: --  Sitting BP: 144/80 HR: 86  Standing BP: 138/74 HR: 89  Site: --  Mode: --  Orthostatic VS  06-06-23 @ 19:21  Lying BP: --/-- HR: --  Sitting BP: 131/60 HR: 79  Standing BP: --/-- HR: --  Site: --  Mode: --

## 2023-06-08 NOTE — BH INPATIENT PSYCHIATRY PROGRESS NOTE - NSBHFUPINTERVALHXFT_PSY_A_CORE
Chart reviewed and case discussed with treatment team. Staff report observing no abnormal movements. Received xanax prn yesterday evening. Patient reports no return of severe anxiety since going 3 days without abilify. Myoclonus has been improving.

## 2023-06-08 NOTE — BH TREATMENT PLAN - NSTXPLANTHERAPYSESSIONSFT_PSY_ALL_CORE
05-23-23  Type of therapy: Coping skills, Creative arts therapy, Health and fitness, Leisure development, Peer advocate, Spirituality  Type of session: Individual  Level of patient participation: Engaged  Duration of participation: 15 minutes  Therapy conducted by: Psych rehab  Therapy Summary: Writer met with patient for an individual session in order to review progress towards psychiatric rehabilitation goals. Patient was pleasant, polite and engaged in the session.  Patient demonstrated good progress related to illness symptoms. She denied symptoms of depression, anxiety, and SI/HI. Patient reported that today was the first day she didn’t feel anxious. She endorsed significant fatigue which she contributed to changes in her medication. Patient was more hopeful and future oriented.  Patient has attended a large portion of psychiatric rehabilitation groups. Patient is visible on the unit and maintains fair behavioral control. Patient is social with select peers and able to communicate needs and wants to staff. Patient ADLs are fair. She was able to work on safety planning today. However, expressed some hesitance about talking about discharge as she reports not feeling ready to leave.  Psych rehab staff will continue to engage patient daily in order to maintain rapport, provide support and assist patient in demonstrating progress towards Psychiatric Rehabilitation goals over the next 7 days.  
  06-06-23  Type of therapy: Coping skills, Creative arts therapy, Health and fitness, Leisure development, Peer advocate, Spirituality  Type of session: Individual  Level of patient participation: Engaged  Duration of participation: 15 minutes  Therapy conducted by: Psych rehab  Therapy Summary: Pt was seen 1:1. Pt was pleasant, polite and engaged in the session.  Patient demonstrated good progress related to illness symptoms. She denied symptoms of depression, anxiety, and SI/HI. Pt remains more hopeful and future oriented. She endorsed ‘ticks’ and was noted to have involuntary movement; pt expressed concern about these movements which affected her mood and feelings regarding readiness for discharge. Pt has attended a large portion of psychiatric rehabilitation groups. Pt is visible on the unit and maintains fair behavioral control. Pt is social with select peers and able to communicate needs and wants to staff. Patient ADLs are good. She was able to continue working on her safety plan. Psych rehab staff will continue to engage patient daily in order to maintain rapport, provide support and assist patient in demonstrating progress towards Psychiatric Rehabilitation goals over the next 7 days.  
  05-15-23  Type of therapy: Other, Physical therapy  Type of session: Individual  Level of patient participation: Participates  --  Therapy conducted by: Other (specify), Physical therapy  Therapy Summary: Patient engaged in ambulation within unit without RW with distant supervision.    05-16-23  Type of therapy: Coping skills, Creative arts therapy, Health and fitness, Leisure development, Spirituality  Type of session: Individual  Level of patient participation: Participates  Duration of participation: 15 minutes  Therapy conducted by: Psych rehab  Therapy Summary: Writer met with patient for an individual session in order to review progress towards psychiatric rehabilitation goals. Patient was pleasant, polite and engaged in the session. Patient was forthcoming and open while conversing with the writer. Patient has demonstrated some improvements in symptoms. Patient stated that her anxiety was extremely high over the weekend and though she still feels somewhat anxious now, it has decreased. Patient is able to identify physical symptoms of anxiety and stated that that her medication has been helpful. Patient endorsed fair sleep and appetite. Due to the COVID-19 pandemic, unit structure and activities are re-evaluated on a consistent basis in effort to maintain the safety of patients and staff. Patient attends some psychiatric rehabilitation groups. Patient is visible on the unit and maintains fair behavioral control. Patient is social with select peers and able to communicate needs to staff.    05-18-23  Type of therapy: Other, Physical therapy  Type of session: Individual  Level of patient participation: Participates  --  Therapy conducted by: Other (specify), Physical therapy  Therapy Summary: Patient participated in therapeutic activity and ambulation within unit  
  05-30-23  Type of therapy: Coping skills, Creative arts therapy, Health and fitness, Leisure development, Peer advocate, Spirituality  Type of session: Individual  Level of patient participation: Engaged  Duration of participation: 15 minutes  Therapy conducted by: Psych rehab  Therapy Summary: Writer met with patient for an individual session in order to review progress towards psychiatric rehabilitation goals. Patient was pleasant, polite and engaged in the session.  Patient demonstrated good progress related to illness symptoms. She denied symptoms of depression, and SI/HI. Pt endorsed mild anxiety. Pt remains more hopeful and future oriented.  She is anxious about leaving the hospital; however, was more receptive to exploring coping strategies for anxiety. Pt has attended a large portion of psychiatric rehabilitation groups. Pt is visible on the unit and maintains fair behavioral control. Pt is social with select peers and able to communicate needs and wants to staff. Patient ADLs are fair. She was able complete her safety plan today. Psych rehab staff will continue to engage patient daily in order to maintain rapport, provide support and assist patient in demonstrating progress towards Psychiatric Rehabilitation goals over the next 7 days.

## 2023-06-08 NOTE — BH TREATMENT PLAN - NSTXDCOTHRGOAL_PSY_ALL_CORE
pt will comply with treatment and improve mental status in order to effectively engage with dc planning.
pt will comply with treatment and improve mental status n order to effectively engage with dc planning.
pt will comply with treatment and improve mental status in order to effectively engage with dc planning.

## 2023-06-08 NOTE — BH TREATMENT PLAN - NSBHPRIMARYDX_PSY_ALL_CORE
Anxiety disorder    
Generalized anxiety disorder    
Anxiety disorder    

## 2023-06-08 NOTE — BH TREATMENT PLAN - NSTXDEPRESGOAL_PSY_ALL_CORE
Report journaling 5 counter-statements to negative predictions/self-image daily

## 2023-06-08 NOTE — BH TREATMENT PLAN - NSTXCAREGIVERPARTICIPATE_PSY_P_CORE
Family/Caregiver participated in identification of needs/problems/goals for treatment
No, patient unwilling to involve family/caregiver

## 2023-06-08 NOTE — BH TREATMENT PLAN - NSTXANXGOAL_PSY_ALL_CORE
Report a reduction in panic attacks and improving mood and confidence
Be able to participate in activities despite lingering anxiety/panic
Be able to participate in activities despite lingering anxiety/panic
Report a reduction in panic attacks and improving mood and confidence
Be able to participate in activities despite lingering anxiety/panic

## 2023-06-08 NOTE — BH TREATMENT PLAN - NSTXDCOTHRINTERMD_PSY_ALL_CORE
Recommend appropriate aftercare modality when more stable

## 2023-06-08 NOTE — CHART NOTE - NSCHARTNOTEFT_GEN_A_CORE
Referring Clinician: Dr. Eliza Dotson   	  Source of information: Patient, Dr. Eliza Dotson    Reason for Consult: New sudden, involuntary muscle contractions in UEs, LEs, and trunk    History of Present Illness: The patient is a 73-year-old right-handed woman with a past psychiatric history of anxiety and depression, 6 prior inpatient psych hospitalizations, current outpatient psychiatrist Dr. Kamara, past medical history of HTN, HLD, endometriosis, GERD, and past surgical history of bilateral breast reduction (), R salpingo-oophorectomy () who was admitted on voluntary status to [place] [address] on 2023 after she presented to her outpatient psychiatrist’s office with worsening anxiety and passive SI.    During this hospital course, the patient was continued on her home medication of escitalopram 15 mg daily.  Her outpatient psychiatrist had started mirtazapine 7.5 mg as of 2023, and her dose was titrated up to 45 mg qHS by 2023 with partial therapeutic effect on mood.  Her outpatient dose of gabapentin 100 mg twice daily was gradually titrated up to 300 mg twice daily and 200 mg once daily as of 2023.  Aripiprazole 1 mg daily was added for augmentation on 2023 with further improvements in mood. The patient first reported involuntary contractions in her trunk and all four limbs to her primary psychiatric team on . The primary team observed brief, multifocal, nonrhythmic, asynchronous muscle contractions of variable intensity that were most prominent in her upper arms and shoulders. Subsequently, her aripiprazole was discontinued with last dose received on . Her escitalopram dose was decreased from 15 to 10 mg daily starting on . Neurology was consulted to evaluate the patient for drug-induced myoclonus.    Upon initial interview, the patient reports she developed sudden, intermittent involuntary jerking movements at rest in her trunk and all four limbs around [id number]. She states that the movements usually cause abduction or adduction at her shoulder joints, but can also cause flexion at the hip joint, extension at the knee joint, contraction of her abdominal muscles, and forward flexion of her trunk. She has noticed intermittent stuttering speech over the last 3-4 days, but denies any involuntary movements in her tongue, soft palate, lips, throat, or face. The movements are sudden with no preceding urge to perform them and cannot be voluntarily suppressed. The movements occur multiple times during the day, but are more frequent in the morning after breakfast until lunchtime. The patient usually receives her aripiprazole and escitalopram doses between 8-9 AM. She denies any other triggers for the movements including loud noises, bright lights, strong emotions, or voluntary movements. She denies any alleviating factors and states that the movements self-resolve after a few seconds. She has been caring for her ADLs including ambulation, feeding, and hygiene at baseline despite the movements. The patient experienced some improvement in her symptoms yesterday () after her medications were adjusted as above. She does report history of motor side effects from aripiprazole (trialed 2-3 years ago) including restlessness and pain in her lower extremities. She trialed quetiapine for a few days as well within the last year with side effect of pain in her lower extremities.    On ROS, the patient denies any history of loss of consciousness, staring behavior, loss of muscle tone, myoclonic activity, postictal confusion, tongue biting, or urinary incontinence. She reports diarrhea with two small episodes of fecal incontinence on her first day on the unit in the context of eating dairy, but otherwise denies history of fecal incontinence. She denies stiffness in her muscles, recent falls, getting lost in familiar settings, word finding difficulties, or decline in her functioning at home.    Allergies: penicillin (tongue swelling, hives), sulfa drugs (N/V)    Current Medications:  -	Standing: escitalopram 10 mg PO daily; gabapentin 200 mg PO daily, gabapentin 300 mg PO BID, HCTZ 12.5 mg PO daily, mirtazapine 45 mg PO HS, metoprolol tartrate 25 mg PO BID, atorvastatin 10 mg PO HS, pantoprazole 40 mg PO daily  -	PRN: alprazolam 0.5 mg BID PRN for anxiety, haloperidol 2 mg PO q6h PRN for agitation, haloperidol 2 mg IM q6h PRN for severe agitation, ondansetron 8 mg q8h PRN for nausea    Past Medical History: HTN, HLD, GERD, Endometriosis    Past Surgical History: History of bilateral breast reduction surgery (), R sided salpingo-oophorectomy due to endometriosis ()    Past Psychiatric History:  -	Charted Diagnoses: Generalized Anxiety Disorder, Recurrent Major Depressive Disorder  -	Known Hospitalizations/Consultation Liaison Psychiatry Involvement: [facility] 3/28/23-23; [facility] 21-21, [facility] 21-21, [facility] 2020-2020, [facility] 2020-2020; [facility] 2019-2019  -	Last Reported Outpatient Psychiatric Visit: Follows with Dr. Rohini Kamara at [facility] since , last visit on 5/10/2023    Family History: Patient denies family history of abnormal involuntary movements, seizure disorders, Parkinson’s disease, brain malignancy, dementia, and headache disorders.    Social History: The patient currently lives in [place] in [place] in a co-op with her  of 26 years. She does not have any children. 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 Her highest level of education includes completing high school. She denies history of regular alcohol or tobacco use, but does admit to smoking cannabis since she was a teenager. She reports taking 1-2 pulls from a joint up to twice per week to help with sleep and anxiety. She reports she has not been smoking cannabis for 2-3 months.    ROS:   Constitutional: Denies fevers, chills, night sweats, or fatigue.  HEENT: Denies headaches, changes in vision, and changes in hearing.  Respiratory: Denies shortness of breath and cough.  Cardiac: Denies chest pain and palpitations.  Gastrointestinal: Reports constipation. Denies any GI upset, nausea, and vomiting.  Genitourinary: Denies dysuria, hematuria, changes in urinary frequency, and incontinence.  Musculoskeletal: Denies muscle aches, joint pain, stiffness, and dystonia.   Neuro: Denies weakness, paresthesia, gait imbalance, and dizziness.   Skin: Denies new rashes or lesions.     Physical Exam:  VS: T 97.3 °F, HR 78, /67  Gen: Well-developed, alert and in no acute distress.   HEENT: Normocephalic and atraumatic.  Neck: Supple with full range of motion.  CV: Regular rate & rhythm. Normal S1/S2. No murmurs, rubs, or gallops.  Pulmonary: Clear to auscultation bilaterally.   Abdomen: Soft, nontender, and nondistended. Normoactive bowel sounds in all four quadrants.   Back: No spinal deviation noted. No spinous or costovertebral angle tenderness.  Ext: 2+ pitting edema to calves bilaterally, L>R. R heel TTP, pain exacerbated by dorsiflexion. No edema of upper extremities. No clubbing or cyanosis of digits.    Neurological Exam:  MSE:  Appeared stated age with good hygiene. Patient was overall alert and cooperative with interview. She was in good behavioral control with good eye contact. Thought process was circumstantial at times, but able to be redirected. Speech had normal rate, prosody, and volume, without evident stuttering or dysarthria.    MMSE: 22/30 based on serial 7s; 27/30 based on spelling WORLD backwards  Orientation:  -	Time: 4/5 (correct year, season, day, and month; incorrect date)  -	Location: 4/5 (correct state, country, town, hospital; incorrect unit/floor)  Registration: 3/3  Attention: 0/5 (Serial 7s) or 5/5 (spell WORLD backwards)  Recall: 2/3 (2/3 with categorical cues)  Namin/2  Repetition:   3-Stage Command: 3/3  Written Instruction:   Written Sentence:   Copying intersecting pentagons:     MIS 3/8 (1/4 words spontaneously, 1/4 words with category cues)    CN II – Pupils are equal, round, and reactive to light and accommodation. Peripheral fields intact bilaterally by confrontation.  CN III, IV, VI – Extraocular movements intact without nystagmus.  CN V – V1-V3 intact to light touch and pain.  CN VII – No facial asymmetry. Able to smile, raise eyebrows, close eyes against resistance, and puff cheeks against resistance symmetrically and bilaterally.  CN VIII – Hearing intact bilaterally to finger rub.  CN IX, X – Palate elevates symmetrically bilaterally. Uvula is midline.  CN XI – Shoulder shrug and head turn intact and symmetric bilaterally with good power.  CN XII – Tongue midline without deviation.     Motor: Normal bulk and tone. Strength 5/5 in upper and lower extremities. No pronator drift or cogwheel rigidity bilaterally. No bradykinesia, tremors, or asterixis noted. Several myoclonic jerks noted in bilateral shoulders causing both abduction and adduction. Muscle contractions also occurred in abdominal muscles causing forward flexion of trunk, R thigh causing flexion of hip and R hand causing radial deviation. The contractions were intermittent with variable intensity. The majority of contractions were asynchronous, but there were rare synchronous contractions (ex: bilateral shoulder abduction). The movements occurred predominately at rest, but were also elicited once by vibratory stimuli at the wrist. No contractions elicited by light touch to outstretched hands or auditory stimuli (clapping). No myoclonus observed during action such as finger to nose testing.  (On repeat examination 9 hours later, her involuntary movements were much less prominent, with only occasional mild twitching asynchronously in her hands.)    Sensory:  Pain and light touch intact in upper and lower extremities bilaterally. Patient reported diminished vibration sense of L foot compared to R foot, but stated vibration was equal at the knees.  Proprioception intact at bilateral great toes.    Reflexes:   	B-radialis	Biceps	Triceps	Patellar	Ankle	Plantar  Right	2+	2+	2+	2+	2+	Flexor  Left	2+	2+	2+	2+	2+	Flexor    Coordination: Finger-nose-finger and heel-to-shin intact bilaterally with no dysmetria. There was no dysdiadochokinesia on rapid alternating movements. Finger and toe tapping rhythmic and symmetric.     Gait: Unassisted regular gait with narrow base, smooth stride, and normal arm swing. She can stand on heels and toes. Normal tandem gait. No Romberg sign.    Labs: WBC 6.61, RBC 4.95, Hgb 14.0, Hct 43.4, Platelets 213 (from [id number])  Na 140, K 3.4 (L), Cl 97 (L), CO2 33, BUN 11, Cr 0.91, Glucose 102 (H), Ca 9.7 (from [id number])  TSH 1.84, free T4 0.9, Total T3 89 (from [id number])    Imaging: MRI head w/ and w/o IV contrast [id number] (images reviewed on [employer] PACS), significant for “branching area of abnormal enhancement seen involving the right posterior temporal/frontal region which is compatible with a developmental venous anomaly.”    Assessment: The patient is a 73-year-old woman with a past psychiatric history of anxiety and depression, 6 prior inpatient psych hospitalizations, who was admitted on voluntary status to [place] [address] on [id number] after she presented to her outpatient psychiatrist’s office with worsening anxiety and passive SI. The patient reported involuntary contractions in her trunk and all four limbs to her primary psychiatric team around [id number] after she was started on aripiprazole 1 mg daily for augmentation of mood symptoms. She has a history of motor side effects from antipsychotics including aripiprazole and quetiapine in the past, such as restlessness and pain in her legs. Her symptoms appear to be improving since aripiprazole was discontinued and her escitalopram dose was lowered. 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  Most likely, this was due to aripiprazole superimposed on escitalopram, although concurrent treatment with mirtazapine and gabapentin could also contribute to myoclonus.    Recommendation:    Continue to monitor for improvement in myoclonus since medication doses were adjusted.    Kristyn Vaughan MD   [phone]    I performed a history and physical examination of the patient and discussed the management with Dr. Vaughan. I reviewed her note and agree with the documented findings and plan of care.  Brain MRI images from 2022 reviewed on [employer] PACS.  Findings and recommendations discussed with Dr. Mauri Gonzales M.D.  [phone]  [facility] License # [id number]  NPI # [id number]

## 2023-06-09 PROCEDURE — 99232 SBSQ HOSP IP/OBS MODERATE 35: CPT

## 2023-06-09 PROCEDURE — 90837 PSYTX W PT 60 MINUTES: CPT

## 2023-06-09 RX ADMIN — GABAPENTIN 300 MILLIGRAM(S): 400 CAPSULE ORAL at 20:45

## 2023-06-09 RX ADMIN — MIRTAZAPINE 45 MILLIGRAM(S): 45 TABLET, ORALLY DISINTEGRATING ORAL at 20:45

## 2023-06-09 RX ADMIN — GABAPENTIN 300 MILLIGRAM(S): 400 CAPSULE ORAL at 10:03

## 2023-06-09 RX ADMIN — ESCITALOPRAM OXALATE 10 MILLIGRAM(S): 10 TABLET, FILM COATED ORAL at 10:03

## 2023-06-09 RX ADMIN — Medication 25 MILLIGRAM(S): at 20:46

## 2023-06-09 RX ADMIN — PANTOPRAZOLE SODIUM 40 MILLIGRAM(S): 20 TABLET, DELAYED RELEASE ORAL at 05:35

## 2023-06-09 RX ADMIN — Medication 25 MILLIGRAM(S): at 10:02

## 2023-06-09 RX ADMIN — GABAPENTIN 200 MILLIGRAM(S): 400 CAPSULE ORAL at 13:31

## 2023-06-09 RX ADMIN — ONDANSETRON 8 MILLIGRAM(S): 8 TABLET, FILM COATED ORAL at 10:03

## 2023-06-09 RX ADMIN — ATORVASTATIN CALCIUM 10 MILLIGRAM(S): 80 TABLET, FILM COATED ORAL at 20:46

## 2023-06-09 NOTE — BH INPATIENT PSYCHIATRY PROGRESS NOTE - CURRENT MEDICATION
MEDICATIONS  (STANDING):  atorvastatin 10 milliGRAM(s) Oral at bedtime  bacitracin   Ointment 1 Application(s) Topical two times a day  escitalopram 10 milliGRAM(s) Oral daily  gabapentin 200 milliGRAM(s) Oral <User Schedule>  gabapentin 300 milliGRAM(s) Oral two times a day  hydrochlorothiazide 12.5 milliGRAM(s) Oral daily  metoprolol tartrate 25 milliGRAM(s) Oral two times a day  mirtazapine 45 milliGRAM(s) Oral at bedtime  pantoprazole    Tablet 40 milliGRAM(s) Oral before breakfast    MEDICATIONS  (PRN):  acetaminophen     Tablet .. 650 milliGRAM(s) Oral every 6 hours PRN Mild Pain (1 - 3)  ALPRAZolam 0.5 milliGRAM(s) Oral two times a day PRN anxiety  aluminum hydroxide/magnesium hydroxide/simethicone Suspension 30 milliLiter(s) Oral every 4 hours PRN Dyspepsia  artificial tears (preservative free) Ophthalmic Solution 1 Drop(s) Both EYES four times a day PRN dry eyes  bisacodyl 5 milliGRAM(s) Oral every 12 hours PRN Constipation  haloperidol     Tablet 2 milliGRAM(s) Oral every 6 hours PRN agitation  haloperidol    Injectable 2 milliGRAM(s) IntraMuscular once PRN severe agitation  ondansetron    Tablet 8 milliGRAM(s) Oral every 8 hours PRN nausea

## 2023-06-09 NOTE — BH PSYCHOLOGY - CLINICIAN PSYCHOTHERAPY NOTE - NSTXSUICIDGOAL_PSY_ALL_CORE
Will identify and utilize 2 coping skills
Will identify 1 trigger / stressor that exacerbates suicidal
Will identify and utilize 2 coping skills
Will identify 1 trigger / stressor that exacerbates suicidal
Will identify and utilize 2 coping skills

## 2023-06-09 NOTE — BH PSYCHOLOGY - CLINICIAN PSYCHOTHERAPY NOTE - NSBHPSYCHOLBILLFAM_PSY_A_CORE
[id number] - 16 to 37 minutes
[id number] - 53 minutes and above
[id number] - 16 to 37 minutes
31011 - 16 to 37 minutes
[id number] - 38 to 52 minutes
41623 - 16 to 37 minutes
86068 - 38 to 52 minutes
55288 - 16 to 37 minutes
89703 - 38 to 52 minutes
25467 - 16 to 37 minutes
42384 - 38 to 52 minutes
37137 - 16 to 37 minutes
46967 - 38 to 52 minutes
98089 - 16 to 37 minutes

## 2023-06-09 NOTE — BH PSYCHOLOGY - CLINICIAN PSYCHOTHERAPY NOTE - NSBHPSYCHOLDISCUSS_PSY_A_CORE
Discussion with collaborating staff took place since patient's last visit

## 2023-06-09 NOTE — BH INPATIENT PSYCHIATRY PROGRESS NOTE - PRN MEDS
MEDICATIONS  (PRN):  acetaminophen     Tablet .. 650 milliGRAM(s) Oral every 6 hours PRN Mild Pain (1 - 3)  ALPRAZolam 0.5 milliGRAM(s) Oral two times a day PRN anxiety  aluminum hydroxide/magnesium hydroxide/simethicone Suspension 30 milliLiter(s) Oral every 4 hours PRN Dyspepsia  artificial tears (preservative free) Ophthalmic Solution 1 Drop(s) Both EYES four times a day PRN dry eyes  bisacodyl 5 milliGRAM(s) Oral every 12 hours PRN Constipation  haloperidol     Tablet 2 milliGRAM(s) Oral every 6 hours PRN agitation  haloperidol    Injectable 2 milliGRAM(s) IntraMuscular once PRN severe agitation  ondansetron    Tablet 8 milliGRAM(s) Oral every 8 hours PRN nausea

## 2023-06-09 NOTE — BH INPATIENT PSYCHIATRY PROGRESS NOTE - NSBHFUPINTERVALHXFT_PSY_A_CORE
Chart reviewed and case discussed with treatment team. Staff report patient has been cooperative, slept well. Patient is noted to be social with peers and engages in groups activities well. Patient has been taking xanax 0.5mg prn no more than once daily. Myoclonus nearly fully resolved and without return of anxiety in absence of abilify x4 days. Patient is forward thinking.

## 2023-06-09 NOTE — BH PSYCHOLOGY - CLINICIAN PSYCHOTHERAPY NOTE - NSBHPTASSESSDT_PSY_A_CORE
05-Jun-2023 15:30
16-May-2023 13:45
02-Jun-2023 11:00
25-May-2023 15:30
01-Jun-2023 13:30
19-May-2023 11:00
02-Jun-2023 13:45
22-May-2023 12:38
30-May-2023 15:30
09-Jun-2023 15:36
11-May-2023 14:05
23-May-2023 16:00
07-Jun-2023 15:00
18-May-2023 15:00

## 2023-06-09 NOTE — BH PSYCHOLOGY - CLINICIAN PSYCHOTHERAPY NOTE - NSBHPSYCHOLNARRATIVE_PSY_A_CORE FT
Patient was seen individually at the team's request and with her consent. Patient's mood was depressed and affect was muted and mood-congruent. She was coherent, logical and not labile. She denied any suicidality or aggressive ideation.    Patient discussed her recent hospitalization at [facility]; she was upset that all medications were discontinued there. She claimed that she was not feeling any better when she was discharged and eventually decided she needed rehospitalization. She reported that she had become more anxious, depressed and isolative for about a month prior to her admission to [facility] and she had been taking extra Klonopin and mirtazapine hoping that it would reduce her symptoms. She reports currently feeling worst in the afternoon between 3 and 5 when she gets “the shivers”. She understands that this is anxiety and is not worried about medical issues as she had been in the past. She is worried that she will not return to baseline or that she would be discharged while still in this state. Writer offered encouragement and support and reminded her that she had felt this way before but did get better. Writer reviewed coping mechanisms and healthy and unhealthy “self-talk”. Patient was somewhat detached during the session but felt reassured to be working with this writer (who was her therapist during prior [facility] hospitalizations). She was encouraged to participate in group activities on the unit. Patient accepted support and agreed to meet again.
Patient was seen individually at the team's request and with her consent. Patient's mood was “discouraged” and affect was of full range and congruent. She was coherent and logical. She denied any suicidality or aggressive ideation.    Patient is still experiencing twitches; she stated that they were gone this morning but reappeared after lunch. Writer observed about 5 such twitches (appearing as sudden involuntary jerks) mostly during the first 5 minutes of the session. She believes it is a reaction to Abilify which she has since stopped, but acknowledged that the doctor thinks the Lexapro/Remeron mix might be more likely the cause and reduced her Lexapro. She evidenced some self-calming and efforts to avoid catastrophizing.     Patient again became fixated on working out the appointment with Dr. Kamara after discharge but agreed to allow the team top work on that. She again discussed the activities she was looking forward to when discharged and believes she is almost ready except for the twitching.     Otherwise, she appeared generally calm and in good humor. She continues to participate in groups and to socialize.     Patient accepted support and agreed to meet again.
Patient was seen individually at the team's request and with her consent. Patient's mood was moderately anxious, and affect was of full range and congruent. She was coherent and logical. She denied any suicidality or aggressive ideation.    Patient reported having had a very anxious weekend but was relieved that she no longer had diarrhea and that her GI results were normal. She reported feeling anxious now (8.5 out of 10) but her heart was not palpitating, rather she just felt the chills. We reviewed positive and negative self-talk and she felt encouraged to remember that she always recovered in past episodes despite not thinking that she would.  She described a very pleasant visit by her  on Saturday and said that she felt friendly with a group of four or five patients on the unit.     Patient was taught and practiced a relaxation technique known as the “physiological sigh”. She accepted support and agreed to meet again.
Patient was seen individually at the team's request and with her consent. Patient's mood was hopeful and affect was of full range and congruent. She was coherent and logical. She denied any suicidality or aggressive ideation.    Patient reported feeling better and is looking forward to discharge next week. She had a resurgence of anxiety yesterday and discussed some medication adjustments with her psychiatrist. She is not feeling nauseous but reports loose stools and plans to follow up with her GI doctor after discharge. She is sleeping well and eating well although she dislikes the food here. She has been participating in groups and has been socializing. She is on good terms with her  who has returned to work after a hiatus. She reviewed her leisure and medical plans for after discharge and expects to “take it one day at a time” so as not to become overwhelmed. She is glad that [facility] allow her to see an outside therapist and she identified Simone Ridley LMSW as someone she has seen earlier this year and with whom she would like to continue.     Patient accepted support and agreed to meet again.
Patient was seen individually at the team's request and with her consent. Patient's mood was relaxed, and affect was of full range and congruent. She was coherent and logical. She denied any suicidality or aggressive ideation.    Patient reported feeling sleepy from the increased Neurontin. Nevertheless, she is feeling no anxiety and if she could remain like this – but more awake – she would feel ready to go home. She reported some conflict with her  about his not having returned to work for the last two months. He stated that he was still anxious, and she is worried that he will lose his job and their insurance. They had ended up “triggering” each other on the phone this morning and patient is struggling to resist the urge to call him and question him again. Writer validated her concerns and also reassured her that this would not jeopardize her hospital coverage.     We reviewed positive and negative self-talk and evidence challenging her fears. Patient has been using the relaxing breathing technique. She accepted support and agreed to meet again.
Patient was seen individually at the team's request and with her consent. Patient's mood was “worried” but not anxious as in the past. Affect was of full range and congruent. She was coherent and logical. She denied any suicidality or aggressive ideation.    Patient reported experiencing twitches i.e., sudden involuntary muscle movements or jerks over the weekend. This did occur at times during this session mostly involving one shoulder or arm but about 90% of the time these movements were not observed. She said that we she is distracted or concentrating on something they go away. They do not keep her up at night. She worries that it might be MS or PD and is glad that her doctor ordered bloodwork. She wondered whether it could have been caused by the Abilify that she started last week; she has been taking Cogentin for the past two days and thinks it might be helping.    Patient was also upset to learn that Dr. Crooks (her outpatient psychiatrist) insists on seeing her in person (at least for the intake); she was concerned that her  would not be able to take off from work both to pick her up and to take her for an appointment. We discussed possible solutions and writer suggested that she defer this concern until she had more information about the timing of these events. There was also some confusion about whether she could see a private therapist; the team is clarifying this with the clinic .     Despite the above, the patient appeared generally calm and in good humor. She is still looking forward to discharge albeit delayed. She continues to participate in groups and to socialize..     Patient accepted support and agreed to meet again.
Patient was seen individually at the team's request and with her consent. Patient's mood was "good " and affect was of full range and congruent. She was coherent and logical. She denied any suicidality or aggressive ideation.    Patient reported feeling better and almost ready to go home. She has not been feeling overly sedated, has slept well, has an appetite and has been socializing appropriately. Her  visited her and she paid a lot if the bills while he was here – which relived her. She has been thinking about all the appointments she will need to make over the next couple of weeks and was clearly in a better state of mind, looking forward to returning home to her routines.     Patient reviewed the safety plan that she had done with rehab. She is getting along better with her  who has started to resume his work..    Patient did not complain of nausea or diarrhea. She accepted support and agreed to meet again.
Patient was seen individually at the team's request and with her consent. Patient's mood was moderately anxious and affect was of full range and congruent. She was coherent and logical. She denied any suicidality or aggressive ideation.    Patient reported having had a bad night as she was nauseous, could not sleep and had a headache. She had increased anxiety this morning which she traces to her fears that she would have diarrhea again and being afraid to eat. She received a PRN to relax, did eat and had normal bowel movements; she was feeling more reassured and relaxed by the time we met. We reviewed her cognitions and tried to use evidence to challenge catastrophic thinking, but she struggles to employ that in real time. Patient thought about the last time she felt “normal” and decided it was back in January 2023; since that time she had been increasingly anxious and isolative. We discussed relapse prevention and tried practicing chooising to think positively and to relive pleasant moments.    Patient called and spoke with 6 friends last night and felt supported by their concern for her. Writer offered validation and encouragement She accepted support and agreed to meet again.
Patient was seen individually at the team's request and with her consent. Patient's mood was worried, and affect was of full range with some brief lability and mood-congruent. She was coherent and logical. She denied any suicidality or aggressive ideation.    Patient reported that she was very bothered by GI symptoms including diarrhea and nausea and worries that maybe she has pancreatic cancer. She acknowledged that she tends to catastrophize and was able to think of other explanations instead which calmed her. She speculated that it could be her habit of eating ice cream before bed and other foods that she may not agree with and agreed to experiment by excluding them. Despite this, patient said that today was the first day that she did not experience anxiety and entertained the idea that maybe she was turning a corner. She is also experiencing improved interactions with her  who does not “trigger” her but instead has shown affection and concern.      Patient again got pulled into negative imagery (thinking about how fortunate her brother was to have children even though she was the one who cared for their parents, and she is childless and has mood and anxiety episodes. We processed these thoughts and feelings, and patient was gradually able to refocus and recognize that her thinking was bringing her down and that she could choose what and how to think. We reviewed relaxation breathing and evidence that challenged her fears.     Writer offered validation and encouragement Patient was praised for adherence. She accepted support and agreed to meet again.
Psychologist met with the patient to conduct brief cognitive screen at the request of the treatment team and consent of the pt.  In order to better understand their cognitive abilities to refine discharge plan, the psychologist administered the following cognitive screen: Abdullahi Cognitive Assessment – Alternate Version (MOCA 7.2). Results from this measure were slightly low but generally fell within expected levels(MOCA = 23/30). While the patient initially reported significant concerns regarding her short term memory, her performance on a task examining short term memory was fairly strong ( MOCA delayed recall: 4/5). The patient reported a fair amount of anxiety and displayed negative self-talk during the task, (e.g. “I already know I’m going to do terrible”). On a task involving calculation ,the patient appeared to become anxious and quickly gave up. As such, results from this measure may underrepresent her actual cognitive abilities.   Psychologist also provided psychoeducation on memory and anxiety. The patient responded well to empathy and validation of her emotional experience. No acute indicators noted.  
Patient was seen individually at the team's request and with her consent. Patient's mood was good and affect was of full range and congruent. She was coherent and logical. She denied any suicidality or aggressive ideation.    Patient reported good despite having thrown up after lunch. She is looking forward to discharge next week and admitted some anxiety about that but can tolerate it and accepts that it is not uncommon. Writer provided psychoeducation about stress reduction and relapse prevention. She continues to participate in groups and to socialize. She was relieved to hear that the  made contact with her private therapist who is willing to take her back. She reviewed her leisure and medical plans for after discharge - she showed the writer a comprehensive list that she had made - and plans to do no more than 1-2 a day to avoid feeling overwhelmed.     Patient accepted support and agreed to meet again.
Patient was seen individually at the team's request and with her consent. Patient's mood was “anxious” and affect was muted and mood-congruent. She was coherent, logical, and not labile. She denied any suicidality or aggressive ideation.    Patient discussed her weekend complaining that she had “anxiety” the whole time and that she had bouts of nausea in the morning and two episodes of fecal incontinence. She was not feeling nauseous now and had normal movements today and yesterday. Patient easily fell into negative thinking, fearing that she would never feel better and that the medications would not work. With some prompting, however, she was able to change her mood by pleasant reminiscences. We also reviewed relaxation breathing, optimistic statements, and evidence that challenged her fears. Patient also stated that she has found conversations with her  more triggering as he has become very worried about her rather than optimistic and supportive and she has told him not to visit over the weekend.     Writer offered validation and encouragement She was encouraged to continue to participate in group activities on the unit. Patient accepted support and agreed to meet again.
Patient was seen individually at the team's request and with her consent. Patient's mood was " a little nervous" and affect was of full range and congruent. She was coherent and logical. She denied any suicidality or aggressive ideation.    Patient reported still feeling tired but less anxious overall. She voiced uncertainty and a need for reassurance that the team will keep trying to get her to baseline and felt badly when her psychiatrist told her that she had reassured her already . She eventually processed this with her psychiatrist and received reassurance. Writer attempted to frame the encounter as based on our efforts to encourage her to reassure herself with the information she was given.     Patient has been using the breathing techniques to relax and has also been trying to focus on positive and pleasant ideas with some success. She is also getting along better with her ; he is going back to work soon and she decided not to tell him when she isn't feeling well because that triggers a negative interactional cycle.    Patient still gets nauseous but does not have the diarrhea she had last week. She is sleeping and eating well. She accepted support and agreed to meet again.
Patient was seen individually at the team's request and with her consent. Patient's mood was good and affect was of full range and congruent. She was coherent and logical. She denied any suicidality or aggressive ideation.    Patient stated that she is no longer experiencing twitches and none were observed by this writer during the session. We reviewed coping skills, triggers and ways of regulating her mood when she finds herself fixating on worries or negative thoughts. She was able to spontaneously recall events that were pleasant and affirming and which enabled her to maintain an optimistic and confident view. She reviewed her stay in the hospital, stressors that precipitated her admission and her plans for the near future. She feels like her “old self” and acknowledged that she did believe it would be possible to feel that way again.    Patient is looking forward to the planned discharge on Monday (6/12/23) and to her resumption of work with Dr. Kamara. She appeared calm and in good humor. She continues to participate in groups and to socialize.     Patient accepted support and appreciated the session.

## 2023-06-09 NOTE — BH PSYCHOLOGY - CLINICIAN PSYCHOTHERAPY NOTE - NSBHPSYCHOLPROBS_PSY_ALL_CORE
Aggression/Depression
Anxiety/Depression
Anxiety
Anxiety/Depression

## 2023-06-09 NOTE — BH PSYCHOLOGY - CLINICIAN PSYCHOTHERAPY NOTE - NSBHPSYCHOLASSESSPROV_PSY_A_CORE
Licensed Psychologist

## 2023-06-09 NOTE — BH PSYCHOLOGY - CLINICIAN PSYCHOTHERAPY NOTE - NSTXDCOTHRDATEEST_PSY_ALL_CORE
11-May-2023
07-Jun-2023
11-May-2023

## 2023-06-09 NOTE — BH PSYCHOLOGY - CLINICIAN PSYCHOTHERAPY NOTE - NSTXSUICIDDATEEST_PSY_ALL_CORE
10-May-2023

## 2023-06-09 NOTE — BH INPATIENT PSYCHIATRY PROGRESS NOTE - MSE UNSTRUCTURED FT
Appearance: adequate hygiene and grooming; stable gait; no myoclonus noted  Behavior: intensely related, attentive, no psychomotor agitation/retardation  Speech:  wnl  Mood: anxious  Affect: congruent, stable, reactive  Thought process: ruminative, less obsessive  Thought content: no delusions elicited; denies SI or HI  Perceptual disturbances: denies AVH  Cognition: orientation intact; has difficulty keeping track of medication names and provider names (likely due to anxiety)  Abstraction: fair  Fund of knowledge: fair  Insight: improving  Judgement: improving

## 2023-06-09 NOTE — BH PSYCHOLOGY - CLINICIAN PSYCHOTHERAPY NOTE - NSTXDEPRESGOAL_PSY_ALL_CORE
Report journaling 5 counter-statements to negative predictions/self-image daily

## 2023-06-09 NOTE — BH PSYCHOLOGY - CLINICIAN PSYCHOTHERAPY NOTE - NSTXANXDATENEW_PSY_ALL_CORE
23-May-2023
23-May-2023
30-May-2023
23-May-2023
30-May-2023
23-May-2023
30-May-2023

## 2023-06-09 NOTE — BH PSYCHOLOGY - CLINICIAN PSYCHOTHERAPY NOTE - NSTXSUICIDDATETRGT_PSY_ALL_CORE
08-Jun-2023
08-Jun-2023
25-May-2023
17-May-2023
25-May-2023
25-May-2023
19-May-2023
25-May-2023
08-Jun-2023
31-May-2023
01-Jun-2023

## 2023-06-09 NOTE — BH PSYCHOLOGY - CLINICIAN PSYCHOTHERAPY NOTE - NSTXANXDATEEST_PSY_ALL_CORE
10-May-2023
11-May-2023
10-May-2023
25-May-2023
10-May-2023
25-May-2023
25-May-2023
10-May-2023
23-May-2023
10-May-2023
10-May-2023
11-May-2023
11-May-2023
10-May-2023

## 2023-06-09 NOTE — BH PSYCHOLOGY - CLINICIAN PSYCHOTHERAPY NOTE - NSTXDEPRESPROGRES_PSY_ALL_CORE
Improving
No Change
Improving
No Change
Improving
No Change
Improving

## 2023-06-09 NOTE — BH PSYCHOLOGY - CLINICIAN PSYCHOTHERAPY NOTE - NSBHPSYCHOLPARTICIP_PSY_A_CORE
Fully engaged

## 2023-06-09 NOTE — BH PSYCHOLOGY - CLINICIAN PSYCHOTHERAPY NOTE - NSTXDCOTHRDATETRGT_PSY_ALL_CORE
25-May-2023
25-May-2023
01-Jun-2023
07-Jun-2023
14-Jun-2023
25-May-2023
19-May-2023
07-Jun-2023
07-Jun-2023
25-May-2023
07-Jun-2023
18-May-2023
01-Jun-2023
07-Jun-2023

## 2023-06-09 NOTE — BH PSYCHOLOGY - CLINICIAN PSYCHOTHERAPY NOTE - NSTXANXDATETRGT_PSY_ALL_CORE
08-Jun-2023
16-May-2023
25-May-2023
18-May-2023
25-May-2023
25-May-2023
06-Jun-2023
31-May-2023
14-Jun-2023
08-Jun-2023
23-May-2023
14-Jun-2023

## 2023-06-09 NOTE — BH PSYCHOLOGY - CLINICIAN PSYCHOTHERAPY NOTE - NSBHPSYCHOLINT_PSY_A_CORE
Cognitive/behavioral therapy/Encourage medication compliance/Stress management/Supportive therapy/Treatment compliance encouraged
Cognitive/behavioral therapy/Encourage medication compliance/Problem-solving techniques discussed/Supportive therapy/Treatment compliance encouraged
Cognitive/behavioral therapy/Dynamic issues addressed/Encourage medication compliance/Stress management/Supportive therapy/Treatment compliance encouraged
Cognitive/behavioral therapy/Encourage medication compliance/Problem-solving techniques discussed/Supportive therapy/Treatment compliance encouraged
Cognitive/behavioral therapy/Encourage medication compliance/Problem-solving techniques discussed/Supportive therapy/Treatment compliance encouraged
Cognitive/behavioral therapy/Encourage medication compliance/Stress management/Treatment compliance encouraged
Cognitive/behavioral therapy/Encourage medication compliance/Problem-solving techniques discussed/Supported coping skills/Supportive therapy/Treatment compliance encouraged
Cognitive/behavioral therapy/Encourage medication compliance/Problem-solving techniques discussed/Supported coping skills/Treatment compliance encouraged
Cognitive/behavioral therapy/Encourage medication compliance/Supportive therapy/Treatment compliance encouraged
Cognitive/behavioral therapy/Encourage medication compliance/Problem-solving techniques discussed/Supportive therapy/Treatment compliance encouraged
Cognitive/behavioral therapy/Encourage medication compliance/Stress management/Supportive therapy/Treatment compliance encouraged
Cognitive/behavioral therapy/Encourage medication compliance/Problem-solving techniques discussed/Supportive therapy/Treatment compliance encouraged
Cognitive/behavioral therapy/Encourage medication compliance/Problem-solving techniques discussed/Supportive therapy/Treatment compliance encouraged
Cognitive/behavioral therapy/Supported coping skills/Supportive therapy

## 2023-06-09 NOTE — BH PSYCHOLOGY - CLINICIAN PSYCHOTHERAPY NOTE - NSTXSUICIDPROGRES_PSY_ALL_CORE
Met - goal discontinued
Improving
Improving
Met - goal discontinued
Met - goal discontinued
No Change
Met - goal discontinued
No Change
Met - goal discontinued
Improving

## 2023-06-09 NOTE — BH PSYCHOLOGY - CLINICIAN PSYCHOTHERAPY NOTE - NSBHPSYCHOLSERV_PSY_A_CORE
Individual psychotherapy

## 2023-06-09 NOTE — BH PSYCHOLOGY - CLINICIAN PSYCHOTHERAPY NOTE - NSTXDEPRESDATETRGT_PSY_ALL_CORE
25-May-2023
15-Sami-2023
25-May-2023
01-Jun-2023
17-May-2023
19-May-2023
15-Sami-2023
25-May-2023
01-Jun-2023
15-Saim-2023
19-May-2023
25-May-2023
15-Sami-2023
15-Sami-2023

## 2023-06-09 NOTE — BH PSYCHOLOGY - CLINICIAN PSYCHOTHERAPY NOTE - NSBHPSYCHOLGOALS_PSY_A_CORE
Decrease symptoms/Improve level of independent functioning/Treatment compliance
Decrease symptoms/Improve level of independent functioning/Psychoeducation/Treatment compliance
Decrease symptoms/Improve level of independent functioning/Treatment compliance
Assessment/Psychoeducation
Decrease symptoms/Improve level of independent functioning/Treatment compliance
Decrease symptoms/Improve level of independent functioning/Prevent relapse/Treatment compliance
Decrease symptoms/Improve level of independent functioning/Treatment compliance

## 2023-06-09 NOTE — BH PSYCHOLOGY - CLINICIAN PSYCHOTHERAPY NOTE - NSTXANXGOAL_PSY_ALL_CORE
Report a reduction in panic attacks and improving mood and confidence
Be able to participate in activities despite lingering anxiety/panic
Report a reduction in panic attacks and improving mood and confidence
Identify and practice 3 coping skills to manage anxiety
Report a reduction in panic attacks and improving mood and confidence
Report a reduction in panic attacks and improving mood and confidence
Be able to participate in activities despite lingering anxiety/panic

## 2023-06-09 NOTE — BH PSYCHOLOGY - CLINICIAN PSYCHOTHERAPY NOTE - NSBHPSYCHOLRESPONSE_PSY_A_CORE
Symptoms reduced/Coping skills acquired/Insight displayed/Accepted support
Symptoms reduced/Coping skills acquired/Insight displayed/Accepted support
Symptoms reduced/Insight displayed/Accepted support
Symptoms reduced/Coping skills acquired/Insight displayed/Accepted support
Symptoms reduced/Insight displayed/Accepted support
Symptoms reduced/Insight displayed/Accepted support
Symptoms reduced/Coping skills acquired/Insight displayed/Accepted support
Insight displayed/Accepted support
Symptoms reduced/Insight displayed/Accepted support
Symptoms reduced/Coping skills acquired/Insight displayed/Accepted support
Insight displayed/Accepted support
Symptoms reduced/Coping skills acquired/Insight displayed/Accepted support

## 2023-06-09 NOTE — BH PSYCHOLOGY - CLINICIAN PSYCHOTHERAPY NOTE - NSTXDCOTHRGOAL_PSY_ALL_CORE
pt will comply with treatment and improve mental status in order to effectively engage with dc planning.
pt will comply with treatment and improve mental status n order to effectively engage with dc planning.
pt will comply with treatment and improve mental status in order to effectively engage with dc planning.
pt will comply with treatment and improve mental status n order to effectively engage with dc planning.
pt will comply with treatment and improve mental status in order to effectively engage with dc planning.

## 2023-06-09 NOTE — BH INPATIENT PSYCHIATRY PROGRESS NOTE - NSBHCHARTREVIEWVS_PSY_A_CORE FT
Vital Signs Last 24 Hrs  T(C): 36.7 (06-09-23 @ 16:03), Max: 36.7 (06-09-23 @ 16:03)  T(F): 98 (06-09-23 @ 16:03), Max: 98 (06-09-23 @ 16:03)  HR: 81 (06-08-23 @ 20:40) (81 - 81)  BP: 111/60 (06-08-23 @ 20:40) (111/60 - 111/60)  BP(mean): --  RR: 18 (06-09-23 @ 05:47) (18 - 18)  SpO2: 95% (06-09-23 @ 16:03) (94% - 98%)    Orthostatic VS  06-09-23 @ 05:47  Lying BP: 123/58 HR: 85  Sitting BP: 126/56 HR: 88  Standing BP: --/-- HR: --  Site: --  Mode: --  Orthostatic VS  06-08-23 @ 05:31  Lying BP: 128/67 HR: 78  Sitting BP: 129/72 HR: 82  Standing BP: --/-- HR: --  Site: upper left arm  Mode: electronic

## 2023-06-09 NOTE — BH PSYCHOLOGY - CLINICIAN PSYCHOTHERAPY NOTE - NSTXANXPROGRES_PSY_ALL_CORE
Improving
Met - goal discontinued
No Change
Improving
Met - goal discontinued
Improving

## 2023-06-09 NOTE — BH PSYCHOLOGY - CLINICIAN PSYCHOTHERAPY NOTE - TOKEN PULL-DIAGNOSIS
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      
Primary Diagnosis:  Anxiety disorder [F41.9]        Problem Dx:   HLD (hyperlipidemia) [E78.5]      HTN (hypertension) [I10]      Chronic GERD [K21.9]      MDD (major depressive disorder), recurrent episode [F33.9]      
Primary Diagnosis:  Generalized anxiety disorder [F41.1]      Anxiety disorder [F41.9]        Problem Dx:   HLD (hyperlipidemia) [E78.5]      HTN (hypertension) [I10]      Chronic GERD [K21.9]      MDD (major depressive disorder), recurrent episode [F33.9]      
Primary Diagnosis:  Anxiety disorder [F41.9]        Problem Dx:   HLD (hyperlipidemia) [E78.5]      HTN (hypertension) [I10]      Chronic GERD [K21.9]      MDD (major depressive disorder), recurrent episode [F33.9]      
Primary Diagnosis:  Generalized anxiety disorder [F41.1]      Anxiety disorder [F41.9]        Problem Dx:   HLD (hyperlipidemia) [E78.5]      HTN (hypertension) [I10]      Chronic GERD [K21.9]      MDD (major depressive disorder), recurrent episode [F33.9]      
Primary Diagnosis:  Anxiety disorder [F41.9]        Problem Dx:   HLD (hyperlipidemia) [E78.5]      HTN (hypertension) [I10]      Chronic GERD [K21.9]      MDD (major depressive disorder), recurrent episode [F33.9]      
Primary Diagnosis:  Anxiety disorder [F41.9]        Problem Dx:   MDD (major depressive disorder), recurrent episode [F33.9]      
Primary Diagnosis:  Anxiety disorder [F41.9]        Problem Dx:   HLD (hyperlipidemia) [E78.5]      HTN (hypertension) [I10]      Chronic GERD [K21.9]      MDD (major depressive disorder), recurrent episode [F33.9]      
Primary Diagnosis:  Anxiety disorder [F41.9]        Problem Dx:   MDD (major depressive disorder), recurrent episode [F33.9]      
Primary Diagnosis:  Anxiety disorder [F41.9]        Problem Dx:   HLD (hyperlipidemia) [E78.5]      HTN (hypertension) [I10]      Chronic GERD [K21.9]      MDD (major depressive disorder), recurrent episode [F33.9]      

## 2023-06-10 RX ADMIN — ESCITALOPRAM OXALATE 10 MILLIGRAM(S): 10 TABLET, FILM COATED ORAL at 08:51

## 2023-06-10 RX ADMIN — Medication 1 DROP(S): at 09:52

## 2023-06-10 RX ADMIN — ATORVASTATIN CALCIUM 10 MILLIGRAM(S): 80 TABLET, FILM COATED ORAL at 20:30

## 2023-06-10 RX ADMIN — PANTOPRAZOLE SODIUM 40 MILLIGRAM(S): 20 TABLET, DELAYED RELEASE ORAL at 06:41

## 2023-06-10 RX ADMIN — Medication 25 MILLIGRAM(S): at 20:29

## 2023-06-10 RX ADMIN — Medication 0.5 MILLIGRAM(S): at 15:54

## 2023-06-10 RX ADMIN — Medication 25 MILLIGRAM(S): at 08:51

## 2023-06-10 RX ADMIN — GABAPENTIN 300 MILLIGRAM(S): 400 CAPSULE ORAL at 20:30

## 2023-06-10 RX ADMIN — GABAPENTIN 200 MILLIGRAM(S): 400 CAPSULE ORAL at 14:34

## 2023-06-10 RX ADMIN — Medication 1 APPLICATION(S): at 20:30

## 2023-06-10 RX ADMIN — GABAPENTIN 300 MILLIGRAM(S): 400 CAPSULE ORAL at 08:51

## 2023-06-10 RX ADMIN — MIRTAZAPINE 45 MILLIGRAM(S): 45 TABLET, ORALLY DISINTEGRATING ORAL at 20:29

## 2023-06-11 PROCEDURE — 99232 SBSQ HOSP IP/OBS MODERATE 35: CPT

## 2023-06-11 RX ADMIN — GABAPENTIN 300 MILLIGRAM(S): 400 CAPSULE ORAL at 20:19

## 2023-06-11 RX ADMIN — GABAPENTIN 200 MILLIGRAM(S): 400 CAPSULE ORAL at 14:14

## 2023-06-11 RX ADMIN — GABAPENTIN 300 MILLIGRAM(S): 400 CAPSULE ORAL at 09:04

## 2023-06-11 RX ADMIN — Medication 0.5 MILLIGRAM(S): at 16:14

## 2023-06-11 RX ADMIN — PANTOPRAZOLE SODIUM 40 MILLIGRAM(S): 20 TABLET, DELAYED RELEASE ORAL at 08:04

## 2023-06-11 RX ADMIN — Medication 1 APPLICATION(S): at 20:19

## 2023-06-11 RX ADMIN — Medication 25 MILLIGRAM(S): at 09:05

## 2023-06-11 RX ADMIN — MIRTAZAPINE 45 MILLIGRAM(S): 45 TABLET, ORALLY DISINTEGRATING ORAL at 20:19

## 2023-06-11 RX ADMIN — ESCITALOPRAM OXALATE 10 MILLIGRAM(S): 10 TABLET, FILM COATED ORAL at 09:04

## 2023-06-11 RX ADMIN — ATORVASTATIN CALCIUM 10 MILLIGRAM(S): 80 TABLET, FILM COATED ORAL at 20:19

## 2023-06-11 RX ADMIN — Medication 25 MILLIGRAM(S): at 20:20

## 2023-06-11 NOTE — BH INPATIENT PSYCHIATRY PROGRESS NOTE - NSTXSUICIDDATETRGT_PSY_ALL_CORE
19-May-2023
08-Jun-2023
25-May-2023
17-May-2023
19-May-2023
08-Jun-2023
25-May-2023
31-May-2023
08-Jun-2023
08-Jun-2023
25-May-2023
31-May-2023
31-May-2023
08-Jun-2023
19-May-2023
08-Jun-2023
17-May-2023
25-May-2023
01-Jun-2023
25-May-2023
19-May-2023

## 2023-06-11 NOTE — BH INPATIENT PSYCHIATRY PROGRESS NOTE - NSICDXBHPRIMARYDX_PSY_ALL_CORE
Anxiety disorder   F41.9  
Generalized anxiety disorder   F41.1  
Generalized anxiety disorder   F41.1  
Anxiety disorder   F41.9  
Anxiety disorder   F41.9  
Generalized anxiety disorder   F41.1  
Anxiety disorder   F41.9  
Generalized anxiety disorder   F41.1  
Generalized anxiety disorder   F41.1  
Anxiety disorder   F41.9  
Generalized anxiety disorder   F41.1  
Anxiety disorder   F41.9  

## 2023-06-11 NOTE — BH INPATIENT PSYCHIATRY PROGRESS NOTE - NSTXANXDATEEST_PSY_ALL_CORE
11-May-2023
10-May-2023
11-May-2023
10-May-2023
10-May-2023
23-May-2023
10-May-2023
25-May-2023
11-May-2023
10-May-2023
23-May-2023
25-May-2023
10-May-2023
25-May-2023
25-May-2023
11-May-2023
10-May-2023

## 2023-06-11 NOTE — BH INPATIENT PSYCHIATRY PROGRESS NOTE - NSBHATTESTTYPEVISIT_PSY_A_CORE
Attending Only
Attending with Resident/Fellow/Student
Attending with Resident/Fellow/Student
Attending Only
Attending with Resident/Fellow/Student
Attending with Resident/Fellow/Student

## 2023-06-11 NOTE — BH INPATIENT PSYCHIATRY PROGRESS NOTE - NSBHASSESSSUMMFT_PSY_ALL_CORE
Ms. Gross is a 74yo female, , retired (), non-caregiver, domiciled at home with her , with a prior psychiatric diagnosis of ANSON and MDD, 4 previous inpatient psychiatric hospitalizations at [facility] and most recently at [facility] from 3/28/23-4/29/23,  prior SA in 11/2019 by overdosing on pills, no history of NSSIB, no history of violence/aggression, no hx of legal issues, hx of daily marijuana use, PMHx of HTN, hyperlipidemia, treated in [facility] by Dr. Kamara. Patient brought in for worsening anxiety and SI at home. Patient no longer reporting SI but remains significantly anxious.     5/25/2023: On assessment today, she denied leg pain and marked improvement in leg swelling. Denies SI/HI and anxiety at presents. Denies Gabapentin-related drowsiness and believes it helps with her mood. Remains preoccupied with thoughts of failed treatment and tentative plans if that were to happen. Remains willin to try Trintellix if indicated. Also willing to try Abilify again despite adverse effect of leg heaviness as previously reported. States will try since in controlled environment. Encouraged to work on self re-assurance as regards plan of care, as opposed seeking daily verbal reassurance from MD. States she is hopeful for the future. Compliant with meds but still requires PRN Xanax [Received 1 dose in the past 24H 8AM - 8AM]    5/26/2023: Pt was assessed today. In no acute distress. Pt denies SI/HI. Cooperative, engages, hopdeful, but still preoccupied with fear of becoming anxious. States she tried hard to reassure herself that she will be better yesterday so did not ask for PRN Xanax overnight. States she feels well but thinks she still needs reassurance from MD that she will get better. Also precoccupied with knowing if her meds would be available since it's a holiday weekend. Pt reassured and encouraged to self reassure. States she remains hopeful that she will feel better soon, but does not think she is "quite there yet."    5/30: Improved since abilify addition, tolerating well  5/31: Sustained improvement, continue abilify; wants to try decrease gabapentin due to belief it's related to diarrhea - will reduce to 200mg po tid  6/1: Some return of anxiety, will increase gabapentin back to 300mg, 200mg, 300mg. Will pursue cognitive testing prior to discharge, neuropsych team informed.  6/2: Improved, likely d/c Tuesday 6/5: anxiety stabilized but possibly having side effects of abilify vs mirtazapine/lexapro combination - decrease lexapro to 10mg po daily and start cogentin 1mg po bid as lower doses were not helpful; r/o psychosomaticism  6/6: abnormal involuntary movements noted, labs wnl, hold abilify and continue decreased lexapro dose of 10mg po daily and continue mirtazapine 45mg po qhs    PLAN:  1. No indication for CO as pt without SI/HI    2. Psych Meds:  - CONTINUE Remeron 45mg qHS   - continue Gabapentin 200mg po tid   - CONTINUE Lexapro 10mg daily  - discontinue abilify 1mg po daily  - CONTINUE Xanax 0.5mg BID prn  - CONTINUE Haldol 2mg q6H prn  - CONTINUE Zofran 8mg q8H prn  - Plan discussed with pt who is in agreement with plan of care    3. Medical Comorbidities:   - HTN, palpitations - CONTINUE Metoprolol 25mg BID. BP and HR stable on this dose.  - Leg edema (likely dependant edema) - No indication for diuretic as pt reports improvement. Pt encourage to elevate legs and ambulate as tolerated. Will resume HCTZ 12.5mg as swelling returned and slightly hypertensive now [Pt was previously on HCTZ 12.5mg daily  however both HCTZ 12.5mg and Losartan 100mg held due to episodes of hypotension. Per outpatient Cardiologist, patient is on metoprolol for palpitations due to anxiety so it can be continued with hold parameters; hospitalist note appreciated - recommends compression stockings but we have been unable to obtain them].     4. Labs: none    DISPO:  -SW for DC planning once clinically stable. 
Ms. Gross is a 72yo female, , retired (), non-caregiver, domiciled at home with her , with a prior psychiatric diagnosis of ANSON and MDD, 4 previous inpatient psychiatric hospitalizations at [facility] and most recently at [facility] from 3/28/23-4/29/23,  prior SA in 11/2019 by overdosing on pills, no history of NSSIB, no history of violence/aggression, no hx of legal issues, hx of daily marijuana use, PMHx of HTN, hyperlipidemia, treated in [facility] by Dr. Kamara. Patient brought in for worsening anxiety and SI at home. Patient no longer reporting SI but remains significantly anxious.     5/25/2023: On assessment today, she denied leg pain and marked improvement in leg swelling. Denies SI/HI and anxiety at presents. Denies Gabapentin-related drowsiness and believes it helps with her mood. Remains preoccupied with thoughts of failed treatment and tentative plans if that were to happen. Remains willin to try Trintellix if indicated. Also willing to try Abilify again despite adverse effect of leg heaviness as previously reported. States will try since in controlled environment. Encouraged to work on self re-assurance as regards plan of care, as opposed seeking daily verbal reassurance from MD. States she is hopeful for the future. Compliant with meds but still requires PRN Xanax [Received 1 dose in the past 24H 8AM - 8AM]    5/26/2023: Pt was assessed today. In no acute distress. Pt denies SI/HI. Cooperative, engages, hopdeful, but still preoccupied with fear of becoming anxious. States she tried hard to reassure herself that she will be better yesterday so did not ask for PRN Xanax overnight. States she feels well but thinks she still needs reassurance from MD that she will get better. Also precoccupied with knowing if her meds would be available since it's a holiday weekend. Pt reassured and encouraged to self reassure. States she remains hopeful that she will feel better soon, but does not think she is "quite there yet."    5/30: Improved since abilify addition, tolerating well  5/31: Sustained improvement, continue abilify; wants to try decrease gabapentin due to belief it's related to diarrhea - will reduce to 200mg po tid  6/1: Some return of anxiety, will increase gabapentin back to 300mg, 200mg, 300mg. Will pursue cognitive testing prior to discharge, neuropsych team informed.  6/2: Improved, likely d/c Tuesday    PLAN:  1. No indication for CO as pt without SI/HI    2. Psych Meds:  - CONTINUE Remeron 45mg qHS   - decrease Gabapentin to 200mg po tid   - CONTINUE Lexapro 15mg daily  - continue abilify 1mg po daily  - CONTINUE Xanax 0.5mg BID prn  - CONTINUE Haldol 2mg q6H prn  - CONTINUE Zofran 8mg q8H prn  - Plan discussed with pt who is in agreement with plan of care    3. Medical Comorbidities:   - HTN, palpitations - CONTINUE Metoprolol 25mg BID. BP and HR stable on this dose.  - Leg edema (likely dependant edema) - No indication for diuretic as pt reports improvement. Pt encourage to elevate legs and ambulate as tolerated. Will resume HCTZ 12.5mg as swelling returned and slightly hypertensive now [Pt was previously on HCTZ 12.5mg daily  however both HCTZ 12.5mg and Losartan 100mg held due to episodes of hypotension. Per outpatient Cardiologist, patient is on metoprolol for palpitations due to anxiety so it can be continued with hold parameters; hospitalist note appreciated - recommends compression stockings but we have been unable to obtain them].     4. Labs: none    DISPO:  -SW for DC planning once clinically stable. 
Patient is a 74yo female, , retired (), non-caregiver, domiciled at home with her ,  with a prior psychiatric diagnosis of ANSON and MDD, 4 previous inpatient psychiatric hospitalizations at [facility] and most recently at [facility] from 3/28/23-4/29/23,  prior SA in 11/2019 by overdosing on pills, no history of NSSIB, no history of violence/aggression, no hx of legal issues, hx of daily marijuana use, PMHx of HTN, hyperlipidemia, treated in outpatient geropsych clinic by Dr. Kamara. Patient brought in for worsening anxiety and SI at home. Patient has fleeting passive SI now and very somatically preoccupied. Patient demonstrating significant functional impairment due to anxiety (helpless, cognitive distortions).     1. Dispo: continue inpatient care on 9.13 due to ongoing functional impairment; d/c to home when stable  2. Safety: No CO needed  3. Medication management:  	Psych- continue Remeron 15mg QD (will increase tomorrow), continue Lexapro 15mg QD; continue tapering of klonopin to 0.25mg po bid with plan to d/c in 2 days; increase Gabapentin to 200mg TID as she states she was taking higher dose when very anxious in the past  	Medical- Atorvastatin 10mg QD,  Metoprolol tartrate 50 mg twice daily, Pantoprazole 20 mg daily (holding Losartan 100 mg daily, Hydrochlorothiazide 12.5 mg daily due to hypotension)  4. Labs: wnl  5. Dispo planning per SW pending clinical improvement        
Patient is a 72yo female, , retired (), non-caregiver, domiciled at home with her ,  with a prior psychiatric diagnosis of ANSON and MDD, 4 previous inpatient psychiatric hospitalizations at [facility] and most recently at [facility] from 3/28/23-4/29/23,  prior SA in 11/2019 by overdosing on pills, no history of NSSIB, no history of violence/aggression, no hx of legal issues, hx of daily marijuana use, PMHx of HTN, hyperlipidemia, treated in outpatient geropsych clinic by Dr. Stovall. Patient brought in for worsening anxiety and SI at home.     On assessment on the unit, patient denies any current SI and is hopeful to find right medication regimen fro her severe anxiety.    1. Dispo: continue inpatient care on 9.13; d/c to home when stable  2. Safety: No CO needed  3. Medication management:  	Psych- continue Remeron 15mg QD, continue Lexapro 15mg QD, continue klonopin 0.5mg po bid with plan to taper off completely, continue Gabapentin 100mg TID  	Medical- Atorvastatin 10mg QD, Losartan 100 mg daily, Hydrochlorothiazide 12.5 mg daily, Metoprolol tartrate 50 mg twice daily, Pantoprazole 20 mg daily  4. Labs: wnl  5. Dispo planning per SW pending clinical improvement  5/14 Depressed anxious, soft BP cont psych meds, discusssed with medicine who put hold parameters on betablocker and d/donald ACE and diuretic      
Patient is a 74yo female, , retired (), non-caregiver, domiciled at home with her ,  with a prior psychiatric diagnosis of ANSON and MDD, 4 previous inpatient psychiatric hospitalizations at [facility] and most recently at [facility] from 3/28/23-4/29/23,  prior SA in 11/2019 by overdosing on pills, no history of NSSIB, no history of violence/aggression, no hx of legal issues, hx of daily marijuana use, PMHx of HTN, hyperlipidemia, treated in outpatient geropsych clinic by Dr. Kamara. Patient brought in for worsening anxiety and SI at home. Patient no longer reporting SI but remains significantly anxious. Patient demonstrating significant functional impairment due to anxiety (helpless, cognitive distortions).     1. Dispo: continue inpatient care on 9.13 due to ongoing functional impairment; d/c to home when stable  2. Safety: No CO needed  3. Medication management:  	Psych- continue Remeron 22.5mg qHS - scheduled dose increase to 30mg po qhs starting tomorrow, continue Lexapro 15mg QD; continue Gabapentin 200mg TID for anxiety  	Medical- Atorvastatin 10mg QD,  Metoprolol tartrate 50 mg twice daily, Pantoprazole 20 mg daily (holding Losartan 100 mg daily, Hydrochlorothiazide 12.5 mg daily due to hypotension); per outpatient cardiologist, patient is on metoprolol for palpitations due to anxiety so it can be continued with hold parameters; hospitalist note appreciated - recommends compression stockings  	- diarrhea returned - changed diet to low fat, nondairy to rule out food causes; GI PCR panel sample collected this morning and is pending  4. Labs: none  5. Dispo planning per  pending clinical improvement        
Patient is a 72yo female, , retired (), non-caregiver, domiciled at home with her ,  with a prior psychiatric diagnosis of ANSON and MDD, 4 previous inpatient psychiatric hospitalizations at [facility] and most recently at [facility] from 3/28/23-4/29/23,  prior SA in 11/2019 by overdosing on pills, no history of NSSIB, no history of violence/aggression, no hx of legal issues, hx of daily marijuana use, PMHx of HTN, hyperlipidemia, treated in outpatient geropsych clinic by Dr. Stovall. Patient brought in for worsening anxiety and SI at home.     On assessment on the unit, patient denies any current SI and is hopeful to find right medication regimen fro her severe anxiety.    1. Dispo: continue inpatient care on 9.13; d/c to home when stable  2. Safety: No CO needed  3. Medication management:  	Psych- continue Remeron 15mg QD, continue Lexapro 15mg QD, continue klonopin 0.5mg po bid with plan to taper off completely, continue Gabapentin 100mg TID  	Medical- Atorvastatin 10mg QD, Losartan 100 mg daily, Hydrochlorothiazide 12.5 mg daily, Metoprolol tartrate 50 mg twice daily, Pantoprazole 20 mg daily  4. Labs: wnl  5. Dispo planning per JOANN pending clinical improvement      
Ms. Gross is a 74yo female, , retired (), non-caregiver, domiciled at home with her , with a prior psychiatric diagnosis of ANSON and MDD, 4 previous inpatient psychiatric hospitalizations at [facility] and most recently at [facility] from 3/28/23-4/29/23,  prior SA in 11/2019 by overdosing on pills, no history of NSSIB, no history of violence/aggression, no hx of legal issues, hx of daily marijuana use, PMHx of HTN, hyperlipidemia, treated in outpatient geropsych clinic by Dr. Kamara. Patient brought in for worsening anxiety and SI at home. Patient no longer reporting SI but remains significantly anxious.     Today she reports concerns about leg pain and swelling which she claims has not improved. Is asking if she can be started on a diuretic to help her sxs. She continues to be preoccupied with concerns about numerous health conditions and demonstrates significant functional impairment due to anxiety (helpless, cognitive distortions). In the past 24H she has required 1 dose of prn Xanax 0.5mg and compliant with ATC anxiety meds.     PLAN:  1. No indication for CO. Pt without SI or HI    2. Psych Meds:  - Will increase Gabapentin from 200mg TID to 300mg TID  - CONTINUE Lexapro 15mg daily  - CONTINUE Remeron 30mg qHS  - CONTINUE Xanax 0.5mg BID prn  - CONTINUE Haldol 2mg q6H prn  - CONTINUE Zofran 8mg q8H prn  - Given room to optimize, will likely increase Remeron dose tomorrow in light of ongoing sxs of depression  - Will consider starting Trintellix by the end of the week if pt's sxs persist  - Plan discussed with pt who is willing to try Trintellix    3. Medical Comorbidities:   - HTN, palpitations - Will decrease Metoprolol tartrate from 50mg BID to 25mg BID and consider restarting low dose diuretic if pt continues to c/o LE swelling. She was previously on HCTZ 12.5mg daily  however both CTZ 12.5mg and Losartan 100mg held due to episodes of hypotension.  Per outpatient Cardiologist, patient is on metoprolol for palpitations due to anxiety so it can be continued with hold parameters; hospitalist note appreciated - recommends compression stockings  - Diarrhea - c/w low fat, nondairy diet to r/o dietary causes. f/u GI PCR panel.    4. Labs: none    DISPO:  -SW for DC planning once clinically stable.   
Patient is a 74yo female, , retired (), non-caregiver, domiciled at home with her ,  with a prior psychiatric diagnosis of ANSON and MDD, 4 previous inpatient psychiatric hospitalizations at [facility] and most recently at [facility] from 3/28/23-4/29/23,  prior SA in 11/2019 by overdosing on pills, no history of NSSIB, no history of violence/aggression, no hx of legal issues, hx of daily marijuana use, PMHx of HTN, hyperlipidemia, treated in outpatient geropsych clinic by Dr. Kamara. Patient brought in for worsening anxiety and SI at home. Patient no longer reporting SI but remains significantly anxious. Patient demonstrating significant functional impairment due to anxiety (helpless, cognitive distortions).     1. Dispo: continue inpatient care on 9.13 due to ongoing functional impairment; d/c to home when stable  2. Safety: No CO needed  3. Medication management:  	Psych- continue Remeron 22.5mg qHS, continue Lexapro 15mg QD; continue klonopin 0.25mg po bid after tonight's dose; continue Gabapentin 200mg TID for anxiety  	Medical- Atorvastatin 10mg QD,  Metoprolol tartrate 50 mg twice daily, Pantoprazole 20 mg daily (holding Losartan 100 mg daily, Hydrochlorothiazide 12.5 mg daily due to hypotension)  4. Labs: wnl  5. Dispo planning per SW pending clinical improvement        
Patient is a 72yo female, , retired (), non-caregiver, domiciled at home with her ,  with a prior psychiatric diagnosis of ANSON and MDD, 4 previous inpatient psychiatric hospitalizations at [facility] and most recently at [facility] from 3/28/23-4/29/23,  prior SA in 11/2019 by overdosing on pills, no history of NSSIB, no history of violence/aggression, no hx of legal issues, hx of daily marijuana use, PMHx of HTN, hyperlipidemia, treated in outpatient geropsych clinic by Dr. Kamara. Patient brought in for worsening anxiety and SI at home. Patient no longer reporting SI but remains significantly anxious. Patient demonstrating significant functional impairment due to anxiety (helpless, cognitive distortions).     1. Dispo: continue inpatient care on 9.13 due to ongoing functional impairment; d/c to home when stable  2. Safety: No CO needed  3. Medication management:  	Psych- increase Remeron to 22.5mg qHS, continue Lexapro 15mg QD; continue klonopin 0.25mg po bid with plan to d/c tomorrow; continue Gabapentin 200mg TID for anxiety  	Medical- Atorvastatin 10mg QD,  Metoprolol tartrate 50 mg twice daily, Pantoprazole 20 mg daily (holding Losartan 100 mg daily, Hydrochlorothiazide 12.5 mg daily due to hypotension)  4. Labs: wnl  5. Dispo planning per SW pending clinical improvement        
Ms. Gross is a 74yo female, , retired (), non-caregiver, domiciled at home with her , with a prior psychiatric diagnosis of ANSON and MDD, 4 previous inpatient psychiatric hospitalizations at [facility] and most recently at [facility] from 3/28/23-4/29/23,  prior SA in 11/2019 by overdosing on pills, no history of NSSIB, no history of violence/aggression, no hx of legal issues, hx of daily marijuana use, PMHx of HTN, hyperlipidemia, treated in [facility] by Dr. Kamara. Patient brought in for worsening anxiety and SI at home. Patient no longer reporting SI but remains significantly anxious.     On assessment today, she reports improvement in leg pain and swelling. Denies drowsiness today after Gabapentin 300mg. Reports episode of anxiety yesterday evening and mildly anxious on my encounter. Reports Gabapentin helps with anxiety which continues to be a source of cognitive impairment. Denies SI/HI. Compliant with meds but still requires PRN Xanax [Received 1 dose in the past 24H 8AM - 8AM]. Continues catastrophizing and preoccupied with meds. Reports depressed mood however hopeful for the future.     PLAN:  1. No indication for CO as pt without SI or HI    2. Psych Meds:  - INCREASED Remeron from 30mg to 37.5mg qHS today  - CONTINUE Gabapentin TID dosing to - 300mg qAM, 200mg 2pm, 300mg po qHS. Pt willing for increased doses if SE of drowsiness abates.   - CONTINUE Lexapro 15mg daily  - CONTINUE Xanax 0.5mg BID prn  - CONTINUE Haldol 2mg q6H prn  - CONTINUE Zofran 8mg q8H prn  - Will consider starting Trintellix by the end of the week if pt's sxs persist  - Plan discussed with pt who is in agreement with plan of care    3. Medical Comorbidities:   - HTN, palpitations - CONTINUE Metoprolol 25mg BID. BP and HR stable on this dose.  - Leg edema - Will HOLD OFF on starting diuretic as pt reports improvement. PE + edema up to ankle, likely dependant in etiology. Encourage to elevate legs and ambulate as tolerated. If swelling worsens may consider resuming HCTZ 12.5mg   [She was previously on HCTZ 12.5mg daily  however both HCTZ 12.5mg and Losartan 100mg held due to episodes of hypotension.  Per outpatient Cardiologist, patient is on metoprolol for palpitations due to anxiety so it can be continued with hold parameters; hospitalist note appreciated - recommends compression stockings but we have been unable to obtain them].     4. Labs: none    DISPO:  -SW for DC planning once clinically stable. 
Ms. Gross is a 74yo female, , retired (), non-caregiver, domiciled at home with her , with a prior psychiatric diagnosis of ANSON and MDD, 4 previous inpatient psychiatric hospitalizations at [facility] and most recently at [facility] from 3/28/23-4/29/23,  prior SA in 11/2019 by overdosing on pills, no history of NSSIB, no history of violence/aggression, no hx of legal issues, hx of daily marijuana use, PMHx of HTN, hyperlipidemia, treated in outpatient geropsych clinic by Dr. Kamara. Patient brought in for worsening anxiety and SI at home. Patient no longer reporting SI but remains significantly anxious.     Today she reports concerns about leg pain and swelling which she claims has not improved. Is asking if she can be started on a diuretic to help her sxs. She continues to be preoccupied with concerns about numerous health conditions and demonstrates significant functional impairment due to anxiety (helpless, cognitive distortions). In the past 24H she has required 1 dose of prn Xanax 0.5mg and compliant with ATC anxiety meds.     PLAN:  1. No indication for CO. Pt without SI or HI    2. Psych Meds:  - reduce Gabapentin from 300mg TID to 300mg AM, 200mg 2pm, 300mg po qhs  - CONTINUE Lexapro 15mg daily  - CONTINUE Remeron 30mg qHS - will increase to 37.5mg po qhs tomorrow  - CONTINUE Xanax 0.5mg BID prn  - CONTINUE Haldol 2mg q6H prn  - CONTINUE Zofran 8mg q8H prn  - Given room to optimize, will likely increase Remeron dose tomorrow in light of ongoing sxs of depression  - Will consider starting Trintellix by the end of the week if pt's sxs persist  - Plan discussed with pt who is willing to try Trintellix    3. Medical Comorbidities:   - HTN, palpitations - Will decrease Metoprolol tartrate from 50mg BID to 25mg BID and consider restarting low dose diuretic if pt continues to c/o LE swelling. She was previously on HCTZ 12.5mg daily  however both CTZ 12.5mg and Losartan 100mg held due to episodes of hypotension.  Per outpatient Cardiologist, patient is on metoprolol for palpitations due to anxiety so it can be continued with hold parameters; hospitalist note appreciated - recommends compression stockings but we have been unable to obtain them. Will reduce metoprolol to 25mg po bid and add back HCTZ.  - Diarrhea - c/w low fat, nondairy diet to r/o dietary causes. f/u GI PCR panel.    4. Labs: none    DISPO:  -SW for DC planning once clinically stable.   
Patient is a 72yo female, , retired (), non-caregiver, domiciled at home with her ,  with a prior psychiatric diagnosis of ANSON and MDD, 4 previous inpatient psychiatric hospitalizations at [facility] and most recently at [facility] from 3/28/23-4/29/23,  prior SA in 11/2019 by overdosing on pills, no history of NSSIB, no history of violence/aggression, no hx of legal issues, hx of daily marijuana use, PMHx of HTN, hyperlipidemia, treated in outpatient geropsych clinic by Dr. Stovall. Patient brought in for worsening anxiety and SI at home.     On assessment on the unit, patient denies any current SI and is hopeful to find right medication regimen fro her severe anxiety.    1. Admit to [facility], 9.13  2. No CO needed  3. Medication management:  	Psych- increase Remeron to15mg QD, continue Lexapro 15mg QD, decrease klonopin to 0.5mg QD and 0.5mg QHS, continue Gabapentin 100mg TID  	Medical- Atorvastatin 10mg QD, Losartan 100 mg daily, Hydrochlorothiazide 12.5 mg daily, Metoprolol tartrate 50 mg twice daily, Pantoprazole 20 mg daily  4. Labs: wnl  5. Dispo planning per SW pending clinical improvement      
Ms. Gross is a 72yo female, , retired (), non-caregiver, domiciled at home with her , with a prior psychiatric diagnosis of ANSON and MDD, 4 previous inpatient psychiatric hospitalizations at [facility] and most recently at [facility] from 3/28/23-4/29/23,  prior SA in 11/2019 by overdosing on pills, no history of NSSIB, no history of violence/aggression, no hx of legal issues, hx of daily marijuana use, PMHx of HTN, hyperlipidemia, treated in [facility] by Dr. Kamara. Patient brought in for worsening anxiety and SI at home. Patient no longer reporting SI but remains significantly anxious.     5/25/2023: On assessment today, she denied leg pain and marked improvement in leg swelling. Denies SI/HI and anxiety at presents. Denies Gabapentin-related drowsiness and believes it helps with her mood. Remains preoccupied with thoughts of failed treatment and tentative plans if that were to happen. Remains willin to try Trintellix if indicated. Also willing to try Abilify again despite adverse effect of leg heaviness as previously reported. States will try since in controlled environment. Encouraged to work on self re-assurance as regards plan of care, as opposed seeking daily verbal reassurance from MD. States she is hopeful for the future. Compliant with meds but still requires PRN Xanax [Received 1 dose in the past 24H 8AM - 8AM]    5/26/2023: Pt was assessed today. In no acute distress. Pt denies SI/HI. Cooperative, engages, hopdeful, but still preoccupied with fear of becoming anxious. States she tried hard to reassure herself that she will be better yesterday so did not ask for PRN Xanax overnight. States she feels well but thinks she still needs reassurance from MD that she will get better. Also precoccupied with knowing if her meds would be available since it's a holiday weekend. Pt reassured and encouraged to self reassure. States she remains hopeful that she will feel better soon, but does not think she is "quite there yet."    5/30: Improved since abilify addition, tolerating well  5/31: Sustained improvement, continue abilify; wants to try decrease gabapentin due to belief it's related to diarrhea - will reduce to 200mg po tid  6/1: Some return of anxiety, will increase gabapentin back to 300mg, 200mg, 300mg. Will pursue cognitive testing prior to discharge, neuropsych team informed.    PLAN:  1. No indication for CO as pt without SI/HI    2. Psych Meds:  - CONTINUE Remeron 45mg qHS   - decrease Gabapentin to 200mg po tid   - CONTINUE Lexapro 15mg daily  - continue abilify 1mg po daily  - CONTINUE Xanax 0.5mg BID prn  - CONTINUE Haldol 2mg q6H prn  - CONTINUE Zofran 8mg q8H prn  - Plan discussed with pt who is in agreement with plan of care    3. Medical Comorbidities:   - HTN, palpitations - CONTINUE Metoprolol 25mg BID. BP and HR stable on this dose.  - Leg edema (likely dependant edema) - No indication for diuretic as pt reports improvement. Pt encourage to elevate legs and ambulate as tolerated. Will resume HCTZ 12.5mg as swelling returned and slightly hypertensive now [Pt was previously on HCTZ 12.5mg daily  however both HCTZ 12.5mg and Losartan 100mg held due to episodes of hypotension. Per outpatient Cardiologist, patient is on metoprolol for palpitations due to anxiety so it can be continued with hold parameters; hospitalist note appreciated - recommends compression stockings but we have been unable to obtain them].     4. Labs: none    DISPO:  -SW for DC planning once clinically stable. 
Ms. Gross is a 74yo female, , retired (), non-caregiver, domiciled at home with her , with a prior psychiatric diagnosis of ANSON and MDD, 4 previous inpatient psychiatric hospitalizations at [facility] and most recently at [facility] from 3/28/23-4/29/23,  prior SA in 11/2019 by overdosing on pills, no history of NSSIB, no history of violence/aggression, no hx of legal issues, hx of daily marijuana use, PMHx of HTN, hyperlipidemia, treated in [facility] by Dr. Kamara. Patient brought in for worsening anxiety and SI at home. Patient no longer reporting SI but remains significantly anxious.     On assessment today, she denied leg pain and marked improvement in leg swelling. Denies SI/HI and anxiety at presents. Denies Gabapentin-related drowsiness and believes it helps with her mood. Remains preoccupied with thoughts of failed treatment and tentative plans if that were to happen. Remains willin to try Trintellix if indicated. Also willing to try Abilify again despite adverse effect of leg heaviness as previously reported. States will try since in controlled environment. Encouraged to work on self re-assurance as regards plan of care, as opposed seeking daily verbal reassurance from MD. States she is hopeful for the future. Compliant with meds but still requires PRN Xanax [Received 1 dose in the past 24H 8AM - 8AM]    PLAN:  1. No indication for CO as pt without SI/HI    2. Psych Meds:  - CONTINUE Remeron 37.5mg qHS for mood  - CONTINUE Gabapentin TID dosing to - 300mg qAM, 200mg 2pm, 300mg po qHS. Pt willing for increased doses if SE of drowsiness abates.   - CONTINUE Lexapro 15mg daily  - CONTINUE Xanax 0.5mg BID prn  - CONTINUE Haldol 2mg q6H prn  - CONTINUE Zofran 8mg q8H prn  - Conversations around Trintellix have been discussed with pt's if sxs persist.  - May also consider Abilify or other antipsychotic with close monitoring if sxs persist.  - Plan discussed with pt who is in agreement with plan of care    3. Medical Comorbidities:   - HTN, palpitations - CONTINUE Metoprolol 25mg BID. BP and HR stable on this dose.  - Leg edema - Will HOLD OFF on starting diuretic as pt reports improvement. PE imprved ankle edema, likely dependant in etiology. Encourage to elevate legs and ambulate as tolerated. If swelling worsens may consider resuming HCTZ 12.5mg   [She was previously on HCTZ 12.5mg daily  however both HCTZ 12.5mg and Losartan 100mg held due to episodes of hypotension.  Per outpatient Cardiologist, patient is on metoprolol for palpitations due to anxiety so it can be continued with hold parameters; hospitalist note appreciated - recommends compression stockings but we have been unable to obtain them].     4. Labs: none    DISPO:  -SW for DC planning once clinically stable. 
Ms. Gross is a 74yo female, , retired (), non-caregiver, domiciled at home with her , with a prior psychiatric diagnosis of ANSON and MDD, 4 previous inpatient psychiatric hospitalizations at [facility] and most recently at [facility] from 3/28/23-4/29/23,  prior SA in 11/2019 by overdosing on pills, no history of NSSIB, no history of violence/aggression, no hx of legal issues, hx of daily marijuana use, PMHx of HTN, hyperlipidemia, treated in [facility] by Dr. Kamara. Patient brought in for worsening anxiety and SI at home. Patient no longer reporting SI but remains significantly anxious.     5/25/2023: On assessment today, she denied leg pain and marked improvement in leg swelling. Denies SI/HI and anxiety at presents. Denies Gabapentin-related drowsiness and believes it helps with her mood. Remains preoccupied with thoughts of failed treatment and tentative plans if that were to happen. Remains willin to try Trintellix if indicated. Also willing to try Abilify again despite adverse effect of leg heaviness as previously reported. States will try since in controlled environment. Encouraged to work on self re-assurance as regards plan of care, as opposed seeking daily verbal reassurance from MD. States she is hopeful for the future. Compliant with meds but still requires PRN Xanax [Received 1 dose in the past 24H 8AM - 8AM]    5/26/2023: Pt was assessed today. In no acute distress. Pt denies SI/HI. Cooperative, engages, hopdeful, but still preoccupied with fear of becoming anxious. States she tried hard to reassure herself that she will be better yesterday so did not ask for PRN Xanax overnight. States she feels well but thinks she still needs reassurance from MD that she will get better. Also precoccupied with knowing if her meds would be available since it's a holiday weekend. Pt reassured and encouraged to self reassure. States she remains hopeful that she will feel better soon, but does not think she is "quite there yet."    5/30: Improved since abilify addition, tolerating well  5/31: Sustained improvement, continue abilify; wants to try decrease gabapentin due to belief it's related to diarrhea - will reduce to 200mg po tid  6/1: Some return of anxiety, will increase gabapentin back to 300mg, 200mg, 300mg. Will pursue cognitive testing prior to discharge, neuropsych team informed.  6/2: Improved, likely d/c Tuesday 6/5: anxiety stabilized but possibly having side effects of abilify vs mirtazapine/lexapro combination - decrease lexapro to 10mg po daily and start cogentin 1mg po bid as lower doses were not helpful; r/o psychosomaticism  6/6: abnormal involuntary movements noted, labs wnl, hold abilify and continue decreased lexapro dose of 10mg po daily and continue mirtazapine 45mg po qhs  6/7: myoclonus appears reduced, will continue holding abilify and reduced dose of lexapro  6/8: little to no myoclonus, continue current medications    PLAN:  1. No indication for CO as pt without SI/HI    2. Psych Meds:  - CONTINUE Remeron 45mg qHS   - continue Gabapentin 200mg po tid   - CONTINUE Lexapro 10mg daily  - hold abilify 1mg po daily  - CONTINUE Xanax 0.5mg BID prn  - CONTINUE Haldol 2mg q6H prn  - CONTINUE Zofran 8mg q8H prn  - Plan discussed with pt who is in agreement with plan of care    3. Medical Comorbidities:   - HTN, palpitations - CONTINUE Metoprolol 25mg BID. BP and HR stable on this dose.  - Leg edema (likely dependant edema) - No indication for diuretic as pt reports improvement. Pt encourage to elevate legs and ambulate as tolerated. Will resume HCTZ 12.5mg as swelling returned and slightly hypertensive now [Pt was previously on HCTZ 12.5mg daily  however both HCTZ 12.5mg and Losartan 100mg held due to episodes of hypotension. Per outpatient Cardiologist, patient is on metoprolol for palpitations due to anxiety so it can be continued with hold parameters; hospitalist note appreciated - recommends compression stockings but we have been unable to obtain them].   - myoclonus: neurology consult pending   4. Labs: none    DISPO:  - for DC planning once clinically stable. 
Ms. Gross is a 74yo female, , retired (), non-caregiver, domiciled at home with her , with a prior psychiatric diagnosis of ANSON and MDD, 4 previous inpatient psychiatric hospitalizations at [facility] and most recently at [facility] from 3/28/23-4/29/23,  prior SA in 11/2019 by overdosing on pills, no history of NSSIB, no history of violence/aggression, no hx of legal issues, hx of daily marijuana use, PMHx of HTN, hyperlipidemia, treated in [facility] by Dr. Kamara. Patient brought in for worsening anxiety and SI at home. Patient no longer reporting SI but remains significantly anxious.     5/25/2023: On assessment today, she denied leg pain and marked improvement in leg swelling. Denies SI/HI and anxiety at presents. Denies Gabapentin-related drowsiness and believes it helps with her mood. Remains preoccupied with thoughts of failed treatment and tentative plans if that were to happen. Remains willin to try Trintellix if indicated. Also willing to try Abilify again despite adverse effect of leg heaviness as previously reported. States will try since in controlled environment. Encouraged to work on self re-assurance as regards plan of care, as opposed seeking daily verbal reassurance from MD. States she is hopeful for the future. Compliant with meds but still requires PRN Xanax [Received 1 dose in the past 24H 8AM - 8AM]    5/26/2023: Pt was assessed today. In no acute distress. Pt denies SI/HI. Cooperative, engages, hopdeful, but still preoccupied with fear of becoming anxious. States she tried hard to reassure herself that she will be better yesterday so did not ask for PRN Xanax overnight. States she feels well but thinks she still needs reassurance from MD that she will get better. Also precoccupied with knowing if her meds would be available since it's a holiday weekend. Pt reassured and encouraged to self reassure. States she remains hopeful that she will feel better soon, but does not think she is "quite there yet."    5/30: Improved since abilify addition, tolerating well  5/31: Sustained improvement, continue abilify; wants to try decrease gabapentin due to belief it's related to diarrhea - will reduce to 200mg po tid    PLAN:  1. No indication for CO as pt without SI/HI    2. Psych Meds:  - CONTINUE Remeron 45mg qHS   - decrease Gabapentin to 200mg po tid   - CONTINUE Lexapro 15mg daily  - continue abilify 1mg po daily  - CONTINUE Xanax 0.5mg BID prn  - CONTINUE Haldol 2mg q6H prn  - CONTINUE Zofran 8mg q8H prn  - Plan discussed with pt who is in agreement with plan of care    3. Medical Comorbidities:   - HTN, palpitations - CONTINUE Metoprolol 25mg BID. BP and HR stable on this dose.  - Leg edema (likely dependant edema) - No indication for diuretic as pt reports improvement. Pt encourage to elevate legs and ambulate as tolerated. Will resume HCTZ 12.5mg as swelling returned and slightly hypertensive now [Pt was previously on HCTZ 12.5mg daily  however both HCTZ 12.5mg and Losartan 100mg held due to episodes of hypotension. Per outpatient Cardiologist, patient is on metoprolol for palpitations due to anxiety so it can be continued with hold parameters; hospitalist note appreciated - recommends compression stockings but we have been unable to obtain them].     4. Labs: none    DISPO:  -SW for DC planning once clinically stable. 
Ms. Gross is a 72yo female, , retired (), non-caregiver, domiciled at home with her , with a prior psychiatric diagnosis of ANSON and MDD, 4 previous inpatient psychiatric hospitalizations at [facility] and most recently at [facility] from 3/28/23-4/29/23,  prior SA in 11/2019 by overdosing on pills, no history of NSSIB, no history of violence/aggression, no hx of legal issues, hx of daily marijuana use, PMHx of HTN, hyperlipidemia, treated in [facility] by Dr. Kamara. Patient brought in for worsening anxiety and SI at home. Patient no longer reporting SI but remains significantly anxious.     5/25/2023: On assessment today, she denied leg pain and marked improvement in leg swelling. Denies SI/HI and anxiety at presents. Denies Gabapentin-related drowsiness and believes it helps with her mood. Remains preoccupied with thoughts of failed treatment and tentative plans if that were to happen. Remains willin to try Trintellix if indicated. Also willing to try Abilify again despite adverse effect of leg heaviness as previously reported. States will try since in controlled environment. Encouraged to work on self re-assurance as regards plan of care, as opposed seeking daily verbal reassurance from MD. States she is hopeful for the future. Compliant with meds but still requires PRN Xanax [Received 1 dose in the past 24H 8AM - 8AM]    5/26/2023: Pt was assessed today. In no acute distress. Pt denies SI/HI. Cooperative, engages, hopdeful, but still preoccupied with fear of becoming anxious. States she tried hard to reassure herself that she will be better yesterday so did not ask for PRN Xanax overnight. States she feels well but thinks she still needs reassurance from MD that she will get better. Also precoccupied with knowing if her meds would be available since it's a holiday weekend. Pt reassured and encouraged to self reassure. States she remains hopeful that she will feel better soon, but does not think she is "quite there yet."    5/30: Improved since abilify addition, tolerating well  5/31: Sustained improvement, continue abilify; wants to try decrease gabapentin due to belief it's related to diarrhea - will reduce to 200mg po tid  6/1: Some return of anxiety, will increase gabapentin back to 300mg, 200mg, 300mg. Will pursue cognitive testing prior to discharge, neuropsych team informed.  6/2: Improved, likely d/c Tuesday 6/4: Patient with somatic anxiety, no suicidal ideations, plan for likely discharge Tuesday    PLAN:  1. No indication for CO as pt without SI/HI    2. Psych Meds:  - CONTINUE Remeron 45mg qHS   - decrease Gabapentin to 200mg po tid   - CONTINUE Lexapro 15mg daily  - continue abilify 1mg po daily  - CONTINUE Xanax 0.5mg BID prn  - CONTINUE Haldol 2mg q6H prn  - CONTINUE Zofran 8mg q8H prn  - Plan discussed with pt who is in agreement with plan of care    3. Medical Comorbidities:   - HTN, palpitations - CONTINUE Metoprolol 25mg BID. BP and HR stable on this dose.  - Leg edema (likely dependant edema) - No indication for diuretic as pt reports improvement. Pt encourage to elevate legs and ambulate as tolerated. Will resume HCTZ 12.5mg as swelling returned and slightly hypertensive now [Pt was previously on HCTZ 12.5mg daily  however both HCTZ 12.5mg and Losartan 100mg held due to episodes of hypotension. Per outpatient Cardiologist, patient is on metoprolol for palpitations due to anxiety so it can be continued with hold parameters; hospitalist note appreciated - recommends compression stockings but we have been unable to obtain them].     4. Labs: none    DISPO:  -SW for DC planning once clinically stable. 
Ms. Gross is a 72yo female, , retired (), non-caregiver, domiciled at home with her , with a prior psychiatric diagnosis of ANSON and MDD, 4 previous inpatient psychiatric hospitalizations at [facility] and most recently at [facility] from 3/28/23-4/29/23,  prior SA in 11/2019 by overdosing on pills, no history of NSSIB, no history of violence/aggression, no hx of legal issues, hx of daily marijuana use, PMHx of HTN, hyperlipidemia, treated in [facility] by Dr. Kamara. Patient brought in for worsening anxiety and SI at home. Patient no longer reporting SI but remains significantly anxious.     5/25/2023: On assessment today, she denied leg pain and marked improvement in leg swelling. Denies SI/HI and anxiety at presents. Denies Gabapentin-related drowsiness and believes it helps with her mood. Remains preoccupied with thoughts of failed treatment and tentative plans if that were to happen. Remains willin to try Trintellix if indicated. Also willing to try Abilify again despite adverse effect of leg heaviness as previously reported. States will try since in controlled environment. Encouraged to work on self re-assurance as regards plan of care, as opposed seeking daily verbal reassurance from MD. States she is hopeful for the future. Compliant with meds but still requires PRN Xanax [Received 1 dose in the past 24H 8AM - 8AM]    5/26/2023: Pt was assessed today. In no acute distress. Pt denies SI/HI. Cooperative, engages, hopdeful, but still preoccupied with fear of becoming anxious. States she tried hard to reassure herself that she will be better yesterday so did not ask for PRN Xanax overnight. States she feels well but thinks she still needs reassurance from MD that she will get better. Also precoccupied with knowing if her meds would be available since it's a holiday weekend. Pt reassured and encouraged to self reassure. States she remains hopeful that she will feel better soon, but does not think she is "quite there yet."    5/30: Improved since abilify addition, tolerating well  5/31: Sustained improvement, continue abilify; wants to try decrease gabapentin due to belief it's related to diarrhea - will reduce to 200mg po tid  6/1: Some return of anxiety, will increase gabapentin back to 300mg, 200mg, 300mg. Will pursue cognitive testing prior to discharge, neuropsych team informed.  6/2: Improved, likely d/c Tuesday 6/5: anxiety stabilized but possibly having side effects of abilify vs mirtazapine/lexapro combination - decrease lexapro to 10mg po daily and start cogentin 1mg po bid as lower doses were not helpful; r/o psychosomaticism    PLAN:  1. No indication for CO as pt without SI/HI    2. Psych Meds:  - CONTINUE Remeron 45mg qHS   - decrease Gabapentin to 200mg po tid   - CONTINUE Lexapro 15mg daily  - continue abilify 1mg po daily  - CONTINUE Xanax 0.5mg BID prn  - CONTINUE Haldol 2mg q6H prn  - CONTINUE Zofran 8mg q8H prn  - Plan discussed with pt who is in agreement with plan of care    3. Medical Comorbidities:   - HTN, palpitations - CONTINUE Metoprolol 25mg BID. BP and HR stable on this dose.  - Leg edema (likely dependant edema) - No indication for diuretic as pt reports improvement. Pt encourage to elevate legs and ambulate as tolerated. Will resume HCTZ 12.5mg as swelling returned and slightly hypertensive now [Pt was previously on HCTZ 12.5mg daily  however both HCTZ 12.5mg and Losartan 100mg held due to episodes of hypotension. Per outpatient Cardiologist, patient is on metoprolol for palpitations due to anxiety so it can be continued with hold parameters; hospitalist note appreciated - recommends compression stockings but we have been unable to obtain them].     4. Labs: none    DISPO:  -SW for DC planning once clinically stable. 
Ms. Gross is a 74yo female, , retired (), non-caregiver, domiciled at home with her , with a prior psychiatric diagnosis of ANSON and MDD, 4 previous inpatient psychiatric hospitalizations at [facility] and most recently at [facility] from 3/28/23-4/29/23,  prior SA in 11/2019 by overdosing on pills, no history of NSSIB, no history of violence/aggression, no hx of legal issues, hx of daily marijuana use, PMHx of HTN, hyperlipidemia, treated in [facility] by Dr. Kamara. Patient brought in for worsening anxiety and SI at home. Patient no longer reporting SI but remains significantly anxious.     5/25/2023: On assessment today, she denied leg pain and marked improvement in leg swelling. Denies SI/HI and anxiety at presents. Denies Gabapentin-related drowsiness and believes it helps with her mood. Remains preoccupied with thoughts of failed treatment and tentative plans if that were to happen. Remains willin to try Trintellix if indicated. Also willing to try Abilify again despite adverse effect of leg heaviness as previously reported. States will try since in controlled environment. Encouraged to work on self re-assurance as regards plan of care, as opposed seeking daily verbal reassurance from MD. States she is hopeful for the future. Compliant with meds but still requires PRN Xanax [Received 1 dose in the past 24H 8AM - 8AM]    5/26/2023: Pt was assessed today. In no acute distress. Pt denies SI/HI. Cooperative, engages, hopdeful, but still preoccupied with fear of becoming anxious. States she tried hard to reassure herself that she will be better yesterday so did not ask for PRN Xanax overnight. States she feels well but thinks she still needs reassurance from MD that she will get better. Also precoccupied with knowing if her meds would be available since it's a holiday weekend. Pt reassured and encouraged to self reassure. States she remains hopeful that she will feel better soon, but does not think she is "quite there yet."    PLAN:  1. No indication for CO as pt without SI/HI    2. Psych Meds:  - CONTINUE Remeron 37.5mg qHS for 2 more days  - INCREASE Remeron to 45mg qHS on Sunday, 5/28/23  - CONTINUE Gabapentin TID - 300mg qAM, 200mg 2pm, 300mg po qHS. Pt willing for increased dose if indicated   - CONTINUE Lexapro 15mg daily  - CONTINUE Xanax 0.5mg BID prn  - CONTINUE Haldol 2mg q6H prn  - CONTINUE Zofran 8mg q8H prn  - Conversations around Trintellix have been discussed with pt's if sxs persist.  - May also consider Abilify or other antipsychotic with close monitoring if sxs persist.  - Plan discussed with pt who is in agreement with plan of care    3. Medical Comorbidities:   - HTN, palpitations - CONTINUE Metoprolol 25mg BID. BP and HR stable on this dose.  - Leg edema (likely dependant edema) - No indication for diuretic as pt reports improvement. Pt encourage to elevate legs and ambulate as tolerated. If swelling worsens may consider resuming HCTZ 12.5mg [Pt was previously on HCTZ 12.5mg daily  however both HCTZ 12.5mg and Losartan 100mg held due to episodes of hypotension. Per outpatient Cardiologist, patient is on metoprolol for palpitations due to anxiety so it can be continued with hold parameters; hospitalist note appreciated - recommends compression stockings but we have been unable to obtain them].     4. Labs: none    DISPO:  -JOANN for DC planning once clinically stable. 
Patient is a 72yo female, , retired (), non-caregiver, domiciled at home with her ,  with a prior psychiatric diagnosis of ANSON and MDD, 4 previous inpatient psychiatric hospitalizations at [facility] and most recently at [facility] from 3/28/23-4/29/23,  prior SA in 11/2019 by overdosing on pills, no history of NSSIB, no history of violence/aggression, no hx of legal issues, hx of daily marijuana use, PMHx of HTN, hyperlipidemia, treated in outpatient geropsych clinic by Dr. Kamara. Patient brought in for worsening anxiety and SI at home. Patient no longer reporting SI but remains significantly anxious. Patient demonstrating significant functional impairment due to anxiety (helpless, cognitive distortions).     1. Dispo: continue inpatient care on 9.13 due to ongoing functional impairment; d/c to home when stable  2. Safety: No CO needed  3. Medication management:  	Psych- continue Remeron 22.5mg qHS, continue Lexapro 15mg QD; discontinued klonopin 0.25mg po bid after last night's dose; continue Gabapentin 200mg TID for anxiety  	Medical- Atorvastatin 10mg QD,  Metoprolol tartrate 50 mg twice daily, Pantoprazole 20 mg daily (holding Losartan 100 mg daily, Hydrochlorothiazide 12.5 mg daily due to hypotension); per outpatient cardiologist, patient is on metoprolol for palpitations due to anxiety so it can be continued with hold parameters; hospitalist note appreciated - recommends compression stockings  	- diarrhea returned - change diet to low fat, nondairy to rule out food causes  4. Labs: check GI PCR  5. Dispo planning per  pending clinical improvement        
Ms. Gross is a 72yo female, , retired (), non-caregiver, domiciled at home with her , with a prior psychiatric diagnosis of ANSON and MDD, 4 previous inpatient psychiatric hospitalizations at [facility] and most recently at [facility] from 3/28/23-4/29/23,  prior SA in 11/2019 by overdosing on pills, no history of NSSIB, no history of violence/aggression, no hx of legal issues, hx of daily marijuana use, PMHx of HTN, hyperlipidemia, treated in [facility] by Dr. Kamara. Patient brought in for worsening anxiety and SI at home. Patient no longer reporting SI but remains significantly anxious.     5/25/2023: On assessment today, she denied leg pain and marked improvement in leg swelling. Denies SI/HI and anxiety at presents. Denies Gabapentin-related drowsiness and believes it helps with her mood. Remains preoccupied with thoughts of failed treatment and tentative plans if that were to happen. Remains willin to try Trintellix if indicated. Also willing to try Abilify again despite adverse effect of leg heaviness as previously reported. States will try since in controlled environment. Encouraged to work on self re-assurance as regards plan of care, as opposed seeking daily verbal reassurance from MD. States she is hopeful for the future. Compliant with meds but still requires PRN Xanax [Received 1 dose in the past 24H 8AM - 8AM]    5/26/2023: Pt was assessed today. In no acute distress. Pt denies SI/HI. Cooperative, engages, hopdeful, but still preoccupied with fear of becoming anxious. States she tried hard to reassure herself that she will be better yesterday so did not ask for PRN Xanax overnight. States she feels well but thinks she still needs reassurance from MD that she will get better. Also precoccupied with knowing if her meds would be available since it's a holiday weekend. Pt reassured and encouraged to self reassure. States she remains hopeful that she will feel better soon, but does not think she is "quite there yet."    5/30: Improved since abilify addition, tolerating well  5/31: Sustained improvement, continue abilify; wants to try decrease gabapentin due to belief it's related to diarrhea - will reduce to 200mg po tid  6/1: Some return of anxiety, will increase gabapentin back to 300mg, 200mg, 300mg. Will pursue cognitive testing prior to discharge, neuropsych team informed.  6/2: Improved, likely d/c Tuesday 6/5: anxiety stabilized but possibly having side effects of abilify vs mirtazapine/lexapro combination - decrease lexapro to 10mg po daily and start cogentin 1mg po bid as lower doses were not helpful; r/o psychosomaticism  6/6: abnormal involuntary movements noted, labs wnl, hold abilify and continue decreased lexapro dose of 10mg po daily and continue mirtazapine 45mg po qhs  6/7: myoclonus appears reduced, will continue holding abilify and reduced dose of lexapro    PLAN:  1. No indication for CO as pt without SI/HI    2. Psych Meds:  - CONTINUE Remeron 45mg qHS   - continue Gabapentin 200mg po tid   - CONTINUE Lexapro 10mg daily  - hold abilify 1mg po daily  - CONTINUE Xanax 0.5mg BID prn  - CONTINUE Haldol 2mg q6H prn  - CONTINUE Zofran 8mg q8H prn  - Plan discussed with pt who is in agreement with plan of care    3. Medical Comorbidities:   - HTN, palpitations - CONTINUE Metoprolol 25mg BID. BP and HR stable on this dose.  - Leg edema (likely dependant edema) - No indication for diuretic as pt reports improvement. Pt encourage to elevate legs and ambulate as tolerated. Will resume HCTZ 12.5mg as swelling returned and slightly hypertensive now [Pt was previously on HCTZ 12.5mg daily  however both HCTZ 12.5mg and Losartan 100mg held due to episodes of hypotension. Per outpatient Cardiologist, patient is on metoprolol for palpitations due to anxiety so it can be continued with hold parameters; hospitalist note appreciated - recommends compression stockings but we have been unable to obtain them].     4. Labs: none    DISPO:  - for DC planning once clinically stable. 
Ms. Gross is a 74yo female, , retired (), non-caregiver, domiciled at home with her , with a prior psychiatric diagnosis of ANSON and MDD, 4 previous inpatient psychiatric hospitalizations at [facility] and most recently at [facility] from 3/28/23-4/29/23,  prior SA in 11/2019 by overdosing on pills, no history of NSSIB, no history of violence/aggression, no hx of legal issues, hx of daily marijuana use, PMHx of HTN, hyperlipidemia, treated in [facility] by Dr. Kamara. Patient brought in for worsening anxiety and SI at home. Patient no longer reporting SI but remains significantly anxious.     5/25/2023: On assessment today, she denied leg pain and marked improvement in leg swelling. Denies SI/HI and anxiety at presents. Denies Gabapentin-related drowsiness and believes it helps with her mood. Remains preoccupied with thoughts of failed treatment and tentative plans if that were to happen. Remains willin to try Trintellix if indicated. Also willing to try Abilify again despite adverse effect of leg heaviness as previously reported. States will try since in controlled environment. Encouraged to work on self re-assurance as regards plan of care, as opposed seeking daily verbal reassurance from MD. States she is hopeful for the future. Compliant with meds but still requires PRN Xanax [Received 1 dose in the past 24H 8AM - 8AM]    5/26/2023: Pt was assessed today. In no acute distress. Pt denies SI/HI. Cooperative, engages, hopdeful, but still preoccupied with fear of becoming anxious. States she tried hard to reassure herself that she will be better yesterday so did not ask for PRN Xanax overnight. States she feels well but thinks she still needs reassurance from MD that she will get better. Also precoccupied with knowing if her meds would be available since it's a holiday weekend. Pt reassured and encouraged to self reassure. States she remains hopeful that she will feel better soon, but does not think she is "quite there yet."    5/30: Improved since abilify addition, tolerating well  5/31: Sustained improvement, continue abilify; wants to try decrease gabapentin due to belief it's related to diarrhea - will reduce to 200mg po tid  6/1: Some return of anxiety, will increase gabapentin back to 300mg, 200mg, 300mg. Will pursue cognitive testing prior to discharge, neuropsych team informed.  6/2: Improved, likely d/c Tuesday 6/5: anxiety stabilized but possibly having side effects of abilify vs mirtazapine/lexapro combination - decrease lexapro to 10mg po daily and start cogentin 1mg po bid as lower doses were not helpful; r/o psychosomaticism  6/6: abnormal involuntary movements noted, labs wnl, hold abilify and continue decreased lexapro dose of 10mg po daily and continue mirtazapine 45mg po qhs  6/7: myoclonus appears reduced, will continue holding abilify and reduced dose of lexapro  6/8: little to no myoclonus, continue current medications  6/9: myoclonus resolved, no return of significant anxiety after abilify d/c due to adverse effects    PLAN:  1. No indication for CO as pt without SI/HI    2. Psych Meds:  - CONTINUE Remeron 45mg qHS   - continue Gabapentin tid - 300mg, 200mg, 300mg   - CONTINUE Lexapro 10mg daily  - hold abilify 1mg po daily  - CONTINUE Xanax 0.5mg BID prn - not taking more than once daily  - CONTINUE Haldol 2mg q6H prn  - CONTINUE Zofran 8mg q8H prn  - Plan discussed with pt who is in agreement with plan of care    3. Medical Comorbidities:   - HTN, palpitations - CONTINUE Metoprolol 25mg BID. BP and HR stable on this dose.  - Leg edema (likely dependant edema) - improved after resuming HCTZ 12.5mg daily   - myoclonus: neurology consult appreciate - likely drug induced, now largely resolved    4. Labs: none    DISPO:  - likely discharge Monday
Patient is a 72yo female, , retired (), non-caregiver, domiciled at home with her ,  with a prior psychiatric diagnosis of ANSON and MDD, 4 previous inpatient psychiatric hospitalizations at [facility] and most recently at [facility] from 3/28/23-4/29/23,  prior SA in 11/2019 by overdosing on pills, no history of NSSIB, no history of violence/aggression, no hx of legal issues, hx of daily marijuana use, PMHx of HTN, hyperlipidemia, treated in outpatient geropsych clinic by Dr. Stovall. Patient brought in for worsening anxiety and SI at home.     On assessment on the unit, patient denies any current SI and is hopeful to find right medication regimen fro her severe anxiety.    1. Dispo: continue inpatient care on 9.13; d/c to home when stable  2. Safety: No CO needed  3. Medication management:  	Psych- continue Remeron 15mg QD, continue Lexapro 15mg QD, continue klonopin 0.5mg po bid with plan to taper off completely, continue Gabapentin 100mg TID  	Medical- Atorvastatin 10mg QD, Losartan 100 mg daily, Hydrochlorothiazide 12.5 mg daily, Metoprolol tartrate 50 mg twice daily, Pantoprazole 20 mg daily  4. Labs: wnl  5. Dispo planning per JOANN pending clinical improvement      
Ms. Gross is a 72yo female, , retired (), non-caregiver, domiciled at home with her , with a prior psychiatric diagnosis of ANSON and MDD, 4 previous inpatient psychiatric hospitalizations at [facility] and most recently at [facility] from 3/28/23-4/29/23,  prior SA in 11/2019 by overdosing on pills, no history of NSSIB, no history of violence/aggression, no hx of legal issues, hx of daily marijuana use, PMHx of HTN, hyperlipidemia, treated in [facility] by Dr. Kamara. Patient brought in for worsening anxiety and SI at home. Patient no longer reporting SI but remains significantly anxious.     5/25/2023: On assessment today, she denied leg pain and marked improvement in leg swelling. Denies SI/HI and anxiety at presents. Denies Gabapentin-related drowsiness and believes it helps with her mood. Remains preoccupied with thoughts of failed treatment and tentative plans if that were to happen. Remains willin to try Trintellix if indicated. Also willing to try Abilify again despite adverse effect of leg heaviness as previously reported. States will try since in controlled environment. Encouraged to work on self re-assurance as regards plan of care, as opposed seeking daily verbal reassurance from MD. States she is hopeful for the future. Compliant with meds but still requires PRN Xanax [Received 1 dose in the past 24H 8AM - 8AM]    5/26/2023: Pt was assessed today. In no acute distress. Pt denies SI/HI. Cooperative, engages, hopdeful, but still preoccupied with fear of becoming anxious. States she tried hard to reassure herself that she will be better yesterday so did not ask for PRN Xanax overnight. States she feels well but thinks she still needs reassurance from MD that she will get better. Also precoccupied with knowing if her meds would be available since it's a holiday weekend. Pt reassured and encouraged to self reassure. States she remains hopeful that she will feel better soon, but does not think she is "quite there yet."    5/30: Improved since abilify addition, tolerating well  5/31: Sustained improvement, continue abilify; wants to try decrease gabapentin due to belief it's related to diarrhea - will reduce to 200mg po tid  6/1: Some return of anxiety, will increase gabapentin back to 300mg, 200mg, 300mg. Will pursue cognitive testing prior to discharge, neuropsych team informed.  6/2: Improved, likely d/c Tuesday 6/5: anxiety stabilized but possibly having side effects of abilify vs mirtazapine/lexapro combination - decrease lexapro to 10mg po daily and start cogentin 1mg po bid as lower doses were not helpful; r/o psychosomaticism  6/6: abnormal involuntary movements noted, labs wnl, hold abilify and continue decreased lexapro dose of 10mg po daily and continue mirtazapine 45mg po qhs  6/7: myoclonus appears reduced, will continue holding abilify and reduced dose of lexapro  6/8: little to no myoclonus, continue current medications  6/9: myoclonus resolved, no return of significant anxiety after abilify d/c due to adverse effects  6/11 Overall improved, no SI , episode of anxiety discussed with patient that some upswing anxiety just prior to d/c is inot uncommon not an indicator she wont do well patient found this reassuring    PLAN:  1. No indication for CO as pt without SI/HI    2. Psych Meds:  - CONTINUE Remeron 45mg qHS   - continue Gabapentin tid - 300mg, 200mg, 300mg   - CONTINUE Lexapro 10mg daily  - hold abilify 1mg po daily  - CONTINUE Xanax 0.5mg BID prn - not taking more than once daily  - CONTINUE Haldol 2mg q6H prn  - CONTINUE Zofran 8mg q8H prn  - Plan discussed with pt who is in agreement with plan of care    3. Medical Comorbidities:   - HTN, palpitations - CONTINUE Metoprolol 25mg BID. BP and HR stable on this dose.  - Leg edema (likely dependant edema) - improved after resuming HCTZ 12.5mg daily   - myoclonus: neurology consult appreciate - likely drug induced, now largely resolved    4. Labs: none    DISPO:  - likely discharge Monday
Ms. Gross is a 74yo female, , retired (), non-caregiver, domiciled at home with her , with a prior psychiatric diagnosis of ANSON and MDD, 4 previous inpatient psychiatric hospitalizations at [facility] and most recently at [facility] from 3/28/23-4/29/23,  prior SA in 11/2019 by overdosing on pills, no history of NSSIB, no history of violence/aggression, no hx of legal issues, hx of daily marijuana use, PMHx of HTN, hyperlipidemia, treated in [facility] by Dr. Kamara. Patient brought in for worsening anxiety and SI at home. Patient no longer reporting SI but remains significantly anxious.     5/25/2023: On assessment today, she denied leg pain and marked improvement in leg swelling. Denies SI/HI and anxiety at presents. Denies Gabapentin-related drowsiness and believes it helps with her mood. Remains preoccupied with thoughts of failed treatment and tentative plans if that were to happen. Remains willin to try Trintellix if indicated. Also willing to try Abilify again despite adverse effect of leg heaviness as previously reported. States will try since in controlled environment. Encouraged to work on self re-assurance as regards plan of care, as opposed seeking daily verbal reassurance from MD. States she is hopeful for the future. Compliant with meds but still requires PRN Xanax [Received 1 dose in the past 24H 8AM - 8AM]    5/26/2023: Pt was assessed today. In no acute distress. Pt denies SI/HI. Cooperative, engages, hopdeful, but still preoccupied with fear of becoming anxious. States she tried hard to reassure herself that she will be better yesterday so did not ask for PRN Xanax overnight. States she feels well but thinks she still needs reassurance from MD that she will get better. Also precoccupied with knowing if her meds would be available since it's a holiday weekend. Pt reassured and encouraged to self reassure. States she remains hopeful that she will feel better soon, but does not think she is "quite there yet."    5/30: Improved since abilify addition, tolerating well    PLAN:  1. No indication for CO as pt without SI/HI    2. Psych Meds:  - CONTINUE Remeron 45mg qHS   - CONTINUE Gabapentin TID - 300mg qAM, 200mg 2pm, 300mg po qHS. Pt willing for increased dose if indicated   - CONTINUE Lexapro 15mg daily  - continue abilify 1mg po daily  - CONTINUE Xanax 0.5mg BID prn  - CONTINUE Haldol 2mg q6H prn  - CONTINUE Zofran 8mg q8H prn  - Plan discussed with pt who is in agreement with plan of care    3. Medical Comorbidities:   - HTN, palpitations - CONTINUE Metoprolol 25mg BID. BP and HR stable on this dose.  - Leg edema (likely dependant edema) - No indication for diuretic as pt reports improvement. Pt encourage to elevate legs and ambulate as tolerated. Will resume HCTZ 12.5mg as swelling returned and slightly hypertensive now [Pt was previously on HCTZ 12.5mg daily  however both HCTZ 12.5mg and Losartan 100mg held due to episodes of hypotension. Per outpatient Cardiologist, patient is on metoprolol for palpitations due to anxiety so it can be continued with hold parameters; hospitalist note appreciated - recommends compression stockings but we have been unable to obtain them].     4. Labs: none    DISPO:  -SW for DC planning once clinically stable.

## 2023-06-11 NOTE — BH INPATIENT PSYCHIATRY PROGRESS NOTE - NSBHCONTPROVIDER_PSY_ALL_CORE
No...

## 2023-06-11 NOTE — BH INPATIENT PSYCHIATRY PROGRESS NOTE - NSBHROSSYSTEMS_PSY_ALL_CORE
Psychiatric
Musculoskeletal.../Psychiatric
Psychiatric
Musculoskeletal.../Psychiatric
Psychiatric

## 2023-06-11 NOTE — BH INPATIENT PSYCHIATRY PROGRESS NOTE - NSTXDCOTHRINTERMD_PSY_ALL_CORE
Recommend appropriate aftercare modality when more stable

## 2023-06-11 NOTE — BH INPATIENT PSYCHIATRY PROGRESS NOTE - NSBHFUPINTERVALCCFT_PSY_A_CORE
"I'm anxious and so is my roommate"
anxiety
Anxiety
"I have twitches in my body, like a movement disorder from the meds
anxiety
anxiety, abnormal movements
Anxiety
Patient reprts brief anxiety attack after speaking with friend on phone, responding to prn
anxiety
anxiety
"I'm concerned about my leg pain and swelling"
anxiety
anxiety, depressed mood
anxiety
patient denies dizziness, weakness
anxiety
Anxiety
anxiety improving

## 2023-06-11 NOTE — BH INPATIENT PSYCHIATRY PROGRESS NOTE - NSTXANXPROGRES_PSY_ALL_CORE
Improving
No Change
Improving
No Change
Improving
Met - goal discontinued
Met - goal discontinued

## 2023-06-11 NOTE — BH INPATIENT PSYCHIATRY PROGRESS NOTE - MSE OPTIONS
Unstructured MSE

## 2023-06-11 NOTE — BH INPATIENT PSYCHIATRY PROGRESS NOTE - NSTXSUICIDINTERMD_PSY_ALL_CORE
Medication titration

## 2023-06-11 NOTE — BH INPATIENT PSYCHIATRY PROGRESS NOTE - NSTXDCOTHRDATEEST_PSY_ALL_CORE
11-May-2023
07-Jun-2023
11-May-2023
07-Jun-2023
11-May-2023
07-Jun-2023
11-May-2023
07-Jun-2023
11-May-2023

## 2023-06-11 NOTE — BH INPATIENT PSYCHIATRY PROGRESS NOTE - NSICDXBHSECONDARYDX_PSY_ALL_CORE
Chronic GERD   K21.9  HTN (hypertension)   I10  HLD (hyperlipidemia)   E78.5  
MDD (major depressive disorder), recurrent episode   F33.9  
Chronic GERD   K21.9  HTN (hypertension)   I10  HLD (hyperlipidemia)   E78.5  
MDD (major depressive disorder), recurrent episode   F33.9  
Chronic GERD   K21.9  HTN (hypertension)   I10  HLD (hyperlipidemia)   E78.5  
Chronic GERD   K21.9  HTN (hypertension)   I10  HLD (hyperlipidemia)   E78.5  
MDD (major depressive disorder), recurrent episode   F33.9  Chronic GERD   K21.9  HTN (hypertension)   I10  HLD (hyperlipidemia)   E78.5  
Chronic GERD   K21.9  HTN (hypertension)   I10  HLD (hyperlipidemia)   E78.5  
MDD (major depressive disorder), recurrent episode   F33.9  
MDD (major depressive disorder), recurrent episode   F33.9  Chronic GERD   K21.9  HTN (hypertension)   I10  HLD (hyperlipidemia)   E78.5  Obesity   E66.9  Drug induced myoclonus   G25.3  
MDD (major depressive disorder), recurrent episode   F33.9  Chronic GERD   K21.9  HTN (hypertension)   I10  HLD (hyperlipidemia)   E78.5  
Chronic GERD   K21.9  HTN (hypertension)   I10  HLD (hyperlipidemia)   E78.5  
Chronic GERD   K21.9  HTN (hypertension)   I10  HLD (hyperlipidemia)   E78.5  
MDD (major depressive disorder), recurrent episode   F33.9  
Chronic GERD   K21.9  HTN (hypertension)   I10  HLD (hyperlipidemia)   E78.5  
MDD (major depressive disorder), recurrent episode   F33.9  Chronic GERD   K21.9  HTN (hypertension)   I10  HLD (hyperlipidemia)   E78.5  
MDD (major depressive disorder), recurrent episode   F33.9  Chronic GERD   K21.9  HTN (hypertension)   I10  HLD (hyperlipidemia)   E78.5  Obesity   E66.9  Drug induced myoclonus   G25.3  
MDD (major depressive disorder), recurrent episode   F33.9  Chronic GERD   K21.9  HTN (hypertension)   I10  HLD (hyperlipidemia)   E78.5  Obesity   E66.9  Drug induced myoclonus   G25.3  
MDD (major depressive disorder), recurrent episode   F33.9

## 2023-06-11 NOTE — BH INPATIENT PSYCHIATRY PROGRESS NOTE - NSTXDCOTHRGOAL_PSY_ALL_CORE
pt will comply with treatment and improve mental status in order to effectively engage with dc planning.
pt will comply with treatment and improve mental status in order to effectively engage with dc planning.
pt will comply with treatment and improve mental status n order to effectively engage with dc planning.
pt will comply with treatment and improve mental status in order to effectively engage with dc planning.
pt will comply with treatment and improve mental status in order to effectively engage with dc planning.
pt will comply with treatment and improve mental status n order to effectively engage with dc planning.
pt will comply with treatment and improve mental status in order to effectively engage with dc planning.
pt will comply with treatment and improve mental status in order to effectively engage with dc planning.
pt will comply with treatment and improve mental status n order to effectively engage with dc planning.
pt will comply with treatment and improve mental status in order to effectively engage with dc planning.
pt will comply with treatment and improve mental status n order to effectively engage with dc planning.
pt will comply with treatment and improve mental status n order to effectively engage with dc planning.
pt will comply with treatment and improve mental status in order to effectively engage with dc planning.
pt will comply with treatment and improve mental status n order to effectively engage with dc planning.
pt will comply with treatment and improve mental status n order to effectively engage with dc planning.

## 2023-06-11 NOTE — BH INPATIENT PSYCHIATRY PROGRESS NOTE - MSE UNSTRUCTURED FT
Appearance: adequate hygiene and grooming; stable gait; no myoclonus noted  Behavior: intensely related, attentive, no psychomotor agitation/retardation  Speech:  wnl  Mood: anxious but reassurable  Affect: congruent, stable, reactive  Thought process: ruminative, less obsessive  Thought content: no delusions elicited; denies SI or HI  Perceptual disturbances: denies AVH  Cognition: orientation intact; has difficulty keeping track of medication names and provider names (likely due to anxiety)  Abstraction: fair  Fund of knowledge: fair  Insight: improving  Judgement: improving

## 2023-06-11 NOTE — BH INPATIENT PSYCHIATRY PROGRESS NOTE - NSBHATTESTBILLING_PSY_A_CORE
44946-Htjoahmlky OBS or IP - moderate complexity OR 35-49 mins
81797-Itmgahzyax OBS or IP - moderate complexity OR 35-49 mins
83037-Vcxwdaxmmv OBS or IP - moderate complexity OR 35-49 mins
14879-Pfdgtsgfaz OBS or IP - low complexity OR 25-34 mins
90957-Rkepmksmao OBS or IP - low complexity OR 25-34 mins
37785-Vcoqjpjnsc OBS or IP - low complexity OR 25-34 mins
42197-Rtjuvbgrkr OBS or IP - moderate complexity OR 35-49 mins
43166-Axrzumbxno OBS or IP - low complexity OR 25-34 mins
22842-Tvlkbxikzf OBS or IP - low complexity OR 25-34 mins
95608-Pxihowusyl OBS or IP - moderate complexity OR 35-49 mins
16602-Kjtabmtxmm OBS or IP - low complexity OR 25-34 mins
90285-Kryglylvur OBS or IP - moderate complexity OR 35-49 mins
56310-Ggcoynzcsp OBS or IP - moderate complexity OR 35-49 mins
93009-Xcsyzqqpjr OBS or IP - low complexity OR 25-34 mins
16126-Lurxipptkh OBS or IP - low complexity OR 25-34 mins
02248-Oabcmhzisr OBS or IP - moderate complexity OR 35-49 mins
83749-Zbhdfrjbdb OBS or IP - moderate complexity OR 35-49 mins
69204-Cfvybtebml OBS or IP - moderate complexity OR 35-49 mins
56265-Snzrcailft OBS or IP - moderate complexity OR 35-49 mins
21327-Zgvdhgzoaa OBS or IP - moderate complexity OR 35-49 mins
89099-Ioailcjobz OBS or IP - moderate complexity OR 35-49 mins
99254-Braqoeatel OBS or IP - moderate complexity OR 35-49 mins
38172-Hgyjcwgbtp OBS or IP - moderate complexity OR 35-49 mins
93102-Swolenzdap OBS or IP - moderate complexity OR 35-49 mins
39318-Wzvpebzdry - moderate complexity OR 30-39 mins

## 2023-06-11 NOTE — BH INPATIENT PSYCHIATRY PROGRESS NOTE - NSTXPROBDCOTHR_PSY_ALL_CORE
DISCHARGE ISSUE - OTHER

## 2023-06-11 NOTE — BH INPATIENT PSYCHIATRY PROGRESS NOTE - NSTXDCOTHRPROGRES_PSY_ALL_CORE
Improving

## 2023-06-11 NOTE — BH INPATIENT PSYCHIATRY PROGRESS NOTE - NSTXSUICIDPROGRES_PSY_ALL_CORE
Improving
No Change
Met - goal discontinued
Improving
No Change
No Change
Improving
Met - goal discontinued
No Change
No Change
Met - goal discontinued
Improving
Met - goal discontinued
Improving
Met - goal discontinued
No Change
Met - goal discontinued
Improving
Met - goal discontinued

## 2023-06-11 NOTE — BH INPATIENT PSYCHIATRY PROGRESS NOTE - NSBHCONSBHPROVCNTCTNOFT_PSY_A_CORE
defer to primary team

## 2023-06-11 NOTE — BH INPATIENT PSYCHIATRY PROGRESS NOTE - NSTXSUICIDDATEEST_PSY_ALL_CORE
10-May-2023

## 2023-06-11 NOTE — BH INPATIENT PSYCHIATRY PROGRESS NOTE - NSBHFUPINTERVALHXFT_PSY_A_CORE
Chart reviewed and case discussed with treatment team. Staff report patient has been cooperative, slept well. Patient is noted to be social with peers and engages in groups activities well. Patient has been taking xanax 0.5mg prn no more than once daily. Myoclonus nearly fully resolved and without return of anxiety in absence of abilify x4 days. Patient is forward thinking.   6/11 PAtient seen for depression anxiety Eating sleeping well, had prn as noted above.

## 2023-06-11 NOTE — BH INPATIENT PSYCHIATRY PROGRESS NOTE - NSBHMETABOLIC_PSY_ALL_CORE_FT
BMI: BMI (kg/m2): 40.6 (05-10-23 @ 16:29)  HbA1c: A1C with Estimated Average Glucose Result: 5.8 % (05-11-23 @ 08:36)    Glucose:   BP: --  Lipid Panel: Date/Time: 05-11-23 @ 08:36  Cholesterol, Serum: 175  Direct LDL: --  HDL Cholesterol, Serum: 38  Total Cholesterol/HDL Ration Measurement: --  Triglycerides, Serum: 198  
BMI: BMI (kg/m2): 40 (06-03-23 @ 16:16)  HbA1c: A1C with Estimated Average Glucose Result: 5.8 % (05-11-23 @ 08:36)    Glucose:   BP: 121/66 (06-07-23 @ 19:56) (121/66 - 121/66)  Lipid Panel: Date/Time: 05-11-23 @ 08:36  Cholesterol, Serum: 175  Direct LDL: --  HDL Cholesterol, Serum: 38  Total Cholesterol/HDL Ration Measurement: --  Triglycerides, Serum: 198  
BMI: BMI (kg/m2): 40.6 (05-10-23 @ 16:29)  HbA1c: A1C with Estimated Average Glucose Result: 5.8 % (05-11-23 @ 08:36)    Glucose:   BP: 116/55 (05-13-23 @ 20:00) (90/43 - 116/55)  Lipid Panel: Date/Time: 05-11-23 @ 08:36  Cholesterol, Serum: 175  Direct LDL: --  HDL Cholesterol, Serum: 38  Total Cholesterol/HDL Ration Measurement: --  Triglycerides, Serum: 198  
BMI: BMI (kg/m2): 40.6 (05-10-23 @ 16:29)  HbA1c: A1C with Estimated Average Glucose Result: 5.8 % (05-11-23 @ 08:36)    Glucose:   BP: --  Lipid Panel: Date/Time: 05-11-23 @ 08:36  Cholesterol, Serum: 175  Direct LDL: --  HDL Cholesterol, Serum: 38  Total Cholesterol/HDL Ration Measurement: --  Triglycerides, Serum: 198  
BMI: BMI (kg/m2): 40.6 (05-10-23 @ 16:29)  HbA1c: A1C with Estimated Average Glucose Result: 5.8 % (05-11-23 @ 08:36)    Glucose:   BP: 136/62 (05-30-23 @ 17:07) (132/73 - 136/62)  Lipid Panel: Date/Time: 05-11-23 @ 08:36  Cholesterol, Serum: 175  Direct LDL: --  HDL Cholesterol, Serum: 38  Total Cholesterol/HDL Ration Measurement: --  Triglycerides, Serum: 198  
BMI: BMI (kg/m2): 40.6 (05-10-23 @ 16:29)  HbA1c: A1C with Estimated Average Glucose Result: 5.8 % (05-11-23 @ 08:36)    Glucose:   BP: 132/65 (05-31-23 @ 09:36) (132/65 - 132/65)  Lipid Panel: Date/Time: 05-11-23 @ 08:36  Cholesterol, Serum: 175  Direct LDL: --  HDL Cholesterol, Serum: 38  Total Cholesterol/HDL Ration Measurement: --  Triglycerides, Serum: 198  
BMI: BMI (kg/m2): 40.6 (05-10-23 @ 16:29)  HbA1c: A1C with Estimated Average Glucose Result: 5.8 % (05-11-23 @ 08:36)    Glucose:   BP: 126/62 (05-20-23 @ 20:49) (126/62 - 126/62)  Lipid Panel: Date/Time: 05-11-23 @ 08:36  Cholesterol, Serum: 175  Direct LDL: --  HDL Cholesterol, Serum: 38  Total Cholesterol/HDL Ration Measurement: --  Triglycerides, Serum: 198  
BMI: BMI (kg/m2): 40.6 (05-10-23 @ 16:29)  HbA1c: A1C with Estimated Average Glucose Result: 5.8 % (05-11-23 @ 08:36)    Glucose:   BP: 98/65 (05-15-23 @ 19:37) (98/65 - 116/55)  Lipid Panel: Date/Time: 05-11-23 @ 08:36  Cholesterol, Serum: 175  Direct LDL: --  HDL Cholesterol, Serum: 38  Total Cholesterol/HDL Ration Measurement: --  Triglycerides, Serum: 198  
BMI: BMI (kg/m2): 40.6 (05-10-23 @ 16:29)  HbA1c: A1C with Estimated Average Glucose Result: 5.8 % (05-11-23 @ 08:36)    Glucose:   BP: --  Lipid Panel: Date/Time: 05-11-23 @ 08:36  Cholesterol, Serum: 175  Direct LDL: --  HDL Cholesterol, Serum: 38  Total Cholesterol/HDL Ration Measurement: --  Triglycerides, Serum: 198  
BMI: BMI (kg/m2): 40.6 (05-10-23 @ 16:29)  HbA1c: A1C with Estimated Average Glucose Result: 5.8 % (05-11-23 @ 08:36)    Glucose:   BP: 116/55 (05-13-23 @ 20:00) (86/56 - 116/55)  Lipid Panel: Date/Time: 05-11-23 @ 08:36  Cholesterol, Serum: 175  Direct LDL: --  HDL Cholesterol, Serum: 38  Total Cholesterol/HDL Ration Measurement: --  Triglycerides, Serum: 198  
BMI: BMI (kg/m2): 40.6 (05-10-23 @ 16:29)  HbA1c: A1C with Estimated Average Glucose Result: 5.8 % (05-11-23 @ 08:36)    Glucose:   BP: 126/62 (05-20-23 @ 20:49) (119/61 - 126/62)  Lipid Panel: Date/Time: 05-11-23 @ 08:36  Cholesterol, Serum: 175  Direct LDL: --  HDL Cholesterol, Serum: 38  Total Cholesterol/HDL Ration Measurement: --  Triglycerides, Serum: 198  
BMI: BMI (kg/m2): 40.6 (05-10-23 @ 16:29)  HbA1c: A1C with Estimated Average Glucose Result: 5.8 % (05-11-23 @ 08:36)    Glucose:   BP: 90/60 (05-11-23 @ 20:43) (86/56 - 90/60)  Lipid Panel: Date/Time: 05-11-23 @ 08:36  Cholesterol, Serum: 175  Direct LDL: --  HDL Cholesterol, Serum: 38  Total Cholesterol/HDL Ration Measurement: --  Triglycerides, Serum: 198  
BMI: BMI (kg/m2): 40 (06-03-23 @ 16:16)  HbA1c: A1C with Estimated Average Glucose Result: 5.8 % (05-11-23 @ 08:36)    Glucose:   BP: 125/74 (06-03-23 @ 20:34) (125/74 - 125/74)  Lipid Panel: Date/Time: 05-11-23 @ 08:36  Cholesterol, Serum: 175  Direct LDL: --  HDL Cholesterol, Serum: 38  Total Cholesterol/HDL Ration Measurement: --  Triglycerides, Serum: 198  
BMI: BMI (kg/m2): 40 (06-03-23 @ 16:16)  HbA1c: A1C with Estimated Average Glucose Result: 5.8 % (05-11-23 @ 08:36)    Glucose:   BP: 149/83 (06-05-23 @ 09:49) (125/74 - 149/83)  Lipid Panel: Date/Time: 05-11-23 @ 08:36  Cholesterol, Serum: 175  Direct LDL: --  HDL Cholesterol, Serum: 38  Total Cholesterol/HDL Ration Measurement: --  Triglycerides, Serum: 198  
BMI: BMI (kg/m2): 40.6 (05-10-23 @ 16:29)  HbA1c: A1C with Estimated Average Glucose Result: 5.8 % (05-11-23 @ 08:36)    Glucose:   BP: 132/65 (05-31-23 @ 09:36) (132/65 - 136/62)  Lipid Panel: Date/Time: 05-11-23 @ 08:36  Cholesterol, Serum: 175  Direct LDL: --  HDL Cholesterol, Serum: 38  Total Cholesterol/HDL Ration Measurement: --  Triglycerides, Serum: 198  
BMI: BMI (kg/m2): 40 (06-03-23 @ 16:16)  HbA1c: A1C with Estimated Average Glucose Result: 5.8 % (05-11-23 @ 08:36)    Glucose:   BP: 149/83 (06-05-23 @ 09:49) (149/83 - 149/83)  Lipid Panel: Date/Time: 05-11-23 @ 08:36  Cholesterol, Serum: 175  Direct LDL: --  HDL Cholesterol, Serum: 38  Total Cholesterol/HDL Ration Measurement: --  Triglycerides, Serum: 198  
BMI: BMI (kg/m2): 40 (06-03-23 @ 16:16)  HbA1c: A1C with Estimated Average Glucose Result: 5.8 % (05-11-23 @ 08:36)    Glucose:   BP: 111/60 (06-08-23 @ 20:40) (111/60 - 121/66)  Lipid Panel: Date/Time: 05-11-23 @ 08:36  Cholesterol, Serum: 175  Direct LDL: --  HDL Cholesterol, Serum: 38  Total Cholesterol/HDL Ration Measurement: --  Triglycerides, Serum: 198  
BMI: BMI (kg/m2): 40 (06-03-23 @ 16:16)  HbA1c: A1C with Estimated Average Glucose Result: 5.8 % (05-11-23 @ 08:36)    Glucose:   BP: 149/83 (06-05-23 @ 09:49) (125/74 - 149/83)  Lipid Panel: Date/Time: 05-11-23 @ 08:36  Cholesterol, Serum: 175  Direct LDL: --  HDL Cholesterol, Serum: 38  Total Cholesterol/HDL Ration Measurement: --  Triglycerides, Serum: 198  
BMI: BMI (kg/m2): 40.6 (05-10-23 @ 16:29)  HbA1c: A1C with Estimated Average Glucose Result: 5.8 % (05-11-23 @ 08:36)    Glucose:   BP: 98/65 (05-15-23 @ 19:37) (98/65 - 98/65)  Lipid Panel: Date/Time: 05-11-23 @ 08:36  Cholesterol, Serum: 175  Direct LDL: --  HDL Cholesterol, Serum: 38  Total Cholesterol/HDL Ration Measurement: --  Triglycerides, Serum: 198  
BMI: BMI (kg/m2): 40.6 (05-10-23 @ 16:29)  HbA1c: A1C with Estimated Average Glucose Result: 5.8 % (05-11-23 @ 08:36)    Glucose:   BP: 90/60 (05-11-23 @ 20:43) (86/56 - 90/60)  Lipid Panel: Date/Time: 05-11-23 @ 08:36  Cholesterol, Serum: 175  Direct LDL: --  HDL Cholesterol, Serum: 38  Total Cholesterol/HDL Ration Measurement: --  Triglycerides, Serum: 198  
BMI: BMI (kg/m2): 40.6 (05-10-23 @ 16:29)  HbA1c: A1C with Estimated Average Glucose Result: 5.8 % (05-11-23 @ 08:36)    Glucose:   BP: 98/65 (05-15-23 @ 19:37) (98/65 - 98/65)  Lipid Panel: Date/Time: 05-11-23 @ 08:36  Cholesterol, Serum: 175  Direct LDL: --  HDL Cholesterol, Serum: 38  Total Cholesterol/HDL Ration Measurement: --  Triglycerides, Serum: 198  
BMI: BMI (kg/m2): 40.6 (05-10-23 @ 16:29)  HbA1c: A1C with Estimated Average Glucose Result: 5.8 % (05-11-23 @ 08:36)    Glucose:   BP: 126/63 (05-25-23 @ 20:55) (117/57 - 126/63)  Lipid Panel: Date/Time: 05-11-23 @ 08:36  Cholesterol, Serum: 175  Direct LDL: --  HDL Cholesterol, Serum: 38  Total Cholesterol/HDL Ration Measurement: --  Triglycerides, Serum: 198  
BMI: BMI (kg/m2): 40.6 (05-10-23 @ 16:29)  HbA1c: A1C with Estimated Average Glucose Result: 5.8 % (05-11-23 @ 08:36)    Glucose:   BP: --  Lipid Panel: Date/Time: 05-11-23 @ 08:36  Cholesterol, Serum: 175  Direct LDL: --  HDL Cholesterol, Serum: 38  Total Cholesterol/HDL Ration Measurement: --  Triglycerides, Serum: 198  
BMI: BMI (kg/m2): 40.6 (05-10-23 @ 16:29)  HbA1c: A1C with Estimated Average Glucose Result: 5.8 % (05-11-23 @ 08:36)    Glucose:   BP: 132/65 (05-31-23 @ 09:36) (132/65 - 136/62)  Lipid Panel: Date/Time: 05-11-23 @ 08:36  Cholesterol, Serum: 175  Direct LDL: --  HDL Cholesterol, Serum: 38  Total Cholesterol/HDL Ration Measurement: --  Triglycerides, Serum: 198  
BMI: BMI (kg/m2): 40 (06-03-23 @ 16:16)  HbA1c: A1C with Estimated Average Glucose Result: 5.8 % (05-11-23 @ 08:36)    Glucose:   BP: 107/60 (06-09-23 @ 20:47) (107/60 - 111/60)  Lipid Panel: Date/Time: 05-11-23 @ 08:36  Cholesterol, Serum: 175  Direct LDL: --  HDL Cholesterol, Serum: 38  Total Cholesterol/HDL Ration Measurement: --  Triglycerides, Serum: 198

## 2023-06-11 NOTE — BH INPATIENT PSYCHIATRY PROGRESS NOTE - NSTXDEPRESPROGRES_PSY_ALL_CORE
No Change
No Change
Improving
No Change
Improving
No Change
No Change
Improving
No Change
Improving
No Change
Improving
Improving
No Change

## 2023-06-11 NOTE — BH INPATIENT PSYCHIATRY PROGRESS NOTE - NSTXDCOTHRDATETRGT_PSY_ALL_CORE
07-Jun-2023
19-May-2023
25-May-2023
07-Jun-2023
01-Jun-2023
07-Jun-2023
14-Jun-2023
25-May-2023
25-May-2023
07-Jun-2023
14-Jun-2023
18-May-2023
14-Jun-2023
25-May-2023
25-May-2023
07-Jun-2023
01-Jun-2023
18-May-2023
19-May-2023
07-Jun-2023
14-Jun-2023
19-May-2023
25-May-2023

## 2023-06-11 NOTE — BH INPATIENT PSYCHIATRY PROGRESS NOTE - NSTXANXDATENEW_PSY_ALL_CORE
30-May-2023
23-May-2023
30-May-2023
23-May-2023
30-May-2023
23-May-2023
30-May-2023
23-May-2023
30-May-2023
23-May-2023
30-May-2023

## 2023-06-11 NOTE — BH INPATIENT PSYCHIATRY PROGRESS NOTE - NSBHCHARTREVIEWVS_PSY_A_CORE FT
Vital Signs Last 24 Hrs  T(C): 36.7 (06-11-23 @ 08:03), Max: 37.1 (06-10-23 @ 17:27)  T(F): 98 (06-11-23 @ 08:03), Max: 98.7 (06-10-23 @ 17:27)  HR: --  BP: --  BP(mean): --  RR: 18 (06-11-23 @ 08:03) (18 - 18)  SpO2: 96% (06-11-23 @ 08:03) (96% - 97%)    Orthostatic VS  06-11-23 @ 08:03  Lying BP: 140/68 HR: 76  Sitting BP: 131/96 HR: 78  Standing BP: --/-- HR: --  Site: upper left arm  Mode: electronic  Orthostatic VS  06-10-23 @ 20:37  Lying BP: --/-- HR: --  Sitting BP: 120/55 HR: 80  Standing BP: --/-- HR: --  Site: --  Mode: --  Orthostatic VS  06-10-23 @ 06:11  Lying BP: --/-- HR: --  Sitting BP: 129/65 HR: 70  Standing BP: 127/63 HR: 72  Site: --  Mode: --

## 2023-06-11 NOTE — BH INPATIENT PSYCHIATRY PROGRESS NOTE - NSDCCRITERIA_PSY_ALL_CORE
symptom management, safety planning, care coordination 
symptom management, safety planning, care coordination 
50% reduction in anxiety
50% reduction in anxiety
symptom management, safety planning, care coordination 
50% reduction in anxiety
symptom management, safety planning, care coordination 
50% reduction in anxiety
symptom management, safety planning, care coordination 
50% reduction in anxiety
symptom management, safety planning, care coordination 
50% reduction in anxiety
symptom management, safety planning, care coordination 
50% reduction in anxiety
symptom management, safety planning, care coordination 
50% reduction in anxiety
symptom management, safety planning, care coordination 
symptom management, safety planning, care coordination 
50% reduction in anxiety
symptom management, safety planning, care coordination 
symptom management, safety planning, care coordination

## 2023-06-11 NOTE — BH INPATIENT PSYCHIATRY PROGRESS NOTE - NSTXANXGOAL_PSY_ALL_CORE
Be able to participate in activities despite lingering anxiety/panic
Report a reduction in panic attacks and improving mood and confidence
Be able to participate in activities despite lingering anxiety/panic
Report a reduction in panic attacks and improving mood and confidence
Be able to participate in activities despite lingering anxiety/panic
Report a reduction in panic attacks and improving mood and confidence
Be able to participate in activities despite lingering anxiety/panic
Identify and practice 3 coping skills to manage anxiety
Be able to participate in activities despite lingering anxiety/panic
Report a reduction in panic attacks and improving mood and confidence
Report a reduction in panic attacks and improving mood and confidence
Be able to participate in activities despite lingering anxiety/panic
Identify and practice 3 coping skills to manage anxiety
Be able to participate in activities despite lingering anxiety/panic
Report a reduction in panic attacks and improving mood and confidence
Report a reduction in panic attacks and improving mood and confidence

## 2023-06-11 NOTE — BH INPATIENT PSYCHIATRY PROGRESS NOTE - NSTXSUICIDGOAL_PSY_ALL_CORE
Will identify and utilize 2 coping skills
Will identify 1 trigger / stressor that exacerbates suicidal
Will identify and utilize 2 coping skills
Will identify 1 trigger / stressor that exacerbates suicidal
Will identify and utilize 2 coping skills
Will identify 1 trigger / stressor that exacerbates suicidal
Will identify and utilize 2 coping skills
Will identify 1 trigger / stressor that exacerbates suicidal
Will identify and utilize 2 coping skills
Will identify 1 trigger / stressor that exacerbates suicidal

## 2023-06-11 NOTE — BH INPATIENT PSYCHIATRY PROGRESS NOTE - NSTXANXDATETRGT_PSY_ALL_CORE
08-Jun-2023
08-Jun-2023
14-Jun-2023
25-May-2023
14-Jun-2023
19-May-2023
08-Jun-2023
08-Jun-2023
14-Jun-2023
31-May-2023
31-May-2023
25-May-2023
13-Jun-2023
06-Jun-2023
25-May-2023
25-May-2023
19-May-2023
16-May-2023
18-May-2023
18-May-2023
14-Jun-2023
23-May-2023
31-May-2023
08-Jun-2023
19-May-2023

## 2023-06-11 NOTE — BH INPATIENT PSYCHIATRY PROGRESS NOTE - NSTXDEPRESDATETRGT_PSY_ALL_CORE
15-Sami-2023
15-Sami-2023
25-May-2023
19-May-2023
01-Jun-2023
15-Sami-2023
01-Jun-2023
25-May-2023
25-May-2023
15-Sami-2023
15-Sami-2023
19-May-2023
01-Jun-2023
25-May-2023
19-May-2023
19-May-2023
25-May-2023
15-Sami-2023
25-May-2023
15-Sami-2023
17-May-2023
15-Sami-2023
19-May-2023
17-May-2023
19-May-2023

## 2023-06-11 NOTE — BH INPATIENT PSYCHIATRY PROGRESS NOTE - NSTXDEPRESGOAL_PSY_ALL_CORE
Report journaling 5 counter-statements to negative predictions/self-image daily

## 2023-06-12 VITALS — TEMPERATURE: 99 F | OXYGEN SATURATION: 96 %

## 2023-06-12 PROCEDURE — 99238 HOSP IP/OBS DSCHRG MGMT 30/<: CPT

## 2023-06-12 RX ORDER — METOPROLOL TARTRATE 50 MG
1 TABLET ORAL
Qty: 60 | Refills: 0
Start: 2023-06-12 | End: 2023-07-11

## 2023-06-12 RX ORDER — ESCITALOPRAM OXALATE 10 MG/1
1 TABLET, FILM COATED ORAL
Qty: 10 | Refills: 0
Start: 2023-06-12 | End: 2023-06-21

## 2023-06-12 RX ORDER — MIRTAZAPINE 45 MG/1
1 TABLET, ORALLY DISINTEGRATING ORAL
Qty: 10 | Refills: 0
Start: 2023-06-12 | End: 2023-06-21

## 2023-06-12 RX ORDER — GABAPENTIN 400 MG/1
1 CAPSULE ORAL
Qty: 20 | Refills: 0
Start: 2023-06-12 | End: 2023-06-21

## 2023-06-12 RX ORDER — ALPRAZOLAM 0.25 MG
1 TABLET ORAL
Qty: 7 | Refills: 0
Start: 2023-06-12 | End: 2023-06-18

## 2023-06-12 RX ORDER — GABAPENTIN 400 MG/1
2 CAPSULE ORAL
Qty: 20 | Refills: 0
Start: 2023-06-12 | End: 2023-06-21

## 2023-06-12 RX ADMIN — GABAPENTIN 300 MILLIGRAM(S): 400 CAPSULE ORAL at 08:36

## 2023-06-12 RX ADMIN — Medication 25 MILLIGRAM(S): at 08:35

## 2023-06-12 RX ADMIN — ESCITALOPRAM OXALATE 10 MILLIGRAM(S): 10 TABLET, FILM COATED ORAL at 08:36

## 2023-06-12 RX ADMIN — PANTOPRAZOLE SODIUM 40 MILLIGRAM(S): 20 TABLET, DELAYED RELEASE ORAL at 08:00

## 2023-06-12 RX ADMIN — Medication 1 APPLICATION(S): at 08:36

## 2023-06-12 NOTE — BH INPATIENT PSYCHIATRY DISCHARGE NOTE - NSBHFUPINTERVALHXFT_PSY_A_CORE
Discharge Progress Note Date and Time: 06-12-23 @ 16:08  Chart reviewed and case discussed with treatment team. Staff report patient has been anxious about discharge but motivated to move forward. Has had no return of anxiety or panic attacks and myoclonus has resolved. Patient denies SI.

## 2023-06-12 NOTE — BH INPATIENT PSYCHIATRY DISCHARGE NOTE - DESCRIPTION
pt lives with her  of 26 yrs in a private apartment. She previously worked as an  and . She has a younger brother (8 yrs younger). Pt grew up with both her parents in [place], [place] and reports being very close to both her parents and that she is strongly grieving their loss. Pt’s father passed away 9 yrs ago and pt’s mother passed away 7 yrs ago. Pt reports being very close to her parents growing up and having a rebellious teenage and young adult phase where she experimented with drugs. Additionally, per DEM[facility] notes, pt reports smoking cannabis regularly.    pt lives with her  of 26 yrs in a private apartment. She previously worked as an  and . She has a younger brother (8 yrs younger). Pt grew up with both her parents in [place], [place] and reports being very close to both her parents and that she is strongly grieving their loss. Pt’s father passed away 9 yrs ago and pt’s mother passed away 7 yrs ago. Pt reports being very close to her parents growing up and having a rebellious teenage and young adult phase where she experimented with drugs.

## 2023-06-12 NOTE — BH INPATIENT PSYCHIATRY DISCHARGE NOTE - NSDCMRMEDTOKEN_GEN_ALL_CORE_FT
ALPRAZolam 0.5 mg oral tablet: 1 tab(s) orally once a day as needed for  anxiety MDD: 0.5mg  atorvastatin 10 mg oral tablet: 1 tab(s) orally once a day (at bedtime) MDD: 10  escitalopram 10 mg oral tablet: 1 tab(s) orally once a day  gabapentin 100 mg oral capsule: 2 cap(s) orally once a day in the afternoon  gabapentin 300 mg oral capsule: 1 cap(s) orally 2 times a day Morning and bedtime  hydroCHLOROthiazide 12.5 mg oral capsule: 1 cap(s) orally once a day  metoprolol tartrate 25 mg oral tablet: 1 tab(s) orally 2 times a day  Mi-Acid Gas Relief 80 mg oral tablet, chewable: 1 tab(s) orally every 6 hours as needed for  nausea MDD: 320 mg  Mi-Acid Gas Relief 80 mg oral tablet, chewable: 80 orally every 6 hours as needed for Nausea  mirtazapine 45 mg oral tablet: 1 tab(s) orally once a day (at bedtime)  ondansetron 4 mg oral tablet, disintegratin tab(s) orally every 8 hours as needed for  nausea MDD: 12 mg  pantoprazole 40 mg oral delayed release tablet: 1 tab(s) orally once a day (before a meal) MDD: 40

## 2023-06-12 NOTE — BH INPATIENT PSYCHIATRY DISCHARGE NOTE - HPI (INCLUDE ILLNESS QUALITY, SEVERITY, DURATION, TIMING, CONTEXT, MODIFYING FACTORS, ASSOCIATED SIGNS AND SYMPTOMS)
Patient is a 74yo female, , retired (), non-caregiver, domiciled at home with her ,  with a prior psychiatric diagnosis of ANSON and MDD, 4 previous inpatient psychiatric hospitalizations at [facility] and most recently at [facility] from 3/28/23-4/29/23,  prior SA in 11/2019 by overdosing on pills, no history of NSSIB, no history of violence/aggression, no hx of legal issues, hx of daily marijuana use, PMHx of HTN, hyperlipidemia, treated in outpatient geropsych clinic by Dr. Stovall. Patient brought in for worsening anxiety and SI at home.     On assessment on the unit, patient endorses worsening anxiety since medication changes from previous hospitalization. She stated that she wakes up in a panic (increased HR, trouble breathing) and then feels "shivers" throughout the whole day until evening when the anxiety is much less. Patient stated that she feels hopeless, helpless, low appetite and poor sleep without Remeron. When asked about SI she replied, "I rather not answer that". She denies that she is depressed, "My mood is good". She denies any SI on the unit, endorses feeling more hopeful since being admitted to [facility]. Denies any HI. Patient denies any hx of psychosis or sadia. She denies any alcohol or cigarette use, but endorses marijuana use (last time was couple of months ago). She reported one past SA about 7yrs ago and denies any hx of NSSIB. Patient stated that she has been taking all medications as prescribed and is in agreement to continue current medications. No CO needed.

## 2023-06-12 NOTE — BH INPATIENT PSYCHIATRY DISCHARGE NOTE - NSDCCPCAREPLAN_GEN_ALL_CORE_FT
PRINCIPAL DISCHARGE DIAGNOSIS  Diagnosis: Generalized anxiety disorder  Assessment and Plan of Treatment:

## 2023-06-12 NOTE — BH INPATIENT PSYCHIATRY DISCHARGE NOTE - NSBHDCSIGEVENTSFT_PSY_A_CORE
Psychoeducation provided regarding addiction potential with xanax. Though risk was somewhat mitigated by lowering dose relative to equivalent amount of klonopin she was taking at home, patient was informed that she should not expect to take it daily chronically and that the aim is to reduce the frequency to taking it a few times a month, only when needed. Patient agree that she finds it helpful to know there is a medication for "security."

## 2023-06-12 NOTE — BH INPATIENT PSYCHIATRY DISCHARGE NOTE - NSBHSATHC_PSY_A_CORE FT
Per [facility] chart review: daily cannabis use.     pt reported on 5/11/23 to inpatient sw that she last used several months ago after an MD frightened her about impact of use.  Per [facility] chart review: used to smoke daily in the past but lately has been much less frequent, last reportedly smoked a few months ago    pt reported on 5/11/23 to inpatient  that she last used several months ago after an MD frightened her about impact of use.

## 2023-06-12 NOTE — BH INPATIENT PSYCHIATRY DISCHARGE NOTE - OTHER PAST PSYCHIATRIC HISTORY (INCLUDE DETAILS REGARDING ONSET, COURSE OF ILLNESS, INPATIENT/OUTPATIENT TREATMENT)
Updated: 5/11/2023  Pt  is a 73 year old  woman. Pt was discharged from [facility] on 4/19/23 with resumption of care at [facility] - virtually with Dr Washington  on April 24.   upon inquiry pt stated she has not smoked 'medical marijuana' for several months as she was frightened by possible impact of use by an MD.  Pt readmitted due to worsening depression and anxiety with statement to ED that she took two pills with suicidal ideation. Pt now minimizes behavior.     May 2023 [facility]  note: Per [facility] chart review: Prior psychiatric diagnosis of ANSON and MDD, 4 previous inpatient psychiatric hospitalizations at [facility] in 2, and most recently 7/5/2021 - 7/3/2021 prior SA in 11/2019 by overdosing on pills, no history of NSSIB, no history of aggression, no hx of legal issues, daily marijuana use, PMH of HTN, hyperlipidemia, treated in outpatient geropsych clinic by Dr. Stovall (as below, with last visit 3/15/23) and at that time documented to be at baseline with chronic anxiety, complaints of nausea, seen twice monthly with mediation mgt and psychotherapy) BIB spouse for "worsening anxiety."

## 2023-06-12 NOTE — BH INPATIENT PSYCHIATRY DISCHARGE NOTE - HOSPITAL COURSE
Patient was previously stable on klonopin, effexor and mirtazapine for 2-3 years but seems to have decompensated due to psychosocial stressors including health issues, loss of her therapist, and conflicts with her . Based on discussion of patient's history with outpatient psychiatrist and our inpatient psychologist who has encountered this patient in the past, patient has poor coping skills and would benefit best from minimizing medication changes, particularly since this is her 3rd admission in a matter of 3 months. Patient was continued on remeron and titrated to 45mg po qhs as she was on this in the past during a relatively long period of stability. She was not returned to effexor to avoid polypharmacy. Lexapro was maintained at 15mg po daily to avoid making multiple changes in a short period of time given patient's propensity for psychosomaticism. She often attributed GI disturbances to medication changes. Klonopin 1.5mg/day was tapered off due to patient reporting it was no longer helping her and replaced with a smaller dose equivalent of xanax 0.5mg po prn which patient took about 1-2 times a day in the beginning and then about once daily towards the latter half of admission. There were a few days when she did not take any. Patient did find xanax more helpful though it could have been due to the environment of the inpatient milieu. At this point, patient's anxiety had been reduced significantly but she did not feel ready for discharge. She was trialed on abilify 1mg po daily as adjunctive treatment. Patient did report she had "pain" in her legs in the past but it was given a try in case it was due to other medications. Patient did respond quite well to it but eventually developed myoclonus. It was believed to be due to polypharmacy (abilify 1mg po daily, lexapro 15mg po daily, mirtazapine 45mg po qhs). As a result, abilify was discontinued and lexapro was reduced to 10mg po daily. Mirtazapine 45mg po qhs was continued as is. Patient would likely benefit from ongoing tapering off and d/c of lexapro as an outpatient. Myoclonus resolved completely.     Patient was seen 1-2 times weekly by inpatient psychologist and provided individual therapy. Patient was previously stable on klonopin, effexor and mirtazapine for 2-3 years but seems to have decompensated due to psychosocial stressors including health issues, loss of her therapist, and conflicts with her . Based on discussion of patient's history with outpatient psychiatrist and our inpatient psychologist who has encountered this patient in the past, patient has poor coping skills and would benefit best from minimizing medication changes, particularly since this is her 3rd admission in a matter of 3 months. Patient was continued on remeron and titrated to 45mg po qhs as she was on this in the past during a relatively long period of stability. She was not returned to effexor to avoid polypharmacy. Lexapro was maintained at 15mg po daily to avoid making multiple changes in a short period of time given patient's propensity for psychosomaticism. She often attributed GI disturbances to medication changes. Klonopin 1.5mg/day was tapered off due to patient reporting it was no longer helping her and replaced with a smaller dose equivalent of xanax 0.5mg po prn which patient took about 1-2 times a day in the beginning and then about once daily towards the latter half of admission. There were a few days when she did not take any. Patient did find xanax more helpful though it could have been due to the environment of the inpatient milieu. At this point, patient's anxiety had been reduced significantly but she did not feel ready for discharge. Gabapentin was increased to 300mg po qAM, 200mg po q2pm and 300mg po qhs due to ongoing anxiety. She was trialed on abilify 1mg po daily as adjunctive treatment. Patient did report she had "pain" in her legs in the past but it was given a try in case it was due to other medications. Patient did respond quite well to it but eventually developed myoclonus. It was believed to be due to polypharmacy (abilify 1mg po daily, lexapro 15mg po daily, mirtazapine 45mg po qhs). As a result, abilify was discontinued and lexapro was reduced to 10mg po daily. Mirtazapine 45mg po qhs was continued as is. Patient would likely benefit from ongoing tapering off and d/c of lexapro as an outpatient. Myoclonus resolved completely.     Patient was seen 1-2 times weekly by inpatient psychologist and provided individual therapy.

## 2023-06-12 NOTE — BH INPATIENT PSYCHIATRY DISCHARGE NOTE - NSBHSUICIDESTATUS_PSY_ALL_CORE
Patient presented with passive SI which resolved and remained absent for much of admission. She does have chronic elevated risk due to past suicide attempt. Patient does have support from her , has a good social support network among friends and compliant to treatment.

## 2023-06-12 NOTE — BH INPATIENT PSYCHIATRY DISCHARGE NOTE - NSBHDCMEDICALFT_PSY_A_CORE
Neurology consult obtained due to myoclonus. Agreed with plan to reduced polypharmacy. Patient also had significant GI symptoms - nausea, diarrhea. But based on review of the chart these are not new symptoms. Patient reports she has a gastroentrologist who is aware. She actually missed a scheduled EGD on the day she presented to our ED.

## 2023-06-12 NOTE — BH INPATIENT PSYCHIATRY DISCHARGE NOTE - NSBHASSESSSUMMFT_PSY_ALL_CORE
Ms. Gross is a 74yo female, , retired (), non-caregiver, domiciled at home with her , with a prior psychiatric diagnosis of ANSON and MDD, 4 previous inpatient psychiatric hospitalizations at [facility] and most recently at [facility] from 3/28/23-4/29/23,  prior SA in 11/2019 by overdosing on pills, no history of NSSIB, no history of violence/aggression, no hx of legal issues, hx of daily marijuana use, PMHx of HTN, hyperlipidemia, treated in [facility] by Dr. Kamara. Patient brought in for worsening anxiety and SI at home. Patient no longer reporting SI but remains significantly anxious.     5/25/2023: On assessment today, she denied leg pain and marked improvement in leg swelling. Denies SI/HI and anxiety at presents. Denies Gabapentin-related drowsiness and believes it helps with her mood. Remains preoccupied with thoughts of failed treatment and tentative plans if that were to happen. Remains willin to try Trintellix if indicated. Also willing to try Abilify again despite adverse effect of leg heaviness as previously reported. States will try since in controlled environment. Encouraged to work on self re-assurance as regards plan of care, as opposed seeking daily verbal reassurance from MD. States she is hopeful for the future. Compliant with meds but still requires PRN Xanax [Received 1 dose in the past 24H 8AM - 8AM]    5/26/2023: Pt was assessed today. In no acute distress. Pt denies SI/HI. Cooperative, engages, hopdeful, but still preoccupied with fear of becoming anxious. States she tried hard to reassure herself that she will be better yesterday so did not ask for PRN Xanax overnight. States she feels well but thinks she still needs reassurance from MD that she will get better. Also precoccupied with knowing if her meds would be available since it's a holiday weekend. Pt reassured and encouraged to self reassure. States she remains hopeful that she will feel better soon, but does not think she is "quite there yet."    5/30: Improved since abilify addition, tolerating well  5/31: Sustained improvement, continue abilify; wants to try decrease gabapentin due to belief it's related to diarrhea - will reduce to 200mg po tid  6/1: Some return of anxiety, will increase gabapentin back to 300mg, 200mg, 300mg. Will pursue cognitive testing prior to discharge, neuropsych team informed.  6/2: Improved, likely d/c Tuesday 6/5: anxiety stabilized but possibly having side effects of abilify vs mirtazapine/lexapro combination - decrease lexapro to 10mg po daily and start cogentin 1mg po bid as lower doses were not helpful; r/o psychosomaticism  6/6: abnormal involuntary movements noted, labs wnl, hold abilify and continue decreased lexapro dose of 10mg po daily and continue mirtazapine 45mg po qhs  6/7: myoclonus appears reduced, will continue holding abilify and reduced dose of lexapro  6/8: little to no myoclonus, continue current medications  6/9: myoclonus resolved, no return of significant anxiety after abilify d/c due to adverse effects    PLAN:  1. No indication for CO as pt without SI/HI    2. Psych Meds:  - CONTINUE Remeron 45mg qHS   - continue Gabapentin tid - 300mg, 200mg, 300mg   - CONTINUE Lexapro 10mg daily  - hold abilify 1mg po daily  - CONTINUE Xanax 0.5mg BID prn - not taking more than once daily  - CONTINUE Haldol 2mg q6H prn  - CONTINUE Zofran 8mg q8H prn  - Plan discussed with pt who is in agreement with plan of care    3. Medical Comorbidities:   - HTN, palpitations - CONTINUE Metoprolol 25mg BID. BP and HR stable on this dose.  - Leg edema (likely dependant edema) - improved after resuming HCTZ 12.5mg daily   - myoclonus: neurology consult appreciate - likely drug induced, now largely resolved    4. Labs: none    DISPO:  - likely discharge Monday

## 2023-06-12 NOTE — BH INPATIENT PSYCHIATRY DISCHARGE NOTE - MSE UNSTRUCTURED FT
Appearance: intact hygiene and grooming; stable gait; no myoclonus noted  Behavior: pleasant, cooperative, no psychomotor agitation/retardation  Speech:  wnl  Mood: anxious  Affect: congruent, stable, reactive  Thought process: linear, logical  Thought content: no delusions elicited; denies SI or HI  Perceptual disturbances: denies AH/VH  Cognition: well oriented, no overt deficits in recall  Abstraction: fair  Fund of knowledge: fair  Insight: adequate  Judgement: intact
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
## 2023-06-13 NOTE — SOCIAL WORK POST DISCHARGE FOLLOW UP NOTE - NSBHSWFOLLOWUP_PSY_ALL_CORE_FT
as requested by pt and with releases inplace. dc documents sent to Simone Ridley LCSW, and MD Dr Grover  PCP

## 2023-06-19 ENCOUNTER — OUTPATIENT (OUTPATIENT)
Dept: OUTPATIENT SERVICES | Facility: HOSPITAL | Age: 73
LOS: 1 days | Discharge: HOME | End: 2023-06-19
Payer: MEDICARE

## 2023-06-19 DIAGNOSIS — Z98.890 OTHER SPECIFIED POSTPROCEDURAL STATES: Chronic | ICD-10-CM

## 2023-06-19 PROCEDURE — 90792 PSYCH DIAG EVAL W/MED SRVCS: CPT

## 2023-06-20 DIAGNOSIS — F41.1 GENERALIZED ANXIETY DISORDER: ICD-10-CM

## 2023-06-20 DIAGNOSIS — K21.9 GASTRO-ESOPHAGEAL REFLUX DISEASE WITHOUT ESOPHAGITIS: ICD-10-CM

## 2023-06-20 DIAGNOSIS — I10 ESSENTIAL (PRIMARY) HYPERTENSION: ICD-10-CM

## 2023-06-20 DIAGNOSIS — E78.5 HYPERLIPIDEMIA, UNSPECIFIED: ICD-10-CM

## 2023-06-20 DIAGNOSIS — F33.1 MAJOR DEPRESSIVE DISORDER, RECURRENT, MODERATE: ICD-10-CM

## 2023-06-28 PROCEDURE — 99212 OFFICE O/P EST SF 10 MIN: CPT | Mod: 95

## 2023-06-28 PROCEDURE — 90833 PSYTX W PT W E/M 30 MIN: CPT | Mod: 95

## 2023-07-12 PROCEDURE — 99212 OFFICE O/P EST SF 10 MIN: CPT | Mod: 95

## 2023-07-12 PROCEDURE — 90833 PSYTX W PT W E/M 30 MIN: CPT | Mod: 95

## 2023-07-20 PROCEDURE — 99214 OFFICE O/P EST MOD 30 MIN: CPT | Mod: 95

## 2023-07-27 PROCEDURE — 90833 PSYTX W PT W E/M 30 MIN: CPT | Mod: 95

## 2023-07-27 PROCEDURE — 99212 OFFICE O/P EST SF 10 MIN: CPT | Mod: 95

## 2023-08-02 PROCEDURE — 99214 OFFICE O/P EST MOD 30 MIN: CPT | Mod: 95

## 2023-08-08 ENCOUNTER — APPOINTMENT (OUTPATIENT)
Dept: GASTROENTEROLOGY | Facility: CLINIC | Age: 73
End: 2023-08-08
Payer: COMMERCIAL

## 2023-08-08 VITALS
HEIGHT: 62 IN | OXYGEN SATURATION: 97 % | HEART RATE: 87 BPM | BODY MASS INDEX: 40.67 KG/M2 | WEIGHT: 221 LBS | SYSTOLIC BLOOD PRESSURE: 130 MMHG | DIASTOLIC BLOOD PRESSURE: 82 MMHG

## 2023-08-08 DIAGNOSIS — F41.9 ANXIETY DISORDER, UNSPECIFIED: ICD-10-CM

## 2023-08-08 DIAGNOSIS — K57.92 DIVERTICULITIS OF INTESTINE, PART UNSPECIFIED, W/OUT PERFORATION OR ABSCESS W/OUT BLEEDING: ICD-10-CM

## 2023-08-08 PROCEDURE — 99214 OFFICE O/P EST MOD 30 MIN: CPT

## 2023-08-08 RX ORDER — METRONIDAZOLE 250 MG/1
250 TABLET ORAL
Qty: 21 | Refills: 0 | Status: ACTIVE | COMMUNITY
Start: 2022-02-16 | End: 1900-01-01

## 2023-08-08 RX ORDER — CIPROFLOXACIN HYDROCHLORIDE 250 MG/1
250 TABLET, FILM COATED ORAL
Qty: 14 | Refills: 0 | Status: ACTIVE | COMMUNITY
Start: 2022-02-23 | End: 1900-01-01

## 2023-08-08 RX ORDER — OLANZAPINE 2.5 MG/1
2.5 TABLET, FILM COATED ORAL
Qty: 30 | Refills: 0 | Status: DISCONTINUED | COMMUNITY
Start: 2022-07-05 | End: 2023-08-08

## 2023-08-08 RX ORDER — ARIPIPRAZOLE 2 MG/1
2 TABLET ORAL
Qty: 30 | Refills: 0 | Status: DISCONTINUED | COMMUNITY
Start: 2022-05-03 | End: 2023-08-08

## 2023-08-08 RX ORDER — VENLAFAXINE HYDROCHLORIDE 37.5 MG/1
37.5 CAPSULE, EXTENDED RELEASE ORAL
Qty: 30 | Refills: 0 | Status: DISCONTINUED | COMMUNITY
Start: 2022-03-01 | End: 2023-08-08

## 2023-08-08 RX ORDER — BUPROPION HYDROCHLORIDE 150 MG/1
150 TABLET, FILM COATED, EXTENDED RELEASE ORAL
Refills: 0 | Status: ACTIVE | COMMUNITY

## 2023-08-08 RX ORDER — VENLAFAXINE HYDROCHLORIDE 75 MG/1
75 CAPSULE, EXTENDED RELEASE ORAL
Qty: 60 | Refills: 0 | Status: DISCONTINUED | COMMUNITY
Start: 2022-03-24 | End: 2023-08-08

## 2023-08-08 RX ORDER — BREXPIPRAZOLE 0.5 MG/1
0.5 TABLET ORAL
Qty: 30 | Refills: 0 | Status: DISCONTINUED | COMMUNITY
Start: 2022-08-02 | End: 2023-08-08

## 2023-08-08 RX ORDER — ARIPIPRAZOLE 2 MG/1
2 TABLET ORAL
Refills: 0 | Status: DISCONTINUED | COMMUNITY
End: 2023-08-08

## 2023-08-08 NOTE — PHYSICAL EXAM
[Alert] : alert [Normal Voice/Communication] : normal voice/communication [Healthy Appearing] : healthy appearing [No Acute Distress] : no acute distress [Sclera] : the sclera and conjunctiva were normal [Hearing Threshold Finger Rub Not Dukes] : hearing was normal [Normal Lips/Gums] : the lips and gums were normal [Oropharynx] : the oropharynx was normal [Normal Appearance] : the appearance of the neck was normal [No Neck Mass] : no neck mass was observed [No Respiratory Distress] : no respiratory distress [No Acc Muscle Use] : no accessory muscle use [Respiration, Rhythm And Depth] : normal respiratory rhythm and effort [Auscultation Breath Sounds / Voice Sounds] : lungs were clear to auscultation bilaterally [Heart Rate And Rhythm] : heart rate was normal and rhythm regular [Normal S1, S2] : normal S1 and S2 [Murmurs] : no murmurs [Bowel Sounds] : normal bowel sounds [No Masses] : no abdominal mass palpated [Abdomen Soft] : soft [] : no hepatosplenomegaly [No CVA Tenderness] : no CVA  tenderness [No Spinal Tenderness] : no spinal tenderness [Abnormal Walk] : normal gait [Oriented To Time, Place, And Person] : oriented to person, place, and time [de-identified] : LLQ tendernesss and mild guarding

## 2023-08-08 NOTE — ASSESSMENT
[FreeTextEntry1] : Patient is better as far as her anxiety disorder and her nausea.  Her psychotropic medications have been streamlined. She does complain of left lower quadrant pain and has left lower quadrant tenderness and mild guarding.  This is similar to her symptoms last year when she had acute diverticulitis.  Cipro/Flagyl will be ordered.  She will try and get us a copy of her previous colonoscopy report which was done less than a year ago.

## 2023-08-08 NOTE — REASON FOR VISIT
[Post Hospitalization] : a post hospitalization visit [FreeTextEntry1] : LLQ pain, Anxiety disorder with multiple hospitalizations

## 2023-08-08 NOTE — HISTORY OF PRESENT ILLNESS
[FreeTextEntry1] : Patient is a 72 year-old female referred by Dr. Grover for complaints of  nausea especially in the morning.  She has a marked generalized anxiety disorder for which she takes several medications including Effexor, mirtazapine, gabapentin, and Klonopin.  She also takes Zofran 8mg on an as needed basis.  The symptoms are associated with decreased appetite.  She had an upper endoscopy in 6/2020 that was essentially normal.  There was no hiatus hernia or evidence of esophagitis or gastritis.  Pathology revealed chronic nonspecific gastritis with negative H. pylori.  Esophageal biopsies revealed reflux type esophagitis with some intestinal metaplasia.    She denies any dysphagia. Months ago, patient developed abdominal pain and was referred for a CAT scan which revealed noncomplicated acute diverticulitis.  She was treated with Cipro/Flagyl with relief of her symptoms and resolution of her diverticulitis.  She no longer has any abdominal pain and her bowel movements are regular.  She denies seeing any blood in the stool and has no abdominal pain.  She apparently underwent a colonoscopy 2-3 years ago which was normal.  Patient has had multiple attacks of anxiety/depression.  She was hospitalized on several occasions.  She is under psychiatric care at this time.  Her antidepressant was changed from Abilify to Rexulti.  She seems to be doing better on this regimen.  She continues to take gabapentin, metoprolol, mirtazapine, Effexor, and Klonopin. She still gets occasional nausea in the morning which is relieved with Zofran 8 mg.  She has no episodes of heartburn or vomiting.  3/21/2023-patient developed a recurrence of her nausea which is severe.  This usually occurs in the morning and is associated with some gurgling in the abdomen.  She was seen in the emergency room on 2 occasions.  She was prescribed Protonix which gives her side effects and also Reglan which she did not take.  The nausea occurs when she first gets up in the morning.  She is not able to tolerate Zofran 8 mg.  4 mg is not strong enough to relieve her symptoms.  Blood work from the emergency room revealed H/H13.8/44.6, CHEM screen profile was normal, hemoglobin A1c C was 5.9.  8/8/2023-patient with recent multiple hospitalizations for anxiety disorder.  She is now on Wellbutrin.  Her nausea seems to have improved.  She continues to take a PPI daily. For the past week she has been complaining of left lower quadrant discomfort.  There is no fever.  She does have the discomfort relieved with a bowel movement and passing flatus.  Her bowel movements have changed somewhat.  She apparently had a colonoscopy in January elsewhere She had a CT scan last year that revealed acute diverticulitis.  She claims the symptoms are similar to her symptoms last year. .

## 2023-08-11 PROCEDURE — 90833 PSYTX W PT W E/M 30 MIN: CPT | Mod: 95

## 2023-08-11 PROCEDURE — 99212 OFFICE O/P EST SF 10 MIN: CPT | Mod: 95

## 2023-08-22 PROCEDURE — 90833 PSYTX W PT W E/M 30 MIN: CPT

## 2023-08-22 PROCEDURE — 99212 OFFICE O/P EST SF 10 MIN: CPT | Mod: 95

## 2023-09-01 PROCEDURE — 99442: CPT

## 2023-09-05 PROCEDURE — 99214 OFFICE O/P EST MOD 30 MIN: CPT | Mod: 95

## 2023-09-11 PROCEDURE — 99212 OFFICE O/P EST SF 10 MIN: CPT | Mod: 95

## 2023-09-11 PROCEDURE — 90833 PSYTX W PT W E/M 30 MIN: CPT

## 2023-09-18 PROCEDURE — 99212 OFFICE O/P EST SF 10 MIN: CPT | Mod: 95

## 2023-09-18 PROCEDURE — 90833 PSYTX W PT W E/M 30 MIN: CPT

## 2023-09-26 PROCEDURE — 99442: CPT

## 2023-09-29 PROCEDURE — 90833 PSYTX W PT W E/M 30 MIN: CPT

## 2023-09-29 PROCEDURE — 99212 OFFICE O/P EST SF 10 MIN: CPT | Mod: 95

## 2023-10-05 PROCEDURE — 99212 OFFICE O/P EST SF 10 MIN: CPT | Mod: 95

## 2023-10-05 PROCEDURE — 90833 PSYTX W PT W E/M 30 MIN: CPT

## 2023-10-06 NOTE — BH SOCIAL WORK INITIAL PSYCHOSOCIAL EVALUATION - TRANSPORTATION
INPATIENT CONSULT    Patient: Maximo Ibrahim Attending: Carlos Vaughan MD   : 1939 Date of Admission:  10/5/2023   AGE: 84 year old Current Hospital Day:  Hospital Day: 2   SEX: male Date:  10/6/2023 11:59 AM   Code Status: Full Resuscitation        Chief Complaint:    Colovesical fistula     HPI:  I was asked to see Maximo Ibrahim at the request of Dr. Vaughan for urologic consultation. Maximo Ibrahim is a 84 year old male patient admitted with Bacteremia [R78.81] .      Maximo Ibrahim is a 84 y.o. male with PMH significant for CAD, HLD, COPD.     He has a recent diagnosis of a colovesical fistula for which he has had a indwelling catheter in place pending GI/General surgery workup to determine surgical plan. He was seen in the ED 10/4/23 for suprapubic pain. His workup in the ED revealed UTI and he was started on cefpodoxime based on prior culture result, his romo catheter was exchanged, and he was discharged. He was called to return to hospital yesterday with positive blood cultures.     Maximo is seen sitting in bed. He denies pain currently. Denies fever or chills. Denies nausea or vomiting.     Past Medical History:   Past Medical History:   Diagnosis Date    Cervical radiculopathy 2019    Cholelithiases 2019    Coronary artery disease involving native coronary artery of native heart without angina pectoris 2019    BMS to LCx in 2008 and 40% lesion in LAD    Gastroesophageal reflux disease without esophagitis 2019    Mixed hyperlipidemia 2019    Other male erectile dysfunction 2019    Panlobular emphysema (CMD) 2019    PUD (peptic ulcer disease) 2019    History of perforated ulcer    Severe protein-calorie malnutrition (CMD) 2023       Surgical History:  Past Surgical History:   Procedure Laterality Date    Exploratory laparotomy w/ bowel resection      Hemorrhoidectomy      Ptca      Tonsillectomy      Tympanostomy tube placement     :    Family  History:  Family History   Problem Relation Age of Onset    Early death Mother     Stroke Mother     Cancer Father        Social History:  Social History     Socioeconomic History    Marital status: /Civil Union     Spouse name: Not on file    Number of children: Not on file    Years of education: Not on file    Highest education level: Not on file   Occupational History    Not on file   Tobacco Use    Smoking status: Former     Current packs/day: 0.00     Types: Cigarettes     Quit date:      Years since quittin.7    Smokeless tobacco: Never   Vaping Use    Vaping Use: Former   Substance and Sexual Activity    Alcohol use: Not Currently    Drug use: Never    Sexual activity: Not Currently     Partners: Female   Other Topics Concern    Not on file   Social History Narrative    Not on file     Social Determinants of Health     Financial Resource Strain: Low Risk  (10/5/2023)    Financial Resource Strain     Social Determinants: Financial Resource Strain: None   Food Insecurity: No Food Insecurity (10/5/2023)    Food Insecurity     Social Determinants: Food Insecurity: Never   Transportation Needs: No Transportation Needs (10/5/2023)    PRAPARE - Transportation     Lack of Transportation (Medical): No     Lack of Transportation (Non-Medical): No   Physical Activity: Inactive (9/15/2023)    Exercise Vital Sign     Days of Exercise per Week: 0 days     Minutes of Exercise per Session: 0 min   Stress: Not on file   Social Connections: Socially Integrated (10/5/2023)    Social Connections     Social Determinants: Social Connections: 3 to 5 times a week   Intimate Partner Violence: Not At Risk (10/5/2023)    Intimate Partner Violence     Social Determinants: Intimate Partner Violence Past Fear: No     Social Determinants: Intimate Partner Violence Current Fear: No       Allergies:  ALLERGIES:   Allergen Reactions    Atorvastatin Other (See Comments)    Penicillins HIVES    Rosuvastatin MYALGIA       Problem  List:   Active Hospital Problems    *Bacteremia         Prior to Admit Meds:   Medications Prior to Admission   Medication Sig Dispense Refill    cefpodoxime (VANTIN) 200 MG tablet Take 1 tablet by mouth in the morning and 1 tablet in the evening. Do all this for 14 days. 28 tablet 0    metroNIDAZOLE (FLAGYL) 500 MG tablet Take 1 tablet by mouth in the morning and 1 tablet at noon and 1 tablet in the evening. Do all this for 21 days. 63 tablet 0    ciprofloxacin (CIPRO) 500 MG tablet Take 1 tablet by mouth in the morning and 1 tablet in the evening. Do all this for 21 days. 42 tablet 0    electrolyte/PEG 3350 (Golytely) 236 g solution Take 4,000 mLs by mouth as directed. 4000 mL 0    ondansetron (ZOFRAN) 4 MG tablet Take 1 tablet at 4pm and 1 tablet at 8pm 2 tablet 0    NON FORMULARY Take 2 tablets by mouth daily. Shaklee Vitamin      VITAMIN D PO Take 1 tablet by mouth daily.      Ascorbic Acid (VITAMIN C PO) Take 1 tablet by mouth daily.      Multiple Vitamin (MULTIVITAMIN PO) Take 1 tablet by mouth daily.      oxybutynin (DITROPAN) 5 MG tablet Take 1 tablet by mouth 3 times daily as needed (bladder spasms). 30 tablet 1    fluticasone-salmeterol 500-50 MCG/ACT inhaler INHALE 1 INHALATION BY MOUTH TWICE A DAY FOR COPD RINSE MOUTH AFTER EACH USE. (REPLACES SYMBICORT)      polyvinyl alcohol-povidone PF (REFRESH CLASSIC EYE DROPS) 1.4-0.6 % ophthalmic solution INSTILL 1 DROP INTO EACH EYE FOUR TIMES A DAY AS NEEDED TO RELIEVE DRY OR IRRITATED EYES      acetaminophen (TYLENOL) 500 MG tablet Take 1,000 mg by mouth as needed for Pain.      Ferrous Sulfate (Iron) 325 (65 Fe) MG Tab Take 325 mg by mouth daily.      albuterol (PROAIR RESPICLICK) 108 (90 Base) MCG/ACT inhaler Inhale 2 puffs into the lungs every 4 hours as needed for Shortness of Breath or Wheezing.      Tiotropium Bromide Monohydrate (SPIRIVA HANDIHALER IN) Inhale 2 puffs into the lungs in the morning and 2 puffs in the evening.      oxygen (O2) gas Inhale  into the lungs as needed.         Current Hospital Meds:  Current Facility-Administered Medications   Medication    sodium chloride 0.9 % flush bag 25 mL    sodium chloride 0.9 % injection 2 mL    ceFEPIme (MAXIPIME) 2,000 mg in sodium chloride 0.9 % 100 mL IVPB    sodium chloride (NORMAL SALINE) 0.9 % bolus 500 mL    ondansetron (ZOFRAN) injection 4 mg    acetaminophen (TYLENOL) tablet 650 mg    traMADol (ULTRAM) tablet 50 mg    aluminum-magnesium hydroxide-simethicone (MAALOX) 200-200-20 MG/5ML suspension 30 mL    sodium chloride 0.9 % flush bag 500 mL    enoxaparin (LOVENOX) injection 40 mg    sodium chloride 0.9 % with KCl 20 mEq infusion    Potassium Standard Replacement Protocol (Levels 3.5 and lower)    Magnesium Standard Replacement Protocol    polyethylene glycol (MIRALAX) packet 17 g    magnesium hydroxide (MILK OF MAGNESIA) 400 MG/5ML suspension 15 mL       Review of Systems:  Constitutional: Patient denies fever, chills, or unintended weight loss.  Ear/Nose/Throat/Mouth: Patient denies ear infection, sore throat or sinus congestion.  Respiratory: Patient denies wheezing, frequent coughing, shortness of breath or chronic obstructive pulmonary disease.  Cardiovascular: Patient denies chest pain, palpitations, or shortness of breath.  Gastrointestinal: Patient denies abdominal pain, nausea, vomiting, indigestion/heartburn, diarrhea or constipation.  Neurological: Patient denies tremors, dizzy spells, numbness/tingling or seizures.  Endocrine: Patient denies excessive thirst, too hot, too cold, tired/sluggish or thyroid disease.  Integumentary: Patient denies skin rash, boils or persistent itch.  Musculoskeletal: Patient denies joint pain, neck pain, or back pain..  Genitourinary: See symptoms listed in HPI.  Hematologic/Lymphatic: Patient denies swollen glands or blood clotting problem.  Psychologic: Patient states satisfaction with life and denies depression or suicidal ideations.    PHYSICAL  EXAM:  Vitals:    10/06/23 1146   BP: 121/66   Pulse: 77   Resp:    Temp: 97.5 °F (36.4 °C)     CONSTITUTIONAL: Alert, oriented, appears in no acute distress.  HEENT: Head is normocephalic, atraumatic. Sclerae white, conjunctivae pink. Bilateral external ears normal. Hearing grossly intact. Nasal mucosa pink, no sinus drainage.   NECK: Supple. Trachea midline.  RESPIRATORY:  Normal respiratory effort.  CARDIOVASCULAR:  No edema noted.   ABDOMEN: Soft. Non-distended. There is no tenderness to palpation. .  Genitourinary: Monaco catheter in place with yellow output.  Skin: No abnormal skin lesions noted.   Muscoluskeletal: No clubbing, no cyanosis, no edema.  Neurological: Cranial nerves II through XII grossly intact, moves all extremities.  Psychicatric: Oriented to person, place and time. Mood indicates no abnormalities. Doesn't appear to be depressed or agitated.       Vital Signs  Vital Last Value 24 Hour Range   Temperature 97.5 °F (36.4 °C) Temp  Min: 97.5 °F (36.4 °C)  Max: 98.8 °F (37.1 °C)   Pulse 77 Pulse  Min: 74  Max: 87   Respiratory 18 Resp  Min: 16  Max: 18   Blood Pressure 121/66 BP  Min: 108/59  Max: 123/58   Pulse Oximetry 98 % SpO2  Min: 95 %  Max: 98 %         Diagnostics:  XR CHEST PA OR AP 1 VIEW    Result Date: 10/5/2023  XR CHEST AP OR PA HISTORY:  . Fever.  Fever COMPARISON: 10/4/2023 TECHNIQUE:  Single, AP upright view of the chest. FINDINGS: There is poor inspiration. The cardiomediastinal contour and pulmonary vasculature are within normal limits. No focal consolidation. No definite pleural effusion. No pneumothorax. The visualized osseous structures demonstrate no acute abnormality.     IMPRESSION: 1.   No evidence for active disease in the chest. Electronically Signed by: Candelaria Bell MD Signed on: 10/5/2023 3:34 PM Created on Workstation ID: [id number] Signed on Workstation ID: [id number]      LABORATORY DATA:     WBC (K/mcL)   Date Value   10/06/2023 9.1     RBC (mil/mcL)   Date Value    10/06/2023 3.55 (L)     HCT (%)   Date Value   10/06/2023 26.7 (L)     HGB (g/dL)   Date Value   10/06/2023 8.2 (L)     PLT (K/mcL)   Date Value   10/06/2023 465 (H)     LASTLABX(sodium)@  Potassium (mmol/L)   Date Value   10/06/2023 3.7     Chloride (mmol/L)   Date Value   10/06/2023 104     Glucose (mg/dL)   Date Value   10/06/2023 114 (H)     Calcium (mg/dL)   Date Value   10/06/2023 8.2 (L)     Carbon Dioxide (mmol/L)   Date Value   10/06/2023 23     BUN (mg/dL)   Date Value   10/06/2023 14     Creatinine (mg/dL)   Date Value   10/06/2023 0.93       Assessment/Plan:    Maximo Ibrahim is a 84 y.o. male admitted with bacteremia. He has a known colovesical fistula with romo catheter in place and GI/General surgery workup in process out pt.     Colovesical fistula/UTI  -Continue romo catheter (exchanged 10/4/23)  -Urine culture pending, blood culture with E.coli, abx under direction of ID    There is no acute  intervention warranted. Pending GI/general surgery workup romo catheter needs to remain in place. If patient is surgical candidate urology will repair bladder at that time. For now continue catheter exchanges every 4 weeks.      I had a brief counseling session with the patient concerning the diagnosis and treatment options.     I performed a brief review of the patient's medical records.     Tests reviewed: CBC, CMP, Urine culture, Blood culture    Tests ordered:  None    The total time spent in review of films, chart review, history and physical examination, discussion with care team and referring provider, and discussion with attending urologist totaled 30 minutes.     ISAIAH Castillo.      I  have independently seen and evaluated the patient and have discussed the case and formulated the plan with ISAIAH Boyer.      Prabhakar Swanson Jr., MD [facility]  Department of Urology  [facility]  [phone]                    no

## 2023-10-12 PROCEDURE — 99212 OFFICE O/P EST SF 10 MIN: CPT | Mod: 95

## 2023-10-12 PROCEDURE — 90833 PSYTX W PT W E/M 30 MIN: CPT

## 2023-10-13 ENCOUNTER — APPOINTMENT (OUTPATIENT)
Dept: GASTROENTEROLOGY | Facility: CLINIC | Age: 73
End: 2023-10-13
Payer: COMMERCIAL

## 2023-10-13 ENCOUNTER — APPOINTMENT (OUTPATIENT)
Dept: GASTROENTEROLOGY | Facility: CLINIC | Age: 73
End: 2023-10-13

## 2023-10-13 DIAGNOSIS — R11.0 NAUSEA: ICD-10-CM

## 2023-10-13 DIAGNOSIS — Z12.11 ENCOUNTER FOR SCREENING FOR MALIGNANT NEOPLASM OF COLON: ICD-10-CM

## 2023-10-13 DIAGNOSIS — R12 HEARTBURN: ICD-10-CM

## 2023-10-13 PROCEDURE — 99214 OFFICE O/P EST MOD 30 MIN: CPT

## 2023-10-13 RX ORDER — CLONAZEPAM 1 MG
1 TABLET,DISINTEGRATING ORAL
Refills: 0 | Status: ACTIVE | COMMUNITY

## 2023-10-13 RX ORDER — SODIUM PICOSULFATE, MAGNESIUM OXIDE, AND ANHYDROUS CITRIC ACID 10; 3.5; 12 MG/160ML; G/160ML; G/160ML
10-3.5-12 MG-GM LIQUID ORAL
Qty: 1 | Refills: 0 | Status: ACTIVE | COMMUNITY
Start: 2023-10-13 | End: 1900-01-01

## 2023-10-13 RX ORDER — PROPRANOLOL HYDROCHLORIDE AND HYDROCHLOROTHIAZIDE 80; 25 MG/1; MG/1
TABLET ORAL
Refills: 0 | Status: ACTIVE | COMMUNITY

## 2023-10-18 NOTE — BH PSYCHOLOGY - CLINICIAN PSYCHOTHERAPY NOTE - NSTXSUICIDGOALOTHER_PSY_ALL_CORE
Patient will stabilize mood & alleviate sx of SI to engage with discharge planning.
Patient will stabilize mood & alleviate sx of SI to engage with discharge planning.
No

## 2023-11-04 NOTE — ED PROVIDER NOTE - OBJECTIVE STATEMENT
68 yo F with pmhx of HTN, HLD, anxiety, panic attack presents with 1 week of palpitations. Admitted for depression in November, started on Diazepam, Lexapro and Bupropion. States medications have stopped working for her. Feels anxious. Had cardiac workup - including nuclear stress test and echo was negative 2 weeks ago. Hx of tachycardia - takes metoprolol 50mg - no change in medication. Denies suicidal/homicidal thoughts. Denies hx of anemia, black/dark stools, hx of thyroid disease (negative TSH in november). HPI  Pt is a 77yo M admitted [facility] for Gastric outlet obs, UTI and ARF.   Pt was seen and examined at bedside. No overnight complaints. tolerate bfast well. uring culture sensitivity pending. Denies of any abd pain, N/V/D     Vital Signs Last 24 Hrs  T(C): 36.6 (04 Nov 2023 08:00), Max: 36.6 (04 Nov 2023 08:00)  T(F): 97.9 (04 Nov 2023 08:00), Max: 97.9 (04 Nov 2023 08:00)  HR: 70 (04 Nov 2023 08:00) (70 - 78)  BP: 133/45 (04 Nov 2023 08:00) (121/45 - 161/55)  BP(mean): 71 (04 Nov 2023 08:00) (67 - 86)  RR: 14 (04 Nov 2023 08:00) (12 - 18)  SpO2: 98% (04 Nov 2023 08:00) (97% - 100%)    Parameters below as of 04 Nov 2023 08:00  Patient On (Oxygen Delivery Method): room air        I&O's Summary    03 Nov 2023 07:01  -  04 Nov 2023 07:00  --------------------------------------------------------  IN: 2000 mL / OUT: 2075 mL / NET: -75 mL    04 Nov 2023 08:01  -  04 Nov 2023 12:08  --------------------------------------------------------  IN: 0 mL / OUT: 150 mL / NET: -150 mL        CAPILLARY BLOOD GLUCOSE          PHYSICAL EXAM:    Constitutional: NAD, awake and alert, well-developed  HEENT: PERR, EOMI, Normal Hearing, MMM  Neck: Soft and supple, No LAD, No JVD  Respiratory: Breath sounds are clear bilaterally, No wheezing, rales or rhonchi  Cardiovascular: S1 and S2, regular rate and rhythm, no Murmurs, gallops or rubs  Gastrointestinal: Bowel Sounds present, soft, nontender, nondistended, no guarding, no rebound  Extremities: No peripheral edema  Vascular: 2+ peripheral pulses      MEDICATIONS:  MEDICATIONS  (STANDING):  ascorbic acid 500 milliGRAM(s) Oral daily  aspirin  chewable 81 milliGRAM(s) Oral daily  cholestyramine Powder (Sugar-Free) 4 Gram(s) Oral daily  gabapentin 300 milliGRAM(s) Oral every 8 hours  meropenem Injectable 500 milliGRAM(s) IV Push every 12 hours  mirtazapine 7.5 milliGRAM(s) Oral at bedtime  pantoprazole  Injectable 40 milliGRAM(s) IV Push every 12 hours  sertraline 50 milliGRAM(s) Oral daily  sodium bicarbonate  Infusion 0.146 mEq/kG/Hr (75 mL/Hr) IV Continuous <Continuous>      LABS: All Labs Reviewed:                        8.6    7.89  )-----------( 280      ( 04 Nov 2023 06:06 )             26.1     11-04    145  |  113<H>  |  34.0<H>  ----------------------------<  79  3.7   |  22.0  |  1.90<H>    Ca    7.9<L>      04 Nov 2023 06:06  Phos  4.3     11-03  Mg     1.8     11-03    TPro  5.7<L>  /  Alb  2.4<L>  /  TBili  <0.2<L>  /  DBili  x   /  AST  15  /  ALT  15  /  AlkPhos  88  11-04          Blood Culture: 11-02 @ 08:36  Organism --  Gram Stain Blood -- Gram Stain --  Specimen Source Clean Catch Clean Catch (Midstream)  Culture-Blood --    11-02 @ 08:34  Organism --  Gram Stain Blood -- Gram Stain --  Specimen Source .Blood Blood-Peripheral  Culture-Blood --    11-02 @ 08:18  Organism --  Gram Stain Blood -- Gram Stain --  Specimen Source .Blood Blood-Peripheral  Culture-Blood --        RADIOLOGY/EKG:    DVT PPX:    ADVANCED DIRECTIVE:    DISPOSITION:

## 2023-11-24 ENCOUNTER — APPOINTMENT (OUTPATIENT)
Dept: GASTROENTEROLOGY | Facility: HOSPITAL | Age: 73
End: 2023-11-24

## 2023-12-31 RX ORDER — PANTOPRAZOLE 40 MG/1
40 TABLET, DELAYED RELEASE ORAL DAILY
Qty: 1 | Refills: 1 | Status: ACTIVE | COMMUNITY
Start: 2022-07-18 | End: 1900-01-01

## 2024-01-09 ENCOUNTER — INPATIENT (INPATIENT)
Facility: HOSPITAL | Age: 74
LOS: 43 days | Discharge: HOME | End: 2024-02-22
Attending: PSYCHIATRY & NEUROLOGY | Admitting: PSYCHIATRY & NEUROLOGY
Payer: COMMERCIAL

## 2024-01-09 VITALS
TEMPERATURE: 99 F | HEART RATE: 107 BPM | DIASTOLIC BLOOD PRESSURE: 82 MMHG | OXYGEN SATURATION: 97 % | SYSTOLIC BLOOD PRESSURE: 126 MMHG

## 2024-01-09 DIAGNOSIS — Z98.890 OTHER SPECIFIED POSTPROCEDURAL STATES: Chronic | ICD-10-CM

## 2024-01-09 LAB
ALBUMIN SERPL ELPH-MCNC: 3.9 G/DL — SIGNIFICANT CHANGE UP (ref 3.3–5)
ALBUMIN SERPL ELPH-MCNC: 3.9 G/DL — SIGNIFICANT CHANGE UP (ref 3.3–5)
ALP SERPL-CCNC: 112 U/L — SIGNIFICANT CHANGE UP (ref 40–120)
ALP SERPL-CCNC: 112 U/L — SIGNIFICANT CHANGE UP (ref 40–120)
ALT FLD-CCNC: 23 U/L — SIGNIFICANT CHANGE UP (ref 4–33)
ALT FLD-CCNC: 23 U/L — SIGNIFICANT CHANGE UP (ref 4–33)
ANION GAP SERPL CALC-SCNC: 10 MMOL/L — SIGNIFICANT CHANGE UP (ref 7–14)
ANION GAP SERPL CALC-SCNC: 10 MMOL/L — SIGNIFICANT CHANGE UP (ref 7–14)
APAP SERPL-MCNC: <10 UG/ML — LOW (ref 15–25)
APAP SERPL-MCNC: <10 UG/ML — LOW (ref 15–25)
AST SERPL-CCNC: 18 U/L — SIGNIFICANT CHANGE UP (ref 4–32)
AST SERPL-CCNC: 18 U/L — SIGNIFICANT CHANGE UP (ref 4–32)
BASOPHILS # BLD AUTO: 0.04 K/UL — SIGNIFICANT CHANGE UP (ref 0–0.2)
BASOPHILS # BLD AUTO: 0.04 K/UL — SIGNIFICANT CHANGE UP (ref 0–0.2)
BASOPHILS NFR BLD AUTO: 0.5 % — SIGNIFICANT CHANGE UP (ref 0–2)
BASOPHILS NFR BLD AUTO: 0.5 % — SIGNIFICANT CHANGE UP (ref 0–2)
BILIRUB SERPL-MCNC: 0.3 MG/DL — SIGNIFICANT CHANGE UP (ref 0.2–1.2)
BILIRUB SERPL-MCNC: 0.3 MG/DL — SIGNIFICANT CHANGE UP (ref 0.2–1.2)
BUN SERPL-MCNC: 7 MG/DL — SIGNIFICANT CHANGE UP (ref 7–23)
BUN SERPL-MCNC: 7 MG/DL — SIGNIFICANT CHANGE UP (ref 7–23)
CALCIUM SERPL-MCNC: 9.2 MG/DL — SIGNIFICANT CHANGE UP (ref 8.4–10.5)
CALCIUM SERPL-MCNC: 9.2 MG/DL — SIGNIFICANT CHANGE UP (ref 8.4–10.5)
CHLORIDE SERPL-SCNC: 101 MMOL/L — SIGNIFICANT CHANGE UP (ref 98–107)
CHLORIDE SERPL-SCNC: 101 MMOL/L — SIGNIFICANT CHANGE UP (ref 98–107)
CO2 SERPL-SCNC: 29 MMOL/L — SIGNIFICANT CHANGE UP (ref 22–31)
CO2 SERPL-SCNC: 29 MMOL/L — SIGNIFICANT CHANGE UP (ref 22–31)
CREAT SERPL-MCNC: 0.72 MG/DL — SIGNIFICANT CHANGE UP (ref 0.5–1.3)
CREAT SERPL-MCNC: 0.72 MG/DL — SIGNIFICANT CHANGE UP (ref 0.5–1.3)
EGFR: 88 ML/MIN/1.73M2 — SIGNIFICANT CHANGE UP
EGFR: 88 ML/MIN/1.73M2 — SIGNIFICANT CHANGE UP
EOSINOPHIL # BLD AUTO: 0.1 K/UL — SIGNIFICANT CHANGE UP (ref 0–0.5)
EOSINOPHIL # BLD AUTO: 0.1 K/UL — SIGNIFICANT CHANGE UP (ref 0–0.5)
EOSINOPHIL NFR BLD AUTO: 1.2 % — SIGNIFICANT CHANGE UP (ref 0–6)
EOSINOPHIL NFR BLD AUTO: 1.2 % — SIGNIFICANT CHANGE UP (ref 0–6)
ETHANOL SERPL-MCNC: <10 MG/DL — SIGNIFICANT CHANGE UP
ETHANOL SERPL-MCNC: <10 MG/DL — SIGNIFICANT CHANGE UP
GLUCOSE SERPL-MCNC: 90 MG/DL — SIGNIFICANT CHANGE UP (ref 70–99)
GLUCOSE SERPL-MCNC: 90 MG/DL — SIGNIFICANT CHANGE UP (ref 70–99)
HCT VFR BLD CALC: 41.4 % — SIGNIFICANT CHANGE UP (ref 34.5–45)
HCT VFR BLD CALC: 41.4 % — SIGNIFICANT CHANGE UP (ref 34.5–45)
HGB BLD-MCNC: 13.5 G/DL — SIGNIFICANT CHANGE UP (ref 11.5–15.5)
HGB BLD-MCNC: 13.5 G/DL — SIGNIFICANT CHANGE UP (ref 11.5–15.5)
IANC: 5.11 K/UL — SIGNIFICANT CHANGE UP (ref 1.8–7.4)
IANC: 5.11 K/UL — SIGNIFICANT CHANGE UP (ref 1.8–7.4)
IMM GRANULOCYTES NFR BLD AUTO: 0.5 % — SIGNIFICANT CHANGE UP (ref 0–0.9)
IMM GRANULOCYTES NFR BLD AUTO: 0.5 % — SIGNIFICANT CHANGE UP (ref 0–0.9)
LYMPHOCYTES # BLD AUTO: 2.37 K/UL — SIGNIFICANT CHANGE UP (ref 1–3.3)
LYMPHOCYTES # BLD AUTO: 2.37 K/UL — SIGNIFICANT CHANGE UP (ref 1–3.3)
LYMPHOCYTES # BLD AUTO: 29.4 % — SIGNIFICANT CHANGE UP (ref 13–44)
LYMPHOCYTES # BLD AUTO: 29.4 % — SIGNIFICANT CHANGE UP (ref 13–44)
MCHC RBC-ENTMCNC: 28.1 PG — SIGNIFICANT CHANGE UP (ref 27–34)
MCHC RBC-ENTMCNC: 28.1 PG — SIGNIFICANT CHANGE UP (ref 27–34)
MCHC RBC-ENTMCNC: 32.6 GM/DL — SIGNIFICANT CHANGE UP (ref 32–36)
MCHC RBC-ENTMCNC: 32.6 GM/DL — SIGNIFICANT CHANGE UP (ref 32–36)
MCV RBC AUTO: 86.3 FL — SIGNIFICANT CHANGE UP (ref 80–100)
MCV RBC AUTO: 86.3 FL — SIGNIFICANT CHANGE UP (ref 80–100)
MONOCYTES # BLD AUTO: 0.4 K/UL — SIGNIFICANT CHANGE UP (ref 0–0.9)
MONOCYTES # BLD AUTO: 0.4 K/UL — SIGNIFICANT CHANGE UP (ref 0–0.9)
MONOCYTES NFR BLD AUTO: 5 % — SIGNIFICANT CHANGE UP (ref 2–14)
MONOCYTES NFR BLD AUTO: 5 % — SIGNIFICANT CHANGE UP (ref 2–14)
NEUTROPHILS # BLD AUTO: 5.11 K/UL — SIGNIFICANT CHANGE UP (ref 1.8–7.4)
NEUTROPHILS # BLD AUTO: 5.11 K/UL — SIGNIFICANT CHANGE UP (ref 1.8–7.4)
NEUTROPHILS NFR BLD AUTO: 63.4 % — SIGNIFICANT CHANGE UP (ref 43–77)
NEUTROPHILS NFR BLD AUTO: 63.4 % — SIGNIFICANT CHANGE UP (ref 43–77)
NRBC # BLD: 0 /100 WBCS — SIGNIFICANT CHANGE UP (ref 0–0)
NRBC # BLD: 0 /100 WBCS — SIGNIFICANT CHANGE UP (ref 0–0)
NRBC # FLD: 0 K/UL — SIGNIFICANT CHANGE UP (ref 0–0)
NRBC # FLD: 0 K/UL — SIGNIFICANT CHANGE UP (ref 0–0)
NT-PROBNP SERPL-SCNC: 74 PG/ML — SIGNIFICANT CHANGE UP
NT-PROBNP SERPL-SCNC: 74 PG/ML — SIGNIFICANT CHANGE UP
PLATELET # BLD AUTO: 240 K/UL — SIGNIFICANT CHANGE UP (ref 150–400)
PLATELET # BLD AUTO: 240 K/UL — SIGNIFICANT CHANGE UP (ref 150–400)
POTASSIUM SERPL-MCNC: 3.5 MMOL/L — SIGNIFICANT CHANGE UP (ref 3.5–5.3)
POTASSIUM SERPL-MCNC: 3.5 MMOL/L — SIGNIFICANT CHANGE UP (ref 3.5–5.3)
POTASSIUM SERPL-SCNC: 3.5 MMOL/L — SIGNIFICANT CHANGE UP (ref 3.5–5.3)
POTASSIUM SERPL-SCNC: 3.5 MMOL/L — SIGNIFICANT CHANGE UP (ref 3.5–5.3)
PROT SERPL-MCNC: 6.7 G/DL — SIGNIFICANT CHANGE UP (ref 6–8.3)
PROT SERPL-MCNC: 6.7 G/DL — SIGNIFICANT CHANGE UP (ref 6–8.3)
RBC # BLD: 4.8 M/UL — SIGNIFICANT CHANGE UP (ref 3.8–5.2)
RBC # BLD: 4.8 M/UL — SIGNIFICANT CHANGE UP (ref 3.8–5.2)
RBC # FLD: 12.5 % — SIGNIFICANT CHANGE UP (ref 10.3–14.5)
RBC # FLD: 12.5 % — SIGNIFICANT CHANGE UP (ref 10.3–14.5)
SALICYLATES SERPL-MCNC: <0.3 MG/DL — LOW (ref 15–30)
SALICYLATES SERPL-MCNC: <0.3 MG/DL — LOW (ref 15–30)
SODIUM SERPL-SCNC: 140 MMOL/L — SIGNIFICANT CHANGE UP (ref 135–145)
SODIUM SERPL-SCNC: 140 MMOL/L — SIGNIFICANT CHANGE UP (ref 135–145)
TROPONIN T, HIGH SENSITIVITY RESULT: 7 NG/L — SIGNIFICANT CHANGE UP
TSH SERPL-MCNC: 2.54 UIU/ML — SIGNIFICANT CHANGE UP (ref 0.27–4.2)
TSH SERPL-MCNC: 2.54 UIU/ML — SIGNIFICANT CHANGE UP (ref 0.27–4.2)
WBC # BLD: 8.06 K/UL — SIGNIFICANT CHANGE UP (ref 3.8–10.5)
WBC # BLD: 8.06 K/UL — SIGNIFICANT CHANGE UP (ref 3.8–10.5)
WBC # FLD AUTO: 8.06 K/UL — SIGNIFICANT CHANGE UP (ref 3.8–10.5)
WBC # FLD AUTO: 8.06 K/UL — SIGNIFICANT CHANGE UP (ref 3.8–10.5)

## 2024-01-09 PROCEDURE — 99285 EMERGENCY DEPT VISIT HI MDM: CPT

## 2024-01-09 PROCEDURE — 71045 X-RAY EXAM CHEST 1 VIEW: CPT | Mod: 26

## 2024-01-09 RX ORDER — ONDANSETRON 8 MG/1
4 TABLET, FILM COATED ORAL ONCE
Refills: 0 | Status: COMPLETED | OUTPATIENT
Start: 2024-01-09 | End: 2024-01-09

## 2024-01-09 RX ORDER — ALPRAZOLAM 0.25 MG
0.25 TABLET ORAL ONCE
Refills: 0 | Status: DISCONTINUED | OUTPATIENT
Start: 2024-01-09 | End: 2024-01-09

## 2024-01-09 RX ORDER — SODIUM CHLORIDE 9 MG/ML
3 INJECTION INTRAMUSCULAR; INTRAVENOUS; SUBCUTANEOUS ONCE
Refills: 0 | Status: COMPLETED | OUTPATIENT
Start: 2024-01-09 | End: 2024-01-09

## 2024-01-09 RX ADMIN — ONDANSETRON 4 MILLIGRAM(S): 8 TABLET, FILM COATED ORAL at 21:46

## 2024-01-09 RX ADMIN — Medication 0.25 MILLIGRAM(S): at 21:30

## 2024-01-09 RX ADMIN — SODIUM CHLORIDE 3 MILLILITER(S): 9 INJECTION INTRAMUSCULAR; INTRAVENOUS; SUBCUTANEOUS at 21:35

## 2024-01-09 NOTE — ED ADULT NURSE NOTE - NS ED NURSE AMBULANCES2
[facility]

## 2024-01-09 NOTE — ED PROVIDER NOTE - ATTENDING CONTRIBUTION TO CARE
72 yo F hx HTN, HLD, MDD, sent in by cardiologist for elevated blood pressure with episode of tremors today, palpitations and dizziness. 74 yo F hx HTN, HLD, MDD, sent in by cardiologist for elevated blood pressure with episode of tremors today, palpitations, headache and dizziness upon waking up. Symptoms improved through the day. She notes that symptoms started after being started on Effexor. Denies chest pain, nausea/vomiting, diarrhea. She reports also having suicidal ideations, not wanting to live anymore, no reported plan. Reports worsening depression. Denies auditory/visual hallucination. No homicidal ideations.     VITALS: reviewed  GEN: NAD, A & O x 4  HEAD/EYES: NCAT, EOMI, anicteric sclerae,   ENT: mucus membranes moist, oropharynx WNL, trachea midline,  RESP: lungs CTA with equal breath sounds bilaterally, chest wall nontender and atraumatic  CV: heart with reg rhythm S1, S2, distal pulses intact and symmetric bilaterally  ABDOMEN: normoactive bowel sounds, soft, nondistended, nontender, no palpable masses  : no CVAT  MSK: extremities atraumatic and nontender, no edema, no asymmetry.  SKIN: warm, dry, no rash, no bruising, no cyanosis. color appropriate for ethnicity  NEURO: alert, mentating appropriately, no facial asymmetry.   PSYCH: Affect appropriate    Consider side effects from medications, no signs of serotonin syndrome on exam, lower suspicion for ACS, consider anxiety, r/o hyperthyroidism. Neuro intact, no seizure like episodes, did not bite her tongue, no LOC, no urinary incontinence. Plan for labs, EKG, cardiac monitor, 1:1, psych c/s. TBA for echo and psych eval.

## 2024-01-09 NOTE — ED ADULT NURSE NOTE - NSFALLUNIVINTERV_ED_ALL_ED
Bed/Stretcher in lowest position, wheels locked, appropriate side rails in place/Call bell, personal items and telephone in reach/Instruct patient to call for assistance before getting out of bed/chair/stretcher/Non-slip footwear applied when patient is off stretcher/[place] to call system/Physically safe environment - no spills, clutter or unnecessary equipment/Purposeful proactive rounding/Room/bathroom lighting operational, light cord in reach Bed/Stretcher in lowest position, wheels locked, appropriate side rails in place/Call bell, personal items and telephone in reach/Instruct patient to call for assistance before getting out of bed/chair/stretcher/Non-slip footwear applied when patient is off stretcher/[place] to call system/Physically safe environment - no spills, clutter or unnecessary equipment/Purposeful proactive rounding/Room/bathroom lighting operational, light cord in reach

## 2024-01-09 NOTE — ED PROVIDER NOTE - PHYSICAL EXAMINATION
GENERAL: Awake, alert, NAD  LUNGS: CTAB, no wheezes or crackles   CARDIAC: RRR, no m/r/g  ABDOMEN: Soft, , non tender, non distended, no rebound, no guarding  EXT: No edema, Slight tremor noted on the extremities  NEURO: A&Ox3. Moving all extremities.  SKIN: Warm and dry. No rash.  PSYCH: Normal affect.

## 2024-01-09 NOTE — ED BEHAVIORAL HEALTH NOTE - BEHAVIORAL HEALTH NOTE
Writer met with pt at bedside. Pt provided phone number for  Yehuda ([phone]) for collateral contact.    As per request of provider, writer contacted pt’s  Yehuda Gross ([phone]) to obtain collateral information. The following information is per the .    Pt is a 72 yo female domiciled w/ , hx of anxiety, sent from cardiologist office.    Reason for ed visit:  came home from work and pt said she doesn’t feel good.  took her to cardiologist and he recommended pt come to the ed for worsening anxiety.    Symptoms/Hx: He says he thought pt was doing good and had been compliant w/ medication. He says the pt is always anxious but noticed it had worsened recently.  No current si or hi reported and he is  unsure of past suicide attempt,=. He says pt did not endorse any AVH, paranoia or delusions. He says pt is sleeping ,eating and showering at baseline. He says pt received the  Flu shot on Saturday and does not know if that contributed to the pt’s current symptoms.     Baseline:  anxious at baseline as per  worries often.    Stressors: unknown.    Medication: He did not provide specifics but says pt takes a lot of medication. He did not have medication list.    Medical problems: none reported.    Treatment team: [facility] most recent admission was admitted for 3 months and d/c 2 weeks ago. he also says pt has multiple admissions at [facility]. He says pt is linked to [facility] out patient.     Family hx: unknown.    Violence/aggression: pt is not violent or aggressive. He says pt has no access to firearms or weapons.    Dispo: He says pt would benefit from psych admission w/ how she currently presents. Writer met with pt at bedside. Pt provided phone number for  Yehuda ([phone]) for collateral contact.    As per request of provider, writer contacted pt’s  Yehuda Gross ([phone]) to obtain collateral information. The following information is per the .    Pt is a 74 yo female domiciled w/ , hx of anxiety, sent from cardiologist office.    Reason for ed visit:  came home from work and pt said she doesn’t feel good.  took her to cardiologist and he recommended pt come to the ed for worsening anxiety.    Symptoms/Hx: He says he thought pt was doing good and had been compliant w/ medication. He says the pt is always anxious but noticed it had worsened recently.  No current si or hi reported and he is  unsure of past suicide attempt,=. He says pt did not endorse any AVH, paranoia or delusions. He says pt is sleeping ,eating and showering at baseline. He says pt received the  Flu shot on Saturday and does not know if that contributed to the pt’s current symptoms.     Baseline:  anxious at baseline as per  worries often.    Stressors: unknown.    Medication: He did not provide specifics but says pt takes a lot of medication. He did not have medication list.    Medical problems: none reported.    Treatment team: [facility] most recent admission was admitted for 3 months and d/c 2 weeks ago. he also says pt has multiple admissions at [facility]. He says pt is linked to [facility] out patient.     Family hx: unknown.    Violence/aggression: pt is not violent or aggressive. He says pt has no access to firearms or weapons.    Dispo: He says pt would benefit from psych admission w/ how she currently presents. Writer met with pt at bedside. Pt provided phone number for  Yehuda ([phone]) for collateral contact.    As per request of provider, writer contacted pt’s  Yehuda Gross ([phone]) to obtain collateral information. The following information is per the .    Pt is a 72 yo female domiciled w/ , hx of anxiety, sent from cardiologist office.    Reason for ed visit:  came home from work and pt said she doesn’t feel good.  took her to cardiologist and he recommended pt come to the ed for worsening anxiety.    Symptoms/Hx: He says he thought pt was doing good and had been compliant w/ medication. He says the pt is always anxious but noticed it had worsened recently.  No current si or hi reported and he is  unsure of past suicide attempt,=. He says pt did not endorse any AVH, paranoia or delusions. He says pt is sleeping ,eating and showering at baseline. He says pt received the  Flu shot on Saturday and does not know if that contributed to the pt’s current symptoms.     Baseline:  anxious at baseline as per  worries often.    Stressors: unknown.    Medication: He did not provide specifics but says pt takes a lot of medication. He did not have medication list.    Medical problems: none reported.    Treatment team: [facility] most recent admission was admitted for 3 months and d/c 2 weeks ago. he also says pt has multiple admissions at [facility]. He says pt is linked to [facility] out patient.     Family hx: unknown.    Violence/aggression: pt is not violent or aggressive. He says pt has no access to firearms or weapons.    Dispo: He says pt would benefit from psych admission w/ how she currently presents. Writer informed  of pt's admission to [facility]. Writer met with pt at bedside. Pt provided phone number for  Yehuda ([phone]) for collateral contact.    As per request of provider, writer contacted pt’s  Yehuda Gross ([phone]) to obtain collateral information. The following information is per the .    Pt is a 72 yo female domiciled w/ , hx of anxiety, sent from cardiologist office.    Reason for ed visit:  came home from work and pt said she doesn’t feel good.  took her to cardiologist and he recommended pt come to the ed for worsening anxiety.    Symptoms/Hx: He says he thought pt was doing good and had been compliant w/ medication. He says the pt is always anxious but noticed it had worsened recently.  No current si or hi reported and he is  unsure of past suicide attempt,=. He says pt did not endorse any AVH, paranoia or delusions. He says pt is sleeping ,eating and showering at baseline. He says pt received the  Flu shot on Saturday and does not know if that contributed to the pt’s current symptoms.     Baseline:  anxious at baseline as per  worries often.    Stressors: unknown.    Medication: He did not provide specifics but says pt takes a lot of medication. He did not have medication list.    Medical problems: none reported.    Treatment team: [facility] most recent admission was admitted for 3 months and d/c 2 weeks ago. he also says pt has multiple admissions at [facility]. He says pt is linked to [facility] out patient.     Family hx: unknown.    Violence/aggression: pt is not violent or aggressive. He says pt has no access to firearms or weapons.    Dispo: He says pt would benefit from psych admission w/ how she currently presents. Writer informed  of pt's admission to [facility].

## 2024-01-09 NOTE — ED BEHAVIORAL HEALTH NOTE - BEHAVIORAL HEALTH NOTE
[facility]  Reference #: [id number]    Practitioner Count: 2  Pharmacy Count: 1  Current Opioid Prescriptions: 0  Current Benzodiazepine Prescriptions: 2  Current Stimulant Prescriptions: 0      Patient Demographic Information (PDI)       PDI	First Name	Last Name	Birth Date	Gender	Street Address	[facility]	Zip Code  A	Cammie Gross	1950	Female	[address]	[facility]	[address]  B	Cammie Gross	1950	Female	[address]	[facility]	[address]    Prescription Information      PDI Filter:    PDI	Current Rx	Drug Type	Rx Written	Rx Dispensed	Drug	Quantity	Days Supply	Prescriber Name	Prescriber NADJA #	Payment Method	Dispenser  A	Y	B	12/20/2023	12/20/2023	alprazolam 0.5 mg tablet	60	30	You Cunningham MD	[id number]	Insurance	[facility]  A	Y	B	12/20/2023	12/20/2023	alprazolam 1 mg tablet	30	30	You Cunningham MD	[id number]	Insurance	[facility]  A	N	B	10/12/2023	10/13/2023	clonazepam 1 mg tablet	60	30	Rohini Kamara MD)	[id number]	Insurance	[facility]  Reference #: [id number]    Practitioner Count: 2  Pharmacy Count: 1  Current Opioid Prescriptions: 0  Current Benzodiazepine Prescriptions: 2  Current Stimulant Prescriptions: 0      Patient Demographic Information (PDI)       PDI	First Name	Last Name	Birth Date	Gender	Street Address	[facility]	Zip Code  A	Cammie Gross	1950	Female	[address]	[facility]	[address]  B	Cammie Gross	1950	Female	[address]	[facility]	[address]    Prescription Information      PDI Filter:    PDI	Current Rx	Drug Type	Rx Written	Rx Dispensed	Drug	Quantity	Days Supply	Prescriber Name	Prescriber NADJA #	Payment Method	Dispenser  A	Y	B	12/20/2023	12/20/2023	alprazolam 0.5 mg tablet	60	30	You Cunningham MD	[id number]	Insurance	[facility]  A	Y	B	12/20/2023	12/20/2023	alprazolam 1 mg tablet	30	30	You Cunningham MD	[id number]	Insurance	[facility]  A	N	B	10/12/2023	10/13/2023	clonazepam 1 mg tablet	60	30	Rohini Kamara MD)	[id number]	Insurance	[facility]

## 2024-01-09 NOTE — ED BEHAVIORAL HEALTH NOTE - BEHAVIORAL HEALTH NOTE
COVID Exposure Screen- Patient    Have you been tested for COVID-19 in the last 90 days?  ( X ) Yes  (  ) No   (  ) Unknown- Reason: _____  IF YES: Date of test(s), type of test(s), result(s) for ALL tests in last 90 days: ________ does not recall details but a swab was done during her most recent psych admission at [facility] (since 10/2023)    In the past 10 days, have you been around anyone with a positive COVID-19 test? (  ) Yes  ( X ) No   (  ) Unknown- Reason: ____  IF YES: Were you closer than 6 feet of them for a total of 15 minutes or more in a 24 hour period? (  ) Yes   (  ) No   ( ) Unknown- Reason: _____ COVID Exposure Screen- Patient    Have you been tested for COVID-19 in the last 90 days?  ( X ) Yes  (  ) No   (  ) Unknown- Reason: _____  IF YES: Date of test(s), type of test(s), result(s) for ALL tests in last 90 days: ________ does not recall details but a swab was done during her most recent psych admission at [facility] (since 10/2023)    In the past 10 days, have you been around anyone with a positive COVID-19 test? (  ) Yes  ( X ) No   (  ) Unknown- Reason: ____  IF YES: Were you closer than 6 feet of them for a total of 15 minutes or more in a 24 hour period? (  ) Yes   (  ) No   ( ) Unknown- Reason: _____

## 2024-01-09 NOTE — ED PROVIDER NOTE - PROGRESS NOTE DETAILS
[place] PGY 3 Psych aware Bartolo Akbar, PGY3 Patient was signed out to me.   On reassessment at bedside, patient stating she has no history of hypertension, hyperlipidemia, diabetes.  Had a nuclear stress test about 3 months ago with Dr. Alford  that was normal at the time.  patient was not having any chest pain, palpitations here in the ED.  Discussed with psychiatry that patient will likely need a follow-up echo.  They will try to get an echo at [facility] or arrange for urgent outpatient follow-up. Bartolo Akbar, PGY3 Patient was signed out to me.   On reassessment at bedside, patient stating she has no history of hypertension, hyperlipidemia, diabetes.  Had a nuclear stress test about 3 months ago with Dr. Alford  that was normal at the time.  patient was not having any chest pain, palpitations here in the ED.  Discussed with psychiatry that patient will likely need a follow-up echo.  They will try to get an echo at [facility] or arrange for urgent outpatient follow-up. Bartolo Akbar, PGY3 Patient was signed out to me.   On reassessment at bedside, patient stating she has no history of hypertension, no diabetes.  Had a nuclear stress test about 3 months ago with Dr. Alford  that was normal at the time.  patient was not having any chest pain, palpitations here in the ED.  Discussed with psychiatry that patient will likely need a follow-up echo.  They will try to get an echo at [facility] or arrange for urgent outpatient follow-up. Bartolo Akbar, PGY3 Patient was signed out to me.   On reassessment at bedside, patient stating she has no history of hypertension, no diabetes.  Had a nuclear stress test about 3 months ago with Dr. Alford  that was normal at the time.  patient was not having any chest pain, palpitations here in the ED.  Discussed with psychiatry that patient will likely need a follow-up echo.  They will try to get an echo at [facility] or arrange for urgent outpatient follow-up. Bartolo Akbar, PGY3 pt requesting her night time medications including lipitor, gabapentin and remeron which pt takes at night.

## 2024-01-09 NOTE — ED PROVIDER NOTE - OBJECTIVE STATEMENT
73-year-old female history of anxiety depression chief complaint palpitations.  Patient complaining of also having shakes / tremors  for the past 3 months .  Patient states that her new medication Effexor which she has been on might be contributing to her symptoms.  Patient also endorsed that she wants to die with no active plan because she feels that she has nothing going for her.  Patient was going to talk herself into her psychiatrist for an admission tomorrow she stated but was ultimately brought him from the cardiologist office per her report after being hypertensive in the office.  Denies any chest pain shortness of breath nausea or vomiting

## 2024-01-09 NOTE — ED ADULT NURSE NOTE - OBJECTIVE STATEMENT
Facilitator RN: Patient received in room 1A. Patient A&Ox3 and ambulatory at baseline. Patient referred to the ED c/o new onset tremor related to new medication. phx GERD, HTN, HLD, anxiety. Patient states approximately 3 weeks ago she was discharged from a psychiatric hospital and sent home with new medications. Patient states since then, she has been experiencing new onset unsteadiness, tremor, nausea, and stutter. Patient states she was referred to come to the ED for evaluation by outpatient provider. Stutter and tremor noted. Airway patent, chest rise equal bilaterally, respirations unlabored. Patient able to speak in complete uninterrupted sentences; patient denies dyspnea, shortness of breath, and chest pain. Abdomen flat, soft and non-distended; patient denies nausea/vomiting and changes in BMs/urine. Pulse/motor/sensory present and no edema noted to all four extremities. 22G IV placed to right hand, labs collected and sent, medications administered as prescribed. Safety measures in place. Patient remains on constant observation with PCA 1:1 for SI, PCA within arms reach, and environment checked for safety. Snack provided per patient request and report given to primary RN Jeana.

## 2024-01-09 NOTE — ED PROVIDER NOTE - CLINICAL SUMMARY MEDICAL DECISION MAKING FREE TEXT BOX
Given history and physical we will workup palpitations tremors could also be TSH/thyroid related.  Will obtain Trope will also get psych clearance labs in conjunction with a ACS workup EKG showed no ischemic signs.  Likely to be admitted

## 2024-01-09 NOTE — ED PROVIDER NOTE - IV ALTEPLASE DOOR HIDDEN
Add 52 Modifier (Optional): no Include Z78.9 (Other Specified Conditions Influencing Health Status) As An Associated Diagnosis?: Yes Consent: The patient's consent was obtained including but not limited to risks of crusting, scabbing, blistering, scarring, darker or lighter pigmentary change, recurrence, incomplete removal and infection. Detail Level: Detailed Medical Necessity Clause: This procedure was medically necessary because the lesions that were treated were: Number Of Freeze-Thaw Cycles: 1 freeze-thaw cycle Spray Paint Text: The liquid nitrogen was applied to the skin utilizing a spray paint frosting technique. Post-Care Instructions: I reviewed with the patient in detail post-care instructions. Patient is to wear sunprotection, and avoid picking at any of the treated lesions. Pt may apply Vaseline to crusted or scabbing areas. Medical Necessity Information: It is in your best interest to select a reason for this procedure from the list below. All of these items fulfill various CMS LCD requirements except the new and changing color options. show

## 2024-01-09 NOTE — ED ADULT TRIAGE NOTE - CHIEF COMPLAINT QUOTE
pt sent to ED by cardiology office, pt c/o palpitation and "shakes".  pt denies pain.  Hx: anxiety.  pt states she was recently placed on a new med effexor

## 2024-01-10 DIAGNOSIS — F41.1 GENERALIZED ANXIETY DISORDER: ICD-10-CM

## 2024-01-10 DIAGNOSIS — F41.0 PANIC DISORDER [EPISODIC PAROXYSMAL ANXIETY]: ICD-10-CM

## 2024-01-10 DIAGNOSIS — F32.9 MAJOR DEPRESSIVE DISORDER, SINGLE EPISODE, UNSPECIFIED: ICD-10-CM

## 2024-01-10 DIAGNOSIS — R45.851 SUICIDAL IDEATIONS: ICD-10-CM

## 2024-01-10 LAB
FLUAV AG NPH QL: SIGNIFICANT CHANGE UP
FLUAV AG NPH QL: SIGNIFICANT CHANGE UP
FLUBV AG NPH QL: SIGNIFICANT CHANGE UP
FLUBV AG NPH QL: SIGNIFICANT CHANGE UP
RSV RNA NPH QL NAA+NON-PROBE: SIGNIFICANT CHANGE UP
RSV RNA NPH QL NAA+NON-PROBE: SIGNIFICANT CHANGE UP
SARS-COV-2 RNA SPEC QL NAA+PROBE: SIGNIFICANT CHANGE UP
SARS-COV-2 RNA SPEC QL NAA+PROBE: SIGNIFICANT CHANGE UP

## 2024-01-10 PROCEDURE — 99285 EMERGENCY DEPT VISIT HI MDM: CPT

## 2024-01-10 RX ORDER — PRIMIDONE 250 MG/1
50 TABLET ORAL ONCE
Refills: 0 | Status: COMPLETED | OUTPATIENT
Start: 2024-01-10 | End: 2024-01-10

## 2024-01-10 RX ORDER — ATORVASTATIN CALCIUM 80 MG/1
10 TABLET, FILM COATED ORAL AT BEDTIME
Refills: 0 | Status: DISCONTINUED | OUTPATIENT
Start: 2024-01-10 | End: 2024-02-22

## 2024-01-10 RX ORDER — MIRTAZAPINE 45 MG/1
45 TABLET, ORALLY DISINTEGRATING ORAL AT BEDTIME
Refills: 0 | Status: DISCONTINUED | OUTPATIENT
Start: 2024-01-10 | End: 2024-01-30

## 2024-01-10 RX ORDER — METOPROLOL TARTRATE 50 MG
50 TABLET ORAL ONCE
Refills: 0 | Status: DISCONTINUED | OUTPATIENT
Start: 2024-01-10 | End: 2024-01-10

## 2024-01-10 RX ORDER — VENLAFAXINE HCL 75 MG
300 CAPSULE, EXT RELEASE 24 HR ORAL DAILY
Refills: 0 | Status: DISCONTINUED | OUTPATIENT
Start: 2024-01-10 | End: 2024-01-10

## 2024-01-10 RX ORDER — MIRTAZAPINE 45 MG/1
45 TABLET, ORALLY DISINTEGRATING ORAL ONCE
Refills: 0 | Status: COMPLETED | OUTPATIENT
Start: 2024-01-10 | End: 2024-01-10

## 2024-01-10 RX ORDER — ALPRAZOLAM 0.25 MG
0.5 TABLET ORAL
Refills: 0 | Status: DISCONTINUED | OUTPATIENT
Start: 2024-01-10 | End: 2024-01-10

## 2024-01-10 RX ORDER — ONDANSETRON 8 MG/1
4 TABLET, FILM COATED ORAL EVERY 12 HOURS
Refills: 0 | Status: DISCONTINUED | OUTPATIENT
Start: 2024-01-10 | End: 2024-01-10

## 2024-01-10 RX ORDER — GABAPENTIN 400 MG/1
200 CAPSULE ORAL ONCE
Refills: 0 | Status: COMPLETED | OUTPATIENT
Start: 2024-01-10 | End: 2024-01-10

## 2024-01-10 RX ORDER — ONDANSETRON 8 MG/1
4 TABLET, FILM COATED ORAL ONCE
Refills: 0 | Status: COMPLETED | OUTPATIENT
Start: 2024-01-10 | End: 2024-01-10

## 2024-01-10 RX ORDER — ONDANSETRON 8 MG/1
4 TABLET, FILM COATED ORAL EVERY 12 HOURS
Refills: 0 | Status: DISCONTINUED | OUTPATIENT
Start: 2024-01-10 | End: 2024-01-11

## 2024-01-10 RX ORDER — ATORVASTATIN CALCIUM 80 MG/1
10 TABLET, FILM COATED ORAL AT BEDTIME
Refills: 0 | Status: DISCONTINUED | OUTPATIENT
Start: 2024-01-10 | End: 2024-01-10

## 2024-01-10 RX ORDER — VENLAFAXINE HCL 75 MG
225 CAPSULE, EXT RELEASE 24 HR ORAL ONCE
Refills: 0 | Status: COMPLETED | OUTPATIENT
Start: 2024-01-10 | End: 2024-01-10

## 2024-01-10 RX ORDER — VENLAFAXINE HCL 75 MG
225 CAPSULE, EXT RELEASE 24 HR ORAL DAILY
Refills: 0 | Status: DISCONTINUED | OUTPATIENT
Start: 2024-01-10 | End: 2024-01-10

## 2024-01-10 RX ORDER — ALPRAZOLAM 0.25 MG
0.5 TABLET ORAL ONCE
Refills: 0 | Status: DISCONTINUED | OUTPATIENT
Start: 2024-01-10 | End: 2024-01-10

## 2024-01-10 RX ORDER — LOSARTAN POTASSIUM 100 MG/1
100 TABLET, FILM COATED ORAL DAILY
Refills: 0 | Status: DISCONTINUED | OUTPATIENT
Start: 2024-01-10 | End: 2024-01-10

## 2024-01-10 RX ORDER — METOPROLOL TARTRATE 50 MG
50 TABLET ORAL ONCE
Refills: 0 | Status: COMPLETED | OUTPATIENT
Start: 2024-01-10 | End: 2024-01-10

## 2024-01-10 RX ORDER — GABAPENTIN 400 MG/1
200 CAPSULE ORAL THREE TIMES A DAY
Refills: 0 | Status: DISCONTINUED | OUTPATIENT
Start: 2024-01-10 | End: 2024-01-17

## 2024-01-10 RX ORDER — METOPROLOL TARTRATE 50 MG
50 TABLET ORAL
Refills: 0 | Status: DISCONTINUED | OUTPATIENT
Start: 2024-01-10 | End: 2024-02-22

## 2024-01-10 RX ORDER — PANTOPRAZOLE SODIUM 20 MG/1
40 TABLET, DELAYED RELEASE ORAL
Refills: 0 | Status: DISCONTINUED | OUTPATIENT
Start: 2024-01-10 | End: 2024-02-01

## 2024-01-10 RX ORDER — ALPRAZOLAM 0.25 MG
0.5 TABLET ORAL EVERY 8 HOURS
Refills: 0 | Status: DISCONTINUED | OUTPATIENT
Start: 2024-01-10 | End: 2024-01-11

## 2024-01-10 RX ORDER — ALPRAZOLAM 0.25 MG
0.5 TABLET ORAL THREE TIMES A DAY
Refills: 0 | Status: DISCONTINUED | OUTPATIENT
Start: 2024-01-10 | End: 2024-01-11

## 2024-01-10 RX ORDER — PRIMIDONE 250 MG/1
50 TABLET ORAL DAILY
Refills: 0 | Status: DISCONTINUED | OUTPATIENT
Start: 2024-01-10 | End: 2024-01-11

## 2024-01-10 RX ORDER — ONDANSETRON 8 MG/1
8 TABLET, FILM COATED ORAL EVERY 12 HOURS
Refills: 0 | Status: DISCONTINUED | OUTPATIENT
Start: 2024-01-10 | End: 2024-01-10

## 2024-01-10 RX ORDER — VENLAFAXINE HCL 75 MG
150 CAPSULE, EXT RELEASE 24 HR ORAL DAILY
Refills: 0 | Status: DISCONTINUED | OUTPATIENT
Start: 2024-01-10 | End: 2024-01-16

## 2024-01-10 RX ORDER — LOSARTAN POTASSIUM 100 MG/1
100 TABLET, FILM COATED ORAL DAILY
Refills: 0 | Status: DISCONTINUED | OUTPATIENT
Start: 2024-01-10 | End: 2024-02-22

## 2024-01-10 RX ADMIN — GABAPENTIN 200 MILLIGRAM(S): 400 CAPSULE ORAL at 13:19

## 2024-01-10 RX ADMIN — MIRTAZAPINE 45 MILLIGRAM(S): 45 TABLET, ORALLY DISINTEGRATING ORAL at 21:02

## 2024-01-10 RX ADMIN — ATORVASTATIN CALCIUM 10 MILLIGRAM(S): 80 TABLET, FILM COATED ORAL at 21:01

## 2024-01-10 RX ADMIN — GABAPENTIN 200 MILLIGRAM(S): 400 CAPSULE ORAL at 01:32

## 2024-01-10 RX ADMIN — ATORVASTATIN CALCIUM 10 MILLIGRAM(S): 80 TABLET, FILM COATED ORAL at 01:32

## 2024-01-10 RX ADMIN — PANTOPRAZOLE SODIUM 40 MILLIGRAM(S): 20 TABLET, DELAYED RELEASE ORAL at 10:45

## 2024-01-10 RX ADMIN — GABAPENTIN 200 MILLIGRAM(S): 400 CAPSULE ORAL at 10:44

## 2024-01-10 RX ADMIN — PRIMIDONE 50 MILLIGRAM(S): 250 TABLET ORAL at 10:45

## 2024-01-10 RX ADMIN — ONDANSETRON 4 MILLIGRAM(S): 8 TABLET, FILM COATED ORAL at 16:11

## 2024-01-10 RX ADMIN — Medication 50 MILLIGRAM(S): at 21:02

## 2024-01-10 RX ADMIN — Medication 0.5 MILLIGRAM(S): at 10:23

## 2024-01-10 RX ADMIN — Medication 0.5 MILLIGRAM(S): at 16:58

## 2024-01-10 RX ADMIN — ONDANSETRON 4 MILLIGRAM(S): 8 TABLET, FILM COATED ORAL at 10:44

## 2024-01-10 RX ADMIN — LOSARTAN POTASSIUM 100 MILLIGRAM(S): 100 TABLET, FILM COATED ORAL at 10:23

## 2024-01-10 RX ADMIN — MIRTAZAPINE 45 MILLIGRAM(S): 45 TABLET, ORALLY DISINTEGRATING ORAL at 01:32

## 2024-01-10 RX ADMIN — Medication 0.5 MILLIGRAM(S): at 21:01

## 2024-01-10 RX ADMIN — GABAPENTIN 200 MILLIGRAM(S): 400 CAPSULE ORAL at 21:01

## 2024-01-10 RX ADMIN — Medication 50 MILLIGRAM(S): at 10:45

## 2024-01-10 RX ADMIN — Medication 225 MILLIGRAM(S): at 10:24

## 2024-01-10 NOTE — BH INPATIENT PSYCHIATRY ASSESSMENT NOTE - MSE UNSTRUCTURED FT
Patient overweight, well groomed, in hospital gown. Has mild tremor, not course, no myoclonic jerks. Speech normal. mood anxious. Denies current Si. Says it is natural to be discouraged when you are this anxious. Worries excessively about ordinary occurrences, no obsessions, complusions, no delusions, hallucinations. Oriented x3. Thinking ruminative, perseverative. Partial insight, fair  judgement

## 2024-01-10 NOTE — ED BEHAVIORAL HEALTH ASSESSMENT NOTE - SUMMARY
Patient is a 73yo female, , retired (), non-caregiver, domiciled at home with her ,  with a prior psychiatric diagnosis of ANSON and MDD, 4 previous inpatient psychiatric hospitalizations at [facility] in 2, and most recently 7/5/2021-7/3/2021 prior SA in 11/2019 by overdosing on pills, no history of NSSIB, no history of violence/aggression, no hx of legal issues, daily marijuana use, PMHx of HTN, hyperlipidemia, [facility] (as below, with last visit 3/15/2023 and at that time documented to be at baseline with chronic anxiety, complaints of nausea, seen twice monthly with mediation mgt and psychotherapy)  BIB spouse for "worsening anxiety."      Patient seen and evaluated. She presented to ED seeking admisssion for severe anxiety, anhdonia and intermittent passive suicidal ideation, despite compliance with medication. Patient does not appear psychotic, manic and denies any changes in appetite or sleep. She appeared well groomed with good eye contact. Patient was made aware there are no [facility] beds and would need to board in the ED overnight. Case discussed with Dr. Zuniga who recommended discharge with f/u with outpatient provider. Patient refused to leave and stated, " I will kill myself if you send me home tonight." Patient reports her spouse is working tonight and early in the morning, and cannot stay home alone. Patient will be admitted on a voluntary status as she cannot engage in safety planning. Discussed urinalysis with medical NP- will send urine culture- does not recommend starting antibiotics at this time. Patient is a 73yo female, , retired (), non-caregiver, domiciled at home with her ,  with a prior psychiatric diagnosis of ANSON and MDD, 4 previous inpatient psychiatric hospitalizations at [place] in 2, and most recently 7/5/2021-7/3/2021 prior SA in 11/2019 by overdosing on pills, no history of NSSIB, no history of violence/aggression, no hx of legal issues, daily marijuana use, PMHx of HTN, hyperlipidemia, [facility] (as below, with last visit 3/15/2023 and at that time documented to be at baseline with chronic anxiety, complaints of nausea, seen twice monthly with mediation mgt and psychotherapy)  BIB spouse for "worsening anxiety."      Patient seen and evaluated. She presented to ED seeking admisssion for severe anxiety, anhdonia and intermittent passive suicidal ideation, despite compliance with medication. Patient does not appear psychotic, manic and denies any changes in appetite or sleep. She appeared well groomed with good eye contact. Patient was made aware there are no [facility] beds and would need to board in the ED overnight. Case discussed with Dr. Zuniga who recommended discharge with f/u with outpatient provider. Patient refused to leave and stated, " I will kill myself if you send me home tonight." Patient reports her spouse is working tonight and early in the morning, and cannot stay home alone. Patient will be admitted on a voluntary status as she cannot engage in safety planning. Discussed urinalysis with medical NP- will send urine culture- does not recommend starting antibiotics at this time. 73/F with hx of depression and anxiety; with multiple psych admissions; prior hx of SA; with hx of THC use - though currently denied. pertinent medical issues: HTN, HLD and GERD. tonight, presented to the ED BIB EMS activated by cardiologist's staff due to complaint of palpitations + chest tightness + hand tremors. psychiatry was consulted for evaluation fo SI    at this time, endorses uncontrolled symptoms of anxiety and depression.  differentials to entertain for anxiety are: panic disorder; ANSON; whereas depressive symptoms meet MDD criteria. there is intermittent passive SI but no intention or plans due to perceived inability to control anxiety symptoms.  Pt has been progressively feeling more anxious, hopeless/ helpless and worthless.      at this time, is not manifesting any signs/ symptom suggestive of acute sadia; Pt is not psychotic. is not delirious. does not appear to be intoxicated.  ongoing symptoms have caused severe functional impairment.  Pt requesting psych admission. once medically cleared, will facilitate transfer to  on 913 status

## 2024-01-10 NOTE — BH INPATIENT PSYCHIATRY ASSESSMENT NOTE - NSBHMETABOLIC_PSY_ALL_CORE_FT
BMI: BMI (kg/m2): 40.4 (01-10-24 @ 09:02)  HbA1c: A1C with Estimated Average Glucose Result: 5.8 % (05-11-23 @ 08:36)    Glucose:   BP: 133/63 (01-10-24 @ 08:02) (111/67 - 133/63)Vital Signs Last 24 Hrs  T(C): 36.8 (01-10-24 @ 09:02), Max: 37.3 (01-09-24 @ 18:50)  T(F): 98.2 (01-10-24 @ 09:02), Max: 99.1 (01-09-24 @ 18:50)  HR: 76 (01-10-24 @ 08:02) (68 - 107)  BP: 133/63 (01-10-24 @ 08:02) (111/67 - 133/63)  BP(mean): 97 (01-10-24 @ 00:58) (97 - 97)  RR: 18 (01-10-24 @ 09:02) (18 - 19)  SpO2: 98% (01-10-24 @ 09:02) (94% - 100%)    Orthostatic VS  01-10-24 @ 09:02  Lying BP: --/-- HR: --  Sitting BP: 151/85 HR: 88  Standing BP: 147/88 HR: 93  Site: upper left arm  Mode: electronic    Lipid Panel: Date/Time: 05-11-23 @ 08:36  Cholesterol, Serum: 175  LDL Cholesterol Calculated: 97  HDL Cholesterol, Serum: 38  Total Cholesterol/HDL Ration Measurement: --  Triglycerides, Serum: 198

## 2024-01-10 NOTE — ED BEHAVIORAL HEALTH ASSESSMENT NOTE - OTHER
CVM, I stop cardiologist office reports inability to function due to severe anxiety; having intermittent passive SI due to uncontrolled anxiety symptoms recent psych discharge, ongoing uncontrolled anxiety symptoms distracted

## 2024-01-10 NOTE — BH INPATIENT PSYCHIATRY ASSESSMENT NOTE - DESCRIPTION
retired (),  for 28 yrs; has no children. is a non-caregiver status living in a private home in [place] with her . not Yazidi. no access to guns retired (),  for 28 yrs; has no children. is a non-caregiver status living in a private home in [place] with her . not Latter-day. no access to guns

## 2024-01-10 NOTE — ED BEHAVIORAL HEALTH ASSESSMENT NOTE - OTHER PAST PSYCHIATRIC HISTORY (INCLUDE DETAILS REGARDING ONSET, COURSE OF ILLNESS, INPATIENT/OUTPATIENT TREATMENT)
Multiple [facility] admission ( last 7/5/2021-7/23/2021) in current treatment at [facility] Multiple [facility] admission ( last 7/5/2021-7/23/2021) in current treatment at [facility] hx of ANSON and MDD  has Multiple [facility] admissions (5/10-6/12/23; 7/5-7/23/21; 4/22-5/24/21; 8/23-9/4/20; 2/18-2/27/20 and 11/13-11/22/19)  most recent discharge from [facility] x 3 weeks ago after a 2 month stay for mgt of depression + anxiety  - reported that she underwent ECT x 5   in current treatment at [facility] lucho- clinic with Dr ROSALINDA Baum   - last seen virtually on 10/12/2023  one prior hx of SA via OD on pills (11/2019) hx of ANSON and MDD  has Multiple [facility] admissions (5/10-6/12/23; 7/5-7/23/21; 4/22-5/24/21; 8/23-9/4/20; 2/18-2/27/20 and 11/13-11/22/19)  most recent discharge from [facility] x 3 weeks ago after a 2 month stay for mgt of depression + anxiety  - reported that she underwent ECT x 5   in current treatment at [facility] lucho- clinic with Dr ROSALINDA Baum   - last seen virtually on 10/12/2023  one prior hx of SA via OD on pills (11/2019)

## 2024-01-10 NOTE — ED BEHAVIORAL HEALTH ASSESSMENT NOTE - DETAILS
EUFEMIA claims recently discharged from [facility] x 3 weeks ago - after being admitted there since 10/2023 due to worsening anxiety and depression SW contacted spouse; discussed with ED team claims recently discharged from [facility] x 3 weeks ago - after being admitted there since 10/2023 due to worsening anxiety and depression does not recall details of providers Father () - PTSD endorses chronic passive SI; hx of a suicide attempt in 11/2019 where  found Pt after overdosing on pills. Penicillin lexapro, celexa - nausea palpitations, chest tightness tremors

## 2024-01-10 NOTE — BH INPATIENT PSYCHIATRY ASSESSMENT NOTE - NSSUICPROTFACT_PSY_ALL_CORE
Identifies reasons for living/Cultural, spiritual and/or moral attitudes against suicide/Positive therapeutic relationships/Uatsdin beliefs Identifies reasons for living/Cultural, spiritual and/or moral attitudes against suicide/Positive therapeutic relationships/Presybeterian beliefs

## 2024-01-10 NOTE — PHYSICAL THERAPY INITIAL EVALUATION ADULT - GENERAL OBSERVATIONS, REHAB EVAL
Patient received in day room in no apparent distress. Patient reported having anxiety and feeling nauseous.

## 2024-01-10 NOTE — ED BEHAVIORAL HEALTH ASSESSMENT NOTE - MEDICAL ISSUES AND PLAN (INCLUDE STANDING AND PRN MEDICATION)
continue Omeprazole 40mg AM, will need to verify dose of Lasix daily (she does not recall) once Pt gets to ; Metoprolol 50mg AM, Losartan 100mg daily, Atorvastatin 10mg HS, Primidone 50mg AM. as discussed with primary ED team, will need ECHO (for palpitation work up) - if possible over at [facility]; otherwise, if unable to do ECHO, then to advice Pt to have ECHO done once she is discharged from ; Pt's last nuclear stress test x 3 months back was normal (as per primary ED team) continue Omeprazole 40mg AM, will need to verify dose of Lasix daily (she does not recall) once Pt gets to ; Metoprolol 50mg AM, Losartan 100mg daily, Atorvastatin 10mg HS, Primidone 50mg AM. as discussed with primary ED team, will need ECHO (for palpitation work up) - if possible over at [facility]; otherwise, if unable to do ECHO, then to advice Pt to have ECHO done once she is discharged from ; Pt's last nuclear stress test x 3 months back was normal (as per primary ED team)

## 2024-01-10 NOTE — ED BEHAVIORAL HEALTH ASSESSMENT NOTE - NSTXRELFACTOR_PSY_ALL_CORE
Change in provider or treatment (i.e., medications, psychotherapy, milieu)/Recent inpatient discharge/Hopeless about or dissatisfied with provider or treatment

## 2024-01-10 NOTE — BH PATIENT PROFILE - HOME MEDICATIONS
ALPRAZolam 0.5 mg oral tablet , 1 tab(s) orally once a day as needed for  anxiety MDD: 0.5mg  gabapentin 100 mg oral capsule , 2 cap(s) orally once a day in the afternoon  gabapentin 300 mg oral capsule , 1 cap(s) orally 2 times a day Morning and bedtime  escitalopram 10 mg oral tablet , 1 tab(s) orally once a day  mirtazapine 45 mg oral tablet , 1 tab(s) orally once a day (at bedtime)  metoprolol tartrate 25 mg oral tablet , 1 tab(s) orally 2 times a day  hydroCHLOROthiazide 12.5 mg oral capsule , 1 cap(s) orally once a day  Mi-Acid Gas Relief 80 mg oral tablet, chewable , 80 orally every 6 hours as needed for Nausea  Mi-Acid Gas Relief 80 mg oral tablet, chewable , 1 tab(s) orally every 6 hours as needed for  nausea MDD: 320 mg  ondansetron 4 mg oral tablet, disintegrating , 1 tab(s) orally every 8 hours as needed for  nausea MDD: 12 mg  atorvastatin 10 mg oral tablet , 1 tab(s) orally once a day (at bedtime) MDD: 10  pantoprazole 40 mg oral delayed release tablet , 1 tab(s) orally once a day (before a meal) MDD: 40

## 2024-01-10 NOTE — BH INPATIENT PSYCHIATRY ASSESSMENT NOTE - CURRENT MEDICATION
MEDICATIONS  (STANDING):  atorvastatin 10 milliGRAM(s) Oral at bedtime  gabapentin 200 milliGRAM(s) Oral three times a day  losartan 100 milliGRAM(s) Oral daily  metoprolol tartrate 50 milliGRAM(s) Oral two times a day  mirtazapine 45 milliGRAM(s) Oral at bedtime  pantoprazole    Tablet 40 milliGRAM(s) Oral before breakfast  primidone 50 milliGRAM(s) Oral daily  venlafaxine  milliGRAM(s) Oral daily    MEDICATIONS  (PRN):  ALPRAZolam 0.5 milliGRAM(s) Oral every 8 hours PRN anxiety  ondansetron    Tablet 8 milliGRAM(s) Oral every 12 hours PRN nausea   MEDICATIONS  (STANDING):  ALPRAZolam 0.5 milliGRAM(s) Oral three times a day  atorvastatin 10 milliGRAM(s) Oral at bedtime  gabapentin 200 milliGRAM(s) Oral three times a day  losartan 100 milliGRAM(s) Oral daily  metoprolol tartrate 50 milliGRAM(s) Oral two times a day  mirtazapine 45 milliGRAM(s) Oral at bedtime  pantoprazole    Tablet 40 milliGRAM(s) Oral before breakfast  primidone 50 milliGRAM(s) Oral daily  venlafaxine  milliGRAM(s) Oral daily    MEDICATIONS  (PRN):  ALPRAZolam 0.5 milliGRAM(s) Oral every 8 hours PRN anxiety  ondansetron    Tablet 4 milliGRAM(s) Oral every 12 hours PRN nausea

## 2024-01-10 NOTE — ED ADULT NURSE REASSESSMENT NOTE - NS ED NURSE REASSESS COMMENT FT1
Break RN: Pt is A&Ox4, resting in stretcher with no complaints at this time. Respirations even and unlabored, chest rise equal b/l. Pt denies chest pain, SOB, fever, cough, chills, abdominal pain, N/V/D, h/a, dizziness, numbness/tingling or any urinary symptoms at this time. No acute distress noted. 1:1 CO remains in place with PCA at bedside. Report given to KAZ Madden. Pt to be transported to [facility], [address] [place] [address]. Safety maintained throughout. Break RN: Pt is A&Ox4, resting in stretcher with no complaints at this time. Respirations even and unlabored, chest rise equal b/l. Pt denies chest pain, SOB, fever, cough, chills, abdominal pain, N/V/D, h/a, dizziness, numbness/tingling or any urinary symptoms at this time. No acute distress noted. 1:1 CO remains in place with PCA at bedside. Report given to KAZ Madden. Pt to be transported to [facility], [address] [place] [address]. Safety maintained throughout.

## 2024-01-10 NOTE — BH INPATIENT PSYCHIATRY ASSESSMENT NOTE - NSPRESENTSXS_PSY_ALL_CORE
severe anxiety symptoms predominating depressive symptoms/Depressed mood/Anhedonia/Severe anxiety, agitation or panic

## 2024-01-10 NOTE — ED BEHAVIORAL HEALTH ASSESSMENT NOTE - CURRENT MEDICATION
Effexor 225 mg daily, Klonopin 1mg in the am and 0.5mg in the afternoon, Gabapentin 200 mg bid, and Mirtazapine 30 mg hs HCT 12.5 mg daily, Losartan 100 mg daily, Atorvastatin 10 mg hs, recently reported to have been started on effexor 300mg daily  gabapentin 200mg TID, xanax 0.5mg BID PRN and remeron 45mg HS

## 2024-01-10 NOTE — ED BEHAVIORAL HEALTH ASSESSMENT NOTE - NSSUICPROTFACT_PSY_ALL_CORE
Identifies reasons for living/Cultural, spiritual and/or moral attitudes against suicide/Positive therapeutic relationships/Zoroastrianism beliefs Identifies reasons for living/Cultural, spiritual and/or moral attitudes against suicide/Positive therapeutic relationships/Rastafarian beliefs

## 2024-01-10 NOTE — ED BEHAVIORAL HEALTH ASSESSMENT NOTE - DESCRIPTION
retired (),  non-caregiver status living in a private home in [place] with her . Reports  often works nights and she is home alone. retired (),  non-caregiver status living in a private home in [place] with her . Reports  often works nights and she is home alone. HTN, HLD noted to be anxious. no active SI or HI. not manic; not psychotic. is not delirious.     Vital Signs Last 24 Hrs  T(C): 36.9 (09 Jan 2024 19:45), Max: 37.3 (09 Jan 2024 18:50)  T(F): 98.5 (09 Jan 2024 19:45), Max: 99.1 (09 Jan 2024 18:50)  HR: 79 (09 Jan 2024 19:45) (79 - 107)  BP: 126/76 (09 Jan 2024 19:45) (126/76 - 126/82)  BP(mean): --  RR: 18 (09 Jan 2024 19:45) (18 - 18)  SpO2: 97% (09 Jan 2024 19:45) (97% - 97%)    Parameters below as of 09 Jan 2024 19:45  Patient On (Oxygen Delivery Method): room air HTN, HLD and GERD. previous med: HCTZ 12.5mg daily. currently claims he is on Losartan 100mg daily, metoprolol 50mg AM, Atorvastatin 10mg HS; primidone 50mg AM, lasix in AM- she does not recall dose; omeprazole 40mg AM retired (),  for 28 yrs; has no children. is a non-caregiver status living in a private home in [place] with her . not Roman Catholic. no access to guns retired (),  for 28 yrs; has no children. is a non-caregiver status living in a private home in [place] with her . not Pentecostalism. no access to guns

## 2024-01-10 NOTE — ED BEHAVIORAL HEALTH ASSESSMENT NOTE - PAST PSYCHOTROPIC MEDICATION
Lexapro, Buspar, Hydroxyzine Lexapro, Buspar, Hydroxyzine, Effexor 225mg daily, Klonopin 1mg AM and 0.5mg in PM; Gabapentin 200mg BID; Mirtazapine 30mg HS  HCT 12.5 mg daily, Losartan 100 mg daily, Atorvastatin 10 mg hs,

## 2024-01-10 NOTE — BH PATIENT PROFILE - FALL HARM RISK - HARM RISK INTERVENTIONS
Communicate Risk of Fall with Harm to all staff/Reinforce activity limits and safety measures with patient and family/Tailored Fall Risk Interventions/Visual Cue: Yellow wristband and red socks/Bed in lowest position, wheels locked, appropriate side rails in place/Call bell, personal items and telephone in reach/Instruct patient to call for assistance before getting out of bed or chair/Non-slip footwear when patient is out of bed/[place] to call system/Physically safe environment - no spills, clutter or unnecessary equipment/Purposeful Proactive Rounding/Room/bathroom lighting operational, light cord in reach Communicate Risk of Fall with Harm to all staff/Reinforce activity limits and safety measures with patient and family/Tailored Fall Risk Interventions/Visual Cue: Yellow wristband and red socks/Bed in lowest position, wheels locked, appropriate side rails in place/Call bell, personal items and telephone in reach/Instruct patient to call for assistance before getting out of bed or chair/Non-slip footwear when patient is out of bed/[place] to call system/Physically safe environment - no spills, clutter or unnecessary equipment/Purposeful Proactive Rounding/Room/bathroom lighting operational, light cord in reach

## 2024-01-10 NOTE — BH INPATIENT PSYCHIATRY ASSESSMENT NOTE - PERSONAL COLLATERAL RELATIONSHIP
Spouse - see separate [facility] notes for collateral information obtained Spouse - see separate [facility] notes for collateral information obtained

## 2024-01-10 NOTE — BH INPATIENT PSYCHIATRY ASSESSMENT NOTE - OTHER PAST PSYCHIATRIC HISTORY (INCLUDE DETAILS REGARDING ONSET, COURSE OF ILLNESS, INPATIENT/OUTPATIENT TREATMENT)
hx of ANSON and MDD  has Multiple [facility] admissions (5/10-6/12/23; 7/5-7/23/21; 4/22-5/24/21; 8/23-9/4/20; 2/18-2/27/20 and 11/13-11/22/19)  most recent discharge from [facility] x 3 weeks ago after a 2 month stay for mgt of depression + anxiety  - reported that she underwent ECT x 5   in current treatment at [facility] lucho- clinic with Dr ROSALINDA Baum   - last seen virtually on 10/12/2023  one prior hx of SA via OD on pills (11/2019) hx of ANSON and MDD  has Multiple [facility] admissions (5/10-6/12/23; 7/5-7/23/21; 4/22-5/24/21; 8/23-9/4/20; 2/18-2/27/20 and 11/13-11/22/19)  most recent discharge from [facility] x 3 weeks ago after a 2 month stay for mgt of depression + anxiety  - reported that she underwent ECT x 5   in current treatment at [facility] lucho- clinic with Dr ROSALINDA Baum   - last seen virtually on 10/12/2023  one prior hx of SA via OD on pills (11/2019)

## 2024-01-10 NOTE — ED BEHAVIORAL HEALTH ASSESSMENT NOTE - NSPRESENTSXS_PSY_ALL_CORE
Depressed mood/Anhedonia/Severe anxiety, agitation or panic severe anxiety symptoms predominating depressive symptoms/Depressed mood/Anhedonia/Severe anxiety, agitation or panic

## 2024-01-10 NOTE — ED BEHAVIORAL HEALTH ASSESSMENT NOTE - COMMENTS ON VIOLENCE RISK/PROTECTIVE FACTORS:
Patient has no history of violent behavior and no risk factors. has no hx of violent behavior and no risk factors.

## 2024-01-10 NOTE — ED BEHAVIORAL HEALTH ASSESSMENT NOTE - PERSONAL COLLATERAL RELATIONSHIP
Spouse - see separate [facility] notes for collateral information obtained Spouse - see separate [facility] notes for collateral information obtained

## 2024-01-10 NOTE — ED BEHAVIORAL HEALTH ASSESSMENT NOTE - PSYCHIATRIC ISSUES AND PLAN (INCLUDE STANDING AND PRN MEDICATION)
continue home medications for now: effexor 300mg daily,  remeron 45mg HS; xanax 0.5mg BID PRN; Gabapentin 200mg TID;  PRNs: ativan 1mg PO q6Hrs for agitation; atiuvan 2mg IM q6Hrs for severe agitation (watch out for sedation/ resp depression)

## 2024-01-10 NOTE — PHYSICAL THERAPY INITIAL EVALUATION ADULT - PERTINENT HX OF CURRENT PROBLEM, REHAB EVAL
Patient is a 73 yr old female, , domiciled, and retired. premorbidly ambulant (not needing assists) and iADL/ bADL independent. with background hx of MDD and ANSON; has multiple psych admissions, had prior SA by overdosing on pills (11/2019) following death of parents. has no reported hx of NSSIB. PMHx: HTN, hyperlipidemia, and GERD. presented to the ED BIB EMS after staff from Pt's cardiologist's activated 911 as Pt was complaining of palpitations with chest tightness + hand tremors. Patient referred to physical therapy referred to physical therapy secondary to unsteady gait.

## 2024-01-10 NOTE — BH INPATIENT PSYCHIATRY ASSESSMENT NOTE - NSBHASSESSSUMMFT_PSY_ALL_CORE
Patient with hx trauma, chronic anxiety  especially over last 7 years, now presenting for third admit in a year and 3 weeks after 2 month stay at another facility with anxiety, panic, somatic complaints such as nausea,shakiness especially worse in the morning. Patient had 5 ECT at other facility without benefit. Patient complaint since d/c. Patient suspected of having ANSON, panic but suspect that could have paradoxical worsening of symptoms from excess serotonergic treatment with shakiness, sweating due to high dose Effexor, Remeron, Zofran. May have vicious cycle where the more Zofran she takes the more symptomatic she gets. Verifies I stop last prescription was for Xanax 1mg AM and 0.5mg hs also has taken Klonopin 1mg AM and 0.5mg hs. Plan reduce Effexor, taper off Zofran, consider change back to Klonopin. Conisder other anxiety options including Lyrica, TCA MAOI but  not likely reliable enough for the latter two and has OD hx.   Patient with hx trauma, chronic anxiety  especially over last 7 years, now presenting for third admit in a year and 3 weeks after 2 month stay at another facility with anxiety, panic, somatic complaints such as nausea,shakiness especially worse in the morning. Patient had 5 ECT at other facility without benefit. Patient complaint since d/c. Patient suspected of having ANSON, panic but suspect that could have paradoxical worsening of symptoms from excess serotonergic treatment with shakiness, sweating due to high dose Effexor, Remeron, Zofran. May have vicious cycle where the more Zofran she takes the more symptomatic she gets. Verifies I stop last prescription was for Xanax 1mg AM and 0.5mg hs also has taken Klonopin 1mg AM and 0.5mg hs. Plan reduce Effexor, taper off Zofran, primodone consider change back to Klonopin. Consider other anxiety options including Lyrica, TCA MAOI but  not likely reliable enough for the latter two and has OD hx.

## 2024-01-10 NOTE — ED BEHAVIORAL HEALTH ASSESSMENT NOTE - HPI (INCLUDE ILLNESS QUALITY, SEVERITY, DURATION, TIMING, CONTEXT, MODIFYING FACTORS, ASSOCIATED SIGNS AND SYMPTOMS)
73 yr old female, , domiciled, and retired. premorbidly ambulant (not needing assists) and iADL/ bADL independent. with background hx of MDD and ANSON; has multiple psych admissions (including last discharge from [facility] x 3 weeks ago); had prior SA by overdosing on pills (11/2019) following death of parents. has no reported hx of NSSIB. she denied using any illicit substance use including alcohol.. per chart review, there was documented use of cannabis daily.  Pt currently under the care of [facility] geropsychiatrist, Dr ROSALINDA Kamara (last virtual follow up on 10/12/2023).  pertinent medical issues include: HTN, hyperlipidemia, and GERD.   tonight, presented to the ED BIB EMS after staff from Pt's cardiologist's activated 911 as Pt was complaining of palpitations with chest tightness + hand tremors.  Pt also claimed that whilst at the office, staff took her BP - it was high; she does not recall exact values    cooperative, jittery, catastrophizing. reports that she had been dealing with worsening anxiety for the last few days. had been discharged from [facility] x 3 weeks ago (for a 2month stay there, wherein she even underwent ECT x 5 - which she did not find helpful; cites that it may have "affected my memory"). whilst at [facility], she had been prescribed effexor which had been titrated to current dose of 300mg daily. she suspects that this may be helping contribute towards propagation of her current uncontrolled anxiety symptoms + hand tremors. in the past, reported that she had been on inderal (for the "shakes"). as this did not help, she claimed being switched to primidone.      for the last few days, would wake up in the morning, already feeling as if - she is always on the edge. would be experiencing panic attacks which would last the whole day. frustration has been mounting to a point that she had been feeling progressively hopeless/ helpless/ worthless - due to the perceived inability to control ongoing anxiety symptoms despite reported good compliance to all psych meds prescribed. said hopelessness/ helplessness/ worthlessness would eventually morph into a constant death wish during the day; "what kind of life do I have now?", she tells writer.  despite the death wish (as propagated by her inability to control anxiety symptoms), she adamantly denied having any active SI; did not have any intentions or plans.  no HI. currently, no passive or active SI nor HI.      anxiety symptoms manifesting as palpitations + chest tightness + nausea.  Pt "dreads" the next panic attack.. she constant worries about the panic attacks and how to deal with it.. resultant of her uncontrolled anxiety, she admits feeling sad; sadness associated with anhedonia; there is early insomnia; poor appetite; low energy level.   Pt denied experiencing any signs/ symptoms suggestive of sadia (denied grandiosity/ racing thoughts/ increased goal directed activities or engaged in risk taking behavior/ no pressured speech/ no elevated mood/ denied any increased in energy level causing sleep disruption).  is not feeling paranoid/ no referential ideations. denied any perceptual disturbances. no thought insertion/ withdrawal nor broadcasting     this morning as she woke up, had another episode of panic attack - she described it as "adrenaline shock". in the afternoon as symptoms did not martir, she told her  to accompany her to her cardiologist's office. whilst at the office, staff took her BP - they found it elevated. in addition, she had also told them that she was "not feeling very well". that she was experiencing palpitations + chest tightness + hand tremors. they called 911. she agreed to come to the ED       ** See [facility] notes for collateral information obtained from Pt's spouse ** 73 yr old female, , domiciled, and retired. premorbidly ambulant (not needing assists) and iADL/ bADL independent. with background hx of MDD and ANSON; has multiple psych admissions (including last discharge from [facility] x 3 weeks ago); had prior SA by overdosing on pills (11/2019) following death of parents. has no reported hx of NSSIB. she denied using any illicit substance use including alcohol.. per chart review, there was documented use of cannabis daily.  Pt currently under the care of [facility] geropsychiatrist, Dr ROSALINDA Kamara (last virtual follow up on 10/12/2023).  pertinent medical issues include: HTN, hyperlipidemia, and GERD.   tonight, presented to the ED BIB EMS after staff from Pt's cardiologist's activated 911 as Pt was complaining of palpitations with chest tightness + hand tremors.  Pt also claimed that whilst at the office, staff took her BP - it was high; she does not recall exact values    cooperative, jittery, catastrophizing. reports that she had been dealing with worsening anxiety for the last few days. had been discharged from [facility] x 3 weeks ago (for a 2month stay there, wherein she even underwent ECT x 5 - which she did not find helpful; cites that it may have "affected my memory"). whilst at [facility], she had been prescribed effexor which had been titrated to current dose of 300mg daily. she suspects that this may be helping contribute towards propagation of her current uncontrolled anxiety symptoms + hand tremors. in the past, reported that she had been on inderal (for the "shakes"). as this did not help, she claimed being switched to primidone.      for the last few days, would wake up in the morning, already feeling as if - she is always on the edge. would be experiencing panic attacks which would last the whole day. frustration has been mounting to a point that she had been feeling progressively hopeless/ helpless/ worthless - due to the perceived inability to control ongoing anxiety symptoms despite reported good compliance to all psych meds prescribed. said hopelessness/ helplessness/ worthlessness would eventually morph into a constant death wish during the day; "what kind of life do I have now?", she tells writer.  despite the death wish (as propagated by her inability to control anxiety symptoms), she adamantly denied having any active SI; did not have any intentions or plans.  no HI. currently, no passive or active SI nor HI.      anxiety symptoms manifesting as palpitations + chest tightness + nausea.  Pt "dreads" the next panic attack.. she constant worries about the panic attacks and how to deal with it.. resultant of her uncontrolled anxiety, she admits feeling sad; sadness associated with anhedonia; there is early insomnia; poor appetite; low energy level.   Pt denied experiencing any signs/ symptoms suggestive of sadia (denied grandiosity/ racing thoughts/ increased goal directed activities or engaged in risk taking behavior/ no pressured speech/ no elevated mood/ denied any increased in energy level causing sleep disruption).  is not feeling paranoid/ no referential ideations. denied any perceptual disturbances. no thought insertion/ withdrawal nor broadcasting     this morning as she woke up, had another episode of panic attack - she described it as "adrenaline shock". in the afternoon as symptoms did not martir, she told her  to accompany her to her cardiologist's office. whilst at the office, staff took her BP - they found it elevated. in addition, she had also told them that she was "not feeling very well". that she was experiencing palpitations + chest tightness + hand tremors. they called 911. she agreed to come to the ED       ** See [facility] notes for collateral information obtained from Pt's spouse **

## 2024-01-10 NOTE — BH INPATIENT PSYCHIATRY ASSESSMENT NOTE - NSSUICRSKFACTOR_PSY_ALL_CORE
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  NCT# [id number], STS/ACC TVT Registry Patient ID# [id number] Current and Past Psychiatric Diagnoses/Presenting Symptoms/Treatment Related Factors/Activating Events/Stressors

## 2024-01-10 NOTE — BH INPATIENT PSYCHIATRY ASSESSMENT NOTE - DETAILS
lexapro, celexa - nausea prozac, Cymbalta, Seroquel, olanzapine , Abilify endorses chronic passive SI; hx of a suicide attempt in 11/2019 where  found Pt after overdosing on pills. Father () - PTSD

## 2024-01-10 NOTE — ED BEHAVIORAL HEALTH ASSESSMENT NOTE - RISK ASSESSMENT
risk- past suicide attempts, current mood symptoms, severe anxiety,   protective- compliant with treatment, supportive spouse, futuristic, risk- past suicide attempt, current mood symptoms, severe anxiety,   protective- compliant with treatment, supportive spouse, futuristic, no access to guns, help seeking, not psychotic; not manic      At this time given all risks taken into consideration, the Pt is not at imminent risk towards self harm but does remain at chronically elevated risk of harming self (given extensive psych hx).  current presentation is not deemed dischargeable back to the community.  current risks can be mitigated by a psychiatric admission with supervision, continuing psych meds with titration towards efficacious dosing range

## 2024-01-10 NOTE — BH INPATIENT PSYCHIATRY ASSESSMENT NOTE - NSBHCHARTREVIEWVS_PSY_A_CORE FT
Vital Signs Last 24 Hrs  T(C): 36.8 (01-10-24 @ 09:02), Max: 37.3 (01-09-24 @ 18:50)  T(F): 98.2 (01-10-24 @ 09:02), Max: 99.1 (01-09-24 @ 18:50)  HR: 76 (01-10-24 @ 08:02) (68 - 107)  BP: 133/63 (01-10-24 @ 08:02) (111/67 - 133/63)  BP(mean): 97 (01-10-24 @ 00:58) (97 - 97)  RR: 18 (01-10-24 @ 09:02) (18 - 19)  SpO2: 98% (01-10-24 @ 09:02) (94% - 100%)    Orthostatic VS  01-10-24 @ 09:02  Lying BP: --/-- HR: --  Sitting BP: 151/85 HR: 88  Standing BP: 147/88 HR: 93  Site: upper left arm  Mode: electronic

## 2024-01-10 NOTE — ED BEHAVIORAL HEALTH ASSESSMENT NOTE - ADDITIONAL DETAILS ALL
endorses chronic passive SI, denies any current intent or plan. Past hx of suicide attempt by overdose in 2019. no other reported SA nor engaged in any NSSIB

## 2024-01-10 NOTE — BH INPATIENT PSYCHIATRY ASSESSMENT NOTE - RISK ASSESSMENT
risk- past suicide attempt, current mood symptoms, severe anxiety,   protective- compliant with treatment, supportive spouse, futuristic, no access to guns, help seeking, not psychotic; not manic      At this time given all risks taken into consideration, the Pt is not at imminent risk towards self harm but does remain at chronically elevated risk of harming self (given extensive psych hx).  current presentation is not deemed dischargeable back to the community.  current risks can be mitigated by a psychiatric admission with supervision, continuing psych meds with titration towards efficacious dosing range

## 2024-01-11 LAB
A1C WITH ESTIMATED AVERAGE GLUCOSE RESULT: 5.8 % — HIGH (ref 4–5.6)
A1C WITH ESTIMATED AVERAGE GLUCOSE RESULT: 5.8 % — HIGH (ref 4–5.6)
ESTIMATED AVERAGE GLUCOSE: 120 — SIGNIFICANT CHANGE UP
ESTIMATED AVERAGE GLUCOSE: 120 — SIGNIFICANT CHANGE UP

## 2024-01-11 PROCEDURE — 90837 PSYTX W PT 60 MINUTES: CPT

## 2024-01-11 PROCEDURE — 90832 PSYTX W PT 30 MINUTES: CPT

## 2024-01-11 RX ORDER — CLONAZEPAM 1 MG
0.25 TABLET ORAL EVERY 8 HOURS
Refills: 0 | Status: DISCONTINUED | OUTPATIENT
Start: 2024-01-11 | End: 2024-01-18

## 2024-01-11 RX ORDER — CLONAZEPAM 1 MG
0.5 TABLET ORAL
Refills: 0 | Status: DISCONTINUED | OUTPATIENT
Start: 2024-01-11 | End: 2024-01-18

## 2024-01-11 RX ORDER — PRIMIDONE 250 MG/1
25 TABLET ORAL DAILY
Refills: 0 | Status: DISCONTINUED | OUTPATIENT
Start: 2024-01-11 | End: 2024-01-17

## 2024-01-11 RX ADMIN — PRIMIDONE 50 MILLIGRAM(S): 250 TABLET ORAL at 08:19

## 2024-01-11 RX ADMIN — Medication 0.5 MILLIGRAM(S): at 20:47

## 2024-01-11 RX ADMIN — Medication 0.5 MILLIGRAM(S): at 08:19

## 2024-01-11 RX ADMIN — GABAPENTIN 200 MILLIGRAM(S): 400 CAPSULE ORAL at 20:46

## 2024-01-11 RX ADMIN — PANTOPRAZOLE SODIUM 40 MILLIGRAM(S): 20 TABLET, DELAYED RELEASE ORAL at 06:30

## 2024-01-11 RX ADMIN — MIRTAZAPINE 45 MILLIGRAM(S): 45 TABLET, ORALLY DISINTEGRATING ORAL at 20:57

## 2024-01-11 RX ADMIN — GABAPENTIN 200 MILLIGRAM(S): 400 CAPSULE ORAL at 08:19

## 2024-01-11 RX ADMIN — Medication 50 MILLIGRAM(S): at 08:19

## 2024-01-11 RX ADMIN — ATORVASTATIN CALCIUM 10 MILLIGRAM(S): 80 TABLET, FILM COATED ORAL at 20:47

## 2024-01-11 RX ADMIN — LOSARTAN POTASSIUM 100 MILLIGRAM(S): 100 TABLET, FILM COATED ORAL at 08:19

## 2024-01-11 RX ADMIN — GABAPENTIN 200 MILLIGRAM(S): 400 CAPSULE ORAL at 13:01

## 2024-01-11 RX ADMIN — Medication 1 DROP(S): at 21:52

## 2024-01-11 RX ADMIN — Medication 0.5 MILLIGRAM(S): at 13:01

## 2024-01-11 RX ADMIN — Medication 50 MILLIGRAM(S): at 20:47

## 2024-01-11 RX ADMIN — Medication 150 MILLIGRAM(S): at 08:19

## 2024-01-11 NOTE — BH SAFETY PLAN - SUICIDE PREVENTION LIFELINE PHONES
Suicide Prevention Lifeline Phone: [phone] Suicide Prevention Lifeline Phone: [phone] TALK ([phone])

## 2024-01-11 NOTE — BH TREATMENT PLAN - NSTXANXINTERMD_PSY_ALL_CORE
may may have serotonergic se will reduce Serotonergic meds such as Effexor, will d/c Zofran, plan reudce mirtazapine
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
## 2024-01-11 NOTE — BH INPATIENT PSYCHIATRY PROGRESS NOTE - NSBHCONSBHPROVDETAILS_PSY_A_CORE  FT
VSS, all questions answered. Denies recent fever or illness. Pt states ready for procedure.     spoke with Dr Peña [phone] spoke with Dr Peña [phone]

## 2024-01-11 NOTE — DIETITIAN INITIAL EVALUATION ADULT - OTHER INFO
Patient is a 72 y/o female with PPHx of MDD and ANSON; has multiple psych admissions (including last discharge from [facility] x 3 weeks ago); had prior SA by overdosing on pills (11/2019) following death of parents, she denied using any illicit substance use including alcohol; per chart review, there was documented use of cannabis daily, PMhx of HTN, hyperlipidemia, and GERD. Presented to the ED BIB EMS after staff from Pt's cardiologist's activated 911 as Pt was complaining of palpitations with chest tightness + hand tremors.  Admitted to [facility] for SI, primary dx: ANSON.     Met with patient in her room today. Patient reports her current appetite is somewhat better with PO intake ~50% at meals, still having anxiety but denies nausea at this time, no vomiting since adm to the unit. Denies chewing/swallowing difficulties on current diet. Dislikes beef, pork, turkey, home fries, [place]. Menu options explored with patient today, new food preferences taken and honored on CBoard. Wt hx per chart & [facility] HIE: 225lb (5/13/23), 224lb (9/18/23), 220lb (10/26/23). Current adm wt: 208lb (1/10/24). Wt loss of ~7.1% x 4 months since Sept 23, likely related to decreased po intake. Writer encouraged po intake as tolerated, patient verbalized understanding. Declined po nutrition supplements at this time. 1/9/24 CMP and CBC reviewed, unremarkable.  Patient is a 74 y/o female with PPHx of MDD and ANSON; has multiple psych admissions (including last discharge from [facility] x 3 weeks ago); had prior SA by overdosing on pills (11/2019) following death of parents, she denied using any illicit substance use including alcohol; per chart review, there was documented use of cannabis daily, PMhx of HTN, hyperlipidemia, and GERD. Presented to the ED BIB EMS after staff from Pt's cardiologist's activated 911 as Pt was complaining of palpitations with chest tightness + hand tremors.  Admitted to [facility] for SI, primary dx: ANSON.     Met with patient in her room today. Patient reports her current appetite is somewhat better with PO intake ~50% at meals, still having anxiety but denies nausea at this time, no vomiting since adm to the unit. Denies chewing/swallowing difficulties on current diet. Dislikes beef, pork, turkey, home fries, [place]. Menu options explored with patient today, new food preferences taken and honored on CBoard. Wt hx per chart & [facility] HIE: 225lb (5/13/23), 224lb (9/18/23), 220lb (10/26/23). Current adm wt: 208lb (1/10/24). Wt loss of ~7.1% x 4 months since Sept 23, likely related to decreased po intake. Writer encouraged po intake as tolerated, patient verbalized understanding. Declined po nutrition supplements at this time. 1/9/24 CMP and CBC reviewed, unremarkable.

## 2024-01-11 NOTE — DIETITIAN INITIAL EVALUATION ADULT - PERTINENT MEDS FT
MEDICATIONS  (STANDING):  atorvastatin 10 milliGRAM(s) Oral at bedtime  clonazePAM  Tablet 0.5 milliGRAM(s) Oral <User Schedule>  gabapentin 200 milliGRAM(s) Oral three times a day  losartan 100 milliGRAM(s) Oral daily  metoprolol tartrate 50 milliGRAM(s) Oral two times a day  mirtazapine 45 milliGRAM(s) Oral at bedtime  pantoprazole    Tablet 40 milliGRAM(s) Oral before breakfast  primidone 25 milliGRAM(s) Oral daily  venlafaxine  milliGRAM(s) Oral daily    MEDICATIONS  (PRN):  bismuth subsalicylate Liquid 30 milliLiter(s) Oral every 6 hours PRN nausea  clonazePAM Oral Disintegrating Tablet 0.25 milliGRAM(s) Oral every 8 hours PRN anxiety

## 2024-01-11 NOTE — BH SOCIAL WORK INITIAL PSYCHOSOCIAL EVALUATION - NSBHFINANCE_PSY_ALL_CORE
The pt receives monthly social security [employer] benefits and two pension checks. The pt's  receives social security [employer] benefits and works full time./Financially stable The pt receives monthly social security half-way benefits and two pension checks. The pt's  receives social security half-way benefits and works full time./Financially stable

## 2024-01-11 NOTE — DIETITIAN INITIAL EVALUATION ADULT - ORAL INTAKE PTA/DIET HISTORY
Patient decreased appetite and significant wt loss over the past 3-4 months due to nausea 2/2 anxiety, not on po nutrition supplements at home. NKFA reported. Avoids spicy or citrus foods (including orange, OJ, cranberry juice) due to hx of GERD. Intolerance to milk but ok with greek yogurt & cheese.

## 2024-01-11 NOTE — DIETITIAN INITIAL EVALUATION ADULT - FACTORS AFF FOOD INTAKE
anxiety/persistent lack of appetite/persistent nausea/Cheondoism/ethnic/cultural/personal food preferences anxiety/persistent lack of appetite/persistent nausea/Restoration/ethnic/cultural/personal food preferences

## 2024-01-11 NOTE — BH PSYCHOLOGY - CLINICIAN PSYCHOTHERAPY NOTE - NSBHPSYCHOLNARRATIVE_PSY_A_CORE FT
The patient was seen individually at the team's request and with her consent. She was pleased to meet with the writer whom she remembered working with during her previous hospitalizations. Speech was of normal rate and volume and there were no verbal irregularities. The patient's mood was anxious/depressed, and affect was constricted and mood congruent. She was coherent and logical and touched on her recent distress, medication history, losses, marriage, and support and affiliation needs. The patient endorsed anhedonia, isolation, lethargy, sad mood, and anxiety but denied current suicidal ideation.     The patient reported that since her last hospitalization, she had become increasingly depressed and isolated in the summer of 2023, culminating in a psychiatric hospitalization at [facility] from October through December 2023. There she received 5 ECT treatments which she denies helped and although she made many friends there, she claimed that she did not improve much. After discharge, she was unsuccessful at continuing treatment at [facility] (an outpatient clinic she was referred to) and eventually required hospitalization at [facility], initially for palpitations and possible cardiac problems, but upon evaluation, was admitted to [facility] for anxiety and depression.      The patient was very focused on her medications and the possibility that they may have been contributing to her symptoms of shaking and sweating. While she was eager to simplify her regimen, she was afraid of suffering withdrawal symptoms.     The patient emphasized the way that nurturance and affection affect her life and her emotional regulation. She misses her parents intensely and knows "that they would be making [her] feel better" if they were alive. She stated that her ’s impatience for her moods exacerbated her depression but that he has been much more sympathetic since her discharge from [facility].     The patient recalled our earlier work including relaxation exercises, meditation, and ways of de-escalating marital conflict. She was eager for encouragement but found it hard to hold on to an optimistic view. She gives in very rapidly to somatic concerns and catastrophizing.     The patient was reminded that she did improve during prior hospitalizations. She was able to recall leaving the hospital feeling happy. The writer offered support, reflection, and coping suggestions which the patient accepted. The patient was appreciative of the session and is interested in meeting again. She was encouraged to continue to participate in therapeutic activities on the unit. The patient was seen individually at the team's request and with her consent. She was pleased to meet with the writer whom she remembered working with during her previous hospitalizations. Speech was of normal rate and volume and there were no verbal irregularities. The patient's mood was anxious/depressed, and affect was constricted and mood congruent. She was coherent and logical and touched on her recent distress, medication history, losses, marriage, and support and affiliation needs. The patient endorsed anhedonia, isolation, lethargy, sad mood, and anxiety but denied current suicidal ideation.     The patient reported that since her last hospitalization, she had become increasingly depressed and isolated in the summer of 2023, culminating in a psychiatric hospitalization at [facility] from October through December 2023. There she received 5 ECT treatments which she denies helped and although she made many friends there, she claimed that she did not improve much. After discharge, she was unsuccessful at continuing treatment at [place] (an outpatient clinic she was referred to) and eventually required hospitalization at [facility], initially for palpitations and possible cardiac problems, but upon evaluation, was admitted to [facility] for anxiety and depression.      The patient was very focused on her medications and the possibility that they may have been contributing to her symptoms of shaking and sweating. While she was eager to simplify her regimen, she was afraid of suffering withdrawal symptoms.     The patient emphasized the way that nurturance and affection affect her life and her emotional regulation. She misses her parents intensely and knows "that they would be making [her] feel better" if they were alive. She stated that her ’s impatience for her moods exacerbated her depression but that he has been much more sympathetic since her discharge from [facility].     The patient recalled our earlier work including relaxation exercises, meditation, and ways of de-escalating marital conflict. She was eager for encouragement but found it hard to hold on to an optimistic view. She gives in very rapidly to somatic concerns and catastrophizing.     The patient was reminded that she did improve during prior hospitalizations. She was able to recall leaving the hospital feeling happy. The writer offered support, reflection, and coping suggestions which the patient accepted. The patient was appreciative of the session and is interested in meeting again. She was encouraged to continue to participate in therapeutic activities on the unit.

## 2024-01-11 NOTE — BH SOCIAL WORK INITIAL PSYCHOSOCIAL EVALUATION - OTHER PAST PSYCHIATRIC HISTORY (INCLUDE DETAILS REGARDING ONSET, COURSE OF ILLNESS, INPATIENT/OUTPATIENT TREATMENT)
Patient is a 69yo  woman, retired , non-caregiver, residing at home with her , David Blackman, who works full time. The pt has no children. Patient has a Hx of  ANSON and MDD, multiple previous inpatient psychiatric hospitalizations at [facility]: 2S unit -19 SA via OD after her parents ,  2W unit -3/11/20, 2S unit -20, L5 unit -21, L5 unit -21, [facility] 3/28-23, [facility] L5 5/10-23, [facility] 10- treated with 5 ECT w/o benefit. The pt has been treated by  in the [facility] since  but following [facility] Hospitalization, the pt was referred to [facility] and was seen by a psychiatrist twice. The pt reportedly did not take the prescribed medication. The pt reports and  concurs that the pt was well functioning until  when symptoms of anxiety and depression presented following her parents death. The pt acknowledged having abandonment issues in her 20s and 30s when relationships with boyfriends ended.  Patient is a 71yo  woman, retired , non-caregiver, residing at home with her , David Blackman, who works full time. The pt has no children. Patient has a Hx of  ANSON and MDD, multiple previous inpatient psychiatric hospitalizations at [facility]: 2S unit -19 SA via OD after her parents ,  2W unit -3/11/20, 2S unit -20, L5 unit -21, L5 unit -21, [facility] 3/28-23, [facility] L5 5/10-23, [facility] 10- treated with 5 ECT w/o benefit. The pt has been treated by  in the [facility]iatric Clinic since  but following [facility] Hospitalization, the pt was referred to [facility] and was seen by a psychiatrist twice. The pt reportedly did not take the prescribed medication. The pt reports and  concurs that the pt was well functioning until  when symptoms of anxiety and depression presented following her parents death. The pt acknowledged having abandonment issues in her 20s and 30s when relationships with boyfriends ended.

## 2024-01-11 NOTE — BH PSYCHOLOGY - CLINICIAN PSYCHOTHERAPY NOTE - NSBHPSYCHOLNARRATIVE_PSY_A_CORE FT
Psychologist met with the patient for an unscheduled individual therapy session at the request of the treatment team and consent of the pt. Patient stopped psychologist in the hallway to request as session. Confidentiality and its limits were discussed; the patient verbalized understanding of those limits. Patient discussed issues related to their hospitalization. Themes in session included the patient’s experience of anxiety, interpersonal challenges, and concerns regarding titration of medication. Patient reported a high level of anxiety and concerns regarding getting her needs met. She reported that she felt safe on the unit. Psychologist provided psychoeducation on anxiety and negative beliefs and the patient engaged in a cognitive restructuring exercise. Psychologist provided the patient with a notebook in order to begin journaling and organizing her thoughts/needs. She welcomed psychology f/u and thanked psychologist for session.       Appearance: Patient appeared their stated age, appropriately attired, adequately dressed. H   Cognition and sensorium: Patient appeared awake and alert. Memory appeared sufficient for purposes of this interview.   Behavior: Patient generally appeared cooperative. Eye contact was fair.  Motor: moderate psychomotor acceleration noted, especially when discussing emotionally evocative content   Gait: Generally appeared steady, though the patient reported difficulty walking as she had heel spurs.  Speech:  Rate, volume, and tone of expressive speech were normal. Occasionally soft-spoken. Quantity was responsive. Quality was emotional.   Mood: Described as “very anxious,” but did not provide a rating.   Affect: Appeared anxious and dysthymic, narrow in range. Congruent with reported thought.   Thought process (observed):  Circumstantial at times, tending to focus on negative beliefs and worries  Thought Content: Patient did not endorse Auditory Hallucinations or Visual Hallucinations.  Pt did not endorse homicidal or suicidal ideation, intent, plan, or urge to self-harm.  Attention/Concentration: fair  Impulse control: good.  Insight:  fair– patient appears to have some insight into their symptoms, though may not be aware of the extent on their impact on her life  Judgment: fair to good as evidenced by their desire to engage in treatment today

## 2024-01-11 NOTE — BH INPATIENT PSYCHIATRY PROGRESS NOTE - NSBHATTESTBILLING_PSY_A_CORE
78712-[id number] OBS or IP - moderate complexity OR 35-49 mins 85764-Qzwqgmnnad OBS or IP - moderate complexity OR 35-49 mins

## 2024-01-11 NOTE — DIETITIAN INITIAL EVALUATION ADULT - PERTINENT LABORATORY DATA
01-09    140  |  101  |  7   ----------------------------<  90  3.5   |  29  |  0.72    Ca    9.2      09 Jan 2024 21:16    TPro  6.7  /  Alb  3.9  /  TBili  0.3  /  DBili  x   /  AST  18  /  ALT  23  /  AlkPhos  112  01-09    A1C with Estimated Average Glucose Result: 5.8 % (01-11-24 @ 08:00)  A1C with Estimated Average Glucose Result: 5.8 % (05-11-23 @ 08:36)  A1C with Estimated Average Glucose Result: 5.7 % (04-14-23 @ 07:32)

## 2024-01-11 NOTE — DIETITIAN INITIAL EVALUATION ADULT - ADD RECOMMEND
1) c/w Remeron per MD order which may stimulate appetite. 2) May consider daily MVI for micronutrient coverage if no medical contraindication per MD's discretion. 3) Encourage po intake as tolerated and honor food preferences PRN. 4) Monitor PO intake/tolerance, weights, labs, BM's, and skin integrity.

## 2024-01-11 NOTE — PSYCHIATRIC REHAB INITIAL EVALUATION - NSBHPROFILEREVIEW_PSY_ALL_CORE
[facility] ONCOLOGY  Hematology/Oncology Clinic Established Patient Follow Up Note     Patient: Nguyen Romero Age: 77 year old  MRN: [id number]     Date of Visit: June 2, 2023     Chief Complaint: Recurrent venous thromboembolic disease    Cancer History:   Cancer Staging   No matching staging information was found for the patient.    Oncology History    No history exists.     SUBJECTIVE  HPI:  Nguyen Romero is a 77 year old female with recurrent venous thromboembolic disease being seen today in follow-up. She remains on Xarelto 10 mg.     She is had  presented to [facility] emergency department with chest tightness and shortness of breath. A CT scan of the chest performed June 14, 2019 showed a filling defect within the right lower lobe branch of the pulmonary artery as well as left lower lobe branch of the pulmonary artery.  I do not see that Dopplers were performed. She was initially started on Lovenox and was then switched to Eliquis.  Denies any inciting factors.  She had not been ill or immobile.    She has a prior history of a pulmonary embolism in January 2008.  At that time she was found to have pulmonary emboli Involving the distal segmental and proximal subsegmental branches in the posterior basal segment of the right lower lobe artery.  There was a small sub-segmental embolism in the anterior basal segment of the right lower lobe pulmonary artery.  There was embolus in the left upper lobe lingular branch. . A partial hypercoagulable work-up at that time showed that she was negative for the prothrombin gene mutation and negative for the factor V Leiden mutation.  Cardiolipin antibodies were negative as well.  In 2008 she had had a breast reconstruction after mastectomies.  However, she states that she was not immobile at that time.    When I had seen her in November 2019 she was already on Eliquis but at a dose of 2.5 mg twice a day as there was some problems with prescription coverage.  As she  seemed to be doing well at that dose although she should have been on 5 twice a day, we decided to keep her on that dose and continue for at least another 6 months.  She was comfortable staying on it longer term.      Lung cancer screening CT of the chest was performed on 8/11/2021 and showed Part solid and groundglass pulmonary nodules appears similar to the prior examination from 2/10/2021. New 2 mm left lower lobe solid pulmonary nodule; follow-up chest CT is recommended in 6 months.     On 9/9/2021, she underwent Cystoscopy, transurethral resection of bladder tumor. Pathology of left sidewall of the bladder tumor was consistent with Low-grade papillary urothelial carcinoma, non-invasive. Muscularis propria is not present. Base of bladder tumor was consistent with Bladder mucosa and muscularis propria, negative for malignancy. She held Eliquis three days prior to the procedure and resumed it afterwards.    CT of the chest on 02/18/2022 indicated stable pulmonary nodules as above. Moderate hiatal hernia.     Patient had bilateral breast implants removed on 04/08/2022.  She had made the decision to have them removed as they were causing her a lot of discomfort.    At one point she had switched from apixaban at 2.5 mg twice a day to Xarelto 10 mg daily due to drug cost and insurance coverage.  She states her brother, whom recently passed away, had a few bottles of the Eliquis 2.5 and she is continuing those for now.    At her office visit on 06/02/2022 we talked about stopping anticoagulation and monitoring but she was not comfortable with that. Therefore, she is desired to continue Xarelto at the 10 mg daily dose which she remains on    The patient presents today for follow up. She remains on Xarelto 10 mg daily.     REVIEW OF SYSTEMS:  Nguyen Romero is a 77 year old female who is here today for follow up. She remains on Xarelto 10 mg daily. She is overall doing reasonably well.    She has had some vein  procedures on her left lower extremity.  She stayed on Xarelto despite that.  She reports having some minor injections in her back with [facility].  She makes it clear that she is on Xarelto but it was never stopped.  She is otherwise been doing well.          The rest of the review was asked in its entirety and is otherwise negative.  Review of Systems - Oncology      MEDICATIONS / ALLERGIES:  Current Outpatient Medications   Medication Sig Dispense Refill   • sertraline (ZOLOFT) 25 MG tablet Take 25 mg by mouth daily.     • rosuvastatin (CRESTOR) 10 MG tablet Take 10 mg by mouth daily.     • olmesartan (BENICAR) 5 MG tablet Take 5 mg by mouth daily.     • methylPREDNISolone (MEDROL DOSEPAK) 4 MG tablet FOLLOW PACKAGE DIRECTIONS     • apixaBAN (ELIQUIS) 2.5 MG Tab Take 2.5 mg by mouth 2 times daily.     • Calcium 600 MG tablet Take 600 mg by mouth.     • scopolamine (TRANSDERM_SCOP) 1 MG/3DAYS patch      • Omega 3 1200 MG Cap Take by mouth 2 times daily.     • cyanocobalamin (Vitamin B-12) 100 MCG tablet Take 1 tablet by mouth every evening.     • magnesium oxide (MAG-OX) 400 (240 Mg) MG tablet Take 1 tablet by mouth every morning.     • Coenzyme Q10 60 MG Cap      • Multiple Vitamins-Minerals (PRESERVISION AREDS 2 PO) Take by mouth daily.     • rivaroxaban (XARELTO) 10 MG Tab Take 1 tablet by mouth daily. 30 tablet 11   • Eszopiclone 3 MG tablet at bedtime as needed.   1   • Cetirizine HCl (ZYRTEC PO) Take by mouth daily as needed.      • omeprazole (PriLOSEC) 40 MG capsule TK 1 C PO ONCE A DAY 30 MIN B MORNING MEAL     • amoxicillin (AMOXIL) 500 MG capsule prior to dental work     • fluticasone (FLONASE) 50 MCG/ACT nasal spray fluticasone propionate 0.05 MG/ACTUAT Metered Dose Nasal Spray  po 1.0  qd    Active       No current facility-administered medications for this visit.     ALLERGIES:   Allergen Reactions   • Chlorhexidine Dermatitis     Blisters   • Terfenadine RASH and SHORTNESS OF BREATH   • Adhesive    (Environmental) RASH     Years ago   • Fluoxetine Other (See Comments)     Hand tremors after 3 weeks of use.   • Latex Other (See Comments) and HIVES     Pt cannot recall   • Pollen Runny Nose   • Ragweed Other (See Comments)     Sneezing; nasal congestion   • Tramadol Other (See Comments)     Memory loss           OBJECTIVE  Physical Examination    Performance Status:   ECOG [06/02/23 1127]   ECOG Performance Status 0     Visit Vitals  BP (!) 157/76 (BP Location: RUE - Right upper extremity, Patient Position: Sitting, Cuff Size: Regular)   Pulse 76   Temp 99 °F (37.2 °C) (Temporal)   Resp 18   Ht 5' 1\" (1.549 m)   Wt 87.5 kg (193 lb)   SpO2 97%   BMI 36.47 kg/m²     General: awake, alert, oriented, NAD, appears staged age  HEENT: normocephalic, without obvious abnormality, conjunctiva clear bilaterally, no scleral icterus  Lungs: clear to auscultation bilaterally, no wheezing/rales/rhonchi  Heart: regular rate and rhythm, S1 and S2 normal, no murmur/rubs/gallops  Abdomen: soft, non-tender, non-distended, NABS, no palpable organomegaly  Extremities: She has a lot of spider veins in the lower extremities.  There is no significant edema.  Musculoskeletal: symmetrical strength and tone, full active and passive ROM  Skin: skin color, texture, and turgor normal, no visible rashes or lesions  Neurologic: oriented x 3, no focal deficits  Psych: mood and affect normal, good insight    Laboratory/Imaging  No visits with results within 1 Month(s) from this visit.   Latest known visit with results is:   Admission on 10/25/2021, Discharged on 10/26/2021   Component Date Value   • Sodium 10/25/2021 144    • Potassium 10/25/2021 3.9    • Chloride 10/25/2021 106    • Carbon Dioxide 10/25/2021 28    • Anion Gap 10/25/2021 14    • Glucose 10/25/2021 145 (H)    • BUN 10/25/2021 19    • Creatinine 10/25/2021 0.73    • Glomerular Filtration Ra* 10/25/2021 80    • BUN/Cr 10/25/2021 26 (H)    • Calcium 10/25/2021 8.4    • Bilirubin,  Total 10/25/2021 0.3    • GOT/AST 10/25/2021 25    • GPT/ALT 10/25/2021 21    • Alkaline Phosphatase 10/25/2021 65    • Albumin 10/25/2021 3.6    • Protein, Total 10/25/2021 6.9    • Globulin 10/25/2021 3.3    • A/G Ratio 10/25/2021 1.1    • Prothrombin Time 10/25/2021 10.3    • INR 10/25/2021 1.0    • WBC 10/25/2021 9.2    • RBC 10/25/2021 4.30    • HGB 10/25/2021 13.2    • HCT 10/25/2021 39.6    • MCV 10/25/2021 92.1    • MCH 10/25/2021 30.7    • MCHC 10/25/2021 33.3    • RDW-CV 10/25/2021 12.5    • RDW-SD 10/25/2021 41.8    • PLT 10/25/2021 187    • NRBC 10/25/2021 0    • Neutrophil, Percent 10/25/2021 75    • Lymphocytes, Percent 10/25/2021 14    • Mono, Percent 10/25/2021 9    • Eosinophils, Percent 10/25/2021 0    • Basophils, Percent 10/25/2021 1    • Immature Granulocytes 10/25/2021 1    • Absolute Neutrophils 10/25/2021 7.0    • Absolute Lymphocytes 10/25/2021 1.3    • Absolute Monocytes 10/25/2021 0.8    • Absolute Eosinophils  10/25/2021 0.0    • Absolute Basophils 10/25/2021 0.1    • Absolute Immature Granul* 10/25/2021 0.1    • ABO/RH(D) 10/25/2021 B Rh Positive    • ANTIBODY SCREEN 10/25/2021 Negative    • TYPE AND SCREEN EXPIRATI* 10/25/2021 10/28/2021 23:59    • Extra Tube 10/25/2021 Hold for Add Ons        Imaging Studies:  Imaging studies reviewed.     Lung cancer screening CT of the chest was performed on 8/11/2021 and showed Part solid and groundglass pulmonary nodules appears similar to the prior examination from 2/10/2021. New 2 mm left lower lobe solid pulmonary nodule; follow-up chest CT is recommended in 6 months.     XR of the ribs on 10/25/2021 showed Acute minimally displaced fractures of the left 9th or 10th posterolateral ribs.     CT of the chest and abdomen on 10/26/2021 showed No acute intrathoracic or intra-abdominal process. No pneumothorax. No abnormal fluid collection within the chest or abdomen. No pneumoperitoneum. Nondisplaced left 10th and 11th rib fractures. Scattered ground  glass nodules within the right upper lobe (largest 10 mm) Lung RADS category 2-benign appearance. Moderate to large sliding-type hiatal hernia.    CT of the chest on 02/18/2022 indicated stable pulmonary nodules as above. Moderate hiatal hernia.     ASSESSMENT AND PLAN  1. This is a 77-year-old female with a pulmonary embolism.  Her last event in June 2019 was not provoked.  She has a prior history of a pulmonary embolism in 2008.  At that time she had had breast reconstruction and she may have developed clots due to the surgery and possible immobility.  However, there were no inciting factors for this most recent event.  In 2008 a partial hypercoagulable work-up was negative.  There is no family history of thrombosis.  Interestingly, I did my best to try and review her old CT scan as well as the most recent from 2019 in regards to the location of venous thromboembolism.  It really seem to be in the same location.  I am not sure if there is old chronic thrombosis there.    We had previously talked about duration of anticoagulation.  We have talked about  full dose for 6 months and then going down to Eliquis 2.5 mg twice a day for 6 months.  She is already on that dose as she did not have good prescription coverage and was being given samples.  We talked about her past history.  Despite a hypercoagulable work-up she essentially had 2 unprovoked pulmonary embolisms. The patient is now on 10 mg Xarelto once daily.     At her office visit on 06/02/2022 we talked about stopping anticoagulation and monitoring but she was not entirely comfortable with that and has decided to remain on Xarelto at the 10 mg daily dose.  We will continue as such.   We again talked about it today, and her desire is to remain on Xarelto.    3.  Pulmonary nodules.  She has had radiographic abnormalities.  She gets annual CTs of her chest for screening which have been ordered by Dr. De La Cruz.      Nguyen CHARLY Romero will return for a follow-up visit  in one years time.. If there are questions or concerns prior to her next visit, I have asked her to contact our office.     Nguyen Romero indicated understanding of the diagnosis and agreed with the plan of care. The patient is encouraged to call between now and next visit with any problems, questions or concerns that arise.      On 06/02/23, Yifan MISHRA scribed the services personally performed by Grant Carroll D.O.  The documentation recorded by the scribe accurately and completely reflects the service(s) I personally performed and the decisions made by me.      Yes

## 2024-01-11 NOTE — PSYCHIATRIC REHAB INITIAL EVALUATION - NSBHSTRENGTHHOME_PSY_ALL_CORE
According to the patient at her baseline she is able to manage her ADL's and IADL's. Patient ambulates independently.

## 2024-01-12 LAB
APPEARANCE UR: CLEAR — SIGNIFICANT CHANGE UP
APPEARANCE UR: CLEAR — SIGNIFICANT CHANGE UP
BACTERIA # UR AUTO: NEGATIVE /HPF — SIGNIFICANT CHANGE UP
BACTERIA # UR AUTO: NEGATIVE /HPF — SIGNIFICANT CHANGE UP
BILIRUB UR-MCNC: NEGATIVE — SIGNIFICANT CHANGE UP
BILIRUB UR-MCNC: NEGATIVE — SIGNIFICANT CHANGE UP
CAST: 0 /LPF — SIGNIFICANT CHANGE UP (ref 0–4)
CAST: 0 /LPF — SIGNIFICANT CHANGE UP (ref 0–4)
COLOR SPEC: YELLOW — SIGNIFICANT CHANGE UP
COLOR SPEC: YELLOW — SIGNIFICANT CHANGE UP
DIFF PNL FLD: NEGATIVE — SIGNIFICANT CHANGE UP
DIFF PNL FLD: NEGATIVE — SIGNIFICANT CHANGE UP
GLUCOSE UR QL: NEGATIVE MG/DL — SIGNIFICANT CHANGE UP
GLUCOSE UR QL: NEGATIVE MG/DL — SIGNIFICANT CHANGE UP
KETONES UR-MCNC: NEGATIVE MG/DL — SIGNIFICANT CHANGE UP
KETONES UR-MCNC: NEGATIVE MG/DL — SIGNIFICANT CHANGE UP
LEUKOCYTE ESTERASE UR-ACNC: ABNORMAL
LEUKOCYTE ESTERASE UR-ACNC: ABNORMAL
NITRITE UR-MCNC: NEGATIVE — SIGNIFICANT CHANGE UP
NITRITE UR-MCNC: NEGATIVE — SIGNIFICANT CHANGE UP
PH UR: 7 — SIGNIFICANT CHANGE UP (ref 5–8)
PH UR: 7 — SIGNIFICANT CHANGE UP (ref 5–8)
PROT UR-MCNC: NEGATIVE MG/DL — SIGNIFICANT CHANGE UP
PROT UR-MCNC: NEGATIVE MG/DL — SIGNIFICANT CHANGE UP
RBC CASTS # UR COMP ASSIST: 1 /HPF — SIGNIFICANT CHANGE UP (ref 0–4)
RBC CASTS # UR COMP ASSIST: 1 /HPF — SIGNIFICANT CHANGE UP (ref 0–4)
SP GR SPEC: 1.01 — SIGNIFICANT CHANGE UP (ref 1–1.03)
SP GR SPEC: 1.01 — SIGNIFICANT CHANGE UP (ref 1–1.03)
SQUAMOUS # UR AUTO: 5 /HPF — SIGNIFICANT CHANGE UP (ref 0–5)
SQUAMOUS # UR AUTO: 5 /HPF — SIGNIFICANT CHANGE UP (ref 0–5)
UROBILINOGEN FLD QL: 0.2 MG/DL — SIGNIFICANT CHANGE UP (ref 0.2–1)
UROBILINOGEN FLD QL: 0.2 MG/DL — SIGNIFICANT CHANGE UP (ref 0.2–1)
WBC UR QL: 5 /HPF — SIGNIFICANT CHANGE UP (ref 0–5)
WBC UR QL: 5 /HPF — SIGNIFICANT CHANGE UP (ref 0–5)

## 2024-01-12 PROCEDURE — 90834 PSYTX W PT 45 MINUTES: CPT

## 2024-01-12 PROCEDURE — 99232 SBSQ HOSP IP/OBS MODERATE 35: CPT

## 2024-01-12 RX ADMIN — Medication 0.25 MILLIGRAM(S): at 16:45

## 2024-01-12 RX ADMIN — Medication 150 MILLIGRAM(S): at 08:25

## 2024-01-12 RX ADMIN — Medication 50 MILLIGRAM(S): at 20:44

## 2024-01-12 RX ADMIN — GABAPENTIN 200 MILLIGRAM(S): 400 CAPSULE ORAL at 12:30

## 2024-01-12 RX ADMIN — Medication 0.5 MILLIGRAM(S): at 05:46

## 2024-01-12 RX ADMIN — Medication 30 MILLILITER(S): at 09:58

## 2024-01-12 RX ADMIN — ATORVASTATIN CALCIUM 10 MILLIGRAM(S): 80 TABLET, FILM COATED ORAL at 20:44

## 2024-01-12 RX ADMIN — LOSARTAN POTASSIUM 100 MILLIGRAM(S): 100 TABLET, FILM COATED ORAL at 08:25

## 2024-01-12 RX ADMIN — PRIMIDONE 25 MILLIGRAM(S): 250 TABLET ORAL at 08:26

## 2024-01-12 RX ADMIN — Medication 0.25 MILLIGRAM(S): at 08:26

## 2024-01-12 RX ADMIN — GABAPENTIN 200 MILLIGRAM(S): 400 CAPSULE ORAL at 08:25

## 2024-01-12 RX ADMIN — Medication 0.5 MILLIGRAM(S): at 20:47

## 2024-01-12 RX ADMIN — Medication 0.5 MILLIGRAM(S): at 12:30

## 2024-01-12 RX ADMIN — Medication 50 MILLIGRAM(S): at 08:26

## 2024-01-12 RX ADMIN — PANTOPRAZOLE SODIUM 40 MILLIGRAM(S): 20 TABLET, DELAYED RELEASE ORAL at 06:37

## 2024-01-12 RX ADMIN — MIRTAZAPINE 45 MILLIGRAM(S): 45 TABLET, ORALLY DISINTEGRATING ORAL at 20:44

## 2024-01-12 RX ADMIN — GABAPENTIN 200 MILLIGRAM(S): 400 CAPSULE ORAL at 20:44

## 2024-01-12 NOTE — BH INPATIENT PSYCHIATRY PROGRESS NOTE - NSBHATTESTBILLING_PSY_A_CORE
84193-Zhjhcjdtha OBS or IP - moderate complexity OR 35-49 mins [id number]-Ehulbrkwle OBS or IP - moderate complexity OR 35-49 mins

## 2024-01-12 NOTE — BH PSYCHOLOGY - CLINICIAN PSYCHOTHERAPY NOTE - NSBHPSYCHOLNARRATIVE_PSY_A_CORE FT
The patient was seen individually at the team's request and with her consent. She readily accompanied the writer to the group room to talk. Speech was of normal rate and volume and there were no verbal irregularities. The patient's mood was anxious and constricted affect and no lability. She was coherent and logical. The patient denied suicidal ideation.      The patient reported experiencing panic last evening about an hour before medication was expected and again this morning when she was awakened for breakfast by a loud, sudden knock on the door. We discussed the nature of affective arousal, of its wave-like duration, and how a startle response - as well as thoughts about the ensuing sensations - may trigger more general arousal. The patient was able to report that her anxiety did pass after some time, claiming “That’s the first time that happened”. She had been nauseous this morning but was not feeling that way during the session. She is still very focused on her medications and worries that she may be having withdrawal symptoms from the lower doses.     The patient required some reassurance that she will again improve as she has in the past. She voiced some appropriate practical concerns (i.e., getting her valuable to her  when he visits, getting her clothes, getting a letter to send in with a juror questionnaire, and seeing a rabbi) and these were later conveyed to the appropriate channels. She had been concerned that when her roommate is discharged, she may get a new one she doesn’t like, however she has formed a good connection with her new roommate who shares some of her symptoms. The writer reviewed relaxation breathing methods and encouraged her to challenge her negative thoughts while providing support and empathy. The patient was provided with a weekend wellness booklet containing exercises that reinforce principles of mindfulness, acceptance, and ACT (acceptance and commitment therapy).      The patient was appreciative of the session and agreed to meet again. She was encouraged to continue to participate in therapeutic activities on the unit.

## 2024-01-13 PROCEDURE — 99231 SBSQ HOSP IP/OBS SF/LOW 25: CPT

## 2024-01-13 RX ADMIN — Medication 0.25 MILLIGRAM(S): at 17:53

## 2024-01-13 RX ADMIN — GABAPENTIN 200 MILLIGRAM(S): 400 CAPSULE ORAL at 08:45

## 2024-01-13 RX ADMIN — MIRTAZAPINE 45 MILLIGRAM(S): 45 TABLET, ORALLY DISINTEGRATING ORAL at 22:09

## 2024-01-13 RX ADMIN — GABAPENTIN 200 MILLIGRAM(S): 400 CAPSULE ORAL at 12:02

## 2024-01-13 RX ADMIN — Medication 0.5 MILLIGRAM(S): at 12:02

## 2024-01-13 RX ADMIN — Medication 0.5 MILLIGRAM(S): at 05:28

## 2024-01-13 RX ADMIN — LOSARTAN POTASSIUM 100 MILLIGRAM(S): 100 TABLET, FILM COATED ORAL at 08:46

## 2024-01-13 RX ADMIN — Medication 150 MILLIGRAM(S): at 08:45

## 2024-01-13 RX ADMIN — Medication 0.5 MILLIGRAM(S): at 22:11

## 2024-01-13 RX ADMIN — Medication 50 MILLIGRAM(S): at 22:09

## 2024-01-13 RX ADMIN — ATORVASTATIN CALCIUM 10 MILLIGRAM(S): 80 TABLET, FILM COATED ORAL at 22:09

## 2024-01-13 RX ADMIN — PANTOPRAZOLE SODIUM 40 MILLIGRAM(S): 20 TABLET, DELAYED RELEASE ORAL at 06:30

## 2024-01-13 RX ADMIN — PRIMIDONE 25 MILLIGRAM(S): 250 TABLET ORAL at 08:46

## 2024-01-13 RX ADMIN — Medication 50 MILLIGRAM(S): at 08:44

## 2024-01-13 RX ADMIN — Medication 1 DROP(S): at 12:12

## 2024-01-13 RX ADMIN — GABAPENTIN 200 MILLIGRAM(S): 400 CAPSULE ORAL at 20:36

## 2024-01-13 NOTE — BH INPATIENT PSYCHIATRY PROGRESS NOTE - NSBHATTESTBILLING_PSY_A_CORE
78752-Hrnbiprfyk OBS or IP - low complexity OR 25-34 mins 38135-Gpomcxvhit OBS or IP - low complexity OR 25-34 mins

## 2024-01-14 PROCEDURE — 99231 SBSQ HOSP IP/OBS SF/LOW 25: CPT

## 2024-01-14 RX ORDER — ACETAMINOPHEN 500 MG
650 TABLET ORAL EVERY 6 HOURS
Refills: 0 | Status: DISCONTINUED | OUTPATIENT
Start: 2024-01-14 | End: 2024-02-22

## 2024-01-14 RX ADMIN — Medication 0.5 MILLIGRAM(S): at 21:56

## 2024-01-14 RX ADMIN — Medication 650 MILLIGRAM(S): at 19:02

## 2024-01-14 RX ADMIN — GABAPENTIN 200 MILLIGRAM(S): 400 CAPSULE ORAL at 21:44

## 2024-01-14 RX ADMIN — MIRTAZAPINE 45 MILLIGRAM(S): 45 TABLET, ORALLY DISINTEGRATING ORAL at 21:45

## 2024-01-14 RX ADMIN — Medication 0.5 MILLIGRAM(S): at 06:09

## 2024-01-14 RX ADMIN — ATORVASTATIN CALCIUM 10 MILLIGRAM(S): 80 TABLET, FILM COATED ORAL at 21:45

## 2024-01-14 RX ADMIN — Medication 50 MILLIGRAM(S): at 08:34

## 2024-01-14 RX ADMIN — Medication 0.5 MILLIGRAM(S): at 12:16

## 2024-01-14 RX ADMIN — PANTOPRAZOLE SODIUM 40 MILLIGRAM(S): 20 TABLET, DELAYED RELEASE ORAL at 06:58

## 2024-01-14 RX ADMIN — LOSARTAN POTASSIUM 100 MILLIGRAM(S): 100 TABLET, FILM COATED ORAL at 08:35

## 2024-01-14 RX ADMIN — Medication 150 MILLIGRAM(S): at 08:34

## 2024-01-14 RX ADMIN — PRIMIDONE 25 MILLIGRAM(S): 250 TABLET ORAL at 08:34

## 2024-01-14 RX ADMIN — GABAPENTIN 200 MILLIGRAM(S): 400 CAPSULE ORAL at 12:17

## 2024-01-14 RX ADMIN — GABAPENTIN 200 MILLIGRAM(S): 400 CAPSULE ORAL at 08:34

## 2024-01-14 RX ADMIN — Medication 650 MILLIGRAM(S): at 17:34

## 2024-01-14 RX ADMIN — Medication 1 DROP(S): at 21:45

## 2024-01-14 RX ADMIN — Medication 30 MILLILITER(S): at 10:01

## 2024-01-14 RX ADMIN — Medication 50 MILLIGRAM(S): at 21:45

## 2024-01-14 NOTE — BH INPATIENT PSYCHIATRY PROGRESS NOTE - NSBHATTESTBILLING_PSY_A_CORE
12867-Mkxmylprdi OBS or IP - low complexity OR 25-34 mins 14064-Ffjfajhoyy OBS or IP - low complexity OR 25-34 mins

## 2024-01-15 RX ADMIN — Medication 0.25 MILLIGRAM(S): at 17:49

## 2024-01-15 RX ADMIN — Medication 650 MILLIGRAM(S): at 19:45

## 2024-01-15 RX ADMIN — PANTOPRAZOLE SODIUM 40 MILLIGRAM(S): 20 TABLET, DELAYED RELEASE ORAL at 06:44

## 2024-01-15 RX ADMIN — Medication 0.5 MILLIGRAM(S): at 21:26

## 2024-01-15 RX ADMIN — GABAPENTIN 200 MILLIGRAM(S): 400 CAPSULE ORAL at 21:26

## 2024-01-15 RX ADMIN — Medication 1 DROP(S): at 21:26

## 2024-01-15 RX ADMIN — ATORVASTATIN CALCIUM 10 MILLIGRAM(S): 80 TABLET, FILM COATED ORAL at 21:26

## 2024-01-15 RX ADMIN — Medication 150 MILLIGRAM(S): at 08:30

## 2024-01-15 RX ADMIN — PRIMIDONE 25 MILLIGRAM(S): 250 TABLET ORAL at 08:30

## 2024-01-15 RX ADMIN — Medication 0.5 MILLIGRAM(S): at 12:26

## 2024-01-15 RX ADMIN — GABAPENTIN 200 MILLIGRAM(S): 400 CAPSULE ORAL at 08:30

## 2024-01-15 RX ADMIN — Medication 1 DROP(S): at 08:58

## 2024-01-15 RX ADMIN — Medication 650 MILLIGRAM(S): at 18:48

## 2024-01-15 RX ADMIN — Medication 50 MILLIGRAM(S): at 21:23

## 2024-01-15 RX ADMIN — Medication 0.25 MILLIGRAM(S): at 08:58

## 2024-01-15 RX ADMIN — GABAPENTIN 200 MILLIGRAM(S): 400 CAPSULE ORAL at 12:26

## 2024-01-15 RX ADMIN — LOSARTAN POTASSIUM 100 MILLIGRAM(S): 100 TABLET, FILM COATED ORAL at 08:30

## 2024-01-15 RX ADMIN — MIRTAZAPINE 45 MILLIGRAM(S): 45 TABLET, ORALLY DISINTEGRATING ORAL at 21:23

## 2024-01-15 RX ADMIN — Medication 0.5 MILLIGRAM(S): at 06:44

## 2024-01-15 RX ADMIN — Medication 50 MILLIGRAM(S): at 08:30

## 2024-01-15 RX ADMIN — Medication 30 MILLILITER(S): at 08:58

## 2024-01-16 LAB
ANION GAP SERPL CALC-SCNC: 14 MMOL/L — SIGNIFICANT CHANGE UP (ref 7–14)
BUN SERPL-MCNC: 8 MG/DL — SIGNIFICANT CHANGE UP (ref 7–23)
CALCIUM SERPL-MCNC: 9.4 MG/DL — SIGNIFICANT CHANGE UP (ref 8.4–10.5)
CHLORIDE SERPL-SCNC: 104 MMOL/L — SIGNIFICANT CHANGE UP (ref 98–107)
CO2 SERPL-SCNC: 24 MMOL/L — SIGNIFICANT CHANGE UP (ref 22–31)
CREAT SERPL-MCNC: 0.73 MG/DL — SIGNIFICANT CHANGE UP (ref 0.5–1.3)
EGFR: 86 ML/MIN/1.73M2 — SIGNIFICANT CHANGE UP
GLUCOSE SERPL-MCNC: 146 MG/DL — HIGH (ref 70–99)
POTASSIUM SERPL-MCNC: 3.7 MMOL/L — SIGNIFICANT CHANGE UP (ref 3.5–5.3)
POTASSIUM SERPL-SCNC: 3.7 MMOL/L — SIGNIFICANT CHANGE UP (ref 3.5–5.3)
SODIUM SERPL-SCNC: 142 MMOL/L — SIGNIFICANT CHANGE UP (ref 135–145)

## 2024-01-16 PROCEDURE — 90837 PSYTX W PT 60 MINUTES: CPT

## 2024-01-16 PROCEDURE — 99232 SBSQ HOSP IP/OBS MODERATE 35: CPT

## 2024-01-16 RX ORDER — VENLAFAXINE HCL 75 MG
112.5 CAPSULE, EXT RELEASE 24 HR ORAL DAILY
Refills: 0 | Status: DISCONTINUED | OUTPATIENT
Start: 2024-01-16 | End: 2024-01-17

## 2024-01-16 RX ADMIN — Medication 0.5 MILLIGRAM(S): at 12:39

## 2024-01-16 RX ADMIN — Medication 50 MILLIGRAM(S): at 08:44

## 2024-01-16 RX ADMIN — LOSARTAN POTASSIUM 100 MILLIGRAM(S): 100 TABLET, FILM COATED ORAL at 08:45

## 2024-01-16 RX ADMIN — Medication 0.25 MILLIGRAM(S): at 16:49

## 2024-01-16 RX ADMIN — MIRTAZAPINE 45 MILLIGRAM(S): 45 TABLET, ORALLY DISINTEGRATING ORAL at 22:09

## 2024-01-16 RX ADMIN — PANTOPRAZOLE SODIUM 40 MILLIGRAM(S): 20 TABLET, DELAYED RELEASE ORAL at 06:23

## 2024-01-16 RX ADMIN — GABAPENTIN 200 MILLIGRAM(S): 400 CAPSULE ORAL at 22:07

## 2024-01-16 RX ADMIN — Medication 112.5 MILLIGRAM(S): at 08:44

## 2024-01-16 RX ADMIN — GABAPENTIN 200 MILLIGRAM(S): 400 CAPSULE ORAL at 08:44

## 2024-01-16 RX ADMIN — ATORVASTATIN CALCIUM 10 MILLIGRAM(S): 80 TABLET, FILM COATED ORAL at 22:22

## 2024-01-16 RX ADMIN — Medication 50 MILLIGRAM(S): at 22:08

## 2024-01-16 RX ADMIN — Medication 1 DROP(S): at 22:23

## 2024-01-16 RX ADMIN — Medication 0.5 MILLIGRAM(S): at 22:09

## 2024-01-16 RX ADMIN — PRIMIDONE 25 MILLIGRAM(S): 250 TABLET ORAL at 08:45

## 2024-01-16 RX ADMIN — Medication 0.5 MILLIGRAM(S): at 06:24

## 2024-01-16 RX ADMIN — GABAPENTIN 200 MILLIGRAM(S): 400 CAPSULE ORAL at 12:39

## 2024-01-16 NOTE — BH INPATIENT PSYCHIATRY PROGRESS NOTE - NSBHATTESTBILLING_PSY_A_CORE
54770-Oynefbefms OBS or IP - moderate complexity OR 35-49 mins 14889-Tkzbioxjlz OBS or IP - moderate complexity OR 35-49 mins

## 2024-01-16 NOTE — BH PSYCHOLOGY - CLINICIAN PSYCHOTHERAPY NOTE - NSBHPSYCHOLNARRATIVE_PSY_A_CORE FT
The patient was seen individually at the team's request and with her consent. She readily accompanied the writer to the group room to talk. Speech was of normal rate and volume and there were no verbal irregularities. The patient's mood was “calm” and affect was congruent with no lability. She was coherent and logical. The patient reported less nausea and she denied suicidal ideation. She reports no difficulty falling asleep.    The patient reported feeling less anxious and depressed but afraid that if she reports improvement she will be discharged prematurely and need to be re-hospitalized soon. She again was very focused on the medications; the writer attempted to steer her attention to ways she could practice coping to augment the medication effects. We also observed that when she requests medications from her doctors she can end up with more than is healthy for her. She had not looked at the booklet (of wellness exercises) that she was given for the weekend. The writer asked her to do so before our next session and identify those that most resonated with her. She is also asked to track her level of anxiety and record when the time it rises above 5/10 and the time it returns to 5/10 so that we can anticipate the duration of anxiety waves and use that in managing them.     The patient described getting extremely upset when nursing staff seemed to lose patience with her and later sought her out to apologize. She understands that she gets compulsive when making plans which – if she believes will not be carried out – sets her off. Writer pointed out that this has been her interpersonal pattern during each hospitalization and related it to her insistence on “plan A” and reluctance to compromise. When calm she can appreciate this but finds it hard to pull back in real time. Patient complained that there are very few female patients to talk to here; she was again directed to the wellness exercises. Writer again reviewed relaxation methods and encouraged challenge of negative thoughts.     The patient was appreciative of the session and agreed to meet again. She was encouraged to continue to participate in therapeutic activities on the unit.

## 2024-01-17 PROCEDURE — 90834 PSYTX W PT 45 MINUTES: CPT

## 2024-01-17 PROCEDURE — 99232 SBSQ HOSP IP/OBS MODERATE 35: CPT

## 2024-01-17 RX ORDER — VENLAFAXINE HCL 75 MG
75 CAPSULE, EXT RELEASE 24 HR ORAL DAILY
Refills: 0 | Status: DISCONTINUED | OUTPATIENT
Start: 2024-01-17 | End: 2024-01-22

## 2024-01-17 RX ADMIN — Medication 0.5 MILLIGRAM(S): at 21:48

## 2024-01-17 RX ADMIN — GABAPENTIN 200 MILLIGRAM(S): 400 CAPSULE ORAL at 09:07

## 2024-01-17 RX ADMIN — Medication 25 MILLIGRAM(S): at 13:07

## 2024-01-17 RX ADMIN — Medication 50 MILLIGRAM(S): at 09:09

## 2024-01-17 RX ADMIN — MIRTAZAPINE 45 MILLIGRAM(S): 45 TABLET, ORALLY DISINTEGRATING ORAL at 21:47

## 2024-01-17 RX ADMIN — PANTOPRAZOLE SODIUM 40 MILLIGRAM(S): 20 TABLET, DELAYED RELEASE ORAL at 06:29

## 2024-01-17 RX ADMIN — ATORVASTATIN CALCIUM 10 MILLIGRAM(S): 80 TABLET, FILM COATED ORAL at 21:48

## 2024-01-17 RX ADMIN — Medication 0.5 MILLIGRAM(S): at 13:07

## 2024-01-17 RX ADMIN — Medication 0.5 MILLIGRAM(S): at 06:28

## 2024-01-17 RX ADMIN — PRIMIDONE 25 MILLIGRAM(S): 250 TABLET ORAL at 09:04

## 2024-01-17 RX ADMIN — Medication 1 DROP(S): at 21:47

## 2024-01-17 RX ADMIN — Medication 112.5 MILLIGRAM(S): at 09:10

## 2024-01-17 RX ADMIN — LOSARTAN POTASSIUM 100 MILLIGRAM(S): 100 TABLET, FILM COATED ORAL at 09:10

## 2024-01-17 RX ADMIN — Medication 25 MILLIGRAM(S): at 21:48

## 2024-01-17 RX ADMIN — Medication 50 MILLIGRAM(S): at 21:47

## 2024-01-17 RX ADMIN — Medication 0.25 MILLIGRAM(S): at 09:09

## 2024-01-17 NOTE — BH PSYCHOLOGY - GROUP THERAPY NOTE - NSPSYCHOLGRPSUPGOAL_PSY_A_CORE
maintaining hope/optimism/reduce isolation coping w/ mental illness/improve social skills via group participaton/increase sense of belonging to treatment milieu/improve skills for coping w/ mental illness

## 2024-01-17 NOTE — BH PSYCHOLOGY - GROUP THERAPY NOTE - NSPSYCHOLGRPSUPPT_PSY_A_CORE
depressed affect/little participation offered/lackof  interest/enjoyment in group process/low energy in group/shared personal experiences openly/feelings of hopelessness expressed

## 2024-01-17 NOTE — BH PSYCHOLOGY - GROUP THERAPY NOTE - NSPSYCHOLGRPSUPINT_PSY_A_CORE
patient practiced social skills in group/ provided support/empathy/ suggest coping skills/group members provided support/empathy/group members validate feelings experience/group members suggest coping skills/group members encouraged hope/optimism

## 2024-01-17 NOTE — BH PSYCHOLOGY - CLINICIAN PSYCHOTHERAPY NOTE - NSBHPSYCHOLNARRATIVE_PSY_A_CORE FT
The patient was seen individually at the team's request and with her consent. She readily accompanied the writer to the group room to talk. Speech was of normal rate and volume and there were no verbal irregularities. The patient's mood was “calm” and affect was congruent with some brief tearfulness. She was coherent and logical. The patient reported no nausea and she denied suicidal ideation. She reports no difficulty falling asleep.    The patient reported a relatively brief (30-minute) anxiety episode this morning and stated that she used positive self-talk and breathing exercises until it subsided. She has also been reading and using the ACT booklet that she was given and discussed some of the sections that resonated with her (acceptance metaphors, mindfulness, etc.). She is sensitive to loss of supportive people and gets easily triggered when she becomes aware that she may have less support than others. She engaged in imagining returning to her activities – both pleasurable ones and chores – which appeared to ground her.    The patient was appreciative of the session and agreed to meet again. She was encouraged to continue to participate in therapeutic activities on the unit.

## 2024-01-18 PROCEDURE — 99232 SBSQ HOSP IP/OBS MODERATE 35: CPT

## 2024-01-18 PROCEDURE — 90834 PSYTX W PT 45 MINUTES: CPT

## 2024-01-18 RX ORDER — CLONAZEPAM 1 MG
0.25 TABLET ORAL EVERY 8 HOURS
Refills: 0 | Status: DISCONTINUED | OUTPATIENT
Start: 2024-01-18 | End: 2024-01-25

## 2024-01-18 RX ORDER — CLONAZEPAM 1 MG
0.5 TABLET ORAL
Refills: 0 | Status: DISCONTINUED | OUTPATIENT
Start: 2024-01-18 | End: 2024-01-25

## 2024-01-18 RX ADMIN — Medication 25 MILLIGRAM(S): at 21:44

## 2024-01-18 RX ADMIN — Medication 30 MILLILITER(S): at 09:44

## 2024-01-18 RX ADMIN — ATORVASTATIN CALCIUM 10 MILLIGRAM(S): 80 TABLET, FILM COATED ORAL at 21:45

## 2024-01-18 RX ADMIN — Medication 25 MILLIGRAM(S): at 13:16

## 2024-01-18 RX ADMIN — Medication 0.25 MILLIGRAM(S): at 15:08

## 2024-01-18 RX ADMIN — Medication 25 MILLIGRAM(S): at 09:44

## 2024-01-18 RX ADMIN — MIRTAZAPINE 45 MILLIGRAM(S): 45 TABLET, ORALLY DISINTEGRATING ORAL at 21:45

## 2024-01-18 RX ADMIN — PANTOPRAZOLE SODIUM 40 MILLIGRAM(S): 20 TABLET, DELAYED RELEASE ORAL at 06:30

## 2024-01-18 RX ADMIN — LOSARTAN POTASSIUM 100 MILLIGRAM(S): 100 TABLET, FILM COATED ORAL at 09:44

## 2024-01-18 RX ADMIN — Medication 0.5 MILLIGRAM(S): at 21:45

## 2024-01-18 RX ADMIN — Medication 50 MILLIGRAM(S): at 09:44

## 2024-01-18 RX ADMIN — Medication 0.5 MILLIGRAM(S): at 13:16

## 2024-01-18 RX ADMIN — Medication 50 MILLIGRAM(S): at 21:45

## 2024-01-18 RX ADMIN — Medication 75 MILLIGRAM(S): at 09:44

## 2024-01-18 RX ADMIN — Medication 0.5 MILLIGRAM(S): at 06:31

## 2024-01-18 NOTE — BH PSYCHOLOGY - CLINICIAN PSYCHOTHERAPY NOTE - NSBHPSYCHOLNARRATIVE_PSY_A_CORE FT
The patient was seen individually at the team's request and with her consent. She readily accompanied the writer to the group room to talk. Speech was of normal rate and volume and there were no verbal irregularities. The patient's mood was “calm” and affect was congruent without lability. She was coherent and logical. The patient reported nausea and she denied suicidal ideation. She reports no difficulty falling asleep.    The patient stated that she had just started Lyrica and that maybe she was having a reaction (palpitations and nausea). She expressed impatience with and worry over the effectiveness of medication and was able to identify her negative thoughts (“Maybe this won’t work” and I will not get better”) and was receptive to models to dispute the thoughts and to use coping skills.  She asked the writer to request that nursing take her vitals since she experienced palpitations; this was done right after the meeting. Patient agreed to meet again tomorrow.

## 2024-01-19 PROCEDURE — 99232 SBSQ HOSP IP/OBS MODERATE 35: CPT

## 2024-01-19 RX ADMIN — Medication 25 MILLIGRAM(S): at 12:26

## 2024-01-19 RX ADMIN — Medication 25 MILLIGRAM(S): at 05:31

## 2024-01-19 RX ADMIN — Medication 0.5 MILLIGRAM(S): at 05:32

## 2024-01-19 RX ADMIN — Medication 0.5 MILLIGRAM(S): at 21:35

## 2024-01-19 RX ADMIN — LOSARTAN POTASSIUM 100 MILLIGRAM(S): 100 TABLET, FILM COATED ORAL at 09:08

## 2024-01-19 RX ADMIN — MIRTAZAPINE 45 MILLIGRAM(S): 45 TABLET, ORALLY DISINTEGRATING ORAL at 21:34

## 2024-01-19 RX ADMIN — Medication 0.25 MILLIGRAM(S): at 18:37

## 2024-01-19 RX ADMIN — Medication 25 MILLIGRAM(S): at 21:34

## 2024-01-19 RX ADMIN — ATORVASTATIN CALCIUM 10 MILLIGRAM(S): 80 TABLET, FILM COATED ORAL at 21:34

## 2024-01-19 RX ADMIN — Medication 50 MILLIGRAM(S): at 21:35

## 2024-01-19 RX ADMIN — Medication 75 MILLIGRAM(S): at 09:08

## 2024-01-19 RX ADMIN — PANTOPRAZOLE SODIUM 40 MILLIGRAM(S): 20 TABLET, DELAYED RELEASE ORAL at 06:43

## 2024-01-19 RX ADMIN — Medication 50 MILLIGRAM(S): at 09:08

## 2024-01-19 RX ADMIN — Medication 0.5 MILLIGRAM(S): at 12:26

## 2024-01-20 LAB — REVIEW: SIGNIFICANT CHANGE UP

## 2024-01-20 PROCEDURE — 99231 SBSQ HOSP IP/OBS SF/LOW 25: CPT

## 2024-01-20 RX ADMIN — Medication 50 MILLIGRAM(S): at 06:45

## 2024-01-20 RX ADMIN — MIRTAZAPINE 45 MILLIGRAM(S): 45 TABLET, ORALLY DISINTEGRATING ORAL at 21:37

## 2024-01-20 RX ADMIN — Medication 0.25 MILLIGRAM(S): at 18:16

## 2024-01-20 RX ADMIN — LOSARTAN POTASSIUM 100 MILLIGRAM(S): 100 TABLET, FILM COATED ORAL at 09:19

## 2024-01-20 RX ADMIN — Medication 50 MILLIGRAM(S): at 09:18

## 2024-01-20 RX ADMIN — Medication 0.5 MILLIGRAM(S): at 21:37

## 2024-01-20 RX ADMIN — Medication 0.5 MILLIGRAM(S): at 13:37

## 2024-01-20 RX ADMIN — Medication 75 MILLIGRAM(S): at 09:19

## 2024-01-20 RX ADMIN — ATORVASTATIN CALCIUM 10 MILLIGRAM(S): 80 TABLET, FILM COATED ORAL at 21:37

## 2024-01-20 RX ADMIN — Medication 50 MILLIGRAM(S): at 21:37

## 2024-01-20 RX ADMIN — PANTOPRAZOLE SODIUM 40 MILLIGRAM(S): 20 TABLET, DELAYED RELEASE ORAL at 06:45

## 2024-01-20 RX ADMIN — Medication 25 MILLIGRAM(S): at 13:37

## 2024-01-20 RX ADMIN — Medication 25 MILLIGRAM(S): at 21:37

## 2024-01-20 RX ADMIN — Medication 0.5 MILLIGRAM(S): at 06:45

## 2024-01-21 PROCEDURE — 99231 SBSQ HOSP IP/OBS SF/LOW 25: CPT

## 2024-01-21 PROCEDURE — 90834 PSYTX W PT 45 MINUTES: CPT

## 2024-01-21 RX ADMIN — Medication 0.5 MILLIGRAM(S): at 21:43

## 2024-01-21 RX ADMIN — LOSARTAN POTASSIUM 100 MILLIGRAM(S): 100 TABLET, FILM COATED ORAL at 08:57

## 2024-01-21 RX ADMIN — Medication 650 MILLIGRAM(S): at 10:16

## 2024-01-21 RX ADMIN — MIRTAZAPINE 45 MILLIGRAM(S): 45 TABLET, ORALLY DISINTEGRATING ORAL at 21:43

## 2024-01-21 RX ADMIN — Medication 50 MILLIGRAM(S): at 08:56

## 2024-01-21 RX ADMIN — Medication 50 MILLIGRAM(S): at 06:01

## 2024-01-21 RX ADMIN — PANTOPRAZOLE SODIUM 40 MILLIGRAM(S): 20 TABLET, DELAYED RELEASE ORAL at 06:49

## 2024-01-21 RX ADMIN — Medication 650 MILLIGRAM(S): at 13:53

## 2024-01-21 RX ADMIN — Medication 25 MILLIGRAM(S): at 21:58

## 2024-01-21 RX ADMIN — Medication 25 MILLIGRAM(S): at 12:28

## 2024-01-21 RX ADMIN — Medication 0.5 MILLIGRAM(S): at 12:28

## 2024-01-21 RX ADMIN — Medication 0.5 MILLIGRAM(S): at 06:01

## 2024-01-21 RX ADMIN — Medication 50 MILLIGRAM(S): at 21:43

## 2024-01-21 RX ADMIN — Medication 75 MILLIGRAM(S): at 08:56

## 2024-01-21 RX ADMIN — Medication 1 DROP(S): at 21:58

## 2024-01-21 RX ADMIN — ATORVASTATIN CALCIUM 10 MILLIGRAM(S): 80 TABLET, FILM COATED ORAL at 21:43

## 2024-01-21 RX ADMIN — Medication 650 MILLIGRAM(S): at 17:48

## 2024-01-21 NOTE — BH PSYCHOLOGY - CLINICIAN PSYCHOTHERAPY NOTE - NSBHPSYCHOLNARRATIVE_PSY_A_CORE FT
The patient was seen individually at the team's request and with her consent. Speech was of normal rate and volume and there were no verbal irregularities. The patient's mood was calm and affect was congruent without lability. She was coherent and logical. The patient denied suicidal ideation. She reports no difficulty falling asleep.  The patient again expressed concern that the medications would not work but she reminded herself that this concern was negated in the past. She said she was thrown off when her psychiatrist intimated that there are no more medications that would be considered if this did not work; the writer mentioned that he spoke with the psychiatrist who clarified that that was not what he meant and that there are other options. This reassured the patient. stated that she had just started Lyrica and that maybe she was having a reaction (palpitations and nausea). She expressed impatience with and worry over the effectiveness of medication and was able to identify her negative thoughts (“Maybe this won’t work” and I will not get better”) and was receptive to models to dispute the thoughts and to use coping skills.  Patient reported that when the nurse had checked her vitals yesterday her blood pressure was elevated, and they administered a PRN (Klonopin).     The writer provided the patient with another ACT-based exercise booklet for the weekend. Patient agreed to meet again next week.

## 2024-01-22 PROCEDURE — 90834 PSYTX W PT 45 MINUTES: CPT

## 2024-01-22 PROCEDURE — 99232 SBSQ HOSP IP/OBS MODERATE 35: CPT

## 2024-01-22 RX ORDER — VENLAFAXINE HCL 75 MG
37.5 CAPSULE, EXT RELEASE 24 HR ORAL DAILY
Refills: 0 | Status: DISCONTINUED | OUTPATIENT
Start: 2024-01-22 | End: 2024-01-25

## 2024-01-22 RX ADMIN — Medication 25 MILLIGRAM(S): at 21:39

## 2024-01-22 RX ADMIN — Medication 37.5 MILLIGRAM(S): at 09:50

## 2024-01-22 RX ADMIN — Medication 0.5 MILLIGRAM(S): at 06:42

## 2024-01-22 RX ADMIN — MIRTAZAPINE 45 MILLIGRAM(S): 45 TABLET, ORALLY DISINTEGRATING ORAL at 21:40

## 2024-01-22 RX ADMIN — LOSARTAN POTASSIUM 100 MILLIGRAM(S): 100 TABLET, FILM COATED ORAL at 09:50

## 2024-01-22 RX ADMIN — Medication 1 DROP(S): at 21:48

## 2024-01-22 RX ADMIN — Medication 50 MILLIGRAM(S): at 21:40

## 2024-01-22 RX ADMIN — ATORVASTATIN CALCIUM 10 MILLIGRAM(S): 80 TABLET, FILM COATED ORAL at 21:40

## 2024-01-22 RX ADMIN — Medication 0.5 MILLIGRAM(S): at 21:39

## 2024-01-22 RX ADMIN — Medication 50 MILLIGRAM(S): at 12:45

## 2024-01-22 RX ADMIN — PANTOPRAZOLE SODIUM 40 MILLIGRAM(S): 20 TABLET, DELAYED RELEASE ORAL at 06:42

## 2024-01-22 RX ADMIN — Medication 50 MILLIGRAM(S): at 06:42

## 2024-01-22 RX ADMIN — Medication 650 MILLIGRAM(S): at 17:27

## 2024-01-22 RX ADMIN — Medication 0.5 MILLIGRAM(S): at 12:46

## 2024-01-22 RX ADMIN — Medication 50 MILLIGRAM(S): at 09:50
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
## 2024-01-23 PROCEDURE — 99232 SBSQ HOSP IP/OBS MODERATE 35: CPT

## 2024-01-23 PROCEDURE — 90834 PSYTX W PT 45 MINUTES: CPT

## 2024-01-23 RX ADMIN — Medication 650 MILLIGRAM(S): at 16:11

## 2024-01-23 RX ADMIN — Medication 25 MILLIGRAM(S): at 12:31

## 2024-01-23 RX ADMIN — Medication 50 MILLIGRAM(S): at 09:45

## 2024-01-23 RX ADMIN — MIRTAZAPINE 45 MILLIGRAM(S): 45 TABLET, ORALLY DISINTEGRATING ORAL at 21:38

## 2024-01-23 RX ADMIN — Medication 0.5 MILLIGRAM(S): at 06:03

## 2024-01-23 RX ADMIN — Medication 650 MILLIGRAM(S): at 16:50

## 2024-01-23 RX ADMIN — PANTOPRAZOLE SODIUM 40 MILLIGRAM(S): 20 TABLET, DELAYED RELEASE ORAL at 06:03

## 2024-01-23 RX ADMIN — Medication 0.5 MILLIGRAM(S): at 12:31

## 2024-01-23 RX ADMIN — LOSARTAN POTASSIUM 100 MILLIGRAM(S): 100 TABLET, FILM COATED ORAL at 09:46

## 2024-01-23 RX ADMIN — ATORVASTATIN CALCIUM 10 MILLIGRAM(S): 80 TABLET, FILM COATED ORAL at 21:24

## 2024-01-23 RX ADMIN — Medication 50 MILLIGRAM(S): at 06:04

## 2024-01-23 RX ADMIN — Medication 50 MILLIGRAM(S): at 21:24

## 2024-01-23 RX ADMIN — Medication 0.5 MILLIGRAM(S): at 21:24

## 2024-01-23 RX ADMIN — Medication 37.5 MILLIGRAM(S): at 09:45

## 2024-01-23 RX ADMIN — Medication 25 MILLIGRAM(S): at 21:24

## 2024-01-23 NOTE — BH PSYCHOLOGY - CLINICIAN PSYCHOTHERAPY NOTE - NSBHPSYCHOLNARRATIVE_PSY_A_CORE FT
Psychologist met with the patient for an individual therapy session at the request of the treatment team and consent of the pt. Patient discussed issues related to current hospitalization. Themes included concerns with discharge and the importance of coping skills. She discussed hopes for her future as well as anxious fears related to discharge.      Psychologist provided psychoeducation on cognitive restructuring and negative thinking styles. The patient engaged in cognitive restructuring and distress tolerance interventions, which she reported being helpful at improving her mood.  Patient welcomed psychology f/u and thanked psychologist for session. Psychology to continue meeting with patient to address challenges with depression and anxiety.       Appearance: Patient appeared their stated age, appropriately attired, well dressed.    Cognition and sensorium: Patient appeared awake and alert. Memory appeared sufficient for purposes of this session.   Behavior: Patient was generally cooperative. Eye contact was fair and appropriate.   Motor: Mild psychomotor agitation (shaking, fidgeting, rubbing hands and hair) when discussing emotionally evocative topics  Gait: Steady.  Speech:  Rate of expressive speech was normal. Volume was slightly low. Tone was soft. Quantity was talkative.   Mood: Reported as "manageable" rating it “5/10” (10 = best). Following intervention, she reported her mood had improved (“6/10”)  Affect: Appeared anxious at outset, improving to slightly euthymic at end; broadening in range as session wore on. Congruent with reported thought/mood.   Thought process (observed):  Overinclusive, occasionally circumstantial, but redirectable  Thought Content: Patient did not endorse Auditory Hallucinations or Visual Hallucinations.  Pt did not endorse suicidal/ homicidal ideation, intent, plan, or urge to self-harm.  Attention/Concentration: good.   Impulse control: good.  Insight:  fair  Judgment: fair to good as evidenced by her desire to engage in treatment today

## 2024-01-24 PROCEDURE — 90834 PSYTX W PT 45 MINUTES: CPT

## 2024-01-24 RX ADMIN — Medication 50 MILLIGRAM(S): at 21:03

## 2024-01-24 RX ADMIN — Medication 37.5 MILLIGRAM(S): at 08:17

## 2024-01-24 RX ADMIN — Medication 650 MILLIGRAM(S): at 18:36

## 2024-01-24 RX ADMIN — Medication 1 DROP(S): at 21:04

## 2024-01-24 RX ADMIN — LOSARTAN POTASSIUM 100 MILLIGRAM(S): 100 TABLET, FILM COATED ORAL at 08:17

## 2024-01-24 RX ADMIN — Medication 50 MILLIGRAM(S): at 08:17

## 2024-01-24 RX ADMIN — Medication 25 MILLIGRAM(S): at 06:24

## 2024-01-24 RX ADMIN — MIRTAZAPINE 45 MILLIGRAM(S): 45 TABLET, ORALLY DISINTEGRATING ORAL at 21:03

## 2024-01-24 RX ADMIN — ATORVASTATIN CALCIUM 10 MILLIGRAM(S): 80 TABLET, FILM COATED ORAL at 21:04

## 2024-01-24 RX ADMIN — Medication 25 MILLIGRAM(S): at 21:03

## 2024-01-24 RX ADMIN — Medication 0.5 MILLIGRAM(S): at 21:03

## 2024-01-24 RX ADMIN — Medication 25 MILLIGRAM(S): at 12:55

## 2024-01-24 RX ADMIN — Medication 650 MILLIGRAM(S): at 17:56

## 2024-01-24 RX ADMIN — Medication 0.5 MILLIGRAM(S): at 06:24

## 2024-01-24 RX ADMIN — Medication 0.5 MILLIGRAM(S): at 12:55

## 2024-01-24 RX ADMIN — PANTOPRAZOLE SODIUM 40 MILLIGRAM(S): 20 TABLET, DELAYED RELEASE ORAL at 08:17

## 2024-01-24 NOTE — BH PSYCHOLOGY - CLINICIAN PSYCHOTHERAPY NOTE - NSBHPSYCHOLNARRATIVE_PSY_A_CORE FT
The patient was seen individually at the team's request and with her consent. Speech was of normal rate and volume and there were no verbal irregularities. The patient's mood was calm and affect was congruent. She was coherent and logical. The patient denied suicidal ideation. She reports no difficulty falling asleep.    The patient is still tolerating the new medication (Lyrica). Patient stated that she is mostly free of anxiety but still “down” and hopes that the combination of Effexor and Remeron will address that. We discussed some of the triggers of her sadness, i.e., remembering that her parents are no longer alive to comfort her and to provide “unconditional love”. We explored her self-perception as vulnerable and in need of support and her sensitivity to abandonment. Efforts were made to bolster her self-esteem using evidence and reflection. Patient also acknowledged that when she “overthinks” especially about the future she can trigger doubt and worry.    Patient reported that her relationship with her  is good at this point. We broached the topic of discharge and ways she would assure that she would not relapse she fears.     Patient agreed to meet next on Friday 1/26/24.

## 2024-01-25 PROCEDURE — 99232 SBSQ HOSP IP/OBS MODERATE 35: CPT

## 2024-01-25 RX ORDER — FUROSEMIDE 40 MG
20 TABLET ORAL ONCE
Refills: 0 | Status: COMPLETED | OUTPATIENT
Start: 2024-01-25 | End: 2024-01-25

## 2024-01-25 RX ORDER — CLONAZEPAM 1 MG
0.25 TABLET ORAL EVERY 8 HOURS
Refills: 0 | Status: DISCONTINUED | OUTPATIENT
Start: 2024-01-25 | End: 2024-01-31

## 2024-01-25 RX ORDER — VENLAFAXINE HCL 75 MG
25 CAPSULE, EXT RELEASE 24 HR ORAL DAILY
Refills: 0 | Status: DISCONTINUED | OUTPATIENT
Start: 2024-01-25 | End: 2024-01-29

## 2024-01-25 RX ORDER — FUROSEMIDE 40 MG
20 TABLET ORAL
Refills: 0 | Status: DISCONTINUED | OUTPATIENT
Start: 2024-01-25 | End: 2024-02-22

## 2024-01-25 RX ORDER — CLONAZEPAM 1 MG
0.5 TABLET ORAL
Refills: 0 | Status: DISCONTINUED | OUTPATIENT
Start: 2024-01-25 | End: 2024-01-31

## 2024-01-25 RX ADMIN — PANTOPRAZOLE SODIUM 40 MILLIGRAM(S): 20 TABLET, DELAYED RELEASE ORAL at 06:42

## 2024-01-25 RX ADMIN — Medication 50 MILLIGRAM(S): at 20:45

## 2024-01-25 RX ADMIN — LOSARTAN POTASSIUM 100 MILLIGRAM(S): 100 TABLET, FILM COATED ORAL at 08:27

## 2024-01-25 RX ADMIN — Medication 37.5 MILLIGRAM(S): at 08:27

## 2024-01-25 RX ADMIN — Medication 0.25 MILLIGRAM(S): at 16:16

## 2024-01-25 RX ADMIN — Medication 0.5 MILLIGRAM(S): at 20:45

## 2024-01-25 RX ADMIN — Medication 25 MILLIGRAM(S): at 06:02

## 2024-01-25 RX ADMIN — MIRTAZAPINE 45 MILLIGRAM(S): 45 TABLET, ORALLY DISINTEGRATING ORAL at 20:45

## 2024-01-25 RX ADMIN — ATORVASTATIN CALCIUM 10 MILLIGRAM(S): 80 TABLET, FILM COATED ORAL at 20:45

## 2024-01-25 RX ADMIN — Medication 0.5 MILLIGRAM(S): at 06:02

## 2024-01-25 RX ADMIN — Medication 1 DROP(S): at 21:41

## 2024-01-25 RX ADMIN — Medication 50 MILLIGRAM(S): at 20:46

## 2024-01-25 RX ADMIN — Medication 20 MILLIGRAM(S): at 12:38

## 2024-01-25 RX ADMIN — Medication 50 MILLIGRAM(S): at 08:27

## 2024-01-25 NOTE — BH PSYCHOLOGY - GROUP THERAPY NOTE - NSBHPSYCHOLPARTICIPCOMMENT_PSY_A_CORE FT
Psychology extern (writer) met with the patient for group therapy at the request of the treatment team and the consent of the patient. Group focused on reflecting on maladaptive coping mechanisms and equipping oneself with coping skills for ones’ “tool belt.” Pt partially engaged in group activity and provided feedback and validation to peers. Pt related well to others and shared personal experiences about using isolation to cope with anxiety.  
Psychology extern (writer) met with the patient for group therapy at the request of the treatment team and the consent of the patient. Group focused on reviewing ACT coping skills through value identification. Pt engaged in group activity and provided feedback and validation to peers. Pt shared personal experiences and identified values and qualities/strengths she hopes to develop while on the unit. Pt shared details related to her tx history and how she remains hopeful “this time [hospitalized] is different.” Group members reflected on one identified value and how they could use it to inform healthy coping behaviors while on the unit. Burton set to check in on identified coping skill at the start of next psychology group.

## 2024-01-25 NOTE — BH PSYCHOLOGY - GROUP THERAPY NOTE - TOKEN PULL-DIAGNOSIS
Primary Diagnosis:  ANSON (generalized anxiety disorder) [F41.1]        Problem Dx:   MDD (major depressive disorder) [F32.9]      Panic disorder [F41.0]      
Primary Diagnosis:  ANSON (generalized anxiety disorder) [F41.1]        Problem Dx:   MDD (major depressive disorder) [F32.9]      Panic disorder [F41.0]

## 2024-01-25 NOTE — BH PSYCHOLOGY - GROUP THERAPY NOTE - NSPSYCHOLGRPCOGINT_PSY_A_CORE
explain the connection between health habits and stress vulnerability/group members provided support/group members suggested positive behaviors/acceptance skills taught

## 2024-01-26 PROCEDURE — 99232 SBSQ HOSP IP/OBS MODERATE 35: CPT

## 2024-01-26 RX ADMIN — MIRTAZAPINE 45 MILLIGRAM(S): 45 TABLET, ORALLY DISINTEGRATING ORAL at 21:41

## 2024-01-26 RX ADMIN — Medication 25 MILLIGRAM(S): at 06:32

## 2024-01-26 RX ADMIN — Medication 0.5 MILLIGRAM(S): at 21:42

## 2024-01-26 RX ADMIN — Medication 25 MILLIGRAM(S): at 09:29

## 2024-01-26 RX ADMIN — PANTOPRAZOLE SODIUM 40 MILLIGRAM(S): 20 TABLET, DELAYED RELEASE ORAL at 06:32

## 2024-01-26 RX ADMIN — Medication 50 MILLIGRAM(S): at 21:41

## 2024-01-26 RX ADMIN — Medication 50 MILLIGRAM(S): at 08:43

## 2024-01-26 RX ADMIN — Medication 1 DROP(S): at 22:24

## 2024-01-26 RX ADMIN — Medication 0.5 MILLIGRAM(S): at 13:35

## 2024-01-26 RX ADMIN — Medication 650 MILLIGRAM(S): at 18:45

## 2024-01-26 RX ADMIN — Medication 650 MILLIGRAM(S): at 09:28

## 2024-01-26 RX ADMIN — ATORVASTATIN CALCIUM 10 MILLIGRAM(S): 80 TABLET, FILM COATED ORAL at 21:40

## 2024-01-26 RX ADMIN — Medication 0.5 MILLIGRAM(S): at 06:32

## 2024-01-26 RX ADMIN — Medication 650 MILLIGRAM(S): at 10:25

## 2024-01-26 RX ADMIN — Medication 25 MILLIGRAM(S): at 13:34

## 2024-01-26 RX ADMIN — LOSARTAN POTASSIUM 100 MILLIGRAM(S): 100 TABLET, FILM COATED ORAL at 08:43

## 2024-01-26 RX ADMIN — Medication 20 MILLIGRAM(S): at 08:42

## 2024-01-27 PROCEDURE — 99232 SBSQ HOSP IP/OBS MODERATE 35: CPT

## 2024-01-27 RX ORDER — IBUPROFEN 200 MG
400 TABLET ORAL EVERY 8 HOURS
Refills: 0 | Status: DISCONTINUED | OUTPATIENT
Start: 2024-01-27 | End: 2024-02-22

## 2024-01-27 RX ADMIN — Medication 50 MILLIGRAM(S): at 09:11

## 2024-01-27 RX ADMIN — Medication 0.5 MILLIGRAM(S): at 12:43

## 2024-01-27 RX ADMIN — ATORVASTATIN CALCIUM 10 MILLIGRAM(S): 80 TABLET, FILM COATED ORAL at 20:43

## 2024-01-27 RX ADMIN — Medication 50 MILLIGRAM(S): at 20:43

## 2024-01-27 RX ADMIN — Medication 25 MILLIGRAM(S): at 12:43

## 2024-01-27 RX ADMIN — MIRTAZAPINE 45 MILLIGRAM(S): 45 TABLET, ORALLY DISINTEGRATING ORAL at 20:43

## 2024-01-27 RX ADMIN — Medication 400 MILLIGRAM(S): at 12:15

## 2024-01-27 RX ADMIN — Medication 400 MILLIGRAM(S): at 11:07

## 2024-01-27 RX ADMIN — Medication 25 MILLIGRAM(S): at 06:32

## 2024-01-27 RX ADMIN — Medication 0.5 MILLIGRAM(S): at 06:32

## 2024-01-27 RX ADMIN — Medication 0.5 MILLIGRAM(S): at 20:43

## 2024-01-27 RX ADMIN — Medication 1 DROP(S): at 13:16

## 2024-01-27 RX ADMIN — Medication 400 MILLIGRAM(S): at 20:05

## 2024-01-27 RX ADMIN — Medication 25 MILLIGRAM(S): at 09:11

## 2024-01-27 RX ADMIN — LOSARTAN POTASSIUM 100 MILLIGRAM(S): 100 TABLET, FILM COATED ORAL at 09:12

## 2024-01-27 RX ADMIN — PANTOPRAZOLE SODIUM 40 MILLIGRAM(S): 20 TABLET, DELAYED RELEASE ORAL at 06:31

## 2024-01-27 RX ADMIN — Medication 400 MILLIGRAM(S): at 21:05

## 2024-01-27 RX ADMIN — Medication 30 MILLILITER(S): at 13:21

## 2024-01-28 PROCEDURE — 90834 PSYTX W PT 45 MINUTES: CPT

## 2024-01-28 PROCEDURE — 99232 SBSQ HOSP IP/OBS MODERATE 35: CPT

## 2024-01-28 RX ORDER — LIDOCAINE 4 G/100G
2 CREAM TOPICAL DAILY
Refills: 0 | Status: DISCONTINUED | OUTPATIENT
Start: 2024-01-28 | End: 2024-02-22

## 2024-01-28 RX ADMIN — Medication 0.25 MILLIGRAM(S): at 15:33

## 2024-01-28 RX ADMIN — Medication 25 MILLIGRAM(S): at 06:37

## 2024-01-28 RX ADMIN — Medication 25 MILLIGRAM(S): at 12:51

## 2024-01-28 RX ADMIN — Medication 400 MILLIGRAM(S): at 20:31

## 2024-01-28 RX ADMIN — Medication 400 MILLIGRAM(S): at 19:31

## 2024-01-28 RX ADMIN — MIRTAZAPINE 45 MILLIGRAM(S): 45 TABLET, ORALLY DISINTEGRATING ORAL at 20:49

## 2024-01-28 RX ADMIN — Medication 400 MILLIGRAM(S): at 09:32

## 2024-01-28 RX ADMIN — Medication 0.5 MILLIGRAM(S): at 12:50

## 2024-01-28 RX ADMIN — Medication 30 MILLILITER(S): at 10:21

## 2024-01-28 RX ADMIN — Medication 50 MILLIGRAM(S): at 08:36

## 2024-01-28 RX ADMIN — ATORVASTATIN CALCIUM 10 MILLIGRAM(S): 80 TABLET, FILM COATED ORAL at 20:49

## 2024-01-28 RX ADMIN — Medication 1 DROP(S): at 13:14

## 2024-01-28 RX ADMIN — Medication 0.5 MILLIGRAM(S): at 06:37

## 2024-01-28 RX ADMIN — PANTOPRAZOLE SODIUM 40 MILLIGRAM(S): 20 TABLET, DELAYED RELEASE ORAL at 06:37

## 2024-01-28 RX ADMIN — Medication 400 MILLIGRAM(S): at 08:36

## 2024-01-28 RX ADMIN — Medication 50 MILLIGRAM(S): at 20:49

## 2024-01-28 RX ADMIN — Medication 0.5 MILLIGRAM(S): at 20:49

## 2024-01-28 RX ADMIN — Medication 50 MILLIGRAM(S): at 20:50

## 2024-01-28 RX ADMIN — Medication 25 MILLIGRAM(S): at 08:36

## 2024-01-28 RX ADMIN — LOSARTAN POTASSIUM 100 MILLIGRAM(S): 100 TABLET, FILM COATED ORAL at 08:36

## 2024-01-28 NOTE — BH PSYCHOLOGY - CLINICIAN PSYCHOTHERAPY NOTE - NSBHPSYCHOLNARRATIVE_PSY_A_CORE FT
The patient was seen individually at the team's request and with her consent. Speech was of normal rate and volume and there were no verbal irregularities. The patient's mood was calm and affect was congruent. She was coherent and logical. The patient denied suicidal ideation. She reports no difficulty falling asleep.    The patient decided to continue the new medication (Lyrica) despite some transient distress. She is mostly free of anxiety but “sad”. Triggers of her sadness were reviewed. The writer reminded her of evidence of high functioning designed to boost self-esteem. Patient complained of back pain which she attributed to a sprain when using the bathroom.    Patient reported a good relationship with her . She appreciated the wellness booklet that she was given this week. She was encouraged to visualize successful resumption of activities upon discharge. The patient was encouraged to continue to attend therapeutic activities on the unit.

## 2024-01-29 PROCEDURE — 99232 SBSQ HOSP IP/OBS MODERATE 35: CPT

## 2024-01-29 PROCEDURE — 90834 PSYTX W PT 45 MINUTES: CPT

## 2024-01-29 RX ADMIN — Medication 30 MILLILITER(S): at 09:55

## 2024-01-29 RX ADMIN — LOSARTAN POTASSIUM 100 MILLIGRAM(S): 100 TABLET, FILM COATED ORAL at 08:58

## 2024-01-29 RX ADMIN — Medication 50 MILLIGRAM(S): at 08:59

## 2024-01-29 RX ADMIN — Medication 0.5 MILLIGRAM(S): at 12:37

## 2024-01-29 RX ADMIN — LIDOCAINE 2 PATCH: 4 CREAM TOPICAL at 08:59

## 2024-01-29 RX ADMIN — PANTOPRAZOLE SODIUM 40 MILLIGRAM(S): 20 TABLET, DELAYED RELEASE ORAL at 06:44

## 2024-01-29 RX ADMIN — Medication 1 DROP(S): at 22:29

## 2024-01-29 RX ADMIN — Medication 25 MILLIGRAM(S): at 06:43

## 2024-01-29 RX ADMIN — Medication 0.5 MILLIGRAM(S): at 06:44

## 2024-01-29 RX ADMIN — Medication 0.5 MILLIGRAM(S): at 20:44

## 2024-01-29 RX ADMIN — Medication 50 MILLIGRAM(S): at 20:45

## 2024-01-29 RX ADMIN — Medication 20 MILLIGRAM(S): at 08:58

## 2024-01-29 RX ADMIN — Medication 50 MILLIGRAM(S): at 20:44

## 2024-01-29 RX ADMIN — ATORVASTATIN CALCIUM 10 MILLIGRAM(S): 80 TABLET, FILM COATED ORAL at 20:44

## 2024-01-29 RX ADMIN — MIRTAZAPINE 45 MILLIGRAM(S): 45 TABLET, ORALLY DISINTEGRATING ORAL at 20:44

## 2024-01-29 RX ADMIN — Medication 25 MILLIGRAM(S): at 08:58

## 2024-01-29 RX ADMIN — LIDOCAINE 2 PATCH: 4 CREAM TOPICAL at 19:07

## 2024-01-29 RX ADMIN — Medication 25 MILLIGRAM(S): at 12:38

## 2024-01-29 RX ADMIN — LIDOCAINE 2 PATCH: 4 CREAM TOPICAL at 20:44
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
## 2024-01-30 LAB
ANION GAP SERPL CALC-SCNC: 11 MMOL/L — SIGNIFICANT CHANGE UP (ref 7–14)
BUN SERPL-MCNC: 10 MG/DL — SIGNIFICANT CHANGE UP (ref 7–23)
CALCIUM SERPL-MCNC: 9.7 MG/DL — SIGNIFICANT CHANGE UP (ref 8.4–10.5)
CHLORIDE SERPL-SCNC: 107 MMOL/L — SIGNIFICANT CHANGE UP (ref 98–107)
CO2 SERPL-SCNC: 29 MMOL/L — SIGNIFICANT CHANGE UP (ref 22–31)
CREAT SERPL-MCNC: 0.84 MG/DL — SIGNIFICANT CHANGE UP (ref 0.5–1.3)
EGFR: 73 ML/MIN/1.73M2 — SIGNIFICANT CHANGE UP
GLUCOSE SERPL-MCNC: 120 MG/DL — HIGH (ref 70–99)
MAGNESIUM SERPL-MCNC: 2.1 MG/DL — SIGNIFICANT CHANGE UP (ref 1.6–2.6)
PHOSPHATE SERPL-MCNC: 3.3 MG/DL — SIGNIFICANT CHANGE UP (ref 2.5–4.5)
POTASSIUM SERPL-MCNC: 4.1 MMOL/L — SIGNIFICANT CHANGE UP (ref 3.5–5.3)
POTASSIUM SERPL-SCNC: 4.1 MMOL/L — SIGNIFICANT CHANGE UP (ref 3.5–5.3)
SODIUM SERPL-SCNC: 147 MMOL/L — HIGH (ref 135–145)

## 2024-01-30 PROCEDURE — 99232 SBSQ HOSP IP/OBS MODERATE 35: CPT

## 2024-01-30 RX ORDER — MIRTAZAPINE 45 MG/1
30 TABLET, ORALLY DISINTEGRATING ORAL AT BEDTIME
Refills: 0 | Status: DISCONTINUED | OUTPATIENT
Start: 2024-01-30 | End: 2024-02-01

## 2024-01-30 RX ORDER — NORTRIPTYLINE HYDROCHLORIDE 10 MG/1
10 CAPSULE ORAL AT BEDTIME
Refills: 0 | Status: DISCONTINUED | OUTPATIENT
Start: 2024-01-30 | End: 2024-02-01

## 2024-01-30 RX ADMIN — NORTRIPTYLINE HYDROCHLORIDE 10 MILLIGRAM(S): 10 CAPSULE ORAL at 21:16

## 2024-01-30 RX ADMIN — Medication 0.5 MILLIGRAM(S): at 21:16

## 2024-01-30 RX ADMIN — LIDOCAINE 2 PATCH: 4 CREAM TOPICAL at 08:12

## 2024-01-30 RX ADMIN — Medication 50 MILLIGRAM(S): at 21:16

## 2024-01-30 RX ADMIN — Medication 50 MILLIGRAM(S): at 08:14

## 2024-01-30 RX ADMIN — Medication 25 MILLIGRAM(S): at 06:32

## 2024-01-30 RX ADMIN — Medication 0.5 MILLIGRAM(S): at 12:38

## 2024-01-30 RX ADMIN — Medication 1 DROP(S): at 10:52

## 2024-01-30 RX ADMIN — MIRTAZAPINE 30 MILLIGRAM(S): 45 TABLET, ORALLY DISINTEGRATING ORAL at 21:16

## 2024-01-30 RX ADMIN — LOSARTAN POTASSIUM 100 MILLIGRAM(S): 100 TABLET, FILM COATED ORAL at 08:14

## 2024-01-30 RX ADMIN — LIDOCAINE 2 PATCH: 4 CREAM TOPICAL at 18:51

## 2024-01-30 RX ADMIN — LIDOCAINE 2 PATCH: 4 CREAM TOPICAL at 21:32

## 2024-01-30 RX ADMIN — ATORVASTATIN CALCIUM 10 MILLIGRAM(S): 80 TABLET, FILM COATED ORAL at 21:16

## 2024-01-30 RX ADMIN — Medication 0.5 MILLIGRAM(S): at 06:32

## 2024-01-30 RX ADMIN — PANTOPRAZOLE SODIUM 40 MILLIGRAM(S): 20 TABLET, DELAYED RELEASE ORAL at 06:32

## 2024-01-30 RX ADMIN — Medication 0.25 MILLIGRAM(S): at 16:15

## 2024-01-30 RX ADMIN — Medication 25 MILLIGRAM(S): at 21:35

## 2024-01-30 RX ADMIN — Medication 25 MILLIGRAM(S): at 12:37

## 2024-01-31 LAB
ANION GAP SERPL CALC-SCNC: 11 MMOL/L — SIGNIFICANT CHANGE UP (ref 7–14)
BUN SERPL-MCNC: 8 MG/DL — SIGNIFICANT CHANGE UP (ref 7–23)
CALCIUM SERPL-MCNC: 9.6 MG/DL — SIGNIFICANT CHANGE UP (ref 8.4–10.5)
CHLORIDE SERPL-SCNC: 103 MMOL/L — SIGNIFICANT CHANGE UP (ref 98–107)
CO2 SERPL-SCNC: 28 MMOL/L — SIGNIFICANT CHANGE UP (ref 22–31)
CREAT SERPL-MCNC: 0.74 MG/DL — SIGNIFICANT CHANGE UP (ref 0.5–1.3)
EGFR: 85 ML/MIN/1.73M2 — SIGNIFICANT CHANGE UP
GLUCOSE SERPL-MCNC: 140 MG/DL — HIGH (ref 70–99)
MAGNESIUM SERPL-MCNC: 2.2 MG/DL — SIGNIFICANT CHANGE UP (ref 1.6–2.6)
PHOSPHATE SERPL-MCNC: 3.3 MG/DL — SIGNIFICANT CHANGE UP (ref 2.5–4.5)
POTASSIUM SERPL-MCNC: 4 MMOL/L — SIGNIFICANT CHANGE UP (ref 3.5–5.3)
POTASSIUM SERPL-SCNC: 4 MMOL/L — SIGNIFICANT CHANGE UP (ref 3.5–5.3)
SODIUM SERPL-SCNC: 142 MMOL/L — SIGNIFICANT CHANGE UP (ref 135–145)

## 2024-01-31 PROCEDURE — 90832 PSYTX W PT 30 MINUTES: CPT

## 2024-01-31 PROCEDURE — 99232 SBSQ HOSP IP/OBS MODERATE 35: CPT

## 2024-01-31 RX ORDER — CLONAZEPAM 1 MG
0.5 TABLET ORAL
Refills: 0 | Status: DISCONTINUED | OUTPATIENT
Start: 2024-01-31 | End: 2024-02-06

## 2024-01-31 RX ORDER — CLONAZEPAM 1 MG
0.25 TABLET ORAL EVERY 8 HOURS
Refills: 0 | Status: DISCONTINUED | OUTPATIENT
Start: 2024-01-31 | End: 2024-02-06

## 2024-01-31 RX ORDER — CLONAZEPAM 1 MG
0.5 TABLET ORAL ONCE
Refills: 0 | Status: DISCONTINUED | OUTPATIENT
Start: 2024-01-31 | End: 2024-01-31

## 2024-01-31 RX ADMIN — LIDOCAINE 2 PATCH: 4 CREAM TOPICAL at 21:52

## 2024-01-31 RX ADMIN — Medication 650 MILLIGRAM(S): at 15:21

## 2024-01-31 RX ADMIN — Medication 0.5 MILLIGRAM(S): at 05:55

## 2024-01-31 RX ADMIN — LIDOCAINE 2 PATCH: 4 CREAM TOPICAL at 09:33

## 2024-01-31 RX ADMIN — Medication 20 MILLIGRAM(S): at 09:32

## 2024-01-31 RX ADMIN — NORTRIPTYLINE HYDROCHLORIDE 10 MILLIGRAM(S): 10 CAPSULE ORAL at 20:44

## 2024-01-31 RX ADMIN — Medication 25 MILLIGRAM(S): at 05:55

## 2024-01-31 RX ADMIN — Medication 0.5 MILLIGRAM(S): at 13:34

## 2024-01-31 RX ADMIN — ATORVASTATIN CALCIUM 10 MILLIGRAM(S): 80 TABLET, FILM COATED ORAL at 20:44

## 2024-01-31 RX ADMIN — Medication 25 MILLIGRAM(S): at 13:34

## 2024-01-31 RX ADMIN — Medication 1 DROP(S): at 21:15

## 2024-01-31 RX ADMIN — LOSARTAN POTASSIUM 100 MILLIGRAM(S): 100 TABLET, FILM COATED ORAL at 09:32

## 2024-01-31 RX ADMIN — MIRTAZAPINE 30 MILLIGRAM(S): 45 TABLET, ORALLY DISINTEGRATING ORAL at 20:44

## 2024-01-31 RX ADMIN — Medication 50 MILLIGRAM(S): at 20:44

## 2024-01-31 RX ADMIN — Medication 50 MILLIGRAM(S): at 09:32

## 2024-01-31 RX ADMIN — Medication 0.5 MILLIGRAM(S): at 20:44

## 2024-01-31 RX ADMIN — Medication 25 MILLIGRAM(S): at 20:44

## 2024-01-31 RX ADMIN — PANTOPRAZOLE SODIUM 40 MILLIGRAM(S): 20 TABLET, DELAYED RELEASE ORAL at 06:47

## 2024-01-31 NOTE — BH PSYCHOLOGY - CLINICIAN PSYCHOTHERAPY NOTE - NSBHPSYCHOLNARRATIVE_PSY_A_CORE FT
Patient was seen briefly at her request. She was tearful and said she had a few bad days. Mental status generally unchanged. She expressed fear that after starting on the new medication (nortriptaline) her doctor would discharge her without titrating to a target does. Writer reassured patient that dosage generally starts small and is increased gradually if tolerated. Patient was reassured and agreed to meet tomorrow for more in-depth session.

## 2024-01-31 NOTE — CHART NOTE - NSCHARTNOTEFT_GEN_A_CORE
Pt requesting to see RDN for food preferences.    Source: Patient [x]    Family [ ]     other [ ]    Diet : Diet, DASH/TLC:   Sodium & Cholesterol Restricted (01-10-24 @ 00:08)      Patient reports [ ] nausea  [ ] vomiting [ ] diarrhea [ ] constipation  [ ]chewing problems [ ] swallowing issues  [x] other: No GI distress noted.      PO intake:  < 50% [ ] 50-75% [ ]   % [x]  other :     Source for PO intake [x] Patient [ ] family [ ] chart [ ] staff [ ] other     Met Pt in day room. Reports good appetite/po intake. States " I am eating to much ice cream"  Provided verbal education re: healthy Eating and portion control. Pt verbalized understanding.  Food preferences explored and implemented.       Current Weight: 1/27 219 lbs (99 kg)  % Weight Change: 11 lbs (5%) gain    Pertinent Medications: MEDICATIONS  (STANDING):  atorvastatin 10 milliGRAM(s) Oral at bedtime  furosemide    Tablet 20 milliGRAM(s) Oral <User Schedule>  lidocaine   4% Patch 2 Patch Transdermal daily  losartan 100 milliGRAM(s) Oral daily  metoprolol tartrate 50 milliGRAM(s) Oral two times a day  mirtazapine 30 milliGRAM(s) Oral at bedtime  nortriptyline 10 milliGRAM(s) Oral at bedtime  pantoprazole    Tablet 40 milliGRAM(s) Oral before breakfast  pregabalin 25 milliGRAM(s) Oral <User Schedule>    MEDICATIONS  (PRN):  acetaminophen     Tablet .. 650 milliGRAM(s) Oral every 6 hours PRN Mild Pain (1 - 3), Moderate Pain (4 - 6)  artificial  tears Solution 1 Drop(s) Both EYES every 8 hours PRN dry eye  bismuth subsalicylate Liquid 30 milliLiter(s) Oral every 6 hours PRN nausea  clonazePAM Oral Disintegrating Tablet 0.25 milliGRAM(s) Oral every 8 hours PRN anxiety  ibuprofen  Tablet. 400 milliGRAM(s) Oral every 8 hours PRN Mild Pain (1 - 3), Moderate Pain (4 - 6)    Pertinent Labs:  None      Skin: Intact     Estimated Needs:   [x] no change since previous assessment  [ ] recalculated:       Previous Nutrition Diagnosis:     [x] Inadequate Protein/Energy Intake [ ]Inadequate Oral Intake [ ] Excessive Energy Intake     [ ] Underweight [ ] Increased Nutrient Needs [ ] Overweight/Obesity     [ ] Altered GI Function [ ] Unintended Weight Loss [ ] Food & Nutrition Related Knowledge Deficit [ ] Malnutrition          Nutrition Diagnosis is [ ] ongoing  [x] resolved [ ] not applicable          New Nutrition Diagnosis: [ ] not applicable    [ ] Inadequate Protein Energy Intake [ ]Inadequate Oral Intake [ ] Excessive Energy Intake     [ ] Underweight [ ] Increased Nutrient Needs [x] Overweight/Obesity     [ ] Altered GI Function [ ] Unintended Weight Loss [ ] Food & Nutrition Related Knowledge Deficit[ ] Limited Adherence to nutrition related recommendations [ ] Malnutrition  [ ] other: Free text       Related to: excessive energy intak and lack of physical activity     As evidenced by: BMI of 42.7     Interventions: provided education re: healthy eating and portion control. Made changes in Pt's menu.      Recommend    [ ] Change Diet To:    [ ] Nutrition Supplement    [ ] Nutrition Support    [x] Other: Continue current diet order.        Monitoring and Evaluation:     [x] PO intake [ ] Tolerance to diet prescription [x] weekly weights [x] follow up per protocol    [x] other: Annita Vega RDN Pager [phone]

## 2024-02-01 PROCEDURE — 73522 X-RAY EXAM HIPS BI 3-4 VIEWS: CPT | Mod: 26

## 2024-02-01 PROCEDURE — 99232 SBSQ HOSP IP/OBS MODERATE 35: CPT

## 2024-02-01 PROCEDURE — 90837 PSYTX W PT 60 MINUTES: CPT

## 2024-02-01 PROCEDURE — 72100 X-RAY EXAM L-S SPINE 2/3 VWS: CPT | Mod: 26

## 2024-02-01 RX ORDER — CHLORHEXIDINE GLUCONATE 213 G/1000ML
15 SOLUTION TOPICAL
Refills: 0 | Status: DISCONTINUED | OUTPATIENT
Start: 2024-02-01 | End: 2024-02-10

## 2024-02-01 RX ORDER — CHLORHEXIDINE GLUCONATE 213 G/1000ML
15 SOLUTION TOPICAL ONCE
Refills: 0 | Status: COMPLETED | OUTPATIENT
Start: 2024-02-01 | End: 2024-02-01

## 2024-02-01 RX ORDER — PANTOPRAZOLE SODIUM 20 MG/1
40 TABLET, DELAYED RELEASE ORAL
Refills: 0 | Status: DISCONTINUED | OUTPATIENT
Start: 2024-02-01 | End: 2024-02-22

## 2024-02-01 RX ORDER — MIRTAZAPINE 45 MG/1
15 TABLET, ORALLY DISINTEGRATING ORAL AT BEDTIME
Refills: 0 | Status: DISCONTINUED | OUTPATIENT
Start: 2024-02-01 | End: 2024-02-07

## 2024-02-01 RX ORDER — NORTRIPTYLINE HYDROCHLORIDE 10 MG/1
20 CAPSULE ORAL AT BEDTIME
Refills: 0 | Status: DISCONTINUED | OUTPATIENT
Start: 2024-02-01 | End: 2024-02-05

## 2024-02-01 RX ORDER — MIRTAZAPINE 45 MG/1
22.5 TABLET, ORALLY DISINTEGRATING ORAL AT BEDTIME
Refills: 0 | Status: DISCONTINUED | OUTPATIENT
Start: 2024-02-01 | End: 2024-02-01

## 2024-02-01 RX ADMIN — Medication 0.5 MILLIGRAM(S): at 06:35

## 2024-02-01 RX ADMIN — CHLORHEXIDINE GLUCONATE 15 MILLILITER(S): 213 SOLUTION TOPICAL at 20:56

## 2024-02-01 RX ADMIN — Medication 50 MILLIGRAM(S): at 09:21

## 2024-02-01 RX ADMIN — Medication 30 MILLILITER(S): at 14:07

## 2024-02-01 RX ADMIN — LIDOCAINE 2 PATCH: 4 CREAM TOPICAL at 19:09

## 2024-02-01 RX ADMIN — Medication 25 MILLIGRAM(S): at 13:04

## 2024-02-01 RX ADMIN — MIRTAZAPINE 15 MILLIGRAM(S): 45 TABLET, ORALLY DISINTEGRATING ORAL at 20:53

## 2024-02-01 RX ADMIN — Medication 25 MILLIGRAM(S): at 20:55

## 2024-02-01 RX ADMIN — Medication 0.5 MILLIGRAM(S): at 20:55

## 2024-02-01 RX ADMIN — Medication 0.5 MILLIGRAM(S): at 13:04

## 2024-02-01 RX ADMIN — CHLORHEXIDINE GLUCONATE 15 MILLILITER(S): 213 SOLUTION TOPICAL at 13:04

## 2024-02-01 RX ADMIN — Medication 1 DROP(S): at 21:50

## 2024-02-01 RX ADMIN — Medication 25 MILLIGRAM(S): at 06:35

## 2024-02-01 RX ADMIN — PANTOPRAZOLE SODIUM 40 MILLIGRAM(S): 20 TABLET, DELAYED RELEASE ORAL at 06:35

## 2024-02-01 RX ADMIN — LOSARTAN POTASSIUM 100 MILLIGRAM(S): 100 TABLET, FILM COATED ORAL at 09:21

## 2024-02-01 RX ADMIN — Medication 1 DROP(S): at 09:22

## 2024-02-01 RX ADMIN — ATORVASTATIN CALCIUM 10 MILLIGRAM(S): 80 TABLET, FILM COATED ORAL at 20:53

## 2024-02-01 RX ADMIN — LIDOCAINE 2 PATCH: 4 CREAM TOPICAL at 09:24

## 2024-02-01 RX ADMIN — NORTRIPTYLINE HYDROCHLORIDE 20 MILLIGRAM(S): 10 CAPSULE ORAL at 20:53

## 2024-02-01 RX ADMIN — Medication 50 MILLIGRAM(S): at 20:53

## 2024-02-01 NOTE — BH PSYCHOLOGY - CLINICIAN PSYCHOTHERAPY NOTE - NSBHPSYCHOLNARRATIVE_PSY_A_CORE FT
The patient was seen individually at the team's request and with her consent. Speech was of normal rate and volume and there were no verbal irregularities. The patient's mood was depressed, and affect was congruent and labile. She was coherent and logical. The patient denied suicidal ideation. She reports no difficulty falling asleep.    The patient expressed some worry that she would not get well, that she would be discharged prematurely, and that if her Remeron is discontinued she would not be able to fall asleep. The writer helped her recall reassurances that she has received numerous times from her doctor and rehearsed focusing on best-case rather than worst-case scenarios. She acknowledged that the above fears center around the theme of abandonment which has reverberated for her since her parents’ deaths. Additionally, we noted that the current treatment challenges her general belief that “more is better” when it comes to medications.     The patient was angry at being told that she could not have her own food in her room or stored for her and that she could not leave her cell phone with [employer] to be retrieved when needed while these allowances are made for other patients. The writer validated her frustration while citing the need for some annoying rules in the hospital. The patient reports that her  has been more sympathetic, supportive, and patient with her over the last few days. The patient was encouraged to continue to attend therapeutic activities on the unit.

## 2024-02-02 PROCEDURE — 99232 SBSQ HOSP IP/OBS MODERATE 35: CPT

## 2024-02-02 PROCEDURE — 90834 PSYTX W PT 45 MINUTES: CPT

## 2024-02-02 RX ADMIN — Medication 0.5 MILLIGRAM(S): at 20:33

## 2024-02-02 RX ADMIN — NORTRIPTYLINE HYDROCHLORIDE 20 MILLIGRAM(S): 10 CAPSULE ORAL at 20:33

## 2024-02-02 RX ADMIN — Medication 50 MILLIGRAM(S): at 09:16

## 2024-02-02 RX ADMIN — CHLORHEXIDINE GLUCONATE 15 MILLILITER(S): 213 SOLUTION TOPICAL at 20:33

## 2024-02-02 RX ADMIN — Medication 25 MILLIGRAM(S): at 13:10

## 2024-02-02 RX ADMIN — Medication 25 MILLIGRAM(S): at 06:04

## 2024-02-02 RX ADMIN — Medication 1 DROP(S): at 21:26

## 2024-02-02 RX ADMIN — LIDOCAINE 2 PATCH: 4 CREAM TOPICAL at 05:36

## 2024-02-02 RX ADMIN — Medication 20 MILLIGRAM(S): at 09:16

## 2024-02-02 RX ADMIN — ATORVASTATIN CALCIUM 10 MILLIGRAM(S): 80 TABLET, FILM COATED ORAL at 20:33

## 2024-02-02 RX ADMIN — LIDOCAINE 2 PATCH: 4 CREAM TOPICAL at 09:18

## 2024-02-02 RX ADMIN — CHLORHEXIDINE GLUCONATE 15 MILLILITER(S): 213 SOLUTION TOPICAL at 09:17

## 2024-02-02 RX ADMIN — MIRTAZAPINE 15 MILLIGRAM(S): 45 TABLET, ORALLY DISINTEGRATING ORAL at 20:33

## 2024-02-02 RX ADMIN — Medication 0.5 MILLIGRAM(S): at 13:11

## 2024-02-02 RX ADMIN — Medication 0.5 MILLIGRAM(S): at 06:04

## 2024-02-02 RX ADMIN — Medication 50 MILLIGRAM(S): at 20:33

## 2024-02-02 RX ADMIN — PANTOPRAZOLE SODIUM 40 MILLIGRAM(S): 20 TABLET, DELAYED RELEASE ORAL at 06:04

## 2024-02-02 RX ADMIN — Medication 25 MILLIGRAM(S): at 20:33

## 2024-02-02 RX ADMIN — LIDOCAINE 2 PATCH: 4 CREAM TOPICAL at 18:36

## 2024-02-02 RX ADMIN — LIDOCAINE 2 PATCH: 4 CREAM TOPICAL at 21:58

## 2024-02-02 NOTE — BH PSYCHOLOGY - CLINICIAN PSYCHOTHERAPY NOTE - NSBHPSYCHOLNARRATIVE_PSY_A_CORE FT
The patient was seen individually at the team's request and with her consent. Speech was of normal rate and volume and there were no verbal irregularities. The patient's mood was depressed, and affect was congruent and labile. She was coherent and logical. The patient denied suicidal ideation. She reports no difficulty falling asleep.    The patient demonstrated tearfulness as she ruminated on what could go wrong in her treatment. She again sought reassurance that she would get better and reported that she felt better when the doctor told her that the tricyclic takes time to work. The writer reminded her that she already has the information that reassures her which she could invoke to calm herself independently (rather than depend on an authority figure). The “more is better” dysfunctional belief was referenced as well in addressing cognitions that can impact her mood.     The patient was upset that a staff member seemed not to like her and appeared to avoid answering her questions about accessing her cell phone when needed. We discussed other possible explanations for the behavior she observed as well as the irrational belief that everyone must like her. The patient stated that her  continues to be supportive. The patient was encouraged to continue to attend therapeutic activities on the unit and agreed to meet again next week.

## 2024-02-03 PROCEDURE — 99231 SBSQ HOSP IP/OBS SF/LOW 25: CPT

## 2024-02-03 RX ADMIN — Medication 50 MILLIGRAM(S): at 08:51

## 2024-02-03 RX ADMIN — CHLORHEXIDINE GLUCONATE 15 MILLILITER(S): 213 SOLUTION TOPICAL at 21:59

## 2024-02-03 RX ADMIN — Medication 50 MILLIGRAM(S): at 21:58

## 2024-02-03 RX ADMIN — Medication 650 MILLIGRAM(S): at 22:56

## 2024-02-03 RX ADMIN — ATORVASTATIN CALCIUM 10 MILLIGRAM(S): 80 TABLET, FILM COATED ORAL at 21:58

## 2024-02-03 RX ADMIN — Medication 1 DROP(S): at 21:59

## 2024-02-03 RX ADMIN — Medication 25 MILLIGRAM(S): at 05:29

## 2024-02-03 RX ADMIN — PANTOPRAZOLE SODIUM 40 MILLIGRAM(S): 20 TABLET, DELAYED RELEASE ORAL at 06:38

## 2024-02-03 RX ADMIN — LOSARTAN POTASSIUM 100 MILLIGRAM(S): 100 TABLET, FILM COATED ORAL at 08:50

## 2024-02-03 RX ADMIN — CHLORHEXIDINE GLUCONATE 15 MILLILITER(S): 213 SOLUTION TOPICAL at 08:51

## 2024-02-03 RX ADMIN — NORTRIPTYLINE HYDROCHLORIDE 20 MILLIGRAM(S): 10 CAPSULE ORAL at 21:58

## 2024-02-03 RX ADMIN — Medication 0.5 MILLIGRAM(S): at 05:29

## 2024-02-03 RX ADMIN — Medication 25 MILLIGRAM(S): at 21:59

## 2024-02-03 RX ADMIN — Medication 0.5 MILLIGRAM(S): at 21:58

## 2024-02-03 RX ADMIN — Medication 0.5 MILLIGRAM(S): at 12:53

## 2024-02-03 RX ADMIN — Medication 1 DROP(S): at 09:31

## 2024-02-03 RX ADMIN — Medication 25 MILLIGRAM(S): at 12:54

## 2024-02-03 RX ADMIN — Medication 650 MILLIGRAM(S): at 21:56

## 2024-02-03 RX ADMIN — MIRTAZAPINE 15 MILLIGRAM(S): 45 TABLET, ORALLY DISINTEGRATING ORAL at 21:58

## 2024-02-04 PROCEDURE — 99231 SBSQ HOSP IP/OBS SF/LOW 25: CPT

## 2024-02-04 RX ADMIN — ATORVASTATIN CALCIUM 10 MILLIGRAM(S): 80 TABLET, FILM COATED ORAL at 21:55

## 2024-02-04 RX ADMIN — LIDOCAINE 2 PATCH: 4 CREAM TOPICAL at 22:42

## 2024-02-04 RX ADMIN — Medication 50 MILLIGRAM(S): at 08:49

## 2024-02-04 RX ADMIN — Medication 1 DROP(S): at 21:11

## 2024-02-04 RX ADMIN — NORTRIPTYLINE HYDROCHLORIDE 20 MILLIGRAM(S): 10 CAPSULE ORAL at 21:54

## 2024-02-04 RX ADMIN — Medication 650 MILLIGRAM(S): at 12:43

## 2024-02-04 RX ADMIN — PANTOPRAZOLE SODIUM 40 MILLIGRAM(S): 20 TABLET, DELAYED RELEASE ORAL at 06:04

## 2024-02-04 RX ADMIN — Medication 30 MILLILITER(S): at 13:38

## 2024-02-04 RX ADMIN — Medication 25 MILLIGRAM(S): at 12:44

## 2024-02-04 RX ADMIN — Medication 50 MILLIGRAM(S): at 21:55

## 2024-02-04 RX ADMIN — LIDOCAINE 2 PATCH: 4 CREAM TOPICAL at 08:50

## 2024-02-04 RX ADMIN — LOSARTAN POTASSIUM 100 MILLIGRAM(S): 100 TABLET, FILM COATED ORAL at 08:50

## 2024-02-04 RX ADMIN — CHLORHEXIDINE GLUCONATE 15 MILLILITER(S): 213 SOLUTION TOPICAL at 21:55

## 2024-02-04 RX ADMIN — Medication 0.5 MILLIGRAM(S): at 06:04

## 2024-02-04 RX ADMIN — CHLORHEXIDINE GLUCONATE 15 MILLILITER(S): 213 SOLUTION TOPICAL at 08:50

## 2024-02-04 RX ADMIN — LIDOCAINE 2 PATCH: 4 CREAM TOPICAL at 19:05

## 2024-02-04 RX ADMIN — Medication 0.5 MILLIGRAM(S): at 12:43

## 2024-02-04 RX ADMIN — Medication 25 MILLIGRAM(S): at 21:56

## 2024-02-04 RX ADMIN — MIRTAZAPINE 15 MILLIGRAM(S): 45 TABLET, ORALLY DISINTEGRATING ORAL at 21:55

## 2024-02-04 RX ADMIN — Medication 0.5 MILLIGRAM(S): at 21:55

## 2024-02-04 RX ADMIN — Medication 25 MILLIGRAM(S): at 06:05

## 2024-02-04 RX ADMIN — Medication 1 DROP(S): at 08:50

## 2024-02-05 PROCEDURE — 99232 SBSQ HOSP IP/OBS MODERATE 35: CPT

## 2024-02-05 PROCEDURE — 90837 PSYTX W PT 60 MINUTES: CPT

## 2024-02-05 RX ORDER — NORTRIPTYLINE HYDROCHLORIDE 10 MG/1
30 CAPSULE ORAL AT BEDTIME
Refills: 0 | Status: DISCONTINUED | OUTPATIENT
Start: 2024-02-05 | End: 2024-02-06

## 2024-02-05 RX ADMIN — Medication 20 MILLIGRAM(S): at 09:50

## 2024-02-05 RX ADMIN — Medication 0.5 MILLIGRAM(S): at 06:06

## 2024-02-05 RX ADMIN — Medication 650 MILLIGRAM(S): at 21:20

## 2024-02-05 RX ADMIN — LIDOCAINE 2 PATCH: 4 CREAM TOPICAL at 22:08

## 2024-02-05 RX ADMIN — Medication 25 MILLIGRAM(S): at 06:06

## 2024-02-05 RX ADMIN — LOSARTAN POTASSIUM 100 MILLIGRAM(S): 100 TABLET, FILM COATED ORAL at 09:39

## 2024-02-05 RX ADMIN — Medication 25 MILLIGRAM(S): at 22:07

## 2024-02-05 RX ADMIN — CHLORHEXIDINE GLUCONATE 15 MILLILITER(S): 213 SOLUTION TOPICAL at 09:39

## 2024-02-05 RX ADMIN — ATORVASTATIN CALCIUM 10 MILLIGRAM(S): 80 TABLET, FILM COATED ORAL at 22:02

## 2024-02-05 RX ADMIN — Medication 1 DROP(S): at 09:39

## 2024-02-05 RX ADMIN — Medication 25 MILLIGRAM(S): at 16:30

## 2024-02-05 RX ADMIN — Medication 650 MILLIGRAM(S): at 20:22

## 2024-02-05 RX ADMIN — Medication 0.5 MILLIGRAM(S): at 16:30

## 2024-02-05 RX ADMIN — CHLORHEXIDINE GLUCONATE 15 MILLILITER(S): 213 SOLUTION TOPICAL at 22:03

## 2024-02-05 RX ADMIN — Medication 50 MILLIGRAM(S): at 09:39

## 2024-02-05 RX ADMIN — MIRTAZAPINE 15 MILLIGRAM(S): 45 TABLET, ORALLY DISINTEGRATING ORAL at 22:03

## 2024-02-05 RX ADMIN — PANTOPRAZOLE SODIUM 40 MILLIGRAM(S): 20 TABLET, DELAYED RELEASE ORAL at 06:06

## 2024-02-05 RX ADMIN — LIDOCAINE 2 PATCH: 4 CREAM TOPICAL at 19:08

## 2024-02-05 RX ADMIN — NORTRIPTYLINE HYDROCHLORIDE 30 MILLIGRAM(S): 10 CAPSULE ORAL at 22:07

## 2024-02-05 RX ADMIN — Medication 1 DROP(S): at 22:32

## 2024-02-05 RX ADMIN — Medication 50 MILLIGRAM(S): at 22:03

## 2024-02-05 RX ADMIN — LIDOCAINE 2 PATCH: 4 CREAM TOPICAL at 09:38

## 2024-02-05 RX ADMIN — Medication 0.5 MILLIGRAM(S): at 22:07

## 2024-02-05 NOTE — BH PSYCHOLOGY - CLINICIAN PSYCHOTHERAPY NOTE - NSBHPSYCHOLNARRATIVE_PSY_A_CORE FT
The patient was seen individually at the team's request and with her consent. Speech was of normal rate and volume and there were no verbal irregularities. The patient's mood was depressed, and affect was congruent and labile. She was coherent and logical. The patient denied suicidal ideation. She reports no difficulty falling asleep.  The patient was pleased that she was able to use her cell phone during her ’s visit over the weekend and that it is being held at [employer]. She remarked that one of the nurses was nice to her which she greatly appreciated. She acknowledged heightened sensitivity to others’ emotional tone towards her and is easily triggered by an impatient or rejecting attitude. This leads to her intense mourning for her parents who “were always there” for her and who tended to dote on her. Most often, her interactions with her  tend to oscillate between supportive and caring vs impatient and denigrating, with her mood following suit.     She again sought reassurance that she will feel better; she is aware that she repeatedly seeks this out and explains that it is because she cannot convince herself that she will stop feeling depressed and be able to resume her pleasurable activities. Writer again asked her to rehearse the thoughts and statements we have used as encouragement. The patient was encouraged to continue to attend therapeutic activities on the unit and agreed to meet again.

## 2024-02-06 PROCEDURE — 99232 SBSQ HOSP IP/OBS MODERATE 35: CPT

## 2024-02-06 RX ORDER — NORTRIPTYLINE HYDROCHLORIDE 10 MG/1
35 CAPSULE ORAL AT BEDTIME
Refills: 0 | Status: DISCONTINUED | OUTPATIENT
Start: 2024-02-06 | End: 2024-02-07

## 2024-02-06 RX ORDER — CLONAZEPAM 1 MG
0.25 TABLET ORAL EVERY 8 HOURS
Refills: 0 | Status: DISCONTINUED | OUTPATIENT
Start: 2024-02-06 | End: 2024-02-12

## 2024-02-06 RX ORDER — CLONAZEPAM 1 MG
0.5 TABLET ORAL
Refills: 0 | Status: DISCONTINUED | OUTPATIENT
Start: 2024-02-06 | End: 2024-02-12

## 2024-02-06 RX ADMIN — Medication 650 MILLIGRAM(S): at 21:47

## 2024-02-06 RX ADMIN — LOSARTAN POTASSIUM 100 MILLIGRAM(S): 100 TABLET, FILM COATED ORAL at 09:02

## 2024-02-06 RX ADMIN — PANTOPRAZOLE SODIUM 40 MILLIGRAM(S): 20 TABLET, DELAYED RELEASE ORAL at 05:59

## 2024-02-06 RX ADMIN — Medication 25 MILLIGRAM(S): at 21:48

## 2024-02-06 RX ADMIN — Medication 50 MILLIGRAM(S): at 21:48

## 2024-02-06 RX ADMIN — LIDOCAINE 2 PATCH: 4 CREAM TOPICAL at 09:02

## 2024-02-06 RX ADMIN — CHLORHEXIDINE GLUCONATE 15 MILLILITER(S): 213 SOLUTION TOPICAL at 09:02

## 2024-02-06 RX ADMIN — Medication 1 DROP(S): at 21:49

## 2024-02-06 RX ADMIN — LIDOCAINE 2 PATCH: 4 CREAM TOPICAL at 21:55

## 2024-02-06 RX ADMIN — Medication 0.5 MILLIGRAM(S): at 12:47

## 2024-02-06 RX ADMIN — Medication 650 MILLIGRAM(S): at 22:42

## 2024-02-06 RX ADMIN — Medication 0.5 MILLIGRAM(S): at 05:58

## 2024-02-06 RX ADMIN — Medication 50 MILLIGRAM(S): at 09:02

## 2024-02-06 RX ADMIN — LIDOCAINE 2 PATCH: 4 CREAM TOPICAL at 19:27

## 2024-02-06 RX ADMIN — Medication 25 MILLIGRAM(S): at 12:47

## 2024-02-06 RX ADMIN — NORTRIPTYLINE HYDROCHLORIDE 35 MILLIGRAM(S): 10 CAPSULE ORAL at 21:48

## 2024-02-06 RX ADMIN — MIRTAZAPINE 15 MILLIGRAM(S): 45 TABLET, ORALLY DISINTEGRATING ORAL at 21:49

## 2024-02-06 RX ADMIN — ATORVASTATIN CALCIUM 10 MILLIGRAM(S): 80 TABLET, FILM COATED ORAL at 21:48

## 2024-02-06 RX ADMIN — Medication 25 MILLIGRAM(S): at 05:58

## 2024-02-06 RX ADMIN — Medication 0.5 MILLIGRAM(S): at 21:48

## 2024-02-07 LAB
ALBUMIN SERPL ELPH-MCNC: 4.2 G/DL — SIGNIFICANT CHANGE UP (ref 3.3–5)
ALP SERPL-CCNC: 114 U/L — SIGNIFICANT CHANGE UP (ref 40–120)
ALT FLD-CCNC: 17 U/L — SIGNIFICANT CHANGE UP (ref 4–33)
ANION GAP SERPL CALC-SCNC: 14 MMOL/L — SIGNIFICANT CHANGE UP (ref 7–14)
AST SERPL-CCNC: 18 U/L — SIGNIFICANT CHANGE UP (ref 4–32)
BILIRUB SERPL-MCNC: 0.3 MG/DL — SIGNIFICANT CHANGE UP (ref 0.2–1.2)
BUN SERPL-MCNC: 9 MG/DL — SIGNIFICANT CHANGE UP (ref 7–23)
CALCIUM SERPL-MCNC: 9.5 MG/DL — SIGNIFICANT CHANGE UP (ref 8.4–10.5)
CHLORIDE SERPL-SCNC: 104 MMOL/L — SIGNIFICANT CHANGE UP (ref 98–107)
CO2 SERPL-SCNC: 24 MMOL/L — SIGNIFICANT CHANGE UP (ref 22–31)
CREAT SERPL-MCNC: 0.74 MG/DL — SIGNIFICANT CHANGE UP (ref 0.5–1.3)
EGFR: 85 ML/MIN/1.73M2 — SIGNIFICANT CHANGE UP
GLUCOSE SERPL-MCNC: 143 MG/DL — HIGH (ref 70–99)
POTASSIUM SERPL-MCNC: 4.1 MMOL/L — SIGNIFICANT CHANGE UP (ref 3.5–5.3)
POTASSIUM SERPL-SCNC: 4.1 MMOL/L — SIGNIFICANT CHANGE UP (ref 3.5–5.3)
PROT SERPL-MCNC: 7.1 G/DL — SIGNIFICANT CHANGE UP (ref 6–8.3)
SODIUM SERPL-SCNC: 142 MMOL/L — SIGNIFICANT CHANGE UP (ref 135–145)

## 2024-02-07 PROCEDURE — 90834 PSYTX W PT 45 MINUTES: CPT

## 2024-02-07 PROCEDURE — 99232 SBSQ HOSP IP/OBS MODERATE 35: CPT

## 2024-02-07 RX ORDER — MIRTAZAPINE 45 MG/1
7.5 TABLET, ORALLY DISINTEGRATING ORAL AT BEDTIME
Refills: 0 | Status: DISCONTINUED | OUTPATIENT
Start: 2024-02-07 | End: 2024-02-22

## 2024-02-07 RX ORDER — NORTRIPTYLINE HYDROCHLORIDE 10 MG/1
40 CAPSULE ORAL AT BEDTIME
Refills: 0 | Status: DISCONTINUED | OUTPATIENT
Start: 2024-02-07 | End: 2024-02-09

## 2024-02-07 RX ADMIN — Medication 50 MILLIGRAM(S): at 09:44

## 2024-02-07 RX ADMIN — CHLORHEXIDINE GLUCONATE 15 MILLILITER(S): 213 SOLUTION TOPICAL at 21:41

## 2024-02-07 RX ADMIN — Medication 650 MILLIGRAM(S): at 22:40

## 2024-02-07 RX ADMIN — Medication 20 MILLIGRAM(S): at 09:44

## 2024-02-07 RX ADMIN — LIDOCAINE 2 PATCH: 4 CREAM TOPICAL at 09:43

## 2024-02-07 RX ADMIN — LIDOCAINE 2 PATCH: 4 CREAM TOPICAL at 19:14

## 2024-02-07 RX ADMIN — Medication 25 MILLIGRAM(S): at 12:48

## 2024-02-07 RX ADMIN — Medication 25 MILLIGRAM(S): at 21:42

## 2024-02-07 RX ADMIN — MIRTAZAPINE 7.5 MILLIGRAM(S): 45 TABLET, ORALLY DISINTEGRATING ORAL at 21:43

## 2024-02-07 RX ADMIN — Medication 650 MILLIGRAM(S): at 21:41

## 2024-02-07 RX ADMIN — NORTRIPTYLINE HYDROCHLORIDE 40 MILLIGRAM(S): 10 CAPSULE ORAL at 21:43

## 2024-02-07 RX ADMIN — Medication 1 DROP(S): at 21:41

## 2024-02-07 RX ADMIN — Medication 0.5 MILLIGRAM(S): at 06:03

## 2024-02-07 RX ADMIN — Medication 0.5 MILLIGRAM(S): at 21:42

## 2024-02-07 RX ADMIN — Medication 50 MILLIGRAM(S): at 21:43

## 2024-02-07 RX ADMIN — Medication 25 MILLIGRAM(S): at 06:03

## 2024-02-07 RX ADMIN — LOSARTAN POTASSIUM 100 MILLIGRAM(S): 100 TABLET, FILM COATED ORAL at 09:44

## 2024-02-07 RX ADMIN — PANTOPRAZOLE SODIUM 40 MILLIGRAM(S): 20 TABLET, DELAYED RELEASE ORAL at 06:02

## 2024-02-07 RX ADMIN — CHLORHEXIDINE GLUCONATE 15 MILLILITER(S): 213 SOLUTION TOPICAL at 09:44

## 2024-02-07 RX ADMIN — Medication 0.5 MILLIGRAM(S): at 12:48

## 2024-02-07 RX ADMIN — ATORVASTATIN CALCIUM 10 MILLIGRAM(S): 80 TABLET, FILM COATED ORAL at 21:42

## 2024-02-07 RX ADMIN — LIDOCAINE 2 PATCH: 4 CREAM TOPICAL at 21:25

## 2024-02-07 NOTE — BH PSYCHOLOGY - CLINICIAN PSYCHOTHERAPY NOTE - NSBHPSYCHOLNARRATIVE_PSY_A_CORE FT
The patient was seen individually at the team's request and with her consent. Speech was of normal rate and volume and there were no verbal irregularities. The patient's mood was “better”, and affect was congruent. She was coherent and logical. The patient denied suicidal ideation. She reports no difficulty falling asleep.    The patient reported feeling better the last two days except for one crying episode yesterday triggered by her fear of premature discharge. She was smiling through most of the session and said that she felt she was turning a corner and not depressed but was afraid to “get excited about it”. We reviewed the chain of cognitions leading to her dysregulation yesterday; she acknowledged that her difficulty tolerating uncertainty can often result in frustration and her assuming worst-case scenarios. She reported receiving support from her  which she appreciates.     The patient had been informed by the  that we are tentatively planning discharge for two weeks from now and that we will communicate with the clinic she will follow up with about her medications and treatment. The patient was pleased with this plan and discussed some of the activities she is looking forward to after discharge. The patient was encouraged to continue to attend therapeutic activities on the unit and agreed to meet again.

## 2024-02-08 PROCEDURE — 99232 SBSQ HOSP IP/OBS MODERATE 35: CPT

## 2024-02-08 RX ADMIN — PANTOPRAZOLE SODIUM 40 MILLIGRAM(S): 20 TABLET, DELAYED RELEASE ORAL at 06:05

## 2024-02-08 RX ADMIN — LOSARTAN POTASSIUM 100 MILLIGRAM(S): 100 TABLET, FILM COATED ORAL at 08:47

## 2024-02-08 RX ADMIN — Medication 1 DROP(S): at 21:14

## 2024-02-08 RX ADMIN — Medication 50 MILLIGRAM(S): at 08:46

## 2024-02-08 RX ADMIN — Medication 0.5 MILLIGRAM(S): at 12:44

## 2024-02-08 RX ADMIN — NORTRIPTYLINE HYDROCHLORIDE 40 MILLIGRAM(S): 10 CAPSULE ORAL at 21:16

## 2024-02-08 RX ADMIN — CHLORHEXIDINE GLUCONATE 15 MILLILITER(S): 213 SOLUTION TOPICAL at 21:15

## 2024-02-08 RX ADMIN — Medication 0.5 MILLIGRAM(S): at 06:05

## 2024-02-08 RX ADMIN — Medication 25 MILLIGRAM(S): at 06:05

## 2024-02-08 RX ADMIN — CHLORHEXIDINE GLUCONATE 15 MILLILITER(S): 213 SOLUTION TOPICAL at 10:12

## 2024-02-08 RX ADMIN — MIRTAZAPINE 7.5 MILLIGRAM(S): 45 TABLET, ORALLY DISINTEGRATING ORAL at 21:15

## 2024-02-08 RX ADMIN — Medication 25 MILLIGRAM(S): at 12:45

## 2024-02-08 RX ADMIN — LIDOCAINE 2 PATCH: 4 CREAM TOPICAL at 21:45

## 2024-02-08 RX ADMIN — LIDOCAINE 2 PATCH: 4 CREAM TOPICAL at 10:12

## 2024-02-08 RX ADMIN — Medication 25 MILLIGRAM(S): at 21:15

## 2024-02-08 RX ADMIN — Medication 0.5 MILLIGRAM(S): at 21:15

## 2024-02-08 RX ADMIN — Medication 50 MILLIGRAM(S): at 21:15

## 2024-02-08 RX ADMIN — ATORVASTATIN CALCIUM 10 MILLIGRAM(S): 80 TABLET, FILM COATED ORAL at 21:15

## 2024-02-09 PROCEDURE — 90834 PSYTX W PT 45 MINUTES: CPT

## 2024-02-09 PROCEDURE — 99232 SBSQ HOSP IP/OBS MODERATE 35: CPT

## 2024-02-09 PROCEDURE — 93010 ELECTROCARDIOGRAM REPORT: CPT

## 2024-02-09 RX ORDER — NORTRIPTYLINE HYDROCHLORIDE 10 MG/1
50 CAPSULE ORAL AT BEDTIME
Refills: 0 | Status: DISCONTINUED | OUTPATIENT
Start: 2024-02-09 | End: 2024-02-14

## 2024-02-09 RX ADMIN — Medication 25 MILLIGRAM(S): at 06:34

## 2024-02-09 RX ADMIN — Medication 0.5 MILLIGRAM(S): at 12:51

## 2024-02-09 RX ADMIN — LIDOCAINE 2 PATCH: 4 CREAM TOPICAL at 22:22

## 2024-02-09 RX ADMIN — Medication 650 MILLIGRAM(S): at 09:55

## 2024-02-09 RX ADMIN — Medication 20 MILLIGRAM(S): at 09:00

## 2024-02-09 RX ADMIN — Medication 25 MILLIGRAM(S): at 20:22

## 2024-02-09 RX ADMIN — Medication 50 MILLIGRAM(S): at 20:23

## 2024-02-09 RX ADMIN — Medication 50 MILLIGRAM(S): at 09:00

## 2024-02-09 RX ADMIN — MIRTAZAPINE 7.5 MILLIGRAM(S): 45 TABLET, ORALLY DISINTEGRATING ORAL at 20:23

## 2024-02-09 RX ADMIN — Medication 650 MILLIGRAM(S): at 20:20

## 2024-02-09 RX ADMIN — Medication 650 MILLIGRAM(S): at 21:20

## 2024-02-09 RX ADMIN — Medication 0.5 MILLIGRAM(S): at 20:23

## 2024-02-09 RX ADMIN — Medication 1 DROP(S): at 20:20

## 2024-02-09 RX ADMIN — Medication 25 MILLIGRAM(S): at 12:51

## 2024-02-09 RX ADMIN — PANTOPRAZOLE SODIUM 40 MILLIGRAM(S): 20 TABLET, DELAYED RELEASE ORAL at 06:34

## 2024-02-09 RX ADMIN — LOSARTAN POTASSIUM 100 MILLIGRAM(S): 100 TABLET, FILM COATED ORAL at 09:00

## 2024-02-09 RX ADMIN — CHLORHEXIDINE GLUCONATE 15 MILLILITER(S): 213 SOLUTION TOPICAL at 20:20

## 2024-02-09 RX ADMIN — Medication 0.5 MILLIGRAM(S): at 06:33

## 2024-02-09 RX ADMIN — Medication 0.25 MILLIGRAM(S): at 15:52

## 2024-02-09 RX ADMIN — ATORVASTATIN CALCIUM 10 MILLIGRAM(S): 80 TABLET, FILM COATED ORAL at 20:22

## 2024-02-09 RX ADMIN — Medication 1 DROP(S): at 09:48

## 2024-02-09 RX ADMIN — LIDOCAINE 2 PATCH: 4 CREAM TOPICAL at 09:01

## 2024-02-09 RX ADMIN — Medication 650 MILLIGRAM(S): at 10:25

## 2024-02-09 RX ADMIN — NORTRIPTYLINE HYDROCHLORIDE 50 MILLIGRAM(S): 10 CAPSULE ORAL at 20:20

## 2024-02-09 RX ADMIN — CHLORHEXIDINE GLUCONATE 15 MILLILITER(S): 213 SOLUTION TOPICAL at 09:01

## 2024-02-09 NOTE — CHART NOTE - NSCHARTNOTEFT_GEN_A_CORE
Nutrition reconsulted for assessment, food preferences.     Met with patient in her room today. Patient reports her appetite remains good but c/o sometimes she did not receive certain food items she requests for such as hamburger, today also requests for apple juice, mashed potatoes and rice pudding added as she dislikes most other food items on her normal meal tray. Menu options reviewed with patient today, new food preferences taken and honored on CBoard. Reinforced healthy, balanced diet with patient today as well as portion control. Also encouraged fruits and vegetables intake. Patient verbalized understanding. Otherwise, patient denies chewing/swallowing difficulties on current diet.     Diet : Diet, DASH/TLC:   Sodium & Cholesterol Restricted (01-10-24 @ 00:08) [Active]    Patient reports [ ] nausea  [ ] vomiting [ ] diarrhea [ ] constipation  [ ] chewing problems [ ] swallowing issues  [X] other: Pt denies GI distress at this time    PO intake:  [ ] < 50%  [ ] 50-75%  [X] %  [ ] other :      Current Weight: 1/27 219 lbs (99 kg)  % Weight Change: 11 lbs (5%) gain    Skin: no pressure injuries     Edema: no edema    Pertinent Medications: MEDICATIONS  (STANDING):  atorvastatin 10 milliGRAM(s) Oral at bedtime  chlorhexidine 0.12% Liquid 15 milliLiter(s) Swish and Spit two times a day  clonazePAM  Tablet 0.5 milliGRAM(s) Oral <User Schedule>  furosemide    Tablet 20 milliGRAM(s) Oral <User Schedule>  lidocaine   4% Patch 2 Patch Transdermal daily  losartan 100 milliGRAM(s) Oral daily  metoprolol tartrate 50 milliGRAM(s) Oral two times a day  mirtazapine 7.5 milliGRAM(s) Oral at bedtime  nortriptyline 40 milliGRAM(s) Oral at bedtime  pantoprazole    Tablet 40 milliGRAM(s) Oral <User Schedule>  pregabalin 25 milliGRAM(s) Oral <User Schedule>    MEDICATIONS  (PRN):  acetaminophen     Tablet .. 650 milliGRAM(s) Oral every 6 hours PRN Mild Pain (1 - 3), Moderate Pain (4 - 6)  artificial  tears Solution 1 Drop(s) Both EYES every 8 hours PRN dry eye  bismuth subsalicylate Liquid 30 milliLiter(s) Oral every 6 hours PRN nausea  clonazePAM Oral Disintegrating Tablet 0.25 milliGRAM(s) Oral every 8 hours PRN anxiety  ibuprofen  Tablet. 400 milliGRAM(s) Oral every 8 hours PRN Mild Pain (1 - 3), Moderate Pain (4 - 6)    Pertinent Labs:  02-07 Na142 mmol/L Glu 143 mg/dL<H> K+ 4.1 mmol/L Cr  0.74 mg/dL BUN 9 mg/dL 02-07 Alb 4.2 g/dL        Estimated Needs:   [X] no change since previous assessment  [ ] recalculated:       Previous Nutrition Diagnosis:  Overweight/Obesity     Nutrition Diagnosis is [X] ongoing  [ ] resolved [ ] not applicable        New Nutrition Diagnosis: [X] not applicable    [ ] Inadequate Protein Energy Intake [ ]Inadequate Oral Intake [ ] Excessive Energy Intake     [ ] Underweight [ ] Increased Nutrient Needs [ ] Overweight/Obesity     [ ] Altered GI Function [ ] Unintended Weight Loss [ ] Food & Nutrition Related Knowledge Deficit[ ] Limited Adherence to nutrition related recommendations [ ] Malnutrition  [ ] other: Free text     Related to:      As evidenced by:      Goal:       Recommendations:  1. Continue current diet order, which remains appropriate at this time.   2. Encourage diet adherence and portion control.  3. Monitor PO intake/tolerance, weights, labs, BM's, and skin integrity.       -- Marvin Melton, MS, RDN, CDN, Pager # [phone]

## 2024-02-09 NOTE — BH PSYCHOLOGY - CLINICIAN PSYCHOTHERAPY NOTE - NSBHPSYCHOLNARRATIVE_PSY_A_CORE FT
The patient was seen individually at the team's request and with her consent. Speech was of normal rate and volume and there were no verbal irregularities. The patient's mood was “better”, and affect was congruent. She was coherent and logical. The patient denied suicidal ideation. She reports no difficulty falling asleep.    The patient reported sustained improvement the last two days with brief sadness this morning. She was relieved to hear from the psychiatrist that her outpatient MD will continue this medication regimen. She is eager but apprehensive about discharge and recognizes that she will still be stabilizing with med titration while here. We reviewed her dysfunctional thoughts and schema that can contribute to anxiety and depression and practiced alternative thinking. She still enjoys the support from her .     The patient was encouraged to continue to attend therapeutic activities on the unit and agreed to meet again.

## 2024-02-10 PROCEDURE — 99232 SBSQ HOSP IP/OBS MODERATE 35: CPT

## 2024-02-10 RX ORDER — SALIVA SUBSTITUTE COMB NO.11 351 MG
5 POWDER IN PACKET (EA) MUCOUS MEMBRANE
Refills: 0 | Status: DISCONTINUED | OUTPATIENT
Start: 2024-02-10 | End: 2024-02-13

## 2024-02-10 RX ADMIN — LIDOCAINE 2 PATCH: 4 CREAM TOPICAL at 22:36

## 2024-02-10 RX ADMIN — ATORVASTATIN CALCIUM 10 MILLIGRAM(S): 80 TABLET, FILM COATED ORAL at 21:56

## 2024-02-10 RX ADMIN — Medication 25 MILLIGRAM(S): at 12:43

## 2024-02-10 RX ADMIN — Medication 25 MILLIGRAM(S): at 06:11

## 2024-02-10 RX ADMIN — Medication 0.5 MILLIGRAM(S): at 06:12

## 2024-02-10 RX ADMIN — LIDOCAINE 2 PATCH: 4 CREAM TOPICAL at 19:36

## 2024-02-10 RX ADMIN — Medication 650 MILLIGRAM(S): at 09:42

## 2024-02-10 RX ADMIN — CHLORHEXIDINE GLUCONATE 15 MILLILITER(S): 213 SOLUTION TOPICAL at 09:41

## 2024-02-10 RX ADMIN — Medication 50 MILLIGRAM(S): at 09:41

## 2024-02-10 RX ADMIN — Medication 1 DROP(S): at 21:59

## 2024-02-10 RX ADMIN — LOSARTAN POTASSIUM 100 MILLIGRAM(S): 100 TABLET, FILM COATED ORAL at 09:51

## 2024-02-10 RX ADMIN — Medication 1 DROP(S): at 09:43

## 2024-02-10 RX ADMIN — Medication 25 MILLIGRAM(S): at 21:56

## 2024-02-10 RX ADMIN — Medication 50 MILLIGRAM(S): at 21:55

## 2024-02-10 RX ADMIN — Medication 0.5 MILLIGRAM(S): at 21:56

## 2024-02-10 RX ADMIN — MIRTAZAPINE 7.5 MILLIGRAM(S): 45 TABLET, ORALLY DISINTEGRATING ORAL at 21:55

## 2024-02-10 RX ADMIN — NORTRIPTYLINE HYDROCHLORIDE 50 MILLIGRAM(S): 10 CAPSULE ORAL at 21:58

## 2024-02-10 RX ADMIN — LIDOCAINE 2 PATCH: 4 CREAM TOPICAL at 09:41

## 2024-02-10 RX ADMIN — PANTOPRAZOLE SODIUM 40 MILLIGRAM(S): 20 TABLET, DELAYED RELEASE ORAL at 06:11

## 2024-02-10 RX ADMIN — Medication 0.5 MILLIGRAM(S): at 12:43

## 2024-02-10 RX ADMIN — Medication 5 MILLILITER(S): at 21:58

## 2024-02-11 RX ADMIN — LIDOCAINE 2 PATCH: 4 CREAM TOPICAL at 09:17

## 2024-02-11 RX ADMIN — Medication 1 DROP(S): at 20:57

## 2024-02-11 RX ADMIN — Medication 0.5 MILLIGRAM(S): at 05:52

## 2024-02-11 RX ADMIN — LOSARTAN POTASSIUM 100 MILLIGRAM(S): 100 TABLET, FILM COATED ORAL at 09:16

## 2024-02-11 RX ADMIN — Medication 25 MILLIGRAM(S): at 13:14

## 2024-02-11 RX ADMIN — Medication 5 MILLILITER(S): at 09:20

## 2024-02-11 RX ADMIN — Medication 25 MILLIGRAM(S): at 05:53

## 2024-02-11 RX ADMIN — Medication 50 MILLIGRAM(S): at 09:17

## 2024-02-11 RX ADMIN — Medication 50 MILLIGRAM(S): at 20:58

## 2024-02-11 RX ADMIN — Medication 0.5 MILLIGRAM(S): at 13:14

## 2024-02-11 RX ADMIN — NORTRIPTYLINE HYDROCHLORIDE 50 MILLIGRAM(S): 10 CAPSULE ORAL at 20:57

## 2024-02-11 RX ADMIN — Medication 25 MILLIGRAM(S): at 20:58

## 2024-02-11 RX ADMIN — ATORVASTATIN CALCIUM 10 MILLIGRAM(S): 80 TABLET, FILM COATED ORAL at 20:58

## 2024-02-11 RX ADMIN — MIRTAZAPINE 7.5 MILLIGRAM(S): 45 TABLET, ORALLY DISINTEGRATING ORAL at 20:57

## 2024-02-11 RX ADMIN — PANTOPRAZOLE SODIUM 40 MILLIGRAM(S): 20 TABLET, DELAYED RELEASE ORAL at 05:52

## 2024-02-11 RX ADMIN — Medication 0.5 MILLIGRAM(S): at 20:58

## 2024-02-11 RX ADMIN — Medication 5 MILLILITER(S): at 21:59

## 2024-02-11 RX ADMIN — LIDOCAINE 2 PATCH: 4 CREAM TOPICAL at 21:20

## 2024-02-12 PROCEDURE — 99232 SBSQ HOSP IP/OBS MODERATE 35: CPT

## 2024-02-12 PROCEDURE — 90834 PSYTX W PT 45 MINUTES: CPT

## 2024-02-12 RX ORDER — CLONAZEPAM 1 MG
0.25 TABLET ORAL EVERY 8 HOURS
Refills: 0 | Status: DISCONTINUED | OUTPATIENT
Start: 2024-02-12 | End: 2024-02-13

## 2024-02-12 RX ORDER — CLONAZEPAM 1 MG
0.5 TABLET ORAL
Refills: 0 | Status: DISCONTINUED | OUTPATIENT
Start: 2024-02-12 | End: 2024-02-13

## 2024-02-12 RX ADMIN — NORTRIPTYLINE HYDROCHLORIDE 50 MILLIGRAM(S): 10 CAPSULE ORAL at 20:58

## 2024-02-12 RX ADMIN — Medication 5 MILLILITER(S): at 09:11

## 2024-02-12 RX ADMIN — Medication 25 MILLIGRAM(S): at 12:33

## 2024-02-12 RX ADMIN — LIDOCAINE 2 PATCH: 4 CREAM TOPICAL at 08:57

## 2024-02-12 RX ADMIN — Medication 20 MILLIGRAM(S): at 08:55

## 2024-02-12 RX ADMIN — LIDOCAINE 2 PATCH: 4 CREAM TOPICAL at 19:06

## 2024-02-12 RX ADMIN — Medication 0.5 MILLIGRAM(S): at 06:06

## 2024-02-12 RX ADMIN — Medication 0.25 MILLIGRAM(S): at 17:22

## 2024-02-12 RX ADMIN — LOSARTAN POTASSIUM 100 MILLIGRAM(S): 100 TABLET, FILM COATED ORAL at 08:56

## 2024-02-12 RX ADMIN — Medication 0.5 MILLIGRAM(S): at 20:57

## 2024-02-12 RX ADMIN — Medication 50 MILLIGRAM(S): at 08:56

## 2024-02-12 RX ADMIN — Medication 1 DROP(S): at 22:02

## 2024-02-12 RX ADMIN — Medication 25 MILLIGRAM(S): at 06:06

## 2024-02-12 RX ADMIN — Medication 50 MILLIGRAM(S): at 20:56

## 2024-02-12 RX ADMIN — Medication 5 MILLILITER(S): at 22:00

## 2024-02-12 RX ADMIN — LIDOCAINE 2 PATCH: 4 CREAM TOPICAL at 20:57

## 2024-02-12 RX ADMIN — PANTOPRAZOLE SODIUM 40 MILLIGRAM(S): 20 TABLET, DELAYED RELEASE ORAL at 06:06

## 2024-02-12 RX ADMIN — Medication 25 MILLIGRAM(S): at 20:58

## 2024-02-12 RX ADMIN — Medication 0.5 MILLIGRAM(S): at 12:33

## 2024-02-12 RX ADMIN — MIRTAZAPINE 7.5 MILLIGRAM(S): 45 TABLET, ORALLY DISINTEGRATING ORAL at 22:02

## 2024-02-12 RX ADMIN — ATORVASTATIN CALCIUM 10 MILLIGRAM(S): 80 TABLET, FILM COATED ORAL at 20:57

## 2024-02-12 NOTE — BH PSYCHOLOGY - CLINICIAN PSYCHOTHERAPY NOTE - NSBHPSYCHOLNARRATIVE_PSY_A_CORE FT
The patient was seen individually at the team's request and with her consent. Speech was of normal rate and volume and there were no verbal irregularities. The patient's mood was “a little sad”, and affect was congruent. She was coherent and logical. The patient denied suicidal ideation. She reports no difficulty falling asleep and a good appetite.    The patient reported feeling some anxiety the past two mornings but felt somewhat reassured when the psychiatrist suggested that the discussions about discharge may have triggered some anxiety. She showed insight into her recurrent squabbles with her  (“I guess I pick on him sometimes”) and reported that they are trying to avoid escalations. We discussed the chain of events that trigger these conflicts and some ways to avoid them, including attention to the dynamic as well as her strong need for frequent reassurance.     The patient was encouraged to continue to attend therapeutic activities on the unit and agreed to meet again.

## 2024-02-13 LAB
ANION GAP SERPL CALC-SCNC: 7 MMOL/L — SIGNIFICANT CHANGE UP (ref 7–14)
BUN SERPL-MCNC: 10 MG/DL — SIGNIFICANT CHANGE UP (ref 7–23)
CALCIUM SERPL-MCNC: 9.5 MG/DL — SIGNIFICANT CHANGE UP (ref 8.4–10.5)
CHLORIDE SERPL-SCNC: 104 MMOL/L — SIGNIFICANT CHANGE UP (ref 98–107)
CO2 SERPL-SCNC: 29 MMOL/L — SIGNIFICANT CHANGE UP (ref 22–31)
CREAT SERPL-MCNC: 0.77 MG/DL — SIGNIFICANT CHANGE UP (ref 0.5–1.3)
EGFR: 81 ML/MIN/1.73M2 — SIGNIFICANT CHANGE UP
GLUCOSE SERPL-MCNC: 128 MG/DL — HIGH (ref 70–99)
POTASSIUM SERPL-MCNC: 4.2 MMOL/L — SIGNIFICANT CHANGE UP (ref 3.5–5.3)
POTASSIUM SERPL-SCNC: 4.2 MMOL/L — SIGNIFICANT CHANGE UP (ref 3.5–5.3)
SODIUM SERPL-SCNC: 140 MMOL/L — SIGNIFICANT CHANGE UP (ref 135–145)

## 2024-02-13 PROCEDURE — 99232 SBSQ HOSP IP/OBS MODERATE 35: CPT

## 2024-02-13 RX ORDER — POLYETHYLENE GLYCOL 3350 17 G/17G
17 POWDER, FOR SOLUTION ORAL
Refills: 0 | Status: DISCONTINUED | OUTPATIENT
Start: 2024-02-13 | End: 2024-02-22

## 2024-02-13 RX ORDER — CLONAZEPAM 1 MG
0.25 TABLET ORAL EVERY 8 HOURS
Refills: 0 | Status: DISCONTINUED | OUTPATIENT
Start: 2024-02-13 | End: 2024-02-20

## 2024-02-13 RX ORDER — CLONAZEPAM 1 MG
0.5 TABLET ORAL
Refills: 0 | Status: DISCONTINUED | OUTPATIENT
Start: 2024-02-13 | End: 2024-02-20

## 2024-02-13 RX ADMIN — Medication 25 MILLIGRAM(S): at 13:28

## 2024-02-13 RX ADMIN — LIDOCAINE 2 PATCH: 4 CREAM TOPICAL at 08:54

## 2024-02-13 RX ADMIN — Medication 0.5 MILLIGRAM(S): at 21:18

## 2024-02-13 RX ADMIN — Medication 25 MILLIGRAM(S): at 05:43

## 2024-02-13 RX ADMIN — PANTOPRAZOLE SODIUM 40 MILLIGRAM(S): 20 TABLET, DELAYED RELEASE ORAL at 05:43

## 2024-02-13 RX ADMIN — LIDOCAINE 2 PATCH: 4 CREAM TOPICAL at 21:06

## 2024-02-13 RX ADMIN — Medication 50 MILLIGRAM(S): at 21:19

## 2024-02-13 RX ADMIN — ATORVASTATIN CALCIUM 10 MILLIGRAM(S): 80 TABLET, FILM COATED ORAL at 21:19

## 2024-02-13 RX ADMIN — MIRTAZAPINE 7.5 MILLIGRAM(S): 45 TABLET, ORALLY DISINTEGRATING ORAL at 21:19

## 2024-02-13 RX ADMIN — Medication 25 MILLIGRAM(S): at 21:19

## 2024-02-13 RX ADMIN — NORTRIPTYLINE HYDROCHLORIDE 50 MILLIGRAM(S): 10 CAPSULE ORAL at 21:18

## 2024-02-13 RX ADMIN — Medication 1 DROP(S): at 13:27

## 2024-02-13 RX ADMIN — LOSARTAN POTASSIUM 100 MILLIGRAM(S): 100 TABLET, FILM COATED ORAL at 08:52

## 2024-02-13 RX ADMIN — POLYETHYLENE GLYCOL 3350 17 GRAM(S): 17 POWDER, FOR SOLUTION ORAL at 21:46

## 2024-02-13 RX ADMIN — Medication 0.5 MILLIGRAM(S): at 05:43

## 2024-02-13 RX ADMIN — Medication 0.5 MILLIGRAM(S): at 13:27

## 2024-02-13 RX ADMIN — LIDOCAINE 2 PATCH: 4 CREAM TOPICAL at 19:53

## 2024-02-13 RX ADMIN — Medication 50 MILLIGRAM(S): at 08:53

## 2024-02-14 PROCEDURE — 90834 PSYTX W PT 45 MINUTES: CPT

## 2024-02-14 PROCEDURE — 99232 SBSQ HOSP IP/OBS MODERATE 35: CPT

## 2024-02-14 RX ORDER — NORTRIPTYLINE HYDROCHLORIDE 10 MG/1
60 CAPSULE ORAL AT BEDTIME
Refills: 0 | Status: DISCONTINUED | OUTPATIENT
Start: 2024-02-14 | End: 2024-02-16

## 2024-02-14 RX ORDER — SALIVA SUBSTITUTE COMB NO.11 351 MG
5 POWDER IN PACKET (EA) MUCOUS MEMBRANE THREE TIMES A DAY
Refills: 0 | Status: DISCONTINUED | OUTPATIENT
Start: 2024-02-14 | End: 2024-02-22

## 2024-02-14 RX ADMIN — Medication 25 MILLIGRAM(S): at 21:33

## 2024-02-14 RX ADMIN — Medication 0.5 MILLIGRAM(S): at 06:09

## 2024-02-14 RX ADMIN — LOSARTAN POTASSIUM 100 MILLIGRAM(S): 100 TABLET, FILM COATED ORAL at 08:30

## 2024-02-14 RX ADMIN — Medication 0.5 MILLIGRAM(S): at 13:06

## 2024-02-14 RX ADMIN — Medication 650 MILLIGRAM(S): at 09:25

## 2024-02-14 RX ADMIN — Medication 1 DROP(S): at 08:30

## 2024-02-14 RX ADMIN — Medication 50 MILLIGRAM(S): at 08:30

## 2024-02-14 RX ADMIN — PANTOPRAZOLE SODIUM 40 MILLIGRAM(S): 20 TABLET, DELAYED RELEASE ORAL at 06:09

## 2024-02-14 RX ADMIN — LIDOCAINE 2 PATCH: 4 CREAM TOPICAL at 18:32

## 2024-02-14 RX ADMIN — Medication 0.5 MILLIGRAM(S): at 21:34

## 2024-02-14 RX ADMIN — NORTRIPTYLINE HYDROCHLORIDE 60 MILLIGRAM(S): 10 CAPSULE ORAL at 21:40

## 2024-02-14 RX ADMIN — Medication 25 MILLIGRAM(S): at 06:09

## 2024-02-14 RX ADMIN — ATORVASTATIN CALCIUM 10 MILLIGRAM(S): 80 TABLET, FILM COATED ORAL at 21:34

## 2024-02-14 RX ADMIN — Medication 20 MILLIGRAM(S): at 08:30

## 2024-02-14 RX ADMIN — MIRTAZAPINE 7.5 MILLIGRAM(S): 45 TABLET, ORALLY DISINTEGRATING ORAL at 21:34

## 2024-02-14 RX ADMIN — Medication 650 MILLIGRAM(S): at 10:20

## 2024-02-14 RX ADMIN — Medication 1 DROP(S): at 21:41

## 2024-02-14 RX ADMIN — Medication 25 MILLIGRAM(S): at 13:06

## 2024-02-14 RX ADMIN — Medication 5 MILLILITER(S): at 21:43

## 2024-02-14 RX ADMIN — LIDOCAINE 2 PATCH: 4 CREAM TOPICAL at 08:30

## 2024-02-14 RX ADMIN — Medication 50 MILLIGRAM(S): at 21:34

## 2024-02-14 RX ADMIN — LIDOCAINE 2 PATCH: 4 CREAM TOPICAL at 21:25

## 2024-02-14 NOTE — BH PSYCHOLOGY - CLINICIAN PSYCHOTHERAPY NOTE - NSBHPSYCHOLNARRATIVE_PSY_A_CORE FT
The patient was seen individually at the team's request and with her consent. Speech was of normal rate and volume and there were no verbal irregularities. The patient's mood was “ok”, and affect was congruent. She was coherent and logical. The patient denied suicidal ideation. She reports no difficulty falling asleep and a good appetite.    The patient again said she had “a titch” of anxiety this morning; she thinks that if she takes her evening meds later it may eliminate this phenomenon. She is apprehensive and ambivalent about discharge; she worries that she may relapse, especially if she feels alone but she also wants to get started on her extensive “to-do list”. We discussed cognitions and scenarios that she could use to feel more optimistic, and she was reminded that she will be receiving substantial follow-up care. The patient again became tearful when she spoke about missing her  parents. In discussion, she acknowledged that she had been in therapy before they passed away, that she began anticipating missing them while they were still alive, and that perhaps it was not their absence but her thoughts regarding their absence that impacted her feelings. The patient was also offered support and reminded of her strengths and all the things that she accomplished.      The patient was encouraged to continue to attend therapeutic activities on the unit and agreed to meet again.

## 2024-02-15 LAB — NORTRIP SERPL-MCNC: 54 NG/ML — SIGNIFICANT CHANGE UP (ref 50–150)

## 2024-02-15 PROCEDURE — 99232 SBSQ HOSP IP/OBS MODERATE 35: CPT

## 2024-02-15 RX ADMIN — Medication 0.5 MILLIGRAM(S): at 13:06

## 2024-02-15 RX ADMIN — ATORVASTATIN CALCIUM 10 MILLIGRAM(S): 80 TABLET, FILM COATED ORAL at 21:48

## 2024-02-15 RX ADMIN — Medication 25 MILLIGRAM(S): at 06:55

## 2024-02-15 RX ADMIN — Medication 50 MILLIGRAM(S): at 08:42

## 2024-02-15 RX ADMIN — Medication 5 MILLILITER(S): at 13:07

## 2024-02-15 RX ADMIN — LOSARTAN POTASSIUM 100 MILLIGRAM(S): 100 TABLET, FILM COATED ORAL at 08:42

## 2024-02-15 RX ADMIN — NORTRIPTYLINE HYDROCHLORIDE 60 MILLIGRAM(S): 10 CAPSULE ORAL at 21:48

## 2024-02-15 RX ADMIN — LIDOCAINE 2 PATCH: 4 CREAM TOPICAL at 13:06

## 2024-02-15 RX ADMIN — Medication 25 MILLIGRAM(S): at 13:05

## 2024-02-15 RX ADMIN — MIRTAZAPINE 7.5 MILLIGRAM(S): 45 TABLET, ORALLY DISINTEGRATING ORAL at 21:48

## 2024-02-15 RX ADMIN — Medication 0.5 MILLIGRAM(S): at 21:49

## 2024-02-15 RX ADMIN — Medication 25 MILLIGRAM(S): at 21:48

## 2024-02-15 RX ADMIN — Medication 50 MILLIGRAM(S): at 21:48

## 2024-02-15 RX ADMIN — Medication 0.5 MILLIGRAM(S): at 06:55

## 2024-02-15 RX ADMIN — PANTOPRAZOLE SODIUM 40 MILLIGRAM(S): 20 TABLET, DELAYED RELEASE ORAL at 06:55

## 2024-02-15 RX ADMIN — Medication 1 DROP(S): at 21:47

## 2024-02-16 PROCEDURE — 99232 SBSQ HOSP IP/OBS MODERATE 35: CPT

## 2024-02-16 RX ORDER — NORTRIPTYLINE HYDROCHLORIDE 10 MG/1
60 CAPSULE ORAL AT BEDTIME
Refills: 0 | Status: COMPLETED | OUTPATIENT
Start: 2024-02-16 | End: 2024-02-16

## 2024-02-16 RX ORDER — NORTRIPTYLINE HYDROCHLORIDE 10 MG/1
75 CAPSULE ORAL AT BEDTIME
Refills: 0 | Status: DISCONTINUED | OUTPATIENT
Start: 2024-02-17 | End: 2024-02-22

## 2024-02-16 RX ADMIN — LIDOCAINE 2 PATCH: 4 CREAM TOPICAL at 19:32

## 2024-02-16 RX ADMIN — Medication 650 MILLIGRAM(S): at 21:33

## 2024-02-16 RX ADMIN — LOSARTAN POTASSIUM 100 MILLIGRAM(S): 100 TABLET, FILM COATED ORAL at 09:29

## 2024-02-16 RX ADMIN — LIDOCAINE 2 PATCH: 4 CREAM TOPICAL at 21:43

## 2024-02-16 RX ADMIN — Medication 25 MILLIGRAM(S): at 05:32

## 2024-02-16 RX ADMIN — ATORVASTATIN CALCIUM 10 MILLIGRAM(S): 80 TABLET, FILM COATED ORAL at 21:30

## 2024-02-16 RX ADMIN — Medication 650 MILLIGRAM(S): at 22:30

## 2024-02-16 RX ADMIN — Medication 25 MILLIGRAM(S): at 13:27

## 2024-02-16 RX ADMIN — LIDOCAINE 2 PATCH: 4 CREAM TOPICAL at 16:20

## 2024-02-16 RX ADMIN — Medication 1 TABLET(S): at 13:19

## 2024-02-16 RX ADMIN — MIRTAZAPINE 7.5 MILLIGRAM(S): 45 TABLET, ORALLY DISINTEGRATING ORAL at 21:29

## 2024-02-16 RX ADMIN — Medication 30 MILLILITER(S): at 13:19

## 2024-02-16 RX ADMIN — Medication 25 MILLIGRAM(S): at 21:30

## 2024-02-16 RX ADMIN — Medication 20 MILLIGRAM(S): at 09:30

## 2024-02-16 RX ADMIN — PANTOPRAZOLE SODIUM 40 MILLIGRAM(S): 20 TABLET, DELAYED RELEASE ORAL at 05:32

## 2024-02-16 RX ADMIN — Medication 0.5 MILLIGRAM(S): at 13:27

## 2024-02-16 RX ADMIN — Medication 1 DROP(S): at 13:28

## 2024-02-16 RX ADMIN — Medication 50 MILLIGRAM(S): at 21:30

## 2024-02-16 RX ADMIN — Medication 1 DROP(S): at 21:30

## 2024-02-16 RX ADMIN — LIDOCAINE 2 PATCH: 4 CREAM TOPICAL at 09:31

## 2024-02-16 RX ADMIN — Medication 0.5 MILLIGRAM(S): at 21:30

## 2024-02-16 RX ADMIN — Medication 5 MILLILITER(S): at 09:31

## 2024-02-16 RX ADMIN — NORTRIPTYLINE HYDROCHLORIDE 60 MILLIGRAM(S): 10 CAPSULE ORAL at 21:29

## 2024-02-16 RX ADMIN — Medication 50 MILLIGRAM(S): at 09:30

## 2024-02-16 RX ADMIN — Medication 0.5 MILLIGRAM(S): at 05:33

## 2024-02-17 RX ADMIN — Medication 650 MILLIGRAM(S): at 22:50

## 2024-02-17 RX ADMIN — Medication 650 MILLIGRAM(S): at 10:29

## 2024-02-17 RX ADMIN — Medication 50 MILLIGRAM(S): at 21:37

## 2024-02-17 RX ADMIN — MIRTAZAPINE 7.5 MILLIGRAM(S): 45 TABLET, ORALLY DISINTEGRATING ORAL at 21:37

## 2024-02-17 RX ADMIN — Medication 5 MILLILITER(S): at 13:41

## 2024-02-17 RX ADMIN — Medication 0.5 MILLIGRAM(S): at 21:37

## 2024-02-17 RX ADMIN — Medication 25 MILLIGRAM(S): at 05:45

## 2024-02-17 RX ADMIN — NORTRIPTYLINE HYDROCHLORIDE 75 MILLIGRAM(S): 10 CAPSULE ORAL at 21:41

## 2024-02-17 RX ADMIN — Medication 0.5 MILLIGRAM(S): at 13:35

## 2024-02-17 RX ADMIN — LOSARTAN POTASSIUM 100 MILLIGRAM(S): 100 TABLET, FILM COATED ORAL at 09:44

## 2024-02-17 RX ADMIN — ATORVASTATIN CALCIUM 10 MILLIGRAM(S): 80 TABLET, FILM COATED ORAL at 21:37

## 2024-02-17 RX ADMIN — Medication 1 DROP(S): at 21:56

## 2024-02-17 RX ADMIN — Medication 0.5 MILLIGRAM(S): at 05:45

## 2024-02-17 RX ADMIN — Medication 25 MILLIGRAM(S): at 13:35

## 2024-02-17 RX ADMIN — Medication 1 TABLET(S): at 09:45

## 2024-02-17 RX ADMIN — PANTOPRAZOLE SODIUM 40 MILLIGRAM(S): 20 TABLET, DELAYED RELEASE ORAL at 05:46

## 2024-02-17 RX ADMIN — Medication 25 MILLIGRAM(S): at 21:37

## 2024-02-17 RX ADMIN — LIDOCAINE 2 PATCH: 4 CREAM TOPICAL at 19:33

## 2024-02-17 RX ADMIN — Medication 50 MILLIGRAM(S): at 09:44

## 2024-02-17 RX ADMIN — LIDOCAINE 2 PATCH: 4 CREAM TOPICAL at 10:29

## 2024-02-17 RX ADMIN — Medication 650 MILLIGRAM(S): at 21:56

## 2024-02-17 RX ADMIN — LIDOCAINE 2 PATCH: 4 CREAM TOPICAL at 21:18

## 2024-02-18 RX ADMIN — PANTOPRAZOLE SODIUM 40 MILLIGRAM(S): 20 TABLET, DELAYED RELEASE ORAL at 06:47

## 2024-02-18 RX ADMIN — Medication 50 MILLIGRAM(S): at 09:02

## 2024-02-18 RX ADMIN — Medication 25 MILLIGRAM(S): at 21:51

## 2024-02-18 RX ADMIN — LIDOCAINE 2 PATCH: 4 CREAM TOPICAL at 23:06

## 2024-02-18 RX ADMIN — LOSARTAN POTASSIUM 100 MILLIGRAM(S): 100 TABLET, FILM COATED ORAL at 09:02

## 2024-02-18 RX ADMIN — Medication 25 MILLIGRAM(S): at 06:47

## 2024-02-18 RX ADMIN — Medication 50 MILLIGRAM(S): at 21:53

## 2024-02-18 RX ADMIN — Medication 30 MILLILITER(S): at 13:44

## 2024-02-18 RX ADMIN — Medication 1 TABLET(S): at 09:02

## 2024-02-18 RX ADMIN — Medication 5 MILLILITER(S): at 21:53

## 2024-02-18 RX ADMIN — Medication 1 DROP(S): at 21:53

## 2024-02-18 RX ADMIN — Medication 5 MILLILITER(S): at 13:37

## 2024-02-18 RX ADMIN — Medication 0.5 MILLIGRAM(S): at 06:47

## 2024-02-18 RX ADMIN — LIDOCAINE 2 PATCH: 4 CREAM TOPICAL at 11:18

## 2024-02-18 RX ADMIN — Medication 5 MILLILITER(S): at 09:03

## 2024-02-18 RX ADMIN — NORTRIPTYLINE HYDROCHLORIDE 75 MILLIGRAM(S): 10 CAPSULE ORAL at 22:04

## 2024-02-18 RX ADMIN — Medication 0.5 MILLIGRAM(S): at 21:52

## 2024-02-18 RX ADMIN — Medication 25 MILLIGRAM(S): at 13:37

## 2024-02-18 RX ADMIN — Medication 1 DROP(S): at 13:47

## 2024-02-18 RX ADMIN — Medication 0.5 MILLIGRAM(S): at 13:37

## 2024-02-18 RX ADMIN — MIRTAZAPINE 7.5 MILLIGRAM(S): 45 TABLET, ORALLY DISINTEGRATING ORAL at 21:52

## 2024-02-18 RX ADMIN — ATORVASTATIN CALCIUM 10 MILLIGRAM(S): 80 TABLET, FILM COATED ORAL at 21:51

## 2024-02-19 PROCEDURE — 90834 PSYTX W PT 45 MINUTES: CPT

## 2024-02-19 PROCEDURE — 99232 SBSQ HOSP IP/OBS MODERATE 35: CPT

## 2024-02-19 RX ADMIN — Medication 1 DROP(S): at 10:03

## 2024-02-19 RX ADMIN — Medication 0.25 MILLIGRAM(S): at 18:36

## 2024-02-19 RX ADMIN — Medication 5 MILLILITER(S): at 10:02

## 2024-02-19 RX ADMIN — LIDOCAINE 2 PATCH: 4 CREAM TOPICAL at 09:58

## 2024-02-19 RX ADMIN — LIDOCAINE 2 PATCH: 4 CREAM TOPICAL at 18:30

## 2024-02-19 RX ADMIN — Medication 50 MILLIGRAM(S): at 09:57

## 2024-02-19 RX ADMIN — Medication 0.5 MILLIGRAM(S): at 21:35

## 2024-02-19 RX ADMIN — Medication 50 MILLIGRAM(S): at 21:35

## 2024-02-19 RX ADMIN — Medication 1 DROP(S): at 21:34

## 2024-02-19 RX ADMIN — Medication 25 MILLIGRAM(S): at 21:35

## 2024-02-19 RX ADMIN — PANTOPRAZOLE SODIUM 40 MILLIGRAM(S): 20 TABLET, DELAYED RELEASE ORAL at 06:10

## 2024-02-19 RX ADMIN — Medication 5 MILLILITER(S): at 12:44

## 2024-02-19 RX ADMIN — ATORVASTATIN CALCIUM 10 MILLIGRAM(S): 80 TABLET, FILM COATED ORAL at 21:35

## 2024-02-19 RX ADMIN — LIDOCAINE 2 PATCH: 4 CREAM TOPICAL at 21:42

## 2024-02-19 RX ADMIN — Medication 25 MILLIGRAM(S): at 12:44

## 2024-02-19 RX ADMIN — LOSARTAN POTASSIUM 100 MILLIGRAM(S): 100 TABLET, FILM COATED ORAL at 09:57

## 2024-02-19 RX ADMIN — Medication 0.5 MILLIGRAM(S): at 12:44

## 2024-02-19 RX ADMIN — Medication 0.5 MILLIGRAM(S): at 05:44

## 2024-02-19 RX ADMIN — Medication 20 MILLIGRAM(S): at 10:03

## 2024-02-19 RX ADMIN — NORTRIPTYLINE HYDROCHLORIDE 75 MILLIGRAM(S): 10 CAPSULE ORAL at 21:34

## 2024-02-19 RX ADMIN — Medication 1 TABLET(S): at 09:57

## 2024-02-19 RX ADMIN — Medication 25 MILLIGRAM(S): at 05:45

## 2024-02-19 RX ADMIN — MIRTAZAPINE 7.5 MILLIGRAM(S): 45 TABLET, ORALLY DISINTEGRATING ORAL at 21:35

## 2024-02-19 NOTE — BH PSYCHOLOGY - CLINICIAN PSYCHOTHERAPY NOTE - NSBHPSYCHOLNARRATIVE_PSY_A_CORE FT
The patient was seen individually at the team's request and with her consent. Speech was of normal rate and volume and there were no verbal irregularities. The patient's mood was good, and affect was congruent. She was coherent and logical. The patient denied suicidal ideation. She reports no difficulty falling asleep and has a good appetite.    The patient reported brief, mild anxiety in the mornings again and believes that getting her medication earlier (i.e., before 6 am) would prevent the morning anxiety. Patient reported less pain in her hip and foot but thinks this might just be a fluke. She again expressed worry that she will not succeed after discharge, and we reviewed the reasons for optimism. She has declined to use some of the behavioral coping skills saying that they “don’t work” on her. Patient was encouraged to catch herself generating pessimistic expectations and was reminded of her strengths and accomplishments.     The patient was encouraged to continue to attend therapeutic activities on the unit and agreed to meet again.

## 2024-02-20 PROCEDURE — 99232 SBSQ HOSP IP/OBS MODERATE 35: CPT

## 2024-02-20 RX ORDER — CLONAZEPAM 1 MG
0.25 TABLET ORAL EVERY 8 HOURS
Refills: 0 | Status: DISCONTINUED | OUTPATIENT
Start: 2024-02-20 | End: 2024-02-22

## 2024-02-20 RX ORDER — CLONAZEPAM 1 MG
0.5 TABLET ORAL
Refills: 0 | Status: DISCONTINUED | OUTPATIENT
Start: 2024-02-20 | End: 2024-02-22

## 2024-02-20 RX ADMIN — ATORVASTATIN CALCIUM 10 MILLIGRAM(S): 80 TABLET, FILM COATED ORAL at 21:16

## 2024-02-20 RX ADMIN — Medication 25 MILLIGRAM(S): at 21:15

## 2024-02-20 RX ADMIN — Medication 5 MILLILITER(S): at 08:25

## 2024-02-20 RX ADMIN — Medication 0.5 MILLIGRAM(S): at 21:15

## 2024-02-20 RX ADMIN — Medication 25 MILLIGRAM(S): at 12:34

## 2024-02-20 RX ADMIN — Medication 25 MILLIGRAM(S): at 05:51

## 2024-02-20 RX ADMIN — Medication 50 MILLIGRAM(S): at 21:15

## 2024-02-20 RX ADMIN — NORTRIPTYLINE HYDROCHLORIDE 75 MILLIGRAM(S): 10 CAPSULE ORAL at 21:15

## 2024-02-20 RX ADMIN — Medication 0.25 MILLIGRAM(S): at 17:54

## 2024-02-20 RX ADMIN — Medication 5 MILLILITER(S): at 12:34

## 2024-02-20 RX ADMIN — Medication 0.5 MILLIGRAM(S): at 12:34

## 2024-02-20 RX ADMIN — Medication 1 TABLET(S): at 08:19

## 2024-02-20 RX ADMIN — Medication 50 MILLIGRAM(S): at 08:19

## 2024-02-20 RX ADMIN — Medication 0.5 MILLIGRAM(S): at 05:51

## 2024-02-20 RX ADMIN — Medication 1 DROP(S): at 21:14

## 2024-02-20 RX ADMIN — MIRTAZAPINE 7.5 MILLIGRAM(S): 45 TABLET, ORALLY DISINTEGRATING ORAL at 21:15

## 2024-02-20 RX ADMIN — LOSARTAN POTASSIUM 100 MILLIGRAM(S): 100 TABLET, FILM COATED ORAL at 08:19

## 2024-02-20 RX ADMIN — PANTOPRAZOLE SODIUM 40 MILLIGRAM(S): 20 TABLET, DELAYED RELEASE ORAL at 05:51

## 2024-02-20 NOTE — BH CHART NOTE - NSPSYPRGNOTEFT_PSY_ALL_CORE
Psychology trainee () tried to meet with the patient for a group therapy session at the treatment team's request and consent of the pt. Pt was sitting in her room and stated she was “too depressed” to engage in group tx. Writer encouraged pt to elaborate and pt processed her fears about returning home (e.g., “I don’t get the love and affection I want from my .”). Writer encouraged pt to utilize the skills she learned during tx and remain in the present rather than thinking about what life may look like in a few weeks. Pt was able to reflect on gains and stated she “can’t predict” what will happen when home. Writer provided pt with validation and supportive feedback. Pt agreed she would be willing to engage in group therapy on Thursday prior to her d/c.

## 2024-02-21 LAB
ANION GAP SERPL CALC-SCNC: 11 MMOL/L — SIGNIFICANT CHANGE UP (ref 7–14)
BUN SERPL-MCNC: 10 MG/DL — SIGNIFICANT CHANGE UP (ref 7–23)
CALCIUM SERPL-MCNC: 9.3 MG/DL — SIGNIFICANT CHANGE UP (ref 8.4–10.5)
CHLORIDE SERPL-SCNC: 104 MMOL/L — SIGNIFICANT CHANGE UP (ref 98–107)
CK SERPL-CCNC: 34 U/L — SIGNIFICANT CHANGE UP (ref 25–170)
CO2 SERPL-SCNC: 27 MMOL/L — SIGNIFICANT CHANGE UP (ref 22–31)
CREAT SERPL-MCNC: 0.81 MG/DL — SIGNIFICANT CHANGE UP (ref 0.5–1.3)
EGFR: 76 ML/MIN/1.73M2 — SIGNIFICANT CHANGE UP
GLUCOSE SERPL-MCNC: 125 MG/DL — HIGH (ref 70–99)
POTASSIUM SERPL-MCNC: 4.1 MMOL/L — SIGNIFICANT CHANGE UP (ref 3.5–5.3)
POTASSIUM SERPL-SCNC: 4.1 MMOL/L — SIGNIFICANT CHANGE UP (ref 3.5–5.3)
SODIUM SERPL-SCNC: 142 MMOL/L — SIGNIFICANT CHANGE UP (ref 135–145)

## 2024-02-21 PROCEDURE — 99232 SBSQ HOSP IP/OBS MODERATE 35: CPT

## 2024-02-21 PROCEDURE — 90834 PSYTX W PT 45 MINUTES: CPT

## 2024-02-21 RX ORDER — MIRTAZAPINE 45 MG/1
2 TABLET, ORALLY DISINTEGRATING ORAL
Qty: 14 | Refills: 0 | DISCHARGE
Start: 2024-02-21 | End: 2024-03-05

## 2024-02-21 RX ORDER — CLONAZEPAM 1 MG
1 TABLET ORAL
Qty: 42 | Refills: 0
Start: 2024-02-21 | End: 2024-03-05

## 2024-02-21 RX ORDER — ATORVASTATIN CALCIUM 80 MG/1
1 TABLET, FILM COATED ORAL
Qty: 30 | Refills: 0
Start: 2024-02-21 | End: 2024-03-21

## 2024-02-21 RX ORDER — NORTRIPTYLINE HYDROCHLORIDE 10 MG/1
1 CAPSULE ORAL
Qty: 14 | Refills: 0
Start: 2024-02-21 | End: 2024-03-05

## 2024-02-21 RX ORDER — FUROSEMIDE 40 MG
1 TABLET ORAL
Qty: 13 | Refills: 0
Start: 2024-02-21 | End: 2024-03-21

## 2024-02-21 RX ORDER — FUROSEMIDE 40 MG
1 TABLET ORAL
Qty: 30 | Refills: 0
Start: 2024-02-21 | End: 2024-03-21

## 2024-02-21 RX ORDER — METOPROLOL TARTRATE 50 MG
1 TABLET ORAL
Qty: 60 | Refills: 0
Start: 2024-02-21 | End: 2024-03-21

## 2024-02-21 RX ORDER — MIRTAZAPINE 45 MG/1
1 TABLET, ORALLY DISINTEGRATING ORAL
Qty: 14 | Refills: 0
Start: 2024-02-21 | End: 2024-03-05

## 2024-02-21 RX ORDER — CLONAZEPAM 1 MG
1 TABLET ORAL
Qty: 42 | Refills: 0 | DISCHARGE
Start: 2024-02-21 | End: 2024-03-05

## 2024-02-21 RX ORDER — CLONAZEPAM 1 MG
1 TABLET ORAL
Qty: 14 | Refills: 0
Start: 2024-02-21 | End: 2024-03-05

## 2024-02-21 RX ORDER — PANTOPRAZOLE SODIUM 20 MG/1
1 TABLET, DELAYED RELEASE ORAL
Qty: 30 | Refills: 0
Start: 2024-02-21 | End: 2024-03-21

## 2024-02-21 RX ORDER — LOSARTAN POTASSIUM 100 MG/1
1 TABLET, FILM COATED ORAL
Qty: 30 | Refills: 0
Start: 2024-02-21 | End: 2024-03-21

## 2024-02-21 RX ORDER — POLYETHYLENE GLYCOL 3350 17 G/17G
17 POWDER, FOR SOLUTION ORAL
Qty: 0 | Refills: 0 | DISCHARGE
Start: 2024-02-21

## 2024-02-21 RX ORDER — SALIVA SUBSTITUTE COMB NO.11 351 MG
5 POWDER IN PACKET (EA) MUCOUS MEMBRANE
Qty: 0 | Refills: 0 | DISCHARGE
Start: 2024-02-21

## 2024-02-21 RX ADMIN — Medication 400 MILLIGRAM(S): at 07:06

## 2024-02-21 RX ADMIN — Medication 25 MILLIGRAM(S): at 13:04

## 2024-02-21 RX ADMIN — NORTRIPTYLINE HYDROCHLORIDE 75 MILLIGRAM(S): 10 CAPSULE ORAL at 20:58

## 2024-02-21 RX ADMIN — Medication 20 MILLIGRAM(S): at 09:22

## 2024-02-21 RX ADMIN — Medication 25 MILLIGRAM(S): at 06:06

## 2024-02-21 RX ADMIN — Medication 50 MILLIGRAM(S): at 20:57

## 2024-02-21 RX ADMIN — Medication 0.5 MILLIGRAM(S): at 13:04

## 2024-02-21 RX ADMIN — MIRTAZAPINE 7.5 MILLIGRAM(S): 45 TABLET, ORALLY DISINTEGRATING ORAL at 20:57

## 2024-02-21 RX ADMIN — Medication 0.5 MILLIGRAM(S): at 20:57

## 2024-02-21 RX ADMIN — LOSARTAN POTASSIUM 100 MILLIGRAM(S): 100 TABLET, FILM COATED ORAL at 09:22

## 2024-02-21 RX ADMIN — Medication 25 MILLIGRAM(S): at 20:57

## 2024-02-21 RX ADMIN — Medication 0.5 MILLIGRAM(S): at 06:06

## 2024-02-21 RX ADMIN — Medication 1 DROP(S): at 13:10

## 2024-02-21 RX ADMIN — Medication 50 MILLIGRAM(S): at 09:22

## 2024-02-21 RX ADMIN — Medication 400 MILLIGRAM(S): at 06:06

## 2024-02-21 RX ADMIN — ATORVASTATIN CALCIUM 10 MILLIGRAM(S): 80 TABLET, FILM COATED ORAL at 20:57

## 2024-02-21 RX ADMIN — PANTOPRAZOLE SODIUM 40 MILLIGRAM(S): 20 TABLET, DELAYED RELEASE ORAL at 06:06

## 2024-02-21 RX ADMIN — Medication 1 TABLET(S): at 09:22

## 2024-02-21 NOTE — BH DISCHARGE NOTE NURSING/SOCIAL WORK/PSYCH REHAB - NSDCPRGOAL_PSY_ALL_CORE
Patient initially presented with symptoms of depression, anxiety, decreased daily functioning and irritability. Patient's initial goals included increasing patient's hope in recovery, increasing her coping skills, decreasing anxiety and increasing daily functioning. Patient made gains towards her rehab goals as evidenced by patient's utilization of coping skills, brighter affect, decreased anxiety and increased daily functioning. Patient also demonstrated daily medication compliance and daily participation in rehab activities. During activities patient was cooperative, verbal and supportive of her peers. Patient was able to stay task focused with minimal redirection.

## 2024-02-21 NOTE — BH DISCHARGE NOTE NURSING/SOCIAL WORK/PSYCH REHAB - NSBHDCAGENCY1FT_PSY_A_CORE
[facility], Rossi Saez, therapist

## 2024-02-21 NOTE — BH DISCHARGE NOTE NURSING/SOCIAL WORK/PSYCH REHAB - PATIENT PORTAL LINK FT
You can access the FollowMyHealth Patient Portal offered by [facility] by registering at the following website: [email or web address]. By joining [facility]’s FollowMyHealth portal, you will also be able to view your health information using other applications (apps) compatible with our system.

## 2024-02-21 NOTE — BH DISCHARGE NOTE NURSING/SOCIAL WORK/PSYCH REHAB - NSDCPRRECOMMEND_PSY_ALL_CORE
Patient is scheduled to be discharged on Thursday February 22nd and will return to her previous residence. Writer encouraged patient to maintain her medication compliance, to participate in structured activities and to utilize her coping skills.

## 2024-02-21 NOTE — BH PSYCHOLOGY - CLINICIAN PSYCHOTHERAPY NOTE - NSBHPSYCHOLNARRATIVE_PSY_A_CORE FT
The patient was seen individually at the team's request and with her consent. Speech was of normal rate and volume and there were no verbal irregularities. The patient's mood was good, and affect was congruent. She was coherent and logical. The patient denied suicidal ideation. She reports no difficulty falling asleep and has a good appetite.    The patient reported minimal anxiety this morning. The patient reported less pain in her hip and foot but said that her legs feel heavy. She started worrying yesterday that this was a side effect and that she \would have to stop the medication but was reassured by staff that it was probably unrelated. She seemed more hopeful about discharge and eager to return to her routine. The patient was again urged to notice when she was thinking negatively and to generate alternate scenarios.     The patient was encouraged to continue to attend therapeutic activities on the unit.

## 2024-02-21 NOTE — BH DISCHARGE NOTE NURSING/SOCIAL WORK/PSYCH REHAB - DISCHARGE INSTRUCTIONS AFTERCARE APPOINTMENTS
In order to check the location, date, or time of your aftercare appointment, please refer to your Discharge Instructions Document given to you upon leaving the hospital.  If you have lost the instructions please call [phone]

## 2024-02-21 NOTE — BH DISCHARGE NOTE NURSING/SOCIAL WORK/PSYCH REHAB - NSCDUDCCRISIS_PSY_A_CORE
[facility]  1 [phone]) [facility]-WELL ([phone])  Text "WELL" to 98800  Website: [email or web address]/.[facility] 1 ([phone] 621-HCRQ (9029) Website: www.safehorizon.org/.National Suicide Prevention Lifeline 8 (688) 253-8655/.  [facility]  1 (871) LIFENET (875-2502)/.  [facility]  [address] [place] [id number] [phone]   Hours:  Monday through Friday from 9 AM to 3 PM/988 Suicide and Crisis Lifeline

## 2024-02-22 VITALS — TEMPERATURE: 98 F

## 2024-02-22 PROCEDURE — 99232 SBSQ HOSP IP/OBS MODERATE 35: CPT

## 2024-02-22 RX ADMIN — PANTOPRAZOLE SODIUM 40 MILLIGRAM(S): 20 TABLET, DELAYED RELEASE ORAL at 05:51

## 2024-02-22 RX ADMIN — Medication 0.5 MILLIGRAM(S): at 05:51

## 2024-02-22 RX ADMIN — Medication 50 MILLIGRAM(S): at 09:37

## 2024-02-22 RX ADMIN — Medication 25 MILLIGRAM(S): at 05:51

## 2024-02-22 RX ADMIN — Medication 0.25 MILLIGRAM(S): at 10:36

## 2024-02-22 RX ADMIN — LOSARTAN POTASSIUM 100 MILLIGRAM(S): 100 TABLET, FILM COATED ORAL at 09:37

## 2024-02-22 RX ADMIN — LIDOCAINE 2 PATCH: 4 CREAM TOPICAL at 10:13

## 2024-02-22 RX ADMIN — Medication 1 TABLET(S): at 09:37

## 2024-02-22 NOTE — BH INPATIENT PSYCHIATRY PROGRESS NOTE - NSBHFUPINTERVALHXFT_PSY_A_CORE
The pt.  seen for anxiety and depression. less weepy Eating sleeping okay VSS. No hypokalemia or increased BUN. EKG no prolonged Qtc or QRS, has PAC which has been also seen by cardiologist on outpatient holter
Patient seen for severity debilitating anxiety, chart reviewed. Pt is compliant with medication Vitals are stable.    On encounter this morning pt was found lying in bed, awake, alert, oriented , in no visible distress. Reports sleeping and eating ok. Denies any sadia, paranoia, hallucinations or physical distress. 
The pt. remains somatic.
Remains med seeking and med focused. asking for increased prns. AVSS
Patient seen for severity debilitating anxiety Still seeking prns at around 9AM No tremor today. VSS. K improved to 3.7
Patient seen for severity debilitating anxiety, chart reviewed. Pt is compliant with medication Vitals are stable.    On encounter this morning pt was found lying in bed, awake, alert, oriented , in no visible distress. Reports sleeping and eating ok. Denies any sadia, paranoia, hallucinations or physical distress. VSS
Patient seen for severity debilitating anxiety, chart reviewed. Pt is compliant with medication, needed klonopin PRN last evening around 6 pm. Vitals are stable.    On encounter this morning pt was found lying in bed, awake, alert, oriented x 4, in no visible distress. Pt reports feeling alright, better, but not 100%. She continues to have anxiety and little bit depression but less. Reports sleeping and eating ok. Denies any sadia, paranoia, hallucinations or physical distress. 
The pt.  seen for anxiety and depression. less weepy Eating sleeping okay VSS. No hypokalemia or increased BUN. EKG no prolonged Qtc or QRS, has PAC which has been also seen by cardiologist on outpatient holter VSS, NTP level pending
The pt.  seen for anxiety and depression. less weepy Eating sleeping okay VSS. No hypokalemia or increased BUN. EKG no prolonged Qtc or QRS, has PAC which has been also seen by cardiologist on outpatient holter VSS, NTP level pending
Patient seen for severity debilitating anxiety, chart reviewed. Pt is compliant with medication Vitals are stable.  Reports sleeping and eating ok. Denies any sadia, paranoia, hallucinations or physical distress.  VSS. Labs no hypokalemia. Patient sasy when she was in whirlpool she lowered self to bench to fast and that when she started having pain though has been inconsistent with this account
Patient seen for anxiety and dysphoric mood. No overnight events Eating sleeping okay. VSS. More med seeking and med focused. asking for increased prns.
The pt.  seen for anxiety and depression. somatic. focused on heavy feeling in her legs . VSS. Mentions she has gone to multiple PT outpatient facilities for same complaint predating NTP. Says she has had panic attacks but less severe more able to talk self through them
Remains med seeking and med focused. asking for increased prns, for "ativan". In bed and calm before MD approaches, then c/o anxiety, feeling "shaky" and insisting that "this is cold turkey." AVSS
The pt.  seen for anxiety and depression. Reports mood is  sad but less but anxiety better, less weepy Eating sleeping okay VSS. No hypokalemia or increased BUN. EKG no prolonged Qtc or QRS, has PAC which has been also seen by cardiologist on outpatient holter
Patient seen for severity debilitating anxiety, chart reviewed. Pt is compliant with medication Vitals are stable.    On encounter this morning pt was found lying in bed, awake, alert, oriented x 4, in no visible distress. Pt reports feeling alright, better, but not 100%. She continues to have anxiety and little bit depression but less. Reports sleeping and eating ok. Denies any sadia, paranoia, hallucinations or physical distress. 
The pt.  seen for anxiety and depression. Reports mood is very sad but anxiety better. Eating sleeping okay VSS. 
Patient seen for severity debilitating anxiety, chart reviewed. Pt is compliant with medication Vitals are stable.  Reports sleeping and eating ok. Denies any sadia, paranoia, hallucinations or physical distress.  VSS. Labs no hypokalemia.Xrays neg for fracture
The pt.  seen for anxiety and depression. No overnight events. Eating, sleeping well. Takes one Klonopin 0.25mg per day as a prn. 
Patient seen for anxiety and dysphoric mood. No overnight events Eating sleeping okay. VSS. Somewhat med seeking and med focused. 
Patient seen for severity debilitating anxiety, chart reviewed. Pt is compliant with medication Vitals are stable.    On encounter this morning pt was found lying in bed, awake, alert, oriented x 4, in no visible distress. Pt reports feeling alright, better, but not 100%. She continues to have anxiety and little bit depression but less. Reports sleeping and eating ok. Denies any sadia, paranoia, hallucinations or physical distress. 
The pt.  seen for anxiety and depression. Reports mood is very sad but anxiety better. Eating sleeping okay VSS. 
Patient seen for severity debilitating anxiety Still seeking prns  . VSS. Med focused and clock watching for next doses of meds
The pt.  seen for anxiety and depression. Reports mood is  sad but anxiety better, less weepy Eating sleeping okay VSS. No hypokalemia or increased BUN
The pt.  seen for anxiety and depression. somatic. focused on heavy feeling in her legs and ?imagined difficulty walking. MINI
Patient seen for severity debilitating anxiety Still seeking prns. VSS.  During rounds this morning pt was found lying in bed, awake, under no visible distress. Pt reported: "I am having a panic attack right this moment. I'm waiting for my medications, please leave me alone". Denied being in pain or any other complaints. Pt refused to discuss further. 
The pt.  seen for anxiety and depression. Reports mood is  sad but anxiety better, less weepy Eating sleeping okay VSS. No hypokalemia or increased BUN
The pt.  seen for anxiety and depression. Reports mood is  sad but less but anxiety better, less weepy Eating sleeping okay VSS. No hypokalemia or increased BUN. EKG no prolonged Qtc or QRS, has PAC which has been also seen by cardiologist on outpatient holter
The pt.  seen for anxiety and depression. somatic. focused on heavy feeling in her legs . VSS. Mentions she has gone to multiple PT outpatient facilities for same complaint predating NTP. CPK wnl
Patient seen for severity debilitating anxiety, chart reviewed. Pt is compliant with medication Vitals are stable.    On encounter this morning pt was found lying in bed, awake, alert, oriented , in no visible distress. Reports sleeping and eating ok. Denies any sadia, paranoia, hallucinations or physical distress. VSS
Patient seen for severity debilitating anxiety Still seeking prns  . VSS. K improved to 3.7. Visible on unit
The pt.  seen for anxiety and depression. Reports mood is  sad but less but anxiety better, less weepy Eating sleeping okay VSS. No hypokalemia or increased BUN. EKG no prolonged Qtc or QRS, has PAC which has been also seen by cardiologist on outpatient holter
Patient seen for severity debilitating anxiety, chart reviewed. Pt is compliant with medication Vitals are stable.    On encounter this morning pt was found lying in bed, awake, alert, oriented , in no visible distress. Reports sleeping and eating ok. Denies any sadia, paranoia, hallucinations or physical distress.  HAs ortho change but being repeated manually
Patient seen for severity debilitating anxiety, chart reviewed. Pt is compliant with medication Vitals are stable.    On encounter this morning pt was found lying in bed, awake, alert, oriented , in no visible distress. Reports sleeping and eating ok. Denies any sadia, paranoia, hallucinations or physical distress. VSS
Somewhat better. Less med seeking. No tremor today. AVSS
Somewhat better. Less med seeking. No tremor today. VSS. K improved to 3.7
Patient seen for severity debilitating anxiety, chart reviewed. Pt is compliant with medication Vitals are stable.  Reports sleeping and eating ok. Denies any sadia, paranoia, hallucinations or physical distress.  VSS. Labs no hypokalemia
Patient seen for severity debilitating anxiety, chart reviewed. Pt is compliant with medication Vitals are stable.    On encounter this morning pt was found lying in bed, awake, alert, oriented , in no visible distress. Reports sleeping and eating ok. Denies any sadia, paranoia, hallucinations or physical distress. VSS
The pt. remains anxious.
The pt.  seen for anxiety and depression. less weepy Eating sleeping okay VSS. No hypokalemia or increased BUN. EKG no prolonged Qtc or QRS, has PAC which has been also seen by cardiologist on outpatient holter VSS, NTP level 54
Patient seen for severity debilitating anxiety, chart reviewed. Pt is compliant with medication, needed klonopin PRN last evening around 6 pm. Vitals are stable.    On encounter this morning pt was found lying in bed, awake, alert, oriented x 4, in no visible distress. Pt reports feeling alright, better, but not 100%. She continues to have anxiety and little bit depression but less. Reports sleeping and eating ok. Denies any sadia, paranoia, hallucinations or physical distress.

## 2024-02-22 NOTE — BH INPATIENT PSYCHIATRY DISCHARGE NOTE - NSBHMETABOLIC_PSY_ALL_CORE_FT
BMI: BMI (kg/m2): 40.4 (01-10-24 @ 09:02)  HbA1c: A1C with Estimated Average Glucose Result: 5.8 % (01-11-24 @ 08:00)    Glucose:   BP: --Vital Signs Last 24 Hrs  T(C): 36.6 (02-21-24 @ 20:51), Max: 36.6 (02-21-24 @ 07:50)  T(F): 97.8 (02-21-24 @ 20:51), Max: 97.9 (02-21-24 @ 07:50)  HR: 98 (02-21-24 @ 20:51) (98 - 98)  BP: --  BP(mean): --  RR: --  SpO2: --    Orthostatic VS  02-21-24 @ 20:51  Lying BP: --/-- HR: --  Sitting BP: 113/50 HR: 97  Standing BP: --/-- HR: --  Site: --  Mode: --  Orthostatic VS  02-21-24 @ 07:50  Lying BP: --/-- HR: --  Sitting BP: 137/79 HR: 89  Standing BP: 141/80 HR: 92  Site: --  Mode: --  Orthostatic VS  02-20-24 @ 20:48  Lying BP: --/-- HR: --  Sitting BP: 122/56 HR: 94  Standing BP: --/-- HR: --  Site: --  Mode: --  Orthostatic VS  02-20-24 @ 08:19  Lying BP: --/-- HR: --  Sitting BP: 130/74 HR: 92  Standing BP: 136/78 HR: 95  Site: --  Mode: --    Lipid Panel: Date/Time: 05-11-23 @ 08:36  Cholesterol, Serum: 175  LDL Cholesterol Calculated: 97  HDL Cholesterol, Serum: 38  Total Cholesterol/HDL Ration Measurement: --  Triglycerides, Serum: 198

## 2024-02-22 NOTE — BH INPATIENT PSYCHIATRY DISCHARGE NOTE - HOSPITAL COURSE
Patient presented as highly anxious, dysphoric , awakening with panic attacks characterized by severe anxiety with palpitations, nausea. She also was noted to have myoclonic jerks and sweating. it was felt her condition could have been inadvertently exacerbated by serotonergic overload from Effexor, mirtazapine, Zofran. Zofran was stopped, Effexor was reduced then d/donald which improved sweating, myoclonic jerks. Primidone was tapered off. Xanax was changed to equipotent dose of clonazepam to avoid wearing off anxiety from short half life BDZ. Gabapentin was changed to Lyrica based on studies supporting use in [facility]. Patients extreme anxiety improved but she reported weepiness, down mood. Decision was made in light of multiple failed trials with SSRI, SNRI to try TCA. THis trial only initiated after  agreed to take complete control of TCA supply only giving her  daily dose given risk of TCA OD and patient's past hx OD. She was titrated to 50mg hs TCA level was 54. Dose titrated to 75mg hs , EKG unchanged level pending. At this dose she had some dry mouth she managed with Biotene. On current regimen she had fairly good response but not dramatic remission. Mood improved, she denied SI, was future oriented. She still tended to have AM anxiety with palpitations but she was more able to remind self that symptoms would pass . She tended still to be somatically focused. She continued to have anticipatory worries such as what if medication stops working, etc. She worked with a psychologist who was able to make some limited progress in getting her to use tools to cope rather than relying on medication to eradicate all symptoms. it was hoped to taper of mirtazapine owing to futility and minimizing polypharm but she was markedly distressed by idea of stopping because she feels it help her sleep that a risk beneift assessment led to her remaining on 7.5mg hs. OWing to AM anxiety she takes her klonopin and Lyrica at 6Am -as soon as she gets up and takes her nighttime Lyrica and Klonopin at late as possible so to reduce chance of waking up early. Patient presented as highly anxious, dysphoric , awakening with panic attacks characterized by severe anxiety with palpitations, nausea. She also was noted to have myoclonic jerks and sweating. it was felt her condition could have been inadvertently exacerbated by serotonergic overload from Effexor, mirtazapine, Zofran. Zofran was stopped, Effexor was reduced then d/donald which improved sweating, myoclonic jerks. Primidone was tapered off. Xanax was changed to equipotent dose of clonazepam to avoid wearing off anxiety from short half life BDZ. She was allowed to take a smallerdose of Klonopin Wafer ( 0.25mg) once a day as prn . It was felt that comfort in knowing she has option to take small extra dose when needed outweighed small increase in total dose. While it may have inadverently increased her focus on medications rather than coping skills it was felt that not having some med option she could control would cause more distress. This can be re-evaluated if she improves or develops increased ability to utilize techniques learned in CBTGabapentin was changed to Lyrica based on studies supporting use in [facility]. Patients extreme anxiety improved but she reported weepiness, down mood. Decision was made in light of multiple failed trials with SSRI, SNRI to try TCA. THis trial only initiated after  agreed to take complete control of TCA supply only giving her  daily dose given risk of TCA OD and patient's past hx OD. She was titrated to 50mg hs TCA level was 54. Dose titrated to 75mg hs , EKG unchanged level pending. At this dose she had some dry mouth she managed with Biotene. On current regimen she had fairly good response but not dramatic remission. Mood improved, she denied SI, was future oriented. She still tended to have AM anxiety with palpitations but she was more able to remind self that symptoms would pass . She tended still to be somatically focused. She continued to have anticipatory worries such as what if medication stops working, etc. She worked with a psychologist who was able to make some limited progress in getting her to use tools to cope rather than relying on medication to eradicate all symptoms. it was hoped to taper of mirtazapine owing to futility and minimizing polypharm but she was markedly distressed by idea of stopping because she feels it help her sleep that a risk beneift assessment led to her remaining on 7.5mg hs. OWing to AM anxiety she takes her klonopin and Lyrica at 6Am -as soon as she gets up and takes her nighttime Lyrica and Klonopin at late as possible so to reduce chance of waking up early.

## 2024-02-22 NOTE — BH INPATIENT PSYCHIATRY DISCHARGE NOTE - HPI (INCLUDE ILLNESS QUALITY, SEVERITY, DURATION, TIMING, CONTEXT, MODIFYING FACTORS, ASSOCIATED SIGNS AND SYMPTOMS)
73 yr old female, , domiciled, and retired. premorbidly ambulant (not needing assists) and iADL/ bADL independent. with background hx of MDD and ANSON; has multiple psych admissions (including last discharge from [facility] x 3 weeks ago); had prior SA by overdosing on pills (11/2019) following death of parents. has no reported hx of NSSIB. she denied using any illicit substance use including alcohol.. per chart review, there was documented use of cannabis daily.  Pt currently under the care of [facility] geropsychiatrist, Dr ROSALINDA Kamara (last virtual follow up on 10/12/2023).  pertinent medical issues include: HTN, hyperlipidemia, and GERD.   tonight, presented to the ED BIB EMS after staff from Pt's cardiologist's activated 911 as Pt was complaining of palpitations with chest tightness + hand tremors.  Pt also claimed that whilst at the office, staff took her BP - it was high; she does not recall exact values    cooperative, jittery, catastrophizing. reports that she had been dealing with worsening anxiety for the last few days. had been discharged from [facility] x 3 weeks ago (for a 2month stay there, wherein she even underwent ECT x 5 - which she did not find helpful; cites that it may have "affected my memory"). whilst at [facility], she had been prescribed effexor which had been titrated to current dose of 300mg daily. she suspects that this may be helping contribute towards propagation of her current uncontrolled anxiety symptoms + hand tremors. in the past, reported that she had been on inderal (for the "shakes"). as this did not help, she claimed being switched to primidone.      for the last few days, would wake up in the morning, already feeling as if - she is always on the edge. would be experiencing panic attacks which would last the whole day. frustration has been mounting to a point that she had been feeling progressively hopeless/ helpless/ worthless - due to the perceived inability to control ongoing anxiety symptoms despite reported good compliance to all psych meds prescribed. said hopelessness/ helplessness/ worthlessness would eventually morph into a constant death wish during the day; "what kind of life do I have now?", she tells writer.  despite the death wish (as propagated by her inability to control anxiety symptoms), she adamantly denied having any active SI; did not have any intentions or plans.  no HI. currently, no passive or active SI nor HI.      anxiety symptoms manifesting as palpitations + chest tightness + nausea.  Pt "dreads" the next panic attack.. she constant worries about the panic attacks and how to deal with it.. resultant of her uncontrolled anxiety, she admits feeling sad; sadness associated with anhedonia; there is early insomnia; poor appetite; low energy level.   Pt denied experiencing any signs/ symptoms suggestive of sadia (denied grandiosity/ racing thoughts/ increased goal directed activities or engaged in risk taking behavior/ no pressured speech/ no elevated mood/ denied any increased in energy level causing sleep disruption).  is not feeling paranoid/ no referential ideations. denied any perceptual disturbances. no thought insertion/ withdrawal nor broadcasting     this morning as she woke up, had another episode of panic attack - she described it as "adrenaline shock". in the afternoon as symptoms did not martir, she told her  to accompany her to her cardiologist's office. whilst at the office, staff took her BP - they found it elevated. in addition, she had also told them that she was "not feeling very well". that she was experiencing palpitations + chest tightness + hand tremors. they called 911. she agreed to come to the ED       ** See [facility] notes for collateral information obtained from Pt's spouse **

## 2024-02-22 NOTE — BH INPATIENT PSYCHIATRY PROGRESS NOTE - NSTXPROBDCOTHR_PSY_ALL_CORE
DISCHARGE ISSUE - OTHER

## 2024-02-22 NOTE — BH INPATIENT PSYCHIATRY PROGRESS NOTE - MSE UNSTRUCTURED FT
Awake and alert. Cooperative. Speech somewhat plaintive, soft, midl tremor.  Engaged in exam. Mood anxious and depressed Affect anxious.  TP logical and coherent. No delusions. Focused on somatic complaints and medications .Worried about not having complete remission.  Very focused on med timing and prns No suicidal ideation. Fair insight. No hallucinations. Thinking perseverative. oriented x3. 
Patient is awake and alert. Affect very anxious. Speech fluent. TP perseverative, focused on "heavy" feeling in legs. No delusions expressed. No perceptual disturbance. Limited insight. No suicidal ideations. 
Pt appears stated age, awake,  alert and oriented x 3. Lying in bed, overweight, dressed and groomed well. Cooperative, makes good eye contact. No agitaton, retardation or involuntary movements noted. Speech relevant, soft, normal in rate, productivity. Mood anxious and mild depressed. Affect anxious. TP is linear,  no delusions, focused on somatic complaints and medications, worried about not having complete remission.  No suicidal/homicidal ideation/intent/plan. No hallucinations expressed. Fair insight. Impulse control is intact now.
Pt appears stated age, awake,  alert and oriented x 3.Patient well dressed and groomed  Cooperative, makes good eye contact. No agitation, retardation or involuntary movements noted. Speech relevant, soft, normal in rate, productivity. Mood anxious ,dysphoric somewhat attenuated Affect anxious TP is linear,  no delusions, focused on mood, desire not to get off Remeron, worries what will happen if current trial fails. Focused on upset caused by getting 6 10mg tabs rather than a 50mg and one 10mg No suicidal/homicidal ideation/intent/plan. No hallucinations expressed. Fair insight. Impulse control is intact
Pt appears stated age, awake,  alert and oriented x 3.Patient well dressed and groomed  Cooperative, makes good eye contact. No agitation, retardation or involuntary movements noted. Speech relevant, soft, normal in rate, productivity. Mood anxious  and dysphoric Affect anxious, weepy at times. TP is linear,  no delusions, focused on somatic complaints now has drifted to back complaints No suicidal/homicidal ideation/intent/plan. No hallucinations expressed. Fair insight. Impulse control is intact
Pt appears stated age, awake,  alert and oriented x 3.Patient well dressed and groomed  Cooperative, makes good eye contact. No agitation, retardation or involuntary movements noted. Speech relevant, soft, normal in rate, productivity. Mood anxious  and dysphoric Affect anxious. TP is linear,  no delusions, focused on somatic complaints now has drifted to back complaints No suicidal/homicidal ideation/intent/plan. No hallucinations expressed. Fair insight. Impulse control is intact
Pt appears stated age, awake,  alert and oriented x 3. Lying in bed, overweight, dressed and groomed well. Cooperative, makes good eye contact. No agitaton, retardation or involuntary movements noted. Speech relevant, soft, normal in rate, productivity. Mood anxious  and dysphoric Affect anxious. TP is linear,  no delusions, focused on somatic complaints and medications, worried about not having complete remission.  No suicidal/homicidal ideation/intent/plan. No hallucinations expressed. Fair insight. Impulse control is intact now.
Pt appears stated age, awake,  alert and oriented x 3.Patient well dressed and groomed  Cooperative, makes good eye contact. No agitation, retardation or involuntary movements noted. Speech relevant, soft, normal in rate, productivity. Mood anxious  and dysphoric Affect anxious, weepy at times. TP is linear,  no delusions, focused on mood, desire not to get off Remeron, worries what will happen if current trial fails No suicidal/homicidal ideation/intent/plan. No hallucinations expressed. Fair insight. Impulse control is intact
Pt appears stated age, awake,  alert and oriented x 3.Patient well dressed and groomed  Cooperative, makes good eye contact. No agitaton, retardation or involuntary movements noted. Speech relevant, soft, normal in rate, productivity. Mood anxious  and dysphoric Affect anxious. TP is linear,  no delusions, focused on somatic complaints such as palpitations, feeling hot or cold and medications, worried about not getting better or limited resposne  No suicidal/homicidal ideation/intent/plan. No hallucinations expressed. Fair insight. Impulse control is intact
Patient is awake and alert.Mood anxious less dysphoric, affect c/w mood Speech fluent. TP perseverative, focused appropriately on getting medications list up to date and accurate but needs to check several times to re assure self she is getting it right. No delusions expressed. No perceptual disturbance. Limited insight. No suicidal ideation. Oriented x3. Fair insight
Awake and alert. Cooperative. Speech somewhat plaintive, soft, midl tremor.  Engaged in exam. Affect anxious.  TP logical and coherent. No delusions. Focused on somatic complaints and medications . Very focused on med timing and prns No suicidal ideation. Fair insight. No hallucinations. THinking perseverative. oriented x3. 
Pt appears stated age, awake,  alert and oriented x 3.Patient well dressed and groomed  Cooperative, makes good eye contact. No agitation, retardation or involuntary movements noted. Speech relevant, soft, normal in rate, productivity. Mood anxious  and dysphoric Affect anxious, weepy at times. TP is linear,  no delusions, focused on mood, desire not to get off Remeron, worries what will happen if current trial fails No suicidal/homicidal ideation/intent/plan. No hallucinations expressed. Fair insight. Impulse control is intact
Pt appears stated age, awake,  alert and oriented x 3. Lying in bed, overweight, dressed and groomed well. Cooperative, makes good eye contact. No agitaton, retardation or involuntary movements noted. Speech relevant, soft, normal in rate, productivity. Mood anxious and mild depressed. Affect anxious. TP is linear,  no delusions, focused on somatic complaints and medications, worried about not having complete remission.  No suicidal/homicidal ideation/intent/plan. No hallucinations expressed. Fair insight. Impulse control is intact now.
Pt appears stated age, awake,  alert and oriented x 3.Patient well dressed and groomed  Cooperative, makes good eye contact. No agitation, retardation or involuntary movements noted. Speech relevant, soft, normal in rate, productivity. Mood anxious  and dysphoric Affect anxious, weepy at times. TP is linear,  no delusions, focused on somatic complaints now has drifted to back complaints No suicidal/homicidal ideation/intent/plan. No hallucinations expressed. Fair insight. Impulse control is intact
Pt appears stated age, awake,  alert and oriented x 3.Patient well dressed and groomed  Cooperative, makes good eye contact. No agitaton, retardation or involuntary movements noted. Speech relevant, soft, normal in rate, productivity. Mood anxious  and dysphoric Affect anxious. TP is linear,  no delusions, focused on somatic complaints now has drifted to back complaints, feels may be from bed  No suicidal/homicidal ideation/intent/plan. No hallucinations expressed. Fair insight. Impulse control is intact
Pt appears stated age, awake,  alert and oriented x 3. Lying in bed, overweight, dressed and groomed well. Cooperative, makes good eye contact. No agitaton, retardation or involuntary movements noted. Speech relevant, soft, normal in rate, productivity. Mood anxious and mild depressed. Affect anxious. TP is linear,  no delusions, focused on somatic complaints and medications, worried about not having complete remission.  No suicidal/homicidal ideation/intent/plan. No hallucinations expressed. Fair insight. Impulse control is intact now.
Pt appears stated age, awake,  alert and oriented x 3.Patient well dressed and groomed  Cooperative, makes good eye contact. No agitaton, retardation or involuntary movements noted. Speech relevant, soft, normal in rate, productivity. Mood anxious  and dysphoric Affect anxious. TP is linear,  no delusions, focused on somatic complaints now has drifted to back complaints, feels may be from bed  No suicidal/homicidal ideation/intent/plan. No hallucinations expressed. Fair insight. Impulse control is intact
Pt appears stated age, awake,  alert and oriented x 3.Patient well dressed and groomed  Cooperative, makes good eye contact. No agitation, retardation or involuntary movements noted. Speech relevant, soft, normal in rate, productivity. Mood anxious  and dysphoric Affect anxious. TP is linear,  no delusions, focused on somatic complaints now has drifted to back complaints No suicidal/homicidal ideation/intent/plan. No hallucinations expressed. Fair insight. Impulse control is intact
Pt appears stated age, awake,  alert and oriented x 3.Patient well dressed and groomed  Cooperative, makes good eye contact. No agitation, retardation or involuntary movements noted. Speech relevant, soft, normal in rate, productivity. Mood anxious  and dysphoric Affect anxious, weepy at times. TP is linear,  no delusions, focused on mood, desire not to get off Remeron, worries what will happen if current trial fails No suicidal/homicidal ideation/intent/plan. No hallucinations expressed. Fair insight. Impulse control is intact
Pt appears stated age, awake,  alert and oriented x 3.Patient well dressed and groomed  Cooperative, makes good eye contact. No agitation, retardation or involuntary movements noted. Speech relevant, soft, normal in rate, productivity. Mood anxious  and dysphoric Affect anxious, weepy at times. TP is linear,  no delusions, focused on mood, desire not to get off Remeron, worries what will happen if current trial fails No suicidal/homicidal ideation/intent/plan. No hallucinations expressed. Fair insight. Impulse control is intact
Awake and alert. Cooperative. Engaged in exam. Affect anxious. Speech is fluent. TP logical and coherent. No delusions. Focused on somatic complaints and medications No suicidal ideation. Fair insight. No hallucinations. THinking perseverative. oriented x3. 
Pt appears stated age, awake,  alert and oriented x 3.Patient well dressed and groomed  Cooperative, makes good eye contact. No agitation, retardation or involuntary movements noted. Speech relevant, soft, normal in rate, productivity. Mood anxious  and dysphoric Affect anxious, weepy at times. TP is linear,  no delusions, focused on somatic complaints now has drifted to back complaints No suicidal/homicidal ideation/intent/plan. No hallucinations expressed. Fair insight. Impulse control is intact
Awake and alert. Cooperative. Speech somewhat plaintive, soft, mild tremor. Disengaged. Mood anxious and depressed. Affect anxious. TP logical and goal directed. No delusions. Focused on somatic complaints and medications.Worried about not having complete remission.  PRN med seeking.  No suicidal ideation. Fair insight. No hallucinations. Thinking perseverative. oriented x3. 
Awake and alert. Cooperative. Engaged in exam. Affect anxious. Speech is fluent. TP logical and coherent. No delusions. Focused on somatic complaints and medications No suicidal ideation. Fair insight. No hallucinations. THinking perseverative. oriented x3. 
Pt appears stated age, awake,  alert and oriented x 3.Patient well dressed and groomed  Cooperative, makes good eye contact. No agitation, retardation or involuntary movements noted. Speech relevant, soft, normal in rate, productivity. Mood anxious  and dysphoric Affect anxious, weepy at times. TP is linear,  no delusions, focused on mood, desire not to get off Remeron, worries what will happen if current trial fails No suicidal/homicidal ideation/intent/plan. No hallucinations expressed. Fair insight. Impulse control is intact
Patient is awake and alert. Affect very anxious. Speech fluent. TP perseverative, focused on "heavy" feeling in legs. No delusions expressed. No perceptual disturbance. Limited insight. No suicidal ideations. 
Pt appears stated age, awake,  alert and oriented x 3.Patient well dressed and groomed  Cooperative, makes good eye contact. No agitation, retardation or involuntary movements noted. Speech relevant, soft, normal in rate, productivity. Mood anxious  and dysphoric Affect anxious, weepy at times. TP is linear,  no delusions, focused on mood, desire not to get off Remeron No suicidal/homicidal ideation/intent/plan. No hallucinations expressed. Fair insight. Impulse control is intact
Awake and alert. Cooperative. Affect anxious Speech is fluent. TP logical but focused on meds. No delusions. No suicidal ideations. Mild high-frequency hand tremor when MD is present, less when not observed. Poor insight.
Pt appears stated age, awake,  alert and oriented x 3.Patient well dressed and groomed  Cooperative, makes good eye contact. No agitation, retardation or involuntary movements noted. Speech relevant, soft, normal in rate, productivity. Mood anxious ,dysphoric somewhat attenuated Affect anxious TP is linear,  no delusions, focused on mood, desire not to get off Remeron, worries what will happen if current trial fails. Focused on upset caused by getting 6 10mg tabs rather than a 50mg and one 10mg No suicidal/homicidal ideation/intent/plan. No hallucinations expressed. Fair insight. Impulse control is intact
Awake and alert. Cooperative. Engaged in exam. Affect anxious. Speech is fluent. TP logical and coherent. No delusions. No suicidal ideations. Fair insight.
Pt appears stated age, awake,  alert and oriented x 3.Patient well dressed and groomed  Cooperative, makes good eye contact. No agitation, retardation or involuntary movements noted. Speech relevant, soft, normal in rate, productivity. Mood anxious , more dysphoric todaydysphoric Affect anxious, weepy at times. TP is linear,  no delusions, focused on mood, desire not to get off Remeron, worries what will happen if current trial fails No suicidal/homicidal ideation/intent/plan. No hallucinations expressed. Fair insight. Impulse control is intact
Pt appears stated age, awake,  alert and oriented x 3.Patient well dressed and groomed  Cooperative, makes good eye contact. No agitation, retardation or involuntary movements noted. Speech relevant, soft, normal in rate, productivity. Mood anxious , more dysphoric todaydysphoric Affect anxious, weepy at times. TP is linear,  no delusions, focused on mood, desire not to get off Remeron, worries what will happen if current trial fails No suicidal/homicidal ideation/intent/plan. No hallucinations expressed. Fair insight. Impulse control is intact
Pt appears stated age, awake,  alert and oriented x 3. Lying in bed, overweight, dressed and groomed well. Cooperative, makes good eye contact. No agitaton, retardation or involuntary movements noted. Speech relevant, soft, normal in rate, productivity. Mood anxious and mild depressed. Affect anxious. TP is linear,  no delusions, focused on somatic complaints and medications, worried about not having complete remission.  No suicidal/homicidal ideation/intent/plan. No hallucinations expressed. Fair insight. Impulse control is intact now.
Pt appears stated age, awake,  alert and oriented x 3.Patient well dressed and groomed  Cooperative, makes good eye contact. No agitation, retardation or involuntary movements noted. Speech relevant, soft, normal in rate, productivity. Mood anxious , more dysphoric todaydysphoric Affect anxious, weepy at times. TP is linear,  no delusions, focused on mood, desire not to get off Remeron, worries what will happen if current trial fails No suicidal/homicidal ideation/intent/plan. No hallucinations expressed. Fair insight. Impulse control is intact
Pt appears stated age, awake,  alert and oriented x 3.Patient well dressed and groomed  Cooperative, makes good eye contact. No agitaton, retardation or involuntary movements noted. Speech relevant, soft, normal in rate, productivity. Mood anxious  and dysphoric Affect anxious. TP is linear,  no delusions, focused on somatic complaints now has drifted to back complaints No suicidal/homicidal ideation/intent/plan. No hallucinations expressed. Fair insight. Impulse control is intact
Pt appears stated age, awake,  alert and oriented x 3.Patient well dressed and groomed  Cooperative, makes good eye contact. No agitaton, retardation or involuntary movements noted. Speech relevant, soft, normal in rate, productivity. Mood anxious  and dysphoric Affect anxious. TP is linear,  no delusions, focused on somatic complaints such as palpitations, feeling hot or cold and medications, worried about not getting better or limited response  No suicidal/homicidal ideation/intent/plan. No hallucinations expressed. Fair insight. Impulse control is intact
Patient is awake and alert.Mood anxious but much less, less dysphoric, affect c/w mood Speech fluent. TP perseverative, focused appropriately on getting medications list up to date and accurate but needs to check several times to re assure self she is getting it right. No delusions expressed. No perceptual disturbance. Limited insight. No suicidal ideation. Expressing future oriented thinking, plan to go to beach club in summer, etc.  Oriented x3. Fair insight. Not impulsive
Awake and alert. Cooperative. Petulant affect. Speech is fluent. TP logical but focused on meds. No delusions. No suicidal ideations. Mild high-frequency hand tremor. Poor insight.
Patient overweight, well groomed, in hospital gown. Has mild tremor, not course, myoclonic jerks are noted today Speech normal. mood anxious. Denies current Si. Says it is natural to be discouraged when you are this anxious. Worries excessively about ordinary occurrences, no obsessions, compulsions, no delusions, hallucinations. Very focused on medications. Seems to not be able to settle on a regimen without beginning to change her mind Oriented x3. Thinking ruminative, perseverative. Partial insight, fair  judgement
Patient overweight, well groomed, in hospital gown. Has tremor which varies with anxiety Speech normal. mood quite anxious. Denies current Si. Says it is natural to be discouraged when you are this anxious. Highly medication focused clock watching for next prn and standing doses, no obsessions, compulsions, no delusions, hallucinations. Seems to not be able to settle on a regimen without beginning to change her mind Oriented x3. Thinking ruminative, perseverative. Partial insight, fair  judgement

## 2024-02-22 NOTE — BH INPATIENT PSYCHIATRY PROGRESS NOTE - NSTXANXDATEEST_PSY_ALL_CORE
07-Feb-2024
10-Ronn-2024
10-Ronn-2024
25-Jan-2024
25-Jan-2024
10-Ronn-2024
10-Ronn-2024
25-Jan-2024
07-Feb-2024
25-Jan-2024
07-Feb-2024
25-Jan-2024
10-Ronn-2024
07-Feb-2024
07-Feb-2024
25-Jan-2024
25-Jan-2024
07-Feb-2024
25-Jan-2024
10-Ronn-2024
07-Feb-2024
25-Jan-2024
07-Feb-2024
10-Ronn-2024
07-Feb-2024
10-Ronn-2024

## 2024-02-22 NOTE — BH INPATIENT PSYCHIATRY PROGRESS NOTE - NSCGISEVERILLNESS_PSY_ALL_CORE
4 = Moderately ill – overt symptoms causing noticeable, but modest, functional impairment or distress; symptom level may warrant medication
5 = Markedly ill - intrusive symptoms that distinctly impair social/occupational function or cause intrusive levels of distress
5 = Markedly ill - intrusive symptoms that distinctly impair social/occupational function or cause intrusive levels of distress
4 = Moderately ill – overt symptoms causing noticeable, but modest, functional impairment or distress; symptom level may warrant medication
4 = Moderately ill – overt symptoms causing noticeable, but modest, functional impairment or distress; symptom level may warrant medication
5 = Markedly ill - intrusive symptoms that distinctly impair social/occupational function or cause intrusive levels of distress
4 = Moderately ill – overt symptoms causing noticeable, but modest, functional impairment or distress; symptom level may warrant medication
4 = Moderately ill – overt symptoms causing noticeable, but modest, functional impairment or distress; symptom level may warrant medication
5 = Markedly ill - intrusive symptoms that distinctly impair social/occupational function or cause intrusive levels of distress
4 = Moderately ill – overt symptoms causing noticeable, but modest, functional impairment or distress; symptom level may warrant medication
5 = Markedly ill - intrusive symptoms that distinctly impair social/occupational function or cause intrusive levels of distress
4 = Moderately ill – overt symptoms causing noticeable, but modest, functional impairment or distress; symptom level may warrant medication
5 = Markedly ill - intrusive symptoms that distinctly impair social/occupational function or cause intrusive levels of distress
4 = Moderately ill – overt symptoms causing noticeable, but modest, functional impairment or distress; symptom level may warrant medication
5 = Markedly ill - intrusive symptoms that distinctly impair social/occupational function or cause intrusive levels of distress
5 = Markedly ill - intrusive symptoms that distinctly impair social/occupational function or cause intrusive levels of distress
4 = Moderately ill – overt symptoms causing noticeable, but modest, functional impairment or distress; symptom level may warrant medication
5 = Markedly ill - intrusive symptoms that distinctly impair social/occupational function or cause intrusive levels of distress

## 2024-02-22 NOTE — BH INPATIENT PSYCHIATRY PROGRESS NOTE - NSICDXBHSECONDARYDX_PSY_ALL_CORE
Panic disorder   F41.0  MDD (major depressive disorder)   F32.9  

## 2024-02-22 NOTE — BH INPATIENT PSYCHIATRY PROGRESS NOTE - NSCGIIMPROVESX_PSY_ALL_CORE
4 = No change - symptoms remain essentially unchanged
3 = Minimally improved - slightly better with little or no clinically meaningful reduction of symptoms.  Represents very little change in basic clinical status, level of care, or functional capacity.
4 = No change - symptoms remain essentially unchanged
4 = No change - symptoms remain essentially unchanged
3 = Minimally improved - slightly better with little or no clinically meaningful reduction of symptoms.  Represents very little change in basic clinical status, level of care, or functional capacity.
4 = No change - symptoms remain essentially unchanged
3 = Minimally improved - slightly better with little or no clinically meaningful reduction of symptoms.  Represents very little change in basic clinical status, level of care, or functional capacity.
3 = Minimally improved - slightly better with little or no clinically meaningful reduction of symptoms.  Represents very little change in basic clinical status, level of care, or functional capacity.
4 = No change - symptoms remain essentially unchanged
4 = No change - symptoms remain essentially unchanged
3 = Minimally improved - slightly better with little or no clinically meaningful reduction of symptoms.  Represents very little change in basic clinical status, level of care, or functional capacity.
4 = No change - symptoms remain essentially unchanged
3 = Minimally improved - slightly better with little or no clinically meaningful reduction of symptoms.  Represents very little change in basic clinical status, level of care, or functional capacity.
3 = Minimally improved - slightly better with little or no clinically meaningful reduction of symptoms.  Represents very little change in basic clinical status, level of care, or functional capacity.
4 = No change - symptoms remain essentially unchanged
4 = No change - symptoms remain essentially unchanged
3 = Minimally improved - slightly better with little or no clinically meaningful reduction of symptoms.  Represents very little change in basic clinical status, level of care, or functional capacity.
4 = No change - symptoms remain essentially unchanged

## 2024-02-22 NOTE — BH INPATIENT PSYCHIATRY PROGRESS NOTE - NSTXCOPEDATETRGT_PSY_ALL_CORE
17-Jan-2024
30-Jan-2024
05-Feb-2024
17-Jan-2024
30-Jan-2024
20-Feb-2024
20-Feb-2024
05-Feb-2024
12-Feb-2024
05-Feb-2024
26-Feb-2024
05-Feb-2024
05-Feb-2024
30-Jan-2024
14-Feb-2024
24-Jan-2024
14-Feb-2024
14-Feb-2024
24-Jan-2024
05-Feb-2024
30-Jan-2024
24-Jan-2024
24-Jan-2024
14-Feb-2024
22-Feb-2024
24-Jan-2024
22-Feb-2024
14-Feb-2024
30-Jan-2024
18-Jan-2024
26-Feb-2024
12-Feb-2024
20-Feb-2024
18-Jan-2024
30-Jan-2024
14-Feb-2024
05-Feb-2024
24-Jan-2024
17-Jan-2024
18-Jan-2024

## 2024-02-22 NOTE — BH INPATIENT PSYCHIATRY PROGRESS NOTE - NSTXDCOTHRPROGRES_PSY_ALL_CORE
Improving
Improving
No Change
Improving
No Change
Improving
No Change
Improving
No Change
Improving
No Change
Improving
No Change
Improving
No Change
No Change

## 2024-02-22 NOTE — BH INPATIENT PSYCHIATRY DISCHARGE NOTE - OTHER PAST PSYCHIATRIC HISTORY (INCLUDE DETAILS REGARDING ONSET, COURSE OF ILLNESS, INPATIENT/OUTPATIENT TREATMENT)
Patient is a 69yo  woman, retired , non-caregiver, residing at home with her , David Blackman, who works full time. The pt has no children. Patient has a Hx of  ANSON and MDD, multiple previous inpatient psychiatric hospitalizations at [facility]: 2S unit -19 SA via OD after her parents ,  2W unit -3/11/20, 2S unit -20, L5 unit -21, L5 unit -21, [facility] 3/28-23, [facility] L5 5/10-23, [facility] 10- treated with 5 ECT w/o benefit. The pt has been treated by  in [facility] since  but following [facility], the pt was referred to [facility] and was seen by a psychiatrist twice. The pt reportedly did not take the prescribed medication. The pt reports and  concurs that the pt was well functioning until  when symptoms of anxiety and depression presented following her parents death. The pt acknowledged having abandonment issues in her 20s and 30s when relationships with boyfriends ended.  Normal for race

## 2024-02-22 NOTE — BH INPATIENT PSYCHIATRY PROGRESS NOTE - NSTXDCOTHRDATEEST_PSY_ALL_CORE
07-Feb-2024
11-Jan-2024
14-Feb-2024
19-Jan-2024
19-Jan-2024
30-Jan-2024
30-Jan-2024
07-Feb-2024
19-Jan-2024
19-Jan-2024
30-Jan-2024
30-Jan-2024
14-Feb-2024
11-Jan-2024
19-Jan-2024
11-Jan-2024
30-Jan-2024
14-Feb-2024
11-Jan-2024
19-Jan-2024
14-Feb-2024
30-Jan-2024
11-Jan-2024
11-Jan-2024
07-Feb-2024
07-Feb-2024
19-Jan-2024
30-Jan-2024
07-Feb-2024
14-Feb-2024
14-Feb-2024
19-Jan-2024
14-Feb-2024
11-Jan-2024
19-Jan-2024
30-Jan-2024
19-Jan-2024
11-Jan-2024

## 2024-02-22 NOTE — BH INPATIENT PSYCHIATRY PROGRESS NOTE - NSTXDCOTHRDATETRGT_PSY_ALL_CORE
06-Feb-2024
18-Jan-2024
18-Jan-2024
06-Feb-2024
14-Feb-2024
14-Feb-2024
18-Jan-2024
21-Feb-2024
26-Jan-2024
06-Feb-2024
26-Jan-2024
18-Jan-2024
26-Jan-2024
14-Feb-2024
18-Jan-2024
26-Jan-2024
14-Feb-2024
06-Feb-2024
18-Jan-2024
06-Feb-2024
06-Feb-2024
26-Jan-2024
26-Jan-2024
21-Feb-2024
26-Jan-2024
14-Feb-2024
21-Feb-2024
26-Jan-2024
21-Feb-2024
26-Jan-2024
06-Feb-2024
26-Jan-2024
18-Jan-2024
21-Feb-2024
26-Jan-2024
26-Jan-2024
06-Feb-2024
18-Jan-2024

## 2024-02-22 NOTE — BH INPATIENT PSYCHIATRY PROGRESS NOTE - NSTXANXGOAL_PSY_ALL_CORE
Be able to perform ADLs and maintain safety despite anxiety/panic daily
Identify and practice 3 coping skills to manage anxiety
Be able to perform ADLs and maintain safety despite anxiety/panic daily
Be able to perform ADLs and maintain safety despite anxiety/panic daily
Identify and practice 3 coping skills to manage anxiety
Report a reduction in panic attacks and improving mood and confidence
Identify and practice 3 coping skills to manage anxiety
Be able to perform ADLs and maintain safety despite anxiety/panic daily
Identify and practice 3 coping skills to manage anxiety
Report a reduction in panic attacks and improving mood and confidence
Be able to perform ADLs and maintain safety despite anxiety/panic daily
Be able to perform ADLs and maintain safety despite anxiety/panic daily
Identify and practice 3 coping skills to manage anxiety
Report a reduction in panic attacks and improving mood and confidence
Report a reduction in panic attacks and improving mood and confidence
Identify and practice 3 coping skills to manage anxiety
Report a reduction in panic attacks and improving mood and confidence
Identify and practice 3 coping skills to manage anxiety
Report a reduction in panic attacks and improving mood and confidence
Be able to perform ADLs and maintain safety despite anxiety/panic daily
Be able to perform ADLs and maintain safety despite anxiety/panic daily
Identify and practice 3 coping skills to manage anxiety
Be able to perform ADLs and maintain safety despite anxiety/panic daily
Report a reduction in panic attacks and improving mood and confidence
Report a reduction in panic attacks and improving mood and confidence
Be able to perform ADLs and maintain safety despite anxiety/panic daily
Be able to perform ADLs and maintain safety despite anxiety/panic daily
Report a reduction in panic attacks and improving mood and confidence

## 2024-02-22 NOTE — BH INPATIENT PSYCHIATRY PROGRESS NOTE - NSTXCOPEPROGRES_PSY_ALL_CORE
Improving
No Change
No Change
Improving
No Change
No Change
Improving
No Change
Improving
No Change
Improving
No Change
Improving
No Change
Improving
No Change
Improving
No Change
Improving
No Change

## 2024-02-22 NOTE — BH INPATIENT PSYCHIATRY PROGRESS NOTE - NSTXCOPEGOAL_PSY_ALL_CORE
Identify and utilize 2 coping skills that meet their needs

## 2024-02-22 NOTE — BH INPATIENT PSYCHIATRY PROGRESS NOTE - PRN MEDS
MEDICATIONS  (PRN):  acetaminophen     Tablet .. 650 milliGRAM(s) Oral every 6 hours PRN Mild Pain (1 - 3), Moderate Pain (4 - 6)  artificial  tears Solution 1 Drop(s) Both EYES every 8 hours PRN dry eye  bismuth subsalicylate Liquid 30 milliLiter(s) Oral every 6 hours PRN nausea  clonazePAM Oral Disintegrating Tablet 0.25 milliGRAM(s) Oral every 8 hours PRN anxiety  ibuprofen  Tablet. 400 milliGRAM(s) Oral every 8 hours PRN Mild Pain (1 - 3), Moderate Pain (4 - 6)  
MEDICATIONS  (PRN):  acetaminophen     Tablet .. 650 milliGRAM(s) Oral every 6 hours PRN Mild Pain (1 - 3), Moderate Pain (4 - 6)  artificial  tears Solution 1 Drop(s) Both EYES every 8 hours PRN dry eye  bismuth subsalicylate Liquid 30 milliLiter(s) Oral every 6 hours PRN nausea  clonazePAM Oral Disintegrating Tablet 0.25 milliGRAM(s) Oral every 8 hours PRN anxiety  ibuprofen  Tablet. 400 milliGRAM(s) Oral every 8 hours PRN Mild Pain (1 - 3), Moderate Pain (4 - 6)  polyethylene glycol 3350 17 Gram(s) Oral two times a day PRN constipation  
MEDICATIONS  (PRN):  acetaminophen     Tablet .. 650 milliGRAM(s) Oral every 6 hours PRN Mild Pain (1 - 3), Moderate Pain (4 - 6)  artificial  tears Solution 1 Drop(s) Both EYES every 8 hours PRN dry eye  bismuth subsalicylate Liquid 30 milliLiter(s) Oral every 6 hours PRN nausea  clonazePAM Oral Disintegrating Tablet 0.25 milliGRAM(s) Oral every 8 hours PRN anxiety  ibuprofen  Tablet. 400 milliGRAM(s) Oral every 8 hours PRN Mild Pain (1 - 3), Moderate Pain (4 - 6)  
MEDICATIONS  (PRN):  acetaminophen     Tablet .. 650 milliGRAM(s) Oral every 6 hours PRN Mild Pain (1 - 3), Moderate Pain (4 - 6)  artificial  tears Solution 1 Drop(s) Both EYES every 8 hours PRN dry eye  bismuth subsalicylate Liquid 30 milliLiter(s) Oral every 6 hours PRN nausea  clonazePAM Oral Disintegrating Tablet 0.25 milliGRAM(s) Oral every 8 hours PRN anxiety  ibuprofen  Tablet. 400 milliGRAM(s) Oral every 8 hours PRN Mild Pain (1 - 3), Moderate Pain (4 - 6)  polyethylene glycol 3350 17 Gram(s) Oral two times a day PRN constipation  
MEDICATIONS  (PRN):  acetaminophen     Tablet .. 650 milliGRAM(s) Oral every 6 hours PRN Mild Pain (1 - 3), Moderate Pain (4 - 6)  artificial  tears Solution 1 Drop(s) Both EYES every 8 hours PRN dry eye  bismuth subsalicylate Liquid 30 milliLiter(s) Oral every 6 hours PRN nausea  clonazePAM Oral Disintegrating Tablet 0.25 milliGRAM(s) Oral every 8 hours PRN anxiety  
MEDICATIONS  (PRN):  acetaminophen     Tablet .. 650 milliGRAM(s) Oral every 6 hours PRN Mild Pain (1 - 3), Moderate Pain (4 - 6)  artificial  tears Solution 1 Drop(s) Both EYES every 8 hours PRN dry eye  bismuth subsalicylate Liquid 30 milliLiter(s) Oral every 6 hours PRN nausea  clonazePAM Oral Disintegrating Tablet 0.25 milliGRAM(s) Oral every 8 hours PRN anxiety  ibuprofen  Tablet. 400 milliGRAM(s) Oral every 8 hours PRN Mild Pain (1 - 3), Moderate Pain (4 - 6)  
MEDICATIONS  (PRN):  acetaminophen     Tablet .. 650 milliGRAM(s) Oral every 6 hours PRN Mild Pain (1 - 3), Moderate Pain (4 - 6)  artificial  tears Solution 1 Drop(s) Both EYES every 8 hours PRN dry eye  bismuth subsalicylate Liquid 30 milliLiter(s) Oral every 6 hours PRN nausea  clonazePAM Oral Disintegrating Tablet 0.25 milliGRAM(s) Oral every 8 hours PRN anxiety  ibuprofen  Tablet. 400 milliGRAM(s) Oral every 8 hours PRN Mild Pain (1 - 3), Moderate Pain (4 - 6)  polyethylene glycol 3350 17 Gram(s) Oral two times a day PRN constipation  
MEDICATIONS  (PRN):  acetaminophen     Tablet .. 650 milliGRAM(s) Oral every 6 hours PRN Mild Pain (1 - 3), Moderate Pain (4 - 6)  artificial  tears Solution 1 Drop(s) Both EYES every 8 hours PRN dry eye  bismuth subsalicylate Liquid 30 milliLiter(s) Oral every 6 hours PRN nausea  clonazePAM Oral Disintegrating Tablet 0.25 milliGRAM(s) Oral every 8 hours PRN anxiety  
MEDICATIONS  (PRN):  acetaminophen     Tablet .. 650 milliGRAM(s) Oral every 6 hours PRN Mild Pain (1 - 3), Moderate Pain (4 - 6)  artificial  tears Solution 1 Drop(s) Both EYES every 8 hours PRN dry eye  bismuth subsalicylate Liquid 30 milliLiter(s) Oral every 6 hours PRN nausea  clonazePAM Oral Disintegrating Tablet 0.25 milliGRAM(s) Oral every 8 hours PRN anxiety  ibuprofen  Tablet. 400 milliGRAM(s) Oral every 8 hours PRN Mild Pain (1 - 3), Moderate Pain (4 - 6)  
MEDICATIONS  (PRN):  artificial  tears Solution 1 Drop(s) Both EYES every 8 hours PRN dry eye  bismuth subsalicylate Liquid 30 milliLiter(s) Oral every 6 hours PRN nausea  clonazePAM Oral Disintegrating Tablet 0.25 milliGRAM(s) Oral every 8 hours PRN anxiety  
MEDICATIONS  (PRN):  acetaminophen     Tablet .. 650 milliGRAM(s) Oral every 6 hours PRN Mild Pain (1 - 3), Moderate Pain (4 - 6)  artificial  tears Solution 1 Drop(s) Both EYES every 8 hours PRN dry eye  bismuth subsalicylate Liquid 30 milliLiter(s) Oral every 6 hours PRN nausea  clonazePAM Oral Disintegrating Tablet 0.25 milliGRAM(s) Oral every 8 hours PRN anxiety  
MEDICATIONS  (PRN):  acetaminophen     Tablet .. 650 milliGRAM(s) Oral every 6 hours PRN Mild Pain (1 - 3), Moderate Pain (4 - 6)  artificial  tears Solution 1 Drop(s) Both EYES every 8 hours PRN dry eye  bismuth subsalicylate Liquid 30 milliLiter(s) Oral every 6 hours PRN nausea  clonazePAM Oral Disintegrating Tablet 0.25 milliGRAM(s) Oral every 8 hours PRN anxiety  
MEDICATIONS  (PRN):  acetaminophen     Tablet .. 650 milliGRAM(s) Oral every 6 hours PRN Mild Pain (1 - 3), Moderate Pain (4 - 6)  artificial  tears Solution 1 Drop(s) Both EYES every 8 hours PRN dry eye  bismuth subsalicylate Liquid 30 milliLiter(s) Oral every 6 hours PRN nausea  clonazePAM Oral Disintegrating Tablet 0.25 milliGRAM(s) Oral every 8 hours PRN anxiety  ibuprofen  Tablet. 400 milliGRAM(s) Oral every 8 hours PRN Mild Pain (1 - 3), Moderate Pain (4 - 6)  
MEDICATIONS  (PRN):  artificial  tears Solution 1 Drop(s) Both EYES every 8 hours PRN dry eye  bismuth subsalicylate Liquid 30 milliLiter(s) Oral every 6 hours PRN nausea  clonazePAM Oral Disintegrating Tablet 0.25 milliGRAM(s) Oral every 8 hours PRN anxiety  
MEDICATIONS  (PRN):  acetaminophen     Tablet .. 650 milliGRAM(s) Oral every 6 hours PRN Mild Pain (1 - 3), Moderate Pain (4 - 6)  artificial  tears Solution 1 Drop(s) Both EYES every 8 hours PRN dry eye  bismuth subsalicylate Liquid 30 milliLiter(s) Oral every 6 hours PRN nausea  clonazePAM Oral Disintegrating Tablet 0.25 milliGRAM(s) Oral every 8 hours PRN anxiety  ibuprofen  Tablet. 400 milliGRAM(s) Oral every 8 hours PRN Mild Pain (1 - 3), Moderate Pain (4 - 6)  
MEDICATIONS  (PRN):  acetaminophen     Tablet .. 650 milliGRAM(s) Oral every 6 hours PRN Mild Pain (1 - 3), Moderate Pain (4 - 6)  artificial  tears Solution 1 Drop(s) Both EYES every 8 hours PRN dry eye  bismuth subsalicylate Liquid 30 milliLiter(s) Oral every 6 hours PRN nausea  clonazePAM Oral Disintegrating Tablet 0.25 milliGRAM(s) Oral every 8 hours PRN anxiety  
MEDICATIONS  (PRN):  acetaminophen     Tablet .. 650 milliGRAM(s) Oral every 6 hours PRN Mild Pain (1 - 3), Moderate Pain (4 - 6)  artificial  tears Solution 1 Drop(s) Both EYES every 8 hours PRN dry eye  bismuth subsalicylate Liquid 30 milliLiter(s) Oral every 6 hours PRN nausea  clonazePAM Oral Disintegrating Tablet 0.25 milliGRAM(s) Oral every 8 hours PRN anxiety  ibuprofen  Tablet. 400 milliGRAM(s) Oral every 8 hours PRN Mild Pain (1 - 3), Moderate Pain (4 - 6)  
MEDICATIONS  (PRN):  acetaminophen     Tablet .. 650 milliGRAM(s) Oral every 6 hours PRN Mild Pain (1 - 3), Moderate Pain (4 - 6)  artificial  tears Solution 1 Drop(s) Both EYES every 8 hours PRN dry eye  bismuth subsalicylate Liquid 30 milliLiter(s) Oral every 6 hours PRN nausea  clonazePAM Oral Disintegrating Tablet 0.25 milliGRAM(s) Oral every 8 hours PRN anxiety  
MEDICATIONS  (PRN):  acetaminophen     Tablet .. 650 milliGRAM(s) Oral every 6 hours PRN Mild Pain (1 - 3), Moderate Pain (4 - 6)  artificial  tears Solution 1 Drop(s) Both EYES every 8 hours PRN dry eye  bismuth subsalicylate Liquid 30 milliLiter(s) Oral every 6 hours PRN nausea  clonazePAM Oral Disintegrating Tablet 0.25 milliGRAM(s) Oral every 8 hours PRN anxiety  ibuprofen  Tablet. 400 milliGRAM(s) Oral every 8 hours PRN Mild Pain (1 - 3), Moderate Pain (4 - 6)  
MEDICATIONS  (PRN):  acetaminophen     Tablet .. 650 milliGRAM(s) Oral every 6 hours PRN Mild Pain (1 - 3), Moderate Pain (4 - 6)  artificial  tears Solution 1 Drop(s) Both EYES every 8 hours PRN dry eye  bismuth subsalicylate Liquid 30 milliLiter(s) Oral every 6 hours PRN nausea  clonazePAM Oral Disintegrating Tablet 0.25 milliGRAM(s) Oral every 8 hours PRN anxiety  ibuprofen  Tablet. 400 milliGRAM(s) Oral every 8 hours PRN Mild Pain (1 - 3), Moderate Pain (4 - 6)  polyethylene glycol 3350 17 Gram(s) Oral two times a day PRN constipation  
MEDICATIONS  (PRN):  acetaminophen     Tablet .. 650 milliGRAM(s) Oral every 6 hours PRN Mild Pain (1 - 3), Moderate Pain (4 - 6)  artificial  tears Solution 1 Drop(s) Both EYES every 8 hours PRN dry eye  bismuth subsalicylate Liquid 30 milliLiter(s) Oral every 6 hours PRN nausea  clonazePAM Oral Disintegrating Tablet 0.25 milliGRAM(s) Oral every 8 hours PRN anxiety  
MEDICATIONS  (PRN):  acetaminophen     Tablet .. 650 milliGRAM(s) Oral every 6 hours PRN Mild Pain (1 - 3), Moderate Pain (4 - 6)  artificial  tears Solution 1 Drop(s) Both EYES every 8 hours PRN dry eye  bismuth subsalicylate Liquid 30 milliLiter(s) Oral every 6 hours PRN nausea  clonazePAM Oral Disintegrating Tablet 0.25 milliGRAM(s) Oral every 8 hours PRN anxiety  ibuprofen  Tablet. 400 milliGRAM(s) Oral every 8 hours PRN Mild Pain (1 - 3), Moderate Pain (4 - 6)  
MEDICATIONS  (PRN):  acetaminophen     Tablet .. 650 milliGRAM(s) Oral every 6 hours PRN Mild Pain (1 - 3), Moderate Pain (4 - 6)  artificial  tears Solution 1 Drop(s) Both EYES every 8 hours PRN dry eye  bismuth subsalicylate Liquid 30 milliLiter(s) Oral every 6 hours PRN nausea  clonazePAM Oral Disintegrating Tablet 0.25 milliGRAM(s) Oral every 8 hours PRN anxiety  ibuprofen  Tablet. 400 milliGRAM(s) Oral every 8 hours PRN Mild Pain (1 - 3), Moderate Pain (4 - 6)  
MEDICATIONS  (PRN):  artificial  tears Solution 1 Drop(s) Both EYES every 8 hours PRN dry eye  bismuth subsalicylate Liquid 30 milliLiter(s) Oral every 6 hours PRN nausea  clonazePAM Oral Disintegrating Tablet 0.25 milliGRAM(s) Oral every 8 hours PRN anxiety  
MEDICATIONS  (PRN):  acetaminophen     Tablet .. 650 milliGRAM(s) Oral every 6 hours PRN Mild Pain (1 - 3), Moderate Pain (4 - 6)  artificial  tears Solution 1 Drop(s) Both EYES every 8 hours PRN dry eye  bismuth subsalicylate Liquid 30 milliLiter(s) Oral every 6 hours PRN nausea  clonazePAM Oral Disintegrating Tablet 0.25 milliGRAM(s) Oral every 8 hours PRN anxiety  ibuprofen  Tablet. 400 milliGRAM(s) Oral every 8 hours PRN Mild Pain (1 - 3), Moderate Pain (4 - 6)  polyethylene glycol 3350 17 Gram(s) Oral two times a day PRN constipation  
MEDICATIONS  (PRN):  acetaminophen     Tablet .. 650 milliGRAM(s) Oral every 6 hours PRN Mild Pain (1 - 3), Moderate Pain (4 - 6)  artificial  tears Solution 1 Drop(s) Both EYES every 8 hours PRN dry eye  bismuth subsalicylate Liquid 30 milliLiter(s) Oral every 6 hours PRN nausea  clonazePAM Oral Disintegrating Tablet 0.25 milliGRAM(s) Oral every 8 hours PRN anxiety  ibuprofen  Tablet. 400 milliGRAM(s) Oral every 8 hours PRN Mild Pain (1 - 3), Moderate Pain (4 - 6)  
MEDICATIONS  (PRN):  acetaminophen     Tablet .. 650 milliGRAM(s) Oral every 6 hours PRN Mild Pain (1 - 3), Moderate Pain (4 - 6)  artificial  tears Solution 1 Drop(s) Both EYES every 8 hours PRN dry eye  bismuth subsalicylate Liquid 30 milliLiter(s) Oral every 6 hours PRN nausea  clonazePAM Oral Disintegrating Tablet 0.25 milliGRAM(s) Oral every 8 hours PRN anxiety  
MEDICATIONS  (PRN):  acetaminophen     Tablet .. 650 milliGRAM(s) Oral every 6 hours PRN Mild Pain (1 - 3), Moderate Pain (4 - 6)  artificial  tears Solution 1 Drop(s) Both EYES every 8 hours PRN dry eye  bismuth subsalicylate Liquid 30 milliLiter(s) Oral every 6 hours PRN nausea  clonazePAM Oral Disintegrating Tablet 0.25 milliGRAM(s) Oral every 8 hours PRN anxiety  ibuprofen  Tablet. 400 milliGRAM(s) Oral every 8 hours PRN Mild Pain (1 - 3), Moderate Pain (4 - 6)  
MEDICATIONS  (PRN):  acetaminophen     Tablet .. 650 milliGRAM(s) Oral every 6 hours PRN Mild Pain (1 - 3), Moderate Pain (4 - 6)  artificial  tears Solution 1 Drop(s) Both EYES every 8 hours PRN dry eye  bismuth subsalicylate Liquid 30 milliLiter(s) Oral every 6 hours PRN nausea  clonazePAM Oral Disintegrating Tablet 0.25 milliGRAM(s) Oral every 8 hours PRN anxiety  ibuprofen  Tablet. 400 milliGRAM(s) Oral every 8 hours PRN Mild Pain (1 - 3), Moderate Pain (4 - 6)  polyethylene glycol 3350 17 Gram(s) Oral two times a day PRN constipation  
MEDICATIONS  (PRN):  acetaminophen     Tablet .. 650 milliGRAM(s) Oral every 6 hours PRN Mild Pain (1 - 3), Moderate Pain (4 - 6)  artificial  tears Solution 1 Drop(s) Both EYES every 8 hours PRN dry eye  bismuth subsalicylate Liquid 30 milliLiter(s) Oral every 6 hours PRN nausea  clonazePAM Oral Disintegrating Tablet 0.25 milliGRAM(s) Oral every 8 hours PRN anxiety  ibuprofen  Tablet. 400 milliGRAM(s) Oral every 8 hours PRN Mild Pain (1 - 3), Moderate Pain (4 - 6)  polyethylene glycol 3350 17 Gram(s) Oral two times a day PRN constipation  
MEDICATIONS  (PRN):  acetaminophen     Tablet .. 650 milliGRAM(s) Oral every 6 hours PRN Mild Pain (1 - 3), Moderate Pain (4 - 6)  artificial  tears Solution 1 Drop(s) Both EYES every 8 hours PRN dry eye  bismuth subsalicylate Liquid 30 milliLiter(s) Oral every 6 hours PRN nausea  clonazePAM Oral Disintegrating Tablet 0.25 milliGRAM(s) Oral every 8 hours PRN anxiety  ibuprofen  Tablet. 400 milliGRAM(s) Oral every 8 hours PRN Mild Pain (1 - 3), Moderate Pain (4 - 6)  
MEDICATIONS  (PRN):  artificial  tears Solution 1 Drop(s) Both EYES every 8 hours PRN dry eye  bismuth subsalicylate Liquid 30 milliLiter(s) Oral every 6 hours PRN nausea  clonazePAM Oral Disintegrating Tablet 0.25 milliGRAM(s) Oral every 8 hours PRN anxiety  
MEDICATIONS  (PRN):  acetaminophen     Tablet .. 650 milliGRAM(s) Oral every 6 hours PRN Mild Pain (1 - 3), Moderate Pain (4 - 6)  artificial  tears Solution 1 Drop(s) Both EYES every 8 hours PRN dry eye  bismuth subsalicylate Liquid 30 milliLiter(s) Oral every 6 hours PRN nausea  clonazePAM Oral Disintegrating Tablet 0.25 milliGRAM(s) Oral every 8 hours PRN anxiety

## 2024-02-22 NOTE — BH INPATIENT PSYCHIATRY PROGRESS NOTE - NSTXANXDATETRGT_PSY_ALL_CORE
01-Feb-2024
17-Jan-2024
01-Feb-2024
17-Jan-2024
14-Feb-2024
17-Jan-2024
01-Feb-2024
17-Jan-2024
17-Jan-2024
01-Feb-2024
14-Feb-2024
17-Jan-2024
14-Feb-2024
01-Feb-2024
01-Feb-2024
14-Feb-2024
01-Feb-2024
01-Feb-2024
14-Feb-2024
14-Feb-2024
01-Feb-2024
17-Jan-2024
14-Feb-2024
17-Jan-2024
14-Feb-2024
17-Jan-2024
01-Feb-2024
01-Feb-2024
14-Feb-2024
17-Jan-2024
01-Feb-2024
17-Jan-2024
01-Feb-2024
17-Jan-2024

## 2024-02-22 NOTE — BH INPATIENT PSYCHIATRY DISCHARGE NOTE - NSDCMRMEDTOKEN_GEN_ALL_CORE_FT
atorvastatin 10 mg oral tablet: 1 tab(s) orally once a day (at bedtime)  clonazePAM 0.25 mg oral tablet, disintegratin tab(s) orally once a day as needed for anxiety MDD: 0.25mg  clonazePAM 0.5 mg oral tablet: 1 tab(s) orally 3 times a day MDD: 1.5  furosemide 20 mg oral tablet: 1 tab(s) orally 3 times a week give on  and Friday  losartan 100 mg oral tablet: 1 tab(s) orally once a day  metoprolol tartrate 50 mg oral tablet: 1 tab(s) orally 2 times a day  mirtazapine 7.5 mg oral tablet: 1 tab(s) orally once a day (at bedtime)  Multiple Vitamins oral tablet: 1 tab(s) orally once a day  nortriptyline 75 mg oral capsule: 1 cap(s) orally once a day (at bedtime)  ocular lubricant ophthalmic solution: 1 drop(s) to each affected eye every 8 hours As needed dry eye  pantoprazole 40 mg oral delayed release tablet: 1 tab(s) orally once a day  polyethylene glycol 3350 oral powder for reconstitution: 17 gram(s) orally 2 times a day As needed constipation  pregabalin 25 mg oral capsule: 1 cap(s) orally 3 times a day MDD: 75mg  saliva substitutes oral solution: 5 milliliter(s) orally 3 times a day rinse and spit

## 2024-02-22 NOTE — BH INPATIENT PSYCHIATRY PROGRESS NOTE - NSBHCONSULTIPREASON_PSY_A_CORE
danger to self; mental illness expected to respond to inpatient care

## 2024-02-22 NOTE — BH INPATIENT PSYCHIATRY DISCHARGE NOTE - DESCRIPTION
retired (),  for 28 yrs; has no children. is a non-caregiver status living in a private home in [place] with her . not [employer]. no access to guns

## 2024-02-22 NOTE — BH INPATIENT PSYCHIATRY DISCHARGE NOTE - NSDCCPCAREPLAN_GEN_ALL_CORE_FT
PRINCIPAL DISCHARGE DIAGNOSIS  Diagnosis: Recurrent major depression  Assessment and Plan of Treatment:       SECONDARY DISCHARGE DIAGNOSES  Diagnosis: Generalized anxiety disorder  Assessment and Plan of Treatment:     Diagnosis: Panic attack  Assessment and Plan of Treatment:

## 2024-02-22 NOTE — BH INPATIENT PSYCHIATRY PROGRESS NOTE - NSBHATTESTTYPEVISIT_PSY_A_CORE
Attending Only
Lab: [id number]
Attending Only
Lab: [id number]
Attending Only

## 2024-02-22 NOTE — BH INPATIENT PSYCHIATRY PROGRESS NOTE - NSTXPROBANX_PSY_ALL_CORE
ANXIETY/PANIC/FEAR

## 2024-02-22 NOTE — BH INPATIENT PSYCHIATRY PROGRESS NOTE - NSBHFUPINTERVALCCFT_PSY_A_CORE
I m nervous and feel hot or cold
The pt. complained of pain on her rigt side despite a negative workup.
Patient reports legs feeling heavy
"I think my legs are heavy, is that a side-effect of the [NTP]?"
'I feel alright'
Im nervous  al little less but more depressed and my back hurts
Patient reports feeling anxious but much less panic. Less complaints of heavy legs
I had a panic attack , denies dizziness sedation
The pt.denies dizziness on standing, reports dry mouth no constipation, says she is again anxious in AM
Patient reports anxiousness in Am with palpitations being aware of heart beating
The pt.denies dizziness on standing, reports dry mouth no constipation, says she is again anxious in AM,  refers to her anxiety as her "Monster". Expressed upset at getting NTP 10mg x6 tabs
I had a panic attack
Patient reports anxiousness in Am with palpitations being aware of heart beating
I m nervous and by brad pereira
The pt.denies dizziness on standing
The pt.denies dizziness on standing
The pt.denies dizziness says she gets muscle cramps
The pt.denies dizziness on standing, reports dry mouth no constipation, says she is again anxious in AM,  refers to her anxiety as her "Monster"
The pt.denies dizziness on standing
Patient reports feeling anxious but much less Less complaints of heavy legs
Im nervous and nauseous and dizzy
Im nervous  al little less but more depressed and by back hurts
I m nervous and by back hurts
"The clonazepam doesn't do anything, you have to tell Dr. Washington I need something else"
I think medicine is  too strong I feel woozy
The pt.denies dizziness on standing, reports dry mouth no constipation
The pt.denies dizziness on standing, reports dry mouth no constipation, says she had panic today in AM
I think medicine is  too strong I feel woozy
Patient reports anxiousness in AM but felt better after 1PM meds
The pt. related that she is getting used to new medication so is not so good.
The pt.denies dizziness on standing, reports dry mouth no constipation, says she is again anxious in AM,  refers to her anxiety as her "Monster".
The pt. expressing worries are tapering of Effexor
Im nervous  al little less but more depressed and my back hurts
I m nervous and feel hot or cold
"They are taking me off my medicines and I am nervous, will they give me something else?"
Im nervous and nauseous
"I think I am a little better but the clonazepam is no good
"I think I am a little better but I am still not sure that the clonazepam is working"

## 2024-02-22 NOTE — BH INPATIENT PSYCHIATRY PROGRESS NOTE - NSBHCHARTREVIEWVS_PSY_A_CORE FT
Vital Signs Last 24 Hrs  T(C): 36.3 (01-20-24 @ 05:34), Max: 36.6 (01-19-24 @ 10:37)  T(F): 97.3 (01-20-24 @ 05:34), Max: 97.8 (01-19-24 @ 10:37)  HR: --  BP: 128/75 (01-19-24 @ 20:35) (128/75 - 128/75)  BP(mean): 84 (01-19-24 @ 20:35) (84 - 84)  RR: --  SpO2: --    Orthostatic VS  01-20-24 @ 05:34  Lying BP: --/-- HR: --  Sitting BP: 135/87 HR: 82  Standing BP: 143/90 HR: 87  Site: --  Mode: --  Orthostatic VS  01-19-24 @ 10:37  Lying BP: --/-- HR: --  Sitting BP: 163/73 HR: 83  Standing BP: 155/77 HR: 86  Site: upper left arm  Mode: electronic  Orthostatic VS  01-18-24 @ 20:23  Lying BP: --/-- HR: --  Sitting BP: 126/55 HR: 79  Standing BP: --/-- HR: --  Site: --  Mode: --  
Vital Signs Last 24 Hrs  T(C): 36.6 (01-22-24 @ 07:46), Max: 36.6 (01-22-24 @ 07:46)  T(F): 97.8 (01-22-24 @ 07:46), Max: 97.8 (01-22-24 @ 07:46)  HR: 77 (01-21-24 @ 20:28) (77 - 77)  BP: 125/75 (01-21-24 @ 20:28) (125/75 - 125/75)  BP(mean): --  RR: 14 (01-21-24 @ 20:28) (14 - 14)  SpO2: --    Orthostatic VS  01-22-24 @ 07:46  Lying BP: --/-- HR: --  Sitting BP: 156/82 HR: 75  Standing BP: 159/87 HR: 80  Site: --  Mode: --  Orthostatic VS  01-21-24 @ 07:49  Lying BP: --/-- HR: --  Sitting BP: 154/86 HR: 72  Standing BP: 161/85 HR: 78  Site: --  Mode: --  
Vital Signs Last 24 Hrs  T(C): 36.4 (02-03-24 @ 08:33), Max: 36.4 (02-03-24 @ 08:33)  T(F): 97.6 (02-03-24 @ 08:33), Max: 97.6 (02-03-24 @ 08:33)  HR: --  BP: --  BP(mean): --  RR: --  SpO2: --    Orthostatic VS  02-03-24 @ 08:33  Lying BP: --/-- HR: --  Sitting BP: 139/80 HR: 87  Standing BP: 149/84 HR: 90  Site: upper right arm  Mode: electronic  Orthostatic VS  02-02-24 @ 20:55  Lying BP: --/-- HR: --  Sitting BP: 135/75 HR: 92  Standing BP: --/-- HR: --  Site: --  Mode: --  Orthostatic VS  02-02-24 @ 08:08  Lying BP: --/-- HR: --  Sitting BP: 120/68 HR: 77  Standing BP: 107/63 HR: 98  Site: --  Mode: --  
Vital Signs Last 24 Hrs  T(C): 36.6 (01-15-24 @ 08:05), Max: 36.6 (01-15-24 @ 08:05)  T(F): 97.8 (01-15-24 @ 08:05), Max: 97.8 (01-15-24 @ 08:05)  HR: --  BP: --  BP(mean): --  RR: --  SpO2: --    Orthostatic VS  01-15-24 @ 08:05  Lying BP: --/-- HR: --  Sitting BP: 156/84 HR: 88  Standing BP: 155/83 HR: 90  Site: --  Mode: --  Orthostatic VS  01-14-24 @ 20:53  Lying BP: --/-- HR: --  Sitting BP: 133/77 HR: 83  Standing BP: --/-- HR: --  Site: --  Mode: --  Orthostatic VS  01-14-24 @ 07:56  Lying BP: --/-- HR: --  Sitting BP: 150/70 HR: 82  Standing BP: 154/80 HR: 95  Site: upper left arm  Mode: electronic  
Vital Signs Last 24 Hrs  T(C): 36.2 (01-16-24 @ 07:48), Max: 36.2 (01-16-24 @ 07:48)  T(F): 97.1 (01-16-24 @ 07:48), Max: 97.1 (01-16-24 @ 07:48)  HR: 92 (01-16-24 @ 07:48) (82 - 92)  BP: 138/78 (01-16-24 @ 07:48) (135/67 - 138/78)  BP(mean): 82 (01-15-24 @ 20:13) (82 - 82)  RR: 16 (01-15-24 @ 20:13) (16 - 16)  SpO2: --    Orthostatic VS  01-16-24 @ 07:48  Lying BP: --/-- HR: --  Sitting BP: 138/78 HR: 92  Standing BP: --/-- HR: --  Site: upper left arm  Mode: electronic  Orthostatic VS  01-15-24 @ 08:05  Lying BP: --/-- HR: --  Sitting BP: 156/84 HR: 88  Standing BP: 155/83 HR: 90  Site: --  Mode: --  Orthostatic VS  01-14-24 @ 20:53  Lying BP: --/-- HR: --  Sitting BP: 133/77 HR: 83  Standing BP: --/-- HR: --  Site: --  Mode: --  
Vital Signs Last 24 Hrs  T(C): 36.4 (02-10-24 @ 08:40), Max: 36.4 (02-10-24 @ 08:40)  T(F): 97.6 (02-10-24 @ 08:40), Max: 97.6 (02-10-24 @ 08:40)  HR: 85 (02-09-24 @ 20:22) (85 - 85)  BP: 131/52 (02-09-24 @ 20:22) (131/52 - 131/52)  BP(mean): --  RR: 16 (02-09-24 @ 20:22) (16 - 16)  SpO2: --    Orthostatic VS  02-10-24 @ 08:40  Lying BP: --/-- HR: --  Sitting BP: 132/53 HR: 85  Standing BP: 126/72 HR: 89  Site: --  Mode: --  Orthostatic VS  02-09-24 @ 08:16  Lying BP: --/-- HR: --  Sitting BP: 129/71 HR: 90  Standing BP: 134/77 HR: 93  Site: --  Mode: --  
Vital Signs Last 24 Hrs  T(C): 36.5 (02-13-24 @ 07:44), Max: 36.5 (02-13-24 @ 07:44)  T(F): 97.7 (02-13-24 @ 07:44), Max: 97.7 (02-13-24 @ 07:44)  HR: --  BP: --  BP(mean): --  RR: --  SpO2: 97% (02-12-24 @ 20:21) (97% - 97%)    Orthostatic VS  02-13-24 @ 07:44  Lying BP: --/-- HR: --  Sitting BP: 150/75 HR: 93  Standing BP: 145/78 HR: 96  Site: --  Mode: --  Orthostatic VS  02-12-24 @ 20:21  Lying BP: --/-- HR: --  Sitting BP: 122/68 HR: 91  Standing BP: --/-- HR: --  Site: --  Mode: --  Orthostatic VS  02-12-24 @ 07:46  Lying BP: --/-- HR: --  Sitting BP: 148/74 HR: 93  Standing BP: 135/78 HR: 95  Site: --  Mode: --  Orthostatic VS  02-11-24 @ 20:30  Lying BP: --/-- HR: --  Sitting BP: 127/72 HR: 90  Standing BP: --/-- HR: --  Site: --  Mode: --  
Vital Signs Last 24 Hrs  T(C): 36.6 (02-12-24 @ 07:46), Max: 36.6 (02-12-24 @ 07:46)  T(F): 97.9 (02-12-24 @ 07:46), Max: 97.9 (02-12-24 @ 07:46)  HR: --  BP: --  BP(mean): --  RR: --  SpO2: 97% (02-11-24 @ 20:30) (97% - 97%)    Orthostatic VS  02-12-24 @ 07:46  Lying BP: --/-- HR: --  Sitting BP: 148/74 HR: 93  Standing BP: 135/78 HR: 95  Site: --  Mode: --  Orthostatic VS  02-11-24 @ 20:30  Lying BP: --/-- HR: --  Sitting BP: 127/72 HR: 90  Standing BP: --/-- HR: --  Site: --  Mode: --  Orthostatic VS  02-11-24 @ 08:12  Lying BP: --/-- HR: --  Sitting BP: 143/88 HR: 93  Standing BP: 147/89 HR: 96  Site: --  Mode: --  
Vital Signs Last 24 Hrs  T(C): --  T(F): --  HR: --  BP: --  BP(mean): --  RR: --  SpO2: --    Orthostatic VS  02-01-24 @ 07:52  Lying BP: --/-- HR: --  Sitting BP: 146/73 HR: 85  Standing BP: 156/86 HR: 90  Site: --  Mode: --  Orthostatic VS  01-31-24 @ 07:50  Lying BP: --/-- HR: --  Sitting BP: 150/85 HR: 85  Standing BP: 156/92 HR: 91  Site: upper left arm  Mode: electronic  Orthostatic VS  01-30-24 @ 14:15  Lying BP: --/-- HR: --  Sitting BP: 140/88 HR: 88  Standing BP: 151/86 HR: 96  Site: upper right arm  Mode: electronic  
Vital Signs Last 24 Hrs  T(C): 36.6 (02-21-24 @ 07:50), Max: 36.6 (02-21-24 @ 07:50)  T(F): 97.9 (02-21-24 @ 07:50), Max: 97.9 (02-21-24 @ 07:50)  HR: --  BP: --  BP(mean): --  RR: --  SpO2: 96% (02-20-24 @ 20:48) (96% - 96%)    Orthostatic VS  02-21-24 @ 07:50  Lying BP: --/-- HR: --  Sitting BP: 137/79 HR: 89  Standing BP: 141/80 HR: 92  Site: --  Mode: --  Orthostatic VS  02-20-24 @ 20:48  Lying BP: --/-- HR: --  Sitting BP: 122/56 HR: 94  Standing BP: --/-- HR: --  Site: --  Mode: --  Orthostatic VS  02-20-24 @ 08:19  Lying BP: --/-- HR: --  Sitting BP: 130/74 HR: 92  Standing BP: 136/78 HR: 95  Site: --  Mode: --  Orthostatic VS  02-19-24 @ 20:24  Lying BP: --/-- HR: --  Sitting BP: 131/70 HR: 72  Standing BP: --/-- HR: --  Site: --  Mode: --  
Vital Signs Last 24 Hrs  T(C): 36.7 (01-13-24 @ 06:04), Max: 36.7 (01-13-24 @ 06:04)  T(F): 98.1 (01-13-24 @ 06:04), Max: 98.1 (01-13-24 @ 06:04)  HR: 75 (01-13-24 @ 06:04) (75 - 96)  BP: 152/73 (01-12-24 @ 20:38) (152/73 - 152/73)  BP(mean): --  RR: 18 (01-13-24 @ 06:04) (18 - 18)  SpO2: 95% (01-13-24 @ 06:04) (95% - 95%)    Orthostatic VS  01-13-24 @ 06:04  Lying BP: --/-- HR: --  Sitting BP: 142/72 HR: 75  Standing BP: 127/76 HR: 81  Site: --  Mode: --  Orthostatic VS  01-12-24 @ 07:54  Lying BP: --/-- HR: --  Sitting BP: 127/70 HR: 85  Standing BP: 135/69 HR: 85  Site: upper left arm  Mode: electronic  Orthostatic VS  01-11-24 @ 20:24  Lying BP: --/-- HR: --  Sitting BP: 134/71 HR: 81  Standing BP: --/-- HR: --  Site: --  Mode: --  
Vital Signs Last 24 Hrs  T(C): 36.4 (02-22-24 @ 07:41), Max: 36.6 (02-21-24 @ 20:51)  T(F): 97.5 (02-22-24 @ 07:41), Max: 97.8 (02-21-24 @ 20:51)  HR: 98 (02-21-24 @ 20:51) (98 - 98)  BP: --  BP(mean): --  RR: --  SpO2: --    Orthostatic VS  02-22-24 @ 07:41  Lying BP: --/-- HR: --  Sitting BP: 150/90 HR: 89  Standing BP: 153/91 HR: 91  Site: --  Mode: --  Orthostatic VS  02-21-24 @ 20:51  Lying BP: --/-- HR: --  Sitting BP: 113/50 HR: 97  Standing BP: --/-- HR: --  Site: --  Mode: --  Orthostatic VS  02-21-24 @ 07:50  Lying BP: --/-- HR: --  Sitting BP: 137/79 HR: 89  Standing BP: 141/80 HR: 92  Site: --  Mode: --  Orthostatic VS  02-20-24 @ 20:48  Lying BP: --/-- HR: --  Sitting BP: 122/56 HR: 94  Standing BP: --/-- HR: --  Site: --  Mode: --  
Vital Signs Last 24 Hrs  T(C): 36.6 (02-05-24 @ 07:45), Max: 36.6 (02-05-24 @ 07:45)  T(F): 97.9 (02-05-24 @ 07:45), Max: 97.9 (02-05-24 @ 07:45)  HR: --  BP: --  BP(mean): --  RR: --  SpO2: --    Orthostatic VS  02-05-24 @ 07:45  Lying BP: --/-- HR: --  Sitting BP: 133/63 HR: 86  Standing BP: 128/64 HR: 94  Site: --  Mode: --  Orthostatic VS  02-04-24 @ 20:38  Lying BP: --/-- HR: --  Sitting BP: 112/62 HR: 88  Standing BP: --/-- HR: --  Site: --  Mode: --  Orthostatic VS  02-04-24 @ 08:03  Lying BP: --/-- HR: --  Sitting BP: 148/78 HR: 80  Standing BP: 143/96 HR: 85  Site: --  Mode: --  
Vital Signs Last 24 Hrs  T(C): 36.4 (01-29-24 @ 07:32), Max: 36.4 (01-29-24 @ 07:32)  T(F): 97.6 (01-29-24 @ 07:32), Max: 97.6 (01-29-24 @ 07:32)  HR: 78 (01-28-24 @ 20:36) (78 - 78)  BP: 127/67 (01-28-24 @ 20:36) (127/67 - 127/67)  BP(mean): --  RR: --  SpO2: --    Orthostatic VS  01-29-24 @ 07:32  Lying BP: --/-- HR: --  Sitting BP: 154/91 HR: 81  Standing BP: 158/85 HR: 85  Site: upper left arm  Mode: electronic  Orthostatic VS  01-28-24 @ 07:58  Lying BP: --/-- HR: --  Sitting BP: 158/82 HR: 81  Standing BP: 146/73 HR: 86  Site: --  Mode: --  
Vital Signs Last 24 Hrs  T(C): 36.6 (01-25-24 @ 08:00), Max: 36.7 (01-24-24 @ 21:10)  T(F): 97.8 (01-25-24 @ 08:00), Max: 98 (01-24-24 @ 21:10)  HR: 85 (01-24-24 @ 21:10) (85 - 85)  BP: 132/67 (01-24-24 @ 21:10) (132/67 - 132/67)  BP(mean): --  RR: 16 (01-24-24 @ 21:10) (16 - 16)  SpO2: --    Orthostatic VS  01-25-24 @ 08:00  Lying BP: --/-- HR: --  Sitting BP: 153/94 HR: 80  Standing BP: 149/76 HR: 87  Site: --  Mode: --  Orthostatic VS  01-24-24 @ 07:43  Lying BP: --/-- HR: --  Sitting BP: 146/81 HR: 80  Standing BP: 156/81 HR: 86  Site: --  Mode: --  
Vital Signs Last 24 Hrs  T(C): 36.7 (02-09-24 @ 08:16), Max: 36.7 (02-09-24 @ 08:16)  T(F): 98.1 (02-09-24 @ 08:16), Max: 98.1 (02-09-24 @ 08:16)  HR: --  BP: --  BP(mean): --  RR: --  SpO2: --    Orthostatic VS  02-09-24 @ 08:16  Lying BP: --/-- HR: --  Sitting BP: 129/71 HR: 90  Standing BP: 134/77 HR: 93  Site: --  Mode: --  Orthostatic VS  02-08-24 @ 08:29  Lying BP: --/-- HR: --  Sitting BP: 131/73 HR: 95  Standing BP: 129/72 HR: 87  Site: --  Mode: --  Orthostatic VS  02-07-24 @ 20:18  Lying BP: --/-- HR: --  Sitting BP: 138/69 HR: 90  Standing BP: 149/85 HR: 94  Site: --  Mode: --  
Vital Signs Last 24 Hrs  T(C): 36.6 (02-04-24 @ 08:03), Max: 36.6 (02-04-24 @ 08:03)  T(F): 97.8 (02-04-24 @ 08:03), Max: 97.8 (02-04-24 @ 08:03)  HR: 93 (02-03-24 @ 20:44) (93 - 93)  BP: 131/75 (02-03-24 @ 20:44) (131/75 - 131/75)  BP(mean): --  RR: --  SpO2: --    Orthostatic VS  02-04-24 @ 08:03  Lying BP: --/-- HR: --  Sitting BP: 148/78 HR: 80  Standing BP: 143/96 HR: 85  Site: --  Mode: --  Orthostatic VS  02-03-24 @ 08:33  Lying BP: --/-- HR: --  Sitting BP: 139/80 HR: 87  Standing BP: 149/84 HR: 90  Site: upper right arm  Mode: electronic  Orthostatic VS  02-02-24 @ 20:55  Lying BP: --/-- HR: --  Sitting BP: 135/75 HR: 92  Standing BP: --/-- HR: --  Site: --  Mode: --  
Vital Signs Last 24 Hrs  T(C): 35.6 (01-22-24 @ 20:58), Max: 35.6 (01-22-24 @ 20:58)  T(F): 96 (01-22-24 @ 20:58), Max: 96 (01-22-24 @ 20:58)  HR: 79 (01-22-24 @ 20:58) (79 - 79)  BP: 123/65 (01-22-24 @ 20:58) (123/65 - 123/65)  BP(mean): --  RR: 18 (01-22-24 @ 20:58) (18 - 18)  SpO2: 96% (01-22-24 @ 20:58) (96% - 96%)    Orthostatic VS  01-22-24 @ 07:46  Lying BP: --/-- HR: --  Sitting BP: 156/82 HR: 75  Standing BP: 159/87 HR: 80  Site: --  Mode: --  
Vital Signs Last 24 Hrs  T(C): 36.6 (02-19-24 @ 07:51), Max: 36.6 (02-19-24 @ 07:51)  T(F): 97.8 (02-19-24 @ 07:51), Max: 97.8 (02-19-24 @ 07:51)  HR: 91 (02-18-24 @ 21:00) (91 - 91)  BP: 110/68 (02-18-24 @ 21:00) (110/68 - 110/68)  BP(mean): --  RR: 18 (02-18-24 @ 21:00) (18 - 18)  SpO2: 98% (02-18-24 @ 21:00) (98% - 98%)    Orthostatic VS  02-19-24 @ 10:00  Lying BP: --/-- HR: --  Sitting BP: 135/77 HR: 101  Standing BP: 131/77 HR: 107  Site: upper left arm  Mode: electronic  Orthostatic VS  02-19-24 @ 07:51  Lying BP: 146/65 HR: 96  Sitting BP: 109/68 HR: 91  Standing BP: --/-- HR: --  Site: upper left arm  Mode: electronic  Orthostatic VS  02-18-24 @ 08:18  Lying BP: --/-- HR: --  Sitting BP: 134/76 HR: 81  Standing BP: 126/64 HR: 89  Site: --  Mode: --  
Vital Signs Last 24 Hrs  T(C): 36.5 (02-16-24 @ 07:59), Max: 36.5 (02-16-24 @ 07:59)  T(F): 97.7 (02-16-24 @ 07:59), Max: 97.7 (02-16-24 @ 07:59)  HR: 87 (02-15-24 @ 21:04) (87 - 87)  BP: 109/59 (02-15-24 @ 21:04) (109/59 - 109/59)  BP(mean): --  RR: 16 (02-15-24 @ 21:04) (16 - 16)  SpO2: --    Orthostatic VS  02-16-24 @ 07:59  Lying BP: --/-- HR: --  Sitting BP: 152/67 HR: 97  Standing BP: 150/73 HR: 102  Site: --  Mode: --  Orthostatic VS  02-15-24 @ 08:19  Lying BP: --/-- HR: --  Sitting BP: 127/82 HR: 94  Standing BP: 146/83 HR: 99  Site: --  Mode: --  
Vital Signs Last 24 Hrs  T(C): 36.7 (01-26-24 @ 07:41), Max: 36.7 (01-26-24 @ 07:41)  T(F): 98 (01-26-24 @ 07:41), Max: 98 (01-26-24 @ 07:41)  HR: 82 (01-25-24 @ 20:34) (82 - 82)  BP: 131/64 (01-25-24 @ 20:34) (131/64 - 131/64)  BP(mean): --  RR: --  SpO2: --    Orthostatic VS  01-26-24 @ 07:41  Lying BP: --/-- HR: --  Sitting BP: 107/60 HR: 78  Standing BP: 105/59 HR: 86  Site: --  Mode: --  Orthostatic VS  01-25-24 @ 08:00  Lying BP: --/-- HR: --  Sitting BP: 153/94 HR: 80  Standing BP: 149/76 HR: 87  Site: --  Mode: --  
Vital Signs Last 24 Hrs  T(C): --  T(F): --  HR: 80 (01-17-24 @ 21:01) (80 - 80)  BP: 134/68 (01-17-24 @ 21:01) (134/68 - 134/68)  BP(mean): --  RR: --  SpO2: --    Orthostatic VS  01-17-24 @ 07:49  Lying BP: --/-- HR: --  Sitting BP: 139/78 HR: 83  Standing BP: 149/90 HR: 88  Site: upper left arm  Mode: electronic  
Vital Signs Last 24 Hrs  T(C): 36.5 (02-15-24 @ 08:19), Max: 36.5 (02-15-24 @ 08:19)  T(F): 97.7 (02-15-24 @ 08:19), Max: 97.7 (02-15-24 @ 08:19)  HR: 91 (02-14-24 @ 20:25) (91 - 91)  BP: 133/61 (02-14-24 @ 20:25) (133/61 - 133/61)  BP(mean): --  RR: 22 (02-14-24 @ 20:25) (22 - 22)  SpO2: --    Orthostatic VS  02-15-24 @ 08:19  Lying BP: --/-- HR: --  Sitting BP: 127/82 HR: 94  Standing BP: 146/83 HR: 99  Site: --  Mode: --  Orthostatic VS  02-14-24 @ 08:46  Lying BP: --/-- HR: --  Sitting BP: 141/71 HR: 94  Standing BP: 135/88 HR: 95  Site: upper right arm  Mode: electronic  
Vital Signs Last 24 Hrs  T(C): 36.4 (01-31-24 @ 07:50), Max: 36.7 (01-30-24 @ 20:35)  T(F): 97.6 (01-31-24 @ 07:50), Max: 98 (01-30-24 @ 20:35)  HR: 93 (01-30-24 @ 20:35) (93 - 93)  BP: 150/82 (01-30-24 @ 20:35) (150/82 - 150/82)  BP(mean): --  RR: 18 (01-30-24 @ 20:35) (18 - 18)  SpO2: --    Orthostatic VS  01-31-24 @ 07:50  Lying BP: --/-- HR: --  Sitting BP: 150/85 HR: 85  Standing BP: 156/92 HR: 91  Site: upper left arm  Mode: electronic  Orthostatic VS  01-30-24 @ 14:15  Lying BP: --/-- HR: --  Sitting BP: 140/88 HR: 88  Standing BP: 151/86 HR: 96  Site: upper right arm  Mode: electronic  Orthostatic VS  01-30-24 @ 08:00  Lying BP: --/-- HR: --  Sitting BP: 149/91 HR: 89  Standing BP: 115/88 HR: 95  Site: --  Mode: --  Orthostatic VS  01-29-24 @ 20:40  Lying BP: --/-- HR: --  Sitting BP: 131/66 HR: 84  Standing BP: --/-- HR: --  Site: --  Mode: --  
Vital Signs Last 24 Hrs  T(C): 36.6 (01-28-24 @ 07:58), Max: 36.6 (01-28-24 @ 07:58)  T(F): 97.8 (01-28-24 @ 07:58), Max: 97.8 (01-28-24 @ 07:58)  HR: 17 (01-28-24 @ 07:58) (17 - 17)  BP: 117/63 (01-27-24 @ 20:49) (117/63 - 117/63)  BP(mean): 85 (01-27-24 @ 20:49) (85 - 85)  RR: --  SpO2: --    Orthostatic VS  01-28-24 @ 07:58  Lying BP: --/-- HR: --  Sitting BP: 158/82 HR: 81  Standing BP: 146/73 HR: 86  Site: --  Mode: --  Orthostatic VS  01-27-24 @ 07:43  Lying BP: --/-- HR: --  Sitting BP: 149/76 HR: 84  Standing BP: 166/86 HR: 89  Site: --  Mode: --  
Vital Signs Last 24 Hrs  T(C): 36.6 (02-02-24 @ 08:08), Max: 36.6 (02-02-24 @ 08:08)  T(F): 97.8 (02-02-24 @ 08:08), Max: 97.8 (02-02-24 @ 08:08)  HR: 94 (02-01-24 @ 21:29) (94 - 94)  BP: 145/84 (02-01-24 @ 21:29) (145/84 - 145/84)  BP(mean): --  RR: 18 (02-01-24 @ 21:29) (18 - 18)  SpO2: 99% (02-02-24 @ 08:08) (99% - 99%)    Orthostatic VS  02-02-24 @ 08:08  Lying BP: --/-- HR: --  Sitting BP: 120/68 HR: 77  Standing BP: 107/63 HR: 98  Site: --  Mode: --  Orthostatic VS  02-01-24 @ 07:52  Lying BP: --/-- HR: --  Sitting BP: 146/73 HR: 85  Standing BP: 156/86 HR: 90  Site: --  Mode: --  
Vital Signs Last 24 Hrs  T(C): 36.8 (02-07-24 @ 07:47), Max: 36.8 (02-07-24 @ 07:47)  T(F): 98.3 (02-07-24 @ 07:47), Max: 98.3 (02-07-24 @ 07:47)  HR: 84 (02-06-24 @ 20:37) (84 - 84)  BP: 118/63 (02-06-24 @ 20:37) (118/63 - 118/63)  BP(mean): --  RR: --  SpO2: --    Orthostatic VS  02-07-24 @ 07:47  Lying BP: --/-- HR: --  Sitting BP: 130/78 HR: 89  Standing BP: 134/80 HR: 93  Site: --  Mode: --  Orthostatic VS  02-06-24 @ 07:26  Lying BP: --/-- HR: --  Sitting BP: 151/75 HR: 79  Standing BP: 160/77 HR: 81  Site: --  Mode: --  
Vital Signs Last 24 Hrs  T(C): 36.6 (01-19-24 @ 10:37), Max: 36.6 (01-19-24 @ 10:37)  T(F): 97.8 (01-19-24 @ 10:37), Max: 97.8 (01-19-24 @ 10:37)  HR: --  BP: --  BP(mean): --  RR: --  SpO2: 97% (01-18-24 @ 20:23) (97% - 97%)    Orthostatic VS  01-19-24 @ 10:37  Lying BP: --/-- HR: --  Sitting BP: 163/73 HR: 83  Standing BP: 155/77 HR: 86  Site: upper left arm  Mode: electronic  Orthostatic VS  01-18-24 @ 20:23  Lying BP: --/-- HR: --  Sitting BP: 126/55 HR: 79  Standing BP: --/-- HR: --  Site: --  Mode: --  
Vital Signs Last 24 Hrs  T(C): 36.8 (01-12-24 @ 07:54), Max: 36.9 (01-11-24 @ 20:24)  T(F): 98.2 (01-12-24 @ 07:54), Max: 98.4 (01-11-24 @ 20:24)  HR: --  BP: --  BP(mean): --  RR: --  SpO2: 96% (01-11-24 @ 20:24) (96% - 96%)    Orthostatic VS  01-12-24 @ 07:54  Lying BP: --/-- HR: --  Sitting BP: 127/70 HR: 85  Standing BP: 135/69 HR: 85  Site: upper left arm  Mode: electronic  Orthostatic VS  01-11-24 @ 20:24  Lying BP: --/-- HR: --  Sitting BP: 134/71 HR: 81  Standing BP: --/-- HR: --  Site: --  Mode: --  Orthostatic VS  01-11-24 @ 05:53  Lying BP: 137/82 HR: 76  Sitting BP: 137/71 HR: 77  Standing BP: --/-- HR: --  Site: --  Mode: --  
Vital Signs Last 24 Hrs  T(C): 36.6 (02-14-24 @ 08:46), Max: 36.6 (02-14-24 @ 08:46)  T(F): 97.8 (02-14-24 @ 08:46), Max: 97.8 (02-14-24 @ 08:46)  HR: 89 (02-13-24 @ 20:20) (89 - 89)  BP: 117/68 (02-13-24 @ 20:20) (117/68 - 117/68)  BP(mean): --  RR: --  SpO2: --    Orthostatic VS  02-14-24 @ 08:46  Lying BP: --/-- HR: --  Sitting BP: 141/71 HR: 94  Standing BP: 135/88 HR: 95  Site: upper right arm  Mode: electronic  Orthostatic VS  02-13-24 @ 07:44  Lying BP: --/-- HR: --  Sitting BP: 150/75 HR: 93  Standing BP: 145/78 HR: 96  Site: --  Mode: --  Orthostatic VS  02-12-24 @ 20:21  Lying BP: --/-- HR: --  Sitting BP: 122/68 HR: 91  Standing BP: --/-- HR: --  Site: --  Mode: --  
Vital Signs Last 24 Hrs  T(C): 36.5 (01-17-24 @ 07:49), Max: 36.7 (01-16-24 @ 20:37)  T(F): 97.7 (01-17-24 @ 07:49), Max: 98 (01-16-24 @ 20:37)  HR: --  BP: 132/67 (01-16-24 @ 20:37) (132/67 - 132/67)  BP(mean): 79 (01-16-24 @ 20:37) (79 - 79)  RR: --  SpO2: --    Orthostatic VS  01-17-24 @ 07:49  Lying BP: --/-- HR: --  Sitting BP: 139/78 HR: 83  Standing BP: 149/90 HR: 88  Site: upper left arm  Mode: electronic  Orthostatic VS  01-16-24 @ 07:48  Lying BP: --/-- HR: --  Sitting BP: 138/78 HR: 92  Standing BP: --/-- HR: --  Site: upper left arm  Mode: electronic  
Vital Signs Last 24 Hrs  T(C): 36.6 (01-25-24 @ 08:00), Max: 36.7 (01-24-24 @ 21:10)  T(F): 97.8 (01-25-24 @ 08:00), Max: 98 (01-24-24 @ 21:10)  HR: 85 (01-24-24 @ 21:10) (85 - 85)  BP: 132/67 (01-24-24 @ 21:10) (132/67 - 132/67)  BP(mean): --  RR: 16 (01-24-24 @ 21:10) (16 - 16)  SpO2: --    Orthostatic VS  01-25-24 @ 08:00  Lying BP: --/-- HR: --  Sitting BP: 153/94 HR: 80  Standing BP: 149/76 HR: 87  Site: --  Mode: --  Orthostatic VS  01-24-24 @ 07:43  Lying BP: --/-- HR: --  Sitting BP: 146/81 HR: 80  Standing BP: 156/81 HR: 86  Site: --  Mode: --  
Vital Signs Last 24 Hrs  T(C): 36.7 (02-20-24 @ 08:19), Max: 36.7 (02-20-24 @ 08:19)  T(F): 98 (02-20-24 @ 08:19), Max: 98 (02-20-24 @ 08:19)  HR: --  BP: --  BP(mean): --  RR: --  SpO2: 99% (02-19-24 @ 20:24) (99% - 99%)    Orthostatic VS  02-20-24 @ 08:19  Lying BP: --/-- HR: --  Sitting BP: 130/74 HR: 92  Standing BP: 136/78 HR: 95  Site: --  Mode: --  Orthostatic VS  02-19-24 @ 20:24  Lying BP: --/-- HR: --  Sitting BP: 131/70 HR: 72  Standing BP: --/-- HR: --  Site: --  Mode: --  Orthostatic VS  02-19-24 @ 10:00  Lying BP: --/-- HR: --  Sitting BP: 135/77 HR: 101  Standing BP: 131/77 HR: 107  Site: upper left arm  Mode: electronic  Orthostatic VS  02-19-24 @ 07:51  Lying BP: 146/65 HR: 96  Sitting BP: 109/68 HR: 91  Standing BP: --/-- HR: --  Site: upper left arm  Mode: electronic  
Vital Signs Last 24 Hrs  T(C): 37.2 (01-21-24 @ 07:49), Max: 37.2 (01-21-24 @ 07:49)  T(F): 98.9 (01-21-24 @ 07:49), Max: 98.9 (01-21-24 @ 07:49)  HR: 80 (01-20-24 @ 20:48) (80 - 80)  BP: 120/63 (01-20-24 @ 20:48) (120/63 - 120/63)  BP(mean): --  RR: 17 (01-20-24 @ 20:48) (17 - 17)  SpO2: --    Orthostatic VS  01-21-24 @ 07:49  Lying BP: --/-- HR: --  Sitting BP: 154/86 HR: 72  Standing BP: 161/85 HR: 78  Site: --  Mode: --  Orthostatic VS  01-20-24 @ 05:34  Lying BP: --/-- HR: --  Sitting BP: 135/87 HR: 82  Standing BP: 143/90 HR: 87  Site: --  Mode: --  
Vital Signs Last 24 Hrs  T(C): 36.4 (02-06-24 @ 07:26), Max: 36.4 (02-06-24 @ 07:26)  T(F): 97.5 (02-06-24 @ 07:26), Max: 97.5 (02-06-24 @ 07:26)  HR: --  BP: --  BP(mean): --  RR: --  SpO2: --    Orthostatic VS  02-06-24 @ 07:26  Lying BP: --/-- HR: --  Sitting BP: 151/75 HR: 79  Standing BP: 160/77 HR: 81  Site: --  Mode: --  Orthostatic VS  02-05-24 @ 07:45  Lying BP: --/-- HR: --  Sitting BP: 133/63 HR: 86  Standing BP: 128/64 HR: 94  Site: --  Mode: --  Orthostatic VS  02-04-24 @ 20:38  Lying BP: --/-- HR: --  Sitting BP: 112/62 HR: 88  Standing BP: --/-- HR: --  Site: --  Mode: --  
Vital Signs Last 24 Hrs  T(C): 36.7 (01-30-24 @ 08:00), Max: 36.7 (01-30-24 @ 08:00)  T(F): 98.1 (01-30-24 @ 08:00), Max: 98.1 (01-30-24 @ 08:00)  HR: --  BP: --  BP(mean): --  RR: --  SpO2: --    Orthostatic VS  01-30-24 @ 08:00  Lying BP: --/-- HR: --  Sitting BP: 149/91 HR: 89  Standing BP: 115/88 HR: 95  Site: --  Mode: --  Orthostatic VS  01-29-24 @ 20:40  Lying BP: --/-- HR: --  Sitting BP: 131/66 HR: 84  Standing BP: --/-- HR: --  Site: --  Mode: --  Orthostatic VS  01-29-24 @ 07:32  Lying BP: --/-- HR: --  Sitting BP: 154/91 HR: 81  Standing BP: 158/85 HR: 85  Site: upper left arm  Mode: electronic  
Vital Signs Last 24 Hrs  T(C): 36.6 (02-08-24 @ 08:29), Max: 36.6 (02-08-24 @ 08:29)  T(F): 97.8 (02-08-24 @ 08:29), Max: 97.8 (02-08-24 @ 08:29)  HR: --  BP: --  BP(mean): --  RR: --  SpO2: --    Orthostatic VS  02-08-24 @ 08:29  Lying BP: --/-- HR: --  Sitting BP: 131/73 HR: 95  Standing BP: 129/72 HR: 87  Site: --  Mode: --  Orthostatic VS  02-07-24 @ 20:18  Lying BP: --/-- HR: --  Sitting BP: 138/69 HR: 90  Standing BP: 149/85 HR: 94  Site: --  Mode: --  Orthostatic VS  02-07-24 @ 07:47  Lying BP: --/-- HR: --  Sitting BP: 130/78 HR: 89  Standing BP: 134/80 HR: 93  Site: --  Mode: --  
Vital Signs Last 24 Hrs  T(C): 36.8 (01-14-24 @ 07:56), Max: 36.8 (01-14-24 @ 07:56)  T(F): 98.2 (01-14-24 @ 07:56), Max: 98.2 (01-14-24 @ 07:56)  HR: --  BP: --  BP(mean): --  RR: --  SpO2: --    Orthostatic VS  01-14-24 @ 07:56  Lying BP: --/-- HR: --  Sitting BP: 150/70 HR: 82  Standing BP: 154/80 HR: 95  Site: upper left arm  Mode: electronic  Orthostatic VS  01-13-24 @ 06:04  Lying BP: --/-- HR: --  Sitting BP: 142/72 HR: 75  Standing BP: 127/76 HR: 81  Site: --  Mode: --  
Vital Signs Last 24 Hrs  T(C): 36.8 (01-27-24 @ 07:43), Max: 36.8 (01-27-24 @ 07:43)  T(F): 98.3 (01-27-24 @ 07:43), Max: 98.3 (01-27-24 @ 07:43)  HR: 82 (01-26-24 @ 20:47) (82 - 82)  BP: 116/62 (01-26-24 @ 20:47) (116/62 - 116/62)  BP(mean): --  RR: 20 (01-26-24 @ 20:47) (20 - 20)  SpO2: --    Orthostatic VS  01-27-24 @ 07:43  Lying BP: --/-- HR: --  Sitting BP: 149/76 HR: 84  Standing BP: 166/86 HR: 89  Site: --  Mode: --  Orthostatic VS  01-26-24 @ 07:41  Lying BP: --/-- HR: --  Sitting BP: 107/60 HR: 78  Standing BP: 105/59 HR: 86  Site: --  Mode: --  
Vital Signs Last 24 Hrs  T(C): 36.8 (01-12-24 @ 07:54), Max: 36.9 (01-11-24 @ 20:24)  T(F): 98.2 (01-12-24 @ 07:54), Max: 98.4 (01-11-24 @ 20:24)  HR: --  BP: --  BP(mean): --  RR: --  SpO2: 96% (01-11-24 @ 20:24) (96% - 96%)    Orthostatic VS  01-12-24 @ 07:54  Lying BP: --/-- HR: --  Sitting BP: 127/70 HR: 85  Standing BP: 135/69 HR: 85  Site: upper left arm  Mode: electronic  Orthostatic VS  01-11-24 @ 20:24  Lying BP: --/-- HR: --  Sitting BP: 134/71 HR: 81  Standing BP: --/-- HR: --  Site: --  Mode: --  Orthostatic VS  01-11-24 @ 05:53  Lying BP: 137/82 HR: 76  Sitting BP: 137/71 HR: 77  Standing BP: --/-- HR: --  Site: --  Mode: --

## 2024-02-22 NOTE — BH INPATIENT PSYCHIATRY PROGRESS NOTE - NSTXPROBCOPE_PSY_ALL_CORE
COPING, INEFFECTIVE

## 2024-02-22 NOTE — BH INPATIENT PSYCHIATRY PROGRESS NOTE - NSBHMETABOLIC_PSY_ALL_CORE_FT
[facility] EMERGENCY DEPARTMENT  [address]  [facility] [address]  [phone]      2023    Cassie Alcazar  [address]  [place] [address]  [phone] (home)     : 1999      To Whom it may concern:    Cassie Alcazar was seen in our Emergency Department today, 2023 for an injury that was reported to be work related.      For the next 1 days she should not work    The employee might be taking medication so that they cannot operate moving machinery or perform activities that require balancing or working above ground.    {Restrictions:200788736}    {WC RESTRICT:136432}    Sincerely,    Angelica Skelton RN  
  September 25, 2023      To Whom It May Concern:      Cassie Alcazar was seen in our Emergency Department today, 09/25/23.  I expect her condition to improve over the next 2-3 days.  She may return to work/school when improved.    Sincerely,        Angelica Skelton RN        
BMI: BMI (kg/m2): 40.4 (01-10-24 @ 09:02)  HbA1c: A1C with Estimated Average Glucose Result: 5.8 % (01-11-24 @ 08:00)    Glucose:   BP: 123/62 (01-10-24 @ 20:41) (111/67 - 133/63)Vital Signs Last 24 Hrs  T(C): 36.8 (01-12-24 @ 07:54), Max: 36.9 (01-11-24 @ 20:24)  T(F): 98.2 (01-12-24 @ 07:54), Max: 98.4 (01-11-24 @ 20:24)  HR: --  BP: --  BP(mean): --  RR: --  SpO2: 96% (01-11-24 @ 20:24) (96% - 96%)    Orthostatic VS  01-12-24 @ 07:54  Lying BP: --/-- HR: --  Sitting BP: 127/70 HR: 85  Standing BP: 135/69 HR: 85  Site: upper left arm  Mode: electronic  Orthostatic VS  01-11-24 @ 20:24  Lying BP: --/-- HR: --  Sitting BP: 134/71 HR: 81  Standing BP: --/-- HR: --  Site: --  Mode: --  Orthostatic VS  01-11-24 @ 05:53  Lying BP: 137/82 HR: 76  Sitting BP: 137/71 HR: 77  Standing BP: --/-- HR: --  Site: --  Mode: --    Lipid Panel: Date/Time: 05-11-23 @ 08:36  Cholesterol, Serum: 175  LDL Cholesterol Calculated: 97  HDL Cholesterol, Serum: 38  Total Cholesterol/HDL Ration Measurement: --  Triglycerides, Serum: 198  
BMI: BMI (kg/m2): 40.4 (01-10-24 @ 09:02)  HbA1c: A1C with Estimated Average Glucose Result: 5.8 % (01-11-24 @ 08:00)    Glucose:   BP: 152/73 (01-12-24 @ 20:38) (123/62 - 152/73)Vital Signs Last 24 Hrs  T(C): 36.7 (01-13-24 @ 06:04), Max: 36.7 (01-13-24 @ 06:04)  T(F): 98.1 (01-13-24 @ 06:04), Max: 98.1 (01-13-24 @ 06:04)  HR: 75 (01-13-24 @ 06:04) (75 - 96)  BP: 152/73 (01-12-24 @ 20:38) (152/73 - 152/73)  BP(mean): --  RR: 18 (01-13-24 @ 06:04) (18 - 18)  SpO2: 95% (01-13-24 @ 06:04) (95% - 95%)    Orthostatic VS  01-13-24 @ 06:04  Lying BP: --/-- HR: --  Sitting BP: 142/72 HR: 75  Standing BP: 127/76 HR: 81  Site: --  Mode: --  Orthostatic VS  01-12-24 @ 07:54  Lying BP: --/-- HR: --  Sitting BP: 127/70 HR: 85  Standing BP: 135/69 HR: 85  Site: upper left arm  Mode: electronic  Orthostatic VS  01-11-24 @ 20:24  Lying BP: --/-- HR: --  Sitting BP: 134/71 HR: 81  Standing BP: --/-- HR: --  Site: --  Mode: --    Lipid Panel: Date/Time: 05-11-23 @ 08:36  Cholesterol, Serum: 175  LDL Cholesterol Calculated: 97  HDL Cholesterol, Serum: 38  Total Cholesterol/HDL Ration Measurement: --  Triglycerides, Serum: 198  
BMI: BMI (kg/m2): 40.4 (01-10-24 @ 09:02)  HbA1c: A1C with Estimated Average Glucose Result: 5.8 % (01-11-24 @ 08:00)    Glucose:   BP: 131/64 (01-25-24 @ 20:34) (129/62 - 132/67)Vital Signs Last 24 Hrs  T(C): 36.7 (01-26-24 @ 07:41), Max: 36.7 (01-26-24 @ 07:41)  T(F): 98 (01-26-24 @ 07:41), Max: 98 (01-26-24 @ 07:41)  HR: 82 (01-25-24 @ 20:34) (82 - 82)  BP: 131/64 (01-25-24 @ 20:34) (131/64 - 131/64)  BP(mean): --  RR: --  SpO2: --    Orthostatic VS  01-26-24 @ 07:41  Lying BP: --/-- HR: --  Sitting BP: 107/60 HR: 78  Standing BP: 105/59 HR: 86  Site: --  Mode: --  Orthostatic VS  01-25-24 @ 08:00  Lying BP: --/-- HR: --  Sitting BP: 153/94 HR: 80  Standing BP: 149/76 HR: 87  Site: --  Mode: --    Lipid Panel: Date/Time: 05-11-23 @ 08:36  Cholesterol, Serum: 175  LDL Cholesterol Calculated: 97  HDL Cholesterol, Serum: 38  Total Cholesterol/HDL Ration Measurement: --  Triglycerides, Serum: 198  
BMI: BMI (kg/m2): 40.4 (01-10-24 @ 09:02)  HbA1c: A1C with Estimated Average Glucose Result: 5.8 % (01-11-24 @ 08:00)    Glucose:   BP: 138/78 (01-16-24 @ 07:48) (135/67 - 138/78)Vital Signs Last 24 Hrs  T(C): 36.2 (01-16-24 @ 07:48), Max: 36.2 (01-16-24 @ 07:48)  T(F): 97.1 (01-16-24 @ 07:48), Max: 97.1 (01-16-24 @ 07:48)  HR: 92 (01-16-24 @ 07:48) (82 - 92)  BP: 138/78 (01-16-24 @ 07:48) (135/67 - 138/78)  BP(mean): 82 (01-15-24 @ 20:13) (82 - 82)  RR: 16 (01-15-24 @ 20:13) (16 - 16)  SpO2: --    Orthostatic VS  01-16-24 @ 07:48  Lying BP: --/-- HR: --  Sitting BP: 138/78 HR: 92  Standing BP: --/-- HR: --  Site: upper left arm  Mode: electronic  Orthostatic VS  01-15-24 @ 08:05  Lying BP: --/-- HR: --  Sitting BP: 156/84 HR: 88  Standing BP: 155/83 HR: 90  Site: --  Mode: --  Orthostatic VS  01-14-24 @ 20:53  Lying BP: --/-- HR: --  Sitting BP: 133/77 HR: 83  Standing BP: --/-- HR: --  Site: --  Mode: --    Lipid Panel: Date/Time: 05-11-23 @ 08:36  Cholesterol, Serum: 175  LDL Cholesterol Calculated: 97  HDL Cholesterol, Serum: 38  Total Cholesterol/HDL Ration Measurement: --  Triglycerides, Serum: 198  
BMI: BMI (kg/m2): 40.4 (01-10-24 @ 09:02)  HbA1c: A1C with Estimated Average Glucose Result: 5.8 % (01-11-24 @ 08:00)    Glucose:   BP: 120/63 (01-20-24 @ 20:48) (120/63 - 128/75)Vital Signs Last 24 Hrs  T(C): 37.2 (01-21-24 @ 07:49), Max: 37.2 (01-21-24 @ 07:49)  T(F): 98.9 (01-21-24 @ 07:49), Max: 98.9 (01-21-24 @ 07:49)  HR: 80 (01-20-24 @ 20:48) (80 - 80)  BP: 120/63 (01-20-24 @ 20:48) (120/63 - 120/63)  BP(mean): --  RR: 17 (01-20-24 @ 20:48) (17 - 17)  SpO2: --    Orthostatic VS  01-21-24 @ 07:49  Lying BP: --/-- HR: --  Sitting BP: 154/86 HR: 72  Standing BP: 161/85 HR: 78  Site: --  Mode: --  Orthostatic VS  01-20-24 @ 05:34  Lying BP: --/-- HR: --  Sitting BP: 135/87 HR: 82  Standing BP: 143/90 HR: 87  Site: --  Mode: --    Lipid Panel: Date/Time: 05-11-23 @ 08:36  Cholesterol, Serum: 175  LDL Cholesterol Calculated: 97  HDL Cholesterol, Serum: 38  Total Cholesterol/HDL Ration Measurement: --  Triglycerides, Serum: 198  
BMI: BMI (kg/m2): 40.4 (01-10-24 @ 09:02)  HbA1c: A1C with Estimated Average Glucose Result: 5.8 % (01-11-24 @ 08:00)    Glucose:   BP: 133/61 (02-14-24 @ 20:25) (117/68 - 133/61)Vital Signs Last 24 Hrs  T(C): 36.5 (02-15-24 @ 08:19), Max: 36.5 (02-15-24 @ 08:19)  T(F): 97.7 (02-15-24 @ 08:19), Max: 97.7 (02-15-24 @ 08:19)  HR: 91 (02-14-24 @ 20:25) (91 - 91)  BP: 133/61 (02-14-24 @ 20:25) (133/61 - 133/61)  BP(mean): --  RR: 22 (02-14-24 @ 20:25) (22 - 22)  SpO2: --    Orthostatic VS  02-15-24 @ 08:19  Lying BP: --/-- HR: --  Sitting BP: 127/82 HR: 94  Standing BP: 146/83 HR: 99  Site: --  Mode: --  Orthostatic VS  02-14-24 @ 08:46  Lying BP: --/-- HR: --  Sitting BP: 141/71 HR: 94  Standing BP: 135/88 HR: 95  Site: upper right arm  Mode: electronic    Lipid Panel: Date/Time: 05-11-23 @ 08:36  Cholesterol, Serum: 175  LDL Cholesterol Calculated: 97  HDL Cholesterol, Serum: 38  Total Cholesterol/HDL Ration Measurement: --  Triglycerides, Serum: 198  
BMI: BMI (kg/m2): 40.4 (01-10-24 @ 09:02)  HbA1c: A1C with Estimated Average Glucose Result: 5.8 % (01-11-24 @ 08:00)    Glucose:   BP: 118/63 (02-06-24 @ 20:37) (118/63 - 118/63)Vital Signs Last 24 Hrs  T(C): 36.6 (02-08-24 @ 08:29), Max: 36.6 (02-08-24 @ 08:29)  T(F): 97.8 (02-08-24 @ 08:29), Max: 97.8 (02-08-24 @ 08:29)  HR: --  BP: --  BP(mean): --  RR: --  SpO2: --    Orthostatic VS  02-08-24 @ 08:29  Lying BP: --/-- HR: --  Sitting BP: 131/73 HR: 95  Standing BP: 129/72 HR: 87  Site: --  Mode: --  Orthostatic VS  02-07-24 @ 20:18  Lying BP: --/-- HR: --  Sitting BP: 138/69 HR: 90  Standing BP: 149/85 HR: 94  Site: --  Mode: --  Orthostatic VS  02-07-24 @ 07:47  Lying BP: --/-- HR: --  Sitting BP: 130/78 HR: 89  Standing BP: 134/80 HR: 93  Site: --  Mode: --    Lipid Panel: Date/Time: 05-11-23 @ 08:36  Cholesterol, Serum: 175  LDL Cholesterol Calculated: 97  HDL Cholesterol, Serum: 38  Total Cholesterol/HDL Ration Measurement: --  Triglycerides, Serum: 198  
BMI: BMI (kg/m2): 40.4 (01-10-24 @ 09:02)  HbA1c: A1C with Estimated Average Glucose Result: 5.8 % (01-11-24 @ 08:00)    Glucose:   BP: 110/68 (02-18-24 @ 21:00) (110/68 - 129/69)Vital Signs Last 24 Hrs  T(C): 36.6 (02-19-24 @ 07:51), Max: 36.6 (02-19-24 @ 07:51)  T(F): 97.8 (02-19-24 @ 07:51), Max: 97.8 (02-19-24 @ 07:51)  HR: 91 (02-18-24 @ 21:00) (91 - 91)  BP: 110/68 (02-18-24 @ 21:00) (110/68 - 110/68)  BP(mean): --  RR: 18 (02-18-24 @ 21:00) (18 - 18)  SpO2: 98% (02-18-24 @ 21:00) (98% - 98%)    Orthostatic VS  02-19-24 @ 10:00  Lying BP: --/-- HR: --  Sitting BP: 135/77 HR: 101  Standing BP: 131/77 HR: 107  Site: upper left arm  Mode: electronic  Orthostatic VS  02-19-24 @ 07:51  Lying BP: 146/65 HR: 96  Sitting BP: 109/68 HR: 91  Standing BP: --/-- HR: --  Site: upper left arm  Mode: electronic  Orthostatic VS  02-18-24 @ 08:18  Lying BP: --/-- HR: --  Sitting BP: 134/76 HR: 81  Standing BP: 126/64 HR: 89  Site: --  Mode: --    Lipid Panel: Date/Time: 05-11-23 @ 08:36  Cholesterol, Serum: 175  LDL Cholesterol Calculated: 97  HDL Cholesterol, Serum: 38  Total Cholesterol/HDL Ration Measurement: --  Triglycerides, Serum: 198  
BMI: BMI (kg/m2): 40.4 (01-10-24 @ 09:02)  HbA1c: A1C with Estimated Average Glucose Result: 5.8 % (01-11-24 @ 08:00)    Glucose:   BP: 131/75 (02-03-24 @ 20:44) (131/75 - 131/75)Vital Signs Last 24 Hrs  T(C): 36.4 (02-06-24 @ 07:26), Max: 36.4 (02-06-24 @ 07:26)  T(F): 97.5 (02-06-24 @ 07:26), Max: 97.5 (02-06-24 @ 07:26)  HR: --  BP: --  BP(mean): --  RR: --  SpO2: --    Orthostatic VS  02-06-24 @ 07:26  Lying BP: --/-- HR: --  Sitting BP: 151/75 HR: 79  Standing BP: 160/77 HR: 81  Site: --  Mode: --  Orthostatic VS  02-05-24 @ 07:45  Lying BP: --/-- HR: --  Sitting BP: 133/63 HR: 86  Standing BP: 128/64 HR: 94  Site: --  Mode: --  Orthostatic VS  02-04-24 @ 20:38  Lying BP: --/-- HR: --  Sitting BP: 112/62 HR: 88  Standing BP: --/-- HR: --  Site: --  Mode: --    Lipid Panel: Date/Time: 05-11-23 @ 08:36  Cholesterol, Serum: 175  LDL Cholesterol Calculated: 97  HDL Cholesterol, Serum: 38  Total Cholesterol/HDL Ration Measurement: --  Triglycerides, Serum: 198  
BMI: BMI (kg/m2): 40.4 (01-10-24 @ 09:02)  HbA1c: A1C with Estimated Average Glucose Result: 5.8 % (01-11-24 @ 08:00)    Glucose:   BP: 132/67 (01-16-24 @ 20:37) (132/67 - 138/78)Vital Signs Last 24 Hrs  T(C): 36.5 (01-17-24 @ 07:49), Max: 36.7 (01-16-24 @ 20:37)  T(F): 97.7 (01-17-24 @ 07:49), Max: 98 (01-16-24 @ 20:37)  HR: --  BP: 132/67 (01-16-24 @ 20:37) (132/67 - 132/67)  BP(mean): 79 (01-16-24 @ 20:37) (79 - 79)  RR: --  SpO2: --    Orthostatic VS  01-17-24 @ 07:49  Lying BP: --/-- HR: --  Sitting BP: 139/78 HR: 83  Standing BP: 149/90 HR: 88  Site: upper left arm  Mode: electronic  Orthostatic VS  01-16-24 @ 07:48  Lying BP: --/-- HR: --  Sitting BP: 138/78 HR: 92  Standing BP: --/-- HR: --  Site: upper left arm  Mode: electronic    Lipid Panel: Date/Time: 05-11-23 @ 08:36  Cholesterol, Serum: 175  LDL Cholesterol Calculated: 97  HDL Cholesterol, Serum: 38  Total Cholesterol/HDL Ration Measurement: --  Triglycerides, Serum: 198  
BMI: BMI (kg/m2): 40.4 (01-10-24 @ 09:02)  HbA1c: A1C with Estimated Average Glucose Result: 5.8 % (01-11-24 @ 08:00)    Glucose:   BP: 132/52 (02-10-24 @ 20:26) (131/52 - 132/52)Vital Signs Last 24 Hrs  T(C): 36.6 (02-12-24 @ 07:46), Max: 36.6 (02-12-24 @ 07:46)  T(F): 97.9 (02-12-24 @ 07:46), Max: 97.9 (02-12-24 @ 07:46)  HR: --  BP: --  BP(mean): --  RR: --  SpO2: 97% (02-11-24 @ 20:30) (97% - 97%)    Orthostatic VS  02-12-24 @ 07:46  Lying BP: --/-- HR: --  Sitting BP: 148/74 HR: 93  Standing BP: 135/78 HR: 95  Site: --  Mode: --  Orthostatic VS  02-11-24 @ 20:30  Lying BP: --/-- HR: --  Sitting BP: 127/72 HR: 90  Standing BP: --/-- HR: --  Site: --  Mode: --  Orthostatic VS  02-11-24 @ 08:12  Lying BP: --/-- HR: --  Sitting BP: 143/88 HR: 93  Standing BP: 147/89 HR: 96  Site: --  Mode: --    Lipid Panel: Date/Time: 05-11-23 @ 08:36  Cholesterol, Serum: 175  LDL Cholesterol Calculated: 97  HDL Cholesterol, Serum: 38  Total Cholesterol/HDL Ration Measurement: --  Triglycerides, Serum: 198  
BMI: BMI (kg/m2): 40.4 (01-10-24 @ 09:02)  HbA1c: A1C with Estimated Average Glucose Result: 5.8 % (01-11-24 @ 08:00)    Glucose:   BP: 134/68 (01-17-24 @ 21:01) (132/67 - 138/78)Vital Signs Last 24 Hrs  T(C): --  T(F): --  HR: 80 (01-17-24 @ 21:01) (80 - 80)  BP: 134/68 (01-17-24 @ 21:01) (134/68 - 134/68)  BP(mean): --  RR: --  SpO2: --    Orthostatic VS  01-17-24 @ 07:49  Lying BP: --/-- HR: --  Sitting BP: 139/78 HR: 83  Standing BP: 149/90 HR: 88  Site: upper left arm  Mode: electronic    Lipid Panel: Date/Time: 05-11-23 @ 08:36  Cholesterol, Serum: 175  LDL Cholesterol Calculated: 97  HDL Cholesterol, Serum: 38  Total Cholesterol/HDL Ration Measurement: --  Triglycerides, Serum: 198  
BMI: BMI (kg/m2): 40.4 (01-10-24 @ 09:02)  HbA1c: A1C with Estimated Average Glucose Result: 5.8 % (01-11-24 @ 08:00)    Glucose:   BP: 131/75 (02-03-24 @ 20:44) (131/75 - 131/75)Vital Signs Last 24 Hrs  T(C): 36.6 (02-05-24 @ 07:45), Max: 36.6 (02-05-24 @ 07:45)  T(F): 97.9 (02-05-24 @ 07:45), Max: 97.9 (02-05-24 @ 07:45)  HR: --  BP: --  BP(mean): --  RR: --  SpO2: --    Orthostatic VS  02-05-24 @ 07:45  Lying BP: --/-- HR: --  Sitting BP: 133/63 HR: 86  Standing BP: 128/64 HR: 94  Site: --  Mode: --  Orthostatic VS  02-04-24 @ 20:38  Lying BP: --/-- HR: --  Sitting BP: 112/62 HR: 88  Standing BP: --/-- HR: --  Site: --  Mode: --  Orthostatic VS  02-04-24 @ 08:03  Lying BP: --/-- HR: --  Sitting BP: 148/78 HR: 80  Standing BP: 143/96 HR: 85  Site: --  Mode: --    Lipid Panel: Date/Time: 05-11-23 @ 08:36  Cholesterol, Serum: 175  LDL Cholesterol Calculated: 97  HDL Cholesterol, Serum: 38  Total Cholesterol/HDL Ration Measurement: --  Triglycerides, Serum: 198  
BMI: BMI (kg/m2): 40.4 (01-10-24 @ 09:02)  HbA1c: A1C with Estimated Average Glucose Result: 5.8 % (01-11-24 @ 08:00)    Glucose:   BP: 132/67 (01-24-24 @ 21:10) (123/65 - 132/67)Vital Signs Last 24 Hrs  T(C): 36.6 (01-25-24 @ 08:00), Max: 36.7 (01-24-24 @ 21:10)  T(F): 97.8 (01-25-24 @ 08:00), Max: 98 (01-24-24 @ 21:10)  HR: 85 (01-24-24 @ 21:10) (85 - 85)  BP: 132/67 (01-24-24 @ 21:10) (132/67 - 132/67)  BP(mean): --  RR: 16 (01-24-24 @ 21:10) (16 - 16)  SpO2: --    Orthostatic VS  01-25-24 @ 08:00  Lying BP: --/-- HR: --  Sitting BP: 153/94 HR: 80  Standing BP: 149/76 HR: 87  Site: --  Mode: --  Orthostatic VS  01-24-24 @ 07:43  Lying BP: --/-- HR: --  Sitting BP: 146/81 HR: 80  Standing BP: 156/81 HR: 86  Site: --  Mode: --    Lipid Panel: Date/Time: 05-11-23 @ 08:36  Cholesterol, Serum: 175  LDL Cholesterol Calculated: 97  HDL Cholesterol, Serum: 38  Total Cholesterol/HDL Ration Measurement: --  Triglycerides, Serum: 198  
BMI: BMI (kg/m2): 40.4 (01-10-24 @ 09:02)  HbA1c: A1C with Estimated Average Glucose Result: 5.8 % (01-11-24 @ 08:00)    Glucose:   BP: 145/84 (02-01-24 @ 21:29) (145/84 - 145/84)Vital Signs Last 24 Hrs  T(C): 36.4 (02-03-24 @ 08:33), Max: 36.4 (02-03-24 @ 08:33)  T(F): 97.6 (02-03-24 @ 08:33), Max: 97.6 (02-03-24 @ 08:33)  HR: --  BP: --  BP(mean): --  RR: --  SpO2: --    Orthostatic VS  02-03-24 @ 08:33  Lying BP: --/-- HR: --  Sitting BP: 139/80 HR: 87  Standing BP: 149/84 HR: 90  Site: upper right arm  Mode: electronic  Orthostatic VS  02-02-24 @ 20:55  Lying BP: --/-- HR: --  Sitting BP: 135/75 HR: 92  Standing BP: --/-- HR: --  Site: --  Mode: --  Orthostatic VS  02-02-24 @ 08:08  Lying BP: --/-- HR: --  Sitting BP: 120/68 HR: 77  Standing BP: 107/63 HR: 98  Site: --  Mode: --    Lipid Panel: Date/Time: 05-11-23 @ 08:36  Cholesterol, Serum: 175  LDL Cholesterol Calculated: 97  HDL Cholesterol, Serum: 38  Total Cholesterol/HDL Ration Measurement: --  Triglycerides, Serum: 198  
BMI: BMI (kg/m2): 40.4 (01-10-24 @ 09:02)  HbA1c: A1C with Estimated Average Glucose Result: 5.8 % (01-11-24 @ 08:00)    Glucose:   BP: 132/52 (02-10-24 @ 20:26) (132/52 - 132/52)Vital Signs Last 24 Hrs  T(C): 36.5 (02-13-24 @ 07:44), Max: 36.5 (02-13-24 @ 07:44)  T(F): 97.7 (02-13-24 @ 07:44), Max: 97.7 (02-13-24 @ 07:44)  HR: --  BP: --  BP(mean): --  RR: --  SpO2: 97% (02-12-24 @ 20:21) (97% - 97%)    Orthostatic VS  02-13-24 @ 07:44  Lying BP: --/-- HR: --  Sitting BP: 150/75 HR: 93  Standing BP: 145/78 HR: 96  Site: --  Mode: --  Orthostatic VS  02-12-24 @ 20:21  Lying BP: --/-- HR: --  Sitting BP: 122/68 HR: 91  Standing BP: --/-- HR: --  Site: --  Mode: --  Orthostatic VS  02-12-24 @ 07:46  Lying BP: --/-- HR: --  Sitting BP: 148/74 HR: 93  Standing BP: 135/78 HR: 95  Site: --  Mode: --  Orthostatic VS  02-11-24 @ 20:30  Lying BP: --/-- HR: --  Sitting BP: 127/72 HR: 90  Standing BP: --/-- HR: --  Site: --  Mode: --    Lipid Panel: Date/Time: 05-11-23 @ 08:36  Cholesterol, Serum: 175  LDL Cholesterol Calculated: 97  HDL Cholesterol, Serum: 38  Total Cholesterol/HDL Ration Measurement: --  Triglycerides, Serum: 198  
BMI: BMI (kg/m2): 40.4 (01-10-24 @ 09:02)  HbA1c: A1C with Estimated Average Glucose Result: 5.8 % (01-11-24 @ 08:00)    Glucose:   BP: 128/75 (01-19-24 @ 20:35) (128/75 - 134/68)Vital Signs Last 24 Hrs  T(C): 36.3 (01-20-24 @ 05:34), Max: 36.6 (01-19-24 @ 10:37)  T(F): 97.3 (01-20-24 @ 05:34), Max: 97.8 (01-19-24 @ 10:37)  HR: --  BP: 128/75 (01-19-24 @ 20:35) (128/75 - 128/75)  BP(mean): 84 (01-19-24 @ 20:35) (84 - 84)  RR: --  SpO2: --    Orthostatic VS  01-20-24 @ 05:34  Lying BP: --/-- HR: --  Sitting BP: 135/87 HR: 82  Standing BP: 143/90 HR: 87  Site: --  Mode: --  Orthostatic VS  01-19-24 @ 10:37  Lying BP: --/-- HR: --  Sitting BP: 163/73 HR: 83  Standing BP: 155/77 HR: 86  Site: upper left arm  Mode: electronic  Orthostatic VS  01-18-24 @ 20:23  Lying BP: --/-- HR: --  Sitting BP: 126/55 HR: 79  Standing BP: --/-- HR: --  Site: --  Mode: --    Lipid Panel: Date/Time: 05-11-23 @ 08:36  Cholesterol, Serum: 175  LDL Cholesterol Calculated: 97  HDL Cholesterol, Serum: 38  Total Cholesterol/HDL Ration Measurement: --  Triglycerides, Serum: 198  
BMI: BMI (kg/m2): 40.4 (01-10-24 @ 09:02)  HbA1c: A1C with Estimated Average Glucose Result: 5.8 % (01-11-24 @ 08:00)    Glucose:   BP: 131/75 (02-03-24 @ 20:44) (131/75 - 145/84)Vital Signs Last 24 Hrs  T(C): 36.6 (02-04-24 @ 08:03), Max: 36.6 (02-04-24 @ 08:03)  T(F): 97.8 (02-04-24 @ 08:03), Max: 97.8 (02-04-24 @ 08:03)  HR: 93 (02-03-24 @ 20:44) (93 - 93)  BP: 131/75 (02-03-24 @ 20:44) (131/75 - 131/75)  BP(mean): --  RR: --  SpO2: --    Orthostatic VS  02-04-24 @ 08:03  Lying BP: --/-- HR: --  Sitting BP: 148/78 HR: 80  Standing BP: 143/96 HR: 85  Site: --  Mode: --  Orthostatic VS  02-03-24 @ 08:33  Lying BP: --/-- HR: --  Sitting BP: 139/80 HR: 87  Standing BP: 149/84 HR: 90  Site: upper right arm  Mode: electronic  Orthostatic VS  02-02-24 @ 20:55  Lying BP: --/-- HR: --  Sitting BP: 135/75 HR: 92  Standing BP: --/-- HR: --  Site: --  Mode: --    Lipid Panel: Date/Time: 05-11-23 @ 08:36  Cholesterol, Serum: 175  LDL Cholesterol Calculated: 97  HDL Cholesterol, Serum: 38  Total Cholesterol/HDL Ration Measurement: --  Triglycerides, Serum: 198  
BMI: BMI (kg/m2): 40.4 (01-10-24 @ 09:02)  HbA1c: A1C with Estimated Average Glucose Result: 5.8 % (01-11-24 @ 08:00)    Glucose:   BP: 109/59 (02-15-24 @ 21:04) (109/59 - 133/61)Vital Signs Last 24 Hrs  T(C): 36.5 (02-16-24 @ 07:59), Max: 36.5 (02-16-24 @ 07:59)  T(F): 97.7 (02-16-24 @ 07:59), Max: 97.7 (02-16-24 @ 07:59)  HR: 87 (02-15-24 @ 21:04) (87 - 87)  BP: 109/59 (02-15-24 @ 21:04) (109/59 - 109/59)  BP(mean): --  RR: 16 (02-15-24 @ 21:04) (16 - 16)  SpO2: --    Orthostatic VS  02-16-24 @ 07:59  Lying BP: --/-- HR: --  Sitting BP: 152/67 HR: 97  Standing BP: 150/73 HR: 102  Site: --  Mode: --  Orthostatic VS  02-15-24 @ 08:19  Lying BP: --/-- HR: --  Sitting BP: 127/82 HR: 94  Standing BP: 146/83 HR: 99  Site: --  Mode: --    Lipid Panel: Date/Time: 05-11-23 @ 08:36  Cholesterol, Serum: 175  LDL Cholesterol Calculated: 97  HDL Cholesterol, Serum: 38  Total Cholesterol/HDL Ration Measurement: --  Triglycerides, Serum: 198  
BMI: BMI (kg/m2): 40.4 (01-10-24 @ 09:02)  HbA1c: A1C with Estimated Average Glucose Result: 5.8 % (01-11-24 @ 08:00)    Glucose:   BP: 110/68 (02-18-24 @ 21:00) (110/68 - 110/68)Vital Signs Last 24 Hrs  T(C): 36.6 (02-21-24 @ 07:50), Max: 36.6 (02-21-24 @ 07:50)  T(F): 97.9 (02-21-24 @ 07:50), Max: 97.9 (02-21-24 @ 07:50)  HR: --  BP: --  BP(mean): --  RR: --  SpO2: 96% (02-20-24 @ 20:48) (96% - 96%)    Orthostatic VS  02-21-24 @ 07:50  Lying BP: --/-- HR: --  Sitting BP: 137/79 HR: 89  Standing BP: 141/80 HR: 92  Site: --  Mode: --  Orthostatic VS  02-20-24 @ 20:48  Lying BP: --/-- HR: --  Sitting BP: 122/56 HR: 94  Standing BP: --/-- HR: --  Site: --  Mode: --  Orthostatic VS  02-20-24 @ 08:19  Lying BP: --/-- HR: --  Sitting BP: 130/74 HR: 92  Standing BP: 136/78 HR: 95  Site: --  Mode: --  Orthostatic VS  02-19-24 @ 20:24  Lying BP: --/-- HR: --  Sitting BP: 131/70 HR: 72  Standing BP: --/-- HR: --  Site: --  Mode: --    Lipid Panel: Date/Time: 05-11-23 @ 08:36  Cholesterol, Serum: 175  LDL Cholesterol Calculated: 97  HDL Cholesterol, Serum: 38  Total Cholesterol/HDL Ration Measurement: --  Triglycerides, Serum: 198  
BMI: BMI (kg/m2): 40.4 (01-10-24 @ 09:02)  HbA1c: A1C with Estimated Average Glucose Result: 5.8 % (01-11-24 @ 08:00)    Glucose:   BP: 116/62 (01-26-24 @ 20:47) (116/62 - 132/67)Vital Signs Last 24 Hrs  T(C): 36.8 (01-27-24 @ 07:43), Max: 36.8 (01-27-24 @ 07:43)  T(F): 98.3 (01-27-24 @ 07:43), Max: 98.3 (01-27-24 @ 07:43)  HR: 82 (01-26-24 @ 20:47) (82 - 82)  BP: 116/62 (01-26-24 @ 20:47) (116/62 - 116/62)  BP(mean): --  RR: 20 (01-26-24 @ 20:47) (20 - 20)  SpO2: --    Orthostatic VS  01-27-24 @ 07:43  Lying BP: --/-- HR: --  Sitting BP: 149/76 HR: 84  Standing BP: 166/86 HR: 89  Site: --  Mode: --  Orthostatic VS  01-26-24 @ 07:41  Lying BP: --/-- HR: --  Sitting BP: 107/60 HR: 78  Standing BP: 105/59 HR: 86  Site: --  Mode: --    Lipid Panel: Date/Time: 05-11-23 @ 08:36  Cholesterol, Serum: 175  LDL Cholesterol Calculated: 97  HDL Cholesterol, Serum: 38  Total Cholesterol/HDL Ration Measurement: --  Triglycerides, Serum: 198  
BMI: BMI (kg/m2): 40.4 (01-10-24 @ 09:02)  HbA1c: A1C with Estimated Average Glucose Result: 5.8 % (01-11-24 @ 08:00)    Glucose:   BP: 145/84 (02-01-24 @ 21:29) (145/84 - 150/82)Vital Signs Last 24 Hrs  T(C): 36.6 (02-02-24 @ 08:08), Max: 36.6 (02-02-24 @ 08:08)  T(F): 97.8 (02-02-24 @ 08:08), Max: 97.8 (02-02-24 @ 08:08)  HR: 94 (02-01-24 @ 21:29) (94 - 94)  BP: 145/84 (02-01-24 @ 21:29) (145/84 - 145/84)  BP(mean): --  RR: 18 (02-01-24 @ 21:29) (18 - 18)  SpO2: 99% (02-02-24 @ 08:08) (99% - 99%)    Orthostatic VS  02-02-24 @ 08:08  Lying BP: --/-- HR: --  Sitting BP: 120/68 HR: 77  Standing BP: 107/63 HR: 98  Site: --  Mode: --  Orthostatic VS  02-01-24 @ 07:52  Lying BP: --/-- HR: --  Sitting BP: 146/73 HR: 85  Standing BP: 156/86 HR: 90  Site: --  Mode: --    Lipid Panel: Date/Time: 05-11-23 @ 08:36  Cholesterol, Serum: 175  LDL Cholesterol Calculated: 97  HDL Cholesterol, Serum: 38  Total Cholesterol/HDL Ration Measurement: --  Triglycerides, Serum: 198  
BMI: BMI (kg/m2): 40.4 (01-10-24 @ 09:02)  HbA1c: A1C with Estimated Average Glucose Result: 5.8 % (01-11-24 @ 08:00)    Glucose:   BP: 125/75 (01-21-24 @ 20:28) (120/63 - 128/75)Vital Signs Last 24 Hrs  T(C): 36.6 (01-22-24 @ 07:46), Max: 36.6 (01-22-24 @ 07:46)  T(F): 97.8 (01-22-24 @ 07:46), Max: 97.8 (01-22-24 @ 07:46)  HR: 77 (01-21-24 @ 20:28) (77 - 77)  BP: 125/75 (01-21-24 @ 20:28) (125/75 - 125/75)  BP(mean): --  RR: 14 (01-21-24 @ 20:28) (14 - 14)  SpO2: --    Orthostatic VS  01-22-24 @ 07:46  Lying BP: --/-- HR: --  Sitting BP: 156/82 HR: 75  Standing BP: 159/87 HR: 80  Site: --  Mode: --  Orthostatic VS  01-21-24 @ 07:49  Lying BP: --/-- HR: --  Sitting BP: 154/86 HR: 72  Standing BP: 161/85 HR: 78  Site: --  Mode: --    Lipid Panel: Date/Time: 05-11-23 @ 08:36  Cholesterol, Serum: 175  LDL Cholesterol Calculated: 97  HDL Cholesterol, Serum: 38  Total Cholesterol/HDL Ration Measurement: --  Triglycerides, Serum: 198  
BMI: BMI (kg/m2): 40.4 (01-10-24 @ 09:02)  HbA1c: A1C with Estimated Average Glucose Result: 5.8 % (01-11-24 @ 08:00)    Glucose:   BP: 150/82 (01-30-24 @ 20:35) (127/67 - 150/82)Vital Signs Last 24 Hrs  T(C): 36.4 (01-31-24 @ 07:50), Max: 36.7 (01-30-24 @ 20:35)  T(F): 97.6 (01-31-24 @ 07:50), Max: 98 (01-30-24 @ 20:35)  HR: 93 (01-30-24 @ 20:35) (93 - 93)  BP: 150/82 (01-30-24 @ 20:35) (150/82 - 150/82)  BP(mean): --  RR: 18 (01-30-24 @ 20:35) (18 - 18)  SpO2: --    Orthostatic VS  01-31-24 @ 07:50  Lying BP: --/-- HR: --  Sitting BP: 150/85 HR: 85  Standing BP: 156/92 HR: 91  Site: upper left arm  Mode: electronic  Orthostatic VS  01-30-24 @ 14:15  Lying BP: --/-- HR: --  Sitting BP: 140/88 HR: 88  Standing BP: 151/86 HR: 96  Site: upper right arm  Mode: electronic  Orthostatic VS  01-30-24 @ 08:00  Lying BP: --/-- HR: --  Sitting BP: 149/91 HR: 89  Standing BP: 115/88 HR: 95  Site: --  Mode: --  Orthostatic VS  01-29-24 @ 20:40  Lying BP: --/-- HR: --  Sitting BP: 131/66 HR: 84  Standing BP: --/-- HR: --  Site: --  Mode: --    Lipid Panel: Date/Time: 05-11-23 @ 08:36  Cholesterol, Serum: 175  LDL Cholesterol Calculated: 97  HDL Cholesterol, Serum: 38  Total Cholesterol/HDL Ration Measurement: --  Triglycerides, Serum: 198  
BMI: BMI (kg/m2): 40.4 (01-10-24 @ 09:02)  HbA1c: A1C with Estimated Average Glucose Result: 5.8 % (01-11-24 @ 08:00)    Glucose:   BP: 134/68 (01-17-24 @ 21:01) (132/67 - 134/68)Vital Signs Last 24 Hrs  T(C): 36.6 (01-19-24 @ 10:37), Max: 36.6 (01-19-24 @ 10:37)  T(F): 97.8 (01-19-24 @ 10:37), Max: 97.8 (01-19-24 @ 10:37)  HR: --  BP: --  BP(mean): --  RR: --  SpO2: 97% (01-18-24 @ 20:23) (97% - 97%)    Orthostatic VS  01-19-24 @ 10:37  Lying BP: --/-- HR: --  Sitting BP: 163/73 HR: 83  Standing BP: 155/77 HR: 86  Site: upper left arm  Mode: electronic  Orthostatic VS  01-18-24 @ 20:23  Lying BP: --/-- HR: --  Sitting BP: 126/55 HR: 79  Standing BP: --/-- HR: --  Site: --  Mode: --    Lipid Panel: Date/Time: 05-11-23 @ 08:36  Cholesterol, Serum: 175  LDL Cholesterol Calculated: 97  HDL Cholesterol, Serum: 38  Total Cholesterol/HDL Ration Measurement: --  Triglycerides, Serum: 198  
BMI: BMI (kg/m2): 40.4 (01-10-24 @ 09:02)  HbA1c: A1C with Estimated Average Glucose Result: 5.8 % (01-11-24 @ 08:00)    Glucose:   BP: 117/68 (02-13-24 @ 20:20) (117/68 - 117/68)Vital Signs Last 24 Hrs  T(C): 36.6 (02-14-24 @ 08:46), Max: 36.6 (02-14-24 @ 08:46)  T(F): 97.8 (02-14-24 @ 08:46), Max: 97.8 (02-14-24 @ 08:46)  HR: 89 (02-13-24 @ 20:20) (89 - 89)  BP: 117/68 (02-13-24 @ 20:20) (117/68 - 117/68)  BP(mean): --  RR: --  SpO2: --    Orthostatic VS  02-14-24 @ 08:46  Lying BP: --/-- HR: --  Sitting BP: 141/71 HR: 94  Standing BP: 135/88 HR: 95  Site: upper right arm  Mode: electronic  Orthostatic VS  02-13-24 @ 07:44  Lying BP: --/-- HR: --  Sitting BP: 150/75 HR: 93  Standing BP: 145/78 HR: 96  Site: --  Mode: --  Orthostatic VS  02-12-24 @ 20:21  Lying BP: --/-- HR: --  Sitting BP: 122/68 HR: 91  Standing BP: --/-- HR: --  Site: --  Mode: --    Lipid Panel: Date/Time: 05-11-23 @ 08:36  Cholesterol, Serum: 175  LDL Cholesterol Calculated: 97  HDL Cholesterol, Serum: 38  Total Cholesterol/HDL Ration Measurement: --  Triglycerides, Serum: 198  
BMI: BMI (kg/m2): 40.4 (01-10-24 @ 09:02)  HbA1c: A1C with Estimated Average Glucose Result: 5.8 % (01-11-24 @ 08:00)    Glucose:   BP: 118/63 (02-06-24 @ 20:37) (118/63 - 118/63)Vital Signs Last 24 Hrs  T(C): 36.7 (02-09-24 @ 08:16), Max: 36.7 (02-09-24 @ 08:16)  T(F): 98.1 (02-09-24 @ 08:16), Max: 98.1 (02-09-24 @ 08:16)  HR: --  BP: --  BP(mean): --  RR: --  SpO2: --    Orthostatic VS  02-09-24 @ 08:16  Lying BP: --/-- HR: --  Sitting BP: 129/71 HR: 90  Standing BP: 134/77 HR: 93  Site: --  Mode: --  Orthostatic VS  02-08-24 @ 08:29  Lying BP: --/-- HR: --  Sitting BP: 131/73 HR: 95  Standing BP: 129/72 HR: 87  Site: --  Mode: --  Orthostatic VS  02-07-24 @ 20:18  Lying BP: --/-- HR: --  Sitting BP: 138/69 HR: 90  Standing BP: 149/85 HR: 94  Site: --  Mode: --    Lipid Panel: Date/Time: 05-11-23 @ 08:36  Cholesterol, Serum: 175  LDL Cholesterol Calculated: 97  HDL Cholesterol, Serum: 38  Total Cholesterol/HDL Ration Measurement: --  Triglycerides, Serum: 198  
BMI: BMI (kg/m2): 40.4 (01-10-24 @ 09:02)  HbA1c: A1C with Estimated Average Glucose Result: 5.8 % (01-11-24 @ 08:00)    Glucose:   BP: 150/82 (01-30-24 @ 20:35) (150/82 - 150/82)Vital Signs Last 24 Hrs  T(C): --  T(F): --  HR: --  BP: --  BP(mean): --  RR: --  SpO2: --    Orthostatic VS  02-01-24 @ 07:52  Lying BP: --/-- HR: --  Sitting BP: 146/73 HR: 85  Standing BP: 156/86 HR: 90  Site: --  Mode: --  Orthostatic VS  01-31-24 @ 07:50  Lying BP: --/-- HR: --  Sitting BP: 150/85 HR: 85  Standing BP: 156/92 HR: 91  Site: upper left arm  Mode: electronic  Orthostatic VS  01-30-24 @ 14:15  Lying BP: --/-- HR: --  Sitting BP: 140/88 HR: 88  Standing BP: 151/86 HR: 96  Site: upper right arm  Mode: electronic    Lipid Panel: Date/Time: 05-11-23 @ 08:36  Cholesterol, Serum: 175  LDL Cholesterol Calculated: 97  HDL Cholesterol, Serum: 38  Total Cholesterol/HDL Ration Measurement: --  Triglycerides, Serum: 198  
BMI: BMI (kg/m2): 40.4 (01-10-24 @ 09:02)  HbA1c: A1C with Estimated Average Glucose Result: 5.8 % (01-11-24 @ 08:00)    Glucose:   BP: 110/68 (02-18-24 @ 21:00) (110/68 - 124/64)Vital Signs Last 24 Hrs  T(C): 36.7 (02-20-24 @ 08:19), Max: 36.7 (02-20-24 @ 08:19)  T(F): 98 (02-20-24 @ 08:19), Max: 98 (02-20-24 @ 08:19)  HR: --  BP: --  BP(mean): --  RR: --  SpO2: 99% (02-19-24 @ 20:24) (99% - 99%)    Orthostatic VS  02-20-24 @ 08:19  Lying BP: --/-- HR: --  Sitting BP: 130/74 HR: 92  Standing BP: 136/78 HR: 95  Site: --  Mode: --  Orthostatic VS  02-19-24 @ 20:24  Lying BP: --/-- HR: --  Sitting BP: 131/70 HR: 72  Standing BP: --/-- HR: --  Site: --  Mode: --  Orthostatic VS  02-19-24 @ 10:00  Lying BP: --/-- HR: --  Sitting BP: 135/77 HR: 101  Standing BP: 131/77 HR: 107  Site: upper left arm  Mode: electronic  Orthostatic VS  02-19-24 @ 07:51  Lying BP: 146/65 HR: 96  Sitting BP: 109/68 HR: 91  Standing BP: --/-- HR: --  Site: upper left arm  Mode: electronic    Lipid Panel: Date/Time: 05-11-23 @ 08:36  Cholesterol, Serum: 175  LDL Cholesterol Calculated: 97  HDL Cholesterol, Serum: 38  Total Cholesterol/HDL Ration Measurement: --  Triglycerides, Serum: 198  
BMI: BMI (kg/m2): 40.4 (01-10-24 @ 09:02)  HbA1c: A1C with Estimated Average Glucose Result: 5.8 % (01-11-24 @ 08:00)    Glucose:   BP: 131/52 (02-09-24 @ 20:22) (131/52 - 131/52)Vital Signs Last 24 Hrs  T(C): 36.4 (02-10-24 @ 08:40), Max: 36.4 (02-10-24 @ 08:40)  T(F): 97.6 (02-10-24 @ 08:40), Max: 97.6 (02-10-24 @ 08:40)  HR: 85 (02-09-24 @ 20:22) (85 - 85)  BP: 131/52 (02-09-24 @ 20:22) (131/52 - 131/52)  BP(mean): --  RR: 16 (02-09-24 @ 20:22) (16 - 16)  SpO2: --    Orthostatic VS  02-10-24 @ 08:40  Lying BP: --/-- HR: --  Sitting BP: 132/53 HR: 85  Standing BP: 126/72 HR: 89  Site: --  Mode: --  Orthostatic VS  02-09-24 @ 08:16  Lying BP: --/-- HR: --  Sitting BP: 129/71 HR: 90  Standing BP: 134/77 HR: 93  Site: --  Mode: --    Lipid Panel: Date/Time: 05-11-23 @ 08:36  Cholesterol, Serum: 175  LDL Cholesterol Calculated: 97  HDL Cholesterol, Serum: 38  Total Cholesterol/HDL Ration Measurement: --  Triglycerides, Serum: 198  
BMI: BMI (kg/m2): 40.4 (01-10-24 @ 09:02)  HbA1c: A1C with Estimated Average Glucose Result: 5.8 % (01-11-24 @ 08:00)    Glucose:   BP: --Vital Signs Last 24 Hrs  T(C): 36.4 (02-22-24 @ 07:41), Max: 36.6 (02-21-24 @ 20:51)  T(F): 97.5 (02-22-24 @ 07:41), Max: 97.8 (02-21-24 @ 20:51)  HR: 98 (02-21-24 @ 20:51) (98 - 98)  BP: --  BP(mean): --  RR: --  SpO2: --    Orthostatic VS  02-22-24 @ 07:41  Lying BP: --/-- HR: --  Sitting BP: 150/90 HR: 89  Standing BP: 153/91 HR: 91  Site: --  Mode: --  Orthostatic VS  02-21-24 @ 20:51  Lying BP: --/-- HR: --  Sitting BP: 113/50 HR: 97  Standing BP: --/-- HR: --  Site: --  Mode: --  Orthostatic VS  02-21-24 @ 07:50  Lying BP: --/-- HR: --  Sitting BP: 137/79 HR: 89  Standing BP: 141/80 HR: 92  Site: --  Mode: --  Orthostatic VS  02-20-24 @ 20:48  Lying BP: --/-- HR: --  Sitting BP: 122/56 HR: 94  Standing BP: --/-- HR: --  Site: --  Mode: --    Lipid Panel: Date/Time: 05-11-23 @ 08:36  Cholesterol, Serum: 175  LDL Cholesterol Calculated: 97  HDL Cholesterol, Serum: 38  Total Cholesterol/HDL Ration Measurement: --  Triglycerides, Serum: 198  
BMI: BMI (kg/m2): 40.4 (01-10-24 @ 09:02)  HbA1c: A1C with Estimated Average Glucose Result: 5.8 % (01-11-24 @ 08:00)    Glucose:   BP: 152/73 (01-12-24 @ 20:38) (152/73 - 152/73)Vital Signs Last 24 Hrs  T(C): 36.6 (01-15-24 @ 08:05), Max: 36.6 (01-15-24 @ 08:05)  T(F): 97.8 (01-15-24 @ 08:05), Max: 97.8 (01-15-24 @ 08:05)  HR: --  BP: --  BP(mean): --  RR: --  SpO2: --    Orthostatic VS  01-15-24 @ 08:05  Lying BP: --/-- HR: --  Sitting BP: 156/84 HR: 88  Standing BP: 155/83 HR: 90  Site: --  Mode: --  Orthostatic VS  01-14-24 @ 20:53  Lying BP: --/-- HR: --  Sitting BP: 133/77 HR: 83  Standing BP: --/-- HR: --  Site: --  Mode: --  Orthostatic VS  01-14-24 @ 07:56  Lying BP: --/-- HR: --  Sitting BP: 150/70 HR: 82  Standing BP: 154/80 HR: 95  Site: upper left arm  Mode: electronic    Lipid Panel: Date/Time: 05-11-23 @ 08:36  Cholesterol, Serum: 175  LDL Cholesterol Calculated: 97  HDL Cholesterol, Serum: 38  Total Cholesterol/HDL Ration Measurement: --  Triglycerides, Serum: 198  
BMI: BMI (kg/m2): 40.4 (01-10-24 @ 09:02)  HbA1c: A1C with Estimated Average Glucose Result: 5.8 % (01-11-24 @ 08:00)    Glucose:   BP: 118/63 (02-06-24 @ 20:37) (118/63 - 118/63)Vital Signs Last 24 Hrs  T(C): 36.8 (02-07-24 @ 07:47), Max: 36.8 (02-07-24 @ 07:47)  T(F): 98.3 (02-07-24 @ 07:47), Max: 98.3 (02-07-24 @ 07:47)  HR: 84 (02-06-24 @ 20:37) (84 - 84)  BP: 118/63 (02-06-24 @ 20:37) (118/63 - 118/63)  BP(mean): --  RR: --  SpO2: --    Orthostatic VS  02-07-24 @ 07:47  Lying BP: --/-- HR: --  Sitting BP: 130/78 HR: 89  Standing BP: 134/80 HR: 93  Site: --  Mode: --  Orthostatic VS  02-06-24 @ 07:26  Lying BP: --/-- HR: --  Sitting BP: 151/75 HR: 79  Standing BP: 160/77 HR: 81  Site: --  Mode: --    Lipid Panel: Date/Time: 05-11-23 @ 08:36  Cholesterol, Serum: 175  LDL Cholesterol Calculated: 97  HDL Cholesterol, Serum: 38  Total Cholesterol/HDL Ration Measurement: --  Triglycerides, Serum: 198  
BMI: BMI (kg/m2): 40.4 (01-10-24 @ 09:02)  HbA1c: A1C with Estimated Average Glucose Result: 5.8 % (01-11-24 @ 08:00)    Glucose:   BP: 117/63 (01-27-24 @ 20:49) (116/62 - 131/64)Vital Signs Last 24 Hrs  T(C): 36.6 (01-28-24 @ 07:58), Max: 36.6 (01-28-24 @ 07:58)  T(F): 97.8 (01-28-24 @ 07:58), Max: 97.8 (01-28-24 @ 07:58)  HR: 17 (01-28-24 @ 07:58) (17 - 17)  BP: 117/63 (01-27-24 @ 20:49) (117/63 - 117/63)  BP(mean): 85 (01-27-24 @ 20:49) (85 - 85)  RR: --  SpO2: --    Orthostatic VS  01-28-24 @ 07:58  Lying BP: --/-- HR: --  Sitting BP: 158/82 HR: 81  Standing BP: 146/73 HR: 86  Site: --  Mode: --  Orthostatic VS  01-27-24 @ 07:43  Lying BP: --/-- HR: --  Sitting BP: 149/76 HR: 84  Standing BP: 166/86 HR: 89  Site: --  Mode: --    Lipid Panel: Date/Time: 05-11-23 @ 08:36  Cholesterol, Serum: 175  LDL Cholesterol Calculated: 97  HDL Cholesterol, Serum: 38  Total Cholesterol/HDL Ration Measurement: --  Triglycerides, Serum: 198  
BMI: BMI (kg/m2): 40.4 (01-10-24 @ 09:02)  HbA1c: A1C with Estimated Average Glucose Result: 5.8 % (01-11-24 @ 08:00)    Glucose:   BP: 132/67 (01-24-24 @ 21:10) (123/65 - 132/67)Vital Signs Last 24 Hrs  T(C): 36.6 (01-25-24 @ 08:00), Max: 36.7 (01-24-24 @ 21:10)  T(F): 97.8 (01-25-24 @ 08:00), Max: 98 (01-24-24 @ 21:10)  HR: 85 (01-24-24 @ 21:10) (85 - 85)  BP: 132/67 (01-24-24 @ 21:10) (132/67 - 132/67)  BP(mean): --  RR: 16 (01-24-24 @ 21:10) (16 - 16)  SpO2: --    Orthostatic VS  01-25-24 @ 08:00  Lying BP: --/-- HR: --  Sitting BP: 153/94 HR: 80  Standing BP: 149/76 HR: 87  Site: --  Mode: --  Orthostatic VS  01-24-24 @ 07:43  Lying BP: --/-- HR: --  Sitting BP: 146/81 HR: 80  Standing BP: 156/81 HR: 86  Site: --  Mode: --    Lipid Panel: Date/Time: 05-11-23 @ 08:36  Cholesterol, Serum: 175  LDL Cholesterol Calculated: 97  HDL Cholesterol, Serum: 38  Total Cholesterol/HDL Ration Measurement: --  Triglycerides, Serum: 198  
BMI: BMI (kg/m2): 40.4 (01-10-24 @ 09:02)  HbA1c: A1C with Estimated Average Glucose Result: 5.8 % (01-11-24 @ 08:00)    Glucose:   BP: 127/67 (01-28-24 @ 20:36) (116/62 - 127/67)Vital Signs Last 24 Hrs  T(C): 36.4 (01-29-24 @ 07:32), Max: 36.4 (01-29-24 @ 07:32)  T(F): 97.6 (01-29-24 @ 07:32), Max: 97.6 (01-29-24 @ 07:32)  HR: 78 (01-28-24 @ 20:36) (78 - 78)  BP: 127/67 (01-28-24 @ 20:36) (127/67 - 127/67)  BP(mean): --  RR: --  SpO2: --    Orthostatic VS  01-29-24 @ 07:32  Lying BP: --/-- HR: --  Sitting BP: 154/91 HR: 81  Standing BP: 158/85 HR: 85  Site: upper left arm  Mode: electronic  Orthostatic VS  01-28-24 @ 07:58  Lying BP: --/-- HR: --  Sitting BP: 158/82 HR: 81  Standing BP: 146/73 HR: 86  Site: --  Mode: --    Lipid Panel: Date/Time: 05-11-23 @ 08:36  Cholesterol, Serum: 175  LDL Cholesterol Calculated: 97  HDL Cholesterol, Serum: 38  Total Cholesterol/HDL Ration Measurement: --  Triglycerides, Serum: 198  
BMI: BMI (kg/m2): 40.4 (01-10-24 @ 09:02)  HbA1c: A1C with Estimated Average Glucose Result: 5.8 % (01-11-24 @ 08:00)    Glucose:   BP: 127/67 (01-28-24 @ 20:36) (117/63 - 127/67)Vital Signs Last 24 Hrs  T(C): 36.7 (01-30-24 @ 08:00), Max: 36.7 (01-30-24 @ 08:00)  T(F): 98.1 (01-30-24 @ 08:00), Max: 98.1 (01-30-24 @ 08:00)  HR: --  BP: --  BP(mean): --  RR: --  SpO2: --    Orthostatic VS  01-30-24 @ 08:00  Lying BP: --/-- HR: --  Sitting BP: 149/91 HR: 89  Standing BP: 115/88 HR: 95  Site: --  Mode: --  Orthostatic VS  01-29-24 @ 20:40  Lying BP: --/-- HR: --  Sitting BP: 131/66 HR: 84  Standing BP: --/-- HR: --  Site: --  Mode: --  Orthostatic VS  01-29-24 @ 07:32  Lying BP: --/-- HR: --  Sitting BP: 154/91 HR: 81  Standing BP: 158/85 HR: 85  Site: upper left arm  Mode: electronic    Lipid Panel: Date/Time: 05-11-23 @ 08:36  Cholesterol, Serum: 175  LDL Cholesterol Calculated: 97  HDL Cholesterol, Serum: 38  Total Cholesterol/HDL Ration Measurement: --  Triglycerides, Serum: 198  
BMI: BMI (kg/m2): 40.4 (01-10-24 @ 09:02)  HbA1c: A1C with Estimated Average Glucose Result: 5.8 % (01-11-24 @ 08:00)    Glucose:   BP: 123/65 (01-22-24 @ 20:58) (120/63 - 125/75)Vital Signs Last 24 Hrs  T(C): 35.6 (01-22-24 @ 20:58), Max: 35.6 (01-22-24 @ 20:58)  T(F): 96 (01-22-24 @ 20:58), Max: 96 (01-22-24 @ 20:58)  HR: 79 (01-22-24 @ 20:58) (79 - 79)  BP: 123/65 (01-22-24 @ 20:58) (123/65 - 123/65)  BP(mean): --  RR: 18 (01-22-24 @ 20:58) (18 - 18)  SpO2: 96% (01-22-24 @ 20:58) (96% - 96%)    Orthostatic VS  01-22-24 @ 07:46  Lying BP: --/-- HR: --  Sitting BP: 156/82 HR: 75  Standing BP: 159/87 HR: 80  Site: --  Mode: --    Lipid Panel: Date/Time: 05-11-23 @ 08:36  Cholesterol, Serum: 175  LDL Cholesterol Calculated: 97  HDL Cholesterol, Serum: 38  Total Cholesterol/HDL Ration Measurement: --  Triglycerides, Serum: 198  
BMI: BMI (kg/m2): 40.4 (01-10-24 @ 09:02)  HbA1c: A1C with Estimated Average Glucose Result: 5.8 % (01-11-24 @ 08:00)    Glucose:   BP: 123/62 (01-10-24 @ 20:41) (111/67 - 133/63)Vital Signs Last 24 Hrs  T(C): 36.8 (01-12-24 @ 07:54), Max: 36.9 (01-11-24 @ 20:24)  T(F): 98.2 (01-12-24 @ 07:54), Max: 98.4 (01-11-24 @ 20:24)  HR: --  BP: --  BP(mean): --  RR: --  SpO2: 96% (01-11-24 @ 20:24) (96% - 96%)    Orthostatic VS  01-12-24 @ 07:54  Lying BP: --/-- HR: --  Sitting BP: 127/70 HR: 85  Standing BP: 135/69 HR: 85  Site: upper left arm  Mode: electronic  Orthostatic VS  01-11-24 @ 20:24  Lying BP: --/-- HR: --  Sitting BP: 134/71 HR: 81  Standing BP: --/-- HR: --  Site: --  Mode: --  Orthostatic VS  01-11-24 @ 05:53  Lying BP: 137/82 HR: 76  Sitting BP: 137/71 HR: 77  Standing BP: --/-- HR: --  Site: --  Mode: --    Lipid Panel: Date/Time: 05-11-23 @ 08:36  Cholesterol, Serum: 175  LDL Cholesterol Calculated: 97  HDL Cholesterol, Serum: 38  Total Cholesterol/HDL Ration Measurement: --  Triglycerides, Serum: 198  
BMI: BMI (kg/m2): 40.4 (01-10-24 @ 09:02)  HbA1c: A1C with Estimated Average Glucose Result: 5.8 % (01-11-24 @ 08:00)    Glucose:   BP: 152/73 (01-12-24 @ 20:38) (152/73 - 152/73)Vital Signs Last 24 Hrs  T(C): 36.8 (01-14-24 @ 07:56), Max: 36.8 (01-14-24 @ 07:56)  T(F): 98.2 (01-14-24 @ 07:56), Max: 98.2 (01-14-24 @ 07:56)  HR: --  BP: --  BP(mean): --  RR: --  SpO2: --    Orthostatic VS  01-14-24 @ 07:56  Lying BP: --/-- HR: --  Sitting BP: 150/70 HR: 82  Standing BP: 154/80 HR: 95  Site: upper left arm  Mode: electronic  Orthostatic VS  01-13-24 @ 06:04  Lying BP: --/-- HR: --  Sitting BP: 142/72 HR: 75  Standing BP: 127/76 HR: 81  Site: --  Mode: --    Lipid Panel: Date/Time: 05-11-23 @ 08:36  Cholesterol, Serum: 175  LDL Cholesterol Calculated: 97  HDL Cholesterol, Serum: 38  Total Cholesterol/HDL Ration Measurement: --  Triglycerides, Serum: 198

## 2024-02-22 NOTE — [facility] INPATIENT PSYCHIATRY PROGRESS NOTE - CURRENT MEDICATION
MEDICATIONS  (STANDING):  atorvastatin 10 milliGRAM(s) Oral at bedtime  Biotene Dry Mouth Oral Rinse 5 milliLiter(s) Swish and Spit three times a day  clonazePAM  Tablet 0.5 milliGRAM(s) Oral <User Schedule>  furosemide    Tablet 20 milliGRAM(s) Oral <User Schedule>  lidocaine   4% Patch 2 Patch Transdermal daily  losartan 100 milliGRAM(s) Oral daily  metoprolol tartrate 50 milliGRAM(s) Oral two times a day  mirtazapine 7.5 milliGRAM(s) Oral at bedtime  multivitamin 1 Tablet(s) Oral daily  nortriptyline 75 milliGRAM(s) Oral at bedtime  pantoprazole    Tablet 40 milliGRAM(s) Oral <User Schedule>  pregabalin 25 milliGRAM(s) Oral <User Schedule>    MEDICATIONS  (PRN):  acetaminophen     Tablet .. 650 milliGRAM(s) Oral every 6 hours PRN Mild Pain (1 - 3), Moderate Pain (4 - 6)  artificial  tears Solution 1 Drop(s) Both EYES every 8 hours PRN dry eye  bismuth subsalicylate Liquid 30 milliLiter(s) Oral every 6 hours PRN nausea  clonazePAM Oral Disintegrating Tablet 0.25 milliGRAM(s) Oral every 8 hours PRN anxiety  ibuprofen  Tablet. 400 milliGRAM(s) Oral every 8 hours PRN Mild Pain (1 - 3), Moderate Pain (4 - 6)  polyethylene glycol 3350 17 Gram(s) Oral two times a day PRN constipation  
MEDICATIONS  (STANDING):  atorvastatin 10 milliGRAM(s) Oral at bedtime  chlorhexidine 0.12% Liquid 15 milliLiter(s) Swish and Spit two times a day  clonazePAM  Tablet 0.5 milliGRAM(s) Oral <User Schedule>  furosemide    Tablet 20 milliGRAM(s) Oral <User Schedule>  lidocaine   4% Patch 2 Patch Transdermal daily  losartan 100 milliGRAM(s) Oral daily  metoprolol tartrate 50 milliGRAM(s) Oral two times a day  mirtazapine 15 milliGRAM(s) Oral at bedtime  nortriptyline 20 milliGRAM(s) Oral at bedtime  pantoprazole    Tablet 40 milliGRAM(s) Oral <User Schedule>  pregabalin 25 milliGRAM(s) Oral <User Schedule>    MEDICATIONS  (PRN):  acetaminophen     Tablet .. 650 milliGRAM(s) Oral every 6 hours PRN Mild Pain (1 - 3), Moderate Pain (4 - 6)  artificial  tears Solution 1 Drop(s) Both EYES every 8 hours PRN dry eye  bismuth subsalicylate Liquid 30 milliLiter(s) Oral every 6 hours PRN nausea  clonazePAM Oral Disintegrating Tablet 0.25 milliGRAM(s) Oral every 8 hours PRN anxiety  ibuprofen  Tablet. 400 milliGRAM(s) Oral every 8 hours PRN Mild Pain (1 - 3), Moderate Pain (4 - 6)  
MEDICATIONS  (STANDING):  atorvastatin 10 milliGRAM(s) Oral at bedtime  clonazePAM  Tablet 0.5 milliGRAM(s) Oral <User Schedule>  furosemide    Tablet 20 milliGRAM(s) Oral <User Schedule>  lidocaine   4% Patch 2 Patch Transdermal daily  losartan 100 milliGRAM(s) Oral daily  metoprolol tartrate 50 milliGRAM(s) Oral two times a day  mirtazapine 30 milliGRAM(s) Oral at bedtime  nortriptyline 10 milliGRAM(s) Oral at bedtime  pantoprazole    Tablet 40 milliGRAM(s) Oral before breakfast  pregabalin 25 milliGRAM(s) Oral <User Schedule>    MEDICATIONS  (PRN):  acetaminophen     Tablet .. 650 milliGRAM(s) Oral every 6 hours PRN Mild Pain (1 - 3), Moderate Pain (4 - 6)  artificial  tears Solution 1 Drop(s) Both EYES every 8 hours PRN dry eye  bismuth subsalicylate Liquid 30 milliLiter(s) Oral every 6 hours PRN nausea  clonazePAM Oral Disintegrating Tablet 0.25 milliGRAM(s) Oral every 8 hours PRN anxiety  ibuprofen  Tablet. 400 milliGRAM(s) Oral every 8 hours PRN Mild Pain (1 - 3), Moderate Pain (4 - 6)  
MEDICATIONS  (STANDING):  atorvastatin 10 milliGRAM(s) Oral at bedtime  clonazePAM  Tablet 0.5 milliGRAM(s) Oral <User Schedule>  losartan 100 milliGRAM(s) Oral daily  metoprolol tartrate 50 milliGRAM(s) Oral two times a day  mirtazapine 45 milliGRAM(s) Oral at bedtime  pantoprazole    Tablet 40 milliGRAM(s) Oral before breakfast  pregabalin 25 milliGRAM(s) Oral three times a day  venlafaxine XR 75 milliGRAM(s) Oral daily    MEDICATIONS  (PRN):  acetaminophen     Tablet .. 650 milliGRAM(s) Oral every 6 hours PRN Mild Pain (1 - 3), Moderate Pain (4 - 6)  artificial  tears Solution 1 Drop(s) Both EYES every 8 hours PRN dry eye  bismuth subsalicylate Liquid 30 milliLiter(s) Oral every 6 hours PRN nausea  clonazePAM Oral Disintegrating Tablet 0.25 milliGRAM(s) Oral every 8 hours PRN anxiety  
MEDICATIONS  (STANDING):  atorvastatin 10 milliGRAM(s) Oral at bedtime  Biotene Dry Mouth Oral Rinse 5 milliLiter(s) Swish and Spit three times a day  clonazePAM  Tablet 0.5 milliGRAM(s) Oral <User Schedule>  furosemide    Tablet 20 milliGRAM(s) Oral <User Schedule>  lidocaine   4% Patch 2 Patch Transdermal daily  losartan 100 milliGRAM(s) Oral daily  metoprolol tartrate 50 milliGRAM(s) Oral two times a day  mirtazapine 7.5 milliGRAM(s) Oral at bedtime  nortriptyline 60 milliGRAM(s) Oral at bedtime  pantoprazole    Tablet 40 milliGRAM(s) Oral <User Schedule>  pregabalin 25 milliGRAM(s) Oral <User Schedule>    MEDICATIONS  (PRN):  acetaminophen     Tablet .. 650 milliGRAM(s) Oral every 6 hours PRN Mild Pain (1 - 3), Moderate Pain (4 - 6)  artificial  tears Solution 1 Drop(s) Both EYES every 8 hours PRN dry eye  bismuth subsalicylate Liquid 30 milliLiter(s) Oral every 6 hours PRN nausea  clonazePAM Oral Disintegrating Tablet 0.25 milliGRAM(s) Oral every 8 hours PRN anxiety  ibuprofen  Tablet. 400 milliGRAM(s) Oral every 8 hours PRN Mild Pain (1 - 3), Moderate Pain (4 - 6)  polyethylene glycol 3350 17 Gram(s) Oral two times a day PRN constipation  
MEDICATIONS  (STANDING):  atorvastatin 10 milliGRAM(s) Oral at bedtime  chlorhexidine 0.12% Liquid 15 milliLiter(s) Swish and Spit two times a day  clonazePAM  Tablet 0.5 milliGRAM(s) Oral <User Schedule>  furosemide    Tablet 20 milliGRAM(s) Oral <User Schedule>  lidocaine   4% Patch 2 Patch Transdermal daily  losartan 100 milliGRAM(s) Oral daily  metoprolol tartrate 50 milliGRAM(s) Oral two times a day  mirtazapine 15 milliGRAM(s) Oral at bedtime  nortriptyline 35 milliGRAM(s) Oral at bedtime  pantoprazole    Tablet 40 milliGRAM(s) Oral <User Schedule>  pregabalin 25 milliGRAM(s) Oral <User Schedule>    MEDICATIONS  (PRN):  acetaminophen     Tablet .. 650 milliGRAM(s) Oral every 6 hours PRN Mild Pain (1 - 3), Moderate Pain (4 - 6)  artificial  tears Solution 1 Drop(s) Both EYES every 8 hours PRN dry eye  bismuth subsalicylate Liquid 30 milliLiter(s) Oral every 6 hours PRN nausea  clonazePAM Oral Disintegrating Tablet 0.25 milliGRAM(s) Oral every 8 hours PRN anxiety  ibuprofen  Tablet. 400 milliGRAM(s) Oral every 8 hours PRN Mild Pain (1 - 3), Moderate Pain (4 - 6)  
MEDICATIONS  (STANDING):  atorvastatin 10 milliGRAM(s) Oral at bedtime  clonazePAM  Tablet 0.5 milliGRAM(s) Oral <User Schedule>  losartan 100 milliGRAM(s) Oral daily  metoprolol tartrate 50 milliGRAM(s) Oral two times a day  mirtazapine 45 milliGRAM(s) Oral at bedtime  pantoprazole    Tablet 40 milliGRAM(s) Oral before breakfast  pregabalin 50 milliGRAM(s) Oral <User Schedule>  pregabalin 25 milliGRAM(s) Oral <User Schedule>  venlafaxine XR 75 milliGRAM(s) Oral daily    MEDICATIONS  (PRN):  acetaminophen     Tablet .. 650 milliGRAM(s) Oral every 6 hours PRN Mild Pain (1 - 3), Moderate Pain (4 - 6)  artificial  tears Solution 1 Drop(s) Both EYES every 8 hours PRN dry eye  bismuth subsalicylate Liquid 30 milliLiter(s) Oral every 6 hours PRN nausea  clonazePAM Oral Disintegrating Tablet 0.25 milliGRAM(s) Oral every 8 hours PRN anxiety  
MEDICATIONS  (STANDING):  atorvastatin 10 milliGRAM(s) Oral at bedtime  Biotene Dry Mouth Oral Rinse 5 milliLiter(s) Swish and Spit three times a day  clonazePAM  Tablet 0.5 milliGRAM(s) Oral <User Schedule>  furosemide    Tablet 20 milliGRAM(s) Oral <User Schedule>  lidocaine   4% Patch 2 Patch Transdermal daily  losartan 100 milliGRAM(s) Oral daily  metoprolol tartrate 50 milliGRAM(s) Oral two times a day  mirtazapine 7.5 milliGRAM(s) Oral at bedtime  multivitamin 1 Tablet(s) Oral daily  nortriptyline 60 milliGRAM(s) Oral at bedtime  pantoprazole    Tablet 40 milliGRAM(s) Oral <User Schedule>  pregabalin 25 milliGRAM(s) Oral <User Schedule>    MEDICATIONS  (PRN):  acetaminophen     Tablet .. 650 milliGRAM(s) Oral every 6 hours PRN Mild Pain (1 - 3), Moderate Pain (4 - 6)  artificial  tears Solution 1 Drop(s) Both EYES every 8 hours PRN dry eye  bismuth subsalicylate Liquid 30 milliLiter(s) Oral every 6 hours PRN nausea  clonazePAM Oral Disintegrating Tablet 0.25 milliGRAM(s) Oral every 8 hours PRN anxiety  ibuprofen  Tablet. 400 milliGRAM(s) Oral every 8 hours PRN Mild Pain (1 - 3), Moderate Pain (4 - 6)  polyethylene glycol 3350 17 Gram(s) Oral two times a day PRN constipation  
MEDICATIONS  (STANDING):  atorvastatin 10 milliGRAM(s) Oral at bedtime  chlorhexidine 0.12% Liquid 15 milliLiter(s) Swish and Spit two times a day  clonazePAM  Tablet 0.5 milliGRAM(s) Oral <User Schedule>  furosemide    Tablet 20 milliGRAM(s) Oral <User Schedule>  lidocaine   4% Patch 2 Patch Transdermal daily  losartan 100 milliGRAM(s) Oral daily  metoprolol tartrate 50 milliGRAM(s) Oral two times a day  mirtazapine 7.5 milliGRAM(s) Oral at bedtime  nortriptyline 40 milliGRAM(s) Oral at bedtime  pantoprazole    Tablet 40 milliGRAM(s) Oral <User Schedule>  pregabalin 25 milliGRAM(s) Oral <User Schedule>    MEDICATIONS  (PRN):  acetaminophen     Tablet .. 650 milliGRAM(s) Oral every 6 hours PRN Mild Pain (1 - 3), Moderate Pain (4 - 6)  artificial  tears Solution 1 Drop(s) Both EYES every 8 hours PRN dry eye  bismuth subsalicylate Liquid 30 milliLiter(s) Oral every 6 hours PRN nausea  clonazePAM Oral Disintegrating Tablet 0.25 milliGRAM(s) Oral every 8 hours PRN anxiety  ibuprofen  Tablet. 400 milliGRAM(s) Oral every 8 hours PRN Mild Pain (1 - 3), Moderate Pain (4 - 6)  
MEDICATIONS  (STANDING):  atorvastatin 10 milliGRAM(s) Oral at bedtime  clonazePAM  Tablet 0.5 milliGRAM(s) Oral <User Schedule>  furosemide    Tablet 20 milliGRAM(s) Oral <User Schedule>  lidocaine   4% Patch 2 Patch Transdermal daily  losartan 100 milliGRAM(s) Oral daily  metoprolol tartrate 50 milliGRAM(s) Oral two times a day  mirtazapine 7.5 milliGRAM(s) Oral at bedtime  nortriptyline 50 milliGRAM(s) Oral at bedtime  pantoprazole    Tablet 40 milliGRAM(s) Oral <User Schedule>  pregabalin 25 milliGRAM(s) Oral <User Schedule>    MEDICATIONS  (PRN):  acetaminophen     Tablet .. 650 milliGRAM(s) Oral every 6 hours PRN Mild Pain (1 - 3), Moderate Pain (4 - 6)  artificial  tears Solution 1 Drop(s) Both EYES every 8 hours PRN dry eye  bismuth subsalicylate Liquid 30 milliLiter(s) Oral every 6 hours PRN nausea  clonazePAM Oral Disintegrating Tablet 0.25 milliGRAM(s) Oral every 8 hours PRN anxiety  ibuprofen  Tablet. 400 milliGRAM(s) Oral every 8 hours PRN Mild Pain (1 - 3), Moderate Pain (4 - 6)  
MEDICATIONS  (STANDING):  atorvastatin 10 milliGRAM(s) Oral at bedtime  clonazePAM  Tablet 0.5 milliGRAM(s) Oral <User Schedule>  furosemide    Tablet 20 milliGRAM(s) Oral <User Schedule>  lidocaine   4% Patch 2 Patch Transdermal daily  losartan 100 milliGRAM(s) Oral daily  metoprolol tartrate 50 milliGRAM(s) Oral two times a day  mirtazapine 30 milliGRAM(s) Oral at bedtime  nortriptyline 10 milliGRAM(s) Oral at bedtime  pantoprazole    Tablet 40 milliGRAM(s) Oral before breakfast  pregabalin 25 milliGRAM(s) Oral <User Schedule>    MEDICATIONS  (PRN):  acetaminophen     Tablet .. 650 milliGRAM(s) Oral every 6 hours PRN Mild Pain (1 - 3), Moderate Pain (4 - 6)  artificial  tears Solution 1 Drop(s) Both EYES every 8 hours PRN dry eye  bismuth subsalicylate Liquid 30 milliLiter(s) Oral every 6 hours PRN nausea  clonazePAM Oral Disintegrating Tablet 0.25 milliGRAM(s) Oral every 8 hours PRN anxiety  ibuprofen  Tablet. 400 milliGRAM(s) Oral every 8 hours PRN Mild Pain (1 - 3), Moderate Pain (4 - 6)  
MEDICATIONS  (STANDING):  atorvastatin 10 milliGRAM(s) Oral at bedtime  clonazePAM  Tablet 0.5 milliGRAM(s) Oral <User Schedule>  losartan 100 milliGRAM(s) Oral daily  metoprolol tartrate 50 milliGRAM(s) Oral two times a day  mirtazapine 45 milliGRAM(s) Oral at bedtime  pantoprazole    Tablet 40 milliGRAM(s) Oral before breakfast  pregabalin 25 milliGRAM(s) Oral at bedtime  venlafaxine XR 37.5 milliGRAM(s) Oral daily    MEDICATIONS  (PRN):  acetaminophen     Tablet .. 650 milliGRAM(s) Oral every 6 hours PRN Mild Pain (1 - 3), Moderate Pain (4 - 6)  artificial  tears Solution 1 Drop(s) Both EYES every 8 hours PRN dry eye  bismuth subsalicylate Liquid 30 milliLiter(s) Oral every 6 hours PRN nausea  clonazePAM Oral Disintegrating Tablet 0.25 milliGRAM(s) Oral every 8 hours PRN anxiety  
MEDICATIONS  (STANDING):  atorvastatin 10 milliGRAM(s) Oral at bedtime  chlorhexidine 0.12% Liquid 15 milliLiter(s) Swish and Spit two times a day  clonazePAM  Tablet 0.5 milliGRAM(s) Oral <User Schedule>  furosemide    Tablet 20 milliGRAM(s) Oral <User Schedule>  lidocaine   4% Patch 2 Patch Transdermal daily  losartan 100 milliGRAM(s) Oral daily  metoprolol tartrate 50 milliGRAM(s) Oral two times a day  mirtazapine 15 milliGRAM(s) Oral at bedtime  nortriptyline 20 milliGRAM(s) Oral at bedtime  pantoprazole    Tablet 40 milliGRAM(s) Oral <User Schedule>  pregabalin 25 milliGRAM(s) Oral <User Schedule>    MEDICATIONS  (PRN):  acetaminophen     Tablet .. 650 milliGRAM(s) Oral every 6 hours PRN Mild Pain (1 - 3), Moderate Pain (4 - 6)  artificial  tears Solution 1 Drop(s) Both EYES every 8 hours PRN dry eye  bismuth subsalicylate Liquid 30 milliLiter(s) Oral every 6 hours PRN nausea  clonazePAM Oral Disintegrating Tablet 0.25 milliGRAM(s) Oral every 8 hours PRN anxiety  ibuprofen  Tablet. 400 milliGRAM(s) Oral every 8 hours PRN Mild Pain (1 - 3), Moderate Pain (4 - 6)  
MEDICATIONS  (STANDING):  atorvastatin 10 milliGRAM(s) Oral at bedtime  clonazePAM  Tablet 0.5 milliGRAM(s) Oral <User Schedule>  furosemide    Tablet 20 milliGRAM(s) Oral <User Schedule>  losartan 100 milliGRAM(s) Oral daily  metoprolol tartrate 50 milliGRAM(s) Oral two times a day  mirtazapine 45 milliGRAM(s) Oral at bedtime  pantoprazole    Tablet 40 milliGRAM(s) Oral before breakfast  pregabalin 50 milliGRAM(s) Oral at bedtime  pregabalin 25 milliGRAM(s) Oral <User Schedule>  venlafaxine 25 milliGRAM(s) Oral daily    MEDICATIONS  (PRN):  acetaminophen     Tablet .. 650 milliGRAM(s) Oral every 6 hours PRN Mild Pain (1 - 3), Moderate Pain (4 - 6)  artificial  tears Solution 1 Drop(s) Both EYES every 8 hours PRN dry eye  bismuth subsalicylate Liquid 30 milliLiter(s) Oral every 6 hours PRN nausea  clonazePAM Oral Disintegrating Tablet 0.25 milliGRAM(s) Oral every 8 hours PRN anxiety  
MEDICATIONS  (STANDING):  atorvastatin 10 milliGRAM(s) Oral at bedtime  clonazePAM  Tablet 0.5 milliGRAM(s) Oral <User Schedule>  furosemide    Tablet 20 milliGRAM(s) Oral <User Schedule>  losartan 100 milliGRAM(s) Oral daily  metoprolol tartrate 50 milliGRAM(s) Oral two times a day  mirtazapine 45 milliGRAM(s) Oral at bedtime  pantoprazole    Tablet 40 milliGRAM(s) Oral before breakfast  pregabalin 50 milliGRAM(s) Oral at bedtime  pregabalin 25 milliGRAM(s) Oral <User Schedule>  venlafaxine 25 milliGRAM(s) Oral daily    MEDICATIONS  (PRN):  acetaminophen     Tablet .. 650 milliGRAM(s) Oral every 6 hours PRN Mild Pain (1 - 3), Moderate Pain (4 - 6)  artificial  tears Solution 1 Drop(s) Both EYES every 8 hours PRN dry eye  bismuth subsalicylate Liquid 30 milliLiter(s) Oral every 6 hours PRN nausea  clonazePAM Oral Disintegrating Tablet 0.25 milliGRAM(s) Oral every 8 hours PRN anxiety  ibuprofen  Tablet. 400 milliGRAM(s) Oral every 8 hours PRN Mild Pain (1 - 3), Moderate Pain (4 - 6)  
MEDICATIONS  (STANDING):  atorvastatin 10 milliGRAM(s) Oral at bedtime  chlorhexidine 0.12% Liquid 15 milliLiter(s) Swish and Spit two times a day  clonazePAM  Tablet 0.5 milliGRAM(s) Oral <User Schedule>  furosemide    Tablet 20 milliGRAM(s) Oral <User Schedule>  lidocaine   4% Patch 2 Patch Transdermal daily  losartan 100 milliGRAM(s) Oral daily  metoprolol tartrate 50 milliGRAM(s) Oral two times a day  mirtazapine 7.5 milliGRAM(s) Oral at bedtime  nortriptyline 40 milliGRAM(s) Oral at bedtime  pantoprazole    Tablet 40 milliGRAM(s) Oral <User Schedule>  pregabalin 25 milliGRAM(s) Oral <User Schedule>    MEDICATIONS  (PRN):  acetaminophen     Tablet .. 650 milliGRAM(s) Oral every 6 hours PRN Mild Pain (1 - 3), Moderate Pain (4 - 6)  artificial  tears Solution 1 Drop(s) Both EYES every 8 hours PRN dry eye  bismuth subsalicylate Liquid 30 milliLiter(s) Oral every 6 hours PRN nausea  clonazePAM Oral Disintegrating Tablet 0.25 milliGRAM(s) Oral every 8 hours PRN anxiety  ibuprofen  Tablet. 400 milliGRAM(s) Oral every 8 hours PRN Mild Pain (1 - 3), Moderate Pain (4 - 6)  
MEDICATIONS  (STANDING):  atorvastatin 10 milliGRAM(s) Oral at bedtime  chlorhexidine 0.12% Liquid 15 milliLiter(s) Swish and Spit two times a day  clonazePAM  Tablet 0.5 milliGRAM(s) Oral <User Schedule>  furosemide    Tablet 20 milliGRAM(s) Oral <User Schedule>  lidocaine   4% Patch 2 Patch Transdermal daily  losartan 100 milliGRAM(s) Oral daily  metoprolol tartrate 50 milliGRAM(s) Oral two times a day  mirtazapine 7.5 milliGRAM(s) Oral at bedtime  nortriptyline 40 milliGRAM(s) Oral at bedtime  pantoprazole    Tablet 40 milliGRAM(s) Oral <User Schedule>  pregabalin 25 milliGRAM(s) Oral <User Schedule>    MEDICATIONS  (PRN):  acetaminophen     Tablet .. 650 milliGRAM(s) Oral every 6 hours PRN Mild Pain (1 - 3), Moderate Pain (4 - 6)  artificial  tears Solution 1 Drop(s) Both EYES every 8 hours PRN dry eye  bismuth subsalicylate Liquid 30 milliLiter(s) Oral every 6 hours PRN nausea  clonazePAM Oral Disintegrating Tablet 0.25 milliGRAM(s) Oral every 8 hours PRN anxiety  ibuprofen  Tablet. 400 milliGRAM(s) Oral every 8 hours PRN Mild Pain (1 - 3), Moderate Pain (4 - 6)  
MEDICATIONS  (STANDING):  atorvastatin 10 milliGRAM(s) Oral at bedtime  clonazePAM  Tablet 0.5 milliGRAM(s) Oral <User Schedule>  losartan 100 milliGRAM(s) Oral daily  metoprolol tartrate 50 milliGRAM(s) Oral two times a day  mirtazapine 45 milliGRAM(s) Oral at bedtime  pantoprazole    Tablet 40 milliGRAM(s) Oral before breakfast  pregabalin 25 milliGRAM(s) Oral <User Schedule>  pregabalin 50 milliGRAM(s) Oral <User Schedule>  venlafaxine XR 75 milliGRAM(s) Oral daily    MEDICATIONS  (PRN):  acetaminophen     Tablet .. 650 milliGRAM(s) Oral every 6 hours PRN Mild Pain (1 - 3), Moderate Pain (4 - 6)  artificial  tears Solution 1 Drop(s) Both EYES every 8 hours PRN dry eye  bismuth subsalicylate Liquid 30 milliLiter(s) Oral every 6 hours PRN nausea  clonazePAM Oral Disintegrating Tablet 0.25 milliGRAM(s) Oral every 8 hours PRN anxiety  
MEDICATIONS  (STANDING):  atorvastatin 10 milliGRAM(s) Oral at bedtime  clonazePAM  Tablet 0.5 milliGRAM(s) Oral <User Schedule>  losartan 100 milliGRAM(s) Oral daily  metoprolol tartrate 50 milliGRAM(s) Oral two times a day  mirtazapine 45 milliGRAM(s) Oral at bedtime  pantoprazole    Tablet 40 milliGRAM(s) Oral before breakfast  pregabalin 25 milliGRAM(s) Oral <User Schedule>  venlafaxine XR 75 milliGRAM(s) Oral daily    MEDICATIONS  (PRN):  acetaminophen     Tablet .. 650 milliGRAM(s) Oral every 6 hours PRN Mild Pain (1 - 3), Moderate Pain (4 - 6)  artificial  tears Solution 1 Drop(s) Both EYES every 8 hours PRN dry eye  bismuth subsalicylate Liquid 30 milliLiter(s) Oral every 6 hours PRN nausea  clonazePAM Oral Disintegrating Tablet 0.25 milliGRAM(s) Oral every 8 hours PRN anxiety  
MEDICATIONS  (STANDING):  atorvastatin 10 milliGRAM(s) Oral at bedtime  clonazePAM  Tablet 0.5 milliGRAM(s) Oral <User Schedule>  losartan 100 milliGRAM(s) Oral daily  metoprolol tartrate 50 milliGRAM(s) Oral two times a day  mirtazapine 45 milliGRAM(s) Oral at bedtime  pantoprazole    Tablet 40 milliGRAM(s) Oral before breakfast  pregabalin 50 milliGRAM(s) Oral <User Schedule>  pregabalin 25 milliGRAM(s) Oral <User Schedule>  venlafaxine XR 75 milliGRAM(s) Oral daily    MEDICATIONS  (PRN):  acetaminophen     Tablet .. 650 milliGRAM(s) Oral every 6 hours PRN Mild Pain (1 - 3), Moderate Pain (4 - 6)  artificial  tears Solution 1 Drop(s) Both EYES every 8 hours PRN dry eye  bismuth subsalicylate Liquid 30 milliLiter(s) Oral every 6 hours PRN nausea  clonazePAM Oral Disintegrating Tablet 0.25 milliGRAM(s) Oral every 8 hours PRN anxiety  
MEDICATIONS  (STANDING):  atorvastatin 10 milliGRAM(s) Oral at bedtime  Biotene Dry Mouth Oral Rinse 5 milliLiter(s) Swish and Spit three times a day  clonazePAM  Tablet 0.5 milliGRAM(s) Oral <User Schedule>  furosemide    Tablet 20 milliGRAM(s) Oral <User Schedule>  lidocaine   4% Patch 2 Patch Transdermal daily  losartan 100 milliGRAM(s) Oral daily  metoprolol tartrate 50 milliGRAM(s) Oral two times a day  mirtazapine 7.5 milliGRAM(s) Oral at bedtime  multivitamin 1 Tablet(s) Oral daily  nortriptyline 75 milliGRAM(s) Oral at bedtime  pantoprazole    Tablet 40 milliGRAM(s) Oral <User Schedule>  pregabalin 25 milliGRAM(s) Oral <User Schedule>    MEDICATIONS  (PRN):  acetaminophen     Tablet .. 650 milliGRAM(s) Oral every 6 hours PRN Mild Pain (1 - 3), Moderate Pain (4 - 6)  artificial  tears Solution 1 Drop(s) Both EYES every 8 hours PRN dry eye  bismuth subsalicylate Liquid 30 milliLiter(s) Oral every 6 hours PRN nausea  clonazePAM Oral Disintegrating Tablet 0.25 milliGRAM(s) Oral every 8 hours PRN anxiety  ibuprofen  Tablet. 400 milliGRAM(s) Oral every 8 hours PRN Mild Pain (1 - 3), Moderate Pain (4 - 6)  polyethylene glycol 3350 17 Gram(s) Oral two times a day PRN constipation  
MEDICATIONS  (STANDING):  atorvastatin 10 milliGRAM(s) Oral at bedtime  Biotene Dry Mouth Oral Rinse 5 milliLiter(s) Swish and Spit three times a day  clonazePAM  Tablet 0.5 milliGRAM(s) Oral <User Schedule>  furosemide    Tablet 20 milliGRAM(s) Oral <User Schedule>  lidocaine   4% Patch 2 Patch Transdermal daily  losartan 100 milliGRAM(s) Oral daily  metoprolol tartrate 50 milliGRAM(s) Oral two times a day  mirtazapine 7.5 milliGRAM(s) Oral at bedtime  multivitamin 1 Tablet(s) Oral daily  nortriptyline 75 milliGRAM(s) Oral at bedtime  pantoprazole    Tablet 40 milliGRAM(s) Oral <User Schedule>  pregabalin 25 milliGRAM(s) Oral <User Schedule>    MEDICATIONS  (PRN):  acetaminophen     Tablet .. 650 milliGRAM(s) Oral every 6 hours PRN Mild Pain (1 - 3), Moderate Pain (4 - 6)  artificial  tears Solution 1 Drop(s) Both EYES every 8 hours PRN dry eye  bismuth subsalicylate Liquid 30 milliLiter(s) Oral every 6 hours PRN nausea  clonazePAM Oral Disintegrating Tablet 0.25 milliGRAM(s) Oral every 8 hours PRN anxiety  ibuprofen  Tablet. 400 milliGRAM(s) Oral every 8 hours PRN Mild Pain (1 - 3), Moderate Pain (4 - 6)  polyethylene glycol 3350 17 Gram(s) Oral two times a day PRN constipation  
MEDICATIONS  (STANDING):  atorvastatin 10 milliGRAM(s) Oral at bedtime  chlorhexidine 0.12% Liquid 15 milliLiter(s) Swish and Spit two times a day  clonazePAM  Tablet 0.5 milliGRAM(s) Oral <User Schedule>  furosemide    Tablet 20 milliGRAM(s) Oral <User Schedule>  lidocaine   4% Patch 2 Patch Transdermal daily  losartan 100 milliGRAM(s) Oral daily  metoprolol tartrate 50 milliGRAM(s) Oral two times a day  mirtazapine 15 milliGRAM(s) Oral at bedtime  nortriptyline 30 milliGRAM(s) Oral at bedtime  pantoprazole    Tablet 40 milliGRAM(s) Oral <User Schedule>  pregabalin 25 milliGRAM(s) Oral <User Schedule>    MEDICATIONS  (PRN):  acetaminophen     Tablet .. 650 milliGRAM(s) Oral every 6 hours PRN Mild Pain (1 - 3), Moderate Pain (4 - 6)  artificial  tears Solution 1 Drop(s) Both EYES every 8 hours PRN dry eye  bismuth subsalicylate Liquid 30 milliLiter(s) Oral every 6 hours PRN nausea  clonazePAM Oral Disintegrating Tablet 0.25 milliGRAM(s) Oral every 8 hours PRN anxiety  ibuprofen  Tablet. 400 milliGRAM(s) Oral every 8 hours PRN Mild Pain (1 - 3), Moderate Pain (4 - 6)  
MEDICATIONS  (STANDING):  atorvastatin 10 milliGRAM(s) Oral at bedtime  clonazePAM  Tablet 0.5 milliGRAM(s) Oral <User Schedule>  furosemide    Tablet 20 milliGRAM(s) Oral <User Schedule>  lidocaine   4% Patch 2 Patch Transdermal daily  losartan 100 milliGRAM(s) Oral daily  metoprolol tartrate 50 milliGRAM(s) Oral two times a day  mirtazapine 45 milliGRAM(s) Oral at bedtime  pantoprazole    Tablet 40 milliGRAM(s) Oral before breakfast  pregabalin 50 milliGRAM(s) Oral at bedtime  pregabalin 25 milliGRAM(s) Oral <User Schedule>    MEDICATIONS  (PRN):  acetaminophen     Tablet .. 650 milliGRAM(s) Oral every 6 hours PRN Mild Pain (1 - 3), Moderate Pain (4 - 6)  artificial  tears Solution 1 Drop(s) Both EYES every 8 hours PRN dry eye  bismuth subsalicylate Liquid 30 milliLiter(s) Oral every 6 hours PRN nausea  clonazePAM Oral Disintegrating Tablet 0.25 milliGRAM(s) Oral every 8 hours PRN anxiety  ibuprofen  Tablet. 400 milliGRAM(s) Oral every 8 hours PRN Mild Pain (1 - 3), Moderate Pain (4 - 6)  
MEDICATIONS  (STANDING):  atorvastatin 10 milliGRAM(s) Oral at bedtime  clonazePAM  Tablet 0.5 milliGRAM(s) Oral <User Schedule>  furosemide    Tablet 20 milliGRAM(s) Oral <User Schedule>  lidocaine   4% Patch 2 Patch Transdermal daily  losartan 100 milliGRAM(s) Oral daily  metoprolol tartrate 50 milliGRAM(s) Oral two times a day  mirtazapine 45 milliGRAM(s) Oral at bedtime  pantoprazole    Tablet 40 milliGRAM(s) Oral before breakfast  pregabalin 50 milliGRAM(s) Oral at bedtime  pregabalin 25 milliGRAM(s) Oral <User Schedule>  venlafaxine 25 milliGRAM(s) Oral daily    MEDICATIONS  (PRN):  acetaminophen     Tablet .. 650 milliGRAM(s) Oral every 6 hours PRN Mild Pain (1 - 3), Moderate Pain (4 - 6)  artificial  tears Solution 1 Drop(s) Both EYES every 8 hours PRN dry eye  bismuth subsalicylate Liquid 30 milliLiter(s) Oral every 6 hours PRN nausea  clonazePAM Oral Disintegrating Tablet 0.25 milliGRAM(s) Oral every 8 hours PRN anxiety  ibuprofen  Tablet. 400 milliGRAM(s) Oral every 8 hours PRN Mild Pain (1 - 3), Moderate Pain (4 - 6)  
MEDICATIONS  (STANDING):  atorvastatin 10 milliGRAM(s) Oral at bedtime  clonazePAM  Tablet 0.5 milliGRAM(s) Oral <User Schedule>  losartan 100 milliGRAM(s) Oral daily  metoprolol tartrate 50 milliGRAM(s) Oral two times a day  mirtazapine 45 milliGRAM(s) Oral at bedtime  pantoprazole    Tablet 40 milliGRAM(s) Oral before breakfast  pregabalin 25 milliGRAM(s) Oral <User Schedule>  venlafaxine XR 37.5 milliGRAM(s) Oral daily    MEDICATIONS  (PRN):  acetaminophen     Tablet .. 650 milliGRAM(s) Oral every 6 hours PRN Mild Pain (1 - 3), Moderate Pain (4 - 6)  artificial  tears Solution 1 Drop(s) Both EYES every 8 hours PRN dry eye  bismuth subsalicylate Liquid 30 milliLiter(s) Oral every 6 hours PRN nausea  clonazePAM Oral Disintegrating Tablet 0.25 milliGRAM(s) Oral every 8 hours PRN anxiety  
MEDICATIONS  (STANDING):  atorvastatin 10 milliGRAM(s) Oral at bedtime  chlorhexidine 0.12% Liquid 15 milliLiter(s) Swish and Spit two times a day  clonazePAM  Tablet 0.5 milliGRAM(s) Oral <User Schedule>  furosemide    Tablet 20 milliGRAM(s) Oral <User Schedule>  lidocaine   4% Patch 2 Patch Transdermal daily  losartan 100 milliGRAM(s) Oral daily  metoprolol tartrate 50 milliGRAM(s) Oral two times a day  mirtazapine 15 milliGRAM(s) Oral at bedtime  nortriptyline 20 milliGRAM(s) Oral at bedtime  pantoprazole    Tablet 40 milliGRAM(s) Oral <User Schedule>  pregabalin 25 milliGRAM(s) Oral <User Schedule>    MEDICATIONS  (PRN):  acetaminophen     Tablet .. 650 milliGRAM(s) Oral every 6 hours PRN Mild Pain (1 - 3), Moderate Pain (4 - 6)  artificial  tears Solution 1 Drop(s) Both EYES every 8 hours PRN dry eye  bismuth subsalicylate Liquid 30 milliLiter(s) Oral every 6 hours PRN nausea  clonazePAM Oral Disintegrating Tablet 0.25 milliGRAM(s) Oral every 8 hours PRN anxiety  ibuprofen  Tablet. 400 milliGRAM(s) Oral every 8 hours PRN Mild Pain (1 - 3), Moderate Pain (4 - 6)  
MEDICATIONS  (STANDING):  atorvastatin 10 milliGRAM(s) Oral at bedtime  clonazePAM  Tablet 0.5 milliGRAM(s) Oral <User Schedule>  furosemide    Tablet 20 milliGRAM(s) Oral <User Schedule>  losartan 100 milliGRAM(s) Oral daily  metoprolol tartrate 50 milliGRAM(s) Oral two times a day  mirtazapine 45 milliGRAM(s) Oral at bedtime  pantoprazole    Tablet 40 milliGRAM(s) Oral before breakfast  pregabalin 50 milliGRAM(s) Oral at bedtime  pregabalin 25 milliGRAM(s) Oral <User Schedule>  venlafaxine 25 milliGRAM(s) Oral daily    MEDICATIONS  (PRN):  acetaminophen     Tablet .. 650 milliGRAM(s) Oral every 6 hours PRN Mild Pain (1 - 3), Moderate Pain (4 - 6)  artificial  tears Solution 1 Drop(s) Both EYES every 8 hours PRN dry eye  bismuth subsalicylate Liquid 30 milliLiter(s) Oral every 6 hours PRN nausea  clonazePAM Oral Disintegrating Tablet 0.25 milliGRAM(s) Oral every 8 hours PRN anxiety  
MEDICATIONS  (STANDING):  atorvastatin 10 milliGRAM(s) Oral at bedtime  clonazePAM  Tablet 0.5 milliGRAM(s) Oral <User Schedule>  losartan 100 milliGRAM(s) Oral daily  metoprolol tartrate 50 milliGRAM(s) Oral two times a day  mirtazapine 45 milliGRAM(s) Oral at bedtime  pantoprazole    Tablet 40 milliGRAM(s) Oral before breakfast  pregabalin 25 milliGRAM(s) Oral <User Schedule>  venlafaxine XR 37.5 milliGRAM(s) Oral daily    MEDICATIONS  (PRN):  acetaminophen     Tablet .. 650 milliGRAM(s) Oral every 6 hours PRN Mild Pain (1 - 3), Moderate Pain (4 - 6)  artificial  tears Solution 1 Drop(s) Both EYES every 8 hours PRN dry eye  bismuth subsalicylate Liquid 30 milliLiter(s) Oral every 6 hours PRN nausea  clonazePAM Oral Disintegrating Tablet 0.25 milliGRAM(s) Oral every 8 hours PRN anxiety  
MEDICATIONS  (STANDING):  atorvastatin 10 milliGRAM(s) Oral at bedtime  Biotene Dry Mouth Oral Rinse 5 milliLiter(s) Swish and Spit two times a day  clonazePAM  Tablet 0.5 milliGRAM(s) Oral <User Schedule>  furosemide    Tablet 20 milliGRAM(s) Oral <User Schedule>  lidocaine   4% Patch 2 Patch Transdermal daily  losartan 100 milliGRAM(s) Oral daily  metoprolol tartrate 50 milliGRAM(s) Oral two times a day  mirtazapine 7.5 milliGRAM(s) Oral at bedtime  nortriptyline 50 milliGRAM(s) Oral at bedtime  pantoprazole    Tablet 40 milliGRAM(s) Oral <User Schedule>  pregabalin 25 milliGRAM(s) Oral <User Schedule>    MEDICATIONS  (PRN):  acetaminophen     Tablet .. 650 milliGRAM(s) Oral every 6 hours PRN Mild Pain (1 - 3), Moderate Pain (4 - 6)  artificial  tears Solution 1 Drop(s) Both EYES every 8 hours PRN dry eye  bismuth subsalicylate Liquid 30 milliLiter(s) Oral every 6 hours PRN nausea  clonazePAM Oral Disintegrating Tablet 0.25 milliGRAM(s) Oral every 8 hours PRN anxiety  ibuprofen  Tablet. 400 milliGRAM(s) Oral every 8 hours PRN Mild Pain (1 - 3), Moderate Pain (4 - 6)  
MEDICATIONS  (STANDING):  atorvastatin 10 milliGRAM(s) Oral at bedtime  chlorhexidine 0.12% Liquid 15 milliLiter(s) Swish and Spit two times a day  clonazePAM  Tablet 0.5 milliGRAM(s) Oral <User Schedule>  furosemide    Tablet 20 milliGRAM(s) Oral <User Schedule>  lidocaine   4% Patch 2 Patch Transdermal daily  losartan 100 milliGRAM(s) Oral daily  metoprolol tartrate 50 milliGRAM(s) Oral two times a day  mirtazapine 15 milliGRAM(s) Oral at bedtime  nortriptyline 20 milliGRAM(s) Oral at bedtime  pantoprazole    Tablet 40 milliGRAM(s) Oral <User Schedule>  pregabalin 25 milliGRAM(s) Oral <User Schedule>    MEDICATIONS  (PRN):  acetaminophen     Tablet .. 650 milliGRAM(s) Oral every 6 hours PRN Mild Pain (1 - 3), Moderate Pain (4 - 6)  artificial  tears Solution 1 Drop(s) Both EYES every 8 hours PRN dry eye  bismuth subsalicylate Liquid 30 milliLiter(s) Oral every 6 hours PRN nausea  clonazePAM Oral Disintegrating Tablet 0.25 milliGRAM(s) Oral every 8 hours PRN anxiety  ibuprofen  Tablet. 400 milliGRAM(s) Oral every 8 hours PRN Mild Pain (1 - 3), Moderate Pain (4 - 6)  
MEDICATIONS  (STANDING):  atorvastatin 10 milliGRAM(s) Oral at bedtime  Biotene Dry Mouth Oral Rinse 5 milliLiter(s) Swish and Spit three times a day  clonazePAM  Tablet 0.5 milliGRAM(s) Oral <User Schedule>  furosemide    Tablet 20 milliGRAM(s) Oral <User Schedule>  lidocaine   4% Patch 2 Patch Transdermal daily  losartan 100 milliGRAM(s) Oral daily  metoprolol tartrate 50 milliGRAM(s) Oral two times a day  mirtazapine 7.5 milliGRAM(s) Oral at bedtime  nortriptyline 60 milliGRAM(s) Oral at bedtime  pantoprazole    Tablet 40 milliGRAM(s) Oral <User Schedule>  pregabalin 25 milliGRAM(s) Oral <User Schedule>    MEDICATIONS  (PRN):  acetaminophen     Tablet .. 650 milliGRAM(s) Oral every 6 hours PRN Mild Pain (1 - 3), Moderate Pain (4 - 6)  artificial  tears Solution 1 Drop(s) Both EYES every 8 hours PRN dry eye  bismuth subsalicylate Liquid 30 milliLiter(s) Oral every 6 hours PRN nausea  clonazePAM Oral Disintegrating Tablet 0.25 milliGRAM(s) Oral every 8 hours PRN anxiety  ibuprofen  Tablet. 400 milliGRAM(s) Oral every 8 hours PRN Mild Pain (1 - 3), Moderate Pain (4 - 6)  polyethylene glycol 3350 17 Gram(s) Oral two times a day PRN constipation  
MEDICATIONS  (STANDING):  atorvastatin 10 milliGRAM(s) Oral at bedtime  Biotene Dry Mouth Oral Rinse 5 milliLiter(s) Swish and Spit two times a day  clonazePAM  Tablet 0.5 milliGRAM(s) Oral <User Schedule>  furosemide    Tablet 20 milliGRAM(s) Oral <User Schedule>  lidocaine   4% Patch 2 Patch Transdermal daily  losartan 100 milliGRAM(s) Oral daily  metoprolol tartrate 50 milliGRAM(s) Oral two times a day  mirtazapine 7.5 milliGRAM(s) Oral at bedtime  nortriptyline 50 milliGRAM(s) Oral at bedtime  pantoprazole    Tablet 40 milliGRAM(s) Oral <User Schedule>  pregabalin 25 milliGRAM(s) Oral <User Schedule>    MEDICATIONS  (PRN):  acetaminophen     Tablet .. 650 milliGRAM(s) Oral every 6 hours PRN Mild Pain (1 - 3), Moderate Pain (4 - 6)  artificial  tears Solution 1 Drop(s) Both EYES every 8 hours PRN dry eye  bismuth subsalicylate Liquid 30 milliLiter(s) Oral every 6 hours PRN nausea  clonazePAM Oral Disintegrating Tablet 0.25 milliGRAM(s) Oral every 8 hours PRN anxiety  ibuprofen  Tablet. 400 milliGRAM(s) Oral every 8 hours PRN Mild Pain (1 - 3), Moderate Pain (4 - 6)  
MEDICATIONS  (STANDING):  atorvastatin 10 milliGRAM(s) Oral at bedtime  clonazePAM  Tablet 0.5 milliGRAM(s) Oral <User Schedule>  gabapentin 200 milliGRAM(s) Oral three times a day  losartan 100 milliGRAM(s) Oral daily  metoprolol tartrate 50 milliGRAM(s) Oral two times a day  mirtazapine 45 milliGRAM(s) Oral at bedtime  pantoprazole    Tablet 40 milliGRAM(s) Oral before breakfast  primidone 25 milliGRAM(s) Oral daily  venlafaxine  milliGRAM(s) Oral daily    MEDICATIONS  (PRN):  acetaminophen     Tablet .. 650 milliGRAM(s) Oral every 6 hours PRN Mild Pain (1 - 3), Moderate Pain (4 - 6)  artificial  tears Solution 1 Drop(s) Both EYES every 8 hours PRN dry eye  bismuth subsalicylate Liquid 30 milliLiter(s) Oral every 6 hours PRN nausea  clonazePAM Oral Disintegrating Tablet 0.25 milliGRAM(s) Oral every 8 hours PRN anxiety  
MEDICATIONS  (STANDING):  atorvastatin 10 milliGRAM(s) Oral at bedtime  Biotene Dry Mouth Oral Rinse 5 milliLiter(s) Swish and Spit three times a day  clonazePAM  Tablet 0.5 milliGRAM(s) Oral <User Schedule>  furosemide    Tablet 20 milliGRAM(s) Oral <User Schedule>  lidocaine   4% Patch 2 Patch Transdermal daily  losartan 100 milliGRAM(s) Oral daily  metoprolol tartrate 50 milliGRAM(s) Oral two times a day  mirtazapine 7.5 milliGRAM(s) Oral at bedtime  multivitamin 1 Tablet(s) Oral daily  nortriptyline 75 milliGRAM(s) Oral at bedtime  pantoprazole    Tablet 40 milliGRAM(s) Oral <User Schedule>  pregabalin 25 milliGRAM(s) Oral <User Schedule>    MEDICATIONS  (PRN):  acetaminophen     Tablet .. 650 milliGRAM(s) Oral every 6 hours PRN Mild Pain (1 - 3), Moderate Pain (4 - 6)  artificial  tears Solution 1 Drop(s) Both EYES every 8 hours PRN dry eye  bismuth subsalicylate Liquid 30 milliLiter(s) Oral every 6 hours PRN nausea  clonazePAM Oral Disintegrating Tablet 0.25 milliGRAM(s) Oral every 8 hours PRN anxiety  ibuprofen  Tablet. 400 milliGRAM(s) Oral every 8 hours PRN Mild Pain (1 - 3), Moderate Pain (4 - 6)  polyethylene glycol 3350 17 Gram(s) Oral two times a day PRN constipation  
MEDICATIONS  (STANDING):  atorvastatin 10 milliGRAM(s) Oral at bedtime  clonazePAM  Tablet 0.5 milliGRAM(s) Oral <User Schedule>  gabapentin 200 milliGRAM(s) Oral three times a day  losartan 100 milliGRAM(s) Oral daily  metoprolol tartrate 50 milliGRAM(s) Oral two times a day  mirtazapine 45 milliGRAM(s) Oral at bedtime  pantoprazole    Tablet 40 milliGRAM(s) Oral before breakfast  primidone 25 milliGRAM(s) Oral daily  venlafaxine  milliGRAM(s) Oral daily    MEDICATIONS  (PRN):  artificial  tears Solution 1 Drop(s) Both EYES every 8 hours PRN dry eye  bismuth subsalicylate Liquid 30 milliLiter(s) Oral every 6 hours PRN nausea  clonazePAM Oral Disintegrating Tablet 0.25 milliGRAM(s) Oral every 8 hours PRN anxiety  
MEDICATIONS  (STANDING):  atorvastatin 10 milliGRAM(s) Oral at bedtime  clonazePAM  Tablet 0.5 milliGRAM(s) Oral <User Schedule>  gabapentin 200 milliGRAM(s) Oral three times a day  losartan 100 milliGRAM(s) Oral daily  metoprolol tartrate 50 milliGRAM(s) Oral two times a day  mirtazapine 45 milliGRAM(s) Oral at bedtime  pantoprazole    Tablet 40 milliGRAM(s) Oral before breakfast  primidone 25 milliGRAM(s) Oral daily  venlafaxine  milliGRAM(s) Oral daily    MEDICATIONS  (PRN):  artificial  tears Solution 1 Drop(s) Both EYES every 8 hours PRN dry eye  bismuth subsalicylate Liquid 30 milliLiter(s) Oral every 6 hours PRN nausea  clonazePAM Oral Disintegrating Tablet 0.25 milliGRAM(s) Oral every 8 hours PRN anxiety  
MEDICATIONS  (STANDING):  atorvastatin 10 milliGRAM(s) Oral at bedtime  clonazePAM  Tablet 0.5 milliGRAM(s) Oral <User Schedule>  gabapentin 200 milliGRAM(s) Oral three times a day  losartan 100 milliGRAM(s) Oral daily  metoprolol tartrate 50 milliGRAM(s) Oral two times a day  mirtazapine 45 milliGRAM(s) Oral at bedtime  pantoprazole    Tablet 40 milliGRAM(s) Oral before breakfast  primidone 25 milliGRAM(s) Oral daily  venlafaxine .5 milliGRAM(s) Oral daily    MEDICATIONS  (PRN):  acetaminophen     Tablet .. 650 milliGRAM(s) Oral every 6 hours PRN Mild Pain (1 - 3), Moderate Pain (4 - 6)  artificial  tears Solution 1 Drop(s) Both EYES every 8 hours PRN dry eye  bismuth subsalicylate Liquid 30 milliLiter(s) Oral every 6 hours PRN nausea  clonazePAM Oral Disintegrating Tablet 0.25 milliGRAM(s) Oral every 8 hours PRN anxiety  
MEDICATIONS  (STANDING):  atorvastatin 10 milliGRAM(s) Oral at bedtime  clonazePAM  Tablet 0.5 milliGRAM(s) Oral <User Schedule>  gabapentin 200 milliGRAM(s) Oral three times a day  losartan 100 milliGRAM(s) Oral daily  metoprolol tartrate 50 milliGRAM(s) Oral two times a day  mirtazapine 45 milliGRAM(s) Oral at bedtime  pantoprazole    Tablet 40 milliGRAM(s) Oral before breakfast  primidone 25 milliGRAM(s) Oral daily  venlafaxine  milliGRAM(s) Oral daily    MEDICATIONS  (PRN):  artificial  tears Solution 1 Drop(s) Both EYES every 8 hours PRN dry eye  bismuth subsalicylate Liquid 30 milliLiter(s) Oral every 6 hours PRN nausea  clonazePAM Oral Disintegrating Tablet 0.25 milliGRAM(s) Oral every 8 hours PRN anxiety  
MEDICATIONS  (STANDING):  atorvastatin 10 milliGRAM(s) Oral at bedtime  clonazePAM  Tablet 0.5 milliGRAM(s) Oral <User Schedule>  gabapentin 200 milliGRAM(s) Oral three times a day  losartan 100 milliGRAM(s) Oral daily  metoprolol tartrate 50 milliGRAM(s) Oral two times a day  mirtazapine 45 milliGRAM(s) Oral at bedtime  pantoprazole    Tablet 40 milliGRAM(s) Oral before breakfast  primidone 25 milliGRAM(s) Oral daily  venlafaxine  milliGRAM(s) Oral daily    MEDICATIONS  (PRN):  artificial  tears Solution 1 Drop(s) Both EYES every 8 hours PRN dry eye  bismuth subsalicylate Liquid 30 milliLiter(s) Oral every 6 hours PRN nausea  clonazePAM Oral Disintegrating Tablet 0.25 milliGRAM(s) Oral every 8 hours PRN anxiety

## 2024-02-22 NOTE — BH INPATIENT PSYCHIATRY PROGRESS NOTE - NSTXDCOTHRGOAL_PSY_ALL_CORE
The pt will be discharged home where she resides with her  and referred to [facility] for outpatient psychiatric f/u. The pt indicated preferring to have f/u in [facility] which is local to her home rather that [facility].
The pt will be discharged home where she resides with her  and referred to [facility] for outpatient psychiatric f/u. The pt indicated preferring to have f/u in [facility] which is local to her home rather that [facility].
The pt will be discharged home where she resides with her  and referred to [facility] for outpatient psychiatric f/u.
The pt will be discharged home where she resides with her  and referred to [facility] for outpatient psychiatric f/u. The pt indicated preferring to have f/u in [facility] which is local to her home rather that [facility].
The pt will be discharged home where she resides with her  and [facility]. Recommendation of participating in individual therapy and STEP program is being addressed.
The pt will be discharged home where she resides with her  and [facility]. Consideration of participating in the STEP program will be addressed.
The pt will be discharged home where she resides with her  and [facility]. Recommendation of participating in individual therapy and STEP program is being addressed.
The pt will be discharged home where she resides with her  and referred to [facility] for outpatient psychiatric f/u.
The pt will be discharged home where she resides with her  and referred to [facility] for outpatient psychiatric f/u.
The pt will be discharged home where she resides with her  and [facility]. Recommendation of participating in individual therapy and STEP program is being addressed.
The pt will be discharged home where she resides with her  and [facility]. Consideration of participating in the STEP program will be addressed.
The pt will be discharged home where she resides with her  and referred to [facility] for outpatient psychiatric f/u.
The pt will be discharged home where she resides with her  and referred to [facility] for outpatient psychiatric f/u.
The pt will be discharged home where she resides with her  and referred to [facility] for outpatient psychiatric f/u.
The pt will be discharged home where she resides with her  and [facility]. Recommendation of participating in individual therapy and STEP program is being addressed.
The pt will be discharged home where she resides with her  and referred to [facility] for outpatient psychiatric f/u.
The pt will be discharged home where she resides with her  and [facility]. Consideration of participating in the STEP program will be addressed.
The pt will be discharged home where she resides with her  and referred to [facility] for outpatient psychiatric f/u. The pt indicated preferring to have f/u in [facility] which is local to her home rather that [facility].
The pt will be discharged home where she resides with her  and [facility]. Consideration of participating in the STEP program will be addressed.
The pt will be discharged home where she resides with her  and [facility]. Recommendation of participating in individual therapy and STEP program is being addressed.
The pt will be discharged home where she resides with her  and referred to [facility] for outpatient psychiatric f/u. The pt indicated preferring to have f/u in [facility] which is local to her home rather that [facility].
The pt will be discharged home where she resides with her  and referred to [facility] for outpatient psychiatric f/u.
The pt will be discharged home where she resides with her  and [facility]. Consideration of participating in the STEP program will be addressed.
The pt will be discharged home where she resides with her  and referred to [facility] for outpatient psychiatric f/u. The pt indicated preferring to have f/u in [facility] which is local to her home rather that [facility].
The pt will be discharged home where she resides with her  and referred to [facility] for outpatient psychiatric f/u.
The pt will be discharged home where she resides with her  and [facility]. Consideration of participating in the STEP program will be addressed.
The pt will be discharged home where she resides with her  and referred to [facility] for outpatient psychiatric f/u.
The pt will be discharged home where she resides with her  and [facility]. Recommendation of participating in individual therapy and STEP program is being addressed.
The pt will be discharged home where she resides with her  and referred to [facility] for outpatient psychiatric f/u.
The pt will be discharged home where she resides with her  and [facility]. Recommendation of participating in individual therapy and STEP program is being addressed.
The pt will be discharged home where she resides with her  and referred to [facility] for outpatient psychiatric f/u.
The pt will be discharged home where she resides with her  and [facility]. Recommendation of participating in individual therapy and STEP program is being addressed.
The pt will be discharged home where she resides with her  and [facility]. Recommendation of participating in individual therapy and STEP program is being addressed.
The pt will be discharged home where she resides with her  and [facility]. Consideration of participating in the STEP program will be addressed.
The pt will be discharged home where she resides with her  and referred to [facility] for outpatient psychiatric f/u. The pt indicated preferring to have f/u in [facility] which is local to her home rather that [facility].
The pt will be discharged home where she resides with her  and referred to [facility] for outpatient psychiatric f/u. The pt indicated preferring to have f/u in [facility] which is local to her home rather that [facility].
The pt will be discharged home where she resides with her  and [facility]. Consideration of participating in the STEP program will be addressed.

## 2024-02-22 NOTE — BH INPATIENT PSYCHIATRY DISCHARGE NOTE - NSBHDCMEDICALFT_PSY_A_CORE
No major issues. Had dry eye, dry mouth plantar fascitis pain, complaints of lower extremity weakness. She will see ophth after d/c and was given script for outpatient PT.

## 2024-02-22 NOTE — BH INPATIENT PSYCHIATRY PROGRESS NOTE - NSTXCOPEDATEEST_PSY_ALL_CORE
07-Feb-2024
23-Jan-2024
23-Jan-2024
05-Feb-2024
17-Jan-2024
23-Jan-2024
29-Jan-2024
07-Feb-2024
05-Feb-2024
13-Feb-2024
17-Jan-2024
07-Feb-2024
17-Jan-2024
07-Feb-2024
12-Feb-2024
17-Jan-2024
21-Feb-2024
23-Jan-2024
29-Jan-2024
12-Feb-2024
13-Feb-2024
23-Jan-2024
07-Feb-2024
29-Jan-2024
23-Jan-2024
10-Ronn-2024
11-Jan-2024
21-Feb-2024
07-Feb-2024
17-Jan-2024
10-Ronn-2024
17-Jan-2024
29-Jan-2024
11-Jan-2024
13-Feb-2024
29-Jan-2024
11-Jan-2024
10-Ronn-2024

## 2024-02-22 NOTE — BH INPATIENT PSYCHIATRY PROGRESS NOTE - NSBHCONTPROVIDER_PSY_ALL_CORE
Yes...

## 2024-02-22 NOTE — BH INPATIENT PSYCHIATRY PROGRESS NOTE - NSICDXBHPRIMARYDX_PSY_ALL_CORE
ANSON (generalized anxiety disorder)   F41.1  

## 2024-02-23 LAB — NORTRIP SERPL-MCNC: 99 NG/ML — SIGNIFICANT CHANGE UP (ref 50–150)

## 2024-02-23 PROCEDURE — 90832 PSYTX W PT 30 MINUTES: CPT

## 2024-02-23 NOTE — BH PSYCHOLOGY - CLINICIAN PSYCHOTHERAPY NOTE - NSTXDCOTHRPROGRES_PSY_ALL_CORE
Improving
No Change
Improving
No Change
No Change
Improving
No Change

## 2024-02-23 NOTE — BH PSYCHOLOGY - CLINICIAN PSYCHOTHERAPY NOTE - NSTXDCOTHRDATEEST_PSY_ALL_CORE
14-Feb-2024
Left message for mother with test results; strep culture still pending. Reviewed quarantine guidelines.  Advised to contact clinic if any questions    Contains abnormal data 2019 NOVEL CORONAVIRUS (SARS-COV-2): [id number]  Order: [id number]   Collected 9/24/2022 16:13     Status: Final result     Visible to patient: No (inaccessible in Patient Portal)     Dx: Sore throat     0 Result Notes    Component Ref Range & Units    SARS-CoV-2 by PCR Not Detected / Detected / Inhibitor Present Detected Abnormal            
07-Feb-2024
14-Feb-2024
19-Jan-2024
30-Jan-2024
07-Feb-2024
19-Jan-2024
11-Jan-2024
14-Feb-2024
30-Jan-2024
19-Jan-2024
11-Jan-2024
14-Feb-2024
30-Jan-2024
11-Jan-2024
19-Jan-2024
30-Jan-2024
19-Jan-2024
19-Jan-2024
11-Jan-2024
07-Feb-2024
11-Jan-2024

## 2024-02-23 NOTE — BH PSYCHOLOGY - CLINICIAN PSYCHOTHERAPY NOTE - NSTXANXGOAL_PSY_ALL_CORE
Report a reduction in panic attacks and improving mood and confidence
Identify and practice 3 coping skills to manage anxiety
Report a reduction in panic attacks and improving mood and confidence
Be able to perform ADLs and maintain safety despite anxiety/panic daily
Report a reduction in panic attacks and improving mood and confidence
Report a reduction in panic attacks and improving mood and confidence
Be able to perform ADLs and maintain safety despite anxiety/panic daily
Report a reduction in panic attacks and improving mood and confidence
Report a reduction in panic attacks and improving mood and confidence
Identify and practice 3 coping skills to manage anxiety
Identify and practice 3 coping skills to manage anxiety
Report a reduction in panic attacks and improving mood and confidence
Identify and practice 3 coping skills to manage anxiety
Identify and practice 3 coping skills to manage anxiety
Be able to perform ADLs and maintain safety despite anxiety/panic daily
Be able to perform ADLs and maintain safety despite anxiety/panic daily
Identify and practice 3 coping skills to manage anxiety
Be able to perform ADLs and maintain safety despite anxiety/panic daily
Be able to perform ADLs and maintain safety despite anxiety/panic daily
Identify and practice 3 coping skills to manage anxiety
Report a reduction in panic attacks and improving mood and confidence

## 2024-02-23 NOTE — BH PSYCHOLOGY - CLINICIAN PSYCHOTHERAPY NOTE - TOKEN PULL-DIAGNOSIS
Primary Diagnosis:  ANSON (generalized anxiety disorder) [F41.1]        Problem Dx:   MDD (major depressive disorder) [F32.9]      Panic disorder [F41.0]      

## 2024-02-23 NOTE — BH PSYCHOLOGY - CLINICIAN PSYCHOTHERAPY NOTE - NSBHPSYCHOLINT_PSY_A_CORE
Cognitive/behavioral therapy/Encourage medication compliance/Stress management/Supportive therapy/Treatment compliance encouraged
Cognitive/behavioral therapy/Encourage medication compliance/Referred to physician/Supported coping skills/Treatment compliance encouraged
Cognitive/behavioral therapy/Encourage medication compliance/Supportive therapy/Treatment compliance encouraged
Cognitive/behavioral therapy/Encourage medication compliance/Problem-solving techniques discussed/Supported coping skills/Treatment compliance encouraged
Cognitive/behavioral therapy/Encourage medication compliance/Supported coping skills/Supportive therapy/Treatment compliance encouraged
Cognitive/behavioral therapy/Encourage medication compliance/Supported coping skills/Supportive therapy/Treatment compliance encouraged
Cognitive/behavioral therapy/Encourage medication compliance/Supportive therapy/Treatment compliance encouraged
Cognitive/behavioral therapy/Encourage medication compliance/Supportive therapy/Treatment compliance encouraged
Cognitive/behavioral therapy/Encourage medication compliance/Supported coping skills/Supportive therapy/Treatment compliance encouraged
Cognitive/behavioral therapy/Encourage medication compliance/Supported coping skills/Supportive therapy/Treatment compliance encouraged
Cognitive/behavioral therapy/Encourage medication compliance/Supportive therapy/Treatment compliance encouraged
Cognitive/behavioral therapy/Encourage medication compliance/Stress management/Supportive therapy/Treatment compliance encouraged
Cognitive/behavioral therapy/Encourage medication compliance/Stress management/Supportive therapy/Treatment compliance encouraged
Cognitive/behavioral therapy/Encourage medication compliance/Interpersonal Social Rhythm Therapy (IPSRT)/Problem-solving techniques discussed/Supported coping skills/Treatment compliance encouraged
Cognitive/behavioral therapy/Encourage medication compliance/Stress management/Supported coping skills/Supportive therapy/Treatment compliance encouraged
Cognitive/behavioral therapy/Encourage medication compliance/Stress management/Supportive therapy/Treatment compliance encouraged
Cognitive/behavioral therapy/Dynamic issues addressed/Supported coping skills/Supportive therapy/Treatment compliance encouraged
Cognitive/behavioral therapy/Encourage medication compliance/Stress management/Supportive therapy/Treatment compliance encouraged
Cognitive/behavioral therapy/Encourage medication compliance/Stress management/Supportive therapy/Treatment compliance encouraged
Cognitive/behavioral therapy/Stress management/Supported coping skills/Supportive therapy
Cognitive/behavioral therapy/Encourage medication compliance/Stress management/Supportive therapy/Treatment compliance encouraged

## 2024-02-23 NOTE — BH PSYCHOLOGY - CLINICIAN PSYCHOTHERAPY NOTE - NSTXCOPEDATEEST_PSY_ALL_CORE
17-Jan-2024
17-Jan-2024
10-Ronn-2024
07-Feb-2024
11-Jan-2024
05-Feb-2024
17-Jan-2024
21-Feb-2024
29-Jan-2024
21-Feb-2024
29-Jan-2024
13-Feb-2024
23-Jan-2024
29-Jan-2024
23-Jan-2024
07-Feb-2024
12-Feb-2024
17-Jan-2024
11-Jan-2024
23-Jan-2024
29-Jan-2024
07-Feb-2024
11-Jan-2024

## 2024-02-23 NOTE — BH PSYCHOLOGY - CLINICIAN PSYCHOTHERAPY NOTE - NSBHPSYCHOLADHERE_PSY_A_CORE
Good
Good
Fair
Good
Poor
Good
Fair
Good

## 2024-02-23 NOTE — BH PSYCHOLOGY - CLINICIAN PSYCHOTHERAPY NOTE - NSTXANXDATEEST_PSY_ALL_CORE
10-Ronn-2024
25-Jan-2024
25-Jan-2024
10-Ronn-2024
07-Feb-2024
10-Ronn-2024
07-Feb-2024
25-Jan-2024
10-Ronn-2024
25-Jan-2024
07-Feb-2024
10-Ronn-2024
25-Jan-2024
10-Ronn-2024
07-Feb-2024
07-Feb-2024
25-Jan-2024
10-Ronn-2024
10-Ronn-2024

## 2024-02-23 NOTE — BH PSYCHOLOGY - CLINICIAN PSYCHOTHERAPY NOTE - NSTXANXDATETRGT_PSY_ALL_CORE
01-Feb-2024
14-Feb-2024
14-Feb-2024
17-Jan-2024
14-Feb-2024
14-Feb-2024
17-Jan-2024
01-Feb-2024
17-Jan-2024
01-Feb-2024
01-Feb-2024
17-Jan-2024
17-Jan-2024
14-Feb-2024
17-Jan-2024
17-Jan-2024
01-Feb-2024
01-Feb-2024
17-Jan-2024

## 2024-02-23 NOTE — BH PSYCHOLOGY - CLINICIAN PSYCHOTHERAPY NOTE - NSBHPSYCHOLNARRATIVE_PSY_A_CORE FT
The patient was seen individually at the team's request and with her consent. Speech was of normal rate and volume and there were no verbal irregularities. The patient's mood was “good”, and affect was congruent. She was coherent and logical. The patient denied suicidal ideation. She reports no difficulty falling asleep and has a good appetite.    The patient reported no anxiety this morning. She also reported less pain in her hip and foot and no leg heaviness. She was more hopeful about discharge and eager to return to her routine. She was urged to notice when she was thinking negatively and to generate alternate scenarios.     Patient was expecting discharge today and felt confident in her follow-up plan.

## 2024-02-23 NOTE — BH PSYCHOLOGY - CLINICIAN PSYCHOTHERAPY NOTE - NSTXCOPEPROGRES_PSY_ALL_CORE
No Change
Improving
No Change
Improving
No Change
No Change
Improving
Improving
No Change
Improving
No Change
No Change

## 2024-02-23 NOTE — BH PSYCHOLOGY - CLINICIAN PSYCHOTHERAPY NOTE - NSTXPROBANX_PSY_ALL_CORE
ANXIETY/PANIC/FEAR

## 2024-02-23 NOTE — BH PSYCHOLOGY - CLINICIAN PSYCHOTHERAPY NOTE - NSBHPSYCHOLPARTICIP_PSY_A_CORE
Fully engaged

## 2024-02-23 NOTE — BH PSYCHOLOGY - CLINICIAN PSYCHOTHERAPY NOTE - NSBHPSYCHOLGOALS_PSY_A_CORE
Decrease symptoms/Assessment/Psychoeducation
Decrease symptoms/Improve level of independent functioning/Treatment compliance
Decrease symptoms/Improve level of independent functioning/Prevent relapse/Treatment compliance
Decrease symptoms/Improve level of independent functioning/Treatment compliance
Decrease symptoms/Improve level of independent functioning/Psychoeducation/Treatment compliance
Decrease symptoms/Improve level of independent functioning/Treatment compliance
Decrease symptoms/Improve social/vocational/coping skills/Prevent relapse/Psychoeducation
Decrease symptoms/Improve level of independent functioning/Treatment compliance

## 2024-02-23 NOTE — BH PSYCHOLOGY - CLINICIAN PSYCHOTHERAPY NOTE - NSTXDCOTHRGOAL_PSY_ALL_CORE
The pt will be discharged home where she resides with her  and [facility]. Recommendation of participating in individual therapy and STEP program is being addressed.
The pt will be discharged home where she resides with her  and referred to [facility] for outpatient psychiatric f/u. The pt indicated preferring to have f/u in [facility] which is local to her home rather that [facility].
The pt will be discharged home where she resides with her  and referred to [facility] for outpatient psychiatric f/u.
The pt will be discharged home where she resides with her  and [facility]. Recommendation of participating in individual therapy and STEP program is being addressed.
The pt will be discharged home where she resides with her  and referred to [facility] for outpatient psychiatric f/u.
The pt will be discharged home where she resides with her  and [facility]. Consideration of participating in the STEP program will be addressed.
The pt will be discharged home where she resides with her  and [facility]. Consideration of participating in the STEP program will be addressed.
The pt will be discharged home where she resides with her  and [facility]. Recommendation of participating in individual therapy and STEP program is being addressed.
The pt will be discharged home where she resides with her  and referred to [facility] for outpatient psychiatric f/u.
The pt will be discharged home where she resides with her  and referred to [facility] for outpatient psychiatric f/u.
The pt will be discharged home where she resides with her  and referred to [facility] for outpatient psychiatric f/u. The pt indicated preferring to have f/u in [facility] which is local to her home rather that [facility].
The pt will be discharged home where she resides with her  and referred to [facility] for outpatient psychiatric f/u. The pt indicated preferring to have f/u in [facility] which is local to her home rather that [facility].
The pt will be discharged home where she resides with her  and [facility]. Recommendation of participating in individual therapy and STEP program is being addressed.
The pt will be discharged home where she resides with her  and [facility]. Consideration of participating in the STEP program will be addressed.
The pt will be discharged home where she resides with her  and [facility]. Consideration of participating in the STEP program will be addressed.
The pt will be discharged home where she resides with her  and referred [place] [facility] [place] psychiatric f/u.
The pt will be discharged home where she resides with her  and [facility]. Recommendation of participating in individual therapy and STEP program is being addressed.
The pt will be discharged home where she resides with her  and referred to [facility] for outpatient psychiatric f/u. The pt indicated preferring to have f/u in [facility] which is local to her home rather that [facility].
The pt will be discharged home where she resides with her  and [facility]. Recommendation of participating in individual therapy and STEP program is being addressed.
The pt will be discharged home where she resides with her  and referred to [facility] for outpatient psychiatric f/u.
The pt will be discharged home where she resides with her  and referred to [facility] for outpatient psychiatric f/u.
The pt will be discharged home where she resides with her  and [facility]. Consideration of participating in the STEP program will be addressed.

## 2024-02-23 NOTE — BH PSYCHOLOGY - CLINICIAN PSYCHOTHERAPY NOTE - NSBHPSYCHOLASSESSPROV_PSY_A_CORE
Licensed Psychologist

## 2024-02-23 NOTE — BH PSYCHOLOGY - CLINICIAN PSYCHOTHERAPY NOTE - NSBHPSYCHOLSERV_PSY_A_CORE
Individual psychotherapy

## 2024-02-23 NOTE — BH PSYCHOLOGY - CLINICIAN PSYCHOTHERAPY NOTE - NSTXDCOTHRDATETRGT_PSY_ALL_CORE
18-Jan-2024
26-Jan-2024
26-Jan-2024
18-Jan-2024
18-Jan-2024
14-Feb-2024
06-Feb-2024
26-Jan-2024
21-Feb-2024
18-Jan-2024
26-Jan-2024
06-Feb-2024
26-Jan-2024
18-Jan-2024
21-Feb-2024
06-Feb-2024
26-Jan-2024
06-Feb-2024
14-Feb-2024
21-Feb-2024
21-Feb-2024
14-Feb-2024

## 2024-02-23 NOTE — BH PSYCHOLOGY - CLINICIAN PSYCHOTHERAPY NOTE - NSBHPTASSESSDT_PSY_A_CORE
02-Feb-2024 11:10
26-Jan-2024 11:15
17-Jan-2024 14:20
19-Jan-2024 11:15
24-Jan-2024 15:30
18-Jan-2024 14:20
31-Jan-2024 16:50
22-Feb-2024 10:00
12-Feb-2024 14:00
01-Feb-2024 11:15
16-Feb-2024 10:30
12-Jan-2024 11:30
11-Jan-2024 10:00
14-Feb-2024 14:10
23-Jan-2024 09:00
20-Feb-2024 11:15
22-Jan-2024 16:10
29-Jan-2024 11:15
11-Jan-2024 14:10
05-Feb-2024 14:05
07-Feb-2024 14:15
09-Feb-2024 11:15
16-Jan-2024 11:00

## 2024-02-23 NOTE — BH PSYCHOLOGY - CLINICIAN PSYCHOTHERAPY NOTE - NSBHPSYCHOLPROBS_PSY_ALL_CORE
Anxiety/Depression
Aggression/Depression
Anxiety/Depression

## 2024-02-23 NOTE — BH PSYCHOLOGY - CLINICIAN PSYCHOTHERAPY NOTE - NSTXCOPEDATETRGT_PSY_ALL_CORE
05-Feb-2024
24-Jan-2024
14-Feb-2024
20-Feb-2024
05-Feb-2024
14-Feb-2024
18-Jan-2024
24-Jan-2024
26-Feb-2024
18-Jan-2024
24-Jan-2024
30-Jan-2024
05-Feb-2024
17-Jan-2024
22-Feb-2024
24-Jan-2024
12-Feb-2024
14-Feb-2024
30-Jan-2024
05-Feb-2024
30-Jan-2024
22-Feb-2024
18-Jan-2024

## 2024-02-23 NOTE — BH PSYCHOLOGY - CLINICIAN PSYCHOTHERAPY NOTE - NSBHPSYCHOLRESPONSE_PSY_A_CORE
Insight displayed/Accepted support
Symptoms reduced/Insight displayed/Accepted support
Insight displayed/Accepted support
Symptoms reduced/Insight displayed/Accepted support
Symptoms reduced/Insight displayed/Accepted support
Insight displayed/Accepted support
Symptoms reduced/Coping skills acquired/Insight displayed/Accepted support
Coping skills acquired/Insight displayed/Accepted support
Coping skills acquired/Insight displayed/Accepted support
Symptoms reduced/Coping skills acquired/Insight displayed/Accepted support
Insight displayed/Accepted support
Insight displayed/Accepted support
Symptoms reduced/Coping skills acquired/Accepted support
Symptoms reduced/Coping skills acquired/Insight displayed/Accepted support
Symptoms reduced/Insight displayed/Accepted support
Symptoms reduced/Insight displayed/Accepted support
Symptoms reduced/Coping skills acquired/Insight displayed/Accepted support
Insight displayed/Accepted support
Coping skills acquired/Insight displayed/Accepted support
Accepted support
Insight displayed/Accepted support
Insight displayed/Accepted support

## 2024-02-23 NOTE — BH PSYCHOLOGY - CLINICIAN PSYCHOTHERAPY NOTE - NSBHPSYCHOLDURATION_PSY_A_CORE
45 minutes
60 minutes
45 minutes
60 minutes
45 minutes
60 minutes
other...
45 minutes
45 minutes
60 minutes
20 minutes
45 minutes
30 minutes

## 2024-02-26 NOTE — SOCIAL WORK POST DISCHARGE FOLLOW UP NOTE - NSBHSWFOLLOWUP_PSY_ALL_CORE_FT
The pt spoke with  regarding feeling sedated. Dr. Washington lowered her Nortriptyline dose from 75 to 50mgs, called in a new prescription to the pt's pharmacy, and spoke with [facility] psychiatrist, Dr. Ramirez. SW faxed an updated discharged summary to [facility].

## 2024-03-26 ENCOUNTER — APPOINTMENT (OUTPATIENT)
Dept: OPHTHALMOLOGY | Facility: CLINIC | Age: 74
End: 2024-03-26

## 2024-03-27 ENCOUNTER — APPOINTMENT (OUTPATIENT)
Dept: OPHTHALMOLOGY | Facility: CLINIC | Age: 74
End: 2024-03-27

## 2024-04-03 ENCOUNTER — INPATIENT (INPATIENT)
Facility: HOSPITAL | Age: 74
LOS: 12 days | Discharge: PSYCHIATRIC OR SUBSTANCE USE FACILITY | End: 2024-04-16
Attending: GENERAL PRACTICE | Admitting: GENERAL PRACTICE
Payer: COMMERCIAL

## 2024-04-03 VITALS
HEART RATE: 105 BPM | RESPIRATION RATE: 20 BRPM | DIASTOLIC BLOOD PRESSURE: 87 MMHG | SYSTOLIC BLOOD PRESSURE: 145 MMHG | TEMPERATURE: 98 F | OXYGEN SATURATION: 97 %

## 2024-04-03 DIAGNOSIS — Z98.890 OTHER SPECIFIED POSTPROCEDURAL STATES: Chronic | ICD-10-CM

## 2024-04-03 LAB
ALBUMIN SERPL ELPH-MCNC: 4.1 G/DL — SIGNIFICANT CHANGE UP (ref 3.3–5)
ALP SERPL-CCNC: 117 U/L — SIGNIFICANT CHANGE UP (ref 40–120)
ALT FLD-CCNC: 13 U/L — SIGNIFICANT CHANGE UP (ref 4–33)
AMPHET UR-MCNC: NEGATIVE — SIGNIFICANT CHANGE UP
ANION GAP SERPL CALC-SCNC: 10 MMOL/L — SIGNIFICANT CHANGE UP (ref 7–14)
APAP SERPL-MCNC: <10 UG/ML — LOW (ref 15–25)
APPEARANCE UR: CLEAR — SIGNIFICANT CHANGE UP
AST SERPL-CCNC: 21 U/L — SIGNIFICANT CHANGE UP (ref 4–32)
BACTERIA # UR AUTO: ABNORMAL /HPF
BARBITURATES UR SCN-MCNC: NEGATIVE — SIGNIFICANT CHANGE UP
BASOPHILS # BLD AUTO: 0.03 K/UL — SIGNIFICANT CHANGE UP (ref 0–0.2)
BASOPHILS NFR BLD AUTO: 0.3 % — SIGNIFICANT CHANGE UP (ref 0–2)
BENZODIAZ UR-MCNC: NEGATIVE — SIGNIFICANT CHANGE UP
BILIRUB SERPL-MCNC: 0.5 MG/DL — SIGNIFICANT CHANGE UP (ref 0.2–1.2)
BILIRUB UR-MCNC: NEGATIVE — SIGNIFICANT CHANGE UP
BUN SERPL-MCNC: 7 MG/DL — SIGNIFICANT CHANGE UP (ref 7–23)
CALCIUM SERPL-MCNC: 9.6 MG/DL — SIGNIFICANT CHANGE UP (ref 8.4–10.5)
CAST: 0 /LPF — SIGNIFICANT CHANGE UP (ref 0–4)
CHLORIDE SERPL-SCNC: 105 MMOL/L — SIGNIFICANT CHANGE UP (ref 98–107)
CO2 SERPL-SCNC: 26 MMOL/L — SIGNIFICANT CHANGE UP (ref 22–31)
COCAINE METAB.OTHER UR-MCNC: NEGATIVE — SIGNIFICANT CHANGE UP
COLOR SPEC: YELLOW — SIGNIFICANT CHANGE UP
CREAT SERPL-MCNC: 0.69 MG/DL — SIGNIFICANT CHANGE UP (ref 0.5–1.3)
CREATININE URINE RESULT, DAU: 83 MG/DL — SIGNIFICANT CHANGE UP
DIFF PNL FLD: NEGATIVE — SIGNIFICANT CHANGE UP
EGFR: 91 ML/MIN/1.73M2 — SIGNIFICANT CHANGE UP
EOSINOPHIL # BLD AUTO: 0.1 K/UL — SIGNIFICANT CHANGE UP (ref 0–0.5)
EOSINOPHIL NFR BLD AUTO: 1.1 % — SIGNIFICANT CHANGE UP (ref 0–6)
ETHANOL SERPL-MCNC: <10 MG/DL — SIGNIFICANT CHANGE UP
GLUCOSE SERPL-MCNC: 97 MG/DL — SIGNIFICANT CHANGE UP (ref 70–99)
GLUCOSE UR QL: NEGATIVE MG/DL — SIGNIFICANT CHANGE UP
HCT VFR BLD CALC: 44.5 % — SIGNIFICANT CHANGE UP (ref 34.5–45)
HGB BLD-MCNC: 14.7 G/DL — SIGNIFICANT CHANGE UP (ref 11.5–15.5)
IANC: 6.76 K/UL — SIGNIFICANT CHANGE UP (ref 1.8–7.4)
IMM GRANULOCYTES NFR BLD AUTO: 0.3 % — SIGNIFICANT CHANGE UP (ref 0–0.9)
KETONES UR-MCNC: NEGATIVE MG/DL — SIGNIFICANT CHANGE UP
LEUKOCYTE ESTERASE UR-ACNC: ABNORMAL
LYMPHOCYTES # BLD AUTO: 1.97 K/UL — SIGNIFICANT CHANGE UP (ref 1–3.3)
LYMPHOCYTES # BLD AUTO: 20.9 % — SIGNIFICANT CHANGE UP (ref 13–44)
MCHC RBC-ENTMCNC: 28 PG — SIGNIFICANT CHANGE UP (ref 27–34)
MCHC RBC-ENTMCNC: 33 GM/DL — SIGNIFICANT CHANGE UP (ref 32–36)
MCV RBC AUTO: 84.8 FL — SIGNIFICANT CHANGE UP (ref 80–100)
METHADONE UR-MCNC: NEGATIVE — SIGNIFICANT CHANGE UP
MONOCYTES # BLD AUTO: 0.52 K/UL — SIGNIFICANT CHANGE UP (ref 0–0.9)
MONOCYTES NFR BLD AUTO: 5.5 % — SIGNIFICANT CHANGE UP (ref 2–14)
NEUTROPHILS # BLD AUTO: 6.76 K/UL — SIGNIFICANT CHANGE UP (ref 1.8–7.4)
NEUTROPHILS NFR BLD AUTO: 71.9 % — SIGNIFICANT CHANGE UP (ref 43–77)
NITRITE UR-MCNC: NEGATIVE — SIGNIFICANT CHANGE UP
NRBC # BLD: 0 /100 WBCS — SIGNIFICANT CHANGE UP (ref 0–0)
NRBC # FLD: 0 K/UL — SIGNIFICANT CHANGE UP (ref 0–0)
OPIATES UR-MCNC: NEGATIVE — SIGNIFICANT CHANGE UP
OXYCODONE UR-MCNC: NEGATIVE — SIGNIFICANT CHANGE UP
PCP SPEC-MCNC: SIGNIFICANT CHANGE UP
PCP UR-MCNC: NEGATIVE — SIGNIFICANT CHANGE UP
PH UR: 7 — SIGNIFICANT CHANGE UP (ref 5–8)
PLATELET # BLD AUTO: 236 K/UL — SIGNIFICANT CHANGE UP (ref 150–400)
POTASSIUM SERPL-MCNC: 4.6 MMOL/L — SIGNIFICANT CHANGE UP (ref 3.5–5.3)
POTASSIUM SERPL-SCNC: 4.6 MMOL/L — SIGNIFICANT CHANGE UP (ref 3.5–5.3)
PROT SERPL-MCNC: 6.9 G/DL — SIGNIFICANT CHANGE UP (ref 6–8.3)
PROT UR-MCNC: NEGATIVE MG/DL — SIGNIFICANT CHANGE UP
RBC # BLD: 5.25 M/UL — HIGH (ref 3.8–5.2)
RBC # FLD: 13 % — SIGNIFICANT CHANGE UP (ref 10.3–14.5)
RBC CASTS # UR COMP ASSIST: 1 /HPF — SIGNIFICANT CHANGE UP (ref 0–4)
SALICYLATES SERPL-MCNC: <0.3 MG/DL — LOW (ref 15–30)
SODIUM SERPL-SCNC: 141 MMOL/L — SIGNIFICANT CHANGE UP (ref 135–145)
SP GR SPEC: 1.01 — SIGNIFICANT CHANGE UP (ref 1–1.03)
SQUAMOUS # UR AUTO: 9 /HPF — HIGH (ref 0–5)
THC UR QL: NEGATIVE — SIGNIFICANT CHANGE UP
TOXICOLOGY SCREEN, DRUGS OF ABUSE, SERUM RESULT: SIGNIFICANT CHANGE UP
TROPONIN T, HIGH SENSITIVITY RESULT: 7 NG/L — SIGNIFICANT CHANGE UP
TSH SERPL-MCNC: 1.53 UIU/ML — SIGNIFICANT CHANGE UP (ref 0.27–4.2)
UROBILINOGEN FLD QL: 1 MG/DL — SIGNIFICANT CHANGE UP (ref 0.2–1)
WBC # BLD: 9.41 K/UL — SIGNIFICANT CHANGE UP (ref 3.8–10.5)
WBC # FLD AUTO: 9.41 K/UL — SIGNIFICANT CHANGE UP (ref 3.8–10.5)
WBC UR QL: 2 /HPF — SIGNIFICANT CHANGE UP (ref 0–5)

## 2024-04-03 PROCEDURE — 99285 EMERGENCY DEPT VISIT HI MDM: CPT

## 2024-04-03 RX ORDER — NORTRIPTYLINE HYDROCHLORIDE 10 MG/1
25 CAPSULE ORAL AT BEDTIME
Refills: 0 | Status: DISCONTINUED | OUTPATIENT
Start: 2024-04-03 | End: 2024-04-16

## 2024-04-03 RX ORDER — CLONAZEPAM 1 MG
0.5 TABLET ORAL
Refills: 0 | Status: DISCONTINUED | OUTPATIENT
Start: 2024-04-03 | End: 2024-04-10

## 2024-04-03 RX ORDER — METOPROLOL TARTRATE 50 MG
50 TABLET ORAL
Refills: 0 | Status: COMPLETED | OUTPATIENT
Start: 2024-04-03 | End: 2024-04-04

## 2024-04-03 RX ORDER — SODIUM CHLORIDE 9 MG/ML
500 INJECTION INTRAMUSCULAR; INTRAVENOUS; SUBCUTANEOUS ONCE
Refills: 0 | Status: COMPLETED | OUTPATIENT
Start: 2024-04-03 | End: 2024-04-03

## 2024-04-03 RX ORDER — ATORVASTATIN CALCIUM 80 MG/1
10 TABLET, FILM COATED ORAL AT BEDTIME
Refills: 0 | Status: DISCONTINUED | OUTPATIENT
Start: 2024-04-03 | End: 2024-04-16

## 2024-04-03 RX ORDER — MIRTAZAPINE 45 MG/1
15 TABLET, ORALLY DISINTEGRATING ORAL AT BEDTIME
Refills: 0 | Status: DISCONTINUED | OUTPATIENT
Start: 2024-04-03 | End: 2024-04-16

## 2024-04-03 RX ORDER — LOSARTAN POTASSIUM 100 MG/1
100 TABLET, FILM COATED ORAL AT BEDTIME
Refills: 0 | Status: COMPLETED | OUTPATIENT
Start: 2024-04-03 | End: 2024-04-04

## 2024-04-03 RX ORDER — ACETAMINOPHEN 500 MG
975 TABLET ORAL ONCE
Refills: 0 | Status: COMPLETED | OUTPATIENT
Start: 2024-04-03 | End: 2024-04-03

## 2024-04-03 RX ORDER — SODIUM CHLORIDE 9 MG/ML
1000 INJECTION INTRAMUSCULAR; INTRAVENOUS; SUBCUTANEOUS ONCE
Refills: 0 | Status: COMPLETED | OUTPATIENT
Start: 2024-04-03 | End: 2024-04-03

## 2024-04-03 RX ADMIN — Medication 0.5 MILLIGRAM(S): at 20:33

## 2024-04-03 RX ADMIN — Medication 975 MILLIGRAM(S): at 16:45

## 2024-04-03 RX ADMIN — ATORVASTATIN CALCIUM 10 MILLIGRAM(S): 80 TABLET, FILM COATED ORAL at 20:33

## 2024-04-03 RX ADMIN — SODIUM CHLORIDE 500 MILLILITER(S): 9 INJECTION INTRAMUSCULAR; INTRAVENOUS; SUBCUTANEOUS at 17:35

## 2024-04-03 RX ADMIN — NORTRIPTYLINE HYDROCHLORIDE 25 MILLIGRAM(S): 10 CAPSULE ORAL at 20:55

## 2024-04-03 RX ADMIN — Medication 25 MILLIGRAM(S): at 20:33

## 2024-04-03 RX ADMIN — MIRTAZAPINE 15 MILLIGRAM(S): 45 TABLET, ORALLY DISINTEGRATING ORAL at 20:33

## 2024-04-03 RX ADMIN — Medication 975 MILLIGRAM(S): at 16:15

## 2024-04-03 RX ADMIN — SODIUM CHLORIDE 1000 MILLILITER(S): 9 INJECTION INTRAMUSCULAR; INTRAVENOUS; SUBCUTANEOUS at 12:20

## 2024-04-03 RX ADMIN — Medication 50 MILLIGRAM(S): at 20:32

## 2024-04-03 NOTE — ED BEHAVIORAL HEALTH ASSESSMENT NOTE - DESCRIPTION
anxious but in behavioral control  Vital Signs Last 24 Hrs  T(C): 37.1 (03 Apr 2024 11:40), Max: 37.1 (03 Apr 2024 11:40)  T(F): 98.8 (03 Apr 2024 11:40), Max: 98.8 (03 Apr 2024 11:40)  HR: 94 (03 Apr 2024 11:40) (94 - 105)  BP: 134/81 (03 Apr 2024 11:40) (134/81 - 145/87)  BP(mean): 96 (03 Apr 2024 11:40) (96 - 96)  RR: 12 (03 Apr 2024 11:40) (12 - 20)  SpO2: 97% (03 Apr 2024 11:40) (97% - 97%)    Parameters below as of 03 Apr 2024 11:40  Patient On (Oxygen Delivery Method): room air retired (),  for 28 yrs; has no children. is a non-caregiver status living in a private home in [place] with her . not Church. no access to guns HTN, HLD and GERD

## 2024-04-03 NOTE — ED BEHAVIORAL HEALTH ASSESSMENT NOTE - DETAILS
Father () - PTSD informed  hx of a suicide attempt in 11/2019 where  found Pt after overdosing on pills. per transfer

## 2024-04-03 NOTE — ED ADULT NURSE NOTE - CHIEF COMPLAINT QUOTE
pt with Hx. Depression, anxiety, coming with dizziness, body shakes started Sunday, pt stated while pt was in hosp. last wk her meds were stop and since she not the same. pt denies SI,

## 2024-04-03 NOTE — ED BEHAVIORAL HEALTH ASSESSMENT NOTE - NSPRESENTSXS_PSY_ALL_CORE
severe anxiety symptoms predominating depressive symptoms/Depressed mood/Anhedonia/Severe anxiety, agitation or panic/Refusal or inability to complete safety plan

## 2024-04-03 NOTE — ED ADULT TRIAGE NOTE - CHIEF COMPLAINT QUOTE
pt with Hx. Depression, anxiety, coming with dizziness, body shakes started Sunday, pt stated while pt was in hosp. last wk her meds were stop and since she not the same. pt denies SI, pt with Hx. Depression, anxiety, coming with dizziness, body shakes started Sunday, pt stated while pt was in hosp. last wk her meds were stop and since she is not the same. pt denies SI,

## 2024-04-03 NOTE — ED ADULT NURSE NOTE - NSFALLUNIVINTERV_ED_ALL_ED
Bed/Stretcher in lowest position, wheels locked, appropriate side rails in place/Call bell, personal items and telephone in reach/Instruct patient to call for assistance before getting out of bed/chair/stretcher/Non-slip footwear applied when patient is off stretcher/[place] to call system/Physically safe environment - no spills, clutter or unnecessary equipment/Purposeful proactive rounding/Room/bathroom lighting operational, light cord in reach

## 2024-04-03 NOTE — ED BEHAVIORAL HEALTH ASSESSMENT NOTE - CURRENT MEDICATION
Current Medication	MEDICATIONS  (STANDING):  atorvastatin 10 milliGRAM(s) Oral at bedtime  Biotene Dry Mouth Oral Rinse 5 milliLiter(s) Swish and Spit three times a day  clonazePAM  Tablet 0.5 milliGRAM(s) Oral <User Schedule>  furosemide    Tablet 20 milliGRAM(s) Oral <User Schedule>  lidocaine   4% Patch 2 Patch Transdermal daily  losartan 100 milliGRAM(s) Oral daily  metoprolol tartrate 50 milliGRAM(s) Oral two times a day  mirtazapine 7.5 milliGRAM(s) Oral at bedtime  multivitamin 1 Tablet(s) Oral daily  nortriptyline 75 milliGRAM(s) Oral at bedtime  pantoprazole    Tablet 40 milliGRAM(s) Oral <User Schedule>  pregabalin 25 milliGRAM(s) Oral <User Schedule> Klonopin 0.5 mg po BID, Remeron 15 mg po QHS, Nortriptyline 25 mg po QHS, Lyrica 25 mg po TID, Atorvastatin 10 mg po QHS, Losartan 100 mg po QHS, Metoprolol tartrate 50 mg po BID

## 2024-04-03 NOTE — ED PROVIDER NOTE - CLINICAL SUMMARY MEDICAL DECISION MAKING FREE TEXT BOX
73y F with PMH of anxiety, depression, HTN, and HLD presents for dizziness and shaking x 3 days. She was admitted to [facility] for anxiety 1 month ago where Dr. Washington took her off her medications. She was then started on clonazepam, mirtazapine, and nortriptyline upon discharge which helped her. She saw Dr. Ramirez outpatient who changed her medications without any tapering, mentioning the zoloft gave her GI upset. She asked to restart her previous medications and was changed suddenly again on 3/28. Then sunday she developed dizziness and increased tremors in her hands. On exam, pt with stable hemodynamics, non-focal neuro exam. Pt appears tearful, anxious and depressed. Imp: Dizziness, non-specific, low suspicion for a cardiac, neurological or metabolic imbalance origin, suspect likely related to recent change in psych meds, there is concern for passive SI as well. Plan for medical clearance with labs and UA, psych consult.

## 2024-04-03 NOTE — ED BEHAVIORAL HEALTH ASSESSMENT NOTE - SUMMARY
73 yr old female, , domiciled, and retired. premorbidly ambulant (not needing assists) and iADL/ bADL independent. with background hx of MDD and ANSON; [facility] had prior SA by overdosing on pills (11/2019) following death of parents. has no reported hx of NSSIB, currently treated by outpatient psychiatrist Dr. Ramirez, she denied using any illicit substance use including alcohol.. per chart review, there was documented use of cannabis daily, pertinent medical issues include: HTN, hyperlipidemia, and GERD, brought in by  for anxiety, depression, somatic symptoms, SI.   At this time, pt endorses uncontrolled symptoms of anxiety and depression, including active SI with plan to overdose on Nortriptyline and Klonopin. Pt presents an acute danger to self and requires inpatient psychiatric admission for safety and stabilization. 73 yr old female, , domiciled, and retired. premorbidly ambulant (not needing assists) and iADL/ bADL independent. with background hx of MDD and ANSON; [facility] had prior SA by overdosing on pills (11/2019) following death of parents. has no reported hx of NSSIB, currently treated by outpatient psychiatrist Dr. Ramirez, she denied using any illicit substance use including alcohol.. per chart review, there was documented use of cannabis daily, pertinent medical issues include: HTN, hyperlipidemia, and GERD, brought in by  for anxiety, depression, somatic symptoms, SI.   At this time, pt endorses uncontrolled symptoms of anxiety and depression, including active SI with plan to overdose on Nortriptyline and Klonopin. Pt presents an acute danger to self and requires inpatient psychiatric admission for safety and stabilization.    UPDATE: SEE  PROGRESS NOTE- PATIENT C/O DIZZINESS, POOR AMBULATION. REQUIRES MEDICAL ADMIT WITH PSYCH CL FOLLOW UP

## 2024-04-03 NOTE — ED PROVIDER NOTE - OBJECTIVE STATEMENT
73y F with PMH of anxiety, depression, HTN, and HLD presents for dizziness and shaking x 3 days. She was admitted to [facility] for anxiety 1 month ago where Dr. Washington took her off her medications. She was then started on clonazepam, mirtazapine, and nortriptyline upon discharge which helped her. She saw Dr. Ramirez outpatient who changed her medications without any tapering, mentioning the zoloft gave her GI upset. She asked to restart her previous medications and was changed suddenly again on 3/28. Then  she developed dizziness and increased tremors in her hands. The dizziness is intermittent and feels like the "room spinning" with no correlation to position or exertion. She notes she has intermittent palpitations at baseline and has not noticed them increase. Denies fever, chills, sweating,m headache, vision changes, hearing loss, tinnitus, chest pain, SOB, N/V, abdominal pain, and falls. She admits to feeling worthless and "being ok if she " but denies plan or significant SI/HI. She is planning to f/u with her former psychologist Dr. Kamara on 4/15. 73y F with PMH of anxiety, depression, HTN, and HLD presents for dizziness and shaking x 3 days. She was admitted to [facility] for anxiety 1 month ago where Dr. Washington took her off her medications. She was then started on clonazepam, mirtazapine, and nortriptyline upon discharge which helped her. She saw Dr. Ramirez outpatient who changed her medications without any tapering, mentioning the zoloft gave her GI upset. She asked to restart her previous medications and was changed suddenly again on 3/28. Then  she developed dizziness and increased tremors in her hands. The dizziness is intermittent and feels like the "room spinning" with no correlation to position or exertion. She notes she has intermittent palpitations at baseline and has not noticed them increase. Denies fever, chills, sweating,m headache, vision changes, hearing loss, tinnitus, chest pain, SOB, N/V, abdominal pain, and falls. She admits to feeling worthless and "being ok if she " but denies plan or significant SI/HI. She is planning to f/u with her former psychologist Dr. Kamara on 4/15.    Attending;  72yo female presents with dizziness and anxiety.  pt states she feels like she does not care if she dies.  states that she felt better after she was admitted at [facility] but then decompensated after another psychiatrist changed her medications.  now feels very anxious and depressed.  requesting to be readmitted to [facility].

## 2024-04-03 NOTE — ED BEHAVIORAL HEALTH ASSESSMENT NOTE - HPI (INCLUDE ILLNESS QUALITY, SEVERITY, DURATION, TIMING, CONTEXT, MODIFYING FACTORS, ASSOCIATED SIGNS AND SYMPTOMS)
73 yr old female, , domiciled, and retired. premorbidly ambulant (not needing assists) and iADL/ bADL independent. with background hx of MDD and ANSON; [facility] had prior SA by overdosing on pills (11/2019) following death of parents. has no reported hx of NSSIB, currently treated by outpatient psychiatrist Dr. Ramirez, she denied using any illicit substance use including alcohol.. per chart review, there was documented use of cannabis daily, pertinent medical issues include: HTN, hyperlipidemia, and GERD, brought in by  for anxiety, depression, somatic symptoms, SI.     On interview, pt is very anxious. States she was doing ok until recently when Dr. Ramirez changed her psych meds. States she was abruptly taken off her meds, and he then restarted them a few days ago. For the past 3-4 days, pt has been experiencing, dizziness, feels "shaky," with intense anxiety as well as depressed mood. She reports feeling hopeless and helpless. She reports anhedonia.  She also reports active SI with plan to overdose on Klonopin and Nortriptyline if discharged home at this time. She denies manic or psychotic symptoms. She denies homicidal or violent ideation, intent, or plan. She reports full medication compliance, denies substance use.   Pt's  corroborates pt's history. States he is unhappy with Dr. Ramirez's care. Pt has appointment with Dr. Peña (pt's former psychiatrist) on 4/15/24.

## 2024-04-03 NOTE — ED PROVIDER NOTE - CARE PLAN
1 Principal Discharge DX:	Dizziness   Principal Discharge DX:	Dizziness  Secondary Diagnosis:	Anxiety

## 2024-04-03 NOTE — ED PROVIDER NOTE - PROGRESS NOTE DETAILS
MD Gabriel: Pt signed out to me at 11:00 pm by Dr Gonzalez pending psych bed. She has remained comfortable all night. Still pending psych bed. Remains medically clear DYLON Jacques: Patient signed out to me this morning with multiple reassessments patient's reported at first she was feeling anxious she was provided her Klonopin 0.5 mg with some improvement.  Patient was pending a bed at psychiatric facility.  However patient noted persistent dizziness that she came in with.  CTAs were performed were unremarkable.  Patient still with persistent dizziness and unsteady gait.  Will admit to Dr. Paige as per Dr. Grover., Can admit. Neurology paged for consult, will see

## 2024-04-03 NOTE — ED PROVIDER NOTE - ATTENDING CONTRIBUTION TO CARE
See History of Present Illness for attending note.     This patient was seen with an ACP.  I have reviewed the documentation and agree with it.

## 2024-04-03 NOTE — ED ADULT NURSE NOTE - OBJECTIVE STATEMENT
Pt received from triage on stretcher w/  at the bedside, A/Ox4 answers all questions appropriately. C/O intermittent Dizziness x 3 days that Pt describes as a sensation of lightheadedness. States her psychiatric medication dosages were recently changed by her PCP. Pt denies headache, numbness/weakness, Pt denies chest pain or SOB during initial assessment, breathing is even and unlabored on room air. Abdomen is soft and non-distended, non-tender to touch. Pt ambulates independently at baseline, moves all four extremities on command, skin is intact. Pt resting comfortably at the bedside, no visible acute distress noted on initial assessment. Vital signs stable, NKA to medications. PMHx Depression/anxiety, HTN. Pt placed on continuous tele monitoring, NSR observed on the monitor. Pending provider orders.

## 2024-04-04 DIAGNOSIS — R42 DIZZINESS AND GIDDINESS: ICD-10-CM

## 2024-04-04 LAB — SARS-COV-2 RNA SPEC QL NAA+PROBE: SIGNIFICANT CHANGE UP

## 2024-04-04 PROCEDURE — 70450 CT HEAD/BRAIN W/O DYE: CPT | Mod: 26,MC,59

## 2024-04-04 PROCEDURE — 70498 CT ANGIOGRAPHY NECK: CPT | Mod: 26,MC

## 2024-04-04 PROCEDURE — 99223 1ST HOSP IP/OBS HIGH 75: CPT

## 2024-04-04 PROCEDURE — 99214 OFFICE O/P EST MOD 30 MIN: CPT

## 2024-04-04 PROCEDURE — 70496 CT ANGIOGRAPHY HEAD: CPT | Mod: 26,MC

## 2024-04-04 RX ORDER — MECLIZINE HCL 12.5 MG
25 TABLET ORAL ONCE
Refills: 0 | Status: COMPLETED | OUTPATIENT
Start: 2024-04-04 | End: 2024-04-04

## 2024-04-04 RX ADMIN — Medication 25 MILLIGRAM(S): at 14:52

## 2024-04-04 RX ADMIN — Medication 25 MILLIGRAM(S): at 22:25

## 2024-04-04 RX ADMIN — Medication 25 MILLIGRAM(S): at 14:00

## 2024-04-04 RX ADMIN — Medication 25 MILLIGRAM(S): at 07:23

## 2024-04-04 RX ADMIN — ATORVASTATIN CALCIUM 10 MILLIGRAM(S): 80 TABLET, FILM COATED ORAL at 22:26

## 2024-04-04 RX ADMIN — NORTRIPTYLINE HYDROCHLORIDE 25 MILLIGRAM(S): 10 CAPSULE ORAL at 22:24

## 2024-04-04 RX ADMIN — Medication 50 MILLIGRAM(S): at 07:24

## 2024-04-04 RX ADMIN — MIRTAZAPINE 15 MILLIGRAM(S): 45 TABLET, ORALLY DISINTEGRATING ORAL at 22:25

## 2024-04-04 RX ADMIN — LOSARTAN POTASSIUM 100 MILLIGRAM(S): 100 TABLET, FILM COATED ORAL at 23:07

## 2024-04-04 RX ADMIN — Medication 0.5 MILLIGRAM(S): at 07:23

## 2024-04-04 RX ADMIN — Medication 0.5 MILLIGRAM(S): at 22:25

## 2024-04-04 RX ADMIN — Medication 50 MILLIGRAM(S): at 18:30

## 2024-04-04 NOTE — ED BEHAVIORAL HEALTH PROGRESS NOTE - COLLATERAL INFORMATION (NAME, PHONE, RELATIONSHIP):
Called Dr. Montogmery- [phone]- He reports he believes patient is anxiety based. Patient is new to the clinic and was previously at [facility] outpatient. During her last admission she was discharged to the clinic. He made a lot of changes with the medications. When she was discharged from the hospital she was c/o of palpitations. So he reduced patient's Nortriptyline to 25mg. He added Zoloft x 1 week and she developed side effects which he believes is related to anxiety. Patient then discontinued the zoloft. He continued Lyrica despite him wanting to discontinue it. However patient insisted on continuing it. The only medication patient discontinued was zoloft. Patient was also insistent to go back to Klonopin 0.5mg TID. However, he did not feel comfortable continuing TID.     Reviewed [facility] - Patient Reference #: [id number]. Recently filled Klonopin 0.5mg BID 3/28/24 & Lyrica 25mg #90 pills 3/7/24. Prior to Klonopin patient prescribed Klonopin 0.25mg ODT on 2/29 #60 and Xanax 0.5mg #60 12/23. Called Dr. Montgomery- [phone]- He reports he believes patient is anxiety based. Patient is new to the clinic and was previously at [facility] outpatient. During her last admission she was discharged to the clinic. He made a lot of changes with the medications. When she was discharged from the hospital she was c/o of palpitations. So he reduced patient's Nortriptyline to 25mg. He added Zoloft 25mg x 1 week and she developed side effects which he believes is related to anxiety. Patient then discontinued the zoloft. He continued Lyrica despite him wanting to discontinue it- patient insisted on continuing it. The only medication patient discontinued was zoloft. Patient was also insistent to go back to Klonopin 0.5mg TID. However, he did not feel comfortable continuing TID.     Reviewed [facility] - Patient Reference #: [id number]. Recently filled Klonopin 0.5mg BID 3/28/24 & Lyrica 25mg #90 pills 3/7/24. Prior to Klonopin patient prescribed Klonopin 0.25mg ODT on 2/29 #60 and Xanax 0.5mg #60 12/23.

## 2024-04-04 NOTE — H&P ADULT - ASSESSMENT
73y F w/ PMHx significant for anxiety, depression, HTN, and HLD presents for dizziness admitted for neuro eval, c/b suicidal ideation

## 2024-04-04 NOTE — ED BEHAVIORAL HEALTH NOTE - BEHAVIORAL HEALTH NOTE
worker called ems to arrange initial transport- placed on hold until medically cleared. worker called ems to arrange initial transport TO [facility]. (AWAITING RN report for EMS ).  placed on hold until medically cleared. worker called ems to arrange initial transport TO [facility]. (AWAITING RN report for EMS  [phone] ).  placed on hold until medically cleared.

## 2024-04-04 NOTE — ED ADULT NURSE REASSESSMENT NOTE - NS ED NURSE REASSESS COMMENT FT1
IV discontinued at this time. Pt resting comfortably in stretcher. Respirations even and unlabored on room air. No acute distress noted. Pt denies any complaints at this time. Plan of care ongoing, comfort measures provided and safety measures maintained. Awaiting .
Pt tolerating  po intake no co nausea noted. Pt awaiting ct results  and dispo, remains on co for safety.
Report received from KAZ Zee. Pt A&Ox3 sitting up in stretcher resting comfortably. RM Rimi at bedside medicating pt per EMAR. Respirations even and unlabored on room air. No acute distress noted. Pt denies headache, dizziness, chest pain and SOB at this time. Plan of care ongoing, comfort measures provided and safety measures maintained. Pt awaiting medication from pharmacy.
Upon reassessment pt sleeping in stretcher at this time, arousable to verbal and tactile stimuli. Respirations even and unlabored on room air. No acute distress noted. Pt denies headache, dizziness, chest pain and SOB at this time. Plan of care ongoing, comfort measures provided and safety measures maintained. Awaiting .
Upon reassessment pt sleeping in stretcher at this time. Arousable to verbal and tactile stimuli. Respirations even and unlabored on room air. No acute distress noted. VS as noted in flow sheet. Denies headache, dizziness, chest pain and SOB at this time. Plan of care ongoing, comfort measures provided and safety measures maintained. Awaiting disposition.
Upon reassessment pt stating she is feeling anxious due to the amount of time she has spent in the ED waiting for bed assignment. Pt ambulated to bathroom with 1 assist and steady gait. Pt asking to eat before taking morning medication. Pt provided food and medicated per EMAR. VS as  noted in flow sheet. Plan of care ongoing, comfort measures provided and safety measures maintained. Pt awaiting  or [facility] bed assignment.
break coverage rn. received report from KAZ galeas. pt A&Ox4, pt resting comfortably at this time. resp even unlabored, abd soft, pedal pulses 2+ bilaterally
pt is calm and cooperative ,tolerated breakfast well. pt with no co chest pain or sob. Pt ambulating to bathroom well. Pt is awaiting [facility]. Pt on co.
Patient received, appears to be resting comfortably in bed, no complaints noted at this time. Breathing even and unlabored, pallor/diaphoresis not noted. patient on 1 to 1 with PCA at bedside. will continue to monitor.
Patient will be going to [facility], report given to KAZ LYN, PCA with patient for 1:1.

## 2024-04-04 NOTE — ED BEHAVIORAL HEALTH NOTE - BEHAVIORAL HEALTH NOTE
Patient is accepted to [facility] – accepting Dr. Hernandez. Nurse to nurse report to be provided at [phone]. EMS to be arranged by nurse. Address : [address] [place], [place] [address]bo[place]ie Fuchs, [place] [id number] Patient is accepted to [facility] – accepting Dr. Hernandez. Nurse to nurse report to be provided at [phone]. EMS to be arranged by nurse. Address : [address] [place] [address]    Writer called patient’s spouse Yehuda ([phone]) and informed of patient admission to [facility]. Patient is accepted to [facility] – accepting Dr. Henrandez. Nurse to nurse report to be provided at [phone]. EMS to be arranged by nurse. Address : [address] [place] [address]    Writer called patient’s spouse Yehuda ([phone]) and informed of patient admission to [facility].    writer emailed ekg to [place].

## 2024-04-04 NOTE — ED BEHAVIORAL HEALTH PROGRESS NOTE - CASE SUMMARY/FORMULATION (CLEARLY DOCUMENT RATIONALE FOR DISPOSITION CHANGE)
73 yr old female, , domiciled, and retired. premorbidly ambulant (not needing assists) and iADL/ bADL independent. with background hx of MDD and ANSON; [facility] had prior SA by overdosing on pills (11/2019) following death of parents. has no reported hx of NSSIB, currently treated by outpatient psychiatrist Dr. Ramirez, she denied using any illicit substance use including alcohol.. per chart review, there was documented use of cannabis daily, pertinent medical issues include: HTN, hyperlipidemia, and GERD, brought in by  for anxiety, depression, somatic symptoms, SI.   At this time, pt endorses uncontrolled symptoms of anxiety and depression, including active SI with plan to overdose on Nortriptyline and Klonopin.     Patient continues to present with suicidal ideation with plan. Unable to contract for safety outside the hospital environment. Patient tearful, hopeless and anxious. She is unpredictable due to severity of symptoms. Pt is at elevated risk for inadvertent injury to self due to exacerbation and intensity of symptoms and will therefore require admission to inpatient psychiatry at this time. Pt presents an acute danger to self and requires inpatient psychiatric admission for safety and stabilization.    Plan:  1. Admit 9.27  2. Continue Klonopin 0.5 mg po BID, Remeron 15 mg po QHS, Nortriptyline 25 mg po QHS, Lyrica 25 mg po TID, Atorvastatin 10 mg po QHS, Losartan 100 mg po QHS, Metoprolol tartrate 50 mg po BID  3. Defer changes to inpatient team  73 yr old female, , domiciled, and retired. premorbidly ambulant (not needing assists) and iADL/ bADL independent. with background hx of MDD and ANSON; has [facility] ([facility] had prior SA by overdosing on pills (11/2019) following death of parents. has no reported hx of NSSIB, currently treated by outpatient psychiatrist Dr. Ramirez, she denied using any illicit substance use including alcohol.. per chart review, there was documented use of cannabis daily, pertinent medical issues include: HTN, hyperlipidemia, and GERD, brought in by  for anxiety, depression, somatic symptoms, SI.   At this time, pt endorses uncontrolled symptoms of anxiety and depression, including active SI with plan to overdose on Nortriptyline and Klonopin.     Patient continues to present with suicidal ideation with plan. Unable to contract for safety outside the hospital environment. Patient tearful, hopeless and anxious. She is unpredictable due to severity of symptoms. Pt is at elevated risk for inadvertent injury to self due to exacerbation and intensity of symptoms and will therefore require admission to inpatient psychiatry at this time. Pt presents an acute danger to self and requires inpatient psychiatric admission for safety and stabilization.    Plan:  1. Admit 9.27  2. Continue Klonopin 0.5 mg po BID, Remeron 15 mg po QHS, Nortriptyline 25 mg po QHS, Lyrica 25 mg po TID, Atorvastatin 10 mg po QHS, Losartan 100 mg po QHS, Metoprolol tartrate 50 mg po BID  3. Defer changes to inpatient team     ADDENDUM 16:20: Prior to admission patient started to c/o increased dizziness, inability to ambulate. Spoke with Dr. Decker. Patient re-evaluated. Patient to be admitted medically for poor ambulation. Plan- email sent to Psych CL. Recommend 1:1     Would recommend stopping Nortriptyline- may be contributing to falls. Continue Remeron 15mg QHS and considering titrating off Klonopin as this may also be contributing to falls. Defer changes to inpatient team. PRNs can include Gabapentin 100mg PRN for anxiety. Zyprexa 1.25mg IM for extreme agitation- For agitation please attempt verbal de-escalation and behavioral intervention first. If patient does not respond to above, may give recommended PRNs if no contraindications. If patient is refusing PO, remains an imminent danger to self or others and If Patient's  qtc <500, can escalate to IM formulation. If IM antipsychotic is administered, please perform follow-up ECG for QTc monitoring.  73 yr old female, , domiciled, and retired. premorbidly ambulant (not needing assists) and iADL/ bADL independent. with background hx of MDD and ANSON; [facility] had prior SA by overdosing on pills (11/2019) following death of parents. has no reported hx of NSSIB, currently treated by outpatient psychiatrist Dr. Ramirez, she denied using any illicit substance use including alcohol.. per chart review, there was documented use of cannabis daily, pertinent medical issues include: HTN, hyperlipidemia, and GERD, brought in by  for anxiety, depression, somatic symptoms, SI.   At this time, pt endorses uncontrolled symptoms of anxiety and depression, including active SI with plan to overdose on Nortriptyline and Klonopin.     Patient continues to present with suicidal ideation with plan. Unable to contract for safety outside the hospital environment. Patient tearful, hopeless and anxious. She is unpredictable due to severity of symptoms. Pt is at elevated risk for inadvertent injury to self due to exacerbation and intensity of symptoms and will therefore require admission to inpatient psychiatry at this time. Pt presents an acute danger to self and requires inpatient psychiatric admission for safety and stabilization.      ADDENDUM 16:20: Prior to admission patient started to c/o increased dizziness, inability to ambulate. Spoke with Dr. Decker. Patient re-evaluated. Patient to be admitted medically for poor ambulation. Plan- email sent to Psych CL. Recommend 1:1     Polypharmacy is likely contributing to patient's physiological symptoms which may be worsening her anxiety. Would recommend stopping Nortriptyline may be contributing to patient's dizziness and consider lowering Lyrica/Klonopin due to polypharmacy defer to CL pending symptoms.     Can continue for now:   Klonopin 0.5 mg po BID, Remeron 15 mg po QHS, Lyrica 25 mg po TID, Remeron 15mg QHS and considering titrating off Klonopin as this may also be contributing to falls. Defer changes to inpatient team. PRNs can include Gabapentin 100mg PRN for anxiety. Zyprexa 1.25mg IM for extreme agitation- For agitation please attempt verbal de-escalation and behavioral intervention first. If patient does not respond to above, may give recommended PRNs if no contraindications. If patient is refusing PO, remains an imminent danger to self or others and If Patient's  qtc <500, can escalate to IM formulation. If IM antipsychotic is administered, please perform follow-up ECG for QTc monitoring.  73 yr old female, , domiciled, and retired. premorbidly ambulant (not needing assists) and iADL/ bADL independent. with background hx of MDD and ANSON; has multiple psych admissions ([facility] had prior SA by overdosing on pills (11/2019) following death of parents. has no reported hx of NSSIB, currently treated by outpatient psychiatrist Dr. Ramirez, she denied using any illicit substance use including alcohol.. per chart review, there was documented use of cannabis daily, pertinent medical issues include: HTN, hyperlipidemia, and GERD, brought in by  for anxiety, depression, somatic symptoms, SI.     Patient continues to present with suicidal ideation with plan. Unable to contract for safety outside the hospital environment. Patient tearful, hopeless and anxious. She is unpredictable due to severity of symptoms. Pt is at elevated risk for inadvertent injury to self due to exacerbation and intensity of symptoms and will therefore require admission to inpatient psychiatry at this time. Pt presents an acute danger to self and requires inpatient psychiatric admission for safety and stabilization.      ADDENDUM 16:20: Prior to admission patient started to c/o increased dizziness, inability to ambulate. Spoke with Dr. Decker. Patient re-evaluated. Patient to be admitted medically for poor ambulation/dizziness. Email sent to Psych CL. Recommend 1:1     Polypharmacy is likely contributing to patient's physiological symptoms which may be worsening her anxiety. Would recommend stopping Nortriptyline may be contributing to patient's dizziness and consider lowering Lyrica/Klonopin due to polypharmacy defer to CL pending symptoms.     Can continue for now:   Klonopin 0.5 mg po BID, Remeron 15 mg po QHS, Lyrica 25 mg po TID, Remeron 15mg QHS and considering titrating off Klonopin as this may also be contributing to falls. Defer changes to inpatient team. PRNs can include Gabapentin 100mg PRN for anxiety. Zyprexa 1.25mg IM for extreme agitation- For agitation please attempt verbal de-escalation and behavioral intervention first. If patient does not respond to above, may give recommended PRNs if no contraindications. If patient is refusing PO, remains an imminent danger to self or others and If Patient's  qtc <500, can escalate to IM formulation. If IM antipsychotic is administered, please perform follow-up ECG for QTc monitoring.  73 yr old female, , domiciled, and retired. premorbidly ambulant (not needing assists) and iADL/ bADL independent. with background hx of MDD and ANSON; has multiple psych admissions (most [facility] had prior SA by overdosing on pills (11/2019) following death of parents. has no reported hx of NSSIB, currently treated by outpatient psychiatrist Dr. Ramirez, she denied using any illicit substance use including alcohol. per chart review, there was documented use of cannabis daily, pertinent medical issues include: HTN, hyperlipidemia, and GERD, brought in by  for anxiety, depression, somatic symptoms, suicidal/homicidal ideations, intent or plan.     At this time, pt endorses uncontrolled symptoms of anxiety and depression, including active SI with plan to overdose on Nortriptyline and Klonopin. Pt presents an acute danger to self and requires inpatient psychiatric admission for safety and stabilization.      Patient continues to present with suicidal ideation with plan. Unable to contract for safety outside the hospital environment. Patient tearful, hopeless and anxious. She is unpredictable due to severity of symptoms. Pt is at elevated risk for inadvertent injury to self due to exacerbation and intensity of symptoms and will therefore require admission to inpatient psychiatry at this time. Pt presents an acute danger to self and requires inpatient psychiatric admission for safety and stabilization.      ADDENDUM 16:20: Prior to admission patient started to c/o increased dizziness, inability to ambulate. Spoke with Dr. Decker. Patient re-evaluated. Patient to be admitted medically for poor ambulation/dizziness. Email sent to Psych CL. Recommend 1:1     Polypharmacy is likely contributing to patient's physiological symptoms which may be worsening her anxiety. Would recommend stopping Nortriptyline may be contributing to patient's dizziness and consider lowering Lyrica/Klonopin due to polypharmacy defer to CL pending symptoms.     Can continue for now:   Klonopin 0.5 mg po BID, Remeron 15 mg po QHS, Lyrica 25 mg po TID, Remeron 15mg QHS and considering titrating off Klonopin as this may also be contributing to falls. Defer changes to inpatient team. PRNs can include Gabapentin 100mg PRN for anxiety. Zyprexa 1.25mg IM for extreme agitation- For agitation please attempt verbal de-escalation and behavioral intervention first. If patient does not respond to above, may give recommended PRNs if no contraindications. If patient is refusing PO, remains an imminent danger to self or others and If Patient's  qtc <500, can escalate to IM formulation. If IM antipsychotic is administered, please perform follow-up ECG for QTc monitoring.

## 2024-04-04 NOTE — H&P ADULT - NSHPLABSRESULTS_GEN_ALL_CORE
( @ 12:20)                      14.7  9.41 )-----------( 236                 44.5    Neutrophils = 6.76 (71.9%)  Lymphocytes = 1.97 (20.9%)  Eosinophils = 0.10 (1.1%)  Basophils = 0.03 (0.3%)  Monocytes = 0.52 (5.5%)  Bands = --%        141  |  105  |  7   ----------------------------<  97  4.6   |  26  |  0.69    Ca    9.6      2024 12:20    TPro  6.9  /  Alb  4.1  /  TBili  0.5  /  DBili  x   /  AST  21  /  ALT  13  /  AlkPhos  117          Tox:   ( @ 12:20)  Acetaminophen Level, Serum: <10 [15 - 25]  Barbiturates Measurement: --  Benzodiazepines: --  Ethanol, Whole Blood: --  Salicylate Level, Serum: <0.3 [15.0 - 30.0]        Urinalysis Basic - ( 2024 12:40 )    Color: Yellow / Appearance: Clear / S.011 / pH: x  Gluc: x / Ketone: Negative mg/dL  / Bili: Negative / Urobili: 1.0 mg/dL   Blood: x / Protein: Negative mg/dL / Nitrite: Negative   Leuk Esterase: Trace / RBC: 1 /HPF / WBC 2 /HPF   Sq Epi: x / Non Sq Epi: 9 /HPF / Bacteria: Occasional /HPF    < from: CT Angio Head w/ IV Cont (24 @ 13:39) >      IMPRESSION:    1.  BRAIN:  Unremarkable head CT. Ischemic white matter disease and   atrophy typical for age. No abnormal enhancement    2.  RIGHT CAROTID SYSTEM:    No hemodynamically significant stenosis.    3   LEFT CAROTID SYSTEM:     No hemodynamically significant stenosis.    4.   VERTEBRAL CIRCULATION:    Patent.    5.  ANTERIOR INTRACRANIAL CIRCULATION:     Intracranial atherosclerosis   cavernous and clinoid segments of the internal carotid arteries, mild to   moderate.    6.  POSTERIOR INTRACRANIAL CIRCULATION:    Unremarkable.    7.  No large vessel occlusion.    < end of copied text >        Labs personally reviewed  Imaging reviewed  EKG: NSR with frequent PVCs,

## 2024-04-04 NOTE — CONSULT NOTE ADULT - ASSESSMENT
Impression:  Persistent feeling of dizziness and unsteadiness when walking exhibiting L lateropulsion i/s/o multiple recent psychiatric medication changes (poor historian). Possible L cerebellar localization. Polysubstance effects vs less likely AIS due to Mild - mod ICAD read on CTA    General  [] telemetry monitoring    Imaging   [] MRI head non contrast  [] STAT repeat CTH if change in neuro status    Studies  [] TTE    Labs   [] CBC, BMP  [] Lipid panel  [] HbA1C    Other  [] Psychiatric assessment for optimization of psychiatric medications including Lyrica, concern for possible polysubstance effects  [] PT/OT  [] dysphagia screen    Case discussed with Dr. Eliecer Silver. Note finalized upon attending attestation       Impression:  Persistent feeling of dizziness and unsteadiness when walking exhibiting L lateropulsion i/s/o multiple recent psychiatric medication changes (poor historian). Possible L cerebellar localization. Polysubstance effects vs less likely AIS due to Mild - mod ICAD read on CTA    General  [] telemetry monitoring due to reports of intense palpitations different from her usual anxiety attacks    Imaging   [] MRI head non contrast  [] STAT repeat CTH if change in neuro status    Studies  [] TTE due to reports of intense palpitations different from her usual anxiety attacks    Labs   [] CBC, BMP  [] Lipid panel  [] HbA1C    Other  [] Psychiatric assessment for optimization of psychiatric medications including Lyrica, concern for possible polysubstance effects  [] PT/OT  [] dysphagia screen    Case discussed with Dr. Eliecer Silver. Note finalized upon attending attestation

## 2024-04-04 NOTE — H&P ADULT - PROBLEM SELECTOR PLAN 2
-pt with history of severe MDD, treated with ECT in the past  -reporting SI  -appreciate psych recs, c/w 1:1.

## 2024-04-04 NOTE — H&P ADULT - NSHPREVIEWOFSYSTEMS_GEN_ALL_CORE
CONSTITUTIONAL:  +fatigue No weight loss, fever, chills  HEENT:  Eyes:  No visual loss, blurred vision, double vision or yellow sclerae. Ears, Nose, Throat:  No hearing loss, sneezing, congestion, runny nose or sore throat.  SKIN:  No rash or itching.  CARDIOVASCULAR:  No chest pain, chest pressure or chest discomfort. No palpitations or edema.  RESPIRATORY:  No shortness of breath, cough or sputum.  GASTROINTESTINAL:  No anorexia, nausea, vomiting or diarrhea. No abdominal pain or blood.  GENITOURINARY:  Denies hematuria, dysuria.   NEUROLOGICAL: +dizziness +dysequilibrium No headache, syncope, paralysis, ataxia, numbness or tingling in the extremities. No change in bowel or bladder control.  MUSCULOSKELETAL:  No muscle, back pain, joint pain or stiffness.  PSYCHIATRIC:  +anxiety +depression   ENDOCRINOLOGIC:  No reports of sweating, cold or heat intolerance. No polyuria or polydipsia.  ALLERGIES:  No history of asthma, hives, eczema or rhinitis.

## 2024-04-04 NOTE — ED BEHAVIORAL HEALTH PROGRESS NOTE - RISK ASSESSMENT
modifiable risk factors- suicidal thoughts with plan/intent, depression, anxiety, decompensation     unmodifiable risk factors- history of suicide attempts, history of inpatient admissions, history of substance use    protective factors- no HI/plan/intent, no sadia, no current substance use, help seeking

## 2024-04-04 NOTE — H&P ADULT - PROBLEM SELECTOR PLAN 3
-psych following, c/w Klonopin 0.5 mg po BID, Remeron 15 mg po QHS, Lyrica 25 mg po TID  -wean of klonopin as above   -Gabapentin 100mg PRN for anxiety as per psych

## 2024-04-04 NOTE — H&P ADULT - NSHPPHYSICALEXAM_GEN_ALL_CORE
GENERAL APPEARANCE: Well developed, alert and cooperative. NAD.   HEENT:  PERRL, EOMI. External auditory canals normal, hearing grossly intact.  NECK: Neck supple, non-tender   CARDIAC: Normal S1 and S2. No S3, S4 or murmurs. Rhythm is regular.  LUNGS: Clear to auscultation and percussion without rales, rhonchi, wheezing or diminished breath sounds.  ABDOMEN: Positive bowel sounds. Soft, nondistended, nontender. No guarding or rebound.   MUSCULOSKELETAL: ROM intact spine and extremities. No joint erythema or tenderness.   BACK: normal posture, no spinal deformity, symmetry of spinal muscles, without tenderness, decreased range of motion.  EXTREMITIES: No significant deformity or joint abnormality. No edema. Peripheral pulses intact.   NEUROLOGICAL: CN II-XII intact. Strength and sensation symmetric and intact throughout. Dizziness reproducible when going from lying to sitting position, unsteady gait  SKIN: Skin normal color, texture and turgor with no lesions or eruptions.  PSYCHIATRIC: AOx3. anxious affect

## 2024-04-04 NOTE — H&P ADULT - HISTORY OF PRESENT ILLNESS
73y F w/ PMHx significant for anxiety, depression, HTN, and HLD presents for dizziness for 4 days. She was admitted to [facility] for anxiety 1 month ago where Dr. Washington took her off her medications due to concern for polypharmacy. She was then started on clonazepam, mirtazapine, and nortriptyline upon discharge which she reports helped her. She saw Dr. Ramirez outpatient who changed her medications without any tapering, mentioning the zoloft gave her GI upset. She states that this is because when she came into the clinic she was diffusely sweating and nauseated. She asked to restart her previous medications on 3/28. Patient reports Albert 3/31 she had a particularly intense anxiety attack, reporting that the palpitations she felt were greater than any anxiety attack she had in the past. In addition, she reported a feeling of rising heat in her body. After this anxiety attack, she developed dizziness and increased tremors in her hands. Her dizziness remained and is intermittent and feels like the "room spinning".  Pt reports her dizziness began after restarting all the psychiatric medications. She reports dizziness occurs only with positional changes, feels like she is falling to the left side when walking. No dizziness when lying down. Patient has expressed suicidal ideation in the ED and has reported a plan to the behavioral health team for which she is planned to be transferred to [facility] for inpatient psychiatric management after medical clearance of persistent unsteadiness. Patient denies headache, chest pain, SOB, nausea vomiting, slurred speech, hearing changes, vision changes, focal numbness or focal weakness.

## 2024-04-04 NOTE — ED BEHAVIORAL HEALTH PROGRESS NOTE - STANDING MEDS RECEIVED IN ED DETAILS FREE TEXT
Klonopin 0.5 mg po BID, Remeron 15 mg po QHS, Nortriptyline 25 mg po QHS, Lyrica 25 mg po TID, Atorvastatin 10 mg po QHS, Losartan 100 mg po QHS, Metoprolol tartrate 50 mg po BID     Given per MAR- regularly scheduled medication

## 2024-04-04 NOTE — ED BEHAVIORAL HEALTH PROGRESS NOTE - DETAILS:
Met with patient at bedside she reports being in the ED due to feeling unwell. She reports it all started when her psychiatrist changed her medication and led to her feeling dizzy and having issues walking. She stated she has been experiencing severe anxiety & depression. She stated "I just can't take it anymore." Patient endorses wishes she was dead or not alive. She stated she could just take 2 bottles of pills at home for end her life. She reports nothing has helped and she needs help for anxiety/depression. Patient noted to be tearful, anxious and endorses feeling helpless.     Patient denies any hallucinations, does not report any delusional thought content, denies thought insertion/withdrawal, denies referential thought processes & is not paranoid on interview. Patient does not report nor exhibit any signs of sadia, including irritable or elevated mood, grandiosity, pressured speech, risk-taking behaviors, increase in productivity or agitation. Patient denies HI, violent thoughts.

## 2024-04-04 NOTE — H&P ADULT - PROBLEM SELECTOR PLAN 1
-Pt with new onset dizziness for 1 week, worse with changes in position. Associated dysequilibrium. No additional neuro deficits noted on exam  -CTA H/N: Intracranial atherosclerosis cavernous and clinoid segments of the internal carotid arteries, mild to   moderate.  -appreciate neuro eval. Suspect orthostatic hypotension 2/2 polypharmacy vs CVA vs 2/2 ICAD  -F/u MRI head  -check orthostatics  -TTE pending. Monitor on tele   -wean klonopin to BID dosing from TID. If no central pathology, may need to titrate rest of psychiatric medications  -fall precautions    -PT eval

## 2024-04-04 NOTE — ED BEHAVIORAL HEALTH PROGRESS NOTE - INTERDISCIPLINARY MEETING HELD DETAILS FREE TEXT
Discussed need for 1:1 with Dr. Decker  Discussed need for 1:1 with Dr. Decker - discussed change of Dispo.  Discussed need for 1:1 with Dr. Decker - discussed change of Dispo. ED team, family

## 2024-04-04 NOTE — ED BEHAVIORAL HEALTH PROGRESS NOTE - SUMMARY
73 yr old female, , domiciled, and retired. premorbidly ambulant (not needing assists) and iADL/ bADL independent. with background hx of MDD and ANSON; [facility] had prior SA by overdosing on pills (11/2019) following death of parents. has no reported hx of NSSIB, currently treated by outpatient psychiatrist Dr. Ramirez, she denied using any illicit substance use including alcohol.. per chart review, there was documented use of cannabis daily, pertinent medical issues include: HTN, hyperlipidemia, and GERD, brought in by  for anxiety, depression, somatic symptoms, SI.   At this time, pt endorses uncontrolled symptoms of anxiety and depression, including active SI with plan to overdose on Nortriptyline and Klonopin. Pt presents an acute danger to self and requires inpatient psychiatric admission for safety and stabilization. 73 yr old female, , domiciled, and retired. premorbidly ambulant (not needing assists) and iADL/ bADL independent. with background hx of MDD and ANSON; [facility] had prior SA by overdosing on pills (11/2019) following death of parents. has no reported hx of NSSIB, currently treated by outpatient psychiatrist Dr. Ramirez, she denied using any illicit substance use including alcohol. per chart review, there was documented use of cannabis daily, pertinent medical issues include: HTN, hyperlipidemia, and GERD, brought in by  for anxiety, depression, somatic symptoms, SI.   At this time, pt endorses uncontrolled symptoms of anxiety and depression, including active SI with plan to overdose on Nortriptyline and Klonopin. Pt presents an acute danger to self and requires inpatient psychiatric admission for safety and stabilization.

## 2024-04-04 NOTE — CONSULT NOTE ADULT - ATTENDING COMMENTS
Ms. Gross, is  73 year old right handed woman with PMHx of  anxiety, depression, and vascular risk factors who presents due to the dizziness  Non-Focal exam and unable to assess the gait due to the safety precaution in the setting of dizziness.  Etiology of dizziness is most likely due to the peripheral etiology . Clinically less suspicious for vascular etiology. Also, patient would benefit from normal saline 100 cc per hours for 24 to 48 hours, meclizine 25 mg TID and vestibular rehab if symports persist. ENT evaluation. Please check the orthostatic BP.  Safety and fall precaution were discussed in details.  I discussed the diagnosis, and treatment plan with the patient  All questions and concerns were addressed. The patient demonstrated good understanding of the treatment plan.  If you have any further questions, please do not hesitate to contact our team.  Thank you for allowing us to participate in this patient care.

## 2024-04-05 ENCOUNTER — RESULT REVIEW (OUTPATIENT)
Age: 74
End: 2024-04-05

## 2024-04-05 DIAGNOSIS — Z29.9 ENCOUNTER FOR PROPHYLACTIC MEASURES, UNSPECIFIED: ICD-10-CM

## 2024-04-05 DIAGNOSIS — R42 DIZZINESS AND GIDDINESS: ICD-10-CM

## 2024-04-05 DIAGNOSIS — F41.9 ANXIETY DISORDER, UNSPECIFIED: ICD-10-CM

## 2024-04-05 DIAGNOSIS — R45.851 SUICIDAL IDEATIONS: ICD-10-CM

## 2024-04-05 DIAGNOSIS — I10 ESSENTIAL (PRIMARY) HYPERTENSION: ICD-10-CM

## 2024-04-05 DIAGNOSIS — F32.9 MAJOR DEPRESSIVE DISORDER, SINGLE EPISODE, UNSPECIFIED: ICD-10-CM

## 2024-04-05 LAB
ANION GAP SERPL CALC-SCNC: 14 MMOL/L — SIGNIFICANT CHANGE UP (ref 7–14)
BUN SERPL-MCNC: 8 MG/DL — SIGNIFICANT CHANGE UP (ref 7–23)
CALCIUM SERPL-MCNC: 9.4 MG/DL — SIGNIFICANT CHANGE UP (ref 8.4–10.5)
CHLORIDE SERPL-SCNC: 106 MMOL/L — SIGNIFICANT CHANGE UP (ref 98–107)
CHOLEST SERPL-MCNC: 176 MG/DL — SIGNIFICANT CHANGE UP
CO2 SERPL-SCNC: 24 MMOL/L — SIGNIFICANT CHANGE UP (ref 22–31)
CREAT SERPL-MCNC: 0.7 MG/DL — SIGNIFICANT CHANGE UP (ref 0.5–1.3)
EGFR: 91 ML/MIN/1.73M2 — SIGNIFICANT CHANGE UP
GLUCOSE SERPL-MCNC: 114 MG/DL — HIGH (ref 70–99)
HCT VFR BLD CALC: 47.5 % — HIGH (ref 34.5–45)
HCV AB S/CO SERPL IA: 0.09 S/CO — SIGNIFICANT CHANGE UP (ref 0–0.99)
HCV AB SERPL-IMP: SIGNIFICANT CHANGE UP
HDLC SERPL-MCNC: 39 MG/DL — LOW
HGB BLD-MCNC: 15.1 G/DL — SIGNIFICANT CHANGE UP (ref 11.5–15.5)
LIPID PNL WITH DIRECT LDL SERPL: 97 MG/DL — SIGNIFICANT CHANGE UP
MAGNESIUM SERPL-MCNC: 2.3 MG/DL — SIGNIFICANT CHANGE UP (ref 1.6–2.6)
MCHC RBC-ENTMCNC: 27.4 PG — SIGNIFICANT CHANGE UP (ref 27–34)
MCHC RBC-ENTMCNC: 31.8 GM/DL — LOW (ref 32–36)
MCV RBC AUTO: 86.1 FL — SIGNIFICANT CHANGE UP (ref 80–100)
NON HDL CHOLESTEROL: 137 MG/DL — HIGH
NRBC # BLD: 0 /100 WBCS — SIGNIFICANT CHANGE UP (ref 0–0)
NRBC # FLD: 0 K/UL — SIGNIFICANT CHANGE UP (ref 0–0)
PHOSPHATE SERPL-MCNC: 3.7 MG/DL — SIGNIFICANT CHANGE UP (ref 2.5–4.5)
PLATELET # BLD AUTO: 225 K/UL — SIGNIFICANT CHANGE UP (ref 150–400)
POTASSIUM SERPL-MCNC: 3.8 MMOL/L — SIGNIFICANT CHANGE UP (ref 3.5–5.3)
POTASSIUM SERPL-SCNC: 3.8 MMOL/L — SIGNIFICANT CHANGE UP (ref 3.5–5.3)
RBC # BLD: 5.52 M/UL — HIGH (ref 3.8–5.2)
RBC # FLD: 13.2 % — SIGNIFICANT CHANGE UP (ref 10.3–14.5)
SODIUM SERPL-SCNC: 144 MMOL/L — SIGNIFICANT CHANGE UP (ref 135–145)
TRIGL SERPL-MCNC: 200 MG/DL — HIGH
WBC # BLD: 8.17 K/UL — SIGNIFICANT CHANGE UP (ref 3.8–10.5)
WBC # FLD AUTO: 8.17 K/UL — SIGNIFICANT CHANGE UP (ref 3.8–10.5)

## 2024-04-05 PROCEDURE — 70551 MRI BRAIN STEM W/O DYE: CPT | Mod: 26

## 2024-04-05 PROCEDURE — 93306 TTE W/DOPPLER COMPLETE: CPT | Mod: 26

## 2024-04-05 PROCEDURE — 99232 SBSQ HOSP IP/OBS MODERATE 35: CPT

## 2024-04-05 PROCEDURE — 99223 1ST HOSP IP/OBS HIGH 75: CPT | Mod: GC

## 2024-04-05 RX ORDER — ENOXAPARIN SODIUM 100 MG/ML
40 INJECTION SUBCUTANEOUS EVERY 24 HOURS
Refills: 0 | Status: DISCONTINUED | OUTPATIENT
Start: 2024-04-05 | End: 2024-04-16

## 2024-04-05 RX ORDER — PANTOPRAZOLE SODIUM 20 MG/1
40 TABLET, DELAYED RELEASE ORAL
Refills: 0 | Status: DISCONTINUED | OUTPATIENT
Start: 2024-04-05 | End: 2024-04-16

## 2024-04-05 RX ORDER — ACETAMINOPHEN 500 MG
650 TABLET ORAL EVERY 6 HOURS
Refills: 0 | Status: DISCONTINUED | OUTPATIENT
Start: 2024-04-05 | End: 2024-04-16

## 2024-04-05 RX ORDER — LANOLIN ALCOHOL/MO/W.PET/CERES
3 CREAM (GRAM) TOPICAL AT BEDTIME
Refills: 0 | Status: DISCONTINUED | OUTPATIENT
Start: 2024-04-05 | End: 2024-04-16

## 2024-04-05 RX ORDER — LACTULOSE 10 G/15ML
20 SOLUTION ORAL ONCE
Refills: 0 | Status: COMPLETED | OUTPATIENT
Start: 2024-04-05 | End: 2024-04-05

## 2024-04-05 RX ORDER — POLYETHYLENE GLYCOL 3350 17 G/17G
17 POWDER, FOR SOLUTION ORAL
Refills: 0 | Status: DISCONTINUED | OUTPATIENT
Start: 2024-04-05 | End: 2024-04-16

## 2024-04-05 RX ORDER — METOPROLOL TARTRATE 50 MG
50 TABLET ORAL
Refills: 0 | Status: DISCONTINUED | OUTPATIENT
Start: 2024-04-05 | End: 2024-04-16

## 2024-04-05 RX ORDER — GABAPENTIN 400 MG/1
100 CAPSULE ORAL THREE TIMES A DAY
Refills: 0 | Status: DISCONTINUED | OUTPATIENT
Start: 2024-04-05 | End: 2024-04-12

## 2024-04-05 RX ORDER — ONDANSETRON 8 MG/1
4 TABLET, FILM COATED ORAL EVERY 8 HOURS
Refills: 0 | Status: DISCONTINUED | OUTPATIENT
Start: 2024-04-05 | End: 2024-04-16

## 2024-04-05 RX ADMIN — MIRTAZAPINE 15 MILLIGRAM(S): 45 TABLET, ORALLY DISINTEGRATING ORAL at 23:08

## 2024-04-05 RX ADMIN — Medication 0.5 MILLIGRAM(S): at 06:30

## 2024-04-05 RX ADMIN — LACTULOSE 20 GRAM(S): 10 SOLUTION ORAL at 13:28

## 2024-04-05 RX ADMIN — Medication 0.5 MILLIGRAM(S): at 21:40

## 2024-04-05 RX ADMIN — Medication 25 MILLIGRAM(S): at 13:27

## 2024-04-05 RX ADMIN — PANTOPRAZOLE SODIUM 40 MILLIGRAM(S): 20 TABLET, DELAYED RELEASE ORAL at 06:43

## 2024-04-05 RX ADMIN — Medication 25 MILLIGRAM(S): at 21:40

## 2024-04-05 RX ADMIN — Medication 25 MILLIGRAM(S): at 06:30

## 2024-04-05 RX ADMIN — Medication 1 TABLET(S): at 13:28

## 2024-04-05 RX ADMIN — ENOXAPARIN SODIUM 40 MILLIGRAM(S): 100 INJECTION SUBCUTANEOUS at 06:30

## 2024-04-05 RX ADMIN — Medication 50 MILLIGRAM(S): at 06:30

## 2024-04-05 RX ADMIN — Medication 50 MILLIGRAM(S): at 18:14

## 2024-04-05 RX ADMIN — NORTRIPTYLINE HYDROCHLORIDE 25 MILLIGRAM(S): 10 CAPSULE ORAL at 23:07

## 2024-04-05 RX ADMIN — ATORVASTATIN CALCIUM 10 MILLIGRAM(S): 80 TABLET, FILM COATED ORAL at 21:41

## 2024-04-05 NOTE — CONSULT NOTE ADULT - SUBJECTIVE AND OBJECTIVE BOX
CHIEF COMPLAINT: dizziness    HISTORY OF PRESENT ILLNESS:  73y F w/ PMHx significant for anxiety, depression, HTN, and HLD presents for dizziness for 4 days. She was admitted to [facility] for anxiety 1 month ago where Dr. Washington took her off her medications due to concern for polypharmacy. She was then started on clonazepam, mirtazapine, and nortriptyline upon discharge which she reports helped her. She saw Dr. Ramirez outpatient who changed her medications without any tapering, mentioning the zoloft gave her GI upset. She states that this is because when she came into the clinic she was diffusely sweating and nauseated. She asked to restart her previous medications on 3/28. Patient reports Albert 3/31 she had a particularly intense anxiety attack, reporting that the palpitations she felt were greater than any anxiety attack she had in the past. In addition, she reported a feeling of rising heat in her body. After this anxiety attack, she developed dizziness and increased tremors in her hands. Her dizziness remained and is intermittent and feels like the "room spinning".  Pt reports her dizziness began after restarting all the psychiatric medications. She reports dizziness occurs only with positional changes, feels like she is falling to the left side when walking. No dizziness when lying down. Patient has expressed suicidal ideation in the ED and has reported a plan to the behavioral health team for which she is planned to be transferred to [facility] for inpatient psychiatric management after medical clearance of persistent unsteadiness. Patient denies headache, chest pain, SOB, nausea vomiting, slurred speech, hearing changes, vision changes, focal numbness or focal weakness.      Allergies    penicillins (Anaphylaxis)  sulfa drugs (Anaphylaxis)    Intolerances    	    MEDICATIONS:  enoxaparin Injectable 40 milliGRAM(s) SubCutaneous every 24 hours  metoprolol tartrate 50 milliGRAM(s) Oral two times a day        acetaminophen     Tablet .. 650 milliGRAM(s) Oral every 6 hours PRN  clonazePAM  Tablet 0.5 milliGRAM(s) Oral two times a day  gabapentin 100 milliGRAM(s) Oral three times a day PRN  melatonin 3 milliGRAM(s) Oral at bedtime PRN  mirtazapine 15 milliGRAM(s) Oral at bedtime  nortriptyline 25 milliGRAM(s) Oral at bedtime  ondansetron Injectable 4 milliGRAM(s) IV Push every 8 hours PRN  pregabalin 25 milliGRAM(s) Oral three times a day    aluminum hydroxide/magnesium hydroxide/simethicone Suspension 30 milliLiter(s) Oral every 4 hours PRN  pantoprazole    Tablet 40 milliGRAM(s) Oral before breakfast  polyethylene glycol 3350 17 Gram(s) Oral two times a day PRN    atorvastatin 10 milliGRAM(s) Oral at bedtime    multivitamin 1 Tablet(s) Oral daily      PAST MEDICAL & SURGICAL HISTORY:  Anxiety      High cholesterol      HTN (hypertension)      Endometriosis      GERD (gastroesophageal reflux disease)      Nausea      History of bilateral breast reduction surgery          FAMILY HISTORY:  Family history of TIAs (Aunt)        SOCIAL HISTORY:    non smoker    REVIEW OF SYSTEMS:  See HPI, otherwise complete 10 point review of systems negative    [ ] All others negative	      PHYSICAL EXAM:  T(C): 36.6 (04-05-24 @ 06:30), Max: 37.1 (04-04-24 @ 14:40)  HR: 78 (04-05-24 @ 06:30) (68 - 102)  BP: 127/68 (04-05-24 @ 06:30) (112/84 - 150/83)  RR: 16 (04-05-24 @ 06:30) (16 - 18)  SpO2: 95% (04-05-24 @ 06:30) (95% - 100%)  Wt(kg): --  I&O's Summary      Appearance: No Acute Distress	  HEENT:  Normal oral mucosa, PERRL, EOMI	  Cardiovascular: Normal S1 S2, No JVD, No murmurs/rubs/gallops  Respiratory: Lungs clear to auscultation bilaterally  Gastrointestinal:  Soft, Non-tender, + BS	  Skin: No rashes, No ecchymoses, No cyanosis	  Neurologic: Non-focal  Extremities: No clubbing, cyanosis or edema  Vascular: Peripheral pulses palpable 2+ bilaterally  Psychiatry: A & O x 3, Mood & affect appropriate    Laboratory Data:	 	    CBC Full  -  ( 05 Apr 2024 05:56 )  WBC Count : 8.17 K/uL  Hemoglobin : 15.1 g/dL  Hematocrit : 47.5 %  Platelet Count - Automated : 225 K/uL  Mean Cell Volume : 86.1 fL  Mean Cell Hemoglobin : 27.4 pg  Mean Cell Hemoglobin Concentration : 31.8 gm/dL  Auto Neutrophil # : x  Auto Lymphocyte # : x  Auto Monocyte # : x  Auto Eosinophil # : x  Auto Basophil # : x  Auto Neutrophil % : x  Auto Lymphocyte % : x  Auto Monocyte % : x  Auto Eosinophil % : x  Auto Basophil % : x    04-05    144  |  106  |  8   ----------------------------<  114<H>  3.8   |  24  |  0.70  04-03    141  |  105  |  7   ----------------------------<  97  4.6   |  26  |  0.69    Ca    9.4      05 Apr 2024 05:56  Ca    9.6      03 Apr 2024 12:20  Phos  3.7     04-05  Mg     2.30     04-05    TPro  6.9  /  Alb  4.1  /  TBili  0.5  /  DBili  x   /  AST  21  /  ALT  13  /  AlkPhos  117  04-03      proBNP:   Lipid Profile:   HgA1c:   TSH:       CARDIAC MARKERS:            Interpretation of Telemetry: 	    ECG:  	  RADIOLOGY:  OTHER: 	    PREVIOUS DIAGNOSTIC TESTING:    [ ] Echocardiogram:  [ ] Catheterization:  [ ] Stress Test:  	    Assessment:  73y F w/ PMHx significant for anxiety, depression, HTN, and HLD presents for dizziness    Recs:  cardiac stable  no e/o acs or chf  cw tele monitoring  neuro follow up  s/p cta head/neck - mild to mod intracranial atherosclerosis  resume antihypertensives and beta blockers  asa and high intensity statin  obtain echo  f/u brain mri        Advanced care planning forms were discussed. Code status including forceful chest compressions, defibrillation and intubation were discussed. The risks benefits and alternatives to pertinent cardiac procedures and interventions were discussed in detail and all questions were answered. Duration: 15 minutes.  Greater than 90 minutes spent on total encounter; more than 50% of the visit was spent counseling and/or coordinating care by the attending physician.   	  Herb Grover MD   Cardiovascular Diseases  [phone]    
Neurology - Consult Note    -  Spectra: [id number] ([facility]), [phone] ([facility])  -    HPI: SKY ESPITIA, 73y (1950) F w/ PMHx significant foranxiety, depression, HTN, and HLD presents for dizziness for 4 days. She was admitted to [facility] for anxiety 1 month ago where Dr. Washington took her off her medications due to concern for polypharmacy. She was then started on clonazepam, mirtazapine, and nortriptyline upon discharge which she reports helped her. She saw Dr. Ramirez outpatient who changed her medications without any tapering, mentioning the zoloft gave her GI upset. She states that this is because when she came into the clinic she was diffusely sweating and nauseated. She asked to restart her previous medications on 3/28. Patient reports she was started on Lyrica during this time and reports it was the first time she took it though records indicate that the patient has been on pregabalin in the past. Patient appears anxious and is currently a poor historian.     Patient reports Albert 3/31 she had a particularly intense anxiety attack, reporting that the palpitations she felt were greater than any anxiety attack she had in the past. In addition, she reported a feeling of rising heat in her body. After this anxiety attack, she developed dizziness and increased tremors in her hands. The dizziness remained and is intermittent and feels like the "room spinning". She does not have this dizziness at rest though when she stands she describes it as "unsteadiness" rather than "room-spinning". Patient has expressed suicidal ideation in the ED and has reported a plan to the behavioral health team for which she is planned to be transferred to [facility] for inpatient psychiatric management after medical clearance of persistent unsteadiness. Patient denies headache, nausea vomiting, slurred speech, hearing changes, vision changes, focal numbness or focal weakness.    Review of Systems:  All other review of systems is negative unless indicated above.    Allergies:  penicillins (Anaphylaxis)  sulfa drugs (Anaphylaxis)      PMHx/PSHx/Family Hx: As above, otherwise see below   Anxiety    High cholesterol    HTN (hypertension)    Endometriosis    GERD (gastroesophageal reflux disease)    Nausea        Social Hx:  No current use of tobacco, alcohol, or illicit drugs    Medications:  MEDICATIONS  (STANDING):  atorvastatin 10 milliGRAM(s) Oral at bedtime  clonazePAM  Tablet 0.5 milliGRAM(s) Oral two times a day  losartan 100 milliGRAM(s) Oral at bedtime  metoprolol tartrate 50 milliGRAM(s) Oral two times a day  mirtazapine 15 milliGRAM(s) Oral at bedtime  nortriptyline 25 milliGRAM(s) Oral at bedtime  pregabalin 25 milliGRAM(s) Oral three times a day    MEDICATIONS  (PRN):      Vitals:  T(C): 37.1 (04-04-24 @ 14:40), Max: 37.1 (04-04-24 @ 14:40)  HR: 95 (04-04-24 @ 14:40) (71 - 104)  BP: 125/70 (04-04-24 @ 14:40) (110/64 - 150/83)  RR: 18 (04-04-24 @ 14:40) (16 - 18)  SpO2: 97% (04-04-24 @ 14:40) (95% - 100%)    Physical Examination:  General - Appears strerssed  Cardiovascular - Peripheral pulses palpable, no edema  Eyes - Fundoscopy not performed due to safety precautions in the setting of infection risk    Neurologic Exam:  Mental status - Awake, Alert, Oriented to person, place, and time. Speech fluent, rapid. Follows simple and complex commands. Attention/concentration, recent and remote memory (including registration and recall), and fund of knowledge intact    Cranial nerves - PERRLA, VFF, EOMI, face sensation (V1-V3) intact b/l, facial strength intact without asymmetry b/l, hearing intact b/l, palate with symmetric elevation, trapezius 5/5 strength b/l, tongue midline on protrusion with full lateral movement    Motor - Normal bulk and tone throughout. No pronator drift.  Strength testing            R        Deltoid:  5    Biceps:  5    Triceps:  5    Wrist Extension:  5    Wrist Flexion:  5    Interossei:  5    :  5    Hip Flexion:  5    Hip Extension:  5    Knee Flexion:  5    Knee Extension:  5    Dorsiflexion:  5    Plantar Flexion:  5        L        Deltoid:  5    Biceps:  5    Triceps:  5    Wrist Extension:  5    Wrist Flexion:  5    Interossei:  5    :  5    Hip Flexion:  5    Hip Extension:  5    Knee Flexion:  5    Knee Extension:  5    Dorsiflexion:  5    Plantar Flexion:  5      Sensation - Light touch/temperature OR pain/vibration intact throughout    DTR's -               R  Biceps:  2+    Triceps:  2+    Brachioradialis:  2+    Patellar:  2+    Ankle:  2+    Toes/plantar response:  Down    L  Biceps:  2+    Triceps:  2+    Brachioradialis:  2+    Patellar:  2+    Ankle:  2+    Toes/plantar response:  Down    Coordination - Finger to Nose intact b/l. No tremors appreciated    Gait and station - L lateropulsion when walking with a described feeling of imbalance Romberg +    Labs:                        14.7   9.41  )-----------( 236      ( 03 Apr 2024 12:20 )             44.5     04-03    141  |  105  |  7   ----------------------------<  97  4.6   |  26  |  0.69    Ca    9.6      03 Apr 2024 12:20    TPro  6.9  /  Alb  4.1  /  TBili  0.5  /  DBili  x   /  AST  21  /  ALT  13  /  AlkPhos  117  04-03    CAPILLARY BLOOD GLUCOSE        LIVER FUNCTIONS - ( 03 Apr 2024 12:20 )  Alb: 4.1 g/dL / Pro: 6.9 g/dL / ALK PHOS: 117 U/L / ALT: 13 U/L / AST: 21 U/L / GGT: x               CSF:                  Radiology:  CT Head No Cont:  (04 Apr 2024 13:38)    < from: CT Angio Head w/ IV Cont (04.04.24 @ 13:39) >  1.  BRAIN:  Unremarkable head CT. Ischemic white matter disease and   atrophy typical for age. No abnormal enhancement    2.  RIGHT CAROTID SYSTEM:    No hemodynamically significant stenosis.    3   LEFT CAROTID SYSTEM:     No hemodynamically significant stenosis.    4.   VERTEBRAL CIRCULATION:    Patent.    5.  ANTERIOR INTRACRANIAL CIRCULATION:     Intracranial atherosclerosis   cavernous and clinoid segments of the internal carotid arteries, mild to   moderate.    6.  POSTERIOR INTRACRANIAL CIRCULATION:    Unremarkable.    7.  No large vessel occlusion.    < end of copied text >

## 2024-04-05 NOTE — BH CONSULTATION LIAISON PROGRESS NOTE - NSBHCONSULTMEDANXIETY_PSY_A_CORE FT
Gabapentin 100mg PO/IM PRN for anxiety.   Zyprexa 1.25mg PO/IM PRN for extreme agitation/anxiety. As above

## 2024-04-05 NOTE — BH CONSULTATION LIAISON PROGRESS NOTE - NSICDXBHSECONDARYDX_PSY_ALL_CORE
Panic disorder   F41.0  MDD (major depressive disorder)   F32.9   MDD (major depressive disorder)   F32.9

## 2024-04-05 NOTE — PHYSICAL THERAPY INITIAL EVALUATION ADULT - LEVEL OF INDEPENDENCE: SIT/STAND, REHAB EVAL
Of note pt anxious throughout assessment limiting pts functional abilities. Pt often stumbling towards the left throughout functional assessment. No significant strength deficits noted at this time.  Will continue to monitor patients functional abilities./contact guard

## 2024-04-05 NOTE — BH CONSULTATION LIAISON PROGRESS NOTE - NSBHFUPINTERVALHXFT_PSY_A_CORE
Patient was seen at bedside. Received no PRNs overnight and no acute overnight events. She reports a longstanding hx of depression and anxiety diagnosed around 9 years ago. She reports she used to follow Dr. Kamara outpatient psychiatrist for 3 years. She reports her recent psychiatrist Dr. Montgomery abruptly stopped her medications and restarted them a few days prior. Per chart, patient was discharged to [facility] outpatient following her last hospitalization. Per chart, Dr. Montgomery reduced Nortryptiline to 25mg as the patient had palpitations, added Zoloft 25mg x1 for a week after which the patient developed side effects and was d/donald. This was the only medication the patient was dced on recently. Since last saturday, the patient reports feeling intermittent dizziness, head spinning, and anxiety. She reports  feeling hopeless, sad, depressed, guilty for her  as he has to deal with all her problems, anhedonia. She reports she does not have many supporters in her life. She reports good sleep. Denies current SI/HI/AVH. Patient is a retired  working in an executive office.  Patient was seen at bedside. Received no PRNs overnight and no acute overnight events. She reports a longstanding hx of depression and anxiety diagnosed around 9 years ago. She reports she used to follow Dr. Kamara outpatient psychiatrist for 3 years. As per pt,.  her recent psychiatrist Dr. Montgomery abruptly stopped her medications and restarted them a few days prior.  Since last Saturday, the patient reports feeling intermittent dizziness, head spinning, and anxiety. She reports  feeling hopeless, sad, depressed, guilty for her  as he has to deal with all her problems, anhedonia. She reports she does not have many supporters in her life. She reports good sleep. Repost passive SI, also reports having recent SI with plans to OD,  superficially denies current SI/HI/AVH. Patient is a retired  working in an executive office.

## 2024-04-05 NOTE — PHYSICAL THERAPY INITIAL EVALUATION ADULT - PRECAUTIONS/LIMITATIONS, REHAB EVAL
Patient : Monica Clement Age: 79year old Sex: female   MRN: [id number] Encounter Date: 7/1/2022    2207/A    History     Chief Complaint   Patient presents with   â¢ Shortness of Breath     HPI  7/1/2022  2:44 PM Monica Clement is a 79year old female with a history of pulmonary HTN who presents to the ED for evaluation of SOB that worsened last night. She states she feels exhausted and has a constant non-productive cough since her last positiveCOVID test 2 weeks ago. She denies any fever, falls, chills, nausea, vomiting, CP, or dysuria. The pt is currently on Prednisone and does not take any blood thinners. She recently was taking iron supplements but stopped 3 days ago. 2 weeks ago, the pt was placed on 2 L of oxygen at night for shallow breathing but it's now constant. At baseline, the pt does ambulate with a walker and have minimal BLE swelling. The pt had a CT scan today and returned home and had a chest x-ray done a few days ago that was suspected for PE from her cardiologist, per family at bedside. Last time she had labs done, her kidney function was 25% and had changes to her sodium levels. The pt has surgical history of L side mastectomy. There are no further complaints or modifying factors at this time. PCP: Flavio Crouch MD    Allergies   Allergen Reactions   â¢ Avandia [Rosiglitazone Maleate] SHORTNESS OF BREATH   â¢ Tarceva [Erlotinib] SHORTNESS OF BREATH     Sores all over body   â¢ Ace Inhibitors Cough   â¢ Ativan RASH and Other (See Comments)     Altered mental status   â¢ Lorazepam RASH     Swollen eyes and rash all over face   â¢ Phenobarbital RASH   â¢ Vicodin [Hydrocodone-Acetaminophen] Other (See Comments)     Extreme confusion       Current Discharge Medication List      Prior to Admission Medications    Details   sildenafil (REVATIO) 20 MG tablet Take 0.5 tablets by mouth 3 times daily.   Qty: 30 tablet, Refills: 0      carvedilol (COREG) 25 MG tablet Take 1 tablet by mouth 2 times daily (with meals). Qty: 180 tablet, Refills: 3      furosemide (LASIX) 40 MG tablet Take 1 tablet by mouth 2 times daily. Do not start before April 28, 2022. Qty: 180 tablet, Refills: 3      rosuvastatin (CRESTOR) 10 MG tablet Take 1 tablet by mouth daily. Qty: 90 tablet, Refills: 3      insulin glargine (LANTUS) 100 UNIT/ML vial solution Inject 22 Units into the skin every morning. Qty: 10 mL, Refills: 12      guaiFENesin (MUCINEX) 600 MG 12 hr tablet Take 600 mg by mouth 2 times daily. oxygen (O2) gas Inhale 1 L/min into the lungs as needed (Shortness of breath ). 0.5-1L of oxygen when sleeping. 3L of oxygen with activity. ALPRAZolam (XANAX) 0.25 MG tablet Take 0.25 mg by mouth 2 times daily as needed for Anxiety or Sleep. aspirin (ECOTRIN) 81 MG EC tablet Take 81 mg by mouth at bedtime. fluticasone (FLONASE) 50 MCG/ACT nasal spray Spray 2 sprays in each nostril as needed. ipratropium-albuterol (DUONEB) 0.5-2.5 (3) MG/3ML nebulizer solution Take 3 mLs by nebulization 3 times daily. Biotin w/ Vitamins C & E (HAIR SKIN & NAILS GUMMIES PO) Take 1 Piece by mouth 2 times daily. vitamin B-12 (CYANOCOBALAMIN) 1000 MCG tablet Take 1,000 mcg by mouth daily. Multiple Vitamins-Minerals (PreserVision AREDS 2) Cap Take 1 capsule by mouth 2 times daily. albuterol 108 (90 Base) MCG/ACT inhaler Inhale 2 puffs into the lungs every 4 hours as needed for Shortness of Breath or Wheezing. Qty: 8.5 g, Refills: 11      blood glucose test strip Test blood sugar 4 times daily as directed. Diagnosis: E11.9. One Touch Ultra 2  Qty: 200 each, Refills: 11      fluticasone-salmeterol (Advair Diskus) 250-50 MCG/DOSE inhaler Inhale 1 puff into the lungs 2 times daily. Qty: 3 each, Refills: 3      gabapentin (NEURONTIN) 100 MG capsule Take 1 capsule by mouth 2 times daily. Qty: 180 capsule, Refills: 1      levothyroxine 125 MCG tablet Take 1 tablet by mouth daily (before breakfast).   Qty: 90 tablet, Refills: 1      mirtazapine (REMERON) 15 MG tablet Take 1 tablet by mouth nightly. Qty: 90 tablet, Refills: 1      DISPENSE Walker, 4 wheel, with seat  Qty: 1 each, Refills: 0      Insulin Pen Needle (PEN NEEDLES) 32G X 6 MM Misc 1 each 2 times daily. Use pen needles to take insulin twice daily  Qty: 200 each, Refills: 12      Glucose Blood (BLOOD GLUCOSE TEST STRIPS) Strip Test as directed  4 times daily  Qty: 200 each, Refills: 11      Blood Glucose Monitoring Suppl (BLOOD GLUCOSE MONITOR SYSTEM) w/Device Kit Check blood sugars 4 times daily   E11.9  Qty: 1 kit, Refills: 0      Acetaminophen (TYLENOL ARTHRITIS PAIN PO) Take 1 tablet by mouth 2 times daily as needed. Blood Glucose Monitoring Suppl (RA BLOOD GLUCOSE MONITOR) Device Test as directed  4 times daily  Qty: 1 each, Refills: 0      fluocinolone (SYNALAR) 0.025 % ointment Apply to affected areas twice daily  Qty: 60 g, Refills: 11      Cholecalciferol (VITAMIN D) 2000 UNITS capsule Take 2,000 Units by mouth daily. Past Medical History:   Diagnosis Date   â¢ Age-related macular degeneration    â¢ Anemia    â¢ Anxiety    â¢ Arthritis    â¢ Bilateral cataracts    â¢ Breast Cancer     Left   â¢ Bronchitis    â¢ Callus    â¢ Chronic respiratory failure with hypoxia (CMS/HCC)     1 L O2 at rest, 4 L O2 w/ exertion ([facility]). Followed by Dr. Wilian Wall [phone]   â¢ Claustrophobia    â¢ Congestive cardiac failure (CMS/HCC)    â¢ COPD, severe (CMS/HCC)     Followed by Dr. Wilian Wall [phone]    â¢ Diabetes mellitus, type 2 (CMS/HCC)    â¢ Difficulty in walking(719.7)    â¢ Environmental allergies    â¢ H/O: lung cancer 11/06/2009    KATERIN, nonsmall cell.  Followed by Dr. Wilian Wall [phone]    â¢ Hyperlipidemia    â¢ Hypothyroidism    â¢ Liver mass    â¢ Onychomycosis    â¢ Osteoporosis    â¢ Peripheral edema    â¢ Pleural effusion     Bilateral; Followed by Dr. Wilian Wall [phone]   â¢ Pneumonia    â¢ Polymyalgia rheumatica (CMS/HCC)    â¢ PONV (postoperative nausea and vomiting)    â¢ Postmastectomy lymphedema syndrome    â¢ Pseudophakia    â¢ Pulmonary HTN (CMS/HCC)    â¢ Pulmonary nodule 04/01/2007    RLL, Followed by Dr. Tena [facility] [phone]   â¢ Rash    â¢ Sinusitis, chronic    â¢ TREMORS ESSENTIAL    â¢ Vegetarian diet        Past Surgical History:   Procedure Laterality Date   â¢ APPENDECTOMY     â¢ BREAST SURGERY Left    â¢ CYBER KNIFE LUNG Left 12/01/2009   â¢ EYE SURGERY      Cataracts, bilaterally   â¢ MASTEC,MOD RADICAL     â¢ REMOVAL OF TONSILS,<12 Y/O     â¢ TEMPORAL ARTERY LIGATN OR BX  10/14/2011   â¢ THORACENTESIS  06/17/2011   â¢ TONSILLECTOMY AND ADENOIDECTOMY     â¢ US GUIDED BREAST CORE BIOPSY  11/06/2009    Lung       Family History   Problem Relation Age of Onset   â¢ Respiratory Mother         chronic bronchitis   â¢ High blood pressure Father    â¢ Kidney disease Father    â¢ Endocrine Disorder Daughter    â¢ Diabetes Daughter    â¢ Heart disease Daughter    â¢ Endocrine Disorder Daughter    â¢ Endocrine Disorder Daughter        Social History     Tobacco Use   â¢ Smoking status: Never Smoker   â¢ Smokeless tobacco: Never Used   â¢ Tobacco comment: Lives in a senior apartment, daughter checks up on her   Vaping Use   â¢ Vaping Use: never used   Substance Use Topics   â¢ Alcohol use: Not Currently     Comment: occ. â¢ Drug use: Not Currently       E-cigarette/Vaping   â¢ E-Cigarette/Vaping Use Never Used      E-Cigarette/Vaping Substances & Devices       Review of Systems   Constitutional: Negative for activity change, chills and fever. HENT: Negative for congestion, ear pain and rhinorrhea. Eyes: Negative for visual disturbance. Respiratory: Positive for cough and shortness of breath. Cardiovascular: Negative for chest pain. Gastrointestinal: Negative for abdominal pain, diarrhea and vomiting. Endocrine: Negative for polyuria. Genitourinary: Negative for dysuria, frequency and vaginal discharge.    Musculoskeletal: Negative for "myalgias. Skin: Negative for rash and wound. Allergic/Immunologic: Negative for immunocompromised state. Neurological: Negative for speech difficulty and headaches. Psychiatric/Behavioral: Negative for confusion. Physical Exam     ED Triage Vitals   ED Triage Vitals Group      Temp 22 1438 97.7 Â°F (36.5 Â°C)      Heart Rate 22 1433 71      Resp 22 1436 (!) 22      BP 22 1433 (!) 205/101      SpO2 22 1433 98 %      EtCO2 mmHg --       Height 22 143 5' 2"" (1.575 m)      Weight 22 143 125 lb (56.7 kg)      Weight Scale Used 22 Scale in bed      BMI (Calculated) 22 143 22.86      IBW/kg (Calculated) 22 143 50.1       Physical Exam  Vitals and nursing note reviewed. Constitutional:       General: She is not in acute distress. Appearance: She is well-developed. HENT:      Head: Normocephalic and atraumatic. Mouth/Throat:      Pharynx: No oropharyngeal exudate. Eyes:      General:         Right eye: No discharge. Left eye: No discharge. Conjunctiva/sclera: Conjunctivae normal.      Pupils: Pupils are equal, round, and reactive to light. Cardiovascular:      Rate and Rhythm: Normal rate and regular rhythm. Heart sounds: Normal heart sounds. No murmur heard. No friction rub. Pulmonary:      Effort: Pulmonary effort is normal. Tachypnea present. No respiratory distress. Comments: Trace scattered crackles  Chest:      Comments: L mastectomy. Abdominal:      General: Bowel sounds are normal. There is no distension. Palpations: Abdomen is soft. Tenderness: There is no abdominal tenderness. There is no guarding or rebound. Musculoskeletal:      Cervical back: Normal range of motion and neck supple. Right lower le+ Pitting Edema present. Left lower le+ Pitting Edema present. Lymphadenopathy:      Cervical: No cervical adenopathy. Skin:     General: Skin is warm and dry.  " Findings: No rash. Neurological:      Mental Status: She is alert and oriented to person, place, and time. GCS: GCS eye subscore is 4. GCS verbal subscore is 5. GCS motor subscore is 6. Cranial Nerves: No cranial nerve deficit. Motor: Tremor (Head) present. Psychiatric:         Behavior: Behavior normal.         Thought Content:  Thought content normal.         Judgment: Judgment normal.       ED Course     Procedures    Lab Results     Results for orders placed or performed during the hospital encounter of 07/01/22   CBC with Automated Differential (performable only)   Result Value Ref Range    WBC 15.0 (H) 4.2 - 11.0 K/mcL    RBC 3.39 (L) 4.00 - 5.20 mil/mcL    HGB 10.3 (L) 12.0 - 15.5 g/dL    HCT 31.5 (L) 36.0 - 46.5 %    MCV 92.9 78.0 - 100.0 fl    MCH 30.4 26.0 - 34.0 pg    MCHC 32.7 32.0 - 36.5 g/dL    RDW-CV 14.9 11.0 - 15.0 %    RDW-SD 50.5 (H) 39.0 - 50.0 fL     140 - 450 K/mcL    NRBC 0 <=0 /100 WBC    Neutrophil, Percent 73 %    Lymphocytes, Percent 15 %    Mono, Percent 8 %    Eosinophils, Percent 3 %    Basophils, Percent 0 %    Immature Granulocytes 1 %    Absolute Neutrophils 11.0 (H) 1.8 - 7.7 K/mcL    Absolute Lymphocytes 2.2 1.0 - 4.0 K/mcL    Absolute Monocytes 1.3 (H) 0.3 - 0.9 K/mcL    Absolute Eosinophils  0.5 0.0 - 0.5 K/mcL    Absolute Basophils 0.0 0.0 - 0.3 K/mcL    Absolute Immmature Granulocytes 0.1 0.0 - 0.2 K/mcL   Prothrombin Time   Result Value Ref Range    Prothrombin Time 10.5 9.7 - 11.8 sec    INR 1.0     Partial Thromboplastin Time   Result Value Ref Range    PTT 29 22 - 30 sec   BLOOD GAS, VENOUS -POINT OF CARE   Result Value Ref Range    PH, VENOUS - POINT OF CARE 7.42 7.35 - 7.45 Units    PCO2, VENOUS - POINT OF CARE 60 (H) 38 - 51 mm Hg    PO2, VENOUS - POINT OF CARE 32 (L) 35 - 42 mm Hg    HCO3, VENOUS - POINT OF CARE 39 (H) 22 - 28 mmol/L    BASE EXCESS / DEFICIT, VENOUS - POINT OF CARE 12 (H) -2 - 2 mmol/L    O2 SATURATION, VENOUS - POINT OF CARE 61 60 - 80 %    TCO2 - POINT OF CARE 40 (H) 19 - 24 mmol/L   ISTAT8 VENOUS  POINT OF CARE   Result Value Ref Range    BUN - POINT OF CARE 27 (H) 6 - 20 mg/dL    SODIUM - POINT OF CARE 131 (L) 135 - 145 mmol/L    POTASSIUM - POINT OF CARE 4.6 3.4 - 5.1 mmol/L    CHLORIDE - POINT OF CARE 86 (L) 97 - 110 mmol/L    TCO2 - POINT OF CARE 36 (H) 19 - 24 mmol/L    ANION GAP - POINT OF CARE 15 7 - 19 mmol/L    HEMATOCRIT - POINT OF CARE 35.0 (L) 36.0 - 46.5 %    HEMOGLOBIN - POINT OF CARE 11.9 (L) 12.0 - 15.5 g/dL    GLUCOSE - POINT OF CARE 151 (H) 70 - 99 mg/dL    CALCIUM, IONIZED - POINT OF CARE 1.14 (L) 1.15 - 1.29 mmol/L    Creatinine 1.30 (H) 0.51 - 0.95 mg/dL    Glomerular Filtration Rate 40 (L) >=60   TROPONIN I  POINT OF CARE   Result Value Ref Range    TROPONIN I - POINT OF CARE <0.10 <0.10 ng/mL   GLUCOSE, BEDSIDE - POINT OF CARE   Result Value Ref Range    GLUCOSE, BEDSIDE - POINT OF CARE 230 (H) 70 - 99 mg/dL       EKG Results     Results for orders placed or performed during the hospital encounter of 07/01/22   Electrocardiogram 12-Lead   Result Value Ref Range    Systolic Blood Pressure 375     Diastolic Blood Pressure 766     Ventricular Rate EKG/Min (BPM) 66     Atrial Rate (BPM) 66     RI-Interval (MSEC) 188     QRS-Interval (MSEC) 130     QT-Interval (MSEC) 424     QTc 444     P Axis (Degrees) 91     R Axis (Degrees) -87     T Axis (Degrees) 73     REPORT TEXT       Normal sinus rhythm  Right bundle branch block  Left anterior fascicular block   Bifascicular block  Septal infarct  (cited on or before  09-AUG-2020)  Abnormal ECG  When compared with ECG of  25-JUN-2022 10:45,  Questionable change in initial forces of  Septal leads  Confirmed by MD VINCE, MYRA BROWN ([id number]),  Laura Smoker ([id number]) on 7/1/2022 2:55:13 PM           Radiology Results     Imaging Results    None         ED Medication Orders (From admission, onward)    Ordered Start     Status Ordering Provider    07/01/22 9071 "07/01/22 1500  heparin (porcine) injection 4,500 Units  (Thromboembolism Treatment Heparin)  ONCE         Last MAR action: Given MYRA CLARKE               [facility]    Vitals  Vitals:    07/01/22 1530 07/01/22 1600 07/01/22 1628 07/01/22 2004   BP: (!) 153/67 (!) 176/72 (!) 180/72 (!) 150/60   BP Location:   RUE - Right upper extremity    Patient Position:   Semi-Styles's    Pulse: 64 62 66 68   Resp: 19 18 20    Temp:   97.6 Â°F (36.4 Â°C)    TempSrc:   Oral    SpO2: 100% 100% 98%    Weight:   56 kg (123 lb 7.3 oz)    Height:   5' 2"" (1.575 m)        ED Course  Initial Impression: I performed the initial assessment and evaluation of the patient. We discussed the pt imaging results showing a PE to the R side and minimal pneumothorax. We discussed the pt needing further care beyond the ED and plan to speak to the Hospitalist. Plan to order Heparin. Patient is agreeable with this plan.    3:23 PM Spoke with Dr. Demetrius Alston [facility]). Made aware of all ED findings, clinical impression, and disposition plan. We discussed the pt presenting for worsening SOB and her imaging showing a R PE and a new pneumothorax. With the pt positive COVID-19 test 2 weeks, ago the pt know requires constant 2 L of oxygen. Agrees to consult. [facility]  CT chest earlier today with PE and RML collapse, known h/o malignancy. On 2L nasal cannula with good oxygen saturations, heparin drip started, stable for floor. Critical Care    No Critical Care    3:43 PM Does this patient meet Severe Sepsis criteria by CMS SEP-1 definition? No     3:43 PM Does this patient meet Septic Shock criteria by CMS SEP-1 definition? No        Clinical Impression and Diagnosis  3:43 PM     Clinical Impression:  ED Diagnosis     Diagnosis Comment Associated Orders       Final diagnosis    Acute pulmonary embolism without acute cor pulmonale, unspecified pulmonary embolism type (CMS/HCC) -- --              Pt to be admitted to Dr. Brissa Cruz  in fair condition.  " I have reviewed the information recorded by the scribe for accuracy and agree with its contents.    ____________________________________________________________________    Gloria Sanchez acting as a scribe for Dr. Rui Guzmán # [id number]  Scribe: Breann Arce MD  07/01/22 7067 no known precautions/limitations

## 2024-04-05 NOTE — PATIENT PROFILE ADULT - FALL HARM RISK - HARM RISK INTERVENTIONS
Assistance with ambulation/Assistance OOB with selected safe patient handling equipment/Communicate Risk of Fall with Harm to all staff/Monitor gait and stability/Reinforce activity limits and safety measures with patient and family/Sit up slowly, dangle for a short time, stand at bedside before walking/Tailored Fall Risk Interventions/Visual Cue: Yellow wristband and red socks/Bed in lowest position, wheels locked, appropriate side rails in place/Call bell, personal items and telephone in reach/Instruct patient to call for assistance before getting out of bed or chair/Non-slip footwear when patient is out of bed/[place] to call system/Physically safe environment - no spills, clutter or unnecessary equipment/Purposeful Proactive Rounding/Room/bathroom lighting operational, light cord in reach

## 2024-04-05 NOTE — PHYSICAL THERAPY INITIAL EVALUATION ADULT - ADDITIONAL COMMENTS
Pt lives in a home with , reporting fully independent, no falls, stairs to negotiate.   Patients Current SpO2: 98%

## 2024-04-05 NOTE — CHART NOTE - NSCHARTNOTEFT_GEN_A_CORE
MRI brain without contrast reviewed. Brain demonstrates no abnormal signal intensity. No acute cerebral cortical infarct is found. No intracranial hemorrhage is recognized. No mass effect is found in the brain. , A1c 5.8. Pa      Plan  continue statin  no further inpatient neurology work-up required at this time.  from a neurological stand point patient is cleared to inpatient psych facility  Patient can follow up as an outpatient Vascular Neurology upon discharge at [address] [facility], [place], [place]; ([phone]).    Case discussed with Neurology Attending, Dr. Silver

## 2024-04-05 NOTE — PHYSICAL THERAPY INITIAL EVALUATION ADULT - PERTINENT HX OF CURRENT PROBLEM, REHAB EVAL
73y F w/ PMHx significant for anxiety, depression, HTN, and HLD presents for dizziness for 4 days. She was admitted to [facility] for anxiety 1 month ago where Dr. Washington took her off her medications due to concern for polypharmacy. She was then started on clonazepam, mirtazapine, and nortriptyline upon discharge which she reports helped her. She saw Dr. Ramirez outpatient who changed her medications without any tapering, mentioning the zoloft gave her GI upset. She states that this is because when she came into the clinic she was diffusely sweating and nauseated. She asked to restart her previous medications on 3/28. Patient reports Albert 3/31 she had a particularly intense anxiety attack, reporting that the palpitations she felt were greater than any anxiety attack she had in the past. In addition, she reported a feeling of rising heat in her body. After this anxiety attack, she developed dizziness and increased tremors in her hands. Her dizziness remained and is intermittent and feels like the "room spinning".  Pt reports her dizziness began after restarting all the psychiatric medications. She reports dizziness occurs only with positional changes, feels like she is falling to the left side when walking. No dizziness when lying down. Patient has expressed suicidal ideation in the ED and has reported a plan to the behavioral health team for which she is planned to be transferred to [facility] for inpatient psychiatric management after medical clearance of persistent unsteadiness. Patient denies headache, chest pain, SOB, nausea vomiting, slurred speech, hearing changes, vision changes, focal numbness or focal weakness.

## 2024-04-05 NOTE — BH CONSULTATION LIAISON PROGRESS NOTE - NSBHASSESSMENTFT_PSY_ALL_CORE
73 yr old female, , domiciled, and retired. premorbidly ambulant (not needing assists) and iADL/ bADL independent. with background hx of MDD and ANSON; [facility] had prior SA by overdosing on pills (11/2019) following death of parents. has no reported hx of NSSIB, currently treated by outpatient psychiatrist Dr. Ramirez, she denied using any illicit substance use including alcohol. per chart review, there was documented use of cannabis daily, pertinent medical issues include: HTN, hyperlipidemia, and GERD, brought in by  for anxiety, depression, somatic symptoms, SI.     4/5: Patient reports severe symptoms of anxiety and depression. Denies current SI/HI/AVH.     Plan:  -CONTINUE Klonopin 0.5mg PO BID, Remeron 15mg PO QHS, Lyrica 25mg PO TID, Pamelor 25mg PO bedtime.   -CONTINUE 1:1 monitor as the patient is high-risk given her past hx and hospital course of SI.  -START Gabapentin 100mg PO/IM PRN for anxiety.   -START Zyprexa 1.25mg PO/IM PRN for extreme agitation/anxiety.  -MONITOR qtc<500  Will follow Patient is a 73 yr old woman ,  , domiciled, and retired, with background hx of MDD and ANSON; has multiple psych admissions (most [facility] had prior SA by overdosing on pills (11/2019) following death of parents,  has no reported hx of NSSIB, currently treated by outpatient psychiatrist Dr. Ramirez, she denied using any illicit substance use including alcohol, pmh of HTN, hyperlipidemia, and GERD, brought in by  for anxiety, depression, somatic symptoms, SI. Admitted to Medicine for dizziness work up.      4/5: Patient reports severe symptoms of anxiety and depression, +passive SI with recent active SI with plans. Denies current SI/HI/AVH.  Will monitor/follow and adjust the doses given dizziness, concerns for polypharmacy.     Plan:  -Observation status: CO 1:1 for passive/active SI  -CONTINUE Klonopin 0.5mg PO BID, Remeron 15mg PO QHS, Lyrica 25mg PO TID, Pamelor 25mg PO bedtime.   -Obtain orthostasis  -Ongoing medical work up  -Haldol 0.5mg Po/IM q6h prn for agitation  -Hold antipsychotics if qtc >500    Will follow Patient is a 73 yr old woman ,  , domiciled, and retired, with background hx of MDD and ANSON; has multiple psych admissions (most recently [facility] had prior SA by overdosing on pills (11/2019) following death of parents,  has no reported hx of NSSIB, currently treated by outpatient psychiatrist Dr. Ramirez, she denied using any illicit substance use including alcohol, pmh of HTN, hyperlipidemia, and GERD, brought in by  for anxiety, depression, somatic symptoms, SI. Admitted to Medicine for dizziness work up.      4/5: Patient reports severe symptoms of anxiety and depression, +passive SI with recent active SI with plans. Denies current SI/HI/AVH.  Will monitor/follow and adjust the doses given dizziness, concerns for polypharmacy.     Plan:  -Observation status: CO 1:1 for passive/active SI  -CONTINUE Klonopin 0.5mg PO BID, Remeron 15mg PO QHS, Lyrica 25mg PO TID, Pamelor 25mg PO bedtime.   -Obtain orthostasis  -Ongoing medical work up  -Haldol 0.5mg Po/IM q6h prn for agitation  -Hold antipsychotics if qtc >500  -Attempted to reach Dr. Montgomery, unable to reach at this time     Will follow Patient is a 73 yr old woman ,  , domiciled, and retired, with background hx of MDD and ANSON; has multiple psych admissions (most recently [facility]), had prior SA by overdosing on pills (11/2019) following death of parents,  has no reported hx of NSSIB, currently treated by outpatient psychiatrist Dr. Ramirez, she denied using any illicit substance use including alcohol, pmh of HTN, hyperlipidemia, and GERD, brought in by  for anxiety, depression, somatic symptoms, SI. Admitted to Medicine for dizziness work up.      4/5: Patient reports severe symptoms of anxiety and depression, +passive SI with recent active SI with plans. Denies current SI/HI/AVH.  Will monitor/follow and adjust the doses given dizziness, concerns for polypharmacy.     Plan:  -Observation status: CO 1:1 for passive/active SI  -CONTINUE Klonopin 0.5mg PO BID, Remeron 15mg PO QHS, Lyrica 25mg PO TID, Pamelor 25mg PO bedtime.   -Obtain orthostasis  -Ongoing medical work up  -Haldol 0.5mg Po/IM q6h prn for agitation  -Hold antipsychotics if qtc >500  -Attempted to reach Dr. Montgomery, unable to reach at this time   -Dispo; inpatient psych when medically stable   Will follow

## 2024-04-05 NOTE — PROGRESS NOTE ADULT - ASSESSMENT
_________________________________________________________________________________________  ========>>  M E D I C A L   A T T E N D I N G    F O L L O W  U P  N O T E  <<=========  -----------------------------------------------------------------------------------------------------    - Patient seen and examined by me earlier today.   - In summary,  SKY ESPITIA is a 73y year old woman admitted with dizziness   - Patient today overall doing ok, comfortable, eating OK.  patient feels anxious     ==================>> REVIEW OF SYSTEM <<=================    GEN: no fever, no chills, no pain  RESP: no SOB, no cough, no sputum  CVS: no chest pain, no palpitations  GI: no abdominal pain, no nausea, +  constipation  : no dysuria, no frequency  Neuro: no headache, + dizziness with movement     ==================>> PHYSICAL EXAM <<=================    GEN: A&O X 3 , NAD , comfortable, pleasant, calm in bed   HEENT: NCAT, PERRL, MMM, hearing intact  CVS: S1S2 , regular , No M/R/G appreciated  PULM: CTA B/L,  no W/R/R appreciated  ABD.: soft. non tender, non distended,  bowel sounds present  Extrem: intact pulses , no edema           ( Note written / Date of service 04-05-24 ( This is certified to be the same as "ENTERED" date above ( for billing purposes)))    ==================>> MEDICATIONS <<====================    MEDICATIONS  (STANDING):  atorvastatin 10 milliGRAM(s) Oral at bedtime  clonazePAM  Tablet 0.5 milliGRAM(s) Oral two times a day  enoxaparin Injectable 40 milliGRAM(s) SubCutaneous every 24 hours  metoprolol tartrate 50 milliGRAM(s) Oral two times a day  mirtazapine 15 milliGRAM(s) Oral at bedtime  multivitamin 1 Tablet(s) Oral daily  nortriptyline 25 milliGRAM(s) Oral at bedtime  pantoprazole    Tablet 40 milliGRAM(s) Oral before breakfast  pregabalin 25 milliGRAM(s) Oral three times a day    MEDICATIONS  (PRN):  acetaminophen     Tablet .. 650 milliGRAM(s) Oral every 6 hours PRN Temp greater or equal to 38C (100.4F), Mild Pain (1 - 3)  aluminum hydroxide/magnesium hydroxide/simethicone Suspension 30 milliLiter(s) Oral every 4 hours PRN Dyspepsia  gabapentin 100 milliGRAM(s) Oral three times a day PRN anxiety  melatonin 3 milliGRAM(s) Oral at bedtime PRN Insomnia  ondansetron Injectable 4 milliGRAM(s) IV Push every 8 hours PRN Nausea and/or Vomiting  polyethylene glycol 3350 17 Gram(s) Oral two times a day PRN constipation    ___________  Active diet:  Diet, Regular:   DASH/TLC Sodium & Cholesterol Restricted (DASH)  ___________________    ==================>> VITAL SIGNS <<==================    T(C): 36.9 (04-05-24 @ 10:40), Max: 37.1 (04-04-24 @ 14:40)  HR: 75 (04-05-24 @ 10:40) (68 - 102)  BP: 140/85 (04-05-24 @ 10:40) (113/60 - 150/83)  RR: 18 (04-05-24 @ 10:40) (16 - 18)  SpO2: 100% (04-05-24 @ 10:40) (95% - 100%)      ==================>> LAB AND IMAGING <<==================                        15.1   8.17  )-----------( 225      ( 05 Apr 2024 05:56 )             47.5        04-05    144  |  106  |  8   ----------------------------<  114<H>  3.8   |  24  |  0.70    Ca    9.4      05 Apr 2024 05:56  Phos  3.7     04-05  Mg     2.30     04-05    TPro  6.9  /  Alb  4.1  /  TBili  0.5  /  DBili  x   /  AST  21  /  ALT  13  /  AlkPhos  117  04-03    Urinalysis:  04-03-24 @ 12:40  Leuk. Est.: Trace  Nirite: Negative  WBC: 2  Blood: Negative     salt Crystal: --     calcium crystal: --     Bili: Negative     cast: 0  color: Yellow  Bacteria: Occasional  Epith. cell: 9  Yeast: --     Ketone: Negative     Protein: Negative     Glucose: Negative     sperm: --     Spec.Gravity: 1.011    TSH:      1.53   (04-03-24)       ,     2.54   (01-09-24)           Lipid profile:  (04-05-24)     Total: 176     LDL  : (p)     HDL  :39     TG   :200     HgA1C:   (01-11-24)          (01-11-24)      5.8    CT imaging of brain / CTA reports reviewed    MRI brain pending     ___________________________________________________________________________________  ===============>>  A S S E S S M E N T   A N D   P L A N <<===============  ------------------------------------------------------------------------------------------    · Assessment	  73y F w/ PMHx significant for anxiety, depression, HTN, and HLD presents for dizziness admitted for neuro eval, c/b suicidal ideation       Problem/Plan - 1:  ·  Problem: Dizziness.   ·  Plan: -Pt with new onset dizziness for 1 week, worse with changes in position. Associated dysequilibrium.   -CTA H/N: Intracranial atherosclerosis cavernous and clinoid segments of the internal carotid arteries, mild to   moderate.  -appreciate neuro eval. Suspect orthostatic hypotension 2/2 polypharmacy vs CVA vs 2/2 ICAD  -F/u MRI head  -check orthostatics  -TTE pending. Monitor on tele   -wean klonopin to BID dosing from TID. If no central pathology, may need to titrate rest of psychiatric medications  -fall precautions    -PT eval.  - neuro follow up  - psych follow up    Problem/Plan - 2:  ·  Problem: Suicidal ideation.   ·  Plan: -pt with history of severe MDD, treated with ECT in the past  -reporting SI  -appreciate psych recs, c/w 1:1.    Problem/Plan - 3:  ·  Problem: Anxiety and depression.   ·  Plan: -psych following, c/w Klonopin 0.5 mg po BID, Remeron 15 mg po QHS, Lyrica 25 mg po TID  -wean of klonopin as above   -Gabapentin 100mg PRN for anxiety as per psych.    Problem/Plan - 4:  ·  Problem: HTN (hypertension).   ·  Plan: -c/w metoprolol. Hold losartan for now given c/f orthostatic hypotension.    Problem/Plan - 5:  ·  Problem: Need for prophylactic measure.   ·  Plan: DVT ppx: lovenox.  + constipation >  bowel regimen     --------------------------------------------  Case discussed with patient, staff   Education given on findings and plan of care  ___________________________  H. NATALIE Paige.  Pager: [phone]

## 2024-04-05 NOTE — BH CONSULTATION LIAISON PROGRESS NOTE - MSE UNSTRUCTURED FT
Patient is awake and alert. Affect very anxious. Speech fluent. TP perseverative, focused on "heavy" feeling in legs. No delusions expressed. No perceptual disturbance. Limited insight. No suicidal ideations.

## 2024-04-05 NOTE — PHYSICAL THERAPY INITIAL EVALUATION ADULT - NSPTDISCHREC_GEN_A_CORE
At this time pt would benefit from rehab in order to improve gait instability.  Follow PT notes for functional progress.

## 2024-04-06 LAB
ANION GAP SERPL CALC-SCNC: 12 MMOL/L — SIGNIFICANT CHANGE UP (ref 7–14)
BUN SERPL-MCNC: 7 MG/DL — SIGNIFICANT CHANGE UP (ref 7–23)
CALCIUM SERPL-MCNC: 9.5 MG/DL — SIGNIFICANT CHANGE UP (ref 8.4–10.5)
CHLORIDE SERPL-SCNC: 104 MMOL/L — SIGNIFICANT CHANGE UP (ref 98–107)
CO2 SERPL-SCNC: 23 MMOL/L — SIGNIFICANT CHANGE UP (ref 22–31)
CREAT SERPL-MCNC: 0.67 MG/DL — SIGNIFICANT CHANGE UP (ref 0.5–1.3)
EGFR: 92 ML/MIN/1.73M2 — SIGNIFICANT CHANGE UP
GLUCOSE SERPL-MCNC: 130 MG/DL — HIGH (ref 70–99)
HCT VFR BLD CALC: 43.5 % — SIGNIFICANT CHANGE UP (ref 34.5–45)
HGB BLD-MCNC: 13.8 G/DL — SIGNIFICANT CHANGE UP (ref 11.5–15.5)
MAGNESIUM SERPL-MCNC: 2.2 MG/DL — SIGNIFICANT CHANGE UP (ref 1.6–2.6)
MCHC RBC-ENTMCNC: 27.3 PG — SIGNIFICANT CHANGE UP (ref 27–34)
MCHC RBC-ENTMCNC: 31.7 GM/DL — LOW (ref 32–36)
MCV RBC AUTO: 86 FL — SIGNIFICANT CHANGE UP (ref 80–100)
NRBC # BLD: 0 /100 WBCS — SIGNIFICANT CHANGE UP (ref 0–0)
NRBC # FLD: 0 K/UL — SIGNIFICANT CHANGE UP (ref 0–0)
PHOSPHATE SERPL-MCNC: 4.3 MG/DL — SIGNIFICANT CHANGE UP (ref 2.5–4.5)
PLATELET # BLD AUTO: 217 K/UL — SIGNIFICANT CHANGE UP (ref 150–400)
POTASSIUM SERPL-MCNC: 3.7 MMOL/L — SIGNIFICANT CHANGE UP (ref 3.5–5.3)
POTASSIUM SERPL-SCNC: 3.7 MMOL/L — SIGNIFICANT CHANGE UP (ref 3.5–5.3)
RBC # BLD: 5.06 M/UL — SIGNIFICANT CHANGE UP (ref 3.8–5.2)
RBC # FLD: 13.3 % — SIGNIFICANT CHANGE UP (ref 10.3–14.5)
SODIUM SERPL-SCNC: 139 MMOL/L — SIGNIFICANT CHANGE UP (ref 135–145)
WBC # BLD: 9.1 K/UL — SIGNIFICANT CHANGE UP (ref 3.8–10.5)
WBC # FLD AUTO: 9.1 K/UL — SIGNIFICANT CHANGE UP (ref 3.8–10.5)

## 2024-04-06 RX ADMIN — Medication 0.5 MILLIGRAM(S): at 21:22

## 2024-04-06 RX ADMIN — Medication 650 MILLIGRAM(S): at 18:04

## 2024-04-06 RX ADMIN — Medication 0.5 MILLIGRAM(S): at 05:55

## 2024-04-06 RX ADMIN — MIRTAZAPINE 15 MILLIGRAM(S): 45 TABLET, ORALLY DISINTEGRATING ORAL at 21:21

## 2024-04-06 RX ADMIN — Medication 25 MILLIGRAM(S): at 13:45

## 2024-04-06 RX ADMIN — ENOXAPARIN SODIUM 40 MILLIGRAM(S): 100 INJECTION SUBCUTANEOUS at 05:56

## 2024-04-06 RX ADMIN — Medication 1 TABLET(S): at 13:43

## 2024-04-06 RX ADMIN — NORTRIPTYLINE HYDROCHLORIDE 25 MILLIGRAM(S): 10 CAPSULE ORAL at 21:21

## 2024-04-06 RX ADMIN — Medication 25 MILLIGRAM(S): at 21:21

## 2024-04-06 RX ADMIN — Medication 50 MILLIGRAM(S): at 17:10

## 2024-04-06 RX ADMIN — ATORVASTATIN CALCIUM 10 MILLIGRAM(S): 80 TABLET, FILM COATED ORAL at 21:22

## 2024-04-06 RX ADMIN — PANTOPRAZOLE SODIUM 40 MILLIGRAM(S): 20 TABLET, DELAYED RELEASE ORAL at 06:05

## 2024-04-06 RX ADMIN — Medication 25 MILLIGRAM(S): at 05:55

## 2024-04-06 RX ADMIN — Medication 50 MILLIGRAM(S): at 05:56

## 2024-04-06 RX ADMIN — Medication 650 MILLIGRAM(S): at 17:04

## 2024-04-06 NOTE — BH CONSULTATION LIAISON PROGRESS NOTE - NSBHFUPINTERVALHXFT_PSY_A_CORE
Patient was seen at bedside. Received no PRNs overnight and no acute overnight events. Mood is "anxious". Repeatedly asking to speak with . Sleep and appetite wnl. Denies SI/HI/I/P. No AVH. Endorses feeling dizzy and lightheaded.

## 2024-04-06 NOTE — PROGRESS NOTE ADULT - ASSESSMENT
73y F w/ PMHx significant for anxiety, depression, HTN, and HLD presents for dizziness admitted for neuro eval, c/b suicidal ideation        Problem/Plan - 1:  ·  Problem: Dizziness.   ·  Plan: -Pt with new onset dizziness for 1 week, worse with changes in position. Associated dysequilibrium.   -CTA H/N: Intracranial atherosclerosis cavernous and clinoid segments of the internal carotid arteries, mild to   moderate.  -appreciate neuro eval. Suspect orthostatic hypotension 2/2 polypharmacy vs CVA vs 2/2 ICAD  -check orthostatics  -TTE pending. Monitor on tele   -wean klonopin to BID dosing from TID. If no central pathology, may need to titrate rest of psychiatric medications  -fall precautions    -PT eval.  - neuro follow up  - psych follow up    < from: MR Head No Cont (04.05.24 @ 12:30) >  IMPRESSION:   Unremarkable MR of the brain.       Problem/Plan - 2:  ·  Problem: Suicidal ideation.   ·  Plan: -pt with history of severe MDD, treated with ECT in the past  -reporting SI  -appreciate psych recs, c/w 1:1.     Problem/Plan - 3:  ·  Problem: Anxiety and depression.   ·  Plan: -psych following, c/w Klonopin 0.5 mg po BID, Remeron 15 mg po QHS, Lyrica 25 mg po TID  -wean of klonopin as above   -Gabapentin 100mg PRN for anxiety as per psych.     Problem/Plan - 4:  ·  Problem: HTN (hypertension).   ·  Plan: -c/w metoprolol. Hold losartan for now given c/f orthostatic hypotension.     Problem/Plan - 5:  ·  Problem: Need for prophylactic measure.   ·  Plan: DVT ppx: lovenox.  + constipation >  bowel regimen

## 2024-04-06 NOTE — BH CONSULTATION LIAISON PROGRESS NOTE - NSBHASSESSMENTFT_PSY_ALL_CORE
Patient is a 73 yr old woman ,  , domiciled, and retired, with background hx of MDD and ANSON; has multiple psych admissions (most recently [facility]24 [facility] had prior SA by overdosing on pills (11/2019) following death of parents,  has no reported hx of NSSIB, currently treated by outpatient psychiatrist Dr. Ramirez, she denied using any illicit substance use including alcohol, pmh of HTN, hyperlipidemia, and GERD, brought in by  for anxiety, depression, somatic symptoms, SI. Admitted to Medicine for dizziness work up.      4/5: Patient reports severe symptoms of anxiety and depression, +passive SI with recent active SI with plans. Denies current SI/HI/AVH.  Will monitor/follow and adjust the doses given dizziness, concerns for polypharmacy.     4/6: Severe anxiety, denies SI/HI. Endorses dizziness/lightheaded.     Plan:  -Observation status: CO 1:1 for passive/active SI  -CONTINUE Klonopin 0.5mg PO BID, Remeron 15mg PO QHS, Lyrica 25mg PO TID, Pamelor 25mg PO bedtime.   -Obtain orthostasis  -Ongoing medical work up  -Haldol 0.5mg Po/IM q6h prn for agitation  -Hold antipsychotics if qtc >500  -Attempted to reach Dr. Montgomery, unable to reach at this time   -Dispo; inpatient psych when medically stable   Will follow

## 2024-04-07 LAB
ANION GAP SERPL CALC-SCNC: 13 MMOL/L — SIGNIFICANT CHANGE UP (ref 7–14)
BUN SERPL-MCNC: 8 MG/DL — SIGNIFICANT CHANGE UP (ref 7–23)
CALCIUM SERPL-MCNC: 9.3 MG/DL — SIGNIFICANT CHANGE UP (ref 8.4–10.5)
CHLORIDE SERPL-SCNC: 105 MMOL/L — SIGNIFICANT CHANGE UP (ref 98–107)
CO2 SERPL-SCNC: 23 MMOL/L — SIGNIFICANT CHANGE UP (ref 22–31)
CREAT SERPL-MCNC: 0.72 MG/DL — SIGNIFICANT CHANGE UP (ref 0.5–1.3)
EGFR: 88 ML/MIN/1.73M2 — SIGNIFICANT CHANGE UP
GLUCOSE SERPL-MCNC: 115 MG/DL — HIGH (ref 70–99)
HCT VFR BLD CALC: 43.5 % — SIGNIFICANT CHANGE UP (ref 34.5–45)
HGB BLD-MCNC: 13.9 G/DL — SIGNIFICANT CHANGE UP (ref 11.5–15.5)
MAGNESIUM SERPL-MCNC: 2.2 MG/DL — SIGNIFICANT CHANGE UP (ref 1.6–2.6)
MCHC RBC-ENTMCNC: 27.5 PG — SIGNIFICANT CHANGE UP (ref 27–34)
MCHC RBC-ENTMCNC: 32 GM/DL — SIGNIFICANT CHANGE UP (ref 32–36)
MCV RBC AUTO: 86 FL — SIGNIFICANT CHANGE UP (ref 80–100)
NRBC # BLD: 0 /100 WBCS — SIGNIFICANT CHANGE UP (ref 0–0)
NRBC # FLD: 0 K/UL — SIGNIFICANT CHANGE UP (ref 0–0)
PHOSPHATE SERPL-MCNC: 4.3 MG/DL — SIGNIFICANT CHANGE UP (ref 2.5–4.5)
PLATELET # BLD AUTO: 205 K/UL — SIGNIFICANT CHANGE UP (ref 150–400)
POTASSIUM SERPL-MCNC: 3.8 MMOL/L — SIGNIFICANT CHANGE UP (ref 3.5–5.3)
POTASSIUM SERPL-SCNC: 3.8 MMOL/L — SIGNIFICANT CHANGE UP (ref 3.5–5.3)
RBC # BLD: 5.06 M/UL — SIGNIFICANT CHANGE UP (ref 3.8–5.2)
RBC # FLD: 13.2 % — SIGNIFICANT CHANGE UP (ref 10.3–14.5)
SODIUM SERPL-SCNC: 141 MMOL/L — SIGNIFICANT CHANGE UP (ref 135–145)
WBC # BLD: 7.11 K/UL — SIGNIFICANT CHANGE UP (ref 3.8–10.5)
WBC # FLD AUTO: 7.11 K/UL — SIGNIFICANT CHANGE UP (ref 3.8–10.5)

## 2024-04-07 RX ORDER — HALOPERIDOL DECANOATE 100 MG/ML
0.5 INJECTION INTRAMUSCULAR EVERY 6 HOURS
Refills: 0 | Status: DISCONTINUED | OUTPATIENT
Start: 2024-04-07 | End: 2024-04-10

## 2024-04-07 RX ADMIN — Medication 50 MILLIGRAM(S): at 05:57

## 2024-04-07 RX ADMIN — ATORVASTATIN CALCIUM 10 MILLIGRAM(S): 80 TABLET, FILM COATED ORAL at 22:27

## 2024-04-07 RX ADMIN — Medication 1 TABLET(S): at 13:26

## 2024-04-07 RX ADMIN — MIRTAZAPINE 15 MILLIGRAM(S): 45 TABLET, ORALLY DISINTEGRATING ORAL at 22:27

## 2024-04-07 RX ADMIN — Medication 50 MILLIGRAM(S): at 18:54

## 2024-04-07 RX ADMIN — PANTOPRAZOLE SODIUM 40 MILLIGRAM(S): 20 TABLET, DELAYED RELEASE ORAL at 05:57

## 2024-04-07 RX ADMIN — Medication 25 MILLIGRAM(S): at 13:25

## 2024-04-07 RX ADMIN — HALOPERIDOL DECANOATE 0.5 MILLIGRAM(S): 100 INJECTION INTRAMUSCULAR at 13:25

## 2024-04-07 RX ADMIN — ENOXAPARIN SODIUM 40 MILLIGRAM(S): 100 INJECTION SUBCUTANEOUS at 05:57

## 2024-04-07 RX ADMIN — Medication 0.5 MILLIGRAM(S): at 22:28

## 2024-04-07 RX ADMIN — Medication 25 MILLIGRAM(S): at 05:57

## 2024-04-07 RX ADMIN — Medication 25 MILLIGRAM(S): at 22:27

## 2024-04-07 RX ADMIN — Medication 0.5 MILLIGRAM(S): at 05:57

## 2024-04-07 RX ADMIN — NORTRIPTYLINE HYDROCHLORIDE 25 MILLIGRAM(S): 10 CAPSULE ORAL at 22:27

## 2024-04-07 NOTE — PROGRESS NOTE ADULT - ASSESSMENT
_________________________________________________________________________________________  ========>>  M E D I C A L   A T T E N D I N G    F O L L O W  U P  N O T E  <<=========  -----------------------------------------------------------------------------------------------------    - Patient seen and examined by me earlier today.   - In summary,  SKY ESPITIA is a 73y year old woman admitted with dizziness   - Patient today overall doing ok, comfortable, eating OK.  patient feels anxious     ==================>> REVIEW OF SYSTEM <<=================    GEN: no fever, no chills, no pain  RESP: no SOB, no cough, no sputum  CVS: no chest pain, no palpitations  GI: no abdominal pain, no nausea, +  constipation  : no dysuria, no frequency  Neuro: no headache, + dizziness with movement     ==================>> PHYSICAL EXAM <<=================    GEN: A&O X 3 , NAD , comfortable, pleasant, calm in bed   HEENT: NCAT, PERRL, MMM, hearing intact  CVS: S1S2 , regular , No M/R/G appreciated  PULM: CTA B/L,  no W/R/R appreciated  ABD.: soft. non tender, non distended,  bowel sounds present  Extrem: intact pulses , no edema           ( Note written / Date of service 04-07-24 ( This is certified to be the same as "ENTERED" date above ( for billing purposes)))    ==================>> MEDICATIONS <<====================    atorvastatin 10 milliGRAM(s) Oral at bedtime  clonazePAM  Tablet 0.5 milliGRAM(s) Oral two times a day  enoxaparin Injectable 40 milliGRAM(s) SubCutaneous every 24 hours  metoprolol tartrate 50 milliGRAM(s) Oral two times a day  mirtazapine 15 milliGRAM(s) Oral at bedtime  multivitamin 1 Tablet(s) Oral daily  nortriptyline 25 milliGRAM(s) Oral at bedtime  pantoprazole    Tablet 40 milliGRAM(s) Oral before breakfast  pregabalin 25 milliGRAM(s) Oral three times a day    MEDICATIONS  (PRN):  acetaminophen     Tablet .. 650 milliGRAM(s) Oral every 6 hours PRN Temp greater or equal to 38C (100.4F), Mild Pain (1 - 3)  aluminum hydroxide/magnesium hydroxide/simethicone Suspension 30 milliLiter(s) Oral every 4 hours PRN Dyspepsia  bisacodyl Suppository 10 milliGRAM(s) Rectal daily PRN Constipation  gabapentin 100 milliGRAM(s) Oral three times a day PRN anxiety  haloperidol     Tablet 0.5 milliGRAM(s) Oral every 6 hours PRN anxiety  melatonin 3 milliGRAM(s) Oral at bedtime PRN Insomnia  ondansetron Injectable 4 milliGRAM(s) IV Push every 8 hours PRN Nausea and/or Vomiting  polyethylene glycol 3350 17 Gram(s) Oral two times a day PRN constipation    ___________  Active diet:  Diet, Regular:   DASH/TLC Sodium & Cholesterol Restricted (DASH)  ___________________    ==================>> VITAL SIGNS <<==================  Height (cm): 157.5  Weight (kg): 95.1  BMI (kg/m2): 38.3  Vital Signs Last 24 HrsT(C): 36.6 (04-07-24 @ 11:21)  T(F): 97.9 (04-07-24 @ 11:21), Max: 97.9 (04-07-24 @ 11:21)  HR: 85 (04-07-24 @ 11:21) (72 - 85)  BP: 146/80 (04-07-24 @ 11:21)  RR: 16 (04-07-24 @ 11:21) (16 - 18)  SpO2: 96% (04-07-24 @ 11:21) (93% - 96%)      CAPILLARY BLOOD GLUCOSE         ==================>> LAB AND IMAGING <<==================                        13.9   7.11  )-----------( 205      ( 07 Apr 2024 04:34 )             43.5        04-07    141  |  105  |  8   ----------------------------<  115<H>  3.8   |  23  |  0.72    Ca    9.3      07 Apr 2024 04:34  Phos  4.3     04-07  Mg     2.20     04-07      WBC count:   7.11 <<== ,  9.10 <<== ,  8.17 <<== ,  9.41 <<==   Hemoglobin:   13.9 <<==,  13.8 <<==,  15.1 <<==,  14.7 <<==  platelets:  205 <==, 217 <==, 225 <==, 236 <==    Creatinine:  0.72  <<==, 0.67  <<==, 0.70  <<==, 0.69  <<==  Sodium:   141  <==, 139  <==, 144  <==, 141  <==       AST:          21(04-03) <==      ALT:        13(04-03)  <==      AP:        117(04-03)  <=     Bili:        0.5(04-03)  <=    ____________________________    M I C R O B I O L O G Y :      COVID-19 PCR: Farhanatejennifer (04-03-24 @ 21:09)    < from: MR Head No Cont (04.05.24 @ 12:30) >  IMPRESSION:   Unremarkable MR of the brain.  < end of copied text >    ___________________________________________________________________________________  ===============>>  A S S E S S M E N T   A N D   P L A N <<===============  ------------------------------------------------------------------------------------------    · Assessment	  73y F w/ PMHx significant for anxiety, depression, HTN, and HLD presents for dizziness admitted for neuro eval, c/b suicidal ideation       Problem/Plan - 1:  ·  Problem: Dizziness.   ·  Plan: -Pt with new onset dizziness for 1 week, worse with changes in position. Associated dysequilibrium.   -CTA H/N: Intracranial atherosclerosis cavernous and clinoid segments of the internal carotid arteries, mild to   moderate.  -appreciate neuro eval. Suspect orthostatic hypotension 2/2 polypharmacy vs CVA vs 2/2 ICAD  -F/u MRI head  -check orthostatics  -TTE pending. Monitor on tele   -wean klonopin to BID dosing from TID. If no central pathology, may need to titrate rest of psychiatric medications  -fall precautions    -PT eval.  - neuro follow up  - psych follow up    Problem/Plan - 2:  ·  Problem: Suicidal ideation.   ·  Plan: -pt with history of severe MDD, treated with ECT in the past  -reporting SI  -appreciate psych recs, c/w 1:1.    Problem/Plan - 3:  ·  Problem: Anxiety and depression.   ·  Plan: -psych following, c/w Klonopin 0.5 mg po BID, Remeron 15 mg po QHS, Lyrica 25 mg po TID  -wean of klonopin as above   -Gabapentin 100mg PRN for anxiety as per psych.    Problem/Plan - 4:  ·  Problem: HTN (hypertension).   ·  Plan: -c/w metoprolol. Hold losartan for now given c/f orthostatic hypotension.    Problem/Plan - 5:  ·  Problem: Need for prophylactic measure.   ·  Plan: DVT ppx: lovenox.  + constipation >  bowel regimen     --------------------------------------------  Case discussed with patient, staff   Education given on findings and plan of care  ___________________________  H. NATALIE Paige.  Pager: [phone]     _________________________________________________________________________________________  ========>>  M E D I C A L   A T T E N D I N G    F O L L O W  U P  N O T E  <<=========  -----------------------------------------------------------------------------------------------------    - Patient seen and examined by me earlier today.   - In summary,  SKY ESPITIA is a 73y year old woman admitted with dizziness   - Patient today overall doing better, comfortable, eating OK.  patient feels Less dizzy, able to ambulate back and forth to the bathroom     patient feels depressed, frustrated about her psychiatric history,>> Encouraged to discuss with psychiatrist regarding further plans    ==================>> REVIEW OF SYSTEM <<=================    GEN: no fever, no chills, no pain  RESP: no SOB, no cough, no sputum  CVS: no chest pain, no palpitations  GI: no abdominal pain, no nausea  : no dysuria, no frequency  Neuro: no headache, dizziness Improved as above    ==================>> PHYSICAL EXAM <<=================    GEN: A&O X 3 , NAD , comfortable, pleasant, calm in bed :: Encouraged increased activity as able, and increased fluid intake  HEENT: NCAT, PERRL, MMM, hearing intact  CVS: S1S2 , regular , No M/R/G appreciated  PULM: CTA B/L,  no W/R/R appreciated  ABD.: soft. non tender, non distended,  bowel sounds present  Extrem: intact pulses , no edema           ( Note written / Date of service 04-07-24 ( This is certified to be the same as "ENTERED" date above ( for billing purposes)))    ==================>> MEDICATIONS <<====================    atorvastatin 10 milliGRAM(s) Oral at bedtime  clonazePAM  Tablet 0.5 milliGRAM(s) Oral two times a day  enoxaparin Injectable 40 milliGRAM(s) SubCutaneous every 24 hours  metoprolol tartrate 50 milliGRAM(s) Oral two times a day  mirtazapine 15 milliGRAM(s) Oral at bedtime  multivitamin 1 Tablet(s) Oral daily  nortriptyline 25 milliGRAM(s) Oral at bedtime  pantoprazole    Tablet 40 milliGRAM(s) Oral before breakfast  pregabalin 25 milliGRAM(s) Oral three times a day    MEDICATIONS  (PRN):  acetaminophen     Tablet .. 650 milliGRAM(s) Oral every 6 hours PRN Temp greater or equal to 38C (100.4F), Mild Pain (1 - 3)  aluminum hydroxide/magnesium hydroxide/simethicone Suspension 30 milliLiter(s) Oral every 4 hours PRN Dyspepsia  bisacodyl Suppository 10 milliGRAM(s) Rectal daily PRN Constipation  gabapentin 100 milliGRAM(s) Oral three times a day PRN anxiety  haloperidol     Tablet 0.5 milliGRAM(s) Oral every 6 hours PRN anxiety  melatonin 3 milliGRAM(s) Oral at bedtime PRN Insomnia  ondansetron Injectable 4 milliGRAM(s) IV Push every 8 hours PRN Nausea and/or Vomiting  polyethylene glycol 3350 17 Gram(s) Oral two times a day PRN constipation    ___________  Active diet:  Diet, Regular:   DASH/TLC Sodium & Cholesterol Restricted (DASH)  ___________________    ==================>> VITAL SIGNS <<==================  Height (cm): 157.5  Weight (kg): 95.1  BMI (kg/m2): 38.3  Vital Signs Last 24 HrsT(C): 36.6 (04-07-24 @ 11:21)  T(F): 97.9 (04-07-24 @ 11:21), Max: 97.9 (04-07-24 @ 11:21)  HR: 85 (04-07-24 @ 11:21) (72 - 85)  BP: 146/80 (04-07-24 @ 11:21)  RR: 16 (04-07-24 @ 11:21) (16 - 18)  SpO2: 96% (04-07-24 @ 11:21) (93% - 96%)      Orthostatic vital signs Within normal limits     ==================>> LAB AND IMAGING <<==================                        13.9   7.11  )-----------( 205      ( 07 Apr 2024 04:34 )             43.5        04-07    141  |  105  |  8   ----------------------------<  115<H>  3.8   |  23  |  0.72    Ca    9.3      07 Apr 2024 04:34  Phos  4.3     04-07  Mg     2.20     04-07      WBC count:   7.11 <<== ,  9.10 <<== ,  8.17 <<== ,  9.41 <<==   Hemoglobin:   13.9 <<==,  13.8 <<==,  15.1 <<==,  14.7 <<==  platelets:  205 <==, 217 <==, 225 <==, 236 <==    Creatinine:  0.72  <<==, 0.67  <<==, 0.70  <<==, 0.69  <<==  Sodium:   141  <==, 139  <==, 144  <==, 141  <==    COVID-19 PCR: NotDetec (04-03-24 @ 21:09)    < from: MR Head No Cont (04.05.24 @ 12:30) >  IMPRESSION:   Unremarkable MR of the brain.  < end of copied text >    ___________________________________________________________________________________  ===============>>  A S S E S S M E N T   A N D   P L A N <<===============  ------------------------------------------------------------------------------------------    · Assessment	  73y F w/ PMHx significant for anxiety, depression, HTN, and HLD presents for dizziness admitted for neuro eval, c/b suicidal ideation       Problem/Plan - 1:  ·  Problem: Dizziness.   ·  Plan: -Pt with new onset dizziness for 1 week, worse with changes in position. Associated dysequilibrium.   -CTA H/N: Intracranial atherosclerosis cavernous and clinoid segments of the internal carotid arteries, mild to   moderate.  -appreciate neuro Follow-up: sign off the case  orthostatic Negative  MRI Negative as above  Possible vertigo, improving  -TTE pending. Monitor on tele   Further medical management as per psychiatry given extensive history of anxiety and depression on multiple medications  -fall precautions    -PT / Out of bed as able daily  - psych follow up    Problem/Plan - 2:  ·  Problem: Suicidal ideation.   ·  Plan: -pt with history of severe MDD, treated with ECT in the past  -reporting SI  -appreciate psych recs, c/w 1:1.    Problem/Plan - 3:  ·  Problem: Anxiety and depression.   ·  Plan: -psych following, c/w Klonopin 0.5 mg po BID, Remeron 15 mg po QHS, Lyrica 25 mg po TID  -Gabapentin 100mg PRN for anxiety as per psych.  - Further medical management as for psychiatry    Problem/Plan - 4:  ·  Problem: HTN (hypertension).   ·  Plan: -c/w metoprolol. Hold losartan for now given c/f orthostatic hypotension.    Problem/Plan - 5:  ·  Problem: Need for prophylactic measure.   ·  Plan: DVT ppx: lovenox.    Patient overall medically cleared for transfer to psychiatric facility if deemed necessary as per psychiatry team    --------------------------------------------  Case discussed with patient, staff   Education given on findings and plan of care  ___________________________  H. NATALIE Paige.  Pager: [phone]

## 2024-04-08 LAB
ANION GAP SERPL CALC-SCNC: 10 MMOL/L — SIGNIFICANT CHANGE UP (ref 7–14)
BUN SERPL-MCNC: 8 MG/DL — SIGNIFICANT CHANGE UP (ref 7–23)
CALCIUM SERPL-MCNC: 9.3 MG/DL — SIGNIFICANT CHANGE UP (ref 8.4–10.5)
CHLORIDE SERPL-SCNC: 107 MMOL/L — SIGNIFICANT CHANGE UP (ref 98–107)
CO2 SERPL-SCNC: 27 MMOL/L — SIGNIFICANT CHANGE UP (ref 22–31)
CREAT SERPL-MCNC: 0.7 MG/DL — SIGNIFICANT CHANGE UP (ref 0.5–1.3)
EGFR: 91 ML/MIN/1.73M2 — SIGNIFICANT CHANGE UP
GLUCOSE SERPL-MCNC: 111 MG/DL — HIGH (ref 70–99)
HCT VFR BLD CALC: 45.1 % — HIGH (ref 34.5–45)
HGB BLD-MCNC: 14.5 G/DL — SIGNIFICANT CHANGE UP (ref 11.5–15.5)
MAGNESIUM SERPL-MCNC: 2.1 MG/DL — SIGNIFICANT CHANGE UP (ref 1.6–2.6)
MCHC RBC-ENTMCNC: 27.7 PG — SIGNIFICANT CHANGE UP (ref 27–34)
MCHC RBC-ENTMCNC: 32.2 GM/DL — SIGNIFICANT CHANGE UP (ref 32–36)
MCV RBC AUTO: 86.1 FL — SIGNIFICANT CHANGE UP (ref 80–100)
NRBC # BLD: 0 /100 WBCS — SIGNIFICANT CHANGE UP (ref 0–0)
NRBC # FLD: 0 K/UL — SIGNIFICANT CHANGE UP (ref 0–0)
PHOSPHATE SERPL-MCNC: 4.3 MG/DL — SIGNIFICANT CHANGE UP (ref 2.5–4.5)
PLATELET # BLD AUTO: 210 K/UL — SIGNIFICANT CHANGE UP (ref 150–400)
POTASSIUM SERPL-MCNC: 3.7 MMOL/L — SIGNIFICANT CHANGE UP (ref 3.5–5.3)
POTASSIUM SERPL-SCNC: 3.7 MMOL/L — SIGNIFICANT CHANGE UP (ref 3.5–5.3)
RBC # BLD: 5.24 M/UL — HIGH (ref 3.8–5.2)
RBC # FLD: 13.2 % — SIGNIFICANT CHANGE UP (ref 10.3–14.5)
SODIUM SERPL-SCNC: 144 MMOL/L — SIGNIFICANT CHANGE UP (ref 135–145)
WBC # BLD: 8.39 K/UL — SIGNIFICANT CHANGE UP (ref 3.8–10.5)
WBC # FLD AUTO: 8.39 K/UL — SIGNIFICANT CHANGE UP (ref 3.8–10.5)

## 2024-04-08 PROCEDURE — 93010 ELECTROCARDIOGRAM REPORT: CPT

## 2024-04-08 RX ORDER — MECLIZINE HCL 12.5 MG
12.5 TABLET ORAL EVERY 8 HOURS
Refills: 0 | Status: DISCONTINUED | OUTPATIENT
Start: 2024-04-08 | End: 2024-04-16

## 2024-04-08 RX ORDER — LIDOCAINE 4 G/100G
1 CREAM TOPICAL ONCE
Refills: 0 | Status: DISCONTINUED | OUTPATIENT
Start: 2024-04-08 | End: 2024-04-16

## 2024-04-08 RX ORDER — CHLORHEXIDINE GLUCONATE 213 G/1000ML
1 SOLUTION TOPICAL DAILY
Refills: 0 | Status: DISCONTINUED | OUTPATIENT
Start: 2024-04-08 | End: 2024-04-16

## 2024-04-08 RX ADMIN — Medication 25 MILLIGRAM(S): at 06:35

## 2024-04-08 RX ADMIN — Medication 0.5 MILLIGRAM(S): at 06:34

## 2024-04-08 RX ADMIN — Medication 25 MILLIGRAM(S): at 13:45

## 2024-04-08 RX ADMIN — Medication 50 MILLIGRAM(S): at 06:34

## 2024-04-08 RX ADMIN — Medication 0.5 MILLIGRAM(S): at 17:07

## 2024-04-08 RX ADMIN — Medication 25 MILLIGRAM(S): at 22:04

## 2024-04-08 RX ADMIN — ATORVASTATIN CALCIUM 10 MILLIGRAM(S): 80 TABLET, FILM COATED ORAL at 22:05

## 2024-04-08 RX ADMIN — Medication 1 TABLET(S): at 13:45

## 2024-04-08 RX ADMIN — HALOPERIDOL DECANOATE 0.5 MILLIGRAM(S): 100 INJECTION INTRAMUSCULAR at 16:04

## 2024-04-08 RX ADMIN — Medication 12.5 MILLIGRAM(S): at 13:47

## 2024-04-08 RX ADMIN — MIRTAZAPINE 15 MILLIGRAM(S): 45 TABLET, ORALLY DISINTEGRATING ORAL at 22:05

## 2024-04-08 RX ADMIN — HALOPERIDOL DECANOATE 0.5 MILLIGRAM(S): 100 INJECTION INTRAMUSCULAR at 09:35

## 2024-04-08 RX ADMIN — PANTOPRAZOLE SODIUM 40 MILLIGRAM(S): 20 TABLET, DELAYED RELEASE ORAL at 06:34

## 2024-04-08 RX ADMIN — Medication 50 MILLIGRAM(S): at 17:07

## 2024-04-08 RX ADMIN — ENOXAPARIN SODIUM 40 MILLIGRAM(S): 100 INJECTION SUBCUTANEOUS at 06:35

## 2024-04-08 RX ADMIN — NORTRIPTYLINE HYDROCHLORIDE 25 MILLIGRAM(S): 10 CAPSULE ORAL at 22:05

## 2024-04-08 RX ADMIN — CHLORHEXIDINE GLUCONATE 1 APPLICATION(S): 213 SOLUTION TOPICAL at 17:10

## 2024-04-08 NOTE — BH CONSULTATION LIAISON PROGRESS NOTE - NSBHASSESSMENTFT_PSY_ALL_CORE
Patient is a 73 yr old woman ,  , domiciled, and retired, with background hx of MDD and ANSON; has multiple psych admissions (most recently Jan 2024 [facility]), had prior SA by overdosing on pills (11/2019) following death of parents,  has no reported hx of NSSIB, currently treated by outpatient psychiatrist Dr. Ramirez, she denied using any illicit substance use including alcohol, pmh of HTN, hyperlipidemia, and GERD, brought in by  for anxiety, depression, somatic symptoms, SI. Admitted to Medicine for dizziness work up.      4/5: Patient reports severe symptoms of anxiety and depression, +passive SI with recent active SI with plans. Denies current SI/HI/AVH.  Will monitor/follow and adjust the doses given dizziness, concerns for polypharmacy.     4/6: Severe anxiety, denies SI/HI. Endorses dizziness/lightheaded.   04/08: remains anxious, no SI/HI. Not medically cleared yet. Receives PRNs    Plan:  -Observation status: CO 1:1 for passive/active SI  -CONTINUE Klonopin 0.5mg PO BID, Remeron 15mg PO QHS, Lyrica 25mg PO TID, Pamelor 25mg PO bedtime.   -Obtain orthostasis  -Ongoing medical work up  -Haldol 0.5mg Po/IM q6h prn for agitation  -Hold antipsychotics if qtc >500  -Obtain collateral Dr. Valentina Bailey; inpatient psych voluntary if patient is amendable, when medically stable   Will follow Patient is a 73 yr old woman ,  , domiciled, and retired, with background hx of MDD and ANSON; has multiple psych admissions (most recently Jan 2024 [facility]), had prior SA by overdosing on pills (11/2019) following death of parents,  has no reported hx of NSSIB, currently treated by outpatient psychiatrist Dr. Ramirez, she denied using any illicit substance use including alcohol, pmh of HTN, hyperlipidemia, and GERD, brought in by  for anxiety, depression, somatic symptoms, SI. Admitted to Medicine for dizziness work up.      4/5: Patient reports severe symptoms of anxiety and depression, +passive SI with recent active SI with plans. Denies current SI/HI/AVH.  Will monitor/follow and adjust the doses given dizziness, concerns for polypharmacy.     4/6: Severe anxiety, denies SI/HI. Endorses dizziness/lightheaded.   04/08: remains anxious, no SI/HI. Not medically cleared yet. Receives PRNs    Plan:  -Observation status: CO 1:1 for passive/active SI  -CONTINUE Klonopin 0.5mg PO BID, Remeron 15mg PO QHS, Lyrica 25mg PO TID, Pamelor 25mg PO bedtime.   -Obtain orthostatics  -Ongoing medical work up  -Haldol 0.5mg Po/IM q6h prn for agitation  -Hold antipsychotics if qtc >500  -Obtain collateral Dr. Valentina Bailey; inpatient psych voluntary if patient is amendable, when medically stable   Will follow Patient is a 73 yr old woman ,  , domiciled, and retired, with background hx of MDD and ANSON; has multiple psych admissions (most recently Jan 2024 [facility]), had prior SA by overdosing on pills (11/2019) following death of parents,  has no reported hx of NSSIB, currently treated by outpatient psychiatrist Dr. Ramirez, she denied using any illicit substance use including alcohol, pmh of HTN, hyperlipidemia, and GERD, brought in by  for anxiety, depression, somatic symptoms, SI. Admitted to Medicine for dizziness work up.      4/5: Patient reports severe symptoms of anxiety and depression, +passive SI with recent active SI with plans. Denies current SI/HI/AVH.  Will monitor/follow and adjust the doses given dizziness, concerns for polypharmacy.     4/6: Severe anxiety, denies SI/HI. Endorses dizziness/lightheaded.   04/08: remains anxious, no SI/HI. Not medically cleared yet. Receives PRNs    Plan:  -Observation status: CO 1:1 for passive/active SI  -CONTINUE Klonopin 0.5mg PO BID, Remeron 15mg PO QHS, Lyrica 25mg PO TID, Pamelor 25mg PO bedtime.   -Obtain orthostatics  -Order EKG  -Ongoing medical work up  -Haldol 0.5mg Po/IM q6h prn for agitation  -Hold antipsychotics if qtc >500  -Obtain collateral Dr. Valentina Bailey; inpatient psych voluntary if patient is amendable, when medically stable   Will follow

## 2024-04-08 NOTE — BH CONSULTATION LIAISON PROGRESS NOTE - NSBHFUPINTERVALHXFT_PSY_A_CORE
Chart, labs, imagine reviewed. Case discussed with the primary team. Patient seen at bedside.  PRN haldol 0.5 mg once yesterday and once today.  Patient reported feeling "anxious", because " I don't know what is going on" referring to her dizziness. Pt reported that she does not have suicidal ideations and regret saying that " I can't live like this" in the ED, stating "I did not mean it this way", and reported that she just felt overwhelmed in the moment, but did not want to die. Sleep and appetite wnl. Denies SI/HI/I/P, identifies reasons for living. No AVH. Continues to endorse feeling dizzy and lightheaded, but " getting better".

## 2024-04-08 NOTE — PROGRESS NOTE ADULT - ASSESSMENT
_________________________________________________________________________________________  ========>>  M E D I C A L   A T T E N D I N G    F O L L O W  U P  N O T E  <<=========  -----------------------------------------------------------------------------------------------------    - Patient seen and examined by me earlier today.   - Patient today overall doing better, comfortable, eating OK.  patient feels Less dizzy in general // occasional , able to ambulate back and forth to the bathroom    ==================>> REVIEW OF SYSTEM <<=================    GEN: no fever, no chills, no pain  RESP: no SOB, no cough, no sputum  CVS: no chest pain, no palpitations  GI: no abdominal pain, no nausea  : no dysuria, no frequency  Neuro: no headache, dizziness Improved as above    ==================>> PHYSICAL EXAM <<=================    GEN: A&O X 3 , NAD , comfortable, pleasant, calm in bed :: Encouraged increased activity as able, and increased fluid intake  HEENT: NCAT, PERRL, MMM, hearing intact  CVS: S1S2 , regular , No M/R/G appreciated  PULM: CTA B/L,  no W/R/R appreciated  ABD.: soft. non tender, non distended,  bowel sounds present  Extrem: intact pulses , no edema        ( Note written / Date of service 04-08-24 ( This is certified to be the same as "ENTERED" date above ( for billing purposes)))    ==================>> MEDICATIONS <<====================    atorvastatin 10 milliGRAM(s) Oral at bedtime  clonazePAM  Tablet 0.5 milliGRAM(s) Oral two times a day  enoxaparin Injectable 40 milliGRAM(s) SubCutaneous every 24 hours  metoprolol tartrate 50 milliGRAM(s) Oral two times a day  mirtazapine 15 milliGRAM(s) Oral at bedtime  multivitamin 1 Tablet(s) Oral daily  nortriptyline 25 milliGRAM(s) Oral at bedtime  pantoprazole    Tablet 40 milliGRAM(s) Oral before breakfast  pregabalin 25 milliGRAM(s) Oral three times a day    MEDICATIONS  (PRN):  acetaminophen     Tablet .. 650 milliGRAM(s) Oral every 6 hours PRN Temp greater or equal to 38C (100.4F), Mild Pain (1 - 3)  aluminum hydroxide/magnesium hydroxide/simethicone Suspension 30 milliLiter(s) Oral every 4 hours PRN Dyspepsia  bisacodyl Suppository 10 milliGRAM(s) Rectal daily PRN Constipation  gabapentin 100 milliGRAM(s) Oral three times a day PRN anxiety  haloperidol     Tablet 0.5 milliGRAM(s) Oral every 6 hours PRN anxiety  meclizine 12.5 milliGRAM(s) Oral every 8 hours PRN Dizziness  melatonin 3 milliGRAM(s) Oral at bedtime PRN Insomnia  ondansetron Injectable 4 milliGRAM(s) IV Push every 8 hours PRN Nausea and/or Vomiting  polyethylene glycol 3350 17 Gram(s) Oral two times a day PRN constipation    ___________  Active diet:  Diet, Regular:   DASH/TLC Sodium & Cholesterol Restricted (DASH)  ___________________    ==================>> VITAL SIGNS <<==================    Height (cm): 157.5  Weight (kg): 95.1  BMI (kg/m2): 38.3  Vital Signs Last 24 HrsT(C): 36.8 (04-08-24 @ 06:31)  T(F): 98.3 (04-08-24 @ 06:31), Max: 98.3 (04-08-24 @ 06:31)  HR: 84 (04-08-24 @ 06:31) (83 - 84)  BP: 121/59 (04-08-24 @ 06:31)  RR: 16 (04-08-24 @ 06:31) (16 - 18)  SpO2: 97% (04-08-24 @ 06:31) (97% - 100%)         ==================>> LAB AND IMAGING <<==================                        14.5   8.39  )-----------( 210      ( 08 Apr 2024 06:30 )             45.1        04-08    144  |  107  |  8   ----------------------------<  111<H>  3.7   |  27  |  0.70    Ca    9.3      08 Apr 2024 06:30  Phos  4.3     04-08  Mg     2.10     04-08      WBC count:   8.39 <<== ,  7.11 <<== ,  9.10 <<== ,  8.17 <<==   Hemoglobin:   14.5 <<==,  13.9 <<==,  13.8 <<==,  15.1 <<==  platelets:  210 <==, 205 <==, 217 <==, 225 <==, 236 <==    Creatinine:  0.70  <<==, 0.72  <<==, 0.67  <<==, 0.70  <<==, 0.69  <<==  Sodium:   144  <==, 141  <==, 139  <==, 144  <==, 141  <==       AST:          21(04-03) <==      ALT:        13(04-03)  <==      AP:        117(04-03)  <=     Bili:        0.5(04-03)  <=      < from: MR Head No Cont (04.05.24 @ 12:30) >  IMPRESSION:   Unremarkable MR of the brain.  < end of copied text >    < from: TTE W or WO Ultrasound Enhancing Agent (04.05.24 @ 12:38) >  CONCLUSIONS:     1. Technically difficult image quality.   2. The left ventricular cavity is normal in size. Left ventricular wall thickness is normal. Left ventricular endocardium is not well visualized; however, the left ventricular systolic function appears grossly normal.   3. Normal right ventricular cavity size and normal systolic function.   4. Structurally normal mitral valve with normal leaflet excursion. There is calcification of the mitral valve annulus. There is trace mitral regurgitation.  < end of copied text >    ___________________________________________________________________________________  ===============>>  A S S E S S M E N T   A N D   P L A N <<===============  ------------------------------------------------------------------------------------------    · Assessment	  73y F w/ PMHx significant for anxiety, depression, HTN, and HLD presents for dizziness admitted for neuro eval, c/b suicidal ideation       Problem/Plan - 1:  ·  Problem: Dizziness.   ·  Plan: -Pt with new onset dizziness for 1 week, worse with changes in position. Associated dysequilibrium.   -CTA H/N: Intracranial atherosclerosis cavernous and clinoid segments of the internal carotid arteries, mild to   moderate.  -appreciate neuro Follow-up: sign off the case  orthostatic Negative  MRI Negative as above  Possible vertigo, improving      meclizine PRN      increase Oral hydration  -TTE as above   Further medical management as per psychiatry given extensive history of anxiety and depression on multiple medications  -fall precautions    -PT / Out of bed as able daily  - psych follow up    Problem/Plan - 2:  ·  Problem: Suicidal ideation.   ·  Plan: -pt with history of severe MDD, treated with ECT in the past  -reporting SI  -appreciate psych recs, c/w 1:1.    Problem/Plan - 3:  ·  Problem: Anxiety and depression.   ·  Plan: -psych following, c/w Klonopin 0.5 mg po BID, Remeron 15 mg po QHS, Lyrica 25 mg po TID  -Gabapentin 100mg PRN for anxiety as per psych.  - Further medical management as for psychiatry    Problem/Plan - 4:  ·  Problem: HTN (hypertension).   ·  Plan: -c/w metoprolol. Hold losartan for now given c/f orthostatic hypotension.    Problem/Plan - 5:  ·  Problem: Need for prophylactic measure.   ·  Plan: DVT ppx: lovenox.    Patient overall medically cleared for transfer to psychiatric facility if deemed necessary as per psychiatry team    --------------------------------------------  Case discussed with patient,  at home   Education given on findings and plan of care  ___________________________  RADHA Paige D.O.  Pager: [phone]

## 2024-04-09 ENCOUNTER — TRANSCRIPTION ENCOUNTER (OUTPATIENT)
Age: 74
End: 2024-04-09

## 2024-04-09 LAB — SARS-COV-2 RNA SPEC QL NAA+PROBE: SIGNIFICANT CHANGE UP

## 2024-04-09 RX ORDER — MECLIZINE HCL 12.5 MG
1 TABLET ORAL
Qty: 0 | Refills: 0 | DISCHARGE
Start: 2024-04-09

## 2024-04-09 RX ORDER — HALOPERIDOL DECANOATE 100 MG/ML
1 INJECTION INTRAMUSCULAR
Qty: 0 | Refills: 0 | DISCHARGE
Start: 2024-04-09

## 2024-04-09 RX ORDER — CLONAZEPAM 1 MG
1 TABLET ORAL
Qty: 0 | Refills: 0 | DISCHARGE
Start: 2024-04-09

## 2024-04-09 RX ORDER — GABAPENTIN 400 MG/1
1 CAPSULE ORAL
Qty: 0 | Refills: 0 | DISCHARGE
Start: 2024-04-09

## 2024-04-09 RX ADMIN — Medication 50 MILLIGRAM(S): at 17:58

## 2024-04-09 RX ADMIN — CHLORHEXIDINE GLUCONATE 1 APPLICATION(S): 213 SOLUTION TOPICAL at 13:50

## 2024-04-09 RX ADMIN — MIRTAZAPINE 15 MILLIGRAM(S): 45 TABLET, ORALLY DISINTEGRATING ORAL at 21:47

## 2024-04-09 RX ADMIN — Medication 1 TABLET(S): at 13:50

## 2024-04-09 RX ADMIN — Medication 50 MILLIGRAM(S): at 05:45

## 2024-04-09 RX ADMIN — HALOPERIDOL DECANOATE 0.5 MILLIGRAM(S): 100 INJECTION INTRAMUSCULAR at 10:25

## 2024-04-09 RX ADMIN — Medication 0.5 MILLIGRAM(S): at 17:58

## 2024-04-09 RX ADMIN — Medication 25 MILLIGRAM(S): at 21:46

## 2024-04-09 RX ADMIN — PANTOPRAZOLE SODIUM 40 MILLIGRAM(S): 20 TABLET, DELAYED RELEASE ORAL at 05:47

## 2024-04-09 RX ADMIN — Medication 25 MILLIGRAM(S): at 05:45

## 2024-04-09 RX ADMIN — Medication 12.5 MILLIGRAM(S): at 13:50

## 2024-04-09 RX ADMIN — ATORVASTATIN CALCIUM 10 MILLIGRAM(S): 80 TABLET, FILM COATED ORAL at 21:46

## 2024-04-09 RX ADMIN — ENOXAPARIN SODIUM 40 MILLIGRAM(S): 100 INJECTION SUBCUTANEOUS at 05:47

## 2024-04-09 RX ADMIN — Medication 25 MILLIGRAM(S): at 13:50

## 2024-04-09 RX ADMIN — Medication 12.5 MILLIGRAM(S): at 05:45

## 2024-04-09 RX ADMIN — HALOPERIDOL DECANOATE 0.5 MILLIGRAM(S): 100 INJECTION INTRAMUSCULAR at 18:00

## 2024-04-09 RX ADMIN — Medication 0.5 MILLIGRAM(S): at 05:45

## 2024-04-09 RX ADMIN — NORTRIPTYLINE HYDROCHLORIDE 25 MILLIGRAM(S): 10 CAPSULE ORAL at 21:47

## 2024-04-09 NOTE — DISCHARGE NOTE PROVIDER - HOSPITAL COURSE
73y F w/ PMHx significant for anxiety, depression, HTN, and HLD presented for dizziness admitted for neuro eval, c/b suicidal ideation     Hospital course:   Dizziness.   -Pt with new onset dizziness for 1 week, worse with changes in position. Associated dysequilibrium.   -CTA H/N: Intracranial atherosclerosis cavernous and clinoid segments of the internal carotid arteries, mild to   moderate.  -appreciate neuro Follow-up: sign off the case  - Orthostatic Negative  - MRI Negative as above  - Possible vertigo, improving with   meclizine PRN > encouraged to continue   - Increase Oral hydration > doing it   - TTE as above   -PT / Out of bed as able daily  - psych follow up     Problem/Plan - 2:  ·  Problem: Suicidal ideation.   ·  Plan: -pt with history of severe MDD, treated with ECT in the past  -reporting SI  -appreciate psych recs, c/w 1:1.   >>  for inpatient psych placement      Problem/Plan - 3:  ·  Problem: Anxiety and depression.   ·  Plan: -psych following, c/w Klonopin 0.5 mg po BID, Remeron 15 mg po QHS, Lyrica 25 mg po TID  -Gabapentin 100mg PRN for anxiety as per psych.  - Further medical management as for psychiatry     Problem/Plan - 4:  ·  Problem: HTN (hypertension).   ·  Plan: -c/w metoprolol. Hold losartan for now given c/f orthostatic hypotension.     Problem/Plan - 5:  ·  Problem: Need for prophylactic measure.   ·  Plan: DVT ppx: lovenox.    Patient overall medically cleared for transfer to psychiatric facility      73y F w/ PMHx significant for anxiety, depression, HTN, and HLD presented for dizziness admitted for neuro eval, c/b suicidal ideation     Hospital course:   Dizziness.   -Pt with new onset dizziness for 1 week, worse with changes in position. Associated dysequilibrium.   -CTA H/N: Intracranial atherosclerosis cavernous and clinoid segments of the internal carotid arteries, mild to   moderate.  -appreciate neuro Follow-up: sign off the case  - Orthostatic Negative  - MRI Negative as above  - Possible vertigo, improving with   meclizine PRN > encouraged to continue   - Increase Oral hydration > doing it   - TTE as above   -PT / Out of bed as able daily  - psych follow up     Problem/Plan - 2:  ·  Problem: Suicidal ideation.   ·  Plan: -pt with history of severe MDD, treated with ECT in the past   >>  for inpatient psych placement      Problem/Plan - 3:  ·  Problem: Anxiety and depression.   ·  Plan: -psych following, c/w Klonopin 0.5 mg po BID, Remeron 15 mg po QHS, Lyrica 25 mg po TID  -Gabapentin 100mg PRN for anxiety as per psych.  - Further medical management as for psychiatry     Problem/Plan - 4:  ·  Problem: HTN (hypertension).   ·  Plan: -c/w metoprolol. Hold losartan for now given c/f orthostatic hypotension.      Patient overall medically cleared for transfer to psychiatric facility

## 2024-04-09 NOTE — DISCHARGE NOTE PROVIDER - NSDCCAREPROVSEEN_GEN_ALL_CORE_FT
Tam Paige Hoorbod Delshadashish  Hoorbod Delshadashish  Hoorbod Delshadfar  Branden Hernandez  User ADM  Ordering Physician  Herb Fragoso  Team [facility] Adult  Team [facility] ACP      [ Greater than 35 min spent for discharge services.   RADHA Paige ]       ( Note written / Date of service is the same as last day of patient stay  in the hospital ( for billing purposes)))

## 2024-04-09 NOTE — BH CONSULTATION LIAISON PROGRESS NOTE - NSBHFUPINTERVALHXFT_PSY_A_CORE
Chart, labs, imagine reviewed. Case discussed with the primary team. Patient seen at bedside.  PRN haldol 0.5 mg once.  Patient continues to feel anxious. Sleep and appetite wnl. Denies SI/HI/I/P, identifies reasons for living. No AVH. Continues to endorse feeling " not right" in her head, but was able to walk with a walker today. Pt reported that she needs someone with her at all times, " to make sure I won't fall". None

## 2024-04-09 NOTE — DISCHARGE NOTE PROVIDER - NSDCMRMEDTOKEN_GEN_ALL_CORE_FT
atorvastatin 10 mg oral tablet: 1 tab(s) orally once a day (at bedtime)  clonazePAM 0.5 mg oral tablet: 1 tab(s) orally 2 times a day  gabapentin 100 mg oral capsule: 1 cap(s) orally 3 times a day As needed anxiety  haloperidol 0.5 mg oral tablet: 1 tab(s) orally every 6 hours As needed anxiety  losartan 100 mg oral tablet: 1 tab(s) orally once a day  meclizine 12.5 mg oral tablet: 1 tab(s) orally every 8 hours As needed Dizziness  metoprolol tartrate 50 mg oral tablet: 1 tab(s) orally 2 times a day  mirtazapine 7.5 mg oral tablet: 2 tab(s) orally once a day (at bedtime)  Multiple Vitamins oral tablet: 1 tab(s) orally once a day  nortriptyline 25 mg oral capsule: 1 cap(s) orally once a day  ocular lubricant ophthalmic solution: 1 drop(s) to each affected eye every 8 hours As needed dry eye  pantoprazole 40 mg oral delayed release tablet: 1 tab(s) orally once a day  polyethylene glycol 3350 oral powder for reconstitution: 17 gram(s) orally 2 times a day As needed constipation  pregabalin 25 mg oral capsule: 1 cap(s) orally 3 times a day   atorvastatin 10 mg oral tablet: 1 tab(s) orally once a day (at bedtime)  clonazePAM 0.5 mg oral tablet: 1 tab(s) orally 2 times a day  gabapentin 100 mg oral capsule: 1 cap(s) orally 3 times a day As needed anxiety  haloperidol 0.5 mg oral tablet: 1 tab(s) orally every 6 hours As needed agitation  hydrOXYzine hydrochloride 25 mg oral tablet: 1 tab(s) orally 3 times a day As needed Anxiety  meclizine 12.5 mg oral tablet: 1 tab(s) orally every 8 hours As needed Dizziness  metoprolol tartrate 50 mg oral tablet: 1 tab(s) orally 2 times a day  mirtazapine 7.5 mg oral tablet: 2 tab(s) orally once a day (at bedtime)  Multiple Vitamins oral tablet: 1 tab(s) orally once a day  nortriptyline 25 mg oral capsule: 1 cap(s) orally once a day  ocular lubricant ophthalmic solution: 1 drop(s) to each affected eye every 8 hours As needed dry eye  ondansetron 4 mg oral tablet, disintegratin tab(s) orally every 8 hours As needed Nausea and/or Vomiting  pantoprazole 40 mg oral delayed release tablet: 1 tab(s) orally once a day  polyethylene glycol 3350 oral powder for reconstitution: 17 gram(s) orally 2 times a day As needed constipation  pregabalin 25 mg oral capsule: 1 cap(s) orally 3 times a day

## 2024-04-09 NOTE — DISCHARGE NOTE PROVIDER - NSDCCPCAREPLAN_GEN_ALL_CORE_FT
PRINCIPAL DISCHARGE DIAGNOSIS  Diagnosis: Dizziness  Assessment and Plan of Treatment: Cardiac work up essentially unrevealing. No evidence of acute coronary syndrome or CHF  CT angiogram of the Head and Neck noted with mild to mod intracranial atherosclerosis. Continue atorvastatin 10 mg at bedtime.   Follow up post discharge from [facility] for Outpatient Vestibular Therapy.      SECONDARY DISCHARGE DIAGNOSES  Diagnosis: Anxiety and depression  Assessment and Plan of Treatment: Please follow up with your primary care and psychiatrist within in 1-2 weeks for further medical management.  Call your psychiatrist immediately if you feel suicidal or if you need to talk to someone.  Exercise 30 minutes daily as tolerated.      Diagnosis: HTN (hypertension)  Assessment and Plan of Treatment: .Continue blood pressure medication regimen as directed. Monitor for any visual changes, headaches or dizziness.  Monitor blood pressure regularly.  Follow up with your primary care provider for further management for high blood pressure.      Diagnosis: Suicidal ideation  Assessment and Plan of Treatment: Passive SI early on in admission. Patient currently denies suicidal or homicidal ideations, intent or a plan. No psychotic symptoms.

## 2024-04-09 NOTE — BH CONSULTATION LIAISON PROGRESS NOTE - NSBHASSESSMENTFT_PSY_ALL_CORE
Patient is a 73 yr old woman ,  , domiciled, and retired, with background hx of MDD and ANSON; has multiple psych admissions (most recently Jan 2024 [facility]), had prior SA by overdosing on pills (11/2019) following death of parents,  has no reported hx of NSSIB, currently treated by outpatient psychiatrist Dr. Ramirez, she denied using any illicit substance use including alcohol, pmh of HTN, hyperlipidemia, and GERD, brought in by  for anxiety, depression, somatic symptoms, SI. Admitted to Medicine for dizziness work up.      4/5: Patient reports severe symptoms of anxiety and depression, +passive SI with recent active SI with plans. Denies current SI/HI/AVH.  Will monitor/follow and adjust the doses given dizziness, concerns for polypharmacy.     4/6: Severe anxiety, denies SI/HI. Endorses dizziness/lightheaded.   04/08: remains anxious, no SI/HI. Not medically cleared yet. Receives PRNs  4/9: no significant changes, Received PRN. PT consult pending.    Plan:  -Observation status: CO 1:1 for passive/active SI  -CONTINUE Klonopin 0.5mg PO BID, Remeron 15mg PO QHS, Lyrica 25mg PO TID, Pamelor 25mg PO bedtime.   -Obtain orthostatics  -Order EKG  -Ongoing medical work up  -Haldol 0.5mg Po/IM q6h prn for agitation  -Hold antipsychotics if qtc >500  -Obtain collateral Dr. Valentina Bailey; inpatient psych voluntary if patient is amendable, when medically stable   Will follow

## 2024-04-09 NOTE — PROGRESS NOTE ADULT - ASSESSMENT
_________________________________________________________________________________________  ========>>  M E D I C A L   A T T E N D I N G    F O L L O W  U P  N O T E  <<=========  -----------------------------------------------------------------------------------------------------    - Patient seen and examined by me earlier today.   - Patient today overall doing better, comfortable, eating OK.  patient feels Less dizzy in general // occasional , able to ambulate back and forth to the bathroom and in  the hallways with walker..      eating well. no other c.o     ==================>> REVIEW OF SYSTEM <<=================    GEN: no fever, no chills, no pain  RESP: no SOB, no cough, no sputum  CVS: no chest pain, no palpitations  GI: no abdominal pain, no nausea  : no dysuria, no frequency  Neuro: no headache, dizziness Improved as above    a little anxious and shaky     ==================>> PHYSICAL EXAM <<=================    GEN: A&O X 3 , NAD , comfortable, pleasant, calm sitting on bed , eating   HEENT: NCAT, PERRL, MMM, hearing intact  CVS: S1S2 , regular , No M/R/G appreciated  PULM: CTA B/L,  no W/R/R appreciated  ABD.: soft. non tender, non distended,  bowel sounds present  Extrem: intact pulses , no edema        ( Note written / Date of service 04-09-24 ( This is certified to be the same as "ENTERED" date above ( for billing purposes)))    ==================>> MEDICATIONS <<====================    atorvastatin 10 milliGRAM(s) Oral at bedtime  chlorhexidine 2% Cloths 1 Application(s) Topical daily  clonazePAM  Tablet 0.5 milliGRAM(s) Oral two times a day  enoxaparin Injectable 40 milliGRAM(s) SubCutaneous every 24 hours  lidocaine   4% Patch 1 Patch Transdermal once  metoprolol tartrate 50 milliGRAM(s) Oral two times a day  mirtazapine 15 milliGRAM(s) Oral at bedtime  multivitamin 1 Tablet(s) Oral daily  nortriptyline 25 milliGRAM(s) Oral at bedtime  pantoprazole    Tablet 40 milliGRAM(s) Oral before breakfast  pregabalin 25 milliGRAM(s) Oral three times a day    MEDICATIONS  (PRN):  acetaminophen     Tablet .. 650 milliGRAM(s) Oral every 6 hours PRN Temp greater or equal to 38C (100.4F), Mild Pain (1 - 3)  aluminum hydroxide/magnesium hydroxide/simethicone Suspension 30 milliLiter(s) Oral every 4 hours PRN Dyspepsia  bisacodyl Suppository 10 milliGRAM(s) Rectal daily PRN Constipation  gabapentin 100 milliGRAM(s) Oral three times a day PRN anxiety  haloperidol     Tablet 0.5 milliGRAM(s) Oral every 6 hours PRN anxiety  meclizine 12.5 milliGRAM(s) Oral every 8 hours PRN Dizziness  melatonin 3 milliGRAM(s) Oral at bedtime PRN Insomnia  ondansetron Injectable 4 milliGRAM(s) IV Push every 8 hours PRN Nausea and/or Vomiting  polyethylene glycol 3350 17 Gram(s) Oral two times a day PRN constipation    ___________  Active diet:  Diet, Regular:   DASH/TLC Sodium & Cholesterol Restricted (DASH)  ___________________    ==================>> VITAL SIGNS <<==================    Vital Signs Last 24 HrsT(C): 36.5 (04-09-24 @ 12:17)  T(F): 97.7 (04-09-24 @ 12:17), Max: 98.4 (04-09-24 @ 05:30)  HR: 85 (04-09-24 @ 12:17) (81 - 93)  BP: 145/86 (04-09-24 @ 12:17)  RR: 18 (04-09-24 @ 12:17) (17 - 18)  SpO2: 97% (04-09-24 @ 12:17) (96% - 100%)       ==================>> LAB AND IMAGING <<==================                        14.5   8.39  )-----------( 210      ( 08 Apr 2024 06:30 )             45.1        04-08    144  |  107  |  8   ----------------------------<  111<H>  3.7   |  27  |  0.70    Ca    9.3      08 Apr 2024 06:30  Phos  4.3     04-08  Mg     2.10     04-08      WBC count:   8.39 <<== ,  7.11 <<== ,  9.10 <<== ,  8.17 <<==   Hemoglobin:   14.5 <<==,  13.9 <<==,  13.8 <<==,  15.1 <<==  platelets:  210 <==, 205 <==, 217 <==, 225 <==    Creatinine:  0.70  <<==, 0.72  <<==, 0.67  <<==, 0.70  <<==  Sodium:   144  <==, 141  <==, 139  <==, 144  <==      < from: MR Head No Cont (04.05.24 @ 12:30) >  IMPRESSION:   Unremarkable MR of the brain.  < end of copied text >    < from: TTE W or WO Ultrasound Enhancing Agent (04.05.24 @ 12:38) >  CONCLUSIONS:     1. Technically difficult image quality.   2. The left ventricular cavity is normal in size. Left ventricular wall thickness is normal. Left ventricular endocardium is not well visualized; however, the left ventricular systolic function appears grossly normal.   3. Normal right ventricular cavity size and normal systolic function.   4. Structurally normal mitral valve with normal leaflet excursion. There is calcification of the mitral valve annulus. There is trace mitral regurgitation.  < end of copied text >    ___________________________________________________________________________________  ===============>>  A S S E S S M E N T   A N D   P L A N <<===============  ------------------------------------------------------------------------------------------    · Assessment	  73y F w/ PMHx significant for anxiety, depression, HTN, and HLD presents for dizziness admitted for neuro eval, c/b suicidal ideation       Problem/Plan - 1:  ·  Problem: Dizziness.   ·  Plan: -Pt with new onset dizziness for 1 week, worse with changes in position. Associated dysequilibrium.   -CTA H/N: Intracranial atherosclerosis cavernous and clinoid segments of the internal carotid arteries, mild to   moderate.  -appreciate neuro Follow-up: sign off the case  orthostatic Negative  MRI Negative as above  Possible vertigo, improving with   meclizine PRN > encouraged to continue       increase Oral hydration > doing it   -TTE as above   Further medical management as per psychiatry given extensive history of anxiety and depression on multiple medications  -fall precautions    -PT / Out of bed as able daily  - psych follow up    Problem/Plan - 2:  ·  Problem: Suicidal ideation.   ·  Plan: -pt with history of severe MDD, treated with ECT in the past  -reporting SI  -appreciate psych recs, c/w 1:1.   >>  for inpatient psych placement     Problem/Plan - 3:  ·  Problem: Anxiety and depression.   ·  Plan: -psych following, c/w Klonopin 0.5 mg po BID, Remeron 15 mg po QHS, Lyrica 25 mg po TID  -Gabapentin 100mg PRN for anxiety as per psych.  - Further medical management as for psychiatry    Problem/Plan - 4:  ·  Problem: HTN (hypertension).   ·  Plan: -c/w metoprolol. Hold losartan for now given c/f orthostatic hypotension.    Problem/Plan - 5:  ·  Problem: Need for prophylactic measure.   ·  Plan: DVT ppx: lovenox.    Patient overall medically cleared for transfer to psychiatric facility     --------------------------------------------  Case discussed with patient, ACP   Education given on findings and plan of care  ___________________________  H. NATALIE Paige.  Pager: [phone]

## 2024-04-09 NOTE — DISCHARGE NOTE PROVIDER - CARE PROVIDER_API CALL
Mic Garcia)  Neurology  [address], [address]  [place] [address]  Phone: [phone]  Fax: [phone]  Follow Up Time: 2 weeks

## 2024-04-09 NOTE — BH CONSULTATION LIAISON PROGRESS NOTE - NSBHCHARTREVIEWLAB_PSY_A_CORE FT
14.5   8.39  )-----------( 210      ( 08 Apr 2024 06:30 )             45.1     04-08    144  |  107  |  8   ----------------------------<  111<H>  3.7   |  27  |  0.70    Ca    9.3      08 Apr 2024 06:30  Phos  4.3     04-08  Mg     2.10     04-08    
                      14.5   8.39  )-----------( 210      ( 08 Apr 2024 06:30 )             45.1     04-08    144  |  107  |  8   ----------------------------<  111<H>  3.7   |  27  |  0.70    Ca    9.3      08 Apr 2024 06:30  Phos  4.3     04-08  Mg     2.10     04-08

## 2024-04-10 PROCEDURE — 93010 ELECTROCARDIOGRAM REPORT: CPT

## 2024-04-10 RX ORDER — HALOPERIDOL DECANOATE 100 MG/ML
0.5 INJECTION INTRAMUSCULAR EVERY 6 HOURS
Refills: 0 | Status: DISCONTINUED | OUTPATIENT
Start: 2024-04-10 | End: 2024-04-11

## 2024-04-10 RX ORDER — CLONAZEPAM 1 MG
0.5 TABLET ORAL
Refills: 0 | Status: DISCONTINUED | OUTPATIENT
Start: 2024-04-10 | End: 2024-04-16

## 2024-04-10 RX ORDER — HYDROXYZINE HCL 10 MG
25 TABLET ORAL THREE TIMES A DAY
Refills: 0 | Status: DISCONTINUED | OUTPATIENT
Start: 2024-04-10 | End: 2024-04-11

## 2024-04-10 RX ADMIN — HALOPERIDOL DECANOATE 0.5 MILLIGRAM(S): 100 INJECTION INTRAMUSCULAR at 10:42

## 2024-04-10 RX ADMIN — ENOXAPARIN SODIUM 40 MILLIGRAM(S): 100 INJECTION SUBCUTANEOUS at 05:38

## 2024-04-10 RX ADMIN — ATORVASTATIN CALCIUM 10 MILLIGRAM(S): 80 TABLET, FILM COATED ORAL at 21:35

## 2024-04-10 RX ADMIN — PANTOPRAZOLE SODIUM 40 MILLIGRAM(S): 20 TABLET, DELAYED RELEASE ORAL at 05:59

## 2024-04-10 RX ADMIN — Medication 50 MILLIGRAM(S): at 05:39

## 2024-04-10 RX ADMIN — Medication 1 TABLET(S): at 13:32

## 2024-04-10 RX ADMIN — Medication 25 MILLIGRAM(S): at 21:35

## 2024-04-10 RX ADMIN — Medication 0.5 MILLIGRAM(S): at 17:49

## 2024-04-10 RX ADMIN — NORTRIPTYLINE HYDROCHLORIDE 25 MILLIGRAM(S): 10 CAPSULE ORAL at 21:35

## 2024-04-10 RX ADMIN — Medication 25 MILLIGRAM(S): at 05:39

## 2024-04-10 RX ADMIN — CHLORHEXIDINE GLUCONATE 1 APPLICATION(S): 213 SOLUTION TOPICAL at 13:32

## 2024-04-10 RX ADMIN — Medication 50 MILLIGRAM(S): at 17:49

## 2024-04-10 RX ADMIN — Medication 25 MILLIGRAM(S): at 21:34

## 2024-04-10 RX ADMIN — Medication 0.5 MILLIGRAM(S): at 05:38

## 2024-04-10 RX ADMIN — Medication 25 MILLIGRAM(S): at 13:31

## 2024-04-10 RX ADMIN — Medication 12.5 MILLIGRAM(S): at 05:42

## 2024-04-10 RX ADMIN — MIRTAZAPINE 15 MILLIGRAM(S): 45 TABLET, ORALLY DISINTEGRATING ORAL at 21:35

## 2024-04-10 NOTE — PROGRESS NOTE ADULT - ASSESSMENT
_________________________________________________________________________________________  ========>>  M E D I C A L   A T T E N D I N G    F O L L O W  U P  N O T E  <<=========  -----------------------------------------------------------------------------------------------------    - Patient seen and examined by me earlier today.   - Patient today overall doing better, comfortable, eating OK.  patient feels Less dizzy in general // occasional , able to ambulate back and forth to the bathroom and in  the hallways with walker..      eating well. no other c.o     ==================>> REVIEW OF SYSTEM <<=================    GEN: no fever, no chills, no pain  RESP: no SOB, no cough, no sputum  CVS: no chest pain, no palpitations  GI: no abdominal pain, no nausea  : no dysuria, no frequency  Neuro: no headache, dizziness Improved as above    a little anxious and shaky     ==================>> PHYSICAL EXAM <<=================    GEN: A&O X 3 , NAD , comfortable, pleasant, calm sitting on bed , eating   HEENT: NCAT, PERRL, MMM, hearing intact  CVS: S1S2 , regular , No M/R/G appreciated  PULM: CTA B/L,  no W/R/R appreciated  ABD.: soft. non tender, non distended,  bowel sounds present  Extrem: intact pulses , no edema          ( Note written / Date of service 04-10-24 ( This is certified to be the same as "ENTERED" date above ( for billing purposes)))    ==================>> MEDICATIONS <<====================    atorvastatin 10 milliGRAM(s) Oral at bedtime  chlorhexidine 2% Cloths 1 Application(s) Topical daily  clonazePAM  Tablet 0.5 milliGRAM(s) Oral two times a day  enoxaparin Injectable 40 milliGRAM(s) SubCutaneous every 24 hours  lidocaine   4% Patch 1 Patch Transdermal once  metoprolol tartrate 50 milliGRAM(s) Oral two times a day  mirtazapine 15 milliGRAM(s) Oral at bedtime  multivitamin 1 Tablet(s) Oral daily  nortriptyline 25 milliGRAM(s) Oral at bedtime  pantoprazole    Tablet 40 milliGRAM(s) Oral before breakfast  pregabalin 25 milliGRAM(s) Oral three times a day    MEDICATIONS  (PRN):  acetaminophen     Tablet .. 650 milliGRAM(s) Oral every 6 hours PRN Temp greater or equal to 38C (100.4F), Mild Pain (1 - 3)  aluminum hydroxide/magnesium hydroxide/simethicone Suspension 30 milliLiter(s) Oral every 4 hours PRN Dyspepsia  bisacodyl Suppository 10 milliGRAM(s) Rectal daily PRN Constipation  gabapentin 100 milliGRAM(s) Oral three times a day PRN anxiety  hydrOXYzine hydrochloride 25 milliGRAM(s) Oral three times a day PRN Anxiety  meclizine 12.5 milliGRAM(s) Oral every 8 hours PRN Dizziness  melatonin 3 milliGRAM(s) Oral at bedtime PRN Insomnia  ondansetron Injectable 4 milliGRAM(s) IV Push every 8 hours PRN Nausea and/or Vomiting  polyethylene glycol 3350 17 Gram(s) Oral two times a day PRN constipation    ___________  Active diet:  Diet, Regular:   DASH/TLC Sodium & Cholesterol Restricted (DASH)  ___________________    ==================>> VITAL SIGNS <<==================    Vital Signs Last 24 HrsT(C): 36.8 (04-10-24 @ 12:00)  T(F): 98.3 (04-10-24 @ 12:00), Max: 98.9 (04-09-24 @ 17:58)  HR: 81 (04-10-24 @ 12:00) (73 - 94)  BP: 149/82 (04-10-24 @ 12:00)  RR: 18 (04-10-24 @ 12:00) (18 - 18)  SpO2: 98% (04-10-24 @ 12:00) (97% - 100%)      CAPILLARY BLOOD GLUCOSE         ==================>> LAB AND IMAGING <<==================             WBC count:   8.39 <<== ,  7.11 <<== ,  9.10 <<==   Hemoglobin:   14.5 <<==,  13.9 <<==,  13.8 <<==  platelets:  210 <==, 205 <==, 217 <==, 225 <==    Creatinine:  0.70  <<==, 0.72  <<==, 0.67  <<==, 0.70  <<==  Sodium:   144  <==, 141  <==, 139  <==, 144  <==       AST:          21(04-03) <==      ALT:        13(04-03)  <==      AP:        117(04-03)  <=     Bili:        0.5(04-03)  <=    ____________________________    M I C R O B I O L O G Y :      COVID-19 PCR: NotDetec (04-09-24 @ 10:33)  COVID-19 PCR: NotDetec (04-03-24 @ 21:09)        < from: MR Head No Cont (04.05.24 @ 12:30) >  IMPRESSION:   Unremarkable MR of the brain.  < end of copied text >    < from: TTE W or WO Ultrasound Enhancing Agent (04.05.24 @ 12:38) >  CONCLUSIONS:     1. Technically difficult image quality.   2. The left ventricular cavity is normal in size. Left ventricular wall thickness is normal. Left ventricular endocardium is not well visualized; however, the left ventricular systolic function appears grossly normal.   3. Normal right ventricular cavity size and normal systolic function.   4. Structurally normal mitral valve with normal leaflet excursion. There is calcification of the mitral valve annulus. There is trace mitral regurgitation.  < end of copied text >    ___________________________________________________________________________________  ===============>>  A S S E S S M E N T   A N D   P L A N <<===============  ------------------------------------------------------------------------------------------    · Assessment	  73y F w/ PMHx significant for anxiety, depression, HTN, and HLD presents for dizziness admitted for neuro eval, c/b suicidal ideation       Problem/Plan - 1:  ·  Problem: Dizziness.   ·  Plan: -Pt with new onset dizziness for 1 week, worse with changes in position. Associated dysequilibrium.   -CTA H/N: Intracranial atherosclerosis cavernous and clinoid segments of the internal carotid arteries, mild to   moderate.  -appreciate neuro Follow-up: sign off the case  orthostatic Negative  MRI Negative as above  Possible vertigo, improving with   meclizine PRN > encouraged to continue       increase Oral hydration > doing it   -TTE as above   Further medical management as per psychiatry given extensive history of anxiety and depression on multiple medications  -fall precautions    -PT / Out of bed as able daily  - psych follow up    Problem/Plan - 2:  ·  Problem: Suicidal ideation.   ·  Plan: -pt with history of severe MDD, treated with ECT in the past  -reporting SI  -appreciate psych recs, c/w 1:1.   >>  for inpatient psych placement     Problem/Plan - 3:  ·  Problem: Anxiety and depression.   ·  Plan: -psych following, c/w Klonopin 0.5 mg po BID, Remeron 15 mg po QHS, Lyrica 25 mg po TID  -Gabapentin 100mg PRN for anxiety as per psych.  - Further medical management as for psychiatry    Problem/Plan - 4:  ·  Problem: HTN (hypertension).   ·  Plan: -c/w metoprolol. Hold losartan for now given c/f orthostatic hypotension.    Problem/Plan - 5:  ·  Problem: Need for prophylactic measure.   ·  Plan: DVT ppx: lovenox.    Patient overall medically cleared for transfer to psychiatric facility     --------------------------------------------  Case discussed with patient, ACP   Education given on findings and plan of care  ___________________________  DEB. NATALIE Paige.  Pager: [phone]     _________________________________________________________________________________________  ========>>  M E D I C A L   A T T E N D I N G    F O L L O W  U P  N O T E  <<=========  -----------------------------------------------------------------------------------------------------    - Patient seen and examined by me earlier today.   - Patient today overall doing better, comfortable, eating OK.  patient With some headaches today in the back of the head     eating well Otherwise. no other c.o     ==================>> REVIEW OF SYSTEM <<=================    GEN: no fever, no chills, As above  RESP: no SOB, no cough, no sputum  CVS: no chest pain, no palpitations  GI: no abdominal pain, no nausea  : no dysuria, no frequency  Neuro: Occasional headache, dizziness Improved     ==================>> PHYSICAL EXAM <<=================    GEN: A&O X 3 , NAD , comfortable, pleasant, calm , In chair  HEENT: NCAT, PERRL, MMM, hearing intact  CVS: S1S2 , regular , No M/R/G appreciated  PULM: CTA B/L,  no W/R/R appreciated  ABD.: soft. non tender, non distended,  Obese  Extrem: intact pulses , no edema          ( Note written / Date of service 04-10-24 ( This is certified to be the same as "ENTERED" date above ( for billing purposes)))    ==================>> MEDICATIONS <<====================    atorvastatin 10 milliGRAM(s) Oral at bedtime  chlorhexidine 2% Cloths 1 Application(s) Topical daily  clonazePAM  Tablet 0.5 milliGRAM(s) Oral two times a day  enoxaparin Injectable 40 milliGRAM(s) SubCutaneous every 24 hours  lidocaine   4% Patch 1 Patch Transdermal once  metoprolol tartrate 50 milliGRAM(s) Oral two times a day  mirtazapine 15 milliGRAM(s) Oral at bedtime  multivitamin 1 Tablet(s) Oral daily  nortriptyline 25 milliGRAM(s) Oral at bedtime  pantoprazole    Tablet 40 milliGRAM(s) Oral before breakfast  pregabalin 25 milliGRAM(s) Oral three times a day    MEDICATIONS  (PRN):  acetaminophen     Tablet .. 650 milliGRAM(s) Oral every 6 hours PRN Temp greater or equal to 38C (100.4F), Mild Pain (1 - 3)  aluminum hydroxide/magnesium hydroxide/simethicone Suspension 30 milliLiter(s) Oral every 4 hours PRN Dyspepsia  bisacodyl Suppository 10 milliGRAM(s) Rectal daily PRN Constipation  gabapentin 100 milliGRAM(s) Oral three times a day PRN anxiety  hydrOXYzine hydrochloride 25 milliGRAM(s) Oral three times a day PRN Anxiety  meclizine 12.5 milliGRAM(s) Oral every 8 hours PRN Dizziness  melatonin 3 milliGRAM(s) Oral at bedtime PRN Insomnia  ondansetron Injectable 4 milliGRAM(s) IV Push every 8 hours PRN Nausea and/or Vomiting  polyethylene glycol 3350 17 Gram(s) Oral two times a day PRN constipation    ___________  Active diet:  Diet, Regular:   DASH/TLC Sodium & Cholesterol Restricted (DASH)  ___________________    ==================>> VITAL SIGNS <<==================    Vital Signs Last 24 HrsT(C): 36.8 (04-10-24 @ 12:00)  T(F): 98.3 (04-10-24 @ 12:00), Max: 98.9 (04-09-24 @ 17:58)  HR: 81 (04-10-24 @ 12:00) (73 - 94)  BP: 149/82 (04-10-24 @ 12:00)  RR: 18 (04-10-24 @ 12:00) (18 - 18)  SpO2: 98% (04-10-24 @ 12:00) (97% - 100%)         ==================>> LAB AND IMAGING <<==================       No labs today      WBC count:   8.39 <<== ,  7.11 <<== ,  9.10 <<==   Hemoglobin:   14.5 <<==,  13.9 <<==,  13.8 <<==  platelets:  210 <==, 205 <==, 217 <==, 225 <==    Creatinine:  0.70  <<==, 0.72  <<==, 0.67  <<==, 0.70  <<==  odium:   144  <==, 141  <==, 139  <==, 144  <==      ____________________________    M I C R O B I O L O G Y :      COVID-19 PCR: NotDetec (04-09-24 @ 10:33)  COVID-19 PCR: NotDetec (04-03-24 @ 21:09)        < from: MR Head No Cont (04.05.24 @ 12:30) >  IMPRESSION:   Unremarkable MR of the brain.  < end of copied text >    < from: TTE W or WO Ultrasound Enhancing Agent (04.05.24 @ 12:38) >  CONCLUSIONS:     1. Technically difficult image quality.   2. The left ventricular cavity is normal in size. Left ventricular wall thickness is normal. Left ventricular endocardium is not well visualized; however, the left ventricular systolic function appears grossly normal.   3. Normal right ventricular cavity size and normal systolic function.   4. Structurally normal mitral valve with normal leaflet excursion. There is calcification of the mitral valve annulus. There is trace mitral regurgitation.  < end of copied text >    ___________________________________________________________________________________  ===============>>  A S S E S S M E N T   A N D   P L A N <<===============  ------------------------------------------------------------------------------------------    · Assessment	  73y F w/ PMHx significant for anxiety, depression, HTN, and HLD presents for dizziness admitted for neuro eval, c/b suicidal ideation       Problem/Plan - 1:  ·  Problem: Dizziness.   ·  Plan: -Pt with new onset dizziness for 1 week, worse with changes in position. Associated dysequilibrium.   -CTA H/N: Intracranial atherosclerosis cavernous and clinoid segments of the internal carotid arteries, mild to   moderate.  -appreciate neuro Follow-up: sign off the case  orthostatic Negative  MRI Negative as above  Possible vertigo, improving with   meclizine PRN > encouraged to continue       increase Oral hydration > doing it   -TTE as above   Further medical management as per psychiatry given extensive history of anxiety and depression on multiple medications  -fall precautions    -PT / Out of bed as able daily  - psych follow up    Problem/Plan - 2:  ·  Problem: Suicidal ideation.   ·  Plan: -pt with history of severe MDD, treated with ECT in the past  -reporting SI  -appreciate psych recs, c/w 1:1.   >>  for inpatient psych placement     Problem/Plan - 3:  ·  Problem: Anxiety and depression.   ·  Plan: -psych following, c/w Klonopin 0.5 mg po BID, Remeron 15 mg po QHS, Lyrica 25 mg po TID  -Gabapentin 100mg PRN for anxiety as per psych.  - Further medical management as for psychiatry    Problem/Plan - 4:  ·  Problem: HTN (hypertension).   ·  Plan: -c/w metoprolol. Hold losartan for now given c/f orthostatic hypotension.    Problem/Plan - 5:  ·  Problem: Need for prophylactic measure.   ·  Plan: DVT ppx: lovenox.    Patient overall medically cleared for transfer to psychiatric facility     --------------------------------------------  Case discussed with patient, ACP   Education given on findings and plan of care  ___________________________  H. NATALIE Paige.  Pager: [phone]

## 2024-04-10 NOTE — BH CONSULTATION LIAISON PROGRESS NOTE - NSBHFUPINTERVALHXFT_PSY_A_CORE
Chart, labs, imagine reviewed. Case discussed with the primary team. Patient seen at bedside.  PRN haldol 0.5 mg once.  Patient continues to feel anxious, but " a little better". Sleep and appetite wnl. Denies SI/HI/I/P, identifies reasons for living. No Denied auditory or visual hallucinations.. Pt reported having side effects from antipsychotics ( quetiapine, Abilify)- pain in legs. Able to tolerate haldol  in the given dose, reported relief from anxiety. Chart, labs, imagine reviewed. Case discussed with the primary team. Patient seen at bedside.  PRN haldol 0.5 mg once.  Patient continues to feel anxious, but " a little better". Sleep and appetite wnl. Denies SI/HI/I/P, identifies reasons for living. No Denied auditory or visual hallucinations.. Pt reported having side effects from antipsychotics ( quetiapine, Abilify)- pain in legs. Able to tolerate haldol  in the given dose, reported relief from anxiety. Later the writer was informed by the ACP that the patient was not feeling good after taking haldol and is willing to switch to atarax.

## 2024-04-10 NOTE — BH CONSULTATION LIAISON PROGRESS NOTE - NSBHASSESSMENTFT_PSY_ALL_CORE
Patient is a 73 yr old woman ,  , domiciled, and retired, with background hx of MDD and ANSON; has multiple psych admissions (most recently Jan 2024 on [facility]), had prior SA by overdosing on pills (11/2019) following death of parents,  has no reported hx of NSSIB, currently treated by outpatient psychiatrist Dr. Ramirez, she denied using any illicit substance use including alcohol, pmh of HTN, hyperlipidemia, and GERD, brought in by  for anxiety, depression, somatic symptoms, SI. Admitted to Medicine for dizziness work up.      4/5: Patient reports severe symptoms of anxiety and depression, +passive SI with recent active SI with plans. Denies current SI/HI/AVH.  Will monitor/follow and adjust the doses given dizziness, concerns for polypharmacy.     4/6: Severe anxiety, denies SI/HI. Endorses dizziness/lightheaded.   04/08: remains anxious, no SI/HI. Not medically cleared yet. Receives PRNs  4/9: no significant changes, Received PRN. PT consult pending.  4/10: slight improvement in anxiety, however, continues to take daily PRNs, waiting for PT clearance.    Plan:  -Observation status: CO 1:1 for passive/active SI  -CONTINUE Klonopin 0.5mg PO BID, Remeron 15mg PO QHS, Lyrica 25mg PO TID, Pamelor 25mg PO bedtime.   -Obtain orthostatics  -Order EKG  -Ongoing medical work up  -Haldol 0.5mg Po/IM q6h prn for agitation  -Hold antipsychotics if qtc >500  -Obtain collateral Dr. Montgomery  -Betty; inpatient psych voluntary if patient is amendable, when medically stable   Will follow Patient is a 73 yr old woman ,  , domiciled, and retired, with background hx of MDD and ANSON; has multiple psych admissions (most recently Jan 2024 [facility]), had prior SA by overdosing on pills (11/2019) following death of parents,  has no reported hx of NSSIB, currently treated by outpatient psychiatrist Dr. Ramirez, she denied using any illicit substance use including alcohol, pmh of HTN, hyperlipidemia, and GERD, brought in by  for anxiety, depression, somatic symptoms, SI. Admitted to Medicine for dizziness work up.      4/5: Patient reports severe symptoms of anxiety and depression, +passive SI with recent active SI with plans. Denies current SI/HI/AVH.  Will monitor/follow and adjust the doses given dizziness, concerns for polypharmacy.     4/6: Severe anxiety, denies SI/HI. Endorses dizziness/lightheaded.   04/08: remains anxious, no SI/HI. Not medically cleared yet. Receives PRNs  4/9: no significant changes, Received PRN. PT consult pending.  4/10: slight improvement in anxiety, however, continues to take daily PRNs, waiting for PT clearance.    Plan:  -Observation status: CO 1:1 for passive/active SI  -CONTINUE Klonopin 0.5mg PO BID, Remeron 15mg PO QHS, Lyrica 25mg PO TID, Pamelor 25mg PO bedtime.   -Obtain orthostatics  -Order EKG  -Ongoing medical work up  - Haldol 0.5mg Po/IM q6h prn for agitation  -PRN for anxiety: Atarax 25 mg TID.  -Hold antipsychotics if qtc >500  -Obtain collateral Dr. Montgomery  -Betty; inpatient psych voluntary if patient is amendable, when medically stable   Will follow

## 2024-04-11 RX ORDER — HYDROXYZINE HCL 10 MG
25 TABLET ORAL THREE TIMES A DAY
Refills: 0 | Status: DISCONTINUED | OUTPATIENT
Start: 2024-04-11 | End: 2024-04-16

## 2024-04-11 RX ORDER — HALOPERIDOL DECANOATE 100 MG/ML
0.5 INJECTION INTRAMUSCULAR EVERY 6 HOURS
Refills: 0 | Status: DISCONTINUED | OUTPATIENT
Start: 2024-04-11 | End: 2024-04-16

## 2024-04-11 RX ADMIN — Medication 1 TABLET(S): at 18:18

## 2024-04-11 RX ADMIN — Medication 0.5 MILLIGRAM(S): at 18:32

## 2024-04-11 RX ADMIN — Medication 25 MILLIGRAM(S): at 22:07

## 2024-04-11 RX ADMIN — Medication 25 MILLIGRAM(S): at 15:41

## 2024-04-11 RX ADMIN — Medication 0.5 MILLIGRAM(S): at 06:23

## 2024-04-11 RX ADMIN — Medication 650 MILLIGRAM(S): at 18:15

## 2024-04-11 RX ADMIN — PANTOPRAZOLE SODIUM 40 MILLIGRAM(S): 20 TABLET, DELAYED RELEASE ORAL at 06:23

## 2024-04-11 RX ADMIN — Medication 3 MILLIGRAM(S): at 22:08

## 2024-04-11 RX ADMIN — Medication 50 MILLIGRAM(S): at 18:31

## 2024-04-11 RX ADMIN — Medication 12.5 MILLIGRAM(S): at 14:15

## 2024-04-11 RX ADMIN — MIRTAZAPINE 15 MILLIGRAM(S): 45 TABLET, ORALLY DISINTEGRATING ORAL at 22:09

## 2024-04-11 RX ADMIN — NORTRIPTYLINE HYDROCHLORIDE 25 MILLIGRAM(S): 10 CAPSULE ORAL at 22:11

## 2024-04-11 RX ADMIN — ENOXAPARIN SODIUM 40 MILLIGRAM(S): 100 INJECTION SUBCUTANEOUS at 06:23

## 2024-04-11 RX ADMIN — Medication 50 MILLIGRAM(S): at 06:23

## 2024-04-11 RX ADMIN — ATORVASTATIN CALCIUM 10 MILLIGRAM(S): 80 TABLET, FILM COATED ORAL at 22:05

## 2024-04-11 NOTE — PROGRESS NOTE ADULT - ASSESSMENT
_________________________________________________________________________________________  ========>>  M E D I C A L   A T T E N D I N G    F O L L O W  U P  N O T E  <<=========  -----------------------------------------------------------------------------------------------------    - Patient seen and examined by me earlier today.   - Patient today overall doing better, comfortable, eating OK.  patient ambulating independently back and forth to bathroom..      eating well Otherwise. no other c.o     ==================>> REVIEW OF SYSTEM <<=================    GEN: no fever, no chills, As above  RESP: no SOB, no cough, no sputum  CVS: no chest pain, no palpitations  GI: no abdominal pain, no nausea  : no dysuria, no frequency  Neuro: Occasional headache, dizziness Improved     ==================>> PHYSICAL EXAM <<=================    GEN: A&O X 3 , NAD , comfortable, pleasant, calm , In chair  HEENT: NCAT, PERRL, MMM, hearing intact  CVS: S1S2 , regular , No M/R/G appreciated  PULM: CTA B/L,  no W/R/R appreciated  ABD.: soft. non tender, non distended,  Obese  Extrem: intact pulses , no edema        ( Note written / Date of service 04-11-24 ( This is certified to be the same as "ENTERED" date above ( for billing purposes)))    ==================>> MEDICATIONS <<====================    atorvastatin 10 milliGRAM(s) Oral at bedtime  chlorhexidine 2% Cloths 1 Application(s) Topical daily  clonazePAM  Tablet 0.5 milliGRAM(s) Oral two times a day  enoxaparin Injectable 40 milliGRAM(s) SubCutaneous every 24 hours  lidocaine   4% Patch 1 Patch Transdermal once  metoprolol tartrate 50 milliGRAM(s) Oral two times a day  mirtazapine 15 milliGRAM(s) Oral at bedtime  multivitamin 1 Tablet(s) Oral daily  nortriptyline 25 milliGRAM(s) Oral at bedtime  pantoprazole    Tablet 40 milliGRAM(s) Oral before breakfast    MEDICATIONS  (PRN):  acetaminophen     Tablet .. 650 milliGRAM(s) Oral every 6 hours PRN Temp greater or equal to 38C (100.4F), Mild Pain (1 - 3)  aluminum hydroxide/magnesium hydroxide/simethicone Suspension 30 milliLiter(s) Oral every 4 hours PRN Dyspepsia  bisacodyl Suppository 10 milliGRAM(s) Rectal daily PRN Constipation  gabapentin 100 milliGRAM(s) Oral three times a day PRN anxiety  haloperidol     Tablet 0.5 milliGRAM(s) Oral every 6 hours PRN agitation  hydrOXYzine hydrochloride 25 milliGRAM(s) Oral three times a day PRN Anxiety  meclizine 12.5 milliGRAM(s) Oral every 8 hours PRN Dizziness  melatonin 3 milliGRAM(s) Oral at bedtime PRN Insomnia  ondansetron Injectable 4 milliGRAM(s) IV Push every 8 hours PRN Nausea and/or Vomiting  polyethylene glycol 3350 17 Gram(s) Oral two times a day PRN constipation    ___________  Active diet:  Diet, Regular:   DASH/TLC Sodium & Cholesterol Restricted (DASH)  ___________________    ==================>> VITAL SIGNS <<==================    Vital Signs Last 24 HrsT(C): 36.7 (04-11-24 @ 11:40)  T(F): 98 (04-11-24 @ 11:40), Max: 98.1 (04-10-24 @ 17:49)  HR: 75 (04-11-24 @ 11:40) (68 - 90)  BP: 133/76 (04-11-24 @ 11:40)  RR: 18 (04-11-24 @ 11:40) (15 - 19)  SpO2: 97% (04-11-24 @ 11:40) (95% - 97%)       ==================>> LAB AND IMAGING <<==================       no labs today       < from: MR Head No Cont (04.05.24 @ 12:30) >  IMPRESSION:   Unremarkable MR of the brain.  < end of copied text >    < from: TTE W or WO Ultrasound Enhancing Agent (04.05.24 @ 12:38) >  CONCLUSIONS:     1. Technically difficult image quality.   2. The left ventricular cavity is normal in size. Left ventricular wall thickness is normal. Left ventricular endocardium is not well visualized; however, the left ventricular systolic function appears grossly normal.   3. Normal right ventricular cavity size and normal systolic function.   4. Structurally normal mitral valve with normal leaflet excursion. There is calcification of the mitral valve annulus. There is trace mitral regurgitation.  < end of copied text >    ___________________________________________________________________________________  ===============>>  A S S E S S M E N T   A N D   P L A N <<===============  ------------------------------------------------------------------------------------------    · Assessment	  73y F w/ PMHx significant for anxiety, depression, HTN, and HLD presents for dizziness admitted for neuro eval, c/b suicidal ideation       Problem/Plan - 1:  ·  Problem: Dizziness.   ·  Plan: -Pt with new onset dizziness for 1 week, worse with changes in position. Associated dysequilibrium.   -CTA H/N: Intracranial atherosclerosis cavernous and clinoid segments of the internal carotid arteries, mild to   moderate.  -appreciate neuro Follow-up: sign off the case  orthostatic Negative  MRI Negative as above  Possible vertigo, improving with   meclizine PRN > encouraged to continue       increase Oral hydration > doing it   -TTE as above   Further medical management as per psychiatry given extensive history of anxiety and depression on multiple medications  -fall precautions    -PT / Out of bed as able daily  - psych follow up    Problem/Plan - 2:  ·  Problem: Suicidal ideation.   ·  Plan: -pt with history of severe MDD, treated with ECT in the past  -reporting SI  -appreciate psych recs, c/w 1:1.   >>  for inpatient psych placement     Problem/Plan - 3:  ·  Problem: Anxiety and depression.   ·  Plan: -psych following, c/w Klonopin 0.5 mg po BID, Remeron 15 mg po QHS, Lyrica 25 mg po TID  -Gabapentin 100mg PRN for anxiety as per psych.  - Further medical management as for psychiatry    Problem/Plan - 4:  ·  Problem: HTN (hypertension).   ·  Plan: -c/w metoprolol. Hold losartan for now given c/f orthostatic hypotension.    Problem/Plan - 5:  ·  Problem: Need for prophylactic measure.   ·  Plan: DVT ppx: lovenox.    Patient overall medically cleared for transfer to psychiatric facility , pending bed availability     --------------------------------------------  Case discussed with patient,  at bedside..   Education given on findings and plan of care  ___________________________  H. NATALIE Paige.  Pager: [phone]

## 2024-04-11 NOTE — BH CONSULTATION LIAISON PROGRESS NOTE - NSBHASSESSMENTFT_PSY_ALL_CORE
Patient is a 73 yr old woman ,  , domiciled, and retired, with background hx of MDD and ANSON; has multiple psych admissions (most recently Jan 2024 [facility]), had prior SA by overdosing on pills (11/2019) following death of parents,  has no reported hx of NSSIB, currently treated by outpatient psychiatrist Dr. Ramirez, she denied using any illicit substance use including alcohol, pmh of HTN, hyperlipidemia, and GERD, brought in by  for anxiety, depression, somatic symptoms, SI. Admitted to Medicine for dizziness work up.      4/5: Patient reports severe symptoms of anxiety and depression, +passive SI with recent active SI with plans. Denies current SI/HI/AVH.  Will monitor/follow and adjust the doses given dizziness, concerns for polypharmacy.     4/6: Severe anxiety, denies SI/HI. Endorses dizziness/lightheaded.   04/08: remains anxious, no SI/HI. Not medically cleared yet. Receives PRNs  4/9: no significant changes, Received PRN. PT consult pending.  4/10: slight improvement in anxiety, however, continues to take daily PRNs, waiting for PT clearance.  4/11: continues to improve, remains anxious, no PRNs yesterday.    Plan:  -Observation status: CO 1:1 for passive/active SI  -CONTINUE Klonopin 0.5mg PO BID, Remeron 15mg PO QHS, Lyrica 25mg PO TID, Pamelor 25mg PO bedtime.   -Obtain orthostatics  -Order EKG  -Ongoing medical work up  - Haldol 0.5mg Po/IM q6h prn for agitation  -PRN for anxiety: Atarax 25 mg TID.  -Hold antipsychotics if qtc >500  -Obtain collateral Dr. Valentina Bailey; inpatient psych voluntary if patient is amendable, when medically stable   Will follow Patient is a 73 yr old woman ,  , domiciled, and retired, with background hx of MDD and ANSON; has multiple psych admissions (most recently Jan 2024 [facility]), had prior SA by overdosing on pills (11/2019) following death of parents,  has no reported hx of NSSIB, currently treated by outpatient psychiatrist Dr. Ramirez, she denied using any illicit substance use including alcohol, pmh of HTN, hyperlipidemia, and GERD, brought in by  for anxiety, depression, somatic symptoms, SI. Admitted to Medicine for dizziness work up.      4/5: Patient reports severe symptoms of anxiety and depression, +passive SI with recent active SI with plans. Denies current SI/HI/AVH.  Will monitor/follow and adjust the doses given dizziness, concerns for polypharmacy.     4/6: Severe anxiety, denies SI/HI. Endorses dizziness/lightheaded.   04/08: remains anxious, no SI/HI. Not medically cleared yet. Receives PRNs  4/9: no significant changes, Received PRN. PT consult pending.  4/10: slight improvement in anxiety, however, continues to take daily PRNs, waiting for PT clearance.  4/11: continues to improve, remains anxious, no PRNs yesterday. Addamantly denies feeling suicidal or wanting to die. Identifies reasons for living.    Plan:  - DISCONTINUE Observation status: CO 1:1 not acutely suicidal. Routine observation  -CONTINUE Klonopin 0.5mg PO BID, Remeron 15mg PO QHS, Lyrica 25mg PO TID, Pamelor 25mg PO bedtime.   -Obtain orthostatics  -Order EKG  -Ongoing medical work up  - Haldol 0.5mg Po/IM q6h prn for agitation  -PRN for anxiety: Atarax 25 mg TID.  -Hold antipsychotics if qtc >500  -Obtain collateral Dr. Montgomery  -Dispo; inpatient psych voluntary if patient is amendable, when medically stable   Will follow Patient is a 73 yr old woman ,  , domiciled, and retired, with background hx of MDD and ANSON; has multiple psych admissions (most recently Jan 2024 [facility]), had prior SA by overdosing on pills (11/2019) following death of parents,  has no reported hx of NSSIB, currently treated by outpatient psychiatrist Dr. Ramirez, she denied using any illicit substance use including alcohol, pmh of HTN, hyperlipidemia, and GERD, brought in by  for anxiety, depression, somatic symptoms, SI. Admitted to Medicine for dizziness work up.      4/5: Patient reports severe symptoms of anxiety and depression, +passive SI with recent active SI with plans. Denies current SI/HI/AVH.  Will monitor/follow and adjust the doses given dizziness, concerns for polypharmacy.     4/6: Severe anxiety, denies SI/HI. Endorses dizziness/lightheaded.   04/08: remains anxious, no SI/HI. Not medically cleared yet. Receives PRNs  4/9: no significant changes, Received PRN. PT consult pending.  4/10: slight improvement in anxiety, however, continues to take daily PRNs, waiting for PT clearance.  4/11: continues to improve, remains anxious, no PRNs yesterday. Adamantly denies feeling suicidal or wanting to die. Identifies reasons for living. But doesn't feel safe outside of hospital    Plan:  - DISCONTINUE Observation status: CO 1:1 not acutely suicidal. Routine observation  -CONTINUE Klonopin 0.5mg PO BID, Remeron 15mg PO QHS, Lyrica 25mg PO TID, Pamelor 25mg PO bedtime.   -Obtain orthostatics  -Order EKG  -Ongoing medical work up  - Haldol 0.5mg Po/IM q6h prn for agitation  -PRN for anxiety: Atarax 25 mg TID.  -Hold antipsychotics if qtc >500  -Obtain collateral Dr. Montgomery  -Dispo; inpatient psych voluntary if patient is amenable, when medically stable   Will follow

## 2024-04-11 NOTE — BH CONSULTATION LIAISON PROGRESS NOTE - NSBHFUPINTERVALHXFT_PSY_A_CORE
Chart, labs, imagine reviewed. Case discussed with the primary team. Patient seen at bedside.  No PRNs  Pt reported that he is walking better and feels a little better. Remains anxious. Sleeps and eats well. Patient denied suicidal or homicidal ideations, intent or a plan. Denied auditory or visual hallucinations.

## 2024-04-11 NOTE — DIETITIAN INITIAL EVALUATION ADULT - PERTINENT MEDS FT
MEDICATIONS  (STANDING):  atorvastatin 10 milliGRAM(s) Oral at bedtime  chlorhexidine 2% Cloths 1 Application(s) Topical daily  clonazePAM  Tablet 0.5 milliGRAM(s) Oral two times a day  enoxaparin Injectable 40 milliGRAM(s) SubCutaneous every 24 hours  lidocaine   4% Patch 1 Patch Transdermal once  metoprolol tartrate 50 milliGRAM(s) Oral two times a day  mirtazapine 15 milliGRAM(s) Oral at bedtime  multivitamin 1 Tablet(s) Oral daily  nortriptyline 25 milliGRAM(s) Oral at bedtime  pantoprazole    Tablet 40 milliGRAM(s) Oral before breakfast    MEDICATIONS  (PRN):  acetaminophen     Tablet .. 650 milliGRAM(s) Oral every 6 hours PRN Temp greater or equal to 38C (100.4F), Mild Pain (1 - 3)  aluminum hydroxide/magnesium hydroxide/simethicone Suspension 30 milliLiter(s) Oral every 4 hours PRN Dyspepsia  bisacodyl Suppository 10 milliGRAM(s) Rectal daily PRN Constipation  gabapentin 100 milliGRAM(s) Oral three times a day PRN anxiety  haloperidol     Tablet 0.5 milliGRAM(s) Oral every 6 hours PRN agitation  hydrOXYzine hydrochloride 25 milliGRAM(s) Oral three times a day PRN Anxiety  meclizine 12.5 milliGRAM(s) Oral every 8 hours PRN Dizziness  melatonin 3 milliGRAM(s) Oral at bedtime PRN Insomnia  ondansetron Injectable 4 milliGRAM(s) IV Push every 8 hours PRN Nausea and/or Vomiting  polyethylene glycol 3350 17 Gram(s) Oral two times a day PRN constipation

## 2024-04-11 NOTE — DIETITIAN INITIAL EVALUATION ADULT - REASON FOR ADMISSION
VOMITING/NAUSEA Dizziness and giddiness.  Per 4/11/24 internal medicine chart review, Patient is a 73y F w/ PMHx significant for anxiety, depression, HTN, and HLD presents for dizziness admitted for neuro eval, c/b suicidal ideation

## 2024-04-11 NOTE — DIETITIAN INITIAL EVALUATION ADULT - ORAL INTAKE PTA/DIET HISTORY
Patient denies food allergies or intolerance.  Reports lack of appetite for ~1 month, possibly related to medication use.  Patient also has decreased mobility for the past couple months due to plantar fasciitis.  Patient complains foods served at [facility] from prior admission, states with improved appetite in house.  Patient has been struggling with weight loss, participating in [employer] meal plan for 7 years PTA.  Patient has been ordering prepared meals due to not cooking for herself anymore.  Typical breakfast including cereal, coffee, milk, and fruits, going to nearby [employer] for lunch with her girlfriend, and her girlfriend will prepare dinner (mostly vegetarian meals) for her.  Patient endorses intentional weight loss (209 lbs) for the past couple months however noted increase in weights again.  Patient was told having pre-diabetes, had been eating less carbohydrates.

## 2024-04-11 NOTE — DIETITIAN INITIAL EVALUATION ADULT - ADD RECOMMEND
1. Continue present diet order as it remains appropriate at this time.   2. Food and nutrition service department to honor food and fluid preferences within dietary restriction  3. Nursing to document PO in RN flow sheets and monitor weekly weights.   4. Continue to monitor skin integrity, bowel regimen, and nutrition pertinent labs.

## 2024-04-11 NOTE — DIETITIAN INITIAL EVALUATION ADULT - OTHER INFO
Patient is A&O x 3 at baseline.  Patient is receiving a DASH/TLC diet order in house.  Pt reports adequate PO intake with good appetite.  Food and fluid preferences discussed.  Denies difficulty chewing or swallowing.  No active GI distress such as nausea, vomit, diarrhea, constipation; on ondansetron PRN.  Pt is on bowel regimen, last BM reported 4/9 per RN flowsheets.  Skin intact without edema, no pressure injury per RN flowsheets.  Labs notable for elevated BUN 39 and glucose 231.  Patient w/ prediabetes, noted A1c@ 5.8 (1-11-24), please consider to recheck A1c due to elevated glucose.  Noted elevated triglyceride 200 and low HDL 39, present diet remains appropriate at this time.  Admitted weight@ 95.1kg (4-5-24), HT@ 157.5cm, BMI 38.8-obesity status.  No clinically significant weight changes noted per prior RD note dated 1/11/24 with [facility] weight history: 220 lbs (10-20-23), 208 lbs (1-10-24).  Patient encouraged to follow a healthy eating pattern.  RDN to remain available for further nutrition intervention as indicated.

## 2024-04-11 NOTE — DIETITIAN INITIAL EVALUATION ADULT - PERTINENT LABORATORY DATA
A1C with Estimated Average Glucose Result: 5.8 % (01-11-24 @ 08:00)  A1C with Estimated Average Glucose Result: 5.8 % (05-11-23 @ 08:36)  A1C with Estimated Average Glucose Result: 5.7 % (04-14-23 @ 07:32)

## 2024-04-12 RX ADMIN — CHLORHEXIDINE GLUCONATE 1 APPLICATION(S): 213 SOLUTION TOPICAL at 13:00

## 2024-04-12 RX ADMIN — MIRTAZAPINE 15 MILLIGRAM(S): 45 TABLET, ORALLY DISINTEGRATING ORAL at 21:59

## 2024-04-12 RX ADMIN — Medication 25 MILLIGRAM(S): at 05:35

## 2024-04-12 RX ADMIN — ENOXAPARIN SODIUM 40 MILLIGRAM(S): 100 INJECTION SUBCUTANEOUS at 05:35

## 2024-04-12 RX ADMIN — Medication 1 TABLET(S): at 13:01

## 2024-04-12 RX ADMIN — PANTOPRAZOLE SODIUM 40 MILLIGRAM(S): 20 TABLET, DELAYED RELEASE ORAL at 05:35

## 2024-04-12 RX ADMIN — ATORVASTATIN CALCIUM 10 MILLIGRAM(S): 80 TABLET, FILM COATED ORAL at 21:24

## 2024-04-12 RX ADMIN — Medication 0.5 MILLIGRAM(S): at 05:35

## 2024-04-12 RX ADMIN — Medication 25 MILLIGRAM(S): at 21:24

## 2024-04-12 RX ADMIN — NORTRIPTYLINE HYDROCHLORIDE 25 MILLIGRAM(S): 10 CAPSULE ORAL at 21:59

## 2024-04-12 RX ADMIN — Medication 0.5 MILLIGRAM(S): at 17:31

## 2024-04-12 RX ADMIN — Medication 12.5 MILLIGRAM(S): at 21:25

## 2024-04-12 RX ADMIN — Medication 50 MILLIGRAM(S): at 05:35

## 2024-04-12 RX ADMIN — Medication 25 MILLIGRAM(S): at 13:12

## 2024-04-12 RX ADMIN — Medication 50 MILLIGRAM(S): at 17:27

## 2024-04-12 NOTE — BH CONSULTATION LIAISON PROGRESS NOTE - NSBHFUPINTERVALHXFT_PSY_A_CORE
Chart, labs, imagine reviewed. Case discussed with the primary team. Patient seen at bedside.  Yesterday PRN: atarax 25 mg two times, none today.  Pt was seen standing in a doorway talking to her nurse. Reported feeling " horrible" because " couple of things happened" namely " there was something not working in my phone". Pt reported that she is able to walk right now, but " not like before" and also her head " is not feeling right". Reported sleeping and eating well. Patient denied suicidal or homicidal ideations, intent or a plan. Denied auditory or visual hallucinations. Other discharge options discussed ( partial hospitalization program, outpatient follow up). Currently not open to other options due to fear that she won't be able to get their due to her physical condition. Rehab ( recommended by PT) also discussed. Pt said that she will think about it.

## 2024-04-12 NOTE — PROGRESS NOTE ADULT - ASSESSMENT
_________________________________________________________________________________________  ========>>  M E D I C A L   A T T E N D I N G    F O L L O W  U P  N O T E  <<=========  -----------------------------------------------------------------------------------------------------    - Patient seen and examined by me earlier today.   - Patient today overall doing better, comfortable, eating OK.  patient ambulating independently back and forth to bathroom..      eating well Otherwise. no other c.o     ==================>> REVIEW OF SYSTEM <<=================    GEN: no fever, no chills, As above  RESP: no SOB, no cough, no sputum  CVS: no chest pain, no palpitations  GI: no abdominal pain, no nausea  : no dysuria, no frequency  Neuro: Occasional headache, dizziness Improved     ==================>> PHYSICAL EXAM <<=================    GEN: A&O X 3 , NAD , comfortable, pleasant, calm , In chair  HEENT: NCAT, PERRL, MMM, hearing intact  CVS: S1S2 , regular , No M/R/G appreciated  PULM: CTA B/L,  no W/R/R appreciated  ABD.: soft. non tender, non distended,  Obese  Extrem: intact pulses , no edema            ( Note written / Date of service 04-12-24 ( This is certified to be the same as "ENTERED" date above ( for billing purposes)))    ==================>> MEDICATIONS <<====================    atorvastatin 10 milliGRAM(s) Oral at bedtime  chlorhexidine 2% Cloths 1 Application(s) Topical daily  clonazePAM  Tablet 0.5 milliGRAM(s) Oral two times a day  enoxaparin Injectable 40 milliGRAM(s) SubCutaneous every 24 hours  lidocaine   4% Patch 1 Patch Transdermal once  metoprolol tartrate 50 milliGRAM(s) Oral two times a day  mirtazapine 15 milliGRAM(s) Oral at bedtime  multivitamin 1 Tablet(s) Oral daily  nortriptyline 25 milliGRAM(s) Oral at bedtime  pantoprazole    Tablet 40 milliGRAM(s) Oral before breakfast  pregabalin 25 milliGRAM(s) Oral three times a day    MEDICATIONS  (PRN):  acetaminophen     Tablet .. 650 milliGRAM(s) Oral every 6 hours PRN Temp greater or equal to 38C (100.4F), Mild Pain (1 - 3)  aluminum hydroxide/magnesium hydroxide/simethicone Suspension 30 milliLiter(s) Oral every 4 hours PRN Dyspepsia  bisacodyl Suppository 10 milliGRAM(s) Rectal daily PRN Constipation  gabapentin 100 milliGRAM(s) Oral three times a day PRN anxiety  haloperidol     Tablet 0.5 milliGRAM(s) Oral every 6 hours PRN agitation  hydrOXYzine hydrochloride 25 milliGRAM(s) Oral three times a day PRN Anxiety  meclizine 12.5 milliGRAM(s) Oral every 8 hours PRN Dizziness  melatonin 3 milliGRAM(s) Oral at bedtime PRN Insomnia  ondansetron Injectable 4 milliGRAM(s) IV Push every 8 hours PRN Nausea and/or Vomiting  polyethylene glycol 3350 17 Gram(s) Oral two times a day PRN constipation    ___________  Active diet:  Diet, Regular:   DASH/TLC Sodium & Cholesterol Restricted (DASH)  ___________________    ==================>> VITAL SIGNS <<==================     Vital Signs Last 24 HrsT(C): 36.7 (04-12-24 @ 11:20)  T(F): 98 (04-12-24 @ 11:20), Max: 98 (04-12-24 @ 11:20)  HR: 74 (04-12-24 @ 11:20) (74 - 95)  BP: 134/83 (04-12-24 @ 11:20)  RR: 18 (04-12-24 @ 11:20) (16 - 18)  SpO2: 95% (04-12-24 @ 11:20) (95% - 99%)      CAPILLARY BLOOD GLUCOSE         ==================>> LAB AND IMAGING <<==================                  no labs today       < from: MR Head No Cont (04.05.24 @ 12:30) >  IMPRESSION:   Unremarkable MR of the brain.  < end of copied text >    < from: TTE W or WO Ultrasound Enhancing Agent (04.05.24 @ 12:38) >  CONCLUSIONS:     1. Technically difficult image quality.   2. The left ventricular cavity is normal in size. Left ventricular wall thickness is normal. Left ventricular endocardium is not well visualized; however, the left ventricular systolic function appears grossly normal.   3. Normal right ventricular cavity size and normal systolic function.   4. Structurally normal mitral valve with normal leaflet excursion. There is calcification of the mitral valve annulus. There is trace mitral regurgitation.  < end of copied text >    ___________________________________________________________________________________  ===============>>  A S S E S S M E N T   A N D   P L A N <<===============  ------------------------------------------------------------------------------------------    · Assessment	  73y F w/ PMHx significant for anxiety, depression, HTN, and HLD presents for dizziness admitted for neuro eval, c/b suicidal ideation       Problem/Plan - 1:  ·  Problem: Dizziness.   ·  Plan: -Pt with new onset dizziness for 1 week, worse with changes in position. Associated dysequilibrium.   -CTA H/N: Intracranial atherosclerosis cavernous and clinoid segments of the internal carotid arteries, mild to   moderate.  -appreciate neuro Follow-up: sign off the case  orthostatic Negative  MRI Negative as above  Possible vertigo, improving with   meclizine PRN > encouraged to continue       increase Oral hydration > doing it   -TTE as above   Further medical management as per psychiatry given extensive history of anxiety and depression on multiple medications  -fall precautions    -PT / Out of bed as able daily  - psych follow up    Problem/Plan - 2:  ·  Problem: Suicidal ideation.   ·  Plan: -pt with history of severe MDD, treated with ECT in the past  -reporting SI  -appreciate psych recs, c/w 1:1.   >>  for inpatient psych placement     Problem/Plan - 3:  ·  Problem: Anxiety and depression.   ·  Plan: -psych following, c/w Klonopin 0.5 mg po BID, Remeron 15 mg po QHS, Lyrica 25 mg po TID  -Gabapentin 100mg PRN for anxiety as per psych.  - Further medical management as for psychiatry    Problem/Plan - 4:  ·  Problem: HTN (hypertension).   ·  Plan: -c/w metoprolol. Hold losartan for now given c/f orthostatic hypotension.    Problem/Plan - 5:  ·  Problem: Need for prophylactic measure.   ·  Plan: DVT ppx: lovenox.    Patient overall medically cleared for transfer to psychiatric facility , pending bed availability     --------------------------------------------  Case discussed with patient,  at bedside..   Education given on findings and plan of care  ___________________________  H. NATALIE Paige.  Pager: [phone]     _________________________________________________________________________________________  ========>>  M E D I C A L   A T T E N D I N G    F O L L O W  U P  N O T E  <<=========  -----------------------------------------------------------------------------------------------------    - Patient seen and examined by me earlier today.   - Patient today overall doing better, comfortable, eating OK.  patient ambulating independently back and forth to bathroom..      eating well Otherwise. no other c.o      Still awaiting bed availability at [facility]    ==================>> REVIEW OF SYSTEM <<=================    GEN: no fever, no chills, As above  RESP: no SOB, no cough, no sputum  CVS: no chest pain, no palpitations  GI: no abdominal pain, no nausea  : no dysuria, no frequency  Neuro: Occasional headache, dizziness Improved     ==================>> PHYSICAL EXAM <<=================    GEN: A&O X 3 , NAD , comfortable, pleasant, calm , In chair  HEENT: NCAT, PERRL, MMM, hearing intact  CVS: S1S2 , regular , No M/R/G appreciated  PULM: CTA B/L,  no W/R/R appreciated  ABD.: soft. non tender, non distended,  Obese  Extrem: intact pulses , no edema            ( Note written / Date of service 04-12-24 ( This is certified to be the same as "ENTERED" date above ( for billing purposes)))    ==================>> MEDICATIONS <<====================    atorvastatin 10 milliGRAM(s) Oral at bedtime  chlorhexidine 2% Cloths 1 Application(s) Topical daily  clonazePAM  Tablet 0.5 milliGRAM(s) Oral two times a day  enoxaparin Injectable 40 milliGRAM(s) SubCutaneous every 24 hours  lidocaine   4% Patch 1 Patch Transdermal once  metoprolol tartrate 50 milliGRAM(s) Oral two times a day  mirtazapine 15 milliGRAM(s) Oral at bedtime  multivitamin 1 Tablet(s) Oral daily  nortriptyline 25 milliGRAM(s) Oral at bedtime  pantoprazole    Tablet 40 milliGRAM(s) Oral before breakfast  pregabalin 25 milliGRAM(s) Oral three times a day    MEDICATIONS  (PRN):  acetaminophen     Tablet .. 650 milliGRAM(s) Oral every 6 hours PRN Temp greater or equal to 38C (100.4F), Mild Pain (1 - 3)  aluminum hydroxide/magnesium hydroxide/simethicone Suspension 30 milliLiter(s) Oral every 4 hours PRN Dyspepsia  bisacodyl Suppository 10 milliGRAM(s) Rectal daily PRN Constipation  gabapentin 100 milliGRAM(s) Oral three times a day PRN anxiety  haloperidol     Tablet 0.5 milliGRAM(s) Oral every 6 hours PRN agitation  hydrOXYzine hydrochloride 25 milliGRAM(s) Oral three times a day PRN Anxiety  meclizine 12.5 milliGRAM(s) Oral every 8 hours PRN Dizziness  melatonin 3 milliGRAM(s) Oral at bedtime PRN Insomnia  ondansetron Injectable 4 milliGRAM(s) IV Push every 8 hours PRN Nausea and/or Vomiting  polyethylene glycol 3350 17 Gram(s) Oral two times a day PRN constipation    ___________  Active diet:  Diet, Regular:   DASH/TLC Sodium & Cholesterol Restricted (DASH)  ___________________    ==================>> VITAL SIGNS <<==================     Vital Signs Last 24 HrsT(C): 36.7 (04-12-24 @ 11:20)  T(F): 98 (04-12-24 @ 11:20), Max: 98 (04-12-24 @ 11:20)  HR: 74 (04-12-24 @ 11:20) (74 - 95)  BP: 134/83 (04-12-24 @ 11:20)  RR: 18 (04-12-24 @ 11:20) (16 - 18)  SpO2: 95% (04-12-24 @ 11:20) (95% - 99%)         ==================>> LAB AND IMAGING <<==================       no labs today       < from: MR Head No Cont (04.05.24 @ 12:30) >  IMPRESSION:   Unremarkable MR of the brain.  < end of copied text >    < from: TTE W or WO Ultrasound Enhancing Agent (04.05.24 @ 12:38) >  CONCLUSIONS:     1. Technically difficult image quality.   2. The left ventricular cavity is normal in size. Left ventricular wall thickness is normal. Left ventricular endocardium is not well visualized; however, the left ventricular systolic function appears grossly normal.   3. Normal right ventricular cavity size and normal systolic function.   4. Structurally normal mitral valve with normal leaflet excursion. There is calcification of the mitral valve annulus. There is trace mitral regurgitation.  < end of copied text >    ___________________________________________________________________________________  ===============>>  A S S E S S M E N T   A N D   P L A N <<===============  ------------------------------------------------------------------------------------------    · Assessment	  73y F w/ PMHx significant for anxiety, depression, HTN, and HLD presents for dizziness admitted for neuro eval, c/b suicidal ideation       Problem/Plan - 1:  ·  Problem: Dizziness.   ·  Plan: -Pt with new onset dizziness for 1 week, worse with changes in position. Associated dysequilibrium.   -CTA H/N: Intracranial atherosclerosis cavernous and clinoid segments of the internal carotid arteries, mild to   moderate.  -appreciate neuro Follow-up: sign off the case  orthostatic Negative  MRI Negative as above  Possible vertigo, improving with   meclizine PRN > encouraged to continue       increase Oral hydration > doing it   -TTE as above   Further medical management as per psychiatry given extensive history of anxiety and depression on multiple medications  -fall precautions    -PT / Out of bed as able daily  - psych follow up    Problem/Plan - 2:  ·  Problem: Suicidal ideation.   ·  Plan: -pt with history of severe MDD, treated with ECT in the past  -reporting SI  -appreciate psych recs, :: No more need for one-to-one observation   >>  for inpatient psych placement     Problem/Plan - 3:  ·  Problem: Anxiety and depression.   ·  Plan: -psych following, c/w Klonopin 0.5 mg po BID, Remeron 15 mg po QHS, Lyrica 25 mg po TID  -Gabapentin 100mg PRN for anxiety as per psych.  - Further medical management as for psychiatry    Problem/Plan - 4:  ·  Problem: HTN (hypertension).   ·  Plan: -c/w metoprolol. Hold losartan for now given c/f orthostatic hypotension.    Problem/Plan - 5:  ·  Problem: Need for prophylactic measure.   ·  Plan: DVT ppx: lovenox.    Patient overall medically cleared for transfer to psychiatric facility , pending bed availability     --------------------------------------------  Case discussed with patient,   Education given on findings and plan of care  ___________________________  H. NATALIE Paige.  Pager: [phone]

## 2024-04-12 NOTE — BH CONSULTATION LIAISON PROGRESS NOTE - NSBHASSESSMENTFT_PSY_ALL_CORE
Patient is a 73 yr old woman ,  , domiciled, and retired, with background hx of MDD and ANSON; has multiple psych admissions (most recently Jan 2024 on [facility]), had prior SA by overdosing on pills (11/2019) following death of parents,  has no reported hx of NSSIB, currently treated by outpatient psychiatrist Dr. Ramirez, she denied using any illicit substance use including alcohol, pmh of HTN, hyperlipidemia, and GERD, brought in by  for anxiety, depression, somatic symptoms, SI. Admitted to Medicine for dizziness work up.      4/5: Patient reports severe symptoms of anxiety and depression, +passive SI with recent active SI with plans. Denies current SI/HI/AVH.  Will monitor/follow and adjust the doses given dizziness, concerns for polypharmacy.     4/6: Severe anxiety, denies SI/HI. Endorses dizziness/lightheaded.   04/08: remains anxious, no SI/HI. Not medically cleared yet. Receives PRNs  4/9: no significant changes, Received PRN. PT consult pending.  4/10: slight improvement in anxiety, however, continues to take daily PRNs, waiting for PT clearance.  4/11: continues to improve, remains anxious, no PRNs yesterday. Adamantly denies feeling suicidal or wanting to die. Identifies reasons for living. But doesn't feel safe outside of hospital  4/12: Two PRN yesterday. No PRN today. Discharge planning discussed. Pt is not open to other options right now, because she is afraid that she won't be able to go there due to weakness in her legs.     Plan:  - DISCONTINUE Observation status: CO 1:1 not acutely suicidal. Routine observation  -CONTINUE Klonopin 0.5mg PO BID, Remeron 15mg PO QHS, Lyrica 25mg PO TID, Pamelor 25mg PO bedtime.   -Obtain orthostatics  -Order EKG  -Ongoing medical work up  - Haldol 0.5mg Po/IM q6h prn for agitation  -PRN for anxiety: Atarax 25 mg TID.  -Hold antipsychotics if qtc >500  -Obtain collateral Dr. Valentina Bailey; inpatient psych voluntary if patient is amenable, when medically stable   Will follow

## 2024-04-13 RX ADMIN — Medication 25 MILLIGRAM(S): at 22:18

## 2024-04-13 RX ADMIN — Medication 0.5 MILLIGRAM(S): at 19:17

## 2024-04-13 RX ADMIN — PANTOPRAZOLE SODIUM 40 MILLIGRAM(S): 20 TABLET, DELAYED RELEASE ORAL at 07:10

## 2024-04-13 RX ADMIN — ATORVASTATIN CALCIUM 10 MILLIGRAM(S): 80 TABLET, FILM COATED ORAL at 22:18

## 2024-04-13 RX ADMIN — NORTRIPTYLINE HYDROCHLORIDE 25 MILLIGRAM(S): 10 CAPSULE ORAL at 22:17

## 2024-04-13 RX ADMIN — CHLORHEXIDINE GLUCONATE 1 APPLICATION(S): 213 SOLUTION TOPICAL at 14:52

## 2024-04-13 RX ADMIN — Medication 50 MILLIGRAM(S): at 06:02

## 2024-04-13 RX ADMIN — Medication 25 MILLIGRAM(S): at 09:51

## 2024-04-13 RX ADMIN — Medication 50 MILLIGRAM(S): at 19:18

## 2024-04-13 RX ADMIN — Medication 25 MILLIGRAM(S): at 14:54

## 2024-04-13 RX ADMIN — Medication 25 MILLIGRAM(S): at 06:02

## 2024-04-13 RX ADMIN — Medication 1 TABLET(S): at 14:51

## 2024-04-13 RX ADMIN — MIRTAZAPINE 15 MILLIGRAM(S): 45 TABLET, ORALLY DISINTEGRATING ORAL at 22:17

## 2024-04-13 RX ADMIN — ENOXAPARIN SODIUM 40 MILLIGRAM(S): 100 INJECTION SUBCUTANEOUS at 06:03

## 2024-04-13 RX ADMIN — Medication 0.5 MILLIGRAM(S): at 06:02

## 2024-04-13 NOTE — PROGRESS NOTE ADULT - ASSESSMENT
73y F w/ PMHx significant for anxiety, depression, HTN, and HLD presents for dizziness admitted for neuro eval, c/b suicidal ideation        Problem/Plan - 1:  ·  Problem: Dizziness.   ·  Plan: -Pt with new onset dizziness for 1 week, worse with changes in position. Associated dysequilibrium.   -CTA H/N: Intracranial atherosclerosis cavernous and clinoid segments of the internal carotid arteries, mild to   moderate.  -appreciate neuro Follow-up: sign off the case  orthostatic Negative  MRI Negative as above  Possible vertigo, improving with   meclizine PRN > encouraged to continue       increase Oral hydration > doing it   -TTE as above   Further medical management as per psychiatry given extensive history of anxiety and depression on multiple medications  -fall precautions    -PT / Out of bed as able daily  - psych follow up     Problem/Plan - 2:  ·  Problem: Suicidal ideation.   ·  Plan: -pt with history of severe MDD, treated with ECT in the past  -reporting SI  -appreciate psych recs, :: No more need for one-to-one observation   >>  for inpatient psych placement      Problem/Plan - 3:  ·  Problem: Anxiety and depression.   ·  Plan: -psych following, c/w Klonopin 0.5 mg po BID, Remeron 15 mg po QHS, Lyrica 25 mg po TID  -Gabapentin 100mg PRN for anxiety as per psych.  - Further medical management as for psychiatry     Problem/Plan - 4:  ·  Problem: HTN (hypertension).   ·  Plan: -c/w metoprolol. Hold losartan for now given c/f orthostatic hypotension.     Problem/Plan - 5:  ·  Problem: Need for prophylactic measure.   ·  Plan: DVT ppx: lovenox.    Patient overall medically cleared for transfer to psychiatric facility , pending bed availability

## 2024-04-14 RX ORDER — ONDANSETRON 8 MG/1
4 TABLET, FILM COATED ORAL EVERY 8 HOURS
Refills: 0 | Status: DISCONTINUED | OUTPATIENT
Start: 2024-04-14 | End: 2024-04-16

## 2024-04-14 RX ADMIN — NORTRIPTYLINE HYDROCHLORIDE 25 MILLIGRAM(S): 10 CAPSULE ORAL at 21:13

## 2024-04-14 RX ADMIN — Medication 25 MILLIGRAM(S): at 05:37

## 2024-04-14 RX ADMIN — MIRTAZAPINE 15 MILLIGRAM(S): 45 TABLET, ORALLY DISINTEGRATING ORAL at 21:13

## 2024-04-14 RX ADMIN — Medication 50 MILLIGRAM(S): at 17:48

## 2024-04-14 RX ADMIN — Medication 0.5 MILLIGRAM(S): at 17:48

## 2024-04-14 RX ADMIN — CHLORHEXIDINE GLUCONATE 1 APPLICATION(S): 213 SOLUTION TOPICAL at 14:02

## 2024-04-14 RX ADMIN — Medication 25 MILLIGRAM(S): at 13:32

## 2024-04-14 RX ADMIN — Medication 25 MILLIGRAM(S): at 21:12

## 2024-04-14 RX ADMIN — ATORVASTATIN CALCIUM 10 MILLIGRAM(S): 80 TABLET, FILM COATED ORAL at 21:12

## 2024-04-14 RX ADMIN — Medication 25 MILLIGRAM(S): at 08:31

## 2024-04-14 RX ADMIN — ONDANSETRON 4 MILLIGRAM(S): 8 TABLET, FILM COATED ORAL at 09:39

## 2024-04-14 RX ADMIN — Medication 0.5 MILLIGRAM(S): at 05:37

## 2024-04-14 RX ADMIN — Medication 1 TABLET(S): at 13:32

## 2024-04-14 RX ADMIN — Medication 50 MILLIGRAM(S): at 05:37

## 2024-04-14 NOTE — PROGRESS NOTE ADULT - ASSESSMENT
73y F w/ PMHx significant for anxiety, depression, HTN, and HLD presents for dizziness admitted for neuro eval, c/b suicidal ideation        Problem/Plan - 1:  ·  Problem: Dizziness.   ·  Plan: -Pt with new onset dizziness for 1 week, worse with changes in position. Associated dysequilibrium.   -CTA H/N: Intracranial atherosclerosis cavernous and clinoid segments of the internal carotid arteries, mild to   moderate.  -appreciate neuro Follow-up: sign off the case  orthostatic Negative  MRI Negative as above  Possible vertigo, improving with   meclizine PRN > encouraged to continue       increase Oral hydration > doing it   -TTE as above   Further medical management as per psychiatry given extensive history of anxiety and depression on multiple medications  -fall precautions    -PT / Out of bed as able daily  - psych follow up     Problem/Plan - 2:  ·  Problem: Suicidal ideation.   ·  Plan: -pt with history of severe MDD, treated with ECT in the past  -reporting SI  -appreciate psych recs, :: No more need for one-to-one observation   >>  for inpatient psych placement      Problem/Plan - 3:  ·  Problem: Anxiety and depression.   ·  Plan: -psych following, c/w Klonopin 0.5 mg po BID, Remeron 15 mg po QHS, Lyrica 25 mg po TID  -Gabapentin 100mg PRN for anxiety as per psych.  - Further medical management as for psychiatry     Problem/Plan - 4:  ·  Problem: HTN (hypertension).   ·  Plan: -c/w metoprolol. Hold losartan for now given c/f orthostatic hypotension.     Problem/Plan - 5:  ·  Problem: Need for prophylactic measure.   ·  Plan: DVT ppx: lovenox.    Patient overall medically cleared for transfer to psychiatric facility , pending bed availability ,  D/W patient and her .

## 2024-04-15 LAB — SARS-COV-2 RNA SPEC QL NAA+PROBE: SIGNIFICANT CHANGE UP

## 2024-04-15 RX ADMIN — Medication 25 MILLIGRAM(S): at 21:16

## 2024-04-15 RX ADMIN — CHLORHEXIDINE GLUCONATE 1 APPLICATION(S): 213 SOLUTION TOPICAL at 11:47

## 2024-04-15 RX ADMIN — Medication 10 MILLIGRAM(S): at 17:18

## 2024-04-15 RX ADMIN — PANTOPRAZOLE SODIUM 40 MILLIGRAM(S): 20 TABLET, DELAYED RELEASE ORAL at 05:07

## 2024-04-15 RX ADMIN — Medication 25 MILLIGRAM(S): at 10:26

## 2024-04-15 RX ADMIN — Medication 1 TABLET(S): at 11:45

## 2024-04-15 RX ADMIN — Medication 0.5 MILLIGRAM(S): at 05:08

## 2024-04-15 RX ADMIN — ATORVASTATIN CALCIUM 10 MILLIGRAM(S): 80 TABLET, FILM COATED ORAL at 21:16

## 2024-04-15 RX ADMIN — Medication 50 MILLIGRAM(S): at 17:18

## 2024-04-15 RX ADMIN — ONDANSETRON 4 MILLIGRAM(S): 8 TABLET, FILM COATED ORAL at 13:49

## 2024-04-15 RX ADMIN — ENOXAPARIN SODIUM 40 MILLIGRAM(S): 100 INJECTION SUBCUTANEOUS at 06:30

## 2024-04-15 RX ADMIN — Medication 50 MILLIGRAM(S): at 05:08

## 2024-04-15 RX ADMIN — MIRTAZAPINE 15 MILLIGRAM(S): 45 TABLET, ORALLY DISINTEGRATING ORAL at 21:16

## 2024-04-15 RX ADMIN — Medication 12.5 MILLIGRAM(S): at 14:41

## 2024-04-15 RX ADMIN — Medication 25 MILLIGRAM(S): at 05:08

## 2024-04-15 RX ADMIN — Medication 25 MILLIGRAM(S): at 14:20

## 2024-04-15 RX ADMIN — NORTRIPTYLINE HYDROCHLORIDE 25 MILLIGRAM(S): 10 CAPSULE ORAL at 21:16

## 2024-04-15 RX ADMIN — Medication 0.5 MILLIGRAM(S): at 17:17

## 2024-04-15 NOTE — PROGRESS NOTE ADULT - ASSESSMENT
_________________________________________________________________________________________  ========>>  M E D I C A L   A T T E N D I N G    F O L L O W  U P  N O T E  <<=========  -----------------------------------------------------------------------------------------------------    - Patient seen and examined by me earlier today.   - Patient today overall doing ok, comfortable, eating OK. ambulating from time to time..     ==================>> REVIEW OF SYSTEM <<=================    GEN: no fever, no chills, no pain  RESP: no SOB, no cough, no sputum  CVS: no chest pain, no palpitations  GI: no abdominal pain, no nausea, no constipation, no diarrhea  : no dysuria, no frequency  Neuro: no headache, no dizziness today    ==================>> PHYSICAL EXAM <<=================    GEN: A&O X 3 , NAD , comfortable, pleasant, calm   HEENT: NCAT, PERRL, MMM, hearing intact  CVS: S1S2 , regular , No M/R/G appreciated  PULM: CTA B/L,  no W/R/R appreciated  ABD.: soft. non tender, non distended,  bowel sounds present  Extrem: intact pulses , no edema           ( Note written / Date of service 04-15-24 ( This is certified to be the same as "ENTERED" date above ( for billing purposes)))    ==================>> MEDICATIONS <<====================    MEDICATIONS  (STANDING):  atorvastatin 10 milliGRAM(s) Oral at bedtime  chlorhexidine 2% Cloths 1 Application(s) Topical daily  clonazePAM  Tablet 0.5 milliGRAM(s) Oral two times a day  enoxaparin Injectable 40 milliGRAM(s) SubCutaneous every 24 hours  lidocaine   4% Patch 1 Patch Transdermal once  metoprolol tartrate 50 milliGRAM(s) Oral two times a day  mirtazapine 15 milliGRAM(s) Oral at bedtime  multivitamin 1 Tablet(s) Oral daily  nortriptyline 25 milliGRAM(s) Oral at bedtime  pantoprazole    Tablet 40 milliGRAM(s) Oral before breakfast  pregabalin 25 milliGRAM(s) Oral three times a day    MEDICATIONS  (PRN):  acetaminophen     Tablet .. 650 milliGRAM(s) Oral every 6 hours PRN Temp greater or equal to 38C (100.4F), Mild Pain (1 - 3)  aluminum hydroxide/magnesium hydroxide/simethicone Suspension 30 milliLiter(s) Oral every 4 hours PRN Dyspepsia  bisacodyl Suppository 10 milliGRAM(s) Rectal daily PRN Constipation  haloperidol     Tablet 0.5 milliGRAM(s) Oral every 6 hours PRN agitation  hydrOXYzine hydrochloride 25 milliGRAM(s) Oral three times a day PRN Anxiety  meclizine 12.5 milliGRAM(s) Oral every 8 hours PRN Dizziness  melatonin 3 milliGRAM(s) Oral at bedtime PRN Insomnia  ondansetron   Disintegrating Tablet 4 milliGRAM(s) Oral every 8 hours PRN Nausea and/or Vomiting  ondansetron Injectable 4 milliGRAM(s) IV Push every 8 hours PRN Nausea and/or Vomiting  polyethylene glycol 3350 17 Gram(s) Oral two times a day PRN constipation  ___________  Active diet:  Diet, Regular:   DASH/TLC Sodium & Cholesterol Restricted (DASH)  ___________________    ==================>> VITAL SIGNS <<==================    T(C): 36.7 (04-15-24 @ 11:15), Max: 36.8 (04-15-24 @ 04:50)  HR: 80 (04-15-24 @ 11:15) (72 - 89)  BP: 123/75 (04-15-24 @ 11:15) (109/61 - 127/67)  BP(mean): --  RR: 18 (04-15-24 @ 11:15) (18 - 18)  SpO2: 97% (04-15-24 @ 11:15) (97% - 99%)      ==================>> LAB AND IMAGING <<==================    no labs today   ___________________________________________________________________________________  ===============>>  A S S E S S M E N T   A N D   P L A N <<===============  ------------------------------------------------------------------------------------------    · Assessment	  73y F w/ PMHx significant for anxiety, depression, HTN, and HLD presents for dizziness admitted for neuro eval, c/b suicidal ideation      Problem/Plan - 1:  ·  Problem: Dizziness.   ·  Plan: -Pt with new onset dizziness for 1 week, worse with changes in position. Associated dysequilibrium.   -CTA H/N: Intracranial atherosclerosis cavernous and clinoid segments of the internal carotid arteries, mild to   moderate.  -appreciate neuro Follow-up: sign off the case  orthostatic Negative  MRI Negative as above  Possible vertigo, improving with   meclizine PRN > encouraged to continue       increase Oral hydration > doing it   -TTE as above   Further medical management as per psychiatry given extensive history of anxiety and depression on multiple medications  -fall precautions    -PT / Out of bed as able daily  - psych follow up     Problem/Plan - 2:  ·  Problem: Suicidal ideation.   -appreciate psych recs, :: No more need for one-to-one observation   >>  for inpatient psych placement      Problem/Plan - 3:  ·  Problem: Anxiety and depression.   ·  Plan: -psych following, c/w Klonopin 0.5 mg po BID, Remeron 15 mg po QHS, Lyrica 25 mg po TID  -Gabapentin 100mg PRN for anxiety as per psych.  - Further medical management as for psychiatry     Problem/Plan - 4:  ·  Problem: HTN (hypertension).   ·  Plan: -c/w metoprolol. Hold losartan for now given c/f orthostatic hypotension.     Problem/Plan - 5:  ·  Problem: Need for prophylactic measure.   ·  Plan: DVT ppx: lovenox.    Patient overall medically cleared for transfer to psychiatric facility , pending bed availability ,  --------------------------------------------  Case discussed with patient , RN   Education given on findings and plan of care  ___________________________  H. NATALIE Paige.  Pager: [phone]

## 2024-04-15 NOTE — BH CONSULTATION LIAISON PROGRESS NOTE - NSBHATTESTCOMMENTATTENDFT_PSY_A_CORE
Chart reviewed, pt. seen/evaluated with the student, I agree with above assessment/plan. Patient oriented/engaged, visibly anxious/dysphoric, labile/tearful, somatically preoccupied, reports ongoing passive SI with recent active SI with plan to overdosed on her medications, denies hi, no psychosis. Plana s above, continue CO 1:1 for safety, will follow  
Chart reviewed. Seen with fellow. Continues to endorse hopelessness/severe depression/anxiety. Does not feel comfortable with outpt plan. Denies safety concerns/SI. Agree with above assessment/recs. Will continue to follow
Chart reviewed. Pt seen with trainee. Presents as AA O x 3, anxious though well-put together. Cooperative. Distressed and dependent/histrionic. Denies SI with intent/plan now but fragile when not in hospital. Agree with above assessment/recs. Will continue to follow
Chart reviewed. Seen with student. Presents dramatically, oriented and well-put together but saying she needs to go to psychiatry. Attention-seeking. Dysregulated easily. Agree with assessment/recs as above. Will continue to follow

## 2024-04-15 NOTE — BH CONSULTATION LIAISON PROGRESS NOTE - NSBHASSESSMENTFT_PSY_ALL_CORE
Patient is a 73 yr old woman ,  , domiciled, and retired, with background hx of MDD and ANSON; has multiple psych admissions (most recently Jan 2024 on [facility]), had prior SA by overdosing on pills (11/2019) following death of parents,  has no reported hx of NSSIB, currently treated by outpatient psychiatrist Dr. Ramirez, she denied using any illicit substance use including alcohol, pmh of HTN, hyperlipidemia, and GERD, brought in by  for anxiety, depression, somatic symptoms, SI. Admitted to Medicine for dizziness work up.      4/5: Patient reports severe symptoms of anxiety and depression, +passive SI with recent active SI with plans. Denies current SI/HI/AVH.  Will monitor/follow and adjust the doses given dizziness, concerns for polypharmacy.     4/6: Severe anxiety, denies SI/HI. Endorses dizziness/lightheaded.   04/08: remains anxious, no SI/HI. Not medically cleared yet. Receives PRNs  4/9: no significant changes, Received PRN. PT consult pending.  4/10: slight improvement in anxiety, however, continues to take daily PRNs, waiting for PT clearance.  4/11: continues to improve, remains anxious, no PRNs yesterday. Adamantly denies feeling suicidal or wanting to die. Identifies reasons for living. But doesn't feel safe outside of hospital  4/12: Two PRN yesterday. No PRN today. Discharge planning discussed. Pt is not open to other options right now, because she is afraid that she won't be able to go there due to weakness in her legs.   4/15: continues to feel anxious and depressed. Takes one PRN of ativan daily.    Plan:  - Routine observation  -CONTINUE Klonopin 0.5mg PO BID, Remeron 15mg PO QHS, Lyrica 25mg PO TID, Pamelor 25mg PO bedtime.   - Haldol 0.5mg Po/IM q6h prn for agitation  -PRN for anxiety: Atarax 25 mg TID.  -Hold antipsychotics if qtc >500  -Obtain collateral Dr. Valentina Bailey; inpatient psych voluntary if patient is amenable, when medically stable   Will follow Patient is a 73 yr old woman ,  , domiciled, and retired, with background hx of MDD and ANSON; has multiple psych admissions (most recently Jan 2024 on [facility]), had prior SA by overdosing on pills (11/2019) following death of parents,  has no reported hx of NSSIB, currently treated by outpatient psychiatrist Dr. Ramirez, she denied using any illicit substance use including alcohol, pmh of HTN, hyperlipidemia, and GERD, brought in by  for anxiety, depression, somatic symptoms, SI. Admitted to Medicine for dizziness work up.      4/5: Patient reports severe symptoms of anxiety and depression, +passive SI with recent active SI with plans. Denies current SI/HI/AVH.  Will monitor/follow and adjust the doses given dizziness, concerns for polypharmacy.   4/6: Severe anxiety, denies SI/HI. Endorses dizziness/lightheaded.   04/08: remains anxious, no SI/HI. Not medically cleared yet. Receives PRNs  4/9: no significant changes, Received PRN. PT consult pending.  4/10: slight improvement in anxiety, however, continues to take daily PRNs, waiting for PT clearance.  4/11: continues to improve, remains anxious, no PRNs yesterday. Adamantly denies feeling suicidal or wanting to die. Identifies reasons for living. But doesn't feel safe outside of hospital  4/12: Two PRN yesterday. No PRN today. Discharge planning discussed. Pt is not open to other options right now, because she is afraid that she won't be able to go there due to weakness in her legs.   4/15: continues to feel anxious and depressed. Takes one PRN of ativan daily.    Plan:  - Routine observation  -CONTINUE Klonopin 0.5mg PO BID, Remeron 15mg PO QHS, Lyrica 25mg PO TID, Pamelor 25mg PO bedtime.   - Haldol 0.5mg Po/IM q6h prn for agitation  -PRN for anxiety: Atarax 25 mg TID.  -Hold antipsychotics if qtc >500  -Obtain collateral Dr. Valentina Bailey; inpatient psych voluntary if patient is amenable, when medically stable   Will follow

## 2024-04-15 NOTE — BH CONSULTATION LIAISON PROGRESS NOTE - NSBHFUPINTERVALHXFT_PSY_A_CORE
Chart, labs, imagine reviewed. Case discussed with the primary team. Patient seen at bedside.  PRN: atarax 25 mg daily  Pt reported that she is walking better, sleeps and eats well. Patient denied suicidal or homicidal ideations, intent or a plan. Denied auditory or visual hallucinations. Continues to feel very anxious and depressed. Interested in inpatient psych to "fix" her medications. Irritable.

## 2024-04-16 ENCOUNTER — INPATIENT (INPATIENT)
Facility: HOSPITAL | Age: 74
LOS: 44 days | Discharge: HOME | End: 2024-05-31
Attending: PSYCHIATRY & NEUROLOGY | Admitting: PSYCHIATRY & NEUROLOGY
Payer: COMMERCIAL

## 2024-04-16 ENCOUNTER — TRANSCRIPTION ENCOUNTER (OUTPATIENT)
Age: 74
End: 2024-04-16

## 2024-04-16 VITALS
TEMPERATURE: 98 F | HEART RATE: 70 BPM | OXYGEN SATURATION: 99 % | SYSTOLIC BLOOD PRESSURE: 138 MMHG | RESPIRATION RATE: 18 BRPM | DIASTOLIC BLOOD PRESSURE: 64 MMHG

## 2024-04-16 VITALS — OXYGEN SATURATION: 95 % | TEMPERATURE: 98 F | HEIGHT: 62.01 IN | WEIGHT: 209.66 LBS

## 2024-04-16 DIAGNOSIS — F41.9 ANXIETY DISORDER, UNSPECIFIED: ICD-10-CM

## 2024-04-16 DIAGNOSIS — Z98.890 OTHER SPECIFIED POSTPROCEDURAL STATES: Chronic | ICD-10-CM

## 2024-04-16 PROCEDURE — 99232 SBSQ HOSP IP/OBS MODERATE 35: CPT | Mod: GC

## 2024-04-16 PROCEDURE — 99221 1ST HOSP IP/OBS SF/LOW 40: CPT | Mod: 1L

## 2024-04-16 RX ORDER — ONDANSETRON 8 MG/1
4 TABLET, FILM COATED ORAL EVERY 8 HOURS
Refills: 0 | Status: DISCONTINUED | OUTPATIENT
Start: 2024-04-16 | End: 2024-05-31

## 2024-04-16 RX ORDER — NORTRIPTYLINE HYDROCHLORIDE 10 MG/1
25 CAPSULE ORAL AT BEDTIME
Refills: 0 | Status: DISCONTINUED | OUTPATIENT
Start: 2024-04-16 | End: 2024-04-18

## 2024-04-16 RX ORDER — LIDOCAINE 4 G/100G
1 CREAM TOPICAL ONCE
Refills: 0 | Status: DISCONTINUED | OUTPATIENT
Start: 2024-04-16 | End: 2024-05-10

## 2024-04-16 RX ORDER — POLYETHYLENE GLYCOL 3350 17 G/17G
17 POWDER, FOR SOLUTION ORAL
Refills: 0 | Status: DISCONTINUED | OUTPATIENT
Start: 2024-04-16 | End: 2024-05-28

## 2024-04-16 RX ORDER — HALOPERIDOL DECANOATE 100 MG/ML
0.5 INJECTION INTRAMUSCULAR EVERY 6 HOURS
Refills: 0 | Status: DISCONTINUED | OUTPATIENT
Start: 2024-04-16 | End: 2024-05-31

## 2024-04-16 RX ORDER — MECLIZINE HCL 12.5 MG
12.5 TABLET ORAL EVERY 8 HOURS
Refills: 0 | Status: DISCONTINUED | OUTPATIENT
Start: 2024-04-16 | End: 2024-05-31

## 2024-04-16 RX ORDER — ATORVASTATIN CALCIUM 80 MG/1
10 TABLET, FILM COATED ORAL AT BEDTIME
Refills: 0 | Status: DISCONTINUED | OUTPATIENT
Start: 2024-04-16 | End: 2024-05-31

## 2024-04-16 RX ORDER — CLONAZEPAM 1 MG
1 TABLET ORAL
Qty: 0 | Refills: 0 | DISCHARGE
Start: 2024-04-16

## 2024-04-16 RX ORDER — PANTOPRAZOLE SODIUM 20 MG/1
40 TABLET, DELAYED RELEASE ORAL
Refills: 0 | Status: DISCONTINUED | OUTPATIENT
Start: 2024-04-16 | End: 2024-05-31

## 2024-04-16 RX ORDER — HALOPERIDOL DECANOATE 100 MG/ML
1 INJECTION INTRAMUSCULAR
Qty: 0 | Refills: 0 | DISCHARGE
Start: 2024-04-16

## 2024-04-16 RX ORDER — HYDROXYZINE HCL 10 MG
1 TABLET ORAL
Qty: 0 | Refills: 0 | DISCHARGE
Start: 2024-04-16

## 2024-04-16 RX ORDER — ACETAMINOPHEN 500 MG
650 TABLET ORAL EVERY 6 HOURS
Refills: 0 | Status: DISCONTINUED | OUTPATIENT
Start: 2024-04-16 | End: 2024-05-31

## 2024-04-16 RX ORDER — HYDROXYZINE HCL 10 MG
25 TABLET ORAL THREE TIMES A DAY
Refills: 0 | Status: DISCONTINUED | OUTPATIENT
Start: 2024-04-16 | End: 2024-04-17

## 2024-04-16 RX ORDER — CLONAZEPAM 1 MG
0.5 TABLET ORAL
Refills: 0 | Status: DISCONTINUED | OUTPATIENT
Start: 2024-04-16 | End: 2024-04-17

## 2024-04-16 RX ORDER — GABAPENTIN 400 MG/1
100 CAPSULE ORAL THREE TIMES A DAY
Refills: 0 | Status: DISCONTINUED | OUTPATIENT
Start: 2024-04-16 | End: 2024-05-21

## 2024-04-16 RX ORDER — LANOLIN ALCOHOL/MO/W.PET/CERES
3 CREAM (GRAM) TOPICAL AT BEDTIME
Refills: 0 | Status: DISCONTINUED | OUTPATIENT
Start: 2024-04-16 | End: 2024-05-20

## 2024-04-16 RX ORDER — HALOPERIDOL DECANOATE 100 MG/ML
0.5 INJECTION INTRAMUSCULAR ONCE
Refills: 0 | Status: DISCONTINUED | OUTPATIENT
Start: 2024-04-16 | End: 2024-05-31

## 2024-04-16 RX ORDER — METOPROLOL TARTRATE 50 MG
50 TABLET ORAL
Refills: 0 | Status: DISCONTINUED | OUTPATIENT
Start: 2024-04-16 | End: 2024-04-22

## 2024-04-16 RX ORDER — ONDANSETRON 8 MG/1
1 TABLET, FILM COATED ORAL
Qty: 0 | Refills: 0 | DISCHARGE
Start: 2024-04-16

## 2024-04-16 RX ORDER — MIRTAZAPINE 45 MG/1
15 TABLET, ORALLY DISINTEGRATING ORAL AT BEDTIME
Refills: 0 | Status: DISCONTINUED | OUTPATIENT
Start: 2024-04-16 | End: 2024-05-21

## 2024-04-16 RX ADMIN — Medication 25 MILLIGRAM(S): at 05:52

## 2024-04-16 RX ADMIN — NORTRIPTYLINE HYDROCHLORIDE 25 MILLIGRAM(S): 10 CAPSULE ORAL at 21:33

## 2024-04-16 RX ADMIN — ENOXAPARIN SODIUM 40 MILLIGRAM(S): 100 INJECTION SUBCUTANEOUS at 05:56

## 2024-04-16 RX ADMIN — ATORVASTATIN CALCIUM 10 MILLIGRAM(S): 80 TABLET, FILM COATED ORAL at 21:32

## 2024-04-16 RX ADMIN — PANTOPRAZOLE SODIUM 40 MILLIGRAM(S): 20 TABLET, DELAYED RELEASE ORAL at 05:56

## 2024-04-16 RX ADMIN — Medication 50 MILLIGRAM(S): at 17:45

## 2024-04-16 RX ADMIN — Medication 0.5 MILLIGRAM(S): at 18:01

## 2024-04-16 RX ADMIN — Medication 50 MILLIGRAM(S): at 05:53

## 2024-04-16 RX ADMIN — Medication 12.5 MILLIGRAM(S): at 13:07

## 2024-04-16 RX ADMIN — Medication 0.5 MILLIGRAM(S): at 05:53

## 2024-04-16 RX ADMIN — Medication 25 MILLIGRAM(S): at 09:56

## 2024-04-16 RX ADMIN — Medication 1 TABLET(S): at 13:08

## 2024-04-16 RX ADMIN — Medication 25 MILLIGRAM(S): at 21:32

## 2024-04-16 RX ADMIN — MIRTAZAPINE 15 MILLIGRAM(S): 45 TABLET, ORALLY DISINTEGRATING ORAL at 21:32

## 2024-04-16 RX ADMIN — CHLORHEXIDINE GLUCONATE 1 APPLICATION(S): 213 SOLUTION TOPICAL at 13:09

## 2024-04-16 RX ADMIN — Medication 3 MILLIGRAM(S): at 21:32

## 2024-04-16 NOTE — PROGRESS NOTE ADULT - PROVIDER SPECIALTY LIST ADULT
Cardiology
Cardiology
Internal Medicine
Cardiology
Cardiology
Internal Medicine

## 2024-04-16 NOTE — BH CONSULTATION LIAISON PROGRESS NOTE - ATTENDING COMMENTS
Chart reviewed. Discussed with resident. Agree with assessment/recs. Will continue to follow
Chart reviewed. Discussed with fellow. PRNs overnight for unclear version of agitation. Medically cleared today. Dispo still inpt psych on voluntary basis unless patient changes her mind. Agree with above assessment/recs. Will continue to follow
Chart reviewed. Pt d/w fellow. Agree with above assessment/recs. Will continue to follow.
Chart reviewed. Pt d/w with trainee. Agree with above assessment/recs. Will transfer to [facility] today.
Chart reviewed. Discussed with fellow. Agree with above assessment/recs. Will continue to follow

## 2024-04-16 NOTE — BH PATIENT PROFILE - HOME MEDICATIONS
hydrOXYzine hydrochloride 25 mg oral tablet , 1 tab(s) orally 3 times a day As needed Anxiety  clonazePAM 0.5 mg oral tablet , 1 tab(s) orally 2 times a day  haloperidol 0.5 mg oral tablet , 1 tab(s) orally every 6 hours As needed agitation  ondansetron 4 mg oral tablet, disintegrating , 1 tab(s) orally every 8 hours As needed Nausea and/or Vomiting  meclizine 12.5 mg oral tablet , 1 tab(s) orally every 8 hours As needed Dizziness  pregabalin 25 mg oral capsule , 1 cap(s) orally 3 times a day  gabapentin 100 mg oral capsule , 1 cap(s) orally 3 times a day As needed anxiety  nortriptyline 25 mg oral capsule , 1 cap(s) orally once a day  mirtazapine 7.5 mg oral tablet , 2 tab(s) orally once a day (at bedtime)  pantoprazole 40 mg oral delayed release tablet , 1 tab(s) orally once a day  Multiple Vitamins oral tablet , 1 tab(s) orally once a day  ocular lubricant ophthalmic solution , 1 drop(s) to each affected eye every 8 hours As needed dry eye  polyethylene glycol 3350 oral powder for reconstitution , 17 gram(s) orally 2 times a day As needed constipation  metoprolol tartrate 50 mg oral tablet , 1 tab(s) orally 2 times a day  atorvastatin 10 mg oral tablet , 1 tab(s) orally once a day (at bedtime)

## 2024-04-16 NOTE — PROGRESS NOTE ADULT - ASSESSMENT
M E D I C A L   A T T E N D I N G    F O L L O W    U P   N O T E  (04-16-24 )                                     ------------------------------------------------------------------------------------------------    patient evaluated by me, case discussed with team, chart, medications, and physical exam reviewed, labs / tests  and vitals reviewed by me, as bellow.   Patient is stable for discharge today. no new events reported otherwise   Patient to follow up with  Psych as outpatient   See discharge document for full note (discussed with ACP).  [Greater than 35 min spent for these services. ]          ( Note written / Date of service 04-16-24 ( This is certified to be the same as "ENTERED" date above ( for billing purposes)))    ==================>> MEDICATIONS <<====================    atorvastatin 10 milliGRAM(s) Oral at bedtime  chlorhexidine 2% Cloths 1 Application(s) Topical daily  clonazePAM  Tablet 0.5 milliGRAM(s) Oral two times a day  enoxaparin Injectable 40 milliGRAM(s) SubCutaneous every 24 hours  lidocaine   4% Patch 1 Patch Transdermal once  metoprolol tartrate 50 milliGRAM(s) Oral two times a day  mirtazapine 15 milliGRAM(s) Oral at bedtime  multivitamin 1 Tablet(s) Oral daily  nortriptyline 25 milliGRAM(s) Oral at bedtime  pantoprazole    Tablet 40 milliGRAM(s) Oral before breakfast  pregabalin 25 milliGRAM(s) Oral three times a day    MEDICATIONS  (PRN):  acetaminophen     Tablet .. 650 milliGRAM(s) Oral every 6 hours PRN Temp greater or equal to 38C (100.4F), Mild Pain (1 - 3)  aluminum hydroxide/magnesium hydroxide/simethicone Suspension 30 milliLiter(s) Oral every 4 hours PRN Dyspepsia  bisacodyl Suppository 10 milliGRAM(s) Rectal daily PRN Constipation  haloperidol     Tablet 0.5 milliGRAM(s) Oral every 6 hours PRN agitation  hydrOXYzine hydrochloride 25 milliGRAM(s) Oral three times a day PRN Anxiety  meclizine 12.5 milliGRAM(s) Oral every 8 hours PRN Dizziness  melatonin 3 milliGRAM(s) Oral at bedtime PRN Insomnia  ondansetron   Disintegrating Tablet 4 milliGRAM(s) Oral every 8 hours PRN Nausea and/or Vomiting  ondansetron Injectable 4 milliGRAM(s) IV Push every 8 hours PRN Nausea and/or Vomiting  polyethylene glycol 3350 17 Gram(s) Oral two times a day PRN constipation    ___________  Active diet:  Diet, Regular:   DASH/TLC Sodium & Cholesterol Restricted (DASH)  ___________________    ==================>> VITAL SIGNS <<==================    T(C): 36.4 (04-16-24 @ 11:20), Max: 36.4 (04-16-24 @ 11:20)  HR: 78 (04-16-24 @ 11:20) (73 - 83)  BP: 142/80 (04-16-24 @ 11:20) (108/64 - 142/80)  RR: 18 (04-16-24 @ 11:20) (18 - 18)  SpO2: 98% (04-16-24 @ 11:20) (96% - 98%)      ==================>> LAB AND IMAGING <<==================       no labs today        ( Note written / Date of service 04-16-24 ( This is certified to be the same as "ENTERED" date above ( for billing Purposes )))

## 2024-04-16 NOTE — BH CONSULTATION LIAISON PROGRESS NOTE - NSBHMSEAFFCONG_PSY_A_CORE
Patient here today for nurse visit blood pressure check. Smoking, allergies, medications and pharmacy reviewed with patient.     BP: 132/68    Please advise.  
Congruent

## 2024-04-16 NOTE — BH INPATIENT PSYCHIATRY ASSESSMENT NOTE - HPI (INCLUDE ILLNESS QUALITY, SEVERITY, DURATION, TIMING, CONTEXT, MODIFYING FACTORS, ASSOCIATED SIGNS AND SYMPTOMS)
73 yr old female, , domiciled, and retired. premorbidly ambulant (not needing assists) and iADL/ bADL independent. with background hx of MDD and ANSON; [facility] had prior SA by overdosing on pills (11/2019) following death of parents. has no reported hx of NSSIB, currently treated by outpatient psychiatrist Dr. Ramirez, she denied using any illicit substance use including alcohol.. per chart review, there was documented use of cannabis daily, pertinent medical issues include: HTN, hyperlipidemia, and GERD, brought in by  for anxiety, depression, somatic symptoms including dizziness, SI. Admitted to [facility], had w/u   TA H/N: Intracranial atherosclerosis cavernous and clinoid segments of the internal carotid arteries, mild to   moderate.  orthostatic Negative  MRI Negative as above  Possible vertigo, improving with   meclizine PRN > encouraged to continue       increase Oral hydration > doing it   -TTE as above  Patient medically cleared and transferred to [facility]            73 yr old female, , domiciled, and retired. premorbidly ambulant (not needing assists) and iADL/ bADL independent. with background hx of MDD and ANSON; has multiple psych admissions (most recently [facility]), had prior SA by overdosing on pills (11/2019) following death of parents. currently treated by outpatient psychiatrist Dr. Ramirez,  there was documented use of cannabis daily, pertinent medical issues include: HTN, hyperlipidemia, and GERD, brought in by  for anxiety, depression, somatic symptoms including dizziness, suicidal ideation (she now denies that, reports that "I only said I was exhausted and didn't want to keep going like this."  Admitted to [facility], had w/u   TA H/N: Intracranial atherosclerosis cavernous and clinoid segments of the internal carotid arteries, mild to   moderate.  orthostatic Negative  MRI Negative as above  Possible vertigo, improving with   meclizine PRN > encouraged to continue       increase Oral hydration > doing it   -TTE as above  Patient medically cleared and transferred to [facility]  on 9.13 after requesting voluntary admission.

## 2024-04-16 NOTE — BH CONSULTATION LIAISON PROGRESS NOTE - NSBHFUPINTERVALCCFT_PSY_A_CORE
"I feel better"
"I feel very anxious"
"I'm feeling depressed"
"I feel better"
"I feel a little better"
"Can you call my "
"I feel very anxious"

## 2024-04-16 NOTE — BH CONSULTATION LIAISON PROGRESS NOTE - NSBHATTESTBILLING_PSY_A_CORE
Non-billable
46782-Shezotqlta OBS or IP - moderate complexity OR 35-49 mins
10705-Ztynxstlzm OBS or IP - moderate complexity OR 35-49 mins
66571-Adgouprzil OBS or IP - moderate complexity OR 35-49 mins
Non-billable
74766-Bxyhpzdvnm OBS or IP - moderate complexity OR 35-49 mins
98696-Qkfwzrizdo OBS or IP - moderate complexity OR 35-49 mins

## 2024-04-16 NOTE — BH INPATIENT PSYCHIATRY ASSESSMENT NOTE - NSBHASSESSSUMMFT_PSY_ALL_CORE
73 yr old female, , domiciled, and retired. premorbidly ambulant (not needing assists) and iADL/ bADL independent. with background hx of MDD and ANSON; has multiple psych admissions [facility] (most recently Jan [facility]24 [facility] had prior SA by overdosing on pills (11/2019) following death of parents, presented to ED with somatic complaints and reports of ?passive suicidal ideation, had exhaustive w/u, now admitted voluntarily to [facility]  Routine checks, no suicidal ideations  Continue current meds

## 2024-04-16 NOTE — BH CONSULTATION LIAISON PROGRESS NOTE - NSBHCONSULTWORKUPEKGFT_PSY_ALL_CORE
Qtc interval

## 2024-04-16 NOTE — BH CONSULTATION LIAISON PROGRESS NOTE - NSBHROSSTATEMENT_PSY_A_CORE
08/31/20      Dolly Schwarz  [address] [address]    Dear Dolly,    We look forward to seeing you on 9/1/20 at 10:15 with a 9:15 arrival for your procedure.    To better prepare you, please observe the following:     Preparing for GI Procedure  ? Please schedule an appointment within 30 days of surgery with your primary care doctor for a history and physical. They will perform any required labs or tests before surgery. If your doctor is not an [facility] physician, please ask them to fax the H&P and test results to [phone].      You will receive a call from our Pre-Admission Testing department prior to your surgical procedure. This call is necessary to get important information about your health history, to ensure you are properly prepared for surgery, and minimize the chance of having your procedure delayed or rescheduled.    WHAT TO DO ABOUT YOUR PRESCRIPTION MEDICATIONS  You must continue taking all your previously prescribed medications as directed unless specifically told not to by your doctor, or if you find them on the list below    STOP THESE MEDICATIONS   • Aspirin stop 3 days prior unless instructed otherwise by your doctor or Cardiologist.   • Erectile dysfunction medications stop 2 days prior    • Herbal medications/ Vitamins/ Fish Oil stop 7 days prior unless otherwise directed by your doctor.    • Phentermine stop 7 days prior   • Selegiline patches stop 21 days prior   Your doctor will have given you instructions about modifying your Insulin regimen 1 day prior.  If on a blood thinner, ask your doctor, Cardiologist and/or surgeon if and when it should be stopped.    You will be contacted by phone or email by a company called [employer]. This is an online educational platform that will provide education regarding your procedure. Please take the time to review this video.    Day before Procedure  Please follow any specific instructions given to you regarding any prep needed related 
to your procedure.    Day of Procedure  Take your normal morning medications with a sip of water first thing in the morning prior to surgery, except those medications your doctor or our Pre-Admission Testing office has specifically told you not to take.     If you choose, for your convenience we have an outpatient pharmacy on-site and can arrange to have your discharge prescription filled before you leave, to save you a trip. You may want to bring enough money to cover the cost of this prescription.      Each patient that comes through the GI department is given individualized care and attention. The doctors and nurses spend the time necessary with each patient to ensure the best care. Sometimes, this can cause delays. You may want to bring a book, puzzle or music player to keep you busy if you experience a delay.       Procedures usually involve giving medications that put you to sleep or ease anxiety. For this reason, you must have a ride home from the hospital and someone (over age 16) should stay with you for the first 24 hours after your procedure. The procedure will be canceled if you do not have a responsible person with you. You are not allowed to drive after receiving sedation of any type.     For the comfort and privacy of all our patients before and after your procedure, we strongly discourage bringing small children as guests and only allow two adult visitors in the procedural area at any given time.     Bring a photo ID, your insurance card and any copay due at time of service. Leave all valuables at home. Do not wear jewelry, makeup, body lotion, or fingernail polish.        If you have any questions or concerns before the day of your procedure, please call [phone] to have them answered.     After your procedure you may get a survey in the mail. Please take the opportunity to fill it out letting us know what we did well or what we could improve on. Our goal is to provide our patients with the best 
possible care and we couldn’t do it without your input.     We look forward to seeing you,  Your GI Services Team  
.

## 2024-04-16 NOTE — BH INPATIENT PSYCHIATRY ASSESSMENT NOTE - MSE UNSTRUCTURED FT
Patient is awake and alert. Irritable and slightly sarcastic, "I don't like you, I only like Dr. Washington." Speech fluent. TP logical and coherent. No delusions. No current dizziness. Denies suicidal ideations.

## 2024-04-16 NOTE — DISCHARGE NOTE NURSING/CASE MANAGEMENT/SOCIAL WORK - PATIENT PORTAL LINK FT
You can access the FollowMyHealth Patient Portal offered by [facility] by registering at the following website: [email or web address]. By joining [facility]’s FollowMyHealth portal, you will also be able to view your health information using other applications (apps) compatible with our system.

## 2024-04-16 NOTE — BH INPATIENT PSYCHIATRY ASSESSMENT NOTE - NSBHMETABOLIC_PSY_ALL_CORE_FT
BMI: BMI (kg/m2): 38.3 (04-05-24 @ 17:30)  HbA1c: A1C with Estimated Average Glucose Result: 5.8 % (01-11-24 @ 08:00)    Glucose:   BP: --Vital Signs Last 24 Hrs  T(C): 36.7 (04-16-24 @ 17:30), Max: 36.7 (04-16-24 @ 17:30)  T(F): 98 (04-16-24 @ 17:30), Max: 98 (04-16-24 @ 17:30)  HR: 70 (04-16-24 @ 17:30) (70 - 83)  BP: 138/64 (04-16-24 @ 17:30) (108/64 - 142/80)  BP(mean): --  RR: 18 (04-16-24 @ 17:30) (18 - 18)  SpO2: 99% (04-16-24 @ 17:30) (96% - 99%)      Lipid Panel: Date/Time: 04-05-24 @ 05:56  Cholesterol, Serum: 176  LDL Cholesterol Calculated: 97  HDL Cholesterol, Serum: 39  Total Cholesterol/HDL Ration Measurement: --  Triglycerides, Serum: 200   BMI: BMI (kg/m2): 38.3 (04-05-24 @ 17:30)  HbA1c: A1C with Estimated Average Glucose Result: 5.8 % (01-11-24 @ 08:00)    Glucose:   BP: --Vital Signs Last 24 Hrs  T(C): 36.5 (04-16-24 @ 19:07), Max: 36.7 (04-16-24 @ 17:30)  T(F): 97.7 (04-16-24 @ 19:07), Max: 98 (04-16-24 @ 17:30)  HR: 70 (04-16-24 @ 17:30) (70 - 83)  BP: 138/64 (04-16-24 @ 17:30) (108/64 - 142/80)  BP(mean): --  RR: 18 (04-16-24 @ 17:30) (18 - 18)  SpO2: 95% (04-16-24 @ 19:07) (95% - 99%)    Orthostatic VS  04-16-24 @ 19:07  Lying BP: --/-- HR: --  Sitting BP: 127/67 HR: 77  Standing BP: 126/66 HR: 78  Site: --  Mode: --    Lipid Panel: Date/Time: 04-05-24 @ 05:56  Cholesterol, Serum: 176  LDL Cholesterol Calculated: 97  HDL Cholesterol, Serum: 39  Total Cholesterol/HDL Ration Measurement: --  Triglycerides, Serum: 200

## 2024-04-16 NOTE — BH CONSULTATION LIAISON PROGRESS NOTE - NSICDXBHPRIMARYDX_PSY_ALL_CORE
ANSON (generalized anxiety disorder)   F41.1  

## 2024-04-16 NOTE — PROGRESS NOTE ADULT - REASON FOR ADMISSION
dizziness, anxiety

## 2024-04-16 NOTE — BH INPATIENT PSYCHIATRY ASSESSMENT NOTE - DESCRIPTION
retired (),  for 28 yrs; has no children. is a non-caregiver status living in a private home in [place] with her . not [employer]. no access to guns

## 2024-04-16 NOTE — BH CONSULTATION LIAISON PROGRESS NOTE - CURRENT MEDICATION
MEDICATIONS  (STANDING):  atorvastatin 10 milliGRAM(s) Oral at bedtime  chlorhexidine 2% Cloths 1 Application(s) Topical daily  clonazePAM  Tablet 0.5 milliGRAM(s) Oral two times a day  enoxaparin Injectable 40 milliGRAM(s) SubCutaneous every 24 hours  metoprolol tartrate 50 milliGRAM(s) Oral two times a day  mirtazapine 15 milliGRAM(s) Oral at bedtime  multivitamin 1 Tablet(s) Oral daily  nortriptyline 25 milliGRAM(s) Oral at bedtime  pantoprazole    Tablet 40 milliGRAM(s) Oral before breakfast  pregabalin 25 milliGRAM(s) Oral three times a day    MEDICATIONS  (PRN):  acetaminophen     Tablet .. 650 milliGRAM(s) Oral every 6 hours PRN Temp greater or equal to 38C (100.4F), Mild Pain (1 - 3)  aluminum hydroxide/magnesium hydroxide/simethicone Suspension 30 milliLiter(s) Oral every 4 hours PRN Dyspepsia  bisacodyl Suppository 10 milliGRAM(s) Rectal daily PRN Constipation  gabapentin 100 milliGRAM(s) Oral three times a day PRN anxiety  haloperidol     Tablet 0.5 milliGRAM(s) Oral every 6 hours PRN anxiety  meclizine 12.5 milliGRAM(s) Oral every 8 hours PRN Dizziness  melatonin 3 milliGRAM(s) Oral at bedtime PRN Insomnia  ondansetron Injectable 4 milliGRAM(s) IV Push every 8 hours PRN Nausea and/or Vomiting  polyethylene glycol 3350 17 Gram(s) Oral two times a day PRN constipation  
MEDICATIONS  (STANDING):  atorvastatin 10 milliGRAM(s) Oral at bedtime  clonazePAM  Tablet 0.5 milliGRAM(s) Oral two times a day  enoxaparin Injectable 40 milliGRAM(s) SubCutaneous every 24 hours  lactulose Syrup 20 Gram(s) Oral once  metoprolol tartrate 50 milliGRAM(s) Oral two times a day  mirtazapine 15 milliGRAM(s) Oral at bedtime  multivitamin 1 Tablet(s) Oral daily  nortriptyline 25 milliGRAM(s) Oral at bedtime  pantoprazole    Tablet 40 milliGRAM(s) Oral before breakfast  pregabalin 25 milliGRAM(s) Oral three times a day    MEDICATIONS  (PRN):  acetaminophen     Tablet .. 650 milliGRAM(s) Oral every 6 hours PRN Temp greater or equal to 38C (100.4F), Mild Pain (1 - 3)  aluminum hydroxide/magnesium hydroxide/simethicone Suspension 30 milliLiter(s) Oral every 4 hours PRN Dyspepsia  bisacodyl Suppository 10 milliGRAM(s) Rectal daily PRN Constipation  gabapentin 100 milliGRAM(s) Oral three times a day PRN anxiety  melatonin 3 milliGRAM(s) Oral at bedtime PRN Insomnia  ondansetron Injectable 4 milliGRAM(s) IV Push every 8 hours PRN Nausea and/or Vomiting  polyethylene glycol 3350 17 Gram(s) Oral two times a day PRN constipation  
MEDICATIONS  (STANDING):  atorvastatin 10 milliGRAM(s) Oral at bedtime  chlorhexidine 2% Cloths 1 Application(s) Topical daily  clonazePAM  Tablet 0.5 milliGRAM(s) Oral two times a day  enoxaparin Injectable 40 milliGRAM(s) SubCutaneous every 24 hours  lidocaine   4% Patch 1 Patch Transdermal once  metoprolol tartrate 50 milliGRAM(s) Oral two times a day  mirtazapine 15 milliGRAM(s) Oral at bedtime  multivitamin 1 Tablet(s) Oral daily  nortriptyline 25 milliGRAM(s) Oral at bedtime  pantoprazole    Tablet 40 milliGRAM(s) Oral before breakfast  pregabalin 25 milliGRAM(s) Oral three times a day    MEDICATIONS  (PRN):  acetaminophen     Tablet .. 650 milliGRAM(s) Oral every 6 hours PRN Temp greater or equal to 38C (100.4F), Mild Pain (1 - 3)  aluminum hydroxide/magnesium hydroxide/simethicone Suspension 30 milliLiter(s) Oral every 4 hours PRN Dyspepsia  bisacodyl Suppository 10 milliGRAM(s) Rectal daily PRN Constipation  gabapentin 100 milliGRAM(s) Oral three times a day PRN anxiety  haloperidol     Tablet 0.5 milliGRAM(s) Oral every 6 hours PRN anxiety  meclizine 12.5 milliGRAM(s) Oral every 8 hours PRN Dizziness  melatonin 3 milliGRAM(s) Oral at bedtime PRN Insomnia  ondansetron Injectable 4 milliGRAM(s) IV Push every 8 hours PRN Nausea and/or Vomiting  polyethylene glycol 3350 17 Gram(s) Oral two times a day PRN constipation  
MEDICATIONS  (STANDING):  atorvastatin 10 milliGRAM(s) Oral at bedtime  chlorhexidine 2% Cloths 1 Application(s) Topical daily  clonazePAM  Tablet 0.5 milliGRAM(s) Oral two times a day  enoxaparin Injectable 40 milliGRAM(s) SubCutaneous every 24 hours  lidocaine   4% Patch 1 Patch Transdermal once  metoprolol tartrate 50 milliGRAM(s) Oral two times a day  mirtazapine 15 milliGRAM(s) Oral at bedtime  multivitamin 1 Tablet(s) Oral daily  nortriptyline 25 milliGRAM(s) Oral at bedtime  pantoprazole    Tablet 40 milliGRAM(s) Oral before breakfast  pregabalin 25 milliGRAM(s) Oral three times a day    MEDICATIONS  (PRN):  acetaminophen     Tablet .. 650 milliGRAM(s) Oral every 6 hours PRN Temp greater or equal to 38C (100.4F), Mild Pain (1 - 3)  aluminum hydroxide/magnesium hydroxide/simethicone Suspension 30 milliLiter(s) Oral every 4 hours PRN Dyspepsia  bisacodyl Suppository 10 milliGRAM(s) Rectal daily PRN Constipation  gabapentin 100 milliGRAM(s) Oral three times a day PRN anxiety  haloperidol     Tablet 0.5 milliGRAM(s) Oral every 6 hours PRN anxiety  meclizine 12.5 milliGRAM(s) Oral every 8 hours PRN Dizziness  melatonin 3 milliGRAM(s) Oral at bedtime PRN Insomnia  ondansetron Injectable 4 milliGRAM(s) IV Push every 8 hours PRN Nausea and/or Vomiting  polyethylene glycol 3350 17 Gram(s) Oral two times a day PRN constipation  
MEDICATIONS  (STANDING):  atorvastatin 10 milliGRAM(s) Oral at bedtime  clonazePAM  Tablet 0.5 milliGRAM(s) Oral two times a day  enoxaparin Injectable 40 milliGRAM(s) SubCutaneous every 24 hours  metoprolol tartrate 50 milliGRAM(s) Oral two times a day  mirtazapine 15 milliGRAM(s) Oral at bedtime  multivitamin 1 Tablet(s) Oral daily  nortriptyline 25 milliGRAM(s) Oral at bedtime  pantoprazole    Tablet 40 milliGRAM(s) Oral before breakfast  pregabalin 25 milliGRAM(s) Oral three times a day    MEDICATIONS  (PRN):  acetaminophen     Tablet .. 650 milliGRAM(s) Oral every 6 hours PRN Temp greater or equal to 38C (100.4F), Mild Pain (1 - 3)  aluminum hydroxide/magnesium hydroxide/simethicone Suspension 30 milliLiter(s) Oral every 4 hours PRN Dyspepsia  bisacodyl Suppository 10 milliGRAM(s) Rectal daily PRN Constipation  gabapentin 100 milliGRAM(s) Oral three times a day PRN anxiety  melatonin 3 milliGRAM(s) Oral at bedtime PRN Insomnia  ondansetron Injectable 4 milliGRAM(s) IV Push every 8 hours PRN Nausea and/or Vomiting  polyethylene glycol 3350 17 Gram(s) Oral two times a day PRN constipation  
MEDICATIONS  (STANDING):  atorvastatin 10 milliGRAM(s) Oral at bedtime  chlorhexidine 2% Cloths 1 Application(s) Topical daily  clonazePAM  Tablet 0.5 milliGRAM(s) Oral two times a day  enoxaparin Injectable 40 milliGRAM(s) SubCutaneous every 24 hours  lidocaine   4% Patch 1 Patch Transdermal once  metoprolol tartrate 50 milliGRAM(s) Oral two times a day  mirtazapine 15 milliGRAM(s) Oral at bedtime  multivitamin 1 Tablet(s) Oral daily  nortriptyline 25 milliGRAM(s) Oral at bedtime  pantoprazole    Tablet 40 milliGRAM(s) Oral before breakfast    MEDICATIONS  (PRN):  acetaminophen     Tablet .. 650 milliGRAM(s) Oral every 6 hours PRN Temp greater or equal to 38C (100.4F), Mild Pain (1 - 3)  aluminum hydroxide/magnesium hydroxide/simethicone Suspension 30 milliLiter(s) Oral every 4 hours PRN Dyspepsia  bisacodyl Suppository 10 milliGRAM(s) Rectal daily PRN Constipation  gabapentin 100 milliGRAM(s) Oral three times a day PRN anxiety  haloperidol     Tablet 0.5 milliGRAM(s) Oral every 6 hours PRN agitation  hydrOXYzine hydrochloride 25 milliGRAM(s) Oral three times a day PRN Anxiety  meclizine 12.5 milliGRAM(s) Oral every 8 hours PRN Dizziness  melatonin 3 milliGRAM(s) Oral at bedtime PRN Insomnia  ondansetron Injectable 4 milliGRAM(s) IV Push every 8 hours PRN Nausea and/or Vomiting  polyethylene glycol 3350 17 Gram(s) Oral two times a day PRN constipation  
MEDICATIONS  (STANDING):  atorvastatin 10 milliGRAM(s) Oral at bedtime  chlorhexidine 2% Cloths 1 Application(s) Topical daily  clonazePAM  Tablet 0.5 milliGRAM(s) Oral two times a day  enoxaparin Injectable 40 milliGRAM(s) SubCutaneous every 24 hours  lidocaine   4% Patch 1 Patch Transdermal once  metoprolol tartrate 50 milliGRAM(s) Oral two times a day  mirtazapine 15 milliGRAM(s) Oral at bedtime  multivitamin 1 Tablet(s) Oral daily  nortriptyline 25 milliGRAM(s) Oral at bedtime  pantoprazole    Tablet 40 milliGRAM(s) Oral before breakfast  pregabalin 25 milliGRAM(s) Oral three times a day    MEDICATIONS  (PRN):  acetaminophen     Tablet .. 650 milliGRAM(s) Oral every 6 hours PRN Temp greater or equal to 38C (100.4F), Mild Pain (1 - 3)  aluminum hydroxide/magnesium hydroxide/simethicone Suspension 30 milliLiter(s) Oral every 4 hours PRN Dyspepsia  bisacodyl Suppository 10 milliGRAM(s) Rectal daily PRN Constipation  haloperidol     Tablet 0.5 milliGRAM(s) Oral every 6 hours PRN agitation  hydrOXYzine hydrochloride 25 milliGRAM(s) Oral three times a day PRN Anxiety  meclizine 12.5 milliGRAM(s) Oral every 8 hours PRN Dizziness  melatonin 3 milliGRAM(s) Oral at bedtime PRN Insomnia  ondansetron   Disintegrating Tablet 4 milliGRAM(s) Oral every 8 hours PRN Nausea and/or Vomiting  ondansetron Injectable 4 milliGRAM(s) IV Push every 8 hours PRN Nausea and/or Vomiting  polyethylene glycol 3350 17 Gram(s) Oral two times a day PRN constipation  
MEDICATIONS  (STANDING):  atorvastatin 10 milliGRAM(s) Oral at bedtime  chlorhexidine 2% Cloths 1 Application(s) Topical daily  clonazePAM  Tablet 0.5 milliGRAM(s) Oral two times a day  enoxaparin Injectable 40 milliGRAM(s) SubCutaneous every 24 hours  lidocaine   4% Patch 1 Patch Transdermal once  metoprolol tartrate 50 milliGRAM(s) Oral two times a day  mirtazapine 15 milliGRAM(s) Oral at bedtime  multivitamin 1 Tablet(s) Oral daily  nortriptyline 25 milliGRAM(s) Oral at bedtime  pantoprazole    Tablet 40 milliGRAM(s) Oral before breakfast  pregabalin 25 milliGRAM(s) Oral three times a day    MEDICATIONS  (PRN):  acetaminophen     Tablet .. 650 milliGRAM(s) Oral every 6 hours PRN Temp greater or equal to 38C (100.4F), Mild Pain (1 - 3)  aluminum hydroxide/magnesium hydroxide/simethicone Suspension 30 milliLiter(s) Oral every 4 hours PRN Dyspepsia  bisacodyl Suppository 10 milliGRAM(s) Rectal daily PRN Constipation  haloperidol     Tablet 0.5 milliGRAM(s) Oral every 6 hours PRN agitation  hydrOXYzine hydrochloride 25 milliGRAM(s) Oral three times a day PRN Anxiety  meclizine 12.5 milliGRAM(s) Oral every 8 hours PRN Dizziness  melatonin 3 milliGRAM(s) Oral at bedtime PRN Insomnia  ondansetron   Disintegrating Tablet 4 milliGRAM(s) Oral every 8 hours PRN Nausea and/or Vomiting  ondansetron Injectable 4 milliGRAM(s) IV Push every 8 hours PRN Nausea and/or Vomiting  polyethylene glycol 3350 17 Gram(s) Oral two times a day PRN constipation  
MEDICATIONS  (STANDING):  atorvastatin 10 milliGRAM(s) Oral at bedtime  chlorhexidine 2% Cloths 1 Application(s) Topical daily  clonazePAM  Tablet 0.5 milliGRAM(s) Oral two times a day  enoxaparin Injectable 40 milliGRAM(s) SubCutaneous every 24 hours  lidocaine   4% Patch 1 Patch Transdermal once  metoprolol tartrate 50 milliGRAM(s) Oral two times a day  mirtazapine 15 milliGRAM(s) Oral at bedtime  multivitamin 1 Tablet(s) Oral daily  nortriptyline 25 milliGRAM(s) Oral at bedtime  pantoprazole    Tablet 40 milliGRAM(s) Oral before breakfast  pregabalin 25 milliGRAM(s) Oral three times a day    MEDICATIONS  (PRN):  acetaminophen     Tablet .. 650 milliGRAM(s) Oral every 6 hours PRN Temp greater or equal to 38C (100.4F), Mild Pain (1 - 3)  aluminum hydroxide/magnesium hydroxide/simethicone Suspension 30 milliLiter(s) Oral every 4 hours PRN Dyspepsia  bisacodyl Suppository 10 milliGRAM(s) Rectal daily PRN Constipation  gabapentin 100 milliGRAM(s) Oral three times a day PRN anxiety  haloperidol     Tablet 0.5 milliGRAM(s) Oral every 6 hours PRN agitation  hydrOXYzine hydrochloride 25 milliGRAM(s) Oral three times a day PRN Anxiety  meclizine 12.5 milliGRAM(s) Oral every 8 hours PRN Dizziness  melatonin 3 milliGRAM(s) Oral at bedtime PRN Insomnia  ondansetron Injectable 4 milliGRAM(s) IV Push every 8 hours PRN Nausea and/or Vomiting  polyethylene glycol 3350 17 Gram(s) Oral two times a day PRN constipation

## 2024-04-16 NOTE — BH CONSULTATION LIAISON PROGRESS NOTE - NSBHATTESTTYPEVISIT_PSY_A_CORE
Resident/Fellow with telephonic supervision
Attending with Resident/Fellow/Student
Attending with Resident/Fellow/Student
Resident/Fellow with telephonic supervision
Attending with Resident/Fellow/Student
Attending with Resident/Fellow/Student

## 2024-04-16 NOTE — BH INPATIENT PSYCHIATRY ASSESSMENT NOTE - CURRENT MEDICATION
MEDICATIONS  (STANDING):    MEDICATIONS  (PRN):   MEDICATIONS  (STANDING):  atorvastatin 10 milliGRAM(s) Oral at bedtime  clonazePAM  Tablet 0.5 milliGRAM(s) Oral two times a day  lidocaine   4% Patch 1 Patch Transdermal once  melatonin. 3 milliGRAM(s) Oral at bedtime  metoprolol tartrate 50 milliGRAM(s) Oral two times a day  mirtazapine 15 milliGRAM(s) Oral at bedtime  multivitamin 1 Tablet(s) Oral daily  nortriptyline 25 milliGRAM(s) Oral at bedtime  pantoprazole    Tablet 40 milliGRAM(s) Oral before breakfast  pregabalin 25 milliGRAM(s) Oral three times a day    MEDICATIONS  (PRN):  acetaminophen     Tablet .. 650 milliGRAM(s) Oral every 6 hours PRN Temp greater or equal to 38C (100.4F), Mild Pain (1 - 3)  aluminum hydroxide/magnesium hydroxide/simethicone Suspension 30 milliLiter(s) Oral every 4 hours PRN Dyspepsia  bisacodyl Suppository 10 milliGRAM(s) Rectal daily PRN Constipation  gabapentin 100 milliGRAM(s) Oral three times a day PRN anxiety  haloperidol     Tablet 0.5 milliGRAM(s) Oral every 6 hours PRN agitation  haloperidol    Injectable 0.5 milliGRAM(s) IntraMuscular once PRN severe agitation  hydrOXYzine hydrochloride 25 milliGRAM(s) Oral three times a day PRN Anxiety  LORazepam     Tablet 0.5 milliGRAM(s) Oral every 4 hours PRN anxiety  LORazepam   Injectable 0.5 milliGRAM(s) IntraMuscular once PRN severe anxiety/agitation  meclizine 12.5 milliGRAM(s) Oral every 8 hours PRN Dizziness  ondansetron   Disintegrating Tablet 4 milliGRAM(s) Oral every 8 hours PRN Nausea and/or Vomiting  polyethylene glycol 3350 17 Gram(s) Oral two times a day PRN constipation

## 2024-04-16 NOTE — PROGRESS NOTE ADULT - SUBJECTIVE AND OBJECTIVE BOX
Cardiovascular Disease Progress Note    Overnight events: No acute events overnight.  no new cardiac sx  Otherwise review of systems negative    Objective Findings:  T(C): 35.7 (04-06-24 @ 04:55), Max: 36.9 (04-05-24 @ 10:40)  HR: 85 (04-06-24 @ 05:50) (74 - 99)  BP: 132/62 (04-06-24 @ 05:50) (114/70 - 141/84)  RR: 19 (04-06-24 @ 04:55) (18 - 21)  SpO2: 100% (04-06-24 @ 04:55) (99% - 100%)  Wt(kg): --  Daily Height in cm: 157.48 (05 Apr 2024 17:30)    Daily       Physical Exam:  Gen: NAD  HEENT: EOMI  CV: RRR, normal S1 + S2, no m/r/g  Lungs: CTAB  Abd: soft, non-tender  Ext: No edema    Telemetry:    Laboratory Data:                        13.8   9.10  )-----------( 217      ( 06 Apr 2024 06:30 )             43.5     04-06    139  |  104  |  7   ----------------------------<  130<H>  3.7   |  23  |  0.67    Ca    9.5      06 Apr 2024 06:30  Phos  4.3     04-06  Mg     2.20     04-06                Inpatient Medications:  MEDICATIONS  (STANDING):  atorvastatin 10 milliGRAM(s) Oral at bedtime  clonazePAM  Tablet 0.5 milliGRAM(s) Oral two times a day  enoxaparin Injectable 40 milliGRAM(s) SubCutaneous every 24 hours  metoprolol tartrate 50 milliGRAM(s) Oral two times a day  mirtazapine 15 milliGRAM(s) Oral at bedtime  multivitamin 1 Tablet(s) Oral daily  nortriptyline 25 milliGRAM(s) Oral at bedtime  pantoprazole    Tablet 40 milliGRAM(s) Oral before breakfast  pregabalin 25 milliGRAM(s) Oral three times a day      Assessment:  73y F w/ PMHx significant for anxiety, depression, HTN, and HLD presents for dizziness    Recs:  cardiac stable  no e/o acs or chf  cw tele monitoring  neuro follow up  s/p cta head/neck - mild to mod intracranial atherosclerosis  s/p brain mri --> normal  resume antihypertensives and beta blockers  asa and high intensity statin  echo (tds) --> wnl            Over 25 minutes spent on total encounter; more than 50% of the visit was spent counseling and/or coordinating care by the attending physician.      Herb Grover MD   Cardiovascular Disease  [phone]
Cardiovascular Disease Progress Note    Overnight events: No acute events overnight.  no new cardiac sx  Otherwise review of systems negative    Objective Findings:  T(C): 36.3 (04-16-24 @ 05:40), Max: 36.7 (04-15-24 @ 11:15)  HR: 83 (04-16-24 @ 05:40) (73 - 83)  BP: 116/59 (04-16-24 @ 05:40) (108/64 - 123/75)  RR: 18 (04-16-24 @ 05:40) (18 - 18)  SpO2: 97% (04-16-24 @ 05:40) (96% - 97%)  Wt(kg): --  Daily     Daily       Physical Exam:  Gen: NAD  HEENT: EOMI  CV: RRR, normal S1 + S2, no m/r/g  Lungs: CTAB  Abd: soft, non-tender  Ext: No edema    Telemetry:    Laboratory Data:                    Inpatient Medications:  MEDICATIONS  (STANDING):  atorvastatin 10 milliGRAM(s) Oral at bedtime  chlorhexidine 2% Cloths 1 Application(s) Topical daily  clonazePAM  Tablet 0.5 milliGRAM(s) Oral two times a day  enoxaparin Injectable 40 milliGRAM(s) SubCutaneous every 24 hours  lidocaine   4% Patch 1 Patch Transdermal once  metoprolol tartrate 50 milliGRAM(s) Oral two times a day  mirtazapine 15 milliGRAM(s) Oral at bedtime  multivitamin 1 Tablet(s) Oral daily  nortriptyline 25 milliGRAM(s) Oral at bedtime  pantoprazole    Tablet 40 milliGRAM(s) Oral before breakfast  pregabalin 25 milliGRAM(s) Oral three times a day      Assessment:  73y F w/ PMHx significant for anxiety, depression, HTN, and HLD presents for dizziness    Recs:  cardiac stable  no e/o acs or chf  cw tele monitoring  neuro follow up  s/p cta head/neck - mild to mod intracranial atherosclerosis. remains on statin  s/p brain mri --> normal  cw antihypertensives and beta blockers. bp stable  asa and high intensity statin  echo (tds) --> wnl  consider op vestibular therapy  dcp        Over 25 minutes spent on total encounter; more than 50% of the visit was spent counseling and/or coordinating care by the attending physician.      Herb Grover MD   Cardiovascular Disease  [phone]
Cardiovascular Disease Progress Note    Overnight events: No acute events overnight.  no new cardiac sx  Otherwise review of systems negative    Objective Findings:  T(C): 36.8 (04-08-24 @ 06:31), Max: 36.8 (04-08-24 @ 06:31)  HR: 84 (04-08-24 @ 06:31) (83 - 85)  BP: 121/59 (04-08-24 @ 06:31) (121/59 - 146/80)  RR: 16 (04-08-24 @ 06:31) (16 - 18)  SpO2: 97% (04-08-24 @ 06:31) (96% - 100%)  Wt(kg): --  Daily     Daily       Physical Exam:  Gen: NAD  HEENT: EOMI  CV: RRR, normal S1 + S2, no m/r/g  Lungs: CTAB  Abd: soft, non-tender  Ext: No edema    Telemetry:    Laboratory Data:                        13.9   7.11  )-----------( 205      ( 07 Apr 2024 04:34 )             43.5     04-07    141  |  105  |  8   ----------------------------<  115<H>  3.8   |  23  |  0.72    Ca    9.3      07 Apr 2024 04:34  Phos  4.3     04-07  Mg     2.20     04-07                Inpatient Medications:  MEDICATIONS  (STANDING):  atorvastatin 10 milliGRAM(s) Oral at bedtime  clonazePAM  Tablet 0.5 milliGRAM(s) Oral two times a day  enoxaparin Injectable 40 milliGRAM(s) SubCutaneous every 24 hours  metoprolol tartrate 50 milliGRAM(s) Oral two times a day  mirtazapine 15 milliGRAM(s) Oral at bedtime  multivitamin 1 Tablet(s) Oral daily  nortriptyline 25 milliGRAM(s) Oral at bedtime  pantoprazole    Tablet 40 milliGRAM(s) Oral before breakfast  pregabalin 25 milliGRAM(s) Oral three times a day      Assessment:  73y F w/ PMHx significant for anxiety, depression, HTN, and HLD presents for dizziness    Recs:  cardiac stable  no e/o acs or chf  cw tele monitoring  neuro follow up  s/p cta head/neck - mild to mod intracranial atherosclerosis  s/p brain mri --> normal  cw antihypertensives and beta blockers. bp stable  asa and high intensity statin  echo (tds) --> wnl    Over 25 minutes spent on total encounter; more than 50% of the visit was spent counseling and/or coordinating care by the attending physician.      Herb Grover MD   Cardiovascular Disease  [phone]
Date of Service  : 04-13-24     INTERVAL HPI/OVERNIGHT EVENTS: No new concerns.  Vital Signs Last 24 Hrs  T(C): 37.1 (13 Apr 2024 06:20), Max: 37.1 (13 Apr 2024 06:20)  T(F): 98.7 (13 Apr 2024 06:20), Max: 98.7 (13 Apr 2024 06:20)  HR: 70 (13 Apr 2024 06:20) (70 - 90)  BP: 127/63 (13 Apr 2024 06:20) (127/63 - 146/69)  BP(mean): --  RR: 18 (13 Apr 2024 06:20) (18 - 18)  SpO2: 100% (13 Apr 2024 06:20) (97% - 100%)    Parameters below as of 13 Apr 2024 06:20  Patient On (Oxygen Delivery Method): room air      I&O's Summary    MEDICATIONS  (STANDING):  atorvastatin 10 milliGRAM(s) Oral at bedtime  chlorhexidine 2% Cloths 1 Application(s) Topical daily  clonazePAM  Tablet 0.5 milliGRAM(s) Oral two times a day  enoxaparin Injectable 40 milliGRAM(s) SubCutaneous every 24 hours  lidocaine   4% Patch 1 Patch Transdermal once  metoprolol tartrate 50 milliGRAM(s) Oral two times a day  mirtazapine 15 milliGRAM(s) Oral at bedtime  multivitamin 1 Tablet(s) Oral daily  nortriptyline 25 milliGRAM(s) Oral at bedtime  pantoprazole    Tablet 40 milliGRAM(s) Oral before breakfast  pregabalin 25 milliGRAM(s) Oral three times a day    MEDICATIONS  (PRN):  acetaminophen     Tablet .. 650 milliGRAM(s) Oral every 6 hours PRN Temp greater or equal to 38C (100.4F), Mild Pain (1 - 3)  aluminum hydroxide/magnesium hydroxide/simethicone Suspension 30 milliLiter(s) Oral every 4 hours PRN Dyspepsia  bisacodyl Suppository 10 milliGRAM(s) Rectal daily PRN Constipation  haloperidol     Tablet 0.5 milliGRAM(s) Oral every 6 hours PRN agitation  hydrOXYzine hydrochloride 25 milliGRAM(s) Oral three times a day PRN Anxiety  meclizine 12.5 milliGRAM(s) Oral every 8 hours PRN Dizziness  melatonin 3 milliGRAM(s) Oral at bedtime PRN Insomnia  ondansetron Injectable 4 milliGRAM(s) IV Push every 8 hours PRN Nausea and/or Vomiting  polyethylene glycol 3350 17 Gram(s) Oral two times a day PRN constipation    LABS:              CAPILLARY BLOOD GLUCOSE              REVIEW OF SYSTEMS:  CONSTITUTIONAL: No fever, weight loss, or fatigue  EYES: No eye pain, visual disturbances, or discharge  ENMT:  No difficulty hearing, tinnitus, vertigo; No sinus or throat pain  NECK: No pain or stiffness  RESPIRATORY: No cough, wheezing, chills or hemoptysis; No shortness of breath  CARDIOVASCULAR: No chest pain, palpitations, dizziness, or leg swelling  GASTROINTESTINAL: No abdominal or epigastric pain. No nausea, vomiting, or hematemesis; No diarrhea or constipation. No melena or hematochezia.  GENITOURINARY: No dysuria, frequency, hematuria, or incontinence  NEUROLOGICAL: No headaches, memory loss, loss of strength, numbness, or tremors  SKIN: No itching, burning, rashes, or lesions     RADIOLOGY & ADDITIONAL TESTS:    Consultant(s) Notes Reviewed:  [x ] YES  [ ] NO    PHYSICAL EXAM:  GENERAL: NAD, well-groomed, well-developed,not in any distress ,  HEAD:  Atraumatic, Normocephalic  EYES: EOMI, PERRLA, conjunctiva and sclera clear  ENMT: No tonsillar erythema, exudates, or enlargement; Moist mucous membranes, Good dentition, No lesions  NECK: Supple, No JVD, Normal thyroid  NERVOUS SYSTEM:  Alert & Oriented X3, No focal deficit   CHEST/LUNG: Good air entry bilateral with no  rales, rhonchi, wheezing, or rubs  HEART: Regular rate and rhythm; No murmurs, rubs, or gallops  ABDOMEN: Soft, Nontender, Nondistended; Bowel sounds present  EXTREMITIES:  2+ Peripheral Pulses, No clubbing, cyanosis, or edema  SKIN: No rashes or lesions    Care Discussed with Consultants/Other Providers [ x] YES  [ ] NO
Cardiovascular Disease Progress Note    Overnight events: No acute events overnight.  no new cardiac sx  Otherwise review of systems negative    Objective Findings:  T(C): 36.9 (04-09-24 @ 05:30), Max: 36.9 (04-09-24 @ 05:30)  HR: 93 (04-09-24 @ 05:30) (81 - 93)  BP: 140/79 (04-09-24 @ 05:30) (131/63 - 157/88)  RR: 18 (04-09-24 @ 05:30) (17 - 18)  SpO2: 99% (04-09-24 @ 05:30) (96% - 100%)  Wt(kg): --  Daily     Daily       Physical Exam:  Gen: NAD  HEENT: EOMI  CV: RRR, normal S1 + S2, no m/r/g  Lungs: CTAB  Abd: soft, non-tender  Ext: No edema    Telemetry:    Laboratory Data:                        14.5   8.39  )-----------( 210      ( 08 Apr 2024 06:30 )             45.1     04-08    144  |  107  |  8   ----------------------------<  111<H>  3.7   |  27  |  0.70    Ca    9.3      08 Apr 2024 06:30  Phos  4.3     04-08  Mg     2.10     04-08                Inpatient Medications:  MEDICATIONS  (STANDING):  atorvastatin 10 milliGRAM(s) Oral at bedtime  chlorhexidine 2% Cloths 1 Application(s) Topical daily  clonazePAM  Tablet 0.5 milliGRAM(s) Oral two times a day  enoxaparin Injectable 40 milliGRAM(s) SubCutaneous every 24 hours  lidocaine   4% Patch 1 Patch Transdermal once  metoprolol tartrate 50 milliGRAM(s) Oral two times a day  mirtazapine 15 milliGRAM(s) Oral at bedtime  multivitamin 1 Tablet(s) Oral daily  nortriptyline 25 milliGRAM(s) Oral at bedtime  pantoprazole    Tablet 40 milliGRAM(s) Oral before breakfast  pregabalin 25 milliGRAM(s) Oral three times a day      Assessment:  73y F w/ PMHx significant for anxiety, depression, HTN, and HLD presents for dizziness    Recs:  cardiac stable  no e/o acs or chf  cw tele monitoring  neuro follow up  s/p cta head/neck - mild to mod intracranial atherosclerosis. remains on statin  s/p brain mri --> normal  cw antihypertensives and beta blockers. bp stable  asa and high intensity statin  echo (tds) --> wnl  consider op vestibular therapy        Over 25 minutes spent on total encounter; more than 50% of the visit was spent counseling and/or coordinating care by the attending physician.      Herb Grover MD   Cardiovascular Disease  [phone]
Date of Service  : 04-14-24    INTERVAL HPI/OVERNIGHT EVENTS: I feel fine .  in room,.   Vital Signs Last 24 Hrs  T(C): 36.7 (14 Apr 2024 11:00), Max: 36.7 (13 Apr 2024 19:15)  T(F): 98 (14 Apr 2024 11:00), Max: 98 (13 Apr 2024 19:15)  HR: 81 (14 Apr 2024 11:00) (72 - 82)  BP: 120/74 (14 Apr 2024 11:00) (102/55 - 137/62)  BP(mean): --  RR: 18 (14 Apr 2024 11:00) (16 - 18)  SpO2: 97% (14 Apr 2024 11:00) (95% - 99%)    Parameters below as of 14 Apr 2024 11:00  Patient On (Oxygen Delivery Method): room air      I&O's Summary    MEDICATIONS  (STANDING):  atorvastatin 10 milliGRAM(s) Oral at bedtime  chlorhexidine 2% Cloths 1 Application(s) Topical daily  clonazePAM  Tablet 0.5 milliGRAM(s) Oral two times a day  enoxaparin Injectable 40 milliGRAM(s) SubCutaneous every 24 hours  lidocaine   4% Patch 1 Patch Transdermal once  metoprolol tartrate 50 milliGRAM(s) Oral two times a day  mirtazapine 15 milliGRAM(s) Oral at bedtime  multivitamin 1 Tablet(s) Oral daily  nortriptyline 25 milliGRAM(s) Oral at bedtime  pantoprazole    Tablet 40 milliGRAM(s) Oral before breakfast  pregabalin 25 milliGRAM(s) Oral three times a day    MEDICATIONS  (PRN):  acetaminophen     Tablet .. 650 milliGRAM(s) Oral every 6 hours PRN Temp greater or equal to 38C (100.4F), Mild Pain (1 - 3)  aluminum hydroxide/magnesium hydroxide/simethicone Suspension 30 milliLiter(s) Oral every 4 hours PRN Dyspepsia  bisacodyl Suppository 10 milliGRAM(s) Rectal daily PRN Constipation  haloperidol     Tablet 0.5 milliGRAM(s) Oral every 6 hours PRN agitation  hydrOXYzine hydrochloride 25 milliGRAM(s) Oral three times a day PRN Anxiety  meclizine 12.5 milliGRAM(s) Oral every 8 hours PRN Dizziness  melatonin 3 milliGRAM(s) Oral at bedtime PRN Insomnia  ondansetron   Disintegrating Tablet 4 milliGRAM(s) Oral every 8 hours PRN Nausea and/or Vomiting  ondansetron Injectable 4 milliGRAM(s) IV Push every 8 hours PRN Nausea and/or Vomiting  polyethylene glycol 3350 17 Gram(s) Oral two times a day PRN constipation    LABS:              CAPILLARY BLOOD GLUCOSE              REVIEW OF SYSTEMS:  CONSTITUTIONAL: No fever, weight loss, or fatigue  EYES: No eye pain, visual disturbances, or discharge  ENMT:  No difficulty hearing, tinnitus, vertigo; No sinus or throat pain  NECK: No pain or stiffness  RESPIRATORY: No cough, wheezing, chills or hemoptysis; No shortness of breath  CARDIOVASCULAR: No chest pain, palpitations, dizziness, or leg swelling  GASTROINTESTINAL: No abdominal or epigastric pain. No nausea, vomiting, or hematemesis; No diarrhea or constipation. No melena or hematochezia.  GENITOURINARY: No dysuria, frequency, hematuria, or incontinence  NEUROLOGICAL: No headaches, memory loss, loss of strength, numbness, or tremors      Consultant(s) Notes Reviewed:  [x ] YES  [ ] NO    PHYSICAL EXAM:  GENERAL: NAD, well-groomed, well-developed,not in any distress ,  HEAD:  Atraumatic, Normocephalic  EYES: EOMI, PERRLA, conjunctiva and sclera clear  ENMT: No tonsillar erythema, exudates, or enlargement; Moist mucous membranes, Good dentition, No lesions  NECK: Supple, No JVD, Normal thyroid  NERVOUS SYSTEM:  Alert & Oriented X3, No focal deficit   CHEST/LUNG: Good air entry bilateral with no  rales, rhonchi, wheezing, or rubs  HEART: Regular rate and rhythm; No murmurs, rubs, or gallops  ABDOMEN: Soft, Nontender, Nondistended; Bowel sounds present  EXTREMITIES:  2+ Peripheral Pulses, No clubbing, cyanosis, or edema    Care Discussed with Consultants/Other Providers [ x] YES  [ ] NO
Date of Service  : 04-06-24     INTERVAL HPI/OVERNIGHT EVENTS: i am okay   Vital Signs Last 24 Hrs  T(C): 36.6 (06 Apr 2024 10:11), Max: 36.7 (05 Apr 2024 17:30)  T(F): 97.8 (06 Apr 2024 10:11), Max: 98.1 (05 Apr 2024 17:30)  HR: 78 (06 Apr 2024 10:11) (74 - 99)  BP: 103/58 (06 Apr 2024 10:11) (103/58 - 141/84)  BP(mean): --  RR: 18 (06 Apr 2024 10:11) (18 - 21)  SpO2: 99% (06 Apr 2024 10:11) (99% - 100%)    Parameters below as of 06 Apr 2024 10:11  Patient On (Oxygen Delivery Method): room air      I&O's Summary    06 Apr 2024 07:01  -  06 Apr 2024 12:58  --------------------------------------------------------  IN: 480 mL / OUT: 0 mL / NET: 480 mL      MEDICATIONS  (STANDING):  atorvastatin 10 milliGRAM(s) Oral at bedtime  clonazePAM  Tablet 0.5 milliGRAM(s) Oral two times a day  enoxaparin Injectable 40 milliGRAM(s) SubCutaneous every 24 hours  metoprolol tartrate 50 milliGRAM(s) Oral two times a day  mirtazapine 15 milliGRAM(s) Oral at bedtime  multivitamin 1 Tablet(s) Oral daily  nortriptyline 25 milliGRAM(s) Oral at bedtime  pantoprazole    Tablet 40 milliGRAM(s) Oral before breakfast  pregabalin 25 milliGRAM(s) Oral three times a day    MEDICATIONS  (PRN):  acetaminophen     Tablet .. 650 milliGRAM(s) Oral every 6 hours PRN Temp greater or equal to 38C (100.4F), Mild Pain (1 - 3)  aluminum hydroxide/magnesium hydroxide/simethicone Suspension 30 milliLiter(s) Oral every 4 hours PRN Dyspepsia  bisacodyl Suppository 10 milliGRAM(s) Rectal daily PRN Constipation  gabapentin 100 milliGRAM(s) Oral three times a day PRN anxiety  melatonin 3 milliGRAM(s) Oral at bedtime PRN Insomnia  ondansetron Injectable 4 milliGRAM(s) IV Push every 8 hours PRN Nausea and/or Vomiting  polyethylene glycol 3350 17 Gram(s) Oral two times a day PRN constipation    LABS:                        13.8   9.10  )-----------( 217      ( 06 Apr 2024 06:30 )             43.5     04-06    139  |  104  |  7   ----------------------------<  130<H>  3.7   |  23  |  0.67    Ca    9.5      06 Apr 2024 06:30  Phos  4.3     04-06  Mg     2.20     04-06        Urinalysis Basic - ( 06 Apr 2024 06:30 )    Color: x / Appearance: x / SG: x / pH: x  Gluc: 130 mg/dL / Ketone: x  / Bili: x / Urobili: x   Blood: x / Protein: x / Nitrite: x   Leuk Esterase: x / RBC: x / WBC x   Sq Epi: x / Non Sq Epi: x / Bacteria: x      CAPILLARY BLOOD GLUCOSE            Urinalysis Basic - ( 06 Apr 2024 06:30 )    Color: x / Appearance: x / SG: x / pH: x  Gluc: 130 mg/dL / Ketone: x  / Bili: x / Urobili: x   Blood: x / Protein: x / Nitrite: x   Leuk Esterase: x / RBC: x / WBC x   Sq Epi: x / Non Sq Epi: x / Bacteria: x          Consultant(s) Notes Reviewed:  [x ] YES  [ ] NO    PHYSICAL EXAM:  GENERAL: NAD, well-groomed, well-developed,not in any distress ,  HEAD:  Atraumatic, Normocephalic  NECK: Supple, No JVD, Normal thyroid  NERVOUS SYSTEM:  Alert & Oriented X3, No focal deficit   CHEST/LUNG: Good air entry bilateral with no  rales, rhonchi, wheezing, or rubs  HEART: Regular rate and rhythm; No murmurs, rubs, or gallops  ABDOMEN: Soft, Nontender, Nondistended; Bowel sounds present  EXTREMITIES:  2+ Peripheral Pulses, No clubbing, cyanosis, or edema    Care Discussed with Consultants/Other Providers [ x] YES  [ ] NO

## 2024-04-16 NOTE — BH CONSULTATION LIAISON PROGRESS NOTE - NSBHCHARTREVIEWVS_PSY_A_CORE FT
Vital Signs Last 24 Hrs  T(C): 36.4 (16 Apr 2024 11:20), Max: 36.4 (16 Apr 2024 11:20)  T(F): 97.5 (16 Apr 2024 11:20), Max: 97.5 (16 Apr 2024 11:20)  HR: 78 (16 Apr 2024 11:20) (73 - 83)  BP: 142/80 (16 Apr 2024 11:20) (108/64 - 142/80)  BP(mean): --  RR: 18 (16 Apr 2024 11:20) (18 - 18)  SpO2: 98% (16 Apr 2024 11:20) (96% - 98%)    Parameters below as of 16 Apr 2024 11:20  Patient On (Oxygen Delivery Method): room air    
Vital Signs Last 24 Hrs  T(C): 36.8 (10 Apr 2024 12:00), Max: 37.2 (09 Apr 2024 17:58)  T(F): 98.3 (10 Apr 2024 12:00), Max: 98.9 (09 Apr 2024 17:58)  HR: 81 (10 Apr 2024 12:00) (73 - 94)  BP: 149/82 (10 Apr 2024 12:00) (128/69 - 157/80)  BP(mean): --  RR: 18 (10 Apr 2024 12:00) (18 - 18)  SpO2: 98% (10 Apr 2024 12:00) (97% - 100%)    Parameters below as of 10 Apr 2024 12:00  Patient On (Oxygen Delivery Method): room air    
Vital Signs Last 24 Hrs  T(C): 36.6 (06 Apr 2024 10:11), Max: 36.7 (05 Apr 2024 17:30)  T(F): 97.8 (06 Apr 2024 10:11), Max: 98.1 (05 Apr 2024 17:30)  HR: 78 (06 Apr 2024 10:11) (74 - 99)  BP: 103/58 (06 Apr 2024 10:11) (103/58 - 141/84)  BP(mean): --  RR: 18 (06 Apr 2024 10:11) (18 - 21)  SpO2: 99% (06 Apr 2024 10:11) (99% - 100%)    Parameters below as of 06 Apr 2024 10:11  Patient On (Oxygen Delivery Method): room air    
Vital Signs Last 24 Hrs  T(C): 36.7 (11 Apr 2024 11:40), Max: 36.7 (10 Apr 2024 17:49)  T(F): 98 (11 Apr 2024 11:40), Max: 98.1 (10 Apr 2024 17:49)  HR: 75 (11 Apr 2024 11:40) (68 - 90)  BP: 133/76 (11 Apr 2024 11:40) (124/76 - 135/82)  BP(mean): --  RR: 18 (11 Apr 2024 11:40) (15 - 19)  SpO2: 97% (11 Apr 2024 11:40) (95% - 97%)    Parameters below as of 11 Apr 2024 11:40  Patient On (Oxygen Delivery Method): room air    
Vital Signs Last 24 Hrs  T(C): 36.9 (05 Apr 2024 10:40), Max: 37.1 (04 Apr 2024 14:40)  T(F): 98.4 (05 Apr 2024 10:40), Max: 98.8 (04 Apr 2024 14:40)  HR: 75 (05 Apr 2024 10:40) (68 - 102)  BP: 140/85 (05 Apr 2024 10:40) (113/60 - 150/83)  BP(mean): --  RR: 18 (05 Apr 2024 10:40) (16 - 18)  SpO2: 100% (05 Apr 2024 10:40) (95% - 100%)    Parameters below as of 05 Apr 2024 10:40  Patient On (Oxygen Delivery Method): room air    
Vital Signs Last 24 Hrs  T(C): 36.5 (09 Apr 2024 12:17), Max: 36.9 (09 Apr 2024 05:30)  T(F): 97.7 (09 Apr 2024 12:17), Max: 98.4 (09 Apr 2024 05:30)  HR: 85 (09 Apr 2024 12:17) (81 - 93)  BP: 145/86 (09 Apr 2024 12:17) (131/63 - 145/86)  BP(mean): --  RR: 18 (09 Apr 2024 12:17) (17 - 18)  SpO2: 97% (09 Apr 2024 12:17) (96% - 100%)    Parameters below as of 09 Apr 2024 12:17  Patient On (Oxygen Delivery Method): room air    
Vital Signs Last 24 Hrs  T(C): 36.7 (12 Apr 2024 11:20), Max: 36.7 (12 Apr 2024 11:20)  T(F): 98 (12 Apr 2024 11:20), Max: 98 (12 Apr 2024 11:20)  HR: 74 (12 Apr 2024 11:20) (74 - 95)  BP: 134/83 (12 Apr 2024 11:20) (117/65 - 146/86)  BP(mean): --  RR: 18 (12 Apr 2024 11:20) (16 - 18)  SpO2: 95% (12 Apr 2024 11:20) (95% - 97%)    Parameters below as of 12 Apr 2024 11:20  Patient On (Oxygen Delivery Method): room air    
Vital Signs Last 24 Hrs  T(C): 36.6 (08 Apr 2024 13:55), Max: 36.8 (08 Apr 2024 06:31)  T(F): 97.8 (08 Apr 2024 13:55), Max: 98.3 (08 Apr 2024 06:31)  HR: 93 (08 Apr 2024 13:55) (83 - 93)  BP: 157/88 (08 Apr 2024 13:55) (121/59 - 157/88)  BP(mean): --  RR: 18 (08 Apr 2024 13:55) (16 - 18)  SpO2: 100% (08 Apr 2024 13:55) (97% - 100%)    Parameters below as of 08 Apr 2024 13:55  Patient On (Oxygen Delivery Method): room air    
Vital Signs Last 24 Hrs  T(C): 36.7 (15 Apr 2024 11:15), Max: 36.8 (15 Apr 2024 04:50)  T(F): 98 (15 Apr 2024 11:15), Max: 98.2 (15 Apr 2024 04:50)  HR: 80 (15 Apr 2024 11:15) (72 - 89)  BP: 123/75 (15 Apr 2024 11:15) (109/61 - 127/67)  BP(mean): --  RR: 18 (15 Apr 2024 11:15) (18 - 18)  SpO2: 97% (15 Apr 2024 11:15) (97% - 99%)    Parameters below as of 15 Apr 2024 11:15  Patient On (Oxygen Delivery Method): room air

## 2024-04-16 NOTE — DISCHARGE NOTE NURSING/CASE MANAGEMENT/SOCIAL WORK - NSDCPEFALRISK_GEN_ALL_CORE
For information on Fall & Injury Prevention, visit: [email or web address] OR  [email or web address] OR  https://www.cdc.gov/steadi/patient.html

## 2024-04-16 NOTE — BH INPATIENT PSYCHIATRY ASSESSMENT NOTE - NSBHCHARTREVIEWVS_PSY_A_CORE FT
Vital Signs Last 24 Hrs  T(C): 36.7 (04-16-24 @ 17:30), Max: 36.7 (04-16-24 @ 17:30)  T(F): 98 (04-16-24 @ 17:30), Max: 98 (04-16-24 @ 17:30)  HR: 70 (04-16-24 @ 17:30) (70 - 83)  BP: 138/64 (04-16-24 @ 17:30) (108/64 - 142/80)  BP(mean): --  RR: 18 (04-16-24 @ 17:30) (18 - 18)  SpO2: 99% (04-16-24 @ 17:30) (96% - 99%)     Vital Signs Last 24 Hrs  T(C): 36.5 (04-16-24 @ 19:07), Max: 36.7 (04-16-24 @ 17:30)  T(F): 97.7 (04-16-24 @ 19:07), Max: 98 (04-16-24 @ 17:30)  HR: 70 (04-16-24 @ 17:30) (70 - 83)  BP: 138/64 (04-16-24 @ 17:30) (108/64 - 142/80)  BP(mean): --  RR: 18 (04-16-24 @ 17:30) (18 - 18)  SpO2: 95% (04-16-24 @ 19:07) (95% - 99%)    Orthostatic VS  04-16-24 @ 19:07  Lying BP: --/-- HR: --  Sitting BP: 127/67 HR: 77  Standing BP: 126/66 HR: 78  Site: --  Mode: --

## 2024-04-16 NOTE — BH CONSULTATION LIAISON PROGRESS NOTE - NSBHMSETHTCONTENT_PSY_A_CORE
Preoccupations
Preoccupations
Unremarkable
Preoccupations
Unremarkable
Unremarkable
Hopelessness
Unremarkable
Hopelessness/Guilt/Suicidality/Other

## 2024-04-16 NOTE — BH CONSULTATION LIAISON PROGRESS NOTE - NSBHMSESPEECH_PSY_A_CORE
Normal volume, rate, productivity, spontaneity and articulation
Abnormal as indicated, otherwise normal...
Normal volume, rate, productivity, spontaneity and articulation
Abnormal as indicated, otherwise normal...
Normal volume, rate, productivity, spontaneity and articulation
Normal volume, rate, productivity, spontaneity and articulation

## 2024-04-16 NOTE — BH CONSULTATION LIAISON PROGRESS NOTE - NSBHPTASSESSDT_PSY_A_CORE
05-Apr-2024 11:27
12-Apr-2024 15:19
15-Apr-2024 13:17
09-Apr-2024 16:50
16-Apr-2024 14:30
11-Apr-2024 13:56
06-Apr-2024 10:55
10-Apr-2024 13:11
08-Apr-2024 15:48

## 2024-04-16 NOTE — BH CONSULTATION LIAISON PROGRESS NOTE - NSBHFUPINTERVALHXFT_PSY_A_CORE
Chart, labs, imagine reviewed. Case discussed with the primary team. Patient seen at bedside.  PRN: atarax 25 mg daily  Pt was seen in her room with her  at bedside. She reported feeling depressed and anxious, however, said that she is walking more now. Patient denied suicidal or homicidal ideations, intent or a plan. No psychotic symptoms.   Discussed possibility of PHP with the patient and her .  is advocating for patient's inpatient admission to psychiatry.  However,  think that she is overall is doing better. Patient said she may be go to PHP after her treatment in psychiatry.

## 2024-04-16 NOTE — BH CONSULTATION LIAISON PROGRESS NOTE - NSBHMSEGAIT_PSY_A_CORE
Unable to assess
Normal gait / station
Normal gait / station
Unable to assess
Normal gait / station

## 2024-04-16 NOTE — BH CONSULTATION LIAISON PROGRESS NOTE - NSBHASSESSMENTFT_PSY_ALL_CORE
Patient is a 73 yr old woman ,  , domiciled, and retired, with background hx of MDD and ANSON; has multiple psych admissions (most recently Jan [facility]24 [facility]), had prior SA by overdosing on pills (11/2019) following death of parents,  has no reported hx of NSSIB, currently treated by outpatient psychiatrist Dr. Ramirez, she denied using any illicit substance use including alcohol, pmh of HTN, hyperlipidemia, and GERD, brought in by  for anxiety, depression, somatic symptoms, SI. Admitted to Medicine for dizziness work up.      4/5: Patient reports severe symptoms of anxiety and depression, +passive SI with recent active SI with plans. Denies current SI/HI/AVH.  Will monitor/follow and adjust the doses given dizziness, concerns for polypharmacy.   4/6: Severe anxiety, denies SI/HI. Endorses dizziness/lightheaded.   04/08: remains anxious, no SI/HI. Not medically cleared yet. Receives PRNs  4/9: no significant changes, Received PRN. PT consult pending.  4/10: slight improvement in anxiety, however, continues to take daily PRNs, waiting for PT clearance.  4/11: continues to improve, remains anxious, no PRNs yesterday. Adamantly denies feeling suicidal or wanting to die. Identifies reasons for living. But doesn't feel safe outside of hospital  4/12: Two PRN yesterday. No PRN today. Discharge planning discussed. Pt is not open to other options right now, because she is afraid that she won't be able to go there due to weakness in her legs.   4/15: continues to feel anxious and depressed. Takes one PRN of atarax daily.  4/16: no significant changes, patient walks more. No safety concerns.    Plan:  - Routine observation  -CONTINUE Klonopin 0.5mg PO BID, Remeron 15mg PO QHS, Lyrica 25mg PO TID, Pamelor 25mg PO bedtime.   - Haldol 0.5mg Po/IM q6h prn for agitation  -PRN for anxiety: Atarax 25 mg TID.  -Hold antipsychotics if qtc >500  -Obtain collateral Dr. Montgomery  -Betty; inpatient psych voluntary if patient is amenable, when medically stable. Currently there is no available beds.  Will follow

## 2024-04-17 PROCEDURE — 99232 SBSQ HOSP IP/OBS MODERATE 35: CPT

## 2024-04-17 PROCEDURE — 90832 PSYTX W PT 30 MINUTES: CPT

## 2024-04-17 PROCEDURE — 99223 1ST HOSP IP/OBS HIGH 75: CPT

## 2024-04-17 RX ORDER — CLONAZEPAM 1 MG
0.5 TABLET ORAL
Refills: 0 | Status: DISCONTINUED | OUTPATIENT
Start: 2024-04-17 | End: 2024-04-21

## 2024-04-17 RX ADMIN — ATORVASTATIN CALCIUM 10 MILLIGRAM(S): 80 TABLET, FILM COATED ORAL at 21:01

## 2024-04-17 RX ADMIN — ONDANSETRON 4 MILLIGRAM(S): 8 TABLET, FILM COATED ORAL at 12:09

## 2024-04-17 RX ADMIN — Medication 50 MILLIGRAM(S): at 21:01

## 2024-04-17 RX ADMIN — PANTOPRAZOLE SODIUM 40 MILLIGRAM(S): 20 TABLET, DELAYED RELEASE ORAL at 08:32

## 2024-04-17 RX ADMIN — Medication 50 MILLIGRAM(S): at 08:32

## 2024-04-17 RX ADMIN — Medication 25 MILLIGRAM(S): at 21:01

## 2024-04-17 RX ADMIN — Medication 0.5 MILLIGRAM(S): at 18:36

## 2024-04-17 RX ADMIN — NORTRIPTYLINE HYDROCHLORIDE 25 MILLIGRAM(S): 10 CAPSULE ORAL at 21:00

## 2024-04-17 RX ADMIN — MIRTAZAPINE 15 MILLIGRAM(S): 45 TABLET, ORALLY DISINTEGRATING ORAL at 21:01

## 2024-04-17 RX ADMIN — Medication 0.5 MILLIGRAM(S): at 06:18

## 2024-04-17 RX ADMIN — Medication 3 MILLIGRAM(S): at 21:01

## 2024-04-17 RX ADMIN — Medication 1 TABLET(S): at 08:32

## 2024-04-17 RX ADMIN — Medication 25 MILLIGRAM(S): at 08:32

## 2024-04-17 RX ADMIN — Medication 12.5 MILLIGRAM(S): at 12:09

## 2024-04-17 NOTE — PSYCHIATRIC REHAB INITIAL EVALUATION - NSBHPRRECOMMEND_PSY_ALL_CORE
Writer met with patient to orient to unit and introduce self, psychiatric rehabilitation staff and department functions. In response to the COVID-19 outbreak, there has been a shift in hospital wide policies and protocols. As a result, it should be noted that unit programming will be re-evaluated on a consistent basis in effort to maintain safety guidelines. Writer encouraged patient to attend psychiatric rehabilitation groups and engage in treatment. Patient appeared receptive and engaged in the admission interview. Patient appeared forthcoming with personal data and information surrounding admitting circumstances. Patient expressed, "I was admitted because I cannot live life the way I use to." Patient expressed feelings of depression. Patient reported having a supportive . Writer and patient were able to establish a collaborative psychiatric rehabilitation goal. Psychiatric Rehabilitation staff will continue to engage patient daily in order to develop therapeutic rapport.

## 2024-04-17 NOTE — BH INPATIENT PSYCHIATRY PROGRESS NOTE - NSBHMETABOLIC_PSY_ALL_CORE_FT
BMI: BMI (kg/m2): 38.3 (04-16-24 @ 19:07)  HbA1c: A1C with Estimated Average Glucose Result: 5.8 % (01-11-24 @ 08:00)    Glucose:   BP: --Vital Signs Last 24 Hrs  T(C): 36.7 (04-17-24 @ 08:06), Max: 36.7 (04-16-24 @ 17:30)  T(F): 98.1 (04-17-24 @ 08:06), Max: 98.1 (04-17-24 @ 08:06)  HR: 70 (04-16-24 @ 17:30) (70 - 70)  BP: 138/64 (04-16-24 @ 17:30) (138/64 - 138/64)  BP(mean): --  RR: 17 (04-17-24 @ 08:06) (17 - 18)  SpO2: 99% (04-17-24 @ 08:06) (95% - 99%)    Orthostatic VS  04-17-24 @ 08:06  Lying BP: 134/71 HR: 96  Sitting BP: 126/66 HR: 90  Standing BP: --/-- HR: --  Site: upper right arm  Mode: electronic  Orthostatic VS  04-16-24 @ 19:07  Lying BP: --/-- HR: --  Sitting BP: 127/67 HR: 77  Standing BP: 126/66 HR: 78  Site: --  Mode: --    Lipid Panel: Date/Time: 04-05-24 @ 05:56  Cholesterol, Serum: 176  LDL Cholesterol Calculated: 97  HDL Cholesterol, Serum: 39  Total Cholesterol/HDL Ration Measurement: --  Triglycerides, Serum: 200

## 2024-04-17 NOTE — BH INPATIENT PSYCHIATRY PROGRESS NOTE - PRN MEDS
MEDICATIONS  (PRN):  acetaminophen     Tablet .. 650 milliGRAM(s) Oral every 6 hours PRN Temp greater or equal to 38C (100.4F), Mild Pain (1 - 3)  aluminum hydroxide/magnesium hydroxide/simethicone Suspension 30 milliLiter(s) Oral every 4 hours PRN Dyspepsia  bisacodyl Suppository 10 milliGRAM(s) Rectal daily PRN Constipation  gabapentin 100 milliGRAM(s) Oral three times a day PRN anxiety  haloperidol     Tablet 0.5 milliGRAM(s) Oral every 6 hours PRN agitation  haloperidol    Injectable 0.5 milliGRAM(s) IntraMuscular once PRN severe agitation  LORazepam     Tablet 0.5 milliGRAM(s) Oral every 4 hours PRN anxiety  LORazepam   Injectable 0.5 milliGRAM(s) IntraMuscular once PRN severe anxiety/agitation  meclizine 12.5 milliGRAM(s) Oral every 8 hours PRN Dizziness  ondansetron   Disintegrating Tablet 4 milliGRAM(s) Oral every 8 hours PRN Nausea and/or Vomiting  polyethylene glycol 3350 17 Gram(s) Oral two times a day PRN constipation

## 2024-04-17 NOTE — CONSULT NOTE ADULT - ASSESSMENT
72 yo lady with h/o anxiety, HLD, MDD (multiple psychiatric admissions) transferred from [facility] to [facility] after being admitted for dizziness and suicidal ideations. While inpatient workup included CTH, MRI Head, TTE all of which were unremarkable.       #Dizziness  -CTA H/N: Intracranial atherosclerosis cavernous and clinoid segments of the internal carotid arteries, mild to moderate.  Seen by neurology at [facility], will need outpatient follow up.   Oral hydration recommended  CTH, MRI Head, TTE unremarkable.   Continue treatement for anxiety and depression     HLD  Continue statin  Follow up lipid panel as outpatient    MDD  Continue workup, management and treatment as per primary/psychiatry team 74 yo lady with h/o anxiety, HLD, MDD (multiple psychiatric admissions) transferred from [facility] to [facility] after being admitted for dizziness and suicidal ideations. While inpatient workup included CTH, MRI Head, TTE all of which were unremarkable.       #Dizziness  -CTA H/N: Intracranial atherosclerosis cavernous and clinoid segments of the internal carotid arteries, mild to moderate.  Seen by neurology at [facility], will need outpatient follow up.   Oral hydration recommended  MRI Head, TTE unremarkable.   Continue treatement for anxiety and depression   Continue Meclizine PRN    #HLD  Continue statin  Follow up lipid panel as outpatient    #MDD  Continue workup, management and treatment as per primary/psychiatry team

## 2024-04-17 NOTE — CONSULT NOTE ADULT - SUBJECTIVE AND OBJECTIVE BOX
74 yo lady with h/o anxiety, HLD, MDD transferred from [facility] to [facility] after being admitted for dizziness and suicidal ideations. While inpatient workup included CTH, MRI Head, TTE all of which were unremarkable. Patient was subsequently cleared by medicine and cardiology for transfer to [facility] for further psychiatric evaluation. At this time no f/c/n/v.     PAST MEDICAL & SURGICAL HISTORY:  Anxiety      High cholesterol      HTN (hypertension)      Endometriosis      GERD (gastroesophageal reflux disease)      Nausea      History of bilateral breast reduction surgery          Review of Systems:   CONSTITUTIONAL:  +fatigue No weight loss, fever, chills  HEENT:  Eyes:  No visual loss, blurred vision, double vision or yellow sclerae. Ears, Nose, Throat:  No hearing loss, sneezing, congestion, runny nose or sore throat.  SKIN:  No rash or itching.  CARDIOVASCULAR:  No chest pain, chest pressure or chest discomfort. No palpitations or edema.  RESPIRATORY:  No shortness of breath, cough or sputum.  GASTROINTESTINAL:  No anorexia, nausea, vomiting or diarrhea. No abdominal pain or blood.  GENITOURINARY:  Denies hematuria, dysuria.   NEUROLOGICAL: +dizziness +dysequilibrium No headache, syncope, paralysis, ataxia, numbness or tingling in the extremities. No change in bowel or bladder control.  MUSCULOSKELETAL:  No muscle, back pain, joint pain or stiffness.  PSYCHIATRIC:  +anxiety +depression   ENDOCRINOLOGIC:  No reports of sweating, cold or heat intolerance. No polyuria or polydipsia.  ALLERGIES:  No history of asthma, hives, eczema or rhinitis    Allergies    penicillins (Anaphylaxis)  sulfa drugs (Anaphylaxis)    Intolerances        Social History: Cannabis use, no tobacco or etoh use    FAMILY HISTORY:  Family history of TIAs (Aunt)        MEDICATIONS  (STANDING):  atorvastatin 10 milliGRAM(s) Oral at bedtime  clonazePAM  Tablet 0.5 milliGRAM(s) Oral two times a day  lidocaine   4% Patch 1 Patch Transdermal once  melatonin. 3 milliGRAM(s) Oral at bedtime  metoprolol tartrate 50 milliGRAM(s) Oral two times a day  mirtazapine 15 milliGRAM(s) Oral at bedtime  multivitamin 1 Tablet(s) Oral daily  nortriptyline 25 milliGRAM(s) Oral at bedtime  pantoprazole    Tablet 40 milliGRAM(s) Oral before breakfast  pregabalin 25 milliGRAM(s) Oral three times a day    MEDICATIONS  (PRN):  acetaminophen     Tablet .. 650 milliGRAM(s) Oral every 6 hours PRN Temp greater or equal to 38C (100.4F), Mild Pain (1 - 3)  aluminum hydroxide/magnesium hydroxide/simethicone Suspension 30 milliLiter(s) Oral every 4 hours PRN Dyspepsia  bisacodyl Suppository 10 milliGRAM(s) Rectal daily PRN Constipation  gabapentin 100 milliGRAM(s) Oral three times a day PRN anxiety  haloperidol     Tablet 0.5 milliGRAM(s) Oral every 6 hours PRN agitation  haloperidol    Injectable 0.5 milliGRAM(s) IntraMuscular once PRN severe agitation  hydrOXYzine hydrochloride 25 milliGRAM(s) Oral three times a day PRN Anxiety  LORazepam     Tablet 0.5 milliGRAM(s) Oral every 4 hours PRN anxiety  LORazepam   Injectable 0.5 milliGRAM(s) IntraMuscular once PRN severe anxiety/agitation  meclizine 12.5 milliGRAM(s) Oral every 8 hours PRN Dizziness  ondansetron   Disintegrating Tablet 4 milliGRAM(s) Oral every 8 hours PRN Nausea and/or Vomiting  polyethylene glycol 3350 17 Gram(s) Oral two times a day PRN constipation      Vital Signs Last 24 Hrs  T(C): 36.7 (17 Apr 2024 08:06), Max: 36.7 (16 Apr 2024 17:30)  T(F): 98.1 (17 Apr 2024 08:06), Max: 98.1 (17 Apr 2024 08:06)  HR: 70 (16 Apr 2024 17:30) (70 - 70)  BP: 138/64 (16 Apr 2024 17:30) (138/64 - 138/64)  BP(mean): --  RR: 17 (17 Apr 2024 08:06) (17 - 18)  SpO2: 99% (17 Apr 2024 08:06) (95% - 99%)    Parameters below as of 16 Apr 2024 19:07  Patient On (Oxygen Delivery Method): room air      CAPILLARY BLOOD GLUCOSE            CONSTITUTIONAL: NAD, pleasant  RESPIRATORY: Normal respiratory effort; no respiratory distress, CTAB  CARDIOVASCULAR: No visible JVD, No lower extremity edema; S1S2, no m,r,g  ABDOMEN: Not guarding, does not appear distended, BS+  MUSCLOSKELETAL: no clubbing or cyanosis of digits; no joint swelling   PSYCH: Awake, interactive       LABS:                    EKG(personally reviewed):    RADIOLOGY & ADDITIONAL TESTS:  Echo, MRI H reviewed, mostly unremarkable.       Imaging Personally Reviewed:    Consultant(s) Notes Reviewed:      Care Discussed with Consultants/Other Providers:   74 yo lady with h/o anxiety, HLD, MDD transferred from [facility] to [facility] after being admitted for dizziness and suicidal ideations. While inpatient workup included CTH, MRI Head, TTE all of which were unremarkable. Patient was subsequently cleared by medicine and cardiology for transfer to [facility] for further psychiatric evaluation. Patient continues to complain of occasional vertigo and unsteady on the feet, however has not had any falls. At this time no f/c/n/v.     PAST MEDICAL & SURGICAL HISTORY:  Anxiety      High cholesterol      HTN (hypertension)      Endometriosis      GERD (gastroesophageal reflux disease)      Nausea      History of bilateral breast reduction surgery          Review of Systems:   CONSTITUTIONAL:  +fatigue No weight loss, fever, chills  HEENT:  Eyes:  No visual loss, blurred vision, double vision or yellow sclerae. Ears, Nose, Throat:  No hearing loss, sneezing, congestion, runny nose or sore throat.  SKIN:  No rash or itching.  CARDIOVASCULAR:  No chest pain, chest pressure or chest discomfort. No palpitations or edema.  RESPIRATORY:  No shortness of breath, cough or sputum.  GASTROINTESTINAL:  No anorexia, nausea, vomiting or diarrhea. No abdominal pain or blood.  GENITOURINARY:  Denies hematuria, dysuria.   NEUROLOGICAL: +dizziness +dysequilibrium No headache, syncope, paralysis, ataxia, numbness or tingling in the extremities. No change in bowel or bladder control.  MUSCULOSKELETAL:  No muscle, back pain, joint pain or stiffness.  PSYCHIATRIC:  +anxiety +depression   ENDOCRINOLOGIC:  No reports of sweating, cold or heat intolerance. No polyuria or polydipsia.  ALLERGIES:  No history of asthma, hives, eczema or rhinitis    Allergies    penicillins (Anaphylaxis)  sulfa drugs (Anaphylaxis)    Intolerances        Social History: Cannabis use, no tobacco or etoh use    FAMILY HISTORY:  Family history of TIAs (Aunt)        MEDICATIONS  (STANDING):  atorvastatin 10 milliGRAM(s) Oral at bedtime  clonazePAM  Tablet 0.5 milliGRAM(s) Oral two times a day  lidocaine   4% Patch 1 Patch Transdermal once  melatonin. 3 milliGRAM(s) Oral at bedtime  metoprolol tartrate 50 milliGRAM(s) Oral two times a day  mirtazapine 15 milliGRAM(s) Oral at bedtime  multivitamin 1 Tablet(s) Oral daily  nortriptyline 25 milliGRAM(s) Oral at bedtime  pantoprazole    Tablet 40 milliGRAM(s) Oral before breakfast  pregabalin 25 milliGRAM(s) Oral three times a day    MEDICATIONS  (PRN):  acetaminophen     Tablet .. 650 milliGRAM(s) Oral every 6 hours PRN Temp greater or equal to 38C (100.4F), Mild Pain (1 - 3)  aluminum hydroxide/magnesium hydroxide/simethicone Suspension 30 milliLiter(s) Oral every 4 hours PRN Dyspepsia  bisacodyl Suppository 10 milliGRAM(s) Rectal daily PRN Constipation  gabapentin 100 milliGRAM(s) Oral three times a day PRN anxiety  haloperidol     Tablet 0.5 milliGRAM(s) Oral every 6 hours PRN agitation  haloperidol    Injectable 0.5 milliGRAM(s) IntraMuscular once PRN severe agitation  hydrOXYzine hydrochloride 25 milliGRAM(s) Oral three times a day PRN Anxiety  LORazepam     Tablet 0.5 milliGRAM(s) Oral every 4 hours PRN anxiety  LORazepam   Injectable 0.5 milliGRAM(s) IntraMuscular once PRN severe anxiety/agitation  meclizine 12.5 milliGRAM(s) Oral every 8 hours PRN Dizziness  ondansetron   Disintegrating Tablet 4 milliGRAM(s) Oral every 8 hours PRN Nausea and/or Vomiting  polyethylene glycol 3350 17 Gram(s) Oral two times a day PRN constipation      Vital Signs Last 24 Hrs  T(C): 36.7 (17 Apr 2024 08:06), Max: 36.7 (16 Apr 2024 17:30)  T(F): 98.1 (17 Apr 2024 08:06), Max: 98.1 (17 Apr 2024 08:06)  HR: 70 (16 Apr 2024 17:30) (70 - 70)  BP: 138/64 (16 Apr 2024 17:30) (138/64 - 138/64)  BP(mean): --  RR: 17 (17 Apr 2024 08:06) (17 - 18)  SpO2: 99% (17 Apr 2024 08:06) (95% - 99%)    Parameters below as of 16 Apr 2024 19:07  Patient On (Oxygen Delivery Method): room air      CAPILLARY BLOOD GLUCOSE            CONSTITUTIONAL: NAD, pleasant  RESPIRATORY: Normal respiratory effort; no respiratory distress, CTAB  CARDIOVASCULAR: No visible JVD, No lower extremity edema; S1S2, no m,r,g  ABDOMEN: Not guarding, does not appear distended, BS+  MUSCLOSKELETAL: no clubbing or cyanosis of digits; no joint swelling   PSYCH: Awake, interactive       LABS:                    EKG(personally reviewed):    RADIOLOGY & ADDITIONAL TESTS:  Echo, MRI H reviewed, mostly unremarkable.       Imaging Personally Reviewed:    Consultant(s) Notes Reviewed:      Care Discussed with Consultants/Other Providers:

## 2024-04-17 NOTE — BH INPATIENT PSYCHIATRY PROGRESS NOTE - NSBHCHARTREVIEWVS_PSY_A_CORE FT
Vital Signs Last 24 Hrs  T(C): 36.7 (04-17-24 @ 08:06), Max: 36.7 (04-16-24 @ 17:30)  T(F): 98.1 (04-17-24 @ 08:06), Max: 98.1 (04-17-24 @ 08:06)  HR: 70 (04-16-24 @ 17:30) (70 - 70)  BP: 138/64 (04-16-24 @ 17:30) (138/64 - 138/64)  BP(mean): --  RR: 17 (04-17-24 @ 08:06) (17 - 18)  SpO2: 99% (04-17-24 @ 08:06) (95% - 99%)    Orthostatic VS  04-17-24 @ 08:06  Lying BP: 134/71 HR: 96  Sitting BP: 126/66 HR: 90  Standing BP: --/-- HR: --  Site: upper right arm  Mode: electronic  Orthostatic VS  04-16-24 @ 19:07  Lying BP: --/-- HR: --  Sitting BP: 127/67 HR: 77  Standing BP: 126/66 HR: 78  Site: --  Mode: --

## 2024-04-17 NOTE — BH INPATIENT PSYCHIATRY PROGRESS NOTE - CURRENT MEDICATION
MEDICATIONS  (STANDING):  atorvastatin 10 milliGRAM(s) Oral at bedtime  clonazePAM  Tablet 0.5 milliGRAM(s) Oral two times a day  lidocaine   4% Patch 1 Patch Transdermal once  melatonin. 3 milliGRAM(s) Oral at bedtime  metoprolol tartrate 50 milliGRAM(s) Oral two times a day  mirtazapine 15 milliGRAM(s) Oral at bedtime  multivitamin 1 Tablet(s) Oral daily  nortriptyline 25 milliGRAM(s) Oral at bedtime  pantoprazole    Tablet 40 milliGRAM(s) Oral before breakfast  pregabalin 25 milliGRAM(s) Oral three times a day    MEDICATIONS  (PRN):  acetaminophen     Tablet .. 650 milliGRAM(s) Oral every 6 hours PRN Temp greater or equal to 38C (100.4F), Mild Pain (1 - 3)  aluminum hydroxide/magnesium hydroxide/simethicone Suspension 30 milliLiter(s) Oral every 4 hours PRN Dyspepsia  bisacodyl Suppository 10 milliGRAM(s) Rectal daily PRN Constipation  gabapentin 100 milliGRAM(s) Oral three times a day PRN anxiety  haloperidol     Tablet 0.5 milliGRAM(s) Oral every 6 hours PRN agitation  haloperidol    Injectable 0.5 milliGRAM(s) IntraMuscular once PRN severe agitation  LORazepam     Tablet 0.5 milliGRAM(s) Oral every 4 hours PRN anxiety  LORazepam   Injectable 0.5 milliGRAM(s) IntraMuscular once PRN severe anxiety/agitation  meclizine 12.5 milliGRAM(s) Oral every 8 hours PRN Dizziness  ondansetron   Disintegrating Tablet 4 milliGRAM(s) Oral every 8 hours PRN Nausea and/or Vomiting  polyethylene glycol 3350 17 Gram(s) Oral two times a day PRN constipation

## 2024-04-17 NOTE — BH INPATIENT PSYCHIATRY PROGRESS NOTE - NSBHASSESSSUMMFT_PSY_ALL_CORE
73 yr old female, , domiciled, and retired. premorbidly ambulant (not needing assists) and iADL/ bADL independent. with background hx of MDD and ANSON; has multiple psych admissions to [facility] (most recently [facility]), had prior SA by overdosing on pills (11/2019) following death of parents, presented to ED with somatic complaints and reports of ?passive suicidal ideation, had exhaustive w/u, now admitted voluntarily to [facility]  Routine checks, no suicidal ideations  Continue current meds - will increase nortiptyline tomorrow; add gabapentin prn for anxiety and d/c vistaril due to inadequate effect

## 2024-04-17 NOTE — BH PSYCHOLOGY - CLINICIAN PSYCHOTHERAPY NOTE - NSBHPSYCHOLNARRATIVE_PSY_A_CORE FT
The patient was seen individually at the team's request and with her consent. She was pleased to meet with the writer whom she remembered working with during her previous hospitalizations. Speech was of normal rate and volume and there were no verbal irregularities. The patient's mood was anxious/depressed, and affect was constricted and mood congruent. She was coherent and logical and touched on her recent distress, medication history, marriage, and support and affiliation needs. The patient endorsed a sad mood and anxiety but denied current suicidal ideation.     The patient reported that since her last hospitalization, she had several medication changes when she felt palpitations became more anxious and depressed, and lacked confidence in her MD. She only socialized with friends once and did not proceed to accomplish any of the goals on her list. Eventually, she found herself one week ago dizzy “the room spinning’ and leaning to one side. She was brought to the [facility] ER where she was worked up for stroke and cardiovascular events, all with negative results. She was transferred to [facility] voluntarily and after stating that she wished she were dead. She currently denies suicidal wishes and regrets saying that; however, she feels hopeless about returning here.     The writer provided empathy and encouragement and suggested that she may only need to be here to get back on track. She acknowledged that when she left here she was feeling fine and had confidence that her current MD will coordinate with Dr. Washington to replicate the progress she made last time. The patient expressed appreciation and agreed to meet again.

## 2024-04-18 PROCEDURE — 90834 PSYTX W PT 45 MINUTES: CPT

## 2024-04-18 PROCEDURE — 99232 SBSQ HOSP IP/OBS MODERATE 35: CPT

## 2024-04-18 RX ORDER — NORTRIPTYLINE HYDROCHLORIDE 10 MG/1
50 CAPSULE ORAL AT BEDTIME
Refills: 0 | Status: DISCONTINUED | OUTPATIENT
Start: 2024-04-18 | End: 2024-04-25

## 2024-04-18 RX ADMIN — Medication 0.5 MILLIGRAM(S): at 06:29

## 2024-04-18 RX ADMIN — NORTRIPTYLINE HYDROCHLORIDE 50 MILLIGRAM(S): 10 CAPSULE ORAL at 21:04

## 2024-04-18 RX ADMIN — Medication 1 TABLET(S): at 08:39

## 2024-04-18 RX ADMIN — ONDANSETRON 4 MILLIGRAM(S): 8 TABLET, FILM COATED ORAL at 12:09

## 2024-04-18 RX ADMIN — MIRTAZAPINE 15 MILLIGRAM(S): 45 TABLET, ORALLY DISINTEGRATING ORAL at 21:05

## 2024-04-18 RX ADMIN — PANTOPRAZOLE SODIUM 40 MILLIGRAM(S): 20 TABLET, DELAYED RELEASE ORAL at 06:31

## 2024-04-18 RX ADMIN — Medication 25 MILLIGRAM(S): at 21:04

## 2024-04-18 RX ADMIN — Medication 25 MILLIGRAM(S): at 12:09

## 2024-04-18 RX ADMIN — Medication 12.5 MILLIGRAM(S): at 12:09

## 2024-04-18 RX ADMIN — Medication 0.5 MILLIGRAM(S): at 18:03

## 2024-04-18 RX ADMIN — Medication 3 MILLIGRAM(S): at 21:05

## 2024-04-18 RX ADMIN — ATORVASTATIN CALCIUM 10 MILLIGRAM(S): 80 TABLET, FILM COATED ORAL at 21:04

## 2024-04-18 RX ADMIN — Medication 50 MILLIGRAM(S): at 21:04

## 2024-04-18 RX ADMIN — Medication 25 MILLIGRAM(S): at 08:39

## 2024-04-18 NOTE — BH INPATIENT PSYCHIATRY PROGRESS NOTE - NSBHASSESSSUMMFT_PSY_ALL_CORE
73 yr old female, , domiciled, and retired. premorbidly ambulant (not needing assists) and iADL/ bADL independent. with background hx of MDD and ANSON; has multiple psych admissions to [facility] (most recently Jan 2024 on [facility]), had prior SA by overdosing on pills (11/2019) following death of parents, presented to ED with somatic complaints and reports of ?passive suicidal ideation, had exhaustive w/u, now admitted voluntarily to [facility]  Routine checks, no suicidal ideations  Increase nortriptyline to 50mg po qhs for depression  Continue klonopin 0.25mg po bid for anxiety; added gabapentin prn for anxiety  Continue mirtazapine 15mg po qhs and melatonin 3mg po qhs for insomnia

## 2024-04-18 NOTE — BH INPATIENT PSYCHIATRY PROGRESS NOTE - NSBHMETABOLIC_PSY_ALL_CORE_FT
BMI: BMI (kg/m2): 38.3 (04-16-24 @ 19:07)  HbA1c: A1C with Estimated Average Glucose Result: 5.8 % (01-11-24 @ 08:00)    Glucose:   BP: 112/64 (04-18-24 @ 12:09) (112/64 - 112/64)Vital Signs Last 24 Hrs  T(C): 36.6 (04-18-24 @ 05:56), Max: 36.6 (04-18-24 @ 05:56)  T(F): 97.8 (04-18-24 @ 05:56), Max: 97.8 (04-18-24 @ 05:56)  HR: 82 (04-18-24 @ 12:09) (82 - 82)  BP: 112/64 (04-18-24 @ 12:09) (112/64 - 112/64)  BP(mean): --  RR: --  SpO2: 100% (04-18-24 @ 05:56) (100% - 100%)    Orthostatic VS  04-18-24 @ 19:56  Lying BP: --/-- HR: --  Sitting BP: 126/76 HR: 102  Standing BP: --/-- HR: --  Site: --  Mode: --  Orthostatic VS  04-18-24 @ 05:56  Lying BP: 110/52 HR: 85  Sitting BP: 128/65 HR: 92  Standing BP: --/-- HR: --  Site: --  Mode: --  Orthostatic VS  04-17-24 @ 19:33  Lying BP: --/-- HR: --  Sitting BP: 151/77 HR: 96  Standing BP: --/-- HR: --  Site: --  Mode: --  Orthostatic VS  04-17-24 @ 08:06  Lying BP: 134/71 HR: 96  Sitting BP: 126/66 HR: 90  Standing BP: --/-- HR: --  Site: upper right arm  Mode: electronic    Lipid Panel: Date/Time: 04-05-24 @ 05:56  Cholesterol, Serum: 176  LDL Cholesterol Calculated: 97  HDL Cholesterol, Serum: 39  Total Cholesterol/HDL Ration Measurement: --  Triglycerides, Serum: 200

## 2024-04-18 NOTE — BH INPATIENT PSYCHIATRY PROGRESS NOTE - NSBHCHARTREVIEWVS_PSY_A_CORE FT
Vital Signs Last 24 Hrs  T(C): 36.6 (04-18-24 @ 05:56), Max: 36.6 (04-18-24 @ 05:56)  T(F): 97.8 (04-18-24 @ 05:56), Max: 97.8 (04-18-24 @ 05:56)  HR: 82 (04-18-24 @ 12:09) (82 - 82)  BP: 112/64 (04-18-24 @ 12:09) (112/64 - 112/64)  BP(mean): --  RR: --  SpO2: 100% (04-18-24 @ 05:56) (100% - 100%)    Orthostatic VS  04-18-24 @ 19:56  Lying BP: --/-- HR: --  Sitting BP: 126/76 HR: 102  Standing BP: --/-- HR: --  Site: --  Mode: --  Orthostatic VS  04-18-24 @ 05:56  Lying BP: 110/52 HR: 85  Sitting BP: 128/65 HR: 92  Standing BP: --/-- HR: --  Site: --  Mode: --  Orthostatic VS  04-17-24 @ 19:33  Lying BP: --/-- HR: --  Sitting BP: 151/77 HR: 96  Standing BP: --/-- HR: --  Site: --  Mode: --  Orthostatic VS  04-17-24 @ 08:06  Lying BP: 134/71 HR: 96  Sitting BP: 126/66 HR: 90  Standing BP: --/-- HR: --  Site: upper right arm  Mode: electronic

## 2024-04-18 NOTE — BH INPATIENT PSYCHIATRY PROGRESS NOTE - CURRENT MEDICATION
MEDICATIONS  (STANDING):  atorvastatin 10 milliGRAM(s) Oral at bedtime  clonazePAM  Tablet 0.5 milliGRAM(s) Oral <User Schedule>  lidocaine   4% Patch 1 Patch Transdermal once  melatonin. 3 milliGRAM(s) Oral at bedtime  metoprolol tartrate 50 milliGRAM(s) Oral two times a day  mirtazapine 15 milliGRAM(s) Oral at bedtime  multivitamin 1 Tablet(s) Oral daily  nortriptyline 50 milliGRAM(s) Oral at bedtime  pantoprazole    Tablet 40 milliGRAM(s) Oral before breakfast  pregabalin 25 milliGRAM(s) Oral three times a day    MEDICATIONS  (PRN):  acetaminophen     Tablet .. 650 milliGRAM(s) Oral every 6 hours PRN Temp greater or equal to 38C (100.4F), Mild Pain (1 - 3)  aluminum hydroxide/magnesium hydroxide/simethicone Suspension 30 milliLiter(s) Oral every 4 hours PRN Dyspepsia  bisacodyl Suppository 10 milliGRAM(s) Rectal daily PRN Constipation  gabapentin 100 milliGRAM(s) Oral three times a day PRN anxiety  haloperidol     Tablet 0.5 milliGRAM(s) Oral every 6 hours PRN agitation  haloperidol    Injectable 0.5 milliGRAM(s) IntraMuscular once PRN severe agitation  LORazepam     Tablet 0.5 milliGRAM(s) Oral every 4 hours PRN anxiety  LORazepam   Injectable 0.5 milliGRAM(s) IntraMuscular once PRN severe anxiety/agitation  meclizine 12.5 milliGRAM(s) Oral every 8 hours PRN Dizziness  ondansetron   Disintegrating Tablet 4 milliGRAM(s) Oral every 8 hours PRN Nausea and/or Vomiting  polyethylene glycol 3350 17 Gram(s) Oral two times a day PRN constipation

## 2024-04-18 NOTE — BH PSYCHOLOGY - CLINICIAN PSYCHOTHERAPY NOTE - NSBHPSYCHOLNARRATIVE_PSY_A_CORE FT
The patient was seen individually at the team's request and with her consent. Speech was of normal rate and volume and there were no verbal irregularities. The patient's mood was anxious/depressed, and affect was constricted, not labile, and mood congruent. She was coherent and logical; content focused on medications, hopelessness, and medical concerns. The patient denied current suicidal ideation.     The patient stated that she feels light-headed and is concerned that “there is something wrong,” even though she was assured by the hospitalist that any serious problems were ruled out by her extensive medical work-up. She understands that her MD is interested in re-instating the medication regimen that worked for her last time, but she is afraid about not feeling better this time. She reported no difficulty with sleep or appetite and has had no panic attacks.     The writer provided empathy and reminded her of the progress she had made during her last admission. She expressed the wish to return to the [facility] after discharge and resume with her previous psychiatrist there. The patient expressed appreciation and agreed to meet again.

## 2024-04-18 NOTE — BH SOCIAL WORK INITIAL PSYCHOSOCIAL EVALUATION - OTHER PAST PSYCHIATRIC HISTORY (INCLUDE DETAILS REGARDING ONSET, COURSE OF ILLNESS, INPATIENT/OUTPATIENT TREATMENT)
Pt. is a 72 y/o old female, domiciled with spouse, retired with history of MDD and ANSON with multiple psych hospitalizatins (most recently Jan 2024 on [facility]), had prior SA by overdosing on pills (11/2019) following death of parents. currently treated by outpatient psychiatrist Dr. Ramirez,  Pt. uses cannabis daily, pertinent medical issues include: HTN, hyperlipidemia, and GERD, brought in by  for anxiety, depression, somatic symptoms including dizziness, suicidal ideation although she now denies that, reports that "I only said I was exhausted and didn't want to keep going like this." .

## 2024-04-18 NOTE — BH INPATIENT PSYCHIATRY PROGRESS NOTE - NSBHFUPINTERVALHXFT_PSY_A_CORE
Patient reports feeling depressed, hopeless. Frequently catastrophizing. Somatically preoccupied. Continues to report dizziness.

## 2024-04-19 PROCEDURE — 99232 SBSQ HOSP IP/OBS MODERATE 35: CPT

## 2024-04-19 RX ORDER — CHLORHEXIDINE GLUCONATE 213 G/1000ML
15 SOLUTION TOPICAL
Refills: 0 | Status: DISCONTINUED | OUTPATIENT
Start: 2024-04-19 | End: 2024-05-31

## 2024-04-19 RX ORDER — SENNA PLUS 8.6 MG/1
2 TABLET ORAL ONCE
Refills: 0 | Status: COMPLETED | OUTPATIENT
Start: 2024-04-19 | End: 2024-04-19

## 2024-04-19 RX ADMIN — Medication 50 MILLIGRAM(S): at 08:21

## 2024-04-19 RX ADMIN — NORTRIPTYLINE HYDROCHLORIDE 50 MILLIGRAM(S): 10 CAPSULE ORAL at 20:39

## 2024-04-19 RX ADMIN — Medication 0.5 MILLIGRAM(S): at 06:12

## 2024-04-19 RX ADMIN — MIRTAZAPINE 15 MILLIGRAM(S): 45 TABLET, ORALLY DISINTEGRATING ORAL at 20:40

## 2024-04-19 RX ADMIN — Medication 3 MILLIGRAM(S): at 20:40

## 2024-04-19 RX ADMIN — Medication 12.5 MILLIGRAM(S): at 12:58

## 2024-04-19 RX ADMIN — ATORVASTATIN CALCIUM 10 MILLIGRAM(S): 80 TABLET, FILM COATED ORAL at 20:40

## 2024-04-19 RX ADMIN — SENNA PLUS 2 TABLET(S): 8.6 TABLET ORAL at 21:39

## 2024-04-19 RX ADMIN — Medication 25 MILLIGRAM(S): at 08:21

## 2024-04-19 RX ADMIN — Medication 1 TABLET(S): at 08:21

## 2024-04-19 RX ADMIN — PANTOPRAZOLE SODIUM 40 MILLIGRAM(S): 20 TABLET, DELAYED RELEASE ORAL at 08:21

## 2024-04-19 RX ADMIN — Medication 0.5 MILLIGRAM(S): at 17:40

## 2024-04-19 RX ADMIN — Medication 25 MILLIGRAM(S): at 12:24

## 2024-04-19 RX ADMIN — Medication 25 MILLIGRAM(S): at 20:39

## 2024-04-19 RX ADMIN — Medication 50 MILLIGRAM(S): at 20:39

## 2024-04-19 NOTE — DIETITIAN INITIAL EVALUATION ADULT - ORAL INTAKE PTA/DIET HISTORY
Patient reports she had had decreased appetite over the past month, with improved appetite/PO intake during her stay in [facility]; NKFA reported. Patient avoids spicy or citrus foods (including orange, OJ, cranberry juice) due to hx of GERD. Intolerance to milk but ok with other dairy products including yogurt & cheese. Tries to follow a low sodium, low carb diet at home.

## 2024-04-19 NOTE — DIETITIAN INITIAL EVALUATION ADULT - ADD RECOMMEND
c/w daily MVI for micronutrient coverage per MD order.   Encourage healthy food choices and honor food preferences PRN.   Monitor PO intake/tolerance, weights, labs, BM's, and skin integrity.

## 2024-04-19 NOTE — DIETITIAN INITIAL EVALUATION ADULT - PERTINENT MEDS FT
MEDICATIONS  (STANDING):  atorvastatin 10 milliGRAM(s) Oral at bedtime  clonazePAM  Tablet 0.5 milliGRAM(s) Oral <User Schedule>  lidocaine   4% Patch 1 Patch Transdermal once  melatonin. 3 milliGRAM(s) Oral at bedtime  metoprolol tartrate 50 milliGRAM(s) Oral two times a day  mirtazapine 15 milliGRAM(s) Oral at bedtime  multivitamin 1 Tablet(s) Oral daily  nortriptyline 50 milliGRAM(s) Oral at bedtime  pantoprazole    Tablet 40 milliGRAM(s) Oral before breakfast  pregabalin 25 milliGRAM(s) Oral three times a day    MEDICATIONS  (PRN):  acetaminophen     Tablet .. 650 milliGRAM(s) Oral every 6 hours PRN Temp greater or equal to 38C (100.4F), Mild Pain (1 - 3)  aluminum hydroxide/magnesium hydroxide/simethicone Suspension 30 milliLiter(s) Oral every 4 hours PRN Dyspepsia  bisacodyl Suppository 10 milliGRAM(s) Rectal daily PRN Constipation  chlorhexidine 0.12% Liquid 15 milliLiter(s) Oral Mucosa two times a day PRN gum inflammation  gabapentin 100 milliGRAM(s) Oral three times a day PRN anxiety  haloperidol     Tablet 0.5 milliGRAM(s) Oral every 6 hours PRN agitation  haloperidol    Injectable 0.5 milliGRAM(s) IntraMuscular once PRN severe agitation  LORazepam     Tablet 0.5 milliGRAM(s) Oral every 4 hours PRN anxiety  LORazepam   Injectable 0.5 milliGRAM(s) IntraMuscular once PRN severe anxiety/agitation  meclizine 12.5 milliGRAM(s) Oral every 8 hours PRN Dizziness  ondansetron   Disintegrating Tablet 4 milliGRAM(s) Oral every 8 hours PRN Nausea and/or Vomiting  polyethylene glycol 3350 17 Gram(s) Oral two times a day PRN constipation

## 2024-04-19 NOTE — DIETITIAN INITIAL EVALUATION ADULT - OTHER INFO
Patient is a 72 y/o female, premorbidly ambulant (not needing assists) and iADL/ bADL independent. with background hx of MDD and ANSON; has [facility] had prior SA by overdosing on pills (11/2019) following death of parents, PMHx of HTN, hyperlipidemia, and GERD, presented to ED with somatic complaints and reports of ?passive suicidal ideation, had exhaustive w/u, now admitted voluntarily to [facility].    Met with patient in the dining area today. Patient reports her appetite remains fair with fair PO intake partially due to dislikes meals provided here in [facility], + occasional vertigo. Denies chewing/swallowing difficulties on current diet. Menu options extensively explored with patient today, food preferences taken and honored on CBoard. Writer encouraged po intake as tolerated, patient verbalized understanding. c/w Remeron which may induce her appetite. On Multivitamin per MD order. Denies GI distress (nausea/vomiting/diarrhea/ constipation) at this time. Reinforced healthy eating and DASH (cholesterol & Na restricted) diet with patient to day for her wt, BG & blood lipid mgmt, patient verbalized understanding.     Wt has been overall stable the past 3 months, current adm wt: 209.6lb (4/16/24); recent wt hx: 209.7lb (4/5/24), 208lb (1/10/24).

## 2024-04-19 NOTE — CONSULT NOTE ADULT - SUBJECTIVE AND OBJECTIVE BOX
HPI:      Medications:  -atorvastatin 10mg po qhs for HLD  -metoprolol 50mg po bid for HTN  -mirtazapine 15mg po qHs  -meclizine 12.5mg po q8hr prn for dizziness  -klonopin 0.5mg po BID for anxiety  -pregabalin 25mg po TID for anxiety  -pantoprazole 40mg po qd for dyspepsia  -gabapentin 100mg po tid prn for anxiety  -other prns: haldol, lorazepam, zofran, dulcolax    Allergies: NKDA    Past Medical History: HTN, HLD, GERD    Past Psychiatric History: MDD, ANSON. Multiple psych admissions (most recently 2024 on [facility]). Hx of SA via overdose. Sees Dr. Ramirez outpatient.    Family History: None known    Social History: Lives with  in private home, retired. iADL, bADL independent. Hx of cannabis use. No access to guns.    ROS:  Constitutional: Denies fevers, chills.  HEENT: Denies changes in vision, changes in hearing  Respiratory: Denies SOB, cough  Cardiac: Denies chest pain and palpitations  Gastrointestinal: Denies changes in bowel movements, denies nausea/vomiting  Genitourinary: Denies dysuria, hematuria  Musculoskeletal: Denies pain in extremities  Neuro: endorses dizziness, vertigo  Skin: Denies new rashes or lesions    Physical Exam:  VS: T 97.7 °F, sitting: HR 88, /80, standing: HR 83, /70. BMI 38.3  Gen: Well-developed, alert and in no acute distress.   HEENT: Normocephalic and atraumatic.  CV: Regular rate & rhythm. Normal S1/S2. No murmurs, rubs, or gallops.  Pulmonary: Clear to auscultation bilaterally.  Abdomen: Soft, nontender, and nondistended.  Ext: No edema. No clubbing or cyanosis of digits. Radial and dorsalis pedis pulses intact bilaterally. Prompt capillary refill.    Neurological Exam:  MSE: Patient was alert though at times nonsensical and requiring redirection.   Orientation:  -Time: correct year/date/day/month/season  -Location: correct state/county/hospital/floor/town  CN II – Pupils are equal, round, and reactive to light and accommodation. Able to read fine print on  page. Peripheral fields intact bilaterally by confrontation.  CN III, IV, VI – Extraocular movements intact without nystagmus.  CN V – V1-V3 intact to light touch.  CN VII – No facial asymmetry. Able to smile and raise eyebrows symmetrically and bilaterally.  CN VIII – Hearing intact.  CN IX, X – Palate elevates symmetrically bilaterally. Uvula is midline.  CN XI – Shoulder shrug symmetric bilaterally with good power.  CN XII – Tongue midline without deviation.  Motor: Normal bulk and tone, including in tibialis anterior and intrinsic hand muscles bilaterally. No  fasciculations. Strength 5/5 in upper extremities and lower extremities. No bradykinesia, tremors, or myoclonus noted.  Sensory: Temperature, light touch, pin, and vibration sense intact in upper and lower extremities.  bilaterally.   Reflexes: biceps, triceps, patellar 2+ on R and L  Coordination: Finger-nose-finger and heel-to-shin intact bilaterally with no dysmetria. There was no  dysdiadochokinesia on rapid alternating movements. Finger and toe tapping rhythmic and symmetric.  Gait: Able to stand on heels and toes. No Romberg sign.     Labs:  4/10: last EKG nsr with occasional premature ventricular complexes  : CBC, CMP unremarkable  4/3: TSH wnl, utox negative, hep C negative    Imagin/5: MRI Head no con  FINDINGS: MR scan dated 2022 and CT head dated 2024 is   available for review.  The brain demonstrates no abnormal signal intensity.   No acute cerebral   cortical infarct is found.   No intracranial hemorrhage is recognized. No   mass effect is found in the brain.  The ventricles, sulci and basal cisterns appear unremarkable.  The vertebral and internal carotid arteries demonstrate expected flow   voids indicating their patency.  The orbits are unremarkable. The lenses are surgically small. The   paranasal sinuses are clear.  The nasal cavity appears intact.  The   nasopharynx is symmetric.  The central skull base and temporal bones are   intact.  The calvarium appears unremarkable.  IMPRESSION:   Unremarkable MR of the brain.    : TTE   CONCLUSIONS:   1. Technically difficult image quality.   2. The left ventricular cavity is normal in size. Left ventricular wall thickness is normal. Left ventricular endocardium is not well visualized; however, the left ventricular systolic function appears grossly normal.   3. Normal right ventricular cavity size and normal systolic function.   4. Structurally normal mitral valve with normal leaflet excursion. There is calcification of the mitral valve annulus. There is trace mitral regurgitation.    : CT Angio of Head/Neck w contrast  IMPRESSION:  1.  BRAIN:  Unremarkable head CT. Ischemic white matter disease and   atrophy typical for age. No abnormal enhancement  2.  RIGHT CAROTID SYSTEM:    No hemodynamically significant stenosis.  3   LEFT CAROTID SYSTEM:     No hemodynamically significant stenosis.  4.   VERTEBRAL CIRCULATION:    Patent.  5.  ANTERIOR INTRACRANIAL CIRCULATION:     Intracranial atherosclerosis   cavernous and clinoid segments of the internal carotid arteries, mild to   moderate.  6.  POSTERIOR INTRACRANIAL CIRCULATION:    Unremarkable.  7.  No large vessel occlusion.       HPI:  SKY ESPITIA, 73y (1950) F w/ PMHx significant for anxiety, depression, HTN, and HLD currently admitted on [facility] for worsening depression. Neurology was consulted due to recent spell of dizziness and vertigo lasting 4 days prior to her psychiatric admission.     She was admitted to [facility] for anxiety in 2024 where Dr. Washington took her off her medications due to concern for polypharmacy. She was then started on clonazepam, mirtazapine, and nortriptyline upon discharge which she reports helped her. She saw Dr. Ramirez outpatient who changed her medications without any tapering, mentioning the zoloft gave her GI upset. She states that this is because when she came into the clinic she was diffusely sweating and nauseated. She asked to restart her previous medications on 3/28. Patient reports she was started on Lyrica during this time and reports it was the first time she took it though records indicate that the patient has been on pregabalin in the past. Patient reports Albert 3/31 she had a particularly intense anxiety attack, reporting that the palpitations she felt were greater than any anxiety attack she had in the past. In addition, she reported a feeling of rising heat in her body. After this anxiety attack, she developed dizziness and increased tremors in her hands. The dizziness remained and is intermittent and feels like the "room spinning". She does not have this dizziness at rest though when she stands she describes it as "unsteadiness" rather than "room-spinning". Patient has expressed suicidal ideation in the ED and has reported a plan to the behavioral health team for which she is planned to be transferred to [facility] for inpatient psychiatric management after medical clearance of persistent unsteadiness. Patient denies headache, nausea vomiting, slurred speech, hearing changes, vision changes, focal numbness or focal weakness.    Patient was recently evaluated [facility] on [facility] for dizziness. CTA head/neck and MRI head wo contrast were performed and unremarkable. Neurology signed off and recommended outpatient follow-up with neurology.     On interview today, patient confirms history above and adds that she has a lingering sensation of her head feeling 'like a bowling ball' that has continued since her vertigo spell. Patient denies further vertigo or issues with gait. Patient endorses tightness in her neck and head, and that she is anxious about these symptoms persisting. Patient states that the meclizine (12.5mg po tid) has not helped.     Medications:  -nortriptyline 50mg po  -atorvastatin 10mg po qhs for HLD  -metoprolol 50mg po bid for HTN  -mirtazapine 15mg po qHs  -meclizine 12.5mg po q8hr prn for dizziness  -klonopin 0.5mg po BID for anxiety  -pregabalin 25mg po TID for anxiety  -pantoprazole 40mg po qd for dyspepsia  -gabapentin 100mg po tid prn for anxiety  -other prns: haldol, lorazepam, zofran, dulcolax    Allergies: NKDA    Past Medical History: HTN, HLD, GERD    Past Psychiatric History: MDD, ANSON. Multiple psych admissions (most recently 2024 on [facility]). Hx of SA via overdose. Sees Dr. Ramirez outpatient.    Family History: None known    Social History: Lives with  in private home, retired. iADL, bADL independent. Hx of cannabis use. No access to guns.    ROS:  Constitutional: Denies fevers, chills.  HEENT: Denies changes in vision, changes in hearing  Respiratory: Denies SOB, cough  Cardiac: Denies chest pain and palpitations  Gastrointestinal: Denies changes in bowel movements, denies nausea/vomiting  Genitourinary: Denies dysuria, hematuria  Musculoskeletal: Denies pain in extremities  Neuro: endorses dizziness, vertigo  Skin: Denies new rashes or lesions    Physical Exam:  VS: T 97.7 °F, sitting: HR 88, /80, standing: HR 83, /70. BMI 38.3  Gen: Well-developed, alert and in no acute distress.   HEENT: Normocephalic and atraumatic.  CV: Regular rate & rhythm. Normal S1/S2. No murmurs, rubs, or gallops.  Pulmonary: Clear to auscultation bilaterally.  Abdomen: Soft, nontender, and nondistended.  Ext: No edema. No clubbing or cyanosis of digits. Radial and dorsalis pedis pulses intact bilaterally. Prompt capillary refill.    Neurological Exam:  MSE: Patient was alert, cooperative, anxious.  Orientation: grossly oriented  CN II – Pupils are equal, round, and reactive to light and accommodation. Able to read fine print on  page. Peripheral fields intact bilaterally by confrontation.  CN III, IV, VI – Extraocular movements intact without nystagmus.  CN V – V1-V3 intact to light touch.  CN VII – No facial asymmetry. Able to smile and raise eyebrows symmetrically and bilaterally.  CN VIII – Hearing intact.  CN IX, X – Palate elevates symmetrically bilaterally. Uvula is midline.  CN XI – Shoulder shrug symmetric bilaterally with good power.  CN XII – Tongue midline without deviation.  Motor: Normal bulk and tone, including in tibialis anterior and intrinsic hand muscles bilaterally. No  fasciculations. Strength 5/5 in upper extremities and lower extremities. No bradykinesia, tremors, or myoclonus noted.  Sensory: Vibration sense reduced in upper and lower extremities. Temperature, light touch intact.  Reflexes: biceps, triceps, patellar 2+ on R and L  Coordination: Finger-nose-finger and heel-to-shin intact bilaterally with no dysmetria. There was no  dysdiadochokinesia on rapid alternating movements. Finger and toe tapping rhythmic and symmetric.  Gait: Romberg+ but could be 2/2 to anxiety.     Labs:  4/10: last EKG nsr with occasional premature ventricular complexes  : CBC, CMP unremarkable  4/3: TSH wnl, utox negative, hep C negative    Imagin/5: MRI Head no con  FINDINGS: MR scan dated 2022 and CT head dated 2024 is   available for review.  The brain demonstrates no abnormal signal intensity.   No acute cerebral   cortical infarct is found.   No intracranial hemorrhage is recognized. No   mass effect is found in the brain.  The ventricles, sulci and basal cisterns appear unremarkable.  The vertebral and internal carotid arteries demonstrate expected flow   voids indicating their patency.  The orbits are unremarkable. The lenses are surgically small. The   paranasal sinuses are clear.  The nasal cavity appears intact.  The   nasopharynx is symmetric.  The central skull base and temporal bones are   intact.  The calvarium appears unremarkable.  IMPRESSION:   Unremarkable MR of the brain.    : TTE   CONCLUSIONS:   1. Technically difficult image quality.   2. The left ventricular cavity is normal in size. Left ventricular wall thickness is normal. Left ventricular endocardium is not well visualized; however, the left ventricular systolic function appears grossly normal.   3. Normal right ventricular cavity size and normal systolic function.   4. Structurally normal mitral valve with normal leaflet excursion. There is calcification of the mitral valve annulus. There is trace mitral regurgitation.    : CT Angio of Head/Neck w contrast  IMPRESSION:  1.  BRAIN:  Unremarkable head CT. Ischemic white matter disease and   atrophy typical for age. No abnormal enhancement  2.  RIGHT CAROTID SYSTEM:    No hemodynamically significant stenosis.  3   LEFT CAROTID SYSTEM:     No hemodynamically significant stenosis.  4.   VERTEBRAL CIRCULATION:    Patent.  5.  ANTERIOR INTRACRANIAL CIRCULATION:     Intracranial atherosclerosis   cavernous and clinoid segments of the internal carotid arteries, mild to   moderate.  6.  POSTERIOR INTRACRANIAL CIRCULATION:    Unremarkable.  7.  No large vessel occlusion.

## 2024-04-19 NOTE — CONSULT NOTE ADULT - ATTENDING COMMENTS
I have examined the pt at bedside.  The risks and the benefits of the proposed diagnostic/therapeutic approach were thoroughly discussed.   I agree with the above plan and have modified it where necessary.    Assessment:  73yRH WF w/ PMHx significant for anxiety, depression, HTN, and HLD currently admitted to [facility] for worsening depression.   Neurology was consulted due to recent spell of dizziness and vertigo lasting 4 days prior to her psychiatric admission with lingering head discomfort. Neurologic exam remarkable for +Romberg though likely 2/2 to anxiety as well as reduced vibration sensation concerning for neuropathy. Less likely a central cause for her symptoms given negative MRI.   Vertigo event dates back to early April, where MRI brain and CTA h/n were negative for stroke.  Exam is benign, but underlies possible neuropathy, and pt does have significant neck tension.  She would benefit from PT and vestibular therapy. Also, worth investigating possible peripheral neuropathy. contributing to her unbalance.  ? if asterixis on exam.      Plan:  -Check ammonia level  -Recommend physical/vestibular therapy   -Recommend flexeril 5mg po qHs   -Coordinate outpatient neurology follow-up Neuroscience at [address] [place] [address], [id number] [place], [phone].

## 2024-04-19 NOTE — BH INPATIENT PSYCHIATRY PROGRESS NOTE - NSBHFUPINTERVALHXFT_PSY_A_CORE
Patient reports ongoing anxiety and depression due to concerns about dizziness and worries that it will never be treated. Denies SI but reports hopelessness.  This is a late entry for service provided 4/19/24. Patient reports ongoing anxiety and depression due to concerns about dizziness and worries that it will never be treated. Denies SI but reports hopelessness.

## 2024-04-19 NOTE — BH INPATIENT PSYCHIATRY PROGRESS NOTE - NSBHCHARTREVIEWVS_PSY_A_CORE FT
Vital Signs Last 24 Hrs  T(C): --  T(F): --  HR: --  BP: --  BP(mean): --  RR: --  SpO2: --    Orthostatic VS  04-19-24 @ 19:00  Lying BP: --/-- HR: --  Sitting BP: 134/58 HR: 98  Standing BP: --/-- HR: --  Site: upper left arm  Mode: electronic  Orthostatic VS  04-19-24 @ 05:27  Lying BP: 124/80 HR: 88  Sitting BP: 118/70 HR: 83  Standing BP: --/-- HR: --  Site: upper right arm  Mode: electronic  Orthostatic VS  04-18-24 @ 19:56  Lying BP: --/-- HR: --  Sitting BP: 126/76 HR: 102  Standing BP: --/-- HR: --  Site: --  Mode: --

## 2024-04-19 NOTE — BH TREATMENT PLAN - NSTXDCOTHRINTERSW_PSY_ALL_CORE
SW will provide psychoed., support, encourage tx. and med. compliance and coordiante discharge plans in collaboration with tx. team.

## 2024-04-19 NOTE — BH INPATIENT PSYCHIATRY PROGRESS NOTE - CURRENT MEDICATION
MEDICATIONS  (STANDING):  atorvastatin 10 milliGRAM(s) Oral at bedtime  clonazePAM  Tablet 0.5 milliGRAM(s) Oral <User Schedule>  lidocaine   4% Patch 1 Patch Transdermal once  melatonin. 3 milliGRAM(s) Oral at bedtime  metoprolol tartrate 50 milliGRAM(s) Oral two times a day  mirtazapine 15 milliGRAM(s) Oral at bedtime  multivitamin 1 Tablet(s) Oral daily  nortriptyline 50 milliGRAM(s) Oral at bedtime  pantoprazole    Tablet 40 milliGRAM(s) Oral before breakfast  pregabalin 25 milliGRAM(s) Oral three times a day    MEDICATIONS  (PRN):  acetaminophen     Tablet .. 650 milliGRAM(s) Oral every 6 hours PRN Temp greater or equal to 38C (100.4F), Mild Pain (1 - 3)  aluminum hydroxide/magnesium hydroxide/simethicone Suspension 30 milliLiter(s) Oral every 4 hours PRN Dyspepsia  bisacodyl Suppository 10 milliGRAM(s) Rectal daily PRN Constipation  chlorhexidine 0.12% Liquid 15 milliLiter(s) Oral Mucosa two times a day PRN gum inflammation  gabapentin 100 milliGRAM(s) Oral three times a day PRN anxiety  haloperidol     Tablet 0.5 milliGRAM(s) Oral every 6 hours PRN agitation  haloperidol    Injectable 0.5 milliGRAM(s) IntraMuscular once PRN severe agitation  LORazepam     Tablet 0.5 milliGRAM(s) Oral every 4 hours PRN anxiety  LORazepam   Injectable 0.5 milliGRAM(s) IntraMuscular once PRN severe anxiety/agitation  meclizine 12.5 milliGRAM(s) Oral every 8 hours PRN Dizziness  ondansetron   Disintegrating Tablet 4 milliGRAM(s) Oral every 8 hours PRN Nausea and/or Vomiting  polyethylene glycol 3350 17 Gram(s) Oral two times a day PRN constipation   MEDICATIONS  (STANDING):  atorvastatin 10 milliGRAM(s) Oral at bedtime  clonazePAM  Tablet 0.5 milliGRAM(s) Oral <User Schedule>  lidocaine   4% Patch 1 Patch Transdermal once  melatonin. 3 milliGRAM(s) Oral at bedtime  metoprolol tartrate 50 milliGRAM(s) Oral two times a day  mirtazapine 15 milliGRAM(s) Oral at bedtime  multivitamin 1 Tablet(s) Oral daily  nortriptyline 50 milliGRAM(s) Oral at bedtime  pantoprazole    Tablet 40 milliGRAM(s) Oral before breakfast  pregabalin 25 milliGRAM(s) Oral three times a day    MEDICATIONS  (PRN):  acetaminophen     Tablet .. 650 milliGRAM(s) Oral every 6 hours PRN Temp greater or equal to 38C (100.4F), Mild Pain (1 - 3)  aluminum hydroxide/magnesium hydroxide/simethicone Suspension 30 milliLiter(s) Oral every 4 hours PRN Dyspepsia  artificial  tears Solution 1 Drop(s) Both EYES every 4 hours PRN dry eyes syndrome  bisacodyl Suppository 10 milliGRAM(s) Rectal daily PRN Constipation  chlorhexidine 0.12% Liquid 15 milliLiter(s) Oral Mucosa two times a day PRN gum inflammation  gabapentin 100 milliGRAM(s) Oral three times a day PRN anxiety  haloperidol     Tablet 0.5 milliGRAM(s) Oral every 6 hours PRN agitation  haloperidol    Injectable 0.5 milliGRAM(s) IntraMuscular once PRN severe agitation  LORazepam     Tablet 0.5 milliGRAM(s) Oral every 4 hours PRN anxiety  LORazepam   Injectable 0.5 milliGRAM(s) IntraMuscular once PRN severe anxiety/agitation  meclizine 12.5 milliGRAM(s) Oral every 8 hours PRN Dizziness  ondansetron   Disintegrating Tablet 4 milliGRAM(s) Oral every 8 hours PRN Nausea and/or Vomiting  polyethylene glycol 3350 17 Gram(s) Oral two times a day PRN constipation

## 2024-04-19 NOTE — BH INPATIENT PSYCHIATRY PROGRESS NOTE - PRN MEDS
MEDICATIONS  (PRN):  acetaminophen     Tablet .. 650 milliGRAM(s) Oral every 6 hours PRN Temp greater or equal to 38C (100.4F), Mild Pain (1 - 3)  aluminum hydroxide/magnesium hydroxide/simethicone Suspension 30 milliLiter(s) Oral every 4 hours PRN Dyspepsia  bisacodyl Suppository 10 milliGRAM(s) Rectal daily PRN Constipation  chlorhexidine 0.12% Liquid 15 milliLiter(s) Oral Mucosa two times a day PRN gum inflammation  gabapentin 100 milliGRAM(s) Oral three times a day PRN anxiety  haloperidol     Tablet 0.5 milliGRAM(s) Oral every 6 hours PRN agitation  haloperidol    Injectable 0.5 milliGRAM(s) IntraMuscular once PRN severe agitation  LORazepam     Tablet 0.5 milliGRAM(s) Oral every 4 hours PRN anxiety  LORazepam   Injectable 0.5 milliGRAM(s) IntraMuscular once PRN severe anxiety/agitation  meclizine 12.5 milliGRAM(s) Oral every 8 hours PRN Dizziness  ondansetron   Disintegrating Tablet 4 milliGRAM(s) Oral every 8 hours PRN Nausea and/or Vomiting  polyethylene glycol 3350 17 Gram(s) Oral two times a day PRN constipation   MEDICATIONS  (PRN):  acetaminophen     Tablet .. 650 milliGRAM(s) Oral every 6 hours PRN Temp greater or equal to 38C (100.4F), Mild Pain (1 - 3)  aluminum hydroxide/magnesium hydroxide/simethicone Suspension 30 milliLiter(s) Oral every 4 hours PRN Dyspepsia  artificial  tears Solution 1 Drop(s) Both EYES every 4 hours PRN dry eyes syndrome  bisacodyl Suppository 10 milliGRAM(s) Rectal daily PRN Constipation  chlorhexidine 0.12% Liquid 15 milliLiter(s) Oral Mucosa two times a day PRN gum inflammation  gabapentin 100 milliGRAM(s) Oral three times a day PRN anxiety  haloperidol     Tablet 0.5 milliGRAM(s) Oral every 6 hours PRN agitation  haloperidol    Injectable 0.5 milliGRAM(s) IntraMuscular once PRN severe agitation  LORazepam     Tablet 0.5 milliGRAM(s) Oral every 4 hours PRN anxiety  LORazepam   Injectable 0.5 milliGRAM(s) IntraMuscular once PRN severe anxiety/agitation  meclizine 12.5 milliGRAM(s) Oral every 8 hours PRN Dizziness  ondansetron   Disintegrating Tablet 4 milliGRAM(s) Oral every 8 hours PRN Nausea and/or Vomiting  polyethylene glycol 3350 17 Gram(s) Oral two times a day PRN constipation

## 2024-04-19 NOTE — BH INPATIENT PSYCHIATRY PROGRESS NOTE - NSBHMETABOLIC_PSY_ALL_CORE_FT
BMI: BMI (kg/m2): 38.3 (04-16-24 @ 19:07)  HbA1c: A1C with Estimated Average Glucose Result: 5.8 % (01-11-24 @ 08:00)    Glucose:   BP: 112/64 (04-18-24 @ 12:09) (112/64 - 112/64)Vital Signs Last 24 Hrs  T(C): --  T(F): --  HR: --  BP: --  BP(mean): --  RR: --  SpO2: --    Orthostatic VS  04-19-24 @ 19:00  Lying BP: --/-- HR: --  Sitting BP: 134/58 HR: 98  Standing BP: --/-- HR: --  Site: upper left arm  Mode: electronic  Orthostatic VS  04-19-24 @ 05:27  Lying BP: 124/80 HR: 88  Sitting BP: 118/70 HR: 83  Standing BP: --/-- HR: --  Site: upper right arm  Mode: electronic  Orthostatic VS  04-18-24 @ 19:56  Lying BP: --/-- HR: --  Sitting BP: 126/76 HR: 102  Standing BP: --/-- HR: --  Site: --  Mode: --    Lipid Panel: Date/Time: 04-05-24 @ 05:56  Cholesterol, Serum: 176  LDL Cholesterol Calculated: 97  HDL Cholesterol, Serum: 39  Total Cholesterol/HDL Ration Measurement: --  Triglycerides, Serum: 200

## 2024-04-19 NOTE — BH TREATMENT PLAN - NSTXDCOTHRGOAL_PSY_ALL_CORE
Pt. will comply with tx. recommendations, decrease sxs and cooperate with discharge plans within seven days.

## 2024-04-19 NOTE — CONSULT NOTE ADULT - ASSESSMENT
Assessment:  SKY ESPITIA, 73y (1950) F w/ PMHx significant for anxiety, depression, HTN, and HLD currently admitted on [facility] for worsening depression. Neurology was consulted due to recent spell of dizziness and vertigo lasting 4 days prior to her psychiatric admission with lingering head discomfort. Neurologic exam remarkable for +Romberg though likely 2/2 to anxiety as well as reduced vibration sensation concerning for neuropathy. Less likely a central cause for her symptoms given negative MRI.     Plan:  -Check ammonia level  -Recommend physical/vestibular therapy   -Recommend flexeril 5mg po qHs   -Coordinate outpatient neurology follow-up at [address] [place] [address]

## 2024-04-19 NOTE — DIETITIAN INITIAL EVALUATION ADULT - PERTINENT LABORATORY DATA
Glucose: 111 mg/dL (04.08.24 @ 06:30)     A1C with Estimated Average Glucose Result: 5.8 % (01-11-24 @ 08:00)  A1C with Estimated Average Glucose Result: 5.8 % (05-11-23 @ 08:36)    Lipid Panel: Date/Time: 04-05-24 @ 05:56  Cholesterol, Serum: 176  LDL Cholesterol Calculated: 97  HDL Cholesterol, Serum: 39  Total Cholesterol/HDL Ration Measurement: --  Triglycerides, Serum: 200

## 2024-04-19 NOTE — BH INPATIENT PSYCHIATRY PROGRESS NOTE - NSBHASSESSSUMMFT_PSY_ALL_CORE
73 yr old female, , domiciled, and retired. premorbidly ambulant (not needing assists) and iADL/ bADL independent. with background hx of MDD and ANSON; has multiple psych admissions to [facility] (most recently Jan 2024 on [facility]), had prior SA by overdosing on pills (11/2019) following death of parents, presented to ED with somatic complaints and reports of ?passive suicidal ideation, had exhaustive w/u, now admitted voluntarily to [facility]  Routine checks, no suicidal ideations  Continue nortriptyline 50mg po qhs for depression  Continue klonopin 0.25mg po bid for anxiety; added gabapentin prn for anxiety  Continue mirtazapine 15mg po qhs and melatonin 3mg po qhs for insomnia  Continue lyrica 25mg po tid off label for anxiety    HLD: atorvastatin 10mg po qhs  GERD: protonix 40mg po daily  HTN: metoprolol 50mg po bid 73 yr old female, , domiciled, and retired. premorbidly ambulant (not needing assists) and iADL/ bADL independent. with background hx of MDD and ANSON; has multiple psych admissions to [facility] (most recently Jan 2024 on [facility]), had prior SA by overdosing on pills (11/2019) following death of parents, presented to ED with somatic complaints and reports of ?passive suicidal ideation, had exhaustive w/u, now admitted voluntarily to [facility]  Routine checks, no suicidal ideations  Continue nortriptyline 50mg po qhs for depression  Continue klonopin 0.25mg po bid for anxiety; added gabapentin prn for anxiety  Continue mirtazapine 15mg po qhs and melatonin 3mg po qhs for insomnia  Continue lyrica 25mg po tid off label for anxiety    HLD: atorvastatin 10mg po qhs  GERD: protonix 40mg po daily  HTN: metoprolol 50mg po bid  Dizziness: MRI wnl; meclizine 12.5mg prn; request neuro consult, r/o BPV?

## 2024-04-20 PROCEDURE — 99232 SBSQ HOSP IP/OBS MODERATE 35: CPT

## 2024-04-20 RX ADMIN — PANTOPRAZOLE SODIUM 40 MILLIGRAM(S): 20 TABLET, DELAYED RELEASE ORAL at 08:14

## 2024-04-20 RX ADMIN — Medication 25 MILLIGRAM(S): at 09:15

## 2024-04-20 RX ADMIN — ONDANSETRON 4 MILLIGRAM(S): 8 TABLET, FILM COATED ORAL at 13:34

## 2024-04-20 RX ADMIN — Medication 25 MILLIGRAM(S): at 21:04

## 2024-04-20 RX ADMIN — Medication 3 MILLIGRAM(S): at 21:02

## 2024-04-20 RX ADMIN — MIRTAZAPINE 15 MILLIGRAM(S): 45 TABLET, ORALLY DISINTEGRATING ORAL at 21:02

## 2024-04-20 RX ADMIN — POLYETHYLENE GLYCOL 3350 17 GRAM(S): 17 POWDER, FOR SOLUTION ORAL at 12:53

## 2024-04-20 RX ADMIN — Medication 50 MILLIGRAM(S): at 21:02

## 2024-04-20 RX ADMIN — Medication 25 MILLIGRAM(S): at 12:55

## 2024-04-20 RX ADMIN — Medication 1 TABLET(S): at 09:14

## 2024-04-20 RX ADMIN — NORTRIPTYLINE HYDROCHLORIDE 50 MILLIGRAM(S): 10 CAPSULE ORAL at 21:01

## 2024-04-20 RX ADMIN — GABAPENTIN 100 MILLIGRAM(S): 400 CAPSULE ORAL at 14:11

## 2024-04-20 RX ADMIN — Medication 10 MILLIGRAM(S): at 20:39

## 2024-04-20 RX ADMIN — Medication 0.5 MILLIGRAM(S): at 06:24

## 2024-04-20 RX ADMIN — Medication 1 DROP(S): at 09:16

## 2024-04-20 RX ADMIN — Medication 50 MILLIGRAM(S): at 09:15

## 2024-04-20 RX ADMIN — ATORVASTATIN CALCIUM 10 MILLIGRAM(S): 80 TABLET, FILM COATED ORAL at 21:02

## 2024-04-20 NOTE — BH INPATIENT PSYCHIATRY PROGRESS NOTE - NSBHASSESSSUMMFT_PSY_ALL_CORE
73 yr old female, , domiciled, and retired. premorbidly ambulant (not needing assists) and iADL/ bADL independent. with background hx of MDD and ANSON; has multiple psych admissions to [facility] (most recently Jan 2024 on [facility]), had prior SA by overdosing on pills (11/2019) following death of parents, presented to ED with somatic complaints and reports of ?passive suicidal ideation, had exhaustive w/u, now admitted voluntarily to [facility]  Routine checks, no suicidal ideations  Continue nortriptyline 50mg po qhs for depression  Continue klonopin 0.25mg po bid for anxiety; added gabapentin prn for anxiety  Continue mirtazapine 15mg po qhs and melatonin 3mg po qhs for insomnia  Continue lyrica 25mg po tid off label for anxiety    HLD: atorvastatin 10mg po qhs  GERD: protonix 40mg po daily  HTN: metoprolol 50mg po bid  Dizziness: MRI wnl; meclizine 12.5mg prn; request neuro consult, r/o BPV?

## 2024-04-20 NOTE — BH INPATIENT PSYCHIATRY PROGRESS NOTE - NSBHCHARTREVIEWVS_PSY_A_CORE FT
Vital Signs Last 24 Hrs  T(C): 36.4 (04-20-24 @ 09:19), Max: 36.4 (04-20-24 @ 09:19)  T(F): 97.6 (04-20-24 @ 09:19), Max: 97.6 (04-20-24 @ 09:19)  HR: --  BP: --  BP(mean): --  RR: 17 (04-20-24 @ 09:19) (17 - 17)  SpO2: 95% (04-20-24 @ 09:19) (95% - 95%)    Orthostatic VS  04-20-24 @ 09:19  Lying BP: --/-- HR: --  Sitting BP: 110/66 HR: 78  Standing BP: 118/70 HR: 84  Site: upper left arm  Mode: electronic  Orthostatic VS  04-19-24 @ 19:00  Lying BP: --/-- HR: --  Sitting BP: 134/58 HR: 98  Standing BP: --/-- HR: --  Site: upper left arm  Mode: electronic  Orthostatic VS  04-19-24 @ 05:27  Lying BP: 124/80 HR: 88  Sitting BP: 118/70 HR: 83  Standing BP: --/-- HR: --  Site: upper right arm  Mode: electronic  Orthostatic VS  04-18-24 @ 19:56  Lying BP: --/-- HR: --  Sitting BP: 126/76 HR: 102  Standing BP: --/-- HR: --  Site: --  Mode: --

## 2024-04-20 NOTE — BH INPATIENT PSYCHIATRY PROGRESS NOTE - PRN MEDS
MEDICATIONS  (PRN):  acetaminophen     Tablet .. 650 milliGRAM(s) Oral every 6 hours PRN Temp greater or equal to 38C (100.4F), Mild Pain (1 - 3)  aluminum hydroxide/magnesium hydroxide/simethicone Suspension 30 milliLiter(s) Oral every 4 hours PRN Dyspepsia  artificial  tears Solution 1 Drop(s) Both EYES every 4 hours PRN dry eyes syndrome  bisacodyl Suppository 10 milliGRAM(s) Rectal daily PRN Constipation  chlorhexidine 0.12% Liquid 15 milliLiter(s) Oral Mucosa two times a day PRN gum inflammation  gabapentin 100 milliGRAM(s) Oral three times a day PRN anxiety  haloperidol     Tablet 0.5 milliGRAM(s) Oral every 6 hours PRN agitation  haloperidol    Injectable 0.5 milliGRAM(s) IntraMuscular once PRN severe agitation  LORazepam     Tablet 0.5 milliGRAM(s) Oral every 4 hours PRN anxiety  LORazepam   Injectable 0.5 milliGRAM(s) IntraMuscular once PRN severe anxiety/agitation  meclizine 12.5 milliGRAM(s) Oral every 8 hours PRN Dizziness  ondansetron   Disintegrating Tablet 4 milliGRAM(s) Oral every 8 hours PRN Nausea and/or Vomiting  polyethylene glycol 3350 17 Gram(s) Oral two times a day PRN constipation

## 2024-04-20 NOTE — BH INPATIENT PSYCHIATRY PROGRESS NOTE - CURRENT MEDICATION
MEDICATIONS  (STANDING):  atorvastatin 10 milliGRAM(s) Oral at bedtime  clonazePAM  Tablet 0.5 milliGRAM(s) Oral <User Schedule>  lidocaine   4% Patch 1 Patch Transdermal once  melatonin. 3 milliGRAM(s) Oral at bedtime  metoprolol tartrate 50 milliGRAM(s) Oral two times a day  mirtazapine 15 milliGRAM(s) Oral at bedtime  multivitamin 1 Tablet(s) Oral daily  nortriptyline 50 milliGRAM(s) Oral at bedtime  pantoprazole    Tablet 40 milliGRAM(s) Oral before breakfast  pregabalin 25 milliGRAM(s) Oral three times a day    MEDICATIONS  (PRN):  acetaminophen     Tablet .. 650 milliGRAM(s) Oral every 6 hours PRN Temp greater or equal to 38C (100.4F), Mild Pain (1 - 3)  aluminum hydroxide/magnesium hydroxide/simethicone Suspension 30 milliLiter(s) Oral every 4 hours PRN Dyspepsia  artificial  tears Solution 1 Drop(s) Both EYES every 4 hours PRN dry eyes syndrome  bisacodyl Suppository 10 milliGRAM(s) Rectal daily PRN Constipation  chlorhexidine 0.12% Liquid 15 milliLiter(s) Oral Mucosa two times a day PRN gum inflammation  gabapentin 100 milliGRAM(s) Oral three times a day PRN anxiety  haloperidol     Tablet 0.5 milliGRAM(s) Oral every 6 hours PRN agitation  haloperidol    Injectable 0.5 milliGRAM(s) IntraMuscular once PRN severe agitation  LORazepam     Tablet 0.5 milliGRAM(s) Oral every 4 hours PRN anxiety  LORazepam   Injectable 0.5 milliGRAM(s) IntraMuscular once PRN severe anxiety/agitation  meclizine 12.5 milliGRAM(s) Oral every 8 hours PRN Dizziness  ondansetron   Disintegrating Tablet 4 milliGRAM(s) Oral every 8 hours PRN Nausea and/or Vomiting  polyethylene glycol 3350 17 Gram(s) Oral two times a day PRN constipation

## 2024-04-20 NOTE — BH INPATIENT PSYCHIATRY PROGRESS NOTE - NSBHFUPINTERVALHXFT_PSY_A_CORE
Patient seen for Major depression. Chart reviewed and discussed with nursing staff. Pt is compliant with medications, no acute events overnight/this morning, no psych PRN needed overnight/this am. Vitals are stable. On encounter, Pt  is visible, alert, awake, oriented and in no acute distress. Reports feeling better, depression and anxiety are medium. Denies any sadia, paranoia, suicidal/homicidal ideation or physical distress.

## 2024-04-20 NOTE — BH INPATIENT PSYCHIATRY PROGRESS NOTE - NSBHMETABOLIC_PSY_ALL_CORE_FT
BMI: BMI (kg/m2): 38.3 (04-16-24 @ 19:07)  HbA1c: A1C with Estimated Average Glucose Result: 5.8 % (01-11-24 @ 08:00)    Glucose:   BP: 112/64 (04-18-24 @ 12:09) (112/64 - 112/64)Vital Signs Last 24 Hrs  T(C): 36.4 (04-20-24 @ 09:19), Max: 36.4 (04-20-24 @ 09:19)  T(F): 97.6 (04-20-24 @ 09:19), Max: 97.6 (04-20-24 @ 09:19)  HR: --  BP: --  BP(mean): --  RR: 17 (04-20-24 @ 09:19) (17 - 17)  SpO2: 95% (04-20-24 @ 09:19) (95% - 95%)    Orthostatic VS  04-20-24 @ 09:19  Lying BP: --/-- HR: --  Sitting BP: 110/66 HR: 78  Standing BP: 118/70 HR: 84  Site: upper left arm  Mode: electronic  Orthostatic VS  04-19-24 @ 19:00  Lying BP: --/-- HR: --  Sitting BP: 134/58 HR: 98  Standing BP: --/-- HR: --  Site: upper left arm  Mode: electronic  Orthostatic VS  04-19-24 @ 05:27  Lying BP: 124/80 HR: 88  Sitting BP: 118/70 HR: 83  Standing BP: --/-- HR: --  Site: upper right arm  Mode: electronic  Orthostatic VS  04-18-24 @ 19:56  Lying BP: --/-- HR: --  Sitting BP: 126/76 HR: 102  Standing BP: --/-- HR: --  Site: --  Mode: --    Lipid Panel: Date/Time: 04-05-24 @ 05:56  Cholesterol, Serum: 176  LDL Cholesterol Calculated: 97  HDL Cholesterol, Serum: 39  Total Cholesterol/HDL Ration Measurement: --  Triglycerides, Serum: 200

## 2024-04-21 PROCEDURE — 99232 SBSQ HOSP IP/OBS MODERATE 35: CPT

## 2024-04-21 RX ORDER — CLONAZEPAM 1 MG
0.5 TABLET ORAL
Refills: 0 | Status: DISCONTINUED | OUTPATIENT
Start: 2024-04-21 | End: 2024-04-25

## 2024-04-21 RX ORDER — CLONAZEPAM 1 MG
0.25 TABLET ORAL
Refills: 0 | Status: DISCONTINUED | OUTPATIENT
Start: 2024-04-21 | End: 2024-04-24

## 2024-04-21 RX ADMIN — Medication 50 MILLIGRAM(S): at 08:29

## 2024-04-21 RX ADMIN — Medication 50 MILLIGRAM(S): at 21:15

## 2024-04-21 RX ADMIN — Medication 25 MILLIGRAM(S): at 08:28

## 2024-04-21 RX ADMIN — ATORVASTATIN CALCIUM 10 MILLIGRAM(S): 80 TABLET, FILM COATED ORAL at 21:17

## 2024-04-21 RX ADMIN — NORTRIPTYLINE HYDROCHLORIDE 50 MILLIGRAM(S): 10 CAPSULE ORAL at 21:15

## 2024-04-21 RX ADMIN — Medication 1 DROP(S): at 21:31

## 2024-04-21 RX ADMIN — Medication 25 MILLIGRAM(S): at 12:30

## 2024-04-21 RX ADMIN — Medication 0.5 MILLIGRAM(S): at 18:00

## 2024-04-21 RX ADMIN — PANTOPRAZOLE SODIUM 40 MILLIGRAM(S): 20 TABLET, DELAYED RELEASE ORAL at 06:33

## 2024-04-21 RX ADMIN — Medication 1 TABLET(S): at 08:29

## 2024-04-21 RX ADMIN — Medication 25 MILLIGRAM(S): at 21:15

## 2024-04-21 RX ADMIN — Medication 3 MILLIGRAM(S): at 21:15

## 2024-04-21 RX ADMIN — Medication 0.5 MILLIGRAM(S): at 06:32

## 2024-04-21 RX ADMIN — MIRTAZAPINE 15 MILLIGRAM(S): 45 TABLET, ORALLY DISINTEGRATING ORAL at 21:15

## 2024-04-21 NOTE — BH INPATIENT PSYCHIATRY PROGRESS NOTE - NSBHMETABOLIC_PSY_ALL_CORE_FT
BMI: BMI (kg/m2): 38.3 (04-16-24 @ 19:07)  HbA1c: A1C with Estimated Average Glucose Result: 5.8 % (01-11-24 @ 08:00)    Glucose:   BP: 133/76 (04-20-24 @ 19:44) (133/76 - 133/76)Vital Signs Last 24 Hrs  T(C): 36.4 (04-21-24 @ 05:14), Max: 36.7 (04-20-24 @ 19:44)  T(F): 97.6 (04-21-24 @ 05:14), Max: 98 (04-20-24 @ 19:44)  HR: --  BP: 133/76 (04-20-24 @ 19:44) (133/76 - 133/76)  BP(mean): 94 (04-20-24 @ 19:44) (94 - 94)  RR: 17 (04-21-24 @ 05:14) (17 - 17)  SpO2: --    Orthostatic VS  04-21-24 @ 05:14  Lying BP: 117/62 HR: 86  Sitting BP: 123/66 HR: 91  Standing BP: --/-- HR: --  Site: upper right arm  Mode: electronic  Orthostatic VS  04-20-24 @ 09:19  Lying BP: --/-- HR: --  Sitting BP: 110/66 HR: 78  Standing BP: 118/70 HR: 84  Site: upper left arm  Mode: electronic  Orthostatic VS  04-19-24 @ 19:00  Lying BP: --/-- HR: --  Sitting BP: 134/58 HR: 98  Standing BP: --/-- HR: --  Site: upper left arm  Mode: electronic    Lipid Panel: Date/Time: 04-05-24 @ 05:56  Cholesterol, Serum: 176  LDL Cholesterol Calculated: 97  HDL Cholesterol, Serum: 39  Total Cholesterol/HDL Ration Measurement: --  Triglycerides, Serum: 200

## 2024-04-21 NOTE — BH INPATIENT PSYCHIATRY PROGRESS NOTE - NSBHFUPINTERVALHXFT_PSY_A_CORE
Patient states dizziness is largely resolved. She is now reporting significant improvement. States she is less anxious due to klonopin but that it's causing some increased drowsiness.

## 2024-04-21 NOTE — BH INPATIENT PSYCHIATRY PROGRESS NOTE - NSBHCHARTREVIEWVS_PSY_A_CORE FT
Vital Signs Last 24 Hrs  T(C): 36.4 (04-21-24 @ 05:14), Max: 36.7 (04-20-24 @ 19:44)  T(F): 97.6 (04-21-24 @ 05:14), Max: 98 (04-20-24 @ 19:44)  HR: --  BP: 133/76 (04-20-24 @ 19:44) (133/76 - 133/76)  BP(mean): 94 (04-20-24 @ 19:44) (94 - 94)  RR: 17 (04-21-24 @ 05:14) (17 - 17)  SpO2: --    Orthostatic VS  04-21-24 @ 05:14  Lying BP: 117/62 HR: 86  Sitting BP: 123/66 HR: 91  Standing BP: --/-- HR: --  Site: upper right arm  Mode: electronic  Orthostatic VS  04-20-24 @ 09:19  Lying BP: --/-- HR: --  Sitting BP: 110/66 HR: 78  Standing BP: 118/70 HR: 84  Site: upper left arm  Mode: electronic  Orthostatic VS  04-19-24 @ 19:00  Lying BP: --/-- HR: --  Sitting BP: 134/58 HR: 98  Standing BP: --/-- HR: --  Site: upper left arm  Mode: electronic

## 2024-04-21 NOTE — BH INPATIENT PSYCHIATRY PROGRESS NOTE - NSBHASSESSSUMMFT_PSY_ALL_CORE
73 yr old female, , domiciled, and retired. premorbidly ambulant (not needing assists) and iADL/ bADL independent. with background hx of MDD and ANSON; has multiple psych admissions to [facility] (most recently Jan 2024 on [facility]), had prior SA by overdosing on pills (11/2019) following death of parents, presented to ED with somatic complaints and reports of ?passive suicidal ideation, had exhaustive w/u, now admitted voluntarily to [facility]  Routine checks, no suicidal ideations  Continue nortriptyline 50mg po qhs for depression  Change klonopin dosing distribution to 0.25mg po bid and 0.5mg po qhs for anxiety; added gabapentin prn for anxiety  Continue mirtazapine 15mg po qhs and melatonin 3mg po qhs for insomnia  Continue lyrica 25mg po tid off label for anxiety    HLD: atorvastatin 10mg po qhs  GERD: protonix 40mg po daily  HTN: metoprolol 50mg po bid - patient states she is actually prescribed this for palpitations from anxiety by her cardiologist, will decrease to 25mg po bid  Dizziness: MRI wnl; meclizine 12.5mg prn; request neuro consult, r/o BPV?

## 2024-04-22 PROCEDURE — 90837 PSYTX W PT 60 MINUTES: CPT

## 2024-04-22 PROCEDURE — 99232 SBSQ HOSP IP/OBS MODERATE 35: CPT

## 2024-04-22 RX ORDER — FUROSEMIDE 40 MG
20 TABLET ORAL
Refills: 0 | Status: DISCONTINUED | OUTPATIENT
Start: 2024-04-22 | End: 2024-04-23

## 2024-04-22 RX ORDER — METOPROLOL TARTRATE 50 MG
25 TABLET ORAL
Refills: 0 | Status: DISCONTINUED | OUTPATIENT
Start: 2024-04-22 | End: 2024-04-23

## 2024-04-22 RX ADMIN — Medication 1 DROP(S): at 21:11

## 2024-04-22 RX ADMIN — Medication 0.5 MILLIGRAM(S): at 18:57

## 2024-04-22 RX ADMIN — NORTRIPTYLINE HYDROCHLORIDE 50 MILLIGRAM(S): 10 CAPSULE ORAL at 21:09

## 2024-04-22 RX ADMIN — Medication 0.25 MILLIGRAM(S): at 06:43

## 2024-04-22 RX ADMIN — Medication 3 MILLIGRAM(S): at 21:58

## 2024-04-22 RX ADMIN — Medication 50 MILLIGRAM(S): at 12:18

## 2024-04-22 RX ADMIN — Medication 650 MILLIGRAM(S): at 18:57

## 2024-04-22 RX ADMIN — ONDANSETRON 4 MILLIGRAM(S): 8 TABLET, FILM COATED ORAL at 16:29

## 2024-04-22 RX ADMIN — Medication 25 MILLIGRAM(S): at 21:09

## 2024-04-22 RX ADMIN — Medication 650 MILLIGRAM(S): at 09:02

## 2024-04-22 RX ADMIN — MIRTAZAPINE 15 MILLIGRAM(S): 45 TABLET, ORALLY DISINTEGRATING ORAL at 21:09

## 2024-04-22 RX ADMIN — PANTOPRAZOLE SODIUM 40 MILLIGRAM(S): 20 TABLET, DELAYED RELEASE ORAL at 06:43

## 2024-04-22 RX ADMIN — ATORVASTATIN CALCIUM 10 MILLIGRAM(S): 80 TABLET, FILM COATED ORAL at 21:09

## 2024-04-22 RX ADMIN — Medication 0.25 MILLIGRAM(S): at 12:18

## 2024-04-22 RX ADMIN — ONDANSETRON 4 MILLIGRAM(S): 8 TABLET, FILM COATED ORAL at 09:02

## 2024-04-22 RX ADMIN — GABAPENTIN 100 MILLIGRAM(S): 400 CAPSULE ORAL at 09:02

## 2024-04-22 RX ADMIN — Medication 25 MILLIGRAM(S): at 08:58

## 2024-04-22 RX ADMIN — Medication 1 TABLET(S): at 08:58

## 2024-04-22 RX ADMIN — Medication 25 MILLIGRAM(S): at 12:18

## 2024-04-22 NOTE — BH INPATIENT PSYCHIATRY PROGRESS NOTE - NSBHCHARTREVIEWVS_PSY_A_CORE FT
Vital Signs Last 24 Hrs  T(C): 36.7 (04-22-24 @ 20:25), Max: 36.7 (04-22-24 @ 20:25)  T(F): 98 (04-22-24 @ 20:25), Max: 98 (04-22-24 @ 20:25)  HR: 90 (04-22-24 @ 20:25) (87 - 90)  BP: 141/84 (04-22-24 @ 12:40) (141/84 - 141/84)  BP(mean): --  RR: --  SpO2: 96% (04-22-24 @ 05:51) (96% - 96%)    Orthostatic VS  04-22-24 @ 20:25  Lying BP: --/-- HR: --  Sitting BP: 114/63 HR: --  Standing BP: --/-- HR: --  Site: --  Mode: --  Orthostatic VS  04-22-24 @ 05:51  Lying BP: 133/56 HR: 80  Sitting BP: 133/70 HR: 86  Standing BP: --/-- HR: --  Site: --  Mode: --  Orthostatic VS  04-21-24 @ 05:14  Lying BP: 117/62 HR: 86  Sitting BP: 123/66 HR: 91  Standing BP: --/-- HR: --  Site: upper right arm  Mode: electronic

## 2024-04-22 NOTE — BH INPATIENT PSYCHIATRY PROGRESS NOTE - NSBHASSESSSUMMFT_PSY_ALL_CORE
73 yr old female, , domiciled, and retired. premorbidly ambulant (not needing assists) and iADL/ bADL independent. with background hx of MDD and ANSON; has multiple psych admissions to [facility] (most recently Jan 2024 on [facility]), had prior SA by overdosing on pills (11/2019) following death of parents, presented to ED with somatic complaints and reports of ?passive suicidal ideation, had exhaustive w/u, now admitted voluntarily to [facility]  Routine checks, no suicidal ideations  Continue nortriptyline 50mg po qhs for depression  Continue klonopin 0.25mg po bid and 0.5mg po qhs for anxiety; gabapentin prn for anxiety  Continue mirtazapine 15mg po qhs and melatonin 3mg po qhs for insomnia  Continue lyrica 25mg po tid off label for anxiety  Individual psychotherapy    HLD: atorvastatin 10mg po qhs  GERD: protonix 40mg po daily  HTN: decrease metoprolol to 25mg po bid - patient states she is actually prescribed this for palpitations from anxiety by her cardiologist (allow addition of lasix)  LE edema: restart lasix 20mg every other day  Dizziness: MRI wnl; meclizine 12.5mg prn; possible BPV; flexeril 5mg po qhs prn recommended by neurology

## 2024-04-22 NOTE — BH INPATIENT PSYCHIATRY PROGRESS NOTE - CURRENT MEDICATION
MEDICATIONS  (STANDING):  atorvastatin 10 milliGRAM(s) Oral at bedtime  clonazePAM  Tablet 0.5 milliGRAM(s) Oral <User Schedule>  clonazePAM  Tablet 0.25 milliGRAM(s) Oral <User Schedule>  furosemide    Tablet 20 milliGRAM(s) Oral <User Schedule>  lidocaine   4% Patch 1 Patch Transdermal once  melatonin. 3 milliGRAM(s) Oral at bedtime  metoprolol tartrate 25 milliGRAM(s) Oral two times a day  mirtazapine 15 milliGRAM(s) Oral at bedtime  multivitamin 1 Tablet(s) Oral daily  nortriptyline 50 milliGRAM(s) Oral at bedtime  pantoprazole    Tablet 40 milliGRAM(s) Oral before breakfast  pregabalin 25 milliGRAM(s) Oral three times a day    MEDICATIONS  (PRN):  acetaminophen     Tablet .. 650 milliGRAM(s) Oral every 6 hours PRN Temp greater or equal to 38C (100.4F), Mild Pain (1 - 3)  aluminum hydroxide/magnesium hydroxide/simethicone Suspension 30 milliLiter(s) Oral every 4 hours PRN Dyspepsia  artificial  tears Solution 1 Drop(s) Both EYES every 4 hours PRN dry eyes syndrome  bisacodyl Suppository 10 milliGRAM(s) Rectal daily PRN Constipation  chlorhexidine 0.12% Liquid 15 milliLiter(s) Oral Mucosa two times a day PRN gum inflammation  gabapentin 100 milliGRAM(s) Oral three times a day PRN anxiety  haloperidol     Tablet 0.5 milliGRAM(s) Oral every 6 hours PRN agitation  haloperidol    Injectable 0.5 milliGRAM(s) IntraMuscular once PRN severe agitation  LORazepam     Tablet 0.5 milliGRAM(s) Oral every 4 hours PRN anxiety  LORazepam   Injectable 0.5 milliGRAM(s) IntraMuscular once PRN severe anxiety/agitation  meclizine 12.5 milliGRAM(s) Oral every 8 hours PRN Dizziness  ondansetron   Disintegrating Tablet 4 milliGRAM(s) Oral every 8 hours PRN Nausea and/or Vomiting  polyethylene glycol 3350 17 Gram(s) Oral two times a day PRN constipation

## 2024-04-22 NOTE — CHART NOTE - NSCHARTNOTEFT_GEN_A_CORE
Pt requesting to see RDN for food preferences.    Diet, DASH/TLC:   Sodium & Cholesterol Restricted (04-16-24 @ 18:37) [Active]    Pt reports good appetite/po intake at present. No GI distress noted. requesting changes in her menu.  Food preferences explored and implemented (cheeseburger, mashed potatoes, raisin bran)     Recommend:  1. Continue with current diet order.  2. Honor food preferences.  3. RDN to remain available/

## 2024-04-22 NOTE — BH INPATIENT PSYCHIATRY PROGRESS NOTE - NSBHFUPINTERVALHXFT_PSY_A_CORE
This is a late entry for service rendered 4/22/24. Patient reports having had a panic attack this morning. She claims it's been a long time since she had such a severe episode. Patient continues to have catastrophic thinking. Demonstrates obsessive/compulsive patterns, frequently checking and seeking reassurance repeatedly from writer that everything will be ok.

## 2024-04-22 NOTE — BH PSYCHOLOGY - CLINICIAN PSYCHOTHERAPY NOTE - NSBHPSYCHOLNARRATIVE_PSY_A_CORE FT
The patient was seen individually at the team's request and with her consent. Speech was of normal rate and volume and there were no verbal irregularities. The patient's mood was depressed (6 out of 10 / 10= worst), and affect was constricted, and mood congruent. She became tearful briefly when speaking about her  mother. She was coherent and logical; content focused on medications and her mood. The patient denied current suicidal ideation.     The patient reported no longer feeling dizzy or light-headed. She has been tolerating her medication regimen. Although she acknowledged that she has improved since admission, she is worried that she will not feel happy again (“Maybe I used up all my happiness”). She reported no difficulty with sleep or appetite. She experienced an “anxiety attack” this morning with her heart palpitating but could not identify any thoughts before or during the episode.     The patient was visited by her  over the weekend and reported that the visits went well; he is showing concern for her well-being and is making himself available for her. The patient spoke again about missing her mother and sometimes forgetting that she is dead, wanting to call her. The writer suggested that she may be unintentionally trying to keep her in her life but then experiencing the pain of reality more frequently. We discussed ways that socialization and realistic goals can contribute to her contentment. The writer provided empathy and reminded her of her progress during her last admission. The patient expressed appreciation and agreed to meet again.

## 2024-04-22 NOTE — BH INPATIENT PSYCHIATRY PROGRESS NOTE - NSBHMETABOLIC_PSY_ALL_CORE_FT
BMI: BMI (kg/m2): 38.3 (04-16-24 @ 19:07)  HbA1c: A1C with Estimated Average Glucose Result: 5.8 % (01-11-24 @ 08:00)    Glucose:   BP: 141/84 (04-22-24 @ 12:40) (133/76 - 143/69)Vital Signs Last 24 Hrs  T(C): 36.7 (04-22-24 @ 20:25), Max: 36.7 (04-22-24 @ 20:25)  T(F): 98 (04-22-24 @ 20:25), Max: 98 (04-22-24 @ 20:25)  HR: 90 (04-22-24 @ 20:25) (87 - 90)  BP: 141/84 (04-22-24 @ 12:40) (141/84 - 141/84)  BP(mean): --  RR: --  SpO2: 96% (04-22-24 @ 05:51) (96% - 96%)    Orthostatic VS  04-22-24 @ 20:25  Lying BP: --/-- HR: --  Sitting BP: 114/63 HR: --  Standing BP: --/-- HR: --  Site: --  Mode: --  Orthostatic VS  04-22-24 @ 05:51  Lying BP: 133/56 HR: 80  Sitting BP: 133/70 HR: 86  Standing BP: --/-- HR: --  Site: --  Mode: --  Orthostatic VS  04-21-24 @ 05:14  Lying BP: 117/62 HR: 86  Sitting BP: 123/66 HR: 91  Standing BP: --/-- HR: --  Site: upper right arm  Mode: electronic    Lipid Panel: Date/Time: 04-05-24 @ 05:56  Cholesterol, Serum: 176  LDL Cholesterol Calculated: 97  HDL Cholesterol, Serum: 39  Total Cholesterol/HDL Ration Measurement: --  Triglycerides, Serum: 200

## 2024-04-23 DIAGNOSIS — E66.9 OBESITY, UNSPECIFIED: ICD-10-CM

## 2024-04-23 DIAGNOSIS — R42 DIZZINESS AND GIDDINESS: ICD-10-CM

## 2024-04-23 DIAGNOSIS — F33.2 MAJOR DEPRESSIVE DISORDER, RECURRENT SEVERE WITHOUT PSYCHOTIC FEATURES: ICD-10-CM

## 2024-04-23 PROCEDURE — 99232 SBSQ HOSP IP/OBS MODERATE 35: CPT

## 2024-04-23 RX ORDER — FUROSEMIDE 40 MG
20 TABLET ORAL
Refills: 0 | Status: DISCONTINUED | OUTPATIENT
Start: 2024-04-23 | End: 2024-05-29

## 2024-04-23 RX ORDER — METOPROLOL TARTRATE 50 MG
25 TABLET ORAL
Refills: 0 | Status: DISCONTINUED | OUTPATIENT
Start: 2024-04-23 | End: 2024-04-30

## 2024-04-23 RX ADMIN — CHLORHEXIDINE GLUCONATE 15 MILLILITER(S): 213 SOLUTION TOPICAL at 13:21

## 2024-04-23 RX ADMIN — Medication 25 MILLIGRAM(S): at 20:50

## 2024-04-23 RX ADMIN — Medication 0.5 MILLIGRAM(S): at 13:52

## 2024-04-23 RX ADMIN — ATORVASTATIN CALCIUM 10 MILLIGRAM(S): 80 TABLET, FILM COATED ORAL at 20:50

## 2024-04-23 RX ADMIN — Medication 25 MILLIGRAM(S): at 12:35

## 2024-04-23 RX ADMIN — Medication 650 MILLIGRAM(S): at 10:50

## 2024-04-23 RX ADMIN — Medication 25 MILLIGRAM(S): at 08:22

## 2024-04-23 RX ADMIN — Medication 1 TABLET(S): at 08:23

## 2024-04-23 RX ADMIN — ONDANSETRON 4 MILLIGRAM(S): 8 TABLET, FILM COATED ORAL at 05:46

## 2024-04-23 RX ADMIN — Medication 650 MILLIGRAM(S): at 10:20

## 2024-04-23 RX ADMIN — Medication 3 MILLIGRAM(S): at 20:50

## 2024-04-23 RX ADMIN — Medication 0.25 MILLIGRAM(S): at 12:35

## 2024-04-23 RX ADMIN — PANTOPRAZOLE SODIUM 40 MILLIGRAM(S): 20 TABLET, DELAYED RELEASE ORAL at 08:23

## 2024-04-23 RX ADMIN — Medication 0.25 MILLIGRAM(S): at 05:47

## 2024-04-23 RX ADMIN — NORTRIPTYLINE HYDROCHLORIDE 50 MILLIGRAM(S): 10 CAPSULE ORAL at 20:50

## 2024-04-23 RX ADMIN — MIRTAZAPINE 15 MILLIGRAM(S): 45 TABLET, ORALLY DISINTEGRATING ORAL at 20:50

## 2024-04-23 RX ADMIN — Medication 0.5 MILLIGRAM(S): at 17:31

## 2024-04-23 NOTE — BH INPATIENT PSYCHIATRY PROGRESS NOTE - NSBHMETABOLIC_PSY_ALL_CORE_FT
BMI: BMI (kg/m2): 38.3 (04-16-24 @ 19:07)  HbA1c: A1C with Estimated Average Glucose Result: 5.8 % (01-11-24 @ 08:00)    Glucose:   BP: 141/84 (04-22-24 @ 12:40) (133/76 - 143/69)Vital Signs Last 24 Hrs  T(C): 36.7 (04-23-24 @ 05:09), Max: 36.7 (04-22-24 @ 20:25)  T(F): 98 (04-23-24 @ 05:09), Max: 98 (04-22-24 @ 20:25)  HR: 90 (04-22-24 @ 20:25) (90 - 90)  BP: --  BP(mean): --  RR: 18 (04-23-24 @ 05:09) (18 - 18)  SpO2: --    Orthostatic VS  04-23-24 @ 05:09  Lying BP: 103/64 HR: 83  Sitting BP: 122/70 HR: 86  Standing BP: --/-- HR: --  Site: upper left arm  Mode: electronic  Orthostatic VS  04-22-24 @ 20:25  Lying BP: --/-- HR: --  Sitting BP: 114/63 HR: --  Standing BP: --/-- HR: --  Site: --  Mode: --  Orthostatic VS  04-22-24 @ 05:51  Lying BP: 133/56 HR: 80  Sitting BP: 133/70 HR: 86  Standing BP: --/-- HR: --  Site: --  Mode: --    Lipid Panel: Date/Time: 04-05-24 @ 05:56  Cholesterol, Serum: 176  LDL Cholesterol Calculated: 97  HDL Cholesterol, Serum: 39  Total Cholesterol/HDL Ration Measurement: --  Triglycerides, Serum: 200

## 2024-04-23 NOTE — BH INPATIENT PSYCHIATRY PROGRESS NOTE - NSBHASSESSSUMMFT_PSY_ALL_CORE
73 yr old female, , domiciled, and retired. premorbidly ambulant (not needing assists) and iADL/ bADL independent. with background hx of MDD and ANSON; has multiple psych admissions to [facility] (most recently Jan 2024 on [facility]), had prior SA by overdosing on pills (11/2019) following death of parents, presented to ED with somatic complaints and reports of ?passive suicidal ideation, had exhaustive w/u, now admitted voluntarily to [facility]  Routine checks, no suicidal ideations  Continue nortriptyline 50mg po qhs for depression - will increase tomorrow  Revert back to klonopin 0.5mg po bid and 0.5mg po qhs for anxiety as patient changed her mind about splitting morning medications; gabapentin prn for anxiety  Continue mirtazapine 15mg po qhs and melatonin 3mg po qhs for insomnia  Continue lyrica 25mg po tid off label for anxiety  Individual psychotherapy    HLD: atorvastatin 10mg po qhs  GERD: protonix 40mg po daily  HTN: decreased metoprolol to 25mg po bid - patient states she is actually prescribed this for palpitations from anxiety by her cardiologist (allow addition of lasix)  LE edema: restart lasix 20mg every other day  Dizziness: MRI wnl; meclizine 12.5mg prn; possible BPV; flexeril 5mg po qhs prn recommended by neurology

## 2024-04-23 NOTE — BH INPATIENT PSYCHIATRY PROGRESS NOTE - NSBHFUPINTERVALHXFT_PSY_A_CORE
Catastrophic thinking, somatic preoccupation, depressed mood ongoing. Preoccupied with medication doses, misattributing symptoms, overanalyzing them.

## 2024-04-23 NOTE — BH INPATIENT PSYCHIATRY PROGRESS NOTE - NSBHCHARTREVIEWVS_PSY_A_CORE FT
Vital Signs Last 24 Hrs  T(C): 36.7 (04-23-24 @ 05:09), Max: 36.7 (04-22-24 @ 20:25)  T(F): 98 (04-23-24 @ 05:09), Max: 98 (04-22-24 @ 20:25)  HR: 90 (04-22-24 @ 20:25) (90 - 90)  BP: --  BP(mean): --  RR: 18 (04-23-24 @ 05:09) (18 - 18)  SpO2: --    Orthostatic VS  04-23-24 @ 05:09  Lying BP: 103/64 HR: 83  Sitting BP: 122/70 HR: 86  Standing BP: --/-- HR: --  Site: upper left arm  Mode: electronic  Orthostatic VS  04-22-24 @ 20:25  Lying BP: --/-- HR: --  Sitting BP: 114/63 HR: --  Standing BP: --/-- HR: --  Site: --  Mode: --  Orthostatic VS  04-22-24 @ 05:51  Lying BP: 133/56 HR: 80  Sitting BP: 133/70 HR: 86  Standing BP: --/-- HR: --  Site: --  Mode: --

## 2024-04-24 PROCEDURE — 99232 SBSQ HOSP IP/OBS MODERATE 35: CPT

## 2024-04-24 RX ADMIN — Medication 25 MILLIGRAM(S): at 21:33

## 2024-04-24 RX ADMIN — NORTRIPTYLINE HYDROCHLORIDE 50 MILLIGRAM(S): 10 CAPSULE ORAL at 21:33

## 2024-04-24 RX ADMIN — PANTOPRAZOLE SODIUM 40 MILLIGRAM(S): 20 TABLET, DELAYED RELEASE ORAL at 09:03

## 2024-04-24 RX ADMIN — MIRTAZAPINE 15 MILLIGRAM(S): 45 TABLET, ORALLY DISINTEGRATING ORAL at 21:33

## 2024-04-24 RX ADMIN — Medication 1 DROP(S): at 21:39

## 2024-04-24 RX ADMIN — Medication 3 MILLIGRAM(S): at 21:33

## 2024-04-24 RX ADMIN — Medication 25 MILLIGRAM(S): at 12:38

## 2024-04-24 RX ADMIN — ATORVASTATIN CALCIUM 10 MILLIGRAM(S): 80 TABLET, FILM COATED ORAL at 21:33

## 2024-04-24 RX ADMIN — Medication 0.5 MILLIGRAM(S): at 19:01

## 2024-04-24 RX ADMIN — Medication 0.5 MILLIGRAM(S): at 09:04

## 2024-04-24 RX ADMIN — Medication 25 MILLIGRAM(S): at 09:03

## 2024-04-24 RX ADMIN — Medication 1 TABLET(S): at 09:03

## 2024-04-24 RX ADMIN — GABAPENTIN 100 MILLIGRAM(S): 400 CAPSULE ORAL at 14:18

## 2024-04-24 RX ADMIN — Medication 0.25 MILLIGRAM(S): at 05:42

## 2024-04-24 RX ADMIN — Medication 0.25 MILLIGRAM(S): at 12:39

## 2024-04-24 RX ADMIN — Medication 20 MILLIGRAM(S): at 09:04

## 2024-04-24 RX ADMIN — CHLORHEXIDINE GLUCONATE 15 MILLILITER(S): 213 SOLUTION TOPICAL at 21:38

## 2024-04-24 NOTE — BH INPATIENT PSYCHIATRY PROGRESS NOTE - CURRENT MEDICATION
MEDICATIONS  (STANDING):  atorvastatin 10 milliGRAM(s) Oral at bedtime  clonazePAM  Tablet 0.5 milliGRAM(s) Oral <User Schedule>  furosemide    Tablet 20 milliGRAM(s) Oral <User Schedule>  lidocaine   4% Patch 1 Patch Transdermal once  melatonin. 3 milliGRAM(s) Oral at bedtime  metoprolol tartrate 25 milliGRAM(s) Oral two times a day  mirtazapine 15 milliGRAM(s) Oral at bedtime  multivitamin 1 Tablet(s) Oral daily  nortriptyline 50 milliGRAM(s) Oral at bedtime  pantoprazole    Tablet 40 milliGRAM(s) Oral before breakfast  pregabalin 25 milliGRAM(s) Oral three times a day    MEDICATIONS  (PRN):  acetaminophen     Tablet .. 650 milliGRAM(s) Oral every 6 hours PRN Temp greater or equal to 38C (100.4F), Mild Pain (1 - 3)  aluminum hydroxide/magnesium hydroxide/simethicone Suspension 30 milliLiter(s) Oral every 4 hours PRN Dyspepsia  artificial  tears Solution 1 Drop(s) Both EYES every 4 hours PRN dry eyes syndrome  bisacodyl Suppository 10 milliGRAM(s) Rectal daily PRN Constipation  chlorhexidine 0.12% Liquid 15 milliLiter(s) Oral Mucosa two times a day PRN gum inflammation  gabapentin 100 milliGRAM(s) Oral three times a day PRN anxiety  haloperidol     Tablet 0.5 milliGRAM(s) Oral every 6 hours PRN agitation  haloperidol    Injectable 0.5 milliGRAM(s) IntraMuscular once PRN severe agitation  LORazepam     Tablet 0.5 milliGRAM(s) Oral every 4 hours PRN anxiety  LORazepam   Injectable 0.5 milliGRAM(s) IntraMuscular once PRN severe anxiety/agitation  meclizine 12.5 milliGRAM(s) Oral every 8 hours PRN Dizziness  ondansetron   Disintegrating Tablet 4 milliGRAM(s) Oral every 8 hours PRN Nausea and/or Vomiting  polyethylene glycol 3350 17 Gram(s) Oral two times a day PRN constipation

## 2024-04-24 NOTE — BH INPATIENT PSYCHIATRY PROGRESS NOTE - NSBHFUPINTERVALHXFT_PSY_A_CORE
Patient continues to be anxious, somatically preoccupied, hyperfocused on medications. Frequent cognitive redirection needed for catastrophic thinking. States palpitations remain.

## 2024-04-24 NOTE — BH INPATIENT PSYCHIATRY PROGRESS NOTE - NSBHASSESSSUMMFT_PSY_ALL_CORE
73 yr old female, , domiciled, and retired. premorbidly ambulant (not needing assists) and iADL/ bADL independent. with background hx of MDD and ANSON; has multiple psych admissions to [facility] (most recently Jan 2024 on [facility]), had prior SA by overdosing on pills (11/2019) following death of parents, presented to ED with somatic complaints and reports of ?passive suicidal ideation, had exhaustive w/u, now admitted voluntarily to [facility]  Routine checks, no suicidal ideations  Continue nortriptyline 50mg po qhs for depression - will hold off increase due to patient being reticent  Continue klonopin 0.5mg po bid and 0.5mg po qhs for anxiety; gabapentin prn for anxiety  Continue mirtazapine 15mg po qhs and melatonin 3mg po qhs for insomnia  Increase lyrica to 50mg po daily and 25mg po q3pm and 9pm - off label for anxiety  Individual psychotherapy    HLD: atorvastatin 10mg po qhs  GERD: protonix 40mg po daily  HTN: decreased metoprolol to 25mg po bid - patient states she is actually prescribed this for palpitations from anxiety by her cardiologist (allow addition of lasix)  LE edema: restart lasix 20mg every other day  Dizziness: MRI wnl; meclizine 12.5mg prn; possible BPV; flexeril 5mg po qhs prn recommended by neurology

## 2024-04-24 NOTE — BH INPATIENT PSYCHIATRY PROGRESS NOTE - NSBHMETABOLIC_PSY_ALL_CORE_FT
BMI: BMI (kg/m2): 38.3 (04-16-24 @ 19:07)  HbA1c: A1C with Estimated Average Glucose Result: 5.8 % (01-11-24 @ 08:00)    Glucose:   BP: 138/81 (04-23-24 @ 20:25) (138/81 - 143/69)Vital Signs Last 24 Hrs  T(C): 36.5 (04-24-24 @ 05:50), Max: 36.7 (04-23-24 @ 20:25)  T(F): 97.7 (04-24-24 @ 05:50), Max: 98.1 (04-23-24 @ 20:25)  HR: 98 (04-23-24 @ 20:25) (98 - 98)  BP: 138/81 (04-23-24 @ 20:25) (138/81 - 138/81)  BP(mean): --  RR: 18 (04-24-24 @ 05:50) (18 - 18)  SpO2: 96% (04-24-24 @ 05:50) (95% - 96%)    Orthostatic VS  04-24-24 @ 05:50  Lying BP: 118/61 HR: 88  Sitting BP: 105/65 HR: 97  Standing BP: --/-- HR: --  Site: --  Mode: --  Orthostatic VS  04-23-24 @ 05:09  Lying BP: 103/64 HR: 83  Sitting BP: 122/70 HR: 86  Standing BP: --/-- HR: --  Site: upper left arm  Mode: electronic  Orthostatic VS  04-22-24 @ 20:25  Lying BP: --/-- HR: --  Sitting BP: 114/63 HR: --  Standing BP: --/-- HR: --  Site: --  Mode: --    Lipid Panel: Date/Time: 04-05-24 @ 05:56  Cholesterol, Serum: 176  LDL Cholesterol Calculated: 97  HDL Cholesterol, Serum: 39  Total Cholesterol/HDL Ration Measurement: --  Triglycerides, Serum: 200

## 2024-04-24 NOTE — BH INPATIENT PSYCHIATRY PROGRESS NOTE - NSBHCHARTREVIEWVS_PSY_A_CORE FT
Vital Signs Last 24 Hrs  T(C): 36.5 (04-24-24 @ 05:50), Max: 36.7 (04-23-24 @ 20:25)  T(F): 97.7 (04-24-24 @ 05:50), Max: 98.1 (04-23-24 @ 20:25)  HR: 98 (04-23-24 @ 20:25) (98 - 98)  BP: 138/81 (04-23-24 @ 20:25) (138/81 - 138/81)  BP(mean): --  RR: 18 (04-24-24 @ 05:50) (18 - 18)  SpO2: 96% (04-24-24 @ 05:50) (95% - 96%)    Orthostatic VS  04-24-24 @ 05:50  Lying BP: 118/61 HR: 88  Sitting BP: 105/65 HR: 97  Standing BP: --/-- HR: --  Site: --  Mode: --  Orthostatic VS  04-23-24 @ 05:09  Lying BP: 103/64 HR: 83  Sitting BP: 122/70 HR: 86  Standing BP: --/-- HR: --  Site: upper left arm  Mode: electronic  Orthostatic VS  04-22-24 @ 20:25  Lying BP: --/-- HR: --  Sitting BP: 114/63 HR: --  Standing BP: --/-- HR: --  Site: --  Mode: --

## 2024-04-25 PROCEDURE — 90837 PSYTX W PT 60 MINUTES: CPT

## 2024-04-25 PROCEDURE — 99232 SBSQ HOSP IP/OBS MODERATE 35: CPT

## 2024-04-25 RX ORDER — CLONAZEPAM 1 MG
0.5 TABLET ORAL
Refills: 0 | Status: DISCONTINUED | OUTPATIENT
Start: 2024-04-25 | End: 2024-05-02

## 2024-04-25 RX ORDER — NORTRIPTYLINE HYDROCHLORIDE 10 MG/1
75 CAPSULE ORAL AT BEDTIME
Refills: 0 | Status: DISCONTINUED | OUTPATIENT
Start: 2024-04-25 | End: 2024-04-25

## 2024-04-25 RX ORDER — NORTRIPTYLINE HYDROCHLORIDE 10 MG/1
50 CAPSULE ORAL AT BEDTIME
Refills: 0 | Status: DISCONTINUED | OUTPATIENT
Start: 2024-04-25 | End: 2024-05-02

## 2024-04-25 RX ORDER — CLONAZEPAM 1 MG
0.5 TABLET ORAL ONCE
Refills: 0 | Status: DISCONTINUED | OUTPATIENT
Start: 2024-04-25 | End: 2024-04-25

## 2024-04-25 RX ADMIN — Medication 25 MILLIGRAM(S): at 21:47

## 2024-04-25 RX ADMIN — Medication 0.5 MILLIGRAM(S): at 06:40

## 2024-04-25 RX ADMIN — CHLORHEXIDINE GLUCONATE 15 MILLILITER(S): 213 SOLUTION TOPICAL at 22:33

## 2024-04-25 RX ADMIN — Medication 25 MILLIGRAM(S): at 15:08

## 2024-04-25 RX ADMIN — Medication 50 MILLIGRAM(S): at 08:05

## 2024-04-25 RX ADMIN — Medication 25 MILLIGRAM(S): at 08:05

## 2024-04-25 RX ADMIN — Medication 25 MILLIGRAM(S): at 21:42

## 2024-04-25 RX ADMIN — Medication 1 DROP(S): at 22:33

## 2024-04-25 RX ADMIN — MIRTAZAPINE 15 MILLIGRAM(S): 45 TABLET, ORALLY DISINTEGRATING ORAL at 21:42

## 2024-04-25 RX ADMIN — GABAPENTIN 100 MILLIGRAM(S): 400 CAPSULE ORAL at 14:00

## 2024-04-25 RX ADMIN — PANTOPRAZOLE SODIUM 40 MILLIGRAM(S): 20 TABLET, DELAYED RELEASE ORAL at 08:05

## 2024-04-25 RX ADMIN — NORTRIPTYLINE HYDROCHLORIDE 50 MILLIGRAM(S): 10 CAPSULE ORAL at 21:42

## 2024-04-25 RX ADMIN — Medication 1 TABLET(S): at 08:04

## 2024-04-25 RX ADMIN — Medication 0.5 MILLIGRAM(S): at 18:07

## 2024-04-25 RX ADMIN — Medication 3 MILLIGRAM(S): at 21:42

## 2024-04-25 RX ADMIN — ATORVASTATIN CALCIUM 10 MILLIGRAM(S): 80 TABLET, FILM COATED ORAL at 21:45

## 2024-04-25 NOTE — BH INPATIENT PSYCHIATRY PROGRESS NOTE - CURRENT MEDICATION
MEDICATIONS  (STANDING):  atorvastatin 10 milliGRAM(s) Oral at bedtime  clonazePAM  Tablet 0.5 milliGRAM(s) Oral <User Schedule>  furosemide    Tablet 20 milliGRAM(s) Oral <User Schedule>  lidocaine   4% Patch 1 Patch Transdermal once  melatonin. 3 milliGRAM(s) Oral at bedtime  metoprolol tartrate 25 milliGRAM(s) Oral two times a day  mirtazapine 15 milliGRAM(s) Oral at bedtime  multivitamin 1 Tablet(s) Oral daily  nortriptyline 50 milliGRAM(s) Oral at bedtime  pantoprazole    Tablet 40 milliGRAM(s) Oral before breakfast  pregabalin 50 milliGRAM(s) Oral daily  pregabalin 25 milliGRAM(s) Oral <User Schedule>    MEDICATIONS  (PRN):  acetaminophen     Tablet .. 650 milliGRAM(s) Oral every 6 hours PRN Temp greater or equal to 38C (100.4F), Mild Pain (1 - 3)  aluminum hydroxide/magnesium hydroxide/simethicone Suspension 30 milliLiter(s) Oral every 4 hours PRN Dyspepsia  artificial  tears Solution 1 Drop(s) Both EYES every 4 hours PRN dry eyes syndrome  bisacodyl Suppository 10 milliGRAM(s) Rectal daily PRN Constipation  chlorhexidine 0.12% Liquid 15 milliLiter(s) Oral Mucosa two times a day PRN gum inflammation  gabapentin 100 milliGRAM(s) Oral three times a day PRN anxiety  haloperidol     Tablet 0.5 milliGRAM(s) Oral every 6 hours PRN agitation  haloperidol    Injectable 0.5 milliGRAM(s) IntraMuscular once PRN severe agitation  LORazepam   Injectable 0.5 milliGRAM(s) IntraMuscular once PRN severe anxiety/agitation  meclizine 12.5 milliGRAM(s) Oral every 8 hours PRN Dizziness  ondansetron   Disintegrating Tablet 4 milliGRAM(s) Oral every 8 hours PRN Nausea and/or Vomiting  polyethylene glycol 3350 17 Gram(s) Oral two times a day PRN constipation

## 2024-04-25 NOTE — BH INPATIENT PSYCHIATRY PROGRESS NOTE - CURRENT MEDICATION
MEDICATIONS  (STANDING):  atorvastatin 10 milliGRAM(s) Oral at bedtime  clonazePAM  Tablet 0.5 milliGRAM(s) Oral <User Schedule>  furosemide    Tablet 20 milliGRAM(s) Oral <User Schedule>  lidocaine   4% Patch 1 Patch Transdermal once  melatonin. 3 milliGRAM(s) Oral at bedtime  metoprolol tartrate 25 milliGRAM(s) Oral two times a day  mirtazapine 15 milliGRAM(s) Oral at bedtime  multivitamin 1 Tablet(s) Oral daily  nortriptyline 75 milliGRAM(s) Oral at bedtime  pantoprazole    Tablet 40 milliGRAM(s) Oral before breakfast  pregabalin 50 milliGRAM(s) Oral daily  pregabalin 25 milliGRAM(s) Oral <User Schedule>    MEDICATIONS  (PRN):  acetaminophen     Tablet .. 650 milliGRAM(s) Oral every 6 hours PRN Temp greater or equal to 38C (100.4F), Mild Pain (1 - 3)  aluminum hydroxide/magnesium hydroxide/simethicone Suspension 30 milliLiter(s) Oral every 4 hours PRN Dyspepsia  artificial  tears Solution 1 Drop(s) Both EYES every 4 hours PRN dry eyes syndrome  bisacodyl Suppository 10 milliGRAM(s) Rectal daily PRN Constipation  chlorhexidine 0.12% Liquid 15 milliLiter(s) Oral Mucosa two times a day PRN gum inflammation  gabapentin 100 milliGRAM(s) Oral three times a day PRN anxiety  haloperidol     Tablet 0.5 milliGRAM(s) Oral every 6 hours PRN agitation  haloperidol    Injectable 0.5 milliGRAM(s) IntraMuscular once PRN severe agitation  LORazepam     Tablet 0.5 milliGRAM(s) Oral every 4 hours PRN anxiety  LORazepam   Injectable 0.5 milliGRAM(s) IntraMuscular once PRN severe anxiety/agitation  meclizine 12.5 milliGRAM(s) Oral every 8 hours PRN Dizziness  ondansetron   Disintegrating Tablet 4 milliGRAM(s) Oral every 8 hours PRN Nausea and/or Vomiting  polyethylene glycol 3350 17 Gram(s) Oral two times a day PRN constipation

## 2024-04-25 NOTE — BH INPATIENT PSYCHIATRY PROGRESS NOTE - PRN MEDS
MEDICATIONS  (PRN):  acetaminophen     Tablet .. 650 milliGRAM(s) Oral every 6 hours PRN Temp greater or equal to 38C (100.4F), Mild Pain (1 - 3)  aluminum hydroxide/magnesium hydroxide/simethicone Suspension 30 milliLiter(s) Oral every 4 hours PRN Dyspepsia  artificial  tears Solution 1 Drop(s) Both EYES every 4 hours PRN dry eyes syndrome  bisacodyl Suppository 10 milliGRAM(s) Rectal daily PRN Constipation  chlorhexidine 0.12% Liquid 15 milliLiter(s) Oral Mucosa two times a day PRN gum inflammation  gabapentin 100 milliGRAM(s) Oral three times a day PRN anxiety  haloperidol     Tablet 0.5 milliGRAM(s) Oral every 6 hours PRN agitation  haloperidol    Injectable 0.5 milliGRAM(s) IntraMuscular once PRN severe agitation  LORazepam   Injectable 0.5 milliGRAM(s) IntraMuscular once PRN severe anxiety/agitation  meclizine 12.5 milliGRAM(s) Oral every 8 hours PRN Dizziness  ondansetron   Disintegrating Tablet 4 milliGRAM(s) Oral every 8 hours PRN Nausea and/or Vomiting  polyethylene glycol 3350 17 Gram(s) Oral two times a day PRN constipation

## 2024-04-25 NOTE — BH INPATIENT PSYCHIATRY PROGRESS NOTE - NSBHASSESSSUMMFT_PSY_ALL_CORE
73 yr old female, , domiciled, and retired. premorbidly ambulant (not needing assists) and iADL/ bADL independent. with background hx of MDD and ANSON; has multiple psych admissions to [facility] (most recently Jan 2024 on [facility]), had prior SA by overdosing on pills (11/2019) following death of parents, presented to ED with somatic complaints and reports of ?passive suicidal ideation, had exhaustive w/u, now admitted voluntarily to [facility]  Routine checks, no suicidal ideations  Continue nortriptyline 50mg po qhs for depression - will hold off increase due to orthostasis today  Continue klonopin 0.5mg po bid and 0.5mg po qhs for anxiety; gabapentin prn for anxiety  Continue mirtazapine 15mg po qhs and melatonin 3mg po qhs for insomnia  Increased lyrica to 50mg po daily and 25mg po q3pm and 9pm - off label for anxiety  Individual psychotherapy    HLD: atorvastatin 10mg po qhs  GERD: protonix 40mg po daily  HTN: decreased metoprolol to 25mg po bid - patient states she is actually prescribed this for palpitations from anxiety by her cardiologist (allow addition of lasix)  LE edema: restart lasix 20mg every other day  Dizziness: MRI wnl; meclizine 12.5mg prn; possible BPV; flexeril 5mg po qhs prn recommended by neurology

## 2024-04-25 NOTE — BH INPATIENT PSYCHIATRY PROGRESS NOTE - NSBHCHARTREVIEWVS_PSY_A_CORE FT
Vital Signs Last 24 Hrs  T(C): 36.4 (04-25-24 @ 05:59), Max: 36.8 (04-24-24 @ 19:30)  T(F): 97.5 (04-25-24 @ 05:59), Max: 98.3 (04-24-24 @ 19:30)  HR: --  BP: --  BP(mean): --  RR: --  SpO2: 96% (04-25-24 @ 05:59) (96% - 96%)    Orthostatic VS  04-25-24 @ 05:59  Lying BP: 135/73 HR: 85  Sitting BP: 115/53 HR: 102  Standing BP: --/-- HR: --  Site: --  Mode: --  Orthostatic VS  04-24-24 @ 19:30  Lying BP: --/-- HR: --  Sitting BP: 141/83 HR: 99  Standing BP: --/-- HR: --  Site: --  Mode: --  Orthostatic VS  04-24-24 @ 05:50  Lying BP: 118/61 HR: 88  Sitting BP: 105/65 HR: 97  Standing BP: --/-- HR: --  Site: --  Mode: --

## 2024-04-25 NOTE — BH PSYCHOLOGY - CLINICIAN PSYCHOTHERAPY NOTE - NSBHPSYCHOLNARRATIVE_PSY_A_CORE FT
The patient was seen individually at the team's request and with her consent. Speech was of normal rate and volume and there were no verbal irregularities. The patient's mood was depressed (8 out of 10 / 10= worst), and affect was constricted, and mood congruent. She was coherent and logical; her content focused on medications, her interactions with some treatment providers, and her history of hospitalizations. The patient denied current suicidal ideation.     The patient reported no dizziness. She has been tolerating her medication regimen. She said that “last night was the first night without a panic attack”. This morning, she was first told that her Klonopin had been canceled, but she was then given her usual dose. She was upset that her metoprolol was cut in half even though she was told that it was because she was also receiving Lasix.     The patient reported that she had a difference of opinion with her doctor who believes she should rely more on psychotherapy to manage her feelings and less on medication. The patient insisted that “This is who I am… I will be 74 years old, and I cannot change”. She said “coping skills” such as relaxation breathing don’t work. She agreed to continue to see a therapist once discharged but does not want her medications lowered now. We reviewed her pattern of catastrophizing and of requesting medication changes when she fears relapse and how we have looked at how her thinking can impact her feelings. Nevertheless, she believes that she is going to adjust her medications to feel good enough to leave. She was also preoccupied with the dates and duration of recent hospitalizations and with the dosages of her discharge medications. The patient was also upset that her doctor would not reassure her and installed encouraged her to reassure herself. The writer clarified that this was likely done to strengthen her rather than encourage dependence. The patient understands this intellectually but experiences it as withholding.     The patient reported good visits with her  the past few days. She has been making use of the ACT-based exercises in a workbook she was given a few days ago. She was encouraged to participate in therapeutic activities on the unit. The patient expressed appreciation and agreed to meet again.

## 2024-04-25 NOTE — BH INPATIENT PSYCHIATRY PROGRESS NOTE - NSBHMETABOLIC_PSY_ALL_CORE_FT
BMI: BMI (kg/m2): 38.3 (04-16-24 @ 19:07)  HbA1c: A1C with Estimated Average Glucose Result: 5.8 % (01-11-24 @ 08:00)    Glucose:   BP: 138/81 (04-23-24 @ 20:25) (138/81 - 138/81)Vital Signs Last 24 Hrs  T(C): 36.4 (04-25-24 @ 05:59), Max: 36.8 (04-24-24 @ 19:30)  T(F): 97.5 (04-25-24 @ 05:59), Max: 98.3 (04-24-24 @ 19:30)  HR: --  BP: --  BP(mean): --  RR: --  SpO2: 96% (04-25-24 @ 05:59) (96% - 96%)    Orthostatic VS  04-25-24 @ 05:59  Lying BP: 135/73 HR: 85  Sitting BP: 115/53 HR: 102  Standing BP: --/-- HR: --  Site: --  Mode: --  Orthostatic VS  04-24-24 @ 19:30  Lying BP: --/-- HR: --  Sitting BP: 141/83 HR: 99  Standing BP: --/-- HR: --  Site: --  Mode: --  Orthostatic VS  04-24-24 @ 05:50  Lying BP: 118/61 HR: 88  Sitting BP: 105/65 HR: 97  Standing BP: --/-- HR: --  Site: --  Mode: --    Lipid Panel: Date/Time: 04-05-24 @ 05:56  Cholesterol, Serum: 176  LDL Cholesterol Calculated: 97  HDL Cholesterol, Serum: 39  Total Cholesterol/HDL Ration Measurement: --  Triglycerides, Serum: 200

## 2024-04-26 RX ADMIN — Medication 25 MILLIGRAM(S): at 09:02

## 2024-04-26 RX ADMIN — MIRTAZAPINE 15 MILLIGRAM(S): 45 TABLET, ORALLY DISINTEGRATING ORAL at 21:13

## 2024-04-26 RX ADMIN — PANTOPRAZOLE SODIUM 40 MILLIGRAM(S): 20 TABLET, DELAYED RELEASE ORAL at 09:02

## 2024-04-26 RX ADMIN — Medication 0.5 MILLIGRAM(S): at 17:57

## 2024-04-26 RX ADMIN — ONDANSETRON 4 MILLIGRAM(S): 8 TABLET, FILM COATED ORAL at 10:12

## 2024-04-26 RX ADMIN — Medication 25 MILLIGRAM(S): at 15:02

## 2024-04-26 RX ADMIN — Medication 20 MILLIGRAM(S): at 09:11

## 2024-04-26 RX ADMIN — ATORVASTATIN CALCIUM 10 MILLIGRAM(S): 80 TABLET, FILM COATED ORAL at 21:13

## 2024-04-26 RX ADMIN — NORTRIPTYLINE HYDROCHLORIDE 50 MILLIGRAM(S): 10 CAPSULE ORAL at 21:13

## 2024-04-26 RX ADMIN — Medication 3 MILLIGRAM(S): at 21:13

## 2024-04-26 RX ADMIN — Medication 25 MILLIGRAM(S): at 21:14

## 2024-04-26 RX ADMIN — Medication 50 MILLIGRAM(S): at 09:02

## 2024-04-26 RX ADMIN — Medication 1 TABLET(S): at 09:02

## 2024-04-26 RX ADMIN — Medication 25 MILLIGRAM(S): at 21:13

## 2024-04-26 RX ADMIN — Medication 1 DROP(S): at 23:05

## 2024-04-26 RX ADMIN — Medication 0.5 MILLIGRAM(S): at 06:39

## 2024-04-27 PROCEDURE — 99232 SBSQ HOSP IP/OBS MODERATE 35: CPT

## 2024-04-27 RX ADMIN — Medication 3 MILLIGRAM(S): at 20:51

## 2024-04-27 RX ADMIN — PANTOPRAZOLE SODIUM 40 MILLIGRAM(S): 20 TABLET, DELAYED RELEASE ORAL at 05:51

## 2024-04-27 RX ADMIN — NORTRIPTYLINE HYDROCHLORIDE 50 MILLIGRAM(S): 10 CAPSULE ORAL at 20:51

## 2024-04-27 RX ADMIN — Medication 1 DROP(S): at 20:52

## 2024-04-27 RX ADMIN — MIRTAZAPINE 15 MILLIGRAM(S): 45 TABLET, ORALLY DISINTEGRATING ORAL at 20:52

## 2024-04-27 RX ADMIN — Medication 25 MILLIGRAM(S): at 21:58

## 2024-04-27 RX ADMIN — Medication 0.5 MILLIGRAM(S): at 18:04

## 2024-04-27 RX ADMIN — Medication 25 MILLIGRAM(S): at 15:11

## 2024-04-27 RX ADMIN — Medication 25 MILLIGRAM(S): at 20:52

## 2024-04-27 RX ADMIN — ATORVASTATIN CALCIUM 10 MILLIGRAM(S): 80 TABLET, FILM COATED ORAL at 20:52

## 2024-04-27 RX ADMIN — Medication 50 MILLIGRAM(S): at 08:47

## 2024-04-27 RX ADMIN — Medication 1 TABLET(S): at 08:46

## 2024-04-27 RX ADMIN — Medication 0.5 MILLIGRAM(S): at 05:51

## 2024-04-27 RX ADMIN — GABAPENTIN 100 MILLIGRAM(S): 400 CAPSULE ORAL at 13:48

## 2024-04-27 RX ADMIN — Medication 25 MILLIGRAM(S): at 08:46

## 2024-04-27 NOTE — BH INPATIENT PSYCHIATRY PROGRESS NOTE - NSBHMETABOLIC_PSY_ALL_CORE_FT
BMI: BMI (kg/m2): 38.3 (04-16-24 @ 19:07)  HbA1c: A1C with Estimated Average Glucose Result: 5.8 % (01-11-24 @ 08:00)    Glucose:   BP: 123/72 (04-26-24 @ 19:50) (123/72 - 123/72)Vital Signs Last 24 Hrs  T(C): 36.9 (04-27-24 @ 05:16), Max: 36.9 (04-27-24 @ 05:16)  T(F): 98.4 (04-27-24 @ 05:16), Max: 98.4 (04-27-24 @ 05:16)  HR: 103 (04-26-24 @ 19:50) (103 - 103)  BP: 123/72 (04-26-24 @ 19:50) (123/72 - 123/72)  BP(mean): --  RR: 17 (04-27-24 @ 05:16) (17 - 17)  SpO2: --    Orthostatic VS  04-27-24 @ 05:16  Lying BP: 136/71 HR: 91  Sitting BP: 128/69 HR: 87  Standing BP: --/-- HR: --  Site: upper left arm  Mode: electronic  Orthostatic VS  04-26-24 @ 06:02  Lying BP: --/-- HR: --  Sitting BP: 146/79 HR: 89  Standing BP: 143/87 HR: 92  Site: --  Mode: --  Orthostatic VS  04-25-24 @ 19:53  Lying BP: --/-- HR: --  Sitting BP: 136/78 HR: 98  Standing BP: --/-- HR: --  Site: --  Mode: --    Lipid Panel: Date/Time: 04-05-24 @ 05:56  Cholesterol, Serum: 176  LDL Cholesterol Calculated: 97  HDL Cholesterol, Serum: 39  Total Cholesterol/HDL Ration Measurement: --  Triglycerides, Serum: 200

## 2024-04-27 NOTE — BH INPATIENT PSYCHIATRY PROGRESS NOTE - NSBHASSESSSUMMFT_PSY_ALL_CORE
73 yr old female, , domiciled, and retired. premorbidly ambulant (not needing assists) and iADL/ bADL independent. with background hx of MDD and ANSON; has multiple psych admissions to [facility] (most recently Jan 2024 on [facility]), had prior SA by overdosing on pills (11/2019) following death of parents, presented to ED with somatic complaints and reports of ?passive suicidal ideation, had exhaustive w/u, now admitted voluntarily to [facility]  Routine checks, no suicidal ideations  Continue nortriptyline 50mg po qhs for depression - will hold off increase due to orthostasis today  Continue klonopin 0.5mg po bid and 0.5mg po qhs for anxiety; gabapentin prn for anxiety  Continue mirtazapine 15mg po qhs and melatonin 3mg po qhs for insomnia  Increased lyrica to 50mg po daily and 25mg po q3pm and 9pm - off label for anxiety  Individual psychotherapy  4/27 Anxious somatic, sweating, cont current meds discuss w/u for carcinoid with medicine   HLD: atorvastatin 10mg po qhs  GERD: protonix 40mg po daily  HTN: decreased metoprolol to 25mg po bid - patient states she is actually prescribed this for palpitations from anxiety by her cardiologist (allow addition of lasix)  LE edema: restart lasix 20mg every other day  Dizziness: MRI wnl; meclizine 12.5mg prn; possible BPV; flexeril 5mg po qhs prn recommended by neurology

## 2024-04-27 NOTE — BH INPATIENT PSYCHIATRY PROGRESS NOTE - NSBHFUPINTERVALHXFT_PSY_A_CORE
Patient continues to report anxiety and is depressed as a result. Severe catastrophization noted. VSS

## 2024-04-27 NOTE — BH INPATIENT PSYCHIATRY PROGRESS NOTE - NSBHCHARTREVIEWVS_PSY_A_CORE FT
Vital Signs Last 24 Hrs  T(C): 36.9 (04-27-24 @ 05:16), Max: 36.9 (04-27-24 @ 05:16)  T(F): 98.4 (04-27-24 @ 05:16), Max: 98.4 (04-27-24 @ 05:16)  HR: 103 (04-26-24 @ 19:50) (103 - 103)  BP: 123/72 (04-26-24 @ 19:50) (123/72 - 123/72)  BP(mean): --  RR: 17 (04-27-24 @ 05:16) (17 - 17)  SpO2: --    Orthostatic VS  04-27-24 @ 05:16  Lying BP: 136/71 HR: 91  Sitting BP: 128/69 HR: 87  Standing BP: --/-- HR: --  Site: upper left arm  Mode: electronic  Orthostatic VS  04-26-24 @ 06:02  Lying BP: --/-- HR: --  Sitting BP: 146/79 HR: 89  Standing BP: 143/87 HR: 92  Site: --  Mode: --  Orthostatic VS  04-25-24 @ 19:53  Lying BP: --/-- HR: --  Sitting BP: 136/78 HR: 98  Standing BP: --/-- HR: --  Site: --  Mode: --

## 2024-04-28 PROCEDURE — 99232 SBSQ HOSP IP/OBS MODERATE 35: CPT

## 2024-04-28 RX ADMIN — Medication 50 MILLIGRAM(S): at 08:58

## 2024-04-28 RX ADMIN — Medication 650 MILLIGRAM(S): at 12:04

## 2024-04-28 RX ADMIN — CHLORHEXIDINE GLUCONATE 15 MILLILITER(S): 213 SOLUTION TOPICAL at 21:50

## 2024-04-28 RX ADMIN — MIRTAZAPINE 15 MILLIGRAM(S): 45 TABLET, ORALLY DISINTEGRATING ORAL at 20:58

## 2024-04-28 RX ADMIN — Medication 0.5 MILLIGRAM(S): at 17:51

## 2024-04-28 RX ADMIN — Medication 650 MILLIGRAM(S): at 13:05

## 2024-04-28 RX ADMIN — ATORVASTATIN CALCIUM 10 MILLIGRAM(S): 80 TABLET, FILM COATED ORAL at 20:57

## 2024-04-28 RX ADMIN — Medication 3 MILLIGRAM(S): at 20:57

## 2024-04-28 RX ADMIN — Medication 25 MILLIGRAM(S): at 15:26

## 2024-04-28 RX ADMIN — Medication 25 MILLIGRAM(S): at 08:58

## 2024-04-28 RX ADMIN — GABAPENTIN 100 MILLIGRAM(S): 400 CAPSULE ORAL at 12:05

## 2024-04-28 RX ADMIN — PANTOPRAZOLE SODIUM 40 MILLIGRAM(S): 20 TABLET, DELAYED RELEASE ORAL at 05:52

## 2024-04-28 RX ADMIN — Medication 0.5 MILLIGRAM(S): at 05:44

## 2024-04-28 RX ADMIN — Medication 1 TABLET(S): at 08:59

## 2024-04-28 RX ADMIN — NORTRIPTYLINE HYDROCHLORIDE 50 MILLIGRAM(S): 10 CAPSULE ORAL at 20:58

## 2024-04-28 RX ADMIN — Medication 25 MILLIGRAM(S): at 20:57

## 2024-04-28 RX ADMIN — Medication 1 DROP(S): at 22:08

## 2024-04-28 NOTE — BH INPATIENT PSYCHIATRY PROGRESS NOTE - NSBHMETABOLIC_PSY_ALL_CORE_FT
BMI: BMI (kg/m2): 38.3 (04-16-24 @ 19:07)  HbA1c: A1C with Estimated Average Glucose Result: 5.8 % (01-11-24 @ 08:00)    Glucose:   BP: 123/72 (04-26-24 @ 19:50) (123/72 - 123/72)Vital Signs Last 24 Hrs  T(C): 36.6 (04-28-24 @ 11:20), Max: 36.8 (04-27-24 @ 20:30)  T(F): 97.9 (04-28-24 @ 11:20), Max: 98.3 (04-27-24 @ 20:30)  HR: --  BP: --  BP(mean): --  RR: --  SpO2: 95% (04-28-24 @ 05:38) (95% - 95%)    Orthostatic VS  04-28-24 @ 05:38  Lying BP: 129/74 HR: 84  Sitting BP: 126/76 HR: 89  Standing BP: --/-- HR: --  Site: --  Mode: --  Orthostatic VS  04-27-24 @ 20:30  Lying BP: --/-- HR: --  Sitting BP: 132/83 HR: 96  Standing BP: 127/70 HR: 100  Site: upper left arm  Mode: electronic  Orthostatic VS  04-27-24 @ 05:16  Lying BP: 136/71 HR: 91  Sitting BP: 128/69 HR: 87  Standing BP: --/-- HR: --  Site: upper left arm  Mode: electronic    Lipid Panel: Date/Time: 04-05-24 @ 05:56  Cholesterol, Serum: 176  LDL Cholesterol Calculated: 97  HDL Cholesterol, Serum: 39  Total Cholesterol/HDL Ration Measurement: --  Triglycerides, Serum: 200

## 2024-04-28 NOTE — BH INPATIENT PSYCHIATRY PROGRESS NOTE - NSBHFUPINTERVALHXFT_PSY_A_CORE
Patient continues to report anxiety and is depressed as a result. Severe catastrophization noted. VSS. Staff note symptoms are often variable

## 2024-04-28 NOTE — BH INPATIENT PSYCHIATRY PROGRESS NOTE - NSBHASSESSSUMMFT_PSY_ALL_CORE
73 yr old female, , domiciled, and retired. premorbidly ambulant (not needing assists) and iADL/ bADL independent. with background hx of MDD and ANSON; has multiple psych admissions to [facility] (most recently Jan 2024 on [facility]), had prior SA by overdosing on pills (11/2019) following death of parents, presented to ED with somatic complaints and reports of ?passive suicidal ideation, had exhaustive w/u, now admitted voluntarily to [facility]  Routine checks, no suicidal ideations  Continue nortriptyline 50mg po qhs for depression - will hold off increase due to orthostasis today  Continue klonopin 0.5mg po bid and 0.5mg po qhs for anxiety; gabapentin prn for anxiety  Continue mirtazapine 15mg po qhs and melatonin 3mg po qhs for insomnia  Increased lyrica to 50mg po daily and 25mg po q3pm and 9pm - off label for anxiety  Individual psychotherapyn in carcinoid  4/27 Anxious somatic, sweating, cont current meds discuss w/u for carcinoid with medicine   4/28 Better today but fluctuates Has sweating SOB but no diarrhea seen in carcinoid cont current meds  HLD: atorvastatin 10mg po qhs  GERD: protonix 40mg po daily  HTN: decreased metoprolol to 25mg po bid - patient states she is actually prescribed this for palpitations from anxiety by her cardiologist (allow addition of lasix)  LE edema: restart lasix 20mg every other day  Dizziness: MRI wnl; meclizine 12.5mg prn; possible BPV; flexeril 5mg po qhs prn recommended by neurology

## 2024-04-28 NOTE — BH INPATIENT PSYCHIATRY PROGRESS NOTE - NSBHCHARTREVIEWVS_PSY_A_CORE FT
Vital Signs Last 24 Hrs  T(C): 36.6 (04-28-24 @ 11:20), Max: 36.8 (04-27-24 @ 20:30)  T(F): 97.9 (04-28-24 @ 11:20), Max: 98.3 (04-27-24 @ 20:30)  HR: --  BP: --  BP(mean): --  RR: --  SpO2: 95% (04-28-24 @ 05:38) (95% - 95%)    Orthostatic VS  04-28-24 @ 05:38  Lying BP: 129/74 HR: 84  Sitting BP: 126/76 HR: 89  Standing BP: --/-- HR: --  Site: --  Mode: --  Orthostatic VS  04-27-24 @ 20:30  Lying BP: --/-- HR: --  Sitting BP: 132/83 HR: 96  Standing BP: 127/70 HR: 100  Site: upper left arm  Mode: electronic  Orthostatic VS  04-27-24 @ 05:16  Lying BP: 136/71 HR: 91  Sitting BP: 128/69 HR: 87  Standing BP: --/-- HR: --  Site: upper left arm  Mode: electronic

## 2024-04-29 PROCEDURE — 99232 SBSQ HOSP IP/OBS MODERATE 35: CPT

## 2024-04-29 RX ADMIN — Medication 1 TABLET(S): at 09:00

## 2024-04-29 RX ADMIN — NORTRIPTYLINE HYDROCHLORIDE 50 MILLIGRAM(S): 10 CAPSULE ORAL at 20:56

## 2024-04-29 RX ADMIN — ONDANSETRON 4 MILLIGRAM(S): 8 TABLET, FILM COATED ORAL at 12:46

## 2024-04-29 RX ADMIN — MIRTAZAPINE 15 MILLIGRAM(S): 45 TABLET, ORALLY DISINTEGRATING ORAL at 20:56

## 2024-04-29 RX ADMIN — Medication 25 MILLIGRAM(S): at 15:21

## 2024-04-29 RX ADMIN — Medication 20 MILLIGRAM(S): at 09:00

## 2024-04-29 RX ADMIN — Medication 50 MILLIGRAM(S): at 09:00

## 2024-04-29 RX ADMIN — Medication 1 DROP(S): at 21:00

## 2024-04-29 RX ADMIN — CHLORHEXIDINE GLUCONATE 15 MILLILITER(S): 213 SOLUTION TOPICAL at 21:01

## 2024-04-29 RX ADMIN — ATORVASTATIN CALCIUM 10 MILLIGRAM(S): 80 TABLET, FILM COATED ORAL at 20:55

## 2024-04-29 RX ADMIN — Medication 3 MILLIGRAM(S): at 20:56

## 2024-04-29 RX ADMIN — GABAPENTIN 100 MILLIGRAM(S): 400 CAPSULE ORAL at 12:45

## 2024-04-29 RX ADMIN — Medication 0.5 MILLIGRAM(S): at 05:48

## 2024-04-29 RX ADMIN — Medication 25 MILLIGRAM(S): at 20:56

## 2024-04-29 RX ADMIN — Medication 25 MILLIGRAM(S): at 09:03

## 2024-04-29 RX ADMIN — PANTOPRAZOLE SODIUM 40 MILLIGRAM(S): 20 TABLET, DELAYED RELEASE ORAL at 05:52

## 2024-04-29 RX ADMIN — Medication 0.5 MILLIGRAM(S): at 18:11

## 2024-04-29 RX ADMIN — GABAPENTIN 100 MILLIGRAM(S): 400 CAPSULE ORAL at 20:56

## 2024-04-29 NOTE — BH INPATIENT PSYCHIATRY PROGRESS NOTE - NSBHFUPINTERVALHXFT_PSY_A_CORE
Patient seen for follow up of anxiety and somatic preoccupation. VSS, no orthostatic hypotension. Heart rate was 94 sitting and 102 standing. During rounds pt was fixated on her heart rate being too elevated and inquiring wether her metoprolol should be increased. Writer provided psychoeducation and reassurance along with her RN. Discussed her HR sitting down was normal and sitting up it increased slightly but not to a level that would cause concern. Discussed how she is so worried about her heart rate every day she may be experiencing anticipatory anxiety prior to vitals which may elevate her heart rate.   Pt then asked about her Nortriptyline and potential side effects. Writer encouraged pt against fixating so much on side effects, pt admitted she tends to loom for side effects once she knows what they are. Discussed we would be titrating her Nortriptyline slowly to monitor her tolerability and response. Pt verbalized agreement. Patient seen for follow up of anxiety and somatic preoccupation. VSS, no orthostatic hypotension. Heart rate was 94 sitting and 102 standing. During rounds pt was fixated on her heart rate being too elevated and inquiring wether her metoprolol should be increased. Writer provided psychoeducation and reassurance along with her RN. Discussed her HR sitting down was normal and sitting up it increased slightly but not to a level that would cause concern. Discussed how she is so worried about her heart rate every day she may be experiencing anticipatory anxiety prior to vitals which may elevate her heart rate.   Pt then asked about her Nortriptyline and potential side effects. Writer encouraged pt against fixating so much on side effects, pt admitted she tends to look for side effects once she knows what they are. Discussed we would be titrating her Nortriptyline slowly to monitor her tolerability and response. Pt verbalized agreement.

## 2024-04-29 NOTE — BH PSYCHOLOGY - CLINICIAN PSYCHOTHERAPY NOTE - NSBHPSYCHOLNARRATIVE_PSY_A_CORE FT
The patient was seen individually at the team's request and with her consent. Speech was of normal rate and volume and there were no verbal irregularities. The patient's mood was depressed (she rated it a 10/10 pretreatment and a 8/10 post treatment interventions with 10= worst), and affect was constricted, and mood congruent. She was coherent and logical; her content continued to focused on medication management, her interactions with some treatment providers, and her frustrations with her 's lack of emotional availability.  The patient denied current suicidal ideation.    The pt discussed her concern about the medication not working, and feeling worried that the MD would not be able to find a the right medications and dosages that would help her feel better. Pt continued to reflect on all the past and current medications she is taking and the changes that have been made. Trainee and pt discussed how her negative catastrophizing is a symptom of her anxiety and worked to challenge it with her previous hospitalizations and how she ha always taken some time and tried few things before feeling happy and able to smile again. Trainee and pt engaged in perspective shifting, and pt was able to be more hopeful about her position and acknowledge that she believes she will get better but that it might be a "rough journey". Trainee encouraged pt that while the journey might be rough there are things that she can do to help herself feel better, including thinking about some of her happiest memories. The pt was able to engage in this and share multiple memories when she felt happiest. Pt then reported feeling much better, and trainee encouraged pt's self-efficacy and ability t help herself get through the times when she might be feeling depressed and anxious.     The patient reported that her  has been seeing her and trying to support her regularly. However, she expressed that he feels more like a "patient" to her since he often cannot listen to her when she is expressing herself and her negative feelings. She expressed a desire to want him to be more emotionally supportive and validating. Trainee and pt discussed ways she can communicate that with him. Pt was engaged and receptive to treatment and looking forward to continuing psychotherapy with psychology. Psychology will continue to follow pt while on the unit.

## 2024-04-29 NOTE — BH INPATIENT PSYCHIATRY PROGRESS NOTE - NSBHCHARTREVIEWVS_PSY_A_CORE FT
Vital Signs Last 24 Hrs  T(C): 36.4 (04-29-24 @ 05:25), Max: 36.7 (04-28-24 @ 19:42)  T(F): 97.6 (04-29-24 @ 05:25), Max: 98 (04-28-24 @ 19:42)  HR: --  BP: --  BP(mean): --  RR: --  SpO2: 95% (04-29-24 @ 05:25) (95% - 95%)    Orthostatic VS  04-29-24 @ 05:25  Lying BP: 137/73 HR: 94  Sitting BP: 139/85 HR: 102  Standing BP: --/-- HR: --  Site: upper right arm  Mode: electronic  Orthostatic VS  04-28-24 @ 19:42  Lying BP: --/-- HR: --  Sitting BP: 123/88 HR: 98  Standing BP: --/-- HR: --  Site: --  Mode: --  Orthostatic VS  04-28-24 @ 05:38  Lying BP: 129/74 HR: 84  Sitting BP: 126/76 HR: 89  Standing BP: --/-- HR: --  Site: --  Mode: --  Orthostatic VS  04-27-24 @ 20:30  Lying BP: --/-- HR: --  Sitting BP: 132/83 HR: 96  Standing BP: 127/70 HR: 100  Site: upper left arm  Mode: electronic

## 2024-04-29 NOTE — BH INPATIENT PSYCHIATRY PROGRESS NOTE - NSBHMETABOLIC_PSY_ALL_CORE_FT
BMI: BMI (kg/m2): 38.3 (04-16-24 @ 19:07)  HbA1c: A1C with Estimated Average Glucose Result: 5.8 % (01-11-24 @ 08:00)    Glucose:   BP: 123/72 (04-26-24 @ 19:50) (123/72 - 123/72)Vital Signs Last 24 Hrs  T(C): 36.4 (04-29-24 @ 05:25), Max: 36.7 (04-28-24 @ 19:42)  T(F): 97.6 (04-29-24 @ 05:25), Max: 98 (04-28-24 @ 19:42)  HR: --  BP: --  BP(mean): --  RR: --  SpO2: 95% (04-29-24 @ 05:25) (95% - 95%)    Orthostatic VS  04-29-24 @ 05:25  Lying BP: 137/73 HR: 94  Sitting BP: 139/85 HR: 102  Standing BP: --/-- HR: --  Site: upper right arm  Mode: electronic  Orthostatic VS  04-28-24 @ 19:42  Lying BP: --/-- HR: --  Sitting BP: 123/88 HR: 98  Standing BP: --/-- HR: --  Site: --  Mode: --  Orthostatic VS  04-28-24 @ 05:38  Lying BP: 129/74 HR: 84  Sitting BP: 126/76 HR: 89  Standing BP: --/-- HR: --  Site: --  Mode: --  Orthostatic VS  04-27-24 @ 20:30  Lying BP: --/-- HR: --  Sitting BP: 132/83 HR: 96  Standing BP: 127/70 HR: 100  Site: upper left arm  Mode: electronic    Lipid Panel: Date/Time: 04-05-24 @ 05:56  Cholesterol, Serum: 176  LDL Cholesterol Calculated: 97  HDL Cholesterol, Serum: 39  Total Cholesterol/HDL Ration Measurement: --  Triglycerides, Serum: 200

## 2024-04-29 NOTE — BH INPATIENT PSYCHIATRY PROGRESS NOTE - NSBHASSESSSUMMFT_PSY_ALL_CORE
73 yr old female, , domiciled, and retired. premorbidly ambulant (not needing assists) and iADL/ bADL independent. with background hx of MDD and ANSON; has multiple psych admissions to [facility] (most recently Jan 2024 on [facility]), had prior SA by overdosing on pills (11/2019) following death of parents, presented to ED with somatic complaints and reports of ?passive suicidal ideation, had exhaustive w/u, now admitted voluntarily to [facility]  Routine checks, no suicidal ideations  Continue nortriptyline 50mg po qhs for depression - will hold off increase due to orthostasis today  Continue klonopin 0.5mg po bid and 0.5mg po qhs for anxiety; gabapentin prn for anxiety  Continue mirtazapine 15mg po qhs and melatonin 3mg po qhs for insomnia  Increased lyrica to 50mg po daily and 25mg po q3pm and 9pm - off label for anxiety  Individual psychotherapyn in carcinoid  4/27 Anxious somatic, sweating, cont current meds discuss w/u for carcinoid with medicine   4/28 Better today but fluctuates Has sweating SOB but no diarrhea seen in carcinoid cont current meds  4/29: Anxious and fixated on HR and potential medication side effects. Requesting metoprolol titration despite recently complaining of dizziness.      HLD: atorvastatin 10mg po qhs  GERD: protonix 40mg po daily  HTN: decreased metoprolol to 25mg po bid - patient states she is actually prescribed this for palpitations from anxiety by her cardiologist (allow addition of lasix)  LE edema: restart lasix 20mg every other day  Dizziness: MRI wnl; meclizine 12.5mg prn; possible BPV; flexeril 5mg po qhs prn recommended by neurology

## 2024-04-30 PROCEDURE — 99232 SBSQ HOSP IP/OBS MODERATE 35: CPT | Mod: GC

## 2024-04-30 RX ORDER — METOPROLOL TARTRATE 50 MG
50 TABLET ORAL
Refills: 0 | Status: DISCONTINUED | OUTPATIENT
Start: 2024-05-01 | End: 2024-05-31

## 2024-04-30 RX ORDER — METOPROLOL TARTRATE 50 MG
25 TABLET ORAL AT BEDTIME
Refills: 0 | Status: DISCONTINUED | OUTPATIENT
Start: 2024-04-30 | End: 2024-05-23

## 2024-04-30 RX ORDER — METOPROLOL TARTRATE 50 MG
25 TABLET ORAL DAILY
Refills: 0 | Status: DISCONTINUED | OUTPATIENT
Start: 2024-04-30 | End: 2024-05-23

## 2024-04-30 RX ADMIN — Medication 25 MILLIGRAM(S): at 20:55

## 2024-04-30 RX ADMIN — Medication 1 TABLET(S): at 09:16

## 2024-04-30 RX ADMIN — Medication 25 MILLIGRAM(S): at 21:19

## 2024-04-30 RX ADMIN — Medication 0.5 MILLIGRAM(S): at 18:01

## 2024-04-30 RX ADMIN — Medication 25 MILLIGRAM(S): at 09:15

## 2024-04-30 RX ADMIN — GABAPENTIN 100 MILLIGRAM(S): 400 CAPSULE ORAL at 20:55

## 2024-04-30 RX ADMIN — Medication 25 MILLIGRAM(S): at 15:07

## 2024-04-30 RX ADMIN — NORTRIPTYLINE HYDROCHLORIDE 50 MILLIGRAM(S): 10 CAPSULE ORAL at 20:56

## 2024-04-30 RX ADMIN — ATORVASTATIN CALCIUM 10 MILLIGRAM(S): 80 TABLET, FILM COATED ORAL at 20:55

## 2024-04-30 RX ADMIN — GABAPENTIN 100 MILLIGRAM(S): 400 CAPSULE ORAL at 13:45

## 2024-04-30 RX ADMIN — CHLORHEXIDINE GLUCONATE 15 MILLILITER(S): 213 SOLUTION TOPICAL at 20:56

## 2024-04-30 RX ADMIN — PANTOPRAZOLE SODIUM 40 MILLIGRAM(S): 20 TABLET, DELAYED RELEASE ORAL at 06:31

## 2024-04-30 RX ADMIN — Medication 3 MILLIGRAM(S): at 20:56

## 2024-04-30 RX ADMIN — Medication 1 DROP(S): at 20:56

## 2024-04-30 RX ADMIN — Medication 50 MILLIGRAM(S): at 09:17

## 2024-04-30 RX ADMIN — Medication 0.5 MILLIGRAM(S): at 06:31

## 2024-04-30 RX ADMIN — MIRTAZAPINE 15 MILLIGRAM(S): 45 TABLET, ORALLY DISINTEGRATING ORAL at 20:55

## 2024-04-30 NOTE — BH INPATIENT PSYCHIATRY PROGRESS NOTE - NSBHASSESSSUMMFT_PSY_ALL_CORE
73 yr old female, , domiciled, and retired. premorbidly ambulant (not needing assists) and iADL/ bADL independent. with background hx of MDD and ANSON; has multiple psych admissions to [facility] (most recently Jan 2024 [facility]), had prior SA by overdosing on pills (11/2019) following death of parents, presented to ED with somatic complaints and reports of ?passive suicidal ideation, had exhaustive w/u, now admitted voluntarily to [facility]  Routine checks, no suicidal ideations  Continue nortriptyline 50mg po qhs for depression - will hold off increase due to orthostasis today  Continue klonopin 0.5mg po bid and 0.5mg po qhs for anxiety; gabapentin prn for anxiety  Continue mirtazapine 15mg po qhs and melatonin 3mg po qhs for insomnia  Increased lyrica to 50mg po daily and 25mg po q3pm and 9pm - off label for anxiety  Individual psychotherapyn in carcinoid  4/27 Anxious somatic, sweating, cont current meds discuss w/u for carcinoid with medicine   4/28 Better today but fluctuates Has sweating SOB but no diarrhea seen in carcinoid cont current meds  4/29: Anxious and fixated on HR and potential medication side effects. Requesting metoprolol titration despite recently complaining of dizziness.  4/30: Ruminative thoughts regarding perceived rejection and stressor persist. Medication titration/ adjustment pending further assessment.     HLD: atorvastatin 10mg po qhs  GERD: protonix 40mg po daily  HTN: continue on metoprolol 50mg every 6am, 25mg Qhs and 25mg PRN- patient states she is actually prescribed this for palpitations from anxiety by her cardiologist (allow addition of lasix)  LE edema: restart lasix 20mg every other day  Dizziness: MRI wnl; meclizine 12.5mg prn; possible BPV; flexeril 5mg po qhs prn recommended by neurology

## 2024-04-30 NOTE — BH INPATIENT PSYCHIATRY PROGRESS NOTE - PRN MEDS
MEDICATIONS  (PRN):  acetaminophen     Tablet .. 650 milliGRAM(s) Oral every 6 hours PRN Temp greater or equal to 38C (100.4F), Mild Pain (1 - 3)  aluminum hydroxide/magnesium hydroxide/simethicone Suspension 30 milliLiter(s) Oral every 4 hours PRN Dyspepsia  artificial  tears Solution 1 Drop(s) Both EYES every 4 hours PRN dry eyes syndrome  bisacodyl Suppository 10 milliGRAM(s) Rectal daily PRN Constipation  chlorhexidine 0.12% Liquid 15 milliLiter(s) Oral Mucosa two times a day PRN gum inflammation  gabapentin 100 milliGRAM(s) Oral three times a day PRN anxiety  haloperidol     Tablet 0.5 milliGRAM(s) Oral every 6 hours PRN agitation  haloperidol    Injectable 0.5 milliGRAM(s) IntraMuscular once PRN severe agitation  meclizine 12.5 milliGRAM(s) Oral every 8 hours PRN Dizziness  ondansetron   Disintegrating Tablet 4 milliGRAM(s) Oral every 8 hours PRN Nausea and/or Vomiting  polyethylene glycol 3350 17 Gram(s) Oral two times a day PRN constipation   MEDICATIONS  (PRN):  acetaminophen     Tablet .. 650 milliGRAM(s) Oral every 6 hours PRN Temp greater or equal to 38C (100.4F), Mild Pain (1 - 3)  aluminum hydroxide/magnesium hydroxide/simethicone Suspension 30 milliLiter(s) Oral every 4 hours PRN Dyspepsia  artificial  tears Solution 1 Drop(s) Both EYES every 4 hours PRN dry eyes syndrome  bisacodyl Suppository 10 milliGRAM(s) Rectal daily PRN Constipation  chlorhexidine 0.12% Liquid 15 milliLiter(s) Oral Mucosa two times a day PRN gum inflammation  gabapentin 100 milliGRAM(s) Oral three times a day PRN anxiety  haloperidol     Tablet 0.5 milliGRAM(s) Oral every 6 hours PRN agitation  haloperidol    Injectable 0.5 milliGRAM(s) IntraMuscular once PRN severe agitation  meclizine 12.5 milliGRAM(s) Oral every 8 hours PRN Dizziness  metoprolol tartrate 25 milliGRAM(s) Oral daily PRN Palpitations due to Anxiety  ondansetron   Disintegrating Tablet 4 milliGRAM(s) Oral every 8 hours PRN Nausea and/or Vomiting  polyethylene glycol 3350 17 Gram(s) Oral two times a day PRN constipation

## 2024-04-30 NOTE — BH INPATIENT PSYCHIATRY PROGRESS NOTE - NSBHCHARTREVIEWVS_PSY_A_CORE FT
Vital Signs Last 24 Hrs  T(C): 36.6 (04-30-24 @ 05:56), Max: 36.7 (04-29-24 @ 19:51)  T(F): 97.8 (04-30-24 @ 05:56), Max: 98 (04-29-24 @ 19:51)  HR: --  BP: --  BP(mean): --  RR: --  SpO2: 92% (04-30-24 @ 05:56) (92% - 92%)    Orthostatic VS  04-30-24 @ 05:56  Lying BP: 118/77 HR: 87  Sitting BP: 135/77 HR: 93  Standing BP: --/-- HR: --  Site: --  Mode: --  Orthostatic VS  04-29-24 @ 19:51  Lying BP: --/-- HR: --  Sitting BP: 130/74 HR: 89  Standing BP: --/-- HR: --  Site: --  Mode: --  Orthostatic VS  04-29-24 @ 05:25  Lying BP: 137/73 HR: 94  Sitting BP: 139/85 HR: 102  Standing BP: --/-- HR: --  Site: upper right arm  Mode: electronic  Orthostatic VS  04-28-24 @ 19:42  Lying BP: --/-- HR: --  Sitting BP: 123/88 HR: 98  Standing BP: --/-- HR: --  Site: --  Mode: --   Vital Signs Last 24 Hrs  T(C): 37.2 (04-30-24 @ 19:56), Max: 37.2 (04-30-24 @ 19:56)  T(F): 99 (04-30-24 @ 19:56), Max: 99 (04-30-24 @ 19:56)  HR: --  BP: --  BP(mean): --  RR: --  SpO2: 92% (04-30-24 @ 05:56) (92% - 92%)    Orthostatic VS  04-30-24 @ 19:56  Lying BP: --/-- HR: --  Sitting BP: 141/72 HR: 97  Standing BP: --/-- HR: --  Site: --  Mode: --  Orthostatic VS  04-30-24 @ 05:56  Lying BP: 118/77 HR: 87  Sitting BP: 135/77 HR: 93  Standing BP: --/-- HR: --  Site: --  Mode: --  Orthostatic VS  04-29-24 @ 19:51  Lying BP: --/-- HR: --  Sitting BP: 130/74 HR: 89  Standing BP: --/-- HR: --  Site: --  Mode: --  Orthostatic VS  04-29-24 @ 05:25  Lying BP: 137/73 HR: 94  Sitting BP: 139/85 HR: 102  Standing BP: --/-- HR: --  Site: upper right arm  Mode: electronic

## 2024-04-30 NOTE — BH INPATIENT PSYCHIATRY PROGRESS NOTE - NSBHFUPINTERVALHXFT_PSY_A_CORE
Chart reviewed. As per floor staff, patient remains very anxious with multiple somatic complaints. On encounter, perseverates on her anxiety symptoms and asked whether any dosage adjustment would be made to her medications. Brief psychoeducation and need for safe medication adjustments guided by response and clinical necessity was briefly explored. Expressed her concern regarding being here and outlook on discharge. Hopeful regarding seeing her cats, hanging out with friends and shopping. Rest of symptom review is no significant.

## 2024-04-30 NOTE — BH INPATIENT PSYCHIATRY PROGRESS NOTE - CURRENT MEDICATION
MEDICATIONS  (STANDING):  atorvastatin 10 milliGRAM(s) Oral at bedtime  clonazePAM  Tablet 0.5 milliGRAM(s) Oral <User Schedule>  furosemide    Tablet 20 milliGRAM(s) Oral <User Schedule>  lidocaine   4% Patch 1 Patch Transdermal once  melatonin. 3 milliGRAM(s) Oral at bedtime  metoprolol tartrate 25 milliGRAM(s) Oral two times a day  mirtazapine 15 milliGRAM(s) Oral at bedtime  multivitamin 1 Tablet(s) Oral daily  nortriptyline 50 milliGRAM(s) Oral at bedtime  pantoprazole    Tablet 40 milliGRAM(s) Oral before breakfast  pregabalin 50 milliGRAM(s) Oral daily  pregabalin 25 milliGRAM(s) Oral <User Schedule>    MEDICATIONS  (PRN):  acetaminophen     Tablet .. 650 milliGRAM(s) Oral every 6 hours PRN Temp greater or equal to 38C (100.4F), Mild Pain (1 - 3)  aluminum hydroxide/magnesium hydroxide/simethicone Suspension 30 milliLiter(s) Oral every 4 hours PRN Dyspepsia  artificial  tears Solution 1 Drop(s) Both EYES every 4 hours PRN dry eyes syndrome  bisacodyl Suppository 10 milliGRAM(s) Rectal daily PRN Constipation  chlorhexidine 0.12% Liquid 15 milliLiter(s) Oral Mucosa two times a day PRN gum inflammation  gabapentin 100 milliGRAM(s) Oral three times a day PRN anxiety  haloperidol     Tablet 0.5 milliGRAM(s) Oral every 6 hours PRN agitation  haloperidol    Injectable 0.5 milliGRAM(s) IntraMuscular once PRN severe agitation  meclizine 12.5 milliGRAM(s) Oral every 8 hours PRN Dizziness  ondansetron   Disintegrating Tablet 4 milliGRAM(s) Oral every 8 hours PRN Nausea and/or Vomiting  polyethylene glycol 3350 17 Gram(s) Oral two times a day PRN constipation   MEDICATIONS  (STANDING):  atorvastatin 10 milliGRAM(s) Oral at bedtime  clonazePAM  Tablet 0.5 milliGRAM(s) Oral <User Schedule>  furosemide    Tablet 20 milliGRAM(s) Oral <User Schedule>  lidocaine   4% Patch 1 Patch Transdermal once  melatonin. 3 milliGRAM(s) Oral at bedtime  metoprolol tartrate 25 milliGRAM(s) Oral at bedtime  mirtazapine 15 milliGRAM(s) Oral at bedtime  multivitamin 1 Tablet(s) Oral daily  nortriptyline 50 milliGRAM(s) Oral at bedtime  pantoprazole    Tablet 40 milliGRAM(s) Oral before breakfast  pregabalin 50 milliGRAM(s) Oral daily  pregabalin 25 milliGRAM(s) Oral <User Schedule>    MEDICATIONS  (PRN):  acetaminophen     Tablet .. 650 milliGRAM(s) Oral every 6 hours PRN Temp greater or equal to 38C (100.4F), Mild Pain (1 - 3)  aluminum hydroxide/magnesium hydroxide/simethicone Suspension 30 milliLiter(s) Oral every 4 hours PRN Dyspepsia  artificial  tears Solution 1 Drop(s) Both EYES every 4 hours PRN dry eyes syndrome  bisacodyl Suppository 10 milliGRAM(s) Rectal daily PRN Constipation  chlorhexidine 0.12% Liquid 15 milliLiter(s) Oral Mucosa two times a day PRN gum inflammation  gabapentin 100 milliGRAM(s) Oral three times a day PRN anxiety  haloperidol     Tablet 0.5 milliGRAM(s) Oral every 6 hours PRN agitation  haloperidol    Injectable 0.5 milliGRAM(s) IntraMuscular once PRN severe agitation  meclizine 12.5 milliGRAM(s) Oral every 8 hours PRN Dizziness  metoprolol tartrate 25 milliGRAM(s) Oral daily PRN Palpitations due to Anxiety  ondansetron   Disintegrating Tablet 4 milliGRAM(s) Oral every 8 hours PRN Nausea and/or Vomiting  polyethylene glycol 3350 17 Gram(s) Oral two times a day PRN constipation

## 2024-04-30 NOTE — BH INPATIENT PSYCHIATRY PROGRESS NOTE - NSBHMETABOLIC_PSY_ALL_CORE_FT
BMI: BMI (kg/m2): 38.3 (04-16-24 @ 19:07)  HbA1c: A1C with Estimated Average Glucose Result: 5.8 % (01-11-24 @ 08:00)    Glucose:   BP: --Vital Signs Last 24 Hrs  T(C): 36.6 (04-30-24 @ 05:56), Max: 36.7 (04-29-24 @ 19:51)  T(F): 97.8 (04-30-24 @ 05:56), Max: 98 (04-29-24 @ 19:51)  HR: --  BP: --  BP(mean): --  RR: --  SpO2: 92% (04-30-24 @ 05:56) (92% - 92%)    Orthostatic VS  04-30-24 @ 05:56  Lying BP: 118/77 HR: 87  Sitting BP: 135/77 HR: 93  Standing BP: --/-- HR: --  Site: --  Mode: --  Orthostatic VS  04-29-24 @ 19:51  Lying BP: --/-- HR: --  Sitting BP: 130/74 HR: 89  Standing BP: --/-- HR: --  Site: --  Mode: --  Orthostatic VS  04-29-24 @ 05:25  Lying BP: 137/73 HR: 94  Sitting BP: 139/85 HR: 102  Standing BP: --/-- HR: --  Site: upper right arm  Mode: electronic  Orthostatic VS  04-28-24 @ 19:42  Lying BP: --/-- HR: --  Sitting BP: 123/88 HR: 98  Standing BP: --/-- HR: --  Site: --  Mode: --    Lipid Panel: Date/Time: 04-05-24 @ 05:56  Cholesterol, Serum: 176  LDL Cholesterol Calculated: 97  HDL Cholesterol, Serum: 39  Total Cholesterol/HDL Ration Measurement: --  Triglycerides, Serum: 200   BMI: BMI (kg/m2): 38.3 (04-16-24 @ 19:07)  HbA1c: A1C with Estimated Average Glucose Result: 5.8 % (01-11-24 @ 08:00)    Glucose:   BP: --Vital Signs Last 24 Hrs  T(C): 37.2 (04-30-24 @ 19:56), Max: 37.2 (04-30-24 @ 19:56)  T(F): 99 (04-30-24 @ 19:56), Max: 99 (04-30-24 @ 19:56)  HR: --  BP: --  BP(mean): --  RR: --  SpO2: 92% (04-30-24 @ 05:56) (92% - 92%)    Orthostatic VS  04-30-24 @ 19:56  Lying BP: --/-- HR: --  Sitting BP: 141/72 HR: 97  Standing BP: --/-- HR: --  Site: --  Mode: --  Orthostatic VS  04-30-24 @ 05:56  Lying BP: 118/77 HR: 87  Sitting BP: 135/77 HR: 93  Standing BP: --/-- HR: --  Site: --  Mode: --  Orthostatic VS  04-29-24 @ 19:51  Lying BP: --/-- HR: --  Sitting BP: 130/74 HR: 89  Standing BP: --/-- HR: --  Site: --  Mode: --  Orthostatic VS  04-29-24 @ 05:25  Lying BP: 137/73 HR: 94  Sitting BP: 139/85 HR: 102  Standing BP: --/-- HR: --  Site: upper right arm  Mode: electronic    Lipid Panel: Date/Time: 04-05-24 @ 05:56  Cholesterol, Serum: 176  LDL Cholesterol Calculated: 97  HDL Cholesterol, Serum: 39  Total Cholesterol/HDL Ration Measurement: --  Triglycerides, Serum: 200

## 2024-05-01 PROCEDURE — 99232 SBSQ HOSP IP/OBS MODERATE 35: CPT | Mod: GC

## 2024-05-01 PROCEDURE — 90837 PSYTX W PT 60 MINUTES: CPT

## 2024-05-01 RX ADMIN — Medication 50 MILLIGRAM(S): at 05:54

## 2024-05-01 RX ADMIN — Medication 3 MILLIGRAM(S): at 20:59

## 2024-05-01 RX ADMIN — Medication 25 MILLIGRAM(S): at 20:58

## 2024-05-01 RX ADMIN — Medication 25 MILLIGRAM(S): at 20:59

## 2024-05-01 RX ADMIN — NORTRIPTYLINE HYDROCHLORIDE 50 MILLIGRAM(S): 10 CAPSULE ORAL at 20:59

## 2024-05-01 RX ADMIN — CHLORHEXIDINE GLUCONATE 15 MILLILITER(S): 213 SOLUTION TOPICAL at 21:46

## 2024-05-01 RX ADMIN — Medication 20 MILLIGRAM(S): at 08:58

## 2024-05-01 RX ADMIN — GABAPENTIN 100 MILLIGRAM(S): 400 CAPSULE ORAL at 21:46

## 2024-05-01 RX ADMIN — Medication 0.5 MILLIGRAM(S): at 17:51

## 2024-05-01 RX ADMIN — Medication 50 MILLIGRAM(S): at 08:58

## 2024-05-01 RX ADMIN — Medication 650 MILLIGRAM(S): at 14:34

## 2024-05-01 RX ADMIN — GABAPENTIN 100 MILLIGRAM(S): 400 CAPSULE ORAL at 13:19

## 2024-05-01 RX ADMIN — ATORVASTATIN CALCIUM 10 MILLIGRAM(S): 80 TABLET, FILM COATED ORAL at 20:59

## 2024-05-01 RX ADMIN — Medication 650 MILLIGRAM(S): at 20:16

## 2024-05-01 RX ADMIN — PANTOPRAZOLE SODIUM 40 MILLIGRAM(S): 20 TABLET, DELAYED RELEASE ORAL at 08:58

## 2024-05-01 RX ADMIN — Medication 25 MILLIGRAM(S): at 14:34

## 2024-05-01 RX ADMIN — Medication 0.5 MILLIGRAM(S): at 05:54

## 2024-05-01 RX ADMIN — MIRTAZAPINE 15 MILLIGRAM(S): 45 TABLET, ORALLY DISINTEGRATING ORAL at 20:58

## 2024-05-01 RX ADMIN — Medication 1 TABLET(S): at 08:58

## 2024-05-01 RX ADMIN — ONDANSETRON 4 MILLIGRAM(S): 8 TABLET, FILM COATED ORAL at 09:57

## 2024-05-01 NOTE — BH INPATIENT PSYCHIATRY PROGRESS NOTE - NSBHMETABOLIC_PSY_ALL_CORE_FT
BMI: BMI (kg/m2): 38.3 (04-16-24 @ 19:07)  HbA1c: A1C with Estimated Average Glucose Result: 5.8 % (01-11-24 @ 08:00)    Glucose:   BP: 134/68 (05-01-24 @ 05:50) (134/68 - 134/68)Vital Signs Last 24 Hrs  T(C): 36.7 (05-01-24 @ 08:42), Max: 37.2 (04-30-24 @ 19:56)  T(F): 98.1 (05-01-24 @ 08:42), Max: 99 (04-30-24 @ 19:56)  HR: 80 (05-01-24 @ 05:50) (80 - 80)  BP: 134/68 (05-01-24 @ 05:50) (134/68 - 134/68)  BP(mean): --  RR: 18 (05-01-24 @ 05:50) (18 - 18)  SpO2: 98% (05-01-24 @ 08:42) (98% - 98%)    Orthostatic VS  05-01-24 @ 08:42  Lying BP: --/-- HR: --  Sitting BP: 146/73 HR: 79  Standing BP: 120/79 HR: 84  Site: --  Mode: --  Orthostatic VS  04-30-24 @ 19:56  Lying BP: --/-- HR: --  Sitting BP: 141/72 HR: 97  Standing BP: --/-- HR: --  Site: --  Mode: --  Orthostatic VS  04-30-24 @ 05:56  Lying BP: 118/77 HR: 87  Sitting BP: 135/77 HR: 93  Standing BP: --/-- HR: --  Site: --  Mode: --  Orthostatic VS  04-29-24 @ 19:51  Lying BP: --/-- HR: --  Sitting BP: 130/74 HR: 89  Standing BP: --/-- HR: --  Site: --  Mode: --    Lipid Panel: Date/Time: 04-05-24 @ 05:56  Cholesterol, Serum: 176  LDL Cholesterol Calculated: 97  HDL Cholesterol, Serum: 39  Total Cholesterol/HDL Ration Measurement: --  Triglycerides, Serum: 200   BMI: BMI (kg/m2): 38.3 (04-16-24 @ 19:07)  HbA1c: A1C with Estimated Average Glucose Result: 5.8 % (01-11-24 @ 08:00)    Glucose:   BP: 134/68 (05-01-24 @ 05:50) (134/68 - 134/68)Vital Signs Last 24 Hrs  T(C): 36.6 (05-01-24 @ 19:30), Max: 36.7 (05-01-24 @ 08:42)  T(F): 97.8 (05-01-24 @ 19:30), Max: 98.1 (05-01-24 @ 08:42)  HR: 80 (05-01-24 @ 05:50) (80 - 80)  BP: 134/68 (05-01-24 @ 05:50) (134/68 - 134/68)  BP(mean): --  RR: 18 (05-01-24 @ 05:50) (18 - 18)  SpO2: 98% (05-01-24 @ 08:42) (98% - 98%)    Orthostatic VS  05-01-24 @ 19:30  Lying BP: --/-- HR: --  Sitting BP: 127/82 HR: 97  Standing BP: --/-- HR: --  Site: --  Mode: --  Orthostatic VS  05-01-24 @ 08:42  Lying BP: --/-- HR: --  Sitting BP: 146/73 HR: 79  Standing BP: 120/79 HR: 84  Site: --  Mode: --  Orthostatic VS  04-30-24 @ 19:56  Lying BP: --/-- HR: --  Sitting BP: 141/72 HR: 97  Standing BP: --/-- HR: --  Site: --  Mode: --  Orthostatic VS  04-30-24 @ 05:56  Lying BP: 118/77 HR: 87  Sitting BP: 135/77 HR: 93  Standing BP: --/-- HR: --  Site: --  Mode: --    Lipid Panel: Date/Time: 04-05-24 @ 05:56  Cholesterol, Serum: 176  LDL Cholesterol Calculated: 97  HDL Cholesterol, Serum: 39  Total Cholesterol/HDL Ration Measurement: --  Triglycerides, Serum: 200

## 2024-05-01 NOTE — BH PSYCHOLOGY - CLINICIAN PSYCHOTHERAPY NOTE - NSBHPSYCHOLNARRATIVE_PSY_A_CORE FT
The patient was seen individually at the team's request and with her consent. Speech was of normal rate and volume and there were no verbal irregularities. The patient's mood was depressed, and affect was constricted, and mood congruent. She was coherent and logical; her content focused on medications and her interactions with some treatment providers. The patient denied current suicidal ideation.     The patient reported no dizziness. She has been tolerating her medication regimen and reported less anxiety. She was pleased that her prior dose of Metoprolol (50mg) in the morning was restored as she feels more confident that she will not have a racing pulse (which triggers anxiety). She was unsure as to whether she should increase the Pamelor from 50 to 75mg or do so slowly (an option that she stated her doctor offered) and discussed the pros and cons. She still wishes that her doctor would reassure her by telling her, "If this doesn’t work, we will find something that does,” and she is afraid that she will get frustrated and give up hope. The patient was able to acknowledge her dysfunctional beliefs to some degree but was unable to shake that need for external reassurance.     The patient stated that she had a “breakdown” the other day in the dining room during dinner. She was feeling extremely depressed that everyone else seemed to have grandchildren that they talked about, and she never had any children and was estranged from her nieces and nephews. She was briefly tearful when speaking about this and noted that the rest of the patients immediately tried to calm her and said they also sometimes felt hopeless. The patient was amenable to reminders that in the past, she had often felt hopeless and predicted gloom but was wrong and that she had only been there less than two weeks.     The patient reported liking the meeting she had with the psychology extern on Monday; she was able to evoke old, pleasant memories and was encouraged to do so when feeling depressed.     The patient continues to report good visits with her . She has still been using the ACT-based exercises in a workbook she was given. She was encouraged to participate in therapeutic activities on the unit. The patient expressed appreciation and agreed to meet again.

## 2024-05-01 NOTE — BH INPATIENT PSYCHIATRY PROGRESS NOTE - NSBHCHARTREVIEWVS_PSY_A_CORE FT
Vital Signs Last 24 Hrs  T(C): 36.7 (05-01-24 @ 08:42), Max: 37.2 (04-30-24 @ 19:56)  T(F): 98.1 (05-01-24 @ 08:42), Max: 99 (04-30-24 @ 19:56)  HR: 80 (05-01-24 @ 05:50) (80 - 80)  BP: 134/68 (05-01-24 @ 05:50) (134/68 - 134/68)  BP(mean): --  RR: 18 (05-01-24 @ 05:50) (18 - 18)  SpO2: 98% (05-01-24 @ 08:42) (98% - 98%)    Orthostatic VS  05-01-24 @ 08:42  Lying BP: --/-- HR: --  Sitting BP: 146/73 HR: 79  Standing BP: 120/79 HR: 84  Site: --  Mode: --  Orthostatic VS  04-30-24 @ 19:56  Lying BP: --/-- HR: --  Sitting BP: 141/72 HR: 97  Standing BP: --/-- HR: --  Site: --  Mode: --  Orthostatic VS  04-30-24 @ 05:56  Lying BP: 118/77 HR: 87  Sitting BP: 135/77 HR: 93  Standing BP: --/-- HR: --  Site: --  Mode: --  Orthostatic VS  04-29-24 @ 19:51  Lying BP: --/-- HR: --  Sitting BP: 130/74 HR: 89  Standing BP: --/-- HR: --  Site: --  Mode: --   Vital Signs Last 24 Hrs  T(C): 36.6 (05-01-24 @ 19:30), Max: 36.7 (05-01-24 @ 08:42)  T(F): 97.8 (05-01-24 @ 19:30), Max: 98.1 (05-01-24 @ 08:42)  HR: 80 (05-01-24 @ 05:50) (80 - 80)  BP: 134/68 (05-01-24 @ 05:50) (134/68 - 134/68)  BP(mean): --  RR: 18 (05-01-24 @ 05:50) (18 - 18)  SpO2: 98% (05-01-24 @ 08:42) (98% - 98%)    Orthostatic VS  05-01-24 @ 19:30  Lying BP: --/-- HR: --  Sitting BP: 127/82 HR: 97  Standing BP: --/-- HR: --  Site: --  Mode: --  Orthostatic VS  05-01-24 @ 08:42  Lying BP: --/-- HR: --  Sitting BP: 146/73 HR: 79  Standing BP: 120/79 HR: 84  Site: --  Mode: --  Orthostatic VS  04-30-24 @ 19:56  Lying BP: --/-- HR: --  Sitting BP: 141/72 HR: 97  Standing BP: --/-- HR: --  Site: --  Mode: --  Orthostatic VS  04-30-24 @ 05:56  Lying BP: 118/77 HR: 87  Sitting BP: 135/77 HR: 93  Standing BP: --/-- HR: --  Site: --  Mode: --

## 2024-05-01 NOTE — BH INPATIENT PSYCHIATRY PROGRESS NOTE - NSBHASSESSSUMMFT_PSY_ALL_CORE
73 yr old female, , domiciled, and retired. premorbidly ambulant (not needing assists) and iADL/ bADL independent. with background hx of MDD and ANSON; has multiple psych admissions to [facility] (most recently Jan 2024 on [facility]), had prior SA by overdosing on pills (11/2019) following death of parents, presented to ED with somatic complaints and reports of ?passive suicidal ideation, had exhaustive w/u, now admitted voluntarily to [facility]  Routine checks, no suicidal ideations  Continue nortriptyline 50mg po qhs for depression - will hold off increase due to orthostasis today  Continue klonopin 0.5mg po bid and 0.5mg po qhs for anxiety; gabapentin prn for anxiety  Continue mirtazapine 15mg po qhs and melatonin 3mg po qhs for insomnia  Increased lyrica to 50mg po daily and 25mg po q3pm and 9pm - off label for anxiety  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 Requesting metoprolol titration despite recently complaining of dizziness.  4/30: Ruminative thoughts regarding perceived rejection and stressor persist. Medication titration/ adjustment pending further assessment.   05/01: Somatic complaints and preservative thoughts about rejection persists. Integration of brief bedside psychotherapy during rounds with limit setting due to exacerbation of anxiety especially when fixated on her medication regimen. Medication titration to be approached slowly to minimize cumulative adverse effects.    PLAN:  Continue on Nortriptyline 50mg Qhs for depressive symptoms  Continue on Remeron 15mg Qhs for depressive symptoms and sleep  Continue on Pregabalin 50mg Qam, 25mg Q3pm, Q9pm for anxiety  Continue on Klonopin 0.5mg @ Q6am & Q6pm for anxiety  HLD: atorvastatin 10mg po qhs  GERD: protonix 40mg po daily  HTN: continue on metoprolol 50mg every 6am, 25mg Qhs and 25mg PRN- patient states she is actually prescribed this for palpitations from anxiety by her cardiologist (allow addition of lasix)  LE edema: restart lasix 20mg every other day  Dizziness: MRI wnl; meclizine 12.5mg prn; possible BPV; flexeril 5mg po qhs prn recommended by neurology

## 2024-05-01 NOTE — BH INPATIENT PSYCHIATRY PROGRESS NOTE - CURRENT MEDICATION
MEDICATIONS  (STANDING):  atorvastatin 10 milliGRAM(s) Oral at bedtime  clonazePAM  Tablet 0.5 milliGRAM(s) Oral <User Schedule>  furosemide    Tablet 20 milliGRAM(s) Oral <User Schedule>  lidocaine   4% Patch 1 Patch Transdermal once  melatonin. 3 milliGRAM(s) Oral at bedtime  metoprolol tartrate 25 milliGRAM(s) Oral at bedtime  metoprolol tartrate 50 milliGRAM(s) Oral <User Schedule>  mirtazapine 15 milliGRAM(s) Oral at bedtime  multivitamin 1 Tablet(s) Oral daily  nortriptyline 50 milliGRAM(s) Oral at bedtime  pantoprazole    Tablet 40 milliGRAM(s) Oral before breakfast  pregabalin 25 milliGRAM(s) Oral <User Schedule>  pregabalin 50 milliGRAM(s) Oral daily    MEDICATIONS  (PRN):  acetaminophen     Tablet .. 650 milliGRAM(s) Oral every 6 hours PRN Temp greater or equal to 38C (100.4F), Mild Pain (1 - 3)  aluminum hydroxide/magnesium hydroxide/simethicone Suspension 30 milliLiter(s) Oral every 4 hours PRN Dyspepsia  artificial  tears Solution 1 Drop(s) Both EYES every 4 hours PRN dry eyes syndrome  bisacodyl Suppository 10 milliGRAM(s) Rectal daily PRN Constipation  chlorhexidine 0.12% Liquid 15 milliLiter(s) Oral Mucosa two times a day PRN gum inflammation  gabapentin 100 milliGRAM(s) Oral three times a day PRN anxiety  haloperidol     Tablet 0.5 milliGRAM(s) Oral every 6 hours PRN agitation  haloperidol    Injectable 0.5 milliGRAM(s) IntraMuscular once PRN severe agitation  meclizine 12.5 milliGRAM(s) Oral every 8 hours PRN Dizziness  metoprolol tartrate 25 milliGRAM(s) Oral daily PRN Palpitations due to Anxiety  ondansetron   Disintegrating Tablet 4 milliGRAM(s) Oral every 8 hours PRN Nausea and/or Vomiting  polyethylene glycol 3350 17 Gram(s) Oral two times a day PRN constipation   MEDICATIONS  (STANDING):  atorvastatin 10 milliGRAM(s) Oral at bedtime  clonazePAM  Tablet 0.5 milliGRAM(s) Oral <User Schedule>  furosemide    Tablet 20 milliGRAM(s) Oral <User Schedule>  lidocaine   4% Patch 1 Patch Transdermal once  melatonin. 3 milliGRAM(s) Oral at bedtime  metoprolol tartrate 50 milliGRAM(s) Oral <User Schedule>  metoprolol tartrate 25 milliGRAM(s) Oral at bedtime  mirtazapine 15 milliGRAM(s) Oral at bedtime  multivitamin 1 Tablet(s) Oral daily  nortriptyline 50 milliGRAM(s) Oral at bedtime  pantoprazole    Tablet 40 milliGRAM(s) Oral before breakfast  pregabalin 50 milliGRAM(s) Oral daily  pregabalin 25 milliGRAM(s) Oral <User Schedule>    MEDICATIONS  (PRN):  acetaminophen     Tablet .. 650 milliGRAM(s) Oral every 6 hours PRN Temp greater or equal to 38C (100.4F), Mild Pain (1 - 3)  aluminum hydroxide/magnesium hydroxide/simethicone Suspension 30 milliLiter(s) Oral every 4 hours PRN Dyspepsia  artificial  tears Solution 1 Drop(s) Both EYES every 4 hours PRN dry eyes syndrome  bisacodyl Suppository 10 milliGRAM(s) Rectal daily PRN Constipation  chlorhexidine 0.12% Liquid 15 milliLiter(s) Oral Mucosa two times a day PRN gum inflammation  gabapentin 100 milliGRAM(s) Oral three times a day PRN anxiety  haloperidol     Tablet 0.5 milliGRAM(s) Oral every 6 hours PRN agitation  haloperidol    Injectable 0.5 milliGRAM(s) IntraMuscular once PRN severe agitation  meclizine 12.5 milliGRAM(s) Oral every 8 hours PRN Dizziness  metoprolol tartrate 25 milliGRAM(s) Oral daily PRN Palpitations due to Anxiety  ondansetron   Disintegrating Tablet 4 milliGRAM(s) Oral every 8 hours PRN Nausea and/or Vomiting  polyethylene glycol 3350 17 Gram(s) Oral two times a day PRN constipation

## 2024-05-01 NOTE — BH INPATIENT PSYCHIATRY PROGRESS NOTE - NSBHFUPINTERVALHXFT_PSY_A_CORE
Patient chart reviewed. No acute overnight events documented. According to nursing report, patient remained visible on the unit, taking all medications as prescribed. On today's assessment, noted with constricted affect and a worried look which on enquiry was met with a response" I fee very depressed" "What is going to happen wit the Nortriptyline?" Patient was encouraged to work with the team especially regarding her medication regimen management. Encouraged to utilize other tools in management of anxiety symptoms which she reports was better since yesterday. Relates that she took all prescribed medication with no reported adverse effect. No sleep or appetite changes endorsed. Relates feeling hopeless about her situation. Denied any suicidal or homicidal thoughts.

## 2024-05-01 NOTE — BH INPATIENT PSYCHIATRY PROGRESS NOTE - PRN MEDS
MEDICATIONS  (PRN):  acetaminophen     Tablet .. 650 milliGRAM(s) Oral every 6 hours PRN Temp greater or equal to 38C (100.4F), Mild Pain (1 - 3)  aluminum hydroxide/magnesium hydroxide/simethicone Suspension 30 milliLiter(s) Oral every 4 hours PRN Dyspepsia  artificial  tears Solution 1 Drop(s) Both EYES every 4 hours PRN dry eyes syndrome  bisacodyl Suppository 10 milliGRAM(s) Rectal daily PRN Constipation  chlorhexidine 0.12% Liquid 15 milliLiter(s) Oral Mucosa two times a day PRN gum inflammation  gabapentin 100 milliGRAM(s) Oral three times a day PRN anxiety  haloperidol     Tablet 0.5 milliGRAM(s) Oral every 6 hours PRN agitation  haloperidol    Injectable 0.5 milliGRAM(s) IntraMuscular once PRN severe agitation  meclizine 12.5 milliGRAM(s) Oral every 8 hours PRN Dizziness  metoprolol tartrate 25 milliGRAM(s) Oral daily PRN Palpitations due to Anxiety  ondansetron   Disintegrating Tablet 4 milliGRAM(s) Oral every 8 hours PRN Nausea and/or Vomiting  polyethylene glycol 3350 17 Gram(s) Oral two times a day PRN constipation

## 2024-05-02 PROCEDURE — 99232 SBSQ HOSP IP/OBS MODERATE 35: CPT

## 2024-05-02 RX ORDER — CLONAZEPAM 1 MG
0.5 TABLET ORAL
Refills: 0 | Status: DISCONTINUED | OUTPATIENT
Start: 2024-05-02 | End: 2024-05-06

## 2024-05-02 RX ORDER — NORTRIPTYLINE HYDROCHLORIDE 10 MG/1
60 CAPSULE ORAL AT BEDTIME
Refills: 0 | Status: DISCONTINUED | OUTPATIENT
Start: 2024-05-02 | End: 2024-05-06

## 2024-05-02 RX ADMIN — Medication 50 MILLIGRAM(S): at 05:03

## 2024-05-02 RX ADMIN — GABAPENTIN 100 MILLIGRAM(S): 400 CAPSULE ORAL at 14:15

## 2024-05-02 RX ADMIN — NORTRIPTYLINE HYDROCHLORIDE 60 MILLIGRAM(S): 10 CAPSULE ORAL at 21:28

## 2024-05-02 RX ADMIN — PANTOPRAZOLE SODIUM 40 MILLIGRAM(S): 20 TABLET, DELAYED RELEASE ORAL at 06:04

## 2024-05-02 RX ADMIN — Medication 0.5 MILLIGRAM(S): at 05:04

## 2024-05-02 RX ADMIN — Medication 25 MILLIGRAM(S): at 21:29

## 2024-05-02 RX ADMIN — Medication 50 MILLIGRAM(S): at 09:11

## 2024-05-02 RX ADMIN — MIRTAZAPINE 15 MILLIGRAM(S): 45 TABLET, ORALLY DISINTEGRATING ORAL at 21:28

## 2024-05-02 RX ADMIN — ATORVASTATIN CALCIUM 10 MILLIGRAM(S): 80 TABLET, FILM COATED ORAL at 21:29

## 2024-05-02 RX ADMIN — CHLORHEXIDINE GLUCONATE 15 MILLILITER(S): 213 SOLUTION TOPICAL at 21:27

## 2024-05-02 RX ADMIN — Medication 0.5 MILLIGRAM(S): at 17:23

## 2024-05-02 RX ADMIN — Medication 3 MILLIGRAM(S): at 21:30

## 2024-05-02 RX ADMIN — Medication 25 MILLIGRAM(S): at 14:16

## 2024-05-02 RX ADMIN — GABAPENTIN 100 MILLIGRAM(S): 400 CAPSULE ORAL at 21:48

## 2024-05-02 RX ADMIN — Medication 1 TABLET(S): at 09:10

## 2024-05-02 NOTE — BH INPATIENT PSYCHIATRY PROGRESS NOTE - NSBHASSESSSUMMFT_PSY_ALL_CORE
73 yr old female, , domiciled, and retired. premorbidly ambulant (not needing assists) and iADL/ bADL independent. with background hx of MDD and ANSON; has multiple psych admissions to [facility] (most recently Jan 2024 on [facility]), had prior SA by overdosing on pills (11/2019) following death of parents, presented to ED with somatic complaints and reports of ?passive suicidal ideation, had exhaustive w/u, now admitted voluntarily to [facility]  Routine checks, no suicidal ideations  Continue nortriptyline 50mg po qhs for depression - will hold off increase due to orthostasis today  Continue klonopin 0.5mg po bid and 0.5mg po qhs for anxiety; gabapentin prn for anxiety  Continue mirtazapine 15mg po qhs and melatonin 3mg po qhs for insomnia  Increased lyrica to 50mg po daily and 25mg po q3pm and 9pm - off label for anxiety  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 Requesting metoprolol titration despite recently complaining of dizziness.  4/30: Ruminative thoughts regarding perceived rejection and stressor persist. Medication titration/ adjustment pending further assessment.   05/01: Somatic complaints and preservative thoughts about rejection persists. Integration of brief bedside psychotherapy during rounds with limit setting due to exacerbation of anxiety especially when fixated on her medication regimen. Medication titration to be approached slowly to minimize cumulative adverse effects.  05/02: No significant change. Open to individual therapy thus will continue in combination with medication management.     PLAN:  Increased Nortriptyline to 60mg Qhs for depressive symptoms  Continue on Remeron 15mg Qhs for depressive symptoms and sleep  Continue on Pregabalin 50mg Qam, 25mg Q3pm, Q9pm for anxiety  Continue on Klonopin 0.5mg @ Q6am & Q6pm for anxiety  HLD: atorvastatin 10mg po qhs  GERD: protonix 40mg po daily  HTN: continue on metoprolol 50mg every 6am, 25mg Qhs and 25mg PRN- patient states she is actually prescribed this for palpitations from anxiety by her cardiologist (allow addition of lasix)  LE edema: restart lasix 20mg every other day  Dizziness: MRI wnl; meclizine 12.5mg prn; possible BPV; flexeril 5mg po qhs prn recommended by neurology

## 2024-05-02 NOTE — BH INPATIENT PSYCHIATRY PROGRESS NOTE - NSBHCHARTREVIEWVS_PSY_A_CORE FT
Vital Signs Last 24 Hrs  T(C): 36.6 (05-02-24 @ 05:36), Max: 36.6 (05-01-24 @ 19:30)  T(F): 97.8 (05-02-24 @ 05:36), Max: 97.8 (05-01-24 @ 19:30)  HR: --  BP: --  BP(mean): --  RR: --  SpO2: 96% (05-02-24 @ 05:36) (96% - 96%)    Orthostatic VS  05-02-24 @ 05:36  Lying BP: --/-- HR: --  Sitting BP: 135/75 HR: 87  Standing BP: 141/70 HR: 84  Site: --  Mode: --  Orthostatic VS  05-01-24 @ 19:30  Lying BP: --/-- HR: --  Sitting BP: 127/82 HR: 97  Standing BP: --/-- HR: --  Site: --  Mode: --  Orthostatic VS  05-01-24 @ 08:42  Lying BP: --/-- HR: --  Sitting BP: 146/73 HR: 79  Standing BP: 120/79 HR: 84  Site: --  Mode: --  Orthostatic VS  04-30-24 @ 19:56  Lying BP: --/-- HR: --  Sitting BP: 141/72 HR: 97  Standing BP: --/-- HR: --  Site: --  Mode: --

## 2024-05-02 NOTE — BH INPATIENT PSYCHIATRY PROGRESS NOTE - NSBHMETABOLIC_PSY_ALL_CORE_FT
BMI: BMI (kg/m2): 38.3 (04-16-24 @ 19:07)  HbA1c: A1C with Estimated Average Glucose Result: 5.8 % (01-11-24 @ 08:00)    Glucose:   BP: 134/68 (05-01-24 @ 05:50) (134/68 - 134/68)Vital Signs Last 24 Hrs  T(C): 36.6 (05-02-24 @ 05:36), Max: 36.6 (05-01-24 @ 19:30)  T(F): 97.8 (05-02-24 @ 05:36), Max: 97.8 (05-01-24 @ 19:30)  HR: --  BP: --  BP(mean): --  RR: --  SpO2: 96% (05-02-24 @ 05:36) (96% - 96%)    Orthostatic VS  05-02-24 @ 05:36  Lying BP: --/-- HR: --  Sitting BP: 135/75 HR: 87  Standing BP: 141/70 HR: 84  Site: --  Mode: --  Orthostatic VS  05-01-24 @ 19:30  Lying BP: --/-- HR: --  Sitting BP: 127/82 HR: 97  Standing BP: --/-- HR: --  Site: --  Mode: --  Orthostatic VS  05-01-24 @ 08:42  Lying BP: --/-- HR: --  Sitting BP: 146/73 HR: 79  Standing BP: 120/79 HR: 84  Site: --  Mode: --  Orthostatic VS  04-30-24 @ 19:56  Lying BP: --/-- HR: --  Sitting BP: 141/72 HR: 97  Standing BP: --/-- HR: --  Site: --  Mode: --    Lipid Panel: Date/Time: 04-05-24 @ 05:56  Cholesterol, Serum: 176  LDL Cholesterol Calculated: 97  HDL Cholesterol, Serum: 39  Total Cholesterol/HDL Ration Measurement: --  Triglycerides, Serum: 200

## 2024-05-02 NOTE — BH INPATIENT PSYCHIATRY PROGRESS NOTE - NSBHFUPINTERVALHXFT_PSY_A_CORE
No overnight issue reported. Patient on current assessment, relates that she likes seeing the psychologist. Reflects on her anxiety and states that she may have contributed to it by assuming a lot of things which may not be true. Feels like she really worked herself up yesterday but feels better today. Expressed relief that her nortriptyline is going to be increased. States that she plans to continue with psychotherapy sessions as it helps with her anxiety. Denied any SI. No other issues elicited.

## 2024-05-02 NOTE — BH CHART NOTE - NSEVENTNOTEFT_PSY_ALL_CORE
Trainee attempted to meet with patient, but patient was sitting waiting for lunch. Trainee provided a quick check in. Patient reported still feeling depressed, but  relieved that her medication is getting increased. Patient reflected on the last meeting with the trainee and mentioned that she really benefited from the newly learned coping skills and has been trying to use them more regularly. Patient did not bring up any concerns or worries during check in. Trainee assured patient she would see her again Monday.

## 2024-05-02 NOTE — BH INPATIENT PSYCHIATRY PROGRESS NOTE - CURRENT MEDICATION
MEDICATIONS  (STANDING):  atorvastatin 10 milliGRAM(s) Oral at bedtime  clonazePAM  Tablet 0.5 milliGRAM(s) Oral <User Schedule>  furosemide    Tablet 20 milliGRAM(s) Oral <User Schedule>  lidocaine   4% Patch 1 Patch Transdermal once  melatonin. 3 milliGRAM(s) Oral at bedtime  metoprolol tartrate 25 milliGRAM(s) Oral at bedtime  metoprolol tartrate 50 milliGRAM(s) Oral <User Schedule>  mirtazapine 15 milliGRAM(s) Oral at bedtime  multivitamin 1 Tablet(s) Oral daily  nortriptyline 60 milliGRAM(s) Oral at bedtime  pantoprazole    Tablet 40 milliGRAM(s) Oral before breakfast  pregabalin 25 milliGRAM(s) Oral <User Schedule>    MEDICATIONS  (PRN):  acetaminophen     Tablet .. 650 milliGRAM(s) Oral every 6 hours PRN Temp greater or equal to 38C (100.4F), Mild Pain (1 - 3)  aluminum hydroxide/magnesium hydroxide/simethicone Suspension 30 milliLiter(s) Oral every 4 hours PRN Dyspepsia  artificial  tears Solution 1 Drop(s) Both EYES every 4 hours PRN dry eyes syndrome  bisacodyl Suppository 10 milliGRAM(s) Rectal daily PRN Constipation  chlorhexidine 0.12% Liquid 15 milliLiter(s) Oral Mucosa two times a day PRN gum inflammation  gabapentin 100 milliGRAM(s) Oral three times a day PRN anxiety  haloperidol     Tablet 0.5 milliGRAM(s) Oral every 6 hours PRN agitation  haloperidol    Injectable 0.5 milliGRAM(s) IntraMuscular once PRN severe agitation  meclizine 12.5 milliGRAM(s) Oral every 8 hours PRN Dizziness  metoprolol tartrate 25 milliGRAM(s) Oral daily PRN Palpitations due to Anxiety  ondansetron   Disintegrating Tablet 4 milliGRAM(s) Oral every 8 hours PRN Nausea and/or Vomiting  polyethylene glycol 3350 17 Gram(s) Oral two times a day PRN constipation   MEDICATIONS  (STANDING):  atorvastatin 10 milliGRAM(s) Oral at bedtime  clonazePAM  Tablet 0.5 milliGRAM(s) Oral <User Schedule>  furosemide    Tablet 20 milliGRAM(s) Oral <User Schedule>  lidocaine   4% Patch 1 Patch Transdermal once  melatonin. 3 milliGRAM(s) Oral at bedtime  metoprolol tartrate 50 milliGRAM(s) Oral <User Schedule>  metoprolol tartrate 25 milliGRAM(s) Oral at bedtime  mirtazapine 15 milliGRAM(s) Oral at bedtime  multivitamin 1 Tablet(s) Oral daily  nortriptyline 60 milliGRAM(s) Oral at bedtime  pantoprazole    Tablet 40 milliGRAM(s) Oral before breakfast  pregabalin 25 milliGRAM(s) Oral <User Schedule>    MEDICATIONS  (PRN):  acetaminophen     Tablet .. 650 milliGRAM(s) Oral every 6 hours PRN Temp greater or equal to 38C (100.4F), Mild Pain (1 - 3)  aluminum hydroxide/magnesium hydroxide/simethicone Suspension 30 milliLiter(s) Oral every 4 hours PRN Dyspepsia  artificial  tears Solution 1 Drop(s) Both EYES every 4 hours PRN dry eyes syndrome  bisacodyl Suppository 10 milliGRAM(s) Rectal daily PRN Constipation  chlorhexidine 0.12% Liquid 15 milliLiter(s) Oral Mucosa two times a day PRN gum inflammation  gabapentin 100 milliGRAM(s) Oral three times a day PRN anxiety  haloperidol     Tablet 0.5 milliGRAM(s) Oral every 6 hours PRN agitation  haloperidol    Injectable 0.5 milliGRAM(s) IntraMuscular once PRN severe agitation  meclizine 12.5 milliGRAM(s) Oral every 8 hours PRN Dizziness  metoprolol tartrate 25 milliGRAM(s) Oral daily PRN Palpitations due to Anxiety  ondansetron   Disintegrating Tablet 4 milliGRAM(s) Oral every 8 hours PRN Nausea and/or Vomiting  polyethylene glycol 3350 17 Gram(s) Oral two times a day PRN constipation

## 2024-05-03 PROCEDURE — 90837 PSYTX W PT 60 MINUTES: CPT

## 2024-05-03 PROCEDURE — 99232 SBSQ HOSP IP/OBS MODERATE 35: CPT | Mod: GC

## 2024-05-03 RX ADMIN — Medication 0.5 MILLIGRAM(S): at 17:49

## 2024-05-03 RX ADMIN — Medication 25 MILLIGRAM(S): at 20:35

## 2024-05-03 RX ADMIN — ATORVASTATIN CALCIUM 10 MILLIGRAM(S): 80 TABLET, FILM COATED ORAL at 20:32

## 2024-05-03 RX ADMIN — ONDANSETRON 4 MILLIGRAM(S): 8 TABLET, FILM COATED ORAL at 16:06

## 2024-05-03 RX ADMIN — Medication 1 DROP(S): at 20:35

## 2024-05-03 RX ADMIN — Medication 1 TABLET(S): at 08:58

## 2024-05-03 RX ADMIN — Medication 20 MILLIGRAM(S): at 08:58

## 2024-05-03 RX ADMIN — Medication 50 MILLIGRAM(S): at 05:42

## 2024-05-03 RX ADMIN — Medication 25 MILLIGRAM(S): at 16:53

## 2024-05-03 RX ADMIN — Medication 50 MILLIGRAM(S): at 08:58

## 2024-05-03 RX ADMIN — Medication 25 MILLIGRAM(S): at 15:03

## 2024-05-03 RX ADMIN — MIRTAZAPINE 15 MILLIGRAM(S): 45 TABLET, ORALLY DISINTEGRATING ORAL at 20:32

## 2024-05-03 RX ADMIN — Medication 25 MILLIGRAM(S): at 20:32

## 2024-05-03 RX ADMIN — PANTOPRAZOLE SODIUM 40 MILLIGRAM(S): 20 TABLET, DELAYED RELEASE ORAL at 05:42

## 2024-05-03 RX ADMIN — CHLORHEXIDINE GLUCONATE 15 MILLILITER(S): 213 SOLUTION TOPICAL at 20:36

## 2024-05-03 RX ADMIN — Medication 3 MILLIGRAM(S): at 20:33

## 2024-05-03 RX ADMIN — Medication 0.5 MILLIGRAM(S): at 05:41

## 2024-05-03 RX ADMIN — NORTRIPTYLINE HYDROCHLORIDE 60 MILLIGRAM(S): 10 CAPSULE ORAL at 20:32

## 2024-05-03 NOTE — BH INPATIENT PSYCHIATRY PROGRESS NOTE - NSBHASSESSSUMMFT_PSY_ALL_CORE
73 yr old female, , domiciled, and retired. premorbidly ambulant (not needing assists) and iADL/ bADL independent. with background hx of MDD and ANSON; has multiple psych admissions to [facility] (most recently Jan 2024 on [facility]), had prior SA by overdosing on pills (11/2019) following death of parents, presented to ED with somatic complaints and reports of ?passive suicidal ideation, had exhaustive w/u, now admitted voluntarily to [facility]  Routine checks, no suicidal ideations  Continue nortriptyline 50mg po qhs for depression - will hold off increase due to orthostasis today  Continue klonopin 0.5mg po bid and 0.5mg po qhs for anxiety; gabapentin prn for anxiety  Continue mirtazapine 15mg po qhs and melatonin 3mg po qhs for insomnia  Increased lyrica to 50mg po daily and 25mg po q3pm and 9pm - off label for anxiety  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 Requesting metoprolol titration despite recently complaining of dizziness.  4/30: Ruminative thoughts regarding perceived rejection and stressor persist. Medication titration/ adjustment pending further assessment.   05/01: Somatic complaints and preservative thoughts about rejection persists. Integration of brief bedside psychotherapy during rounds with limit setting due to exacerbation of anxiety especially when fixated on her medication regimen. Medication titration to be approached slowly to minimize cumulative adverse effects.  05/02: No significant change. Open to individual therapy thus will continue in combination with medication management.   05/03: No change in current treatment plan. Will continue to encourage patient in engaging with treatment reccomnedations.    PLAN:  Continue on Nortriptyline 60mg Qhs for depressive symptoms  Continue on Remeron 15mg Qhs for depressive symptoms and sleep  Continue on Pregabalin 50mg Qam, 25mg Q3pm, Q9pm for anxiety  Continue on Klonopin 0.5mg @ Q6am & Q6pm for anxiety  HLD: atorvastatin 10mg po qhs  GERD: protonix 40mg po daily  HTN: continue on metoprolol 50mg every 6am, 25mg Qhs and 25mg PRN- patient states she is actually prescribed this for palpitations from anxiety by her cardiologist (allow addition of lasix)  LE edema: restart lasix 20mg every other day  Dizziness: MRI wnl; meclizine 12.5mg prn; possible BPV; flexeril 5mg po qhs prn recommended by neurology

## 2024-05-03 NOTE — BH INPATIENT PSYCHIATRY PROGRESS NOTE - NSBHFUPINTERVALHXFT_PSY_A_CORE
On today's assessment, appears worried that her rent for April was not paid and is thinking of how to get it sorted out as it's already May. No side effects from current medication adjustment. During rounds, it was revealed that patient whilst engaged in individual therapy, is somewhat hesitant with recommendations. patient encouraged to keep working with team with regards to medication titration and therapy recommendations. Acknowledged having a really bad anxiety attack yesterday. Hopeful but at same time doubtful her medication will work for her. patient was reminded of available prn meds. No other issues elicited.

## 2024-05-03 NOTE — BH INPATIENT PSYCHIATRY PROGRESS NOTE - CURRENT MEDICATION
MEDICATIONS  (STANDING):  atorvastatin 10 milliGRAM(s) Oral at bedtime  clonazePAM  Tablet 0.5 milliGRAM(s) Oral <User Schedule>  furosemide    Tablet 20 milliGRAM(s) Oral <User Schedule>  lidocaine   4% Patch 1 Patch Transdermal once  melatonin. 3 milliGRAM(s) Oral at bedtime  metoprolol tartrate 25 milliGRAM(s) Oral at bedtime  metoprolol tartrate 50 milliGRAM(s) Oral <User Schedule>  mirtazapine 15 milliGRAM(s) Oral at bedtime  multivitamin 1 Tablet(s) Oral daily  nortriptyline 60 milliGRAM(s) Oral at bedtime  pantoprazole    Tablet 40 milliGRAM(s) Oral before breakfast  pregabalin 25 milliGRAM(s) Oral <User Schedule>  pregabalin 50 milliGRAM(s) Oral daily    MEDICATIONS  (PRN):  acetaminophen     Tablet .. 650 milliGRAM(s) Oral every 6 hours PRN Temp greater or equal to 38C (100.4F), Mild Pain (1 - 3)  aluminum hydroxide/magnesium hydroxide/simethicone Suspension 30 milliLiter(s) Oral every 4 hours PRN Dyspepsia  artificial  tears Solution 1 Drop(s) Both EYES every 4 hours PRN dry eyes syndrome  bisacodyl Suppository 10 milliGRAM(s) Rectal daily PRN Constipation  chlorhexidine 0.12% Liquid 15 milliLiter(s) Oral Mucosa two times a day PRN gum inflammation  gabapentin 100 milliGRAM(s) Oral three times a day PRN anxiety  haloperidol     Tablet 0.5 milliGRAM(s) Oral every 6 hours PRN agitation  haloperidol    Injectable 0.5 milliGRAM(s) IntraMuscular once PRN severe agitation  meclizine 12.5 milliGRAM(s) Oral every 8 hours PRN Dizziness  metoprolol tartrate 25 milliGRAM(s) Oral daily PRN Palpitations due to Anxiety  ondansetron   Disintegrating Tablet 4 milliGRAM(s) Oral every 8 hours PRN Nausea and/or Vomiting  polyethylene glycol 3350 17 Gram(s) Oral two times a day PRN constipation

## 2024-05-03 NOTE — BH PSYCHOLOGY - CLINICIAN PSYCHOTHERAPY NOTE - NSBHPSYCHOLNARRATIVE_PSY_A_CORE FT
The patient was seen individually at the team's request and with her consent. Speech was barely audible and slow but there were no verbal irregularities. The patient's mood was depressed affect was constricted. She was coherent and logical; her content focused on her recent decline in functioning. The patient denied suicidal ideation.     The patient reported no dizziness or stomach distress and has been anxiety-free in the mornings (which she attributes to early medications), and she has been sleeping and eating well. Upon seeing this writer, she said, “You have to help. It’s an emergency!” and explained that she had not been able to get to her cell phone to pay her monthly bills. The writer obtained her cell phone from nursing and sat with her as she proceeded to pay five bills that were due. She exhibited dexterity with the cell phone aleks but was visibly anxious and reviewed the transactions several times to make sure that they appeared on her banking aleks despite having copied down the confirmation numbers. When completed, the writer attempted to explore with her how she could have calmed herself or tried alternate solutions if this had not been possible right now. However, she resisted that approach, insisting that she would have just “cried and panicked”.      The patient also was fixated on her medications and expressed the fear that her doctor would “kick [her] out” if nothing worked to help her feel better. She acknowledged that these fears were generated by herself rather than the doctor and was encouraged to remind herself of that and to remember that last time it took her longer than her current stay to feel better and that we were still titrating the meds (with the most recent increase or Nortriptyline only made last night).     The patient continues to report good visits with her . She was encouraged to participate in therapeutic activities on the unit. The patient expressed appreciation and agreed to meet again.

## 2024-05-03 NOTE — BH INPATIENT PSYCHIATRY PROGRESS NOTE - NSBHCHARTREVIEWVS_PSY_A_CORE FT
Vital Signs Last 24 Hrs  T(C): 36.7 (05-03-24 @ 05:09), Max: 36.7 (05-03-24 @ 05:09)  T(F): 98.1 (05-03-24 @ 05:09), Max: 98.1 (05-03-24 @ 05:09)  HR: --  BP: --  BP(mean): --  RR: 18 (05-03-24 @ 05:09) (18 - 18)  SpO2: --    Orthostatic VS  05-03-24 @ 16:51  Lying BP: --/-- HR: --  Sitting BP: 151/89 HR: 112  Standing BP: --/-- HR: --  Site: --  Mode: --  Orthostatic VS  05-03-24 @ 05:09  Lying BP: 150/84 HR: 91  Sitting BP: 149/83 HR: 97  Standing BP: --/-- HR: --  Site: upper left arm  Mode: electronic  Orthostatic VS  05-02-24 @ 19:39  Lying BP: --/-- HR: --  Sitting BP: 125/72 HR: 104  Standing BP: --/-- HR: --  Site: --  Mode: --  Orthostatic VS  05-02-24 @ 05:36  Lying BP: --/-- HR: --  Sitting BP: 135/75 HR: 87  Standing BP: 141/70 HR: 84  Site: --  Mode: --  Orthostatic VS  05-01-24 @ 19:30  Lying BP: --/-- HR: --  Sitting BP: 127/82 HR: 97  Standing BP: --/-- HR: --  Site: --  Mode: --

## 2024-05-04 RX ADMIN — Medication 0.5 MILLIGRAM(S): at 18:16

## 2024-05-04 RX ADMIN — Medication 0.5 MILLIGRAM(S): at 05:41

## 2024-05-04 RX ADMIN — Medication 1 TABLET(S): at 08:21

## 2024-05-04 RX ADMIN — PANTOPRAZOLE SODIUM 40 MILLIGRAM(S): 20 TABLET, DELAYED RELEASE ORAL at 05:41

## 2024-05-04 RX ADMIN — Medication 50 MILLIGRAM(S): at 08:21

## 2024-05-04 RX ADMIN — NORTRIPTYLINE HYDROCHLORIDE 60 MILLIGRAM(S): 10 CAPSULE ORAL at 21:02

## 2024-05-04 RX ADMIN — Medication 3 MILLIGRAM(S): at 21:02

## 2024-05-04 RX ADMIN — Medication 50 MILLIGRAM(S): at 05:43

## 2024-05-04 RX ADMIN — Medication 1 DROP(S): at 21:04

## 2024-05-04 RX ADMIN — Medication 25 MILLIGRAM(S): at 21:03

## 2024-05-04 RX ADMIN — MIRTAZAPINE 15 MILLIGRAM(S): 45 TABLET, ORALLY DISINTEGRATING ORAL at 21:02

## 2024-05-04 RX ADMIN — ATORVASTATIN CALCIUM 10 MILLIGRAM(S): 80 TABLET, FILM COATED ORAL at 21:02

## 2024-05-04 RX ADMIN — CHLORHEXIDINE GLUCONATE 15 MILLILITER(S): 213 SOLUTION TOPICAL at 21:25

## 2024-05-04 RX ADMIN — ONDANSETRON 4 MILLIGRAM(S): 8 TABLET, FILM COATED ORAL at 09:37

## 2024-05-04 RX ADMIN — Medication 25 MILLIGRAM(S): at 15:08

## 2024-05-04 RX ADMIN — Medication 25 MILLIGRAM(S): at 13:33

## 2024-05-05 RX ADMIN — GABAPENTIN 100 MILLIGRAM(S): 400 CAPSULE ORAL at 21:41

## 2024-05-05 RX ADMIN — Medication 50 MILLIGRAM(S): at 08:17

## 2024-05-05 RX ADMIN — Medication 25 MILLIGRAM(S): at 15:55

## 2024-05-05 RX ADMIN — NORTRIPTYLINE HYDROCHLORIDE 60 MILLIGRAM(S): 10 CAPSULE ORAL at 20:51

## 2024-05-05 RX ADMIN — Medication 25 MILLIGRAM(S): at 20:51

## 2024-05-05 RX ADMIN — Medication 25 MILLIGRAM(S): at 20:50

## 2024-05-05 RX ADMIN — Medication 50 MILLIGRAM(S): at 05:33

## 2024-05-05 RX ADMIN — Medication 3 MILLIGRAM(S): at 20:51

## 2024-05-05 RX ADMIN — MIRTAZAPINE 15 MILLIGRAM(S): 45 TABLET, ORALLY DISINTEGRATING ORAL at 20:50

## 2024-05-05 RX ADMIN — ATORVASTATIN CALCIUM 10 MILLIGRAM(S): 80 TABLET, FILM COATED ORAL at 20:50

## 2024-05-05 RX ADMIN — Medication 0.5 MILLIGRAM(S): at 05:33

## 2024-05-05 RX ADMIN — PANTOPRAZOLE SODIUM 40 MILLIGRAM(S): 20 TABLET, DELAYED RELEASE ORAL at 05:34

## 2024-05-05 RX ADMIN — CHLORHEXIDINE GLUCONATE 15 MILLILITER(S): 213 SOLUTION TOPICAL at 20:50

## 2024-05-05 RX ADMIN — Medication 25 MILLIGRAM(S): at 13:31

## 2024-05-05 RX ADMIN — Medication 1 TABLET(S): at 08:17

## 2024-05-05 RX ADMIN — Medication 0.5 MILLIGRAM(S): at 17:23

## 2024-05-05 RX ADMIN — Medication 1 DROP(S): at 20:50

## 2024-05-06 PROCEDURE — 99232 SBSQ HOSP IP/OBS MODERATE 35: CPT | Mod: GC

## 2024-05-06 PROCEDURE — 90834 PSYTX W PT 45 MINUTES: CPT

## 2024-05-06 RX ORDER — CLONAZEPAM 1 MG
0.5 TABLET ORAL
Refills: 0 | Status: DISCONTINUED | OUTPATIENT
Start: 2024-05-06 | End: 2024-05-07

## 2024-05-06 RX ORDER — NORTRIPTYLINE HYDROCHLORIDE 10 MG/1
75 CAPSULE ORAL AT BEDTIME
Refills: 0 | Status: DISCONTINUED | OUTPATIENT
Start: 2024-05-06 | End: 2024-05-13

## 2024-05-06 RX ADMIN — Medication 25 MILLIGRAM(S): at 22:35

## 2024-05-06 RX ADMIN — Medication 3 MILLIGRAM(S): at 21:24

## 2024-05-06 RX ADMIN — MIRTAZAPINE 15 MILLIGRAM(S): 45 TABLET, ORALLY DISINTEGRATING ORAL at 21:23

## 2024-05-06 RX ADMIN — Medication 50 MILLIGRAM(S): at 05:35

## 2024-05-06 RX ADMIN — Medication 0.5 MILLIGRAM(S): at 18:32

## 2024-05-06 RX ADMIN — Medication 0.5 MILLIGRAM(S): at 05:35

## 2024-05-06 RX ADMIN — NORTRIPTYLINE HYDROCHLORIDE 75 MILLIGRAM(S): 10 CAPSULE ORAL at 21:23

## 2024-05-06 RX ADMIN — Medication 20 MILLIGRAM(S): at 08:40

## 2024-05-06 RX ADMIN — ATORVASTATIN CALCIUM 10 MILLIGRAM(S): 80 TABLET, FILM COATED ORAL at 21:24

## 2024-05-06 RX ADMIN — Medication 25 MILLIGRAM(S): at 21:22

## 2024-05-06 RX ADMIN — Medication 1 DROP(S): at 22:05

## 2024-05-06 RX ADMIN — PANTOPRAZOLE SODIUM 40 MILLIGRAM(S): 20 TABLET, DELAYED RELEASE ORAL at 05:36

## 2024-05-06 RX ADMIN — Medication 50 MILLIGRAM(S): at 08:40

## 2024-05-06 RX ADMIN — GABAPENTIN 100 MILLIGRAM(S): 400 CAPSULE ORAL at 21:22

## 2024-05-06 RX ADMIN — Medication 25 MILLIGRAM(S): at 13:37

## 2024-05-06 RX ADMIN — Medication 1 TABLET(S): at 08:40

## 2024-05-06 RX ADMIN — CHLORHEXIDINE GLUCONATE 15 MILLILITER(S): 213 SOLUTION TOPICAL at 21:22

## 2024-05-06 RX ADMIN — Medication 25 MILLIGRAM(S): at 14:56

## 2024-05-06 NOTE — BH INPATIENT PSYCHIATRY PROGRESS NOTE - NSBHFUPINTERVALHXFT_PSY_A_CORE
Cammie reported that her anxiety continues but has improved overall over the weekend, largely thanks to an issue regarding her delinquent housing co-operative monthly maintenance fee being resolved this morning. She reported feeling upset by "snippy" behavior her roommate engaged in earlier today after she asked the roommate to leave her room so she could be interviewed in private by a psychology extern. She has received metoprolol 25 mg PRN for palpitations almost every afternoon since it was started last week. She reported last having a bowel movement yesterday, and said that this is unusual, as she typically has 1-2/day. She appears to be fixated on her medication dosages and schedules and inquired about whether her nortriptyline dosage would be increased to address her continued anxiety. She expressed that she does not wish to leave the hospital until her anxiety and depression are manageable; she was asked when was the last time that her symptoms were satisfactory and replied that this last occurred on the day Dr. Lou discharged from her previous hospitalization.

## 2024-05-06 NOTE — BH PSYCHOLOGY - CLINICIAN PSYCHOTHERAPY NOTE - NSBHPSYCHOLNARRATIVE_PSY_A_CORE FT
The patient was seen individually at the team's request and with her consent. Speech was of normal rate and volume and there were no verbal irregularities. The patient's mood was dysthymic and anxious, and affect was constricted, and mood congruent. She was coherent and logical; her content focused on her interpersonal relationships, her self-esteem and feelings of anxiety. The patient denied current suicidal ideation.    The pt discussed her anxiety has increased over the past few days as she has had to manage paying rent, and calling her bank, her landlord and her  to navigate why the first check was not received. Trainee and pt reflected the positives in the way that she both managed her feelings and resolved the issue. Trainee continued to discuss the importance of building her self-efficacy by practicing deep breathing and using her safe space.     Pt then shared that she has gotten a new roommate who tends to be rude and has not been fair in the way she treats the pt. Pt described that she did her best to stand up for herself and effectively communicate that it was not nice of the roommate to not allow her to use the room for a session, sine my pt was kicked out of the room when the roommate had a session.  While the roommate apologized and said she would no longer do that to the pt, the pt felt that she was disingenuous and very aggressive/ "condescending" in the way that she responded to the pt. Pt then shared that this interaction reminded her of her childhood and previous friendships and romantic relationships where she was made her feel less than. She was also made to feel afraid of standing up for herself and being confrontational. Trainee and pt continued to explore the pt's feelings. Trainee encouraged a said compassion exercises and discussed the importance of depersonalizing in situations where we cannot control or understand others actions towards us. Pt was engaged and receptive to treatment and looking forward to continuing psychotherapy with psychology. Psychology will continue to follow pt while on the unit.  The patient was seen individually at the team's request and with her consent. Speech was of normal rate and volume and there were no verbal irregularities. The patient's mood was dysthymic and anxious, and affect was constricted, and mood congruent. She was coherent and logical; her content focused on her interpersonal relationships, her self-esteem and feelings of anxiety. The patient denied current suicidal ideation.    The pt discussed her anxiety has increased over the past few days as she has had to manage paying rent, and calling her bank, her landlord and her  to navigate why the first check was not received. Trainee and pt reflected the positives in the way that she both managed her feelings and resolved the issue. Trainee continued to discuss the importance of building her self-efficacy by practicing deep breathing and using her safe space.     Pt then shared that she has gotten a new roommate who tends to be rude and has not been fair in the way she treats the pt. Pt described that she did her best to stand up for herself and effectively communicate that it was not nice of the roommate to not allow her to use the room for a session, since she had given up the room when her roommate had a session.  While the roommate apologized and said she would no longer do that to the pt, the pt felt that she was disingenuous and very aggressive/ "condescending" in the way that she responded to the pt. Pt then shared that this interaction reminded her of her childhood and previous friendships and romantic relationships where she was made her feel less than. She was also made to feel afraid of standing up for herself and being confrontational. Trainee and pt continued to explore the pt's feelings. Trainee encouraged a said compassion exercises and discussed the importance of depersonalizing in situations where we cannot control or understand others actions towards us. Pt was engaged and receptive to treatment and looking forward to continuing psychotherapy with psychology. Psychology will continue to follow pt while on the unit.

## 2024-05-06 NOTE — BH INPATIENT PSYCHIATRY PROGRESS NOTE - NSBHASSESSSUMMFT_PSY_ALL_CORE
73 yr old female, , domiciled, and retired. premorbidly ambulant (not needing assists) and iADL/ bADL independent. with background hx of MDD and ANSON; has multiple psych admissions to [facility] (most recently Jan 2024 on [facility]), had prior SA by overdosing on pills (11/2019) following death of parents, presented to ED with somatic complaints and reports of ?passive suicidal ideation, had exhaustive w/u, now admitted voluntarily to [facility]  Routine checks, no suicidal ideations  Continue nortriptyline 50mg po qhs for depression - will hold off increase due to orthostasis today  Continue klonopin 0.5mg po bid and 0.5mg po qhs for anxiety; gabapentin prn for anxiety  Continue mirtazapine 15mg po qhs and melatonin 3mg po qhs for insomnia  Increased lyrica to 50mg po daily and 25mg po q3pm and 9pm - off label for anxiety  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 Requesting metoprolol titration despite recently complaining of dizziness.  4/30: Ruminative thoughts regarding perceived rejection and stressor persist. Medication titration/ adjustment pending further assessment.   05/01: Somatic complaints and preservative thoughts about rejection persists. Integration of brief bedside psychotherapy during rounds with limit setting due to exacerbation of anxiety especially when fixated on her medication regimen. Medication titration to be approached slowly to minimize cumulative adverse effects.  05/02: No significant change. Open to individual therapy thus will continue in combination with medication management.   05/03: No change in current treatment plan. Will continue to encourage patient in engaging with treatment recommendations.  5/6: Minimal improvement in anxiety/depression. Nortryptilline to be increased from 60 to 75 mg daily.    PLAN:  Psychotropic medications:  - Increase Nortriptyline from 60 mg to 75 mg qHS for depressive symptoms  - Continue Remeron 15 mg qHS for depressive symptoms and sleep  - Continue Pregabalin 50 mg daily, 25 mg q15:00, 25 mg q21:00 for anxiety  - Continue Klonopin 0.5 mg q06:00 and q18:00 for anxiety  HLD: atorvastatin 10 mg qHS  GERD: Protonix 40 mg daily  HTN: continue metoprolol 50mg q06:00, 25 mg qHS, and 25 mg PRN; patient states that her cardiologist prescribed this for palpitations from anxiety by (allow addition of Lasix)  LE edema: restart Lasix 20 mg every other day  Dizziness: MRI wnl; meclizine 12.5 mg PRN; possible BPPV; Flexeril 5 mg po qHS PRN recommended by Neurology

## 2024-05-06 NOTE — BH INPATIENT PSYCHIATRY PROGRESS NOTE - NSBHCHARTREVIEWVS_PSY_A_CORE FT
Vital Signs Last 24 Hrs  T(C): 36.4 (05-06-24 @ 05:59), Max: 36.4 (05-06-24 @ 05:59)  T(F): 97.6 (05-06-24 @ 05:59), Max: 97.6 (05-06-24 @ 05:59)  HR: 105 (05-06-24 @ 13:34) (105 - 105)  BP: 135/80 (05-06-24 @ 13:34) (135/80 - 135/80)  BP(mean): --  RR: --  SpO2: 97% (05-06-24 @ 05:59) (97% - 97%)    Orthostatic VS  05-06-24 @ 05:59  Lying BP: 113/70 HR: 81  Sitting BP: 127/88 HR: 89  Standing BP: --/-- HR: --  Site: --  Mode: --  Orthostatic VS  05-05-24 @ 19:39  Lying BP: --/-- HR: --  Sitting BP: 123/66 HR: 92  Standing BP: --/-- HR: --  Site: --  Mode: --  Orthostatic VS  05-05-24 @ 05:12  Lying BP: 127/73 HR: 87  Sitting BP: 135/73 HR: 89  Standing BP: --/-- HR: --  Site: upper left arm  Mode: electronic

## 2024-05-06 NOTE — BH PSYCHOLOGY - CLINICIAN PSYCHOTHERAPY NOTE - NSBHPSYCHOLNARRATIVE_PSY_A_CORE FT
The patient was seen individually at the team's request and with her consent. Speech was f normal rate and volume. There were no verbal irregularities. The patient's mood was depressed and anxious; affect was constricted and congruent. She was coherent and logical; her content focused on recent interactions and medications. The patient denied suicidal ideation.     The patient was preoccupied with a prior encounter with the MD in which she felt she was being told to forget about medications and use behavioral methods to reduce anxiety. She experienced this as a sharp criticism, and it confused her and made her worried that she would be deprived of the medications she needed. The writer explored other possible ways to understand the interchange and reminded her of previous assurances that no one plans to take away medications she receives. Still, at the same time, when she repeatedly obsesses about possible dosage changes, she may evoke frustration and a sense that she would not be happy with any dose. The patient understood these points and seemed to accept them. She also related other interpersonal triggers, such as a nasty remark by her roommate and a mix-up with her rental payments. By the end of the session, the patient appeared calmer.     The patient was again encouraged to participate in therapeutic activities on the unit. The patient expressed appreciation and agreed to meet again.

## 2024-05-06 NOTE — BH INPATIENT PSYCHIATRY PROGRESS NOTE - NSBHMETABOLIC_PSY_ALL_CORE_FT
BMI: BMI (kg/m2): 38.3 (04-16-24 @ 19:07)  HbA1c: A1C with Estimated Average Glucose Result: 5.8 % (01-11-24 @ 08:00)    Glucose:   BP: 135/80 (05-06-24 @ 13:34) (114/62 - 140/82)Vital Signs Last 24 Hrs  T(C): 36.4 (05-06-24 @ 05:59), Max: 36.4 (05-06-24 @ 05:59)  T(F): 97.6 (05-06-24 @ 05:59), Max: 97.6 (05-06-24 @ 05:59)  HR: 105 (05-06-24 @ 13:34) (105 - 105)  BP: 135/80 (05-06-24 @ 13:34) (135/80 - 135/80)  BP(mean): --  RR: --  SpO2: 97% (05-06-24 @ 05:59) (97% - 97%)    Orthostatic VS  05-06-24 @ 05:59  Lying BP: 113/70 HR: 81  Sitting BP: 127/88 HR: 89  Standing BP: --/-- HR: --  Site: --  Mode: --  Orthostatic VS  05-05-24 @ 19:39  Lying BP: --/-- HR: --  Sitting BP: 123/66 HR: 92  Standing BP: --/-- HR: --  Site: --  Mode: --  Orthostatic VS  05-05-24 @ 05:12  Lying BP: 127/73 HR: 87  Sitting BP: 135/73 HR: 89  Standing BP: --/-- HR: --  Site: upper left arm  Mode: electronic    Lipid Panel: Date/Time: 04-05-24 @ 05:56  Cholesterol, Serum: 176  LDL Cholesterol Calculated: 97  HDL Cholesterol, Serum: 39  Total Cholesterol/HDL Ration Measurement: --  Triglycerides, Serum: 200

## 2024-05-06 NOTE — BH INPATIENT PSYCHIATRY PROGRESS NOTE - CURRENT MEDICATION
MEDICATIONS  (STANDING):  atorvastatin 10 milliGRAM(s) Oral at bedtime  clonazePAM  Tablet 0.5 milliGRAM(s) Oral <User Schedule>  furosemide    Tablet 20 milliGRAM(s) Oral <User Schedule>  lidocaine   4% Patch 1 Patch Transdermal once  melatonin. 3 milliGRAM(s) Oral at bedtime  metoprolol tartrate 25 milliGRAM(s) Oral at bedtime  metoprolol tartrate 50 milliGRAM(s) Oral <User Schedule>  mirtazapine 15 milliGRAM(s) Oral at bedtime  multivitamin 1 Tablet(s) Oral daily  nortriptyline 60 milliGRAM(s) Oral at bedtime  pantoprazole    Tablet 40 milliGRAM(s) Oral before breakfast  pregabalin 50 milliGRAM(s) Oral daily  pregabalin 25 milliGRAM(s) Oral <User Schedule>    MEDICATIONS  (PRN):  acetaminophen     Tablet .. 650 milliGRAM(s) Oral every 6 hours PRN Temp greater or equal to 38C (100.4F), Mild Pain (1 - 3)  aluminum hydroxide/magnesium hydroxide/simethicone Suspension 30 milliLiter(s) Oral every 4 hours PRN Dyspepsia  artificial  tears Solution 1 Drop(s) Both EYES every 4 hours PRN dry eyes syndrome  bisacodyl Suppository 10 milliGRAM(s) Rectal daily PRN Constipation  chlorhexidine 0.12% Liquid 15 milliLiter(s) Oral Mucosa two times a day PRN gum inflammation  gabapentin 100 milliGRAM(s) Oral three times a day PRN anxiety  haloperidol     Tablet 0.5 milliGRAM(s) Oral every 6 hours PRN agitation  haloperidol    Injectable 0.5 milliGRAM(s) IntraMuscular once PRN severe agitation  meclizine 12.5 milliGRAM(s) Oral every 8 hours PRN Dizziness  metoprolol tartrate 25 milliGRAM(s) Oral daily PRN Palpitations due to Anxiety  ondansetron   Disintegrating Tablet 4 milliGRAM(s) Oral every 8 hours PRN Nausea and/or Vomiting  polyethylene glycol 3350 17 Gram(s) Oral two times a day PRN constipation   MEDICATIONS  (STANDING):  atorvastatin 10 milliGRAM(s) Oral at bedtime  clonazePAM  Tablet 0.5 milliGRAM(s) Oral <User Schedule>  furosemide    Tablet 20 milliGRAM(s) Oral <User Schedule>  lidocaine   4% Patch 1 Patch Transdermal once  melatonin. 3 milliGRAM(s) Oral at bedtime  metoprolol tartrate 25 milliGRAM(s) Oral at bedtime  metoprolol tartrate 50 milliGRAM(s) Oral <User Schedule>  mirtazapine 15 milliGRAM(s) Oral at bedtime  multivitamin 1 Tablet(s) Oral daily  nortriptyline 75 milliGRAM(s) Oral at bedtime  pantoprazole    Tablet 40 milliGRAM(s) Oral before breakfast  pregabalin 50 milliGRAM(s) Oral daily  pregabalin 25 milliGRAM(s) Oral <User Schedule>    MEDICATIONS  (PRN):  acetaminophen     Tablet .. 650 milliGRAM(s) Oral every 6 hours PRN Temp greater or equal to 38C (100.4F), Mild Pain (1 - 3)  aluminum hydroxide/magnesium hydroxide/simethicone Suspension 30 milliLiter(s) Oral every 4 hours PRN Dyspepsia  artificial  tears Solution 1 Drop(s) Both EYES every 4 hours PRN dry eyes syndrome  bisacodyl Suppository 10 milliGRAM(s) Rectal daily PRN Constipation  chlorhexidine 0.12% Liquid 15 milliLiter(s) Oral Mucosa two times a day PRN gum inflammation  gabapentin 100 milliGRAM(s) Oral three times a day PRN anxiety  haloperidol     Tablet 0.5 milliGRAM(s) Oral every 6 hours PRN agitation  haloperidol    Injectable 0.5 milliGRAM(s) IntraMuscular once PRN severe agitation  meclizine 12.5 milliGRAM(s) Oral every 8 hours PRN Dizziness  metoprolol tartrate 25 milliGRAM(s) Oral daily PRN Palpitations due to Anxiety  ondansetron   Disintegrating Tablet 4 milliGRAM(s) Oral every 8 hours PRN Nausea and/or Vomiting  polyethylene glycol 3350 17 Gram(s) Oral two times a day PRN constipation

## 2024-05-07 PROCEDURE — 99232 SBSQ HOSP IP/OBS MODERATE 35: CPT | Mod: GC

## 2024-05-07 RX ORDER — CLONAZEPAM 1 MG
0.5 TABLET ORAL
Refills: 0 | Status: DISCONTINUED | OUTPATIENT
Start: 2024-05-07 | End: 2024-05-13

## 2024-05-07 RX ORDER — CLONAZEPAM 1 MG
0.25 TABLET ORAL
Refills: 0 | Status: DISCONTINUED | OUTPATIENT
Start: 2024-05-08 | End: 2024-05-13

## 2024-05-07 RX ADMIN — Medication 3 MILLIGRAM(S): at 21:31

## 2024-05-07 RX ADMIN — MIRTAZAPINE 15 MILLIGRAM(S): 45 TABLET, ORALLY DISINTEGRATING ORAL at 21:20

## 2024-05-07 RX ADMIN — Medication 50 MILLIGRAM(S): at 05:08

## 2024-05-07 RX ADMIN — ATORVASTATIN CALCIUM 10 MILLIGRAM(S): 80 TABLET, FILM COATED ORAL at 21:31

## 2024-05-07 RX ADMIN — Medication 25 MILLIGRAM(S): at 14:50

## 2024-05-07 RX ADMIN — NORTRIPTYLINE HYDROCHLORIDE 75 MILLIGRAM(S): 10 CAPSULE ORAL at 21:21

## 2024-05-07 RX ADMIN — Medication 1 TABLET(S): at 09:21

## 2024-05-07 RX ADMIN — Medication 25 MILLIGRAM(S): at 21:20

## 2024-05-07 RX ADMIN — Medication 25 MILLIGRAM(S): at 14:15

## 2024-05-07 RX ADMIN — Medication 650 MILLIGRAM(S): at 18:28

## 2024-05-07 RX ADMIN — Medication 650 MILLIGRAM(S): at 17:32

## 2024-05-07 RX ADMIN — GABAPENTIN 100 MILLIGRAM(S): 400 CAPSULE ORAL at 21:26

## 2024-05-07 RX ADMIN — Medication 50 MILLIGRAM(S): at 09:21

## 2024-05-07 RX ADMIN — PANTOPRAZOLE SODIUM 40 MILLIGRAM(S): 20 TABLET, DELAYED RELEASE ORAL at 08:03

## 2024-05-07 RX ADMIN — Medication 0.5 MILLIGRAM(S): at 05:08

## 2024-05-07 RX ADMIN — Medication 0.5 MILLIGRAM(S): at 18:04

## 2024-05-07 NOTE — BH INPATIENT PSYCHIATRY PROGRESS NOTE - NSBHASSESSSUMMFT_PSY_ALL_CORE
73 yr old female, , domiciled, and retired. premorbidly ambulant (not needing assists) and iADL/ bADL independent. with background hx of MDD and ANSON; has multiple psych admissions to [facility] (most recently Jan 2024 on [facility]), had prior SA by overdosing on pills (11/2019) following death of parents, presented to ED with somatic complaints and reports of ?passive suicidal ideation, had exhaustive w/u, now admitted voluntarily to [facility]  Routine checks, no suicidal ideations  Continue nortriptyline 50mg po qhs for depression - will hold off increase due to orthostasis today  Continue klonopin 0.5mg po bid and 0.5mg po qhs for anxiety; gabapentin prn for anxiety  Continue mirtazapine 15mg po qhs and melatonin 3mg po qhs for insomnia  Increased lyrica to 50mg po daily and 25mg po q3pm and 9pm - off label for anxiety  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 Requesting metoprolol titration despite recently complaining of dizziness.  4/30: Ruminative thoughts regarding perceived rejection and stressor persist. Medication titration/ adjustment pending further assessment.   05/01: Somatic complaints and preservative thoughts about rejection persists. Integration of brief bedside psychotherapy during rounds with limit setting due to exacerbation of anxiety especially when fixated on her medication regimen. Medication titration to be approached slowly to minimize cumulative adverse effects.  05/02: No significant change. Open to individual therapy thus will continue in combination with medication management.   05/03: No change in current treatment plan. Will continue to encourage patient in engaging with treatment recommendations.  5/6: Minimal improvement in anxiety/depression. Nortryptilline to be increased from 60 to 75 mg daily.  5/7: Tolerating medication changes. Will consider further increase in dose of Nortriptyline. Adjustments made to dosing fo Klonopin due to complaint of morning drowsiness on current dose of 0.5mg     PLAN:  Psychotropic medications:  - Increase Nortriptyline from 60 mg to 75 mg qHS for depressive symptoms  - Continue Remeron 15 mg qHS for depressive symptoms and sleep  - Continue Pregabalin 50 mg daily, 25 mg q15:00, 25 mg q21:00 for anxiety  - Continue Klonopin 0.5 mg at q18:00 for anxiety  - Klonopin 0.2mg @ Q6am & Q12pm daily for anxiety  HLD: atorvastatin 10 mg qHS  GERD: Protonix 40 mg daily  HTN: continue metoprolol 50mg q06:00, 25 mg qHS, and 25 mg PRN; patient states that her cardiologist prescribed this for palpitations from anxiety by (allow addition of Lasix)  LE edema: restart Lasix 20 mg every other day  Dizziness: MRI wnl; meclizine 12.5 mg PRN; possible BPPV; Flexeril 5 mg po qHS PRN recommended by Neurology

## 2024-05-07 NOTE — BH INPATIENT PSYCHIATRY PROGRESS NOTE - NSBHCHARTREVIEWVS_PSY_A_CORE FT
Vital Signs Last 24 Hrs  T(C): 36.6 (05-07-24 @ 05:44), Max: 37 (05-06-24 @ 19:45)  T(F): 97.9 (05-07-24 @ 05:44), Max: 98.6 (05-06-24 @ 19:45)  HR: 102 (05-07-24 @ 14:12) (102 - 102)  BP: --  BP(mean): --  RR: --  SpO2: 97% (05-07-24 @ 14:12) (93% - 97%)    Orthostatic VS  05-07-24 @ 05:44  Lying BP: --/-- HR: --  Sitting BP: 137/65 HR: 93  Standing BP: 128/83 HR: 96  Site: --  Mode: --  Orthostatic VS  05-06-24 @ 19:45  Lying BP: --/-- HR: --  Sitting BP: 124/78 HR: 106  Standing BP: --/-- HR: --  Site: --  Mode: --  Orthostatic VS  05-06-24 @ 05:59  Lying BP: 113/70 HR: 81  Sitting BP: 127/88 HR: 89  Standing BP: --/-- HR: --  Site: --  Mode: --  Orthostatic VS  05-05-24 @ 19:39  Lying BP: --/-- HR: --  Sitting BP: 123/66 HR: 92  Standing BP: --/-- HR: --  Site: --  Mode: --   Vital Signs Last 24 Hrs  T(C): 36.7 (05-07-24 @ 19:50), Max: 36.7 (05-07-24 @ 19:50)  T(F): 98.1 (05-07-24 @ 19:50), Max: 98.1 (05-07-24 @ 19:50)  HR: 102 (05-07-24 @ 14:12) (102 - 102)  BP: --  BP(mean): --  RR: --  SpO2: 95% (05-07-24 @ 19:50) (93% - 97%)    Orthostatic VS  05-07-24 @ 19:50  Lying BP: --/-- HR: --  Sitting BP: 124/71 HR: 83  Standing BP: 129/85 HR: 94  Site: --  Mode: --  Orthostatic VS  05-07-24 @ 05:44  Lying BP: --/-- HR: --  Sitting BP: 137/65 HR: 93  Standing BP: 128/83 HR: 96  Site: --  Mode: --  Orthostatic VS  05-06-24 @ 19:45  Lying BP: --/-- HR: --  Sitting BP: 124/78 HR: 106  Standing BP: --/-- HR: --  Site: --  Mode: --  Orthostatic VS  05-06-24 @ 05:59  Lying BP: 113/70 HR: 81  Sitting BP: 127/88 HR: 89  Standing BP: --/-- HR: --  Site: --  Mode: --

## 2024-05-07 NOTE — BH INPATIENT PSYCHIATRY PROGRESS NOTE - NSBHMETABOLIC_PSY_ALL_CORE_FT
BMI: BMI (kg/m2): 38.3 (04-16-24 @ 19:07)  HbA1c: A1C with Estimated Average Glucose Result: 5.8 % (01-11-24 @ 08:00)    Glucose:   BP: 135/80 (05-06-24 @ 13:34) (114/62 - 140/82)Vital Signs Last 24 Hrs  T(C): 36.6 (05-07-24 @ 05:44), Max: 37 (05-06-24 @ 19:45)  T(F): 97.9 (05-07-24 @ 05:44), Max: 98.6 (05-06-24 @ 19:45)  HR: 102 (05-07-24 @ 14:12) (102 - 102)  BP: --  BP(mean): --  RR: --  SpO2: 97% (05-07-24 @ 14:12) (93% - 97%)    Orthostatic VS  05-07-24 @ 05:44  Lying BP: --/-- HR: --  Sitting BP: 137/65 HR: 93  Standing BP: 128/83 HR: 96  Site: --  Mode: --  Orthostatic VS  05-06-24 @ 19:45  Lying BP: --/-- HR: --  Sitting BP: 124/78 HR: 106  Standing BP: --/-- HR: --  Site: --  Mode: --  Orthostatic VS  05-06-24 @ 05:59  Lying BP: 113/70 HR: 81  Sitting BP: 127/88 HR: 89  Standing BP: --/-- HR: --  Site: --  Mode: --  Orthostatic VS  05-05-24 @ 19:39  Lying BP: --/-- HR: --  Sitting BP: 123/66 HR: 92  Standing BP: --/-- HR: --  Site: --  Mode: --    Lipid Panel: Date/Time: 04-05-24 @ 05:56  Cholesterol, Serum: 176  LDL Cholesterol Calculated: 97  HDL Cholesterol, Serum: 39  Total Cholesterol/HDL Ration Measurement: --  Triglycerides, Serum: 200   BMI: BMI (kg/m2): 38.3 (04-16-24 @ 19:07)  HbA1c: A1C with Estimated Average Glucose Result: 5.8 % (01-11-24 @ 08:00)    Glucose:   BP: 135/80 (05-06-24 @ 13:34) (135/80 - 140/82)Vital Signs Last 24 Hrs  T(C): 36.7 (05-07-24 @ 19:50), Max: 36.7 (05-07-24 @ 19:50)  T(F): 98.1 (05-07-24 @ 19:50), Max: 98.1 (05-07-24 @ 19:50)  HR: 102 (05-07-24 @ 14:12) (102 - 102)  BP: --  BP(mean): --  RR: --  SpO2: 95% (05-07-24 @ 19:50) (93% - 97%)    Orthostatic VS  05-07-24 @ 19:50  Lying BP: --/-- HR: --  Sitting BP: 124/71 HR: 83  Standing BP: 129/85 HR: 94  Site: --  Mode: --  Orthostatic VS  05-07-24 @ 05:44  Lying BP: --/-- HR: --  Sitting BP: 137/65 HR: 93  Standing BP: 128/83 HR: 96  Site: --  Mode: --  Orthostatic VS  05-06-24 @ 19:45  Lying BP: --/-- HR: --  Sitting BP: 124/78 HR: 106  Standing BP: --/-- HR: --  Site: --  Mode: --  Orthostatic VS  05-06-24 @ 05:59  Lying BP: 113/70 HR: 81  Sitting BP: 127/88 HR: 89  Standing BP: --/-- HR: --  Site: --  Mode: --    Lipid Panel: Date/Time: 04-05-24 @ 05:56  Cholesterol, Serum: 176  LDL Cholesterol Calculated: 97  HDL Cholesterol, Serum: 39  Total Cholesterol/HDL Ration Measurement: --  Triglycerides, Serum: 200

## 2024-05-07 NOTE — BH INPATIENT PSYCHIATRY PROGRESS NOTE - NSBHFUPINTERVALHXFT_PSY_A_CORE
Patient relates that her anxiety symptoms are not going away. Worried that her medication would be taken away. Reluctant regarding psychology recommendations whilst  reiterating that she will to start working with a therapist long term when she leaves the hospital. believes at this time, she needs more medication to control her symptoms. Psychoeducation and patient counseling on managing expectations and treatment plan was briefly explored. Patient was encouraged to utilize the mood log provided by behavorial therapist whenever she has an episode of anxiety. Patient expressed her fear regarding medication titration as per recommendation due to her fear of side effects. Agreeable to continue working with team. Appetite and sleep are reportedly adequate. Does not report any suicidal ideation.

## 2024-05-07 NOTE — BH INPATIENT PSYCHIATRY PROGRESS NOTE - CURRENT MEDICATION
MEDICATIONS  (STANDING):  atorvastatin 10 milliGRAM(s) Oral at bedtime  clonazePAM  Tablet 0.5 milliGRAM(s) Oral <User Schedule>  furosemide    Tablet 20 milliGRAM(s) Oral <User Schedule>  lidocaine   4% Patch 1 Patch Transdermal once  melatonin. 3 milliGRAM(s) Oral at bedtime  metoprolol tartrate 50 milliGRAM(s) Oral <User Schedule>  metoprolol tartrate 25 milliGRAM(s) Oral at bedtime  mirtazapine 15 milliGRAM(s) Oral at bedtime  multivitamin 1 Tablet(s) Oral daily  nortriptyline 75 milliGRAM(s) Oral at bedtime  pantoprazole    Tablet 40 milliGRAM(s) Oral before breakfast  pregabalin 50 milliGRAM(s) Oral daily  pregabalin 25 milliGRAM(s) Oral <User Schedule>    MEDICATIONS  (PRN):  acetaminophen     Tablet .. 650 milliGRAM(s) Oral every 6 hours PRN Temp greater or equal to 38C (100.4F), Mild Pain (1 - 3)  aluminum hydroxide/magnesium hydroxide/simethicone Suspension 30 milliLiter(s) Oral every 4 hours PRN Dyspepsia  artificial  tears Solution 1 Drop(s) Both EYES every 4 hours PRN dry eyes syndrome  bisacodyl Suppository 10 milliGRAM(s) Rectal daily PRN Constipation  chlorhexidine 0.12% Liquid 15 milliLiter(s) Oral Mucosa two times a day PRN gum inflammation  gabapentin 100 milliGRAM(s) Oral three times a day PRN anxiety  haloperidol     Tablet 0.5 milliGRAM(s) Oral every 6 hours PRN agitation  haloperidol    Injectable 0.5 milliGRAM(s) IntraMuscular once PRN severe agitation  meclizine 12.5 milliGRAM(s) Oral every 8 hours PRN Dizziness  metoprolol tartrate 25 milliGRAM(s) Oral daily PRN Palpitations due to Anxiety  ondansetron   Disintegrating Tablet 4 milliGRAM(s) Oral every 8 hours PRN Nausea and/or Vomiting  polyethylene glycol 3350 17 Gram(s) Oral two times a day PRN constipation

## 2024-05-08 PROCEDURE — 90837 PSYTX W PT 60 MINUTES: CPT

## 2024-05-08 PROCEDURE — 99232 SBSQ HOSP IP/OBS MODERATE 35: CPT | Mod: GC

## 2024-05-08 RX ADMIN — Medication 20 MILLIGRAM(S): at 09:37

## 2024-05-08 RX ADMIN — Medication 25 MILLIGRAM(S): at 21:10

## 2024-05-08 RX ADMIN — Medication 25 MILLIGRAM(S): at 21:11

## 2024-05-08 RX ADMIN — Medication 50 MILLIGRAM(S): at 15:11

## 2024-05-08 RX ADMIN — Medication 1 DROP(S): at 21:40

## 2024-05-08 RX ADMIN — Medication 0.25 MILLIGRAM(S): at 05:50

## 2024-05-08 RX ADMIN — ATORVASTATIN CALCIUM 10 MILLIGRAM(S): 80 TABLET, FILM COATED ORAL at 21:11

## 2024-05-08 RX ADMIN — Medication 50 MILLIGRAM(S): at 09:37

## 2024-05-08 RX ADMIN — MIRTAZAPINE 15 MILLIGRAM(S): 45 TABLET, ORALLY DISINTEGRATING ORAL at 21:09

## 2024-05-08 RX ADMIN — Medication 0.25 MILLIGRAM(S): at 12:59

## 2024-05-08 RX ADMIN — NORTRIPTYLINE HYDROCHLORIDE 75 MILLIGRAM(S): 10 CAPSULE ORAL at 21:10

## 2024-05-08 RX ADMIN — Medication 0.5 MILLIGRAM(S): at 17:45

## 2024-05-08 RX ADMIN — CHLORHEXIDINE GLUCONATE 15 MILLILITER(S): 213 SOLUTION TOPICAL at 21:15

## 2024-05-08 RX ADMIN — Medication 50 MILLIGRAM(S): at 05:51

## 2024-05-08 RX ADMIN — Medication 3 MILLIGRAM(S): at 21:10

## 2024-05-08 RX ADMIN — Medication 1 TABLET(S): at 09:37

## 2024-05-08 NOTE — BH INPATIENT PSYCHIATRY PROGRESS NOTE - NSBHMETABOLIC_PSY_ALL_CORE_FT
BMI: BMI (kg/m2): 38.3 (04-16-24 @ 19:07)  HbA1c: A1C with Estimated Average Glucose Result: 5.8 % (01-11-24 @ 08:00)    Glucose:   BP: 135/80 (05-06-24 @ 13:34) (135/80 - 135/80)Vital Signs Last 24 Hrs  T(C): 36.3 (05-08-24 @ 05:41), Max: 36.7 (05-07-24 @ 19:50)  T(F): 97.3 (05-08-24 @ 05:41), Max: 98.1 (05-07-24 @ 19:50)  HR: --  BP: --  BP(mean): --  RR: --  SpO2: 95% (05-08-24 @ 05:41) (95% - 95%)    Orthostatic VS  05-08-24 @ 05:41  Lying BP: 124/75 HR: 94  Sitting BP: 127/68 HR: 100  Standing BP: --/-- HR: --  Site: --  Mode: --  Orthostatic VS  05-07-24 @ 19:50  Lying BP: --/-- HR: --  Sitting BP: 124/71 HR: 83  Standing BP: 129/85 HR: 94  Site: --  Mode: --  Orthostatic VS  05-07-24 @ 05:44  Lying BP: --/-- HR: --  Sitting BP: 137/65 HR: 93  Standing BP: 128/83 HR: 96  Site: --  Mode: --  Orthostatic VS  05-06-24 @ 19:45  Lying BP: --/-- HR: --  Sitting BP: 124/78 HR: 106  Standing BP: --/-- HR: --  Site: --  Mode: --    Lipid Panel: Date/Time: 04-05-24 @ 05:56  Cholesterol, Serum: 176  LDL Cholesterol Calculated: 97  HDL Cholesterol, Serum: 39  Total Cholesterol/HDL Ration Measurement: --  Triglycerides, Serum: 200

## 2024-05-08 NOTE — BH INPATIENT PSYCHIATRY PROGRESS NOTE - NSBHASSESSSUMMFT_PSY_ALL_CORE
Holter done. Anything needed? 73 yr old female, , domiciled, and retired. premorbidly ambulant (not needing assists) and iADL/ bADL independent. with background hx of MDD and ANSON; has multiple psych admissions to [facility] (most recently Jan 2024 on [facility]), had prior SA by overdosing on pills (11/2019) following death of parents, presented to ED with somatic complaints and reports of ?passive suicidal ideation, had exhaustive w/u, now admitted voluntarily to [facility]  Routine checks, no suicidal ideations  Continue nortriptyline 50mg po qhs for depression - will hold off increase due to orthostasis today  Continue klonopin 0.5mg po bid and 0.5mg po qhs for anxiety; gabapentin prn for anxiety  Continue mirtazapine 15mg po qhs and melatonin 3mg po qhs for insomnia  Increased lyrica to 50mg po daily and 25mg po q3pm and 9pm - off label for anxiety  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 Requesting metoprolol titration despite recently complaining of dizziness.  4/30: Ruminative thoughts regarding perceived rejection and stressor persist. Medication titration/ adjustment pending further assessment.   05/01: Somatic complaints and preservative thoughts about rejection persists. Integration of brief bedside psychotherapy during rounds with limit setting due to exacerbation of anxiety especially when fixated on her medication regimen. Medication titration to be approached slowly to minimize cumulative adverse effects.  05/02: No significant change. Open to individual therapy thus will continue in combination with medication management.   05/03: No change in current treatment plan. Will continue to encourage patient in engaging with treatment recommendations.  5/6: Minimal improvement in anxiety/depression. Nortryptilline to be increased from 60 to 75 mg daily.  5/7: Tolerating medication changes. Will consider further increase in dose of Nortriptyline. Adjustments made to dosing fo Klonopin due to complaint of morning drowsiness on current dose of 0.5mg   5/8: Increased 3pm Baclofen to 50mg. Will continue monitoring response to medication.     PLAN:  Psychotropic medications:  - Continue on Nortriptyline  75 mg qHS for depressive symptoms  - Continue Remeron 15 mg qHS for depressive symptoms and sleep  - Continue Pregabalin 50 mg daily, increased to 50 mg q15:00, 25 mg q21:00 for anxiety  - Continue Klonopin 0.5 mg at q18:00 for anxiety  - Klonopin 0.2mg @ Q6am & Q12pm daily for anxiety  HLD: atorvastatin 10 mg qHS  GERD: Protonix 40 mg daily  HTN: continue metoprolol 50mg q06:00, 25 mg qHS, and 25 mg PRN; patient states that her cardiologist prescribed this for palpitations from anxiety by (allow addition of Lasix)  LE edema: restart Lasix 20 mg every other day  Dizziness: MRI wnl; meclizine 12.5 mg PRN; possible BPPV; Flexeril 5 mg po qHS PRN recommended by Neurology 73 yr old female, , domiciled, and retired. premorbidly ambulant (not needing assists) and iADL/ bADL independent. with background hx of MDD and ANSON; has multiple psych admissions to [facility] (most recently Jan 2024 on [facility]), had prior SA by overdosing on pills (11/2019) following death of parents, presented to ED with somatic complaints and reports of ?passive suicidal ideation, had exhaustive w/u, now admitted voluntarily to [facility] [place]  Routine checks, no suicidal ideations  Continue nortriptyline 50mg po qhs for depression - will hold off increase due to orthostasis today  Continue klonopin 0.5mg po bid and 0.5mg po qhs for anxiety; gabapentin prn for anxiety  Continue mirtazapine 15mg po qhs and melatonin 3mg po qhs for insomnia  Increased lyrica to 50mg po daily and 25mg po q3pm and 9pm - off label for anxiety  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 Requesting metoprolol titration despite recently complaining of dizziness.  4/30: Ruminative thoughts regarding perceived rejection and stressor persist. Medication titration/ adjustment pending further assessment.   05/01: Somatic complaints and preservative thoughts about rejection persists. Integration of brief bedside psychotherapy during rounds with limit setting due to exacerbation of anxiety especially when fixated on her medication regimen. Medication titration to be approached slowly to minimize cumulative adverse effects.  05/02: No significant change. Open to individual therapy thus will continue in combination with medication management.   05/03: No change in current treatment plan. Will continue to encourage patient in engaging with treatment recommendations.  5/6: Minimal improvement in anxiety/depression. Nortryptilline to be increased from 60 to 75 mg daily.  5/7: Tolerating medication changes. Will consider further increase in dose of Nortriptyline. Adjustments made to dosing fo Klonopin due to complaint of morning drowsiness on current dose of 0.5mg   5/8: Increased 3pm lyrica to 50mg. Will continue monitoring response to medication.     PLAN:  Psychotropic medications:  - Continue on Nortriptyline  75 mg qHS for depressive symptoms - will titrate actively as tolerated by this anxious somatically preoccupied patient  - Continue Remeron 15 mg qHS for depressive symptoms and sleep  - Increase Pregabalin to 50 mg daily and q15:00, 25 mg q21:00 for anxiety  - Continue Klonopin 0.5 mg at q18:00 for anxiety  - Klonopin 0.2mg @ Q6am & Q12pm daily for anxiety  HLD: atorvastatin 10 mg qHS  GERD: Protonix 40 mg daily  HTN: continue metoprolol 50mg q06:00, 25 mg qHS, and 25 mg PRN; patient states that her cardiologist prescribed this for palpitations from anxiety by (allow addition of Lasix)  LE edema: restart Lasix 20 mg every other day  Dizziness: MRI wnl; meclizine 12.5 mg PRN; possible BPPV; Flexeril 5 mg po qHS PRN recommended by Neurology

## 2024-05-08 NOTE — BH PSYCHOLOGY - CLINICIAN PSYCHOTHERAPY NOTE - NSBHPSYCHOLNARRATIVE_PSY_A_CORE FT
The patient was seen individually at the team's request and with her consent. Speech was of normal rate and volume. There were no verbal irregularities. The patient's mood was moderately depressed; affect was constricted and congruent. She was coherent and logical; her content focused on her mood and medications. The patient denied suicidal ideation.     The patient reported feeling somewhat anxious this morning but noted that she tried to think of pleasant memories as she lay in bed waiting for her medication. This was reflected to her as an example of a coping skill which, although she believes she cannot learn, she has nevertheless been employing. Psychoeducation about the variety of “coping skills” - both behavioral and cognitive - was provided, and she seemed pleasantly surprised to learn that there are more than just breathing techniques. She gave another example of spontaneous coping by counting the tiles in the bathroom when she felt anxious.     The patient has been using the hourly mood log that she was given by the rehabilitation therapist and showed this writer her entries for yesterday. She also noted the ACT workbook activities that she found useful and interesting. She noted that evenings are her best time and that if she felt that way most of the day, she would be content. She also spoke about stressors on the unit (e.g., boredom, unpleasant food, annoying roommate) and her efforts to manage them.      The patient was encouraged to continue to use the mood log as well as the ACT workbook. The patient expressed appreciation and agreed to meet again.

## 2024-05-08 NOTE — BH INPATIENT PSYCHIATRY PROGRESS NOTE - CURRENT MEDICATION
MEDICATIONS  (STANDING):  atorvastatin 10 milliGRAM(s) Oral at bedtime  clonazePAM  Tablet 0.5 milliGRAM(s) Oral <User Schedule>  clonazePAM  Tablet 0.25 milliGRAM(s) Oral <User Schedule>  furosemide    Tablet 20 milliGRAM(s) Oral <User Schedule>  lidocaine   4% Patch 1 Patch Transdermal once  melatonin. 3 milliGRAM(s) Oral at bedtime  metoprolol tartrate 25 milliGRAM(s) Oral at bedtime  metoprolol tartrate 50 milliGRAM(s) Oral <User Schedule>  mirtazapine 15 milliGRAM(s) Oral at bedtime  multivitamin 1 Tablet(s) Oral daily  nortriptyline 75 milliGRAM(s) Oral at bedtime  pantoprazole    Tablet 40 milliGRAM(s) Oral before breakfast  pregabalin 25 milliGRAM(s) Oral <User Schedule>  pregabalin 50 milliGRAM(s) Oral <User Schedule>    MEDICATIONS  (PRN):  acetaminophen     Tablet .. 650 milliGRAM(s) Oral every 6 hours PRN Temp greater or equal to 38C (100.4F), Mild Pain (1 - 3)  aluminum hydroxide/magnesium hydroxide/simethicone Suspension 30 milliLiter(s) Oral every 4 hours PRN Dyspepsia  artificial  tears Solution 1 Drop(s) Both EYES every 4 hours PRN dry eyes syndrome  bisacodyl Suppository 10 milliGRAM(s) Rectal daily PRN Constipation  chlorhexidine 0.12% Liquid 15 milliLiter(s) Oral Mucosa two times a day PRN gum inflammation  gabapentin 100 milliGRAM(s) Oral three times a day PRN anxiety  haloperidol     Tablet 0.5 milliGRAM(s) Oral every 6 hours PRN agitation  haloperidol    Injectable 0.5 milliGRAM(s) IntraMuscular once PRN severe agitation  meclizine 12.5 milliGRAM(s) Oral every 8 hours PRN Dizziness  metoprolol tartrate 25 milliGRAM(s) Oral daily PRN Palpitations due to Anxiety  ondansetron   Disintegrating Tablet 4 milliGRAM(s) Oral every 8 hours PRN Nausea and/or Vomiting  polyethylene glycol 3350 17 Gram(s) Oral two times a day PRN constipation

## 2024-05-08 NOTE — BH INPATIENT PSYCHIATRY PROGRESS NOTE - NSBHFUPINTERVALHXFT_PSY_A_CORE
On today's assessment, relates that splitting the Klonopin morning dose did not work for her as she woke up anxious. Perseverates on medication management. Patient completed her mood log yesterday and plan to do so today. Pointed to an entry in her mood log regarding feeling sad that her continued symptomatic presentation is causing her  to feel depressed. Wants to be updated on any medication changes. Agreeable to continue working with clinical psychologist regarding her anxiety symptoms and hopes that she can get to a "place' where she does not need to be on Klonopin. No other issues elicited.

## 2024-05-08 NOTE — BH INPATIENT PSYCHIATRY PROGRESS NOTE - NSBHCHARTREVIEWVS_PSY_A_CORE FT
Vital Signs Last 24 Hrs  T(C): 36.3 (05-08-24 @ 05:41), Max: 36.7 (05-07-24 @ 19:50)  T(F): 97.3 (05-08-24 @ 05:41), Max: 98.1 (05-07-24 @ 19:50)  HR: --  BP: --  BP(mean): --  RR: --  SpO2: 95% (05-08-24 @ 05:41) (95% - 95%)    Orthostatic VS  05-08-24 @ 05:41  Lying BP: 124/75 HR: 94  Sitting BP: 127/68 HR: 100  Standing BP: --/-- HR: --  Site: --  Mode: --  Orthostatic VS  05-07-24 @ 19:50  Lying BP: --/-- HR: --  Sitting BP: 124/71 HR: 83  Standing BP: 129/85 HR: 94  Site: --  Mode: --  Orthostatic VS  05-07-24 @ 05:44  Lying BP: --/-- HR: --  Sitting BP: 137/65 HR: 93  Standing BP: 128/83 HR: 96  Site: --  Mode: --  Orthostatic VS  05-06-24 @ 19:45  Lying BP: --/-- HR: --  Sitting BP: 124/78 HR: 106  Standing BP: --/-- HR: --  Site: --  Mode: --

## 2024-05-09 PROCEDURE — 99232 SBSQ HOSP IP/OBS MODERATE 35: CPT | Mod: GC

## 2024-05-09 RX ADMIN — Medication 25 MILLIGRAM(S): at 16:39

## 2024-05-09 RX ADMIN — PANTOPRAZOLE SODIUM 40 MILLIGRAM(S): 20 TABLET, DELAYED RELEASE ORAL at 05:50

## 2024-05-09 RX ADMIN — Medication 0.5 MILLIGRAM(S): at 17:51

## 2024-05-09 RX ADMIN — Medication 25 MILLIGRAM(S): at 21:21

## 2024-05-09 RX ADMIN — Medication 0.25 MILLIGRAM(S): at 12:42

## 2024-05-09 RX ADMIN — NORTRIPTYLINE HYDROCHLORIDE 75 MILLIGRAM(S): 10 CAPSULE ORAL at 21:19

## 2024-05-09 RX ADMIN — Medication 3 MILLIGRAM(S): at 21:20

## 2024-05-09 RX ADMIN — Medication 25 MILLIGRAM(S): at 21:20

## 2024-05-09 RX ADMIN — ATORVASTATIN CALCIUM 10 MILLIGRAM(S): 80 TABLET, FILM COATED ORAL at 21:20

## 2024-05-09 RX ADMIN — Medication 50 MILLIGRAM(S): at 05:49

## 2024-05-09 RX ADMIN — MIRTAZAPINE 15 MILLIGRAM(S): 45 TABLET, ORALLY DISINTEGRATING ORAL at 21:21

## 2024-05-09 RX ADMIN — Medication 1 DROP(S): at 21:29

## 2024-05-09 RX ADMIN — CHLORHEXIDINE GLUCONATE 15 MILLILITER(S): 213 SOLUTION TOPICAL at 21:19

## 2024-05-09 RX ADMIN — Medication 0.25 MILLIGRAM(S): at 05:48

## 2024-05-09 RX ADMIN — Medication 1 TABLET(S): at 08:25

## 2024-05-09 RX ADMIN — Medication 50 MILLIGRAM(S): at 08:25

## 2024-05-09 RX ADMIN — Medication 50 MILLIGRAM(S): at 14:51

## 2024-05-09 NOTE — BH PSYCHOLOGY - CLINICIAN PSYCHOTHERAPY NOTE - NSBHPSYCHOLNARRATIVE_PSY_A_CORE FT
Dr. Aragon at bedside discussing dispo. with pt. and partner The patient was seen individually at the team's request and with her consent. Speech was of normal rate and volume and there were no verbal irregularities. The patient's mood was dysthymic and anxious, and affect was normal, and mood congruent. She was coherent and logical; her content focused on her feelings of helplessness, loss of her parents, and treatment goals. The patient denied current suicidal ideation.    The pt discussed that she has been doing "alright" but that her anxiety continues to be high in the morning when she wakes up. Trainee and pt explored different coping techniques that she has been and can continue to use to help her regulate her somatic symptoms of anxiety in the morning. Pt shared that feeling anxious in the morning started after the loss of her mother; she continued to share how she feels helpless and lonely without her parents. Trainee and pt worked to reframe how her close relationship and her parents' ability to be very reliable and dependable could help her instead of hinder her independence and treatment goals. Trainee also introduced the concept that the pt might be experiencing and adjusting to a role loss (daughter and then caregiver) and should start thinking about her values and what she might want her new role to be. Trainee and pt also discussed the ways in which she can keep her parents spirt alive thought the way that she lives her life in minor but important ways. Additionally, the pt shared that she asked for her medication to be adjusted again. Pt was engaged and receptive to treatment and looking forward to continuing psychotherapy with psychology. Psychology will continue to follow pt while on the unit.

## 2024-05-09 NOTE — BH INPATIENT PSYCHIATRY PROGRESS NOTE - NSBHASSESSSUMMFT_PSY_ALL_CORE
73 yr old female, , domiciled, and retired. premorbidly ambulant (not needing assists) and iADL/ bADL independent. with background hx of MDD and ANSON; has multiple psych admissions to [facility] (most recently Jan 2024 on [facility]), had prior SA by overdosing on pills (11/2019) following death of parents, presented to ED with somatic complaints and reports of ?passive suicidal ideation, had exhaustive w/u, now admitted voluntarily to [facility]  Routine checks, no suicidal ideations  Continue nortriptyline 50mg po qhs for depression - will hold off increase due to orthostasis today  Continue klonopin 0.5mg po bid and 0.5mg po qhs for anxiety; gabapentin prn for anxiety  Continue mirtazapine 15mg po qhs and melatonin 3mg po qhs for insomnia  Increased lyrica to 50mg po daily and 25mg po q3pm and 9pm - off label for anxiety  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 Requesting metoprolol titration despite recently complaining of dizziness.  4/30: Ruminative thoughts regarding perceived rejection and stressor persist. Medication titration/ adjustment pending further assessment.   05/01: Somatic complaints and preservative thoughts about rejection persists. Integration of brief bedside psychotherapy during rounds with limit setting due to exacerbation of anxiety especially when fixated on her medication regimen. Medication titration to be approached slowly to minimize cumulative adverse effects.  05/02: No significant change. Open to individual therapy thus will continue in combination with medication management.   05/03: No change in current treatment plan. Will continue to encourage patient in engaging with treatment recommendations.  5/6: Minimal improvement in anxiety/depression. Nortryptilline to be increased from 60 to 75 mg daily.  5/7: Tolerating medication changes. Will consider further increase in dose of Nortriptyline. Adjustments made to dosing fo Klonopin due to complaint of morning drowsiness on current dose of 0.5mg   5/8: Increased 3pm lyrica to 50mg. Will continue monitoring response to medication.   5/9: Able to manage anxious episode this morning without medication adjustment. Will continue to encourage incorporation of therapy with meds.    PLAN:  Psychotropic medications:  - Continue on Nortriptyline  75 mg qHS for depressive symptoms - will titrate actively as tolerated by this anxious somatically preoccupied patient  - Continue Remeron 15 mg qHS for depressive symptoms and sleep  - Continue on Pregabalin 50 mg daily and q15:00, 25 mg q21:00 for anxiety  - Continue Klonopin 0.5 mg at q18:00 for anxiety  - Klonopin 0.2mg @ Q6am & Q12pm daily for anxiety  HLD: atorvastatin 10 mg qHS  GERD: Protonix 40 mg daily  HTN: continue metoprolol 50mg q06:00, 25 mg qHS, and 25 mg PRN; patient states that her cardiologist prescribed this for palpitations from anxiety by (allow addition of Lasix)  LE edema: restart Lasix 20 mg every other day  Dizziness: MRI wnl; meclizine 12.5 mg PRN; possible BPPV; Flexeril 5 mg po qHS PRN recommended by Neurology

## 2024-05-09 NOTE — BH INPATIENT PSYCHIATRY PROGRESS NOTE - NSBHMETABOLIC_PSY_ALL_CORE_FT
BMI: BMI (kg/m2): 38.3 (04-16-24 @ 19:07)  HbA1c: A1C with Estimated Average Glucose Result: 5.8 % (01-11-24 @ 08:00)    Glucose:   BP: --Vital Signs Last 24 Hrs  T(C): 36.7 (05-09-24 @ 05:26), Max: 36.8 (05-08-24 @ 19:35)  T(F): 98 (05-09-24 @ 05:26), Max: 98.3 (05-08-24 @ 19:35)  HR: --  BP: --  BP(mean): --  RR: 18 (05-09-24 @ 05:26) (18 - 18)  SpO2: --    Orthostatic VS  05-09-24 @ 05:26  Lying BP: 131/78 HR: 94  Sitting BP: 132/80 HR: 100  Standing BP: --/-- HR: --  Site: upper right arm  Mode: electronic  Orthostatic VS  05-08-24 @ 19:35  Lying BP: --/-- HR: --  Sitting BP: 131/80 HR: 108  Standing BP: --/-- HR: --  Site: --  Mode: --  Orthostatic VS  05-08-24 @ 05:41  Lying BP: 124/75 HR: 94  Sitting BP: 127/68 HR: 100  Standing BP: --/-- HR: --  Site: --  Mode: --  Orthostatic VS  05-07-24 @ 19:50  Lying BP: --/-- HR: --  Sitting BP: 124/71 HR: 83  Standing BP: 129/85 HR: 94  Site: --  Mode: --    Lipid Panel: Date/Time: 04-05-24 @ 05:56  Cholesterol, Serum: 176  LDL Cholesterol Calculated: 97  HDL Cholesterol, Serum: 39  Total Cholesterol/HDL Ration Measurement: --  Triglycerides, Serum: 200   BMI: BMI (kg/m2): 38.3 (04-16-24 @ 19:07)  HbA1c: A1C with Estimated Average Glucose Result: 5.8 % (01-11-24 @ 08:00)    Glucose:   BP: --Vital Signs Last 24 Hrs  T(C): 36.7 (05-09-24 @ 05:26), Max: 36.8 (05-08-24 @ 19:35)  T(F): 98 (05-09-24 @ 05:26), Max: 98.3 (05-08-24 @ 19:35)  HR: 114 (05-09-24 @ 16:40) (114 - 114)  BP: --  BP(mean): --  RR: 18 (05-09-24 @ 05:26) (18 - 18)  SpO2: --    Orthostatic VS  05-09-24 @ 05:26  Lying BP: 131/78 HR: 94  Sitting BP: 132/80 HR: 100  Standing BP: --/-- HR: --  Site: upper right arm  Mode: electronic  Orthostatic VS  05-08-24 @ 19:35  Lying BP: --/-- HR: --  Sitting BP: 131/80 HR: 108  Standing BP: --/-- HR: --  Site: --  Mode: --  Orthostatic VS  05-08-24 @ 05:41  Lying BP: 124/75 HR: 94  Sitting BP: 127/68 HR: 100  Standing BP: --/-- HR: --  Site: --  Mode: --  Orthostatic VS  05-07-24 @ 19:50  Lying BP: --/-- HR: --  Sitting BP: 124/71 HR: 83  Standing BP: 129/85 HR: 94  Site: --  Mode: --    Lipid Panel: Date/Time: 04-05-24 @ 05:56  Cholesterol, Serum: 176  LDL Cholesterol Calculated: 97  HDL Cholesterol, Serum: 39  Total Cholesterol/HDL Ration Measurement: --  Triglycerides, Serum: 200

## 2024-05-09 NOTE — BH INPATIENT PSYCHIATRY PROGRESS NOTE - CURRENT MEDICATION
MEDICATIONS  (STANDING):  atorvastatin 10 milliGRAM(s) Oral at bedtime  clonazePAM  Tablet 0.25 milliGRAM(s) Oral <User Schedule>  clonazePAM  Tablet 0.5 milliGRAM(s) Oral <User Schedule>  furosemide    Tablet 20 milliGRAM(s) Oral <User Schedule>  lidocaine   4% Patch 1 Patch Transdermal once  melatonin. 3 milliGRAM(s) Oral at bedtime  metoprolol tartrate 50 milliGRAM(s) Oral <User Schedule>  metoprolol tartrate 25 milliGRAM(s) Oral at bedtime  mirtazapine 15 milliGRAM(s) Oral at bedtime  multivitamin 1 Tablet(s) Oral daily  nortriptyline 75 milliGRAM(s) Oral at bedtime  pantoprazole    Tablet 40 milliGRAM(s) Oral before breakfast  pregabalin 25 milliGRAM(s) Oral <User Schedule>  pregabalin 50 milliGRAM(s) Oral <User Schedule>    MEDICATIONS  (PRN):  acetaminophen     Tablet .. 650 milliGRAM(s) Oral every 6 hours PRN Temp greater or equal to 38C (100.4F), Mild Pain (1 - 3)  aluminum hydroxide/magnesium hydroxide/simethicone Suspension 30 milliLiter(s) Oral every 4 hours PRN Dyspepsia  artificial  tears Solution 1 Drop(s) Both EYES every 4 hours PRN dry eyes syndrome  bisacodyl Suppository 10 milliGRAM(s) Rectal daily PRN Constipation  chlorhexidine 0.12% Liquid 15 milliLiter(s) Oral Mucosa two times a day PRN gum inflammation  gabapentin 100 milliGRAM(s) Oral three times a day PRN anxiety  haloperidol     Tablet 0.5 milliGRAM(s) Oral every 6 hours PRN agitation  haloperidol    Injectable 0.5 milliGRAM(s) IntraMuscular once PRN severe agitation  meclizine 12.5 milliGRAM(s) Oral every 8 hours PRN Dizziness  metoprolol tartrate 25 milliGRAM(s) Oral daily PRN Palpitations due to Anxiety  ondansetron   Disintegrating Tablet 4 milliGRAM(s) Oral every 8 hours PRN Nausea and/or Vomiting  polyethylene glycol 3350 17 Gram(s) Oral two times a day PRN constipation   MEDICATIONS  (STANDING):  atorvastatin 10 milliGRAM(s) Oral at bedtime  clonazePAM  Tablet 0.5 milliGRAM(s) Oral <User Schedule>  clonazePAM  Tablet 0.25 milliGRAM(s) Oral <User Schedule>  furosemide    Tablet 20 milliGRAM(s) Oral <User Schedule>  lidocaine   4% Patch 1 Patch Transdermal once  melatonin. 3 milliGRAM(s) Oral at bedtime  metoprolol tartrate 50 milliGRAM(s) Oral <User Schedule>  metoprolol tartrate 25 milliGRAM(s) Oral at bedtime  mirtazapine 15 milliGRAM(s) Oral at bedtime  multivitamin 1 Tablet(s) Oral daily  nortriptyline 75 milliGRAM(s) Oral at bedtime  pantoprazole    Tablet 40 milliGRAM(s) Oral before breakfast  pregabalin 50 milliGRAM(s) Oral <User Schedule>  pregabalin 25 milliGRAM(s) Oral <User Schedule>    MEDICATIONS  (PRN):  acetaminophen     Tablet .. 650 milliGRAM(s) Oral every 6 hours PRN Temp greater or equal to 38C (100.4F), Mild Pain (1 - 3)  aluminum hydroxide/magnesium hydroxide/simethicone Suspension 30 milliLiter(s) Oral every 4 hours PRN Dyspepsia  artificial  tears Solution 1 Drop(s) Both EYES every 4 hours PRN dry eyes syndrome  bisacodyl Suppository 10 milliGRAM(s) Rectal daily PRN Constipation  chlorhexidine 0.12% Liquid 15 milliLiter(s) Oral Mucosa two times a day PRN gum inflammation  gabapentin 100 milliGRAM(s) Oral three times a day PRN anxiety  haloperidol     Tablet 0.5 milliGRAM(s) Oral every 6 hours PRN agitation  haloperidol    Injectable 0.5 milliGRAM(s) IntraMuscular once PRN severe agitation  meclizine 12.5 milliGRAM(s) Oral every 8 hours PRN Dizziness  metoprolol tartrate 25 milliGRAM(s) Oral daily PRN Palpitations due to Anxiety  ondansetron   Disintegrating Tablet 4 milliGRAM(s) Oral every 8 hours PRN Nausea and/or Vomiting  polyethylene glycol 3350 17 Gram(s) Oral two times a day PRN constipation

## 2024-05-09 NOTE — BH INPATIENT PSYCHIATRY PROGRESS NOTE - NSBHFUPINTERVALHXFT_PSY_A_CORE
Patient relates that she still had an episode this morning however was less than the previous day. Continues working with psychology team. Anxious about any further medication changes however agreeable to keep current morning dose split of Klonopin for another day. Keeping her mood log. No other issues elicited.

## 2024-05-10 PROCEDURE — 90834 PSYTX W PT 45 MINUTES: CPT

## 2024-05-10 PROCEDURE — 99232 SBSQ HOSP IP/OBS MODERATE 35: CPT | Mod: GC

## 2024-05-10 RX ADMIN — Medication 0.25 MILLIGRAM(S): at 12:40

## 2024-05-10 RX ADMIN — Medication 50 MILLIGRAM(S): at 06:00

## 2024-05-10 RX ADMIN — CHLORHEXIDINE GLUCONATE 15 MILLILITER(S): 213 SOLUTION TOPICAL at 22:56

## 2024-05-10 RX ADMIN — Medication 20 MILLIGRAM(S): at 09:14

## 2024-05-10 RX ADMIN — Medication 3 MILLIGRAM(S): at 20:46

## 2024-05-10 RX ADMIN — Medication 50 MILLIGRAM(S): at 14:56

## 2024-05-10 RX ADMIN — ONDANSETRON 4 MILLIGRAM(S): 8 TABLET, FILM COATED ORAL at 10:53

## 2024-05-10 RX ADMIN — ATORVASTATIN CALCIUM 10 MILLIGRAM(S): 80 TABLET, FILM COATED ORAL at 20:47

## 2024-05-10 RX ADMIN — Medication 0.5 MILLIGRAM(S): at 17:29

## 2024-05-10 RX ADMIN — PANTOPRAZOLE SODIUM 40 MILLIGRAM(S): 20 TABLET, DELAYED RELEASE ORAL at 06:00

## 2024-05-10 RX ADMIN — NORTRIPTYLINE HYDROCHLORIDE 75 MILLIGRAM(S): 10 CAPSULE ORAL at 20:46

## 2024-05-10 RX ADMIN — Medication 50 MILLIGRAM(S): at 09:14

## 2024-05-10 RX ADMIN — MIRTAZAPINE 15 MILLIGRAM(S): 45 TABLET, ORALLY DISINTEGRATING ORAL at 20:46

## 2024-05-10 RX ADMIN — Medication 25 MILLIGRAM(S): at 20:46

## 2024-05-10 RX ADMIN — Medication 0.25 MILLIGRAM(S): at 06:00

## 2024-05-10 RX ADMIN — Medication 1 TABLET(S): at 09:14

## 2024-05-10 RX ADMIN — GABAPENTIN 100 MILLIGRAM(S): 400 CAPSULE ORAL at 06:00

## 2024-05-10 NOTE — BH INPATIENT PSYCHIATRY PROGRESS NOTE - NSBHCHARTREVIEWVS_PSY_A_CORE FT
Vital Signs Last 24 Hrs  T(C): 36.4 (05-10-24 @ 05:49), Max: 36.8 (05-09-24 @ 19:53)  T(F): 97.6 (05-10-24 @ 05:49), Max: 98.2 (05-09-24 @ 19:53)  HR: --  BP: --  BP(mean): --  RR: 18 (05-09-24 @ 19:53) (18 - 18)  SpO2: 93% (05-10-24 @ 05:49) (93% - 98%)    Orthostatic VS  05-10-24 @ 08:32  Lying BP: 139/69 HR: 83  Sitting BP: 133/74 HR: 89  Standing BP: --/-- HR: --  Site: --  Mode: --  Orthostatic VS  05-10-24 @ 05:49  Lying BP: 120/70 HR: 96  Sitting BP: 120/57 HR: 100  Standing BP: --/-- HR: --  Site: --  Mode: --  Orthostatic VS  05-09-24 @ 19:53  Lying BP: --/-- HR: --  Sitting BP: 135/83 HR: 94  Standing BP: --/-- HR: --  Site: --  Mode: --  Orthostatic VS  05-09-24 @ 05:26  Lying BP: 131/78 HR: 94  Sitting BP: 132/80 HR: 100  Standing BP: --/-- HR: --  Site: upper right arm  Mode: electronic  Orthostatic VS  05-08-24 @ 19:35  Lying BP: --/-- HR: --  Sitting BP: 131/80 HR: 108  Standing BP: --/-- HR: --  Site: --  Mode: --

## 2024-05-10 NOTE — BH INPATIENT PSYCHIATRY PROGRESS NOTE - NSBHASSESSSUMMFT_PSY_ALL_CORE
73 yr old female, , domiciled, and retired. premorbidly ambulant (not needing assists) and iADL/ bADL independent. with background hx of MDD and ANSON; has multiple psych admissions to [facility] (most recently Jan 2024 on [facility]), had prior SA by overdosing on pills (11/2019) following death of parents, presented to ED with somatic complaints and reports of ?passive suicidal ideation, had exhaustive w/u, now admitted voluntarily to [facility]  Routine checks, no suicidal ideations  Continue nortriptyline 50mg po qhs for depression - will hold off increase due to orthostasis today  Continue klonopin 0.5mg po bid and 0.5mg po qhs for anxiety; gabapentin prn for anxiety  Continue mirtazapine 15mg po qhs and melatonin 3mg po qhs for insomnia  Increased lyrica to 50mg po daily and 25mg po q3pm and 9pm - off label for anxiety  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 Requesting metoprolol titration despite recently complaining of dizziness.  4/30: Ruminative thoughts regarding perceived rejection and stressor persist. Medication titration/ adjustment pending further assessment.   05/01: Somatic complaints and preservative thoughts about rejection persists. Integration of brief bedside psychotherapy during rounds with limit setting due to exacerbation of anxiety especially when fixated on her medication regimen. Medication titration to be approached slowly to minimize cumulative adverse effects.  05/02: No significant change. Open to individual therapy thus will continue in combination with medication management.   05/03: No change in current treatment plan. Will continue to encourage patient in engaging with treatment recommendations.  5/6: Minimal improvement in anxiety/depression. Nortriptyline to be increased from 60 to 75 mg daily.  5/7: Tolerating medication changes. Will consider further increase in dose of Nortriptyline. Adjustments made to dosing fo Klonopin due to complaint of morning drowsiness on current dose of 0.5mg   5/8: Increased 3pm lyrica to 50mg. Will continue monitoring response to medication.   5/9: Able to manage anxious episode this morning without medication adjustment. Will continue to encourage incorporation of therapy with meds.  5/10: Slight improvement in anxiety. More receptive to psychology sessions. Will consider titration of nortriptyline dose on Monday.     PLAN:  Psychotropic medications:  - Continue on Nortriptyline  75 mg qHS for depressive symptoms - will titrate actively as tolerated by this anxious somatically preoccupied patient  - Continue Remeron 15 mg qHS for depressive symptoms and sleep  - Continue on Pregabalin 50 mg daily and q15:00, 25 mg q21:00 for anxiety  - Continue Klonopin 0.5 mg at q18:00 for anxiety  - Klonopin 0.2mg @ Q6am & Q12pm daily for anxiety  HLD: atorvastatin 10 mg qHS  GERD: Protonix 40 mg daily  HTN: continue metoprolol 50mg q06:00, 25 mg qHS, and 25 mg PRN; patient states that her cardiologist prescribed this for palpitations from anxiety by (allow addition of Lasix)  LE edema: restart Lasix 20 mg every other day  Dizziness: MRI wnl; meclizine 12.5 mg PRN; possible BPPV; Flexeril 5 mg po qHS PRN recommended by Neurology

## 2024-05-10 NOTE — BH PSYCHOLOGY - CLINICIAN PSYCHOTHERAPY NOTE - NSBHPSYCHOLNARRATIVE_PSY_A_CORE FT
The patient was seen individually at the team's request and with her consent. Speech was of normal rate and volume and there were no verbal irregularities. The patient's mood was dysthymic and anxious, and their affect was normal and mood congruent. She was coherent and logical; her content focused on her level of anxiety and depression, her marriage, and the impact of the loss of her parents. The patient denied current suicidal ideation.    We observed that despite anxiety, she had been high functioning until the death of her parents several years ago. 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 Their deaths occasioned a role shift for her from competent caretaker to orphaned daughter/child with a concomitant drop in self-esteem and self-efficacy. Although the patient acknowledged the above, it did not empower her. She attributed some of her fluctuating moods to the marital relationship which has been characterized by intermittent but intense conflict; nevertheless, she estimated that her marriage is positive “80%” of the time.    The patient reported tolerating the increase in Nortriptyline (from 50mg to 75mg) and sensed some improvement in mood. She has been keeping a record of her mood throughout the day, which shows a pattern of best mood in the evening hours, improving mood in the mornings, and anxiety still tending to surge in the afternoon. We reviewed ways she could try to manage her mood on her own. She was interested in using the tablet meditation aleks, and the writer then provided her with one.    Pt was engaged and receptive to treatment and looking forward to continuing psychotherapy. Psychology will continue to follow pt while on the unit.

## 2024-05-10 NOTE — BH INPATIENT PSYCHIATRY PROGRESS NOTE - CURRENT MEDICATION
MEDICATIONS  (STANDING):  atorvastatin 10 milliGRAM(s) Oral at bedtime  clonazePAM  Tablet 0.5 milliGRAM(s) Oral <User Schedule>  clonazePAM  Tablet 0.25 milliGRAM(s) Oral <User Schedule>  furosemide    Tablet 20 milliGRAM(s) Oral <User Schedule>  melatonin. 3 milliGRAM(s) Oral at bedtime  metoprolol tartrate 50 milliGRAM(s) Oral <User Schedule>  metoprolol tartrate 25 milliGRAM(s) Oral at bedtime  mirtazapine 15 milliGRAM(s) Oral at bedtime  multivitamin 1 Tablet(s) Oral daily  nortriptyline 75 milliGRAM(s) Oral at bedtime  pantoprazole    Tablet 40 milliGRAM(s) Oral before breakfast  pregabalin 50 milliGRAM(s) Oral <User Schedule>  pregabalin 25 milliGRAM(s) Oral <User Schedule>    MEDICATIONS  (PRN):  acetaminophen     Tablet .. 650 milliGRAM(s) Oral every 6 hours PRN Temp greater or equal to 38C (100.4F), Mild Pain (1 - 3)  aluminum hydroxide/magnesium hydroxide/simethicone Suspension 30 milliLiter(s) Oral every 4 hours PRN Dyspepsia  artificial  tears Solution 1 Drop(s) Both EYES every 4 hours PRN dry eyes syndrome  bisacodyl Suppository 10 milliGRAM(s) Rectal daily PRN Constipation  chlorhexidine 0.12% Liquid 15 milliLiter(s) Oral Mucosa two times a day PRN gum inflammation  gabapentin 100 milliGRAM(s) Oral three times a day PRN anxiety  haloperidol     Tablet 0.5 milliGRAM(s) Oral every 6 hours PRN agitation  haloperidol    Injectable 0.5 milliGRAM(s) IntraMuscular once PRN severe agitation  meclizine 12.5 milliGRAM(s) Oral every 8 hours PRN Dizziness  metoprolol tartrate 25 milliGRAM(s) Oral daily PRN Palpitations due to Anxiety  ondansetron   Disintegrating Tablet 4 milliGRAM(s) Oral every 8 hours PRN Nausea and/or Vomiting  polyethylene glycol 3350 17 Gram(s) Oral two times a day PRN constipation

## 2024-05-10 NOTE — BH INPATIENT PSYCHIATRY PROGRESS NOTE - NSBHFUPINTERVALHXFT_PSY_A_CORE
As per nursing report, patient took a couple of prn with her morning medications. On current assessment, relates that she feels better this morning as she combined her prn with her standing medications. Still feels that it is going to take sometime for her to feel better. Not too happy that her  did not call her this morning considering it's her 74th birthday. Expressed that her anxiety is not getting worse and has been using her mood log where she documents staying mostly at 5 especially towards the evening. As per patient, 1 is very anxious and a bad day while 10 is a very good day without anxiety. Relates that she is hopeful to get to a 6 next week. Continues to work with the psychology team and has been trying to implement the coping skills discussed. Thought a bit upset with her , happy hat he is coming this evening. No other issues elicited. As per nursing report, patient took a couple of prn with her morning medications. On current assessment, relates that she feels better this morning as she combined her prn with her standing medications. Still feels that it is going to take sometime for her to feel better. Not too happy that her  did not call her this morning considering it's her 74th birthday. Expressed that her anxiety is not getting worse and has been using her mood log where she documents staying mostly at 5 especially towards the evening. As per patient, 1 is very anxious and a bad day while 10 is a very good day without anxiety. Relates that she is hopeful to get to a 6 next week. Continues to work with the psychology team and has been trying to implement the coping skills discussed. Thought a bit upset with her , happy that he is coming this evening. No other issues elicited.

## 2024-05-10 NOTE — BH INPATIENT PSYCHIATRY PROGRESS NOTE - NSBHMETABOLIC_PSY_ALL_CORE_FT
BMI: BMI (kg/m2): 38.3 (04-16-24 @ 19:07)  HbA1c: A1C with Estimated Average Glucose Result: 5.8 % (01-11-24 @ 08:00)    Glucose:   BP: --Vital Signs Last 24 Hrs  T(C): 36.7 (05-09-24 @ 05:26), Max: 36.8 (05-08-24 @ 19:35)  T(F): 98 (05-09-24 @ 05:26), Max: 98.3 (05-08-24 @ 19:35)  HR: 114 (05-09-24 @ 16:40) (114 - 114)  BP: --  BP(mean): --  RR: 18 (05-09-24 @ 05:26) (18 - 18)  SpO2: --    Orthostatic VS  05-09-24 @ 05:26  Lying BP: 131/78 HR: 94  Sitting BP: 132/80 HR: 100  Standing BP: --/-- HR: --  Site: upper right arm  Mode: electronic  Orthostatic VS  05-08-24 @ 19:35  Lying BP: --/-- HR: --  Sitting BP: 131/80 HR: 108  Standing BP: --/-- HR: --  Site: --  Mode: --  Orthostatic VS  05-08-24 @ 05:41  Lying BP: 124/75 HR: 94  Sitting BP: 127/68 HR: 100  Standing BP: --/-- HR: --  Site: --  Mode: --  Orthostatic VS  05-07-24 @ 19:50  Lying BP: --/-- HR: --  Sitting BP: 124/71 HR: 83  Standing BP: 129/85 HR: 94  Site: --  Mode: --    Lipid Panel: Date/Time: 04-05-24 @ 05:56  Cholesterol, Serum: 176  LDL Cholesterol Calculated: 97  HDL Cholesterol, Serum: 39  Total Cholesterol/HDL Ration Measurement: --  Triglycerides, Serum: 200

## 2024-05-11 RX ADMIN — Medication 25 MILLIGRAM(S): at 21:10

## 2024-05-11 RX ADMIN — Medication 0.5 MILLIGRAM(S): at 17:33

## 2024-05-11 RX ADMIN — CHLORHEXIDINE GLUCONATE 15 MILLILITER(S): 213 SOLUTION TOPICAL at 21:08

## 2024-05-11 RX ADMIN — PANTOPRAZOLE SODIUM 40 MILLIGRAM(S): 20 TABLET, DELAYED RELEASE ORAL at 06:46

## 2024-05-11 RX ADMIN — Medication 1 DROP(S): at 21:08

## 2024-05-11 RX ADMIN — ATORVASTATIN CALCIUM 10 MILLIGRAM(S): 80 TABLET, FILM COATED ORAL at 21:08

## 2024-05-11 RX ADMIN — NORTRIPTYLINE HYDROCHLORIDE 75 MILLIGRAM(S): 10 CAPSULE ORAL at 21:08

## 2024-05-11 RX ADMIN — Medication 0.25 MILLIGRAM(S): at 06:23

## 2024-05-11 RX ADMIN — Medication 1 TABLET(S): at 08:59

## 2024-05-11 RX ADMIN — Medication 50 MILLIGRAM(S): at 06:23

## 2024-05-11 RX ADMIN — Medication 50 MILLIGRAM(S): at 15:18

## 2024-05-11 RX ADMIN — Medication 0.25 MILLIGRAM(S): at 12:50

## 2024-05-11 RX ADMIN — MIRTAZAPINE 15 MILLIGRAM(S): 45 TABLET, ORALLY DISINTEGRATING ORAL at 21:08

## 2024-05-11 RX ADMIN — Medication 50 MILLIGRAM(S): at 08:59

## 2024-05-11 RX ADMIN — Medication 3 MILLIGRAM(S): at 21:08

## 2024-05-11 RX ADMIN — Medication 25 MILLIGRAM(S): at 21:08

## 2024-05-11 RX ADMIN — GABAPENTIN 100 MILLIGRAM(S): 400 CAPSULE ORAL at 06:22

## 2024-05-11 RX ADMIN — Medication 650 MILLIGRAM(S): at 15:24

## 2024-05-12 RX ADMIN — Medication 25 MILLIGRAM(S): at 21:26

## 2024-05-12 RX ADMIN — PANTOPRAZOLE SODIUM 40 MILLIGRAM(S): 20 TABLET, DELAYED RELEASE ORAL at 05:50

## 2024-05-12 RX ADMIN — CHLORHEXIDINE GLUCONATE 15 MILLILITER(S): 213 SOLUTION TOPICAL at 21:26

## 2024-05-12 RX ADMIN — Medication 50 MILLIGRAM(S): at 08:58

## 2024-05-12 RX ADMIN — Medication 25 MILLIGRAM(S): at 21:32

## 2024-05-12 RX ADMIN — MIRTAZAPINE 15 MILLIGRAM(S): 45 TABLET, ORALLY DISINTEGRATING ORAL at 21:26

## 2024-05-12 RX ADMIN — Medication 1 TABLET(S): at 08:57

## 2024-05-12 RX ADMIN — Medication 50 MILLIGRAM(S): at 05:50

## 2024-05-12 RX ADMIN — Medication 0.5 MILLIGRAM(S): at 18:00

## 2024-05-12 RX ADMIN — Medication 50 MILLIGRAM(S): at 15:25

## 2024-05-12 RX ADMIN — NORTRIPTYLINE HYDROCHLORIDE 75 MILLIGRAM(S): 10 CAPSULE ORAL at 21:25

## 2024-05-12 RX ADMIN — Medication 0.25 MILLIGRAM(S): at 05:50

## 2024-05-12 RX ADMIN — Medication 3 MILLIGRAM(S): at 21:26

## 2024-05-12 RX ADMIN — GABAPENTIN 100 MILLIGRAM(S): 400 CAPSULE ORAL at 05:50

## 2024-05-12 RX ADMIN — Medication 0.25 MILLIGRAM(S): at 12:49

## 2024-05-12 RX ADMIN — Medication 1 DROP(S): at 21:28

## 2024-05-12 RX ADMIN — ATORVASTATIN CALCIUM 10 MILLIGRAM(S): 80 TABLET, FILM COATED ORAL at 21:26

## 2024-05-13 PROCEDURE — 99232 SBSQ HOSP IP/OBS MODERATE 35: CPT | Mod: GC

## 2024-05-13 RX ORDER — CLONAZEPAM 1 MG
0.5 TABLET ORAL
Refills: 0 | Status: DISCONTINUED | OUTPATIENT
Start: 2024-05-13 | End: 2024-05-20

## 2024-05-13 RX ORDER — CLONAZEPAM 1 MG
0.25 TABLET ORAL
Refills: 0 | Status: DISCONTINUED | OUTPATIENT
Start: 2024-05-13 | End: 2024-05-15

## 2024-05-13 RX ORDER — NORTRIPTYLINE HYDROCHLORIDE 10 MG/1
100 CAPSULE ORAL AT BEDTIME
Refills: 0 | Status: DISCONTINUED | OUTPATIENT
Start: 2024-05-13 | End: 2024-05-31

## 2024-05-13 RX ADMIN — Medication 50 MILLIGRAM(S): at 05:50

## 2024-05-13 RX ADMIN — MIRTAZAPINE 15 MILLIGRAM(S): 45 TABLET, ORALLY DISINTEGRATING ORAL at 20:57

## 2024-05-13 RX ADMIN — Medication 20 MILLIGRAM(S): at 08:58

## 2024-05-13 RX ADMIN — Medication 50 MILLIGRAM(S): at 15:55

## 2024-05-13 RX ADMIN — Medication 25 MILLIGRAM(S): at 20:57

## 2024-05-13 RX ADMIN — Medication 1 TABLET(S): at 08:58

## 2024-05-13 RX ADMIN — CHLORHEXIDINE GLUCONATE 15 MILLILITER(S): 213 SOLUTION TOPICAL at 20:58

## 2024-05-13 RX ADMIN — Medication 3 MILLIGRAM(S): at 20:57

## 2024-05-13 RX ADMIN — ONDANSETRON 4 MILLIGRAM(S): 8 TABLET, FILM COATED ORAL at 17:30

## 2024-05-13 RX ADMIN — NORTRIPTYLINE HYDROCHLORIDE 100 MILLIGRAM(S): 10 CAPSULE ORAL at 20:57

## 2024-05-13 RX ADMIN — ATORVASTATIN CALCIUM 10 MILLIGRAM(S): 80 TABLET, FILM COATED ORAL at 20:57

## 2024-05-13 RX ADMIN — Medication 0.25 MILLIGRAM(S): at 05:50

## 2024-05-13 RX ADMIN — Medication 0.5 MILLIGRAM(S): at 17:30

## 2024-05-13 RX ADMIN — PANTOPRAZOLE SODIUM 40 MILLIGRAM(S): 20 TABLET, DELAYED RELEASE ORAL at 05:50

## 2024-05-13 RX ADMIN — Medication 1 DROP(S): at 20:59

## 2024-05-13 RX ADMIN — Medication 0.25 MILLIGRAM(S): at 13:26

## 2024-05-13 RX ADMIN — Medication 25 MILLIGRAM(S): at 20:58

## 2024-05-13 RX ADMIN — Medication 50 MILLIGRAM(S): at 08:58

## 2024-05-13 NOTE — BH PSYCHOLOGY - CLINICIAN PSYCHOTHERAPY NOTE - NSBHPSYCHOLNARRATIVE_PSY_A_CORE FT
The patient was seen individually at the team's request and with her consent. Speech was of normal rate and volume and there were no verbal irregularities. The patient's mood was dysthymic and anxious, and affect was normal, and mood congruent. She was coherent and logical; her content focused on her feelings of medication management, building confidence and discharge planning. The patient denied current suicidal ideation.    The pt discussed that she is having another change to her medication management, and she shared that she as feeling scared about the change. Trainee and pt explored what she was worried about and challenged some of her negative believes about the medication change. Pt shard that over the weekend she was really fatigued and tired and she attributed this to being "over medicated". However, the pt was able to say that after speaking with the MD's and making an adjustment, she was feeling much better. The pt shared that she has not requested any medication pnr, despite it being available to her. Trainee and pt reflected about how that is a good sign ,and indicates that she has been feeling better and that she has been tolerating her anxiety symptoms better. Pt and trainee reflected on the ways in which she can also support her improvement post discharge in addition to having the right medication. Pt shared that she would like to go to the [employer], go on more walks with her , engage in more activities outside the house. Pt shared how this might be challenging for her as her depression and anxiety can be too overwhelming and disable her from engaging in the things she enjoys. Trainee and pt discussed creating a schedule where she plan events in advance and tries to stick to the routine even when he might not be feeling her best, knowing that her plans are in place to help her feel better. Pt was engaged and receptive to treatment and looking forward to continuing psychotherapy with psychology. Psychology will continue to follow pt while on the unit.

## 2024-05-13 NOTE — BH INPATIENT PSYCHIATRY PROGRESS NOTE - CURRENT MEDICATION
MEDICATIONS  (STANDING):  atorvastatin 10 milliGRAM(s) Oral at bedtime  clonazePAM  Tablet 0.5 milliGRAM(s) Oral <User Schedule>  clonazePAM  Tablet 0.25 milliGRAM(s) Oral <User Schedule>  furosemide    Tablet 20 milliGRAM(s) Oral <User Schedule>  melatonin. 3 milliGRAM(s) Oral at bedtime  metoprolol tartrate 50 milliGRAM(s) Oral <User Schedule>  metoprolol tartrate 25 milliGRAM(s) Oral at bedtime  mirtazapine 15 milliGRAM(s) Oral at bedtime  multivitamin 1 Tablet(s) Oral daily  nortriptyline 75 milliGRAM(s) Oral at bedtime  pantoprazole    Tablet 40 milliGRAM(s) Oral before breakfast  pregabalin 50 milliGRAM(s) Oral <User Schedule>  pregabalin 25 milliGRAM(s) Oral <User Schedule>    MEDICATIONS  (PRN):  acetaminophen     Tablet .. 650 milliGRAM(s) Oral every 6 hours PRN Temp greater or equal to 38C (100.4F), Mild Pain (1 - 3)  aluminum hydroxide/magnesium hydroxide/simethicone Suspension 30 milliLiter(s) Oral every 4 hours PRN Dyspepsia  artificial  tears Solution 1 Drop(s) Both EYES every 4 hours PRN dry eyes syndrome  bisacodyl Suppository 10 milliGRAM(s) Rectal daily PRN Constipation  chlorhexidine 0.12% Liquid 15 milliLiter(s) Oral Mucosa two times a day PRN gum inflammation  gabapentin 100 milliGRAM(s) Oral three times a day PRN anxiety  haloperidol     Tablet 0.5 milliGRAM(s) Oral every 6 hours PRN agitation  haloperidol    Injectable 0.5 milliGRAM(s) IntraMuscular once PRN severe agitation  meclizine 12.5 milliGRAM(s) Oral every 8 hours PRN Dizziness  metoprolol tartrate 25 milliGRAM(s) Oral daily PRN Palpitations due to Anxiety  ondansetron   Disintegrating Tablet 4 milliGRAM(s) Oral every 8 hours PRN Nausea and/or Vomiting  polyethylene glycol 3350 17 Gram(s) Oral two times a day PRN constipation   MEDICATIONS  (STANDING):  atorvastatin 10 milliGRAM(s) Oral at bedtime  clonazePAM  Tablet 0.5 milliGRAM(s) Oral <User Schedule>  clonazePAM  Tablet 0.25 milliGRAM(s) Oral <User Schedule>  furosemide    Tablet 20 milliGRAM(s) Oral <User Schedule>  melatonin. 3 milliGRAM(s) Oral at bedtime  metoprolol tartrate 50 milliGRAM(s) Oral <User Schedule>  metoprolol tartrate 25 milliGRAM(s) Oral at bedtime  mirtazapine 15 milliGRAM(s) Oral at bedtime  multivitamin 1 Tablet(s) Oral daily  nortriptyline 100 milliGRAM(s) Oral at bedtime  pantoprazole    Tablet 40 milliGRAM(s) Oral before breakfast  pregabalin 50 milliGRAM(s) Oral <User Schedule>  pregabalin 25 milliGRAM(s) Oral <User Schedule>    MEDICATIONS  (PRN):  acetaminophen     Tablet .. 650 milliGRAM(s) Oral every 6 hours PRN Temp greater or equal to 38C (100.4F), Mild Pain (1 - 3)  aluminum hydroxide/magnesium hydroxide/simethicone Suspension 30 milliLiter(s) Oral every 4 hours PRN Dyspepsia  artificial  tears Solution 1 Drop(s) Both EYES every 4 hours PRN dry eyes syndrome  bisacodyl Suppository 10 milliGRAM(s) Rectal daily PRN Constipation  chlorhexidine 0.12% Liquid 15 milliLiter(s) Oral Mucosa two times a day PRN gum inflammation  gabapentin 100 milliGRAM(s) Oral three times a day PRN anxiety  haloperidol     Tablet 0.5 milliGRAM(s) Oral every 6 hours PRN agitation  haloperidol    Injectable 0.5 milliGRAM(s) IntraMuscular once PRN severe agitation  meclizine 12.5 milliGRAM(s) Oral every 8 hours PRN Dizziness  metoprolol tartrate 25 milliGRAM(s) Oral daily PRN Palpitations due to Anxiety  ondansetron   Disintegrating Tablet 4 milliGRAM(s) Oral every 8 hours PRN Nausea and/or Vomiting  polyethylene glycol 3350 17 Gram(s) Oral two times a day PRN constipation

## 2024-05-13 NOTE — CHART NOTE - NSCHARTNOTEFT_GEN_A_CORE
called about LE edema  likely PVD - recent TTE wnl, no hx of CHF  on Lasix every other day  would not inc diuretics for pvd - best treatment for PVD is leg elevation and stockings  over diuresis can lead to intravascular depletion and CAMRON  consider LE duplex but doubt clot as edema reported b/l  monitor renal fx sporadically on lasix

## 2024-05-13 NOTE — BH INPATIENT PSYCHIATRY PROGRESS NOTE - NSBHCHARTREVIEWVS_PSY_A_CORE FT
Vital Signs Last 24 Hrs  T(C): 36.6 (05-13-24 @ 05:48), Max: 36.9 (05-12-24 @ 20:59)  T(F): 97.9 (05-13-24 @ 05:48), Max: 98.5 (05-12-24 @ 20:59)  HR: --  BP: 136/76 (05-12-24 @ 20:59) (136/76 - 136/76)  BP(mean): 97 (05-12-24 @ 20:59) (97 - 97)  RR: --  SpO2: 95% (05-13-24 @ 05:48) (95% - 95%)    Orthostatic VS  05-13-24 @ 05:48  Lying BP: --/-- HR: --  Sitting BP: 142/69 HR: 90  Standing BP: 124/83 HR: 93  Site: --  Mode: --  Orthostatic VS  05-12-24 @ 06:04  Lying BP: 129/71 HR: 87  Sitting BP: 123/68 HR: 91  Standing BP: --/-- HR: --  Site: --  Mode: --   Vital Signs Last 24 Hrs  T(C): 36.6 (05-13-24 @ 05:48), Max: 36.6 (05-13-24 @ 05:48)  T(F): 97.9 (05-13-24 @ 05:48), Max: 97.9 (05-13-24 @ 05:48)  HR: 95 (05-13-24 @ 19:53) (95 - 95)  BP: 131/72 (05-13-24 @ 19:53) (131/72 - 131/72)  BP(mean): --  RR: --  SpO2: 95% (05-13-24 @ 05:48) (95% - 95%)    Orthostatic VS  05-13-24 @ 05:48  Lying BP: --/-- HR: --  Sitting BP: 142/69 HR: 90  Standing BP: 124/83 HR: 93  Site: --  Mode: --  Orthostatic VS  05-12-24 @ 06:04  Lying BP: 129/71 HR: 87  Sitting BP: 123/68 HR: 91  Standing BP: --/-- HR: --  Site: --  Mode: --

## 2024-05-13 NOTE — BH INPATIENT PSYCHIATRY PROGRESS NOTE - NSBHMETABOLIC_PSY_ALL_CORE_FT
BMI: BMI (kg/m2): 38.3 (04-16-24 @ 19:07)  HbA1c: A1C with Estimated Average Glucose Result: 5.8 % (01-11-24 @ 08:00)    Glucose:   BP: 136/76 (05-12-24 @ 20:59) (136/73 - 136/76)Vital Signs Last 24 Hrs  T(C): 36.6 (05-13-24 @ 05:48), Max: 36.9 (05-12-24 @ 20:59)  T(F): 97.9 (05-13-24 @ 05:48), Max: 98.5 (05-12-24 @ 20:59)  HR: --  BP: 136/76 (05-12-24 @ 20:59) (136/76 - 136/76)  BP(mean): 97 (05-12-24 @ 20:59) (97 - 97)  RR: --  SpO2: 95% (05-13-24 @ 05:48) (95% - 95%)    Orthostatic VS  05-13-24 @ 05:48  Lying BP: --/-- HR: --  Sitting BP: 142/69 HR: 90  Standing BP: 124/83 HR: 93  Site: --  Mode: --  Orthostatic VS  05-12-24 @ 06:04  Lying BP: 129/71 HR: 87  Sitting BP: 123/68 HR: 91  Standing BP: --/-- HR: --  Site: --  Mode: --    Lipid Panel: Date/Time: 04-05-24 @ 05:56  Cholesterol, Serum: 176  LDL Cholesterol Calculated: 97  HDL Cholesterol, Serum: 39  Total Cholesterol/HDL Ration Measurement: --  Triglycerides, Serum: 200   BMI: BMI (kg/m2): 38.3 (04-16-24 @ 19:07)  HbA1c: A1C with Estimated Average Glucose Result: 5.8 % (01-11-24 @ 08:00)    Glucose:   BP: 131/72 (05-13-24 @ 19:53) (131/72 - 136/76)Vital Signs Last 24 Hrs  T(C): 36.6 (05-13-24 @ 05:48), Max: 36.6 (05-13-24 @ 05:48)  T(F): 97.9 (05-13-24 @ 05:48), Max: 97.9 (05-13-24 @ 05:48)  HR: 95 (05-13-24 @ 19:53) (95 - 95)  BP: 131/72 (05-13-24 @ 19:53) (131/72 - 131/72)  BP(mean): --  RR: --  SpO2: 95% (05-13-24 @ 05:48) (95% - 95%)    Orthostatic VS  05-13-24 @ 05:48  Lying BP: --/-- HR: --  Sitting BP: 142/69 HR: 90  Standing BP: 124/83 HR: 93  Site: --  Mode: --  Orthostatic VS  05-12-24 @ 06:04  Lying BP: 129/71 HR: 87  Sitting BP: 123/68 HR: 91  Standing BP: --/-- HR: --  Site: --  Mode: --    Lipid Panel: Date/Time: 04-05-24 @ 05:56  Cholesterol, Serum: 176  LDL Cholesterol Calculated: 97  HDL Cholesterol, Serum: 39  Total Cholesterol/HDL Ration Measurement: --  Triglycerides, Serum: 200

## 2024-05-13 NOTE — BH INPATIENT PSYCHIATRY PROGRESS NOTE - NSBHASSESSSUMMFT_PSY_ALL_CORE
73 yr old female, , domiciled, and retired. premorbidly ambulant (not needing assists) and iADL/ bADL independent. with background hx of MDD and ANSON; has multiple psych admissions to [facility] (most recently Jan 2024 on [facility]), had prior SA by overdosing on pills (11/2019) following death of parents, presented to ED with somatic complaints and reports of ?passive suicidal ideation, had exhaustive w/u, now admitted voluntarily to [facility]  Routine checks, no suicidal ideations  Continue nortriptyline 50mg po qhs for depression - will hold off increase due to orthostasis today  Continue klonopin 0.5mg po bid and 0.5mg po qhs for anxiety; gabapentin prn for anxiety  Continue mirtazapine 15mg po qhs and melatonin 3mg po qhs for insomnia  Increased lyrica to 50mg po daily and 25mg po q3pm and 9pm - off label for anxiety  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 Requesting metoprolol titration despite recently complaining of dizziness.  4/30: Ruminative thoughts regarding perceived rejection and stressor persist. Medication titration/ adjustment pending further assessment.   05/01: Somatic complaints and preservative thoughts about rejection persists. Integration of brief bedside psychotherapy during rounds with limit setting due to exacerbation of anxiety especially when fixated on her medication regimen. Medication titration to be approached slowly to minimize cumulative adverse effects.  05/02: No significant change. Open to individual therapy thus will continue in combination with medication management.   05/03: No change in current treatment plan. Will continue to encourage patient in engaging with treatment recommendations.  5/6: Minimal improvement in anxiety/depression. Nortriptyline to be increased from 60 to 75 mg daily.  5/7: Tolerating medication changes. Will consider further increase in dose of Nortriptyline. Adjustments made to dosing fo Klonopin due to complaint of morning drowsiness on current dose of 0.5mg   5/8: Increased 3pm lyrica to 50mg. Will continue monitoring response to medication.   5/9: Able to manage anxious episode this morning without medication adjustment. Will continue to encourage incorporation of therapy with meds.  5/10: Slight improvement in anxiety. More receptive to psychology sessions. Will consider titration of nortriptyline dose on Monday.   5/13: Increasing dose of Nortriptyline to 100mg daily. Plan to review rest of anti anxiolytic regimen over the week.     PLAN:  Psychotropic medications:  - Increased Nortriptyline to 100mg qHS for depressive symptoms - will titrate actively as tolerated by patient who remains anxious and somatically preoccupied with medication side effect profile.   - Continue Remeron 15 mg qHS for depressive symptoms and sleep  - Continue on Pregabalin 50 mg daily and q15:00, 25 mg q21:00 for anxiety  - Continue Klonopin 0.5 mg at q18:00 for anxiety  - Klonopin 0.25mg @ Q6am & Q12pm daily for anxiety  HLD: atorvastatin 10 mg qHS  GERD: Protonix 40 mg daily  HTN: continue metoprolol 50mg q06:00, 25 mg qHS, and 25 mg PRN; patient states that her cardiologist prescribed this for palpitations from anxiety by (allow addition of Lasix)  LE edema: restart Lasix 20 mg every other day  Dizziness: MRI wnl; meclizine 12.5 mg PRN; possible BPPV; Flexeril 5 mg po qHS PRN recommended by Neurology

## 2024-05-13 NOTE — BH INPATIENT PSYCHIATRY PROGRESS NOTE - NSBHFUPINTERVALHXFT_PSY_A_CORE
No significant issue reported over the weekend. On current assessment, states she feels a bit drowsy with her morning medications but anxious about any changes as she fears she might get very anxious in the morning and not have something to take for it. Psychoeducation provided. Encouraged to continue with psychology and avoid requesting prn anti anxiolytics as well as consideration to standing dose of Lyrica and Klonopin. Acknowledged receipt and relates she will consider recommendation. Nursing staff pointed out patient's b/l LE edema which on physical examination is +1 with no significant interval change. As per hospitalist recommendation on EMR, no further increase in Lasix with recommendation for compression stockings and foot elevation. Discussed with patient regarding increasing dose of Nortriptyline which she agreed to. Patient has been using her mood chart with more 6 (improved anxiety) documented over the weekend. No other issues elicited.

## 2024-05-14 PROCEDURE — 99232 SBSQ HOSP IP/OBS MODERATE 35: CPT | Mod: GC

## 2024-05-14 PROCEDURE — 90834 PSYTX W PT 45 MINUTES: CPT

## 2024-05-14 RX ADMIN — Medication 25 MILLIGRAM(S): at 22:12

## 2024-05-14 RX ADMIN — Medication 50 MILLIGRAM(S): at 08:46

## 2024-05-14 RX ADMIN — Medication 0.25 MILLIGRAM(S): at 06:27

## 2024-05-14 RX ADMIN — MIRTAZAPINE 15 MILLIGRAM(S): 45 TABLET, ORALLY DISINTEGRATING ORAL at 22:11

## 2024-05-14 RX ADMIN — Medication 1 DROP(S): at 22:27

## 2024-05-14 RX ADMIN — Medication 50 MILLIGRAM(S): at 18:41

## 2024-05-14 RX ADMIN — Medication 0.25 MILLIGRAM(S): at 12:31

## 2024-05-14 RX ADMIN — Medication 50 MILLIGRAM(S): at 06:27

## 2024-05-14 RX ADMIN — Medication 25 MILLIGRAM(S): at 22:15

## 2024-05-14 RX ADMIN — ATORVASTATIN CALCIUM 10 MILLIGRAM(S): 80 TABLET, FILM COATED ORAL at 22:11

## 2024-05-14 RX ADMIN — CHLORHEXIDINE GLUCONATE 15 MILLILITER(S): 213 SOLUTION TOPICAL at 22:12

## 2024-05-14 RX ADMIN — Medication 3 MILLIGRAM(S): at 22:11

## 2024-05-14 RX ADMIN — PANTOPRAZOLE SODIUM 40 MILLIGRAM(S): 20 TABLET, DELAYED RELEASE ORAL at 06:28

## 2024-05-14 RX ADMIN — Medication 0.5 MILLIGRAM(S): at 17:35

## 2024-05-14 RX ADMIN — NORTRIPTYLINE HYDROCHLORIDE 100 MILLIGRAM(S): 10 CAPSULE ORAL at 22:10

## 2024-05-14 RX ADMIN — Medication 1 TABLET(S): at 08:42

## 2024-05-14 NOTE — BH PSYCHOLOGY - CLINICIAN PSYCHOTHERAPY NOTE - NSBHPSYCHOLNARRATIVE_PSY_A_CORE FT
The patient was seen individually at the team's request and with her consent. Speech was of normal rate and volume, and there were no verbal irregularities. The patient's mood was calm and moderately dysthymic, affect was constricted, and mood was congruent. She was coherent and logical; her content focused on her feelings of lethargy and sedation. The patient denied current suicidal ideation.    The patient reviewed her medications again, noting that she was not taking all the PRNs that were available to her. She stated that she no longer felt anxiety or panic but felt tired and lethargic. She is unsure whether to attribute this to her increased Nortriptyline or to the combinations of medications she receives. The patient appeared more relaxed and content than before and is thinking about readiness to return home, although she still has a way to go to her liking. She still enjoys the visits of her  and participates in group activities on the unit. She reported trying to employ relaxation and stress tolerance methods over the weekend.     The patient was engaged and appreciative of psychotherapy. Psychology will continue to follow pt while on the unit.

## 2024-05-14 NOTE — BH INPATIENT PSYCHIATRY PROGRESS NOTE - NSBHASSESSSUMMFT_PSY_ALL_CORE
73 yr old female, , domiciled, and retired. premorbidly ambulant (not needing assists) and iADL/ bADL independent. with background hx of MDD and ANSON; has multiple psych admissions to [facility] (most recently Jan 2024 on [facility]), had prior SA by overdosing on pills (11/2019) following death of parents, presented to ED with somatic complaints and reports of ?passive suicidal ideation, had exhaustive w/u, now admitted voluntarily to [facility]  Routine checks, no suicidal ideations  Continue nortriptyline 50mg po qhs for depression - will hold off increase due to orthostasis today  Continue klonopin 0.5mg po bid and 0.5mg po qhs for anxiety; gabapentin prn for anxiety  Continue mirtazapine 15mg po qhs and melatonin 3mg po qhs for insomnia  Increased lyrica to 50mg po daily and 25mg po q3pm and 9pm - off label for anxiety  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 Requesting metoprolol titration despite recently complaining of dizziness.  4/30: Ruminative thoughts regarding perceived rejection and stressor persist. Medication titration/ adjustment pending further assessment.   05/01: Somatic complaints and preservative thoughts about rejection persists. Integration of brief bedside psychotherapy during rounds with limit setting due to exacerbation of anxiety especially when fixated on her medication regimen. Medication titration to be approached slowly to minimize cumulative adverse effects.  05/02: No significant change. Open to individual therapy thus will continue in combination with medication management.   05/03: No change in current treatment plan. Will continue to encourage patient in engaging with treatment recommendations.  5/6: Minimal improvement in anxiety/depression. Nortriptyline to be increased from 60 to 75 mg daily.  5/7: Tolerating medication changes. Will consider further increase in dose of Nortriptyline. Adjustments made to dosing fo Klonopin due to complaint of morning drowsiness on current dose of 0.5mg   5/8: Increased 3pm lyrica to 50mg. Will continue monitoring response to medication.   5/9: Able to manage anxious episode this morning without medication adjustment. Will continue to encourage incorporation of therapy with meds.  5/10: Slight improvement in anxiety. More receptive to psychology sessions. Will consider titration of nortriptyline dose on Monday.   5/13: Increasing dose of Nortriptyline to 100mg daily. Plan to review rest of anti anxiolytic regimen over the week.   5/14: medication management in progress. Incorporating psychotherapy with increased receptivity by patient. Will continue to monitor & engage patient in treatment planning.    PLAN:  Psychotropic medications:  - Continue on [id number] qHS for depressive symptoms - will titrate actively as tolerated by patient who remains anxious and somatically preoccupied with medication side effect profile.   - Continue Remeron 15 mg qHS for depressive symptoms and sleep  - Continue on Pregabalin 50 mg daily and q15:00, 25 mg q21:00 for anxiety  - Continue Klonopin 0.5 mg at q18:00 for anxiety  - Klonopin 0.25mg @ Q6am & Q12pm daily for anxiety  HLD: atorvastatin 10 mg qHS  GERD: Protonix 40 mg daily  HTN: continue metoprolol 50mg q06:00, 25 mg qHS, and 25 mg PRN; patient states that her cardiologist prescribed this for palpitations from anxiety by (allow addition of Lasix)  LE edema: restart Lasix 20 mg every other day  Dizziness: MRI wnl; meclizine 12.5 mg PRN; possible BPPV; Flexeril 5 mg po qHS PRN recommended by Neurology

## 2024-05-14 NOTE — BH INPATIENT PSYCHIATRY PROGRESS NOTE - NSBHFUPINTERVALHXFT_PSY_A_CORE
No significant event overnight. On current assessment, patient was quick to inform writer that she did not use prn gabapentin. Encouraged to continue working with team regarding medication adjustment to decrease number needed for therapeutic effect. Patient states she does not want any changes to Remeron as she has been on it for a very long time an believes that the main medication that helps her sleep. Does not endorse any side effects with regards to the dosage adjustment of Nortriptyline however worried that she is the only one on it and wondered whether the team are well versed in it's utilization. Psychoeducation and brief psychotherapy provided bedside. patient was encouraged to continue working with psychology and therapy staff as she relates that she has made some progress in those sessions. No constipation, medication side effect or significant physical complaint endorsed.

## 2024-05-14 NOTE — BH INPATIENT PSYCHIATRY PROGRESS NOTE - NSBHMETABOLIC_PSY_ALL_CORE_FT
BMI: BMI (kg/m2): 38.3 (04-16-24 @ 19:07)  HbA1c: A1C with Estimated Average Glucose Result: 5.8 % (01-11-24 @ 08:00)    Glucose:   BP: 131/72 (05-13-24 @ 19:53) (131/72 - 136/76)Vital Signs Last 24 Hrs  T(C): 36.3 (05-14-24 @ 05:43), Max: 36.3 (05-14-24 @ 05:43)  T(F): 97.3 (05-14-24 @ 05:43), Max: 97.3 (05-14-24 @ 05:43)  HR: 95 (05-13-24 @ 19:53) (95 - 95)  BP: 131/72 (05-13-24 @ 19:53) (131/72 - 131/72)  BP(mean): --  RR: --  SpO2: --    Orthostatic VS  05-14-24 @ 05:43  Lying BP: 139/70 HR: 97  Sitting BP: 134/72 HR: 101  Standing BP: --/-- HR: --  Site: --  Mode: --  Orthostatic VS  05-13-24 @ 05:48  Lying BP: --/-- HR: --  Sitting BP: 142/69 HR: 90  Standing BP: 124/83 HR: 93  Site: --  Mode: --    Lipid Panel: Date/Time: 04-05-24 @ 05:56  Cholesterol, Serum: 176  LDL Cholesterol Calculated: 97  HDL Cholesterol, Serum: 39  Total Cholesterol/HDL Ration Measurement: --  Triglycerides, Serum: 200

## 2024-05-14 NOTE — BH INPATIENT PSYCHIATRY PROGRESS NOTE - NSBHCHARTREVIEWVS_PSY_A_CORE FT
Vital Signs Last 24 Hrs  T(C): 36.3 (05-14-24 @ 05:43), Max: 36.3 (05-14-24 @ 05:43)  T(F): 97.3 (05-14-24 @ 05:43), Max: 97.3 (05-14-24 @ 05:43)  HR: 95 (05-13-24 @ 19:53) (95 - 95)  BP: 131/72 (05-13-24 @ 19:53) (131/72 - 131/72)  BP(mean): --  RR: --  SpO2: --    Orthostatic VS  05-14-24 @ 05:43  Lying BP: 139/70 HR: 97  Sitting BP: 134/72 HR: 101  Standing BP: --/-- HR: --  Site: --  Mode: --  Orthostatic VS  05-13-24 @ 05:48  Lying BP: --/-- HR: --  Sitting BP: 142/69 HR: 90  Standing BP: 124/83 HR: 93  Site: --  Mode: --

## 2024-05-14 NOTE — BH INPATIENT PSYCHIATRY PROGRESS NOTE - CURRENT MEDICATION
MEDICATIONS  (STANDING):  atorvastatin 10 milliGRAM(s) Oral at bedtime  clonazePAM  Tablet 0.25 milliGRAM(s) Oral <User Schedule>  clonazePAM  Tablet 0.5 milliGRAM(s) Oral <User Schedule>  furosemide    Tablet 20 milliGRAM(s) Oral <User Schedule>  melatonin. 3 milliGRAM(s) Oral at bedtime  metoprolol tartrate 25 milliGRAM(s) Oral at bedtime  metoprolol tartrate 50 milliGRAM(s) Oral <User Schedule>  mirtazapine 15 milliGRAM(s) Oral at bedtime  multivitamin 1 Tablet(s) Oral daily  nortriptyline 100 milliGRAM(s) Oral at bedtime  pantoprazole    Tablet 40 milliGRAM(s) Oral before breakfast  pregabalin 50 milliGRAM(s) Oral <User Schedule>  pregabalin 25 milliGRAM(s) Oral <User Schedule>    MEDICATIONS  (PRN):  acetaminophen     Tablet .. 650 milliGRAM(s) Oral every 6 hours PRN Temp greater or equal to 38C (100.4F), Mild Pain (1 - 3)  aluminum hydroxide/magnesium hydroxide/simethicone Suspension 30 milliLiter(s) Oral every 4 hours PRN Dyspepsia  artificial  tears Solution 1 Drop(s) Both EYES every 4 hours PRN dry eyes syndrome  bisacodyl Suppository 10 milliGRAM(s) Rectal daily PRN Constipation  chlorhexidine 0.12% Liquid 15 milliLiter(s) Oral Mucosa two times a day PRN gum inflammation  gabapentin 100 milliGRAM(s) Oral three times a day PRN anxiety  haloperidol     Tablet 0.5 milliGRAM(s) Oral every 6 hours PRN agitation  haloperidol    Injectable 0.5 milliGRAM(s) IntraMuscular once PRN severe agitation  meclizine 12.5 milliGRAM(s) Oral every 8 hours PRN Dizziness  metoprolol tartrate 25 milliGRAM(s) Oral daily PRN Palpitations due to Anxiety  ondansetron   Disintegrating Tablet 4 milliGRAM(s) Oral every 8 hours PRN Nausea and/or Vomiting  polyethylene glycol 3350 17 Gram(s) Oral two times a day PRN constipation   MEDICATIONS  (STANDING):  atorvastatin 10 milliGRAM(s) Oral at bedtime  clonazePAM  Tablet 0.25 milliGRAM(s) Oral <User Schedule>  clonazePAM  Tablet 0.5 milliGRAM(s) Oral <User Schedule>  furosemide    Tablet 20 milliGRAM(s) Oral <User Schedule>  melatonin. 3 milliGRAM(s) Oral at bedtime  metoprolol tartrate 50 milliGRAM(s) Oral <User Schedule>  metoprolol tartrate 25 milliGRAM(s) Oral at bedtime  mirtazapine 15 milliGRAM(s) Oral at bedtime  multivitamin 1 Tablet(s) Oral daily  nortriptyline 100 milliGRAM(s) Oral at bedtime  pantoprazole    Tablet 40 milliGRAM(s) Oral before breakfast  pregabalin 25 milliGRAM(s) Oral <User Schedule>  pregabalin 50 milliGRAM(s) Oral <User Schedule>  pregabalin 50 milliGRAM(s) Oral once    MEDICATIONS  (PRN):  acetaminophen     Tablet .. 650 milliGRAM(s) Oral every 6 hours PRN Temp greater or equal to 38C (100.4F), Mild Pain (1 - 3)  aluminum hydroxide/magnesium hydroxide/simethicone Suspension 30 milliLiter(s) Oral every 4 hours PRN Dyspepsia  artificial  tears Solution 1 Drop(s) Both EYES every 4 hours PRN dry eyes syndrome  bisacodyl Suppository 10 milliGRAM(s) Rectal daily PRN Constipation  chlorhexidine 0.12% Liquid 15 milliLiter(s) Oral Mucosa two times a day PRN gum inflammation  gabapentin 100 milliGRAM(s) Oral three times a day PRN anxiety  haloperidol     Tablet 0.5 milliGRAM(s) Oral every 6 hours PRN agitation  haloperidol    Injectable 0.5 milliGRAM(s) IntraMuscular once PRN severe agitation  meclizine 12.5 milliGRAM(s) Oral every 8 hours PRN Dizziness  metoprolol tartrate 25 milliGRAM(s) Oral daily PRN Palpitations due to Anxiety  ondansetron   Disintegrating Tablet 4 milliGRAM(s) Oral every 8 hours PRN Nausea and/or Vomiting  polyethylene glycol 3350 17 Gram(s) Oral two times a day PRN constipation

## 2024-05-15 LAB
ALBUMIN SERPL ELPH-MCNC: 4.4 G/DL — SIGNIFICANT CHANGE UP (ref 3.3–5)
ALP SERPL-CCNC: 128 U/L — HIGH (ref 40–120)
ALT FLD-CCNC: 20 U/L — SIGNIFICANT CHANGE UP (ref 4–33)
ANION GAP SERPL CALC-SCNC: 13 MMOL/L — SIGNIFICANT CHANGE UP (ref 7–14)
AST SERPL-CCNC: 16 U/L — SIGNIFICANT CHANGE UP (ref 4–32)
BASOPHILS # BLD AUTO: 0.06 K/UL — SIGNIFICANT CHANGE UP (ref 0–0.2)
BASOPHILS NFR BLD AUTO: 0.5 % — SIGNIFICANT CHANGE UP (ref 0–2)
BILIRUB SERPL-MCNC: 0.3 MG/DL — SIGNIFICANT CHANGE UP (ref 0.2–1.2)
BUN SERPL-MCNC: 11 MG/DL — SIGNIFICANT CHANGE UP (ref 7–23)
CALCIUM SERPL-MCNC: 9.5 MG/DL — SIGNIFICANT CHANGE UP (ref 8.4–10.5)
CHLORIDE SERPL-SCNC: 101 MMOL/L — SIGNIFICANT CHANGE UP (ref 98–107)
CO2 SERPL-SCNC: 26 MMOL/L — SIGNIFICANT CHANGE UP (ref 22–31)
CREAT SERPL-MCNC: 0.75 MG/DL — SIGNIFICANT CHANGE UP (ref 0.5–1.3)
EGFR: 83 ML/MIN/1.73M2 — SIGNIFICANT CHANGE UP
EOSINOPHIL # BLD AUTO: 0.23 K/UL — SIGNIFICANT CHANGE UP (ref 0–0.5)
EOSINOPHIL NFR BLD AUTO: 2.1 % — SIGNIFICANT CHANGE UP (ref 0–6)
GLUCOSE SERPL-MCNC: 91 MG/DL — SIGNIFICANT CHANGE UP (ref 70–99)
HCT VFR BLD CALC: 46.3 % — HIGH (ref 34.5–45)
HGB BLD-MCNC: 15 G/DL — SIGNIFICANT CHANGE UP (ref 11.5–15.5)
IANC: 6.63 K/UL — SIGNIFICANT CHANGE UP (ref 1.8–7.4)
IMM GRANULOCYTES NFR BLD AUTO: 0.4 % — SIGNIFICANT CHANGE UP (ref 0–0.9)
LYMPHOCYTES # BLD AUTO: 3.58 K/UL — HIGH (ref 1–3.3)
LYMPHOCYTES # BLD AUTO: 32.2 % — SIGNIFICANT CHANGE UP (ref 13–44)
MAGNESIUM SERPL-MCNC: 2.2 MG/DL — SIGNIFICANT CHANGE UP (ref 1.6–2.6)
MCHC RBC-ENTMCNC: 27.9 PG — SIGNIFICANT CHANGE UP (ref 27–34)
MCHC RBC-ENTMCNC: 32.4 GM/DL — SIGNIFICANT CHANGE UP (ref 32–36)
MCV RBC AUTO: 86.2 FL — SIGNIFICANT CHANGE UP (ref 80–100)
MONOCYTES # BLD AUTO: 0.58 K/UL — SIGNIFICANT CHANGE UP (ref 0–0.9)
MONOCYTES NFR BLD AUTO: 5.2 % — SIGNIFICANT CHANGE UP (ref 2–14)
NEUTROPHILS # BLD AUTO: 6.63 K/UL — SIGNIFICANT CHANGE UP (ref 1.8–7.4)
NEUTROPHILS NFR BLD AUTO: 59.6 % — SIGNIFICANT CHANGE UP (ref 43–77)
NRBC # BLD: 0 /100 WBCS — SIGNIFICANT CHANGE UP (ref 0–0)
NRBC # FLD: 0 K/UL — SIGNIFICANT CHANGE UP (ref 0–0)
PHOSPHATE SERPL-MCNC: 3.2 MG/DL — SIGNIFICANT CHANGE UP (ref 2.5–4.5)
PLATELET # BLD AUTO: 273 K/UL — SIGNIFICANT CHANGE UP (ref 150–400)
POTASSIUM SERPL-MCNC: 3.9 MMOL/L — SIGNIFICANT CHANGE UP (ref 3.5–5.3)
POTASSIUM SERPL-SCNC: 3.9 MMOL/L — SIGNIFICANT CHANGE UP (ref 3.5–5.3)
PROT SERPL-MCNC: 6.9 G/DL — SIGNIFICANT CHANGE UP (ref 6–8.3)
RBC # BLD: 5.37 M/UL — HIGH (ref 3.8–5.2)
RBC # FLD: 12.9 % — SIGNIFICANT CHANGE UP (ref 10.3–14.5)
SODIUM SERPL-SCNC: 140 MMOL/L — SIGNIFICANT CHANGE UP (ref 135–145)
WBC # BLD: 11.12 K/UL — HIGH (ref 3.8–10.5)
WBC # FLD AUTO: 11.12 K/UL — HIGH (ref 3.8–10.5)

## 2024-05-15 PROCEDURE — 99232 SBSQ HOSP IP/OBS MODERATE 35: CPT | Mod: GC

## 2024-05-15 PROCEDURE — 93010 ELECTROCARDIOGRAM REPORT: CPT

## 2024-05-15 RX ORDER — CLONAZEPAM 1 MG
0.25 TABLET ORAL DAILY
Refills: 0 | Status: DISCONTINUED | OUTPATIENT
Start: 2024-05-16 | End: 2024-05-21

## 2024-05-15 RX ORDER — CLONAZEPAM 1 MG
0.25 TABLET ORAL
Refills: 0 | Status: DISCONTINUED | OUTPATIENT
Start: 2024-05-16 | End: 2024-05-20

## 2024-05-15 RX ORDER — SENNA PLUS 8.6 MG/1
2 TABLET ORAL AT BEDTIME
Refills: 0 | Status: DISCONTINUED | OUTPATIENT
Start: 2024-05-15 | End: 2024-05-31

## 2024-05-15 RX ADMIN — SENNA PLUS 2 TABLET(S): 8.6 TABLET ORAL at 21:35

## 2024-05-15 RX ADMIN — Medication 0.5 MILLIGRAM(S): at 17:46

## 2024-05-15 RX ADMIN — Medication 20 MILLIGRAM(S): at 09:16

## 2024-05-15 RX ADMIN — PANTOPRAZOLE SODIUM 40 MILLIGRAM(S): 20 TABLET, DELAYED RELEASE ORAL at 06:37

## 2024-05-15 RX ADMIN — Medication 3 MILLIGRAM(S): at 21:35

## 2024-05-15 RX ADMIN — Medication 1 TABLET(S): at 09:17

## 2024-05-15 RX ADMIN — Medication 25 MILLIGRAM(S): at 21:35

## 2024-05-15 RX ADMIN — Medication 12.5 MILLIGRAM(S): at 12:27

## 2024-05-15 RX ADMIN — Medication 1 DROP(S): at 22:00

## 2024-05-15 RX ADMIN — Medication 50 MILLIGRAM(S): at 06:36

## 2024-05-15 RX ADMIN — POLYETHYLENE GLYCOL 3350 17 GRAM(S): 17 POWDER, FOR SOLUTION ORAL at 20:00

## 2024-05-15 RX ADMIN — CHLORHEXIDINE GLUCONATE 15 MILLILITER(S): 213 SOLUTION TOPICAL at 22:00

## 2024-05-15 RX ADMIN — MIRTAZAPINE 15 MILLIGRAM(S): 45 TABLET, ORALLY DISINTEGRATING ORAL at 21:35

## 2024-05-15 RX ADMIN — Medication 0.25 MILLIGRAM(S): at 12:24

## 2024-05-15 RX ADMIN — Medication 50 MILLIGRAM(S): at 09:16

## 2024-05-15 RX ADMIN — ATORVASTATIN CALCIUM 10 MILLIGRAM(S): 80 TABLET, FILM COATED ORAL at 21:34

## 2024-05-15 RX ADMIN — Medication 0.25 MILLIGRAM(S): at 06:37

## 2024-05-15 RX ADMIN — NORTRIPTYLINE HYDROCHLORIDE 100 MILLIGRAM(S): 10 CAPSULE ORAL at 21:35

## 2024-05-15 RX ADMIN — POLYETHYLENE GLYCOL 3350 17 GRAM(S): 17 POWDER, FOR SOLUTION ORAL at 12:27

## 2024-05-15 NOTE — BH INPATIENT PSYCHIATRY PROGRESS NOTE - CURRENT MEDICATION
MEDICATIONS  (STANDING):  atorvastatin 10 milliGRAM(s) Oral at bedtime  clonazePAM  Tablet 0.5 milliGRAM(s) Oral <User Schedule>  clonazePAM  Tablet 0.25 milliGRAM(s) Oral <User Schedule>  furosemide    Tablet 20 milliGRAM(s) Oral <User Schedule>  melatonin. 3 milliGRAM(s) Oral at bedtime  metoprolol tartrate 50 milliGRAM(s) Oral <User Schedule>  metoprolol tartrate 25 milliGRAM(s) Oral at bedtime  mirtazapine 15 milliGRAM(s) Oral at bedtime  multivitamin 1 Tablet(s) Oral daily  nortriptyline 100 milliGRAM(s) Oral at bedtime  pantoprazole    Tablet 40 milliGRAM(s) Oral before breakfast  pregabalin 50 milliGRAM(s) Oral <User Schedule>  pregabalin 25 milliGRAM(s) Oral <User Schedule>    MEDICATIONS  (PRN):  acetaminophen     Tablet .. 650 milliGRAM(s) Oral every 6 hours PRN Temp greater or equal to 38C (100.4F), Mild Pain (1 - 3)  aluminum hydroxide/magnesium hydroxide/simethicone Suspension 30 milliLiter(s) Oral every 4 hours PRN Dyspepsia  artificial  tears Solution 1 Drop(s) Both EYES every 4 hours PRN dry eyes syndrome  bisacodyl Suppository 10 milliGRAM(s) Rectal daily PRN Constipation  chlorhexidine 0.12% Liquid 15 milliLiter(s) Oral Mucosa two times a day PRN gum inflammation  gabapentin 100 milliGRAM(s) Oral three times a day PRN anxiety  haloperidol     Tablet 0.5 milliGRAM(s) Oral every 6 hours PRN agitation  haloperidol    Injectable 0.5 milliGRAM(s) IntraMuscular once PRN severe agitation  meclizine 12.5 milliGRAM(s) Oral every 8 hours PRN Dizziness  metoprolol tartrate 25 milliGRAM(s) Oral daily PRN Palpitations due to Anxiety  ondansetron   Disintegrating Tablet 4 milliGRAM(s) Oral every 8 hours PRN Nausea and/or Vomiting  polyethylene glycol 3350 17 Gram(s) Oral two times a day PRN constipation   MEDICATIONS  (STANDING):  atorvastatin 10 milliGRAM(s) Oral at bedtime  clonazePAM  Tablet 0.5 milliGRAM(s) Oral <User Schedule>  furosemide    Tablet 20 milliGRAM(s) Oral <User Schedule>  melatonin. 3 milliGRAM(s) Oral at bedtime  metoprolol tartrate 25 milliGRAM(s) Oral at bedtime  metoprolol tartrate 50 milliGRAM(s) Oral <User Schedule>  mirtazapine 15 milliGRAM(s) Oral at bedtime  multivitamin 1 Tablet(s) Oral daily  nortriptyline 100 milliGRAM(s) Oral at bedtime  pantoprazole    Tablet 40 milliGRAM(s) Oral before breakfast  pregabalin 25 milliGRAM(s) Oral <User Schedule>  senna 2 Tablet(s) Oral at bedtime    MEDICATIONS  (PRN):  acetaminophen     Tablet .. 650 milliGRAM(s) Oral every 6 hours PRN Temp greater or equal to 38C (100.4F), Mild Pain (1 - 3)  aluminum hydroxide/magnesium hydroxide/simethicone Suspension 30 milliLiter(s) Oral every 4 hours PRN Dyspepsia  artificial  tears Solution 1 Drop(s) Both EYES every 4 hours PRN dry eyes syndrome  bisacodyl Suppository 10 milliGRAM(s) Rectal daily PRN Constipation  chlorhexidine 0.12% Liquid 15 milliLiter(s) Oral Mucosa two times a day PRN gum inflammation  gabapentin 100 milliGRAM(s) Oral three times a day PRN anxiety  haloperidol     Tablet 0.5 milliGRAM(s) Oral every 6 hours PRN agitation  haloperidol    Injectable 0.5 milliGRAM(s) IntraMuscular once PRN severe agitation  meclizine 12.5 milliGRAM(s) Oral every 8 hours PRN Dizziness  metoprolol tartrate 25 milliGRAM(s) Oral daily PRN Palpitations due to Anxiety  ondansetron   Disintegrating Tablet 4 milliGRAM(s) Oral every 8 hours PRN Nausea and/or Vomiting  polyethylene glycol 3350 17 Gram(s) Oral two times a day PRN constipation

## 2024-05-15 NOTE — BH INPATIENT PSYCHIATRY PROGRESS NOTE - NSBHFUPINTERVALHXFT_PSY_A_CORE
no significant issues overnight. Patient continues to engage with treatment team. Compliant with medications. Tolerating adjustment in medication dose changes.  Requiring less prn. Still endorsing feeling anxious later in the day. Changed to more comfortable shoes. No worsening of bilateral LE edema. Aware that she has Antiembolism socks. No other issues elicited. no significant issues overnight. Patient continues to engage with treatment team. Compliant with medications. Tolerating adjustment in medication dose changes.  Requiring less prn. Agreeable to changes to Lyrica and Klonopin dosing. Still endorsing feeling anxious later in the day. Changed to more comfortable shoes. No worsening of bilateral LE edema. Aware that she has Antiembolism socks. Senna added for complaint of constipation. No other issues elicited.

## 2024-05-15 NOTE — BH INPATIENT PSYCHIATRY PROGRESS NOTE - NSBHASSESSSUMMFT_PSY_ALL_CORE
73 yr old female, , domiciled, and retired. premorbidly ambulant (not needing assists) and iADL/ bADL independent. with background hx of MDD and ANSON; has multiple psych admissions to [facility] (most recently Jan 2024 on [facility]), had prior SA by overdosing on pills (11/2019) following death of parents, presented to ED with somatic complaints and reports of ?passive suicidal ideation, had exhaustive w/u, now admitted voluntarily to [facility]  Routine checks, no suicidal ideations  Continue nortriptyline 50mg po qhs for depression - will hold off increase due to orthostasis today  Continue klonopin 0.5mg po bid and 0.5mg po qhs for anxiety; gabapentin prn for anxiety  Continue mirtazapine 15mg po qhs and melatonin 3mg po qhs for insomnia  Increased lyrica to 50mg po daily and 25mg po q3pm and 9pm - off label for anxiety  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 Requesting metoprolol titration despite recently complaining of dizziness.  4/30: Ruminative thoughts regarding perceived rejection and stressor persist. Medication titration/ adjustment pending further assessment.   05/01: Somatic complaints and preservative thoughts about rejection persists. Integration of brief bedside psychotherapy during rounds with limit setting due to exacerbation of anxiety especially when fixated on her medication regimen. Medication titration to be approached slowly to minimize cumulative adverse effects.  05/02: No significant change. Open to individual therapy thus will continue in combination with medication management.   05/03: No change in current treatment plan. Will continue to encourage patient in engaging with treatment recommendations.  5/6: Minimal improvement in anxiety/depression. Nortriptyline to be increased from 60 to 75 mg daily.  5/7: Tolerating medication changes. Will consider further increase in dose of Nortriptyline. Adjustments made to dosing fo Klonopin due to complaint of morning drowsiness on current dose of 0.5mg   5/8: Increased 3pm lyrica to 50mg. Will continue monitoring response to medication.   5/9: Able to manage anxious episode this morning without medication adjustment. Will continue to encourage incorporation of therapy with meds.  5/10: Slight improvement in anxiety. More receptive to psychology sessions. Will consider titration of nortriptyline dose on Monday.   5/13: Increasing dose of Nortriptyline to 100mg daily. Plan to review rest of anti anxiolytic regimen over the week.   5/14: medication management in progress. Incorporating psychotherapy with increased receptivity by patient. Will continue to monitor & engage patient in treatment planning.  5/15: Tolerating dose adjustment. Still endorsing anxiety and depressive symptoms. Will c/w current dose of Nortriptyline.     PLAN:  Psychotropic medications:  - Continue on [id number] qHS for depressive symptoms - will titrate actively as tolerated by patient who remains anxious and somatically preoccupied with medication side effect profile.   - Continue Remeron 15 mg qHS for depressive symptoms and sleep  - Continue on Pregabalin 50 mg daily and q15:00, 25 mg q21:00 for anxiety  - Continue Klonopin 0.5 mg at q18:00 for anxiety  - Klonopin 0.25mg @ Q6am & Q12pm daily for anxiety  HLD: atorvastatin 10 mg qHS  GERD: Protonix 40 mg daily  HTN: continue metoprolol 50mg q06:00, 25 mg qHS, and 25 mg PRN; patient states that her cardiologist prescribed this for palpitations from anxiety by (allow addition of Lasix)  LE edema: restart Lasix 20 mg every other day  Dizziness: MRI wnl; meclizine 12.5 mg PRN; possible BPPV; Flexeril 5 mg po qHS PRN recommended by Neurology 73 yr old female, , domiciled, and retired. premorbidly ambulant (not needing assists) and iADL/ bADL independent. with background hx of MDD and ANSON; has multiple psych admissions to [facility] (most recently Jan 2024 on [facility]), had prior SA by overdosing on pills (11/2019) following death of parents, presented to ED with somatic complaints and reports of ?passive suicidal ideation, had exhaustive w/u, now admitted voluntarily to [facility]  Routine checks, no suicidal ideations  Continue nortriptyline 50mg po qhs for depression - will hold off increase due to orthostasis today  Continue klonopin 0.5mg po bid and 0.5mg po qhs for anxiety; gabapentin prn for anxiety  Continue mirtazapine 15mg po qhs and melatonin 3mg po qhs for insomnia  Increased lyrica to 50mg po daily and 25mg po q3pm and 9pm - off label for anxiety  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 Requesting metoprolol titration despite recently complaining of dizziness.  4/30: Ruminative thoughts regarding perceived rejection and stressor persist. Medication titration/ adjustment pending further assessment.   05/01: Somatic complaints and preservative thoughts about rejection persists. Integration of brief bedside psychotherapy during rounds with limit setting due to exacerbation of anxiety especially when fixated on her medication regimen. Medication titration to be approached slowly to minimize cumulative adverse effects.  05/02: No significant change. Open to individual therapy thus will continue in combination with medication management.   05/03: No change in current treatment plan. Will continue to encourage patient in engaging with treatment recommendations.  5/6: Minimal improvement in anxiety/depression. Nortriptyline to be increased from 60 to 75 mg daily.  5/7: Tolerating medication changes. Will consider further increase in dose of Nortriptyline. Adjustments made to dosing fo Klonopin due to complaint of morning drowsiness on current dose of 0.5mg   5/8: Increased 3pm lyrica to 50mg. Will continue monitoring response to medication.   5/9: Able to manage anxious episode this morning without medication adjustment. Will continue to encourage incorporation of therapy with meds.  5/10: Slight improvement in anxiety. More receptive to psychology sessions. Will consider titration of nortriptyline dose on Monday.   5/13: Increasing dose of Nortriptyline to 100mg daily. Plan to review rest of anti anxiolytic regimen over the week.   5/14: medication management in progress. Incorporating psychotherapy with increased receptivity by patient. Will continue to monitor & engage patient in treatment planning.  5/15: Complaint of constipation for which Senna was added. No urinary retention, tremors, blurry vision or symptoms of anticholinergic effect. Still endorsing anxiety and depressive symptoms. Will c/w current dose of Nortriptyline. Will obtain labs and EKG. Adjusted doses of Lyrica & Klonopin    PLAN:  Psychotropic medications:  - Continue on [id number] qHS for depressive symptoms - will titrate actively as tolerated by patient who remains anxious and somatically preoccupied with medication side effect profile.   - Continue Remeron 15 mg qHS for depressive symptoms and sleep  - Changed Pregabalin to 25mg TID @ 9am, 3pm & 9pm for anxiety.   - Continue Klonopin 0.5 mg at q18:00 for anxiety  - c/w Klonopin 0.25mg @ Q6am for anxiety  - Changed Klonopin 0.25mg scheduled for 12pm to prn.  - Started on Senna for constipation  HLD: atorvastatin 10 mg qHS  GERD: Protonix 40 mg daily  HTN: continue metoprolol 50mg q06:00, 25 mg qHS, and 25 mg PRN; patient states that her cardiologist prescribed this for palpitations from anxiety by (allow addition of Lasix)  LE edema: restart Lasix 20 mg every other day  Dizziness: MRI wnl; meclizine 12.5 mg PRN; possible BPPV; Flexeril 5 mg po qHS PRN recommended by Neurology

## 2024-05-15 NOTE — BH INPATIENT PSYCHIATRY PROGRESS NOTE - NSBHMETABOLIC_PSY_ALL_CORE_FT
BMI: BMI (kg/m2): 38.3 (04-16-24 @ 19:07)  HbA1c: A1C with Estimated Average Glucose Result: 5.8 % (01-11-24 @ 08:00)    Glucose:   BP: 129/84 (05-14-24 @ 19:56) (129/84 - 136/76)Vital Signs Last 24 Hrs  T(C): 36.6 (05-15-24 @ 06:01), Max: 36.6 (05-15-24 @ 06:01)  T(F): 97.8 (05-15-24 @ 06:01), Max: 97.8 (05-15-24 @ 06:01)  HR: 99 (05-14-24 @ 19:56) (99 - 99)  BP: 129/84 (05-14-24 @ 19:56) (129/84 - 129/84)  BP(mean): --  RR: --  SpO2: --    Orthostatic VS  05-15-24 @ 06:01  Lying BP: --/-- HR: --  Sitting BP: 138/80 HR: 95  Standing BP: 135/81 HR: 99  Site: --  Mode: --  Orthostatic VS  05-14-24 @ 05:43  Lying BP: 139/70 HR: 97  Sitting BP: 134/72 HR: 101  Standing BP: --/-- HR: --  Site: --  Mode: --    Lipid Panel: Date/Time: 04-05-24 @ 05:56  Cholesterol, Serum: 176  LDL Cholesterol Calculated: 97  HDL Cholesterol, Serum: 39  Total Cholesterol/HDL Ration Measurement: --  Triglycerides, Serum: 200

## 2024-05-16 PROCEDURE — 99232 SBSQ HOSP IP/OBS MODERATE 35: CPT | Mod: GC

## 2024-05-16 RX ADMIN — Medication 25 MILLIGRAM(S): at 21:25

## 2024-05-16 RX ADMIN — PANTOPRAZOLE SODIUM 40 MILLIGRAM(S): 20 TABLET, DELAYED RELEASE ORAL at 06:41

## 2024-05-16 RX ADMIN — Medication 1 TABLET(S): at 08:52

## 2024-05-16 RX ADMIN — Medication 50 MILLIGRAM(S): at 06:41

## 2024-05-16 RX ADMIN — Medication 0.5 MILLIGRAM(S): at 17:51

## 2024-05-16 RX ADMIN — MIRTAZAPINE 15 MILLIGRAM(S): 45 TABLET, ORALLY DISINTEGRATING ORAL at 21:26

## 2024-05-16 RX ADMIN — ATORVASTATIN CALCIUM 10 MILLIGRAM(S): 80 TABLET, FILM COATED ORAL at 21:26

## 2024-05-16 RX ADMIN — Medication 0.25 MILLIGRAM(S): at 06:41

## 2024-05-16 RX ADMIN — Medication 25 MILLIGRAM(S): at 14:44

## 2024-05-16 RX ADMIN — ONDANSETRON 4 MILLIGRAM(S): 8 TABLET, FILM COATED ORAL at 14:46

## 2024-05-16 RX ADMIN — Medication 25 MILLIGRAM(S): at 08:52

## 2024-05-16 RX ADMIN — SENNA PLUS 2 TABLET(S): 8.6 TABLET ORAL at 21:25

## 2024-05-16 RX ADMIN — CHLORHEXIDINE GLUCONATE 15 MILLILITER(S): 213 SOLUTION TOPICAL at 21:43

## 2024-05-16 RX ADMIN — Medication 1 DROP(S): at 21:43

## 2024-05-16 RX ADMIN — NORTRIPTYLINE HYDROCHLORIDE 100 MILLIGRAM(S): 10 CAPSULE ORAL at 21:24

## 2024-05-16 RX ADMIN — Medication 3 MILLIGRAM(S): at 21:25

## 2024-05-16 NOTE — BH INPATIENT PSYCHIATRY PROGRESS NOTE - NSBHCHARTREVIEWVS_PSY_A_CORE FT
Vital Signs Last 24 Hrs  T(C): 36.3 (05-16-24 @ 05:53), Max: 36.3 (05-16-24 @ 05:53)  T(F): 97.4 (05-16-24 @ 05:53), Max: 97.4 (05-16-24 @ 05:53)  HR: 102 (05-15-24 @ 19:46) (102 - 102)  BP: 142/82 (05-15-24 @ 19:46) (142/82 - 142/82)  BP(mean): --  RR: --  SpO2: 94% (05-16-24 @ 05:53) (94% - 94%)    Orthostatic VS  05-16-24 @ 05:53  Lying BP: 132/72 HR: 87  Sitting BP: 124/73 HR: 92  Standing BP: --/-- HR: --  Site: --  Mode: --  Orthostatic VS  05-15-24 @ 06:01  Lying BP: --/-- HR: --  Sitting BP: 138/80 HR: 95  Standing BP: 135/81 HR: 99  Site: --  Mode: --

## 2024-05-16 NOTE — BH INPATIENT PSYCHIATRY PROGRESS NOTE - NSBHMETABOLIC_PSY_ALL_CORE_FT
BMI: BMI (kg/m2): 38.3 (04-16-24 @ 19:07)  HbA1c: A1C with Estimated Average Glucose Result: 5.8 % (01-11-24 @ 08:00)    Glucose:   BP: 142/82 (05-15-24 @ 19:46) (129/84 - 142/82)Vital Signs Last 24 Hrs  T(C): 36.3 (05-16-24 @ 05:53), Max: 36.3 (05-16-24 @ 05:53)  T(F): 97.4 (05-16-24 @ 05:53), Max: 97.4 (05-16-24 @ 05:53)  HR: 102 (05-15-24 @ 19:46) (102 - 102)  BP: 142/82 (05-15-24 @ 19:46) (142/82 - 142/82)  BP(mean): --  RR: --  SpO2: 94% (05-16-24 @ 05:53) (94% - 94%)    Orthostatic VS  05-16-24 @ 05:53  Lying BP: 132/72 HR: 87  Sitting BP: 124/73 HR: 92  Standing BP: --/-- HR: --  Site: --  Mode: --  Orthostatic VS  05-15-24 @ 06:01  Lying BP: --/-- HR: --  Sitting BP: 138/80 HR: 95  Standing BP: 135/81 HR: 99  Site: --  Mode: --    Lipid Panel: Date/Time: 04-05-24 @ 05:56  Cholesterol, Serum: 176  LDL Cholesterol Calculated: 97  HDL Cholesterol, Serum: 39  Total Cholesterol/HDL Ration Measurement: --  Triglycerides, Serum: 200

## 2024-05-16 NOTE — BH PSYCHOLOGY - CLINICIAN PSYCHOTHERAPY NOTE - NSBHPSYCHOLNARRATIVE_PSY_A_CORE FT
The patient was seen individually at the team's request and with her consent. Speech was of normal rate and volume and there were no verbal irregularities. The patient's mood was dysthymic and anxious, and affect was normal, and mood congruent. She was coherent and logical; her content focused on her feelings of medication management, changes in her energy levels, and mindfulness coping exercise. The patient denied current suicidal ideation.     The pt discussed that she has been feeling more fatigued and tired lately. Pt described that is has been feeling particularly difficult to walk and stand. Trainee provided psychoeducation on the importance of paying attention to her physical changes while also trying to be more active while on the unit to help with strength training so that it could get a little easier for her to walk and stand. Trainee and pt also discussed how previously there was a pt on the unit that would initiate and invite everyone to walk around the unit together; however, since they were discharged it has been hard for the pt to initiate walking around. Trainee and pt explored reasons that the pt has difficulty initiating activities for herself. Trainee and pt explored ways in which would make it easier for her to initiate engaging in activities that benefit her. Trainee and pt came up with a plan that before the next group the pt would ask a pt on the unit that she believes would join her in walking around. Pt shared it would help her to have this scheduled so that she can be more consistent.  Trainee offered encouragement as the pt has participated in various coping skills that have helped her on the unit in addition to the medication and emphasized the importance of these two working together in helping her feel better post discharge. Pt was engaged and receptive to treatment and looking forward to continuing psychotherapy with psychology. Psychology will continue to follow pt while on the unit.

## 2024-05-16 NOTE — BH INPATIENT PSYCHIATRY PROGRESS NOTE - PRN MEDS
MEDICATIONS  (PRN):  acetaminophen     Tablet .. 650 milliGRAM(s) Oral every 6 hours PRN Temp greater or equal to 38C (100.4F), Mild Pain (1 - 3)  aluminum hydroxide/magnesium hydroxide/simethicone Suspension 30 milliLiter(s) Oral every 4 hours PRN Dyspepsia  artificial  tears Solution 1 Drop(s) Both EYES every 4 hours PRN dry eyes syndrome  bisacodyl Suppository 10 milliGRAM(s) Rectal daily PRN Constipation  chlorhexidine 0.12% Liquid 15 milliLiter(s) Oral Mucosa two times a day PRN gum inflammation  clonazePAM  Tablet 0.25 milliGRAM(s) Oral daily PRN anxiety  gabapentin 100 milliGRAM(s) Oral three times a day PRN anxiety  haloperidol     Tablet 0.5 milliGRAM(s) Oral every 6 hours PRN agitation  haloperidol    Injectable 0.5 milliGRAM(s) IntraMuscular once PRN severe agitation  meclizine 12.5 milliGRAM(s) Oral every 8 hours PRN Dizziness  metoprolol tartrate 25 milliGRAM(s) Oral daily PRN Palpitations due to Anxiety  ondansetron   Disintegrating Tablet 4 milliGRAM(s) Oral every 8 hours PRN Nausea and/or Vomiting  polyethylene glycol 3350 17 Gram(s) Oral two times a day PRN constipation

## 2024-05-16 NOTE — BH INPATIENT PSYCHIATRY PROGRESS NOTE - NSBHFUPINTERVALHXFT_PSY_A_CORE
Chart reviewed. No issues overnight. On current assessment, relates feeling less tired compared to the previous day. Able to have a bowel movement after getting dulcolax. Ok with addition of Senna to bowel regimen. Engaging in therapy. Does not report any worsening of anxiety. States that she was informed her labs and EKG are wnl. No other issues elicited.

## 2024-05-16 NOTE — BH INPATIENT PSYCHIATRY PROGRESS NOTE - CURRENT MEDICATION
MEDICATIONS  (STANDING):  atorvastatin 10 milliGRAM(s) Oral at bedtime  clonazePAM  Tablet 0.5 milliGRAM(s) Oral <User Schedule>  clonazePAM  Tablet 0.25 milliGRAM(s) Oral <User Schedule>  furosemide    Tablet 20 milliGRAM(s) Oral <User Schedule>  melatonin. 3 milliGRAM(s) Oral at bedtime  metoprolol tartrate 25 milliGRAM(s) Oral at bedtime  metoprolol tartrate 50 milliGRAM(s) Oral <User Schedule>  mirtazapine 15 milliGRAM(s) Oral at bedtime  multivitamin 1 Tablet(s) Oral daily  nortriptyline 100 milliGRAM(s) Oral at bedtime  pantoprazole    Tablet 40 milliGRAM(s) Oral before breakfast  pregabalin 25 milliGRAM(s) Oral <User Schedule>  pregabalin 25 milliGRAM(s) Oral <User Schedule>  senna 2 Tablet(s) Oral at bedtime    MEDICATIONS  (PRN):  acetaminophen     Tablet .. 650 milliGRAM(s) Oral every 6 hours PRN Temp greater or equal to 38C (100.4F), Mild Pain (1 - 3)  aluminum hydroxide/magnesium hydroxide/simethicone Suspension 30 milliLiter(s) Oral every 4 hours PRN Dyspepsia  artificial  tears Solution 1 Drop(s) Both EYES every 4 hours PRN dry eyes syndrome  bisacodyl Suppository 10 milliGRAM(s) Rectal daily PRN Constipation  chlorhexidine 0.12% Liquid 15 milliLiter(s) Oral Mucosa two times a day PRN gum inflammation  clonazePAM  Tablet 0.25 milliGRAM(s) Oral daily PRN anxiety  gabapentin 100 milliGRAM(s) Oral three times a day PRN anxiety  haloperidol     Tablet 0.5 milliGRAM(s) Oral every 6 hours PRN agitation  haloperidol    Injectable 0.5 milliGRAM(s) IntraMuscular once PRN severe agitation  meclizine 12.5 milliGRAM(s) Oral every 8 hours PRN Dizziness  metoprolol tartrate 25 milliGRAM(s) Oral daily PRN Palpitations due to Anxiety  ondansetron   Disintegrating Tablet 4 milliGRAM(s) Oral every 8 hours PRN Nausea and/or Vomiting  polyethylene glycol 3350 17 Gram(s) Oral two times a day PRN constipation

## 2024-05-16 NOTE — BH INPATIENT PSYCHIATRY PROGRESS NOTE - NSBHASSESSSUMMFT_PSY_ALL_CORE
73 yr old female, , domiciled, and retired. premorbidly ambulant (not needing assists) and iADL/ bADL independent. with background hx of MDD and ANSON; has multiple psych admissions to [facility] (most recently Jan 2024 on [facility]), had prior SA by overdosing on pills (11/2019) following death of parents, presented to ED with somatic complaints and reports of ?passive suicidal ideation, had exhaustive w/u, now admitted voluntarily to [facility]  Routine checks, no suicidal ideations  Continue nortriptyline 50mg po qhs for depression - will hold off increase due to orthostasis today  Continue klonopin 0.5mg po bid and 0.5mg po qhs for anxiety; gabapentin prn for anxiety  Continue mirtazapine 15mg po qhs and melatonin 3mg po qhs for insomnia  Increased lyrica to 50mg po daily and 25mg po q3pm and 9pm - off label for anxiety  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 Requesting metoprolol titration despite recently complaining of dizziness.  4/30: Ruminative thoughts regarding perceived rejection and stressor persist. Medication titration/ adjustment pending further assessment.   05/01: Somatic complaints and preservative thoughts about rejection persists. Integration of brief bedside psychotherapy during rounds with limit setting due to exacerbation of anxiety especially when fixated on her medication regimen. Medication titration to be approached slowly to minimize cumulative adverse effects.  05/02: No significant change. Open to individual therapy thus will continue in combination with medication management.   05/03: No change in current treatment plan. Will continue to encourage patient in engaging with treatment recommendations.  5/6: Minimal improvement in anxiety/depression. Nortriptyline to be increased from 60 to 75 mg daily.  5/7: Tolerating medication changes. Will consider further increase in dose of Nortriptyline. Adjustments made to dosing fo Klonopin due to complaint of morning drowsiness on current dose of 0.5mg   5/8: Increased 3pm lyrica to 50mg. Will continue monitoring response to medication.   5/9: Able to manage anxious episode this morning without medication adjustment. Will continue to encourage incorporation of therapy with meds.  5/10: Slight improvement in anxiety. More receptive to psychology sessions. Will consider titration of nortriptyline dose on Monday.   5/13: Increasing dose of Nortriptyline to 100mg daily. Plan to review rest of anti anxiolytic regimen over the week.   5/14: medication management in progress. Incorporating psychotherapy with increased receptivity by patient. Will continue to monitor & engage patient in treatment planning.  5/15: Complaint of constipation for which Senna was added. No urinary retention, tremors, blurry vision or symptoms of anticholinergic effect. Still endorsing anxiety and depressive symptoms. Will c/w current dose of Nortriptyline. Will obtain labs and EKG. Adjusted doses of Lyrica & Klonopin  5/16: Tolerating medication adjustments. Labs & EKG wnl. No medication adverse effect. Will continue to monitor.    PLAN:  Psychotropic medications:  - Continue on [id number] qHS for depressive symptoms - will titrate actively as tolerated by patient who remains anxious and somatically preoccupied with medication side effect profile.   - Continue Remeron 15 mg qHS for depressive symptoms and sleep  - c/w Pregabalin 25mg TID @ 9am, 3pm & 9pm for anxiety.   - Continue Klonopin 0.5 mg at q18:00 for anxiety  - c/w Klonopin 0.25mg @ Q6am for anxiety  - c/w Klonopin 0.25mg prn daily.  - c/w senna 2tabs qhs for constipation  HLD: atorvastatin 10 mg qHS  GERD: Protonix 40 mg daily  HTN: continue metoprolol 50mg q06:00, 25 mg qHS, and 25 mg PRN; patient states that her cardiologist prescribed this for palpitations from anxiety by (allow addition of Lasix)  LE edema: restart Lasix 20 mg every other day  Dizziness: MRI wnl; meclizine 12.5 mg PRN; possible BPPV; Flexeril 5 mg po qHS PRN recommended by Neurology

## 2024-05-17 PROCEDURE — 99232 SBSQ HOSP IP/OBS MODERATE 35: CPT | Mod: GC

## 2024-05-17 PROCEDURE — 90834 PSYTX W PT 45 MINUTES: CPT

## 2024-05-17 RX ADMIN — Medication 25 MILLIGRAM(S): at 15:05

## 2024-05-17 RX ADMIN — Medication 20 MILLIGRAM(S): at 08:44

## 2024-05-17 RX ADMIN — MIRTAZAPINE 15 MILLIGRAM(S): 45 TABLET, ORALLY DISINTEGRATING ORAL at 21:22

## 2024-05-17 RX ADMIN — Medication 1 DROP(S): at 21:22

## 2024-05-17 RX ADMIN — Medication 1 TABLET(S): at 08:44

## 2024-05-17 RX ADMIN — Medication 50 MILLIGRAM(S): at 05:49

## 2024-05-17 RX ADMIN — PANTOPRAZOLE SODIUM 40 MILLIGRAM(S): 20 TABLET, DELAYED RELEASE ORAL at 08:44

## 2024-05-17 RX ADMIN — CHLORHEXIDINE GLUCONATE 15 MILLILITER(S): 213 SOLUTION TOPICAL at 21:20

## 2024-05-17 RX ADMIN — Medication 3 MILLIGRAM(S): at 21:20

## 2024-05-17 RX ADMIN — Medication 25 MILLIGRAM(S): at 21:21

## 2024-05-17 RX ADMIN — NORTRIPTYLINE HYDROCHLORIDE 100 MILLIGRAM(S): 10 CAPSULE ORAL at 21:21

## 2024-05-17 RX ADMIN — Medication 0.25 MILLIGRAM(S): at 05:49

## 2024-05-17 RX ADMIN — Medication 0.5 MILLIGRAM(S): at 17:15

## 2024-05-17 RX ADMIN — Medication 0.25 MILLIGRAM(S): at 13:09

## 2024-05-17 RX ADMIN — ATORVASTATIN CALCIUM 10 MILLIGRAM(S): 80 TABLET, FILM COATED ORAL at 21:21

## 2024-05-17 RX ADMIN — Medication 25 MILLIGRAM(S): at 08:45

## 2024-05-17 NOTE — BH DISCHARGE NOTE NURSING/SOCIAL WORK/PSYCH REHAB - NSCDUDCCRISIS_PSY_A_CORE
[facility]  1 (224) [facility]LL ([phone])  Text "WELL" to 25960  Website: www.SEA.Seemage/.National Suicide Prevention Lifeline 1 (826) 825-8743/.  [facility]  1 (560) LIFENET (402-4336)/.  [facility]  [address] [id number] [phone]   Hours:  Monday through Friday from 9 AM to 3 PM/988 Suicide and Crisis Lifeline

## 2024-05-17 NOTE — BH DISCHARGE NOTE NURSING/SOCIAL WORK/PSYCH REHAB - NSBHDCADDR1FT_A_CORE
[facility] 2nd floor ACP Bldg.  Driving Navigator address: [id number] [place] [address], [place] [address]

## 2024-05-17 NOTE — BH DISCHARGE NOTE NURSING/SOCIAL WORK/PSYCH REHAB - DISCHARGE INSTRUCTIONS AFTERCARE APPOINTMENTS
In order to check the location, date, or time of your aftercare appointment, please refer to your Discharge Instructions Document given to you upon leaving the hospital.  If you have lost the instructions please call [phone]

## 2024-05-17 NOTE — BH INPATIENT PSYCHIATRY PROGRESS NOTE - NSBHFUPINTERVALHXFT_PSY_A_CORE
Chart reviewed. No issues overnight. On current assessment, worried that the sweating is not due to Pamelor because she knows it is one of it's side effects. Have not been using Neurontin but still wants it left in her regimen. Ramirez snot feel she needs the compression socks. No worsening of b/l LE edema. Appetite and sleep are reportedly adequate. No other issues elicited.

## 2024-05-17 NOTE — BH INPATIENT PSYCHIATRY PROGRESS NOTE - NSBHCHARTREVIEWVS_PSY_A_CORE FT
Vital Signs Last 24 Hrs  T(C): 36.3 (05-17-24 @ 06:40), Max: 36.3 (05-17-24 @ 06:40)  T(F): 97.4 (05-17-24 @ 06:40), Max: 97.4 (05-17-24 @ 06:40)  HR: 102 (05-16-24 @ 19:34) (102 - 102)  BP: 127/68 (05-16-24 @ 19:34) (127/68 - 127/68)  BP(mean): --  RR: 16 (05-17-24 @ 06:40) (16 - 16)  SpO2: 96% (05-17-24 @ 06:40) (96% - 96%)    Orthostatic VS  05-17-24 @ 06:40  Lying BP: --/-- HR: --  Sitting BP: 129/76 HR: 96  Standing BP: 124/73 HR: 99  Site: upper left arm  Mode: electronic  Orthostatic VS  05-16-24 @ 05:53  Lying BP: 132/72 HR: 87  Sitting BP: 124/73 HR: 92  Standing BP: --/-- HR: --  Site: --  Mode: --

## 2024-05-17 NOTE — BH INPATIENT PSYCHIATRY PROGRESS NOTE - NSBHASSESSSUMMFT_PSY_ALL_CORE
73 yr old female, , domiciled, and retired. premorbidly ambulant (not needing assists) and iADL/ bADL independent. with background hx of MDD and ANSON; has multiple psych admissions to [facility] (most recently Jan 2024 on [facility]), had prior SA by overdosing on pills (11/2019) following death of parents, presented to ED with somatic complaints and reports of ?passive suicidal ideation, had exhaustive w/u, now admitted voluntarily to [facility]  Routine checks, no suicidal ideations  Continue nortriptyline 50mg po qhs for depression - will hold off increase due to orthostasis today  Continue klonopin 0.5mg po bid and 0.5mg po qhs for anxiety; gabapentin prn for anxiety  Continue mirtazapine 15mg po qhs and melatonin 3mg po qhs for insomnia  Increased lyrica to 50mg po daily and 25mg po q3pm and 9pm - off label for anxiety  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 Requesting metoprolol titration despite recently complaining of dizziness.  4/30: Ruminative thoughts regarding perceived rejection and stressor persist. Medication titration/ adjustment pending further assessment.   05/01: Somatic complaints and preservative thoughts about rejection persists. Integration of brief bedside psychotherapy during rounds with limit setting due to exacerbation of anxiety especially when fixated on her medication regimen. Medication titration to be approached slowly to minimize cumulative adverse effects.  05/02: No significant change. Open to individual therapy thus will continue in combination with medication management.   05/03: No change in current treatment plan. Will continue to encourage patient in engaging with treatment recommendations.  5/6: Minimal improvement in anxiety/depression. Nortriptyline to be increased from 60 to 75 mg daily.  5/7: Tolerating medication changes. Will consider further increase in dose of Nortriptyline. Adjustments made to dosing fo Klonopin due to complaint of morning drowsiness on current dose of 0.5mg   5/8: Increased 3pm lyrica to 50mg. Will continue monitoring response to medication.   5/9: Able to manage anxious episode this morning without medication adjustment. Will continue to encourage incorporation of therapy with meds.  5/10: Slight improvement in anxiety. More receptive to psychology sessions. Will consider titration of nortriptyline dose on Monday.   5/13: Increasing dose of Nortriptyline to 100mg daily. Plan to review rest of anti anxiolytic regimen over the week.   5/14: medication management in progress. Incorporating psychotherapy with increased receptivity by patient. Will continue to monitor & engage patient in treatment planning.  5/15: Complaint of constipation for which Senna was added. No urinary retention, tremors, blurry vision or symptoms of anticholinergic effect. Still endorsing anxiety and depressive symptoms. Will c/w current dose of Nortriptyline. Will obtain labs and EKG. Adjusted doses of Lyrica & Klonopin  5/16: Tolerating medication adjustments. Labs & EKG wnl. No medication adverse effect. Will continue to monitor.  5/17: Some improvement with anxiety. Ongoing adjustment of medication regimen.    PLAN:  Psychotropic medications:  - Continue on [id number] qHS for depressive symptoms - will titrate actively as tolerated by patient who remains anxious and somatically preoccupied with medication side effect profile.   - Continue Remeron 15 mg qHS for depressive symptoms and sleep  - c/w Pregabalin 25mg TID @ 9am, 3pm & 9pm for anxiety.   - Continue Klonopin 0.5 mg at q18:00 for anxiety  - c/w Klonopin 0.25mg @ Q6am for anxiety  - c/w Klonopin 0.25mg prn daily.  - c/w senna 2tabs qhs for constipation  HLD: atorvastatin 10 mg qHS  GERD: Protonix 40 mg daily  HTN: continue metoprolol 50mg q06:00, 25 mg qHS, and 25 mg PRN; patient states that her cardiologist prescribed this for palpitations from anxiety by (allow addition of Lasix)  LE edema: restart Lasix 20 mg every other day  Dizziness: MRI wnl; meclizine 12.5 mg PRN; possible BPPV; Flexeril 5 mg po qHS PRN recommended by Neurology

## 2024-05-17 NOTE — BH TREATMENT PLAN - NSTXPLANTHERAPYSESSIONSFT_PSY_ALL_CORE
05-13-24  Type of therapy: Coping skills, Health and fitness, Leisure development, Mindfulness, Self esteem  Type of session: Individual  Level of patient participation: Engaged  Duration of participation: 30 minutes  Therapy conducted by: Psych rehab  Therapy Summary: Writer met with patient for an individual session to review progress towards psychiatric rehabilitation goal of identifying two coping skills that help in assist in improving mood. Patient appears to be making minimal progress towards goal. Patient endorses decreased anxiety. Patient and writer reviewed her mood chart. Patient and writer discussed goals that she has for her mood and what she can do to help improve her mood. Patient and writer discussed schedules. Patient appeared open to this idea. Patient fairly attends and participates in group activities. Patient appears to enjoy music, mindfulness, games, and leisure groups. Psych rehab plans to follow up with patient within the next seven days.    05-15-24  Type of therapy: Other, Physical therapy  Type of session: Individual  Level of patient participation: Participates  --  Therapy conducted by: Other (specify), Physical therapy  Therapy Summary: Patient engage din ambulation and therapeutic activity within unit.  
  04-18-24  --  Type of session: Individual  Level of patient participation: Participates  --  Therapy conducted by: Other (specify), Physical therapy  Therapy Summary: Patient engaged in B LE strengthening exercises and gait training within unit

## 2024-05-17 NOTE — BH TREATMENT PLAN - NSTXDEPRESINTERRN_PSY_ALL_CORE
Encourage clients to express feelings and come up with alternative ways to handle feelings. Administer antidepressants, as prescribed.
Pt. encouraged to perform ADL's. Pt. encouraged to attend at least 2 groups despite mood/energy and not to isolate. Pt. encouraged to seek staff support and verbalize negative self talk. Pt. encouraged to utilize effective coping skills to help improve mood.

## 2024-05-17 NOTE — BH DISCHARGE NOTE NURSING/SOCIAL WORK/PSYCH REHAB - PATIENT PORTAL LINK FT
You can access the FollowMyHealth Patient Portal offered by [facility] by registering at the following website: [email or web address]. By joining [email or web address] FollowMyHealth portal, you will also be able to view your health information using other applications (apps) compatible with our system.

## 2024-05-17 NOTE — BH TREATMENT PLAN - NSTXDEPRESINTERPR_PSY_ALL_CORE
Psychiatric rehabilitation recommends patient continue to attend groups, engage in individual sessions, and participate in activities in the milieu to continue exploring coping skills to manage symptoms.
Psychiatric rehabilitation recommends patient attends 2-3 groups per day, engages in individual sessions and participates in activities on the milieu in order to mitigate symptoms.

## 2024-05-17 NOTE — BH PSYCHOLOGY - CLINICIAN PSYCHOTHERAPY NOTE - NSBHPSYCHOLNARRATIVE_PSY_A_CORE FT
The patient was seen individually at the team's request and with her consent. Speech was of normal rate and volume, and there were no verbal irregularities. The stated that she was feeling “the shivers” and had just taken Klonopin as a PRN. During the session, she was feeling more relaxed and realized that it was anxiety and not a medical condition. She was also briefly labile when she started thinking that she would not get better. When she spoke about other topics her equanimity returned and writer pointed out the powerful impact her thoughts have on her mood.     The patient agreed that she was feeling consistently good in the evenings and most mornings, but that afternoons represent the last challenge for improvement. She has a good appetite and sleeps well and she socializes on the unit. She has had some conflict with her  who has not been going in to work since she was hospitalized, but they typically make up after verbal skirmishes.     The patient was engaged and appreciative of psychotherapy and was encouraged to continue attending therapeutic activities on the unit. Psychology will continue to follow pt while on the unit.

## 2024-05-17 NOTE — BH TREATMENT PLAN - NSTXDEPRESGOAL_PSY_ALL_CORE
Will identify 2 coping skills that assist in improving mood
Will identify thoughts and self-talk that contribute to depression

## 2024-05-17 NOTE — BH DISCHARGE NOTE NURSING/SOCIAL WORK/PSYCH REHAB - NSDCPRGOAL_PSY_ALL_CORE
Pt was seen prior to discharge. She was provided with a press-ganey survey.  Pt reported significant improvement related to her mood and anxiety since admission. However, continued to endorse anticipatory anxiety as well as mild depressive symptoms. Pt denied SI/HI/AH/VH. She was hopeful and future oriented. Pt easily became overwhelmed when discussing all the tasks she needed to complete upon discharge. Pt reviewed coping strategies with fair effect. Pt reviewed her safety plan.  Pt attended a fair portion of psych rehab groups which she actively engaged in. Pt was intermittently visible in the milieu and social with select peers. Pt's ADLs were good.

## 2024-05-17 NOTE — BH INPATIENT PSYCHIATRY PROGRESS NOTE - NSBHMETABOLIC_PSY_ALL_CORE_FT
BMI: BMI (kg/m2): 38.3 (04-16-24 @ 19:07)  HbA1c: A1C with Estimated Average Glucose Result: 5.8 % (01-11-24 @ 08:00)    Glucose:   BP: 127/68 (05-16-24 @ 19:34) (127/68 - 142/82)Vital Signs Last 24 Hrs  T(C): 36.3 (05-17-24 @ 06:40), Max: 36.3 (05-17-24 @ 06:40)  T(F): 97.4 (05-17-24 @ 06:40), Max: 97.4 (05-17-24 @ 06:40)  HR: 102 (05-16-24 @ 19:34) (102 - 102)  BP: 127/68 (05-16-24 @ 19:34) (127/68 - 127/68)  BP(mean): --  RR: 16 (05-17-24 @ 06:40) (16 - 16)  SpO2: 96% (05-17-24 @ 06:40) (96% - 96%)    Orthostatic VS  05-17-24 @ 06:40  Lying BP: --/-- HR: --  Sitting BP: 129/76 HR: 96  Standing BP: 124/73 HR: 99  Site: upper left arm  Mode: electronic  Orthostatic VS  05-16-24 @ 05:53  Lying BP: 132/72 HR: 87  Sitting BP: 124/73 HR: 92  Standing BP: --/-- HR: --  Site: --  Mode: --    Lipid Panel: Date/Time: 04-05-24 @ 05:56  Cholesterol, Serum: 176  LDL Cholesterol Calculated: 97  HDL Cholesterol, Serum: 39  Total Cholesterol/HDL Ration Measurement: --  Triglycerides, Serum: 200

## 2024-05-18 RX ADMIN — Medication 0.25 MILLIGRAM(S): at 13:05

## 2024-05-18 RX ADMIN — Medication 25 MILLIGRAM(S): at 15:33

## 2024-05-18 RX ADMIN — Medication 0.25 MILLIGRAM(S): at 06:12

## 2024-05-18 RX ADMIN — Medication 25 MILLIGRAM(S): at 21:33

## 2024-05-18 RX ADMIN — SENNA PLUS 2 TABLET(S): 8.6 TABLET ORAL at 21:33

## 2024-05-18 RX ADMIN — Medication 25 MILLIGRAM(S): at 09:47

## 2024-05-18 RX ADMIN — Medication 0.5 MILLIGRAM(S): at 17:52

## 2024-05-18 RX ADMIN — Medication 3 MILLIGRAM(S): at 21:34

## 2024-05-18 RX ADMIN — MIRTAZAPINE 15 MILLIGRAM(S): 45 TABLET, ORALLY DISINTEGRATING ORAL at 21:34

## 2024-05-18 RX ADMIN — Medication 1 DROP(S): at 21:39

## 2024-05-18 RX ADMIN — Medication 1 TABLET(S): at 09:46

## 2024-05-18 RX ADMIN — ATORVASTATIN CALCIUM 10 MILLIGRAM(S): 80 TABLET, FILM COATED ORAL at 21:34

## 2024-05-18 RX ADMIN — PANTOPRAZOLE SODIUM 40 MILLIGRAM(S): 20 TABLET, DELAYED RELEASE ORAL at 08:15

## 2024-05-18 RX ADMIN — ONDANSETRON 4 MILLIGRAM(S): 8 TABLET, FILM COATED ORAL at 09:52

## 2024-05-18 RX ADMIN — Medication 25 MILLIGRAM(S): at 21:34

## 2024-05-18 RX ADMIN — NORTRIPTYLINE HYDROCHLORIDE 100 MILLIGRAM(S): 10 CAPSULE ORAL at 21:34

## 2024-05-18 RX ADMIN — CHLORHEXIDINE GLUCONATE 15 MILLILITER(S): 213 SOLUTION TOPICAL at 21:36

## 2024-05-18 RX ADMIN — Medication 50 MILLIGRAM(S): at 06:12

## 2024-05-18 NOTE — BH INPATIENT PSYCHIATRY PROGRESS NOTE - NSBHMSEBEHAV_PSY_A_CORE
[employer]
Kathy Carrasco(NP)
[employer]

## 2024-05-19 RX ADMIN — Medication 3 MILLIGRAM(S): at 21:45

## 2024-05-19 RX ADMIN — NORTRIPTYLINE HYDROCHLORIDE 100 MILLIGRAM(S): 10 CAPSULE ORAL at 21:44

## 2024-05-19 RX ADMIN — ATORVASTATIN CALCIUM 10 MILLIGRAM(S): 80 TABLET, FILM COATED ORAL at 21:45

## 2024-05-19 RX ADMIN — MIRTAZAPINE 15 MILLIGRAM(S): 45 TABLET, ORALLY DISINTEGRATING ORAL at 21:45

## 2024-05-19 RX ADMIN — Medication 25 MILLIGRAM(S): at 14:06

## 2024-05-19 RX ADMIN — Medication 25 MILLIGRAM(S): at 21:45

## 2024-05-19 RX ADMIN — Medication 0.5 MILLIGRAM(S): at 18:23

## 2024-05-19 RX ADMIN — Medication 1 TABLET(S): at 09:53

## 2024-05-19 RX ADMIN — Medication 50 MILLIGRAM(S): at 07:03

## 2024-05-19 RX ADMIN — PANTOPRAZOLE SODIUM 40 MILLIGRAM(S): 20 TABLET, DELAYED RELEASE ORAL at 07:04

## 2024-05-19 RX ADMIN — Medication 1 DROP(S): at 21:47

## 2024-05-19 RX ADMIN — CHLORHEXIDINE GLUCONATE 15 MILLILITER(S): 213 SOLUTION TOPICAL at 21:47

## 2024-05-19 RX ADMIN — Medication 25 MILLIGRAM(S): at 09:54

## 2024-05-19 RX ADMIN — Medication 0.25 MILLIGRAM(S): at 07:04

## 2024-05-19 RX ADMIN — Medication 25 MILLIGRAM(S): at 21:44

## 2024-05-20 PROCEDURE — 99232 SBSQ HOSP IP/OBS MODERATE 35: CPT

## 2024-05-20 PROCEDURE — 90834 PSYTX W PT 45 MINUTES: CPT

## 2024-05-20 RX ORDER — CLONAZEPAM 1 MG
0.25 TABLET ORAL
Refills: 0 | Status: DISCONTINUED | OUTPATIENT
Start: 2024-05-20 | End: 2024-05-24

## 2024-05-20 RX ORDER — LANOLIN ALCOHOL/MO/W.PET/CERES
6 CREAM (GRAM) TOPICAL AT BEDTIME
Refills: 0 | Status: DISCONTINUED | OUTPATIENT
Start: 2024-05-20 | End: 2024-05-31

## 2024-05-20 RX ADMIN — Medication 25 MILLIGRAM(S): at 21:19

## 2024-05-20 RX ADMIN — Medication 20 MILLIGRAM(S): at 09:32

## 2024-05-20 RX ADMIN — ATORVASTATIN CALCIUM 10 MILLIGRAM(S): 80 TABLET, FILM COATED ORAL at 21:19

## 2024-05-20 RX ADMIN — Medication 0.25 MILLIGRAM(S): at 12:00

## 2024-05-20 RX ADMIN — Medication 50 MILLIGRAM(S): at 06:34

## 2024-05-20 RX ADMIN — PANTOPRAZOLE SODIUM 40 MILLIGRAM(S): 20 TABLET, DELAYED RELEASE ORAL at 06:35

## 2024-05-20 RX ADMIN — Medication 25 MILLIGRAM(S): at 09:33

## 2024-05-20 RX ADMIN — MIRTAZAPINE 15 MILLIGRAM(S): 45 TABLET, ORALLY DISINTEGRATING ORAL at 21:19

## 2024-05-20 RX ADMIN — NORTRIPTYLINE HYDROCHLORIDE 100 MILLIGRAM(S): 10 CAPSULE ORAL at 21:20

## 2024-05-20 RX ADMIN — Medication 0.25 MILLIGRAM(S): at 06:36

## 2024-05-20 RX ADMIN — Medication 1 TABLET(S): at 09:32

## 2024-05-20 RX ADMIN — Medication 0.25 MILLIGRAM(S): at 18:04

## 2024-05-20 RX ADMIN — CHLORHEXIDINE GLUCONATE 15 MILLILITER(S): 213 SOLUTION TOPICAL at 21:20

## 2024-05-20 RX ADMIN — Medication 25 MILLIGRAM(S): at 15:30

## 2024-05-20 RX ADMIN — SENNA PLUS 2 TABLET(S): 8.6 TABLET ORAL at 21:19

## 2024-05-20 RX ADMIN — Medication 1 DROP(S): at 21:20

## 2024-05-20 RX ADMIN — ONDANSETRON 4 MILLIGRAM(S): 8 TABLET, FILM COATED ORAL at 11:58

## 2024-05-20 RX ADMIN — Medication 6 MILLIGRAM(S): at 21:19

## 2024-05-20 NOTE — BH INPATIENT PSYCHIATRY PROGRESS NOTE - NSBHASSESSSUMMFT_PSY_ALL_CORE
73 yr old female, , domiciled, and retired. premorbidly ambulant (not needing assists) and iADL/ bADL independent. with background hx of MDD and ANSON; has multiple psych admissions to [facility] (most recently Jan 2024 on [facility]), had prior SA by overdosing on pills (11/2019) following death of parents, presented to ED with somatic complaints and reports of ?passive suicidal ideation, had exhaustive w/u, now admitted voluntarily to [facility]  Routine checks, no suicidal ideations  Continue nortriptyline 50mg po qhs for depression - will hold off increase due to orthostasis today  Continue klonopin 0.5mg po bid and 0.5mg po qhs for anxiety; gabapentin prn for anxiety  Continue mirtazapine 15mg po qhs and melatonin 3mg po qhs for insomnia  Increased lyrica to 50mg po daily and 25mg po q3pm and 9pm - off label for anxiety  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 Requesting metoprolol titration despite recently complaining of dizziness.  4/30: Ruminative thoughts regarding perceived rejection and stressor persist. Medication titration/ adjustment pending further assessment.   05/01: Somatic complaints and preservative thoughts about rejection persists. Integration of brief bedside psychotherapy during rounds with limit setting due to exacerbation of anxiety especially when fixated on her medication regimen. Medication titration to be approached slowly to minimize cumulative adverse effects.  05/02: No significant change. Open to individual therapy thus will continue in combination with medication management.   05/03: No change in current treatment plan. Will continue to encourage patient in engaging with treatment recommendations.  5/6: Minimal improvement in anxiety/depression. Nortriptyline to be increased from 60 to 75 mg daily.  5/7: Tolerating medication changes. Will consider further increase in dose of Nortriptyline. Adjustments made to dosing fo Klonopin due to complaint of morning drowsiness on current dose of 0.5mg   5/8: Increased 3pm lyrica to 50mg. Will continue monitoring response to medication.   5/9: Able to manage anxious episode this morning without medication adjustment. Will continue to encourage incorporation of therapy with meds.  5/10: Slight improvement in anxiety. More receptive to psychology sessions. Will consider titration of nortriptyline dose on Monday.   5/13: Increasing dose of Nortriptyline to 100mg daily. Plan to review rest of anti anxiolytic regimen over the week.   5/14: medication management in progress. Incorporating psychotherapy with increased receptivity by patient. Will continue to monitor & engage patient in treatment planning.  5/15: Complaint of constipation for which Senna was added. No urinary retention, tremors, blurry vision or symptoms of anticholinergic effect. Still endorsing anxiety and depressive symptoms. Will c/w current dose of Nortriptyline. Will obtain labs and EKG. Adjusted doses of Lyrica & Klonopin  5/16: Tolerating medication adjustments. Labs & EKG wnl. No medication adverse effect. Will continue to monitor.  5/17: Some improvement with anxiety. Ongoing adjustment of medication regimen.  5/20: has had some sustained gains with anxiety and depression symptoms but continues to catastrophize    PLAN:  Psychotropic medications:  - Continue on [id number] qHS for depressive symptoms - will titrate actively as tolerated by patient who remains anxious and somatically preoccupied with medication side effect profile. Can increase dose tomorrow if tolerates remeron dose reduction  - decrease Remeron to 7.5 mg qHS for depressive symptoms and sleep - decrease in anticipation of nortriptyline dose increase  - increase melatonin to 6mg po qhs and give remeron 7.5mg po qhs prn for insomnia if there are problems this evening  - c/w Pregabalin 25mg TID @ 9am, 3pm & 9pm for anxiety.   - change Klonopin to 0.25mg po tid to 6am, noon, 6pm for anxiety (will need to consolidate dosing times starting tomorrow)  - c/w Klonopin 0.25mg prn daily.  - c/w senna 2tabs qhs for constipation  HLD: atorvastatin 10 mg qHS  GERD: Protonix 40 mg daily  HTN: continue metoprolol 50mg q06:00, 25 mg qHS, and 25 mg PRN; patient states that her cardiologist prescribed this for palpitations from anxiety by (allow addition of Lasix)  LE edema: restart Lasix 20 mg every other day  Dizziness: MRI wnl; meclizine 12.5 mg PRN; possible BPPV; Flexeril 5 mg po qHS PRN recommended by Neurology

## 2024-05-20 NOTE — BH INPATIENT PSYCHIATRY PROGRESS NOTE - NSBHMETABOLIC_PSY_ALL_CORE_FT
BMI: BMI (kg/m2): 38.3 (04-16-24 @ 19:07)  HbA1c: A1C with Estimated Average Glucose Result: 5.8 % (01-11-24 @ 08:00)    Glucose:   BP: 137/78 (05-19-24 @ 18:47) (137/78 - 137/78)Vital Signs Last 24 Hrs  T(C): 36.7 (05-20-24 @ 05:12), Max: 36.7 (05-20-24 @ 05:12)  T(F): 98 (05-20-24 @ 05:12), Max: 98 (05-20-24 @ 05:12)  HR: --  BP: 137/78 (05-19-24 @ 18:47) (137/78 - 137/78)  BP(mean): 105 (05-19-24 @ 18:47) (105 - 105)  RR: 18 (05-20-24 @ 05:12) (18 - 18)  SpO2: --    Orthostatic VS  05-20-24 @ 05:12  Lying BP: 123/64 HR: 79  Sitting BP: 133/84 HR: 89  Standing BP: --/-- HR: --  Site: upper left arm  Mode: electronic  Orthostatic VS  05-19-24 @ 05:58  Lying BP: 120/69 HR: 89  Sitting BP: 129/69 HR: 90  Standing BP: --/-- HR: --  Site: --  Mode: --  Orthostatic VS  05-18-24 @ 18:51  Lying BP: --/-- HR: --  Sitting BP: 137/74 HR: 101  Standing BP: --/-- HR: --  Site: --  Mode: --    Lipid Panel: Date/Time: 04-05-24 @ 05:56  Cholesterol, Serum: 176  LDL Cholesterol Calculated: 97  HDL Cholesterol, Serum: 39  Total Cholesterol/HDL Ration Measurement: --  Triglycerides, Serum: 200

## 2024-05-20 NOTE — BH INPATIENT PSYCHIATRY PROGRESS NOTE - NSBHCHARTREVIEWVS_PSY_A_CORE FT
Vital Signs Last 24 Hrs  T(C): 36.7 (05-20-24 @ 05:12), Max: 36.7 (05-20-24 @ 05:12)  T(F): 98 (05-20-24 @ 05:12), Max: 98 (05-20-24 @ 05:12)  HR: --  BP: 137/78 (05-19-24 @ 18:47) (137/78 - 137/78)  BP(mean): 105 (05-19-24 @ 18:47) (105 - 105)  RR: 18 (05-20-24 @ 05:12) (18 - 18)  SpO2: --    Orthostatic VS  05-20-24 @ 05:12  Lying BP: 123/64 HR: 79  Sitting BP: 133/84 HR: 89  Standing BP: --/-- HR: --  Site: upper left arm  Mode: electronic  Orthostatic VS  05-19-24 @ 05:58  Lying BP: 120/69 HR: 89  Sitting BP: 129/69 HR: 90  Standing BP: --/-- HR: --  Site: --  Mode: --  Orthostatic VS  05-18-24 @ 18:51  Lying BP: --/-- HR: --  Sitting BP: 137/74 HR: 101  Standing BP: --/-- HR: --  Site: --  Mode: --

## 2024-05-20 NOTE — BH INPATIENT PSYCHIATRY PROGRESS NOTE - NSBHFUPINTERVALHXFT_PSY_A_CORE
Patient reports ongoing anxiety that is now more concentrated in the afternoons. She continues to demonstrated obsessive patterns of thinking, frequently catastrophizes. Remains preoccupied with medication changes. Agreeable to increase nortriptyline but wants first to address klonopin dosing as she is less sedated but now back to feeling anxious. Denies active SI but has catastrophic thoughts that she might not want to live with anxiety not better controlled.

## 2024-05-20 NOTE — BH PSYCHOLOGY - CLINICIAN PSYCHOTHERAPY NOTE - NSBHPSYCHOLNARRATIVE_PSY_A_CORE FT
The patient was seen individually at the team's request and with her consent. Speech was of normal rate and volume, and there were no verbal irregularities. She denied acute anxiety but is worried about how she will function after discharge and still feels moderately depressed. She reviewed recent changes in her medications which are designed to cover her throughout the day and to avoid over-sedation. She acknowledged that many of her anxiety episodes are triggered by medical concerns, and we discussed ways to prevent those events from escalating into panic.     The patient still reports feeling good in the evenings and most mornings, with anxiety peaking in the afternoons. She again reported conflict with her , but she acknowledged that he cares about her and is available for her when needed.     The patient was encouraged to continue attending therapeutic activities on the unit.

## 2024-05-20 NOTE — BH INPATIENT PSYCHIATRY PROGRESS NOTE - CURRENT MEDICATION
MEDICATIONS  (STANDING):  atorvastatin 10 milliGRAM(s) Oral at bedtime  clonazePAM  Tablet 0.25 milliGRAM(s) Oral <User Schedule>  clonazePAM  Tablet 0.5 milliGRAM(s) Oral <User Schedule>  furosemide    Tablet 20 milliGRAM(s) Oral <User Schedule>  melatonin. 3 milliGRAM(s) Oral at bedtime  metoprolol tartrate 50 milliGRAM(s) Oral <User Schedule>  metoprolol tartrate 25 milliGRAM(s) Oral at bedtime  mirtazapine 15 milliGRAM(s) Oral at bedtime  multivitamin 1 Tablet(s) Oral daily  nortriptyline 100 milliGRAM(s) Oral at bedtime  pantoprazole    Tablet 40 milliGRAM(s) Oral before breakfast  pregabalin 25 milliGRAM(s) Oral <User Schedule>  pregabalin 25 milliGRAM(s) Oral <User Schedule>  senna 2 Tablet(s) Oral at bedtime    MEDICATIONS  (PRN):  acetaminophen     Tablet .. 650 milliGRAM(s) Oral every 6 hours PRN Temp greater or equal to 38C (100.4F), Mild Pain (1 - 3)  aluminum hydroxide/magnesium hydroxide/simethicone Suspension 30 milliLiter(s) Oral every 4 hours PRN Dyspepsia  artificial  tears Solution 1 Drop(s) Both EYES every 4 hours PRN dry eyes syndrome  bisacodyl Suppository 10 milliGRAM(s) Rectal daily PRN Constipation  chlorhexidine 0.12% Liquid 15 milliLiter(s) Oral Mucosa two times a day PRN gum inflammation  clonazePAM  Tablet 0.25 milliGRAM(s) Oral daily PRN anxiety  gabapentin 100 milliGRAM(s) Oral three times a day PRN anxiety  haloperidol     Tablet 0.5 milliGRAM(s) Oral every 6 hours PRN agitation  haloperidol    Injectable 0.5 milliGRAM(s) IntraMuscular once PRN severe agitation  meclizine 12.5 milliGRAM(s) Oral every 8 hours PRN Dizziness  metoprolol tartrate 25 milliGRAM(s) Oral daily PRN Palpitations due to Anxiety  ondansetron   Disintegrating Tablet 4 milliGRAM(s) Oral every 8 hours PRN Nausea and/or Vomiting  polyethylene glycol 3350 17 Gram(s) Oral two times a day PRN constipation

## 2024-05-21 PROCEDURE — 99232 SBSQ HOSP IP/OBS MODERATE 35: CPT | Mod: GC

## 2024-05-21 RX ORDER — MIRTAZAPINE 45 MG/1
7.5 TABLET, ORALLY DISINTEGRATING ORAL AT BEDTIME
Refills: 0 | Status: DISCONTINUED | OUTPATIENT
Start: 2024-05-21 | End: 2024-05-31

## 2024-05-21 RX ORDER — CLONAZEPAM 1 MG
0.25 TABLET ORAL DAILY
Refills: 0 | Status: DISCONTINUED | OUTPATIENT
Start: 2024-05-21 | End: 2024-05-28

## 2024-05-21 RX ADMIN — Medication 1 TABLET(S): at 09:25

## 2024-05-21 RX ADMIN — Medication 25 MILLIGRAM(S): at 21:07

## 2024-05-21 RX ADMIN — Medication 1 DROP(S): at 21:09

## 2024-05-21 RX ADMIN — POLYETHYLENE GLYCOL 3350 17 GRAM(S): 17 POWDER, FOR SOLUTION ORAL at 17:34

## 2024-05-21 RX ADMIN — Medication 6 MILLIGRAM(S): at 21:07

## 2024-05-21 RX ADMIN — Medication 0.25 MILLIGRAM(S): at 17:30

## 2024-05-21 RX ADMIN — Medication 0.25 MILLIGRAM(S): at 06:10

## 2024-05-21 RX ADMIN — Medication 25 MILLIGRAM(S): at 12:21

## 2024-05-21 RX ADMIN — PANTOPRAZOLE SODIUM 40 MILLIGRAM(S): 20 TABLET, DELAYED RELEASE ORAL at 06:09

## 2024-05-21 RX ADMIN — ATORVASTATIN CALCIUM 10 MILLIGRAM(S): 80 TABLET, FILM COATED ORAL at 21:07

## 2024-05-21 RX ADMIN — SENNA PLUS 2 TABLET(S): 8.6 TABLET ORAL at 21:07

## 2024-05-21 RX ADMIN — Medication 50 MILLIGRAM(S): at 06:09

## 2024-05-21 RX ADMIN — Medication 0.25 MILLIGRAM(S): at 12:21

## 2024-05-21 RX ADMIN — Medication 25 MILLIGRAM(S): at 09:27

## 2024-05-21 RX ADMIN — ONDANSETRON 4 MILLIGRAM(S): 8 TABLET, FILM COATED ORAL at 10:05

## 2024-05-21 RX ADMIN — CHLORHEXIDINE GLUCONATE 15 MILLILITER(S): 213 SOLUTION TOPICAL at 21:09

## 2024-05-21 RX ADMIN — MIRTAZAPINE 7.5 MILLIGRAM(S): 45 TABLET, ORALLY DISINTEGRATING ORAL at 21:07

## 2024-05-21 RX ADMIN — NORTRIPTYLINE HYDROCHLORIDE 100 MILLIGRAM(S): 10 CAPSULE ORAL at 21:07

## 2024-05-21 RX ADMIN — Medication 25 MILLIGRAM(S): at 16:19

## 2024-05-21 NOTE — BH INPATIENT PSYCHIATRY PROGRESS NOTE - CURRENT MEDICATION
MEDICATIONS  (STANDING):  atorvastatin 10 milliGRAM(s) Oral at bedtime  clonazePAM  Tablet 0.25 milliGRAM(s) Oral <User Schedule>  furosemide    Tablet 20 milliGRAM(s) Oral <User Schedule>  melatonin. 6 milliGRAM(s) Oral at bedtime  metoprolol tartrate 25 milliGRAM(s) Oral at bedtime  metoprolol tartrate 50 milliGRAM(s) Oral <User Schedule>  mirtazapine 7.5 milliGRAM(s) Oral at bedtime  multivitamin 1 Tablet(s) Oral daily  nortriptyline 100 milliGRAM(s) Oral at bedtime  pantoprazole    Tablet 40 milliGRAM(s) Oral before breakfast  pregabalin 25 milliGRAM(s) Oral <User Schedule>  senna 2 Tablet(s) Oral at bedtime    MEDICATIONS  (PRN):  acetaminophen     Tablet .. 650 milliGRAM(s) Oral every 6 hours PRN Temp greater or equal to 38C (100.4F), Mild Pain (1 - 3)  aluminum hydroxide/magnesium hydroxide/simethicone Suspension 30 milliLiter(s) Oral every 4 hours PRN Dyspepsia  artificial  tears Solution 1 Drop(s) Both EYES every 4 hours PRN dry eyes syndrome  bisacodyl Suppository 10 milliGRAM(s) Rectal daily PRN Constipation  chlorhexidine 0.12% Liquid 15 milliLiter(s) Oral Mucosa two times a day PRN gum inflammation  clonazePAM  Tablet 0.25 milliGRAM(s) Oral daily PRN anxiety  gabapentin 100 milliGRAM(s) Oral three times a day PRN anxiety  haloperidol     Tablet 0.5 milliGRAM(s) Oral every 6 hours PRN agitation  haloperidol    Injectable 0.5 milliGRAM(s) IntraMuscular once PRN severe agitation  meclizine 12.5 milliGRAM(s) Oral every 8 hours PRN Dizziness  metoprolol tartrate 25 milliGRAM(s) Oral daily PRN Palpitations due to Anxiety  ondansetron   Disintegrating Tablet 4 milliGRAM(s) Oral every 8 hours PRN Nausea and/or Vomiting  polyethylene glycol 3350 17 Gram(s) Oral two times a day PRN constipation

## 2024-05-21 NOTE — BH INPATIENT PSYCHIATRY PROGRESS NOTE - NSBHMETABOLIC_PSY_ALL_CORE_FT
BMI: BMI (kg/m2): 38.3 (04-16-24 @ 19:07)  HbA1c: A1C with Estimated Average Glucose Result: 5.8 % (01-11-24 @ 08:00)    Glucose:   BP: 130/70 (05-20-24 @ 20:00) (130/70 - 137/78)Vital Signs Last 24 Hrs  T(C): 36.7 (05-21-24 @ 05:12), Max: 36.7 (05-21-24 @ 05:12)  T(F): 98 (05-21-24 @ 05:12), Max: 98 (05-21-24 @ 05:12)  HR: 108 (05-20-24 @ 20:00) (108 - 108)  BP: 130/70 (05-20-24 @ 20:00) (130/70 - 130/70)  BP(mean): --  RR: 18 (05-21-24 @ 05:12) (18 - 18)  SpO2: --    Orthostatic VS  05-21-24 @ 05:12  Lying BP: 117/67 HR: 89  Sitting BP: 130/60 HR: 97  Standing BP: --/-- HR: --  Site: upper right arm  Mode: electronic  Orthostatic VS  05-20-24 @ 05:12  Lying BP: 123/64 HR: 79  Sitting BP: 133/84 HR: 89  Standing BP: --/-- HR: --  Site: upper left arm  Mode: electronic    Lipid Panel: Date/Time: 04-05-24 @ 05:56  Cholesterol, Serum: 176  LDL Cholesterol Calculated: 97  HDL Cholesterol, Serum: 39  Total Cholesterol/HDL Ration Measurement: --  Triglycerides, Serum: 200

## 2024-05-21 NOTE — BH INPATIENT PSYCHIATRY PROGRESS NOTE - NSBHFUPINTERVALHXFT_PSY_A_CORE
Patient on current assessment was very anxious about recent medication changes stating that she did not take the 6am Lyrica as she did not know if she will have something for 9am. Psychoeducation provided bedside. Still reporting anxiety albeit no worse than before. Appetite and sleep are reportedly Ok. No complaint of constipation. Attending psychotherapy classes. No other issues elicited.

## 2024-05-21 NOTE — BH INPATIENT PSYCHIATRY PROGRESS NOTE - NSBHCHARTREVIEWVS_PSY_A_CORE FT
Vital Signs Last 24 Hrs  T(C): 36.7 (05-21-24 @ 05:12), Max: 36.7 (05-21-24 @ 05:12)  T(F): 98 (05-21-24 @ 05:12), Max: 98 (05-21-24 @ 05:12)  HR: 108 (05-20-24 @ 20:00) (108 - 108)  BP: 130/70 (05-20-24 @ 20:00) (130/70 - 130/70)  BP(mean): --  RR: 18 (05-21-24 @ 05:12) (18 - 18)  SpO2: --    Orthostatic VS  05-21-24 @ 05:12  Lying BP: 117/67 HR: 89  Sitting BP: 130/60 HR: 97  Standing BP: --/-- HR: --  Site: upper right arm  Mode: electronic  Orthostatic VS  05-20-24 @ 05:12  Lying BP: 123/64 HR: 79  Sitting BP: 133/84 HR: 89  Standing BP: --/-- HR: --  Site: upper left arm  Mode: electronic

## 2024-05-21 NOTE — BH INPATIENT PSYCHIATRY PROGRESS NOTE - NSBHASSESSSUMMFT_PSY_ALL_CORE
73 yr old female, , domiciled, and retired. premorbidly ambulant (not needing assists) and iADL/ bADL independent. with background hx of MDD and ANSON; has multiple psych admissions to [facility] (most recently Jan 2024 on [facility]), had prior SA by overdosing on pills (11/2019) following death of parents, presented to ED with somatic complaints and reports of ?passive suicidal ideation, had exhaustive w/u, now admitted voluntarily to [facility]  Routine checks, no suicidal ideations  Continue nortriptyline 50mg po qhs for depression - will hold off increase due to orthostasis today  Continue klonopin 0.5mg po bid and 0.5mg po qhs for anxiety; gabapentin prn for anxiety  Continue mirtazapine 15mg po qhs and melatonin 3mg po qhs for insomnia  Increased lyrica to 50mg po daily and 25mg po q3pm and 9pm - off label for anxiety  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 Requesting metoprolol titration despite recently complaining of dizziness.  4/30: Ruminative thoughts regarding perceived rejection and stressor persist. Medication titration/ adjustment pending further assessment.   05/01: Somatic complaints and preservative thoughts about rejection persists. Integration of brief bedside psychotherapy during rounds with limit setting due to exacerbation of anxiety especially when fixated on her medication regimen. Medication titration to be approached slowly to minimize cumulative adverse effects.  05/02: No significant change. Open to individual therapy thus will continue in combination with medication management.   05/03: No change in current treatment plan. Will continue to encourage patient in engaging with treatment recommendations.  5/6: Minimal improvement in anxiety/depression. Nortriptyline to be increased from 60 to 75 mg daily.  5/7: Tolerating medication changes. Will consider further increase in dose of Nortriptyline. Adjustments made to dosing fo Klonopin due to complaint of morning drowsiness on current dose of 0.5mg   5/8: Increased 3pm lyrica to 50mg. Will continue monitoring response to medication.   5/9: Able to manage anxious episode this morning without medication adjustment. Will continue to encourage incorporation of therapy with meds.  5/10: Slight improvement in anxiety. More receptive to psychology sessions. Will consider titration of nortriptyline dose on Monday.   5/13: Increasing dose of Nortriptyline to 100mg daily. Plan to review rest of anti anxiolytic regimen over the week.   5/14: medication management in progress. Incorporating psychotherapy with increased receptivity by patient. Will continue to monitor & engage patient in treatment planning.  5/15: Complaint of constipation for which Senna was added. No urinary retention, tremors, blurry vision or symptoms of anticholinergic effect. Still endorsing anxiety and depressive symptoms. Will c/w current dose of Nortriptyline. Will obtain labs and EKG. Adjusted doses of Lyrica & Klonopin  5/16: Tolerating medication adjustments. Labs & EKG wnl. No medication adverse effect. Will continue to monitor.  5/17: Some improvement with anxiety. Ongoing adjustment of medication regimen.  5/20: has had some sustained gains with anxiety and depression symptoms but continues to catastrophize  5/21: Anxious about medication changes albeit controlled. Consolidating dosing times of anti anxiolytics with aim to discontinuing prior to discharge.    PLAN:  Psychotropic medications:  - Continue on [id number] qHS for depressive symptoms - will titrate actively as tolerated by patient who remains anxious and somatically preoccupied with medication side effect profile. Can increase dose tomorrow if tolerates remeron dose reduction  - decreased Remeron to 7.5 mg qHS for depressive symptoms and sleep - decrease in anticipation of nortriptyline dose increase  - c/w melatonin 6mg po qhs and give remeron 7.5mg po qhs prn for insomnia if there are problems this evening  - c/w Pregabalin 25mg TID @ 9am, 3pm & 9pm for anxiety.   - c/w Klonopin 0.25mg po tid to 6am, noon, 6pm for anxiety   - c/w Klonopin 0.25mg prn daily.  - c/w senna 2tabs qhs for constipation  HLD: atorvastatin 10 mg qHS  GERD: Protonix 40 mg daily  HTN: continue metoprolol 50mg q06:00, 25 mg qHS, and 25 mg PRN; patient states that her cardiologist prescribed this for palpitations from anxiety by (allow addition of Lasix)  LE edema: restart Lasix 20 mg every other day  Dizziness: MRI wnl; meclizine 12.5 mg PRN; possible BPPV; Flexeril 5 mg po qHS PRN recommended by Neurology

## 2024-05-22 PROCEDURE — 90832 PSYTX W PT 30 MINUTES: CPT

## 2024-05-22 PROCEDURE — 99232 SBSQ HOSP IP/OBS MODERATE 35: CPT | Mod: GC

## 2024-05-22 RX ADMIN — Medication 20 MILLIGRAM(S): at 08:57

## 2024-05-22 RX ADMIN — Medication 25 MILLIGRAM(S): at 21:24

## 2024-05-22 RX ADMIN — CHLORHEXIDINE GLUCONATE 15 MILLILITER(S): 213 SOLUTION TOPICAL at 21:21

## 2024-05-22 RX ADMIN — Medication 0.25 MILLIGRAM(S): at 15:27

## 2024-05-22 RX ADMIN — ATORVASTATIN CALCIUM 10 MILLIGRAM(S): 80 TABLET, FILM COATED ORAL at 21:24

## 2024-05-22 RX ADMIN — Medication 50 MILLIGRAM(S): at 06:06

## 2024-05-22 RX ADMIN — Medication 1 TABLET(S): at 08:56

## 2024-05-22 RX ADMIN — Medication 25 MILLIGRAM(S): at 12:44

## 2024-05-22 RX ADMIN — Medication 0.25 MILLIGRAM(S): at 06:06

## 2024-05-22 RX ADMIN — Medication 0.25 MILLIGRAM(S): at 18:25

## 2024-05-22 RX ADMIN — MIRTAZAPINE 7.5 MILLIGRAM(S): 45 TABLET, ORALLY DISINTEGRATING ORAL at 21:24

## 2024-05-22 RX ADMIN — Medication 6 MILLIGRAM(S): at 21:23

## 2024-05-22 RX ADMIN — PANTOPRAZOLE SODIUM 40 MILLIGRAM(S): 20 TABLET, DELAYED RELEASE ORAL at 06:15

## 2024-05-22 RX ADMIN — Medication 1 DROP(S): at 21:18

## 2024-05-22 RX ADMIN — Medication 25 MILLIGRAM(S): at 06:06

## 2024-05-22 RX ADMIN — NORTRIPTYLINE HYDROCHLORIDE 100 MILLIGRAM(S): 10 CAPSULE ORAL at 21:24

## 2024-05-22 NOTE — BH INPATIENT PSYCHIATRY PROGRESS NOTE - NSBHMETABOLIC_PSY_ALL_CORE_FT
BMI: BMI (kg/m2): 38.3 (04-16-24 @ 19:07)  HbA1c: A1C with Estimated Average Glucose Result: 5.8 % (01-11-24 @ 08:00)    Glucose:   BP: 130/70 (05-20-24 @ 20:00) (130/70 - 137/78)Vital Signs Last 24 Hrs  T(C): 36.4 (05-22-24 @ 06:34), Max: 36.7 (05-21-24 @ 19:36)  T(F): 97.5 (05-22-24 @ 06:34), Max: 98.1 (05-21-24 @ 19:36)  HR: 106 (05-21-24 @ 16:16) (106 - 106)  BP: --  BP(mean): --  RR: 18 (05-22-24 @ 06:34) (17 - 18)  SpO2: 94% (05-22-24 @ 06:34) (94% - 98%)    Orthostatic VS  05-22-24 @ 06:34  Lying BP: --/-- HR: --  Sitting BP: 127/68 HR: 89  Standing BP: 120/64 HR: 94  Site: --  Mode: --  Orthostatic VS  05-21-24 @ 19:36  Lying BP: --/-- HR: --  Sitting BP: 134/70 HR: 82  Standing BP: --/-- HR: --  Site: --  Mode: --  Orthostatic VS  05-21-24 @ 05:12  Lying BP: 117/67 HR: 89  Sitting BP: 130/60 HR: 97  Standing BP: --/-- HR: --  Site: upper right arm  Mode: electronic    Lipid Panel: Date/Time: 04-05-24 @ 05:56  Cholesterol, Serum: 176  LDL Cholesterol Calculated: 97  HDL Cholesterol, Serum: 39  Total Cholesterol/HDL Ration Measurement: --  Triglycerides, Serum: 200

## 2024-05-22 NOTE — BH INPATIENT PSYCHIATRY PROGRESS NOTE - CURRENT MEDICATION
MEDICATIONS  (STANDING):  atorvastatin 10 milliGRAM(s) Oral at bedtime  clonazePAM  Tablet 0.25 milliGRAM(s) Oral <User Schedule>  furosemide    Tablet 20 milliGRAM(s) Oral <User Schedule>  melatonin. 6 milliGRAM(s) Oral at bedtime  metoprolol tartrate 50 milliGRAM(s) Oral <User Schedule>  metoprolol tartrate 25 milliGRAM(s) Oral at bedtime  mirtazapine 7.5 milliGRAM(s) Oral at bedtime  multivitamin 1 Tablet(s) Oral daily  nortriptyline 100 milliGRAM(s) Oral at bedtime  pantoprazole    Tablet 40 milliGRAM(s) Oral before breakfast  pregabalin 25 milliGRAM(s) Oral at bedtime  pregabalin 25 milliGRAM(s) Oral <User Schedule>  senna 2 Tablet(s) Oral at bedtime    MEDICATIONS  (PRN):  acetaminophen     Tablet .. 650 milliGRAM(s) Oral every 6 hours PRN Temp greater or equal to 38C (100.4F), Mild Pain (1 - 3)  aluminum hydroxide/magnesium hydroxide/simethicone Suspension 30 milliLiter(s) Oral every 4 hours PRN Dyspepsia  artificial  tears Solution 1 Drop(s) Both EYES every 4 hours PRN dry eyes syndrome  bisacodyl Suppository 10 milliGRAM(s) Rectal daily PRN Constipation  chlorhexidine 0.12% Liquid 15 milliLiter(s) Oral Mucosa two times a day PRN gum inflammation  clonazePAM  Tablet 0.25 milliGRAM(s) Oral daily PRN anxiety  haloperidol     Tablet 0.5 milliGRAM(s) Oral every 6 hours PRN agitation  haloperidol    Injectable 0.5 milliGRAM(s) IntraMuscular once PRN severe agitation  meclizine 12.5 milliGRAM(s) Oral every 8 hours PRN Dizziness  metoprolol tartrate 25 milliGRAM(s) Oral daily PRN Palpitations due to Anxiety  ondansetron   Disintegrating Tablet 4 milliGRAM(s) Oral every 8 hours PRN Nausea and/or Vomiting  polyethylene glycol 3350 17 Gram(s) Oral two times a day PRN constipation   MEDICATIONS  (STANDING):  atorvastatin 10 milliGRAM(s) Oral at bedtime  clonazePAM  Tablet 0.25 milliGRAM(s) Oral <User Schedule>  furosemide    Tablet 20 milliGRAM(s) Oral <User Schedule>  melatonin. 6 milliGRAM(s) Oral at bedtime  metoprolol tartrate 50 milliGRAM(s) Oral <User Schedule>  metoprolol tartrate 25 milliGRAM(s) Oral at bedtime  mirtazapine 7.5 milliGRAM(s) Oral at bedtime  multivitamin 1 Tablet(s) Oral daily  nortriptyline 100 milliGRAM(s) Oral at bedtime  pantoprazole    Tablet 40 milliGRAM(s) Oral before breakfast  pregabalin 25 milliGRAM(s) Oral <User Schedule>  pregabalin 25 milliGRAM(s) Oral at bedtime  senna 2 Tablet(s) Oral at bedtime    MEDICATIONS  (PRN):  acetaminophen     Tablet .. 650 milliGRAM(s) Oral every 6 hours PRN Temp greater or equal to 38C (100.4F), Mild Pain (1 - 3)  aluminum hydroxide/magnesium hydroxide/simethicone Suspension 30 milliLiter(s) Oral every 4 hours PRN Dyspepsia  artificial  tears Solution 1 Drop(s) Both EYES every 4 hours PRN dry eyes syndrome  bisacodyl Suppository 10 milliGRAM(s) Rectal daily PRN Constipation  chlorhexidine 0.12% Liquid 15 milliLiter(s) Oral Mucosa two times a day PRN gum inflammation  clonazePAM  Tablet 0.25 milliGRAM(s) Oral daily PRN anxiety  haloperidol     Tablet 0.5 milliGRAM(s) Oral every 6 hours PRN agitation  haloperidol    Injectable 0.5 milliGRAM(s) IntraMuscular once PRN severe agitation  meclizine 12.5 milliGRAM(s) Oral every 8 hours PRN Dizziness  metoprolol tartrate 25 milliGRAM(s) Oral daily PRN Palpitations due to Anxiety  ondansetron   Disintegrating Tablet 4 milliGRAM(s) Oral every 8 hours PRN Nausea and/or Vomiting  polyethylene glycol 3350 17 Gram(s) Oral two times a day PRN constipation

## 2024-05-22 NOTE — BH INPATIENT PSYCHIATRY PROGRESS NOTE - NSBHASSESSSUMMFT_PSY_ALL_CORE
73 yr old female, , domiciled, and retired. premorbidly ambulant (not needing assists) and iADL/ bADL independent. with background hx of MDD and ANSON; has multiple psych admissions to [facility] (most recently Jan 2024 on [facility]), had prior SA by overdosing on pills (11/2019) following death of parents, presented to ED with somatic complaints and reports of ?passive suicidal ideation, had exhaustive w/u, now admitted voluntarily to [facility]  Routine checks, no suicidal ideations  Continue nortriptyline 50mg po qhs for depression - will hold off increase due to orthostasis today  Continue klonopin 0.5mg po bid and 0.5mg po qhs for anxiety; gabapentin prn for anxiety  Continue mirtazapine 15mg po qhs and melatonin 3mg po qhs for insomnia  Increased lyrica to 50mg po daily and 25mg po q3pm and 9pm - off label for anxiety  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 Requesting metoprolol titration despite recently complaining of dizziness.  4/30: Ruminative thoughts regarding perceived rejection and stressor persist. Medication titration/ adjustment pending further assessment.   05/01: Somatic complaints and preservative thoughts about rejection persists. Integration of brief bedside psychotherapy during rounds with limit setting due to exacerbation of anxiety especially when fixated on her medication regimen. Medication titration to be approached slowly to minimize cumulative adverse effects.  05/02: No significant change. Open to individual therapy thus will continue in combination with medication management.   05/03: No change in current treatment plan. Will continue to encourage patient in engaging with treatment recommendations.  5/6: Minimal improvement in anxiety/depression. Nortriptyline to be increased from 60 to 75 mg daily.  5/7: Tolerating medication changes. Will consider further increase in dose of Nortriptyline. Adjustments made to dosing fo Klonopin due to complaint of morning drowsiness on current dose of 0.5mg   5/8: Increased 3pm lyrica to 50mg. Will continue monitoring response to medication.   5/9: Able to manage anxious episode this morning without medication adjustment. Will continue to encourage incorporation of therapy with meds.  5/10: Slight improvement in anxiety. More receptive to psychology sessions. Will consider titration of nortriptyline dose on Monday.   5/13: Increasing dose of Nortriptyline to 100mg daily. Plan to review rest of anti anxiolytic regimen over the week.   5/14: medication management in progress. Incorporating psychotherapy with increased receptivity by patient. Will continue to monitor & engage patient in treatment planning.  5/15: Complaint of constipation for which Senna was added. No urinary retention, tremors, blurry vision or symptoms of anticholinergic effect. Still endorsing anxiety and depressive symptoms. Will c/w current dose of Nortriptyline. Will obtain labs and EKG. Adjusted doses of Lyrica & Klonopin  5/16: Tolerating medication adjustments. Labs & EKG wnl. No medication adverse effect. Will continue to monitor.  5/17: Some improvement with anxiety. Ongoing adjustment of medication regimen.  5/20: has had some sustained gains with anxiety and depression symptoms but continues to catastrophize  5/21: Anxious about medication changes albeit controlled. Consolidating dosing times of anti anxiolytics with aim to discontinuing prior to discharge.  5/22: Chronically anxious albeit somewhat amenable to redirection. Will continue working on further medication adjustment prior to discharge.    PLAN:  Psychotropic medications:  - Continue on [id number] qHS for depressive symptoms - will titrate actively as tolerated by patient who remains anxious and somatically preoccupied with medication side effect profile. Can increase dose tomorrow if tolerates remeron dose reduction  - c/w Remeron 7.5 mg qHS for depressive symptoms and sleep - decrease in anticipation of nortriptyline dose increase  - c/w melatonin 6mg po qhs and give remeron 7.5mg po qhs prn for insomnia if there are problems this evening  - c/w Pregabalin 25mg TID @ 9am, 3pm & 9pm for anxiety.   - c/w Klonopin 0.25mg po tid to 6am, noon, 6pm for anxiety   - c/w Klonopin 0.25mg prn daily.  - c/w senna 2tabs qhs for constipation  HLD: atorvastatin 10 mg qHS  GERD: Protonix 40 mg daily  HTN: continue metoprolol 50mg q06:00, 25 mg qHS, and 25 mg PRN; patient states that her cardiologist prescribed this for palpitations from anxiety by (allow addition of Lasix)  LE edema: restart Lasix 20 mg every other day  Dizziness: MRI wnl; meclizine 12.5 mg PRN; possible BPPV; Flexeril 5 mg po qHS PRN recommended by Neurology 73 yr old female, , domiciled, and retired. premorbidly ambulant (not needing assists) and iADL/ bADL independent. with background hx of MDD and ANSON; has multiple psych admissions to [facility] (most recently Jan 2024 [facility]), had prior SA by overdosing on pills (11/2019) following death of parents, presented to ED with somatic complaints and reports of ?passive suicidal ideation, had exhaustive w/u, now admitted voluntarily to [facility]  Routine checks, no suicidal ideations  Continue nortriptyline 50mg po qhs for depression - will hold off increase due to orthostasis today  Continue klonopin 0.5mg po bid and 0.5mg po qhs for anxiety; gabapentin prn for anxiety  Continue mirtazapine 15mg po qhs and melatonin 3mg po qhs for insomnia  Increased lyrica to 50mg po daily and 25mg po q3pm and 9pm - off label for anxiety  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 Requesting metoprolol titration despite recently complaining of dizziness.  4/30: Ruminative thoughts regarding perceived rejection and stressor persist. Medication titration/ adjustment pending further assessment.   05/01: Somatic complaints and preservative thoughts about rejection persists. Integration of brief bedside psychotherapy during rounds with limit setting due to exacerbation of anxiety especially when fixated on her medication regimen. Medication titration to be approached slowly to minimize cumulative adverse effects.  05/02: No significant change. Open to individual therapy thus will continue in combination with medication management.   05/03: No change in current treatment plan. Will continue to encourage patient in engaging with treatment recommendations.  5/6: Minimal improvement in anxiety/depression. Nortriptyline to be increased from 60 to 75 mg daily.  5/7: Tolerating medication changes. Will consider further increase in dose of Nortriptyline. Adjustments made to dosing fo Klonopin due to complaint of morning drowsiness on current dose of 0.5mg   5/8: Increased 3pm lyrica to 50mg. Will continue monitoring response to medication.   5/9: Able to manage anxious episode this morning without medication adjustment. Will continue to encourage incorporation of therapy with meds.  5/10: Slight improvement in anxiety. More receptive to psychology sessions. Will consider titration of nortriptyline dose on Monday.   5/13: Increasing dose of Nortriptyline to 100mg daily. Plan to review rest of anti anxiolytic regimen over the week.   5/14: medication management in progress. Incorporating psychotherapy with increased receptivity by patient. Will continue to monitor & engage patient in treatment planning.  5/15: Complaint of constipation for which Senna was added. No urinary retention, tremors, blurry vision or symptoms of anticholinergic effect. Still endorsing anxiety and depressive symptoms. Will c/w current dose of Nortriptyline. Will obtain labs and EKG. Adjusted doses of Lyrica & Klonopin  5/16: Tolerating medication adjustments. Labs & EKG wnl. No medication adverse effect. Will continue to monitor.  5/17: Some improvement with anxiety. Ongoing adjustment of medication regimen.  5/20: has had some sustained gains with anxiety and depression symptoms but continues to catastrophize  5/21: Anxious about medication changes albeit controlled. Consolidating dosing times of anti anxiolytics with aim to discontinuing prior to discharge.  5/22: Chronically anxious albeit somewhat amenable to redirection. Will continue working on further medication adjustment prior to discharge.    PLAN:  Psychotropic medications:  - Continue on [id number] qHS for depressive symptoms - will titrate actively as tolerated by patient who remains anxious and somatically preoccupied with medication side effect profile. Can increase dose tomorrow if tolerates remeron dose reduction  - c/w Remeron 7.5 mg qHS for depressive symptoms and sleep - decrease in anticipation of nortriptyline dose increase  - c/w melatonin 6mg po qhs and give remeron 7.5mg po qhs prn for insomnia if there are problems this evening  - c/w Pregabalin 25mg TID @ 6am, 12pm & 9pm for anxiety.   - c/w Klonopin 0.25mg po tid to 6am, noon, 6pm for anxiety   - c/w Klonopin 0.25mg prn daily.  - c/w senna 2tabs qhs for constipation  HLD: atorvastatin 10 mg qHS  GERD: Protonix 40 mg daily  HTN: continue metoprolol 50mg q06:00, 25 mg qHS, and 25 mg PRN; patient states that her cardiologist prescribed this for palpitations from anxiety by (allow addition of Lasix)  LE edema: restart Lasix 20 mg every other day  Dizziness: MRI wnl; meclizine 12.5 mg PRN; possible BPPV; Flexeril 5 mg po qHS PRN recommended by Neurology

## 2024-05-22 NOTE — BH PSYCHOLOGY - CLINICIAN PSYCHOTHERAPY NOTE - NSBHPSYCHOLNARRATIVE_PSY_A_CORE FT
The patient was seen individually at the team's request and with her consent. Speech was of normal rate and volume, and there were no verbal irregularities. She denied acute anxiety and reports good sleep and appetite. She has tolerated the increased dose of Nortriptyline and is willing to consider discharge next week if this continues. Medications have been slightly reorganized to reduce multiple administrations throughout the day. She reports good interactions with her . She had some constipation, but it resolved when treated. She has been trying to remain active and has asked another patient to be a walking partner with her. The patient stopped recording her mood throughout the day; and was given another blank sheet and encouraged to resume.     The patient was encouraged to continue attending therapeutic activities on the unit and agreed to meet again.

## 2024-05-22 NOTE — BH INPATIENT PSYCHIATRY PROGRESS NOTE - NSBHCHARTREVIEWVS_PSY_A_CORE FT
Vital Signs Last 24 Hrs  T(C): 36.4 (05-22-24 @ 06:34), Max: 36.7 (05-21-24 @ 19:36)  T(F): 97.5 (05-22-24 @ 06:34), Max: 98.1 (05-21-24 @ 19:36)  HR: 106 (05-21-24 @ 16:16) (106 - 106)  BP: --  BP(mean): --  RR: 18 (05-22-24 @ 06:34) (17 - 18)  SpO2: 94% (05-22-24 @ 06:34) (94% - 98%)    Orthostatic VS  05-22-24 @ 06:34  Lying BP: --/-- HR: --  Sitting BP: 127/68 HR: 89  Standing BP: 120/64 HR: 94  Site: --  Mode: --  Orthostatic VS  05-21-24 @ 19:36  Lying BP: --/-- HR: --  Sitting BP: 134/70 HR: 82  Standing BP: --/-- HR: --  Site: --  Mode: --  Orthostatic VS  05-21-24 @ 05:12  Lying BP: 117/67 HR: 89  Sitting BP: 130/60 HR: 97  Standing BP: --/-- HR: --  Site: upper right arm  Mode: electronic

## 2024-05-22 NOTE — BH INPATIENT PSYCHIATRY PROGRESS NOTE - NSBHFUPINTERVALHXFT_PSY_A_CORE
No significant issue overnight. Patient relates having a good night rest. Denied any recurrent episodes of dizziness. hesitant regarding any further medication changes and refused discontinuation of Remeron. Worried that she may not have Klonopin for when she gets anxious. Encouraged to hold 12pm Klonopin to ascertain continuing need for it. Encouraged to work with the team including psychology and restart mood logs which relates she stopped yesterday. Appetite and sleep are reportedly Ok. No complaint of constipation.

## 2024-05-22 NOTE — BH INPATIENT PSYCHIATRY PROGRESS NOTE - PRN MEDS
MEDICATIONS  (PRN):  acetaminophen     Tablet .. 650 milliGRAM(s) Oral every 6 hours PRN Temp greater or equal to 38C (100.4F), Mild Pain (1 - 3)  aluminum hydroxide/magnesium hydroxide/simethicone Suspension 30 milliLiter(s) Oral every 4 hours PRN Dyspepsia  artificial  tears Solution 1 Drop(s) Both EYES every 4 hours PRN dry eyes syndrome  bisacodyl Suppository 10 milliGRAM(s) Rectal daily PRN Constipation  chlorhexidine 0.12% Liquid 15 milliLiter(s) Oral Mucosa two times a day PRN gum inflammation  clonazePAM  Tablet 0.25 milliGRAM(s) Oral daily PRN anxiety  haloperidol     Tablet 0.5 milliGRAM(s) Oral every 6 hours PRN agitation  haloperidol    Injectable 0.5 milliGRAM(s) IntraMuscular once PRN severe agitation  meclizine 12.5 milliGRAM(s) Oral every 8 hours PRN Dizziness  metoprolol tartrate 25 milliGRAM(s) Oral daily PRN Palpitations due to Anxiety  ondansetron   Disintegrating Tablet 4 milliGRAM(s) Oral every 8 hours PRN Nausea and/or Vomiting  polyethylene glycol 3350 17 Gram(s) Oral two times a day PRN constipation

## 2024-05-23 PROCEDURE — 99232 SBSQ HOSP IP/OBS MODERATE 35: CPT | Mod: GC

## 2024-05-23 RX ORDER — METOPROLOL TARTRATE 50 MG
50 TABLET ORAL AT BEDTIME
Refills: 0 | Status: DISCONTINUED | OUTPATIENT
Start: 2024-05-23 | End: 2024-05-31

## 2024-05-23 RX ADMIN — PANTOPRAZOLE SODIUM 40 MILLIGRAM(S): 20 TABLET, DELAYED RELEASE ORAL at 08:09

## 2024-05-23 RX ADMIN — Medication 25 MILLIGRAM(S): at 21:36

## 2024-05-23 RX ADMIN — ATORVASTATIN CALCIUM 10 MILLIGRAM(S): 80 TABLET, FILM COATED ORAL at 21:35

## 2024-05-23 RX ADMIN — Medication 0.25 MILLIGRAM(S): at 06:07

## 2024-05-23 RX ADMIN — Medication 1 DROP(S): at 21:46

## 2024-05-23 RX ADMIN — Medication 1 TABLET(S): at 09:28

## 2024-05-23 RX ADMIN — Medication 6 MILLIGRAM(S): at 21:35

## 2024-05-23 RX ADMIN — Medication 50 MILLIGRAM(S): at 21:35

## 2024-05-23 RX ADMIN — Medication 0.25 MILLIGRAM(S): at 12:23

## 2024-05-23 RX ADMIN — Medication 25 MILLIGRAM(S): at 12:23

## 2024-05-23 RX ADMIN — MIRTAZAPINE 7.5 MILLIGRAM(S): 45 TABLET, ORALLY DISINTEGRATING ORAL at 21:36

## 2024-05-23 RX ADMIN — CHLORHEXIDINE GLUCONATE 15 MILLILITER(S): 213 SOLUTION TOPICAL at 21:46

## 2024-05-23 RX ADMIN — Medication 0.25 MILLIGRAM(S): at 17:57

## 2024-05-23 RX ADMIN — Medication 25 MILLIGRAM(S): at 06:07

## 2024-05-23 RX ADMIN — Medication 50 MILLIGRAM(S): at 06:08

## 2024-05-23 RX ADMIN — NORTRIPTYLINE HYDROCHLORIDE 100 MILLIGRAM(S): 10 CAPSULE ORAL at 21:36

## 2024-05-23 NOTE — BH INPATIENT PSYCHIATRY PROGRESS NOTE - PRN MEDS
MEDICATIONS  (PRN):  acetaminophen     Tablet .. 650 milliGRAM(s) Oral every 6 hours PRN Temp greater or equal to 38C (100.4F), Mild Pain (1 - 3)  aluminum hydroxide/magnesium hydroxide/simethicone Suspension 30 milliLiter(s) Oral every 4 hours PRN Dyspepsia  artificial  tears Solution 1 Drop(s) Both EYES every 4 hours PRN dry eyes syndrome  bisacodyl Suppository 10 milliGRAM(s) Rectal daily PRN Constipation  chlorhexidine 0.12% Liquid 15 milliLiter(s) Oral Mucosa two times a day PRN gum inflammation  clonazePAM  Tablet 0.25 milliGRAM(s) Oral daily PRN anxiety  haloperidol     Tablet 0.5 milliGRAM(s) Oral every 6 hours PRN agitation  haloperidol    Injectable 0.5 milliGRAM(s) IntraMuscular once PRN severe agitation  meclizine 12.5 milliGRAM(s) Oral every 8 hours PRN Dizziness  ondansetron   Disintegrating Tablet 4 milliGRAM(s) Oral every 8 hours PRN Nausea and/or Vomiting  polyethylene glycol 3350 17 Gram(s) Oral two times a day PRN constipation

## 2024-05-23 NOTE — BH INPATIENT PSYCHIATRY PROGRESS NOTE - NSBHMETABOLIC_PSY_ALL_CORE_FT
BMI: BMI (kg/m2): 38.3 (04-16-24 @ 19:07)  HbA1c: A1C with Estimated Average Glucose Result: 5.8 % (01-11-24 @ 08:00)    Glucose:   BP: 118/55 (05-22-24 @ 20:27) (118/55 - 130/70)Vital Signs Last 24 Hrs  T(C): --  T(F): --  HR: 98 (05-22-24 @ 20:27) (98 - 98)  BP: 118/55 (05-22-24 @ 20:27) (118/55 - 118/55)  BP(mean): --  RR: --  SpO2: --    Orthostatic VS  05-22-24 @ 06:34  Lying BP: --/-- HR: --  Sitting BP: 127/68 HR: 89  Standing BP: 120/64 HR: 94  Site: --  Mode: --  Orthostatic VS  05-21-24 @ 19:36  Lying BP: --/-- HR: --  Sitting BP: 134/70 HR: 82  Standing BP: --/-- HR: --  Site: --  Mode: --    Lipid Panel: Date/Time: 04-05-24 @ 05:56  Cholesterol, Serum: 176  LDL Cholesterol Calculated: 97  HDL Cholesterol, Serum: 39  Total Cholesterol/HDL Ration Measurement: --  Triglycerides, Serum: 200

## 2024-05-23 NOTE — BH INPATIENT PSYCHIATRY PROGRESS NOTE - NSBHASSESSSUMMFT_PSY_ALL_CORE
73 yr old female, , domiciled, and retired. premorbidly ambulant (not needing assists) and iADL/ bADL independent. with background hx of MDD and ANSON; has multiple psych admissions to [facility] (most recently Jan 2024 on [facility]), had prior SA by overdosing on pills (11/2019) following death of parents, presented to ED with somatic complaints and reports of ?passive suicidal ideation, had exhaustive w/u, now admitted voluntarily to [facility]  Routine checks, no suicidal ideations  Continue nortriptyline 50mg po qhs for depression - will hold off increase due to orthostasis today  Continue klonopin 0.5mg po bid and 0.5mg po qhs for anxiety; gabapentin prn for anxiety  Continue mirtazapine 15mg po qhs and melatonin 3mg po qhs for insomnia  Increased lyrica to 50mg po daily and 25mg po q3pm and 9pm - off label for anxiety  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 Requesting metoprolol titration despite recently complaining of dizziness.  4/30: Ruminative thoughts regarding perceived rejection and stressor persist. Medication titration/ adjustment pending further assessment.   05/01: Somatic complaints and preservative thoughts about rejection persists. Integration of brief bedside psychotherapy during rounds with limit setting due to exacerbation of anxiety especially when fixated on her medication regimen. Medication titration to be approached slowly to minimize cumulative adverse effects.  05/02: No significant change. Open to individual therapy thus will continue in combination with medication management.   05/03: No change in current treatment plan. Will continue to encourage patient in engaging with treatment recommendations.  5/6: Minimal improvement in anxiety/depression. Nortriptyline to be increased from 60 to 75 mg daily.  5/7: Tolerating medication changes. Will consider further increase in dose of Nortriptyline. Adjustments made to dosing fo Klonopin due to complaint of morning drowsiness on current dose of 0.5mg   5/8: Increased 3pm lyrica to 50mg. Will continue monitoring response to medication.   5/9: Able to manage anxious episode this morning without medication adjustment. Will continue to encourage incorporation of therapy with meds.  5/10: Slight improvement in anxiety. More receptive to psychology sessions. Will consider titration of nortriptyline dose on Monday.   5/13: Increasing dose of Nortriptyline to 100mg daily. Plan to review rest of anti anxiolytic regimen over the week.   5/14: medication management in progress. Incorporating psychotherapy with increased receptivity by patient. Will continue to monitor & engage patient in treatment planning.  5/15: Complaint of constipation for which Senna was added. No urinary retention, tremors, blurry vision or symptoms of anticholinergic effect. Still endorsing anxiety and depressive symptoms. Will c/w current dose of Nortriptyline. Will obtain labs and EKG. Adjusted doses of Lyrica & Klonopin  5/16: Tolerating medication adjustments. Labs & EKG wnl. No medication adverse effect. Will continue to monitor.  5/17: Some improvement with anxiety. Ongoing adjustment of medication regimen.  5/20: has had some sustained gains with anxiety and depression symptoms but continues to catastrophize  5/21: Anxious about medication changes albeit controlled. Consolidating dosing times of anti anxiolytics with aim to discontinuing prior to discharge.  5/22: Chronically anxious albeit somewhat amenable to redirection. Will continue working on further medication adjustment prior to discharge.  5/22: Anxiety centered on medications changes. Changing Metoprolol to 50mg BID. Will consider increasing dose on Pamelor.     PLAN:  Psychotropic medications:  - Continue on [id number] qHS for depressive symptoms - will titrate actively as tolerated by patient who remains anxious and somatically preoccupied with medication side effect profile.   - c/w Remeron 7.5 mg qHS for depressive symptoms and sleep - decrease in anticipation of nortriptyline dose increase  - c/w melatonin 6mg po qhs and give remeron 7.5mg po qhs prn for insomnia if there are problems this evening  - c/w Pregabalin 25mg TID @ 6am, 12pm & 9pm for anxiety.   - c/w Klonopin 0.25mg po tid to 6am, noon, 6pm for anxiety   - c/w Klonopin 0.25mg prn daily.  - c/w senna 2tabs qhs for constipation  HLD: atorvastatin 10 mg qHS  GERD: Protonix 40 mg daily  HTN: continue metoprolol 50mg q06:00, 25 mg qHS, and 25 mg PRN; patient states that her cardiologist prescribed this for palpitations from anxiety by (allow addition of Lasix)  LE edema: restart Lasix 20 mg every other day  Dizziness: MRI wnl; meclizine 12.5 mg PRN; possible BPPV; Flexeril 5 mg po qHS PRN recommended by Neurology

## 2024-05-23 NOTE — BH INPATIENT PSYCHIATRY PROGRESS NOTE - CURRENT MEDICATION
MEDICATIONS  (STANDING):  atorvastatin 10 milliGRAM(s) Oral at bedtime  clonazePAM  Tablet 0.25 milliGRAM(s) Oral <User Schedule>  furosemide    Tablet 20 milliGRAM(s) Oral <User Schedule>  melatonin. 6 milliGRAM(s) Oral at bedtime  metoprolol tartrate 50 milliGRAM(s) Oral <User Schedule>  metoprolol tartrate 50 milliGRAM(s) Oral at bedtime  mirtazapine 7.5 milliGRAM(s) Oral at bedtime  multivitamin 1 Tablet(s) Oral daily  nortriptyline 100 milliGRAM(s) Oral at bedtime  pantoprazole    Tablet 40 milliGRAM(s) Oral before breakfast  pregabalin 25 milliGRAM(s) Oral at bedtime  pregabalin 25 milliGRAM(s) Oral <User Schedule>  senna 2 Tablet(s) Oral at bedtime    MEDICATIONS  (PRN):  acetaminophen     Tablet .. 650 milliGRAM(s) Oral every 6 hours PRN Temp greater or equal to 38C (100.4F), Mild Pain (1 - 3)  aluminum hydroxide/magnesium hydroxide/simethicone Suspension 30 milliLiter(s) Oral every 4 hours PRN Dyspepsia  artificial  tears Solution 1 Drop(s) Both EYES every 4 hours PRN dry eyes syndrome  bisacodyl Suppository 10 milliGRAM(s) Rectal daily PRN Constipation  chlorhexidine 0.12% Liquid 15 milliLiter(s) Oral Mucosa two times a day PRN gum inflammation  clonazePAM  Tablet 0.25 milliGRAM(s) Oral daily PRN anxiety  haloperidol     Tablet 0.5 milliGRAM(s) Oral every 6 hours PRN agitation  haloperidol    Injectable 0.5 milliGRAM(s) IntraMuscular once PRN severe agitation  meclizine 12.5 milliGRAM(s) Oral every 8 hours PRN Dizziness  ondansetron   Disintegrating Tablet 4 milliGRAM(s) Oral every 8 hours PRN Nausea and/or Vomiting  polyethylene glycol 3350 17 Gram(s) Oral two times a day PRN constipation   MEDICATIONS  (STANDING):  atorvastatin 10 milliGRAM(s) Oral at bedtime  clonazePAM  Tablet 0.25 milliGRAM(s) Oral <User Schedule>  furosemide    Tablet 20 milliGRAM(s) Oral <User Schedule>  melatonin. 6 milliGRAM(s) Oral at bedtime  metoprolol tartrate 50 milliGRAM(s) Oral at bedtime  metoprolol tartrate 50 milliGRAM(s) Oral <User Schedule>  mirtazapine 7.5 milliGRAM(s) Oral at bedtime  multivitamin 1 Tablet(s) Oral daily  nortriptyline 100 milliGRAM(s) Oral at bedtime  pantoprazole    Tablet 40 milliGRAM(s) Oral before breakfast  pregabalin 25 milliGRAM(s) Oral at bedtime  pregabalin 25 milliGRAM(s) Oral <User Schedule>  senna 2 Tablet(s) Oral at bedtime    MEDICATIONS  (PRN):  acetaminophen     Tablet .. 650 milliGRAM(s) Oral every 6 hours PRN Temp greater or equal to 38C (100.4F), Mild Pain (1 - 3)  aluminum hydroxide/magnesium hydroxide/simethicone Suspension 30 milliLiter(s) Oral every 4 hours PRN Dyspepsia  artificial  tears Solution 1 Drop(s) Both EYES every 4 hours PRN dry eyes syndrome  bisacodyl Suppository 10 milliGRAM(s) Rectal daily PRN Constipation  chlorhexidine 0.12% Liquid 15 milliLiter(s) Oral Mucosa two times a day PRN gum inflammation  clonazePAM  Tablet 0.25 milliGRAM(s) Oral daily PRN anxiety  haloperidol     Tablet 0.5 milliGRAM(s) Oral every 6 hours PRN agitation  haloperidol    Injectable 0.5 milliGRAM(s) IntraMuscular once PRN severe agitation  meclizine 12.5 milliGRAM(s) Oral every 8 hours PRN Dizziness  ondansetron   Disintegrating Tablet 4 milliGRAM(s) Oral every 8 hours PRN Nausea and/or Vomiting  polyethylene glycol 3350 17 Gram(s) Oral two times a day PRN constipation

## 2024-05-23 NOTE — BH INPATIENT PSYCHIATRY PROGRESS NOTE - NSBHCHARTREVIEWVS_PSY_A_CORE FT
Vital Signs Last 24 Hrs  T(C): --  T(F): --  HR: 98 (05-22-24 @ 20:27) (98 - 98)  BP: 118/55 (05-22-24 @ 20:27) (118/55 - 118/55)  BP(mean): --  RR: --  SpO2: --    Orthostatic VS  05-22-24 @ 06:34  Lying BP: --/-- HR: --  Sitting BP: 127/68 HR: 89  Standing BP: 120/64 HR: 94  Site: --  Mode: --  Orthostatic VS  05-21-24 @ 19:36  Lying BP: --/-- HR: --  Sitting BP: 134/70 HR: 82  Standing BP: --/-- HR: --  Site: --  Mode: --

## 2024-05-23 NOTE — BH INPATIENT PSYCHIATRY PROGRESS NOTE - NSBHFUPINTERVALHXFT_PSY_A_CORE
Reports feeling very anxious regarding recent medication changes. Psychoeducation provided regarding approximate duration of medication effect, polypharmacy and incorporation of therapy in managing anxiety. Encouraged to keep working with team. No complaint of constipation. Appetite and sleep are reportedly Ok.

## 2024-05-24 PROCEDURE — 99232 SBSQ HOSP IP/OBS MODERATE 35: CPT | Mod: GC

## 2024-05-24 PROCEDURE — 90832 PSYTX W PT 30 MINUTES: CPT

## 2024-05-24 RX ORDER — CLONAZEPAM 1 MG
0.25 TABLET ORAL
Refills: 0 | Status: DISCONTINUED | OUTPATIENT
Start: 2024-05-24 | End: 2024-05-28

## 2024-05-24 RX ADMIN — Medication 0.25 MILLIGRAM(S): at 10:18

## 2024-05-24 RX ADMIN — NORTRIPTYLINE HYDROCHLORIDE 100 MILLIGRAM(S): 10 CAPSULE ORAL at 21:27

## 2024-05-24 RX ADMIN — Medication 0.25 MILLIGRAM(S): at 18:02

## 2024-05-24 RX ADMIN — Medication 1 DROP(S): at 21:31

## 2024-05-24 RX ADMIN — Medication 0.25 MILLIGRAM(S): at 12:56

## 2024-05-24 RX ADMIN — Medication 6 MILLIGRAM(S): at 21:27

## 2024-05-24 RX ADMIN — Medication 25 MILLIGRAM(S): at 13:36

## 2024-05-24 RX ADMIN — Medication 50 MILLIGRAM(S): at 21:27

## 2024-05-24 RX ADMIN — Medication 25 MILLIGRAM(S): at 21:30

## 2024-05-24 RX ADMIN — CHLORHEXIDINE GLUCONATE 15 MILLILITER(S): 213 SOLUTION TOPICAL at 21:30

## 2024-05-24 RX ADMIN — Medication 20 MILLIGRAM(S): at 09:35

## 2024-05-24 RX ADMIN — Medication 25 MILLIGRAM(S): at 06:15

## 2024-05-24 RX ADMIN — PANTOPRAZOLE SODIUM 40 MILLIGRAM(S): 20 TABLET, DELAYED RELEASE ORAL at 06:17

## 2024-05-24 RX ADMIN — Medication 0.25 MILLIGRAM(S): at 06:15

## 2024-05-24 RX ADMIN — Medication 50 MILLIGRAM(S): at 06:16

## 2024-05-24 RX ADMIN — Medication 1 TABLET(S): at 09:36

## 2024-05-24 RX ADMIN — ATORVASTATIN CALCIUM 10 MILLIGRAM(S): 80 TABLET, FILM COATED ORAL at 21:27

## 2024-05-24 RX ADMIN — MIRTAZAPINE 7.5 MILLIGRAM(S): 45 TABLET, ORALLY DISINTEGRATING ORAL at 21:27

## 2024-05-24 NOTE — BH INPATIENT PSYCHIATRY PROGRESS NOTE - NSBHATTESTCOMMENTATTENDFT_PSY_A_CORE
Maladaptive behavior due to anxiety continues. Seeks frequent reassurance, has checking behavior. Agree with assessment and plan. 
Agree with assessment and plan. 
Patient reporting improvement in anxiety, particularly with heart palpitations though still has tendency to catastrophize. She is attributing this to lyrica dose in crease a days ago. Now focused on depressed mood. BP improved so will increase metoprolol to 50mg po daily and 25mg po qhs with extra 25mg po daily prn for heart palpitations. Will increase nortriptyline to 75mg po qhs tomorrow - delay till tomorrow in case of patient having return of anxiety and misattributing it to nortriptyline as she had a tendency to do. 
Sustained improvement in anxiety. Will increase nortriptyline to 60mg po qhs. Patient weary of increasing in 25mg increments. 
Agree with assessment and plan. 
Agree with assessment and plan. Continue nortriptyline 100mg po qhs. Patient remains anxious, overanalyzes symptoms and medications. 
Agree with assessment and plan. 
Agree with assessment. Titration of nortriptyline to continue as tolerated. 
Agree with assessment and plan. Nortriptyline increased yesterday to 75mg po qhs. Patient again reporting excess daytime sedation from klonopin so will again split AM dose to 0.25mg 6am and noon. Anxiety, depression, poor functioning remains. 
Agree with assessment and plan. 
Agree with assessment and plan. Anxiety and depression less severe though persistence and frequency about the same. Relying less on PRN medications. Has been demonstrating less anticipatory anxiety despite adjusting medications which are always a source of stress for her. Less drowsy since medications reduced. Still unwilling to reduce mirtazapine. Will continue to encourage and discuss discharge if there is ongoing improvement. Still going to groups well and benefiting from individual psychotherapy. 
Agree with assessment and plan. EKG and labs largely unremarkable other than slight leukocytosis and slight increase in alk phos though not much higher than last value. Can be monitored as an outpatient.
Continues to report anxiety in the form of heart palpitations. Continue current medications. Will attempt to increase nortriptyline again on Monday. 
Agree with assessment and plan. 
Agree with assessment and plan. Patient remains preoccupied with medication dosing times. Was informed yesterday she would get tid dosing of all meds but seems to be reneging on this plan and wanting to have medication administration times spread out. Will provide psychoeducation. 
Agree with assessment and plan. Will encourage optimizing nortriptyline though patient seems somewhat resistant at this time. 
Agree with assessment and plan. Treatment team continuing to work with patient to minimize polypharmacy, decrease reliance on anxiolytics and prn medications. Patient experiencing significantly worse anxiety today due to resident physician's time ending on this unit. Will provide encouragement and support. 
Agree with assessment and plan. Extensive psychoeducation and supportive psychotherapy ongoing due to over-reliance on medications and psychosomatic tendencies.

## 2024-05-24 NOTE — BH INPATIENT PSYCHIATRY PROGRESS NOTE - PRN MEDS
MEDICATIONS  (PRN):  acetaminophen     Tablet .. 650 milliGRAM(s) Oral every 6 hours PRN Temp greater or equal to 38C (100.4F), Mild Pain (1 - 3)  aluminum hydroxide/magnesium hydroxide/simethicone Suspension 30 milliLiter(s) Oral every 4 hours PRN Dyspepsia  artificial  tears Solution 1 Drop(s) Both EYES every 4 hours PRN dry eyes syndrome  bisacodyl Suppository 10 milliGRAM(s) Rectal daily PRN Constipation  chlorhexidine 0.12% Liquid 15 milliLiter(s) Oral Mucosa two times a day PRN gum inflammation  clonazePAM  Tablet 0.25 milliGRAM(s) Oral daily PRN anxiety  haloperidol     Tablet 0.5 milliGRAM(s) Oral every 6 hours PRN agitation  haloperidol    Injectable 0.5 milliGRAM(s) IntraMuscular once PRN severe agitation  meclizine 12.5 milliGRAM(s) Oral every 8 hours PRN Dizziness  ondansetron   Disintegrating Tablet 4 milliGRAM(s) Oral every 8 hours PRN Nausea and/or Vomiting  polyethylene glycol 3350 17 Gram(s) Oral two times a day PRN constipation  pregabalin 25 milliGRAM(s) Oral daily PRN anxiety

## 2024-05-24 NOTE — BH INPATIENT PSYCHIATRY PROGRESS NOTE - NSBHASSESSSUMMFT_PSY_ALL_CORE
73 yr old female, , domiciled, and retired. premorbidly ambulant (not needing assists) and iADL/ bADL independent. with background hx of MDD and ANSON; has multiple psych admissions to [facility] (most recently Jan 2024 on [facility]), had prior SA by overdosing on pills (11/2019) following death of parents, presented to ED with somatic complaints and reports of ?passive suicidal ideation, had exhaustive w/u, now admitted voluntarily to [facility]  Routine checks, no suicidal ideations  Continue nortriptyline 50mg po qhs for depression - will hold off increase due to orthostasis today  Continue klonopin 0.5mg po bid and 0.5mg po qhs for anxiety; gabapentin prn for anxiety  Continue mirtazapine 15mg po qhs and melatonin 3mg po qhs for insomnia  Increased lyrica to 50mg po daily and 25mg po q3pm and 9pm - off label for anxiety  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 Requesting metoprolol titration despite recently complaining of dizziness.  4/30: Ruminative thoughts regarding perceived rejection and stressor persist. Medication titration/ adjustment pending further assessment.   05/01: Somatic complaints and preservative thoughts about rejection persists. Integration of brief bedside psychotherapy during rounds with limit setting due to exacerbation of anxiety especially when fixated on her medication regimen. Medication titration to be approached slowly to minimize cumulative adverse effects.  05/02: No significant change. Open to individual therapy thus will continue in combination with medication management.   05/03: No change in current treatment plan. Will continue to encourage patient in engaging with treatment recommendations.  5/6: Minimal improvement in anxiety/depression. Nortriptyline to be increased from 60 to 75 mg daily.  5/7: Tolerating medication changes. Will consider further increase in dose of Nortriptyline. Adjustments made to dosing fo Klonopin due to complaint of morning drowsiness on current dose of 0.5mg   5/8: Increased 3pm lyrica to 50mg. Will continue monitoring response to medication.   5/9: Able to manage anxious episode this morning without medication adjustment. Will continue to encourage incorporation of therapy with meds.  5/10: Slight improvement in anxiety. More receptive to psychology sessions. Will consider titration of nortriptyline dose on Monday.   5/13: Increasing dose of Nortriptyline to 100mg daily. Plan to review rest of anti anxiolytic regimen over the week.   5/14: medication management in progress. Incorporating psychotherapy with increased receptivity by patient. Will continue to monitor & engage patient in treatment planning.  5/15: Complaint of constipation for which Senna was added. No urinary retention, tremors, blurry vision or symptoms of anticholinergic effect. Still endorsing anxiety and depressive symptoms. Will c/w current dose of Nortriptyline. Will obtain labs and EKG. Adjusted doses of Lyrica & Klonopin  5/16: Tolerating medication adjustments. Labs & EKG wnl. No medication adverse effect. Will continue to monitor.  5/17: Some improvement with anxiety. Ongoing adjustment of medication regimen.  5/20: has had some sustained gains with anxiety and depression symptoms but continues to catastrophize  5/21: Anxious about medication changes albeit controlled. Consolidating dosing times of anti anxiolytics with aim to discontinuing prior to discharge.  5/22: Chronically anxious albeit somewhat amenable to redirection. Will continue working on further medication adjustment prior to discharge.  5/23: Anxiety centered on medications changes. Changing Metoprolol to 50mg BID. Will consider increasing dose on Pamelor.   5/24: Persistent anxiety especially with change. Some improvement with depressive symptoms. Holding further medication adjustment pending reassessment.    PLAN:  Psychotropic medications:  - Continue on [id number] qHS for depressive symptoms - will titrate actively as tolerated by patient who remains anxious and somatically preoccupied with medication side effect profile.   - c/w Remeron 7.5 mg qHS for depressive symptoms and sleep - decrease in anticipation of nortriptyline dose increase  - c/w melatonin 6mg po qhs and give remeron 7.5mg po qhs prn for insomnia if there are problems this evening  - c/w Pregabalin 25mg BID @ 6am & 9pm for anxiety.   - c/w Klonopin 0.25mg po tid to 6am, noon, 6pm for anxiety   - c/w Klonopin 0.25mg prn daily.  - c/w Pregabalin 25mg prn  - c/w senna 2tabs qhs for constipation  HLD: atorvastatin 10 mg qHS  GERD: Protonix 40 mg daily  HTN: continue metoprolol 50mg q06:00, 25 mg qHS, and 25 mg PRN; patient states that her cardiologist prescribed this for palpitations from anxiety by (allow addition of Lasix)  LE edema: restart Lasix 20 mg every other day  Dizziness: MRI wnl; meclizine 12.5 mg PRN; possible BPPV; Flexeril 5 mg po qHS PRN recommended by Neurology

## 2024-05-24 NOTE — BH INPATIENT PSYCHIATRY PROGRESS NOTE - NSBHMETABOLIC_PSY_ALL_CORE_FT
BMI: BMI (kg/m2): 38.3 (04-16-24 @ 19:07)  HbA1c: A1C with Estimated Average Glucose Result: 5.8 % (01-11-24 @ 08:00)    Glucose:   BP: 118/55 (05-22-24 @ 20:27) (118/55 - 118/55)Vital Signs Last 24 Hrs  T(C): 36.7 (05-24-24 @ 05:47), Max: 37.1 (05-23-24 @ 20:35)  T(F): 98 (05-24-24 @ 05:47), Max: 98.7 (05-23-24 @ 20:35)  HR: --  BP: --  BP(mean): --  RR: --  SpO2: 96% (05-24-24 @ 05:47) (96% - 96%)    Orthostatic VS  05-24-24 @ 05:47  Lying BP: 134/65 HR: 80  Sitting BP: 131/65 HR: 85  Standing BP: --/-- HR: --  Site: --  Mode: --  Orthostatic VS  05-23-24 @ 20:35  Lying BP: --/-- HR: --  Sitting BP: 128/70 HR: 97  Standing BP: --/-- HR: --  Site: --  Mode: --    Lipid Panel: Date/Time: 04-05-24 @ 05:56  Cholesterol, Serum: 176  LDL Cholesterol Calculated: 97  HDL Cholesterol, Serum: 39  Total Cholesterol/HDL Ration Measurement: --  Triglycerides, Serum: 200

## 2024-05-24 NOTE — BH PSYCHOLOGY - CLINICIAN PSYCHOTHERAPY NOTE - NSBHPSYCHOLNARRATIVE_PSY_A_CORE FT
The patient was seen individually at the team's request and with her consent. Speech was of normal rate and volume, and there were no verbal irregularities. She denied acute anxiety and reports good sleep and appetite. She was very focused on the medication regimen, and the time and dosage of her medications were reviewed with her. She wondered whether she would be discharged with any PRN medications. The writer reviewed relaxation and calming techniques and provided the patient with a card that changes color in response to finger temperature which she could use in biofeedback for relaxation when presented with anxiety.     The patient expressed worry about how functional and strong she will feel when discharged and we reviewed the improvement she has made since admission. She stated that she still does not feel “happy” which is how she felt when she left last time. She also was upset that the resident she had been seeing was leaving after today. The patient accepted support and agreed to meet again after the weekend.

## 2024-05-24 NOTE — BH INPATIENT PSYCHIATRY PROGRESS NOTE - CURRENT MEDICATION
MEDICATIONS  (STANDING):  atorvastatin 10 milliGRAM(s) Oral at bedtime  clonazePAM  Tablet 0.25 milliGRAM(s) Oral <User Schedule>  furosemide    Tablet 20 milliGRAM(s) Oral <User Schedule>  melatonin. 6 milliGRAM(s) Oral at bedtime  metoprolol tartrate 50 milliGRAM(s) Oral at bedtime  metoprolol tartrate 50 milliGRAM(s) Oral <User Schedule>  mirtazapine 7.5 milliGRAM(s) Oral at bedtime  multivitamin 1 Tablet(s) Oral daily  nortriptyline 100 milliGRAM(s) Oral at bedtime  pantoprazole    Tablet 40 milliGRAM(s) Oral before breakfast  pregabalin 25 milliGRAM(s) Oral <User Schedule>  senna 2 Tablet(s) Oral at bedtime    MEDICATIONS  (PRN):  acetaminophen     Tablet .. 650 milliGRAM(s) Oral every 6 hours PRN Temp greater or equal to 38C (100.4F), Mild Pain (1 - 3)  aluminum hydroxide/magnesium hydroxide/simethicone Suspension 30 milliLiter(s) Oral every 4 hours PRN Dyspepsia  artificial  tears Solution 1 Drop(s) Both EYES every 4 hours PRN dry eyes syndrome  bisacodyl Suppository 10 milliGRAM(s) Rectal daily PRN Constipation  chlorhexidine 0.12% Liquid 15 milliLiter(s) Oral Mucosa two times a day PRN gum inflammation  clonazePAM  Tablet 0.25 milliGRAM(s) Oral daily PRN anxiety  haloperidol     Tablet 0.5 milliGRAM(s) Oral every 6 hours PRN agitation  haloperidol    Injectable 0.5 milliGRAM(s) IntraMuscular once PRN severe agitation  meclizine 12.5 milliGRAM(s) Oral every 8 hours PRN Dizziness  ondansetron   Disintegrating Tablet 4 milliGRAM(s) Oral every 8 hours PRN Nausea and/or Vomiting  polyethylene glycol 3350 17 Gram(s) Oral two times a day PRN constipation  pregabalin 25 milliGRAM(s) Oral daily PRN anxiety   MEDICATIONS  (STANDING):  atorvastatin 10 milliGRAM(s) Oral at bedtime  clonazePAM  Tablet 0.25 milliGRAM(s) Oral <User Schedule>  furosemide    Tablet 20 milliGRAM(s) Oral <User Schedule>  melatonin. 6 milliGRAM(s) Oral at bedtime  metoprolol tartrate 50 milliGRAM(s) Oral <User Schedule>  metoprolol tartrate 50 milliGRAM(s) Oral at bedtime  mirtazapine 7.5 milliGRAM(s) Oral at bedtime  multivitamin 1 Tablet(s) Oral daily  nortriptyline 100 milliGRAM(s) Oral at bedtime  pantoprazole    Tablet 40 milliGRAM(s) Oral before breakfast  pregabalin 25 milliGRAM(s) Oral <User Schedule>  senna 2 Tablet(s) Oral at bedtime    MEDICATIONS  (PRN):  acetaminophen     Tablet .. 650 milliGRAM(s) Oral every 6 hours PRN Temp greater or equal to 38C (100.4F), Mild Pain (1 - 3)  aluminum hydroxide/magnesium hydroxide/simethicone Suspension 30 milliLiter(s) Oral every 4 hours PRN Dyspepsia  artificial  tears Solution 1 Drop(s) Both EYES every 4 hours PRN dry eyes syndrome  bisacodyl Suppository 10 milliGRAM(s) Rectal daily PRN Constipation  chlorhexidine 0.12% Liquid 15 milliLiter(s) Oral Mucosa two times a day PRN gum inflammation  clonazePAM  Tablet 0.25 milliGRAM(s) Oral daily PRN anxiety  haloperidol     Tablet 0.5 milliGRAM(s) Oral every 6 hours PRN agitation  haloperidol    Injectable 0.5 milliGRAM(s) IntraMuscular once PRN severe agitation  meclizine 12.5 milliGRAM(s) Oral every 8 hours PRN Dizziness  ondansetron   Disintegrating Tablet 4 milliGRAM(s) Oral every 8 hours PRN Nausea and/or Vomiting  polyethylene glycol 3350 17 Gram(s) Oral two times a day PRN constipation  pregabalin 25 milliGRAM(s) Oral daily PRN anxiety

## 2024-05-24 NOTE — BH INPATIENT PSYCHIATRY PROGRESS NOTE - NSBHCHARTREVIEWVS_PSY_A_CORE FT
Vital Signs Last 24 Hrs  T(C): 36.7 (05-24-24 @ 05:47), Max: 37.1 (05-23-24 @ 20:35)  T(F): 98 (05-24-24 @ 05:47), Max: 98.7 (05-23-24 @ 20:35)  HR: --  BP: --  BP(mean): --  RR: --  SpO2: 96% (05-24-24 @ 05:47) (96% - 96%)    Orthostatic VS  05-24-24 @ 05:47  Lying BP: 134/65 HR: 80  Sitting BP: 131/65 HR: 85  Standing BP: --/-- HR: --  Site: --  Mode: --  Orthostatic VS  05-23-24 @ 20:35  Lying BP: --/-- HR: --  Sitting BP: 128/70 HR: 97  Standing BP: --/-- HR: --  Site: --  Mode: --

## 2024-05-24 NOTE — BH INPATIENT PSYCHIATRY PROGRESS NOTE - NSBHFUPINTERVALHXFT_PSY_A_CORE
On today's assessment still presenting worried regarding medication changes including the adjustment of some of her medications to prn. Perseverative regarding getting the prns on discharge. psychoeducation provided. Somatic complaints of generalized body aches. No other issues elicited.

## 2024-05-25 RX ADMIN — Medication 25 MILLIGRAM(S): at 21:28

## 2024-05-25 RX ADMIN — Medication 50 MILLIGRAM(S): at 21:18

## 2024-05-25 RX ADMIN — MIRTAZAPINE 7.5 MILLIGRAM(S): 45 TABLET, ORALLY DISINTEGRATING ORAL at 21:18

## 2024-05-25 RX ADMIN — Medication 0.25 MILLIGRAM(S): at 06:01

## 2024-05-25 RX ADMIN — Medication 6 MILLIGRAM(S): at 21:18

## 2024-05-25 RX ADMIN — Medication 0.25 MILLIGRAM(S): at 17:43

## 2024-05-25 RX ADMIN — Medication 1 TABLET(S): at 08:26

## 2024-05-25 RX ADMIN — SENNA PLUS 2 TABLET(S): 8.6 TABLET ORAL at 21:18

## 2024-05-25 RX ADMIN — Medication 50 MILLIGRAM(S): at 06:01

## 2024-05-25 RX ADMIN — Medication 25 MILLIGRAM(S): at 14:02

## 2024-05-25 RX ADMIN — PANTOPRAZOLE SODIUM 40 MILLIGRAM(S): 20 TABLET, DELAYED RELEASE ORAL at 06:04

## 2024-05-25 RX ADMIN — ONDANSETRON 4 MILLIGRAM(S): 8 TABLET, FILM COATED ORAL at 14:03

## 2024-05-25 RX ADMIN — ATORVASTATIN CALCIUM 10 MILLIGRAM(S): 80 TABLET, FILM COATED ORAL at 21:18

## 2024-05-25 RX ADMIN — NORTRIPTYLINE HYDROCHLORIDE 100 MILLIGRAM(S): 10 CAPSULE ORAL at 21:18

## 2024-05-25 RX ADMIN — Medication 0.25 MILLIGRAM(S): at 12:00

## 2024-05-25 RX ADMIN — CHLORHEXIDINE GLUCONATE 15 MILLILITER(S): 213 SOLUTION TOPICAL at 21:17

## 2024-05-25 RX ADMIN — Medication 25 MILLIGRAM(S): at 06:01

## 2024-05-26 RX ADMIN — ATORVASTATIN CALCIUM 10 MILLIGRAM(S): 80 TABLET, FILM COATED ORAL at 21:24

## 2024-05-26 RX ADMIN — PANTOPRAZOLE SODIUM 40 MILLIGRAM(S): 20 TABLET, DELAYED RELEASE ORAL at 06:09

## 2024-05-26 RX ADMIN — MIRTAZAPINE 7.5 MILLIGRAM(S): 45 TABLET, ORALLY DISINTEGRATING ORAL at 21:24

## 2024-05-26 RX ADMIN — Medication 0.25 MILLIGRAM(S): at 14:35

## 2024-05-26 RX ADMIN — Medication 50 MILLIGRAM(S): at 06:08

## 2024-05-26 RX ADMIN — Medication 25 MILLIGRAM(S): at 21:24

## 2024-05-26 RX ADMIN — NORTRIPTYLINE HYDROCHLORIDE 100 MILLIGRAM(S): 10 CAPSULE ORAL at 21:24

## 2024-05-26 RX ADMIN — SENNA PLUS 2 TABLET(S): 8.6 TABLET ORAL at 21:27

## 2024-05-26 RX ADMIN — Medication 6 MILLIGRAM(S): at 21:24

## 2024-05-26 RX ADMIN — Medication 0.25 MILLIGRAM(S): at 06:08

## 2024-05-26 RX ADMIN — Medication 1 TABLET(S): at 10:14

## 2024-05-26 RX ADMIN — Medication 0.25 MILLIGRAM(S): at 18:45

## 2024-05-26 RX ADMIN — Medication 0.25 MILLIGRAM(S): at 10:14

## 2024-05-26 RX ADMIN — Medication 25 MILLIGRAM(S): at 06:08

## 2024-05-26 RX ADMIN — Medication 50 MILLIGRAM(S): at 21:23

## 2024-05-27 RX ADMIN — Medication 6 MILLIGRAM(S): at 21:38

## 2024-05-27 RX ADMIN — SENNA PLUS 2 TABLET(S): 8.6 TABLET ORAL at 21:38

## 2024-05-27 RX ADMIN — ATORVASTATIN CALCIUM 10 MILLIGRAM(S): 80 TABLET, FILM COATED ORAL at 21:38

## 2024-05-27 RX ADMIN — Medication 25 MILLIGRAM(S): at 15:23

## 2024-05-27 RX ADMIN — Medication 0.25 MILLIGRAM(S): at 17:49

## 2024-05-27 RX ADMIN — MIRTAZAPINE 7.5 MILLIGRAM(S): 45 TABLET, ORALLY DISINTEGRATING ORAL at 21:38

## 2024-05-27 RX ADMIN — Medication 50 MILLIGRAM(S): at 06:20

## 2024-05-27 RX ADMIN — Medication 1 TABLET(S): at 09:14

## 2024-05-27 RX ADMIN — Medication 50 MILLIGRAM(S): at 21:41

## 2024-05-27 RX ADMIN — ONDANSETRON 4 MILLIGRAM(S): 8 TABLET, FILM COATED ORAL at 13:50

## 2024-05-27 RX ADMIN — Medication 25 MILLIGRAM(S): at 21:40

## 2024-05-27 RX ADMIN — PANTOPRAZOLE SODIUM 40 MILLIGRAM(S): 20 TABLET, DELAYED RELEASE ORAL at 06:24

## 2024-05-27 RX ADMIN — Medication 0.25 MILLIGRAM(S): at 12:12

## 2024-05-27 RX ADMIN — POLYETHYLENE GLYCOL 3350 17 GRAM(S): 17 POWDER, FOR SOLUTION ORAL at 16:38

## 2024-05-27 RX ADMIN — Medication 1 DROP(S): at 21:47

## 2024-05-27 RX ADMIN — Medication 25 MILLIGRAM(S): at 06:21

## 2024-05-27 RX ADMIN — Medication 0.25 MILLIGRAM(S): at 06:21

## 2024-05-27 RX ADMIN — NORTRIPTYLINE HYDROCHLORIDE 100 MILLIGRAM(S): 10 CAPSULE ORAL at 21:38

## 2024-05-27 RX ADMIN — Medication 20 MILLIGRAM(S): at 09:14

## 2024-05-27 RX ADMIN — CHLORHEXIDINE GLUCONATE 15 MILLILITER(S): 213 SOLUTION TOPICAL at 21:47

## 2024-05-28 PROCEDURE — 99232 SBSQ HOSP IP/OBS MODERATE 35: CPT

## 2024-05-28 PROCEDURE — 90837 PSYTX W PT 60 MINUTES: CPT

## 2024-05-28 RX ORDER — POLYETHYLENE GLYCOL 3350 17 G/17G
17 POWDER, FOR SOLUTION ORAL DAILY
Refills: 0 | Status: DISCONTINUED | OUTPATIENT
Start: 2024-05-28 | End: 2024-05-31

## 2024-05-28 RX ORDER — GLYCERIN ADULT
1 SUPPOSITORY, RECTAL RECTAL ONCE
Refills: 0 | Status: COMPLETED | OUTPATIENT
Start: 2024-05-28 | End: 2024-05-28

## 2024-05-28 RX ORDER — CLONAZEPAM 1 MG
0.25 TABLET ORAL DAILY
Refills: 0 | Status: DISCONTINUED | OUTPATIENT
Start: 2024-05-28 | End: 2024-05-31

## 2024-05-28 RX ORDER — GABAPENTIN 400 MG/1
300 CAPSULE ORAL
Refills: 0 | Status: COMPLETED | OUTPATIENT
Start: 2024-05-28 | End: 2024-05-29

## 2024-05-28 RX ORDER — CLONAZEPAM 1 MG
0.25 TABLET ORAL
Refills: 0 | Status: DISCONTINUED | OUTPATIENT
Start: 2024-05-28 | End: 2024-05-31

## 2024-05-28 RX ADMIN — Medication 0.25 MILLIGRAM(S): at 06:24

## 2024-05-28 RX ADMIN — CHLORHEXIDINE GLUCONATE 15 MILLILITER(S): 213 SOLUTION TOPICAL at 21:26

## 2024-05-28 RX ADMIN — NORTRIPTYLINE HYDROCHLORIDE 100 MILLIGRAM(S): 10 CAPSULE ORAL at 21:22

## 2024-05-28 RX ADMIN — ATORVASTATIN CALCIUM 10 MILLIGRAM(S): 80 TABLET, FILM COATED ORAL at 21:22

## 2024-05-28 RX ADMIN — MIRTAZAPINE 7.5 MILLIGRAM(S): 45 TABLET, ORALLY DISINTEGRATING ORAL at 21:22

## 2024-05-28 RX ADMIN — Medication 1 DROP(S): at 21:31

## 2024-05-28 RX ADMIN — Medication 25 MILLIGRAM(S): at 21:22

## 2024-05-28 RX ADMIN — Medication 50 MILLIGRAM(S): at 21:22

## 2024-05-28 RX ADMIN — Medication 0.25 MILLIGRAM(S): at 17:46

## 2024-05-28 RX ADMIN — Medication 1 SUPPOSITORY(S): at 09:13

## 2024-05-28 RX ADMIN — Medication 1 TABLET(S): at 08:47

## 2024-05-28 RX ADMIN — Medication 25 MILLIGRAM(S): at 06:24

## 2024-05-28 RX ADMIN — PANTOPRAZOLE SODIUM 40 MILLIGRAM(S): 20 TABLET, DELAYED RELEASE ORAL at 06:29

## 2024-05-28 RX ADMIN — Medication 50 MILLIGRAM(S): at 06:24

## 2024-05-28 RX ADMIN — Medication 0.25 MILLIGRAM(S): at 12:49

## 2024-05-28 RX ADMIN — Medication 6 MILLIGRAM(S): at 21:22

## 2024-05-28 NOTE — BH INPATIENT PSYCHIATRY PROGRESS NOTE - CURRENT MEDICATION
MEDICATIONS  (STANDING):  atorvastatin 10 milliGRAM(s) Oral at bedtime  clonazePAM  Tablet 0.25 milliGRAM(s) Oral <User Schedule>  furosemide    Tablet 20 milliGRAM(s) Oral <User Schedule>  gabapentin 300 milliGRAM(s) Oral <User Schedule>  melatonin. 6 milliGRAM(s) Oral at bedtime  metoprolol tartrate 50 milliGRAM(s) Oral at bedtime  metoprolol tartrate 50 milliGRAM(s) Oral <User Schedule>  mirtazapine 7.5 milliGRAM(s) Oral at bedtime  multivitamin 1 Tablet(s) Oral daily  nortriptyline 100 milliGRAM(s) Oral at bedtime  pantoprazole    Tablet 40 milliGRAM(s) Oral before breakfast  polyethylene glycol 3350 17 Gram(s) Oral daily  pregabalin 25 milliGRAM(s) Oral <User Schedule>  senna 2 Tablet(s) Oral at bedtime    MEDICATIONS  (PRN):  acetaminophen     Tablet .. 650 milliGRAM(s) Oral every 6 hours PRN Temp greater or equal to 38C (100.4F), Mild Pain (1 - 3)  aluminum hydroxide/magnesium hydroxide/simethicone Suspension 30 milliLiter(s) Oral every 4 hours PRN Dyspepsia  artificial  tears Solution 1 Drop(s) Both EYES every 4 hours PRN dry eyes syndrome  bisacodyl Suppository 10 milliGRAM(s) Rectal daily PRN Constipation  chlorhexidine 0.12% Liquid 15 milliLiter(s) Oral Mucosa two times a day PRN gum inflammation  clonazePAM  Tablet 0.25 milliGRAM(s) Oral daily PRN anxiety  haloperidol     Tablet 0.5 milliGRAM(s) Oral every 6 hours PRN agitation  haloperidol    Injectable 0.5 milliGRAM(s) IntraMuscular once PRN severe agitation  meclizine 12.5 milliGRAM(s) Oral every 8 hours PRN Dizziness  ondansetron   Disintegrating Tablet 4 milliGRAM(s) Oral every 8 hours PRN Nausea and/or Vomiting  pregabalin 25 milliGRAM(s) Oral daily PRN anxiety   MEDICATIONS  (STANDING):  atorvastatin 10 milliGRAM(s) Oral at bedtime  clonazePAM  Tablet 0.25 milliGRAM(s) Oral <User Schedule>  furosemide    Tablet 20 milliGRAM(s) Oral <User Schedule>  gabapentin 300 milliGRAM(s) Oral <User Schedule>  melatonin. 6 milliGRAM(s) Oral at bedtime  metoprolol tartrate 50 milliGRAM(s) Oral <User Schedule>  metoprolol tartrate 50 milliGRAM(s) Oral at bedtime  mirtazapine 7.5 milliGRAM(s) Oral at bedtime  multivitamin 1 Tablet(s) Oral daily  nortriptyline 100 milliGRAM(s) Oral at bedtime  pantoprazole    Tablet 40 milliGRAM(s) Oral before breakfast  polyethylene glycol 3350 17 Gram(s) Oral daily  pregabalin 25 milliGRAM(s) Oral <User Schedule>  senna 2 Tablet(s) Oral at bedtime    MEDICATIONS  (PRN):  acetaminophen     Tablet .. 650 milliGRAM(s) Oral every 6 hours PRN Temp greater or equal to 38C (100.4F), Mild Pain (1 - 3)  aluminum hydroxide/magnesium hydroxide/simethicone Suspension 30 milliLiter(s) Oral every 4 hours PRN Dyspepsia  artificial  tears Solution 1 Drop(s) Both EYES every 4 hours PRN dry eyes syndrome  bisacodyl Suppository 10 milliGRAM(s) Rectal daily PRN Constipation  chlorhexidine 0.12% Liquid 15 milliLiter(s) Oral Mucosa two times a day PRN gum inflammation  clonazePAM  Tablet 0.25 milliGRAM(s) Oral daily PRN anxiety  haloperidol     Tablet 0.5 milliGRAM(s) Oral every 6 hours PRN agitation  haloperidol    Injectable 0.5 milliGRAM(s) IntraMuscular once PRN severe agitation  meclizine 12.5 milliGRAM(s) Oral every 8 hours PRN Dizziness  ondansetron   Disintegrating Tablet 4 milliGRAM(s) Oral every 8 hours PRN Nausea and/or Vomiting  pregabalin 25 milliGRAM(s) Oral daily PRN anxiety

## 2024-05-28 NOTE — BH INPATIENT PSYCHIATRY PROGRESS NOTE - NSBHFUPINTERVALHXFT_PSY_A_CORE
Patient reports sustained improvement in anxiety and depression. She is more worried about medication regimen, particularly how she feels more sedated when taking lyrica and klonopin together but anxious when she does not take one of them. Forward thinking and open to discharge Friday.

## 2024-05-28 NOTE — BH PSYCHOLOGY - CLINICIAN PSYCHOTHERAPY NOTE - NSBHPSYCHOLNARRATIVE_PSY_A_CORE FT
The patient was seen individually at the team's request and with her consent. Speech was of normal rate and volume, and there were no verbal irregularities. She again denied acute anxiety and her sleep and appetite are good. She reported feeling “a little light-headed” and thought it may be related to not drinking enough water. She has been constipated on and off for the past week or two. She also seemed more unsettled today and cited concerns about discharge (which will probably be this week) as well as having to tolerate new patients who are loud and disorganized. She complained that she is not as “happy” as she was before her last discharge and that she feels unenthusiastic. We discussed how she could plan to routinize some of her time when she returns home so as to avoid falling into a pattern of isolation and inactivity.     The patient was praised for attending therapeutic activities on the unit and encouraged to continue. She accepted support and agreed to meet again.

## 2024-05-28 NOTE — BH INPATIENT PSYCHIATRY PROGRESS NOTE - NSBHCHARTREVIEWVS_PSY_A_CORE FT
Vital Signs Last 24 Hrs  T(C): 36.6 (05-28-24 @ 06:00), Max: 36.6 (05-28-24 @ 06:00)  T(F): 97.9 (05-28-24 @ 06:00), Max: 97.9 (05-28-24 @ 06:00)  HR: 103 (05-27-24 @ 20:07) (103 - 103)  BP: 125/71 (05-27-24 @ 20:07) (125/71 - 125/71)  BP(mean): --  RR: --  SpO2: 95% (05-28-24 @ 06:00) (95% - 95%)    Orthostatic VS  05-28-24 @ 06:00  Lying BP: 111/53 HR: 74  Sitting BP: 116/61 HR: 78  Standing BP: --/-- HR: --  Site: --  Mode: --  Orthostatic VS  05-27-24 @ 05:45  Lying BP: 136/69 HR: 81  Sitting BP: 129/61 HR: 85  Standing BP: --/-- HR: --  Site: --  Mode: --  Orthostatic VS  05-26-24 @ 18:55  Lying BP: --/-- HR: --  Sitting BP: 135/65 HR: 96  Standing BP: 115/74 HR: 98  Site: --  Mode: --

## 2024-05-28 NOTE — BH INPATIENT PSYCHIATRY PROGRESS NOTE - NSBHASSESSSUMMFT_PSY_ALL_CORE
73 yr old female, , domiciled, and retired. premorbidly ambulant (not needing assists) and iADL/ bADL independent. with background hx of MDD and ANSON; has multiple psych admissions to [facility] (most recently Jan 2024 on [facility]), had prior SA by overdosing on pills (11/2019) following death of parents, presented to ED with somatic complaints and reports of ?passive suicidal ideation, had exhaustive w/u, now admitted voluntarily to [facility]  Routine checks, no suicidal ideations  Continue nortriptyline 50mg po qhs for depression - will hold off increase due to orthostasis today  Continue klonopin 0.5mg po bid and 0.5mg po qhs for anxiety; gabapentin prn for anxiety  Continue mirtazapine 15mg po qhs and melatonin 3mg po qhs for insomnia  Increased lyrica to 50mg po daily and 25mg po q3pm and 9pm - off label for anxiety  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 Requesting metoprolol titration despite recently complaining of dizziness.  4/30: Ruminative thoughts regarding perceived rejection and stressor persist. Medication titration/ adjustment pending further assessment.   05/01: Somatic complaints and preservative thoughts about rejection persists. Integration of brief bedside psychotherapy during rounds with limit setting due to exacerbation of anxiety especially when fixated on her medication regimen. Medication titration to be approached slowly to minimize cumulative adverse effects.  05/02: No significant change. Open to individual therapy thus will continue in combination with medication management.   05/03: No change in current treatment plan. Will continue to encourage patient in engaging with treatment recommendations.  5/6: Minimal improvement in anxiety/depression. Nortriptyline to be increased from 60 to 75 mg daily.  5/7: Tolerating medication changes. Will consider further increase in dose of Nortriptyline. Adjustments made to dosing fo Klonopin due to complaint of morning drowsiness on current dose of 0.5mg   5/8: Increased 3pm lyrica to 50mg. Will continue monitoring response to medication.   5/9: Able to manage anxious episode this morning without medication adjustment. Will continue to encourage incorporation of therapy with meds.  5/10: Slight improvement in anxiety. More receptive to psychology sessions. Will consider titration of nortriptyline dose on Monday.   5/13: Increasing dose of Nortriptyline to 100mg daily. Plan to review rest of anti anxiolytic regimen over the week.   5/14: medication management in progress. Incorporating psychotherapy with increased receptivity by patient. Will continue to monitor & engage patient in treatment planning.  5/15: Complaint of constipation for which Senna was added. No urinary retention, tremors, blurry vision or symptoms of anticholinergic effect. Still endorsing anxiety and depressive symptoms. Will c/w current dose of Nortriptyline. Will obtain labs and EKG. Adjusted doses of Lyrica & Klonopin  5/16: Tolerating medication adjustments. Labs & EKG wnl. No medication adverse effect. Will continue to monitor.  5/17: Some improvement with anxiety. Ongoing adjustment of medication regimen.  5/20: has had some sustained gains with anxiety and depression symptoms but continues to catastrophize  5/21: Anxious about medication changes albeit controlled. Consolidating dosing times of anti anxiolytics with aim to discontinuing prior to discharge.  5/22: Chronically anxious albeit somewhat amenable to redirection. Will continue working on further medication adjustment prior to discharge.  5/23: Anxiety centered on medications changes. Changing Metoprolol to 50mg BID. Will consider increasing dose on Pamelor.   5/24: Persistent anxiety especially with change. Some improvement with depressive symptoms. Holding further medication adjustment pending reassessment.  5/28: patient wants to try gabapentin in combo with klonopin instead of lyrica due to excess sedation; constipated     PLAN:  Psychotropic medications:  - Continue on [id number] qHS for depressive symptoms - will consider titrating later as patient is very constipated  - c/w Remeron 7.5 mg qHS for depressive symptoms and sleep - decrease in anticipation of nortriptyline dose increase  - c/w melatonin 6mg po qhs and give remeron 7.5mg po qhs prn for insomnia if there are problems this evening  - c/w Pregabalin 25mg BID @ 6am & 9pm for anxiety but will trial holding tomorrow's AM dose and give one time gabapentin 300mg at 6am to take with klonopin 6am dose to see if less sedated when taken together  - c/w Klonopin 0.25mg po tid to 6am, noon, 6pm for anxiety   - c/w Klonopin 0.25mg prn daily.  - c/w Pregabalin 25mg prn  - c/w senna 2tabs qhs for constipation  HLD: atorvastatin 10 mg qHS  GERD: Protonix 40 mg daily  HTN: continue metoprolol 50mg q06:00, 25 mg qHS, and 25 mg PRN; patient states that her cardiologist prescribed this for palpitations from anxiety by (allow addition of Lasix)  LE edema: restart Lasix 20 mg every other day  Dizziness: MRI wnl; meclizine 12.5 mg PRN; possible BPPV; Flexeril 5 mg po qHS PRN recommended by Neurology 73 yr old female, , domiciled, and retired. premorbidly ambulant (not needing assists) and iADL/ bADL independent. with background hx of MDD and ANSON; has multiple psych admissions to [facility] (most recently Jan 2024 on [facility]), had prior SA by overdosing on pills (11/2019) following death of parents, presented to ED with somatic complaints and reports of ?passive suicidal ideation, had exhaustive w/u, now admitted voluntarily to [facility]  Routine checks, no suicidal ideations  Continue nortriptyline 50mg po qhs for depression - will hold off increase due to orthostasis today  Continue klonopin 0.5mg po bid and 0.5mg po qhs for anxiety; gabapentin prn for anxiety  Continue mirtazapine 15mg po qhs and melatonin 3mg po qhs for insomnia  Increased lyrica to 50mg po daily and 25mg po q3pm and 9pm - off label for anxiety  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 Requesting metoprolol titration despite recently complaining of dizziness.  4/30: Ruminative thoughts regarding perceived rejection and stressor persist. Medication titration/ adjustment pending further assessment.   05/01: Somatic complaints and preservative thoughts about rejection persists. Integration of brief bedside psychotherapy during rounds with limit setting due to exacerbation of anxiety especially when fixated on her medication regimen. Medication titration to be approached slowly to minimize cumulative adverse effects.  05/02: No significant change. Open to individual therapy thus will continue in combination with medication management.   05/03: No change in current treatment plan. Will continue to encourage patient in engaging with treatment recommendations.  5/6: Minimal improvement in anxiety/depression. Nortriptyline to be increased from 60 to 75 mg daily.  5/7: Tolerating medication changes. Will consider further increase in dose of Nortriptyline. Adjustments made to dosing fo Klonopin due to complaint of morning drowsiness on current dose of 0.5mg   5/8: Increased 3pm lyrica to 50mg. Will continue monitoring response to medication.   5/9: Able to manage anxious episode this morning without medication adjustment. Will continue to encourage incorporation of therapy with meds.  5/10: Slight improvement in anxiety. More receptive to psychology sessions. Will consider titration of nortriptyline dose on Monday.   5/13: Increasing dose of Nortriptyline to 100mg daily. Plan to review rest of anti anxiolytic regimen over the week.   5/14: medication management in progress. Incorporating psychotherapy with increased receptivity by patient. Will continue to monitor & engage patient in treatment planning.  5/15: Complaint of constipation for which Senna was added. No urinary retention, tremors, blurry vision or symptoms of anticholinergic effect. Still endorsing anxiety and depressive symptoms. Will c/w current dose of Nortriptyline. Will obtain labs and EKG. Adjusted doses of Lyrica & Klonopin  5/16: Tolerating medication adjustments. Labs & EKG wnl. No medication adverse effect. Will continue to monitor.  5/17: Some improvement with anxiety. Ongoing adjustment of medication regimen.  5/20: has had some sustained gains with anxiety and depression symptoms but continues to catastrophize  5/21: Anxious about medication changes albeit controlled. Consolidating dosing times of anti anxiolytics with aim to discontinuing prior to discharge.  5/22: Chronically anxious albeit somewhat amenable to redirection. Will continue working on further medication adjustment prior to discharge.  5/23: Anxiety centered on medications changes. Changing Metoprolol to 50mg BID. Will consider increasing dose on Pamelor.   5/24: Persistent anxiety especially with change. Some improvement with depressive symptoms. Holding further medication adjustment pending reassessment.  5/28: patient wants to try gabapentin in combo with klonopin instead of lyrica due to excess sedation; constipated     PLAN:  Psychotropic medications:  - Continue on [id number] qHS for depressive symptoms - will consider titrating later as patient is very constipated  - c/w Remeron 7.5 mg qHS for depressive symptoms and sleep - decrease in anticipation of nortriptyline dose increase  - c/w melatonin 6mg po qhs and give remeron 7.5mg po qhs prn for insomnia if there are problems this evening  - c/w Pregabalin 25mg BID @ 6am & 9pm for anxiety but will trial holding tomorrow's AM dose and give one time gabapentin 300mg at 6am to take with klonopin 6am dose to see if less sedated when taken together  - c/w Klonopin 0.25mg po tid to 6am, noon, 6pm for anxiety   - c/w Klonopin 0.25mg prn daily.  - c/w Pregabalin 25mg prn  - c/w senna 2tabs qhs for constipation  HLD: atorvastatin 10 mg qHS  GERD: Protonix 40 mg daily  HTN: continue metoprolol 50mg q06:00, 25 mg qHS, and 25 mg PRN; patient states that her cardiologist prescribed this for palpitations from anxiety by (allow addition of Lasix)  LE edema: resolved; patient wants to change to prn 20mg daily  constipation: continue senna and change miralax to daily  Dizziness: MRI wnl; meclizine 12.5 mg PRN; possible BPPV; Flexeril 5 mg po qHS PRN recommended by Neurology

## 2024-05-28 NOTE — BH INPATIENT PSYCHIATRY PROGRESS NOTE - PRN MEDS
MEDICATIONS  (PRN):  acetaminophen     Tablet .. 650 milliGRAM(s) Oral every 6 hours PRN Temp greater or equal to 38C (100.4F), Mild Pain (1 - 3)  aluminum hydroxide/magnesium hydroxide/simethicone Suspension 30 milliLiter(s) Oral every 4 hours PRN Dyspepsia  artificial  tears Solution 1 Drop(s) Both EYES every 4 hours PRN dry eyes syndrome  bisacodyl Suppository 10 milliGRAM(s) Rectal daily PRN Constipation  chlorhexidine 0.12% Liquid 15 milliLiter(s) Oral Mucosa two times a day PRN gum inflammation  clonazePAM  Tablet 0.25 milliGRAM(s) Oral daily PRN anxiety  haloperidol     Tablet 0.5 milliGRAM(s) Oral every 6 hours PRN agitation  haloperidol    Injectable 0.5 milliGRAM(s) IntraMuscular once PRN severe agitation  meclizine 12.5 milliGRAM(s) Oral every 8 hours PRN Dizziness  ondansetron   Disintegrating Tablet 4 milliGRAM(s) Oral every 8 hours PRN Nausea and/or Vomiting  pregabalin 25 milliGRAM(s) Oral daily PRN anxiety

## 2024-05-29 PROCEDURE — 99232 SBSQ HOSP IP/OBS MODERATE 35: CPT

## 2024-05-29 RX ORDER — GABAPENTIN 400 MG/1
300 CAPSULE ORAL
Refills: 0 | Status: DISCONTINUED | OUTPATIENT
Start: 2024-05-29 | End: 2024-05-30

## 2024-05-29 RX ORDER — GABAPENTIN 400 MG/1
300 CAPSULE ORAL DAILY
Refills: 0 | Status: DISCONTINUED | OUTPATIENT
Start: 2024-05-29 | End: 2024-05-30

## 2024-05-29 RX ORDER — FUROSEMIDE 40 MG
20 TABLET ORAL DAILY
Refills: 0 | Status: DISCONTINUED | OUTPATIENT
Start: 2024-05-29 | End: 2024-05-31

## 2024-05-29 RX ADMIN — Medication 0.25 MILLIGRAM(S): at 06:36

## 2024-05-29 RX ADMIN — POLYETHYLENE GLYCOL 3350 17 GRAM(S): 17 POWDER, FOR SOLUTION ORAL at 10:17

## 2024-05-29 RX ADMIN — Medication 6 MILLIGRAM(S): at 21:32

## 2024-05-29 RX ADMIN — Medication 1 TABLET(S): at 09:58

## 2024-05-29 RX ADMIN — Medication 50 MILLIGRAM(S): at 06:35

## 2024-05-29 RX ADMIN — NORTRIPTYLINE HYDROCHLORIDE 100 MILLIGRAM(S): 10 CAPSULE ORAL at 21:32

## 2024-05-29 RX ADMIN — Medication 25 MILLIGRAM(S): at 21:32

## 2024-05-29 RX ADMIN — CHLORHEXIDINE GLUCONATE 15 MILLILITER(S): 213 SOLUTION TOPICAL at 21:39

## 2024-05-29 RX ADMIN — Medication 0.25 MILLIGRAM(S): at 13:26

## 2024-05-29 RX ADMIN — Medication 0.25 MILLIGRAM(S): at 17:47

## 2024-05-29 RX ADMIN — PANTOPRAZOLE SODIUM 40 MILLIGRAM(S): 20 TABLET, DELAYED RELEASE ORAL at 06:36

## 2024-05-29 RX ADMIN — ATORVASTATIN CALCIUM 10 MILLIGRAM(S): 80 TABLET, FILM COATED ORAL at 21:32

## 2024-05-29 RX ADMIN — Medication 50 MILLIGRAM(S): at 21:33

## 2024-05-29 RX ADMIN — Medication 650 MILLIGRAM(S): at 19:08

## 2024-05-29 RX ADMIN — GABAPENTIN 300 MILLIGRAM(S): 400 CAPSULE ORAL at 21:33

## 2024-05-29 RX ADMIN — GABAPENTIN 300 MILLIGRAM(S): 400 CAPSULE ORAL at 06:35

## 2024-05-29 RX ADMIN — MIRTAZAPINE 7.5 MILLIGRAM(S): 45 TABLET, ORALLY DISINTEGRATING ORAL at 21:32

## 2024-05-29 RX ADMIN — SENNA PLUS 2 TABLET(S): 8.6 TABLET ORAL at 21:33

## 2024-05-29 RX ADMIN — Medication 650 MILLIGRAM(S): at 19:40

## 2024-05-29 NOTE — BH INPATIENT PSYCHIATRY PROGRESS NOTE - CURRENT MEDICATION
MEDICATIONS  (STANDING):  atorvastatin 10 milliGRAM(s) Oral at bedtime  clonazePAM  Tablet 0.25 milliGRAM(s) Oral <User Schedule>  gabapentin 300 milliGRAM(s) Oral two times a day  melatonin. 6 milliGRAM(s) Oral at bedtime  metoprolol tartrate 50 milliGRAM(s) Oral <User Schedule>  metoprolol tartrate 50 milliGRAM(s) Oral at bedtime  mirtazapine 7.5 milliGRAM(s) Oral at bedtime  multivitamin 1 Tablet(s) Oral daily  nortriptyline 100 milliGRAM(s) Oral at bedtime  pantoprazole    Tablet 40 milliGRAM(s) Oral before breakfast  polyethylene glycol 3350 17 Gram(s) Oral daily  senna 2 Tablet(s) Oral at bedtime    MEDICATIONS  (PRN):  acetaminophen     Tablet .. 650 milliGRAM(s) Oral every 6 hours PRN Temp greater or equal to 38C (100.4F), Mild Pain (1 - 3)  aluminum hydroxide/magnesium hydroxide/simethicone Suspension 30 milliLiter(s) Oral every 4 hours PRN Dyspepsia  artificial  tears Solution 1 Drop(s) Both EYES every 4 hours PRN dry eyes syndrome  bisacodyl Suppository 10 milliGRAM(s) Rectal daily PRN Constipation  chlorhexidine 0.12% Liquid 15 milliLiter(s) Oral Mucosa two times a day PRN gum inflammation  clonazePAM  Tablet 0.25 milliGRAM(s) Oral daily PRN anxiety  furosemide    Tablet 20 milliGRAM(s) Oral daily PRN lower extremity edema  gabapentin 300 milliGRAM(s) Oral daily PRN anxiety  haloperidol     Tablet 0.5 milliGRAM(s) Oral every 6 hours PRN agitation  haloperidol    Injectable 0.5 milliGRAM(s) IntraMuscular once PRN severe agitation  meclizine 12.5 milliGRAM(s) Oral every 8 hours PRN Dizziness  ondansetron   Disintegrating Tablet 4 milliGRAM(s) Oral every 8 hours PRN Nausea and/or Vomiting

## 2024-05-29 NOTE — BH INPATIENT PSYCHIATRY PROGRESS NOTE - NSBHCHARTREVIEWVS_PSY_A_CORE FT
Vital Signs Last 24 Hrs  T(C): 36.6 (05-29-24 @ 05:38), Max: 36.6 (05-29-24 @ 05:38)  T(F): 97.8 (05-29-24 @ 05:38), Max: 97.8 (05-29-24 @ 05:38)  HR: 70 (05-29-24 @ 20:45) (70 - 70)  BP: 117/65 (05-29-24 @ 20:45) (117/65 - 117/65)  BP(mean): --  RR: 17 (05-29-24 @ 20:45) (17 - 17)  SpO2: 98% (05-29-24 @ 20:45) (98% - 98%)    Orthostatic VS  05-29-24 @ 05:38  Lying BP: 111/51 HR: 73  Sitting BP: 125/66 HR: 77  Standing BP: --/-- HR: --  Site: --  Mode: --  Orthostatic VS  05-28-24 @ 06:00  Lying BP: 111/53 HR: 74  Sitting BP: 116/61 HR: 78  Standing BP: --/-- HR: --  Site: --  Mode: --

## 2024-05-29 NOTE — BH INPATIENT PSYCHIATRY PROGRESS NOTE - NSBHMETABOLIC_PSY_ALL_CORE_FT
BMI: BMI (kg/m2): 38.3 (04-16-24 @ 19:07)  HbA1c: A1C with Estimated Average Glucose Result: 5.8 % (01-11-24 @ 08:00)    Glucose:   BP: 117/65 (05-29-24 @ 20:45) (117/65 - 125/71)Vital Signs Last 24 Hrs  T(C): 36.6 (05-29-24 @ 05:38), Max: 36.6 (05-29-24 @ 05:38)  T(F): 97.8 (05-29-24 @ 05:38), Max: 97.8 (05-29-24 @ 05:38)  HR: 70 (05-29-24 @ 20:45) (70 - 70)  BP: 117/65 (05-29-24 @ 20:45) (117/65 - 117/65)  BP(mean): --  RR: 17 (05-29-24 @ 20:45) (17 - 17)  SpO2: 98% (05-29-24 @ 20:45) (98% - 98%)    Orthostatic VS  05-29-24 @ 05:38  Lying BP: 111/51 HR: 73  Sitting BP: 125/66 HR: 77  Standing BP: --/-- HR: --  Site: --  Mode: --  Orthostatic VS  05-28-24 @ 06:00  Lying BP: 111/53 HR: 74  Sitting BP: 116/61 HR: 78  Standing BP: --/-- HR: --  Site: --  Mode: --    Lipid Panel: Date/Time: 04-05-24 @ 05:56  Cholesterol, Serum: 176  LDL Cholesterol Calculated: 97  HDL Cholesterol, Serum: 39  Total Cholesterol/HDL Ration Measurement: --  Triglycerides, Serum: 200

## 2024-05-29 NOTE — BH INPATIENT PSYCHIATRY PROGRESS NOTE - NSBHFUPINTERVALHXFT_PSY_A_CORE
Patient reports tolerating gabapentin well in combination with klonopin without sedation. Denies having had any anxiety today.

## 2024-05-29 NOTE — BH INPATIENT PSYCHIATRY PROGRESS NOTE - NSBHASSESSSUMMFT_PSY_ALL_CORE
73 yr old female, , domiciled, and retired. premorbidly ambulant (not needing assists) and iADL/ bADL independent. with background hx of MDD and ANSON; has multiple psych admissions to [facility] (most recently Jan 2024 on [facility]), had prior SA by overdosing on pills (11/2019) following death of parents, presented to ED with somatic complaints and reports of ?passive suicidal ideation, had exhaustive w/u, now admitted voluntarily to [facility]  Routine checks, no suicidal ideations  Continue nortriptyline 50mg po qhs for depression - will hold off increase due to orthostasis today  Continue klonopin 0.5mg po bid and 0.5mg po qhs for anxiety; gabapentin prn for anxiety  Continue mirtazapine 15mg po qhs and melatonin 3mg po qhs for insomnia  Increased lyrica to 50mg po daily and 25mg po q3pm and 9pm - off label for anxiety  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 Requesting metoprolol titration despite recently complaining of dizziness.  4/30: Ruminative thoughts regarding perceived rejection and stressor persist. Medication titration/ adjustment pending further assessment.   05/01: Somatic complaints and preservative thoughts about rejection persists. Integration of brief bedside psychotherapy during rounds with limit setting due to exacerbation of anxiety especially when fixated on her medication regimen. Medication titration to be approached slowly to minimize cumulative adverse effects.  05/02: No significant change. Open to individual therapy thus will continue in combination with medication management.   05/03: No change in current treatment plan. Will continue to encourage patient in engaging with treatment recommendations.  5/6: Minimal improvement in anxiety/depression. Nortriptyline to be increased from 60 to 75 mg daily.  5/7: Tolerating medication changes. Will consider further increase in dose of Nortriptyline. Adjustments made to dosing fo Klonopin due to complaint of morning drowsiness on current dose of 0.5mg   5/8: Increased 3pm lyrica to 50mg. Will continue monitoring response to medication.   5/9: Able to manage anxious episode this morning without medication adjustment. Will continue to encourage incorporation of therapy with meds.  5/10: Slight improvement in anxiety. More receptive to psychology sessions. Will consider titration of nortriptyline dose on Monday.   5/13: Increasing dose of Nortriptyline to 100mg daily. Plan to review rest of anti anxiolytic regimen over the week.   5/14: medication management in progress. Incorporating psychotherapy with increased receptivity by patient. Will continue to monitor & engage patient in treatment planning.  5/15: Complaint of constipation for which Senna was added. No urinary retention, tremors, blurry vision or symptoms of anticholinergic effect. Still endorsing anxiety and depressive symptoms. Will c/w current dose of Nortriptyline. Will obtain labs and EKG. Adjusted doses of Lyrica & Klonopin  5/16: Tolerating medication adjustments. Labs & EKG wnl. No medication adverse effect. Will continue to monitor.  5/17: Some improvement with anxiety. Ongoing adjustment of medication regimen.  5/20: has had some sustained gains with anxiety and depression symptoms but continues to catastrophize  5/21: Anxious about medication changes albeit controlled. Consolidating dosing times of anti anxiolytics with aim to discontinuing prior to discharge.  5/22: Chronically anxious albeit somewhat amenable to redirection. Will continue working on further medication adjustment prior to discharge.  5/23: Anxiety centered on medications changes. Changing Metoprolol to 50mg BID. Will consider increasing dose on Pamelor.   5/24: Persistent anxiety especially with change. Some improvement with depressive symptoms. Holding further medication adjustment pending reassessment.  5/28: patient wants to try gabapentin in combo with klonopin instead of lyrica due to excess sedation; constipated   5/29: d/c lyrica and start gabapentin instead, continues to improve    PLAN:  Psychotropic medications:  - Continue Nortriptyline 100mg qHS for depressive symptoms - will consider titrating later as patient is very constipated  - c/w Remeron 7.5 mg qHS for depressive symptoms and sleep - decrease in anticipation of nortriptyline dose increase  - c/w melatonin 6mg po qhs and give remeron 7.5mg po qhs prn for insomnia if there are problems this evening  - d/c Pregabalin 25mg BID @ 6am & 9pm for anxiety but will trial holding tomorrow's AM dose   - start gabapentin 300mg tid  - c/w Klonopin 0.25mg po tid to 6am, noon, 6pm for anxiety   - c/w Klonopin 0.25mg prn daily.  - d/c Pregabalin 25mg prn  - c/w senna 2tabs qhs for constipation and make miralax daily  HLD: atorvastatin 10 mg qHS  GERD: Protonix 40 mg daily  HTN: continue metoprolol 50mg q06:00, 25 mg qHS, and 25 mg PRN; patient states that her cardiologist prescribed this for palpitations from anxiety by (allow addition of Lasix)  LE edema: resolved; patient wants to change to prn 20mg daily  Dizziness: MRI wnl; meclizine 12.5 mg PRN; possible BPPV; Flexeril 5 mg po qHS PRN recommended by Neurology

## 2024-05-29 NOTE — BH INPATIENT PSYCHIATRY PROGRESS NOTE - PRN MEDS
MEDICATIONS  (PRN):  acetaminophen     Tablet .. 650 milliGRAM(s) Oral every 6 hours PRN Temp greater or equal to 38C (100.4F), Mild Pain (1 - 3)  aluminum hydroxide/magnesium hydroxide/simethicone Suspension 30 milliLiter(s) Oral every 4 hours PRN Dyspepsia  artificial  tears Solution 1 Drop(s) Both EYES every 4 hours PRN dry eyes syndrome  bisacodyl Suppository 10 milliGRAM(s) Rectal daily PRN Constipation  chlorhexidine 0.12% Liquid 15 milliLiter(s) Oral Mucosa two times a day PRN gum inflammation  clonazePAM  Tablet 0.25 milliGRAM(s) Oral daily PRN anxiety  furosemide    Tablet 20 milliGRAM(s) Oral daily PRN lower extremity edema  gabapentin 300 milliGRAM(s) Oral daily PRN anxiety  haloperidol     Tablet 0.5 milliGRAM(s) Oral every 6 hours PRN agitation  haloperidol    Injectable 0.5 milliGRAM(s) IntraMuscular once PRN severe agitation  meclizine 12.5 milliGRAM(s) Oral every 8 hours PRN Dizziness  ondansetron   Disintegrating Tablet 4 milliGRAM(s) Oral every 8 hours PRN Nausea and/or Vomiting

## 2024-05-30 PROCEDURE — 90832 PSYTX W PT 30 MINUTES: CPT

## 2024-05-30 PROCEDURE — 99232 SBSQ HOSP IP/OBS MODERATE 35: CPT

## 2024-05-30 RX ORDER — NORTRIPTYLINE HYDROCHLORIDE 10 MG/1
1 CAPSULE ORAL
Refills: 0 | DISCHARGE

## 2024-05-30 RX ORDER — SENNA PLUS 8.6 MG/1
2 TABLET ORAL
Qty: 0 | Refills: 0 | DISCHARGE
Start: 2024-05-30

## 2024-05-30 RX ORDER — GABAPENTIN 400 MG/1
3 CAPSULE ORAL
Qty: 42 | Refills: 0 | DISCHARGE
Start: 2024-05-30 | End: 2024-06-05

## 2024-05-30 RX ORDER — GABAPENTIN 400 MG/1
100 CAPSULE ORAL
Refills: 0 | Status: DISCONTINUED | OUTPATIENT
Start: 2024-05-30 | End: 2024-05-31

## 2024-05-30 RX ORDER — MIRTAZAPINE 30 MG/1
3 TABLET, FILM COATED ORAL
Qty: 7 | Refills: 0 | DISCHARGE
Start: 2024-05-30 | End: 2024-06-05

## 2024-05-30 RX ORDER — POLYETHYLENE GLYCOL 3350 17 G/17G
17 POWDER, FOR SOLUTION ORAL
Qty: 0 | Refills: 0 | DISCHARGE
Start: 2024-05-30

## 2024-05-30 RX ORDER — GABAPENTIN 400 MG/1
200 CAPSULE ORAL
Refills: 0 | Status: DISCONTINUED | OUTPATIENT
Start: 2024-05-30 | End: 2024-05-31

## 2024-05-30 RX ORDER — LANOLIN ALCOHOL/MO/W.PET/CERES
2 CREAM (GRAM) TOPICAL
Qty: 0 | Refills: 0 | DISCHARGE
Start: 2024-05-30

## 2024-05-30 RX ORDER — NORTRIPTYLINE HCL 75 MG
1 CAPSULE ORAL
Qty: 14 | Refills: 0 | DISCHARGE
Start: 2024-05-30 | End: 2024-06-05

## 2024-05-30 RX ORDER — GABAPENTIN 400 MG/1
200 CAPSULE ORAL
Refills: 0 | Status: DISCONTINUED | OUTPATIENT
Start: 2024-05-30 | End: 2024-05-30

## 2024-05-30 RX ORDER — GABAPENTIN 400 MG/1
2 CAPSULE ORAL
Qty: 42 | Refills: 0
Start: 2024-05-30 | End: 2024-06-05

## 2024-05-30 RX ORDER — METOPROLOL TARTRATE 50 MG
1 TABLET ORAL
Qty: 14 | Refills: 0
Start: 2024-05-30 | End: 2024-06-05

## 2024-05-30 RX ORDER — NORTRIPTYLINE HYDROCHLORIDE 10 MG/1
2 CAPSULE ORAL
Qty: 14 | Refills: 0
Start: 2024-05-30 | End: 2024-06-05

## 2024-05-30 RX ORDER — CLONAZEPAM 1 MG
0.5 TABLET ORAL
Qty: 10.5 | Refills: 0
Start: 2024-05-30 | End: 2024-06-05

## 2024-05-30 RX ORDER — ATORVASTATIN CALCIUM 80 MG/1
1 TABLET, FILM COATED ORAL
Qty: 7 | Refills: 0
Start: 2024-05-30 | End: 2024-06-05

## 2024-05-30 RX ORDER — MIRTAZAPINE 45 MG/1
1 TABLET, ORALLY DISINTEGRATING ORAL
Qty: 7 | Refills: 0
Start: 2024-05-30 | End: 2024-06-05

## 2024-05-30 RX ADMIN — PANTOPRAZOLE SODIUM 40 MILLIGRAM(S): 20 TABLET, DELAYED RELEASE ORAL at 06:43

## 2024-05-30 RX ADMIN — GABAPENTIN 300 MILLIGRAM(S): 400 CAPSULE ORAL at 08:29

## 2024-05-30 RX ADMIN — ATORVASTATIN CALCIUM 10 MILLIGRAM(S): 80 TABLET, FILM COATED ORAL at 21:31

## 2024-05-30 RX ADMIN — SENNA PLUS 2 TABLET(S): 8.6 TABLET ORAL at 21:30

## 2024-05-30 RX ADMIN — Medication 1 DROP(S): at 21:59

## 2024-05-30 RX ADMIN — NORTRIPTYLINE HYDROCHLORIDE 100 MILLIGRAM(S): 10 CAPSULE ORAL at 21:30

## 2024-05-30 RX ADMIN — CHLORHEXIDINE GLUCONATE 15 MILLILITER(S): 213 SOLUTION TOPICAL at 21:59

## 2024-05-30 RX ADMIN — Medication 6 MILLIGRAM(S): at 21:31

## 2024-05-30 RX ADMIN — MIRTAZAPINE 7.5 MILLIGRAM(S): 45 TABLET, ORALLY DISINTEGRATING ORAL at 21:30

## 2024-05-30 RX ADMIN — Medication 0.25 MILLIGRAM(S): at 06:43

## 2024-05-30 RX ADMIN — Medication 0.25 MILLIGRAM(S): at 12:49

## 2024-05-30 RX ADMIN — Medication 50 MILLIGRAM(S): at 21:30

## 2024-05-30 RX ADMIN — POLYETHYLENE GLYCOL 3350 17 GRAM(S): 17 POWDER, FOR SOLUTION ORAL at 08:29

## 2024-05-30 RX ADMIN — Medication 1 TABLET(S): at 08:29

## 2024-05-30 RX ADMIN — GABAPENTIN 200 MILLIGRAM(S): 400 CAPSULE ORAL at 21:29

## 2024-05-30 RX ADMIN — Medication 0.25 MILLIGRAM(S): at 16:33

## 2024-05-30 RX ADMIN — Medication 50 MILLIGRAM(S): at 06:43

## 2024-05-30 NOTE — BH INPATIENT PSYCHIATRY PROGRESS NOTE - CURRENT MEDICATION
MEDICATIONS  (STANDING):  atorvastatin 10 milliGRAM(s) Oral at bedtime  clonazePAM  Tablet 0.25 milliGRAM(s) Oral <User Schedule>  gabapentin 200 milliGRAM(s) Oral two times a day  melatonin. 6 milliGRAM(s) Oral at bedtime  metoprolol tartrate 50 milliGRAM(s) Oral <User Schedule>  metoprolol tartrate 50 milliGRAM(s) Oral at bedtime  mirtazapine 7.5 milliGRAM(s) Oral at bedtime  multivitamin 1 Tablet(s) Oral daily  nortriptyline 100 milliGRAM(s) Oral at bedtime  pantoprazole    Tablet 40 milliGRAM(s) Oral before breakfast  polyethylene glycol 3350 17 Gram(s) Oral daily  senna 2 Tablet(s) Oral at bedtime    MEDICATIONS  (PRN):  acetaminophen     Tablet .. 650 milliGRAM(s) Oral every 6 hours PRN Temp greater or equal to 38C (100.4F), Mild Pain (1 - 3)  aluminum hydroxide/magnesium hydroxide/simethicone Suspension 30 milliLiter(s) Oral every 4 hours PRN Dyspepsia  artificial  tears Solution 1 Drop(s) Both EYES every 4 hours PRN dry eyes syndrome  bisacodyl Suppository 10 milliGRAM(s) Rectal daily PRN Constipation  chlorhexidine 0.12% Liquid 15 milliLiter(s) Oral Mucosa two times a day PRN gum inflammation  clonazePAM  Tablet 0.25 milliGRAM(s) Oral daily PRN anxiety  furosemide    Tablet 20 milliGRAM(s) Oral daily PRN lower extremity edema  gabapentin 100 milliGRAM(s) Oral two times a day PRN anxiety  haloperidol     Tablet 0.5 milliGRAM(s) Oral every 6 hours PRN agitation  haloperidol    Injectable 0.5 milliGRAM(s) IntraMuscular once PRN severe agitation  meclizine 12.5 milliGRAM(s) Oral every 8 hours PRN Dizziness  ondansetron   Disintegrating Tablet 4 milliGRAM(s) Oral every 8 hours PRN Nausea and/or Vomiting

## 2024-05-30 NOTE — BH INPATIENT PSYCHIATRY PROGRESS NOTE - NSBHASSESSSUMMFT_PSY_ALL_CORE
73 yr old female, , domiciled, and retired. premorbidly ambulant (not needing assists) and iADL/ bADL independent. with background hx of MDD and ANSON; has multiple psych admissions to [facility] (most recently Jan 2024 on [facility]), had prior SA by overdosing on pills (11/2019) following death of parents, presented to ED with somatic complaints and reports of ?passive suicidal ideation, had exhaustive w/u, now admitted voluntarily to [facility]  Routine checks, no suicidal ideations  Continue nortriptyline 50mg po qhs for depression - will hold off increase due to orthostasis today  Continue klonopin 0.5mg po bid and 0.5mg po qhs for anxiety; gabapentin prn for anxiety  Continue mirtazapine 15mg po qhs and melatonin 3mg po qhs for insomnia  Increased lyrica to 50mg po daily and 25mg po q3pm and 9pm - off label for anxiety  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 Requesting metoprolol titration despite recently complaining of dizziness.  4/30: Ruminative thoughts regarding perceived rejection and stressor persist. Medication titration/ adjustment pending further assessment.   05/01: Somatic complaints and preservative thoughts about rejection persists. Integration of brief bedside psychotherapy during rounds with limit setting due to exacerbation of anxiety especially when fixated on her medication regimen. Medication titration to be approached slowly to minimize cumulative adverse effects.  05/02: No significant change. Open to individual therapy thus will continue in combination with medication management.   05/03: No change in current treatment plan. Will continue to encourage patient in engaging with treatment recommendations.  5/6: Minimal improvement in anxiety/depression. Nortriptyline to be increased from 60 to 75 mg daily.  5/7: Tolerating medication changes. Will consider further increase in dose of Nortriptyline. Adjustments made to dosing fo Klonopin due to complaint of morning drowsiness on current dose of 0.5mg   5/8: Increased 3pm lyrica to 50mg. Will continue monitoring response to medication.   5/9: Able to manage anxious episode this morning without medication adjustment. Will continue to encourage incorporation of therapy with meds.  5/10: Slight improvement in anxiety. More receptive to psychology sessions. Will consider titration of nortriptyline dose on Monday.   5/13: Increasing dose of Nortriptyline to 100mg daily. Plan to review rest of anti anxiolytic regimen over the week.   5/14: medication management in progress. Incorporating psychotherapy with increased receptivity by patient. Will continue to monitor & engage patient in treatment planning.  5/15: Complaint of constipation for which Senna was added. No urinary retention, tremors, blurry vision or symptoms of anticholinergic effect. Still endorsing anxiety and depressive symptoms. Will c/w current dose of Nortriptyline. Will obtain labs and EKG. Adjusted doses of Lyrica & Klonopin  5/16: Tolerating medication adjustments. Labs & EKG wnl. No medication adverse effect. Will continue to monitor.  5/17: Some improvement with anxiety. Ongoing adjustment of medication regimen.  5/20: has had some sustained gains with anxiety and depression symptoms but continues to catastrophize  5/21: Anxious about medication changes albeit controlled. Consolidating dosing times of anti anxiolytics with aim to discontinuing prior to discharge.  5/22: Chronically anxious albeit somewhat amenable to redirection. Will continue working on further medication adjustment prior to discharge.  5/23: Anxiety centered on medications changes. Changing Metoprolol to 50mg BID. Will consider increasing dose on Pamelor.   5/24: Persistent anxiety especially with change. Some improvement with depressive symptoms. Holding further medication adjustment pending reassessment.  5/28: patient wants to try gabapentin in combo with klonopin instead of lyrica due to excess sedation; constipated   5/29: d/c lyrica and start gabapentin instead, continues to improve  5/30: decrease gabapentin to 200mg po bid with 100 mg prns if anxious in between doses; d/c tomorrow    PLAN:  Psychotropic medications:  - Continue Nortriptyline 100mg qHS for depressive symptoms   - c/w Remeron 7.5 mg qHS for depressive symptoms and sleep - patient unwilling to d/c  - c/w melatonin 6mg po qhs and give remeron 7.5mg po qhs prn for insomnia if there are problems this evening  - decrease gabapentin to 200mg bid with 100mg bid prn to fill in any gaps in coverage of anxiety  - c/w Klonopin 0.25mg po tid to 6am, noon, 6pm for anxiety   - c/w Klonopin 0.25mg prn daily.  - d/c Pregabalin 25mg prn  - constipation: c/w senna 2tabs qhs and miralax daily  HLD: atorvastatin 10 mg qHS  GERD: Protonix 40 mg daily  HTN: continue metoprolol 50mg q06:00, 25 mg qHS, and 25 mg PRN; patient states that her cardiologist prescribed this for palpitations from anxiety by (allow addition of Lasix)  LE edema: resolved; patient wants to change to prn 20mg daily  Dizziness: MRI wnl; meclizine 12.5 mg PRN; possible BPPV; Flexeril 5 mg po qHS PRN recommended by Neurology

## 2024-05-30 NOTE — BH PSYCHOLOGY - CLINICIAN PSYCHOTHERAPY NOTE - NSBHPSYCHOLNARRATIVE_PSY_A_CORE FT
The patient was seen individually at the team's request and with her consent. Speech was of normal rate and volume, and there were no verbal irregularities. She denied acute anxiety; her sleep and appetite are good. She reported that she asked that her Lyrica be switched for gabapentin since she was becoming very constipated on it. She was apprehensive about her planned discharge tomorrow, worried that she is not feeling as happy as she was at the last discharge and concerned that she will become depressed. She acknowledged that the hospital environment itself may be adding to her distress since the admission of several new patients who are loud and disorganized. We reviewed steps to prevent isolation and depression including routinization and structure, socialization and psychotherapy. She also has several medical appointments to make and was feeling somewhat overwhelmed. She responded to support and reassurance as well as encouragement to prioritize her appointments. She also voiced some concern that her  has not been going in to work while she has been in the hospital but was reminded that this happened last time as well and that he resumed work when she returned home.     The patient was praised for attending therapeutic activities on the unit and she accepted support and expressed appreciation for the session.

## 2024-05-30 NOTE — BH INPATIENT PSYCHIATRY PROGRESS NOTE - NSBHCHARTREVIEWVS_PSY_A_CORE FT
Vital Signs Last 24 Hrs  T(C): 36.9 (05-30-24 @ 05:12), Max: 36.9 (05-30-24 @ 05:12)  T(F): 98.4 (05-30-24 @ 05:12), Max: 98.4 (05-30-24 @ 05:12)  HR: 70 (05-29-24 @ 20:45) (70 - 70)  BP: 117/65 (05-29-24 @ 20:45) (117/65 - 117/65)  BP(mean): --  RR: 18 (05-30-24 @ 05:12) (17 - 18)  SpO2: 98% (05-29-24 @ 20:45) (98% - 98%)    Orthostatic VS  05-30-24 @ 05:12  Lying BP: 109/70 HR: 77  Sitting BP: 122/71 HR: 85  Standing BP: --/-- HR: --  Site: upper left arm  Mode: electronic  Orthostatic VS  05-29-24 @ 05:38  Lying BP: 111/51 HR: 73  Sitting BP: 125/66 HR: 77  Standing BP: --/-- HR: --  Site: --  Mode: --

## 2024-05-30 NOTE — BH INPATIENT PSYCHIATRY DISCHARGE NOTE - NSBHDCSIGEVENTSFT_PSY_A_CORE
Received individual psychotherapy. She seems to enjoy the treatment despite her devaluing the modality in the outpatient setting. Had some passive aggressive dictating of treatment.

## 2024-05-30 NOTE — BH INPATIENT PSYCHIATRY PROGRESS NOTE - NSTXANXGOAL_PSY_ALL_CORE
Report a reduction in panic attacks and improving mood and confidence

## 2024-05-30 NOTE — BH INPATIENT PSYCHIATRY DISCHARGE NOTE - NSBHDCRISKMITIGATEFT_PSY_ALL_CORE
Patient  has chronic risk related to age and past suicide attempt. Acute risk has been mitigated by medication adjustments, individual psychotherapy as an inpatient and with return back to her outpatient psychiatrist of preference. Patient has significant support from  and is known to have a good network of friends. Further inpatient treatment unlikely to affect modifiable risks further.

## 2024-05-30 NOTE — BH INPATIENT PSYCHIATRY PROGRESS NOTE - MSE UNSTRUCTURED FT
Appearance: appropriate grooming and hygiene; no abnormal involuntary movements noted, in NAD  Behavior: cooperative, good eye contact; no psychomotor retardation/agitation but on edge  Speech: fluent & spontaneous. prosody: wnl  Mood: "worried"  Affect: very anxious  Thought process: preservative  & obsessive  Thought content: denies SI/HI; no delusions elicited.   Perceptual disturbances: denies hallucinations  Cognition: grossly intact   Insight: fair  Judgement: fair  Impulse control: poor  
Appearance: fair grooming, intact hygiene; has had some weight gain since last seen; no abnormal involuntary movements noted  Behavior: cooperative,  good eye contact  No agitation/retardation/involuntary movements  Speech: normal in rate, rhythm, volume  Mood: better, depressed, anxious  Affect: anxious, stable, reactive  Thought process: linear, logical but can be inconsistent frequently  Thought content: hopeless at times, no delusions elicited but frequently catastrophizing; denies SI/HI  Perceptual disturbances: denies hallucinations  Cognition: well oriented; adequate recall though has poor concentration  Insight: fair  Judgement: partially intact   
Appearance: fair grooming, intact hygiene; has had some weight gain since last seen; no abnormal involuntary movements noted  Behavior: cooperative,  good eye contact  No agitation/retardation/involuntary movements  Speech: normal in rate, rhythm, volume  Mood: improved  Affect: less intense, reactive  Thought process: linear, logical   Thought content: denies SI/HI; no delusions elicited  Perceptual disturbances: denies hallucinations  Cognition: well oriented; adequate recall though has poor concentration  Insight: fair  Judgement: improved  
Appearance: appropriate grooming and hygiene; no abnormal involuntary movements noted  Behavior: cooperative, good eye contact; no psychomotor retardation/agitation  Speech: fluent, spontaneous; normal rate, rhythm, and tone  Mood: Ok  Affect: anxious, constricted,   Thought process: linear. preoccupied with meds side effect and recurrence of anxiety  Thought content: denies SI/HI; no delusions elicited  Perceptual disturbances: denies hallucinations  Cognition: issues with recall (minimal)  Insight: fair  Judgement: fair  Impulse control: fair  
Appearance: appropriate grooming and hygiene; no abnormal involuntary movements noted, in NAD  Behavior: cooperative, good eye contact; no psychomotor retardation/agitation  Speech: fluent, spontaneous; normal rate, rhythm, and tone  Mood: Good  Affect: somewhat anxious, reactive and mood congruent  Thought process: linear. preoccupied with meds side effect and recurrence of anxiety  Thought content: denies SI/HI; no delusions elicited  Perceptual disturbances: denies hallucinations  Cognition: grossly intact with mild memory deficits.  Insight: fair  Judgement: fair  Impulse control: fairly good  
Appearance: fair grooming, intact hygiene; no abnormal involuntary movements noted  Behavior: superficially cooperative, good eye contact; no psychomotor retardation/agitation  Speech: fluent, spontaneous albeit with low tone.  Mood: "worried"  Affect: constricted  Thought process: ruminative, obsessive thoughts  Thought content: denies SI/HI; no delusions elicited  Perceptual disturbances: denies hallucinations  Cognition: relatively intact with mild deficit in recall.   Insight: fair  Judgement: limited   Impulse control: fair  
Appearance: appropriate grooming and hygiene; no abnormal involuntary movements noted, in NAD  Behavior: cooperative, good eye contact; no psychomotor retardation/agitation  Speech: wnl  Mood: "better"  Affect: neutral, stable, reactive  Thought process: linear but somewhat obsessive  Thought content: denies SI/HI; no delusions elicited.   Perceptual disturbances: denies hallucinations  Cognition: grossly intact   Insight: fair  Judgement: improved  Impulse control: fair  
Appearance: fair grooming, intact hygiene; has had some weight gain since last seen; no abnormal involuntary movements noted  Behavior: cooperative, mild psychomotor agitation; good eye contact  Speech: wnl  Mood: hopeless  Affect: anxious, stable, reactive  Thought process: linear, logical but can be inconsistent frequently  Thought content: no delusions elicited but frequently catastrophizing; denies SI/HI  Perceptual disturbances: denies AH/VH  Cognition: well oriented; adequate recall though has poor concentration  Insight: fair  Judgement: partially intact   
Appearance: fair grooming, intact hygiene; no abnormal involuntary movements noted  Behavior: superficially cooperative, good eye contact; no psychomotor retardation/agitation  Speech: fluent, spontaneous. wnl  Mood: anxious  Affect: constricted, very reactive  Thought process: ruminative, obsessive thoughts  Thought content: denies SI/HI; no delusions elicited  Perceptual disturbances: denies hallucinations  Cognition: well oriented; adequate recall though has poor concentration  Insight: fair  Judgement: fair.   Impulse control: fair  
Appearance: fair grooming, intact hygiene; no abnormal involuntary movements noted  Behavior: superficially cooperative, good eye contact; no psychomotor retardation/agitation  Speech: wnl  Mood: anxious  Affect: congruent, stable, reactive  Thought process: ruminative, obsessive  Thought content: denies SI/HI; no delusions elicited  Perceptual disturbances: denies hallucinations  Cognition: well oriented; adequate recall though has poor concentration  Insight: partially intact  Judgement: improved  
Appearance: appropriate grooming and hygiene; no abnormal involuntary movements noted  Behavior: superficially cooperative, good eye contact; no psychomotor retardation/agitation  Speech: fluent, spontaneous; normal rate, rhythm, and tone  Mood: "mostly the same"  Affect: slightly anxious, relatively more reactive today, tearful at times  Thought process: perseverative, circumstantial  Thought content: denies SI/HI; no delusions elicited  Perceptual disturbances: denies hallucinations  Cognition: relatively intact with mild deficit in recall.  Insight: fair  Judgement: fair  Impulse control: fair during interview  
Appearance: appropriate grooming and hygiene; no abnormal involuntary movements noted  Behavior: cooperative, good eye contact; no psychomotor retardation/agitation  Speech: fluent, spontaneous; normal rate, rhythm, and tone  Mood: " a bit better than yesterday"  Affect: anxious, reactive  Thought process: perseverative, circumstantial  Thought content: denies SI/HI; no delusions elicited  Perceptual disturbances: denies hallucinations  Cognition: issues with recall (minimal)  Insight: fair  Judgement: fair  Impulse control: fair  
Appearance: appropriate grooming and hygiene; no abnormal involuntary movements noted  Behavior: cooperative, good eye contact; no psychomotor retardation/agitation  Speech: fluent, spontaneous; normal rate, rhythm, and tone  Mood: "Ok"  Affect: anxious  Thought process: perseverative, circumstantial  Thought content: denies SI/HI; no delusions elicited  Perceptual disturbances: denies hallucinations  Cognition: comprehensive MMSE deferred  Insight: fair  Judgement: fair  Impulse control: fair  
Appearance: fair grooming, intact hygiene; has had some weight gain since last seen; no abnormal involuntary movements noted  Behavior: cooperative, mild psychomotor agitation; good eye contact  Speech: wnl  Mood: hopeless  Affect: anxious, stable, reactive  Thought process: linear, logical but can be inconsistent frequently  Thought content: no delusions elicited but frequently catastrophizing; denies SI/HI  Perceptual disturbances: denies AH/VH  Cognition: well oriented; adequate recall though has poor concentration  Insight: fair  Judgement: partially intact   
Appearance: fair grooming, intact hygiene; no abnormal involuntary movements noted  Behavior: superficially cooperative, good eye contact; no psychomotor retardation/agitation  Speech: wnl  Mood: anxious  Affect: congruent, stable, reactive  Thought process: ruminative, obsessive  Thought content: denies SI/HI; no delusions elicited  Perceptual disturbances: denies hallucinations  Cognition: well oriented; adequate recall though has poor concentration  Insight: partially intact  Judgement: improved  
Appearance: appropriate grooming and hygiene; no abnormal involuntary movements noted, in NAD  Behavior: cooperative, good eye contact; no psychomotor retardation/agitation but on edge  Speech: fluent & spontaneous. prosody: wnl  Mood: "very anxious" dysphoric  Affect: intensely anxious and labile  Thought process: preservative  & obsessive  Thought content: denies SI/HI; no delusions elicited.   Perceptual disturbances: denies hallucinations  Cognition: grossly intact   Insight: fair  Judgement: fair  Impulse control: poor  
Appearance: fair grooming, intact hygiene; no abnormal involuntary movements noted  Behavior: superficially cooperative, good eye contact; no psychomotor retardation/agitation  Speech: wnl  Mood: anxious  Affect: congruent, stable, reactive  Thought process: ruminative, obsessive  Thought content: denies SI/HI; no delusions elicited  Perceptual disturbances: denies hallucinations  Cognition: well oriented; adequate recall though has poor concentration  Insight: partially intact  Judgement: improved  
Patient is awake and alert. Irritable and slightly sarcastic, "I don't like you, I only like Dr. Washington." Speech fluent. TP logical and coherent. No delusions. No current dizziness. Denies suicidal ideations. 
Appearance: fair grooming, intact hygiene; no abnormal involuntary movements noted  Behavior: superficially cooperative, good eye contact; no psychomotor retardation/agitation  Speech: wnl  Mood: anxious  Affect: congruent, stable, reactive  Thought process: ruminative, obsessive  Thought content: denies SI/HI; no delusions elicited  Perceptual disturbances: denies hallucinations  Cognition: well oriented; adequate recall though has poor concentration  Insight: partially intact  Judgement: improved  
Appearance: fair grooming, intact hygiene; no abnormal involuntary movements noted  Behavior: superficially cooperative, good eye contact; no psychomotor retardation/agitation  Speech: wnl  Mood: anxious  Affect: congruent, stable, reactive  Thought process: ruminative, obsessive  Thought content: denies SI/HI; no delusions elicited  Perceptual disturbances: denies hallucinations  Cognition: well oriented; adequate recall though has poor concentration  Insight: partially intact  Judgement: improved  
Appearance: appropriate grooming and hygiene; no abnormal involuntary movements noted  Behavior: cooperative, good eye contact; no psychomotor retardation/agitation  Speech: fluent, spontaneous; normal rate, rhythm, and tone  Mood: "worried"  Affect: anxious, mood congruent  Thought process: perseverative, circumstantial  Thought content: denies SI/HI; no delusions elicited  Perceptual disturbances: denies hallucinations  Cognition: relatively intact with mild deficit in recall.  Insight: fair  Judgement: fair  Impulse control: fair  
Appearance: fair grooming, intact hygiene; no abnormal involuntary movements noted  Behavior: superficially cooperative, good eye contact; no psychomotor retardation/agitation  Speech: fluent, spontaneous albeit underproductive  Mood: "Ok but still depressed"  Affect: constricted albeit reactived  Thought process: ruminative, obsessive thoughts  Thought content: denies SI/HI; no delusions elicited  Perceptual disturbances: denies hallucinations  Cognition: relatively intact with mild deficit in recall.   Insight: fair  Judgement: limited   Impulse control: good  
Appearance: fair grooming, intact hygiene; no abnormal involuntary movements noted  Behavior: superficially cooperative, good eye contact; no psychomotor retardation/agitation  Speech: wnl  Mood: anxious  Affect: congruent, stable, reactive  Thought process: ruminative, obsessive  Thought content: denies SI/HI; no delusions elicited  Perceptual disturbances: denies hallucinations  Cognition: well oriented; adequate recall though has poor concentration  Insight: partially intact  Judgement: improved  
Appearance: fair grooming, intact hygiene; no abnormal involuntary movements noted  Behavior: superficially cooperative, good eye contact; no psychomotor retardation/agitation  Speech: fluent, spontaneous albeit underproductive  Mood: "worried"  Affect: constricted  Thought process: ruminative, obsessive thoughts about bills and anxiety episodes  Thought content: denies SI/HI; no delusions elicited  Perceptual disturbances: denies hallucinations  Cognition: relatively intact with mild deficit in recall.   Insight: fair  Judgement: fair  Impulse control: fair  
Appearance: appropriate grooming and hygiene; no abnormal involuntary movements noted  Behavior: cooperative, good eye contact; no psychomotor retardation/agitation  Speech: fluent, spontaneous; normal rate, rhythm, and tone  Mood: dysthymic  Affect: stable, less anxious. mood congruent affect  Thought process: linear. preoccupied with meds  Thought content: denies SI/HI; no delusions elicited  Perceptual disturbances: denies hallucinations  Cognition: issues with recall (minimal)  Insight: fair  Judgement: fair  Impulse control: fairly good (improved)  
Appearance: appropriate grooming and hygiene; no abnormal involuntary movements noted, in NAD  Behavior: cooperative, good eye contact; no psychomotor retardation/agitation  Speech: fluent, spontaneous; normal rate, rhythm, and tone  Mood: Ok  Affect: neutral, reactive  Thought process:logical  Thought content: denies SI/HI; no delusions elicited  Perceptual disturbances: denies hallucinations  Cognition: grossly intact with mild memory deficits.  Insight: fair  Judgement: fair  Impulse control: good  
Appearance: appropriate grooming and hygiene; no abnormal involuntary movements noted, in NAD  Behavior: cooperative, good eye contact; no psychomotor retardation/agitation  Speech: wnl  Mood: "better"  Affect: neutral, stable, reactive  Thought process: linear but somewhat obsessive  Thought content: denies SI/HI; no delusions elicited.   Perceptual disturbances: denies hallucinations  Cognition: grossly intact   Insight: fair  Judgement: improved  Impulse control: fair  
Appearance: appropriate grooming and hygiene; no abnormal involuntary movements noted, in NAD  Behavior: cooperative, good eye contact; no psychomotor retardation/agitation  Speech: wnl  Mood: anxious due to medication side effects  Affect: neutral, stable, reactive  Thought process: linear but somewhat obsessive  Thought content: denies SI/HI; no delusions elicited.   Perceptual disturbances: denies hallucinations  Cognition: grossly intact   Insight: fair  Judgement: improved  Impulse control: fair  
Appearance: appropriate grooming and hygiene; no abnormal involuntary movements noted, in NAD  Behavior: cooperative, good eye contact; no psychomotor retardation/agitation but on edge  Speech: fluent, spontaneous; normal rate, rhythm, but voice somewhat tremulous  Mood: "anxious"  Affect: anxious affect with a  constricted range , stable & appropriate to speech & circumstance  Thought process: logical, goal directed & obsessive  Thought content: denies SI/HI; no delusions elicited.   Perceptual disturbances: denies hallucinations  Cognition: grossly intact   Insight: fair  Judgement: fair  Impulse control: fair  
Appearance: fair grooming, intact hygiene; no abnormal involuntary movements noted  Behavior: superficially cooperative, good eye contact; no psychomotor retardation/agitation  Speech: wnl  Mood: anxious  Affect: congruent, stable, reactive  Thought process: ruminative, obsessive  Thought content: denies SI/HI; no delusions elicited  Perceptual disturbances: denies hallucinations  Cognition: well oriented; adequate recall though has poor concentration  Insight: partially intact  Judgement: improved  
Appearance: appropriate grooming and hygiene; no abnormal involuntary movements noted  Behavior: cooperative, good eye contact; no psychomotor retardation/agitation  Speech: fluent, spontaneous; normal rate, rhythm, and tone  Mood: tired  Affect: anxious. mood congruent affect  Thought process: linear. preoccupied with meds side effect  Thought content: denies SI/HI; no delusions elicited  Perceptual disturbances: denies hallucinations  Cognition: issues with recall (minimal)  Insight: fair  Judgement: fair  Impulse control: fairr  
Appearance: appropriate grooming and hygiene; no abnormal involuntary movements noted, in NAD  Behavior: cooperative, good eye contact; no psychomotor retardation/agitation  Speech: fluent, spontaneous; normal rate, rhythm, and tone  Mood: "not too bad"  Affect: neutral, reactive  Thought process: mostly logical   Thought content: denies SI/HI; no delusions elicited.   Perceptual disturbances: denies hallucinations  Cognition: grossly intact with mild memory deficits.  Insight: fair  Judgement: fair  Impulse control: good  
Appearance: fair grooming, intact hygiene; no abnormal involuntary movements noted  Behavior: superficially cooperative, good eye contact; no psychomotor retardation/agitation  Speech: wnl  Mood: anxious  Affect: congruent, stable, reactive  Thought process: ruminative, obsessive  Thought content: denies SI/HI; no delusions elicited  Perceptual disturbances: denies hallucinations  Cognition: well oriented; adequate recall though has poor concentration  Insight: partially intact  Judgement: improved  
Appearance: appropriate grooming and hygiene; no abnormal involuntary movements noted, in NAD  Behavior: cooperative, good eye contact; no psychomotor retardation/agitation but on edge  Speech: fluent & spontaneous. prosody: wnl  Mood:  anxious"   Affect: congruent, stable, reactive  Thought process: linear but somewhat obsessive  Thought content: denies SI/HI; no delusions elicited.   Perceptual disturbances: denies hallucinations  Cognition: grossly intact   Insight: fair  Judgement: improved  Impulse control: poor  
Appearance: appropriate grooming and hygiene; no abnormal involuntary movements noted, in NAD  Behavior: cooperative, good eye contact; no psychomotor retardation/agitation but on edge  Speech: fluent, spontaneous; normal rate, rhythm, but voice somewhat tremulous  Mood: anxious  Affect: congruent, reactive, appropriate  Thought process: mostly logical, linear but obsessive  Thought content: denies SI/HI; no delusions elicited.   Perceptual disturbances: denies hallucinations  Cognition: grossly intact with mild memory deficits.  Insight: fair  Judgement: fair  Impulse control: good  
Appearance: appropriate grooming and hygiene; no abnormal involuntary movements noted, in NAD  Behavior: cooperative, good eye contact; no psychomotor retardation/agitation but on edge  Speech: fluent & spontaneous. prosody: wnl  Mood: "Ok"  Affect: anxious, somewhat stable with a constricted range.  Thought process: logical, goal directed & obsessive  Thought content: denies SI/HI; no delusions elicited.   Perceptual disturbances: denies hallucinations  Cognition: grossly intact   Insight: good  Judgement: fair  Impulse control: fair

## 2024-05-30 NOTE — BH INPATIENT PSYCHIATRY PROGRESS NOTE - NSTXDCOTHRDATETRGT_PSY_ALL_CORE
23-May-2024
25-Apr-2024
23-May-2024
26-Apr-2024
09-May-2024
26-Apr-2024
16-May-2024
26-Apr-2024
09-May-2024
09-May-2024
26-Apr-2024
30-May-2024
16-May-2024
26-Apr-2024
09-May-2024
26-Apr-2024
26-Apr-2024
23-May-2024
26-Apr-2024
09-May-2024
16-May-2024
16-May-2024
30-May-2024
16-May-2024
30-May-2024
26-Apr-2024
23-May-2024
30-May-2024
30-May-2024

## 2024-05-30 NOTE — BH INPATIENT PSYCHIATRY PROGRESS NOTE - NSTXDEPRESDATEEST_PSY_ALL_CORE
16-Apr-2024
17-Apr-2024
16-Apr-2024
17-Apr-2024
16-Apr-2024
17-Apr-2024
16-Apr-2024
17-Apr-2024
16-Apr-2024
17-Apr-2024
16-Apr-2024
17-Apr-2024
16-Apr-2024
17-Apr-2024
16-Apr-2024
17-Apr-2024
16-Apr-2024
17-Apr-2024
16-Apr-2024
16-Apr-2024
17-Apr-2024

## 2024-05-30 NOTE — BH INPATIENT PSYCHIATRY PROGRESS NOTE - NSTXFEARINTERMD_PSY_ALL_CORE
Psychoeducation and medication management

## 2024-05-30 NOTE — BH INPATIENT PSYCHIATRY PROGRESS NOTE - NSBHMETABOLIC_PSY_ALL_CORE_FT
BMI: BMI (kg/m2): 38.3 (04-16-24 @ 19:07)  HbA1c: A1C with Estimated Average Glucose Result: 5.8 % (01-11-24 @ 08:00)    Glucose:   BP: 117/65 (05-29-24 @ 20:45) (117/65 - 125/71)Vital Signs Last 24 Hrs  T(C): 36.9 (05-30-24 @ 05:12), Max: 36.9 (05-30-24 @ 05:12)  T(F): 98.4 (05-30-24 @ 05:12), Max: 98.4 (05-30-24 @ 05:12)  HR: 70 (05-29-24 @ 20:45) (70 - 70)  BP: 117/65 (05-29-24 @ 20:45) (117/65 - 117/65)  BP(mean): --  RR: 18 (05-30-24 @ 05:12) (17 - 18)  SpO2: 98% (05-29-24 @ 20:45) (98% - 98%)    Orthostatic VS  05-30-24 @ 05:12  Lying BP: 109/70 HR: 77  Sitting BP: 122/71 HR: 85  Standing BP: --/-- HR: --  Site: upper left arm  Mode: electronic  Orthostatic VS  05-29-24 @ 05:38  Lying BP: 111/51 HR: 73  Sitting BP: 125/66 HR: 77  Standing BP: --/-- HR: --  Site: --  Mode: --    Lipid Panel: Date/Time: 04-05-24 @ 05:56  Cholesterol, Serum: 176  LDL Cholesterol Calculated: 97  HDL Cholesterol, Serum: 39  Total Cholesterol/HDL Ration Measurement: --  Triglycerides, Serum: 200

## 2024-05-30 NOTE — BH INPATIENT PSYCHIATRY PROGRESS NOTE - NSTXFEARDATETRGT_PSY_ALL_CORE
24-May-2024

## 2024-05-30 NOTE — BH INPATIENT PSYCHIATRY PROGRESS NOTE - NSTXANXINTERMD_PSY_ALL_CORE
Adjusting current medication regimen with encouragement to decrease reliance on prns

## 2024-05-30 NOTE — BH INPATIENT PSYCHIATRY DISCHARGE NOTE - NSBHDCHANDOFFFT_PSY_ALL_CORE
Emailed detailed treatment course to Dr. Bates and Dr. Kamara and provided phone number to discuss.

## 2024-05-30 NOTE — BH INPATIENT PSYCHIATRY DISCHARGE NOTE - OTHER PAST PSYCHIATRIC HISTORY (INCLUDE DETAILS REGARDING ONSET, COURSE OF ILLNESS, INPATIENT/OUTPATIENT TREATMENT)
Pt. is a 74 y/o old female, domiciled with spouse, retired with history of MDD and ANSON with multiple psych hospitalizatins (most recently Jan 2024 on [facility]), had prior SA by overdosing on pills (11/2019) following death of parents. currently treated by outpatient psychiatrist Dr. Ramirez,  Pt. uses cannabis daily, pertinent medical issues include: HTN, hyperlipidemia, and GERD, brought in by  for anxiety, depression, somatic symptoms including dizziness, suicidal ideation although she now denies that, reports that "I only said I was exhausted and didn't want to keep going like this." .

## 2024-05-30 NOTE — BH INPATIENT PSYCHIATRY PROGRESS NOTE - NSTXDEPRESINTERMD_PSY_ALL_CORE
medication management

## 2024-05-30 NOTE — BH INPATIENT PSYCHIATRY PROGRESS NOTE - NSTXDCOTHRPROGRES_PSY_ALL_CORE
No Change
Improving
No Change
Improving
No Change
No Change
Improving
No Change
Improving
No Change
Improving
No Change

## 2024-05-30 NOTE — BH INPATIENT PSYCHIATRY DISCHARGE NOTE - NSDCCPCAREPLAN_GEN_ALL_CORE_FT
PRINCIPAL DISCHARGE DIAGNOSIS  Diagnosis: ANSON (generalized anxiety disorder)  Assessment and Plan of Treatment:

## 2024-05-30 NOTE — BH INPATIENT PSYCHIATRY DISCHARGE NOTE - HPI (INCLUDE ILLNESS QUALITY, SEVERITY, DURATION, TIMING, CONTEXT, MODIFYING FACTORS, ASSOCIATED SIGNS AND SYMPTOMS)
73 yr old female, , domiciled, and retired. premorbidly ambulant (not needing assists) and iADL/ bADL independent. with background hx of MDD and ANSON; has multiple psych admissions (most recently Jan 2024 on [facility]), had prior SA by overdosing on pills (11/2019) following death of parents. currently treated by outpatient psychiatrist Dr. 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  orthostatic Negative  MRI Negative as above  Possible vertigo, improving with   meclizine PRN > encouraged to continue       increase Oral hydration > doing it   -TTE as above  Patient medically cleared and transferred to [facility]  on 9.13 after requesting voluntary admission.

## 2024-05-30 NOTE — BH INPATIENT PSYCHIATRY PROGRESS NOTE - NSTXDEPRESGOAL_PSY_ALL_CORE
Will identify 2 coping skills that assist in improving mood
Will identify 2 coping skills that assist in improving mood
Report using a coping skill to overcome sadness and worry in order to socialize with peers daily
Will identify 2 coping skills that assist in improving mood
Will identify thoughts and self-talk that contribute to depression
Attend and participate in at least 2 groups daily despite low mood/energy
Will identify 2 coping skills that assist in improving mood
Exhibit improvements in self-grooming, hygiene, sleep and appetite
Will identify thoughts and self-talk that contribute to depression
Will identify 2 coping skills that assist in improving mood
Attend and participate in at least 2 groups daily despite low mood/energy
Exhibit improvements in self-grooming, hygiene, sleep and appetite
Will identify 2 coping skills that assist in improving mood
Will identify thoughts and self-talk that contribute to depression
Will identify 2 coping skills that assist in improving mood
Exhibit improvements in self-grooming, hygiene, sleep and appetite
Will identify 2 coping skills that assist in improving mood
Attend and participate in at least 2 groups daily despite low mood/energy
Will identify 2 coping skills that assist in improving mood
Attend and participate in at least 2 groups daily despite low mood/energy
Exhibit improvements in self-grooming, hygiene, sleep and appetite
Exhibit improvements in self-grooming, hygiene, sleep and appetite
Will identify 2 coping skills that assist in improving mood
Will identify thoughts and self-talk that contribute to depression
Attend and participate in at least 2 groups daily despite low mood/energy
Attend and participate in at least 2 groups daily despite low mood/energy
Will identify 2 coping skills that assist in improving mood
Report using a coping skill to overcome sadness and worry in order to socialize with peers daily
Will identify 2 coping skills that assist in improving mood
Attend and participate in at least 2 groups daily despite low mood/energy
Report using a coping skill to overcome sadness and worry in order to socialize with peers daily
Will identify 2 coping skills that assist in improving mood
Attend and participate in at least 2 groups daily despite low mood/energy

## 2024-05-30 NOTE — BH INPATIENT PSYCHIATRY PROGRESS NOTE - NSICDXBHPRIMARYDX_PSY_ALL_CORE
Generalized anxiety disorder   F41.1  
Generalized anxiety disorder   F41.1  
Depression, major   F32.9  
Generalized anxiety disorder   F41.1  
Depression, major   F32.9  
Generalized anxiety disorder   F41.1  
Depression, major   F32.9  
Generalized anxiety disorder   F41.1  
Depression, major   F32.9  
Generalized anxiety disorder   F41.1  
Depression, major   F32.9  
Generalized anxiety disorder   F41.1  

## 2024-05-30 NOTE — BH INPATIENT PSYCHIATRY PROGRESS NOTE - NSTXPROBFEAR_PSY_ALL_CORE
FEAR OF CHANGE

## 2024-05-30 NOTE — BH INPATIENT PSYCHIATRY PROGRESS NOTE - NSTXANXDATETRGT_PSY_ALL_CORE
24-May-2024
- Take a stool sample to your primary doctor to test for C Diff if diarrhea continues.  - Do not take Imodium as this may cause you to retain toxins and prolong the illness.  - Drink plenty of fluids to replace water losses.  - Return to ED for fever chills, bloody diarrhea, nausea/vomiting, or any other emergent concerns
24-May-2024

## 2024-05-30 NOTE — BH INPATIENT PSYCHIATRY PROGRESS NOTE - NSTXDCOTHRGOAL_PSY_ALL_CORE
Pt. will comply with tx. recommendations, decrease sxs and cooperate with discharge plans within seven days.
Pt. will comply with tx. recommendations within seven days.
Pt. will comply with tx. recommendations within seven days.
Pt. will comply with tx. recommendations, decrease  sxs and cooperate with discharge plans for seven days.
Pt. will comply with tx. recommendations, decrease sxs and cooperate with discharge plans within seven days.
Pt. will comply with tx. recommendations, decrease sxs and cooperate with discharge plans within seven days.
Pt. will comply with tx. recommendations within seven days.
Pt. will comply with tx. recommendations, decrease sxs and cooperate with discharge plans within seven days.
Pt. will comply with tx. recommendations, decrease sxs and cooperate with discharge plans within seven days.
Pt will improve mental status in order to effectively engage with dc planning.
Pt has chronic anxiety
Pt. will comply with tx. recommendations, decrease sxs and cooperate with discharge plans within seven days.
Pt. will comply with tx. recommendations, decrease sxs and cooperate with discharge plans within seven days.
Pt will improve mental status in order to effectively engage with dc planning.
Pt. will comply with tx. recommendations, decrease sxs and cooperate with discharge plans within seven days.
Pt. will comply with tx. recommendations within seven days.
Pt will improve mental status in order to effectively engage with dc planning.
Pt. will comply with tx. recommendations, decrease sxs and cooperate with discharge plans within seven days.
Pt has chronic anxiety
Pt. will comply with tx. recommendations within seven days.
Pt. will comply with tx. recommendations, decrease sxs and cooperate with discharge plans within seven days.
Pt has chronic anxiety
Pt. will comply with tx. recommendations, decrease sxs and cooperate with discharge plans within seven days.
Pt. will comply with tx. recommendations, decrease  sxs and cooperate with discharge plans for seven days.
Pt. will comply with tx. recommendations, decrease  sxs and cooperate with discharge plans for seven days.
Pt has chronic anxiety
Pt. will comply with tx. recommendations, decrease  sxs and cooperate with discharge plans for seven days.
Pt. will comply with tx. recommendations, decrease sxs and cooperate with discharge plans within seven days.
Pt will improve mental status in order to effectively engage with dc planning.
Pt. will comply with tx. recommendations, decrease sxs and cooperate with discharge plans within seven days.
Pt. will comply with tx. recommendations, decrease sxs and cooperate with discharge plans within seven days.
Pt has chronic anxiety
Pt will improve mental status in order to effectively engage with dc planning.
Pt. will comply with tx. recommendations within seven days.
Pt. will comply with tx. recommendations, decrease  sxs and cooperate with discharge plans for seven days.

## 2024-05-30 NOTE — BH INPATIENT PSYCHIATRY DISCHARGE NOTE - MSE UNSTRUCTURED FT
Appearance: appropriate grooming and hygiene; no abnormal involuntary movements noted, in NAD  Behavior: cooperative, good eye contact; no psychomotor retardation/agitation  Speech: wnl  Mood: mild anxiety  Affect: neutral, stable, reactive  Thought process: linear but somewhat obsessive  Thought content: denies SI/HI; no delusions elicited.   Perceptual disturbances: denies hallucinations  Cognition: grossly intact   Insight: fair  Judgement: intact  Impulse control: fair

## 2024-05-30 NOTE — BH INPATIENT PSYCHIATRY PROGRESS NOTE - NSBHFUPINTERVALCCFT_PSY_A_CORE
Anxious about her bills and unpaid house rent
Continued generalized anxiety
Depressed and anxious
Medication time
anxiety
PAtient c/o sweating , anxiousness no dizziness
I am very depressed
Somewhat anxious
Anxious about medication changes
Anxious regarding changes to meds
Some anxiety
" A little tired"
anxiety
"I'm worried about my heart rate"
Patient c/o sweating, less today , anxiousness no dizziness
Less anxious this morning
anxiety
'I feel better'
depressed mood
A bit anxious
anxiety
Worsening anxiety
anxiety
I still feel depressed
Anxiety
A bit drowsy after morning meds
Tired
improved mood
anxiety
medication side effects

## 2024-05-30 NOTE — BH INPATIENT PSYCHIATRY PROGRESS NOTE - NSBHATTESTTYPEVISIT_PSY_A_CORE
Attending Only
Attending with Resident/Fellow/Student

## 2024-05-30 NOTE — BH INPATIENT PSYCHIATRY DISCHARGE NOTE - HOSPITAL COURSE
Patient originally presented with somatic symptoms of dizziness and falling to the left side. She was admitted to medicine first and got a stroke work up which was -ve. The left side motor symptoms resolved but was still having dizziness, eventually characterized as virtigo which responded well to benito hallpike maneuver from neurology after transfer to our unit.  She was a somewhat inconsistent historian due to anxiety but she claims she left [facility] admission on 2S a few months ago feeling quite well and stated her new outpatient psychiatrist took her abruptly off of the nortriptyline 75mg po qhs that the 2S inpatient psychiatrist previously started her. She claims that this made her feel worse and then the somatic symptoms mentioned above put her over the edge. She was inconsistent about whether the nortriptyline was discontinued before or after onset of heart palpitations were reported to her outpatient provider but she did mention numerous times that the outpatient psychiatrist had concerns about the medication being associated with significant cardiac side effects.     On our unit, we restarted and increased the nortriptyline up to 100mg po qhs. She has some dry mouth and constipation but well managed with senna and intermittent miralax and was without any cardiac side effects. EKG was normal with normal QTc. She also continued lyrica (from 2S admission) and dose/timing was being adjusted but she ultimately wanted to come off of it due to concerns about constipation. Klonopin was restarted and eventually settled at 0.25mg po tid. And instead of lyrica, patient got restarted on gabapentin (helpful in the pas), taken here as 200mg bid and extra 100-200mg daily prn for breakthrough anxiety. There was significant back and forth with doses/timing of klonopin, lyrica, gabapentin due to excessive anxiety about medications and somatic preoccupation about their side effects but she ultimately felt well enough to be discharged. She also is on metoprolol 50mg po bid for palpitations (anxiety related) but prescribed by her cardiologist (has no heart disease diagnosed per cardiologist). Also was not ready to d/c mirtazapine for insomnia despite psychoeducation and reassurance about overlapping effects from nortriptyline. Was willing to decrease from 15mg po qhs to 7.5mg.     Patient noted to have significant reduction in anxiety and resolution of depressed mood. No insomnia noted. Still has intermittent breakthrough anxiety but less severe and much less frequent. Patient is more confident about managing symptoms at home and getting back to her routine despite some symptoms.     On d/c she is on nortriptyline 100mg po qhs, klonopin 0.25mg po tid, mirtazapine 7.5mh po qhs, gabapentin 200mg po bid and 100-200mg daily prn for anxiety, metoprolol 50mg po bid for heart palpitations from anxiety. Provided 7 days worth of medications. Will follow up with Dr. Bates for intake and eventually return to Dr. Kamara for long term follow up.

## 2024-05-30 NOTE — BH PSYCHOLOGY - CLINICIAN PSYCHOTHERAPY NOTE - NSBHPSYCHOLNARRATIVE_PSY_A_CORE FT
The patient was seen individually at the team's request and with her consent. Speech was of normal rate and volume and there were no verbal irregularities. The patient's mood was euthymic, and affect was normal, and mood congruent. She was coherent and logical; her content focused on her feelings around discharge, and discharge planning. The patient denied current suicidal ideation.     The pt discussed that she has been feeling better and reported no longer feeling anxious. That said, the pt shared that she was disappointed that her progress feels differently than the first time she was discharged. Trainee and pt discussed the importance of not using this comparison as a marker for her progress. Trainee and pt also explored all of the tools she learned during this admission that she did not learn last time, that will support her in continuing to make improvements in her mood. Pt shared that she feels less helpless and shared all the plans she wants to make when she gets home. Pt shared that she is really looking forward to being in her own home with her  again. She shared that she is excited to start making more plans with friends and feels more confident in initiating the plans. Trainee and pt explored ways that she can help herself follow through with her plans instead of falling into comfortable habits that do not serve her mental health or overall well-being in the long run. Pt expressed feeling hopeful about getting better and continuing outpatient treatment post-discharge. Pt was engaged and receptive to treatment.

## 2024-05-30 NOTE — BH INPATIENT PSYCHIATRY PROGRESS NOTE - NSTXFEARGOAL_PSY_ALL_CORE
Will attend groups, participate in activities, engage in community meetings

## 2024-05-30 NOTE — BH INPATIENT PSYCHIATRY PROGRESS NOTE - NSTXDEPRESDATETRGT_PSY_ALL_CORE
29-Apr-2024
15-May-2024
08-May-2024
01-May-2024
26-Apr-2024
01-May-2024
03-Jun-2024
06-Jun-2024
29-Apr-2024
01-May-2024
27-May-2024
06-Jun-2024
06-May-2024
20-May-2024
20-May-2024
06-May-2024
08-May-2024
23-May-2024
15-May-2024
26-Apr-2024
15-May-2024
06-Jun-2024
23-May-2024
24-May-2024
08-May-2024
24-Apr-2024
24-Apr-2024
01-May-2024
08-May-2024
29-May-2024
08-May-2024
01-May-2024
23-May-2024
26-Apr-2024
29-May-2024
29-May-2024

## 2024-05-30 NOTE — BH INPATIENT PSYCHIATRY PROGRESS NOTE - NSICDXBHSECONDARYDX_PSY_ALL_CORE
MDD (major depressive disorder), recurrent severe, without psychosis   F33.2  Obesity   E66.9  Vertigo   R42  

## 2024-05-30 NOTE — BH INPATIENT PSYCHIATRY PROGRESS NOTE - NSBHFUPINTERVALHXFT_PSY_A_CORE
Patient reports sustained improvement in anxiety, depression but seems to be overly sedated from gabapentin now that she's taking a few doses. Denies SI. Remains forward thinking.

## 2024-05-30 NOTE — BH INPATIENT PSYCHIATRY PROGRESS NOTE - NSTXDCOTHRDATEEST_PSY_ALL_CORE
18-Apr-2024
02-May-2024
23-May-2024
18-Apr-2024
09-May-2024
02-May-2024
18-Apr-2024
18-Apr-2024
02-May-2024
23-May-2024
16-May-2024
23-May-2024
18-Apr-2024
18-Apr-2024
09-May-2024
02-May-2024
09-May-2024
18-Apr-2024
18-Apr-2024
09-May-2024
16-May-2024
18-Apr-2024
23-May-2024
02-May-2024
18-Apr-2024
18-Apr-2024
16-May-2024
18-Apr-2024
23-May-2024
18-Apr-2024
09-May-2024
18-Apr-2024
16-May-2024

## 2024-05-30 NOTE — BH INPATIENT PSYCHIATRY PROGRESS NOTE - NSTXDEPRESPROGRES_PSY_ALL_CORE
Improving

## 2024-05-30 NOTE — BH INPATIENT PSYCHIATRY DISCHARGE NOTE - DESCRIPTION
retired (),  for 28 yrs; has no children. is a non-caregiver status living in a private home in [place] with her . not Judaism. no access to guns

## 2024-05-30 NOTE — BH INPATIENT PSYCHIATRY DISCHARGE NOTE - NSDCMRMEDTOKEN_GEN_ALL_CORE_FT
atorvastatin 10 mg oral tablet: 1 tab(s) orally once a day (at bedtime)  clonazePAM 0.5 mg oral tablet: 0.5 tab(s) orally 3 times a day MDD: 0.75mg  gabapentin 100 mg oral capsule: 2 cap(s) orally 3 times a day as needed for  anxiety  melatonin 3 mg oral tablet: 2 tab(s) orally once a day (at bedtime)  metoprolol tartrate 50 mg oral tablet: 1 tab(s) orally 2 times a day  mirtazapine 7.5 mg oral tablet: 1 tab(s) orally once a day (at bedtime)  Multiple Vitamins oral tablet: 1 tab(s) orally once a day  nortriptyline 50 mg oral capsule: 2 cap(s) orally once a day (at bedtime)  ocular lubricant ophthalmic solution: 1 drop(s) to each affected eye every 8 hours As needed dry eye  pantoprazole 40 mg oral delayed release tablet: 1 tab(s) orally once a day  polyethylene glycol 3350 oral powder for reconstitution: 17 gram(s) orally once a day as needed for  constipation  senna leaf extract oral tablet: 2 tab(s) orally once a day (at bedtime)

## 2024-05-30 NOTE — BH INPATIENT PSYCHIATRY PROGRESS NOTE - NSBHCONSDANGERSELF_PSY_A_CORE
suicidal ideation with plan and means

## 2024-05-30 NOTE — BH INPATIENT PSYCHIATRY PROGRESS NOTE - PRN MEDS
MEDICATIONS  (PRN):  acetaminophen     Tablet .. 650 milliGRAM(s) Oral every 6 hours PRN Temp greater or equal to 38C (100.4F), Mild Pain (1 - 3)  aluminum hydroxide/magnesium hydroxide/simethicone Suspension 30 milliLiter(s) Oral every 4 hours PRN Dyspepsia  artificial  tears Solution 1 Drop(s) Both EYES every 4 hours PRN dry eyes syndrome  bisacodyl Suppository 10 milliGRAM(s) Rectal daily PRN Constipation  chlorhexidine 0.12% Liquid 15 milliLiter(s) Oral Mucosa two times a day PRN gum inflammation  clonazePAM  Tablet 0.25 milliGRAM(s) Oral daily PRN anxiety  furosemide    Tablet 20 milliGRAM(s) Oral daily PRN lower extremity edema  gabapentin 100 milliGRAM(s) Oral two times a day PRN anxiety  haloperidol     Tablet 0.5 milliGRAM(s) Oral every 6 hours PRN agitation  haloperidol    Injectable 0.5 milliGRAM(s) IntraMuscular once PRN severe agitation  meclizine 12.5 milliGRAM(s) Oral every 8 hours PRN Dizziness  ondansetron   Disintegrating Tablet 4 milliGRAM(s) Oral every 8 hours PRN Nausea and/or Vomiting

## 2024-05-30 NOTE — BH INPATIENT PSYCHIATRY PROGRESS NOTE - NSDCCRITERIA_PSY_ALL_CORE
improvement in symptoms 
pending stabilization and improved symptoms. 
pending stabilization and improved symptoms. 
improvement in symptoms 
pending stabilization and improved symptoms. 
improvement in symptoms 
improvement in symptoms 
pending stabilization and improved symptoms. 
improvement in symptoms 
improvement in symptoms 
pending stabilization and improved symptoms. 
improvement in symptoms 
pending stabilization and improved symptoms. 
improvement in symptoms 
pending stabilization and improved symptoms. 
improvement in symptoms 
pending stabilization and improved symptoms. 

## 2024-05-30 NOTE — BH INPATIENT PSYCHIATRY PROGRESS NOTE - NSBHATTESTBILLING_PSY_A_CORE
[id number]-Leggivvgvz OBS or IP - moderate complexity OR 35-49 mins
70711-Zbzgbpxtwb OBS or IP - moderate complexity OR 35-49 mins
22040-Sobbttvvej OBS or IP - moderate complexity OR 35-49 mins
37963-Roapadeowv OBS or IP - moderate complexity OR 35-49 mins
96548-Mkzlmhslsz OBS or IP - moderate complexity OR 35-49 mins
03938-Dclrmfdenl OBS or IP - moderate complexity OR 35-49 mins
43904-Vgehlojlcr OBS or IP - moderate complexity OR 35-49 mins
41717-Tficnpqkeb OBS or IP - moderate complexity OR 35-49 mins
26269-Dzuejxfiqj OBS or IP - moderate complexity OR 35-49 mins
23570-Qzeihesoxk OBS or IP - moderate complexity OR 35-49 mins
17547-Rrfyzxlpgj OBS or IP - moderate complexity OR 35-49 mins
72429-Qwnxiqbiuc OBS or IP - moderate complexity OR 35-49 mins
38029-Ygamgwwouw OBS or IP - moderate complexity OR 35-49 mins
68883-Yaybkhblqc OBS or IP - moderate complexity OR 35-49 mins
96203-Krbvhyhozp OBS or IP - moderate complexity OR 35-49 mins
06576-Bhwdropibr OBS or IP - moderate complexity OR 35-49 mins
36736-Dgsocmzqbt OBS or IP - moderate complexity OR 35-49 mins
00264-Veqzhfacht OBS or IP - moderate complexity OR 35-49 mins
08073-Gbbhpxbtbz OBS or IP - moderate complexity OR 35-49 mins
87620-Zoresterzd OBS or IP - moderate complexity OR 35-49 mins
07809-Dlxjfevefj OBS or IP - moderate complexity OR 35-49 mins
31181-Lodbvbdtng OBS or IP - moderate complexity OR 35-49 mins
02163-Wgmmncsbux OBS or IP - moderate complexity OR 35-49 mins
62273-Cuiiuhummu OBS or IP - moderate complexity OR 35-49 mins
86454-Zvixsswxwo OBS or IP - moderate complexity OR 35-49 mins
01260-Xmacyrcicz OBS or IP - moderate complexity OR 35-49 mins
74144-Snxabxhepn OBS or IP - moderate complexity OR 35-49 mins
82888-Rrannniznd OBS or IP - moderate complexity OR 35-49 mins
04035-Fiaraugmuv OBS or IP - moderate complexity OR 35-49 mins
34447-Azzfkjvdds OBS or IP - moderate complexity OR 35-49 mins
27494-Uxnyehtmas OBS or IP - moderate complexity OR 35-49 mins
00784-Yrembxbzvx OBS or IP - moderate complexity OR 35-49 mins
72746-Iidvwqcuub OBS or IP - moderate complexity OR 35-49 mins
77756-Zxlimeoebi OBS or IP - moderate complexity OR 35-49 mins

## 2024-05-30 NOTE — BH INPATIENT PSYCHIATRY DISCHARGE NOTE - NSBHASSESSSUMMFT_PSY_ALL_CORE
73 yr old female, , domiciled, and retired. premorbidly ambulant (not needing assists) and iADL/ bADL independent. with background hx of MDD and ANSON; has multiple psych admissions to [facility] (most recently Jan 2024 on [facility]), had prior SA by overdosing on pills (11/2019) following death of parents, presented to ED with somatic complaints and reports of ?passive suicidal ideation, had exhaustive w/u, now admitted voluntarily to [facility]  Routine checks, no suicidal ideations  Continue nortriptyline 50mg po qhs for depression - will hold off increase due to orthostasis today  Continue klonopin 0.5mg po bid and 0.5mg po qhs for anxiety; gabapentin prn for anxiety  Continue mirtazapine 15mg po qhs and melatonin 3mg po qhs for insomnia  Increased lyrica to 50mg po daily and 25mg po q3pm and 9pm - off label for anxiety  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 Requesting metoprolol titration despite recently complaining of dizziness.  4/30: Ruminative thoughts regarding perceived rejection and stressor persist. Medication titration/ adjustment pending further assessment.   05/01: Somatic complaints and preservative thoughts about rejection persists. Integration of brief bedside psychotherapy during rounds with limit setting due to exacerbation of anxiety especially when fixated on her medication regimen. Medication titration to be approached slowly to minimize cumulative adverse effects.  05/02: No significant change. Open to individual therapy thus will continue in combination with medication management.   05/03: No change in current treatment plan. Will continue to encourage patient in engaging with treatment recommendations.  5/6: Minimal improvement in anxiety/depression. Nortriptyline to be increased from 60 to 75 mg daily.  5/7: Tolerating medication changes. Will consider further increase in dose of Nortriptyline. Adjustments made to dosing fo Klonopin due to complaint of morning drowsiness on current dose of 0.5mg   5/8: Increased 3pm lyrica to 50mg. Will continue monitoring response to medication.   5/9: Able to manage anxious episode this morning without medication adjustment. Will continue to encourage incorporation of therapy with meds.  5/10: Slight improvement in anxiety. More receptive to psychology sessions. Will consider titration of nortriptyline dose on Monday.   5/13: Increasing dose of Nortriptyline to 100mg daily. Plan to review rest of anti anxiolytic regimen over the week.   5/14: medication management in progress. Incorporating psychotherapy with increased receptivity by patient. Will continue to monitor & engage patient in treatment planning.  5/15: Complaint of constipation for which Senna was added. No urinary retention, tremors, blurry vision or symptoms of anticholinergic effect. Still endorsing anxiety and depressive symptoms. Will c/w current dose of Nortriptyline. Will obtain labs and EKG. Adjusted doses of Lyrica & Klonopin  5/16: Tolerating medication adjustments. Labs & EKG wnl. No medication adverse effect. Will continue to monitor.  5/17: Some improvement with anxiety. Ongoing adjustment of medication regimen.  5/20: has had some sustained gains with anxiety and depression symptoms but continues to catastrophize  5/21: Anxious about medication changes albeit controlled. Consolidating dosing times of anti anxiolytics with aim to discontinuing prior to discharge.  5/22: Chronically anxious albeit somewhat amenable to redirection. Will continue working on further medication adjustment prior to discharge.  5/23: Anxiety centered on medications changes. Changing Metoprolol to 50mg BID. Will consider increasing dose on Pamelor.   5/24: Persistent anxiety especially with change. Some improvement with depressive symptoms. Holding further medication adjustment pending reassessment.  5/28: patient wants to try gabapentin in combo with klonopin instead of lyrica due to excess sedation; constipated   5/29: d/c lyrica and start gabapentin instead, continues to improve  5/30: decrease gabapentin to 200mg po bid with 100 mg prns if anxious in between doses; d/c tomorrow

## 2024-05-31 VITALS — TEMPERATURE: 98 F | OXYGEN SATURATION: 96 %

## 2024-05-31 PROCEDURE — 90832 PSYTX W PT 30 MINUTES: CPT

## 2024-05-31 RX ADMIN — Medication 0.25 MILLIGRAM(S): at 06:02

## 2024-05-31 RX ADMIN — ONDANSETRON 4 MILLIGRAM(S): 8 TABLET, FILM COATED ORAL at 13:35

## 2024-05-31 RX ADMIN — Medication 1 TABLET(S): at 09:47

## 2024-05-31 RX ADMIN — PANTOPRAZOLE SODIUM 40 MILLIGRAM(S): 20 TABLET, DELAYED RELEASE ORAL at 08:18

## 2024-05-31 RX ADMIN — Medication 0.25 MILLIGRAM(S): at 13:12

## 2024-05-31 RX ADMIN — Medication 650 MILLIGRAM(S): at 13:12

## 2024-05-31 RX ADMIN — Medication 50 MILLIGRAM(S): at 06:03

## 2024-05-31 RX ADMIN — GABAPENTIN 200 MILLIGRAM(S): 400 CAPSULE ORAL at 06:02

## 2024-05-31 RX ADMIN — Medication 650 MILLIGRAM(S): at 12:12

## 2024-05-31 NOTE — BH PSYCHOLOGY - CLINICIAN PSYCHOTHERAPY NOTE - NSBHPSYCHOLASSESSPROV_PSY_A_CORE
Licensed Psychologist
Psychology Trainee only
Licensed Psychologist
Psychology Trainee only
Licensed Psychologist
Psychology Trainee only
Licensed Psychologist
Psychology Trainee only
Licensed Psychologist
Psychology Trainee only
Licensed Psychologist
Licensed Psychologist
Psychology Trainee only
Licensed Psychologist

## 2024-05-31 NOTE — BH PSYCHOLOGY - CLINICIAN PSYCHOTHERAPY NOTE - NSBHPTASSESSDT_PSY_A_CORE
01-May-2024 16:00
14-May-2024 14:20
08-May-2024 11:10
16-May-2024 17:29
18-Apr-2024 14:15
03-May-2024 11:00
31-May-2024 11:10
17-Apr-2024 14:30
20-May-2024 14:30
29-Apr-2024 15:25
28-May-2024 14:45
10-May-2024 11:15
06-May-2024 13:52
17-May-2024 14:54
30-May-2024 10:30
22-Apr-2024 11:15
22-May-2024 09:45
30-May-2024 20:30
09-May-2024 13:03
25-Apr-2024 10:15
13-May-2024 15:54
24-May-2024 11:30
06-May-2024 14:15

## 2024-05-31 NOTE — BH PSYCHOLOGY - CLINICIAN PSYCHOTHERAPY NOTE - NSTXDCOTHRDATETRGT_PSY_ALL_CORE
16-May-2024
23-May-2024
23-May-2024
07-Jun-2024
25-Apr-2024
09-May-2024
09-May-2024
16-May-2024
23-May-2024
30-May-2024
26-Apr-2024
26-Apr-2024
09-May-2024
16-May-2024
16-May-2024
09-May-2024
23-May-2024
30-May-2024
26-Apr-2024
26-Apr-2024
07-Jun-2024
30-May-2024

## 2024-05-31 NOTE — BH PSYCHOLOGY - CLINICIAN PSYCHOTHERAPY NOTE - NSTXFEARGOAL_PSY_ALL_CORE
Will attend groups, participate in activities, engage in community meetings

## 2024-05-31 NOTE — BH PSYCHOLOGY - CLINICIAN PSYCHOTHERAPY NOTE - NSBHPSYCHOLNARRATIVE_PSY_A_CORE FT
The patient was seen individually at the team's request and with her consent. Speech was of normal rate and volume, and there were no verbal irregularities. The patient's mood was “nervous,” and the affect was normal and mood congruent. She was coherent and logical; her content focused on her feelings around discharge and discharge planning. The patient denied any suicidal ideation.     The pt reported feeling somewhat anxious about her pending discharge. She noted that she had felt calmer and more upbeat at her last discharge. We reviewed likely reasons her the difference. The patient was reassured to hear that it is not uncommon to feel apprehensive prior to discharge. We reviewed her plans for when she returns home. The discussion emphasized the importance of activity, socialization, and medication adherence. The patient was encouraged to face challenges but to pace herself and to prioritize. Pt was able to accept support and reassurance. Pt expressed appreciation for the treatment.

## 2024-05-31 NOTE — BH PSYCHOLOGY - CLINICIAN PSYCHOTHERAPY NOTE - TOKEN PULL-DIAGNOSIS
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      
Primary Diagnosis:  Depression, major [F32.9]        Problem Dx:   
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      
Primary Diagnosis:  Depression, major [F32.9]        Problem Dx:   
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      
Primary Diagnosis:  Depression, major [F32.9]        Problem Dx:   
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      

## 2024-05-31 NOTE — BH PSYCHOLOGY - CLINICIAN PSYCHOTHERAPY NOTE - NSBHPSYCHOLINT_PSY_A_CORE
Cognitive/behavioral therapy/Encourage medication compliance/Supportive therapy/Treatment compliance encouraged
Cognitive/behavioral therapy/Encourage medication compliance/Supportive therapy/Treatment compliance encouraged
Cognitive/behavioral therapy/Encourage medication compliance/Problem-solving techniques discussed/Supportive therapy/Treatment compliance encouraged
Cognitive/behavioral therapy/Encourage medication compliance/Problem-solving techniques discussed/Stress management/Treatment compliance encouraged
Cognitive/behavioral therapy/Encourage medication compliance/Supportive therapy/Treatment compliance encouraged
Cognitive/behavioral therapy/Encourage medication compliance/Treatment compliance encouraged
Cognitive/behavioral therapy/Problem-solving techniques discussed/Supportive therapy
Cognitive/behavioral therapy/Encourage medication compliance/Referred to physician/Supportive therapy/Treatment compliance encouraged
Cognitive/behavioral therapy/Encourage medication compliance/Supportive therapy/Treatment compliance encouraged
Cognitive/behavioral therapy/Encourage medication compliance/Supported coping skills/Supportive therapy/Treatment compliance encouraged
Cognitive/behavioral therapy/Dynamic issues addressed/Problem-solving techniques discussed/Supportive therapy/Treatment compliance encouraged
Cognitive/behavioral therapy/Encourage medication compliance/Referred to physician/Supportive therapy/Treatment compliance encouraged
Cognitive/behavioral therapy/Dynamic issues addressed/Supported coping skills/Supportive therapy/Treatment compliance encouraged
Cognitive/behavioral therapy/Encourage medication compliance/Problem-solving techniques discussed/Supportive therapy/Treatment compliance encouraged
Cognitive/behavioral therapy/Dynamic issues addressed/Problem-solving techniques discussed/Supported coping skills/Supportive therapy/Treatment compliance encouraged
Cognitive/behavioral therapy/Dynamic issues addressed/Supportive therapy/Treatment compliance encouraged
Cognitive/behavioral therapy/Supportive therapy/Treatment compliance encouraged
Cognitive/behavioral therapy/Encourage medication compliance/Supportive therapy/Treatment compliance encouraged
Cognitive/behavioral therapy/Encourage medication compliance/Treatment compliance encouraged
Cognitive/behavioral therapy/Encourage medication compliance/Supportive therapy/Treatment compliance encouraged
Cognitive/behavioral therapy/Encourage medication compliance/Problem-solving techniques discussed/Supportive therapy/Treatment compliance encouraged
Cognitive/behavioral therapy/Encourage medication compliance/Referred to physician/Supportive therapy/Treatment compliance encouraged
Cognitive/behavioral therapy/Encourage medication compliance/Supportive therapy/Treatment compliance encouraged

## 2024-05-31 NOTE — BH PSYCHOLOGY - CLINICIAN PSYCHOTHERAPY NOTE - NSBHPSYCHOLPROBS_PSY_ALL_CORE
Anxiety/Depression

## 2024-05-31 NOTE — BH PSYCHOLOGY - CLINICIAN PSYCHOTHERAPY NOTE - NSTXDCOTHRDATEEST_PSY_ALL_CORE
18-Apr-2024
18-Apr-2024
09-May-2024
09-May-2024
23-May-2024
02-May-2024
18-Apr-2024
18-Apr-2024
09-May-2024
02-May-2024
18-Apr-2024
23-May-2024
16-May-2024
09-May-2024
30-May-2024
16-May-2024
16-May-2024
30-May-2024
23-May-2024
16-May-2024

## 2024-05-31 NOTE — BH PSYCHOLOGY - CLINICIAN PSYCHOTHERAPY NOTE - NSTXDEPRESDATEEST_PSY_ALL_CORE
16-Apr-2024
17-Apr-2024
16-Apr-2024
17-Apr-2024
16-Apr-2024
17-Apr-2024
16-Apr-2024
17-Apr-2024
16-Apr-2024
17-Apr-2024
16-Apr-2024
16-Apr-2024
17-Apr-2024
17-Apr-2024
16-Apr-2024
17-Apr-2024
16-Apr-2024

## 2024-05-31 NOTE — BH PSYCHOLOGY - CLINICIAN PSYCHOTHERAPY NOTE - NSTXANXGOAL_PSY_ALL_CORE
Report a reduction in panic attacks and improving mood and confidence

## 2024-05-31 NOTE — BH PSYCHOLOGY - CLINICIAN PSYCHOTHERAPY NOTE - NSTXDEPRESDATETRGT_PSY_ALL_CORE
03-Jun-2024
08-May-2024
15-May-2024
24-Apr-2024
06-Jun-2024
20-May-2024
23-May-2024
20-May-2024
08-May-2024
08-May-2024
23-May-2024
08-May-2024
24-Apr-2024
15-May-2024
15-May-2024
06-May-2024
26-Apr-2024
31-May-2024
01-May-2024
06-Jun-2024
29-May-2024
27-May-2024
29-May-2024

## 2024-05-31 NOTE — BH PSYCHOLOGY - CLINICIAN PSYCHOTHERAPY NOTE - NSBHPSYCHOLDURATION_PSY_A_CORE
60 minutes
45 minutes
20 minutes
30 minutes
45 minutes
30 minutes
30 minutes
45 minutes
60 minutes
30 minutes
45 minutes
45 minutes
60 minutes
45 minutes
45 minutes
60 minutes
45 minutes
30 minutes
30 minutes

## 2024-05-31 NOTE — BH PSYCHOLOGY - CLINICIAN PSYCHOTHERAPY NOTE - NSBHPSYCHOLDISCUSS_PSY_A_CORE
Discussion with collaborating staff took place since patient's last visit

## 2024-05-31 NOTE — BH PSYCHOLOGY - CLINICIAN PSYCHOTHERAPY NOTE - NSBHPSYCHOLBILLFAM_PSY_A_CORE
[id number] - 38 to 52 minutes
25857 - 53 minutes and above
35512 - 38 to 52 minutes
57258 - 38 to 52 minutes
95744 - 53 minutes and above
75280 - 16 to 37 minutes
23273 - 53 minutes and above
23048 - 53 minutes and above
29200 - 53 minutes and above
72268 - 38 to 52 minutes
45560 - 38 to 52 minutes
46553 - 53 minutes and above
17595 - 38 to 52 minutes
27207 - 16 to 37 minutes
18377 - 16 to 37 minutes
86130 - 16 to 37 minutes
58188 - 16 to 37 minutes

## 2024-05-31 NOTE — BH PSYCHOLOGY - CLINICIAN PSYCHOTHERAPY NOTE - NSBHPSYCHOLRESPONSE_PSY_A_CORE
Symptoms reduced/Coping skills acquired/Insight displayed/Accepted support
Insight displayed/Accepted support
Symptoms reduced/Coping skills acquired/Insight displayed/Accepted support
Symptoms reduced/Insight displayed/Accepted support
Insight displayed/Accepted support
Symptoms reduced/Insight displayed/Accepted support
Accepted support
Insight displayed/Accepted support
Symptoms reduced/Insight displayed/Accepted support
Insight displayed/Accepted support
Insight displayed/Accepted support
Symptoms reduced/Coping skills acquired/Insight displayed/Accepted support
Insight displayed/Accepted support
Insight displayed/Accepted support
Coping skills acquired/Insight displayed/Accepted support
Symptoms reduced/Insight displayed/Accepted support
Symptoms reduced/Coping skills acquired/Insight displayed/Accepted support
Symptoms reduced/Coping skills acquired/Insight displayed/Accepted support
Accepted support
Symptoms reduced/Coping skills acquired/Insight displayed/Accepted support
Accepted support
Symptoms reduced/Coping skills acquired/Insight displayed/Accepted support

## 2024-05-31 NOTE — BH PSYCHOLOGY - CLINICIAN PSYCHOTHERAPY NOTE - NSTXDEPRESPROGRES_PSY_ALL_CORE
Improving
Met - goal discontinued
Improving

## 2024-05-31 NOTE — BH PSYCHOLOGY - CLINICIAN PSYCHOTHERAPY NOTE - NSTXPROBFEAR_PSY_ALL_CORE
FEAR OF CHANGE

## 2024-05-31 NOTE — BH PSYCHOLOGY - CLINICIAN PSYCHOTHERAPY NOTE - NSTXDCOTHRPROGRES_PSY_ALL_CORE
Improving
No Change
Improving
No Change
Improving
No Change
Improving
No Change
Improving
No Change

## 2024-05-31 NOTE — BH PSYCHOLOGY - CLINICIAN PSYCHOTHERAPY NOTE - NSTXDCOTHRGOAL_PSY_ALL_CORE
Pt. will comply with tx. recommendations, decrease  sxs and cooperate with discharge plans for seven days.
Pt will improve mental status in order to effectively engage with dc planning.
Pt has chronic anxiety
Pt. will comply with tx. recommendations within seven days.
Pt. will comply with tx. recommendations, decrease  sxs and cooperate with discharge plans for seven days.
Pt will improve mental status in order to effectively engage with dc planning.
Pt will improve mental status in order to effectively engage with dc planning.
Pt. will comply with tx. recommendations within seven days.
Pt has chronic anxiety
Pt. will comply with tx. recommendations within seven days.
Pt. will comply with tx. recommendations, decrease  sxs and cooperate with discharge plans for seven days.
Pt. will comply with tx. recommendations within seven days.
Pt. will comply with tx. recommendations, decrease sxs and cooperate with discharge plans within seven days.
Pt. will comply with tx. recommendations, decrease sxs and cooperate with discharge plans within seven days.
Pt. will comply with tx. recommendations, decrease  sxs and cooperate with discharge plans for seven days.
Pt. will comply with tx. recommendations, decrease sxs and cooperate with discharge plans within seven days.
Pt has chronic anxiety
Pt will improve mental status in order to effectively engage with dc planning.
Pt. will comply with tx. recommendations, decrease sxs and cooperate with discharge plans within seven days.
Pt. will comply with tx. recommendations, decrease  sxs and cooperate with discharge plans for seven days.
Pt. will comply with tx. recommendations, decrease sxs and cooperate with discharge plans within seven days.
Pt has chronic anxiety

## 2024-05-31 NOTE — BH PSYCHOLOGY - CLINICIAN PSYCHOTHERAPY NOTE - NSTXDEPRESGOAL_PSY_ALL_CORE
Exhibit improvements in self-grooming, hygiene, sleep and appetite
Report using a coping skill to overcome sadness and worry in order to socialize with peers daily
Will identify 2 coping skills that assist in improving mood
Attend and participate in at least 2 groups daily despite low mood/energy
Will identify 2 coping skills that assist in improving mood
Will identify 2 coping skills that assist in improving mood
Will identify thoughts and self-talk that contribute to depression
Report using a coping skill to overcome sadness and worry in order to socialize with peers daily
Exhibit improvements in self-grooming, hygiene, sleep and appetite
Report using a coping skill to overcome sadness and worry in order to socialize with peers daily
Attend and participate in at least 2 groups daily despite low mood/energy
Attend and participate in at least 2 groups daily despite low mood/energy
Will identify 2 coping skills that assist in improving mood
Attend and participate in at least 2 groups daily despite low mood/energy
Will identify 2 coping skills that assist in improving mood
Will identify 2 coping skills that assist in improving mood
Will identify thoughts and self-talk that contribute to depression
Attend and participate in at least 2 groups daily despite low mood/energy
Attend and participate in at least 2 groups daily despite low mood/energy
Will identify 2 coping skills that assist in improving mood
Will identify 2 coping skills that assist in improving mood

## 2024-05-31 NOTE — BH PSYCHOLOGY - CLINICIAN PSYCHOTHERAPY NOTE - NSBHPSYCHOLGOALS_PSY_A_CORE
Decrease symptoms/Improve level of independent functioning/Treatment compliance
Decrease symptoms/Assessment/Improve social/vocational/coping skills/Psychoeducation
Decrease symptoms/Assessment/Improve social/vocational/coping skills/Psychoeducation/Treatment compliance
Decrease symptoms/Improve level of independent functioning/Treatment compliance
Decrease symptoms/Assessment/Improve social/vocational/coping skills/Psychoeducation/Treatment compliance
Decrease symptoms/Improve level of independent functioning/Treatment compliance
Decrease symptoms/Assessment/Improve social/vocational/coping skills/Psychoeducation/Treatment compliance
Decrease symptoms/Assessment/Improve social/vocational/coping skills/Psychoeducation/Treatment compliance
Decrease symptoms/Improve level of independent functioning/Treatment compliance
Decrease symptoms/Improve level of independent functioning/Treatment compliance
Decrease symptoms/Assessment/Improve social/vocational/coping skills/Psychoeducation/Treatment compliance
Decrease symptoms/Improve level of independent functioning/Treatment compliance

## 2024-06-03 PROCEDURE — 90792 PSYCH DIAG EVAL W/MED SRVCS: CPT

## 2024-06-12 PROCEDURE — 90834 PSYTX W PT 45 MINUTES: CPT

## 2024-06-14 PROCEDURE — 90833 PSYTX W PT W E/M 30 MIN: CPT

## 2024-06-14 PROCEDURE — 99215 OFFICE O/P EST HI 40 MIN: CPT

## 2024-06-19 DIAGNOSIS — F33.1 MAJOR DEPRESSIVE DISORDER, RECURRENT, MODERATE: ICD-10-CM

## 2024-06-19 DIAGNOSIS — F41.1 GENERALIZED ANXIETY DISORDER: ICD-10-CM

## 2024-06-19 LAB — NORTRIP SERPL-MCNC: 109 NG/ML — SIGNIFICANT CHANGE UP (ref 50–150)

## 2024-06-19 PROCEDURE — 90833 PSYTX W PT W E/M 30 MIN: CPT

## 2024-06-19 PROCEDURE — 99214 OFFICE O/P EST MOD 30 MIN: CPT

## 2024-06-19 PROCEDURE — 90834 PSYTX W PT 45 MINUTES: CPT

## 2024-06-27 PROCEDURE — 90833 PSYTX W PT W E/M 30 MIN: CPT

## 2024-06-27 PROCEDURE — 90834 PSYTX W PT 45 MINUTES: CPT

## 2024-06-27 PROCEDURE — 99214 OFFICE O/P EST MOD 30 MIN: CPT

## 2024-07-10 PROCEDURE — 90833 PSYTX W PT W E/M 30 MIN: CPT

## 2024-07-10 PROCEDURE — 90834 PSYTX W PT 45 MINUTES: CPT

## 2024-07-10 PROCEDURE — 99214 OFFICE O/P EST MOD 30 MIN: CPT

## 2024-07-17 PROCEDURE — 90834 PSYTX W PT 45 MINUTES: CPT

## 2024-07-22 PROCEDURE — 90833 PSYTX W PT W E/M 30 MIN: CPT

## 2024-07-22 PROCEDURE — 99214 OFFICE O/P EST MOD 30 MIN: CPT

## 2024-07-29 NOTE — DIETITIAN INITIAL EVALUATION ADULT - NS FNS WEIGHT CHANGE REASON
Pt c/o weakness and dizziness that started this morning but got worse after taking his Klonapin about an hour PTA. Per wife pt fell earlier today but denies any head injury or LOC. Pt denies N/V/D/fever and Cough  
unintentional

## 2024-07-31 PROCEDURE — 90834 PSYTX W PT 45 MINUTES: CPT

## 2024-08-07 PROCEDURE — 90833 PSYTX W PT W E/M 30 MIN: CPT

## 2024-08-07 PROCEDURE — 99214 OFFICE O/P EST MOD 30 MIN: CPT

## 2024-08-14 PROCEDURE — 90834 PSYTX W PT 45 MINUTES: CPT

## 2024-08-21 PROCEDURE — 90833 PSYTX W PT W E/M 30 MIN: CPT

## 2024-08-21 PROCEDURE — 99214 OFFICE O/P EST MOD 30 MIN: CPT

## 2024-08-23 PROCEDURE — 90834 PSYTX W PT 45 MINUTES: CPT

## 2024-08-28 PROCEDURE — 90834 PSYTX W PT 45 MINUTES: CPT

## 2024-08-29 PROCEDURE — 90833 PSYTX W PT W E/M 30 MIN: CPT

## 2024-08-29 PROCEDURE — 99214 OFFICE O/P EST MOD 30 MIN: CPT

## 2024-09-11 PROCEDURE — 90834 PSYTX W PT 45 MINUTES: CPT

## 2024-09-11 PROCEDURE — 99214 OFFICE O/P EST MOD 30 MIN: CPT

## 2024-09-11 PROCEDURE — 90833 PSYTX W PT W E/M 30 MIN: CPT

## 2024-09-16 NOTE — ED BEHAVIORAL HEALTH ASSESSMENT NOTE - THOUGHT PROCESS
Caller: Muna    Doctor: PATRICE    Reason for call: pt would like a referral to do aqua therapy sent to her insurance.     Call back#: [phone]   Overinclusive/Circumstantial Linear/Perseverative

## 2024-09-20 PROCEDURE — 90834 PSYTX W PT 45 MINUTES: CPT

## 2024-09-24 NOTE — BH PATIENT PROFILE - NSDASAVERBAL_PSY_ALL_CORE
09/24/24                            Edmond Monroy  [address] [place] [address]    To Whom It May Concern:    This is to certify Edmond Monroy was evaluated with Patti Sim MD on 09/24/24 and can return to school on 9/25/2024.     RESTRICTIONS: None            Electronically signed by:  Patti Sim MD  [facility]--[address] [place] [address]  Dept Phone: [phone]       
           09/24/24                            Edmond Monroy  [address] [place] [address]    To Whom It May Concern:    This is to certify Edmond Monroy was evaluated with Patti Sim MD on 09/24/24 accompanied by his mother Savi Monroy.  He will return to school 9/25/2024.      Electronically signed by:  Patti Sim MD  [facility]--[address] [place] [address]  Dept Phone: [phone]       
No

## 2024-09-27 PROCEDURE — 90834 PSYTX W PT 45 MINUTES: CPT

## 2024-09-27 PROCEDURE — 99214 OFFICE O/P EST MOD 30 MIN: CPT

## 2024-09-27 PROCEDURE — 90833 PSYTX W PT W E/M 30 MIN: CPT

## 2024-10-09 PROCEDURE — 90834 PSYTX W PT 45 MINUTES: CPT

## 2024-10-09 PROCEDURE — 90833 PSYTX W PT W E/M 30 MIN: CPT

## 2024-10-09 PROCEDURE — 99214 OFFICE O/P EST MOD 30 MIN: CPT

## 2024-10-21 NOTE — BH INPATIENT PSYCHIATRY PROGRESS NOTE - NSBHCHARTREVIEWVS_PSY_A_CORE FT
Vital Signs Last 24 Hrs  T(C): 37.1 (06-07-23 @ 15:56), Max: 37.1 (06-07-23 @ 15:56)  T(F): 98.7 (06-07-23 @ 15:56), Max: 98.7 (06-07-23 @ 15:56)  HR: --  BP: --  BP(mean): --  RR: --  SpO2: 96% (06-07-23 @ 15:56) (95% - 97%)    Orthostatic VS  06-07-23 @ 07:30  Lying BP: --/-- HR: --  Sitting BP: 144/80 HR: 86  Standing BP: 138/74 HR: 89  Site: --  Mode: --  Orthostatic VS  06-06-23 @ 19:21  Lying BP: --/-- HR: --  Sitting BP: 131/60 HR: 79  Standing BP: --/-- HR: --  Site: --  Mode: --  Orthostatic VS  06-06-23 @ 06:13  Lying BP: 139/73 HR: 70  Sitting BP: 127/93 HR: 77  Standing BP: --/-- HR: --  Site: --  Mode: --  Orthostatic VS  06-05-23 @ 20:29  Lying BP: --/-- HR: --  Sitting BP: 129/67 HR: 90  Standing BP: --/-- HR: --  Site: --  Mode: --   Opt out

## 2024-10-23 PROCEDURE — 90834 PSYTX W PT 45 MINUTES: CPT

## 2024-10-23 PROCEDURE — 90833 PSYTX W PT W E/M 30 MIN: CPT

## 2024-10-23 PROCEDURE — 99214 OFFICE O/P EST MOD 30 MIN: CPT

## 2024-11-08 PROCEDURE — 90833 PSYTX W PT W E/M 30 MIN: CPT

## 2024-11-08 PROCEDURE — 99214 OFFICE O/P EST MOD 30 MIN: CPT

## 2024-11-13 PROCEDURE — 99214 OFFICE O/P EST MOD 30 MIN: CPT

## 2024-11-13 PROCEDURE — 90833 PSYTX W PT W E/M 30 MIN: CPT

## 2024-11-13 PROCEDURE — 90834 PSYTX W PT 45 MINUTES: CPT

## 2024-11-20 PROCEDURE — 99214 OFFICE O/P EST MOD 30 MIN: CPT

## 2024-11-20 PROCEDURE — 90833 PSYTX W PT W E/M 30 MIN: CPT

## 2024-11-26 NOTE — ED BEHAVIORAL HEALTH ASSESSMENT NOTE - NS ED BHA MSE SPEECH ARTICULATION
Quality 130: Documentation Of Current Medications In The Medical Record: Current Medications Documented
Quality 226: Preventive Care And Screening: Tobacco Use: Screening And Cessation Intervention: Patient screened for tobacco use and is an ex/non-smoker
Detail Level: Detailed
Quality 431: Preventive Care And Screening: Unhealthy Alcohol Use - Screening: Patient not identified as an unhealthy alcohol user when screened for unhealthy alcohol use using a systematic screening method
Normal

## 2024-12-11 PROCEDURE — 90834 PSYTX W PT 45 MINUTES: CPT

## 2024-12-12 ENCOUNTER — TRANSCRIPTION ENCOUNTER (OUTPATIENT)
Age: 74
End: 2024-12-12

## 2024-12-18 PROCEDURE — 90833 PSYTX W PT W E/M 30 MIN: CPT

## 2024-12-18 PROCEDURE — 99214 OFFICE O/P EST MOD 30 MIN: CPT

## 2024-12-27 ENCOUNTER — INPATIENT (INPATIENT)
Facility: HOSPITAL | Age: 74
LOS: 53 days | Discharge: HOME | End: 2025-02-19
Attending: PSYCHIATRY & NEUROLOGY | Admitting: PSYCHIATRY & NEUROLOGY
Payer: MEDICARE

## 2024-12-27 VITALS — WEIGHT: 205.03 LBS | HEIGHT: 62 IN | TEMPERATURE: 98 F

## 2024-12-27 DIAGNOSIS — Z98.890 OTHER SPECIFIED POSTPROCEDURAL STATES: Chronic | ICD-10-CM

## 2024-12-27 DIAGNOSIS — F33.1 MAJOR DEPRESSIVE DISORDER, RECURRENT, MODERATE: ICD-10-CM

## 2024-12-27 LAB
ALBUMIN SERPL ELPH-MCNC: 4.5 G/DL — SIGNIFICANT CHANGE UP (ref 3.3–5)
ALP SERPL-CCNC: 92 U/L — SIGNIFICANT CHANGE UP (ref 40–120)
ALT FLD-CCNC: 20 U/L — SIGNIFICANT CHANGE UP (ref 4–33)
ANION GAP SERPL CALC-SCNC: 14 MMOL/L — SIGNIFICANT CHANGE UP (ref 7–14)
AST SERPL-CCNC: 19 U/L — SIGNIFICANT CHANGE UP (ref 4–32)
BASOPHILS # BLD AUTO: 0.05 K/UL — SIGNIFICANT CHANGE UP (ref 0–0.2)
BASOPHILS NFR BLD AUTO: 0.6 % — SIGNIFICANT CHANGE UP (ref 0–2)
BILIRUB SERPL-MCNC: 0.5 MG/DL — SIGNIFICANT CHANGE UP (ref 0.2–1.2)
BUN SERPL-MCNC: 13 MG/DL — SIGNIFICANT CHANGE UP (ref 7–23)
CALCIUM SERPL-MCNC: 10.1 MG/DL — SIGNIFICANT CHANGE UP (ref 8.4–10.5)
CHLORIDE SERPL-SCNC: 105 MMOL/L — SIGNIFICANT CHANGE UP (ref 98–107)
CO2 SERPL-SCNC: 23 MMOL/L — SIGNIFICANT CHANGE UP (ref 22–31)
CREAT SERPL-MCNC: 0.74 MG/DL — SIGNIFICANT CHANGE UP (ref 0.5–1.3)
EGFR: 84 ML/MIN/1.73M2 — SIGNIFICANT CHANGE UP
EOSINOPHIL # BLD AUTO: 0.2 K/UL — SIGNIFICANT CHANGE UP (ref 0–0.5)
EOSINOPHIL NFR BLD AUTO: 2.2 % — SIGNIFICANT CHANGE UP (ref 0–6)
FLUAV AG NPH QL: SIGNIFICANT CHANGE UP
FLUBV AG NPH QL: SIGNIFICANT CHANGE UP
GLUCOSE SERPL-MCNC: 123 MG/DL — HIGH (ref 70–99)
HCT VFR BLD CALC: 46.8 % — HIGH (ref 34.5–45)
HGB BLD-MCNC: 15.2 G/DL — SIGNIFICANT CHANGE UP (ref 11.5–15.5)
IANC: 6.05 K/UL — SIGNIFICANT CHANGE UP (ref 1.8–7.4)
IMM GRANULOCYTES NFR BLD AUTO: 0.4 % — SIGNIFICANT CHANGE UP (ref 0–0.9)
LYMPHOCYTES # BLD AUTO: 2.29 K/UL — SIGNIFICANT CHANGE UP (ref 1–3.3)
LYMPHOCYTES # BLD AUTO: 25.4 % — SIGNIFICANT CHANGE UP (ref 13–44)
MCHC RBC-ENTMCNC: 28.1 PG — SIGNIFICANT CHANGE UP (ref 27–34)
MCHC RBC-ENTMCNC: 32.5 G/DL — SIGNIFICANT CHANGE UP (ref 32–36)
MCV RBC AUTO: 86.7 FL — SIGNIFICANT CHANGE UP (ref 80–100)
MONOCYTES # BLD AUTO: 0.38 K/UL — SIGNIFICANT CHANGE UP (ref 0–0.9)
MONOCYTES NFR BLD AUTO: 4.2 % — SIGNIFICANT CHANGE UP (ref 2–14)
NEUTROPHILS # BLD AUTO: 6.05 K/UL — SIGNIFICANT CHANGE UP (ref 1.8–7.4)
NEUTROPHILS NFR BLD AUTO: 67.2 % — SIGNIFICANT CHANGE UP (ref 43–77)
NRBC # BLD: 0 /100 WBCS — SIGNIFICANT CHANGE UP (ref 0–0)
NRBC # FLD: 0 K/UL — SIGNIFICANT CHANGE UP (ref 0–0)
PLATELET # BLD AUTO: 282 K/UL — SIGNIFICANT CHANGE UP (ref 150–400)
POTASSIUM SERPL-MCNC: 4 MMOL/L — SIGNIFICANT CHANGE UP (ref 3.5–5.3)
POTASSIUM SERPL-SCNC: 4 MMOL/L — SIGNIFICANT CHANGE UP (ref 3.5–5.3)
PROT SERPL-MCNC: 7.2 G/DL — SIGNIFICANT CHANGE UP (ref 6–8.3)
RBC # BLD: 5.4 M/UL — HIGH (ref 3.8–5.2)
RBC # FLD: 12.1 % — SIGNIFICANT CHANGE UP (ref 10.3–14.5)
RSV RNA NPH QL NAA+NON-PROBE: SIGNIFICANT CHANGE UP
SARS-COV-2 RNA SPEC QL NAA+PROBE: SIGNIFICANT CHANGE UP
SODIUM SERPL-SCNC: 142 MMOL/L — SIGNIFICANT CHANGE UP (ref 135–145)
TSH SERPL-MCNC: 1.74 UIU/ML — SIGNIFICANT CHANGE UP (ref 0.27–4.2)
WBC # BLD: 9.01 K/UL — SIGNIFICANT CHANGE UP (ref 3.8–10.5)
WBC # FLD AUTO: 9.01 K/UL — SIGNIFICANT CHANGE UP (ref 3.8–10.5)

## 2024-12-27 PROCEDURE — 99223 1ST HOSP IP/OBS HIGH 75: CPT

## 2024-12-27 RX ORDER — CLONAZEPAM 0.5 MG/1
0.5 TABLET ORAL
Refills: 0 | Status: DISCONTINUED | OUTPATIENT
Start: 2024-12-27 | End: 2024-12-31

## 2024-12-27 RX ORDER — GABAPENTIN 400 MG/1
300 CAPSULE ORAL THREE TIMES A DAY
Refills: 0 | Status: DISCONTINUED | OUTPATIENT
Start: 2024-12-27 | End: 2025-01-02

## 2024-12-27 RX ORDER — MIRTAZAPINE 30 MG/1
22.5 TABLET, FILM COATED ORAL AT BEDTIME
Refills: 0 | Status: DISCONTINUED | OUTPATIENT
Start: 2024-12-27 | End: 2025-01-08

## 2024-12-27 RX ORDER — METOPROLOL SUCCINATE 50 MG/1
50 TABLET, EXTENDED RELEASE ORAL
Refills: 0 | Status: DISCONTINUED | OUTPATIENT
Start: 2024-12-27 | End: 2024-12-27

## 2024-12-27 RX ORDER — BEMPEDOIC ACID 180 MG/1
1 TABLET, FILM COATED ORAL
Qty: 30 | Refills: 0
Start: 2024-12-27 | End: 2025-01-25

## 2024-12-27 RX ORDER — GABAPENTIN 400 MG/1
100 CAPSULE ORAL THREE TIMES A DAY
Refills: 0 | Status: DISCONTINUED | OUTPATIENT
Start: 2024-12-27 | End: 2025-01-03

## 2024-12-27 RX ORDER — NORTRIPTYLINE HCL 75 MG
25 CAPSULE ORAL AT BEDTIME
Refills: 0 | Status: DISCONTINUED | OUTPATIENT
Start: 2024-12-27 | End: 2025-01-03

## 2024-12-27 RX ORDER — CLONAZEPAM 0.5 MG/1
0.5 TABLET ORAL THREE TIMES A DAY
Refills: 0 | Status: DISCONTINUED | OUTPATIENT
Start: 2024-12-27 | End: 2024-12-27

## 2024-12-27 RX ORDER — ONDANSETRON HCL/PF 4 MG/2 ML
4 VIAL (ML) INJECTION THREE TIMES A DAY
Refills: 0 | Status: DISCONTINUED | OUTPATIENT
Start: 2024-12-27 | End: 2024-12-27

## 2024-12-27 RX ORDER — ONDANSETRON HCL/PF 4 MG/2 ML
4 VIAL (ML) INJECTION THREE TIMES A DAY
Refills: 0 | Status: DISCONTINUED | OUTPATIENT
Start: 2024-12-27 | End: 2025-01-07

## 2024-12-27 RX ORDER — OLANZAPINE 10 MG/1
2.5 TABLET ORAL THREE TIMES A DAY
Refills: 0 | Status: DISCONTINUED | OUTPATIENT
Start: 2024-12-27 | End: 2025-01-06

## 2024-12-27 RX ORDER — METOPROLOL SUCCINATE 50 MG/1
50 TABLET, EXTENDED RELEASE ORAL DAILY
Refills: 0 | Status: DISCONTINUED | OUTPATIENT
Start: 2024-12-27 | End: 2024-12-30

## 2024-12-27 RX ORDER — MELATONIN 5 MG
1 TABLET ORAL AT BEDTIME
Refills: 0 | Status: DISCONTINUED | OUTPATIENT
Start: 2024-12-27 | End: 2025-02-19

## 2024-12-27 RX ADMIN — GABAPENTIN 100 MILLIGRAM(S): 400 CAPSULE ORAL at 17:19

## 2024-12-27 RX ADMIN — MIRTAZAPINE 22.5 MILLIGRAM(S): 30 TABLET, FILM COATED ORAL at 21:04

## 2024-12-27 RX ADMIN — GABAPENTIN 100 MILLIGRAM(S): 400 CAPSULE ORAL at 21:04

## 2024-12-27 RX ADMIN — Medication 25 MILLIGRAM(S): at 21:04

## 2024-12-27 RX ADMIN — Medication 1 MILLIGRAM(S): at 21:04

## 2024-12-27 RX ADMIN — CLONAZEPAM 0.5 MILLIGRAM(S): 0.5 TABLET ORAL at 21:06

## 2024-12-27 NOTE — BH INPATIENT PSYCHIATRY ASSESSMENT NOTE - CURRENT MEDICATION
MEDICATIONS  (STANDING):    MEDICATIONS  (PRN):   MEDICATIONS  (STANDING):  clonazePAM  Tablet 0.5 milliGRAM(s) Oral three times a day  gabapentin 100 milliGRAM(s) Oral two times a day  metoprolol succinate ER 50 milliGRAM(s) Oral two times a day  mirtazapine 22.5 milliGRAM(s) Oral at bedtime  nortriptyline 25 milliGRAM(s) Oral at bedtime  pantoprazole    Tablet 40 milliGRAM(s) Oral before breakfast    MEDICATIONS  (PRN):  gabapentin 100 milliGRAM(s) Oral three times a day PRN anxiety  OLANZapine 2.5 milliGRAM(s) Oral three times a day PRN Agitation

## 2024-12-27 NOTE — CHART NOTE - NSCHARTNOTEFT_GEN_A_CORE
Screening Medical Evaluation    Patient Admitted from: [facility]    [facility] admitting diagnosis: Moderate episode of recurrent major depressive disorder      PAST MEDICAL & SURGICAL HISTORY:  Anxiety      High cholesterol      HTN (hypertension)      Endometriosis      GERD (gastroesophageal reflux disease)      Nausea      History of bilateral breast reduction surgery            Allergies    penicillins (Anaphylaxis)  sulfa drugs (Anaphylaxis)    Intolerances          Social History:       FAMILY HISTORY:  Family history of TIAs (Aunt)          MEDICATIONS  (STANDING):  clonazePAM  Tablet 0.5 milliGRAM(s) Oral <User Schedule>  gabapentin 100 milliGRAM(s) Oral two times a day  melatonin. 1 milliGRAM(s) Oral at bedtime  metoprolol succinate ER 50 milliGRAM(s) Oral two times a day  mirtazapine 22.5 milliGRAM(s) Oral at bedtime  nortriptyline 25 milliGRAM(s) Oral at bedtime  pantoprazole    Tablet 40 milliGRAM(s) Oral before breakfast    MEDICATIONS  (PRN):  gabapentin 100 milliGRAM(s) Oral three times a day PRN anxiety  OLANZapine 2.5 milliGRAM(s) Oral three times a day PRN Agitation  ondansetron    Tablet 4 milliGRAM(s) Oral three times a day PRN Nausea        Vital Signs Last 24 Hrs  T(C): 36.7 (27 Dec 2024 15:12), Max: 36.7 (27 Dec 2024 15:12)  T(F): 98 (27 Dec 2024 15:12), Max: 98 (27 Dec 2024 15:12)  HR: -- 90b/ min   BP: -- 166/ 92  RR: -- 18b/ min         CAPILLARY BLOOD GLUCOSE            PHYSICAL EXAM:  GENERAL: NAD.   HEAD:  Atraumatic, Normocephalic  EYES: EOMI, PERRLA, conjunctiva and sclera clear  NECK: Supple, No JVD  CHEST/LUNG: Clear to auscultation bilaterally; No wheeze  HEART: Regular rate and rhythm; No murmurs, rubs, or gallops  ABDOMEN: Positive BS, Soft, Nontender.   EXTREMITIES:  2+ Peripheral Pulses, No clubbing, cyanosis, or edema  PSYCH: Calm and cooperative   NEUROLOGY: non-focal  SKIN: No rashes or lesions    LABS:                        15.2   9.01  )-----------( 282      ( 27 Dec 2024 12:15 )             46.8     12-27    142  |  105  |  13  ----------------------------<  123[H]  4.0   |  23  |  0.74    Ca    10.1      27 Dec 2024 12:15    TPro  7.2  /  Alb  4.5  /  TBili  0.5  /  DBili  x   /  AST  19  /  ALT  20  /  AlkPhos  92  12-27          Urinalysis Basic - ( 27 Dec 2024 12:15 )    Color: x / Appearance: x / SG: x / pH: x  Gluc: 123 mg/dL / Ketone: x  / Bili: x / Urobili: x   Blood: x / Protein: x / Nitrite: x   Leuk Esterase: x / RBC: x / WBC x   Sq Epi: x / Non Sq Epi: x / Bacteria: x        RADIOLOGY & ADDITIONAL TESTS:      Assessment and Plan:  74F admitted to [facility] for Moderate episode of recurrent major depressive disorder.  PMHx of HTN, GERD. Pt seen for medical screening evaluation. Patient has no acute complaints at this time. Patient denies fever, chills, headache, dizziness, lightheadedness, N/V, SOB, cough, congestion, chest pain, abdominal pain, dysuria, hematuria, diarrhea, constipation. Physical exam unremarkable, VSS. Labs above. EKG: Pending.     1.) HTN: / 92, Elevated likely due to whitecoat HTN. DASH diet, Continue BB BID with parameters.    2.) GERD: Continue PPI   3.) Moderate episode of recurrent major depressive disorder: F/U EKG to assess QT/ QTC, Plan per primary team.

## 2024-12-27 NOTE — BH INPATIENT PSYCHIATRY ASSESSMENT NOTE - NSBHMETABOLIC_PSY_ALL_CORE_FT
BMI: BMI (kg/m2): 37.5 (12-27-24 @ 15:12)  HbA1c: A1C with Estimated Average Glucose Result: 5.8 % (01-11-24 @ 08:00)    Glucose:   BP: --Vital Signs Last 24 Hrs  T(C): 36.7 (12-27-24 @ 15:12), Max: 36.7 (12-27-24 @ 15:12)  T(F): 98 (12-27-24 @ 15:12), Max: 98 (12-27-24 @ 15:12)  HR: --  BP: --  BP(mean): --  RR: --  SpO2: --    Orthostatic VS  12-27-24 @ 15:12  Lying BP: --/-- HR: --  Sitting BP: 166/92 HR: 90  Standing BP: 166/99 HR: 90  Site: --  Mode: --    Lipid Panel: Date/Time: 04-05-24 @ 05:56  Cholesterol, Serum: 176  LDL Cholesterol Calculated: 97  HDL Cholesterol, Serum: 39  Total Cholesterol/HDL Ration Measurement: --  Triglycerides, Serum: 200

## 2024-12-27 NOTE — BH INPATIENT PSYCHIATRY ASSESSMENT NOTE - NSBHCHARTREVIEWVS_PSY_A_CORE FT
Vital Signs Last 24 Hrs  T(C): 36.7 (12-27-24 @ 15:12), Max: 36.7 (12-27-24 @ 15:12)  T(F): 98 (12-27-24 @ 15:12), Max: 98 (12-27-24 @ 15:12)  HR: --  BP: --  BP(mean): --  RR: --  SpO2: --    Orthostatic VS  12-27-24 @ 15:12  Lying BP: --/-- HR: --  Sitting BP: 166/92 HR: 90  Standing BP: 166/99 HR: 90  Site: --  Mode: --

## 2024-12-27 NOTE — BH PATIENT PROFILE - HOME MEDICATIONS
Nexletol 180 mg oral tablet , 1 tab(s) orally once a day (at bedtime)  metoprolol tartrate 50 mg oral tablet , 1 tab(s) orally 2 times a day  melatonin 3 mg oral tablet , 2 tab(s) orally once a day (at bedtime)  polyethylene glycol 3350 oral powder for reconstitution , 17 gram(s) orally once a day as needed for  constipation  senna leaf extract oral tablet , 2 tab(s) orally once a day (at bedtime)  clonazePAM 0.5 mg oral tablet , 0.5 tab(s) orally 3 times a day MDD: 0.75mg  atorvastatin 10 mg oral tablet , 1 tab(s) orally once a day (at bedtime)  nortriptyline 50 mg oral capsule , 2 cap(s) orally once a day (at bedtime)  mirtazapine 7.5 mg oral tablet , 1 tab(s) orally once a day (at bedtime)  gabapentin 100 mg oral capsule , 2 cap(s) orally 3 times a day as needed for  anxiety  pantoprazole 40 mg oral delayed release tablet , 1 tab(s) orally once a day  Multiple Vitamins oral tablet , 1 tab(s) orally once a day  ocular lubricant ophthalmic solution , 1 drop(s) to each affected eye every 8 hours As needed dry eye

## 2024-12-27 NOTE — BH PATIENT PROFILE - FALL HARM RISK - UNIVERSAL INTERVENTIONS
Bed in lowest position, wheels locked, appropriate side rails in place/Call bell, personal items and telephone in reach/Instruct patient to call for assistance before getting out of bed or chair/Non-slip footwear when patient is out of bed/[place] to call system/Physically safe environment - no spills, clutter or unnecessary equipment/Purposeful Proactive Rounding/Room/bathroom lighting operational, light cord in reach

## 2024-12-27 NOTE — BH INPATIENT PSYCHIATRY ASSESSMENT NOTE - HPI (INCLUDE ILLNESS QUALITY, SEVERITY, DURATION, TIMING, CONTEXT, MODIFYING FACTORS, ASSOCIATED SIGNS AND SYMPTOMS)
Ms. Gross is a 73yo female, , retired (), domiciled at home with , prior psychiatric diagnosis of ANSON and MDD, 6 previous inpatient psychiatric hospitalizations at [facility] ,( [facility] from 3/28/23-4/29/23, and more recent admission to [facility] from 5/10-6/12/23 for worsening of anxiety, depression and passive SI) and last admission to [facility] from 4/16-5/31/24 for worsening of anxiety and depression. Pt. has a prior SA ( Oding on pills) in 11/2019 by overdosing on pills, no history of NSSIB, no history of violence/aggression, no hx of legal issues, hx of marijuana use, PMHx of HTN, hyperlipidemia. Pt. seen today for follow up.    Patient was refer form the psychiatric geriatric clinic as the patient reported increase depression and anxiety accompanied with passive SI. Patient is voluntary. Patient was seen and evaluated in the group room by her self. Patient presented as very anxious, forgetful and a poor historian. She repeated the same questions over and over. Patient was not oriented to date as she said that it was 02/28 and said that the next president will be Priyank Gomez. she was able to remember once I told her that this information was not accurate. Patient said that her memory is not good and she has been more anxious to the point that she is not able to function. she wakes up in the morning with a lot of anxiety and she doesn't know what to do. Patient has been taking more Klonopin and this is not helping. She tried Gabapentin 300mg tid and this made her more anxious. She is not able to tolerate the Nortriptyline and wants to be off this medication. She reports has tremors, anxiety, increase ruminations and has problems sleeping. She denies symptoms of psychosis, denies SI at the moment of the assessment and denies any safety concerns. Patient said that she has been complaint with the her medications and the list was verify with the pharmacy  [facility] . Patient is on:  Klonopin 0.5mg BID and 0.25mg in the AM and noon  Nexletol 180mg at bedtime   Pantoprazole 40mg am   Metoprolol 50mg bid   Gabapentin 300mg tid  Remeron 22.5mg bedtime   Nortryptiline 50mg bedtime   Repatha IM 140mg every 2 weeks

## 2024-12-27 NOTE — BH INPATIENT PSYCHIATRY ASSESSMENT NOTE - NSBHASSESSSUMMFT_PSY_ALL_CORE
Ms. Gross is a 73yo female, , retired (), domiciled at home with , prior psychiatric diagnosis of ANSON and MDD, 6 previous inpatient psychiatric hospitalizations at [facility] ,( [facility] from 3/28/23-4/29/23, and more recent admission to [facility] from 5/10-6/12/23 for worsening of anxiety, depression and passive SI) and last admission to [facility] from 4/16-5/31/24 for worsening of anxiety and depression. Pt. has a prior SA ( Oding on pills) in 11/2019 by overdosing on pills, no history of NSSIB, no history of violence/aggression, no hx of legal issues, hx of marijuana use, PMHx of HTN, hyperlipidemia. Patient was admitted to psychiatry due to increase anxiety, reporting SI with not plans in the clinic. All of this in the context of medication adjustment and psychosocial problems.     Plan:  1. Admit to the unit as a voluntary patient.  2. Medications:      Decrease Nortriptyline to 25mg at bedtime as patient is reporting side effects       Change Klonopin to 0.5mg tid      Change Gabapentin 100mg BID as patietn reported feeling sedated with higher doses       Remeron 22.5mg at bedtime       Pantoprazole 40mg daily       Metoprolol 50mg bid       Nexletol 180mg at bedtime       Repatha IM 140mg every 2 weeks (we will need to verify when the patient took this)  3. Blood work to be order   4. Patient to be evaluated by the PA and PT to evaluate the patient  5. SW to coordinate a safe discharge plan         Ms. Gross is a 75yo female, , retired (), domiciled at home with , prior psychiatric diagnosis of ANSON and MDD, 6 previous inpatient psychiatric hospitalizations at [facility] ,( [facility] from 3/28/23-4/29/23, and more recent admission to [facility] from 5/10-6/12/23 for worsening of anxiety, depression and passive SI) and last admission to [facility] from 4/16-5/31/24 for worsening of anxiety and depression. Pt. has a prior SA ( Oding on pills) in 11/2019 by overdosing on pills, no history of NSSIB, no history of violence/aggression, no hx of legal issues, hx of marijuana use, PMHx of HTN, hyperlipidemia. Patient was admitted to psychiatry due to increase anxiety, reporting SI with not plans in the clinic. All of this in the context of medication adjustment and psychosocial problems.     Plan:  1. Admit to the unit as a voluntary patient.  2. Medications:      Decrease Nortriptyline to 25mg at bedtime as patient is reporting side effects       Change Klonopin to 0.5mg tid      Change Gabapentin 100mg BID as patient reported feeling sedated with higher doses. We are also adding 100mg as needed for anxiety       Remeron 22.5mg at bedtime       Pantoprazole 40mg daily       Metoprolol 50mg bid       Nexletol 180mg at bedtime       Repatha IM 140mg every 2 weeks (we will need to verify when the patient took this)  3. Blood work to be order   4. Patient to be evaluated by the PA and PT to evaluate the patient  5. SW to coordinate a safe discharge plan

## 2024-12-27 NOTE — BH PATIENT PROFILE - FALL HARM RISK - FALLEN IN PAST
HISTORY  Tiffanie Spivey is a 27 year old female.   The patient presents for a physical exam.    She is currently 10 weeks pregnant and has been experiencing nausea and fatigue, which is different from her first pregnancy. She is taking prenatal vitamins and occasionally uses vitamin B6 and a sleep aid. She plans to breastfeed. Her next OB appointment is scheduled for Thursday.    She has been experiencing acne since the birth of her daughter. Despite trying various face washes, including organic ones, she has not seen any improvement. She has also tried different birth control methods. Her diet includes a significant amount of dairy products such as chocolate milk, yogurt, cottage cheese, and cheese.    She engages in physical activities like volleyball once a week and uses a stair master three times a week.    She was informed of a cyst but was told it was not a cause for concern.    She reports no issues with constipation.    She has a prescription for Adderall, which she is not currently taking, but she has a full bottle at home. She has been using it more frequently due to increased work stress.    MEDICAL HISTORY  Past Medical History:   Diagnosis Date    ADHD     Asthma, exercise induced (CMD)     since childhood    Breast disorder     COVID 2021 2023 and 1/2024    Depressive disorder     H/O LEEP 2023    History of colposcopy 2021    HPV in female     LGSIL on Pap smear of cervix 2021    and 2022    RAD (reactive airway disease) (CMD)     EXERCISE INDUCED    Seasonal allergies     STD (sexually transmitted disease)     chlamydia - 03/2019    Vitamin D insufficiency 02/22/2021       MEDICATIONS  Current Outpatient Medications   Medication Sig    Prenatal Vit-Fe Fumarate-FA (PRENATAL VITAMINS PO)     VITAMIN D PO Take by mouth as needed. Patient unsure of dosage and takes when remembers.     No current facility-administered medications for this visit.       ALLERGIES  ALLERGIES:   Allergen Reactions     Seasonal        FAMILY HISTORY  Pertinent family history has been updated and reviewed    SOCIAL HISTORY  Has been updated and reviewed    GYNOCOLOGIC HISTORY  Has been updated and reviewed,     REVIEW OF SYSTEMS  Constitutional:  Patient denies fever, chills, tiredness or malaise.    Eyes:  Denies change in visual acuity, pain, burning or itching.    Immunologic:  Denies hives, seasonal allergies.    HENT:  Denies sinus problems, ear infections, nasal congestion or sore throat.    Respiratory:  Denies cough, shortness of breath.    Cardiovascular:  Denies chest pain, edema.    GI:  Denies abdominal pain, nausea, vomiting, bloody stools or diarrhea.    :  Denies urine retention, painful urination, urinary frequency, blood in urine or nocturia.    Musculoskeletal:  Denies back pain, neck pain, joint pain or leg swelling.    Integument:  Denies rash, itching.    Neurologic:  Denies headache, focal weakness or sensory changes.    Endocrine:  Denies polyuria, polydipsia or temperature intolerance.    Lymphatic:  Denies swollen glands, weight loss.    All other systems reviewed and negative      PHYSICAL EXAM  Vital Signs:    Vitals:    24 1049   BP: 92/56   Pulse: 74   SpO2: 98%   Weight: 88.9 kg (195 lb 14.4 oz)   Height: 5' 9\" (1.753 m)   LMP: 2024     Constitutional:  Tiffanie is a 27 year old  year old who is pleasant and in no acute distress.  Integument:  Skin is warm. Dry. No erythema. No rash.    Nails: Without clubbing  HENT:  Normocephalic. Atraumatic with facial symmetry. Bilateral tympanic membranes with pearly gray light reflex. Oropharynx moist and pink with no lesions.   Nose : mucosa is pink and moist  Neck: Neck is supple with normal range of motion. No tenderness. No Bruits.  Eyes:  PERRLA, EOM Intact. Conjunctivae pink. No discharge.    Cardiovascular:  Normal heart rate and rhythm. No murmurs, rubs or gallops appreciated.  Respiratory:  Normal breath sounds throughtout the anterior  and posterior lobes. No respiratory distress. No wheezing, rhonchi or rales.  GI:  Bowel sounds normal. Soft. No tenderness. No masses palpable.   Neurologic:  Alert & oriented x 3. Normal motor function. Normal sensory function. No focal deficits noted.        ASSESSMENT/PLAN  Tiffanie was seen today for physical.    Diagnoses and all orders for this visit:    Physical exam, routine  -     Glycohemoglobin; Future  -     Comprehensive Metabolic Panel; Future  -     CBC with Automated Differential; Future  -     Lipid Panel With Reflex; Future  -     Thyroid Stimulating Hormone; Future  -     Urinalysis & Reflex Microscopy With Culture If Indicated    Vitamin D deficiency disease  -     Vitamin D -25 Hydroxy; Future      1. Acne.  The patient reports persistent acne since having her daughter, unresponsive to various treatments including organic face wash, baby soap, CeraVe, Aczone, and tretinoin. Spironolactone was discussed as a potential treatment option for adult acne, but it cannot be used during pregnancy. She is currently 10 weeks pregnant and will consider spironolactone after pregnancy and breastfeeding.    2. Pregnancy.  She is currently 10 weeks pregnant with her second child and experiencing different symptoms compared to her first pregnancy, including nausea and fatigue. Her blood work from October 2024 showed normal sodium, potassium, kidney function, liver function, white blood cell count, hemoglobin, and hematocrit. The size of her red blood cells was slightly reduced, likely due to pregnancy. Her A1c was slightly low, indicating a need for increased protein intake. Thyroid and progestin levels were normal. She tested positive for Rh factor. Tests for hepatitis C, HIV, and STDs were all negative. She was advised to continue taking her prenatal vitamins and to add vitamin D 2000 IU daily to her regimen.    3. Medication Management.  She mentioned having her Adderall prescription filled but is not taking it  during pregnancy. She will need to sign a consent form when she resumes taking Adderall after pregnancy.    Follow-up  Return in November 2025 for follow-up.    Encourage 1500 mg of calcium daily  Encourage 60 minutes of exercise daily  Discussed side effects of medications, patient verbalized good understanding  Review Flowsheet  More data exists         11/18/2024   PHQ 2/9 Score   Adult PHQ 2 Score 0   Adult PHQ 2 Interpretation No further screening needed   Little interest or pleasure in activity? Not at all   Feeling down, depressed or hopeless? Not at all      Details                   DEPRESSION ASSESSMENT/PLAN  Depression screening is negative no further plan needed.     No

## 2024-12-28 LAB
APPEARANCE UR: ABNORMAL
BACTERIA # UR AUTO: ABNORMAL /HPF
BILIRUB UR-MCNC: NEGATIVE — SIGNIFICANT CHANGE UP
COLOR SPEC: YELLOW — SIGNIFICANT CHANGE UP
DIFF PNL FLD: ABNORMAL
GLUCOSE UR QL: NEGATIVE MG/DL — SIGNIFICANT CHANGE UP
KETONES UR-MCNC: NEGATIVE MG/DL — SIGNIFICANT CHANGE UP
LEUKOCYTE ESTERASE UR-ACNC: ABNORMAL
NITRITE UR-MCNC: NEGATIVE — SIGNIFICANT CHANGE UP
PH UR: 5.5 — SIGNIFICANT CHANGE UP (ref 5–8)
PROT UR-MCNC: NEGATIVE MG/DL — SIGNIFICANT CHANGE UP
RBC CASTS # UR COMP ASSIST: 2 /HPF — SIGNIFICANT CHANGE UP (ref 0–4)
REVIEW: SIGNIFICANT CHANGE UP
SP GR SPEC: 1.02 — SIGNIFICANT CHANGE UP (ref 1–1.03)
SQUAMOUS # UR AUTO: 30 /HPF — HIGH (ref 0–5)
UROBILINOGEN FLD QL: 0.2 MG/DL — SIGNIFICANT CHANGE UP (ref 0.2–1)
WBC UR QL: 15 /HPF — HIGH (ref 0–5)

## 2024-12-28 PROCEDURE — 99232 SBSQ HOSP IP/OBS MODERATE 35: CPT

## 2024-12-28 PROCEDURE — 93010 ELECTROCARDIOGRAM REPORT: CPT

## 2024-12-28 RX ADMIN — GABAPENTIN 100 MILLIGRAM(S): 400 CAPSULE ORAL at 20:41

## 2024-12-28 RX ADMIN — CLONAZEPAM 0.5 MILLIGRAM(S): 0.5 TABLET ORAL at 12:08

## 2024-12-28 RX ADMIN — METOPROLOL SUCCINATE 50 MILLIGRAM(S): 50 TABLET, EXTENDED RELEASE ORAL at 08:43

## 2024-12-28 RX ADMIN — Medication 25 MILLIGRAM(S): at 20:41

## 2024-12-28 RX ADMIN — Medication 1 MILLIGRAM(S): at 20:41

## 2024-12-28 RX ADMIN — MIRTAZAPINE 22.5 MILLIGRAM(S): 30 TABLET, FILM COATED ORAL at 20:40

## 2024-12-28 RX ADMIN — CLONAZEPAM 0.5 MILLIGRAM(S): 0.5 TABLET ORAL at 20:41

## 2024-12-28 RX ADMIN — Medication 40 MILLIGRAM(S): at 08:42

## 2024-12-28 RX ADMIN — CLONAZEPAM 0.5 MILLIGRAM(S): 0.5 TABLET ORAL at 06:19

## 2024-12-28 RX ADMIN — GABAPENTIN 100 MILLIGRAM(S): 400 CAPSULE ORAL at 08:42

## 2024-12-28 NOTE — BH INPATIENT PSYCHIATRY PROGRESS NOTE - NSBHCHARTREVIEWVS_PSY_A_CORE FT
Vital Signs Last 24 Hrs  T(C): 36.7 (12-28-24 @ 08:01), Max: 36.7 (12-28-24 @ 08:01)  T(F): 98.1 (12-28-24 @ 08:01), Max: 98.1 (12-28-24 @ 08:01)  HR: --  BP: --  BP(mean): --  RR: 18 (12-28-24 @ 08:01) (18 - 18)  SpO2: --    Orthostatic VS  12-28-24 @ 08:01  Lying BP: --/-- HR: --  Sitting BP: 149/84 HR: 97  Standing BP: 137/85 HR: 98  Site: --  Mode: --  Orthostatic VS  12-27-24 @ 15:12  Lying BP: --/-- HR: --  Sitting BP: 166/92 HR: 90  Standing BP: 166/99 HR: 90  Site: --  Mode: --

## 2024-12-28 NOTE — PSYCHIATRIC REHAB INITIAL EVALUATION - NSBHSIGPRIORMED_PSY_ALL_CORE
Per the pt's chart, she has HTN and hyperlipidemia./Yes (Specify) 100% of the time/able to follow multistep instructions

## 2024-12-28 NOTE — BH INPATIENT PSYCHIATRY PROGRESS NOTE - CURRENT MEDICATION
MEDICATIONS  (STANDING):  clonazePAM  Tablet 0.5 milliGRAM(s) Oral <User Schedule>  gabapentin 100 milliGRAM(s) Oral two times a day  melatonin. 1 milliGRAM(s) Oral at bedtime  metoprolol succinate ER 50 milliGRAM(s) Oral daily  mirtazapine 22.5 milliGRAM(s) Oral at bedtime  nortriptyline 25 milliGRAM(s) Oral at bedtime  pantoprazole    Tablet 40 milliGRAM(s) Oral before breakfast    MEDICATIONS  (PRN):  gabapentin 100 milliGRAM(s) Oral three times a day PRN anxiety  OLANZapine 2.5 milliGRAM(s) Oral three times a day PRN Agitation  ondansetron    Tablet 4 milliGRAM(s) Oral three times a day PRN Nausea

## 2024-12-28 NOTE — BH INPATIENT PSYCHIATRY PROGRESS NOTE - PRN MEDS
MEDICATIONS  (PRN):  gabapentin 100 milliGRAM(s) Oral three times a day PRN anxiety  OLANZapine 2.5 milliGRAM(s) Oral three times a day PRN Agitation  ondansetron    Tablet 4 milliGRAM(s) Oral three times a day PRN Nausea

## 2024-12-28 NOTE — BH INPATIENT PSYCHIATRY PROGRESS NOTE - NSBHASSESSSUMMFT_PSY_ALL_CORE
Ms. Gross is a 75yo female, , retired (), domiciled at home with , prior psychiatric diagnosis of ANSON and MDD, 6 previous inpatient psychiatric hospitalizations at [facility] ,( [facility] from 3/28/23-4/29/23, and more recent admission to [facility] from 5/10-6/12/23 for worsening of anxiety, depression and passive SI) and last admission to [facility] from 4/16-5/31/24 for worsening of anxiety and depression. Pt. has a prior SA ( Oding on pills) in 11/2019 by overdosing on pills, no history of NSSIB, no history of violence/aggression, no hx of legal issues, hx of marijuana use, PMHx of HTN, hyperlipidemia. Patient was admitted to psychiatry due to increase anxiety, reporting SI with not plans in the clinic. All of this in the context of medication adjustment and psychosocial problems.     On assessment, patient endorses anxiety and depression, denies any SI.    Plan:  1. Admit to the unit as a voluntary patient.  2. Medications:      Decrease Nortriptyline to 25mg at bedtime as patient is reporting side effects       Change Klonopin to 0.5mg tid      Change Gabapentin 100mg BID as patient reported feeling sedated with higher doses. We are also adding 100mg as needed for anxiety       Remeron 22.5mg at bedtime       Pantoprazole 40mg daily       Metoprolol 50mg bid       Nexletol 180mg at bedtime       Repatha IM 140mg every 2 weeks (we will need to verify when the patient took this)  3. Blood work to be order   4. Patient to be evaluated by the PA and PT to evaluate the patient  5. SW to coordinate a safe discharge plan

## 2024-12-28 NOTE — BH INPATIENT PSYCHIATRY PROGRESS NOTE - NSBHMETABOLIC_PSY_ALL_CORE_FT
BMI: BMI (kg/m2): 37.5 (12-27-24 @ 15:12)  HbA1c: A1C with Estimated Average Glucose Result: 5.8 % (01-11-24 @ 08:00)    Glucose:   BP: --Vital Signs Last 24 Hrs  T(C): 36.7 (12-28-24 @ 08:01), Max: 36.7 (12-28-24 @ 08:01)  T(F): 98.1 (12-28-24 @ 08:01), Max: 98.1 (12-28-24 @ 08:01)  HR: --  BP: --  BP(mean): --  RR: 18 (12-28-24 @ 08:01) (18 - 18)  SpO2: --    Orthostatic VS  12-28-24 @ 08:01  Lying BP: --/-- HR: --  Sitting BP: 149/84 HR: 97  Standing BP: 137/85 HR: 98  Site: --  Mode: --  Orthostatic VS  12-27-24 @ 15:12  Lying BP: --/-- HR: --  Sitting BP: 166/92 HR: 90  Standing BP: 166/99 HR: 90  Site: --  Mode: --    Lipid Panel: Date/Time: 04-05-24 @ 05:56  Cholesterol, Serum: 176  LDL Cholesterol Calculated: 97  HDL Cholesterol, Serum: 39  Total Cholesterol/HDL Ration Measurement: --  Triglycerides, Serum: 200

## 2024-12-28 NOTE — BH INPATIENT PSYCHIATRY PROGRESS NOTE - NSBHFUPINTERVALHXFT_PSY_A_CORE
Chart reviewed and case discussed with treatment team. No events reported overnight. Sleep and appetite is fair. Patient reports feeling "very depressed" and denies any SI, stated that she has her  to live for. Patient reports worsening anxiety at times, especially upon waking.  Patient is compliant with medications, no adverse effects reported.

## 2024-12-28 NOTE — PSYCHIATRIC REHAB INITIAL EVALUATION - NSBHPRRECOMMEND_PSY_ALL_CORE
The patient engaged appropriately with the writer during the initial session. The patient was able to endure the orienting session, oriented to the unit 2W, as well as the role/ function of  and Inpatient Psychiatric rehabilitation programming. Per the patient’s chart, the patient was referred by her psychiatric geriatric clinic as she reported increased depression and anxiety accompanied with passive SI. Writer identified an apt goal for the patient defined in the  plan of care. The patient’s Psychiatric Rehabilitation goal is to identify and practice 3 coping skills to manage anxiety for her anxiety/panic/fear goal. Psychiatric Rehabilitation staff will meet with patient weekly to review progress towards identified goal.

## 2024-12-29 PROCEDURE — 99232 SBSQ HOSP IP/OBS MODERATE 35: CPT

## 2024-12-29 RX ORDER — ACETAMINOPHEN 500 MG/5ML
650 LIQUID (ML) ORAL EVERY 6 HOURS
Refills: 0 | Status: DISCONTINUED | OUTPATIENT
Start: 2024-12-29 | End: 2025-02-19

## 2024-12-29 RX ADMIN — GABAPENTIN 100 MILLIGRAM(S): 400 CAPSULE ORAL at 20:28

## 2024-12-29 RX ADMIN — Medication 4 MILLIGRAM(S): at 09:22

## 2024-12-29 RX ADMIN — CLONAZEPAM 0.5 MILLIGRAM(S): 0.5 TABLET ORAL at 20:30

## 2024-12-29 RX ADMIN — Medication 1 MILLIGRAM(S): at 20:28

## 2024-12-29 RX ADMIN — Medication 25 MILLIGRAM(S): at 20:28

## 2024-12-29 RX ADMIN — Medication 40 MILLIGRAM(S): at 08:31

## 2024-12-29 RX ADMIN — METOPROLOL SUCCINATE 50 MILLIGRAM(S): 50 TABLET, EXTENDED RELEASE ORAL at 08:33

## 2024-12-29 RX ADMIN — MIRTAZAPINE 22.5 MILLIGRAM(S): 30 TABLET, FILM COATED ORAL at 20:27

## 2024-12-29 RX ADMIN — CLONAZEPAM 0.5 MILLIGRAM(S): 0.5 TABLET ORAL at 12:51

## 2024-12-29 RX ADMIN — Medication 650 MILLIGRAM(S): at 18:57

## 2024-12-29 RX ADMIN — CLONAZEPAM 0.5 MILLIGRAM(S): 0.5 TABLET ORAL at 06:03

## 2024-12-29 RX ADMIN — GABAPENTIN 100 MILLIGRAM(S): 400 CAPSULE ORAL at 08:32

## 2024-12-29 NOTE — BH INPATIENT PSYCHIATRY PROGRESS NOTE - NSBHASSESSSUMMFT_PSY_ALL_CORE
Ms. Gross is a 73yo female, , retired (), domiciled at home with , prior psychiatric diagnosis of ANSON and MDD, 6 previous inpatient psychiatric hospitalizations at [facility] ,( [facility] from 3/28/23-4/29/23, and more recent admission to [facility] from 5/10-6/12/23 for worsening of anxiety, depression and passive SI) and last admission to [facility] from 4/16-5/31/24 for worsening of anxiety and depression. Pt. has a prior SA ( Oding on pills) in 11/2019 by overdosing on pills, no history of NSSIB, no history of violence/aggression, no hx of legal issues, hx of marijuana use, PMHx of HTN, hyperlipidemia. Patient was admitted to psychiatry due to increase anxiety, reporting SI with not plans in the clinic. All of this in the context of medication adjustment and psychosocial problems.     On assessment, patient endorses worsening anxiety and depression, denies any SI.    Plan:  1. Admit to the unit as a voluntary patient.  2. Medications:      Decrease Nortriptyline to 25mg at bedtime as patient is reporting side effects       Change Klonopin to 0.5mg tid      Change Gabapentin 100mg BID as patient reported feeling sedated with higher doses. We are also adding 100mg as needed for anxiety       Remeron 22.5mg at bedtime       Pantoprazole 40mg daily       Metoprolol 50mg bid       Nexletol 180mg at bedtime       Repatha IM 140mg every 2 weeks (we will need to verify when the patient took this)  3. Blood work to be order   4. Patient to be evaluated by the PA and PT to evaluate the patient  5. SW to coordinate a safe discharge plan

## 2024-12-29 NOTE — BH INPATIENT PSYCHIATRY PROGRESS NOTE - NSBHMETABOLIC_PSY_ALL_CORE_FT
BMI: BMI (kg/m2): 37.5 (12-27-24 @ 15:12)  HbA1c: A1C with Estimated Average Glucose Result: 5.8 % (01-11-24 @ 08:00)    Glucose:   BP: --Vital Signs Last 24 Hrs  T(C): 36.6 (12-29-24 @ 07:45), Max: 36.6 (12-29-24 @ 07:45)  T(F): 97.9 (12-29-24 @ 07:45), Max: 97.9 (12-29-24 @ 07:45)  HR: --  BP: --  BP(mean): --  RR: 19 (12-29-24 @ 07:45) (19 - 19)  SpO2: --    Orthostatic VS  12-29-24 @ 07:45  Lying BP: --/-- HR: --  Sitting BP: 154/83 HR: 97  Standing BP: 153/95 HR: 99  Site: --  Mode: --  Orthostatic VS  12-28-24 @ 08:01  Lying BP: --/-- HR: --  Sitting BP: 149/84 HR: 97  Standing BP: 137/85 HR: 98  Site: --  Mode: --  Orthostatic VS  12-27-24 @ 15:12  Lying BP: --/-- HR: --  Sitting BP: 166/92 HR: 90  Standing BP: 166/99 HR: 90  Site: --  Mode: --    Lipid Panel: Date/Time: 04-05-24 @ 05:56  Cholesterol, Serum: 176  LDL Cholesterol Calculated: 97  HDL Cholesterol, Serum: 39  Total Cholesterol/HDL Ration Measurement: --  Triglycerides, Serum: 200

## 2024-12-29 NOTE — BH INPATIENT PSYCHIATRY PROGRESS NOTE - NSBHCHARTREVIEWVS_PSY_A_CORE FT
Vital Signs Last 24 Hrs  T(C): 36.6 (12-29-24 @ 07:45), Max: 36.6 (12-29-24 @ 07:45)  T(F): 97.9 (12-29-24 @ 07:45), Max: 97.9 (12-29-24 @ 07:45)  HR: --  BP: --  BP(mean): --  RR: 19 (12-29-24 @ 07:45) (19 - 19)  SpO2: --    Orthostatic VS  12-29-24 @ 07:45  Lying BP: --/-- HR: --  Sitting BP: 154/83 HR: 97  Standing BP: 153/95 HR: 99  Site: --  Mode: --  Orthostatic VS  12-28-24 @ 08:01  Lying BP: --/-- HR: --  Sitting BP: 149/84 HR: 97  Standing BP: 137/85 HR: 98  Site: --  Mode: --  Orthostatic VS  12-27-24 @ 15:12  Lying BP: --/-- HR: --  Sitting BP: 166/92 HR: 90  Standing BP: 166/99 HR: 90  Site: --  Mode: --

## 2024-12-29 NOTE — BH INPATIENT PSYCHIATRY PROGRESS NOTE - NSBHFUPINTERVALHXFT_PSY_A_CORE
Chart reviewed and case discussed with treatment team. No events reported overnight. Sleep and appetite is fair. Patient reports increased anxiety this morning and denies any SI. Patient is compliant with medications, no adverse effects reported.

## 2024-12-30 PROCEDURE — 99232 SBSQ HOSP IP/OBS MODERATE 35: CPT

## 2024-12-30 RX ORDER — METOPROLOL SUCCINATE 50 MG/1
50 TABLET, EXTENDED RELEASE ORAL
Refills: 0 | Status: DISCONTINUED | OUTPATIENT
Start: 2024-12-30 | End: 2025-02-19

## 2024-12-30 RX ADMIN — Medication 25 MILLIGRAM(S): at 21:31

## 2024-12-30 RX ADMIN — MIRTAZAPINE 22.5 MILLIGRAM(S): 30 TABLET, FILM COATED ORAL at 21:31

## 2024-12-30 RX ADMIN — GABAPENTIN 300 MILLIGRAM(S): 400 CAPSULE ORAL at 21:31

## 2024-12-30 RX ADMIN — CLONAZEPAM 0.5 MILLIGRAM(S): 0.5 TABLET ORAL at 06:12

## 2024-12-30 RX ADMIN — Medication 1 MILLIGRAM(S): at 21:31

## 2024-12-30 RX ADMIN — Medication 40 MILLIGRAM(S): at 08:21

## 2024-12-30 RX ADMIN — METOPROLOL SUCCINATE 50 MILLIGRAM(S): 50 TABLET, EXTENDED RELEASE ORAL at 21:32

## 2024-12-30 RX ADMIN — GABAPENTIN 100 MILLIGRAM(S): 400 CAPSULE ORAL at 08:21

## 2024-12-30 RX ADMIN — METOPROLOL SUCCINATE 50 MILLIGRAM(S): 50 TABLET, EXTENDED RELEASE ORAL at 08:22

## 2024-12-30 RX ADMIN — CLONAZEPAM 0.5 MILLIGRAM(S): 0.5 TABLET ORAL at 21:31

## 2024-12-30 RX ADMIN — GABAPENTIN 300 MILLIGRAM(S): 400 CAPSULE ORAL at 13:25

## 2024-12-30 RX ADMIN — Medication 4 MILLIGRAM(S): at 11:46

## 2024-12-30 RX ADMIN — CLONAZEPAM 0.5 MILLIGRAM(S): 0.5 TABLET ORAL at 13:25

## 2024-12-30 NOTE — BH SOCIAL WORK INITIAL PSYCHOSOCIAL EVALUATION - NSBHCOMMOTHER_PSY_ALL_CORE
Ms. Cyr is a 75-year-old female with a history of multiple compression fractures (T12 and L2) s/p IR kyphoplasty (11/28), osteoporosis (DEXA T score of -3.6),  T2DM, HLD, HTN,  CHF (G1DD EF 65%), aortic stenosis undergoing TAVR in Jan, COPD (on home 2L NC), and dementia who presents to the ED for intractable back pain, new onset agitation, and chronic diarrhea.      History was obtained from daughter who was present at bedside. Patient was able to follow commands, however communication was limited by patient's AMS. Her back pain became worse on 12/18/22 after running out of her pain medications for which they received a 21 dy supply of. Her last dose was on 12/17/22. As per daughter, pain is paraspinal and causing increased restlessness in patient. She used to receive steroid injections done by ortho for pain, however daugher does not want her to be seen by orthopedics. Back pain is not associated with loss of sensation. She is able to ambulate with a walker, however limited as she develops dyspnea on exertion.    Of note, patient had a multiple hospitlization from 11/21/22-11/30/22 for intractable back pain and diarrhea. She was diagnosed with a L2 compression fracture 2 weeks prior and T12 fx on admission. NSGY was consulted  who recommended TLSO brace and further imaging (unable to tolerate MRI). She ev eventually had a IR kyphophlasty performed on 11/28 with improvement in pain. Also, UA at that time grew enterococcus and she was on IV CTX and eventually de-escalated to ampicillin. Diarrhea improved and C-diff and stool cx were negative. She was d/c with .    ED Course:  In the ED, patient was HDS, afebrile, and saturating % on 2L NC. Initial labs notable for hyponatremia (134), hypokalemia (2.8), severe hypomagnesiumia (0.8), anemia (hg of 10.4). UA pending. EKG showed NSR with QTC of 444. Received K Cl 10 meq, Mg 2g, morphine 6 mg, ketorolac 10mg, droperidol 2.5 mg.    None

## 2024-12-30 NOTE — DIETITIAN INITIAL EVALUATION ADULT - OTHER INFO
Patient is a 75 y/o female with PPHx of ANSON and MDD, 6 previous inpatient psychiatric hospitalizations at [facility] ,( H from 3/28/23-4/29/23, and more recent admission to [facility] from 5/10-6/12/23 for worsening of anxiety, depression and passive SI) and last admission to [facility] from 4/16-5/31/24 for worsening of anxiety and depression. Pt. has a prior SA ( Oding on pills) in 11/2019 by overdosing on pills, no history of NSSIB, no history of violence/aggression, no hx of legal issues, hx of marijuana use, PMHx of HTN, hyperlipidemia, and GERD. Patient was admitted to psychiatry due to increase anxiety, reporting SI with not plans in the clinic. All of this in the context of medication adjustment and psychosocial problems.     Patient is known to this writer from her previous [facility] adm. Met with patient in her room today. Patient reports decreased appetite with fair PO intake for >1 month PTA due to worsening anxiety and depression. Current appetite remains fair with PO intake ~50-75% on in-house meals which is also related to her food preferences. Dislikes pork; avoids spicy or citrus foods (including orange, OJ, cranberry juice) due to hx of GERD. Lactose intolerance to milk but ok with other dairy products including yogurt & cheese. Menu options explored with patient today, food preferences taken and honored on CBoard. NKFA reported. + missing teeth, dentures left at home per patient, but denies chewing/swallowing difficulties on current diet, declined diet consistency modification. Writer encouraged po intake as tolerated, also discussed healthy food choice with patient today; patient verbalized understanding and declined ONS at this time. On Remeron which may induce her appetite. Pending new HgbA1C and blood lipids results.     Wt hx per chart review: 208lb (1/10/24), 209.6lb (4/16/24); current adm wt: 205lb (12/27/24), -2.2% x past 8 month, insignificant but possibly related to decreased po intake PTA. Will monitor weekly wt trend.

## 2024-12-30 NOTE — BH INPATIENT PSYCHIATRY PROGRESS NOTE - NSBHMETABOLIC_PSY_ALL_CORE_FT
BMI: BMI (kg/m2): 37.5 (12-27-24 @ 15:12)  HbA1c: A1C with Estimated Average Glucose Result: 5.8 % (01-11-24 @ 08:00)    Glucose:   BP: --Vital Signs Last 24 Hrs  T(C): 36.8 (12-30-24 @ 08:09), Max: 36.8 (12-30-24 @ 08:09)  T(F): 98.2 (12-30-24 @ 08:09), Max: 98.2 (12-30-24 @ 08:09)  HR: --  BP: --  BP(mean): --  RR: --  SpO2: --    Orthostatic VS  12-30-24 @ 08:09  Lying BP: --/-- HR: --  Sitting BP: 153/75 HR: 91  Standing BP: 161/93 HR: 95  Site: --  Mode: --  Orthostatic VS  12-29-24 @ 07:45  Lying BP: --/-- HR: --  Sitting BP: 154/83 HR: 97  Standing BP: 153/95 HR: 99  Site: --  Mode: --    Lipid Panel: Date/Time: 04-05-24 @ 05:56  Cholesterol, Serum: 176  LDL Cholesterol Calculated: 97  HDL Cholesterol, Serum: 39  Total Cholesterol/HDL Ration Measurement: --  Triglycerides, Serum: 200   BMI: BMI (kg/m2): 37.5 (12-27-24 @ 15:12)  HbA1c: A1C with Estimated Average Glucose Result: 5.8 % (01-11-24 @ 08:00)    Glucose:   BP: --Vital Signs Last 24 Hrs  T(C): 36.4 (12-30-24 @ 13:29), Max: 36.8 (12-30-24 @ 08:09)  T(F): 97.5 (12-30-24 @ 13:29), Max: 98.2 (12-30-24 @ 08:09)  HR: --  BP: --  BP(mean): --  RR: --  SpO2: --    Orthostatic VS  12-30-24 @ 13:29  Lying BP: --/-- HR: --  Sitting BP: 112/65 HR: 98  Standing BP: 114/71 HR: 96  Site: --  Mode: --  Orthostatic VS  12-30-24 @ 08:09  Lying BP: --/-- HR: --  Sitting BP: 153/75 HR: 91  Standing BP: 161/93 HR: 95  Site: --  Mode: --  Orthostatic VS  12-29-24 @ 07:45  Lying BP: --/-- HR: --  Sitting BP: 154/83 HR: 97  Standing BP: 153/95 HR: 99  Site: --  Mode: --    Lipid Panel: Date/Time: 04-05-24 @ 05:56  Cholesterol, Serum: 176  LDL Cholesterol Calculated: 97  HDL Cholesterol, Serum: 39  Total Cholesterol/HDL Ration Measurement: --  Triglycerides, Serum: 200

## 2024-12-30 NOTE — BH INPATIENT PSYCHIATRY PROGRESS NOTE - ATTENDING COMMENTS
Care was discussed and reviewed in the interdisciplinary treatment team.  I, Flory Mittal MD, have reviewed and verified the documentation.  I independently performed the documented medical decision making.

## 2024-12-30 NOTE — BH SOCIAL WORK INITIAL PSYCHOSOCIAL EVALUATION - LEGAL HELP
Number Of Freeze-Thaw Cycles: 2 freeze-thaw cycles Detail Level: Simple Show Applicator Variable?: Yes Render Note In Bullet Format When Appropriate: No Application Tool (Optional): Liquid Nitrogen Sprayer Duration Of Freeze Thaw-Cycle (Seconds): 6 Post-Care Instructions: I reviewed with the patient in detail post-care instructions. Patient is to wear sunprotection, and avoid picking at any of the treated lesions. Pt may apply Vaseline to crusted or scabbing areas. Consent: The patient's consent was obtained including but not limited to risks of crusting, scabbing, blistering, scarring, darker or lighter pigmentary change, recurrence, incomplete removal and infection. no

## 2024-12-30 NOTE — BH INPATIENT PSYCHIATRY PROGRESS NOTE - PRN MEDS
MEDICATIONS  (PRN):  acetaminophen     Tablet .. 650 milliGRAM(s) Oral every 6 hours PRN Temp greater or equal to 38C (100.4F), Mild Pain (1 - 3), Moderate Pain (4 - 6)  gabapentin 100 milliGRAM(s) Oral three times a day PRN anxiety  OLANZapine 2.5 milliGRAM(s) Oral three times a day PRN Agitation  ondansetron    Tablet 4 milliGRAM(s) Oral three times a day PRN Nausea

## 2024-12-30 NOTE — DIETITIAN INITIAL EVALUATION ADULT - NS FNS DIET ORDER
Diet, Regular:   DASH/TLC Sodium & Cholesterol Restricted (DASH)  Low Sodium (12-27-24 @ 19:33) [Active]

## 2024-12-30 NOTE — DIETITIAN INITIAL EVALUATION ADULT - PERTINENT LABORATORY DATA
Comprehensive Metabolic Panel (12.27.24 @ 12:15)   Sodium: 142 mmol/L  Potassium: 4.0 mmol/L  Chloride: 105 mmol/L  Carbon Dioxide: 23 mmol/L  Anion Gap: 14 mmol/L  Blood Urea Nitrogen: 13 mg/dL  Creatinine: 0.74 mg/dL  Glucose: 123 mg/dL  Calcium: 10.1 mg/dL  Protein Total: 7.2 g/dL  Albumin: 4.5 g/dL  Bilirubin Total: 0.5 mg/dL  Alkaline Phosphatase: 92 U/L  Aspartate Aminotransferase (AST/SGOT): 19 U/L  Alanine Aminotransferase (ALT/SGPT): 20 U/L  eGFR: 84: The estimated glomerular filtration rate (eGFR) calculation is based on   the 2021 CKD-EPI creatinine equation, which is validated in male and   female population 18 years of age and older (N Engl J Med 2021;   [id number]). mL/min/1.73m2    A1C with Estimated Average Glucose Result: 5.8 % (01-11-24 @ 08:00)

## 2024-12-30 NOTE — BH INPATIENT PSYCHIATRY PROGRESS NOTE - NSBHFUPINTERVALHXFT_PSY_A_CORE
Patient seen for follow up for anxiety. Chart reviewed and case discussed with treatment team. No events reported overnight. Sleep and appetite is fair. Patient reported concerns about anxiety and depression, spoke about mental health issues started when her parents passed 8 years ago. Patient reported she wants to get her life back, wants to drive again, go out with friend and go back to her beach club. Patient was focused on medications, concerned about Klonopin dose, requesting additional medication. Discussed increasing Gabapentin to 300mg TID to better manage anxiety. Klonopin 0.5mg will remain the same. She denied SIIP.      Spoke with  Yehuda. He endorsed worsening anxiety and depression, poor functioning. Discussed medications, he was unaware if she was adherent with her medications. Will bring in her home medications.

## 2024-12-30 NOTE — DIETITIAN INITIAL EVALUATION ADULT - PERTINENT MEDS FT
MEDICATIONS  (STANDING):  clonazePAM  Tablet 0.5 milliGRAM(s) Oral <User Schedule>  gabapentin 300 milliGRAM(s) Oral three times a day  melatonin. 1 milliGRAM(s) Oral at bedtime  metoprolol succinate ER 50 milliGRAM(s) Oral daily  mirtazapine 22.5 milliGRAM(s) Oral at bedtime  nortriptyline 25 milliGRAM(s) Oral at bedtime  pantoprazole    Tablet 40 milliGRAM(s) Oral before breakfast    MEDICATIONS  (PRN):  acetaminophen     Tablet .. 650 milliGRAM(s) Oral every 6 hours PRN Temp greater or equal to 38C (100.4F), Mild Pain (1 - 3), Moderate Pain (4 - 6)  gabapentin 100 milliGRAM(s) Oral three times a day PRN anxiety  OLANZapine 2.5 milliGRAM(s) Oral three times a day PRN Agitation  ondansetron    Tablet 4 milliGRAM(s) Oral three times a day PRN Nausea

## 2024-12-30 NOTE — BH INPATIENT PSYCHIATRY PROGRESS NOTE - CURRENT MEDICATION
MEDICATIONS  (STANDING):  clonazePAM  Tablet 0.5 milliGRAM(s) Oral <User Schedule>  gabapentin 300 milliGRAM(s) Oral three times a day  melatonin. 1 milliGRAM(s) Oral at bedtime  metoprolol succinate ER 50 milliGRAM(s) Oral daily  mirtazapine 22.5 milliGRAM(s) Oral at bedtime  nortriptyline 25 milliGRAM(s) Oral at bedtime  pantoprazole    Tablet 40 milliGRAM(s) Oral before breakfast    MEDICATIONS  (PRN):  acetaminophen     Tablet .. 650 milliGRAM(s) Oral every 6 hours PRN Temp greater or equal to 38C (100.4F), Mild Pain (1 - 3), Moderate Pain (4 - 6)  gabapentin 100 milliGRAM(s) Oral three times a day PRN anxiety  OLANZapine 2.5 milliGRAM(s) Oral three times a day PRN Agitation  ondansetron    Tablet 4 milliGRAM(s) Oral three times a day PRN Nausea   MEDICATIONS  (STANDING):  clonazePAM  Tablet 0.5 milliGRAM(s) Oral <User Schedule>  gabapentin 300 milliGRAM(s) Oral three times a day  melatonin. 1 milliGRAM(s) Oral at bedtime  metoprolol succinate ER 50 milliGRAM(s) Oral two times a day  mirtazapine 22.5 milliGRAM(s) Oral at bedtime  nortriptyline 25 milliGRAM(s) Oral at bedtime  pantoprazole    Tablet 40 milliGRAM(s) Oral before breakfast    MEDICATIONS  (PRN):  acetaminophen     Tablet .. 650 milliGRAM(s) Oral every 6 hours PRN Temp greater or equal to 38C (100.4F), Mild Pain (1 - 3), Moderate Pain (4 - 6)  gabapentin 100 milliGRAM(s) Oral three times a day PRN anxiety  OLANZapine 2.5 milliGRAM(s) Oral three times a day PRN Agitation  ondansetron    Tablet 4 milliGRAM(s) Oral three times a day PRN Nausea

## 2024-12-30 NOTE — BH INPATIENT PSYCHIATRY PROGRESS NOTE - NSBHCHARTREVIEWVS_PSY_A_CORE FT
Vital Signs Last 24 Hrs  T(C): 36.8 (12-30-24 @ 08:09), Max: 36.8 (12-30-24 @ 08:09)  T(F): 98.2 (12-30-24 @ 08:09), Max: 98.2 (12-30-24 @ 08:09)  HR: --  BP: --  BP(mean): --  RR: --  SpO2: --    Orthostatic VS  12-30-24 @ 08:09  Lying BP: --/-- HR: --  Sitting BP: 153/75 HR: 91  Standing BP: 161/93 HR: 95  Site: --  Mode: --  Orthostatic VS  12-29-24 @ 07:45  Lying BP: --/-- HR: --  Sitting BP: 154/83 HR: 97  Standing BP: 153/95 HR: 99  Site: --  Mode: --   Vital Signs Last 24 Hrs  T(C): 36.4 (12-30-24 @ 13:29), Max: 36.8 (12-30-24 @ 08:09)  T(F): 97.5 (12-30-24 @ 13:29), Max: 98.2 (12-30-24 @ 08:09)  HR: --  BP: --  BP(mean): --  RR: --  SpO2: --    Orthostatic VS  12-30-24 @ 13:29  Lying BP: --/-- HR: --  Sitting BP: 112/65 HR: 98  Standing BP: 114/71 HR: 96  Site: --  Mode: --  Orthostatic VS  12-30-24 @ 08:09  Lying BP: --/-- HR: --  Sitting BP: 153/75 HR: 91  Standing BP: 161/93 HR: 95  Site: --  Mode: --  Orthostatic VS  12-29-24 @ 07:45  Lying BP: --/-- HR: --  Sitting BP: 154/83 HR: 97  Standing BP: 153/95 HR: 99  Site: --  Mode: --

## 2024-12-30 NOTE — DIETITIAN INITIAL EVALUATION ADULT - ADD RECOMMEND
- c/w appetite stimulant as per MD order.  - Encourage po intake as tolerated and honor food preferences PRN.  - Monitor PO intake/tolerance, weights, labs, BM's, and skin integrity.

## 2024-12-30 NOTE — BH SOCIAL WORK INITIAL PSYCHOSOCIAL EVALUATION - OTHER PAST PSYCHIATRIC HISTORY (INCLUDE DETAILS REGARDING ONSET, COURSE OF ILLNESS, INPATIENT/OUTPATIENT TREATMENT)
Pt. is a 72 y/o old female, domiciled with spouse, retired with history of MDD and ANSON with multiple psych hospitalizatins (most recently April 2024 on [facility]), had prior SA by overdosing on pills (11/2019) following death of parents. currently treated by [facility] outpatient psychiatrist Dr. Kamara.

## 2024-12-31 PROCEDURE — 99232 SBSQ HOSP IP/OBS MODERATE 35: CPT

## 2024-12-31 RX ORDER — EVOLOCUMAB 140 MG/ML
140 INJECTION, SOLUTION SUBCUTANEOUS EVERY 2 WEEKS
Refills: 0 | Status: DISCONTINUED | OUTPATIENT
Start: 2025-01-02 | End: 2025-02-19

## 2024-12-31 RX ORDER — CLONAZEPAM 0.5 MG/1
0.5 TABLET ORAL THREE TIMES A DAY
Refills: 0 | Status: DISCONTINUED | OUTPATIENT
Start: 2024-12-31 | End: 2025-01-03

## 2024-12-31 RX ORDER — CLONAZEPAM 0.5 MG/1
0.5 TABLET ORAL ONCE
Refills: 0 | Status: DISCONTINUED | OUTPATIENT
Start: 2024-12-31 | End: 2024-12-31

## 2024-12-31 RX ADMIN — Medication 25 MILLIGRAM(S): at 21:10

## 2024-12-31 RX ADMIN — Medication 1 MILLIGRAM(S): at 21:10

## 2024-12-31 RX ADMIN — CLONAZEPAM 0.5 MILLIGRAM(S): 0.5 TABLET ORAL at 21:10

## 2024-12-31 RX ADMIN — GABAPENTIN 300 MILLIGRAM(S): 400 CAPSULE ORAL at 12:35

## 2024-12-31 RX ADMIN — GABAPENTIN 300 MILLIGRAM(S): 400 CAPSULE ORAL at 09:05

## 2024-12-31 RX ADMIN — CLONAZEPAM 0.5 MILLIGRAM(S): 0.5 TABLET ORAL at 12:36

## 2024-12-31 RX ADMIN — MIRTAZAPINE 22.5 MILLIGRAM(S): 30 TABLET, FILM COATED ORAL at 21:24

## 2024-12-31 RX ADMIN — CLONAZEPAM 0.5 MILLIGRAM(S): 0.5 TABLET ORAL at 10:08

## 2024-12-31 RX ADMIN — METOPROLOL SUCCINATE 50 MILLIGRAM(S): 50 TABLET, EXTENDED RELEASE ORAL at 09:06

## 2024-12-31 RX ADMIN — GABAPENTIN 300 MILLIGRAM(S): 400 CAPSULE ORAL at 21:44

## 2024-12-31 RX ADMIN — Medication 40 MILLIGRAM(S): at 06:47

## 2024-12-31 RX ADMIN — METOPROLOL SUCCINATE 50 MILLIGRAM(S): 50 TABLET, EXTENDED RELEASE ORAL at 21:10

## 2024-12-31 NOTE — BH INPATIENT PSYCHIATRY PROGRESS NOTE - NSBHCHARTREVIEWVS_PSY_A_CORE FT
Vital Signs Last 24 Hrs  T(C): 36.6 (12-31-24 @ 09:14), Max: 36.6 (12-31-24 @ 09:14)  T(F): 97.9 (12-31-24 @ 09:14), Max: 97.9 (12-31-24 @ 09:14)  HR: 106 (12-31-24 @ 09:14) (106 - 106)  BP: 135/99 (12-31-24 @ 09:14) (135/99 - 135/99)  BP(mean): --  RR: 18 (12-31-24 @ 09:14) (18 - 18)  SpO2: 99% (12-31-24 @ 09:14) (99% - 99%)    Orthostatic VS  12-30-24 @ 13:29  Lying BP: --/-- HR: --  Sitting BP: 112/65 HR: 98  Standing BP: 114/71 HR: 96  Site: --  Mode: --  Orthostatic VS  12-30-24 @ 08:09  Lying BP: --/-- HR: --  Sitting BP: 153/75 HR: 91  Standing BP: 161/93 HR: 95  Site: --  Mode: --

## 2024-12-31 NOTE — BH INPATIENT PSYCHIATRY PROGRESS NOTE - NSBHASSESSSUMMFT_PSY_ALL_CORE
Ms. Gross is a 75yo female, , retired (), domiciled at home with , prior psychiatric diagnosis of ANOSN and MDD, 6 previous inpatient psychiatric hospitalizations at [facility] ,( [facility] from 3/28/23-4/29/23, and more recent admission to [facility] from 5/10-6/12/23 for worsening of anxiety, depression and passive SI) and last admission to [facility] from 4/16-5/31/24 for worsening of anxiety and depression. Pt. has a prior SA ( Oding on pills) in 11/2019 by overdosing on pills, no history of NSSIB, no history of violence/aggression, no hx of legal issues, hx of marijuana use, PMHx of HTN, hyperlipidemia. Patient was admitted to psychiatry due to increase anxiety, reporting SI with not plans in the clinic. All of this in the context of medication adjustment and psychosocial problems.     On assessment, patient endorses worsening anxiety and depression, denies any SI.    Plan:  1. Admit to the unit as a voluntary patient.  2. Medications:      Decrease Nortriptyline to 25mg at bedtime as patient is reporting side effects. Once discontinued we will start Cymbalta.       Change Klonopin to 0.5mg tid      Change Gabapentin 300mg BID as patient reported feeling sedated with higher doses. We are also adding 100mg as needed for anxiety       Remeron 22.5mg at bedtime       Pantoprazole 40mg daily       Metoprolol 50mg bid       Nexletol 180mg at bedtime       Repatha IM 140mg every 2 weeks (due on 01/02)  3. Blood work order for 01/02  4. Patient to be evaluated by the PA and PT to evaluate the patient  5. SW to coordinate a safe discharge plan

## 2024-12-31 NOTE — BH INPATIENT PSYCHIATRY PROGRESS NOTE - CURRENT MEDICATION
MEDICATIONS  (STANDING):  clonazePAM  Tablet 0.5 milliGRAM(s) Oral three times a day  gabapentin 300 milliGRAM(s) Oral three times a day  melatonin. 1 milliGRAM(s) Oral at bedtime  metoprolol succinate ER 50 milliGRAM(s) Oral two times a day  mirtazapine 22.5 milliGRAM(s) Oral at bedtime  Nexletol 180 mg 1 Tablet(s) 1 Tablet(s) Oral daily  nortriptyline 25 milliGRAM(s) Oral at bedtime  pantoprazole    Tablet 40 milliGRAM(s) Oral before breakfast    MEDICATIONS  (PRN):  acetaminophen     Tablet .. 650 milliGRAM(s) Oral every 6 hours PRN Temp greater or equal to 38C (100.4F), Mild Pain (1 - 3), Moderate Pain (4 - 6)  gabapentin 100 milliGRAM(s) Oral three times a day PRN anxiety  OLANZapine 2.5 milliGRAM(s) Oral three times a day PRN Agitation  ondansetron    Tablet 4 milliGRAM(s) Oral three times a day PRN Nausea

## 2024-12-31 NOTE — BH INPATIENT PSYCHIATRY PROGRESS NOTE - NSBHMETABOLIC_PSY_ALL_CORE_FT
BMI: BMI (kg/m2): 37.5 (12-27-24 @ 15:12)  HbA1c: A1C with Estimated Average Glucose Result: 5.8 % (01-11-24 @ 08:00)    Glucose:   BP: 135/99 (12-31-24 @ 09:14) (135/99 - 135/99)Vital Signs Last 24 Hrs  T(C): 36.6 (12-31-24 @ 09:14), Max: 36.6 (12-31-24 @ 09:14)  T(F): 97.9 (12-31-24 @ 09:14), Max: 97.9 (12-31-24 @ 09:14)  HR: 106 (12-31-24 @ 09:14) (106 - 106)  BP: 135/99 (12-31-24 @ 09:14) (135/99 - 135/99)  BP(mean): --  RR: 18 (12-31-24 @ 09:14) (18 - 18)  SpO2: 99% (12-31-24 @ 09:14) (99% - 99%)    Orthostatic VS  12-30-24 @ 13:29  Lying BP: --/-- HR: --  Sitting BP: 112/65 HR: 98  Standing BP: 114/71 HR: 96  Site: --  Mode: --  Orthostatic VS  12-30-24 @ 08:09  Lying BP: --/-- HR: --  Sitting BP: 153/75 HR: 91  Standing BP: 161/93 HR: 95  Site: --  Mode: --    Lipid Panel: Date/Time: 04-05-24 @ 05:56  Cholesterol, Serum: 176  LDL Cholesterol Calculated: 97  HDL Cholesterol, Serum: 39  Total Cholesterol/HDL Ration Measurement: --  Triglycerides, Serum: 200

## 2025-01-01 RX ORDER — IBUPROFEN 200 MG
400 TABLET ORAL EVERY 8 HOURS
Refills: 0 | Status: DISCONTINUED | OUTPATIENT
Start: 2025-01-01 | End: 2025-01-22

## 2025-01-01 RX ADMIN — CLONAZEPAM 0.5 MILLIGRAM(S): 0.5 TABLET ORAL at 09:06

## 2025-01-01 RX ADMIN — METOPROLOL SUCCINATE 50 MILLIGRAM(S): 50 TABLET, EXTENDED RELEASE ORAL at 20:48

## 2025-01-01 RX ADMIN — CLONAZEPAM 0.5 MILLIGRAM(S): 0.5 TABLET ORAL at 13:04

## 2025-01-01 RX ADMIN — Medication 40 MILLIGRAM(S): at 09:06

## 2025-01-01 RX ADMIN — Medication 25 MILLIGRAM(S): at 20:49

## 2025-01-01 RX ADMIN — GABAPENTIN 300 MILLIGRAM(S): 400 CAPSULE ORAL at 20:47

## 2025-01-01 RX ADMIN — CLONAZEPAM 0.5 MILLIGRAM(S): 0.5 TABLET ORAL at 20:47

## 2025-01-01 RX ADMIN — Medication 650 MILLIGRAM(S): at 16:25

## 2025-01-01 RX ADMIN — METOPROLOL SUCCINATE 50 MILLIGRAM(S): 50 TABLET, EXTENDED RELEASE ORAL at 09:06

## 2025-01-01 RX ADMIN — MIRTAZAPINE 22.5 MILLIGRAM(S): 30 TABLET, FILM COATED ORAL at 20:47

## 2025-01-01 RX ADMIN — GABAPENTIN 300 MILLIGRAM(S): 400 CAPSULE ORAL at 09:06

## 2025-01-01 RX ADMIN — Medication 1 MILLIGRAM(S): at 20:47

## 2025-01-02 LAB
A1C WITH ESTIMATED AVERAGE GLUCOSE RESULT: 5.5 % — SIGNIFICANT CHANGE UP (ref 4–5.6)
CHOLEST SERPL-MCNC: 128 MG/DL — SIGNIFICANT CHANGE UP
ESTIMATED AVERAGE GLUCOSE: 111 — SIGNIFICANT CHANGE UP
HDLC SERPL-MCNC: 46 MG/DL — LOW
LIPID PNL WITH DIRECT LDL SERPL: 53 MG/DL — SIGNIFICANT CHANGE UP
NON HDL CHOLESTEROL: 82 MG/DL — SIGNIFICANT CHANGE UP
TRIGL SERPL-MCNC: 174 MG/DL — HIGH

## 2025-01-02 PROCEDURE — 99232 SBSQ HOSP IP/OBS MODERATE 35: CPT

## 2025-01-02 RX ORDER — GABAPENTIN 400 MG/1
300 CAPSULE ORAL
Refills: 0 | Status: DISCONTINUED | OUTPATIENT
Start: 2025-01-02 | End: 2025-01-07

## 2025-01-02 RX ORDER — CYANOCOBALAMIN 1000 UG/ML
1000 INJECTION INTRAMUSCULAR; SUBCUTANEOUS DAILY
Refills: 0 | Status: DISCONTINUED | OUTPATIENT
Start: 2025-01-02 | End: 2025-01-14

## 2025-01-02 RX ORDER — GABAPENTIN 400 MG/1
100 CAPSULE ORAL ONCE
Refills: 0 | Status: COMPLETED | OUTPATIENT
Start: 2025-01-02 | End: 2025-01-02

## 2025-01-02 RX ORDER — GABAPENTIN 400 MG/1
100 CAPSULE ORAL
Refills: 0 | Status: DISCONTINUED | OUTPATIENT
Start: 2025-01-02 | End: 2025-01-02

## 2025-01-02 RX ADMIN — MIRTAZAPINE 22.5 MILLIGRAM(S): 30 TABLET, FILM COATED ORAL at 20:52

## 2025-01-02 RX ADMIN — CLONAZEPAM 0.5 MILLIGRAM(S): 0.5 TABLET ORAL at 09:16

## 2025-01-02 RX ADMIN — Medication 25 MILLIGRAM(S): at 20:52

## 2025-01-02 RX ADMIN — Medication 650 MILLIGRAM(S): at 14:14

## 2025-01-02 RX ADMIN — GABAPENTIN 300 MILLIGRAM(S): 400 CAPSULE ORAL at 09:15

## 2025-01-02 RX ADMIN — CLONAZEPAM 0.5 MILLIGRAM(S): 0.5 TABLET ORAL at 20:36

## 2025-01-02 RX ADMIN — GABAPENTIN 300 MILLIGRAM(S): 400 CAPSULE ORAL at 20:36

## 2025-01-02 RX ADMIN — Medication 1 MILLIGRAM(S): at 20:52

## 2025-01-02 RX ADMIN — GABAPENTIN 100 MILLIGRAM(S): 400 CAPSULE ORAL at 16:57

## 2025-01-02 RX ADMIN — METOPROLOL SUCCINATE 50 MILLIGRAM(S): 50 TABLET, EXTENDED RELEASE ORAL at 09:16

## 2025-01-02 RX ADMIN — GABAPENTIN 100 MILLIGRAM(S): 400 CAPSULE ORAL at 16:47

## 2025-01-02 RX ADMIN — Medication 40 MILLIGRAM(S): at 07:59

## 2025-01-02 RX ADMIN — METOPROLOL SUCCINATE 50 MILLIGRAM(S): 50 TABLET, EXTENDED RELEASE ORAL at 20:52

## 2025-01-02 RX ADMIN — Medication 400 MILLIGRAM(S): at 18:09

## 2025-01-02 RX ADMIN — GABAPENTIN 100 MILLIGRAM(S): 400 CAPSULE ORAL at 13:46

## 2025-01-02 RX ADMIN — CLONAZEPAM 0.5 MILLIGRAM(S): 0.5 TABLET ORAL at 12:38

## 2025-01-02 RX ADMIN — EVOLOCUMAB 140 MILLIGRAM(S): 140 INJECTION, SOLUTION SUBCUTANEOUS at 09:17

## 2025-01-02 NOTE — BH INPATIENT PSYCHIATRY PROGRESS NOTE - CURRENT MEDICATION
MEDICATIONS  (STANDING):  clonazePAM  Tablet 0.5 milliGRAM(s) Oral three times a day  cyanocobalamin 1000 MICROGram(s) Oral daily  evolocumab Injectable 140 milliGRAM(s) SubCutaneous every 2 weeks  gabapentin 100 milliGRAM(s) Oral <User Schedule>  gabapentin 100 milliGRAM(s) Oral once  gabapentin 300 milliGRAM(s) Oral two times a day  melatonin. 1 milliGRAM(s) Oral at bedtime  metoprolol succinate ER 50 milliGRAM(s) Oral two times a day  mirtazapine 22.5 milliGRAM(s) Oral at bedtime  Nexletol 180 mg 1 Tablet(s) 1 Tablet(s) Oral daily  nortriptyline 25 milliGRAM(s) Oral at bedtime  pantoprazole    Tablet 40 milliGRAM(s) Oral before breakfast    MEDICATIONS  (PRN):  acetaminophen     Tablet .. 650 milliGRAM(s) Oral every 6 hours PRN Temp greater or equal to 38C (100.4F), Mild Pain (1 - 3), Moderate Pain (4 - 6)  gabapentin 100 milliGRAM(s) Oral three times a day PRN anxiety  ibuprofen  Tablet. 400 milliGRAM(s) Oral every 8 hours PRN Mild Pain (1 - 3), Moderate Pain (4 - 6)  OLANZapine 2.5 milliGRAM(s) Oral three times a day PRN Agitation  ondansetron    Tablet 4 milliGRAM(s) Oral three times a day PRN Nausea

## 2025-01-02 NOTE — BH INPATIENT PSYCHIATRY PROGRESS NOTE - NSBHASSESSSUMMFT_PSY_ALL_CORE
Ms. Gross is a 73yo female, , retired (), domiciled at home with , prior psychiatric diagnosis of ANSON and MDD, 6 previous inpatient psychiatric hospitalizations at [facility] ,( [facility] from 3/28/23-4/29/23, and more recent admission to [facility] from 5/10-6/12/23 for worsening of anxiety, depression and passive SI) and last admission to [facility] from 4/16-5/31/24 for worsening of anxiety and depression. Pt. has a prior SA ( Oding on pills) in 11/2019 by overdosing on pills, no history of NSSIB, no history of violence/aggression, no hx of legal issues, hx of marijuana use, PMHx of HTN, hyperlipidemia. Patient was admitted to psychiatry due to increase anxiety, reporting SI with not plans in the clinic. All of this in the context of medication adjustment and psychosocial problems.     On assessment, patient endorses worsening anxiety and depression, denies any SI.    Plan:  1. Admit to the unit as a voluntary patient.  2. Medications:      Decrease Nortriptyline to 25mg at bedtime as patient is reporting side effects. Once discontinued we will start Cymbalta.       Change Klonopin to 0.5mg tid      Change Gabapentin 300mg BID as patient reported feeling sedated with higher doses, 100mg at 1pm./       Remeron 22.5mg at bedtime       Pantoprazole 40mg daily       Metoprolol 50mg bid       Nexletol 180mg at bedtime       Repatha IM 140mg every 2 weeks, given 1/2  3. Blood work order for 01/02  4. Patient to be evaluated by the PA and PT to evaluate the patient  5. SW to coordinate a safe discharge plan

## 2025-01-02 NOTE — BH INPATIENT PSYCHIATRY PROGRESS NOTE - PRN MEDS
MEDICATIONS  (PRN):  acetaminophen     Tablet .. 650 milliGRAM(s) Oral every 6 hours PRN Temp greater or equal to 38C (100.4F), Mild Pain (1 - 3), Moderate Pain (4 - 6)  gabapentin 100 milliGRAM(s) Oral three times a day PRN anxiety  ibuprofen  Tablet. 400 milliGRAM(s) Oral every 8 hours PRN Mild Pain (1 - 3), Moderate Pain (4 - 6)  OLANZapine 2.5 milliGRAM(s) Oral three times a day PRN Agitation  ondansetron    Tablet 4 milliGRAM(s) Oral three times a day PRN Nausea

## 2025-01-02 NOTE — BH INPATIENT PSYCHIATRY PROGRESS NOTE - NSBHMSETHTCONTENT_PSY_A_CORE
Preoccupations/Ruminations

## 2025-01-02 NOTE — BH INPATIENT PSYCHIATRY PROGRESS NOTE - NSBHCHARTREVIEWVS_PSY_A_CORE FT
Vital Signs Last 24 Hrs  T(C): 36.4 (01-02-25 @ 07:18), Max: 36.4 (01-02-25 @ 07:18)  T(F): 97.5 (01-02-25 @ 07:18), Max: 97.5 (01-02-25 @ 07:18)  HR: --  BP: --  BP(mean): --  RR: 19 (01-02-25 @ 07:18) (19 - 19)  SpO2: --    Orthostatic VS  01-02-25 @ 07:18  Lying BP: --/-- HR: --  Sitting BP: 156/86 HR: 79  Standing BP: 147/78 HR: 86  Site: --  Mode: --  Orthostatic VS  01-01-25 @ 07:42  Lying BP: --/-- HR: --  Sitting BP: 159/87 HR: 77  Standing BP: 148/88 HR: 84  Site: --  Mode: --

## 2025-01-02 NOTE — BH INPATIENT PSYCHIATRY PROGRESS NOTE - NSBHMSETHTPROC_PSY_A_CORE
Overinclusive/Circumstantial

## 2025-01-02 NOTE — BH INPATIENT PSYCHIATRY PROGRESS NOTE - NSBHMETABOLIC_PSY_ALL_CORE_FT
BMI: BMI (kg/m2): 37.5 (12-27-24 @ 15:12)  HbA1c: A1C with Estimated Average Glucose Result: 5.8 % (01-11-24 @ 08:00)    Glucose:   BP: 135/99 (12-31-24 @ 09:14) (135/99 - 135/99)Vital Signs Last 24 Hrs  T(C): 36.4 (01-02-25 @ 07:18), Max: 36.4 (01-02-25 @ 07:18)  T(F): 97.5 (01-02-25 @ 07:18), Max: 97.5 (01-02-25 @ 07:18)  HR: --  BP: --  BP(mean): --  RR: 19 (01-02-25 @ 07:18) (19 - 19)  SpO2: --    Orthostatic VS  01-02-25 @ 07:18  Lying BP: --/-- HR: --  Sitting BP: 156/86 HR: 79  Standing BP: 147/78 HR: 86  Site: --  Mode: --  Orthostatic VS  01-01-25 @ 07:42  Lying BP: --/-- HR: --  Sitting BP: 159/87 HR: 77  Standing BP: 148/88 HR: 84  Site: --  Mode: --    Lipid Panel: Date/Time: 04-05-24 @ 05:56  Cholesterol, Serum: 176  LDL Cholesterol Calculated: 97  HDL Cholesterol, Serum: 39  Total Cholesterol/HDL Ration Measurement: --  Triglycerides, Serum: 200

## 2025-01-02 NOTE — BH INPATIENT PSYCHIATRY PROGRESS NOTE - NSBHFUPINTERVALHXFT_PSY_A_CORE
Patient seen for follow up for anxiety. Chart reviewed and case discussed with treatment team. No events reported overnight. Sleep and appetite is fair. Patient to be transferred to [facility] when bed is available. Patient was anxious about medications and transfer to geriatric unit. Patient reported feeling tired in the afternoon with midday Klonopin and Gabapentin, requested dose to be decreased to 100mg. Patient was very focused on medications, wants to have Nortriptyline discontinued but apprehensive about starting Cymbalta. Patient was able to discussed symptoms starting 8 years ago when her parents passed and feeling broken hearted since then but is unable to accept she can move on to live her life. Patient denied SIIP, HIIP, AVH.     Called , Yehuda to inform patient will be transferred to [facility]. He was concerned she is due to Vitamin B12 injection, ordered oral supplements to be started tomorrow.

## 2025-01-03 PROCEDURE — 99232 SBSQ HOSP IP/OBS MODERATE 35: CPT | Mod: GC

## 2025-01-03 RX ORDER — OLANZAPINE 10 MG/1
2.5 TABLET ORAL ONCE
Refills: 0 | Status: DISCONTINUED | OUTPATIENT
Start: 2025-01-03 | End: 2025-01-06

## 2025-01-03 RX ORDER — EVOLOCUMAB 140 MG/ML
140 INJECTION, SOLUTION SUBCUTANEOUS
Refills: 0 | DISCHARGE

## 2025-01-03 RX ORDER — CLONAZEPAM 0.5 MG/1
0.5 TABLET ORAL THREE TIMES A DAY
Refills: 0 | Status: DISCONTINUED | OUTPATIENT
Start: 2025-01-03 | End: 2025-01-04

## 2025-01-03 RX ORDER — NORTRIPTYLINE HCL 75 MG
10 CAPSULE ORAL AT BEDTIME
Refills: 0 | Status: DISCONTINUED | OUTPATIENT
Start: 2025-01-03 | End: 2025-01-08

## 2025-01-03 RX ORDER — GABAPENTIN 400 MG/1
100 CAPSULE ORAL THREE TIMES A DAY
Refills: 0 | Status: DISCONTINUED | OUTPATIENT
Start: 2025-01-03 | End: 2025-01-04

## 2025-01-03 RX ORDER — NORTRIPTYLINE HCL 75 MG
12.5 CAPSULE ORAL AT BEDTIME
Refills: 0 | Status: DISCONTINUED | OUTPATIENT
Start: 2025-01-03 | End: 2025-01-03

## 2025-01-03 RX ADMIN — Medication 650 MILLIGRAM(S): at 12:41

## 2025-01-03 RX ADMIN — CLONAZEPAM 0.5 MILLIGRAM(S): 0.5 TABLET ORAL at 13:32

## 2025-01-03 RX ADMIN — Medication 650 MILLIGRAM(S): at 13:06

## 2025-01-03 RX ADMIN — Medication 1 MILLIGRAM(S): at 21:45

## 2025-01-03 RX ADMIN — CLONAZEPAM 0.5 MILLIGRAM(S): 0.5 TABLET ORAL at 21:54

## 2025-01-03 RX ADMIN — Medication 40 MILLIGRAM(S): at 06:30

## 2025-01-03 RX ADMIN — Medication 4 MILLIGRAM(S): at 13:32

## 2025-01-03 RX ADMIN — GABAPENTIN 300 MILLIGRAM(S): 400 CAPSULE ORAL at 21:44

## 2025-01-03 RX ADMIN — CLONAZEPAM 0.5 MILLIGRAM(S): 0.5 TABLET ORAL at 09:09

## 2025-01-03 RX ADMIN — METOPROLOL SUCCINATE 50 MILLIGRAM(S): 50 TABLET, EXTENDED RELEASE ORAL at 09:09

## 2025-01-03 RX ADMIN — MIRTAZAPINE 22.5 MILLIGRAM(S): 30 TABLET, FILM COATED ORAL at 21:45

## 2025-01-03 RX ADMIN — GABAPENTIN 300 MILLIGRAM(S): 400 CAPSULE ORAL at 09:10

## 2025-01-03 RX ADMIN — CYANOCOBALAMIN 1000 MICROGRAM(S): 1000 INJECTION INTRAMUSCULAR; SUBCUTANEOUS at 09:09

## 2025-01-03 RX ADMIN — Medication 10 MILLIGRAM(S): at 21:45

## 2025-01-03 RX ADMIN — METOPROLOL SUCCINATE 50 MILLIGRAM(S): 50 TABLET, EXTENDED RELEASE ORAL at 21:49

## 2025-01-03 RX ADMIN — Medication 400 MILLIGRAM(S): at 06:33

## 2025-01-03 NOTE — BH INPATIENT PSYCHIATRY PROGRESS NOTE - NSBHMETABOLIC_PSY_ALL_CORE_FT
BMI: BMI (kg/m2): 37.5 (12-27-24 @ 15:12)  HbA1c: A1C with Estimated Average Glucose Result: 5.5 % (01-02-25 @ 09:11)    Glucose:   BP: 129/80 (01-02-25 @ 20:48) (129/80 - 129/80)Vital Signs Last 24 Hrs  T(C): 36.8 (01-03-25 @ 07:55), Max: 36.8 (01-03-25 @ 07:55)  T(F): 98.2 (01-03-25 @ 07:55), Max: 98.2 (01-03-25 @ 07:55)  HR: --  BP: 129/80 (01-02-25 @ 20:48) (129/80 - 129/80)  BP(mean): 80 (01-02-25 @ 20:48) (80 - 80)  RR: --  SpO2: --    Orthostatic VS  01-03-25 @ 07:55  Lying BP: --/-- HR: --  Sitting BP: 154/80 HR: 82  Standing BP: 150/93 HR: 89  Site: --  Mode: --  Orthostatic VS  01-02-25 @ 07:18  Lying BP: --/-- HR: --  Sitting BP: 156/86 HR: 79  Standing BP: 147/78 HR: 86  Site: --  Mode: --    Lipid Panel: Date/Time: 01-02-25 @ 09:11  Cholesterol, Serum: 128  LDL Cholesterol Calculated: 53  HDL Cholesterol, Serum: 46  Total Cholesterol/HDL Ration Measurement: --  Triglycerides, Serum: 174   BMI: BMI (kg/m2): 37.5 (12-27-24 @ 15:12)  HbA1c: A1C with Estimated Average Glucose Result: 5.5 % (01-02-25 @ 09:11)    Glucose:   BP: 108/67 (01-03-25 @ 20:36) (108/67 - 129/80)Vital Signs Last 24 Hrs  T(C): 36.8 (01-03-25 @ 07:55), Max: 36.8 (01-03-25 @ 07:55)  T(F): 98.2 (01-03-25 @ 07:55), Max: 98.2 (01-03-25 @ 07:55)  HR: --  BP: 108/67 (01-03-25 @ 20:36) (108/67 - 129/80)  BP(mean): 78 (01-03-25 @ 20:36) (78 - 80)  RR: --  SpO2: --    Orthostatic VS  01-03-25 @ 07:55  Lying BP: --/-- HR: --  Sitting BP: 154/80 HR: 82  Standing BP: 150/93 HR: 89  Site: --  Mode: --  Orthostatic VS  01-02-25 @ 07:18  Lying BP: --/-- HR: --  Sitting BP: 156/86 HR: 79  Standing BP: 147/78 HR: 86  Site: --  Mode: --    Lipid Panel: Date/Time: 01-02-25 @ 09:11  Cholesterol, Serum: 128  LDL Cholesterol Calculated: 53  HDL Cholesterol, Serum: 46  Total Cholesterol/HDL Ration Measurement: --  Triglycerides, Serum: 174

## 2025-01-03 NOTE — BH INPATIENT PSYCHIATRY PROGRESS NOTE - NSBHCHARTREVIEWVS_PSY_A_CORE FT
Vital Signs Last 24 Hrs  T(C): 36.8 (01-03-25 @ 07:55), Max: 36.8 (01-03-25 @ 07:55)  T(F): 98.2 (01-03-25 @ 07:55), Max: 98.2 (01-03-25 @ 07:55)  HR: --  BP: 129/80 (01-02-25 @ 20:48) (129/80 - 129/80)  BP(mean): 80 (01-02-25 @ 20:48) (80 - 80)  RR: --  SpO2: --    Orthostatic VS  01-03-25 @ 07:55  Lying BP: --/-- HR: --  Sitting BP: 154/80 HR: 82  Standing BP: 150/93 HR: 89  Site: --  Mode: --  Orthostatic VS  01-02-25 @ 07:18  Lying BP: --/-- HR: --  Sitting BP: 156/86 HR: 79  Standing BP: 147/78 HR: 86  Site: --  Mode: --   Vital Signs Last 24 Hrs  T(C): 36.8 (01-03-25 @ 07:55), Max: 36.8 (01-03-25 @ 07:55)  T(F): 98.2 (01-03-25 @ 07:55), Max: 98.2 (01-03-25 @ 07:55)  HR: --  BP: 108/67 (01-03-25 @ 20:36) (108/67 - 129/80)  BP(mean): 78 (01-03-25 @ 20:36) (78 - 80)  RR: --  SpO2: --    Orthostatic VS  01-03-25 @ 07:55  Lying BP: --/-- HR: --  Sitting BP: 154/80 HR: 82  Standing BP: 150/93 HR: 89  Site: --  Mode: --  Orthostatic VS  01-02-25 @ 07:18  Lying BP: --/-- HR: --  Sitting BP: 156/86 HR: 79  Standing BP: 147/78 HR: 86  Site: --  Mode: --

## 2025-01-03 NOTE — BH INPATIENT PSYCHIATRY PROGRESS NOTE - NSBHASSESSSUMMFT_PSY_ALL_CORE
Ms. Gross is a 73yo female, , retired (), domiciled at home with , no children; PMHx HTN, HLD; PPHx d/o ANSON and MDD, 9 past psych admissions starting in 2019 (last [facility] April 2024 for worsening anxiety and depression), ECT at [facility] 2023, OP at [facility]; 1 past SA (OD on 9 pills of unknown medication in 2019), no h/o NSSIB, no h/o violence/aggression/legal issues, past marijuana use; initially admitted to [facility] on 12/27 for increasing anxiety and SI w/o plans in the context of medication adjustments and psychosocial stressors.     On assessment, patient endorses worsening anxiety and depression, denies any SI. **Incomplete Note**       PLAN:   1. Legals: admitted to  on 9.13. 1/2 pt transferred to .  2. Safety: routine obs appropriate as no current active SI/SIB/HI/VI on unit.   3. Psychiatric  - Nortriptyline decreased from 50mg to 25mg qHS for mood/anxiety as pt reporting side effects (tremors, worsening anxiety, difficulty sleeping). Initial plan to d/c and start Cymbalta...  - c/w Remeron 22.5mg qHS for mood/anxiety (home med).   - Klonopin changed from 0.5mg BID and 0.25mg qAM and noon to 0.5mg TID for anxiety.   - c/w Gabapentin 300mg BID for anxiety (home med).   - Melatonin 1mg qHS for insomnia.   - PRNs           - For anxiety: Gabapentin 100mg TID.           - For agitation: PO Zyprexa 2.5mg TID.            - STAT IM Ativan 1mg if there are imminent risk to self/others due to severe agitation.   4. Therapy: I/G/M therapy as indicated.   5. Medical: admission labs and EKG reviewed.   #HTN  - c/w Metoprolol 50mg bid (home med).   #HLD  - c/w Nexletol 180mg qHS (home med).   - c/w IM [id number] q2 weeks (last given 1/2) (home med).   #GERD  - c/w Pantoprazole 40mg qD (home med).   #Vit B12 deficiency   - pt due for injection per chart, for now started on PO 1000mcg qD.   #Nausea  - PRN PO Zofran 4mg TID.   6. Collateral:  advised of transfer to .   7. Disposition: pending clinical improvement; likely return to previous provider at [facility].  Ms. Gross is a 75yo female, , retired (), domiciled at home with , no children; PMHx HTN, HLD; PPHx d/o ANSON and MDD, 9 past psych admissions starting in 2019 (last [facility] April 2024 for worsening anxiety and depression), ECT at [facility] 2023, OP at [facility]; 1 past SA (OD on 9 pills of unknown medication in 2019), no h/o NSSIB, no h/o violence/aggression/legal issues, past marijuana use; initially admitted to [facility] 2 on 12/27 for increasing anxiety and SI w/o plans in the context of medication adjustments and psychosocial stressors.     On exam pt dysphoric, anxious, hopeless, and preoccupied with somatic sxs. There are elements of active passivity, severe sensitivity to feelings of inadequacy and complicated interpersonal relationships (, friends, providers). 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 Provided pt with psychoeducation regarding limitations of medications and need to also focus on therapeutic interventions however she has limited insight into this and is focused on finding the right medication(s). For now will continue to slowly taper off nortriptyline.     PLAN:   1. Legals: admitted to  on 9.13. 1/2 pt transferred to .  2. Safety: routine obs appropriate as no current active SI/SIB/HI/VI on unit.   3. Psychiatric  - Further taper Nortriptyline from 25mg to 12.5mg qHS at pt request due to reports of tremors, worsening anxiety, difficulty sleeping at home doses (50mg to 25mg on 12/27, 12.5mg on 1/3).   - c/w Remeron 22.5mg qHS for mood/anxiety (home med).   - Klonopin changed from 0.5mg BID and 0.25mg qAM and noon to 0.5mg TID for anxiety.   - Decreased Gabapentin 300mg TID to BID for anxiety and neuropathy due to pt concern for sedation.   - Melatonin 1mg qHS for insomnia.   - PRNs           - For anxiety and/or neuropathic pain: Gabapentin 100mg TID.           - For agitation: PO Zyprexa 2.5mg TID.            - STAT IM Ativan 1mg if there are imminent risk to self/others due to severe agitation.   4. Therapy: I/G/M therapy as indicated.   5. Medical: admission labs and EKG reviewed and wnl.   #HTN  - c/w Metoprolol 50mg bid (home med).   #HLD  - c/w Nexletol 180mg qHS (home med).   - c/w IM [id number] q2 weeks (last given 1/2) (home med).   #GERD  - c/w Pantoprazole 40mg qD (home med).   #Vit B12 deficiency   - pt due for injection per chart, for now started on PO 1000mcg qD.   #Nausea  - PRN PO Zofran 4mg TID.   6. Collateral:  advised of transfer to .   7. Disposition: pending clinical improvement; likely return to previous provider at [facility].  Ms. Gross is a 73yo female, , retired (), domiciled at home with , no children; PMHx HTN, HLD; PPHx d/o ANSON and MDD, 9 past psych admissions starting in 2019 (last [facility] April 2024 for worsening anxiety and depression), ECT at [facility] 2023, OP at [facility]; 1 past SA (OD on 9 pills of unknown medication in 2019), no h/o NSSIB, no h/o violence/aggression/legal issues, past marijuana use; initially admitted to [facility] 2 on 12/27 for increasing anxiety and SI w/o plans in the context of medication adjustments and psychosocial stressors.     On exam pt dysphoric, anxious, hopeless, and preoccupied with somatic sxs. There are elements of active passivity, severe sensitivity to feelings of inadequacy and complicated interpersonal relationships (, friends, providers). 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 Provided pt with psychoeducation regarding limitations of medications and need to also focus on therapeutic interventions however she has limited insight into this and is focused on finding the right medication(s). For now will continue to slowly taper off nortriptyline.     PLAN:   1. Legals: admitted to  on 9.13. 1/2 pt transferred to .  2. Safety: routine obs appropriate as no current active SI/SIB/HI/VI on unit.   3. Psychiatric  - Further taper Nortriptyline from 25mg to 12.5mg qHS at pt request due to reports of tremors, worsening anxiety, difficulty sleeping at home doses (50mg to 25mg on 12/27, 12.5mg on 1/3).   - c/w Remeron 22.5mg qHS for mood/anxiety (home med).   - Klonopin changed from 0.5mg BID and 0.25mg qAM and noon to 0.5mg TID for anxiety.   - Decreased Gabapentin 300mg TID to BID for anxiety and neuropathy due to pt concern for sedation.   - Melatonin 1mg qHS for insomnia.   - PRNs           - For anxiety and/or neuropathic pain: Gabapentin 100mg TID.           - For agitation: PO Zyprexa 2.5mg TID.            - STAT IM Zyprexa 2.5mg if there are imminent risk to self/others due to severe agitation.   4. Therapy: I/G/M therapy as indicated.   5. Medical: admission labs and EKG reviewed and wnl.   #HTN  - c/w Metoprolol 50mg bid (home med).   #HLD  - c/w Nexletol 180mg qHS (home med).   - c/w IM [id number] q2 weeks (last given 1/2) (home med).   #GERD  - c/w Pantoprazole 40mg qD (home med).   #Vit B12 deficiency   - pt due for injection per chart, for now started on PO 1000mcg qD.   #Nausea  - PRN PO Zofran 4mg TID.   6. Collateral:  advised of transfer to .   7. Disposition: pending clinical improvement; likely return to previous provider at [facility].  Ms. Gross is a 73yo female, , retired (), domiciled at home with , no children; PMHx HTN, HLD; PPHx d/o ANSON and MDD, 9 past psych admissions starting in 2019 (last [facility] April 2024 for worsening anxiety and depression), ECT at [facility] 2023, OP at [facility]; 1 past SA (OD on 9 pills of unknown medication in 2019), no h/o NSSIB, no h/o violence/aggression/legal issues, past marijuana use; initially admitted to [facility] 2 on 12/27 for increasing anxiety and SI w/o plans in the context of medication adjustments and psychosocial stressors.     On exam pt dysphoric, anxious, hopeless, and preoccupied with somatic sxs. There are elements of active passivity, severe sensitivity to feelings of inadequacy and complicated interpersonal relationships (, friends, providers). 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 Provided pt with psychoeducation regarding limitations of medications and need to focus on therapeutic interventions however she has limited insight into this and is intent on finding the right medication(s). For now will continue to slowly taper off nortriptyline.     PLAN:   1. Legals: admitted to  on 9.13. 1/2 pt transferred to .  2. Safety: routine obs appropriate as no current active SI/SIB/HI/VI on unit.   3. Psychiatric  - Further taper Nortriptyline from 25mg to 10mgmg qHS at pt request due to reports of tremors, worsening anxiety, difficulty sleeping at home doses (50mg to 25mg on 12/27, 12.5mg on 1/3).   - c/w Remeron 22.5mg qHS for mood/anxiety (home med).   - Klonopin changed from 0.5mg BID and 0.25mg qAM and noon to 0.5mg TID for anxiety.   - Decreased Gabapentin 300mg TID to BID for anxiety and neuropathy due to pt concern for sedation.   - Melatonin 1mg qHS for insomnia.   - PRNs           - For anxiety and/or neuropathic pain: Gabapentin 100mg TID.           - For agitation: PO Zyprexa 2.5mg TID.            - STAT IM Zyprexa 2.5mg if there are imminent risk to self/others due to severe agitation.   4. Therapy: I/G/M therapy as indicated.   5. Medical: admission labs and EKG reviewed and wnl.   #HTN  - c/w Metoprolol 50mg bid (home med).   #HLD  - c/w Nexletol 180mg qHS (home med).   - c/w IM [id number] q2 weeks (last given 1/2) (home med).   #GERD  - c/w Pantoprazole 40mg qD (home med).   #Vit B12 deficiency   - pt due for injection per chart, for now started on PO 1000mcg qD.   #Nausea  - PRN PO Zofran 4mg TID.   6. Collateral:  advised of transfer to .   7. Disposition: pending clinical improvement; likely return to previous provider at [facility].  Ms. Gross is a 73yo female, , retired (), domiciled at home with , no children; PMHx HTN, HLD; PPHx d/o ANSON and MDD, 9 past psych admissions starting in 2019 (last [facility] April 2024 for worsening anxiety and depression), ECT at [facility] 2023, OP at [facility]; 1 past SA (OD on 9 pills of unknown medication in 2019), no h/o NSSIB, no h/o violence/aggression/legal issues, past marijuana use; initially admitted to [facility] 2 on 12/27 for increasing anxiety and SI w/o plans in the context of medication adjustments and psychosocial stressors.     On exam pt dysphoric, anxious, hopeless, and preoccupied with somatic sxs. There are elements of active passivity, severe sensitivity to feelings of inadequacy and complicated interpersonal relationships (, friends, providers). 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 Provided pt with psychoeducation regarding limitations of medications and need to focus on therapeutic interventions however she has limited insight into this and is intent on finding the right medication(s). For now will continue to slowly taper off nortriptyline.     PLAN:   1. Legals: admitted to  on 9.13. 1/2 pt transferred to .  2. Safety: routine obs appropriate as no current active SI/SIB/HI/VI on unit.   3. Psychiatric  - Further taper Nortriptyline from 25mg to 10mgmg qHS at pt request due to reports of tremors, worsening anxiety, difficulty sleeping at home doses (50mg to 25mg on 12/27, 12.5mg on 1/3).   - c/w Remeron 22.5mg qHS for mood/anxiety (home med).   - Klonopin changed from 0.5mg BID and 0.25mg qAM and noon to 0.5mg TID for anxiety.   - Decreased Gabapentin 300mg TID to BID for anxiety and neuropathy due to pt concern for sedation.   - Melatonin 1mg qHS for insomnia.   - PRNs           - For anxiety and/or neuropathic pain: Gabapentin 100mg TID.           - For agitation: PO Zyprexa 2.5mg TID.            - STAT IM Zyprexa 2.5mg if there are imminent risk to self/others due to severe agitation.   4. Therapy: I/G/M therapy as indicated.   5. Medical: admission labs and EKG reviewed and wnl.   #HTN  - c/w Metoprolol 50mg bid (home med).   #HLD  - c/w Nexletol 180mg qHS (home med).   - c/w IM [id number] q2 weeks (last given 1/2) (home med).   #GERD  - c/w Pantoprazole 40mg qD (home med).   #Vit B12 deficiency   - pt due for injection per chart, for now started on PO 1000mcg qD.   #Nausea  - PRN PO Zofran 4mg TID.   6. Collateral: 1/3 Updated  Yehuda at [phone] who is agreeable with plan above.   7. Disposition: pending clinical improvement; likely return to previous provider at [facility].

## 2025-01-03 NOTE — BH INPATIENT PSYCHIATRY PROGRESS NOTE - NSBHFUPINTERVALHXFT_PSY_A_CORE
Chart reviewed. Case d/w interdisciplinary team. Patient seen and examined for follow up of anxiety. No acute events overnight. Compliant with standing medication. PRN gabapentin requested for anxiety. Per sleep log good/fair sleep. Pt transferred from 2W to 2S yesterday afternoon.     **Incomplete Note**  Chart reviewed. Case d/w interdisciplinary team. Patient seen and examined for follow up of anxiety and depression. No acute events overnight. Compliant with standing medication. PRN gabapentin requested for anxiety. Per sleep log good/fair sleep. Pt transferred from 2W to 2S yesterday afternoon.     Pt seen for initial assessment on 2S.  Chart reviewed. Case d/w interdisciplinary team. Patient seen and examined for follow up of anxiety and depression. No acute events overnight. Compliant with standing medication. PRN gabapentin requested for anxiety. Per sleep log good/fair sleep. Pt transferred from 2W to 2S yesterday afternoon.     Pt seen for initial assessment on 2S. She initially reports worsening anxiety and depression after transfer to the unit. Admits that she initially requested the transfer but now feels isolated and unable to relate well to the other patients. Says she has tried to stay out of the hospital for the last few weeks but finally requested direct admission from the outpatient clinic. Has difficulty attributing sxs to depression vs anxiety, but seems to feel the anxiety was worse outside of the hospital and now her mood is more depressed. At home she was unable to leave her room, shower, talk to friends. Experienced physical sxs that she is not sure if she can attribute to anxiety vs medications (shakiness, tremors, poor memory, leg pain, palpitations). Describes lack of motivation/anhedonia but unclear if this is more related to efforts to avoid unpleasant/negative stressors. Reports guilt, hopelessness, subjective impairments in cognition. Sleep is poor but helped by Remeron. Appetite intact. She denies current active/passive SIIP but reports intermittent passive ideation (wishing she was not alive so she could be with her parents). No h/o NSSIB. Reports subjective and physical anxiety sxs, but better able to share the physical (tremor, palpitations). Ruminates about her physical sxs, decline in functioning, burden she is putting on her  vs inability of her  to be there for her in the way she needs, medication effects, comparing her situation to friends/families. Pt denies current/past psychosis, sadia or HIIP/VI. She denies substance use.     Notes extended periods where she was back to her usual self, even since her hospitalizations began in 2019, but feels her sxs have been worsening over the past year due to med changes/side effects. Says she was able to go out with friends and enjoy herself for an afternoon a few weeks ago, but once she returned home she felt worse about herself and have been avoiding further attempts especially due to the holidays as she feels she cannot tolerate hearing about her friends' families and activities.     Outpatient care at [facility]. Sees a therapist weekly but does not find it effective. Reports muiltiple outpatient doctors (psychiatrists, neurologists, holistic doctors) but often feels rejected and hopeless. HTN and HLD is being managed with medications. Recently diagnosed with neuropathy but w/ unclear etiology.     Medication history:   - SSRIs:   	Prozac - Pt thinks she may have been on prozac over 20 years ago for depression and it was effective. Says she was trialed on it again but had to stop due to palpitations.   	Zoloft and Lexapro - tremors, shakiness, leg pain/swelling.   - SNRIs:  	Effexor - pt feels it was effective at lower doses but while admitted to [facility] they increased it to ?300 mg and she was afraid it was too high. After stopping it she reported withdrawal sxs.  	Cymbalta - pt thinks she was on it but cannot remember if it was helpful.   - TCAs:   	Nortriptyline - Initially started it in 2023 with good effect, then reports her outpatient doctor decreased it and she experienced withdrawal (shakiness, sweating). Now requesting to discontinue it due to shakiness, tremor, poor memory, ruminative thoughts.   - Other:  	Remeron - Helps with sleep but unsure if effective for anxiety and depression at this dose (22.5). Thinks higher doses may have helped.   	Seroquel - augmented lexapro while pt admitted to [facility], pt unsure why it was discontinued but remembers experiencing leg pain/swelling.   	Abilify - pt feels this was very helpful/the best she has ever felt, unclear why it was d/c'd but when she restarted it she experienced leg pain/swelling.     Discussed various treatment options with pt. She felt unsure about most of them, but adamant about slowly tapering off the nortriptyline. She appears most open to augmenting a serotonergic agent with Abilify. She was never on Lithium but is afraid of the side effects despite reassurance. Agrees with plan to slowly taper off nortriptyline and defer further changes.

## 2025-01-03 NOTE — BH INPATIENT PSYCHIATRY PROGRESS NOTE - MSE UNSTRUCTURED FT
GIFTY/BEH: In no acute distress; dressed in street clothes; calm & cooperative; fair eye contact.  SPEECH: Normal rate, tone, and volume.  MOTOR: No PMR/PMA; no other abnormal movements.  MOOD: “”.  AFFECT: Euthymic and mood congruent; full-range.  THOUGHT PROCESS: circumstantial, overinclusive.   THOUGHT CONTENT: Denies SI or HI; no evidence of delusions. preoccupied and ruminative.   COGNITION: Grossly intact.  INSIGHT: Fair.  JUDGMENT: Fair.  IMPULSE CONTROL: Intact on unit.  GIFTY/BEH: In no acute distress; dressed in own clothes, good grooming and hygiene. Calm & cooperative; fair eye contact.  SPEECH: Normal rate, tone, and volume.  MOTOR: No PMR/PMA; no other abnormal movements.  MOOD: “Depressed”.  AFFECT: Dysphoric, constricted.   THOUGHT PROCESS: Linear although overinclusive.   THOUGHT CONTENT: Denies SI or HI; no evidence of delusions. Preoccupied and ruminative.   COGNITION: Grossly intact.  INSIGHT: Poor.  JUDGMENT: Fair.  IMPULSE CONTROL: Intact on unit.

## 2025-01-03 NOTE — BH INPATIENT PSYCHIATRY PROGRESS NOTE - CURRENT MEDICATION
MEDICATIONS  (STANDING):  clonazePAM  Tablet 0.5 milliGRAM(s) Oral three times a day  cyanocobalamin 1000 MICROGram(s) Oral daily  evolocumab Injectable 140 milliGRAM(s) SubCutaneous every 2 weeks  gabapentin 300 milliGRAM(s) Oral two times a day  melatonin. 1 milliGRAM(s) Oral at bedtime  metoprolol succinate ER 50 milliGRAM(s) Oral two times a day  mirtazapine 22.5 milliGRAM(s) Oral at bedtime  Nexletol 180 mg 1 Tablet(s) 1 Tablet(s) Oral daily  nortriptyline 25 milliGRAM(s) Oral at bedtime  pantoprazole    Tablet 40 milliGRAM(s) Oral before breakfast    MEDICATIONS  (PRN):  acetaminophen     Tablet .. 650 milliGRAM(s) Oral every 6 hours PRN Temp greater or equal to 38C (100.4F), Mild Pain (1 - 3), Moderate Pain (4 - 6)  gabapentin 100 milliGRAM(s) Oral three times a day PRN anxiety  ibuprofen  Tablet. 400 milliGRAM(s) Oral every 8 hours PRN Mild Pain (1 - 3), Moderate Pain (4 - 6)  OLANZapine 2.5 milliGRAM(s) Oral three times a day PRN Agitation  ondansetron    Tablet 4 milliGRAM(s) Oral three times a day PRN Nausea   MEDICATIONS  (STANDING):  cyanocobalamin 1000 MICROGram(s) Oral daily  evolocumab Injectable 140 milliGRAM(s) SubCutaneous every 2 weeks  gabapentin 300 milliGRAM(s) Oral two times a day  melatonin. 1 milliGRAM(s) Oral at bedtime  metoprolol succinate ER 50 milliGRAM(s) Oral two times a day  mirtazapine 22.5 milliGRAM(s) Oral at bedtime  Nexletol 180 mg 1 Tablet(s) 1 Tablet(s) Oral daily  nortriptyline 12.5 milliGRAM(s) Oral at bedtime  pantoprazole    Tablet 40 milliGRAM(s) Oral before breakfast    MEDICATIONS  (PRN):  acetaminophen     Tablet .. 650 milliGRAM(s) Oral every 6 hours PRN Temp greater or equal to 38C (100.4F), Mild Pain (1 - 3), Moderate Pain (4 - 6)  clonazePAM Oral Disintegrating Tablet 0.5 milliGRAM(s) Oral three times a day PRN anxiety  gabapentin 100 milliGRAM(s) Oral three times a day PRN anxiety and/or neuropathic pain  ibuprofen  Tablet. 400 milliGRAM(s) Oral every 8 hours PRN Mild Pain (1 - 3), Moderate Pain (4 - 6)  OLANZapine 2.5 milliGRAM(s) Oral three times a day PRN Agitation  OLANZapine Injectable 2.5 milliGRAM(s) IntraMuscular once PRN severe agitation  ondansetron    Tablet 4 milliGRAM(s) Oral three times a day PRN Nausea   MEDICATIONS  (STANDING):  cyanocobalamin 1000 MICROGram(s) Oral daily  evolocumab Injectable 140 milliGRAM(s) SubCutaneous every 2 weeks  gabapentin 300 milliGRAM(s) Oral two times a day  melatonin. 1 milliGRAM(s) Oral at bedtime  metoprolol succinate ER 50 milliGRAM(s) Oral two times a day  mirtazapine 22.5 milliGRAM(s) Oral at bedtime  Nexletol 180 mg 1 Tablet(s) 1 Tablet(s) Oral daily  nortriptyline 10 milliGRAM(s) Oral at bedtime  pantoprazole    Tablet 40 milliGRAM(s) Oral before breakfast    MEDICATIONS  (PRN):  acetaminophen     Tablet .. 650 milliGRAM(s) Oral every 6 hours PRN Temp greater or equal to 38C (100.4F), Mild Pain (1 - 3), Moderate Pain (4 - 6)  clonazePAM Oral Disintegrating Tablet 0.5 milliGRAM(s) Oral three times a day PRN anxiety  gabapentin 100 milliGRAM(s) Oral three times a day PRN anxiety and/or neuropathic pain  ibuprofen  Tablet. 400 milliGRAM(s) Oral every 8 hours PRN Mild Pain (1 - 3), Moderate Pain (4 - 6)  OLANZapine 2.5 milliGRAM(s) Oral three times a day PRN Agitation  OLANZapine Injectable 2.5 milliGRAM(s) IntraMuscular once PRN severe agitation  ondansetron    Tablet 4 milliGRAM(s) Oral three times a day PRN Nausea

## 2025-01-03 NOTE — BH INPATIENT PSYCHIATRY PROGRESS NOTE - PRN MEDS
MEDICATIONS  (PRN):  acetaminophen     Tablet .. 650 milliGRAM(s) Oral every 6 hours PRN Temp greater or equal to 38C (100.4F), Mild Pain (1 - 3), Moderate Pain (4 - 6)  gabapentin 100 milliGRAM(s) Oral three times a day PRN anxiety  ibuprofen  Tablet. 400 milliGRAM(s) Oral every 8 hours PRN Mild Pain (1 - 3), Moderate Pain (4 - 6)  OLANZapine 2.5 milliGRAM(s) Oral three times a day PRN Agitation  ondansetron    Tablet 4 milliGRAM(s) Oral three times a day PRN Nausea   MEDICATIONS  (PRN):  acetaminophen     Tablet .. 650 milliGRAM(s) Oral every 6 hours PRN Temp greater or equal to 38C (100.4F), Mild Pain (1 - 3), Moderate Pain (4 - 6)  clonazePAM Oral Disintegrating Tablet 0.5 milliGRAM(s) Oral three times a day PRN anxiety  gabapentin 100 milliGRAM(s) Oral three times a day PRN anxiety and/or neuropathic pain  ibuprofen  Tablet. 400 milliGRAM(s) Oral every 8 hours PRN Mild Pain (1 - 3), Moderate Pain (4 - 6)  OLANZapine 2.5 milliGRAM(s) Oral three times a day PRN Agitation  OLANZapine Injectable 2.5 milliGRAM(s) IntraMuscular once PRN severe agitation  ondansetron    Tablet 4 milliGRAM(s) Oral three times a day PRN Nausea

## 2025-01-04 PROCEDURE — 99231 SBSQ HOSP IP/OBS SF/LOW 25: CPT

## 2025-01-04 RX ORDER — GABAPENTIN 400 MG/1
100 CAPSULE ORAL EVERY 8 HOURS
Refills: 0 | Status: DISCONTINUED | OUTPATIENT
Start: 2025-01-04 | End: 2025-01-06

## 2025-01-04 RX ORDER — CLONAZEPAM 0.5 MG/1
0.5 TABLET ORAL EVERY 8 HOURS
Refills: 0 | Status: DISCONTINUED | OUTPATIENT
Start: 2025-01-04 | End: 2025-01-06

## 2025-01-04 RX ADMIN — GABAPENTIN 300 MILLIGRAM(S): 400 CAPSULE ORAL at 08:54

## 2025-01-04 RX ADMIN — CLONAZEPAM 0.5 MILLIGRAM(S): 0.5 TABLET ORAL at 21:34

## 2025-01-04 RX ADMIN — GABAPENTIN 300 MILLIGRAM(S): 400 CAPSULE ORAL at 21:14

## 2025-01-04 RX ADMIN — Medication 40 MILLIGRAM(S): at 06:31

## 2025-01-04 RX ADMIN — Medication 1 MILLIGRAM(S): at 21:14

## 2025-01-04 RX ADMIN — Medication 10 MILLIGRAM(S): at 21:14

## 2025-01-04 RX ADMIN — METOPROLOL SUCCINATE 50 MILLIGRAM(S): 50 TABLET, EXTENDED RELEASE ORAL at 21:14

## 2025-01-04 RX ADMIN — CYANOCOBALAMIN 1000 MICROGRAM(S): 1000 INJECTION INTRAMUSCULAR; SUBCUTANEOUS at 08:54

## 2025-01-04 RX ADMIN — MIRTAZAPINE 22.5 MILLIGRAM(S): 30 TABLET, FILM COATED ORAL at 21:14

## 2025-01-04 RX ADMIN — Medication 4 MILLIGRAM(S): at 14:17

## 2025-01-04 RX ADMIN — METOPROLOL SUCCINATE 50 MILLIGRAM(S): 50 TABLET, EXTENDED RELEASE ORAL at 08:55

## 2025-01-04 RX ADMIN — CLONAZEPAM 0.5 MILLIGRAM(S): 0.5 TABLET ORAL at 09:14

## 2025-01-04 RX ADMIN — GABAPENTIN 100 MILLIGRAM(S): 400 CAPSULE ORAL at 14:17

## 2025-01-04 NOTE — BH INPATIENT PSYCHIATRY PROGRESS NOTE - CURRENT MEDICATION
MEDICATIONS  (STANDING):  cyanocobalamin 1000 MICROGram(s) Oral daily  evolocumab Injectable 140 milliGRAM(s) SubCutaneous every 2 weeks  gabapentin 300 milliGRAM(s) Oral two times a day  melatonin. 1 milliGRAM(s) Oral at bedtime  metoprolol succinate ER 50 milliGRAM(s) Oral two times a day  mirtazapine 22.5 milliGRAM(s) Oral at bedtime  Nexletol 180 mg 1 Tablet(s) 1 Tablet(s) Oral daily  nortriptyline 10 milliGRAM(s) Oral at bedtime  pantoprazole    Tablet 40 milliGRAM(s) Oral before breakfast    MEDICATIONS  (PRN):  acetaminophen     Tablet .. 650 milliGRAM(s) Oral every 6 hours PRN Temp greater or equal to 38C (100.4F), Mild Pain (1 - 3), Moderate Pain (4 - 6)  clonazePAM Oral Disintegrating Tablet 0.5 milliGRAM(s) Oral three times a day PRN anxiety  gabapentin 100 milliGRAM(s) Oral three times a day PRN anxiety and/or neuropathic pain  ibuprofen  Tablet. 400 milliGRAM(s) Oral every 8 hours PRN Mild Pain (1 - 3), Moderate Pain (4 - 6)  OLANZapine 2.5 milliGRAM(s) Oral three times a day PRN Agitation  OLANZapine Injectable 2.5 milliGRAM(s) IntraMuscular once PRN severe agitation  ondansetron    Tablet 4 milliGRAM(s) Oral three times a day PRN Nausea

## 2025-01-04 NOTE — BH INPATIENT PSYCHIATRY PROGRESS NOTE - NSBHCHARTREVIEWVS_PSY_A_CORE FT
Vital Signs Last 24 Hrs  T(C): 36.9 (01-04-25 @ 07:55), Max: 36.9 (01-04-25 @ 07:55)  T(F): 98.4 (01-04-25 @ 07:55), Max: 98.4 (01-04-25 @ 07:55)  HR: --  BP: 139/72 (01-03-25 @ 21:40) (108/67 - 139/72)  BP(mean): 86 (01-03-25 @ 21:40) (78 - 86)  RR: 18 (01-03-25 @ 21:40) (18 - 18)  SpO2: --    Orthostatic VS  01-04-25 @ 07:55  Lying BP: --/-- HR: --  Sitting BP: 139/79 HR: 79  Standing BP: 154/85 HR: 84  Site: --  Mode: --  Orthostatic VS  01-03-25 @ 07:55  Lying BP: --/-- HR: --  Sitting BP: 154/80 HR: 82  Standing BP: 150/93 HR: 89  Site: --  Mode: --

## 2025-01-04 NOTE — BH INPATIENT PSYCHIATRY PROGRESS NOTE - NSBHASSESSSUMMFT_PSY_ALL_CORE
Ms. Gross is a 75yo female, , retired (), domiciled at home with , no children; PMHx HTN, HLD; PPHx d/o ANSON and MDD, 9 past psych admissions starting in 2019 (last [facility] April 2024 for worsening anxiety and depression), ECT at [facility] 2023, OP at [facility]; 1 past SA (OD on 9 pills of unknown medication in 2019), no h/o NSSIB, no h/o violence/aggression/legal issues, past marijuana use; initially admitted to [facility] 2 on 12/27 for increasing anxiety and SI w/o plans in the context of medication adjustments and psychosocial stressors.     On exam pt dysphoric, anxious, hopeless, and preoccupied with somatic sxs. There are elements of active passivity, severe sensitivity to feelings of inadequacy and complicated interpersonal relationships (, friends, providers). 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 Provided pt with psychoeducation regarding limitations of medications and need to focus on therapeutic interventions however she has limited insight into this and is intent on finding the right medication(s). For now will continue to slowly taper off nortriptyline.     PLAN:   1. Legals: admitted to  on 9.13. 1/2 pt transferred to .  2. Safety: routine obs appropriate as no current active SI/SIB/HI/VI on unit.   3. Psychiatric  - Further taper Nortriptyline from 25mg to 10mgmg qHS at pt request due to reports of tremors, worsening anxiety, difficulty sleeping at home doses (50mg to 25mg on 12/27, 12.5mg on 1/3).   - c/w Remeron 22.5mg qHS for mood/anxiety (home med).   - Klonopin changed from 0.5mg BID and 0.25mg qAM and noon to 0.5mg TID for anxiety.   - Decreased Gabapentin 300mg TID to BID for anxiety and neuropathy due to pt concern for sedation.   - Melatonin 1mg qHS for insomnia.   - PRNs           - For anxiety and/or neuropathic pain: Gabapentin 100mg TID.           - For agitation: PO Zyprexa 2.5mg TID.            - STAT IM Zyprexa 2.5mg if there are imminent risk to self/others due to severe agitation.   4. Therapy: I/G/M therapy as indicated.   5. Medical: admission labs and EKG reviewed and wnl.   #HTN  - c/w Metoprolol 50mg bid (home med).   #HLD  - c/w Nexletol 180mg qHS (home med).   - c/w IM [id number] q2 weeks (last given 1/2) (home med).   #GERD  - c/w Pantoprazole 40mg qD (home med).   #Vit B12 deficiency   - pt due for injection per chart, for now started on PO 1000mcg qD.   #Nausea  - PRN PO Zofran 4mg TID.   6. Collateral: 1/3 Updated  Yehuda at [phone] who is agreeable with plan above.   7. Disposition: pending clinical improvement; likely return to previous provider at [facility].

## 2025-01-04 NOTE — BH INPATIENT PSYCHIATRY PROGRESS NOTE - PRN MEDS
MEDICATIONS  (PRN):  acetaminophen     Tablet .. 650 milliGRAM(s) Oral every 6 hours PRN Temp greater or equal to 38C (100.4F), Mild Pain (1 - 3), Moderate Pain (4 - 6)  clonazePAM Oral Disintegrating Tablet 0.5 milliGRAM(s) Oral three times a day PRN anxiety  gabapentin 100 milliGRAM(s) Oral three times a day PRN anxiety and/or neuropathic pain  ibuprofen  Tablet. 400 milliGRAM(s) Oral every 8 hours PRN Mild Pain (1 - 3), Moderate Pain (4 - 6)  OLANZapine 2.5 milliGRAM(s) Oral three times a day PRN Agitation  OLANZapine Injectable 2.5 milliGRAM(s) IntraMuscular once PRN severe agitation  ondansetron    Tablet 4 milliGRAM(s) Oral three times a day PRN Nausea

## 2025-01-04 NOTE — BH INPATIENT PSYCHIATRY PROGRESS NOTE - NSBHFUPINTERVALHXFT_PSY_A_CORE
Chart reviewed. Patient seen and examined for follow up of anxiety and depression. No acute events overnight. Compliant with standing medication. Requests prn clonazepam, focused on dosing schedule.  AVSS

## 2025-01-04 NOTE — BH INPATIENT PSYCHIATRY PROGRESS NOTE - NSBHMETABOLIC_PSY_ALL_CORE_FT
BMI: BMI (kg/m2): 37.5 (12-27-24 @ 15:12)  HbA1c: A1C with Estimated Average Glucose Result: 5.5 % (01-02-25 @ 09:11)    Glucose:   BP: 139/72 (01-03-25 @ 21:40) (108/67 - 139/72)Vital Signs Last 24 Hrs  T(C): 36.9 (01-04-25 @ 07:55), Max: 36.9 (01-04-25 @ 07:55)  T(F): 98.4 (01-04-25 @ 07:55), Max: 98.4 (01-04-25 @ 07:55)  HR: --  BP: 139/72 (01-03-25 @ 21:40) (108/67 - 139/72)  BP(mean): 86 (01-03-25 @ 21:40) (78 - 86)  RR: 18 (01-03-25 @ 21:40) (18 - 18)  SpO2: --    Orthostatic VS  01-04-25 @ 07:55  Lying BP: --/-- HR: --  Sitting BP: 139/79 HR: 79  Standing BP: 154/85 HR: 84  Site: --  Mode: --  Orthostatic VS  01-03-25 @ 07:55  Lying BP: --/-- HR: --  Sitting BP: 154/80 HR: 82  Standing BP: 150/93 HR: 89  Site: --  Mode: --    Lipid Panel: Date/Time: 01-02-25 @ 09:11  Cholesterol, Serum: 128  LDL Cholesterol Calculated: 53  HDL Cholesterol, Serum: 46  Total Cholesterol/HDL Ration Measurement: --  Triglycerides, Serum: 174

## 2025-01-04 NOTE — BH INPATIENT PSYCHIATRY PROGRESS NOTE - MSE UNSTRUCTURED FT
Awake an alert.  SPEECH: Normal rate, tone, and volume.  MOTOR: No PMR/PMA; no other abnormal movements.  AFFECT: Dysphoric, constricted.   THOUGHT PROCESS: Linear.   THOUGHT CONTENT: Denies SI or HI; no evidence of delusions. Preoccupied with benzo and "relief from anxiety" and ruminative.   COGNITION: Grossly intact.  INSIGHT: Poor.  JUDGMENT: Fair.  IMPULSE CONTROL: Intact on unit.

## 2025-01-05 PROCEDURE — 99231 SBSQ HOSP IP/OBS SF/LOW 25: CPT

## 2025-01-05 RX ADMIN — Medication 4 MILLIGRAM(S): at 09:43

## 2025-01-05 RX ADMIN — Medication 400 MILLIGRAM(S): at 09:43

## 2025-01-05 RX ADMIN — Medication 1 MILLIGRAM(S): at 21:32

## 2025-01-05 RX ADMIN — Medication 40 MILLIGRAM(S): at 06:35

## 2025-01-05 RX ADMIN — GABAPENTIN 300 MILLIGRAM(S): 400 CAPSULE ORAL at 21:32

## 2025-01-05 RX ADMIN — CLONAZEPAM 0.5 MILLIGRAM(S): 0.5 TABLET ORAL at 09:43

## 2025-01-05 RX ADMIN — METOPROLOL SUCCINATE 50 MILLIGRAM(S): 50 TABLET, EXTENDED RELEASE ORAL at 09:26

## 2025-01-05 RX ADMIN — CYANOCOBALAMIN 1000 MICROGRAM(S): 1000 INJECTION INTRAMUSCULAR; SUBCUTANEOUS at 09:26

## 2025-01-05 RX ADMIN — Medication 10 MILLIGRAM(S): at 21:33

## 2025-01-05 RX ADMIN — MIRTAZAPINE 22.5 MILLIGRAM(S): 30 TABLET, FILM COATED ORAL at 21:32

## 2025-01-05 RX ADMIN — METOPROLOL SUCCINATE 50 MILLIGRAM(S): 50 TABLET, EXTENDED RELEASE ORAL at 21:32

## 2025-01-05 RX ADMIN — GABAPENTIN 300 MILLIGRAM(S): 400 CAPSULE ORAL at 09:26

## 2025-01-05 RX ADMIN — Medication 400 MILLIGRAM(S): at 10:38

## 2025-01-05 NOTE — BH INPATIENT PSYCHIATRY PROGRESS NOTE - NSBHMETABOLIC_PSY_ALL_CORE_FT
BMI: BMI (kg/m2): 37.5 (12-27-24 @ 15:12)  HbA1c: A1C with Estimated Average Glucose Result: 5.5 % (01-02-25 @ 09:11)    Glucose:   BP: 139/72 (01-03-25 @ 21:40) (108/67 - 139/72)Vital Signs Last 24 Hrs  T(C): 36.6 (01-05-25 @ 07:55), Max: 36.6 (01-05-25 @ 07:55)  T(F): 97.9 (01-05-25 @ 07:55), Max: 97.9 (01-05-25 @ 07:55)  HR: --  BP: --  BP(mean): --  RR: --  SpO2: --    Orthostatic VS  01-05-25 @ 10:30  Lying BP: 123/70 HR: 79  Sitting BP: 124/72 HR: 83  Standing BP: --/-- HR: --  Site: upper left arm  Mode: electronic  Orthostatic VS  01-05-25 @ 07:55  Lying BP: --/-- HR: --  Sitting BP: 126/78 HR: 77  Standing BP: 144/70 HR: 83  Site: --  Mode: --  Orthostatic VS  01-04-25 @ 07:55  Lying BP: --/-- HR: --  Sitting BP: 139/79 HR: 79  Standing BP: 154/85 HR: 84  Site: --  Mode: --    Lipid Panel: Date/Time: 01-02-25 @ 09:11  Cholesterol, Serum: 128  LDL Cholesterol Calculated: 53  HDL Cholesterol, Serum: 46  Total Cholesterol/HDL Ration Measurement: --  Triglycerides, Serum: 174

## 2025-01-05 NOTE — BH INPATIENT PSYCHIATRY PROGRESS NOTE - MSE UNSTRUCTURED FT
Awake an alert.  SPEECH: Normal rate, tone, and volume.  MOTOR: mild PMR   AFFECT: neutral/improved    THOUGHT PROCESS: Linear.   THOUGHT CONTENT: Denies SI or HI; no evidence of delusions. Preoccupied with benzo and "relief from anxiety" and ruminative.   COGNITION: Grossly intact.  INSIGHT: Poor.  JUDGMENT: Fair.  IMPULSE CONTROL: Intact on unit.   No suicidal ideations

## 2025-01-05 NOTE — BH INPATIENT PSYCHIATRY PROGRESS NOTE - PRN MEDS
MEDICATIONS  (PRN):  acetaminophen     Tablet .. 650 milliGRAM(s) Oral every 6 hours PRN Temp greater or equal to 38C (100.4F), Mild Pain (1 - 3), Moderate Pain (4 - 6)  clonazePAM Oral Disintegrating Tablet 0.5 milliGRAM(s) Oral every 8 hours PRN anxiety  gabapentin 100 milliGRAM(s) Oral every 8 hours PRN anxiety and/or neuropathic pain  ibuprofen  Tablet. 400 milliGRAM(s) Oral every 8 hours PRN Mild Pain (1 - 3), Moderate Pain (4 - 6)  OLANZapine 2.5 milliGRAM(s) Oral three times a day PRN Agitation  OLANZapine Injectable 2.5 milliGRAM(s) IntraMuscular once PRN severe agitation  ondansetron    Tablet 4 milliGRAM(s) Oral three times a day PRN Nausea

## 2025-01-05 NOTE — BH INPATIENT PSYCHIATRY PROGRESS NOTE - CURRENT MEDICATION
MEDICATIONS  (STANDING):  cyanocobalamin 1000 MICROGram(s) Oral daily  evolocumab Injectable 140 milliGRAM(s) SubCutaneous every 2 weeks  gabapentin 300 milliGRAM(s) Oral two times a day  melatonin. 1 milliGRAM(s) Oral at bedtime  metoprolol succinate ER 50 milliGRAM(s) Oral two times a day  mirtazapine 22.5 milliGRAM(s) Oral at bedtime  Nexletol 180 mg 1 Tablet(s) 1 Tablet(s) Oral daily  nortriptyline 10 milliGRAM(s) Oral at bedtime  pantoprazole    Tablet 40 milliGRAM(s) Oral before breakfast    MEDICATIONS  (PRN):  acetaminophen     Tablet .. 650 milliGRAM(s) Oral every 6 hours PRN Temp greater or equal to 38C (100.4F), Mild Pain (1 - 3), Moderate Pain (4 - 6)  clonazePAM Oral Disintegrating Tablet 0.5 milliGRAM(s) Oral every 8 hours PRN anxiety  gabapentin 100 milliGRAM(s) Oral every 8 hours PRN anxiety and/or neuropathic pain  ibuprofen  Tablet. 400 milliGRAM(s) Oral every 8 hours PRN Mild Pain (1 - 3), Moderate Pain (4 - 6)  OLANZapine 2.5 milliGRAM(s) Oral three times a day PRN Agitation  OLANZapine Injectable 2.5 milliGRAM(s) IntraMuscular once PRN severe agitation  ondansetron    Tablet 4 milliGRAM(s) Oral three times a day PRN Nausea

## 2025-01-05 NOTE — BH INPATIENT PSYCHIATRY PROGRESS NOTE - NSBHCHARTREVIEWVS_PSY_A_CORE FT
Vital Signs Last 24 Hrs  T(C): 36.6 (01-05-25 @ 07:55), Max: 36.6 (01-05-25 @ 07:55)  T(F): 97.9 (01-05-25 @ 07:55), Max: 97.9 (01-05-25 @ 07:55)  HR: --  BP: --  BP(mean): --  RR: --  SpO2: --    Orthostatic VS  01-05-25 @ 10:30  Lying BP: 123/70 HR: 79  Sitting BP: 124/72 HR: 83  Standing BP: --/-- HR: --  Site: upper left arm  Mode: electronic  Orthostatic VS  01-05-25 @ 07:55  Lying BP: --/-- HR: --  Sitting BP: 126/78 HR: 77  Standing BP: 144/70 HR: 83  Site: --  Mode: --  Orthostatic VS  01-04-25 @ 07:55  Lying BP: --/-- HR: --  Sitting BP: 139/79 HR: 79  Standing BP: 154/85 HR: 84  Site: --  Mode: --

## 2025-01-05 NOTE — BH INPATIENT PSYCHIATRY PROGRESS NOTE - NSBHASSESSSUMMFT_PSY_ALL_CORE
Ms. Gross is a 73yo female, , retired (), domiciled at home with , no children; PMHx HTN, HLD; PPHx d/o ANSON and MDD, 9 past psych admissions starting in 2019 (last [facility] April 2024 for worsening anxiety and depression), ECT at [facility] 2023, OP at [facility]; 1 past SA (OD on 9 pills of unknown medication in 2019), no h/o NSSIB, no h/o violence/aggression/legal issues, past marijuana use; initially admitted to [facility] 2 on 12/27 for increasing anxiety and SI w/o plans in the context of medication adjustments and psychosocial stressors.     On exam pt dysphoric, anxious, hopeless, and preoccupied with somatic sxs. There are elements of active passivity, severe sensitivity to feelings of inadequacy and complicated interpersonal relationships (, friends, providers). 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 Provided pt with psychoeducation regarding limitations of medications and need to focus on therapeutic interventions however she has limited insight into this and is intent on finding the right medication(s). For now will continue to slowly taper off nortriptyline.     PLAN:   1. Legals: admitted to  on 9.13. 1/2 pt transferred to .  2. Safety: routine obs appropriate as no current active SI/SIB/HI/VI on unit.   3. Psychiatric  - Further taper Nortriptyline from 25mg to 10mgmg qHS at pt request due to reports of tremors, worsening anxiety, difficulty sleeping at home doses (50mg to 25mg on 12/27, 12.5mg on 1/3).   - c/w Remeron 22.5mg qHS for mood/anxiety (home med).   - Klonopin changed from 0.5mg BID and 0.25mg qAM and noon to 0.5mg TID for anxiety.   - Decreased Gabapentin 300mg TID to BID for anxiety and neuropathy due to pt concern for sedation.   - Melatonin 1mg qHS for insomnia.   - PRNs           - For anxiety and/or neuropathic pain: Gabapentin 100mg TID.           - For agitation: PO Zyprexa 2.5mg TID.            - STAT IM Zyprexa 2.5mg if there are imminent risk to self/others due to severe agitation.   4. Therapy: I/G/M therapy as indicated.   5. Medical: admission labs and EKG reviewed and wnl.   #HTN  - c/w Metoprolol 50mg bid (home med).   #HLD  - c/w Nexletol 180mg qHS (home med).   - c/w IM [id number] q2 weeks (last given 1/2) (home med).   #GERD  - c/w Pantoprazole 40mg qD (home med).   #Vit B12 deficiency   - pt due for injection per chart, for now started on PO 1000mcg qD.   #Nausea  - PRN PO Zofran 4mg TID.   6. Collateral: 1/3 Updated  Yehuda at [phone] who is agreeable with plan above.   7. Disposition: pending clinical improvement; likely return to previous provider at [facility].

## 2025-01-06 PROCEDURE — 99232 SBSQ HOSP IP/OBS MODERATE 35: CPT | Mod: GC

## 2025-01-06 RX ORDER — VILAZODONE HYDROCHLORIDE 40 MG/1
1 TABLET, FILM COATED ORAL
Qty: 30 | Refills: 0
Start: 2025-01-06 | End: 2025-02-04

## 2025-01-06 RX ORDER — VORTIOXETINE 20 MG/1
1 TABLET, FILM COATED ORAL
Qty: 30 | Refills: 0
Start: 2025-01-06 | End: 2025-02-04

## 2025-01-06 RX ORDER — DEXTROMETHORPHAN HYDROBROMIDE, BUPROPION HYDROCHLORIDE 105; 45 MG/1; MG/1
1 TABLET, MULTILAYER, EXTENDED RELEASE ORAL
Qty: 30 | Refills: 0
Start: 2025-01-06 | End: 2025-02-04

## 2025-01-06 RX ORDER — CLONAZEPAM 0.5 MG/1
0.5 TABLET ORAL ONCE
Refills: 0 | Status: DISCONTINUED | OUTPATIENT
Start: 2025-01-06 | End: 2025-01-06

## 2025-01-06 RX ORDER — CLONAZEPAM 0.5 MG/1
0.25 TABLET ORAL EVERY 8 HOURS
Refills: 0 | Status: DISCONTINUED | OUTPATIENT
Start: 2025-01-06 | End: 2025-01-07

## 2025-01-06 RX ORDER — CLONAZEPAM 0.5 MG/1
0.5 TABLET ORAL
Refills: 0 | Status: DISCONTINUED | OUTPATIENT
Start: 2025-01-06 | End: 2025-01-07

## 2025-01-06 RX ADMIN — CYANOCOBALAMIN 1000 MICROGRAM(S): 1000 INJECTION INTRAMUSCULAR; SUBCUTANEOUS at 09:19

## 2025-01-06 RX ADMIN — Medication 400 MILLIGRAM(S): at 09:47

## 2025-01-06 RX ADMIN — METOPROLOL SUCCINATE 50 MILLIGRAM(S): 50 TABLET, EXTENDED RELEASE ORAL at 09:19

## 2025-01-06 RX ADMIN — GABAPENTIN 100 MILLIGRAM(S): 400 CAPSULE ORAL at 13:06

## 2025-01-06 RX ADMIN — CLONAZEPAM 0.5 MILLIGRAM(S): 0.5 TABLET ORAL at 21:28

## 2025-01-06 RX ADMIN — GABAPENTIN 300 MILLIGRAM(S): 400 CAPSULE ORAL at 21:25

## 2025-01-06 RX ADMIN — CLONAZEPAM 0.5 MILLIGRAM(S): 0.5 TABLET ORAL at 14:07

## 2025-01-06 RX ADMIN — Medication 1 MILLIGRAM(S): at 21:25

## 2025-01-06 RX ADMIN — Medication 10 MILLIGRAM(S): at 21:25

## 2025-01-06 RX ADMIN — METOPROLOL SUCCINATE 50 MILLIGRAM(S): 50 TABLET, EXTENDED RELEASE ORAL at 21:25

## 2025-01-06 RX ADMIN — MIRTAZAPINE 22.5 MILLIGRAM(S): 30 TABLET, FILM COATED ORAL at 21:25

## 2025-01-06 RX ADMIN — Medication 400 MILLIGRAM(S): at 10:47

## 2025-01-06 RX ADMIN — GABAPENTIN 300 MILLIGRAM(S): 400 CAPSULE ORAL at 09:19

## 2025-01-06 RX ADMIN — CLONAZEPAM 0.5 MILLIGRAM(S): 0.5 TABLET ORAL at 07:39

## 2025-01-06 NOTE — BH INPATIENT PSYCHIATRY PROGRESS NOTE - CURRENT MEDICATION
MEDICATIONS  (STANDING):  cyanocobalamin 1000 MICROGram(s) Oral daily  evolocumab Injectable 140 milliGRAM(s) SubCutaneous every 2 weeks  gabapentin 300 milliGRAM(s) Oral two times a day  melatonin. 1 milliGRAM(s) Oral at bedtime  metoprolol succinate ER 50 milliGRAM(s) Oral two times a day  mirtazapine 22.5 milliGRAM(s) Oral at bedtime  Nexletol 180 mg 1 Tablet(s) 1 Tablet(s) Oral daily  nortriptyline 10 milliGRAM(s) Oral at bedtime  pantoprazole    Tablet 40 milliGRAM(s) Oral before breakfast    MEDICATIONS  (PRN):  acetaminophen     Tablet .. 650 milliGRAM(s) Oral every 6 hours PRN Temp greater or equal to 38C (100.4F), Mild Pain (1 - 3), Moderate Pain (4 - 6)  clonazePAM Oral Disintegrating Tablet 0.5 milliGRAM(s) Oral every 8 hours PRN anxiety  gabapentin 100 milliGRAM(s) Oral every 8 hours PRN anxiety and/or neuropathic pain  ibuprofen  Tablet. 400 milliGRAM(s) Oral every 8 hours PRN Mild Pain (1 - 3), Moderate Pain (4 - 6)  OLANZapine 2.5 milliGRAM(s) Oral three times a day PRN Agitation  OLANZapine Injectable 2.5 milliGRAM(s) IntraMuscular once PRN severe agitation  ondansetron    Tablet 4 milliGRAM(s) Oral three times a day PRN Nausea   MEDICATIONS  (STANDING):  clonazePAM Oral Disintegrating Tablet 0.5 milliGRAM(s) Oral <User Schedule>  cyanocobalamin 1000 MICROGram(s) Oral daily  evolocumab Injectable 140 milliGRAM(s) SubCutaneous every 2 weeks  gabapentin 300 milliGRAM(s) Oral two times a day  melatonin. 1 milliGRAM(s) Oral at bedtime  metoprolol succinate ER 50 milliGRAM(s) Oral two times a day  mirtazapine 22.5 milliGRAM(s) Oral at bedtime  Nexletol 180 mg 1 Tablet(s) 1 Tablet(s) Oral daily  nortriptyline 10 milliGRAM(s) Oral at bedtime  pantoprazole    Tablet 40 milliGRAM(s) Oral before breakfast    MEDICATIONS  (PRN):  acetaminophen     Tablet .. 650 milliGRAM(s) Oral every 6 hours PRN Temp greater or equal to 38C (100.4F), Mild Pain (1 - 3), Moderate Pain (4 - 6)  clonazePAM Oral Disintegrating Tablet 0.25 milliGRAM(s) Oral every 8 hours PRN anxiety  ibuprofen  Tablet. 400 milliGRAM(s) Oral every 8 hours PRN Mild Pain (1 - 3), Moderate Pain (4 - 6)  ondansetron    Tablet 4 milliGRAM(s) Oral three times a day PRN Nausea

## 2025-01-06 NOTE — BH INPATIENT PSYCHIATRY PROGRESS NOTE - NSBHASSESSSUMMFT_PSY_ALL_CORE
Ms. Gross is a 73yo female, , retired (), domiciled at home with , no children; PMHx HTN, HLD; PPHx d/o ANSON and MDD, 9 past psych admissions starting in 2019 (last [facility] April 2024 for worsening anxiety and depression), ECT at [facility] 2023, OP at [facility]; 1 past SA (OD on 9 pills of unknown medication in 2019), no h/o NSSIB, no h/o violence/aggression/legal issues, past marijuana use; initially admitted to [facility] on 12/27 for increasing anxiety and SI w/o plans in the context of medication adjustments and psychosocial stressors.     On exam pt dysphoric, anxious, hopeless, and preoccupied with somatic sxs. There are elements of active passivity, severe sensitivity to feelings of inadequacy and complicated interpersonal relationships (, friends, providers). 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 Provided pt with psychoeducation regarding limitations of medications and need to focus on therapeutic interventions however she has limited insight into this and is intent on finding the right medication(s). For now will continue to slowly taper off nortriptyline.     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         PLAN:   1. Legals: admitted to  on 9.13. 1/2 pt transferred to .  2. Safety: routine obs appropriate as no current active SI/SIB/HI/VI on unit.   3. Psychiatric  - c/w Nortriptyline 10mgmg qHS with plans to discontinue at pt request due to reports of tremors, worsening anxiety, difficulty sleeping at home doses (50mg to 25mg on 12/27, 12.5mg on 1/3). Will defer further medication changes until Nortriptyline is d/c'd given pt concern for side effects/past difficulty tolerating serotonergic meds.   - c/w Remeron 22.5mg qHS for mood/anxiety (home med).   - Decreased Gabapentin 300mg TID to BID for anxiety and neuropathy due to pt concern for sedation.   - Melatonin 1mg qHS for insomnia.   - PRNs           - For anxiety: Klonopin wafer 0.5mg q8h (pt was taking 3-4x per day at home).           - For anxiety and/or neuropathic pain: Gabapentin 100mg TID.           - For agitation: PO Zyprexa 2.5mg TID.            - STAT IM Zyprexa 2.5mg if there are imminent risk to self/others due to severe agitation.   4. Therapy: I/G/M therapy as indicated.   5. Medical: admission labs and EKG reviewed and wnl.   #HTN  - c/w Metoprolol 50mg bid (home med).   #HLD  - c/w Nexletol 180mg qHS (home med).   - c/w IM [id number] q2 weeks (last given 1/2) (home med).   #GERD  - c/w Pantoprazole 40mg qD (home med).   #Vit B12 deficiency   - pt due for injection per chart, for now started on PO 1000mcg qD.   #Nausea  - PRN PO Zofran 4mg TID.   6. Collateral: 1/3 Updated  Yehuda at [phone] who is agreeable with plan above.   7. Disposition: pending clinical improvement; likely return to previous provider at [facility] vs partial.  Ms. Gross is a 75yo female, , retired (), domiciled at home with , no children; PMHx HTN, HLD; PPHx d/o ANSON and MDD, 9 past psych admissions starting in 2019 (last [facility] April 2024 for worsening anxiety and depression), ECT at [facility] 2023, OP at [facility]; 1 past SA (OD on 9 pills of unknown medication in 2019), no h/o NSSIB, no h/o violence/aggression/legal issues, past marijuana use; initially admitted to [facility] 2W on 12/27 for increasing anxiety and SI w/o plans in the context of medication adjustments and psychosocial stressors.     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     On initial  assessment pt dysphoric, anxious, hopeless, and preoccupied with somatic sxs. There are elements of active passivity, severe sensitivity to feelings of inadequacy and complicated interpersonal relationships (, friends, providers). 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 Provided pt with psychoeducation regarding limitations of medications and need to focus on therapeutic interventions however she has limited insight into this and is intent on finding the right medication(s). For now will continue to slowly taper off nortriptyline.     1/6: Prominent treatment interfering behaviors including active passivity and rejection sensitivity. For now will continue nortriptyline taper. Considering atypical antidepressant trial. Pt reporting chronic b/l LE edema, will encourage her to use compression stocking and elevate legs, no indication for diuretic at this time.     PLAN:   1. Legals: admitted to  on 9.13. 1/2 pt transferred to .  2. Safety: routine obs appropriate as no current active SI/SIB/HI/VI on unit.   3. Psychiatric  - c/w Nortriptyline 10mg qHS with plans to discontinue at pt request due to reports of tremors, worsening anxiety, difficulty sleeping at home doses (50mg to 25mg on 12/27, 12.5mg on 1/3). Will defer further medication changes until Nortriptyline is d/c'd given pt concern for side effects/past difficulty tolerating serotonergic meds.   - c/w Remeron 22.5mg qHS for mood/anxiety (home med).   - c/w Klonopin 0.5mg TID for anxiety (home med).   - Decreased Gabapentin 300mg TID to BID for anxiety and neuropathy due to pt concern for sedation.   - Melatonin 1mg qHS for insomnia.   - PRNs           - For anxiety: Klonopin wafer 0.25 mg q8h. d/c'd PRN gabapentin to simplify meds.            - For agitation: PO Zyprexa 2.5mg TID.            - STAT IM Zyprexa 2.5mg if there are imminent risk to self/others due to severe agitation.   4. Therapy: I/G/M therapy as indicated.   5. Medical: admission labs and EKG reviewed and wnl.   #HTN  - c/w Metoprolol 50mg bid (home med).   #HLD  - c/w Nexletol 180mg qHS (home med).   - c/w IM [id number] q2 weeks (last given 1/2) (home med).   #GERD  - c/w Pantoprazole 40mg qD (home med).   #Vit B12 deficiency   - pt due for injection per chart, for now started on PO 1000mcg qD.   #Nausea  - PRN PO Zofran 4mg TID.   6. Collateral: 1/3 Updated  Yehuda at [phone] who is agreeable with plan above.   7. Disposition: pending clinical improvement; likely return to previous provider at [facility] vs partial.

## 2025-01-06 NOTE — PHYSICAL THERAPY INITIAL EVALUATION ADULT - PERTINENT HX OF CURRENT PROBLEM, REHAB EVAL
Massage Therapy Progress Note      Length of treatment: 60-minute    Authorization: Patient request    Reviewed contraindications/current health status with Patient    No Contraindications to massage therapy exist    Subjective:  Patient complains of:  Tightness in the head, neck, and shoulders  Client is here mostly to relax    Pain report prior to treatment:  Pain relief not a goal of massage    Type of treatment requested: Comfort massage and Therapeutic massage    Specific requests:  Full body     Objective Observations:  Hypertonicity noted in:  Upper trapezius Bilateral, levator scapula bilateral, Gluteus medius bilateral but greater on the Rt., hamstring  Group Right and erectors bilateral but greater on the Rt.    Hypotonicity/Ischemia noted in: None noted    Decreased ROM noted in No areas    Additional observations:  None    Assessment:  Patient received Neuromuscular therapy, Cross-fiber friction, Longitudinal friction, Trigger point therapy, Swedish techniques and Myofascial release to affected tissues.    Additional treatment provided: 2 warm moist towels applied to mid to upper back    Response to treatment: Hypertonicity in  noted tissues decreased, Patient's breathing slowed and Patient relaxed easily    Pain report after treatment:N/A    Education/Resources: None provided this visit    Plan/Recommendations:  Increase water consumption  Cold compress encouraged in low back/SI  Massage therapy as desired  Session concluded  
Patient is a 75yo female, , retired (), domiciled at home with , prior psychiatric diagnosis of ANSON and MDD, 6 previous inpatient psychiatric hospitalizations at [facility], no history of NSSIB, no history of violence/aggression, no hx of legal issues, hx of marijuana use, PMHx of HTN, hyperlipidemia.  Patient was refer form the psychiatric geriatric clinic as the patient reported increase depression and anxiety accompanied with passive SI. Patient referred to physical therapy secondary to deconditioning.
Patient is 74 year old female, , retired (), domiciled at home with , prior psychiatric diagnosis of ANSON and MDD, 6 previous inpatient psychiatric hospitalizations at [facility] ,( [facility] from 3/28/23-4/29/23, and more recent admission to [facility] from 5/10-6/12/23 for worsening of anxiety, depression and passive SI) and last admission to [facility] from 4/16-5/31/24 for worsening of anxiety and depression. Pt. has a prior SA ( Oding on pills) in 11/2019 by overdosing on pills, no history of NSSIB, no history of violence/aggression, no hx of legal issues, hx of marijuana use, PMHx of HTN, hyperlipidemia. Patient was admitted to psychiatry due to increase anxiety, reporting SI.

## 2025-01-06 NOTE — PHYSICAL THERAPY INITIAL EVALUATION ADULT - IMPAIRMENTS FOUND, PT EVAL
aerobic capacity/endurance/gait, locomotion, and balance/muscle strength
gait, locomotion, and balance/joint integrity and mobility/muscle strength/poor safety awareness

## 2025-01-06 NOTE — PHYSICAL THERAPY INITIAL EVALUATION ADULT - NSPTDISCHREC_GEN_A_CORE
Nursing recommendation: please encourage ambulation on unit and under nursing supervision.
Outpatient PT

## 2025-01-06 NOTE — BH INPATIENT PSYCHIATRY PROGRESS NOTE - NSBHCHARTREVIEWVS_PSY_A_CORE FT
Vital Signs Last 24 Hrs  T(C): 36.5 (01-06-25 @ 07:53), Max: 36.5 (01-05-25 @ 20:41)  T(F): 97.7 (01-06-25 @ 07:53), Max: 97.7 (01-05-25 @ 20:41)  HR: --  BP: --  BP(mean): --  RR: --  SpO2: --    Orthostatic VS  01-06-25 @ 07:53  Lying BP: --/-- HR: --  Sitting BP: 136/72 HR: 81  Standing BP: 147/73 HR: 93  Site: --  Mode: --  Orthostatic VS  01-05-25 @ 20:41  Lying BP: --/-- HR: --  Sitting BP: 131/78 HR: 74  Standing BP: 132/82 HR: 81  Site: --  Mode: --  Orthostatic VS  01-05-25 @ 10:30  Lying BP: 123/70 HR: 79  Sitting BP: 124/72 HR: 83  Standing BP: --/-- HR: --  Site: upper left arm  Mode: electronic  Orthostatic VS  01-05-25 @ 07:55  Lying BP: --/-- HR: --  Sitting BP: 126/78 HR: 77  Standing BP: 144/70 HR: 83  Site: --  Mode: --

## 2025-01-06 NOTE — BH INPATIENT PSYCHIATRY PROGRESS NOTE - NSBHMETABOLIC_PSY_ALL_CORE_FT
BMI: BMI (kg/m2): 37.5 (12-27-24 @ 15:12)  HbA1c: A1C with Estimated Average Glucose Result: 5.5 % (01-02-25 @ 09:11)    Glucose:   BP: 139/72 (01-03-25 @ 21:40) (108/67 - 139/72)Vital Signs Last 24 Hrs  T(C): 36.5 (01-06-25 @ 07:53), Max: 36.5 (01-05-25 @ 20:41)  T(F): 97.7 (01-06-25 @ 07:53), Max: 97.7 (01-05-25 @ 20:41)  HR: --  BP: --  BP(mean): --  RR: --  SpO2: --    Orthostatic VS  01-06-25 @ 07:53  Lying BP: --/-- HR: --  Sitting BP: 136/72 HR: 81  Standing BP: 147/73 HR: 93  Site: --  Mode: --  Orthostatic VS  01-05-25 @ 20:41  Lying BP: --/-- HR: --  Sitting BP: 131/78 HR: 74  Standing BP: 132/82 HR: 81  Site: --  Mode: --  Orthostatic VS  01-05-25 @ 10:30  Lying BP: 123/70 HR: 79  Sitting BP: 124/72 HR: 83  Standing BP: --/-- HR: --  Site: upper left arm  Mode: electronic  Orthostatic VS  01-05-25 @ 07:55  Lying BP: --/-- HR: --  Sitting BP: 126/78 HR: 77  Standing BP: 144/70 HR: 83  Site: --  Mode: --    Lipid Panel: Date/Time: 01-02-25 @ 09:11  Cholesterol, Serum: 128  LDL Cholesterol Calculated: 53  HDL Cholesterol, Serum: 46  Total Cholesterol/HDL Ration Measurement: --  Triglycerides, Serum: 174

## 2025-01-06 NOTE — BH INPATIENT PSYCHIATRY PROGRESS NOTE - PRN MEDS
MEDICATIONS  (PRN):  acetaminophen     Tablet .. 650 milliGRAM(s) Oral every 6 hours PRN Temp greater or equal to 38C (100.4F), Mild Pain (1 - 3), Moderate Pain (4 - 6)  clonazePAM Oral Disintegrating Tablet 0.5 milliGRAM(s) Oral every 8 hours PRN anxiety  gabapentin 100 milliGRAM(s) Oral every 8 hours PRN anxiety and/or neuropathic pain  ibuprofen  Tablet. 400 milliGRAM(s) Oral every 8 hours PRN Mild Pain (1 - 3), Moderate Pain (4 - 6)  OLANZapine 2.5 milliGRAM(s) Oral three times a day PRN Agitation  OLANZapine Injectable 2.5 milliGRAM(s) IntraMuscular once PRN severe agitation  ondansetron    Tablet 4 milliGRAM(s) Oral three times a day PRN Nausea   MEDICATIONS  (PRN):  acetaminophen     Tablet .. 650 milliGRAM(s) Oral every 6 hours PRN Temp greater or equal to 38C (100.4F), Mild Pain (1 - 3), Moderate Pain (4 - 6)  clonazePAM Oral Disintegrating Tablet 0.25 milliGRAM(s) Oral every 8 hours PRN anxiety  ibuprofen  Tablet. 400 milliGRAM(s) Oral every 8 hours PRN Mild Pain (1 - 3), Moderate Pain (4 - 6)  ondansetron    Tablet 4 milliGRAM(s) Oral three times a day PRN Nausea

## 2025-01-06 NOTE — BH INPATIENT PSYCHIATRY PROGRESS NOTE - NSBHFUPINTERVALHXFT_PSY_A_CORE
Chart reviewed. Case d/w interdisciplinary team. Patient seen and examined for follow up of anxiety and depression. No acute events overnight. Compliant with standing medication. PRN klonopin requested for anxiety. Per sleep log good/fair sleep.     **Incomplete Note**   Chart reviewed. Case d/w interdisciplinary team. Patient seen and examined for follow up of anxiety and depression. No acute events overnight. Compliant with standing medication. PRN klonopin requested for anxiety. Per sleep log good/fair sleep.     prominent maladaptive personality traits and treatment interfering behaviors  Chart reviewed. Case d/w interdisciplinary team. Patient seen and examined for follow up of anxiety and depression. No acute events overnight. Compliant with standing medication. PRN klonopin requested for anxiety. Per sleep log fair sleep.     Today Cammie reports anxiety, depressed mood and hopelessness in response to medication changes and negative interactions with staff. Remains fixated on dosing schedule for medications (especially gabapentin, klonopin and nortriptyline). She recounts much of what was already discussed on Friday and often changes the subject when pressed about ineffective behaviors she is engaging in on the unit, as well as past med trials. Her report of symptoms is inconsistent even through the interview, and seem to depend on what topic is being discussed. She denies SIIP/NSSIB and overall is future-oriented despite prominent active passivity. She denies adverse effects associated with nortriptyline withdrawal, although reports reading about severe withdrawal sxs on the internet. Pt is concerned with her leg swelling and asks for diuretics. After extensive psychoeducation she agrees to change the size of her compression stockings, improve ambulation and elevate her legs when resting.     Reviewed medications and dosing schedule with patient. Discussed plan to complete nortriptyline taper before making additional changes. Mentioned the discussions her outpatient provider had with her regarding Trintellix, pt initially says she never heard of it, then reports uncertainty about the med given TV ads she's seen listing adverse effects but is open to further discussions.

## 2025-01-06 NOTE — BH INPATIENT PSYCHIATRY PROGRESS NOTE - MSE UNSTRUCTURED FT
Awake an alert.  SPEECH: Normal rate, tone, and volume.  MOTOR: mild PMR   AFFECT: neutral/improved    THOUGHT PROCESS: Linear.   THOUGHT CONTENT: Denies SI or HI; no evidence of delusions. Preoccupied with benzo and "relief from anxiety" and ruminative.   COGNITION: Grossly intact.  INSIGHT: Poor.  JUDGMENT: Fair.  IMPULSE CONTROL: Intact on unit.   No suicidal ideations  Awake and alert.  SPEECH: Normal rate, tone, and volume.  MOTOR: mild PMR. Can become shaky when dysregulated but no tremors or other abnormal movements noted.   AFFECT: constricted to anxious/dysphoric with periods of bright/euthymic affect.   THOUGHT PROCESS: Linear.   THOUGHT CONTENT: Denies SI or HI; no evidence of delusions. Preoccupied with med side effects and negative interpersonal interactions, ruminative.   COGNITION: Grossly intact.  INSIGHT: Poor.  JUDGMENT: Fair.  IMPULSE CONTROL: Intact on unit.

## 2025-01-06 NOTE — PHYSICAL THERAPY INITIAL EVALUATION ADULT - PLANNED THERAPY INTERVENTIONS, PT EVAL
balance training/gait training/strengthening
balance training/gait training/joint mobilization/lumbar stabilization/manual therapy techniques/neuromuscular re-education/strengthening/stretching

## 2025-01-06 NOTE — PHYSICAL THERAPY INITIAL EVALUATION ADULT - GENERAL OBSERVATIONS, REHAB EVAL
Patient seen sitting in chair in day room, NAD.
Patient received sitting in day room in no apparent distress.

## 2025-01-07 PROCEDURE — 90832 PSYTX W PT 30 MINUTES: CPT

## 2025-01-07 PROCEDURE — 99232 SBSQ HOSP IP/OBS MODERATE 35: CPT | Mod: GC

## 2025-01-07 RX ORDER — CLONAZEPAM 0.5 MG/1
0.25 TABLET ORAL EVERY 8 HOURS
Refills: 0 | Status: DISCONTINUED | OUTPATIENT
Start: 2025-01-07 | End: 2025-01-14

## 2025-01-07 RX ORDER — MAGNESIUM, ALUMINUM HYDROXIDE 200-200 MG
30 TABLET,CHEWABLE ORAL EVERY 6 HOURS
Refills: 0 | Status: DISCONTINUED | OUTPATIENT
Start: 2025-01-07 | End: 2025-01-21

## 2025-01-07 RX ORDER — GABAPENTIN 400 MG/1
300 CAPSULE ORAL AT BEDTIME
Refills: 0 | Status: DISCONTINUED | OUTPATIENT
Start: 2025-01-07 | End: 2025-02-14

## 2025-01-07 RX ORDER — GABAPENTIN 400 MG/1
200 CAPSULE ORAL
Refills: 0 | Status: DISCONTINUED | OUTPATIENT
Start: 2025-01-07 | End: 2025-02-14

## 2025-01-07 RX ORDER — CLONAZEPAM 0.5 MG/1
0.5 TABLET ORAL
Refills: 0 | Status: DISCONTINUED | OUTPATIENT
Start: 2025-01-07 | End: 2025-01-14

## 2025-01-07 RX ORDER — ONDANSETRON HCL/PF 4 MG/2 ML
4 VIAL (ML) INJECTION DAILY
Refills: 0 | Status: DISCONTINUED | OUTPATIENT
Start: 2025-01-07 | End: 2025-01-09

## 2025-01-07 RX ADMIN — CLONAZEPAM 0.5 MILLIGRAM(S): 0.5 TABLET ORAL at 20:58

## 2025-01-07 RX ADMIN — METOPROLOL SUCCINATE 50 MILLIGRAM(S): 50 TABLET, EXTENDED RELEASE ORAL at 20:58

## 2025-01-07 RX ADMIN — CLONAZEPAM 0.5 MILLIGRAM(S): 0.5 TABLET ORAL at 06:27

## 2025-01-07 RX ADMIN — CLONAZEPAM 0.5 MILLIGRAM(S): 0.5 TABLET ORAL at 12:42

## 2025-01-07 RX ADMIN — Medication 40 MILLIGRAM(S): at 06:34

## 2025-01-07 RX ADMIN — CYANOCOBALAMIN 1000 MICROGRAM(S): 1000 INJECTION INTRAMUSCULAR; SUBCUTANEOUS at 09:11

## 2025-01-07 RX ADMIN — GABAPENTIN 300 MILLIGRAM(S): 400 CAPSULE ORAL at 09:11

## 2025-01-07 RX ADMIN — METOPROLOL SUCCINATE 50 MILLIGRAM(S): 50 TABLET, EXTENDED RELEASE ORAL at 09:11

## 2025-01-07 RX ADMIN — Medication 1 MILLIGRAM(S): at 20:58

## 2025-01-07 RX ADMIN — GABAPENTIN 300 MILLIGRAM(S): 400 CAPSULE ORAL at 20:58

## 2025-01-07 RX ADMIN — Medication 4 MILLIGRAM(S): at 09:16

## 2025-01-07 RX ADMIN — Medication 10 MILLIGRAM(S): at 20:58

## 2025-01-07 RX ADMIN — MIRTAZAPINE 22.5 MILLIGRAM(S): 30 TABLET, FILM COATED ORAL at 20:57

## 2025-01-07 NOTE — BH TREATMENT PLAN - NSTXDEPRESINTERRN_PSY_ALL_CORE
Administer medication as prescribed. Educate patient regarding medication regimen and rationale. Educate regarding appropriate coping skills to manage depressive symptoms and encouarge to utilize.
Administer medication as prescribed. Educate patient regarding medication regimen and rationale. Educate regarding appropriate coping skills to manage depressive symptoms and encouarge to utilize.

## 2025-01-07 NOTE — BH TREATMENT PLAN - NSTXCOPEINTERRN_PSY_ALL_CORE
Educate patient regarding appropriate coping skills to manage anxiety and depressive symptoms and encourage to utilize.
Educate patient regarding appropriate coping skills to manage anxiety and depressive symptoms and encourage to utilize.

## 2025-01-07 NOTE — BH TREATMENT PLAN - NSTXANXINTERPR_PSY_ALL_CORE
During time of engagement, the patient presented with a calm mood. Patient was dressed in her personal clothes. Upon review, the patient has made no progress towards specified goal to identify and practice 3 coping skills to manage anxiety for her anxiety/panic/fear goal. The patient attended 33% of the groups this past week. Psychiatric Rehabilitation staff will continue to support patient via 1:1 engagement and encourage programming attendance in effort to facilitate continued progress towards goal.
During time of engagement, the patient presented with a calm mood. Patient was dressed in her personal clothes. Upon review, the patient has made no progress towards specified goal to identify and practice 3 coping skills to manage anxiety for her anxiety/panic/fear goal. The patient attended 33% of the groups this past week. Psychiatric Rehabilitation staff will continue to support patient via 1:1 engagement and encourage programming attendance in effort to facilitate continued progress towards goal.

## 2025-01-07 NOTE — BH TREATMENT PLAN - NSTXDCOTHRGOAL_PSY_ALL_CORE
Patient will report improved mood and insight into coping skills for symptoms, and with no SIIP.
The pt will be discharged home where she resides with her  and [facility]. Consideration of referral to the GP will be explored as pt's  now is retired and available to transport pt.

## 2025-01-07 NOTE — BH INPATIENT PSYCHIATRY PROGRESS NOTE - MSE UNSTRUCTURED FT
Awake and alert.  SPEECH: Normal rate, tone, and volume.  MOTOR: mild PMR. No tremors or other abnormal movements noted.   AFFECT: neutral to euthymic.   THOUGHT PROCESS: Linear.   THOUGHT CONTENT: Denies SI or HI; no evidence of delusions. Preoccupied with med side effects, ruminative, interpersonal relationships with providers.   COGNITION: Grossly intact.  INSIGHT: Poor.  JUDGMENT: Fair.  IMPULSE CONTROL: Intact on unit.

## 2025-01-07 NOTE — BH INPATIENT PSYCHIATRY PROGRESS NOTE - PRN MEDS
MEDICATIONS  (PRN):  acetaminophen     Tablet .. 650 milliGRAM(s) Oral every 6 hours PRN Temp greater or equal to 38C (100.4F), Mild Pain (1 - 3), Moderate Pain (4 - 6)  clonazePAM Oral Disintegrating Tablet 0.25 milliGRAM(s) Oral every 8 hours PRN anxiety  ibuprofen  Tablet. 400 milliGRAM(s) Oral every 8 hours PRN Mild Pain (1 - 3), Moderate Pain (4 - 6)  ondansetron    Tablet 4 milliGRAM(s) Oral three times a day PRN Nausea   MEDICATIONS  (PRN):  acetaminophen     Tablet .. 650 milliGRAM(s) Oral every 6 hours PRN Temp greater or equal to 38C (100.4F), Mild Pain (1 - 3), Moderate Pain (4 - 6)  aluminum hydroxide/magnesium hydroxide/simethicone Suspension 30 milliLiter(s) Oral every 6 hours PRN Dyspepsia  clonazePAM Oral Disintegrating Tablet 0.25 milliGRAM(s) Oral every 8 hours PRN anxiety  ibuprofen  Tablet. 400 milliGRAM(s) Oral every 8 hours PRN Mild Pain (1 - 3), Moderate Pain (4 - 6)  ondansetron    Tablet 4 milliGRAM(s) Oral daily PRN nausea

## 2025-01-07 NOTE — BH INPATIENT PSYCHIATRY PROGRESS NOTE - NSBHFUPINTERVALHXFT_PSY_A_CORE
Chart reviewed. Case d/w interdisciplinary team. Patient seen and examined for follow up of anxiety and depression. No acute events overnight. Compliant with standing medication. No PRNs requested or required. Per sleep log good sleep.     Pt reports difficulty sleeping last night due to roommate's frequent bathroom trips, also concerned she is oversedated this morning after taking qAM gabapentin. Reports concern with b/l leg cramping and muscle spasms, although open to discussions that these sxs could be due to known d/o chronic venous insufficiency, dehydration or muscle fatigue, not requesting medications/PRNs. Continues to fixate on need to start a new medication that will make her feel better and concerns that she will be discharged too soon. Somewhat receptive to psychoeducation regarding med options and expected side effects, as well as reassurance regarding nortriptyline d/c. Less depressed today, says she is happy that there are more patients closer to her age, looking forward to seeing psychologist. Denies SIIP/NSSIB urges. Appetite intact.

## 2025-01-07 NOTE — BH INPATIENT PSYCHIATRY PROGRESS NOTE - NSBHMETABOLIC_PSY_ALL_CORE_FT
BMI: BMI (kg/m2): 37.5 (12-27-24 @ 15:12)  HbA1c: A1C with Estimated Average Glucose Result: 5.5 % (01-02-25 @ 09:11)    Glucose:   BP: --Vital Signs Last 24 Hrs  T(C): 36.4 (01-07-25 @ 08:05), Max: 36.4 (01-07-25 @ 08:05)  T(F): 97.5 (01-07-25 @ 08:05), Max: 97.5 (01-07-25 @ 08:05)  HR: --  BP: --  BP(mean): --  RR: --  SpO2: --    Orthostatic VS  01-07-25 @ 08:05  Lying BP: --/-- HR: --  Sitting BP: 148/70 HR: 79  Standing BP: 141/83 HR: 84  Site: --  Mode: --  Orthostatic VS  01-06-25 @ 21:28  Lying BP: --/-- HR: --  Sitting BP: 142/77 HR: 85  Standing BP: 140/85 HR: 87  Site: --  Mode: --  Orthostatic VS  01-06-25 @ 07:53  Lying BP: --/-- HR: --  Sitting BP: 136/72 HR: 81  Standing BP: 147/73 HR: 93  Site: --  Mode: --  Orthostatic VS  01-05-25 @ 20:41  Lying BP: --/-- HR: --  Sitting BP: 131/78 HR: 74  Standing BP: 132/82 HR: 81  Site: --  Mode: --    Lipid Panel: Date/Time: 01-02-25 @ 09:11  Cholesterol, Serum: 128  LDL Cholesterol Calculated: 53  HDL Cholesterol, Serum: 46  Total Cholesterol/HDL Ration Measurement: --  Triglycerides, Serum: 174

## 2025-01-07 NOTE — BH TREATMENT PLAN - ANXIETY/PANIC/FEAR NURSING INTERVENTIONS/RECOMMENDATIONS
Educate patient regarding appropriate coping skills and encourage to utilize. Administer medication as prescribed and monitor for effectiveness.
Educate patient regarding appropriate coping skills and encourage to utilize. Administer medication as prescribed and monitor for effectiveness.

## 2025-01-07 NOTE — BH TREATMENT PLAN - NSTXPROBCOPE_PSY_ALL_CORE
COPING, INEFFECTIVE
COPING, INEFFECTIVE
Birth Control Pills Pregnancy And Lactation Text: This medication should be avoided if pregnant and for the first 30 days post-partum.

## 2025-01-07 NOTE — BH INPATIENT PSYCHIATRY PROGRESS NOTE - NSBHCHARTREVIEWVS_PSY_A_CORE FT
Vital Signs Last 24 Hrs  T(C): 36.4 (01-07-25 @ 08:05), Max: 36.4 (01-07-25 @ 08:05)  T(F): 97.5 (01-07-25 @ 08:05), Max: 97.5 (01-07-25 @ 08:05)  HR: --  BP: --  BP(mean): --  RR: --  SpO2: --    Orthostatic VS  01-07-25 @ 08:05  Lying BP: --/-- HR: --  Sitting BP: 148/70 HR: 79  Standing BP: 141/83 HR: 84  Site: --  Mode: --  Orthostatic VS  01-06-25 @ 21:28  Lying BP: --/-- HR: --  Sitting BP: 142/77 HR: 85  Standing BP: 140/85 HR: 87  Site: --  Mode: --  Orthostatic VS  01-06-25 @ 07:53  Lying BP: --/-- HR: --  Sitting BP: 136/72 HR: 81  Standing BP: 147/73 HR: 93  Site: --  Mode: --  Orthostatic VS  01-05-25 @ 20:41  Lying BP: --/-- HR: --  Sitting BP: 131/78 HR: 74  Standing BP: 132/82 HR: 81  Site: --  Mode: --

## 2025-01-07 NOTE — BH TREATMENT PLAN - NSTXCOPEINTERMD_PSY_ALL_CORE
Optimize medications for mood/anxiety, discuss additional trials including augmentation. Encourage pt to engage in therapeutic interventions. 
Optimize medications for mood/anxiety, discuss additional trials including augmentation. Encourage pt to engage in therapeutic interventions.

## 2025-01-07 NOTE — BH INPATIENT PSYCHIATRY PROGRESS NOTE - CURRENT MEDICATION
MEDICATIONS  (STANDING):  clonazePAM Oral Disintegrating Tablet 0.5 milliGRAM(s) Oral <User Schedule>  cyanocobalamin 1000 MICROGram(s) Oral daily  evolocumab Injectable 140 milliGRAM(s) SubCutaneous every 2 weeks  gabapentin 300 milliGRAM(s) Oral two times a day  melatonin. 1 milliGRAM(s) Oral at bedtime  metoprolol succinate ER 50 milliGRAM(s) Oral two times a day  mirtazapine 22.5 milliGRAM(s) Oral at bedtime  Nexletol 180 mg 1 Tablet(s) 1 Tablet(s) Oral daily  nortriptyline 10 milliGRAM(s) Oral at bedtime  pantoprazole    Tablet 40 milliGRAM(s) Oral before breakfast    MEDICATIONS  (PRN):  acetaminophen     Tablet .. 650 milliGRAM(s) Oral every 6 hours PRN Temp greater or equal to 38C (100.4F), Mild Pain (1 - 3), Moderate Pain (4 - 6)  clonazePAM Oral Disintegrating Tablet 0.25 milliGRAM(s) Oral every 8 hours PRN anxiety  ibuprofen  Tablet. 400 milliGRAM(s) Oral every 8 hours PRN Mild Pain (1 - 3), Moderate Pain (4 - 6)  ondansetron    Tablet 4 milliGRAM(s) Oral three times a day PRN Nausea   MEDICATIONS  (STANDING):  clonazePAM Oral Disintegrating Tablet 0.5 milliGRAM(s) Oral <User Schedule>  cyanocobalamin 1000 MICROGram(s) Oral daily  evolocumab Injectable 140 milliGRAM(s) SubCutaneous every 2 weeks  gabapentin 300 milliGRAM(s) Oral at bedtime  gabapentin 200 milliGRAM(s) Oral <User Schedule>  melatonin. 1 milliGRAM(s) Oral at bedtime  metoprolol succinate ER 50 milliGRAM(s) Oral two times a day  mirtazapine 22.5 milliGRAM(s) Oral at bedtime  Nexletol 180 mg 1 Tablet(s) 1 Tablet(s) Oral daily  nortriptyline 10 milliGRAM(s) Oral at bedtime  pantoprazole    Tablet 40 milliGRAM(s) Oral before breakfast    MEDICATIONS  (PRN):  acetaminophen     Tablet .. 650 milliGRAM(s) Oral every 6 hours PRN Temp greater or equal to 38C (100.4F), Mild Pain (1 - 3), Moderate Pain (4 - 6)  aluminum hydroxide/magnesium hydroxide/simethicone Suspension 30 milliLiter(s) Oral every 6 hours PRN Dyspepsia  clonazePAM Oral Disintegrating Tablet 0.25 milliGRAM(s) Oral every 8 hours PRN anxiety  ibuprofen  Tablet. 400 milliGRAM(s) Oral every 8 hours PRN Mild Pain (1 - 3), Moderate Pain (4 - 6)  ondansetron    Tablet 4 milliGRAM(s) Oral daily PRN nausea

## 2025-01-07 NOTE — BH INPATIENT PSYCHIATRY PROGRESS NOTE - NSBHASSESSSUMMFT_PSY_ALL_CORE
Ms. Gross is a 73yo female, , retired (), domiciled at home with , no children; PMHx HTN, HLD; PPHx d/o ANSON and MDD, 9 past psych admissions starting in 2019 (last [facility] April 2024 for worsening anxiety and depression), ECT at [facility] 2023, OP at [facility]; 1 past SA (OD on 9 pills of unknown medication in 2019), no h/o NSSIB, no h/o violence/aggression/legal issues, past marijuana use; initially admitted to [facility] 2W on 12/27 for increasing anxiety and SI w/o plans in the context of medication adjustments and psychosocial stressors.     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     On initial 2S assessment pt dysphoric, anxious, hopeless, and preoccupied with somatic sxs. There are elements of active passivity, severe sensitivity to feelings of inadequacy and complicated interpersonal relationships (, friends, providers). 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 Provided pt with psychoeducation regarding limitations of medications and need to focus on therapeutic interventions however she has limited insight into this and is intent on finding the right medication(s). For now will continue to slowly taper off nortriptyline.     1/6: Prominent treatment interfering behaviors including active passivity and rejection sensitivity. For now will continue nortriptyline taper. Considering atypical antidepressant trial. Pt reporting chronic b/l LE edema, will encourage her to use compression stocking and elevate legs, no indication for diuretic at this time.     1/7: Preoccupied with med side effects and physical sxs, but more positive about potential med trials. Will decrease qAM gabapentin 300mg to 200mg but otherwise will attempt to limit med changes other than nortriptyline taper. Will also attempt to discourage use of zofran PRNs given past difficulties tolerating multiple serotonergic medications.     PLAN:   1. Legals: admitted to  on 9.13. 1/2 pt transferred to .  2. Safety: routine obs appropriate as no current active SI/SIB/HI/VI on unit.   3. Psychiatric  - c/w Nortriptyline 10mg qHS with plans to discontinue at pt request due to reports of tremors, worsening anxiety, difficulty sleeping at home doses (50mg to 25mg on 12/27, 12.5mg on 1/3). Will defer further medication changes until Nortriptyline is d/c'd given pt concern for side effects/past difficulty tolerating serotonergic meds.   - c/w Remeron 22.5mg qHS for mood/anxiety (home med).   - c/w Klonopin 0.5mg TID for anxiety (home med).   - Decreased Gabapentin 300mg BID to 200mg qAM/300mg qHS for anxiety and neuropathy due to pt concern for sedation. Home dose Gabapentin 300mg TID.   - Melatonin 1mg qHS for insomnia.   - PRNs           - For anxiety: Klonopin wafer 0.25 mg q8h. d/c'd PRN gabapentin to simplify meds.            - For agitation: PO Zyprexa 2.5mg TID.            - STAT IM Zyprexa 2.5mg if there are imminent risk to self/others due to severe agitation.   4. Therapy: I/G/M therapy as indicated.   5. Medical: admission labs and EKG reviewed and wnl.   #Dependent edema  - chronic b/l LE edema likely 2/2 chronic venous insufficiency/deconditioning. No cardiac history (multiple cards workups have been negative).   - encourage pt to utilize compression stockings, elevation, appropriate physical therapy/exercise. No indication for diuretics/attempting to discourage polypharmacy.   #HTN  - c/w Metoprolol 50mg bid (home med) - per notes this is for palpitations 2/2 anxiety managed by outpatient cardiologist.   #HLD  - c/w Nexletol 180mg qHS (home med).   - c/w IM [id number] q2 weeks (last given 1/2) (home med).   #GERD  - c/w Pantoprazole 40mg qD (home med).   #Vit B12 deficiency   - pt due for injection per chart, for now started on PO 1000mcg qD.   #Nausea  - PRN PO Zofran 4mg TID (home med) --> lower to qD with attempts to d/c PRN. Will add maalox PRN given pt report that nausea is often 2/2 indigestion depending on diet.   6. Collateral: 1/3 Updated  Yehuda at [phone] who is agreeable with plan above.   7. Disposition: pending clinical improvement; likely return to previous provider at [facility] vs partial.

## 2025-01-07 NOTE — BH TREATMENT PLAN - NSTXPLANTHERAPYSESSIONSFT_PSY_ALL_CORE
01-07-25  Type of therapy: Other, Physical therapy  Type of session: Individual  Level of patient participation: Participates  --  Therapy conducted by: Other (specify), Physical therapy  Therapy Summary: Patient engaged in B LE exercises, ambulation and therapeutic activity within unit  
Her/She

## 2025-01-07 NOTE — BH INPATIENT PSYCHIATRY PROGRESS NOTE - NSBHATTESTCOMMENTATTENDFT_PSY_A_CORE
See resident note for details. I saw and evaluated patient. I agree with assessment and plan of resident except patient says she does not want to take a sertonergic med as in past they caused agitation, shakiness will therefore plan increasing Remeron and augmenting with Wellbutrin after taking off TCA, will also taper off Zofran
See resident note for details. I saw and evaluated patient. I agree with assessment and plan of resident. Adjusted dose of Klonopin tapering TCA
Agree with assessment and plan. Patient presents very similarly to prior admissions. Has poor coping skills - frequent splitting, some passive aggressiveness, externalization. Explore alternative treatment once nortriptyline taper complete.

## 2025-01-07 NOTE — BH TREATMENT PLAN - NSTXDCOTHRINTERMD_PSY_ALL_CORE
Optimize medications for mood/anxiety, discuss additional trials including augmentation. Encourage pt to engage in therapeutic interventions. Provide psychoeducation and coordinate care with interdisciplinary treatment and outpatient providers. 
Optimize medications for mood/anxiety, discuss additional trials including augmentation. Encourage pt to engage in therapeutic interventions. Provide psychoeducation and coordinate care with interdisciplinary treatment and outpatient providers.

## 2025-01-08 PROCEDURE — 99232 SBSQ HOSP IP/OBS MODERATE 35: CPT

## 2025-01-08 RX ORDER — MIRTAZAPINE 30 MG/1
30 TABLET, FILM COATED ORAL AT BEDTIME
Refills: 0 | Status: DISCONTINUED | OUTPATIENT
Start: 2025-01-08 | End: 2025-01-13

## 2025-01-08 RX ADMIN — Medication 40 MILLIGRAM(S): at 06:42

## 2025-01-08 RX ADMIN — CYANOCOBALAMIN 1000 MICROGRAM(S): 1000 INJECTION INTRAMUSCULAR; SUBCUTANEOUS at 08:58

## 2025-01-08 RX ADMIN — Medication 1 MILLIGRAM(S): at 21:18

## 2025-01-08 RX ADMIN — GABAPENTIN 300 MILLIGRAM(S): 400 CAPSULE ORAL at 21:18

## 2025-01-08 RX ADMIN — METOPROLOL SUCCINATE 50 MILLIGRAM(S): 50 TABLET, EXTENDED RELEASE ORAL at 08:58

## 2025-01-08 RX ADMIN — MIRTAZAPINE 30 MILLIGRAM(S): 30 TABLET, FILM COATED ORAL at 21:17

## 2025-01-08 RX ADMIN — METOPROLOL SUCCINATE 50 MILLIGRAM(S): 50 TABLET, EXTENDED RELEASE ORAL at 21:18

## 2025-01-08 RX ADMIN — CLONAZEPAM 0.5 MILLIGRAM(S): 0.5 TABLET ORAL at 06:15

## 2025-01-08 RX ADMIN — CLONAZEPAM 0.5 MILLIGRAM(S): 0.5 TABLET ORAL at 21:18

## 2025-01-08 RX ADMIN — CLONAZEPAM 0.5 MILLIGRAM(S): 0.5 TABLET ORAL at 12:21

## 2025-01-08 RX ADMIN — Medication 4 MILLIGRAM(S): at 08:58

## 2025-01-08 RX ADMIN — GABAPENTIN 200 MILLIGRAM(S): 400 CAPSULE ORAL at 08:59

## 2025-01-08 NOTE — BH INPATIENT PSYCHIATRY PROGRESS NOTE - PRN MEDS
MEDICATIONS  (PRN):  acetaminophen     Tablet .. 650 milliGRAM(s) Oral every 6 hours PRN Temp greater or equal to 38C (100.4F), Mild Pain (1 - 3), Moderate Pain (4 - 6)  aluminum hydroxide/magnesium hydroxide/simethicone Suspension 30 milliLiter(s) Oral every 6 hours PRN Dyspepsia  clonazePAM Oral Disintegrating Tablet 0.25 milliGRAM(s) Oral every 8 hours PRN anxiety  ibuprofen  Tablet. 400 milliGRAM(s) Oral every 8 hours PRN Mild Pain (1 - 3), Moderate Pain (4 - 6)  ondansetron    Tablet 4 milliGRAM(s) Oral daily PRN nausea

## 2025-01-08 NOTE — BH INPATIENT PSYCHIATRY PROGRESS NOTE - NSBHFUPINTERVALHXFT_PSY_A_CORE
Chart reviewed. Case d/w interdisciplinary team. Patient seen and examined for follow up of anxiety and depression. No acute events overnight. Compliant with standing medication. No PRNs requested or required. Per sleep log good sleep. Appetite okay.

## 2025-01-08 NOTE — BH INPATIENT PSYCHIATRY PROGRESS NOTE - NSBHMETABOLIC_PSY_ALL_CORE_FT
BMI: BMI (kg/m2): 37.5 (12-27-24 @ 15:12)  HbA1c: A1C with Estimated Average Glucose Result: 5.5 % (01-02-25 @ 09:11)    Glucose:   BP: 129/78 (01-07-25 @ 20:33) (129/78 - 129/78)Vital Signs Last 24 Hrs  T(C): 36.7 (01-08-25 @ 07:58), Max: 36.7 (01-08-25 @ 07:58)  T(F): 98.1 (01-08-25 @ 07:58), Max: 98.1 (01-08-25 @ 07:58)  HR: --  BP: 129/78 (01-07-25 @ 20:33) (129/78 - 129/78)  BP(mean): 83 (01-07-25 @ 20:33) (83 - 83)  RR: --  SpO2: --    Orthostatic VS  01-08-25 @ 07:58  Lying BP: --/-- HR: --  Sitting BP: 134/87 HR: 79  Standing BP: 154/88 HR: 84  Site: --  Mode: --  Orthostatic VS  01-07-25 @ 08:05  Lying BP: --/-- HR: --  Sitting BP: 148/70 HR: 79  Standing BP: 141/83 HR: 84  Site: --  Mode: --  Orthostatic VS  01-06-25 @ 21:28  Lying BP: --/-- HR: --  Sitting BP: 142/77 HR: 85  Standing BP: 140/85 HR: 87  Site: --  Mode: --    Lipid Panel: Date/Time: 01-02-25 @ 09:11  Cholesterol, Serum: 128  LDL Cholesterol Calculated: 53  HDL Cholesterol, Serum: 46  Total Cholesterol/HDL Ration Measurement: --  Triglycerides, Serum: 174

## 2025-01-08 NOTE — BH INPATIENT PSYCHIATRY PROGRESS NOTE - NSBHCHARTREVIEWVS_PSY_A_CORE FT
Vital Signs Last 24 Hrs  T(C): 36.7 (01-08-25 @ 07:58), Max: 36.7 (01-08-25 @ 07:58)  T(F): 98.1 (01-08-25 @ 07:58), Max: 98.1 (01-08-25 @ 07:58)  HR: --  BP: 129/78 (01-07-25 @ 20:33) (129/78 - 129/78)  BP(mean): 83 (01-07-25 @ 20:33) (83 - 83)  RR: --  SpO2: --    Orthostatic VS  01-08-25 @ 07:58  Lying BP: --/-- HR: --  Sitting BP: 134/87 HR: 79  Standing BP: 154/88 HR: 84  Site: --  Mode: --  Orthostatic VS  01-07-25 @ 08:05  Lying BP: --/-- HR: --  Sitting BP: 148/70 HR: 79  Standing BP: 141/83 HR: 84  Site: --  Mode: --  Orthostatic VS  01-06-25 @ 21:28  Lying BP: --/-- HR: --  Sitting BP: 142/77 HR: 85  Standing BP: 140/85 HR: 87  Site: --  Mode: --

## 2025-01-08 NOTE — BH INPATIENT PSYCHIATRY PROGRESS NOTE - MSE UNSTRUCTURED FT
Awake and alert.  SPEECH: Normal rate, tone, and volume.  MOTOR: mild PMR. No tremors or other abnormal movements noted.   AFFECT: neutral to euthymic.   THOUGHT PROCESS: Linear.   THOUGHT CONTENT: Denies SI or HI; no evidence of delusions. Preoccupied with med side effects, ruminative, interpersonal relationships with providers. Focused on staying in hospital til better, fears of being dced not well  COGNITION: Grossly intact.  INSIGHT: Poor.  JUDGMENT: Fair.  IMPULSE CONTROL: Intact on unit.

## 2025-01-08 NOTE — BH INPATIENT PSYCHIATRY PROGRESS NOTE - NSBHASSESSSUMMFT_PSY_ALL_CORE
Ms. Gross is a 73yo female, , retired (), domiciled at home with , no children; PMHx HTN, HLD; PPHx d/o ANSON and MDD, 9 past psych admissions starting in 2019 (last [facility] April 2024 for worsening anxiety and depression), ECT at [facility] 2023, OP at [facility]; 1 past SA (OD on 9 pills of unknown medication in 2019), no h/o NSSIB, no h/o violence/aggression/legal issues, past marijuana use; initially admitted to [facility] 2W on 12/27 for increasing anxiety and SI w/o plans in the context of medication adjustments and psychosocial stressors.     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     On initial 2S assessment pt dysphoric, anxious, hopeless, and preoccupied with somatic sxs. There are elements of active passivity, severe sensitivity to feelings of inadequacy and complicated interpersonal relationships (, friends, providers). 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 Provided pt with psychoeducation regarding limitations of medications and need to focus on therapeutic interventions however she has limited insight into this and is intent on finding the right medication(s). For now will continue to slowly taper off nortriptyline.     1/6: Prominent treatment interfering behaviors including active passivity and rejection sensitivity. For now will continue nortriptyline taper. Considering atypical antidepressant trial. Pt reporting chronic b/l LE edema, will encourage her to use compression stocking and elevate legs, no indication for diuretic at this time.     1/7: Preoccupied with med side effects and physical sxs, but more positive about potential med trials. Will decrease qAM gabapentin 300mg to 200mg but otherwise will attempt to limit med changes other than nortriptyline taper. Will also attempt to discourage use of zofran PRNs given past difficulties tolerating multiple serotonergic medications.     PLAN:   1. Legals: admitted to  on 9.13. 1/2 pt transferred to .  2. Safety: routine obs appropriate as no current active SI/SIB/HI/VI on unit.   3. Psychiatric  - c/w Nortriptyline 10mg qHS with plans to discontinue at pt request due to reports of tremors, worsening anxiety, difficulty sleeping at home doses (50mg to 25mg on 12/27, 12.5mg on 1/3). Will defer further medication changes until Nortriptyline is d/c'd given pt concern for side effects/past difficulty tolerating serotonergic meds.   - c/w Remeron 22.5mg qHS for mood/anxiety (home med).   - c/w Klonopin 0.5mg TID for anxiety (home med).   - Decreased Gabapentin 300mg BID to 200mg qAM/300mg qHS for anxiety and neuropathy due to pt concern for sedation. Home dose Gabapentin 300mg TID.   - Melatonin 1mg qHS for insomnia.   - PRNs           - For anxiety: Klonopin wafer 0.25 mg q8h. d/c'd PRN gabapentin to simplify meds.            - For agitation: PO Zyprexa 2.5mg TID.            - STAT IM Zyprexa 2.5mg if there are imminent risk to self/others due to severe agitation.   4. Therapy: I/G/M therapy as indicated.   5. Medical: admission labs and EKG reviewed and wnl.   #Dependent edema  - chronic b/l LE edema likely 2/2 chronic venous insufficiency/deconditioning. No cardiac history (multiple cards workups have been negative).   - encourage pt to utilize compression stockings, elevation, appropriate physical therapy/exercise. No indication for diuretics/attempting to discourage polypharmacy.   #HTN  - c/w Metoprolol 50mg bid (home med) - per notes this is for palpitations 2/2 anxiety managed by outpatient cardiologist.   #HLD  - c/w Nexletol 180mg qHS (home med).   - c/w IM [id number] q2 weeks (last given 1/2) (home med).   #GERD  - c/w Pantoprazole 40mg qD (home med).   #Vit B12 deficiency   - pt due for injection per chart, for now started on PO 1000mcg qD.   #Nausea  - PRN PO Zofran 4mg TID (home med) --> lower to qD with attempts to d/c PRN. Will add maalox PRN given pt report that nausea is often 2/2 indigestion depending on diet.   6. Collateral: 1/3 Updated  Yehuda at [phone] who is agreeable with plan above.   7. Disposition: pending clinical improvement; likely return to previous provider at [facility] vs partial.   1/8 Patient unchanged anxious ruminative unable to use coping skilss. Will dc [facility] increase Remeron

## 2025-01-08 NOTE — BH INPATIENT PSYCHIATRY PROGRESS NOTE - CURRENT MEDICATION
MEDICATIONS  (STANDING):  clonazePAM Oral Disintegrating Tablet 0.5 milliGRAM(s) Oral <User Schedule>  cyanocobalamin 1000 MICROGram(s) Oral daily  evolocumab Injectable 140 milliGRAM(s) SubCutaneous every 2 weeks  gabapentin 200 milliGRAM(s) Oral <User Schedule>  gabapentin 300 milliGRAM(s) Oral at bedtime  melatonin. 1 milliGRAM(s) Oral at bedtime  metoprolol succinate ER 50 milliGRAM(s) Oral two times a day  mirtazapine 22.5 milliGRAM(s) Oral at bedtime  Nexletol 180 mg 1 Tablet(s) 1 Tablet(s) Oral daily  nortriptyline 10 milliGRAM(s) Oral at bedtime  pantoprazole    Tablet 40 milliGRAM(s) Oral before breakfast    MEDICATIONS  (PRN):  acetaminophen     Tablet .. 650 milliGRAM(s) Oral every 6 hours PRN Temp greater or equal to 38C (100.4F), Mild Pain (1 - 3), Moderate Pain (4 - 6)  aluminum hydroxide/magnesium hydroxide/simethicone Suspension 30 milliLiter(s) Oral every 6 hours PRN Dyspepsia  clonazePAM Oral Disintegrating Tablet 0.25 milliGRAM(s) Oral every 8 hours PRN anxiety  ibuprofen  Tablet. 400 milliGRAM(s) Oral every 8 hours PRN Mild Pain (1 - 3), Moderate Pain (4 - 6)  ondansetron    Tablet 4 milliGRAM(s) Oral daily PRN nausea

## 2025-01-09 LAB — VIT B12 SERPL-MCNC: 1679 PG/ML — HIGH (ref 200–900)

## 2025-01-09 PROCEDURE — 99232 SBSQ HOSP IP/OBS MODERATE 35: CPT

## 2025-01-09 PROCEDURE — 90832 PSYTX W PT 30 MINUTES: CPT

## 2025-01-09 RX ADMIN — CLONAZEPAM 0.5 MILLIGRAM(S): 0.5 TABLET ORAL at 13:44

## 2025-01-09 RX ADMIN — Medication 1 MILLIGRAM(S): at 21:44

## 2025-01-09 RX ADMIN — GABAPENTIN 300 MILLIGRAM(S): 400 CAPSULE ORAL at 21:44

## 2025-01-09 RX ADMIN — CLONAZEPAM 0.25 MILLIGRAM(S): 0.5 TABLET ORAL at 10:33

## 2025-01-09 RX ADMIN — Medication 30 MILLILITER(S): at 10:33

## 2025-01-09 RX ADMIN — MIRTAZAPINE 30 MILLIGRAM(S): 30 TABLET, FILM COATED ORAL at 21:44

## 2025-01-09 RX ADMIN — Medication 40 MILLIGRAM(S): at 06:41

## 2025-01-09 RX ADMIN — CLONAZEPAM 0.5 MILLIGRAM(S): 0.5 TABLET ORAL at 06:41

## 2025-01-09 RX ADMIN — GABAPENTIN 200 MILLIGRAM(S): 400 CAPSULE ORAL at 08:51

## 2025-01-09 RX ADMIN — METOPROLOL SUCCINATE 50 MILLIGRAM(S): 50 TABLET, EXTENDED RELEASE ORAL at 21:45

## 2025-01-09 RX ADMIN — CYANOCOBALAMIN 1000 MICROGRAM(S): 1000 INJECTION INTRAMUSCULAR; SUBCUTANEOUS at 08:51

## 2025-01-09 RX ADMIN — METOPROLOL SUCCINATE 50 MILLIGRAM(S): 50 TABLET, EXTENDED RELEASE ORAL at 08:51

## 2025-01-09 RX ADMIN — CLONAZEPAM 0.5 MILLIGRAM(S): 0.5 TABLET ORAL at 21:44

## 2025-01-09 NOTE — BH INPATIENT PSYCHIATRY PROGRESS NOTE - NSBHASSESSSUMMFT_PSY_ALL_CORE
Ms. Gross is a 75yo female, , retired (), domiciled at home with , no children; PMHx HTN, HLD; PPHx d/o ANSON and MDD, 9 past psych admissions starting in 2019 (last [facility] April 2024 for worsening anxiety and depression), ECT at [facility] 2023, OP at [facility]; 1 past SA (OD on 9 pills of unknown medication in 2019), no h/o NSSIB, no h/o violence/aggression/legal issues, past marijuana use; initially admitted to [facility] 2W on 12/27 for increasing anxiety and SI w/o plans in the context of medication adjustments and psychosocial stressors.     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     On initial 2S assessment pt dysphoric, anxious, hopeless, and preoccupied with somatic sxs. There are elements of active passivity, severe sensitivity to feelings of inadequacy and complicated interpersonal relationships (, friends, providers). 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 Provided pt with psychoeducation regarding limitations of medications and need to focus on therapeutic interventions however she has limited insight into this and is intent on finding the right medication(s). For now will continue to slowly taper off nortriptyline.     1/6: Prominent treatment interfering behaviors including active passivity and rejection sensitivity. For now will continue nortriptyline taper. Considering atypical antidepressant trial. Pt reporting chronic b/l LE edema, will encourage her to use compression stocking and elevate legs, no indication for diuretic at this time.     1/7: Preoccupied with med side effects and physical sxs, but more positive about potential med trials. Will decrease qAM gabapentin 300mg to 200mg but otherwise will attempt to limit med changes other than nortriptyline taper. Will also attempt to discourage use of zofran PRNs given past difficulties tolerating multiple serotonergic medications.     PLAN:   1. Legals: admitted to  on 9.13. 1/2 pt transferred to .  2. Safety: routine obs appropriate as no current active SI/SIB/HI/VI on unit.   3. Psychiatric  - c/w Nortriptyline 10mg qHS with plans to discontinue at pt request due to reports of tremors, worsening anxiety, difficulty sleeping at home doses (50mg to 25mg on 12/27, 12.5mg on 1/3). Will defer further medication changes until Nortriptyline is d/c'd given pt concern for side effects/past difficulty tolerating serotonergic meds.   - c/w Remeron 22.5mg qHS for mood/anxiety (home med).   - c/w Klonopin 0.5mg TID for anxiety (home med).   - Decreased Gabapentin 300mg BID to 200mg qAM/300mg qHS for anxiety and neuropathy due to pt concern for sedation. Home dose Gabapentin 300mg TID.   - Melatonin 1mg qHS for insomnia.   - PRNs           - For anxiety: Klonopin wafer 0.25 mg q8h. d/c'd PRN gabapentin to simplify meds.            - For agitation: PO Zyprexa 2.5mg TID.            - STAT IM Zyprexa 2.5mg if there are imminent risk to self/others due to severe agitation.   4. Therapy: I/G/M therapy as indicated.   5. Medical: admission labs and EKG reviewed and wnl.   #Dependent edema  - chronic b/l LE edema likely 2/2 chronic venous insufficiency/deconditioning. No cardiac history (multiple cards workups have been negative).   - encourage pt to utilize compression stockings, elevation, appropriate physical therapy/exercise. No indication for diuretics/attempting to discourage polypharmacy.   #HTN  - c/w Metoprolol 50mg bid (home med) - per notes this is for palpitations 2/2 anxiety managed by outpatient cardiologist.   #HLD  - c/w Nexletol 180mg qHS (home med).   - c/w IM [id number] q2 weeks (last given 1/2) (home med).   #GERD  - c/w Pantoprazole 40mg qD (home med).   #Vit B12 deficiency   - pt due for injection per chart, for now started on PO 1000mcg qD.   #Nausea  - PRN PO Zofran 4mg TID (home med) --> lower to qD with attempts to d/c PRN. Will add maalox PRN given pt report that nausea is often 2/2 indigestion depending on diet.   6. Collateral: 1/3 Updated  Yehuda at [phone] who is agreeable with plan above.   7. Disposition: pending clinical improvement; likely return to previous provider at [facility] vs partial.   1/8 Patient unchanged anxious ruminative unable to use coping skilss. Will dc NTP increase Remeron  1/9 Somatic anxious unable to utilize coping techniques. Plan behavior plan initiated, cont to titrate Remeorn. Added methylfolate for augmentation

## 2025-01-09 NOTE — BH INPATIENT PSYCHIATRY PROGRESS NOTE - NSBHPROGMEDSE_PSY_A_CORE FT
see admission note. 

## 2025-01-09 NOTE — BH INPATIENT PSYCHIATRY PROGRESS NOTE - CURRENT MEDICATION
MEDICATIONS  (STANDING):  clonazePAM Oral Disintegrating Tablet 0.5 milliGRAM(s) Oral <User Schedule>  cyanocobalamin 1000 MICROGram(s) Oral daily  evolocumab Injectable 140 milliGRAM(s) SubCutaneous every 2 weeks  gabapentin 300 milliGRAM(s) Oral at bedtime  gabapentin 200 milliGRAM(s) Oral <User Schedule>  l-methylfolate 7.5 milliGRAM(s) Oral daily  melatonin. 1 milliGRAM(s) Oral at bedtime  metoprolol succinate ER 50 milliGRAM(s) Oral two times a day  mirtazapine 30 milliGRAM(s) Oral at bedtime  Nexletol 180 mg 1 Tablet(s) 1 Tablet(s) Oral daily  pantoprazole    Tablet 40 milliGRAM(s) Oral before breakfast    MEDICATIONS  (PRN):  acetaminophen     Tablet .. 650 milliGRAM(s) Oral every 6 hours PRN Temp greater or equal to 38C (100.4F), Mild Pain (1 - 3), Moderate Pain (4 - 6)  aluminum hydroxide/magnesium hydroxide/simethicone Suspension 30 milliLiter(s) Oral every 6 hours PRN Dyspepsia  clonazePAM Oral Disintegrating Tablet 0.25 milliGRAM(s) Oral every 8 hours PRN anxiety  ibuprofen  Tablet. 400 milliGRAM(s) Oral every 8 hours PRN Mild Pain (1 - 3), Moderate Pain (4 - 6)

## 2025-01-09 NOTE — BH INPATIENT PSYCHIATRY PROGRESS NOTE - NSBHCHARTREVIEWVS_PSY_A_CORE FT
Vital Signs Last 24 Hrs  T(C): 36.9 (01-09-25 @ 08:19), Max: 36.9 (01-09-25 @ 08:19)  T(F): 98.4 (01-09-25 @ 08:19), Max: 98.4 (01-09-25 @ 08:19)  HR: --  BP: --  BP(mean): --  RR: --  SpO2: --    Orthostatic VS  01-09-25 @ 08:19  Lying BP: --/-- HR: --  Sitting BP: 137/87 HR: 96  Standing BP: 137/83 HR: 99  Site: upper left arm  Mode: electronic  Orthostatic VS  01-08-25 @ 07:58  Lying BP: --/-- HR: --  Sitting BP: 134/87 HR: 79  Standing BP: 154/88 HR: 84  Site: --  Mode: --

## 2025-01-09 NOTE — BH INPATIENT PSYCHIATRY PROGRESS NOTE - NSBHMETABOLIC_PSY_ALL_CORE_FT
BMI: BMI (kg/m2): 37.5 (12-27-24 @ 15:12)  HbA1c: A1C with Estimated Average Glucose Result: 5.5 % (01-02-25 @ 09:11)    Glucose:   BP: 129/78 (01-07-25 @ 20:33) (129/78 - 129/78)Vital Signs Last 24 Hrs  T(C): 36.9 (01-09-25 @ 08:19), Max: 36.9 (01-09-25 @ 08:19)  T(F): 98.4 (01-09-25 @ 08:19), Max: 98.4 (01-09-25 @ 08:19)  HR: --  BP: --  BP(mean): --  RR: --  SpO2: --    Orthostatic VS  01-09-25 @ 08:19  Lying BP: --/-- HR: --  Sitting BP: 137/87 HR: 96  Standing BP: 137/83 HR: 99  Site: upper left arm  Mode: electronic  Orthostatic VS  01-08-25 @ 07:58  Lying BP: --/-- HR: --  Sitting BP: 134/87 HR: 79  Standing BP: 154/88 HR: 84  Site: --  Mode: --    Lipid Panel: Date/Time: 01-02-25 @ 09:11  Cholesterol, Serum: 128  LDL Cholesterol Calculated: 53  HDL Cholesterol, Serum: 46  Total Cholesterol/HDL Ration Measurement: --  Triglycerides, Serum: 174

## 2025-01-09 NOTE — PROGRESS NOTE ADULT - ASSESSMENT
Assessment/plan:    Ingrown dystrophic toenails x 10 digits.    Aseptic debridement of ingrown dystrophic toenails all 10 digits with Betadine applied.  Recommend continued routine podiatric footcare as an outpatient.  Thank you for consultation.

## 2025-01-09 NOTE — BH INPATIENT PSYCHIATRY PROGRESS NOTE - PRN MEDS
MEDICATIONS  (PRN):  acetaminophen     Tablet .. 650 milliGRAM(s) Oral every 6 hours PRN Temp greater or equal to 38C (100.4F), Mild Pain (1 - 3), Moderate Pain (4 - 6)  aluminum hydroxide/magnesium hydroxide/simethicone Suspension 30 milliLiter(s) Oral every 6 hours PRN Dyspepsia  clonazePAM Oral Disintegrating Tablet 0.25 milliGRAM(s) Oral every 8 hours PRN anxiety  ibuprofen  Tablet. 400 milliGRAM(s) Oral every 8 hours PRN Mild Pain (1 - 3), Moderate Pain (4 - 6)

## 2025-01-09 NOTE — BH INPATIENT PSYCHIATRY PROGRESS NOTE - NSBHFUPINTERVALHXFT_PSY_A_CORE
Chart reviewed. Case d/w interdisciplinary team. Patient seen and examined for follow up of anxiety and depression. No acute events overnight. Compliant with standing medication.Received klonoruthy Hall sleep log good sleep. Appetite okay.

## 2025-01-09 NOTE — BH INPATIENT PSYCHIATRY PROGRESS NOTE - MSE UNSTRUCTURED FT
Awake and alert.  SPEECH: Normal rate, tone, and volume.  MOTOR: mild PMR. No tremors or other abnormal movements noted.   AFFECT: neutral to euthymic.   THOUGHT PROCESS: Linear.   THOUGHT CONTENT: Denies SI or HI; no evidence of delusions. Preoccupied with med side effects, ruminative, interpersonal relationships with providers. Focused on staying in hospital til better, fears of being dced not well. Focused on unit  related complaints  COGNITION: Grossly intact.  INSIGHT: Poor.  JUDGMENT: Fair.  IMPULSE CONTROL: Intact on unit.

## 2025-01-09 NOTE — PROGRESS NOTE ADULT - SUBJECTIVE AND OBJECTIVE BOX
Podiatry pager #: [phone]/ [phone]    Patient is a 74y old  Female who presents with a chief complaint of Moderate episode of recurrent major depressive disorder.Podiatry consult for painful tender thickened toenails of both feet aggravated by shoe gear ambulation and palpation.     (30 Dec 2024 12:45)      HPI:      PAST MEDICAL & SURGICAL HISTORY:  Anxiety      High cholesterol      HTN (hypertension)      Endometriosis      GERD (gastroesophageal reflux disease)      Nausea      History of bilateral breast reduction surgery          MEDICATIONS  (STANDING):  clonazePAM Oral Disintegrating Tablet 0.5 milliGRAM(s) Oral <User Schedule>  cyanocobalamin 1000 MICROGram(s) Oral daily  evolocumab Injectable 140 milliGRAM(s) SubCutaneous every 2 weeks  gabapentin 300 milliGRAM(s) Oral at bedtime  gabapentin 200 milliGRAM(s) Oral <User Schedule>  l-methylfolate 7.5 milliGRAM(s) Oral daily  melatonin. 1 milliGRAM(s) Oral at bedtime  metoprolol succinate ER 50 milliGRAM(s) Oral two times a day  mirtazapine 30 milliGRAM(s) Oral at bedtime  Nexletol 180 mg 1 Tablet(s) 1 Tablet(s) Oral daily  pantoprazole    Tablet 40 milliGRAM(s) Oral before breakfast    MEDICATIONS  (PRN):  acetaminophen     Tablet .. 650 milliGRAM(s) Oral every 6 hours PRN Temp greater or equal to 38C (100.4F), Mild Pain (1 - 3), Moderate Pain (4 - 6)  aluminum hydroxide/magnesium hydroxide/simethicone Suspension 30 milliLiter(s) Oral every 6 hours PRN Dyspepsia  clonazePAM Oral Disintegrating Tablet 0.25 milliGRAM(s) Oral every 8 hours PRN anxiety  ibuprofen  Tablet. 400 milliGRAM(s) Oral every 8 hours PRN Mild Pain (1 - 3), Moderate Pain (4 - 6)      Allergies    penicillins (Anaphylaxis)  sulfa drugs (Anaphylaxis)    Intolerances        VITALS:    Vital Signs Last 24 Hrs  T(C): 36.6 (11 Jan 2025 08:07), Max: 36.6 (11 Jan 2025 08:07)  T(F): 97.9 (11 Jan 2025 08:07), Max: 97.9 (11 Jan 2025 08:07)  HR: --  BP: --  BP(mean): --  RR: --  SpO2: --        LABS:                CAPILLARY BLOOD GLUCOSE              LOWER EXTREMITY PHYSICAL EXAM:    Vasular: DP/PT _1/4, B/L, CFT <_3 seconds B/L, Temperature gradient _wnl, B/L.   Neuro: Epicritic sensation intact_ to the level of _toes, B/L.  Skin: Positive dystrophic, ingrown/incurvated toenails with mild subungual debris to all 10 digits.  No open ulcerations or clinical signs of infection.       RADIOLOGY & ADDITIONAL STUDIES:

## 2025-01-10 PROCEDURE — 99232 SBSQ HOSP IP/OBS MODERATE 35: CPT

## 2025-01-10 RX ORDER — IBUPROFEN 200 MG
600 TABLET ORAL ONCE
Refills: 0 | Status: COMPLETED | OUTPATIENT
Start: 2025-01-10 | End: 2025-01-10

## 2025-01-10 RX ADMIN — Medication 40 MILLIGRAM(S): at 06:49

## 2025-01-10 RX ADMIN — CLONAZEPAM 0.5 MILLIGRAM(S): 0.5 TABLET ORAL at 22:11

## 2025-01-10 RX ADMIN — GABAPENTIN 200 MILLIGRAM(S): 400 CAPSULE ORAL at 08:54

## 2025-01-10 RX ADMIN — Medication 600 MILLIGRAM(S): at 17:38

## 2025-01-10 RX ADMIN — GABAPENTIN 300 MILLIGRAM(S): 400 CAPSULE ORAL at 22:11

## 2025-01-10 RX ADMIN — Medication 1 MILLIGRAM(S): at 22:11

## 2025-01-10 RX ADMIN — Medication 650 MILLIGRAM(S): at 11:55

## 2025-01-10 RX ADMIN — CYANOCOBALAMIN 1000 MICROGRAM(S): 1000 INJECTION INTRAMUSCULAR; SUBCUTANEOUS at 08:53

## 2025-01-10 RX ADMIN — CLONAZEPAM 0.5 MILLIGRAM(S): 0.5 TABLET ORAL at 12:57

## 2025-01-10 RX ADMIN — CLONAZEPAM 0.5 MILLIGRAM(S): 0.5 TABLET ORAL at 06:10

## 2025-01-10 RX ADMIN — MIRTAZAPINE 30 MILLIGRAM(S): 30 TABLET, FILM COATED ORAL at 22:12

## 2025-01-10 RX ADMIN — METOPROLOL SUCCINATE 50 MILLIGRAM(S): 50 TABLET, EXTENDED RELEASE ORAL at 08:53

## 2025-01-10 RX ADMIN — METOPROLOL SUCCINATE 50 MILLIGRAM(S): 50 TABLET, EXTENDED RELEASE ORAL at 22:12

## 2025-01-10 NOTE — BH INPATIENT PSYCHIATRY PROGRESS NOTE - NSBHASSESSSUMMFT_PSY_ALL_CORE
Ms. Gross is a 75yo female, , retired (), domiciled at home with , no children; PMHx HTN, HLD; PPHx d/o ANSON and MDD, 9 past psych admissions starting in 2019 (last [facility] April 2024 for worsening anxiety and depression), ECT at [facility] 2023, OP at [facility]; 1 past SA (OD on 9 pills of unknown medication in 2019), no h/o NSSIB, no h/o violence/aggression/legal issues, past marijuana use; initially admitted to [facility] 2W on 12/27 for increasing anxiety and SI w/o plans in the context of medication adjustments and psychosocial stressors.     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     On initial 2S assessment pt dysphoric, anxious, hopeless, and preoccupied with somatic sxs. There are elements of active passivity, severe sensitivity to feelings of inadequacy and complicated interpersonal relationships (, friends, providers). 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 Provided pt with psychoeducation regarding limitations of medications and need to focus on therapeutic interventions however she has limited insight into this and is intent on finding the right medication(s). For now will continue to slowly taper off nortriptyline.     1/6: Prominent treatment interfering behaviors including active passivity and rejection sensitivity. For now will continue nortriptyline taper. Considering atypical antidepressant trial. Pt reporting chronic b/l LE edema, will encourage her to use compression stocking and elevate legs, no indication for diuretic at this time.     1/7: Preoccupied with med side effects and physical sxs, but more positive about potential med trials. Will decrease qAM gabapentin 300mg to 200mg but otherwise will attempt to limit med changes other than nortriptyline taper. Will also attempt to discourage use of zofran PRNs given past difficulties tolerating multiple serotonergic medications.     PLAN:   1. Legals: admitted to  on 9.13. 1/2 pt transferred to .  2. Safety: routine obs appropriate as no current active SI/SIB/HI/VI on unit.   3. Psychiatric  - c/w Nortriptyline 10mg qHS with plans to discontinue at pt request due to reports of tremors, worsening anxiety, difficulty sleeping at home doses (50mg to 25mg on 12/27, 12.5mg on 1/3). Will defer further medication changes until Nortriptyline is d/c'd given pt concern for side effects/past difficulty tolerating serotonergic meds.   - c/w Remeron 22.5mg qHS for mood/anxiety (home med).   - c/w Klonopin 0.5mg TID for anxiety (home med).   - Decreased Gabapentin 300mg BID to 200mg qAM/300mg qHS for anxiety and neuropathy due to pt concern for sedation. Home dose Gabapentin 300mg TID.   - Melatonin 1mg qHS for insomnia.   - PRNs           - For anxiety: Klonopin wafer 0.25 mg q8h. d/c'd PRN gabapentin to simplify meds.            - For agitation: PO Zyprexa 2.5mg TID.            - STAT IM Zyprexa 2.5mg if there are imminent risk to self/others due to severe agitation.   4. Therapy: I/G/M therapy as indicated.   5. Medical: admission labs and EKG reviewed and wnl.   #Dependent edema  - chronic b/l LE edema likely 2/2 chronic venous insufficiency/deconditioning. No cardiac history (multiple cards workups have been negative).   - encourage pt to utilize compression stockings, elevation, appropriate physical therapy/exercise. No indication for diuretics/attempting to discourage polypharmacy.   #HTN  - c/w Metoprolol 50mg bid (home med) - per notes this is for palpitations 2/2 anxiety managed by outpatient cardiologist.   #HLD  - c/w Nexletol 180mg qHS (home med).   - c/w IM [id number] q2 weeks (last given 1/2) (home med).   #GERD  - c/w Pantoprazole 40mg qD (home med).   #Vit B12 deficiency   - pt due for injection per chart, for now started on PO 1000mcg qD.   #Nausea  - PRN PO Zofran 4mg TID (home med) --> lower to qD with attempts to d/c PRN. Will add maalox PRN given pt report that nausea is often 2/2 indigestion depending on diet.   6. Collateral: 1/3 Updated  Yehuda at [phone] who is agreeable with plan above.   7. Disposition: pending clinical improvement; likely return to previous provider at [facility] vs partial.   1/8 Patient unchanged anxious ruminative unable to use coping skilss. Will dc NTP increase Remeron  1/9 Somatic anxious unable to utilize coping techniques. Plan behavior plan initiated, cont to titrate Remeorn. Added methylfolate for augmentation  1/10 Reports malaise, could be a viral illness vs somatic anxiety symptoms; otherwise tolerating treatment well

## 2025-01-10 NOTE — BH INPATIENT PSYCHIATRY PROGRESS NOTE - NSBHMETABOLIC_PSY_ALL_CORE_FT
BMI: BMI (kg/m2): 37.5 (12-27-24 @ 15:12)  HbA1c: A1C with Estimated Average Glucose Result: 5.5 % (01-02-25 @ 09:11)    Glucose:   BP: --Vital Signs Last 24 Hrs  T(C): 36.7 (01-10-25 @ 07:50), Max: 37 (01-09-25 @ 20:52)  T(F): 98 (01-10-25 @ 07:50), Max: 98.6 (01-09-25 @ 20:52)  HR: --  BP: --  BP(mean): --  RR: --  SpO2: --    Orthostatic VS  01-10-25 @ 07:50  Lying BP: --/-- HR: --  Sitting BP: 142/87 HR: 76  Standing BP: 142/86 HR: 84  Site: --  Mode: --  Orthostatic VS  01-09-25 @ 20:52  Lying BP: --/-- HR: --  Sitting BP: 134/80 HR: 78  Standing BP: 144/78 HR: 87  Site: --  Mode: --  Orthostatic VS  01-09-25 @ 08:19  Lying BP: --/-- HR: --  Sitting BP: 137/87 HR: 96  Standing BP: 137/83 HR: 99  Site: upper left arm  Mode: electronic    Lipid Panel: Date/Time: 01-02-25 @ 09:11  Cholesterol, Serum: 128  LDL Cholesterol Calculated: 53  HDL Cholesterol, Serum: 46  Total Cholesterol/HDL Ration Measurement: --  Triglycerides, Serum: 174

## 2025-01-10 NOTE — BH INPATIENT PSYCHIATRY PROGRESS NOTE - NSBHCHARTREVIEWVS_PSY_A_CORE FT
Vital Signs Last 24 Hrs  T(C): 36.7 (01-10-25 @ 07:50), Max: 37 (01-09-25 @ 20:52)  T(F): 98 (01-10-25 @ 07:50), Max: 98.6 (01-09-25 @ 20:52)  HR: --  BP: --  BP(mean): --  RR: --  SpO2: --    Orthostatic VS  01-10-25 @ 07:50  Lying BP: --/-- HR: --  Sitting BP: 142/87 HR: 76  Standing BP: 142/86 HR: 84  Site: --  Mode: --  Orthostatic VS  01-09-25 @ 20:52  Lying BP: --/-- HR: --  Sitting BP: 134/80 HR: 78  Standing BP: 144/78 HR: 87  Site: --  Mode: --  Orthostatic VS  01-09-25 @ 08:19  Lying BP: --/-- HR: --  Sitting BP: 137/87 HR: 96  Standing BP: 137/83 HR: 99  Site: upper left arm  Mode: electronic

## 2025-01-10 NOTE — BH INPATIENT PSYCHIATRY PROGRESS NOTE - NSBHFUPINTERVALHXFT_PSY_A_CORE
Patient remains psychosomatic. Reports feeling "achy" and at times warm. Denies respiratory or GI symptoms but concluded that it is due to nortriptyline withdrawal. Continues to have anticipatory anxiety, is ruminative, reports difficulty sleeping. Again seeks reassurance that she won't be discharged before she feels ready.

## 2025-01-10 NOTE — BH INPATIENT PSYCHIATRY PROGRESS NOTE - CURRENT MEDICATION
MEDICATIONS  (STANDING):  clonazePAM Oral Disintegrating Tablet 0.5 milliGRAM(s) Oral <User Schedule>  cyanocobalamin 1000 MICROGram(s) Oral daily  evolocumab Injectable 140 milliGRAM(s) SubCutaneous every 2 weeks  gabapentin 300 milliGRAM(s) Oral at bedtime  gabapentin 200 milliGRAM(s) Oral <User Schedule>  l-methylfolate 7.5 milliGRAM(s) Oral daily  melatonin. 1 milliGRAM(s) Oral at bedtime  metoprolol succinate ER 50 milliGRAM(s) Oral two times a day  mirtazapine 30 milliGRAM(s) Oral at bedtime  Nexletol 180 mg 1 Tablet(s) 1 Tablet(s) Oral daily  pantoprazole    Tablet 40 milliGRAM(s) Oral before breakfast    MEDICATIONS  (PRN):  acetaminophen     Tablet .. 650 milliGRAM(s) Oral every 6 hours PRN Temp greater or equal to 38C (100.4F), Mild Pain (1 - 3), Moderate Pain (4 - 6)  aluminum hydroxide/magnesium hydroxide/simethicone Suspension 30 milliLiter(s) Oral every 6 hours PRN Dyspepsia  clonazePAM Oral Disintegrating Tablet 0.25 milliGRAM(s) Oral every 8 hours PRN anxiety  ibuprofen  Tablet. 400 milliGRAM(s) Oral every 8 hours PRN Mild Pain (1 - 3), Moderate Pain (4 - 6)   none

## 2025-01-11 RX ADMIN — GABAPENTIN 200 MILLIGRAM(S): 400 CAPSULE ORAL at 09:39

## 2025-01-11 RX ADMIN — Medication 1 MILLIGRAM(S): at 20:44

## 2025-01-11 RX ADMIN — Medication 400 MILLIGRAM(S): at 10:19

## 2025-01-11 RX ADMIN — METOPROLOL SUCCINATE 50 MILLIGRAM(S): 50 TABLET, EXTENDED RELEASE ORAL at 20:45

## 2025-01-11 RX ADMIN — GABAPENTIN 300 MILLIGRAM(S): 400 CAPSULE ORAL at 20:45

## 2025-01-11 RX ADMIN — Medication 30 MILLILITER(S): at 15:05

## 2025-01-11 RX ADMIN — MIRTAZAPINE 30 MILLIGRAM(S): 30 TABLET, FILM COATED ORAL at 20:44

## 2025-01-11 RX ADMIN — CLONAZEPAM 0.5 MILLIGRAM(S): 0.5 TABLET ORAL at 06:09

## 2025-01-11 RX ADMIN — METOPROLOL SUCCINATE 50 MILLIGRAM(S): 50 TABLET, EXTENDED RELEASE ORAL at 09:39

## 2025-01-11 RX ADMIN — CLONAZEPAM 0.5 MILLIGRAM(S): 0.5 TABLET ORAL at 20:45

## 2025-01-11 RX ADMIN — CLONAZEPAM 0.5 MILLIGRAM(S): 0.5 TABLET ORAL at 12:32

## 2025-01-11 RX ADMIN — Medication 400 MILLIGRAM(S): at 09:40

## 2025-01-11 RX ADMIN — CYANOCOBALAMIN 1000 MICROGRAM(S): 1000 INJECTION INTRAMUSCULAR; SUBCUTANEOUS at 09:39

## 2025-01-11 RX ADMIN — Medication 40 MILLIGRAM(S): at 06:10

## 2025-01-12 RX ADMIN — CLONAZEPAM 0.5 MILLIGRAM(S): 0.5 TABLET ORAL at 06:46

## 2025-01-12 RX ADMIN — GABAPENTIN 200 MILLIGRAM(S): 400 CAPSULE ORAL at 09:26

## 2025-01-12 RX ADMIN — Medication 1 MILLIGRAM(S): at 20:52

## 2025-01-12 RX ADMIN — GABAPENTIN 300 MILLIGRAM(S): 400 CAPSULE ORAL at 20:52

## 2025-01-12 RX ADMIN — METOPROLOL SUCCINATE 50 MILLIGRAM(S): 50 TABLET, EXTENDED RELEASE ORAL at 20:52

## 2025-01-12 RX ADMIN — METOPROLOL SUCCINATE 50 MILLIGRAM(S): 50 TABLET, EXTENDED RELEASE ORAL at 09:26

## 2025-01-12 RX ADMIN — CLONAZEPAM 0.5 MILLIGRAM(S): 0.5 TABLET ORAL at 20:52

## 2025-01-12 RX ADMIN — MIRTAZAPINE 30 MILLIGRAM(S): 30 TABLET, FILM COATED ORAL at 20:52

## 2025-01-12 RX ADMIN — CYANOCOBALAMIN 1000 MICROGRAM(S): 1000 INJECTION INTRAMUSCULAR; SUBCUTANEOUS at 09:26

## 2025-01-12 RX ADMIN — Medication 30 MILLILITER(S): at 12:53

## 2025-01-12 RX ADMIN — Medication 40 MILLIGRAM(S): at 07:00

## 2025-01-12 RX ADMIN — CLONAZEPAM 0.5 MILLIGRAM(S): 0.5 TABLET ORAL at 12:54

## 2025-01-13 DIAGNOSIS — F33.2 MAJOR DEPRESSIVE DISORDER, RECURRENT SEVERE WITHOUT PSYCHOTIC FEATURES: ICD-10-CM

## 2025-01-13 PROCEDURE — 99232 SBSQ HOSP IP/OBS MODERATE 35: CPT

## 2025-01-13 PROCEDURE — 90832 PSYTX W PT 30 MINUTES: CPT

## 2025-01-13 RX ORDER — MIRTAZAPINE 30 MG/1
37.5 TABLET, FILM COATED ORAL AT BEDTIME
Refills: 0 | Status: DISCONTINUED | OUTPATIENT
Start: 2025-01-13 | End: 2025-01-15

## 2025-01-13 RX ADMIN — Medication 30 MILLILITER(S): at 17:35

## 2025-01-13 RX ADMIN — Medication 40 MILLIGRAM(S): at 06:38

## 2025-01-13 RX ADMIN — CLONAZEPAM 0.5 MILLIGRAM(S): 0.5 TABLET ORAL at 12:04

## 2025-01-13 RX ADMIN — MIRTAZAPINE 37.5 MILLIGRAM(S): 30 TABLET, FILM COATED ORAL at 21:12

## 2025-01-13 RX ADMIN — Medication 1 MILLIGRAM(S): at 21:11

## 2025-01-13 RX ADMIN — METOPROLOL SUCCINATE 50 MILLIGRAM(S): 50 TABLET, EXTENDED RELEASE ORAL at 21:12

## 2025-01-13 RX ADMIN — METOPROLOL SUCCINATE 50 MILLIGRAM(S): 50 TABLET, EXTENDED RELEASE ORAL at 08:45

## 2025-01-13 RX ADMIN — GABAPENTIN 300 MILLIGRAM(S): 400 CAPSULE ORAL at 21:12

## 2025-01-13 RX ADMIN — CLONAZEPAM 0.5 MILLIGRAM(S): 0.5 TABLET ORAL at 21:11

## 2025-01-13 RX ADMIN — GABAPENTIN 200 MILLIGRAM(S): 400 CAPSULE ORAL at 08:46

## 2025-01-13 RX ADMIN — CYANOCOBALAMIN 1000 MICROGRAM(S): 1000 INJECTION INTRAMUSCULAR; SUBCUTANEOUS at 08:45

## 2025-01-13 RX ADMIN — CLONAZEPAM 0.25 MILLIGRAM(S): 0.5 TABLET ORAL at 15:38

## 2025-01-13 RX ADMIN — CLONAZEPAM 0.5 MILLIGRAM(S): 0.5 TABLET ORAL at 06:38

## 2025-01-13 RX ADMIN — Medication 30 MILLILITER(S): at 10:44

## 2025-01-13 NOTE — BH INPATIENT PSYCHIATRY PROGRESS NOTE - MSE UNSTRUCTURED FT
Awake and alert. Engaged, cooperative  SPEECH: Normal rate, tone, and volume.  MOTOR: mild PMR. No tremors or other abnormal movements noted. Stable gait  Mood: anxious  AFFECT: congruent, stable, intense, constricted  THOUGHT PROCESS: Linear but with obsessive, ruminative pattern   THOUGHT CONTENT: Denies SI or HI; no evidence of delusions. Somatically preoccupied  COGNITION: Grossly intact.  INSIGHT: Poor.  JUDGMENT: Fair.  IMPULSE CONTROL: Intact on unit.

## 2025-01-13 NOTE — BH INPATIENT PSYCHIATRY PROGRESS NOTE - CURRENT MEDICATION
MEDICATIONS  (STANDING):  clonazePAM Oral Disintegrating Tablet 0.5 milliGRAM(s) Oral <User Schedule>  cyanocobalamin 1000 MICROGram(s) Oral daily  evolocumab Injectable 140 milliGRAM(s) SubCutaneous every 2 weeks  gabapentin 300 milliGRAM(s) Oral at bedtime  gabapentin 200 milliGRAM(s) Oral <User Schedule>  l-methylfolate 7.5 milliGRAM(s) Oral daily  melatonin. 1 milliGRAM(s) Oral at bedtime  metoprolol succinate ER 50 milliGRAM(s) Oral two times a day  mirtazapine 37.5 milliGRAM(s) Oral at bedtime  Nexletol 180 mg 1 Tablet(s) 1 Tablet(s) Oral daily  pantoprazole    Tablet 40 milliGRAM(s) Oral before breakfast    MEDICATIONS  (PRN):  acetaminophen     Tablet .. 650 milliGRAM(s) Oral every 6 hours PRN Temp greater or equal to 38C (100.4F), Mild Pain (1 - 3), Moderate Pain (4 - 6)  aluminum hydroxide/magnesium hydroxide/simethicone Suspension 30 milliLiter(s) Oral every 6 hours PRN Dyspepsia  clonazePAM Oral Disintegrating Tablet 0.25 milliGRAM(s) Oral every 8 hours PRN anxiety  ibuprofen  Tablet. 400 milliGRAM(s) Oral every 8 hours PRN Mild Pain (1 - 3), Moderate Pain (4 - 6)

## 2025-01-13 NOTE — BH INPATIENT PSYCHIATRY PROGRESS NOTE - NSBHMETABOLIC_PSY_ALL_CORE_FT
BMI: BMI (kg/m2): 37.5 (12-27-24 @ 15:12)  HbA1c: A1C with Estimated Average Glucose Result: 5.5 % (01-02-25 @ 09:11)    Glucose:   BP: 138/78 (01-13-25 @ 08:42) (126/67 - 138/78)Vital Signs Last 24 Hrs  T(C): --  T(F): --  HR: 88 (01-13-25 @ 08:42) (88 - 88)  BP: 138/78 (01-13-25 @ 08:42) (138/78 - 138/78)  BP(mean): --  RR: --  SpO2: --    Orthostatic VS  01-12-25 @ 20:30  Lying BP: --/-- HR: --  Sitting BP: 141/97 HR: 85  Standing BP: 152/99 HR: 85  Site: --  Mode: --  Orthostatic VS  01-12-25 @ 07:39  Lying BP: --/-- HR: --  Sitting BP: 143/83 HR: 78  Standing BP: 157/87 HR: 85  Site: --  Mode: --    Lipid Panel: Date/Time: 01-02-25 @ 09:11  Cholesterol, Serum: 128  LDL Cholesterol Calculated: 53  HDL Cholesterol, Serum: 46  Total Cholesterol/HDL Ration Measurement: --  Triglycerides, Serum: 174   BMI: BMI (kg/m2): 37.5 (12-27-24 @ 15:12)  HbA1c: A1C with Estimated Average Glucose Result: 5.5 % (01-02-25 @ 09:11)    Glucose:   BP: 140/84 (01-13-25 @ 20:40) (126/67 - 140/84)Vital Signs Last 24 Hrs  T(C): --  T(F): --  HR: 83 (01-13-25 @ 20:40) (83 - 88)  BP: 140/84 (01-13-25 @ 20:40) (138/78 - 140/84)  BP(mean): --  RR: --  SpO2: --    Orthostatic VS  01-12-25 @ 20:30  Lying BP: --/-- HR: --  Sitting BP: 141/97 HR: 85  Standing BP: 152/99 HR: 85  Site: --  Mode: --  Orthostatic VS  01-12-25 @ 07:39  Lying BP: --/-- HR: --  Sitting BP: 143/83 HR: 78  Standing BP: 157/87 HR: 85  Site: --  Mode: --    Lipid Panel: Date/Time: 01-02-25 @ 09:11  Cholesterol, Serum: 128  LDL Cholesterol Calculated: 53  HDL Cholesterol, Serum: 46  Total Cholesterol/HDL Ration Measurement: --  Triglycerides, Serum: 174

## 2025-01-13 NOTE — BH INPATIENT PSYCHIATRY PROGRESS NOTE - NSBHCHARTREVIEWVS_PSY_A_CORE FT
Vital Signs Last 24 Hrs  T(C): --  T(F): --  HR: 88 (01-13-25 @ 08:42) (88 - 88)  BP: 138/78 (01-13-25 @ 08:42) (138/78 - 138/78)  BP(mean): --  RR: --  SpO2: --    Orthostatic VS  01-12-25 @ 20:30  Lying BP: --/-- HR: --  Sitting BP: 141/97 HR: 85  Standing BP: 152/99 HR: 85  Site: --  Mode: --  Orthostatic VS  01-12-25 @ 07:39  Lying BP: --/-- HR: --  Sitting BP: 143/83 HR: 78  Standing BP: 157/87 HR: 85  Site: --  Mode: --   Vital Signs Last 24 Hrs  T(C): --  T(F): --  HR: 83 (01-13-25 @ 20:40) (83 - 88)  BP: 140/84 (01-13-25 @ 20:40) (138/78 - 140/84)  BP(mean): --  RR: --  SpO2: --    Orthostatic VS  01-12-25 @ 20:30  Lying BP: --/-- HR: --  Sitting BP: 141/97 HR: 85  Standing BP: 152/99 HR: 85  Site: --  Mode: --  Orthostatic VS  01-12-25 @ 07:39  Lying BP: --/-- HR: --  Sitting BP: 143/83 HR: 78  Standing BP: 157/87 HR: 85  Site: --  Mode: --

## 2025-01-13 NOTE — BH INPATIENT PSYCHIATRY PROGRESS NOTE - NSBHFUPINTERVALHXFT_PSY_A_CORE
Pt is seen and evaluated for follow up for MDD, ANSON.  Chart is reviewed and case is discussed with treatment team.  No issues overnight.  Pt has been med adherant.  Pt tearful, seeks reassurance on exam.  Patient remains psychosomatic. Denies respiratory or GI symptoms. Continues to have anticipatory anxiety, is ruminative, reports difficulty sleeping. Again seeks reassurance that she won't be discharged before she feels ready.  Pt received prn Klonopin today, noted to be irritable with RN.  Agrees to titration of Remeron, med focused and reliant on pharmacologic interventions, case is also d/w Psychologist.  No agitation, responds well to reassurance.

## 2025-01-13 NOTE — BH INPATIENT PSYCHIATRY PROGRESS NOTE - NSBHASSESSSUMMFT_PSY_ALL_CORE
Ms. Gross is a 75yo female, , retired (), domiciled at home with , no children; PMHx HTN, HLD; PPHx d/o ANSON and MDD, 9 past psych admissions starting in 2019 (last [facility] April 2024 for worsening anxiety and depression), ECT at [facility] 2023, OP at [facility]; 1 past SA (OD on 9 pills of unknown medication in 2019), no h/o NSSIB, no h/o violence/aggression/legal issues, past marijuana use; initially admitted to [facility] 2W on 12/27 for increasing anxiety and SI w/o plans in the context of medication adjustments and psychosocial stressors.     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     On initial 2S assessment pt dysphoric, anxious, hopeless, and preoccupied with somatic sxs. There are elements of active passivity, severe sensitivity to feelings of inadequacy and complicated interpersonal relationships (, friends, providers). 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 Provided pt with psychoeducation regarding limitations of medications and need to focus on therapeutic interventions however she has limited insight into this and is intent on finding the right medication(s). For now will continue to slowly taper off nortriptyline.     1/6: Prominent treatment interfering behaviors including active passivity and rejection sensitivity. For now will continue nortriptyline taper. Considering atypical antidepressant trial. Pt reporting chronic b/l LE edema, will encourage her to use compression stocking and elevate legs, no indication for diuretic at this time.     1/7: Preoccupied with med side effects and physical sxs, but more positive about potential med trials. Will decrease qAM gabapentin 300mg to 200mg but otherwise will attempt to limit med changes other than nortriptyline taper. Will also attempt to discourage use of zofran PRNs given past difficulties tolerating multiple serotonergic medications.   1/8 Patient unchanged anxious ruminative unable to use coping skilss. Will dc NTP increase Remeron  1/9 Somatic anxious unable to utilize coping techniques. Plan behavior plan initiated, cont to titrate Remeorn. Added methylfolate for augmentation  1/10 Reports malaise, could be a viral illness vs somatic anxiety symptoms; otherwise tolerating treatment well    1/13: Dysphoric, tearful, anxious but denies SI/HI, discussed med plans and seeks reassurance.  Will increase Remeron to 37.5mg QHS.    PLAN:   1. Legals: admitted to 2W on 9.13. 1/2 pt transferred to 2S.  2. Safety: routine obs appropriate as no current active SI/SIB/HI/VI on unit.   3. Psychiatric  - c/w Nortriptyline 10mg qHS with plans to discontinue at pt request due to reports of tremors, worsening anxiety, difficulty sleeping at home doses (50mg to 25mg on 12/27, 12.5mg on 1/3). Will defer further medication changes until Nortriptyline is d/c'd given pt concern for side effects/past difficulty tolerating serotonergic meds.   - increase Remeron from 30mg to 37.5mg qHS for mood/anxiety on 1/13 (home med).   - c/w Klonopin 0.5mg TID for anxiety (home med).   - Decreased Gabapentin 300mg BID to 200mg qAM/300mg qHS for anxiety and neuropathy due to pt concern for sedation. Home dose Gabapentin 300mg TID.   - Melatonin 1mg qHS for insomnia.   - PRNs           - For anxiety: Klonopin wafer 0.25 mg q8h. d/c'd PRN gabapentin to simplify meds.            - For agitation: PO Zyprexa 2.5mg TID.            - STAT IM Zyprexa 2.5mg if there are imminent risk to self/others due to severe agitation.   4. Therapy: I/G/M therapy as indicated.   5. Medical: admission labs and EKG reviewed and wnl.   #Dependent edema  - chronic b/l LE edema likely 2/2 chronic venous insufficiency/deconditioning. No cardiac history (multiple cards workups have been negative).   - encourage pt to utilize compression stockings, elevation, appropriate physical therapy/exercise. No indication for diuretics/attempting to discourage polypharmacy.   #HTN  - c/w Metoprolol 50mg bid (home med) - per notes this is for palpitations 2/2 anxiety managed by outpatient cardiologist.   #HLD  - c/w Nexletol 180mg qHS (home med).   - c/w IM [id number] q2 weeks (last given 1/2) (home med).   #GERD  - c/w Pantoprazole 40mg qD (home med).   #Vit B12 deficiency   - pt due for injection per chart, for now started on PO 1000mcg qD.   #Nausea  - PRN PO Zofran 4mg TID (home med) --> lower to qD with attempts to d/c PRN. Will add maalox PRN given pt report that nausea is often 2/2 indigestion depending on diet.   6. Collateral: 1/3 Updated  Yehuda at [phone] who is agreeable with plan above.   7. Disposition: pending clinical improvement; likely return to previous provider at [facility] vs partial.

## 2025-01-14 PROCEDURE — 99232 SBSQ HOSP IP/OBS MODERATE 35: CPT

## 2025-01-14 RX ORDER — ONDANSETRON HCL/PF 4 MG/2 ML
4 VIAL (ML) INJECTION DAILY
Refills: 0 | Status: DISCONTINUED | OUTPATIENT
Start: 2025-01-15 | End: 2025-01-21

## 2025-01-14 RX ORDER — LIDOCAINE HYDROCHLORIDE 20 MG/ML
1 JELLY TOPICAL DAILY
Refills: 0 | Status: DISCONTINUED | OUTPATIENT
Start: 2025-01-14 | End: 2025-02-19

## 2025-01-14 RX ORDER — CLONAZEPAM 0.5 MG/1
0.25 TABLET ORAL EVERY 8 HOURS
Refills: 0 | Status: DISCONTINUED | OUTPATIENT
Start: 2025-01-14 | End: 2025-01-17

## 2025-01-14 RX ORDER — CLONAZEPAM 0.5 MG/1
0.5 TABLET ORAL
Refills: 0 | Status: DISCONTINUED | OUTPATIENT
Start: 2025-01-14 | End: 2025-01-17

## 2025-01-14 RX ADMIN — Medication 40 MILLIGRAM(S): at 06:41

## 2025-01-14 RX ADMIN — METOPROLOL SUCCINATE 50 MILLIGRAM(S): 50 TABLET, EXTENDED RELEASE ORAL at 20:29

## 2025-01-14 RX ADMIN — METOPROLOL SUCCINATE 50 MILLIGRAM(S): 50 TABLET, EXTENDED RELEASE ORAL at 09:20

## 2025-01-14 RX ADMIN — MIRTAZAPINE 37.5 MILLIGRAM(S): 30 TABLET, FILM COATED ORAL at 20:29

## 2025-01-14 RX ADMIN — GABAPENTIN 300 MILLIGRAM(S): 400 CAPSULE ORAL at 20:06

## 2025-01-14 RX ADMIN — Medication 30 MILLILITER(S): at 14:25

## 2025-01-14 RX ADMIN — CLONAZEPAM 0.5 MILLIGRAM(S): 0.5 TABLET ORAL at 12:56

## 2025-01-14 RX ADMIN — GABAPENTIN 200 MILLIGRAM(S): 400 CAPSULE ORAL at 09:20

## 2025-01-14 RX ADMIN — CLONAZEPAM 0.5 MILLIGRAM(S): 0.5 TABLET ORAL at 06:29

## 2025-01-14 RX ADMIN — Medication 400 MILLIGRAM(S): at 16:30

## 2025-01-14 RX ADMIN — Medication 1 MILLIGRAM(S): at 20:29

## 2025-01-14 RX ADMIN — CLONAZEPAM 0.5 MILLIGRAM(S): 0.5 TABLET ORAL at 20:06

## 2025-01-14 RX ADMIN — Medication 400 MILLIGRAM(S): at 17:25

## 2025-01-14 NOTE — BH INPATIENT PSYCHIATRY PROGRESS NOTE - NSBHCHARTREVIEWVS_PSY_A_CORE FT
Vital Signs Last 24 Hrs  T(C): 36.8 (01-14-25 @ 07:36), Max: 36.8 (01-14-25 @ 07:36)  T(F): 98.2 (01-14-25 @ 07:36), Max: 98.2 (01-14-25 @ 07:36)  HR: --  BP: --  BP(mean): --  RR: 16 (01-14-25 @ 09:20) (16 - 16)  SpO2: --    Orthostatic VS  01-14-25 @ 20:35  Lying BP: --/-- HR: --  Sitting BP: 133/81 HR: 81  Standing BP: --/-- HR: --  Site: --  Mode: --  Orthostatic VS  01-14-25 @ 09:20  Lying BP: --/-- HR: --  Sitting BP: 156/78 HR: 80  Standing BP: 142/80 HR: 78  Site: --  Mode: --  Orthostatic VS  01-14-25 @ 07:36  Lying BP: --/-- HR: --  Sitting BP: 139/87 HR: 78  Standing BP: 158/89 HR: 89  Site: upper right arm  Mode: electronic   Vital Signs Last 24 Hrs  T(C): 36.9 (01-16-25 @ 07:45), Max: 36.9 (01-16-25 @ 07:45)  T(F): 98.4 (01-16-25 @ 07:45), Max: 98.4 (01-16-25 @ 07:45)  HR: 79 (01-16-25 @ 07:45) (79 - 79)  BP: 153/76 (01-15-25 @ 20:40) (153/76 - 153/76)  BP(mean): 86 (01-15-25 @ 20:40) (86 - 86)  RR: --  SpO2: --    Orthostatic VS  01-16-25 @ 07:45  Lying BP: --/-- HR: --  Sitting BP: 154/84 HR: --  Standing BP: 152/87 HR: --  Site: --  Mode: --  Orthostatic VS  01-15-25 @ 08:00  Lying BP: --/-- HR: --  Sitting BP: 153/95 HR: 83  Standing BP: 158/95 HR: 89  Site: --  Mode: --  Orthostatic VS  01-14-25 @ 20:35  Lying BP: --/-- HR: --  Sitting BP: 133/81 HR: 81  Standing BP: --/-- HR: --  Site: --  Mode: --

## 2025-01-14 NOTE — BH INPATIENT PSYCHIATRY PROGRESS NOTE - NSBHASSESSSUMMFT_PSY_ALL_CORE
Ms. Gross is a 75yo female, , retired (), domiciled at home with , no children; PMHx HTN, HLD; PPHx d/o ANSON and MDD, 9 past psych admissions starting in 2019 (last [facility] April 2024 for worsening anxiety and depression), ECT at [facility] 2023, OP at [facility]; 1 past SA (OD on 9 pills of unknown medication in 2019), no h/o NSSIB, no h/o violence/aggression/legal issues, past marijuana use; initially admitted to [facility] 2W on 12/27 for increasing anxiety and SI w/o plans in the context of medication adjustments and psychosocial stressors.     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     On initial 2S assessment pt dysphoric, anxious, hopeless, and preoccupied with somatic sxs. There are elements of active passivity, severe sensitivity to feelings of inadequacy and complicated interpersonal relationships (, friends, providers). 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 Provided pt with psychoeducation regarding limitations of medications and need to focus on therapeutic interventions however she has limited insight into this and is intent on finding the right medication(s). For now will continue to slowly taper off nortriptyline.     1/6: Prominent treatment interfering behaviors including active passivity and rejection sensitivity. For now will continue nortriptyline taper. Considering atypical antidepressant trial. Pt reporting chronic b/l LE edema, will encourage her to use compression stocking and elevate legs, no indication for diuretic at this time.     1/7: Preoccupied with med side effects and physical sxs, but more positive about potential med trials. Will decrease qAM gabapentin 300mg to 200mg but otherwise will attempt to limit med changes other than nortriptyline taper. Will also attempt to discourage use of zofran PRNs given past difficulties tolerating multiple serotonergic medications.   1/8 Patient unchanged anxious ruminative unable to use coping skilss. Will dc NTP increase Remeron  1/9 Somatic anxious unable to utilize coping techniques. Plan behavior plan initiated, cont to titrate Remeorn. Added methylfolate for augmentation  1/10 Reports malaise, could be a viral illness vs somatic anxiety symptoms; otherwise tolerating treatment well    1/13: Dysphoric, tearful, anxious but denies SI/HI, discussed med plans and seeks reassurance.  Will increase Remeron to 37.5mg QHS.    PLAN:   1. Legals: admitted to 2W on 9.13. 1/2 pt transferred to 2S.  2. Safety: routine obs appropriate as no current active SI/SIB/HI/VI on unit.   3. Psychiatric  - c/w Nortriptyline 10mg qHS with plans to discontinue at pt request due to reports of tremors, worsening anxiety, difficulty sleeping at home doses (50mg to 25mg on 12/27, 12.5mg on 1/3). Will defer further medication changes until Nortriptyline is d/c'd given pt concern for side effects/past difficulty tolerating serotonergic meds.   - increase Remeron from 30mg to 37.5mg qHS for mood/anxiety on 1/13 (home med).   - c/w Klonopin 0.5mg TID for anxiety (home med).   - Decreased Gabapentin 300mg BID to 200mg qAM/300mg qHS for anxiety and neuropathy due to pt concern for sedation. Home dose Gabapentin 300mg TID.   - Melatonin 1mg qHS for insomnia.   - PRNs           - For anxiety: Klonopin wafer 0.25 mg q8h. d/c'd PRN gabapentin to simplify meds.            - For agitation: PO Zyprexa 2.5mg TID.            - STAT IM Zyprexa 2.5mg if there are imminent risk to self/others due to severe agitation.   4. Therapy: I/G/M therapy as indicated.   5. Medical: admission labs and EKG reviewed and wnl.   #Dependent edema  - chronic b/l LE edema likely 2/2 chronic venous insufficiency/deconditioning. No cardiac history (multiple cards workups have been negative).   - encourage pt to utilize compression stockings, elevation, appropriate physical therapy/exercise. No indication for diuretics/attempting to discourage polypharmacy.   #HTN  - c/w Metoprolol 50mg bid (home med) - per notes this is for palpitations 2/2 anxiety managed by outpatient cardiologist.   #HLD  - c/w Nexletol 180mg qHS (home med).   - c/w IM [id number] q2 weeks (last given 1/2) (home med).   #GERD  - c/w Pantoprazole 40mg qD (home med).   #Vit B12 deficiency   - pt due for injection per chart, for now started on PO 1000mcg qD.   #Nausea  - PRN PO Zofran 4mg TID (home med) --> lower to qD with attempts to d/c PRN. Will add maalox PRN given pt report that nausea is often 2/2 indigestion depending on diet.   6. Collateral: 1/3 Updated  Yehuda at [phone] who is agreeable with plan above.   7. Disposition: pending clinical improvement; likely return to previous provider at [facility] vs partial.  Ms. Gross is a 75yo female, , retired (), domiciled at home with , no children; PMHx HTN, HLD; PPHx d/o ANSON and MDD, 9 past psych admissions starting in 2019 (last [facility] April 2024 for worsening anxiety and depression), ECT at [facility] 2023, OP at [facility]; 1 past SA (OD on 9 pills of unknown medication in 2019), no h/o NSSIB, no h/o violence/aggression/legal issues, past marijuana use; initially admitted to [facility] 2W on 12/27 for increasing anxiety and SI w/o plans in the context of medication adjustments and psychosocial stressors.     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     On initial 2S assessment pt dysphoric, anxious, hopeless, and preoccupied with somatic sxs. There are elements of active passivity, severe sensitivity to feelings of inadequacy and complicated interpersonal relationships (, friends, providers). 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 Provided pt with psychoeducation regarding limitations of medications and need to focus on therapeutic interventions however she has limited insight into this and is intent on finding the right medication(s). For now will continue to slowly taper off nortriptyline.     1/6: Prominent treatment interfering behaviors including active passivity and rejection sensitivity. For now will continue nortriptyline taper. Considering atypical antidepressant trial. Pt reporting chronic b/l LE edema, will encourage her to use compression stocking and elevate legs, no indication for diuretic at this time.     1/7: Preoccupied with med side effects and physical sxs, but more positive about potential med trials. Will decrease qAM gabapentin 300mg to 200mg but otherwise will attempt to limit med changes other than nortriptyline taper. Will also attempt to discourage use of zofran PRNs given past difficulties tolerating multiple serotonergic medications.   1/8 Patient unchanged anxious ruminative unable to use coping skilss. Will dc NTP increase Remeron  1/9 Somatic anxious unable to utilize coping techniques. Plan behavior plan initiated, cont to titrate Remeorn. Added methylfolate for augmentation  1/10 Reports malaise, could be a viral illness vs somatic anxiety symptoms; otherwise tolerating treatment well    1/13: Dysphoric, tearful, anxious but denies SI/HI, discussed med plans and seeks reassurance.  Will increase Remeron to 37.5mg QHS.  1/14: Remains dysphoric, anxious, somatically focused, responds well to support and reassurance provided.  Will increase methylfolate to 15mg daily, provided zofran 4mg PO daily prn nausea, Lidoderm patch prn sciatic pain, will monitor tolerability and consider further titration of Remeron.    PLAN:   1. Legals: admitted to 2 on 9.13. 1/2 pt transferred to .  2. Safety: routine obs appropriate as no current active SI/SIB/HI/VI on unit.   3. Psychiatric  - c/w Nortriptyline 10mg qHS with plans to discontinue at pt request due to reports of tremors, worsening anxiety, difficulty sleeping at home doses (50mg to 25mg on 12/27, 12.5mg on 1/3). Will defer further medication changes until Nortriptyline is d/c'd given pt concern for side effects/past difficulty tolerating serotonergic meds.   - increase Remeron from 30mg to 37.5mg qHS for mood/anxiety on 1/13 (home med).   - c/w Klonopin 0.5mg TID for anxiety (home med).   - Decreased Gabapentin 300mg BID to 200mg qAM/300mg qHS for anxiety and neuropathy due to pt concern for sedation. Home dose Gabapentin 300mg TID.   - Melatonin 1mg qHS for insomnia.   - PRNs           - For anxiety: Klonopin wafer 0.25 mg q8h. d/c'd PRN gabapentin to simplify meds.            - For agitation: PO Zyprexa 2.5mg TID.            - STAT IM Zyprexa 2.5mg if there are imminent risk to self/others due to severe agitation.   4. Therapy: I/G/M therapy as indicated.   5. Medical: admission labs and EKG reviewed and wnl.   #Dependent edema  - chronic b/l LE edema likely 2/2 chronic venous insufficiency/deconditioning. No cardiac history (multiple cards workups have been negative).   - encourage pt to utilize compression stockings, elevation, appropriate physical therapy/exercise. No indication for diuretics/attempting to discourage polypharmacy.   #HTN  - c/w Metoprolol 50mg bid (home med) - per notes this is for palpitations 2/2 anxiety managed by outpatient cardiologist.   #HLD  - c/w Nexletol 180mg qHS (home med).   - c/w IM [id number] q2 weeks (last given 1/2) (home med).   #GERD  - c/w Pantoprazole 40mg qD (home med).   #Vit B12 deficiency   - pt due for injection per chart, for now started on PO 1000mcg qD.   #Nausea  - PRN PO Zofran 4mg TID (home med) --> lower to qD with attempts to d/c PRN. Will add maalox PRN given pt report that nausea is often 2/2 indigestion depending on diet.   6. Collateral: 1/3 Updated  Yehuda at [phone] who is agreeable with plan above.   7. Disposition: pending clinical improvement; likely return to previous provider at [facility] vs partial.

## 2025-01-14 NOTE — BH INPATIENT PSYCHIATRY PROGRESS NOTE - PRN MEDS
MEDICATIONS  (PRN):  acetaminophen     Tablet .. 650 milliGRAM(s) Oral every 6 hours PRN Temp greater or equal to 38C (100.4F), Mild Pain (1 - 3), Moderate Pain (4 - 6)  aluminum hydroxide/magnesium hydroxide/simethicone Suspension 30 milliLiter(s) Oral every 6 hours PRN Dyspepsia  clonazePAM Oral Disintegrating Tablet 0.25 milliGRAM(s) Oral every 8 hours PRN anxiety  ibuprofen  Tablet. 400 milliGRAM(s) Oral every 8 hours PRN Mild Pain (1 - 3), Moderate Pain (4 - 6)  lidocaine   4% Patch 1 Patch Transdermal daily PRN sciatica pain   MEDICATIONS  (PRN):  acetaminophen     Tablet .. 650 milliGRAM(s) Oral every 6 hours PRN Temp greater or equal to 38C (100.4F), Mild Pain (1 - 3), Moderate Pain (4 - 6)  aluminum hydroxide/magnesium hydroxide/simethicone Suspension 30 milliLiter(s) Oral every 6 hours PRN Dyspepsia  clonazePAM Oral Disintegrating Tablet 0.25 milliGRAM(s) Oral every 8 hours PRN anxiety  ibuprofen  Tablet. 400 milliGRAM(s) Oral every 8 hours PRN Mild Pain (1 - 3), Moderate Pain (4 - 6)  lidocaine   4% Patch 1 Patch Transdermal daily PRN sciatica pain  ondansetron    Tablet 4 milliGRAM(s) Oral daily PRN Nausea

## 2025-01-14 NOTE — BH INPATIENT PSYCHIATRY PROGRESS NOTE - CURRENT MEDICATION
MEDICATIONS  (STANDING):  clonazePAM Oral Disintegrating Tablet 0.5 milliGRAM(s) Oral <User Schedule>  evolocumab Injectable 140 milliGRAM(s) SubCutaneous every 2 weeks  gabapentin 300 milliGRAM(s) Oral at bedtime  gabapentin 200 milliGRAM(s) Oral <User Schedule>  melatonin. 1 milliGRAM(s) Oral at bedtime  metoprolol succinate ER 50 milliGRAM(s) Oral two times a day  mirtazapine 37.5 milliGRAM(s) Oral at bedtime  Nexletol 180 mg 1 Tablet(s) 1 Tablet(s) Oral daily  pantoprazole    Tablet 40 milliGRAM(s) Oral before breakfast    MEDICATIONS  (PRN):  acetaminophen     Tablet .. 650 milliGRAM(s) Oral every 6 hours PRN Temp greater or equal to 38C (100.4F), Mild Pain (1 - 3), Moderate Pain (4 - 6)  aluminum hydroxide/magnesium hydroxide/simethicone Suspension 30 milliLiter(s) Oral every 6 hours PRN Dyspepsia  clonazePAM Oral Disintegrating Tablet 0.25 milliGRAM(s) Oral every 8 hours PRN anxiety  ibuprofen  Tablet. 400 milliGRAM(s) Oral every 8 hours PRN Mild Pain (1 - 3), Moderate Pain (4 - 6)  lidocaine   4% Patch 1 Patch Transdermal daily PRN sciatica pain   MEDICATIONS  (STANDING):  clonazePAM Oral Disintegrating Tablet 0.5 milliGRAM(s) Oral <User Schedule>  evolocumab Injectable 140 milliGRAM(s) SubCutaneous every 2 weeks  gabapentin 300 milliGRAM(s) Oral at bedtime  gabapentin 200 milliGRAM(s) Oral <User Schedule>  l-methylfolate 15 milliGRAM(s) Oral daily  melatonin. 1 milliGRAM(s) Oral at bedtime  metoprolol succinate ER 50 milliGRAM(s) Oral two times a day  mirtazapine 45 milliGRAM(s) Oral at bedtime  Nexletol 180 mg 1 Tablet(s) 1 Tablet(s) Oral daily  pantoprazole    Tablet 40 milliGRAM(s) Oral before breakfast    MEDICATIONS  (PRN):  acetaminophen     Tablet .. 650 milliGRAM(s) Oral every 6 hours PRN Temp greater or equal to 38C (100.4F), Mild Pain (1 - 3), Moderate Pain (4 - 6)  aluminum hydroxide/magnesium hydroxide/simethicone Suspension 30 milliLiter(s) Oral every 6 hours PRN Dyspepsia  clonazePAM Oral Disintegrating Tablet 0.25 milliGRAM(s) Oral every 8 hours PRN anxiety  ibuprofen  Tablet. 400 milliGRAM(s) Oral every 8 hours PRN Mild Pain (1 - 3), Moderate Pain (4 - 6)  lidocaine   4% Patch 1 Patch Transdermal daily PRN sciatica pain  ondansetron    Tablet 4 milliGRAM(s) Oral daily PRN Nausea

## 2025-01-14 NOTE — BH INPATIENT PSYCHIATRY PROGRESS NOTE - NSBHFUPINTERVALHXFT_PSY_A_CORE
Pt is seen and evaluated for follow up for MDD, ANSON.  Chart is reviewed and case is discussed with treatment team.  No issues overnight.  Pt has been med adherant.  Pt tearful, seeks reassurance on exam.  Patient remains psychosomatic. Denies respiratory or GI symptoms. Continues to have anticipatory anxiety, is ruminative, reports difficulty sleeping. Again seeks reassurance that she won't be discharged before she feels ready.  Pt received prn Klonopin today, noted to be irritable with RN.  Agrees to titration of Remeron, med focused and reliant on pharmacologic interventions, case is also d/w Psychologist.  No agitation, responds well to reassurance. Pt is seen and evaluated for follow up for MDD, ANSON.  Chart is reviewed and case is discussed with treatment team.  No issues overnight.  Pt has been med adherant.  Pt tearful, seeks reassurance on exam.  Patient remains psychosomatic. Denies respiratory or GI symptoms. Continues to have anticipatory anxiety, is ruminative.  Pt remains to be med focused and expresses concerns related to sciatic pain and nausea, also largely focused on med changes as sole treatment focus to manage mood symptoms.  Agrees to titration of methylfolate, med focused and reliant on pharmacologic interventions, case is also d/w  during visiting session in afternoon.  No agitation, responds well to reassurance.  Reviewed chart and will provide Lidoderm patch prn sciatica pain as well as zofran prn, closely monitor serotonergic burden.

## 2025-01-14 NOTE — BH INPATIENT PSYCHIATRY PROGRESS NOTE - NSBHMETABOLIC_PSY_ALL_CORE_FT
BMI: BMI (kg/m2): 37.5 (12-27-24 @ 15:12)  HbA1c: A1C with Estimated Average Glucose Result: 5.5 % (01-02-25 @ 09:11)    Glucose:   BP: 140/84 (01-13-25 @ 20:40) (138/78 - 140/84)Vital Signs Last 24 Hrs  T(C): 36.8 (01-14-25 @ 07:36), Max: 36.8 (01-14-25 @ 07:36)  T(F): 98.2 (01-14-25 @ 07:36), Max: 98.2 (01-14-25 @ 07:36)  HR: --  BP: --  BP(mean): --  RR: 16 (01-14-25 @ 09:20) (16 - 16)  SpO2: --    Orthostatic VS  01-14-25 @ 20:35  Lying BP: --/-- HR: --  Sitting BP: 133/81 HR: 81  Standing BP: --/-- HR: --  Site: --  Mode: --  Orthostatic VS  01-14-25 @ 09:20  Lying BP: --/-- HR: --  Sitting BP: 156/78 HR: 80  Standing BP: 142/80 HR: 78  Site: --  Mode: --  Orthostatic VS  01-14-25 @ 07:36  Lying BP: --/-- HR: --  Sitting BP: 139/87 HR: 78  Standing BP: 158/89 HR: 89  Site: upper right arm  Mode: electronic    Lipid Panel: Date/Time: 01-02-25 @ 09:11  Cholesterol, Serum: 128  LDL Cholesterol Calculated: 53  HDL Cholesterol, Serum: 46  Total Cholesterol/HDL Ration Measurement: --  Triglycerides, Serum: 174   BMI: BMI (kg/m2): 37.5 (12-27-24 @ 15:12)  HbA1c: A1C with Estimated Average Glucose Result: 5.5 % (01-02-25 @ 09:11)    Glucose:   BP: 153/76 (01-15-25 @ 20:40) (140/84 - 153/76)Vital Signs Last 24 Hrs  T(C): 36.9 (01-16-25 @ 07:45), Max: 36.9 (01-16-25 @ 07:45)  T(F): 98.4 (01-16-25 @ 07:45), Max: 98.4 (01-16-25 @ 07:45)  HR: 79 (01-16-25 @ 07:45) (79 - 79)  BP: 153/76 (01-15-25 @ 20:40) (153/76 - 153/76)  BP(mean): 86 (01-15-25 @ 20:40) (86 - 86)  RR: --  SpO2: --    Orthostatic VS  01-16-25 @ 07:45  Lying BP: --/-- HR: --  Sitting BP: 154/84 HR: --  Standing BP: 152/87 HR: --  Site: --  Mode: --  Orthostatic VS  01-15-25 @ 08:00  Lying BP: --/-- HR: --  Sitting BP: 153/95 HR: 83  Standing BP: 158/95 HR: 89  Site: --  Mode: --  Orthostatic VS  01-14-25 @ 20:35  Lying BP: --/-- HR: --  Sitting BP: 133/81 HR: 81  Standing BP: --/-- HR: --  Site: --  Mode: --    Lipid Panel: Date/Time: 01-02-25 @ 09:11  Cholesterol, Serum: 128  LDL Cholesterol Calculated: 53  HDL Cholesterol, Serum: 46  Total Cholesterol/HDL Ration Measurement: --  Triglycerides, Serum: 174

## 2025-01-15 PROCEDURE — 90832 PSYTX W PT 30 MINUTES: CPT

## 2025-01-15 RX ORDER — ONDANSETRON HCL/PF 4 MG/2 ML
4 VIAL (ML) INJECTION ONCE
Refills: 0 | Status: COMPLETED | OUTPATIENT
Start: 2025-01-15 | End: 2025-01-15

## 2025-01-15 RX ORDER — MIRTAZAPINE 30 MG/1
45 TABLET, FILM COATED ORAL AT BEDTIME
Refills: 0 | Status: DISCONTINUED | OUTPATIENT
Start: 2025-01-15 | End: 2025-02-19

## 2025-01-15 RX ADMIN — LIDOCAINE HYDROCHLORIDE 1 PATCH: 20 JELLY TOPICAL at 21:17

## 2025-01-15 RX ADMIN — METOPROLOL SUCCINATE 50 MILLIGRAM(S): 50 TABLET, EXTENDED RELEASE ORAL at 10:13

## 2025-01-15 RX ADMIN — MIRTAZAPINE 45 MILLIGRAM(S): 30 TABLET, FILM COATED ORAL at 20:29

## 2025-01-15 RX ADMIN — Medication 4 MILLIGRAM(S): at 09:50

## 2025-01-15 RX ADMIN — CLONAZEPAM 0.5 MILLIGRAM(S): 0.5 TABLET ORAL at 20:30

## 2025-01-15 RX ADMIN — METOPROLOL SUCCINATE 50 MILLIGRAM(S): 50 TABLET, EXTENDED RELEASE ORAL at 20:30

## 2025-01-15 RX ADMIN — Medication 40 MILLIGRAM(S): at 06:30

## 2025-01-15 RX ADMIN — LIDOCAINE HYDROCHLORIDE 1 PATCH: 20 JELLY TOPICAL at 17:28

## 2025-01-15 RX ADMIN — Medication 1 MILLIGRAM(S): at 20:29

## 2025-01-15 RX ADMIN — GABAPENTIN 200 MILLIGRAM(S): 400 CAPSULE ORAL at 10:13

## 2025-01-15 RX ADMIN — Medication 4 MILLIGRAM(S): at 20:52

## 2025-01-15 RX ADMIN — Medication 30 MILLILITER(S): at 17:28

## 2025-01-15 RX ADMIN — CLONAZEPAM 0.5 MILLIGRAM(S): 0.5 TABLET ORAL at 13:15

## 2025-01-15 RX ADMIN — CLONAZEPAM 0.5 MILLIGRAM(S): 0.5 TABLET ORAL at 06:27

## 2025-01-15 RX ADMIN — GABAPENTIN 300 MILLIGRAM(S): 400 CAPSULE ORAL at 20:29

## 2025-01-15 NOTE — BH INPATIENT PSYCHIATRY PROGRESS NOTE - NSBHFUPINTERVALHXFT_PSY_A_CORE
Pt is seen and evaluated for follow up for MDD, ANSON.  Chart is reviewed and case is discussed with treatment team.  No issues overnight.  Pt has been med adherant.  Patient remains psychosomatic, reports took zofran with no reported benefit, discusses if symptoms may be postnasal drip.  Denies respiratory or other GI symptoms. Continues to have anticipatory anxiety, is ruminative.  Pt remains to be med focused and expresses concerns related to wellbutrin, also largely focused on med changes as sole treatment focus to manage mood symptoms.  Agrees to titration of Remeron, med focused and reliant on pharmacologic interventions.  No agitation, responds well to reassurance.

## 2025-01-15 NOTE — BH INPATIENT PSYCHIATRY PROGRESS NOTE - NSBHMETABOLIC_PSY_ALL_CORE_FT
BMI: BMI (kg/m2): 37.5 (12-27-24 @ 15:12)  HbA1c: A1C with Estimated Average Glucose Result: 5.5 % (01-02-25 @ 09:11)    Glucose:   BP: 153/76 (01-15-25 @ 20:40) (140/84 - 153/76)Vital Signs Last 24 Hrs  T(C): 36.9 (01-16-25 @ 07:45), Max: 36.9 (01-16-25 @ 07:45)  T(F): 98.4 (01-16-25 @ 07:45), Max: 98.4 (01-16-25 @ 07:45)  HR: 79 (01-16-25 @ 07:45) (79 - 79)  BP: 153/76 (01-15-25 @ 20:40) (153/76 - 153/76)  BP(mean): 86 (01-15-25 @ 20:40) (86 - 86)  RR: --  SpO2: --    Orthostatic VS  01-16-25 @ 07:45  Lying BP: --/-- HR: --  Sitting BP: 154/84 HR: --  Standing BP: 152/87 HR: --  Site: --  Mode: --  Orthostatic VS  01-15-25 @ 08:00  Lying BP: --/-- HR: --  Sitting BP: 153/95 HR: 83  Standing BP: 158/95 HR: 89  Site: --  Mode: --  Orthostatic VS  01-14-25 @ 20:35  Lying BP: --/-- HR: --  Sitting BP: 133/81 HR: 81  Standing BP: --/-- HR: --  Site: --  Mode: --    Lipid Panel: Date/Time: 01-02-25 @ 09:11  Cholesterol, Serum: 128  LDL Cholesterol Calculated: 53  HDL Cholesterol, Serum: 46  Total Cholesterol/HDL Ration Measurement: --  Triglycerides, Serum: 174

## 2025-01-15 NOTE — BH INPATIENT PSYCHIATRY PROGRESS NOTE - NSBHASSESSSUMMFT_PSY_ALL_CORE
Ms. Gross is a 75yo female, , retired (), domiciled at home with , no children; PMHx HTN, HLD; PPHx d/o ANSON and MDD, 9 past psych admissions starting in 2019 (last [facility] April 2024 for worsening anxiety and depression), ECT at [facility] 2023, OP at [facility]; 1 past SA (OD on 9 pills of unknown medication in 2019), no h/o NSSIB, no h/o violence/aggression/legal issues, past marijuana use; initially admitted to [facility] 2W on 12/27 for increasing anxiety and SI w/o plans in the context of medication adjustments and psychosocial stressors.     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     On initial 2S assessment pt dysphoric, anxious, hopeless, and preoccupied with somatic sxs. There are elements of active passivity, severe sensitivity to feelings of inadequacy and complicated interpersonal relationships (, friends, providers). 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 Provided pt with psychoeducation regarding limitations of medications and need to focus on therapeutic interventions however she has limited insight into this and is intent on finding the right medication(s). For now will continue to slowly taper off nortriptyline.     1/6: Prominent treatment interfering behaviors including active passivity and rejection sensitivity. For now will continue nortriptyline taper. Considering atypical antidepressant trial. Pt reporting chronic b/l LE edema, will encourage her to use compression stocking and elevate legs, no indication for diuretic at this time.     1/7: Preoccupied with med side effects and physical sxs, but more positive about potential med trials. Will decrease qAM gabapentin 300mg to 200mg but otherwise will attempt to limit med changes other than nortriptyline taper. Will also attempt to discourage use of zofran PRNs given past difficulties tolerating multiple serotonergic medications.   1/8 Patient unchanged anxious ruminative unable to use coping skilss. Will dc NTP increase Remeron  1/9 Somatic anxious unable to utilize coping techniques. Plan behavior plan initiated, cont to titrate Remeorn. Added methylfolate for augmentation  1/10 Reports malaise, could be a viral illness vs somatic anxiety symptoms; otherwise tolerating treatment well    1/13: Dysphoric, tearful, anxious but denies SI/HI, discussed med plans and seeks reassurance.  Will increase Remeron to 37.5mg QHS.  1/14: Remains dysphoric, anxious, somatically focused, responds well to support and reassurance provided.  Will increase methylfolate to 15mg daily, provided zofran 4mg PO daily prn nausea, Lidoderm patch prn sciatic pain, will monitor tolerability and consider further titration of Remeron.  1/15: Anxious, depressed, somatic, seeks reassurance on exam, will increase Remeron to 45mg QHS tonight.  Pt agrees with plan as stated.    PLAN:   1. Legals: admitted to 2W on 9.13. 1/2 pt transferred to .  2. Safety: routine obs appropriate as no current active SI/SIB/HI/VI on unit.   3. Psychiatric  - c/w Nortriptyline 10mg qHS with plans to discontinue at pt request due to reports of tremors, worsening anxiety, difficulty sleeping at home doses (50mg to 25mg on 12/27, 12.5mg on 1/3). Will defer further medication changes until Nortriptyline is d/c'd given pt concern for side effects/past difficulty tolerating serotonergic meds.   - increase Remeron from 37.5mg qHS to 45mg for mood/anxiety on 1/15 (home med).   - c/w Klonopin 0.5mg TID for anxiety (home med).   - Decreased Gabapentin 300mg BID to 200mg qAM/300mg qHS for anxiety and neuropathy due to pt concern for sedation. Home dose Gabapentin 300mg TID.   - Melatonin 1mg qHS for insomnia.   - PRNs           - For anxiety: Klonopin wafer 0.25 mg q8h. d/c'd PRN gabapentin to simplify meds.            - For agitation: PO Zyprexa 2.5mg TID.            - STAT IM Zyprexa 2.5mg if there are imminent risk to self/others due to severe agitation.   4. Therapy: I/G/M therapy as indicated.   5. Medical: admission labs and EKG reviewed and wnl.   #Dependent edema  - chronic b/l LE edema likely 2/2 chronic venous insufficiency/deconditioning. No cardiac history (multiple cards workups have been negative).   - encourage pt to utilize compression stockings, elevation, appropriate physical therapy/exercise. No indication for diuretics/attempting to discourage polypharmacy.   #HTN  - c/w Metoprolol 50mg bid (home med) - per notes this is for palpitations 2/2 anxiety managed by outpatient cardiologist.   #HLD  - c/w Nexletol 180mg qHS (home med).   - c/w IM [id number] q2 weeks (last given 1/2) (home med).   #GERD  - c/w Pantoprazole 40mg qD (home med).   #Vit B12 deficiency   - pt due for injection per chart, for now started on PO 1000mcg qD.   #Nausea  - PRN PO Zofran 4mg TID (home med) --> lower to qD with attempts to d/c PRN. Will add maalox PRN given pt report that nausea is often 2/2 indigestion depending on diet.   6. Collateral: 1/3 Updated  Yehuda at [phone] who is agreeable with plan above.   7. Disposition: pending clinical improvement; likely return to previous provider at [facility] vs partial.

## 2025-01-15 NOTE — BH INPATIENT PSYCHIATRY PROGRESS NOTE - NSBHCHARTREVIEWVS_PSY_A_CORE FT
Vital Signs Last 24 Hrs  T(C): 36.9 (01-16-25 @ 07:45), Max: 36.9 (01-16-25 @ 07:45)  T(F): 98.4 (01-16-25 @ 07:45), Max: 98.4 (01-16-25 @ 07:45)  HR: 79 (01-16-25 @ 07:45) (79 - 79)  BP: 153/76 (01-15-25 @ 20:40) (153/76 - 153/76)  BP(mean): 86 (01-15-25 @ 20:40) (86 - 86)  RR: --  SpO2: --    Orthostatic VS  01-16-25 @ 07:45  Lying BP: --/-- HR: --  Sitting BP: 154/84 HR: --  Standing BP: 152/87 HR: --  Site: --  Mode: --  Orthostatic VS  01-15-25 @ 08:00  Lying BP: --/-- HR: --  Sitting BP: 153/95 HR: 83  Standing BP: 158/95 HR: 89  Site: --  Mode: --  Orthostatic VS  01-14-25 @ 20:35  Lying BP: --/-- HR: --  Sitting BP: 133/81 HR: 81  Standing BP: --/-- HR: --  Site: --  Mode: --

## 2025-01-15 NOTE — BH INPATIENT PSYCHIATRY PROGRESS NOTE - CURRENT MEDICATION
MEDICATIONS  (STANDING):  clonazePAM Oral Disintegrating Tablet 0.5 milliGRAM(s) Oral <User Schedule>  evolocumab Injectable 140 milliGRAM(s) SubCutaneous every 2 weeks  gabapentin 300 milliGRAM(s) Oral at bedtime  gabapentin 200 milliGRAM(s) Oral <User Schedule>  l-methylfolate 15 milliGRAM(s) Oral daily  melatonin. 1 milliGRAM(s) Oral at bedtime  metoprolol succinate ER 50 milliGRAM(s) Oral two times a day  mirtazapine 45 milliGRAM(s) Oral at bedtime  Nexletol 180 mg 1 Tablet(s) 1 Tablet(s) Oral daily  pantoprazole    Tablet 40 milliGRAM(s) Oral before breakfast    MEDICATIONS  (PRN):  acetaminophen     Tablet .. 650 milliGRAM(s) Oral every 6 hours PRN Temp greater or equal to 38C (100.4F), Mild Pain (1 - 3), Moderate Pain (4 - 6)  aluminum hydroxide/magnesium hydroxide/simethicone Suspension 30 milliLiter(s) Oral every 6 hours PRN Dyspepsia  clonazePAM Oral Disintegrating Tablet 0.25 milliGRAM(s) Oral every 8 hours PRN anxiety  ibuprofen  Tablet. 400 milliGRAM(s) Oral every 8 hours PRN Mild Pain (1 - 3), Moderate Pain (4 - 6)  lidocaine   4% Patch 1 Patch Transdermal daily PRN sciatica pain  ondansetron    Tablet 4 milliGRAM(s) Oral daily PRN Nausea

## 2025-01-15 NOTE — BH INPATIENT PSYCHIATRY PROGRESS NOTE - PRN MEDS

## 2025-01-16 RX ADMIN — Medication 1 MILLIGRAM(S): at 21:02

## 2025-01-16 RX ADMIN — METOPROLOL SUCCINATE 50 MILLIGRAM(S): 50 TABLET, EXTENDED RELEASE ORAL at 08:15

## 2025-01-16 RX ADMIN — CLONAZEPAM 0.5 MILLIGRAM(S): 0.5 TABLET ORAL at 21:05

## 2025-01-16 RX ADMIN — GABAPENTIN 200 MILLIGRAM(S): 400 CAPSULE ORAL at 08:15

## 2025-01-16 RX ADMIN — EVOLOCUMAB 140 MILLIGRAM(S): 140 INJECTION, SOLUTION SUBCUTANEOUS at 14:41

## 2025-01-16 RX ADMIN — CLONAZEPAM 0.5 MILLIGRAM(S): 0.5 TABLET ORAL at 06:34

## 2025-01-16 RX ADMIN — GABAPENTIN 300 MILLIGRAM(S): 400 CAPSULE ORAL at 21:02

## 2025-01-16 RX ADMIN — MIRTAZAPINE 45 MILLIGRAM(S): 30 TABLET, FILM COATED ORAL at 21:02

## 2025-01-16 RX ADMIN — Medication 40 MILLIGRAM(S): at 06:34

## 2025-01-16 RX ADMIN — CLONAZEPAM 0.5 MILLIGRAM(S): 0.5 TABLET ORAL at 12:23

## 2025-01-16 RX ADMIN — METOPROLOL SUCCINATE 50 MILLIGRAM(S): 50 TABLET, EXTENDED RELEASE ORAL at 21:02

## 2025-01-16 NOTE — BH INPATIENT PSYCHIATRY PROGRESS NOTE - NSBHFUPINTERVALHXFT_PSY_A_CORE
Pt is seen and evaluated for follow up for MDD, ANSON.  Chart is reviewed and case is discussed with treatment team.  No issues overnight.  Pt has been med adherant.  Patient remains psychosomatic, reports took zofran with some reported benefit, discusses less nausea.  Denies respiratory or other GI symptoms. Continues to have anticipatory anxiety, is ruminative.  Pt remains to be med focused and expresses concerns related to being discharged before feeling ready for such.  Tolerating titration of Remeron, now 45mg, and reliant on pharmacologic interventions.  No agitation, responds well to reassurance.

## 2025-01-16 NOTE — BH INPATIENT PSYCHIATRY PROGRESS NOTE - PRN MEDS
MEDICATIONS  (PRN):  acetaminophen     Tablet .. 650 milliGRAM(s) Oral every 6 hours PRN Temp greater or equal to 38C (100.4F), Mild Pain (1 - 3), Moderate Pain (4 - 6)  aluminum hydroxide/magnesium hydroxide/simethicone Suspension 30 milliLiter(s) Oral every 6 hours PRN Dyspepsia  clonazePAM  Tablet 0.25 milliGRAM(s) Oral every 8 hours PRN anxiety  ibuprofen  Tablet. 400 milliGRAM(s) Oral every 8 hours PRN Mild Pain (1 - 3), Moderate Pain (4 - 6)  lidocaine   4% Patch 1 Patch Transdermal daily PRN sciatica pain  ondansetron    Tablet 4 milliGRAM(s) Oral daily PRN Nausea

## 2025-01-16 NOTE — BH INPATIENT PSYCHIATRY PROGRESS NOTE - NSBHASSESSSUMMFT_PSY_ALL_CORE
Ms. Gross is a 75yo female, , retired (), domiciled at home with , no children; PMHx HTN, HLD; PPHx d/o ANSON and MDD, 9 past psych admissions starting in 2019 (last [facility] April 2024 for worsening anxiety and depression), ECT at [facility] 2023, OP at [facility]; 1 past SA (OD on 9 pills of unknown medication in 2019), no h/o NSSIB, no h/o violence/aggression/legal issues, past marijuana use; initially admitted to [facility] 2W on 12/27 for increasing anxiety and SI w/o plans in the context of medication adjustments and psychosocial stressors.     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     On initial 2S assessment pt dysphoric, anxious, hopeless, and preoccupied with somatic sxs. There are elements of active passivity, severe sensitivity to feelings of inadequacy and complicated interpersonal relationships (, friends, providers). 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 Provided pt with psychoeducation regarding limitations of medications and need to focus on therapeutic interventions however she has limited insight into this and is intent on finding the right medication(s). For now will continue to slowly taper off nortriptyline.     1/6: Prominent treatment interfering behaviors including active passivity and rejection sensitivity. For now will continue nortriptyline taper. Considering atypical antidepressant trial. Pt reporting chronic b/l LE edema, will encourage her to use compression stocking and elevate legs, no indication for diuretic at this time.     1/7: Preoccupied with med side effects and physical sxs, but more positive about potential med trials. Will decrease qAM gabapentin 300mg to 200mg but otherwise will attempt to limit med changes other than nortriptyline taper. Will also attempt to discourage use of zofran PRNs given past difficulties tolerating multiple serotonergic medications.   1/8 Patient unchanged anxious ruminative unable to use coping skilss. Will dc NTP increase Remeron  1/9 Somatic anxious unable to utilize coping techniques. Plan behavior plan initiated, cont to titrate Remeorn. Added methylfolate for augmentation  1/10 Reports malaise, could be a viral illness vs somatic anxiety symptoms; otherwise tolerating treatment well    1/13: Dysphoric, tearful, anxious but denies SI/HI, discussed med plans and seeks reassurance.  Will increase Remeron to 37.5mg QHS.  1/14: Remains dysphoric, anxious, somatically focused, responds well to support and reassurance provided.  Will increase methylfolate to 15mg daily, provided zofran 4mg PO daily prn nausea, Lidoderm patch prn sciatic pain, will monitor tolerability and consider further titration of Remeron.  1/15: Anxious, depressed, somatic, seeks reassurance on exam, will increase Remeron to 45mg QHS tonight.  Pt agrees with plan as stated.  1/16: Anxious, ruminative but reports improvement, less somatic, tolerating Remeron 45mg and methylfolate 15mg, considering augmentation with Wellbutrin.     PLAN:   1. Legals: admitted to  on 9.13. 1/2 pt transferred to .  2. Safety: routine obs appropriate as no current active SI/SIB/HI/VI on unit.   3. Psychiatric  - c/w Nortriptyline 10mg qHS with plans to discontinue at pt request due to reports of tremors, worsening anxiety, difficulty sleeping at home doses (50mg to 25mg on 12/27, 12.5mg on 1/3). Will defer further medication changes until Nortriptyline is d/c'd given pt concern for side effects/past difficulty tolerating serotonergic meds.   - c/w Remeron 45mg PO QHS for mood/anxiety on 1/15 (home med).   - c/w Klonopin 0.5mg TID for anxiety (home med).   - Decreased Gabapentin 300mg BID to 200mg qAM/300mg qHS for anxiety and neuropathy due to pt concern for sedation. Home dose Gabapentin 300mg TID.   - Melatonin 1mg qHS for insomnia.   - PRNs           - For anxiety: Klonopin wafer 0.25 mg q8h. d/c'd PRN gabapentin to simplify meds.            - For agitation: PO Zyprexa 2.5mg TID.            - STAT IM Zyprexa 2.5mg if there are imminent risk to self/others due to severe agitation.   4. Therapy: I/G/M therapy as indicated.   5. Medical: admission labs and EKG reviewed and wnl.   #Dependent edema  - chronic b/l LE edema likely 2/2 chronic venous insufficiency/deconditioning. No cardiac history (multiple cards workups have been negative).   - encourage pt to utilize compression stockings, elevation, appropriate physical therapy/exercise. No indication for diuretics/attempting to discourage polypharmacy.   #HTN  - c/w Metoprolol 50mg bid (home med) - per notes this is for palpitations 2/2 anxiety managed by outpatient cardiologist.   #HLD  - c/w Nexletol 180mg qHS (home med).   - c/w IM [id number] q2 weeks (last given 1/2) (home med).   #GERD  - c/w Pantoprazole 40mg qD (home med).   #Vit B12 deficiency   - pt due for injection per chart, for now started on PO 1000mcg qD.   #Nausea  - PRN PO Zofran 4mg TID (home med) --> lower to qD with attempts to d/c PRN. Will add maalox PRN given pt report that nausea is often 2/2 indigestion depending on diet.   6. Collateral: 1/3 Updated  Yehuda at [phone] who is agreeable with plan above.   7. Disposition: pending clinical improvement; likely return to previous provider at [facility] vs partial.

## 2025-01-16 NOTE — BH INPATIENT PSYCHIATRY PROGRESS NOTE - NSBHCHARTREVIEWVS_PSY_A_CORE FT
Vital Signs Last 24 Hrs  T(C): 36.9 (01-17-25 @ 07:34), Max: 36.9 (01-17-25 @ 07:34)  T(F): 98.5 (01-17-25 @ 07:34), Max: 98.5 (01-17-25 @ 07:34)  HR: 86 (01-16-25 @ 20:50) (86 - 86)  BP: 136/77 (01-16-25 @ 20:50) (136/77 - 136/77)  BP(mean): --  RR: --  SpO2: --    Orthostatic VS  01-17-25 @ 07:34  Lying BP: 128/59 HR: 71  Sitting BP: 139/72 HR: 80  Standing BP: --/-- HR: --  Site: upper left arm  Mode: electronic  Orthostatic VS  01-16-25 @ 07:45  Lying BP: --/-- HR: --  Sitting BP: 154/84 HR: --  Standing BP: 152/87 HR: --  Site: --  Mode: --

## 2025-01-16 NOTE — BH INPATIENT PSYCHIATRY PROGRESS NOTE - NSBHMETABOLIC_PSY_ALL_CORE_FT
BMI: BMI (kg/m2): 37.5 (12-27-24 @ 15:12)  HbA1c: A1C with Estimated Average Glucose Result: 5.5 % (01-02-25 @ 09:11)    Glucose:   BP: 136/77 (01-16-25 @ 20:50) (136/77 - 153/76)Vital Signs Last 24 Hrs  T(C): 36.9 (01-17-25 @ 07:34), Max: 36.9 (01-17-25 @ 07:34)  T(F): 98.5 (01-17-25 @ 07:34), Max: 98.5 (01-17-25 @ 07:34)  HR: 86 (01-16-25 @ 20:50) (86 - 86)  BP: 136/77 (01-16-25 @ 20:50) (136/77 - 136/77)  BP(mean): --  RR: --  SpO2: --    Orthostatic VS  01-17-25 @ 07:34  Lying BP: 128/59 HR: 71  Sitting BP: 139/72 HR: 80  Standing BP: --/-- HR: --  Site: upper left arm  Mode: electronic  Orthostatic VS  01-16-25 @ 07:45  Lying BP: --/-- HR: --  Sitting BP: 154/84 HR: --  Standing BP: 152/87 HR: --  Site: --  Mode: --    Lipid Panel: Date/Time: 01-02-25 @ 09:11  Cholesterol, Serum: 128  LDL Cholesterol Calculated: 53  HDL Cholesterol, Serum: 46  Total Cholesterol/HDL Ration Measurement: --  Triglycerides, Serum: 174

## 2025-01-16 NOTE — BH INPATIENT PSYCHIATRY PROGRESS NOTE - CURRENT MEDICATION
MEDICATIONS  (STANDING):  clonazePAM  Tablet 0.5 milliGRAM(s) Oral <User Schedule>  evolocumab Injectable 140 milliGRAM(s) SubCutaneous every 2 weeks  gabapentin 300 milliGRAM(s) Oral at bedtime  gabapentin 200 milliGRAM(s) Oral <User Schedule>  l-methylfolate 15 milliGRAM(s) Oral daily  melatonin. 1 milliGRAM(s) Oral at bedtime  metoprolol succinate ER 50 milliGRAM(s) Oral two times a day  mirtazapine 45 milliGRAM(s) Oral at bedtime  Nexletol 180 mg 1 Tablet(s) 1 Tablet(s) Oral daily  pantoprazole    Tablet 40 milliGRAM(s) Oral before breakfast    MEDICATIONS  (PRN):  acetaminophen     Tablet .. 650 milliGRAM(s) Oral every 6 hours PRN Temp greater or equal to 38C (100.4F), Mild Pain (1 - 3), Moderate Pain (4 - 6)  aluminum hydroxide/magnesium hydroxide/simethicone Suspension 30 milliLiter(s) Oral every 6 hours PRN Dyspepsia  clonazePAM  Tablet 0.25 milliGRAM(s) Oral every 8 hours PRN anxiety  ibuprofen  Tablet. 400 milliGRAM(s) Oral every 8 hours PRN Mild Pain (1 - 3), Moderate Pain (4 - 6)  lidocaine   4% Patch 1 Patch Transdermal daily PRN sciatica pain  ondansetron    Tablet 4 milliGRAM(s) Oral daily PRN Nausea

## 2025-01-17 PROCEDURE — 99232 SBSQ HOSP IP/OBS MODERATE 35: CPT

## 2025-01-17 PROCEDURE — 90834 PSYTX W PT 45 MINUTES: CPT

## 2025-01-17 RX ORDER — CLONAZEPAM 0.5 MG/1
0.5 TABLET ORAL
Refills: 0 | Status: DISCONTINUED | OUTPATIENT
Start: 2025-01-17 | End: 2025-01-23

## 2025-01-17 RX ORDER — CLONAZEPAM 0.5 MG/1
0.25 TABLET ORAL EVERY 8 HOURS
Refills: 0 | Status: DISCONTINUED | OUTPATIENT
Start: 2025-01-17 | End: 2025-01-23

## 2025-01-17 RX ADMIN — GABAPENTIN 300 MILLIGRAM(S): 400 CAPSULE ORAL at 20:28

## 2025-01-17 RX ADMIN — METOPROLOL SUCCINATE 50 MILLIGRAM(S): 50 TABLET, EXTENDED RELEASE ORAL at 20:48

## 2025-01-17 RX ADMIN — CLONAZEPAM 0.5 MILLIGRAM(S): 0.5 TABLET ORAL at 14:09

## 2025-01-17 RX ADMIN — MIRTAZAPINE 45 MILLIGRAM(S): 30 TABLET, FILM COATED ORAL at 20:48

## 2025-01-17 RX ADMIN — Medication 30 MILLILITER(S): at 14:10

## 2025-01-17 RX ADMIN — CLONAZEPAM 0.5 MILLIGRAM(S): 0.5 TABLET ORAL at 05:54

## 2025-01-17 RX ADMIN — Medication 1 MILLIGRAM(S): at 20:48

## 2025-01-17 RX ADMIN — CLONAZEPAM 0.5 MILLIGRAM(S): 0.5 TABLET ORAL at 20:28

## 2025-01-17 RX ADMIN — Medication 4 MILLIGRAM(S): at 09:27

## 2025-01-17 RX ADMIN — GABAPENTIN 200 MILLIGRAM(S): 400 CAPSULE ORAL at 09:25

## 2025-01-17 RX ADMIN — METOPROLOL SUCCINATE 50 MILLIGRAM(S): 50 TABLET, EXTENDED RELEASE ORAL at 09:26

## 2025-01-17 RX ADMIN — Medication 400 MILLIGRAM(S): at 19:29

## 2025-01-17 RX ADMIN — LIDOCAINE HYDROCHLORIDE 1 PATCH: 20 JELLY TOPICAL at 18:20

## 2025-01-17 RX ADMIN — LIDOCAINE HYDROCHLORIDE 1 PATCH: 20 JELLY TOPICAL at 21:26

## 2025-01-17 RX ADMIN — Medication 400 MILLIGRAM(S): at 18:51

## 2025-01-17 RX ADMIN — Medication 40 MILLIGRAM(S): at 05:54

## 2025-01-17 RX ADMIN — LIDOCAINE HYDROCHLORIDE 1 PATCH: 20 JELLY TOPICAL at 09:28

## 2025-01-17 NOTE — BH INPATIENT PSYCHIATRY PROGRESS NOTE - NSBHCHARTREVIEWVS_PSY_A_CORE FT
Vital Signs Last 24 Hrs  T(C): 36.9 (01-17-25 @ 07:34), Max: 36.9 (01-17-25 @ 07:34)  T(F): 98.5 (01-17-25 @ 07:34), Max: 98.5 (01-17-25 @ 07:34)  HR: --  BP: --  BP(mean): --  RR: --  SpO2: --    Orthostatic VS  01-17-25 @ 07:34  Lying BP: 128/59 HR: 71  Sitting BP: 139/72 HR: 80  Standing BP: --/-- HR: --  Site: upper left arm  Mode: electronic  Orthostatic VS  01-16-25 @ 07:45  Lying BP: --/-- HR: --  Sitting BP: 154/84 HR: --  Standing BP: 152/87 HR: --  Site: --  Mode: --   Vital Signs Last 24 Hrs  T(C): 36.9 (01-17-25 @ 07:34), Max: 36.9 (01-17-25 @ 07:34)  T(F): 98.5 (01-17-25 @ 07:34), Max: 98.5 (01-17-25 @ 07:34)  HR: 69 (01-17-25 @ 20:30) (69 - 69)  BP: 124/53 (01-17-25 @ 20:30) (124/53 - 124/53)  BP(mean): --  RR: --  SpO2: --    Orthostatic VS  01-17-25 @ 07:34  Lying BP: 128/59 HR: 71  Sitting BP: 139/72 HR: 80  Standing BP: --/-- HR: --  Site: upper left arm  Mode: electronic  Orthostatic VS  01-16-25 @ 07:45  Lying BP: --/-- HR: --  Sitting BP: 154/84 HR: --  Standing BP: 152/87 HR: --  Site: --  Mode: --

## 2025-01-17 NOTE — BH INPATIENT PSYCHIATRY PROGRESS NOTE - NSBHMETABOLIC_PSY_ALL_CORE_FT
BMI: BMI (kg/m2): 37.5 (12-27-24 @ 15:12)  HbA1c: A1C with Estimated Average Glucose Result: 5.5 % (01-02-25 @ 09:11)    Glucose:   BP: 136/77 (01-16-25 @ 20:50) (136/77 - 153/76)Vital Signs Last 24 Hrs  T(C): 36.9 (01-17-25 @ 07:34), Max: 36.9 (01-17-25 @ 07:34)  T(F): 98.5 (01-17-25 @ 07:34), Max: 98.5 (01-17-25 @ 07:34)  HR: --  BP: --  BP(mean): --  RR: --  SpO2: --    Orthostatic VS  01-17-25 @ 07:34  Lying BP: 128/59 HR: 71  Sitting BP: 139/72 HR: 80  Standing BP: --/-- HR: --  Site: upper left arm  Mode: electronic  Orthostatic VS  01-16-25 @ 07:45  Lying BP: --/-- HR: --  Sitting BP: 154/84 HR: --  Standing BP: 152/87 HR: --  Site: --  Mode: --    Lipid Panel: Date/Time: 01-02-25 @ 09:11  Cholesterol, Serum: 128  LDL Cholesterol Calculated: 53  HDL Cholesterol, Serum: 46  Total Cholesterol/HDL Ration Measurement: --  Triglycerides, Serum: 174   BMI: BMI (kg/m2): 37.5 (12-27-24 @ 15:12)  HbA1c: A1C with Estimated Average Glucose Result: 5.5 % (01-02-25 @ 09:11)    Glucose:   BP: 124/53 (01-17-25 @ 20:30) (124/53 - 153/76)Vital Signs Last 24 Hrs  T(C): 36.9 (01-17-25 @ 07:34), Max: 36.9 (01-17-25 @ 07:34)  T(F): 98.5 (01-17-25 @ 07:34), Max: 98.5 (01-17-25 @ 07:34)  HR: 69 (01-17-25 @ 20:30) (69 - 69)  BP: 124/53 (01-17-25 @ 20:30) (124/53 - 124/53)  BP(mean): --  RR: --  SpO2: --    Orthostatic VS  01-17-25 @ 07:34  Lying BP: 128/59 HR: 71  Sitting BP: 139/72 HR: 80  Standing BP: --/-- HR: --  Site: upper left arm  Mode: electronic  Orthostatic VS  01-16-25 @ 07:45  Lying BP: --/-- HR: --  Sitting BP: 154/84 HR: --  Standing BP: 152/87 HR: --  Site: --  Mode: --    Lipid Panel: Date/Time: 01-02-25 @ 09:11  Cholesterol, Serum: 128  LDL Cholesterol Calculated: 53  HDL Cholesterol, Serum: 46  Total Cholesterol/HDL Ration Measurement: --  Triglycerides, Serum: 174

## 2025-01-17 NOTE — BH INPATIENT PSYCHIATRY PROGRESS NOTE - NSBHASSESSSUMMFT_PSY_ALL_CORE
Ms. Gross is a 75yo female, , retired (), domiciled at home with , no children; PMHx HTN, HLD; PPHx d/o ANSON and MDD, 9 past psych admissions starting in 2019 (last [facility] April 2024 for worsening anxiety and depression), ECT at [facility] 2023, OP at [facility]; 1 past SA (OD on 9 pills of unknown medication in 2019), no h/o NSSIB, no h/o violence/aggression/legal issues, past marijuana use; initially admitted to [facility] 2W on 12/27 for increasing anxiety and SI w/o plans in the context of medication adjustments and psychosocial stressors.     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     On initial 2S assessment pt dysphoric, anxious, hopeless, and preoccupied with somatic sxs. There are elements of active passivity, severe sensitivity to feelings of inadequacy and complicated interpersonal relationships (, friends, providers). 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 Provided pt with psychoeducation regarding limitations of medications and need to focus on therapeutic interventions however she has limited insight into this and is intent on finding the right medication(s). For now will continue to slowly taper off nortriptyline.     1/6: Prominent treatment interfering behaviors including active passivity and rejection sensitivity. For now will continue nortriptyline taper. Considering atypical antidepressant trial. Pt reporting chronic b/l LE edema, will encourage her to use compression stocking and elevate legs, no indication for diuretic at this time.     1/7: Preoccupied with med side effects and physical sxs, but more positive about potential med trials. Will decrease qAM gabapentin 300mg to 200mg but otherwise will attempt to limit med changes other than nortriptyline taper. Will also attempt to discourage use of zofran PRNs given past difficulties tolerating multiple serotonergic medications.   1/8 Patient unchanged anxious ruminative unable to use coping skilss. Will dc NTP increase Remeron  1/9 Somatic anxious unable to utilize coping techniques. Plan behavior plan initiated, cont to titrate Remeorn. Added methylfolate for augmentation  1/10 Reports malaise, could be a viral illness vs somatic anxiety symptoms; otherwise tolerating treatment well    1/13: Dysphoric, tearful, anxious but denies SI/HI, discussed med plans and seeks reassurance.  Will increase Remeron to 37.5mg QHS.  1/14: Remains dysphoric, anxious, somatically focused, responds well to support and reassurance provided.  Will increase methylfolate to 15mg daily, provided zofran 4mg PO daily prn nausea, Lidoderm patch prn sciatic pain, will monitor tolerability and consider further titration of Remeron.  1/15: Anxious, depressed, somatic, seeks reassurance on exam, will increase Remeron to 45mg QHS tonight.  Pt agrees with plan as stated.  1/16: Anxious, ruminative but reports improvement, less somatic, tolerating Remeron 45mg and methylfolate 15mg, considering augmentation with Wellbutrin.     PLAN:   1. Legals: admitted to  on 9.13. 1/2 pt transferred to .  2. Safety: routine obs appropriate as no current active SI/SIB/HI/VI on unit.   3. Psychiatric  - c/w Nortriptyline 10mg qHS with plans to discontinue at pt request due to reports of tremors, worsening anxiety, difficulty sleeping at home doses (50mg to 25mg on 12/27, 12.5mg on 1/3). Will defer further medication changes until Nortriptyline is d/c'd given pt concern for side effects/past difficulty tolerating serotonergic meds.   - c/w Remeron 45mg PO QHS for mood/anxiety on 1/15 (home med).   - c/w Klonopin 0.5mg TID for anxiety (home med).   - Decreased Gabapentin 300mg BID to 200mg qAM/300mg qHS for anxiety and neuropathy due to pt concern for sedation. Home dose Gabapentin 300mg TID.   - Melatonin 1mg qHS for insomnia.   - PRNs           - For anxiety: Klonopin wafer 0.25 mg q8h. d/c'd PRN gabapentin to simplify meds.            - For agitation: PO Zyprexa 2.5mg TID.            - STAT IM Zyprexa 2.5mg if there are imminent risk to self/others due to severe agitation.   4. Therapy: I/G/M therapy as indicated.   5. Medical: admission labs and EKG reviewed and wnl.   #Dependent edema  - chronic b/l LE edema likely 2/2 chronic venous insufficiency/deconditioning. No cardiac history (multiple cards workups have been negative).   - encourage pt to utilize compression stockings, elevation, appropriate physical therapy/exercise. No indication for diuretics/attempting to discourage polypharmacy.   #HTN  - c/w Metoprolol 50mg bid (home med) - per notes this is for palpitations 2/2 anxiety managed by outpatient cardiologist.   #HLD  - c/w Nexletol 180mg qHS (home med).   - c/w IM [id number] q2 weeks (last given 1/2) (home med).   #GERD  - c/w Pantoprazole 40mg qD (home med).   #Vit B12 deficiency   - pt due for injection per chart, for now started on PO 1000mcg qD.   #Nausea  - PRN PO Zofran 4mg TID (home med) --> lower to qD with attempts to d/c PRN. Will add maalox PRN given pt report that nausea is often 2/2 indigestion depending on diet.   6. Collateral: 1/3 Updated  Yehuda at [phone] who is agreeable with plan above.   7. Disposition: pending clinical improvement; likely return to previous provider at [facility] vs partial.  Ms. Gross is a 75yo female, , retired (), domiciled at home with , no children; PMHx HTN, HLD; PPHx d/o ANSON and MDD, 9 past psych admissions starting in 2019 (last [facility] April 2024 for worsening anxiety and depression), ECT at [facility] 2023, OP at [facility]; 1 past SA (OD on 9 pills of unknown medication in 2019), no h/o NSSIB, no h/o violence/aggression/legal issues, past marijuana use; initially admitted to [facility] 2W on 12/27 for increasing anxiety and SI w/o plans in the context of medication adjustments and psychosocial stressors.     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     On initial 2S assessment pt dysphoric, anxious, hopeless, and preoccupied with somatic sxs. There are elements of active passivity, severe sensitivity to feelings of inadequacy and complicated interpersonal relationships (, friends, providers). 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 Provided pt with psychoeducation regarding limitations of medications and need to focus on therapeutic interventions however she has limited insight into this and is intent on finding the right medication(s). For now will continue to slowly taper off nortriptyline.     1/6: Prominent treatment interfering behaviors including active passivity and rejection sensitivity. For now will continue nortriptyline taper. Considering atypical antidepressant trial. Pt reporting chronic b/l LE edema, will encourage her to use compression stocking and elevate legs, no indication for diuretic at this time.     1/7: Preoccupied with med side effects and physical sxs, but more positive about potential med trials. Will decrease qAM gabapentin 300mg to 200mg but otherwise will attempt to limit med changes other than nortriptyline taper. Will also attempt to discourage use of zofran PRNs given past difficulties tolerating multiple serotonergic medications.   1/8 Patient unchanged anxious ruminative unable to use coping skilss. Will dc NTP increase Remeron  1/9 Somatic anxious unable to utilize coping techniques. Plan behavior plan initiated, cont to titrate Remeorn. Added methylfolate for augmentation  1/10 Reports malaise, could be a viral illness vs somatic anxiety symptoms; otherwise tolerating treatment well    1/13: Dysphoric, tearful, anxious but denies SI/HI, discussed med plans and seeks reassurance.  Will increase Remeron to 37.5mg QHS.  1/14: Remains dysphoric, anxious, somatically focused, responds well to support and reassurance provided.  Will increase methylfolate to 15mg daily, provided zofran 4mg PO daily prn nausea, Lidoderm patch prn sciatic pain, will monitor tolerability and consider further titration of Remeron.  1/15: Anxious, depressed, somatic, seeks reassurance on exam, will increase Remeron to 45mg QHS tonight.  Pt agrees with plan as stated.  1/16: Anxious, ruminative but reports improvement, less somatic, tolerating Remeron 45mg and methylfolate 15mg, considering augmentation with Wellbutrin.   1/17: Dysphoric, anxious, denies SI, seeks support, reassurance, will change to regular tablet Klonopin formulation from disintegrating, continue current meds.    PLAN:   1. Legals: admitted to  on 9.13. 1/2 pt transferred to .  2. Safety: routine obs appropriate as no current active SI/SIB/HI/VI on unit.   3. Psychiatric  - c/w Nortriptyline 10mg qHS with plans to discontinue at pt request due to reports of tremors, worsening anxiety, difficulty sleeping at home doses (50mg to 25mg on 12/27, 12.5mg on 1/3). Will defer further medication changes until Nortriptyline is d/c'd given pt concern for side effects/past difficulty tolerating serotonergic meds.   - c/w Remeron 45mg PO QHS for mood/anxiety on 1/15 (home med).   - c/w Klonopin 0.5mg TID for anxiety (home med).   - Decreased Gabapentin 300mg BID to 200mg qAM/300mg qHS for anxiety and neuropathy due to pt concern for sedation. Home dose Gabapentin 300mg TID.   - Melatonin 1mg qHS for insomnia.   - PRNs           - For anxiety: Klonopin wafer 0.25 mg q8h. d/c'd PRN gabapentin to simplify meds.            - For agitation: PO Zyprexa 2.5mg TID.            - STAT IM Zyprexa 2.5mg if there are imminent risk to self/others due to severe agitation.   4. Therapy: I/G/M therapy as indicated.   5. Medical: admission labs and EKG reviewed and wnl.   #Dependent edema  - chronic b/l LE edema likely 2/2 chronic venous insufficiency/deconditioning. No cardiac history (multiple cards workups have been negative).   - encourage pt to utilize compression stockings, elevation, appropriate physical therapy/exercise. No indication for diuretics/attempting to discourage polypharmacy.   #HTN  - c/w Metoprolol 50mg bid (home med) - per notes this is for palpitations 2/2 anxiety managed by outpatient cardiologist.   #HLD  - c/w Nexletol 180mg qHS (home med).   - c/w IM [id number] q2 weeks (last given 1/2) (home med).   #GERD  - c/w Pantoprazole 40mg qD (home med).   #Vit B12 deficiency   - pt due for injection per chart, for now started on PO 1000mcg qD.   #Nausea  - PRN PO Zofran 4mg TID (home med) --> lower to qD with attempts to d/c PRN. Will add maalox PRN given pt report that nausea is often 2/2 indigestion depending on diet.   6. Collateral: 1/3 Updated  Yehuda at [phone] who is agreeable with plan above.   7. Disposition: pending clinical improvement; likely return to previous provider at [facility] vs partial.

## 2025-01-17 NOTE — BH INPATIENT PSYCHIATRY PROGRESS NOTE - NSBHFUPINTERVALHXFT_PSY_A_CORE
Pt is seen and evaluated for follow up for MDD, ANSON.  Chart is reviewed and case is discussed with treatment team.  No issues overnight.  Pt has been med adherant.  Patient remains psychosomatic, reports took zofran with some reported benefit, discusses less nausea.  Denies respiratory or other GI symptoms. Continues to have anticipatory anxiety, is ruminative.  Pt remains to be med focused and expresses concerns related to being discharged before feeling ready for such.  Tolerating titration of Remeron, now 45mg, and reliant on pharmacologic interventions.  No agitation, responds well to reassurance.  Requests change in formulation of Klonopin from disintegrating tablets. Pt is seen and evaluated for follow up for MDD, ANSON.  Chart is reviewed and case is discussed with treatment team.  No issues overnight.  Pt has been med adherant.  Patient less psychosomatic, discusses feeling less nausea but just requested Maalox.  Denies respiratory or other GI symptoms. Continues to have anticipatory anxiety, is ruminative.  Pt remains to be med focused and expresses concerns related to being discharged before feeling ready for such.  Tolerating Remeron, now 45mg, and reliant on pharmacologic interventions, hopeful for Wellbutrin.  No agitation, responds well to reassurance.  Requests change in formulation of Klonopin from disintegrating tablets.  Explored possible PHP but patient resistant to explore discharge options.

## 2025-01-18 RX ADMIN — CLONAZEPAM 0.5 MILLIGRAM(S): 0.5 TABLET ORAL at 12:15

## 2025-01-18 RX ADMIN — Medication 40 MILLIGRAM(S): at 06:37

## 2025-01-18 RX ADMIN — Medication 4 MILLIGRAM(S): at 10:52

## 2025-01-18 RX ADMIN — METOPROLOL SUCCINATE 50 MILLIGRAM(S): 50 TABLET, EXTENDED RELEASE ORAL at 09:15

## 2025-01-18 RX ADMIN — MIRTAZAPINE 45 MILLIGRAM(S): 30 TABLET, FILM COATED ORAL at 21:25

## 2025-01-18 RX ADMIN — CLONAZEPAM 0.5 MILLIGRAM(S): 0.5 TABLET ORAL at 06:36

## 2025-01-18 RX ADMIN — METOPROLOL SUCCINATE 50 MILLIGRAM(S): 50 TABLET, EXTENDED RELEASE ORAL at 21:26

## 2025-01-18 RX ADMIN — Medication 1 MILLIGRAM(S): at 21:25

## 2025-01-18 RX ADMIN — CLONAZEPAM 0.5 MILLIGRAM(S): 0.5 TABLET ORAL at 21:26

## 2025-01-18 RX ADMIN — GABAPENTIN 200 MILLIGRAM(S): 400 CAPSULE ORAL at 09:15

## 2025-01-18 RX ADMIN — Medication 30 MILLILITER(S): at 18:26

## 2025-01-18 RX ADMIN — LIDOCAINE HYDROCHLORIDE 1 PATCH: 20 JELLY TOPICAL at 20:33

## 2025-01-18 RX ADMIN — GABAPENTIN 300 MILLIGRAM(S): 400 CAPSULE ORAL at 21:25

## 2025-01-18 RX ADMIN — LIDOCAINE HYDROCHLORIDE 1 PATCH: 20 JELLY TOPICAL at 09:15

## 2025-01-18 RX ADMIN — LIDOCAINE HYDROCHLORIDE 1 PATCH: 20 JELLY TOPICAL at 23:35

## 2025-01-19 RX ADMIN — GABAPENTIN 200 MILLIGRAM(S): 400 CAPSULE ORAL at 08:59

## 2025-01-19 RX ADMIN — MIRTAZAPINE 45 MILLIGRAM(S): 30 TABLET, FILM COATED ORAL at 20:31

## 2025-01-19 RX ADMIN — METOPROLOL SUCCINATE 50 MILLIGRAM(S): 50 TABLET, EXTENDED RELEASE ORAL at 20:31

## 2025-01-19 RX ADMIN — CLONAZEPAM 0.5 MILLIGRAM(S): 0.5 TABLET ORAL at 06:03

## 2025-01-19 RX ADMIN — Medication 1 MILLIGRAM(S): at 20:31

## 2025-01-19 RX ADMIN — CLONAZEPAM 0.5 MILLIGRAM(S): 0.5 TABLET ORAL at 12:10

## 2025-01-19 RX ADMIN — Medication 40 MILLIGRAM(S): at 07:03

## 2025-01-19 RX ADMIN — METOPROLOL SUCCINATE 50 MILLIGRAM(S): 50 TABLET, EXTENDED RELEASE ORAL at 08:59

## 2025-01-19 RX ADMIN — GABAPENTIN 300 MILLIGRAM(S): 400 CAPSULE ORAL at 20:31

## 2025-01-19 RX ADMIN — CLONAZEPAM 0.5 MILLIGRAM(S): 0.5 TABLET ORAL at 20:31

## 2025-01-20 RX ORDER — ONDANSETRON HCL/PF 4 MG/2 ML
4 VIAL (ML) INJECTION ONCE
Refills: 0 | Status: COMPLETED | OUTPATIENT
Start: 2025-01-20 | End: 2025-01-20

## 2025-01-20 RX ADMIN — GABAPENTIN 300 MILLIGRAM(S): 400 CAPSULE ORAL at 20:34

## 2025-01-20 RX ADMIN — CLONAZEPAM 0.5 MILLIGRAM(S): 0.5 TABLET ORAL at 12:54

## 2025-01-20 RX ADMIN — LIDOCAINE HYDROCHLORIDE 1 PATCH: 20 JELLY TOPICAL at 19:26

## 2025-01-20 RX ADMIN — Medication 1 MILLIGRAM(S): at 20:35

## 2025-01-20 RX ADMIN — GABAPENTIN 200 MILLIGRAM(S): 400 CAPSULE ORAL at 09:22

## 2025-01-20 RX ADMIN — Medication 40 MILLIGRAM(S): at 06:31

## 2025-01-20 RX ADMIN — METOPROLOL SUCCINATE 50 MILLIGRAM(S): 50 TABLET, EXTENDED RELEASE ORAL at 20:35

## 2025-01-20 RX ADMIN — Medication 4 MILLIGRAM(S): at 15:56

## 2025-01-20 RX ADMIN — LIDOCAINE HYDROCHLORIDE 1 PATCH: 20 JELLY TOPICAL at 09:33

## 2025-01-20 RX ADMIN — CLONAZEPAM 0.5 MILLIGRAM(S): 0.5 TABLET ORAL at 06:21

## 2025-01-20 RX ADMIN — MIRTAZAPINE 45 MILLIGRAM(S): 30 TABLET, FILM COATED ORAL at 20:35

## 2025-01-20 RX ADMIN — Medication 30 MILLILITER(S): at 12:54

## 2025-01-20 RX ADMIN — METOPROLOL SUCCINATE 50 MILLIGRAM(S): 50 TABLET, EXTENDED RELEASE ORAL at 09:23

## 2025-01-20 RX ADMIN — Medication 4 MILLIGRAM(S): at 09:22

## 2025-01-20 RX ADMIN — LIDOCAINE HYDROCHLORIDE 1 PATCH: 20 JELLY TOPICAL at 18:22

## 2025-01-20 RX ADMIN — CLONAZEPAM 0.5 MILLIGRAM(S): 0.5 TABLET ORAL at 20:35

## 2025-01-21 PROCEDURE — 99232 SBSQ HOSP IP/OBS MODERATE 35: CPT

## 2025-01-21 PROCEDURE — 90834 PSYTX W PT 45 MINUTES: CPT

## 2025-01-21 RX ORDER — BUPROPION HYDROBROMIDE 522 MG/1
37.5 TABLET, EXTENDED RELEASE ORAL DAILY
Refills: 0 | Status: DISCONTINUED | OUTPATIENT
Start: 2025-01-21 | End: 2025-01-24

## 2025-01-21 RX ORDER — CYANOCOBALAMIN 1000 UG/ML
1000 INJECTION INTRAMUSCULAR; SUBCUTANEOUS DAILY
Refills: 0 | Status: DISCONTINUED | OUTPATIENT
Start: 2025-01-21 | End: 2025-02-19

## 2025-01-21 RX ADMIN — CLONAZEPAM 0.5 MILLIGRAM(S): 0.5 TABLET ORAL at 12:21

## 2025-01-21 RX ADMIN — MIRTAZAPINE 45 MILLIGRAM(S): 30 TABLET, FILM COATED ORAL at 20:56

## 2025-01-21 RX ADMIN — GABAPENTIN 300 MILLIGRAM(S): 400 CAPSULE ORAL at 20:56

## 2025-01-21 RX ADMIN — METOPROLOL SUCCINATE 50 MILLIGRAM(S): 50 TABLET, EXTENDED RELEASE ORAL at 08:25

## 2025-01-21 RX ADMIN — CLONAZEPAM 0.5 MILLIGRAM(S): 0.5 TABLET ORAL at 20:56

## 2025-01-21 RX ADMIN — METOPROLOL SUCCINATE 50 MILLIGRAM(S): 50 TABLET, EXTENDED RELEASE ORAL at 20:56

## 2025-01-21 RX ADMIN — BUPROPION HYDROBROMIDE 37.5 MILLIGRAM(S): 522 TABLET, EXTENDED RELEASE ORAL at 16:37

## 2025-01-21 RX ADMIN — Medication 4 MILLIGRAM(S): at 08:54

## 2025-01-21 RX ADMIN — CLONAZEPAM 0.5 MILLIGRAM(S): 0.5 TABLET ORAL at 06:33

## 2025-01-21 RX ADMIN — Medication 40 MILLIGRAM(S): at 06:33

## 2025-01-21 RX ADMIN — Medication 1 MILLIGRAM(S): at 20:55

## 2025-01-21 RX ADMIN — GABAPENTIN 200 MILLIGRAM(S): 400 CAPSULE ORAL at 08:25

## 2025-01-21 NOTE — BH INPATIENT PSYCHIATRY PROGRESS NOTE - PRN MEDS
MEDICATIONS  (PRN):  acetaminophen     Tablet .. 650 milliGRAM(s) Oral every 6 hours PRN Temp greater or equal to 38C (100.4F), Mild Pain (1 - 3), Moderate Pain (4 - 6)  clonazePAM  Tablet 0.25 milliGRAM(s) Oral every 8 hours PRN anxiety  famotidine    Tablet 10 milliGRAM(s) Oral two times a day PRN acid reflux symptroms  ibuprofen  Tablet. 400 milliGRAM(s) Oral every 8 hours PRN Mild Pain (1 - 3), Moderate Pain (4 - 6)  lidocaine   4% Patch 1 Patch Transdermal daily PRN sciatica pain

## 2025-01-21 NOTE — BH INPATIENT PSYCHIATRY PROGRESS NOTE - CURRENT MEDICATION
MEDICATIONS  (STANDING):  buPROPion 37.5 milliGRAM(s) Oral daily  clonazePAM  Tablet 0.5 milliGRAM(s) Oral <User Schedule>  cyanocobalamin 1000 MICROGram(s) Oral daily  evolocumab Injectable 140 milliGRAM(s) SubCutaneous every 2 weeks  gabapentin 300 milliGRAM(s) Oral at bedtime  gabapentin 200 milliGRAM(s) Oral <User Schedule>  l-methylfolate 15 milliGRAM(s) Oral daily  melatonin. 1 milliGRAM(s) Oral at bedtime  metoprolol succinate ER 50 milliGRAM(s) Oral two times a day  mirtazapine 45 milliGRAM(s) Oral at bedtime  Nexletol 180 mg 1 Tablet(s) 1 Tablet(s) Oral daily  pantoprazole    Tablet 40 milliGRAM(s) Oral before breakfast    MEDICATIONS  (PRN):  acetaminophen     Tablet .. 650 milliGRAM(s) Oral every 6 hours PRN Temp greater or equal to 38C (100.4F), Mild Pain (1 - 3), Moderate Pain (4 - 6)  clonazePAM  Tablet 0.25 milliGRAM(s) Oral every 8 hours PRN anxiety  famotidine    Tablet 10 milliGRAM(s) Oral two times a day PRN acid reflux symptroms  ibuprofen  Tablet. 400 milliGRAM(s) Oral every 8 hours PRN Mild Pain (1 - 3), Moderate Pain (4 - 6)  lidocaine   4% Patch 1 Patch Transdermal daily PRN sciatica pain

## 2025-01-21 NOTE — PHARMACOTHERAPY INTERVENTION NOTE - INTERVENTION TYPE RECOOMEND
Therapy Recommended - Formulary adherence
Timing/Frequency of Administration Recommended
Dose Optimization/Non-renal Dose Adjustment
Dose Optimization/Non-renal Dose Adjustment

## 2025-01-21 NOTE — BH INPATIENT PSYCHIATRY PROGRESS NOTE - NSBHMETABOLIC_PSY_ALL_CORE_FT
BMI: BMI (kg/m2): 37.5 (12-27-24 @ 15:12)  HbA1c: A1C with Estimated Average Glucose Result: 5.5 % (01-02-25 @ 09:11)    Glucose:   BP: 118/61 (01-20-25 @ 21:25) (118/61 - 131/78)Vital Signs Last 24 Hrs  T(C): 36.7 (01-21-25 @ 09:52), Max: 36.7 (01-21-25 @ 09:52)  T(F): 98.1 (01-21-25 @ 09:52), Max: 98.1 (01-21-25 @ 09:52)  HR: 75 (01-20-25 @ 21:25) (75 - 75)  BP: 118/61 (01-20-25 @ 21:25) (118/61 - 118/61)  BP(mean): --  RR: --  SpO2: --    Orthostatic VS  01-21-25 @ 09:52  Lying BP: --/-- HR: --  Sitting BP: 140/81 HR: 95  Standing BP: 140/78 HR: 79  Site: upper right arm  Mode: electronic  Orthostatic VS  01-20-25 @ 05:39  Lying BP: --/-- HR: --  Sitting BP: 137/77 HR: 63  Standing BP: 147/84 HR: 70  Site: --  Mode: --    Lipid Panel: Date/Time: 01-02-25 @ 09:11  Cholesterol, Serum: 128  LDL Cholesterol Calculated: 53  HDL Cholesterol, Serum: 46  Total Cholesterol/HDL Ration Measurement: --  Triglycerides, Serum: 174

## 2025-01-21 NOTE — PHARMACOTHERAPY INTERVENTION NOTE - COMMENTS
spoke with dr. Landau, alexandra that nortriptyline is only available as 10mg capsule. she will d/c 12.5mg and order 10mg
The famotidine normal dose and frequency 20-40 mg bid, the order is confirmed with Dr. Washington and changed to 10 mg bid.
Spoke with EBER Friedman and confirmed that it is being given 2 x day. EBER Friedman had the patient's own medication vial and verified the 2 x day dosing.
The initiation therapy dose for immediate release bupropion is 37.5 mg per day for few days for geriatric patient. Confirmed with Dr. Washington. The pill cutter is provided to the unit with the medication.

## 2025-01-21 NOTE — BH INPATIENT PSYCHIATRY PROGRESS NOTE - NSBHASSESSSUMMFT_PSY_ALL_CORE
Ms. Gross is a 73yo female, , retired (), domiciled at home with , no children; PMHx HTN, HLD; PPHx d/o ANSON and MDD, 9 past psych admissions starting in 2019 (last [facility] April 2024 for worsening anxiety and depression), ECT at  2023, OP at [facility]; 1 past SA (OD on 9 pills of unknown medication in 2019), no h/o NSSIB, no h/o violence/aggression/legal issues, past marijuana use; initially admitted to [facility] 2W on 12/27 for increasing anxiety and SI w/o plans in the context of medication adjustments and psychosocial stressors.     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     On initial 2S assessment pt dysphoric, anxious, hopeless, and preoccupied with somatic sxs. There are elements of active passivity, severe sensitivity to feelings of inadequacy and complicated interpersonal relationships (, friends, providers). 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 Provided pt with psychoeducation regarding limitations of medications and need to focus on therapeutic interventions however she has limited insight into this and is intent on finding the right medication(s). For now will continue to slowly taper off nortriptyline.     1/6: Prominent treatment interfering behaviors including active passivity and rejection sensitivity. For now will continue nortriptyline taper. Considering atypical antidepressant trial. Pt reporting chronic b/l LE edema, will encourage her to use compression stocking and elevate legs, no indication for diuretic at this time.     1/7: Preoccupied with med side effects and physical sxs, but more positive about potential med trials. Will decrease qAM gabapentin 300mg to 200mg but otherwise will attempt to limit med changes other than nortriptyline taper. Will also attempt to discourage use of zofran PRNs given past difficulties tolerating multiple serotonergic medications.   1/8 Patient unchanged anxious ruminative unable to use coping skilss. Will dc NTP increase Remeron  1/9 Somatic anxious unable to utilize coping techniques. Plan behavior plan initiated, cont to titrate Remeorn. Added methylfolate for augmentation  1/10 Reports malaise, could be a viral illness vs somatic anxiety symptoms; otherwise tolerating treatment well    1/13: Dysphoric, tearful, anxious but denies SI/HI, discussed med plans and seeks reassurance.  Will increase Remeron to 37.5mg QHS.  1/14: Remains dysphoric, anxious, somatically focused, responds well to support and reassurance provided.  Will increase methylfolate to 15mg daily, provided zofran 4mg PO daily prn nausea, Lidoderm patch prn sciatic pain, will monitor tolerability and consider further titration of Remeron.  1/15: Anxious, depressed, somatic, seeks reassurance on exam, will increase Remeron to 45mg QHS tonight.  Pt agrees with plan as stated.  1/16: Anxious, ruminative but reports improvement, less somatic, tolerating Remeron 45mg and methylfolate 15mg, considering augmentation with Wellbutrin.   1/17: Dysphoric, anxious, denies SI, seeks support, reassurance, will change to regular tablet Klonopin formulation from disintegrating, continue current meds.  1/21 About same will start Wellbutrin to augment mirtazapine. Will change Zofran to pepcid to minimize serotonergic meds  PLAN:   1. Legals: admitted to 2 on 9.13. 1/2 pt transferred to .  2. Safety: routine obs appropriate as no current active SI/SIB/HI/VI on unit.   3. Psychiatric  - c/w Nortriptyline 10mg qHS with plans to discontinue at pt request due to reports of tremors, worsening anxiety, difficulty sleeping at home doses (50mg to 25mg on 12/27, 12.5mg on 1/3). Will defer further medication changes until Nortriptyline is d/c'd given pt concern for side effects/past difficulty tolerating serotonergic meds.   - c/w Remeron 45mg PO QHS for mood/anxiety on 1/15 (home med).   - c/w Klonopin 0.5mg TID for anxiety (home med).   - Decreased Gabapentin 300mg BID to 200mg qAM/300mg qHS for anxiety and neuropathy due to pt concern for sedation. Home dose Gabapentin 300mg TID.   - Melatonin 1mg qHS for insomnia.   - PRNs           - For anxiety: Klonopin wafer 0.25 mg q8h. d/c'd PRN gabapentin to simplify meds.            - For agitation: PO Zyprexa 2.5mg TID.            - STAT IM Zyprexa 2.5mg if there are imminent risk to self/others due to severe agitation.   4. Therapy: I/G/M therapy as indicated.   5. Medical: admission labs and EKG reviewed and wnl.   #Dependent edema  - chronic b/l LE edema likely 2/2 chronic venous insufficiency/deconditioning. No cardiac history (multiple cards workups have been negative).   - encourage pt to utilize compression stockings, elevation, appropriate physical therapy/exercise. No indication for diuretics/attempting to discourage polypharmacy.   #HTN  - c/w Metoprolol 50mg bid (home med) - per notes this is for palpitations 2/2 anxiety managed by outpatient cardiologist.   #HLD  - c/w Nexletol 180mg qHS (home med).   - c/w IM [id number] q2 weeks (last given 1/2) (home med).   #GERD  - c/w Pantoprazole 40mg qD (home med).   #Vit B12 deficiency   - pt due for injection per chart, for now started on PO 1000mcg qD.   #Nausea  - PRN PO Zofran 4mg TID (home med) --> lower to qD with attempts to d/c PRN. Will add maalox PRN given pt report that nausea is often 2/2 indigestion depending on diet.   6. Collateral: 1/3 Updated  Yehuda at [phone] who is agreeable with plan above.   7. Disposition: pending clinical improvement; likely return to previous provider at [facility] vs partial.

## 2025-01-21 NOTE — BH INPATIENT PSYCHIATRY PROGRESS NOTE - NSBHFUPINTERVALHXFT_PSY_A_CORE
Pt is seen and evaluated for follow up for MDD, ANSON.  Chart is reviewed and case is discussed with treatment team.  No issues overnight.  Pt has been med adherant.  Patient with stable vitals, eating sleeping okay gets others to look up side effects of meds and then becomes alarmed over rare se

## 2025-01-21 NOTE — BH INPATIENT PSYCHIATRY PROGRESS NOTE - NSBHCHARTREVIEWVS_PSY_A_CORE FT
Vital Signs Last 24 Hrs  T(C): 36.7 (01-21-25 @ 09:52), Max: 36.7 (01-21-25 @ 09:52)  T(F): 98.1 (01-21-25 @ 09:52), Max: 98.1 (01-21-25 @ 09:52)  HR: 75 (01-20-25 @ 21:25) (75 - 75)  BP: 118/61 (01-20-25 @ 21:25) (118/61 - 118/61)  BP(mean): --  RR: --  SpO2: --    Orthostatic VS  01-21-25 @ 09:52  Lying BP: --/-- HR: --  Sitting BP: 140/81 HR: 95  Standing BP: 140/78 HR: 79  Site: upper right arm  Mode: electronic  Orthostatic VS  01-20-25 @ 05:39  Lying BP: --/-- HR: --  Sitting BP: 137/77 HR: 63  Standing BP: 147/84 HR: 70  Site: --  Mode: --

## 2025-01-22 PROCEDURE — 99232 SBSQ HOSP IP/OBS MODERATE 35: CPT

## 2025-01-22 RX ADMIN — GABAPENTIN 300 MILLIGRAM(S): 400 CAPSULE ORAL at 20:51

## 2025-01-22 RX ADMIN — CYANOCOBALAMIN 1000 MICROGRAM(S): 1000 INJECTION INTRAMUSCULAR; SUBCUTANEOUS at 09:27

## 2025-01-22 RX ADMIN — LIDOCAINE HYDROCHLORIDE 1 PATCH: 20 JELLY TOPICAL at 19:30

## 2025-01-22 RX ADMIN — METOPROLOL SUCCINATE 50 MILLIGRAM(S): 50 TABLET, EXTENDED RELEASE ORAL at 09:29

## 2025-01-22 RX ADMIN — LIDOCAINE HYDROCHLORIDE 1 PATCH: 20 JELLY TOPICAL at 18:02

## 2025-01-22 RX ADMIN — Medication 10 MILLIGRAM(S): at 09:28

## 2025-01-22 RX ADMIN — CLONAZEPAM 0.5 MILLIGRAM(S): 0.5 TABLET ORAL at 06:38

## 2025-01-22 RX ADMIN — LIDOCAINE HYDROCHLORIDE 1 PATCH: 20 JELLY TOPICAL at 09:29

## 2025-01-22 RX ADMIN — CLONAZEPAM 0.5 MILLIGRAM(S): 0.5 TABLET ORAL at 12:47

## 2025-01-22 RX ADMIN — BUPROPION HYDROBROMIDE 37.5 MILLIGRAM(S): 522 TABLET, EXTENDED RELEASE ORAL at 09:27

## 2025-01-22 RX ADMIN — Medication 40 MILLIGRAM(S): at 06:38

## 2025-01-22 RX ADMIN — METOPROLOL SUCCINATE 50 MILLIGRAM(S): 50 TABLET, EXTENDED RELEASE ORAL at 20:52

## 2025-01-22 RX ADMIN — CLONAZEPAM 0.5 MILLIGRAM(S): 0.5 TABLET ORAL at 23:38

## 2025-01-22 RX ADMIN — MIRTAZAPINE 45 MILLIGRAM(S): 30 TABLET, FILM COATED ORAL at 20:51

## 2025-01-22 RX ADMIN — Medication 1 MILLIGRAM(S): at 20:51

## 2025-01-22 RX ADMIN — GABAPENTIN 200 MILLIGRAM(S): 400 CAPSULE ORAL at 09:27

## 2025-01-22 NOTE — BH INPATIENT PSYCHIATRY PROGRESS NOTE - NSBHCHARTREVIEWVS_PSY_A_CORE FT
Vital Signs Last 24 Hrs  T(C): 36.8 (01-22-25 @ 07:52), Max: 36.8 (01-22-25 @ 07:52)  T(F): 98.2 (01-22-25 @ 07:52), Max: 98.2 (01-22-25 @ 07:52)  HR: 79 (01-21-25 @ 20:49) (79 - 79)  BP: 139/77 (01-21-25 @ 20:49) (139/77 - 139/77)  BP(mean): --  RR: --  SpO2: --    Orthostatic VS  01-22-25 @ 07:52  Lying BP: --/-- HR: --  Sitting BP: 141/83 HR: 69  Standing BP: 143/88 HR: 72  Site: --  Mode: --  Orthostatic VS  01-21-25 @ 09:52  Lying BP: --/-- HR: --  Sitting BP: 140/81 HR: 95  Standing BP: 140/78 HR: 79  Site: upper right arm  Mode: electronic

## 2025-01-22 NOTE — BH INPATIENT PSYCHIATRY PROGRESS NOTE - PRN MEDS
MEDICATIONS  (PRN):  acetaminophen     Tablet .. 650 milliGRAM(s) Oral every 6 hours PRN Temp greater or equal to 38C (100.4F), Mild Pain (1 - 3), Moderate Pain (4 - 6)  clonazePAM  Tablet 0.25 milliGRAM(s) Oral every 8 hours PRN anxiety  famotidine    Tablet 10 milliGRAM(s) Oral every 6 hours PRN acid reflux symptroms  lidocaine   4% Patch 1 Patch Transdermal daily PRN sciatica pain

## 2025-01-22 NOTE — BH INPATIENT PSYCHIATRY PROGRESS NOTE - NSBHFUPINTERVALHXFT_PSY_A_CORE
Pt is seen and evaluated for follow up for MDD, ANSON.  Chart is reviewed and case is discussed with treatment team.  No issues overnight.  Pt has been med adherent.  Patient with stable vitals, eating sleeping okay gets others to look up side effects of meds and then becomes alarmed over rare se.

## 2025-01-22 NOTE — BH INPATIENT PSYCHIATRY PROGRESS NOTE - NSBHMETABOLIC_PSY_ALL_CORE_FT
BMI: BMI (kg/m2): 37.5 (12-27-24 @ 15:12)  HbA1c: A1C with Estimated Average Glucose Result: 5.5 % (01-02-25 @ 09:11)    Glucose:   BP: 139/77 (01-21-25 @ 20:49) (118/61 - 139/77)Vital Signs Last 24 Hrs  T(C): 36.8 (01-22-25 @ 07:52), Max: 36.8 (01-22-25 @ 07:52)  T(F): 98.2 (01-22-25 @ 07:52), Max: 98.2 (01-22-25 @ 07:52)  HR: 79 (01-21-25 @ 20:49) (79 - 79)  BP: 139/77 (01-21-25 @ 20:49) (139/77 - 139/77)  BP(mean): --  RR: --  SpO2: --    Orthostatic VS  01-22-25 @ 07:52  Lying BP: --/-- HR: --  Sitting BP: 141/83 HR: 69  Standing BP: 143/88 HR: 72  Site: --  Mode: --  Orthostatic VS  01-21-25 @ 09:52  Lying BP: --/-- HR: --  Sitting BP: 140/81 HR: 95  Standing BP: 140/78 HR: 79  Site: upper right arm  Mode: electronic    Lipid Panel: Date/Time: 01-02-25 @ 09:11  Cholesterol, Serum: 128  LDL Cholesterol Calculated: 53  HDL Cholesterol, Serum: 46  Total Cholesterol/HDL Ration Measurement: --  Triglycerides, Serum: 174

## 2025-01-22 NOTE — BH INPATIENT PSYCHIATRY PROGRESS NOTE - MSE UNSTRUCTURED FT
Awake and alert. Engaged, cooperative  SPEECH: Normal rate, tone, and volume.  MOTOR: mild PMR. No tremors or other abnormal movements noted. Stable gait  Mood: anxious  AFFECT: congruent, stable, intense, constricted  THOUGHT PROCESS: Linear but with obsessive, ruminative pattern   THOUGHT CONTENT: Denies SI or HI; no evidence of delusions. Somatically preoccupied today with GI concerns asks to know when next prns available.   COGNITION: Grossly intact.  INSIGHT: Poor.  JUDGMENT: Fair.  IMPULSE CONTROL: Intact on unit.

## 2025-01-22 NOTE — BH INPATIENT PSYCHIATRY PROGRESS NOTE - CURRENT MEDICATION
MEDICATIONS  (STANDING):  buPROPion 37.5 milliGRAM(s) Oral daily  clonazePAM  Tablet 0.5 milliGRAM(s) Oral <User Schedule>  cyanocobalamin 1000 MICROGram(s) Oral daily  evolocumab Injectable 140 milliGRAM(s) SubCutaneous every 2 weeks  gabapentin 300 milliGRAM(s) Oral at bedtime  gabapentin 200 milliGRAM(s) Oral <User Schedule>  l-methylfolate 15 milliGRAM(s) Oral daily  melatonin. 1 milliGRAM(s) Oral at bedtime  metoprolol succinate ER 50 milliGRAM(s) Oral two times a day  mirtazapine 45 milliGRAM(s) Oral at bedtime  Nexletol 180 mg 1 Tablet(s) 1 Tablet(s) Oral daily  pantoprazole    Tablet 40 milliGRAM(s) Oral before breakfast    MEDICATIONS  (PRN):  acetaminophen     Tablet .. 650 milliGRAM(s) Oral every 6 hours PRN Temp greater or equal to 38C (100.4F), Mild Pain (1 - 3), Moderate Pain (4 - 6)  clonazePAM  Tablet 0.25 milliGRAM(s) Oral every 8 hours PRN anxiety  famotidine    Tablet 10 milliGRAM(s) Oral every 6 hours PRN acid reflux symptroms  lidocaine   4% Patch 1 Patch Transdermal daily PRN sciatica pain

## 2025-01-22 NOTE — BH INPATIENT PSYCHIATRY PROGRESS NOTE - NSBHASSESSSUMMFT_PSY_ALL_CORE
Ms. Gross is a 75yo female, , retired (), domiciled at home with , no children; PMHx HTN, HLD; PPHx d/o ANSON and MDD, 9 past psych admissions starting in 2019 (last [facility] April 2024 for worsening anxiety and depression), ECT at [facility] 2023, OP at [facility]; 1 past SA (OD on 9 pills of unknown medication in 2019), no h/o NSSIB, no h/o violence/aggression/legal issues, past marijuana use; initially admitted to [facility] 2W on 12/27 for increasing anxiety and SI w/o plans in the context of medication adjustments and psychosocial stressors.     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     On initial 2S assessment pt dysphoric, anxious, hopeless, and preoccupied with somatic sxs. There are elements of active passivity, severe sensitivity to feelings of inadequacy and complicated interpersonal relationships (, friends, providers). 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 Provided pt with psychoeducation regarding limitations of medications and need to focus on therapeutic interventions however she has limited insight into this and is intent on finding the right medication(s). For now will continue to slowly taper off nortriptyline.     1/6: Prominent treatment interfering behaviors including active passivity and rejection sensitivity. For now will continue nortriptyline taper. Considering atypical antidepressant trial. Pt reporting chronic b/l LE edema, will encourage her to use compression stocking and elevate legs, no indication for diuretic at this time.     1/7: Preoccupied with med side effects and physical sxs, but more positive about potential med trials. Will decrease qAM gabapentin 300mg to 200mg but otherwise will attempt to limit med changes other than nortriptyline taper. Will also attempt to discourage use of zofran PRNs given past difficulties tolerating multiple serotonergic medications.   1/8 Patient unchanged anxious ruminative unable to use coping skilss. Will dc NTP increase Remeron  1/9 Somatic anxious unable to utilize coping techniques. Plan behavior plan initiated, cont to titrate Remeorn. Added methylfolate for augmentation  1/10 Reports malaise, could be a viral illness vs somatic anxiety symptoms; otherwise tolerating treatment well    1/13: Dysphoric, tearful, anxious but denies SI/HI, discussed med plans and seeks reassurance.  Will increase Remeron to 37.5mg QHS.  1/14: Remains dysphoric, anxious, somatically focused, responds well to support and reassurance provided.  Will increase methylfolate to 15mg daily, provided zofran 4mg PO daily prn nausea, Lidoderm patch prn sciatic pain, will monitor tolerability and consider further titration of Remeron.  1/15: Anxious, depressed, somatic, seeks reassurance on exam, will increase Remeron to 45mg QHS tonight.  Pt agrees with plan as stated.  1/16: Anxious, ruminative but reports improvement, less somatic, tolerating Remeron 45mg and methylfolate 15mg, considering augmentation with Wellbutrin.   1/17: Dysphoric, anxious, denies SI, seeks support, reassurance, will change to regular tablet Klonopin formulation from disintegrating, continue current meds.  1/21 About same will start Wellbutrin to augment mirtazapine. Will change Zofran to pepcid to minimize serotonergic meds  1/222 Tolerating Wellbutrin con current dose. Will alllow Pepcid 10mg q6 prn with max three doses per 24 hours hopefully will seek less when feeling better  PLAN:   1. Legals: admitted to 2W on 9.13. 1/2 pt transferred to 2S.  2. Safety: routine obs appropriate as no current active SI/SIB/HI/VI on unit.   3. Psychiatric  - c/w Nortriptyline 10mg qHS with plans to discontinue at pt request due to reports of tremors, worsening anxiety, difficulty sleeping at home doses (50mg to 25mg on 12/27, 12.5mg on 1/3). Will defer further medication changes until Nortriptyline is d/c'd given pt concern for side effects/past difficulty tolerating serotonergic meds.   - c/w Remeron 45mg PO QHS for mood/anxiety on 1/15 (home med).   - c/w Klonopin 0.5mg TID for anxiety (home med).   - Decreased Gabapentin 300mg BID to 200mg qAM/300mg qHS for anxiety and neuropathy due to pt concern for sedation. Home dose Gabapentin 300mg TID.   - Melatonin 1mg qHS for insomnia.   - PRNs           - For anxiety: Klonopin wafer 0.25 mg q8h. d/c'd PRN gabapentin to simplify meds.            - For agitation: PO Zyprexa 2.5mg TID.            - STAT IM Zyprexa 2.5mg if there are imminent risk to self/others due to severe agitation.   4. Therapy: I/G/M therapy as indicated.   5. Medical: admission labs and EKG reviewed and wnl.   #Dependent edema  - chronic b/l LE edema likely 2/2 chronic venous insufficiency/deconditioning. No cardiac history (multiple cards workups have been negative).   - encourage pt to utilize compression stockings, elevation, appropriate physical therapy/exercise. No indication for diuretics/attempting to discourage polypharmacy.   #HTN  - c/w Metoprolol 50mg bid (home med) - per notes this is for palpitations 2/2 anxiety managed by outpatient cardiologist.   #HLD  - c/w Nexletol 180mg qHS (home med).   - c/w IM [id number] q2 weeks (last given 1/2) (home med).   #GERD  - c/w Pantoprazole 40mg qD (home med).   #Vit B12 deficiency   - pt due for injection per chart, for now started on PO 1000mcg qD.   #Nausea  - PRN PO Zofran 4mg TID (home med) --> lower to qD with attempts to d/c PRN. Will add maalox PRN given pt report that nausea is often 2/2 indigestion depending on diet.   6. Collateral: 1/3 Updated  Yehuda at [phone] who is agreeable with plan above.   7. Disposition: pending clinical improvement; likely return to previous provider at [facility] vs partial.

## 2025-01-23 PROCEDURE — 99232 SBSQ HOSP IP/OBS MODERATE 35: CPT

## 2025-01-23 PROCEDURE — 99223 1ST HOSP IP/OBS HIGH 75: CPT

## 2025-01-23 RX ORDER — CLONAZEPAM 0.5 MG/1
0.5 TABLET ORAL
Refills: 0 | Status: DISCONTINUED | OUTPATIENT
Start: 2025-01-23 | End: 2025-01-30

## 2025-01-23 RX ORDER — CLONAZEPAM 0.5 MG/1
0.25 TABLET ORAL EVERY 8 HOURS
Refills: 0 | Status: DISCONTINUED | OUTPATIENT
Start: 2025-01-23 | End: 2025-01-23

## 2025-01-23 RX ADMIN — METOPROLOL SUCCINATE 50 MILLIGRAM(S): 50 TABLET, EXTENDED RELEASE ORAL at 08:47

## 2025-01-23 RX ADMIN — GABAPENTIN 200 MILLIGRAM(S): 400 CAPSULE ORAL at 08:47

## 2025-01-23 RX ADMIN — MIRTAZAPINE 45 MILLIGRAM(S): 30 TABLET, FILM COATED ORAL at 20:18

## 2025-01-23 RX ADMIN — Medication 1 MILLIGRAM(S): at 20:18

## 2025-01-23 RX ADMIN — CLONAZEPAM 0.5 MILLIGRAM(S): 0.5 TABLET ORAL at 13:37

## 2025-01-23 RX ADMIN — Medication 40 MILLIGRAM(S): at 16:15

## 2025-01-23 RX ADMIN — BUPROPION HYDROBROMIDE 37.5 MILLIGRAM(S): 522 TABLET, EXTENDED RELEASE ORAL at 08:47

## 2025-01-23 RX ADMIN — CLONAZEPAM 0.5 MILLIGRAM(S): 0.5 TABLET ORAL at 21:53

## 2025-01-23 RX ADMIN — GABAPENTIN 300 MILLIGRAM(S): 400 CAPSULE ORAL at 20:18

## 2025-01-23 RX ADMIN — CYANOCOBALAMIN 1000 MICROGRAM(S): 1000 INJECTION INTRAMUSCULAR; SUBCUTANEOUS at 08:47

## 2025-01-23 RX ADMIN — Medication 40 MILLIGRAM(S): at 06:41

## 2025-01-23 RX ADMIN — Medication 10 MILLIGRAM(S): at 10:55

## 2025-01-23 RX ADMIN — CLONAZEPAM 0.5 MILLIGRAM(S): 0.5 TABLET ORAL at 06:41

## 2025-01-23 RX ADMIN — METOPROLOL SUCCINATE 50 MILLIGRAM(S): 50 TABLET, EXTENDED RELEASE ORAL at 20:18

## 2025-01-23 NOTE — BH INPATIENT PSYCHIATRY PROGRESS NOTE - NSBHASSESSSUMMFT_PSY_ALL_CORE
Ms. Gross is a 73yo female, , retired (), domiciled at home with , no children; PMHx HTN, HLD; PPHx d/o ANSON and MDD, 9 past psych admissions starting in 2019 (last [facility] April 2024 for worsening anxiety and depression), ECT at [facility] 2023, OP at [facility]; 1 past SA (OD on 9 pills of unknown medication in 2019), no h/o NSSIB, no h/o violence/aggression/legal issues, past marijuana use; initially admitted to [facility] 2W on 12/27 for increasing anxiety and SI w/o plans in the context of medication adjustments and psychosocial stressors.     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     On initial 2S assessment pt dysphoric, anxious, hopeless, and preoccupied with somatic sxs. There are elements of active passivity, severe sensitivity to feelings of inadequacy and complicated interpersonal relationships (, friends, providers). 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 Provided pt with psychoeducation regarding limitations of medications and need to focus on therapeutic interventions however she has limited insight into this and is intent on finding the right medication(s). For now will continue to slowly taper off nortriptyline.     1/6: Prominent treatment interfering behaviors including active passivity and rejection sensitivity. For now will continue nortriptyline taper. Considering atypical antidepressant trial. Pt reporting chronic b/l LE edema, will encourage her to use compression stocking and elevate legs, no indication for diuretic at this time.     1/7: Preoccupied with med side effects and physical sxs, but more positive about potential med trials. Will decrease qAM gabapentin 300mg to 200mg but otherwise will attempt to limit med changes other than nortriptyline taper. Will also attempt to discourage use of zofran PRNs given past difficulties tolerating multiple serotonergic medications.   1/8 Patient unchanged anxious ruminative unable to use coping skilss. Will dc NTP increase Remeron  1/9 Somatic anxious unable to utilize coping techniques. Plan behavior plan initiated, cont to titrate Remeorn. Added methylfolate for augmentation  1/10 Reports malaise, could be a viral illness vs somatic anxiety symptoms; otherwise tolerating treatment well    1/13: Dysphoric, tearful, anxious but denies SI/HI, discussed med plans and seeks reassurance.  Will increase Remeron to 37.5mg QHS.  1/14: Remains dysphoric, anxious, somatically focused, responds well to support and reassurance provided.  Will increase methylfolate to 15mg daily, provided zofran 4mg PO daily prn nausea, Lidoderm patch prn sciatic pain, will monitor tolerability and consider further titration of Remeron.  1/15: Anxious, depressed, somatic, seeks reassurance on exam, will increase Remeron to 45mg QHS tonight.  Pt agrees with plan as stated.  1/16: Anxious, ruminative but reports improvement, less somatic, tolerating Remeron 45mg and methylfolate 15mg, considering augmentation with Wellbutrin.   1/17: Dysphoric, anxious, denies SI, seeks support, reassurance, will change to regular tablet Klonopin formulation from disintegrating, continue current meds.  1/21 About same will start Wellbutrin to augment mirtazapine. Will change Zofran to pepcid to minimize serotonergic meds  1/22 Tolerating Wellbutrin con current dose. Will alllow Pepcid 10mg q6 prn with max three doses per 24 hours hopefully will seek less when feeling better  1/23 Gi focused today , cont meds will increase Wellbutrin and patient was seen by medicine who increased Protonix  PLAN:   1. Legals: admitted to  on 9.13. 1/2 pt transferred to .  2. Safety: routine obs appropriate as no current active SI/SIB/HI/VI on unit.   3. Psychiatric  - c/w Nortriptyline 10mg qHS with plans to discontinue at pt request due to reports of tremors, worsening anxiety, difficulty sleeping at home doses (50mg to 25mg on 12/27, 12.5mg on 1/3). Will defer further medication changes until Nortriptyline is d/c'd given pt concern for side effects/past difficulty tolerating serotonergic meds.   - c/w Remeron 45mg PO QHS for mood/anxiety on 1/15 (home med).   - c/w Klonopin 0.5mg TID for anxiety (home med).   - Decreased Gabapentin 300mg BID to 200mg qAM/300mg qHS for anxiety and neuropathy due to pt concern for sedation. Home dose Gabapentin 300mg TID.   - Melatonin 1mg qHS for insomnia.   - PRNs           - For anxiety: Klonopin wafer 0.25 mg q8h. d/c'd PRN gabapentin to simplify meds.            - For agitation: PO Zyprexa 2.5mg TID.            - STAT IM Zyprexa 2.5mg if there are imminent risk to self/others due to severe agitation.   4. Therapy: I/G/M therapy as indicated.   5. Medical: admission labs and EKG reviewed and wnl.   #Dependent edema  - chronic b/l LE edema likely 2/2 chronic venous insufficiency/deconditioning. No cardiac history (multiple cards workups have been negative).   - encourage pt to utilize compression stockings, elevation, appropriate physical therapy/exercise. No indication for diuretics/attempting to discourage polypharmacy.   #HTN  - c/w Metoprolol 50mg bid (home med) - per notes this is for palpitations 2/2 anxiety managed by outpatient cardiologist.   #HLD  - c/w Nexletol 180mg qHS (home med).   - c/w IM [id number] q2 weeks (last given 1/2) (home med).   #GERD  - c/w Pantoprazole 40mg qD (home med).   #Vit B12 deficiency   - pt due for injection per chart, for now started on PO 1000mcg qD.   #Nausea  - PRN PO Zofran 4mg TID (home med) --> lower to qD with attempts to d/c PRN. Will add maalox PRN given pt report that nausea is often 2/2 indigestion depending on diet.   6. Collateral: 1/3 Updated  Yehuda at [phone] who is agreeable with plan above.   7. Disposition: pending clinical improvement; likely return to previous provider at [facility] vs partial.

## 2025-01-23 NOTE — BH INPATIENT PSYCHIATRY PROGRESS NOTE - CURRENT MEDICATION
MEDICATIONS  (STANDING):  buPROPion 37.5 milliGRAM(s) Oral daily  clonazePAM  Tablet 0.5 milliGRAM(s) Oral <User Schedule>  cyanocobalamin 1000 MICROGram(s) Oral daily  evolocumab Injectable 140 milliGRAM(s) SubCutaneous every 2 weeks  gabapentin 300 milliGRAM(s) Oral at bedtime  gabapentin 200 milliGRAM(s) Oral <User Schedule>  l-methylfolate 15 milliGRAM(s) Oral daily  melatonin. 1 milliGRAM(s) Oral at bedtime  metoprolol succinate ER 50 milliGRAM(s) Oral two times a day  mirtazapine 45 milliGRAM(s) Oral at bedtime  Nexletol 180 mg 1 Tablet(s) 1 Tablet(s) Oral daily  pantoprazole    Tablet 40 milliGRAM(s) Oral <User Schedule>    MEDICATIONS  (PRN):  acetaminophen     Tablet .. 650 milliGRAM(s) Oral every 6 hours PRN Temp greater or equal to 38C (100.4F), Mild Pain (1 - 3), Moderate Pain (4 - 6)  clonazePAM  Tablet 0.25 milliGRAM(s) Oral every 8 hours PRN anxiety  famotidine    Tablet 10 milliGRAM(s) Oral every 6 hours PRN acid reflux symptroms  lidocaine   4% Patch 1 Patch Transdermal daily PRN sciatica pain

## 2025-01-23 NOTE — BH INPATIENT PSYCHIATRY PROGRESS NOTE - NSBHCHARTREVIEWVS_PSY_A_CORE FT
Vital Signs Last 24 Hrs  T(C): 36.7 (01-23-25 @ 07:57), Max: 36.9 (01-22-25 @ 20:30)  T(F): 98 (01-23-25 @ 07:57), Max: 98.4 (01-22-25 @ 20:30)  HR: --  BP: 110/60 (01-22-25 @ 20:30) (110/60 - 110/60)  BP(mean): 70 (01-22-25 @ 20:30) (70 - 70)  RR: --  SpO2: --    Orthostatic VS  01-23-25 @ 07:57  Lying BP: --/-- HR: --  Sitting BP: 145/82 HR: 68  Standing BP: 152/76 HR: 75  Site: --  Mode: --  Orthostatic VS  01-22-25 @ 07:52  Lying BP: --/-- HR: --  Sitting BP: 141/83 HR: 69  Standing BP: 143/88 HR: 72  Site: --  Mode: --

## 2025-01-23 NOTE — CONSULT NOTE ADULT - TIME BILLING
Time-based billing (NON-critical care).     More than 50% of the visit was spent counseling and / or coordinating care by the attending physician.      The necessity of the time spent during the encounter on this date of service was due to: documentation in [facility], reviewing chart and coordinating care with patient/primary team and interdisciplinary staff (such as , social workers, etc) as well as reviewing vitals, laboratory data, radiology, medication list, and prior records. Interventions were performed as documented above.

## 2025-01-23 NOTE — CONSULT NOTE ADULT - ASSESSMENT
74F PMHx HTN, HLD; PPHx d/o ANSON and MDD initially admitted to [facility] 2W on 12/27 for increasing anxiety and SI w/o plans in the context of medication adjustments and psychosocial stressors. Medicine consulted for GERD management.     #psych  - care as per primary team    #GERD  - Pt reports having EGD and c-scope recently with only diagnosis being GERD  - continues to have globus sensation on protonix OD. Of note, this appears to be mostly positional as pt lies down shortly after eating breakfast  - denies fever, chills, chest pain, sob, heart burn, odynophagia, leg swelling. Has decreased diet to avoid exacerbating foods such as caffeine, acidic foods, etc.  Plan:  - advised to not lie down for at least an hour after eating  - increase protonix to 40mg BID , one hour before breakfast and one hour before dinner  - pepcid prn  - if pt's symptoms don't improve can trial baclofen as symptoms appear to be predominantly regurgitation related  - f/u o/p GI    #HTN  - continue toprol

## 2025-01-23 NOTE — CONSULT NOTE ADULT - SUBJECTIVE AND OBJECTIVE BOX
HPI:   74F PMHx HTN, HLD; PPHx d/o ANSON and MDD initially admitted to [facility] 2W on 12/27 for increasing anxiety and SI w/o plans in the context of medication adjustments and psychosocial stressors. Medicine consulted for GERD management. Pt reports having EGD and c-scope recently with only diagnosis being GERD. She has been on protonix OD in the morning and pepcid but still complains of globus sensation in the throat. Pt reports this is worse after she lies down after breakfast. Denies fever, chills, chest pain, sob, heart burn, leg swelling. Has decreased diet to avoid exacerbating foods such as caffeine, acidic foods, etc.    PAST MEDICAL & SURGICAL HISTORY:  Anxiety      High cholesterol      HTN (hypertension)      Endometriosis      GERD (gastroesophageal reflux disease)      Nausea      History of bilateral breast reduction surgery          Review of Systems:   see above in HPI    Allergies    penicillins (Anaphylaxis)  sulfa drugs (Anaphylaxis)    Intolerances        Social History: , retired (), domiciled at home with , no children;    FAMILY HISTORY:  Family history of TIAs (Aunt)        MEDICATIONS  (STANDING):  buPROPion 37.5 milliGRAM(s) Oral daily  clonazePAM  Tablet 0.5 milliGRAM(s) Oral <User Schedule>  cyanocobalamin 1000 MICROGram(s) Oral daily  evolocumab Injectable 140 milliGRAM(s) SubCutaneous every 2 weeks  gabapentin 300 milliGRAM(s) Oral at bedtime  gabapentin 200 milliGRAM(s) Oral <User Schedule>  l-methylfolate 15 milliGRAM(s) Oral daily  melatonin. 1 milliGRAM(s) Oral at bedtime  metoprolol succinate ER 50 milliGRAM(s) Oral two times a day  mirtazapine 45 milliGRAM(s) Oral at bedtime  Nexletol 180 mg 1 Tablet(s) 1 Tablet(s) Oral daily  pantoprazole    Tablet 40 milliGRAM(s) Oral <User Schedule>    MEDICATIONS  (PRN):  acetaminophen     Tablet .. 650 milliGRAM(s) Oral every 6 hours PRN Temp greater or equal to 38C (100.4F), Mild Pain (1 - 3), Moderate Pain (4 - 6)  clonazePAM  Tablet 0.25 milliGRAM(s) Oral every 8 hours PRN anxiety  famotidine    Tablet 10 milliGRAM(s) Oral every 6 hours PRN acid reflux symptroms  lidocaine   4% Patch 1 Patch Transdermal daily PRN sciatica pain      Vital Signs Last 24 Hrs  T(C): 36.7 (23 Jan 2025 07:57), Max: 36.9 (22 Jan 2025 20:30)  T(F): 98 (23 Jan 2025 07:57), Max: 98.4 (22 Jan 2025 20:30)  HR: --  BP: 110/60 (22 Jan 2025 20:30) (110/60 - 110/60)  BP(mean): 70 (22 Jan 2025 20:30) (70 - 70)  RR: --  SpO2: --      CAPILLARY BLOOD GLUCOSE            PHYSICAL EXAM:  General: NAD  HEENT: MMM  Neck: supple  Cardiovascular: +S1/S2; RRR  Respiratory: CTA B/L; no W/R/R  Gastrointestinal: soft, NT/ND  Extremities: WWP; no edema, clubbing or cyanosis  Neurological: awake and alert; communicating and able to follow commands without appreciated focal neurologic deficits    LABS:                    EKG(personally reviewed):    RADIOLOGY & ADDITIONAL TESTS:    Imaging Personally Reviewed:    Consultant(s) Notes Reviewed:      Care Discussed with Consultants/Other Providers:

## 2025-01-23 NOTE — BH INPATIENT PSYCHIATRY PROGRESS NOTE - NSBHMETABOLIC_PSY_ALL_CORE_FT
BMI: BMI (kg/m2): 37.5 (12-27-24 @ 15:12)  HbA1c: A1C with Estimated Average Glucose Result: 5.5 % (01-02-25 @ 09:11)    Glucose:   BP: 110/60 (01-22-25 @ 20:30) (110/60 - 139/77)Vital Signs Last 24 Hrs  T(C): 36.7 (01-23-25 @ 07:57), Max: 36.9 (01-22-25 @ 20:30)  T(F): 98 (01-23-25 @ 07:57), Max: 98.4 (01-22-25 @ 20:30)  HR: --  BP: 110/60 (01-22-25 @ 20:30) (110/60 - 110/60)  BP(mean): 70 (01-22-25 @ 20:30) (70 - 70)  RR: --  SpO2: --    Orthostatic VS  01-23-25 @ 07:57  Lying BP: --/-- HR: --  Sitting BP: 145/82 HR: 68  Standing BP: 152/76 HR: 75  Site: --  Mode: --  Orthostatic VS  01-22-25 @ 07:52  Lying BP: --/-- HR: --  Sitting BP: 141/83 HR: 69  Standing BP: 143/88 HR: 72  Site: --  Mode: --    Lipid Panel: Date/Time: 01-02-25 @ 09:11  Cholesterol, Serum: 128  LDL Cholesterol Calculated: 53  HDL Cholesterol, Serum: 46  Total Cholesterol/HDL Ration Measurement: --  Triglycerides, Serum: 174

## 2025-01-24 PROCEDURE — 99232 SBSQ HOSP IP/OBS MODERATE 35: CPT

## 2025-01-24 RX ORDER — BUPROPION HYDROBROMIDE 522 MG/1
75 TABLET, EXTENDED RELEASE ORAL DAILY
Refills: 0 | Status: DISCONTINUED | OUTPATIENT
Start: 2025-01-25 | End: 2025-01-27

## 2025-01-24 RX ORDER — LEVOMEFOLATE CALCIUM, PYRIDOXAL 5-PHOSPHATE, AND METHYLCOBALAMIN 15; 35; 2 MG/1; MG/1; MG/1
1 TABLET, COATED ORAL
Qty: 30 | Refills: 0
Start: 2025-01-24 | End: 2025-02-22

## 2025-01-24 RX ORDER — BUPROPION HYDROBROMIDE 522 MG/1
37.5 TABLET, EXTENDED RELEASE ORAL ONCE
Refills: 0 | Status: COMPLETED | OUTPATIENT
Start: 2025-01-24 | End: 2025-01-24

## 2025-01-24 RX ADMIN — BUPROPION HYDROBROMIDE 37.5 MILLIGRAM(S): 522 TABLET, EXTENDED RELEASE ORAL at 12:56

## 2025-01-24 RX ADMIN — Medication 40 MILLIGRAM(S): at 21:39

## 2025-01-24 RX ADMIN — Medication 1 MILLIGRAM(S): at 21:32

## 2025-01-24 RX ADMIN — BUPROPION HYDROBROMIDE 37.5 MILLIGRAM(S): 522 TABLET, EXTENDED RELEASE ORAL at 08:30

## 2025-01-24 RX ADMIN — CLONAZEPAM 0.5 MILLIGRAM(S): 0.5 TABLET ORAL at 21:38

## 2025-01-24 RX ADMIN — Medication 40 MILLIGRAM(S): at 06:13

## 2025-01-24 RX ADMIN — METOPROLOL SUCCINATE 50 MILLIGRAM(S): 50 TABLET, EXTENDED RELEASE ORAL at 08:30

## 2025-01-24 RX ADMIN — CYANOCOBALAMIN 1000 MICROGRAM(S): 1000 INJECTION INTRAMUSCULAR; SUBCUTANEOUS at 08:30

## 2025-01-24 RX ADMIN — MIRTAZAPINE 45 MILLIGRAM(S): 30 TABLET, FILM COATED ORAL at 21:33

## 2025-01-24 RX ADMIN — GABAPENTIN 200 MILLIGRAM(S): 400 CAPSULE ORAL at 08:29

## 2025-01-24 RX ADMIN — CLONAZEPAM 0.5 MILLIGRAM(S): 0.5 TABLET ORAL at 06:13

## 2025-01-24 RX ADMIN — CLONAZEPAM 0.5 MILLIGRAM(S): 0.5 TABLET ORAL at 12:56

## 2025-01-24 RX ADMIN — METOPROLOL SUCCINATE 50 MILLIGRAM(S): 50 TABLET, EXTENDED RELEASE ORAL at 21:32

## 2025-01-24 RX ADMIN — GABAPENTIN 300 MILLIGRAM(S): 400 CAPSULE ORAL at 21:33

## 2025-01-24 RX ADMIN — Medication 10 MILLIGRAM(S): at 13:03

## 2025-01-24 NOTE — BH INPATIENT PSYCHIATRY PROGRESS NOTE - NSBHASSESSSUMMFT_PSY_ALL_CORE
Ms. Gross is a 75yo female, , retired (), domiciled at home with , no children; PMHx HTN, HLD; PPHx d/o ANSON and MDD, 9 past psych admissions starting in 2019 (last [facility] April 2024 for worsening anxiety and depression), ECT at [facility] 2023, OP at [facility]; 1 past SA (OD on 9 pills of unknown medication in 2019), no h/o NSSIB, no h/o violence/aggression/legal issues, past marijuana use; initially admitted to [facility] 2W on 12/27 for increasing anxiety and SI w/o plans in the context of medication adjustments and psychosocial stressors.     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     On initial 2S assessment pt dysphoric, anxious, hopeless, and preoccupied with somatic sxs. There are elements of active passivity, severe sensitivity to feelings of inadequacy and complicated interpersonal relationships (, friends, providers). 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 Provided pt with psychoeducation regarding limitations of medications and need to focus on therapeutic interventions however she has limited insight into this and is intent on finding the right medication(s). For now will continue to slowly taper off nortriptyline.     1/6: Prominent treatment interfering behaviors including active passivity and rejection sensitivity. For now will continue nortriptyline taper. Considering atypical antidepressant trial. Pt reporting chronic b/l LE edema, will encourage her to use compression stocking and elevate legs, no indication for diuretic at this time.     1/7: Preoccupied with med side effects and physical sxs, but more positive about potential med trials. Will decrease qAM gabapentin 300mg to 200mg but otherwise will attempt to limit med changes other than nortriptyline taper. Will also attempt to discourage use of zofran PRNs given past difficulties tolerating multiple serotonergic medications.   1/8 Patient unchanged anxious ruminative unable to use coping skilss. Will dc NTP increase Remeron  1/9 Somatic anxious unable to utilize coping techniques. Plan behavior plan initiated, cont to titrate Remeorn. Added methylfolate for augmentation  1/10 Reports malaise, could be a viral illness vs somatic anxiety symptoms; otherwise tolerating treatment well    1/13: Dysphoric, tearful, anxious but denies SI/HI, discussed med plans and seeks reassurance.  Will increase Remeron to 37.5mg QHS.  1/14: Remains dysphoric, anxious, somatically focused, responds well to support and reassurance provided.  Will increase methylfolate to 15mg daily, provided zofran 4mg PO daily prn nausea, Lidoderm patch prn sciatic pain, will monitor tolerability and consider further titration of Remeron.  1/15: Anxious, depressed, somatic, seeks reassurance on exam, will increase Remeron to 45mg QHS tonight.  Pt agrees with plan as stated.  1/16: Anxious, ruminative but reports improvement, less somatic, tolerating Remeron 45mg and methylfolate 15mg, considering augmentation with Wellbutrin.   1/17: Dysphoric, anxious, denies SI, seeks support, reassurance, will change to regular tablet Klonopin formulation from disintegrating, continue current meds.  1/21 About same will start Wellbutrin to augment mirtazapine. Will change Zofran to pepcid to minimize serotonergic meds  1/22 Tolerating Wellbutrin con current dose. Will alllow Pepcid 10mg q6 prn with max three doses per 24 hours hopefully will seek less when feeling better  1/23 Gi focused today , cont meds will increase Wellbutrin and patient was seen by medicine who increased Protonix  1/24 Depressed, anxious somatic will increase Wellbutrin to 75mg/d, medicine increase protonix  PLAN:   1. Legals: admitted to 2 on 9.13. 1/2 pt transferred to .  2. Safety: routine obs appropriate as no current active SI/SIB/HI/VI on unit.   3. Psychiatric  - c/w Nortriptyline 10mg qHS with plans to discontinue at pt request due to reports of tremors, worsening anxiety, difficulty sleeping at home doses (50mg to 25mg on 12/27, 12.5mg on 1/3). Will defer further medication changes until Nortriptyline is d/c'd given pt concern for side effects/past difficulty tolerating serotonergic meds.   - c/w Remeron 45mg PO QHS for mood/anxiety on 1/15 (home med).   - c/w Klonopin 0.5mg TID for anxiety (home med).   - Decreased Gabapentin 300mg BID to 200mg qAM/300mg qHS for anxiety and neuropathy due to pt concern for sedation. Home dose Gabapentin 300mg TID.   - Melatonin 1mg qHS for insomnia.   - PRNs           - For anxiety: Klonopin wafer 0.25 mg q8h. d/c'd PRN gabapentin to simplify meds.            - For agitation: PO Zyprexa 2.5mg TID.            - STAT IM Zyprexa 2.5mg if there are imminent risk to self/others due to severe agitation.   4. Therapy: I/G/M therapy as indicated.   5. Medical: admission labs and EKG reviewed and wnl.   #Dependent edema  - chronic b/l LE edema likely 2/2 chronic venous insufficiency/deconditioning. No cardiac history (multiple cards workups have been negative).   - encourage pt to utilize compression stockings, elevation, appropriate physical therapy/exercise. No indication for diuretics/attempting to discourage polypharmacy.   #HTN  - c/w Metoprolol 50mg bid (home med) - per notes this is for palpitations 2/2 anxiety managed by outpatient cardiologist.   #HLD  - c/w Nexletol 180mg qHS (home med).   - c/w IM [id number] q2 weeks (last given 1/2) (home med).   #GERD  - c/w Pantoprazole 40mg qD (home med).   #Vit B12 deficiency   - pt due for injection per chart, for now started on PO 1000mcg qD.   #Nausea  - PRN PO Zofran 4mg TID (home med) --> lower to qD with attempts to d/c PRN. Will add maalox PRN given pt report that nausea is often 2/2 indigestion depending on diet.   6. Collateral: 1/3 Updated  Yehuda at [phone] who is agreeable with plan above.   7. Disposition: pending clinical improvement; likely return to previous provider at [facility] vs partial.

## 2025-01-24 NOTE — BH INPATIENT PSYCHIATRY PROGRESS NOTE - NSBHFUPINTERVALHXFT_PSY_A_CORE
Pt is seen and evaluated for follow up for MDD, ANSON.  Chart is reviewed and case is discussed with treatment team.  No issues overnight.  Pt has been med adherent.  Patient with stable vitals, eating sleeping okay , remains med focused and asks multiple questions to multiple staff  and peers about her regimen

## 2025-01-24 NOTE — BH INPATIENT PSYCHIATRY PROGRESS NOTE - CURRENT MEDICATION
MEDICATIONS  (STANDING):  clonazePAM  Tablet 0.5 milliGRAM(s) Oral <User Schedule>  cyanocobalamin 1000 MICROGram(s) Oral daily  evolocumab Injectable 140 milliGRAM(s) SubCutaneous every 2 weeks  gabapentin 200 milliGRAM(s) Oral <User Schedule>  gabapentin 300 milliGRAM(s) Oral at bedtime  l-methylfolate 15 milliGRAM(s) Oral daily  melatonin. 1 milliGRAM(s) Oral at bedtime  metoprolol succinate ER 50 milliGRAM(s) Oral two times a day  mirtazapine 45 milliGRAM(s) Oral at bedtime  Nexletol 180 mg 1 Tablet(s) 1 Tablet(s) Oral daily  pantoprazole    Tablet 40 milliGRAM(s) Oral <User Schedule>    MEDICATIONS  (PRN):  acetaminophen     Tablet .. 650 milliGRAM(s) Oral every 6 hours PRN Temp greater or equal to 38C (100.4F), Mild Pain (1 - 3), Moderate Pain (4 - 6)  clonazePAM  Tablet 0.25 milliGRAM(s) Oral every 8 hours PRN anxiety  famotidine    Tablet 10 milliGRAM(s) Oral every 6 hours PRN acid reflux symptroms  lidocaine   4% Patch 1 Patch Transdermal daily PRN sciatica pain

## 2025-01-24 NOTE — BH INPATIENT PSYCHIATRY PROGRESS NOTE - NSBHCHARTREVIEWVS_PSY_A_CORE FT
Vital Signs Last 24 Hrs  T(C): 36.5 (01-24-25 @ 07:55), Max: 36.5 (01-24-25 @ 07:55)  T(F): 97.7 (01-24-25 @ 07:55), Max: 97.7 (01-24-25 @ 07:55)  HR: --  BP: 130/65 (01-23-25 @ 20:36) (130/65 - 130/65)  BP(mean): 73 (01-23-25 @ 20:36) (73 - 73)  RR: --  SpO2: --    Orthostatic VS  01-24-25 @ 07:55  Lying BP: --/-- HR: --  Sitting BP: 143/91 HR: 67  Standing BP: 141/85 HR: 73  Site: --  Mode: --  Orthostatic VS  01-23-25 @ 07:57  Lying BP: --/-- HR: --  Sitting BP: 145/82 HR: 68  Standing BP: 152/76 HR: 75  Site: --  Mode: --

## 2025-01-24 NOTE — BH INPATIENT PSYCHIATRY PROGRESS NOTE - NSBHMETABOLIC_PSY_ALL_CORE_FT
BMI: BMI (kg/m2): 37.5 (12-27-24 @ 15:12)  HbA1c: A1C with Estimated Average Glucose Result: 5.5 % (01-02-25 @ 09:11)    Glucose:   BP: 130/65 (01-23-25 @ 20:36) (110/60 - 139/77)Vital Signs Last 24 Hrs  T(C): 36.5 (01-24-25 @ 07:55), Max: 36.5 (01-24-25 @ 07:55)  T(F): 97.7 (01-24-25 @ 07:55), Max: 97.7 (01-24-25 @ 07:55)  HR: --  BP: 130/65 (01-23-25 @ 20:36) (130/65 - 130/65)  BP(mean): 73 (01-23-25 @ 20:36) (73 - 73)  RR: --  SpO2: --    Orthostatic VS  01-24-25 @ 07:55  Lying BP: --/-- HR: --  Sitting BP: 143/91 HR: 67  Standing BP: 141/85 HR: 73  Site: --  Mode: --  Orthostatic VS  01-23-25 @ 07:57  Lying BP: --/-- HR: --  Sitting BP: 145/82 HR: 68  Standing BP: 152/76 HR: 75  Site: --  Mode: --    Lipid Panel: Date/Time: 01-02-25 @ 09:11  Cholesterol, Serum: 128  LDL Cholesterol Calculated: 53  HDL Cholesterol, Serum: 46  Total Cholesterol/HDL Ration Measurement: --  Triglycerides, Serum: 174

## 2025-01-25 LAB
ANION GAP SERPL CALC-SCNC: 10 MMOL/L — SIGNIFICANT CHANGE UP (ref 7–14)
BUN SERPL-MCNC: 11 MG/DL — SIGNIFICANT CHANGE UP (ref 7–23)
CALCIUM SERPL-MCNC: 9.5 MG/DL — SIGNIFICANT CHANGE UP (ref 8.4–10.5)
CHLORIDE SERPL-SCNC: 108 MMOL/L — HIGH (ref 98–107)
CO2 SERPL-SCNC: 26 MMOL/L — SIGNIFICANT CHANGE UP (ref 22–31)
CREAT SERPL-MCNC: 0.8 MG/DL — SIGNIFICANT CHANGE UP (ref 0.5–1.3)
EGFR: 77 ML/MIN/1.73M2 — SIGNIFICANT CHANGE UP
GLUCOSE SERPL-MCNC: 92 MG/DL — SIGNIFICANT CHANGE UP (ref 70–99)
MAGNESIUM SERPL-MCNC: 2.1 MG/DL — SIGNIFICANT CHANGE UP (ref 1.6–2.6)
PHOSPHATE SERPL-MCNC: 3 MG/DL — SIGNIFICANT CHANGE UP (ref 2.5–4.5)
POTASSIUM SERPL-MCNC: 4.3 MMOL/L — SIGNIFICANT CHANGE UP (ref 3.5–5.3)
POTASSIUM SERPL-SCNC: 4.3 MMOL/L — SIGNIFICANT CHANGE UP (ref 3.5–5.3)
SODIUM SERPL-SCNC: 144 MMOL/L — SIGNIFICANT CHANGE UP (ref 135–145)

## 2025-01-25 PROCEDURE — 99232 SBSQ HOSP IP/OBS MODERATE 35: CPT

## 2025-01-25 RX ADMIN — CLONAZEPAM 0.5 MILLIGRAM(S): 0.5 TABLET ORAL at 20:56

## 2025-01-25 RX ADMIN — GABAPENTIN 300 MILLIGRAM(S): 400 CAPSULE ORAL at 20:56

## 2025-01-25 RX ADMIN — CLONAZEPAM 0.5 MILLIGRAM(S): 0.5 TABLET ORAL at 12:58

## 2025-01-25 RX ADMIN — CYANOCOBALAMIN 1000 MICROGRAM(S): 1000 INJECTION INTRAMUSCULAR; SUBCUTANEOUS at 08:31

## 2025-01-25 RX ADMIN — CLONAZEPAM 0.5 MILLIGRAM(S): 0.5 TABLET ORAL at 06:47

## 2025-01-25 RX ADMIN — Medication 40 MILLIGRAM(S): at 20:57

## 2025-01-25 RX ADMIN — BUPROPION HYDROBROMIDE 75 MILLIGRAM(S): 522 TABLET, EXTENDED RELEASE ORAL at 08:30

## 2025-01-25 RX ADMIN — Medication 10 MILLIGRAM(S): at 10:30

## 2025-01-25 RX ADMIN — METOPROLOL SUCCINATE 50 MILLIGRAM(S): 50 TABLET, EXTENDED RELEASE ORAL at 08:31

## 2025-01-25 RX ADMIN — Medication 1 MILLIGRAM(S): at 20:57

## 2025-01-25 RX ADMIN — Medication 40 MILLIGRAM(S): at 06:46

## 2025-01-25 RX ADMIN — Medication 650 MILLIGRAM(S): at 09:16

## 2025-01-25 RX ADMIN — GABAPENTIN 200 MILLIGRAM(S): 400 CAPSULE ORAL at 08:31

## 2025-01-25 RX ADMIN — Medication 650 MILLIGRAM(S): at 10:48

## 2025-01-25 RX ADMIN — METOPROLOL SUCCINATE 50 MILLIGRAM(S): 50 TABLET, EXTENDED RELEASE ORAL at 20:57

## 2025-01-25 RX ADMIN — MIRTAZAPINE 45 MILLIGRAM(S): 30 TABLET, FILM COATED ORAL at 20:56

## 2025-01-25 NOTE — BH INPATIENT PSYCHIATRY PROGRESS NOTE - NSBHASSESSSUMMFT_PSY_ALL_CORE
Ms. Gross is a 75yo female, , retired (), domiciled at home with , no children; PMHx HTN, HLD; PPHx d/o ANSON and MDD, 9 past psych admissions starting in 2019 (last [facility] April 2024 for worsening anxiety and depression), ECT at [facility] 2023, OP at [facility]; 1 past SA (OD on 9 pills of unknown medication in 2019), no h/o NSSIB, no h/o violence/aggression/legal issues, past marijuana use; initially admitted to [facility] 2W on 12/27 for increasing anxiety and SI w/o plans in the context of medication adjustments and psychosocial stressors.     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     On initial 2S assessment pt dysphoric, anxious, hopeless, and preoccupied with somatic sxs. There are elements of active passivity, severe sensitivity to feelings of inadequacy and complicated interpersonal relationships (, friends, providers). 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 Provided pt with psychoeducation regarding limitations of medications and need to focus on therapeutic interventions however she has limited insight into this and is intent on finding the right medication(s). For now will continue to slowly taper off nortriptyline.     1/6: Prominent treatment interfering behaviors including active passivity and rejection sensitivity. For now will continue nortriptyline taper. Considering atypical antidepressant trial. Pt reporting chronic b/l LE edema, will encourage her to use compression stocking and elevate legs, no indication for diuretic at this time.     1/7: Preoccupied with med side effects and physical sxs, but more positive about potential med trials. Will decrease qAM gabapentin 300mg to 200mg but otherwise will attempt to limit med changes other than nortriptyline taper. Will also attempt to discourage use of zofran PRNs given past difficulties tolerating multiple serotonergic medications.   1/8 Patient unchanged anxious ruminative unable to use coping skilss. Will dc NTP increase Remeron  1/9 Somatic anxious unable to utilize coping techniques. Plan behavior plan initiated, cont to titrate Remeorn. Added methylfolate for augmentation  1/10 Reports malaise, could be a viral illness vs somatic anxiety symptoms; otherwise tolerating treatment well    1/13: Dysphoric, tearful, anxious but denies SI/HI, discussed med plans and seeks reassurance.  Will increase Remeron to 37.5mg QHS.  1/14: Remains dysphoric, anxious, somatically focused, responds well to support and reassurance provided.  Will increase methylfolate to 15mg daily, provided zofran 4mg PO daily prn nausea, Lidoderm patch prn sciatic pain, will monitor tolerability and consider further titration of Remeron.  1/15: Anxious, depressed, somatic, seeks reassurance on exam, will increase Remeron to 45mg QHS tonight.  Pt agrees with plan as stated.  1/16: Anxious, ruminative but reports improvement, less somatic, tolerating Remeron 45mg and methylfolate 15mg, considering augmentation with Wellbutrin.   1/17: Dysphoric, anxious, denies SI, seeks support, reassurance, will change to regular tablet Klonopin formulation from disintegrating, continue current meds.  1/21 About same will start Wellbutrin to augment mirtazapine. Will change Zofran to pepcid to minimize serotonergic meds  1/22 Tolerating Wellbutrin con current dose. Will alllow Pepcid 10mg q6 prn with max three doses per 24 hours hopefully will seek less when feeling better  1/23 Gi focused today , cont meds will increase Wellbutrin and patient was seen by medicine who increased Protonix  1/24 Depressed, anxious somatic will increase Wellbutrin to 75mg/d, medicine increase protonix  1/25 No major improvement cont meds avoid making multiple changes which makes her anxious and more symptomatic  PLAN:   1. Legals: admitted to 2W on 9.13. 1/2 pt transferred to .  2. Safety: routine obs appropriate as no current active SI/SIB/HI/VI on unit.   3. Psychiatric  - c/w Nortriptyline 10mg qHS with plans to discontinue at pt request due to reports of tremors, worsening anxiety, difficulty sleeping at home doses (50mg to 25mg on 12/27, 12.5mg on 1/3). Will defer further medication changes until Nortriptyline is d/c'd given pt concern for side effects/past difficulty tolerating serotonergic meds.   - c/w Remeron 45mg PO QHS for mood/anxiety on 1/15 (home med).   - c/w Klonopin 0.5mg TID for anxiety (home med).   - Decreased Gabapentin 300mg BID to 200mg qAM/300mg qHS for anxiety and neuropathy due to pt concern for sedation. Home dose Gabapentin 300mg TID.   - Melatonin 1mg qHS for insomnia.   - PRNs           - For anxiety: Klonopin wafer 0.25 mg q8h. d/c'd PRN gabapentin to simplify meds.            - For agitation: PO Zyprexa 2.5mg TID.            - STAT IM Zyprexa 2.5mg if there are imminent risk to self/others due to severe agitation.   4. Therapy: I/G/M therapy as indicated.   5. Medical: admission labs and EKG reviewed and wnl.   #Dependent edema  - chronic b/l LE edema likely 2/2 chronic venous insufficiency/deconditioning. No cardiac history (multiple cards workups have been negative).   - encourage pt to utilize compression stockings, elevation, appropriate physical therapy/exercise. No indication for diuretics/attempting to discourage polypharmacy.   #HTN  - c/w Metoprolol 50mg bid (home med) - per notes this is for palpitations 2/2 anxiety managed by outpatient cardiologist.   #HLD  - c/w Nexletol 180mg qHS (home med).   - c/w IM [id number] q2 weeks (last given 1/2) (home med).   #GERD  - c/w Pantoprazole 40mg qD (home med).   #Vit B12 deficiency   - pt due for injection per chart, for now started on PO 1000mcg qD.   #Nausea  - PRN PO Zofran 4mg TID (home med) --> lower to qD with attempts to d/c PRN. Will add maalox PRN given pt report that nausea is often 2/2 indigestion depending on diet.   6. Collateral: 1/3 Updated  Yehuda at [phone] who is agreeable with plan above.   7. Disposition: pending clinical improvement; likely return to previous provider at [facility] vs partial.

## 2025-01-25 NOTE — BH INPATIENT PSYCHIATRY PROGRESS NOTE - NSBHMETABOLIC_PSY_ALL_CORE_FT
BMI: BMI (kg/m2): 37.5 (12-27-24 @ 15:12)  HbA1c: A1C with Estimated Average Glucose Result: 5.5 % (01-02-25 @ 09:11)    Glucose:   BP: 130/65 (01-23-25 @ 20:36) (110/60 - 130/65)Vital Signs Last 24 Hrs  T(C): 36.6 (01-25-25 @ 05:52), Max: 36.6 (01-25-25 @ 05:52)  T(F): 97.9 (01-25-25 @ 05:52), Max: 97.9 (01-25-25 @ 05:52)  HR: --  BP: --  BP(mean): --  RR: --  SpO2: --    Orthostatic VS  01-25-25 @ 05:52  Lying BP: --/-- HR: --  Sitting BP: 149/89 HR: 67  Standing BP: 131/97 HR: 73  Site: --  Mode: --  Orthostatic VS  01-24-25 @ 21:37  Lying BP: --/-- HR: --  Sitting BP: 123/76 HR: 79  Standing BP: --/-- HR: --  Site: --  Mode: --  Orthostatic VS  01-24-25 @ 07:55  Lying BP: --/-- HR: --  Sitting BP: 143/91 HR: 67  Standing BP: 141/85 HR: 73  Site: --  Mode: --    Lipid Panel: Date/Time: 01-02-25 @ 09:11  Cholesterol, Serum: 128  LDL Cholesterol Calculated: 53  HDL Cholesterol, Serum: 46  Total Cholesterol/HDL Ration Measurement: --  Triglycerides, Serum: 174

## 2025-01-25 NOTE — BH INPATIENT PSYCHIATRY PROGRESS NOTE - CURRENT MEDICATION
MEDICATIONS  (STANDING):  buPROPion 75 milliGRAM(s) Oral daily  clonazePAM  Tablet 0.5 milliGRAM(s) Oral <User Schedule>  cyanocobalamin 1000 MICROGram(s) Oral daily  evolocumab Injectable 140 milliGRAM(s) SubCutaneous every 2 weeks  gabapentin 200 milliGRAM(s) Oral <User Schedule>  gabapentin 300 milliGRAM(s) Oral at bedtime  l-methylfolate 15 milliGRAM(s) Oral daily  melatonin. 1 milliGRAM(s) Oral at bedtime  metoprolol succinate ER 50 milliGRAM(s) Oral two times a day  mirtazapine 45 milliGRAM(s) Oral at bedtime  Nexletol 180 mg 1 Tablet(s) 1 Tablet(s) Oral daily  pantoprazole    Tablet 40 milliGRAM(s) Oral <User Schedule>    MEDICATIONS  (PRN):  acetaminophen     Tablet .. 650 milliGRAM(s) Oral every 6 hours PRN Temp greater or equal to 38C (100.4F), Mild Pain (1 - 3), Moderate Pain (4 - 6)  clonazePAM  Tablet 0.25 milliGRAM(s) Oral every 8 hours PRN anxiety  famotidine    Tablet 10 milliGRAM(s) Oral every 6 hours PRN acid reflux symptroms  lidocaine   4% Patch 1 Patch Transdermal daily PRN sciatica pain

## 2025-01-25 NOTE — BH INPATIENT PSYCHIATRY PROGRESS NOTE - NSBHCHARTREVIEWVS_PSY_A_CORE FT
Vital Signs Last 24 Hrs  T(C): 36.6 (01-25-25 @ 05:52), Max: 36.6 (01-25-25 @ 05:52)  T(F): 97.9 (01-25-25 @ 05:52), Max: 97.9 (01-25-25 @ 05:52)  HR: --  BP: --  BP(mean): --  RR: --  SpO2: --    Orthostatic VS  01-25-25 @ 05:52  Lying BP: --/-- HR: --  Sitting BP: 149/89 HR: 67  Standing BP: 131/97 HR: 73  Site: --  Mode: --  Orthostatic VS  01-24-25 @ 21:37  Lying BP: --/-- HR: --  Sitting BP: 123/76 HR: 79  Standing BP: --/-- HR: --  Site: --  Mode: --  Orthostatic VS  01-24-25 @ 07:55  Lying BP: --/-- HR: --  Sitting BP: 143/91 HR: 67  Standing BP: 141/85 HR: 73  Site: --  Mode: --

## 2025-01-26 PROCEDURE — 90832 PSYTX W PT 30 MINUTES: CPT

## 2025-01-26 PROCEDURE — 99232 SBSQ HOSP IP/OBS MODERATE 35: CPT

## 2025-01-26 RX ADMIN — Medication 40 MILLIGRAM(S): at 06:32

## 2025-01-26 RX ADMIN — CLONAZEPAM 0.5 MILLIGRAM(S): 0.5 TABLET ORAL at 06:31

## 2025-01-26 RX ADMIN — GABAPENTIN 200 MILLIGRAM(S): 400 CAPSULE ORAL at 08:52

## 2025-01-26 RX ADMIN — CLONAZEPAM 0.5 MILLIGRAM(S): 0.5 TABLET ORAL at 12:53

## 2025-01-26 RX ADMIN — Medication 1 MILLIGRAM(S): at 21:07

## 2025-01-26 RX ADMIN — BUPROPION HYDROBROMIDE 75 MILLIGRAM(S): 522 TABLET, EXTENDED RELEASE ORAL at 08:52

## 2025-01-26 RX ADMIN — MIRTAZAPINE 45 MILLIGRAM(S): 30 TABLET, FILM COATED ORAL at 21:07

## 2025-01-26 RX ADMIN — Medication 40 MILLIGRAM(S): at 21:11

## 2025-01-26 RX ADMIN — LIDOCAINE HYDROCHLORIDE 1 PATCH: 20 JELLY TOPICAL at 12:53

## 2025-01-26 RX ADMIN — LIDOCAINE HYDROCHLORIDE 1 PATCH: 20 JELLY TOPICAL at 22:59

## 2025-01-26 RX ADMIN — GABAPENTIN 300 MILLIGRAM(S): 400 CAPSULE ORAL at 21:07

## 2025-01-26 RX ADMIN — METOPROLOL SUCCINATE 50 MILLIGRAM(S): 50 TABLET, EXTENDED RELEASE ORAL at 21:09

## 2025-01-26 RX ADMIN — METOPROLOL SUCCINATE 50 MILLIGRAM(S): 50 TABLET, EXTENDED RELEASE ORAL at 08:52

## 2025-01-26 RX ADMIN — CLONAZEPAM 0.5 MILLIGRAM(S): 0.5 TABLET ORAL at 21:09

## 2025-01-26 RX ADMIN — CYANOCOBALAMIN 1000 MICROGRAM(S): 1000 INJECTION INTRAMUSCULAR; SUBCUTANEOUS at 08:53

## 2025-01-26 RX ADMIN — LIDOCAINE HYDROCHLORIDE 1 PATCH: 20 JELLY TOPICAL at 18:35

## 2025-01-26 NOTE — BH INPATIENT PSYCHIATRY PROGRESS NOTE - NSBHASSESSSUMMFT_PSY_ALL_CORE
Ms. Gross is a 75yo female, , retired (), domiciled at home with , no children; PMHx HTN, HLD; PPHx d/o ANSON and MDD, 9 past psych admissions starting in 2019 (last [facility] April 2024 for worsening anxiety and depression), ECT at [facility] 2023, OP at [facility]; 1 past SA (OD on 9 pills of unknown medication in 2019), no h/o NSSIB, no h/o violence/aggression/legal issues, past marijuana use; initially admitted to [facility] 2W on 12/27 for increasing anxiety and SI w/o plans in the context of medication adjustments and psychosocial stressors.     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     On initial 2S assessment pt dysphoric, anxious, hopeless, and preoccupied with somatic sxs. There are elements of active passivity, severe sensitivity to feelings of inadequacy and complicated interpersonal relationships (, friends, providers). 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 Provided pt with psychoeducation regarding limitations of medications and need to focus on therapeutic interventions however she has limited insight into this and is intent on finding the right medication(s). For now will continue to slowly taper off nortriptyline.     1/6: Prominent treatment interfering behaviors including active passivity and rejection sensitivity. For now will continue nortriptyline taper. Considering atypical antidepressant trial. Pt reporting chronic b/l LE edema, will encourage her to use compression stocking and elevate legs, no indication for diuretic at this time.     1/7: Preoccupied with med side effects and physical sxs, but more positive about potential med trials. Will decrease qAM gabapentin 300mg to 200mg but otherwise will attempt to limit med changes other than nortriptyline taper. Will also attempt to discourage use of zofran PRNs given past difficulties tolerating multiple serotonergic medications.   1/8 Patient unchanged anxious ruminative unable to use coping skilss. Will dc NTP increase Remeron  1/9 Somatic anxious unable to utilize coping techniques. Plan behavior plan initiated, cont to titrate Remeorn. Added methylfolate for augmentation  1/10 Reports malaise, could be a viral illness vs somatic anxiety symptoms; otherwise tolerating treatment well    1/13: Dysphoric, tearful, anxious but denies SI/HI, discussed med plans and seeks reassurance.  Will increase Remeron to 37.5mg QHS.  1/14: Remains dysphoric, anxious, somatically focused, responds well to support and reassurance provided.  Will increase methylfolate to 15mg daily, provided zofran 4mg PO daily prn nausea, Lidoderm patch prn sciatic pain, will monitor tolerability and consider further titration of Remeron.  1/15: Anxious, depressed, somatic, seeks reassurance on exam, will increase Remeron to 45mg QHS tonight.  Pt agrees with plan as stated.  1/16: Anxious, ruminative but reports improvement, less somatic, tolerating Remeron 45mg and methylfolate 15mg, considering augmentation with Wellbutrin.   1/17: Dysphoric, anxious, denies SI, seeks support, reassurance, will change to regular tablet Klonopin formulation from disintegrating, continue current meds.  1/21 About same will start Wellbutrin to augment mirtazapine. Will change Zofran to pepcid to minimize serotonergic meds  1/22 Tolerating Wellbutrin con current dose. Will alllow Pepcid 10mg q6 prn with max three doses per 24 hours hopefully will seek less when feeling better  1/23 Gi focused today , cont meds will increase Wellbutrin and patient was seen by medicine who increased Protonix  1/24 Depressed, anxious somatic will increase Wellbutrin to 75mg/d, medicine increase protonix  1/25 No major improvement cont meds avoid making multiple changes which makes her anxious and more symptomatic  1/26 No change cont med tiral. Consider some form of neuropsych eval if subtle cog decline playing role in anxiety and treatment refractoriness  PLAN:   1. Legals: admitted to 2W on 9.13. 1/2 pt transferred to 2S.  2. Safety: routine obs appropriate as no current active SI/SIB/HI/VI on unit.   3. Psychiatric  - c/w Nortriptyline 10mg qHS with plans to discontinue at pt request due to reports of tremors, worsening anxiety, difficulty sleeping at home doses (50mg to 25mg on 12/27, 12.5mg on 1/3). Will defer further medication changes until Nortriptyline is d/c'd given pt concern for side effects/past difficulty tolerating serotonergic meds.   - c/w Remeron 45mg PO QHS for mood/anxiety on 1/15 (home med).   - c/w Klonopin 0.5mg TID for anxiety (home med).   - Decreased Gabapentin 300mg BID to 200mg qAM/300mg qHS for anxiety and neuropathy due to pt concern for sedation. Home dose Gabapentin 300mg TID.   - Melatonin 1mg qHS for insomnia.   - PRNs           - For anxiety: Klonopin wafer 0.25 mg q8h. d/c'd PRN gabapentin to simplify meds.            - For agitation: PO Zyprexa 2.5mg TID.            - STAT IM Zyprexa 2.5mg if there are imminent risk to self/others due to severe agitation.   4. Therapy: I/G/M therapy as indicated.   5. Medical: admission labs and EKG reviewed and wnl.   #Dependent edema  - chronic b/l LE edema likely 2/2 chronic venous insufficiency/deconditioning. No cardiac history (multiple cards workups have been negative).   - encourage pt to utilize compression stockings, elevation, appropriate physical therapy/exercise. No indication for diuretics/attempting to discourage polypharmacy.   #HTN  - c/w Metoprolol 50mg bid (home med) - per notes this is for palpitations 2/2 anxiety managed by outpatient cardiologist.   #HLD  - c/w Nexletol 180mg qHS (home med).   - c/w IM [id number] q2 weeks (last given 1/2) (home med).   #GERD  - c/w Pantoprazole 40mg qD (home med).   #Vit B12 deficiency   - pt due for injection per chart, for now started on PO 1000mcg qD.   #Nausea  - PRN PO Zofran 4mg TID (home med) --> lower to qD with attempts to d/c PRN. Will add maalox PRN given pt report that nausea is often 2/2 indigestion depending on diet.   6. Collateral: 1/3 Updated  Yehuda at [phone] who is agreeable with plan above.   7. Disposition: pending clinical improvement; likely return to previous provider at [facility] vs partial.

## 2025-01-26 NOTE — BH INPATIENT PSYCHIATRY PROGRESS NOTE - NSBHFUPINTERVALHXFT_PSY_A_CORE
Pt is seen and evaluated for follow up for MDD, ANSON.  Chart is reviewed and case is discussed with treatment team.  No issues overnight.  Pt has been med adherent.  Patient with stable vitals, eating sleeping okay , remains med focused and asks multiple questions to multiple staff  and peers about her regimen. Labs show no electrolyte abnormality

## 2025-01-26 NOTE — BH INPATIENT PSYCHIATRY PROGRESS NOTE - NSBHCHARTREVIEWVS_PSY_A_CORE FT
Vital Signs Last 24 Hrs  T(C): 36.8 (01-26-25 @ 08:03), Max: 36.8 (01-25-25 @ 20:34)  T(F): 98.3 (01-26-25 @ 08:03), Max: 98.3 (01-26-25 @ 08:03)  HR: --  BP: 120/64 (01-25-25 @ 20:34) (120/64 - 120/64)  BP(mean): 72 (01-25-25 @ 20:34) (72 - 72)  RR: --  SpO2: --    Orthostatic VS  01-26-25 @ 08:03  Lying BP: 139/72 HR: 89  Sitting BP: 132/74 HR: 86  Standing BP: --/-- HR: --  Site: upper left arm  Mode: electronic  Orthostatic VS  01-25-25 @ 05:52  Lying BP: --/-- HR: --  Sitting BP: 149/89 HR: 67  Standing BP: 131/97 HR: 73  Site: --  Mode: --  Orthostatic VS  01-24-25 @ 21:37  Lying BP: --/-- HR: --  Sitting BP: 123/76 HR: 79  Standing BP: --/-- HR: --  Site: --  Mode: --

## 2025-01-26 NOTE — BH INPATIENT PSYCHIATRY PROGRESS NOTE - CURRENT MEDICATION
Tayla Munroe Patient Age: 9 year old  MESSAGE: Interpreting service used: No    Insurance on file confirmed with caller: Yes    Pediatrics- Reason for call: Order Request- Order Request-     Type of order being requested: Labs-    Thyroid and Vitamin D      Reason order is needed:  Follow Up Testing Mother states that she wants to check to make sure pt is ok.      Is the patient currently having any symptoms? Other-   Symptom(s): Still seeing hair loss.        Appointment: Caller declined making an appointment before speaking with the nurse.    Routed to provider's clinical pool.     Message read back to caller for accuracy: Yes       ALLERGIES:  Hazelnut    (food or med), Macadamia nut oil   (food or med), Pecan   (food), and Jerome   (food)  Current Outpatient Medications   Medication Sig Dispense Refill    EPINEPHrine (Auvi-Q) 0.3 MG/0.3ML auto-injector Inject 0.3 mLs into the muscle as needed for Anaphylaxis. 2 each 0    EPINEPHrine (EpiPen 2-Red) 0.3 MG/0.3ML auto-injector Inject 0.3 mLs into the muscle as needed for Anaphylaxis. Please ensure at least 1 year expiration date. Please contact us if medication is not well covered by insurance for more cost-effective alternatives. 2 each 0     No current facility-administered medications for this visit.     PHARMACY to use:           Pharmacy preference(s) on file:   [id number] - [place], [place] - [facility] [address] AT [address]  [address]  [facility] [address]  Phone: [phone] Fax: [phone]      CALL BACK INFO: Ok to leave response (including medical information) on answering machine      PCP: Judy Johnson MD         INS: Payor: [employer] / Plan: MC/POS/EPO / Product Type: POS MISC   PATIENT ADDRESS:  [address] [place] [id number]     MEDICATIONS  (STANDING):  buPROPion 75 milliGRAM(s) Oral daily  clonazePAM  Tablet 0.5 milliGRAM(s) Oral <User Schedule>  cyanocobalamin 1000 MICROGram(s) Oral daily  evolocumab Injectable 140 milliGRAM(s) SubCutaneous every 2 weeks  gabapentin 300 milliGRAM(s) Oral at bedtime  gabapentin 200 milliGRAM(s) Oral <User Schedule>  l-methylfolate 15 milliGRAM(s) Oral daily  melatonin. 1 milliGRAM(s) Oral at bedtime  metoprolol succinate ER 50 milliGRAM(s) Oral two times a day  mirtazapine 45 milliGRAM(s) Oral at bedtime  Nexletol 180 mg 1 Tablet(s) 1 Tablet(s) Oral daily  pantoprazole    Tablet 40 milliGRAM(s) Oral <User Schedule>    MEDICATIONS  (PRN):  acetaminophen     Tablet .. 650 milliGRAM(s) Oral every 6 hours PRN Temp greater or equal to 38C (100.4F), Mild Pain (1 - 3), Moderate Pain (4 - 6)  clonazePAM  Tablet 0.25 milliGRAM(s) Oral every 8 hours PRN anxiety  famotidine    Tablet 10 milliGRAM(s) Oral every 6 hours PRN acid reflux symptroms  lidocaine   4% Patch 1 Patch Transdermal daily PRN sciatica pain

## 2025-01-26 NOTE — BH INPATIENT PSYCHIATRY PROGRESS NOTE - MSE UNSTRUCTURED FT
Awake and alert. Engaged, cooperative  SPEECH: Normal rate, tone, and volume.  MOTOR: mild PMR. No tremors or other abnormal movements noted. Stable gait  Mood: anxious  AFFECT: congruent, stable, intense, constricted  THOUGHT PROCESS: Linear but with obsessive, ruminative pattern   THOUGHT CONTENT: Denies SI or HI; no evidence of delusions. Somatically preoccupied today with concerns about leg cramps.   COGNITION: Grossly intact.  INSIGHT: Poor.  JUDGMENT: Fair.  IMPULSE CONTROL: Intact on unit.

## 2025-01-26 NOTE — BH INPATIENT PSYCHIATRY PROGRESS NOTE - NSBHMETABOLIC_PSY_ALL_CORE_FT
BMI: BMI (kg/m2): 37.5 (12-27-24 @ 15:12)  HbA1c: A1C with Estimated Average Glucose Result: 5.5 % (01-02-25 @ 09:11)    Glucose:   BP: 120/64 (01-25-25 @ 20:34) (120/64 - 130/65)Vital Signs Last 24 Hrs  T(C): 36.8 (01-26-25 @ 08:03), Max: 36.8 (01-25-25 @ 20:34)  T(F): 98.3 (01-26-25 @ 08:03), Max: 98.3 (01-26-25 @ 08:03)  HR: --  BP: 120/64 (01-25-25 @ 20:34) (120/64 - 120/64)  BP(mean): 72 (01-25-25 @ 20:34) (72 - 72)  RR: --  SpO2: --    Orthostatic VS  01-26-25 @ 08:03  Lying BP: 139/72 HR: 89  Sitting BP: 132/74 HR: 86  Standing BP: --/-- HR: --  Site: upper left arm  Mode: electronic  Orthostatic VS  01-25-25 @ 05:52  Lying BP: --/-- HR: --  Sitting BP: 149/89 HR: 67  Standing BP: 131/97 HR: 73  Site: --  Mode: --  Orthostatic VS  01-24-25 @ 21:37  Lying BP: --/-- HR: --  Sitting BP: 123/76 HR: 79  Standing BP: --/-- HR: --  Site: --  Mode: --    Lipid Panel: Date/Time: 01-02-25 @ 09:11  Cholesterol, Serum: 128  LDL Cholesterol Calculated: 53  HDL Cholesterol, Serum: 46  Total Cholesterol/HDL Ration Measurement: --  Triglycerides, Serum: 174

## 2025-01-27 PROCEDURE — 99232 SBSQ HOSP IP/OBS MODERATE 35: CPT

## 2025-01-27 PROCEDURE — 90834 PSYTX W PT 45 MINUTES: CPT

## 2025-01-27 RX ADMIN — METOPROLOL SUCCINATE 50 MILLIGRAM(S): 50 TABLET, EXTENDED RELEASE ORAL at 20:50

## 2025-01-27 RX ADMIN — LIDOCAINE HYDROCHLORIDE 1 PATCH: 20 JELLY TOPICAL at 18:56

## 2025-01-27 RX ADMIN — Medication 1 MILLIGRAM(S): at 20:51

## 2025-01-27 RX ADMIN — GABAPENTIN 300 MILLIGRAM(S): 400 CAPSULE ORAL at 20:51

## 2025-01-27 RX ADMIN — MIRTAZAPINE 45 MILLIGRAM(S): 30 TABLET, FILM COATED ORAL at 20:50

## 2025-01-27 RX ADMIN — Medication 40 MILLIGRAM(S): at 20:51

## 2025-01-27 RX ADMIN — CLONAZEPAM 0.5 MILLIGRAM(S): 0.5 TABLET ORAL at 12:06

## 2025-01-27 RX ADMIN — CLONAZEPAM 0.5 MILLIGRAM(S): 0.5 TABLET ORAL at 20:50

## 2025-01-27 RX ADMIN — LIDOCAINE HYDROCHLORIDE 1 PATCH: 20 JELLY TOPICAL at 17:41

## 2025-01-27 RX ADMIN — Medication 10 MILLIGRAM(S): at 09:08

## 2025-01-27 RX ADMIN — CLONAZEPAM 0.5 MILLIGRAM(S): 0.5 TABLET ORAL at 06:38

## 2025-01-27 RX ADMIN — CYANOCOBALAMIN 1000 MICROGRAM(S): 1000 INJECTION INTRAMUSCULAR; SUBCUTANEOUS at 08:22

## 2025-01-27 RX ADMIN — Medication 10 MILLIGRAM(S): at 15:13

## 2025-01-27 RX ADMIN — METOPROLOL SUCCINATE 50 MILLIGRAM(S): 50 TABLET, EXTENDED RELEASE ORAL at 08:22

## 2025-01-27 RX ADMIN — Medication 40 MILLIGRAM(S): at 06:38

## 2025-01-27 RX ADMIN — BUPROPION HYDROBROMIDE 75 MILLIGRAM(S): 522 TABLET, EXTENDED RELEASE ORAL at 08:22

## 2025-01-27 RX ADMIN — GABAPENTIN 200 MILLIGRAM(S): 400 CAPSULE ORAL at 08:22

## 2025-01-27 NOTE — BH INPATIENT PSYCHIATRY PROGRESS NOTE - NSBHMETABOLIC_PSY_ALL_CORE_FT
BMI: BMI (kg/m2): 37.5 (12-27-24 @ 15:12)  HbA1c: A1C with Estimated Average Glucose Result: 5.5 % (01-02-25 @ 09:11)    Glucose:   BP: 120/64 (01-25-25 @ 20:34) (120/64 - 120/64)Vital Signs Last 24 Hrs  T(C): --  T(F): --  HR: --  BP: --  BP(mean): --  RR: --  SpO2: --    Orthostatic VS  01-26-25 @ 20:35  Lying BP: --/-- HR: --  Sitting BP: 148/74 HR: 95  Standing BP: --/-- HR: --  Site: upper left arm  Mode: electronic  Orthostatic VS  01-26-25 @ 08:03  Lying BP: 139/72 HR: 89  Sitting BP: 132/74 HR: 86  Standing BP: --/-- HR: --  Site: upper left arm  Mode: electronic    Lipid Panel: Date/Time: 01-02-25 @ 09:11  Cholesterol, Serum: 128  LDL Cholesterol Calculated: 53  HDL Cholesterol, Serum: 46  Total Cholesterol/HDL Ration Measurement: --  Triglycerides, Serum: 174

## 2025-01-27 NOTE — BH INPATIENT PSYCHIATRY PROGRESS NOTE - CURRENT MEDICATION
MEDICATIONS  (STANDING):  buPROPion 75 milliGRAM(s) Oral daily  clonazePAM  Tablet 0.5 milliGRAM(s) Oral <User Schedule>  cyanocobalamin 1000 MICROGram(s) Oral daily  evolocumab Injectable 140 milliGRAM(s) SubCutaneous every 2 weeks  gabapentin 300 milliGRAM(s) Oral at bedtime  gabapentin 200 milliGRAM(s) Oral <User Schedule>  l-methylfolate 15 milliGRAM(s) Oral daily  melatonin. 1 milliGRAM(s) Oral at bedtime  metoprolol succinate ER 50 milliGRAM(s) Oral two times a day  mirtazapine 45 milliGRAM(s) Oral at bedtime  Nexletol 180 mg 1 Tablet(s) 1 Tablet(s) Oral daily  pantoprazole    Tablet 40 milliGRAM(s) Oral <User Schedule>    MEDICATIONS  (PRN):  acetaminophen     Tablet .. 650 milliGRAM(s) Oral every 6 hours PRN Temp greater or equal to 38C (100.4F), Mild Pain (1 - 3), Moderate Pain (4 - 6)  clonazePAM  Tablet 0.25 milliGRAM(s) Oral every 8 hours PRN anxiety  famotidine    Tablet 10 milliGRAM(s) Oral every 6 hours PRN acid reflux symptroms  lidocaine   4% Patch 1 Patch Transdermal daily PRN sciatica pain

## 2025-01-27 NOTE — BH INPATIENT PSYCHIATRY PROGRESS NOTE - NSBHCHARTREVIEWVS_PSY_A_CORE FT
Vital Signs Last 24 Hrs  T(C): --  T(F): --  HR: --  BP: --  BP(mean): --  RR: --  SpO2: --    Orthostatic VS  01-26-25 @ 20:35  Lying BP: --/-- HR: --  Sitting BP: 148/74 HR: 95  Standing BP: --/-- HR: --  Site: upper left arm  Mode: electronic  Orthostatic VS  01-26-25 @ 08:03  Lying BP: 139/72 HR: 89  Sitting BP: 132/74 HR: 86  Standing BP: --/-- HR: --  Site: upper left arm  Mode: electronic

## 2025-01-27 NOTE — BH INPATIENT PSYCHIATRY PROGRESS NOTE - NSBHASSESSSUMMFT_PSY_ALL_CORE
Ms. Gross is a 73yo female, , retired (), domiciled at home with , no children; PMHx HTN, HLD; PPHx d/o ANSON and MDD, 9 past psych admissions starting in 2019 (last [facility] April 2024 for worsening anxiety and depression), ECT at [facility] 2023, OP at [facility]; 1 past SA (OD on 9 pills of unknown medication in 2019), no h/o NSSIB, no h/o violence/aggression/legal issues, past marijuana use; initially admitted to [facility] 2W on 12/27 for increasing anxiety and SI w/o plans in the context of medication adjustments and psychosocial stressors.     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     On initial 2S assessment pt dysphoric, anxious, hopeless, and preoccupied with somatic sxs. There are elements of active passivity, severe sensitivity to feelings of inadequacy and complicated interpersonal relationships (, friends, providers). 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 Provided pt with psychoeducation regarding limitations of medications and need to focus on therapeutic interventions however she has limited insight into this and is intent on finding the right medication(s). For now will continue to slowly taper off nortriptyline.     1/6: Prominent treatment interfering behaviors including active passivity and rejection sensitivity. For now will continue nortriptyline taper. Considering atypical antidepressant trial. Pt reporting chronic b/l LE edema, will encourage her to use compression stocking and elevate legs, no indication for diuretic at this time.     1/7: Preoccupied with med side effects and physical sxs, but more positive about potential med trials. Will decrease qAM gabapentin 300mg to 200mg but otherwise will attempt to limit med changes other than nortriptyline taper. Will also attempt to discourage use of zofran PRNs given past difficulties tolerating multiple serotonergic medications.   1/8 Patient unchanged anxious ruminative unable to use coping skilss. Will dc NTP increase Remeron  1/9 Somatic anxious unable to utilize coping techniques. Plan behavior plan initiated, cont to titrate Remeorn. Added methylfolate for augmentation  1/10 Reports malaise, could be a viral illness vs somatic anxiety symptoms; otherwise tolerating treatment well    1/13: Dysphoric, tearful, anxious but denies SI/HI, discussed med plans and seeks reassurance.  Will increase Remeron to 37.5mg QHS.  1/14: Remains dysphoric, anxious, somatically focused, responds well to support and reassurance provided.  Will increase methylfolate to 15mg daily, provided zofran 4mg PO daily prn nausea, Lidoderm patch prn sciatic pain, will monitor tolerability and consider further titration of Remeron.  1/15: Anxious, depressed, somatic, seeks reassurance on exam, will increase Remeron to 45mg QHS tonight.  Pt agrees with plan as stated.  1/16: Anxious, ruminative but reports improvement, less somatic, tolerating Remeron 45mg and methylfolate 15mg, considering augmentation with Wellbutrin.   1/17: Dysphoric, anxious, denies SI, seeks support, reassurance, will change to regular tablet Klonopin formulation from disintegrating, continue current meds.  1/21 About same will start Wellbutrin to augment mirtazapine. Will change Zofran to pepcid to minimize serotonergic meds  1/22 Tolerating Wellbutrin con current dose. Will alllow Pepcid 10mg q6 prn with max three doses per 24 hours hopefully will seek less when feeling better  1/23 Gi focused today , cont meds will increase Wellbutrin and patient was seen by medicine who increased Protonix  1/24 Depressed, anxious somatic will increase Wellbutrin to 75mg/d, medicine increase protonix  1/25 No major improvement cont meds avoid making multiple changes which makes her anxious and more symptomatic  1/26 No change cont med tiral. Consider some form of neuropsych eval if subtle cog decline playing role in anxiety and treatment refractoriness  1/27 No major changes plan titrate bupropion to 100mg SR daily  PLAN:   1. Legals: admitted to 2W on 9.13. 1/2 pt transferred to 2S.  2. Safety: routine obs appropriate as no current active SI/SIB/HI/VI on unit.   3. Psychiatric  - c/w Nortriptyline 10mg qHS with plans to discontinue at pt request due to reports of tremors, worsening anxiety, difficulty sleeping at home doses (50mg to 25mg on 12/27, 12.5mg on 1/3). Will defer further medication changes until Nortriptyline is d/c'd given pt concern for side effects/past difficulty tolerating serotonergic meds.   - c/w Remeron 45mg PO QHS for mood/anxiety on 1/15 (home med).   - c/w Klonopin 0.5mg TID for anxiety (home med).   - Decreased Gabapentin 300mg BID to 200mg qAM/300mg qHS for anxiety and neuropathy due to pt concern for sedation. Home dose Gabapentin 300mg TID.   - Melatonin 1mg qHS for insomnia.   - PRNs           - For anxiety: Klonopin wafer 0.25 mg q8h. d/c'd PRN gabapentin to simplify meds.            - For agitation: PO Zyprexa 2.5mg TID.            - STAT IM Zyprexa 2.5mg if there are imminent risk to self/others due to severe agitation.   4. Therapy: I/G/M therapy as indicated.   5. Medical: admission labs and EKG reviewed and wnl.   #Dependent edema  - chronic b/l LE edema likely 2/2 chronic venous insufficiency/deconditioning. No cardiac history (multiple cards workups have been negative).   - encourage pt to utilize compression stockings, elevation, appropriate physical therapy/exercise. No indication for diuretics/attempting to discourage polypharmacy.   #HTN  - c/w Metoprolol 50mg bid (home med) - per notes this is for palpitations 2/2 anxiety managed by outpatient cardiologist.   #HLD  - c/w Nexletol 180mg qHS (home med).   - c/w IM [id number] q2 weeks (last given 1/2) (home med).   #GERD  - c/w Pantoprazole 40mg qD (home med).   #Vit B12 deficiency   - pt due for injection per chart, for now started on PO 1000mcg qD.   #Nausea  - PRN PO Zofran 4mg TID (home med) --> lower to qD with attempts to d/c PRN. Will add maalox PRN given pt report that nausea is often 2/2 indigestion depending on diet.   6. Collateral: 1/3 Updated  Yehuda at [phone] who is agreeable with plan above.   7. Disposition: pending clinical improvement; likely return to previous provider at [facility] vs partial.

## 2025-01-28 PROCEDURE — 99232 SBSQ HOSP IP/OBS MODERATE 35: CPT

## 2025-01-28 RX ORDER — BUPROPION HYDROBROMIDE 522 MG/1
100 TABLET, EXTENDED RELEASE ORAL DAILY
Refills: 0 | Status: DISCONTINUED | OUTPATIENT
Start: 2025-01-28 | End: 2025-02-03

## 2025-01-28 RX ORDER — BREXPIPRAZOLE 0.5 MG/1
1 TABLET ORAL
Qty: 30 | Refills: 0
Start: 2025-01-28 | End: 2025-02-26

## 2025-01-28 RX ADMIN — Medication 15 MILLILITER(S): at 14:00

## 2025-01-28 RX ADMIN — Medication 40 MILLIGRAM(S): at 20:42

## 2025-01-28 RX ADMIN — GABAPENTIN 300 MILLIGRAM(S): 400 CAPSULE ORAL at 20:42

## 2025-01-28 RX ADMIN — GABAPENTIN 200 MILLIGRAM(S): 400 CAPSULE ORAL at 08:41

## 2025-01-28 RX ADMIN — Medication 40 MILLIGRAM(S): at 06:18

## 2025-01-28 RX ADMIN — MIRTAZAPINE 45 MILLIGRAM(S): 30 TABLET, FILM COATED ORAL at 20:41

## 2025-01-28 RX ADMIN — Medication 1 MILLIGRAM(S): at 20:42

## 2025-01-28 RX ADMIN — CLONAZEPAM 0.5 MILLIGRAM(S): 0.5 TABLET ORAL at 20:41

## 2025-01-28 RX ADMIN — BUPROPION HYDROBROMIDE 100 MILLIGRAM(S): 522 TABLET, EXTENDED RELEASE ORAL at 13:13

## 2025-01-28 RX ADMIN — METOPROLOL SUCCINATE 50 MILLIGRAM(S): 50 TABLET, EXTENDED RELEASE ORAL at 20:42

## 2025-01-28 RX ADMIN — Medication 15 MILLILITER(S): at 22:20

## 2025-01-28 RX ADMIN — CLONAZEPAM 0.5 MILLIGRAM(S): 0.5 TABLET ORAL at 06:18

## 2025-01-28 RX ADMIN — Medication 10 MILLIGRAM(S): at 18:02

## 2025-01-28 RX ADMIN — CYANOCOBALAMIN 1000 MICROGRAM(S): 1000 INJECTION INTRAMUSCULAR; SUBCUTANEOUS at 08:41

## 2025-01-28 RX ADMIN — CLONAZEPAM 0.5 MILLIGRAM(S): 0.5 TABLET ORAL at 12:01

## 2025-01-28 RX ADMIN — METOPROLOL SUCCINATE 50 MILLIGRAM(S): 50 TABLET, EXTENDED RELEASE ORAL at 08:42

## 2025-01-28 RX ADMIN — LIDOCAINE HYDROCHLORIDE 1 PATCH: 20 JELLY TOPICAL at 06:50

## 2025-01-28 NOTE — BH INPATIENT PSYCHIATRY PROGRESS NOTE - NSBHCHARTREVIEWVS_PSY_A_CORE FT
Vital Signs Last 24 Hrs  T(C): 36.8 (01-28-25 @ 07:47), Max: 36.8 (01-28-25 @ 07:47)  T(F): 98.2 (01-28-25 @ 07:47), Max: 98.2 (01-28-25 @ 07:47)  HR: 74 (01-27-25 @ 20:40) (74 - 74)  BP: 149/75 (01-27-25 @ 20:40) (149/75 - 149/75)  BP(mean): --  RR: --  SpO2: --    Orthostatic VS  01-28-25 @ 11:05  Lying BP: 135/72 HR: 66  Sitting BP: 145/82 HR: 69  Standing BP: --/-- HR: --  Site: upper left arm  Mode: electronic  Orthostatic VS  01-28-25 @ 07:47  Lying BP: --/-- HR: --  Sitting BP: 115/98 HR: 69  Standing BP: 149/87 HR: 71  Site: --  Mode: --  Orthostatic VS  01-26-25 @ 20:35  Lying BP: --/-- HR: --  Sitting BP: 148/74 HR: 95  Standing BP: --/-- HR: --  Site: upper left arm  Mode: electronic

## 2025-01-28 NOTE — BH INPATIENT PSYCHIATRY PROGRESS NOTE - CURRENT MEDICATION
MEDICATIONS  (STANDING):  buPROPion SR (12-Hour) 100 milliGRAM(s) Oral daily  chlorhexidine 0.12% Liquid 15 milliLiter(s) Swish and Spit two times a day  clonazePAM  Tablet 0.5 milliGRAM(s) Oral <User Schedule>  cyanocobalamin 1000 MICROGram(s) Oral daily  evolocumab Injectable 140 milliGRAM(s) SubCutaneous every 2 weeks  gabapentin 300 milliGRAM(s) Oral at bedtime  gabapentin 200 milliGRAM(s) Oral <User Schedule>  l-methylfolate 15 milliGRAM(s) Oral daily  melatonin. 1 milliGRAM(s) Oral at bedtime  metoprolol succinate ER 50 milliGRAM(s) Oral two times a day  mirtazapine 45 milliGRAM(s) Oral at bedtime  Nexletol 180 mg 1 Tablet(s) 1 Tablet(s) Oral daily  pantoprazole    Tablet 40 milliGRAM(s) Oral <User Schedule>    MEDICATIONS  (PRN):  acetaminophen     Tablet .. 650 milliGRAM(s) Oral every 6 hours PRN Temp greater or equal to 38C (100.4F), Mild Pain (1 - 3), Moderate Pain (4 - 6)  clonazePAM  Tablet 0.25 milliGRAM(s) Oral every 8 hours PRN anxiety  famotidine    Tablet 10 milliGRAM(s) Oral every 6 hours PRN acid reflux symptroms  lidocaine   4% Patch 1 Patch Transdermal daily PRN sciatica pain

## 2025-01-28 NOTE — BH INPATIENT PSYCHIATRY PROGRESS NOTE - NSBHMETABOLIC_PSY_ALL_CORE_FT
BMI: BMI (kg/m2): 37.5 (12-27-24 @ 15:12)  HbA1c: A1C with Estimated Average Glucose Result: 5.5 % (01-02-25 @ 09:11)    Glucose:   BP: 149/75 (01-27-25 @ 20:40) (120/64 - 149/75)Vital Signs Last 24 Hrs  T(C): 36.8 (01-28-25 @ 07:47), Max: 36.8 (01-28-25 @ 07:47)  T(F): 98.2 (01-28-25 @ 07:47), Max: 98.2 (01-28-25 @ 07:47)  HR: 74 (01-27-25 @ 20:40) (74 - 74)  BP: 149/75 (01-27-25 @ 20:40) (149/75 - 149/75)  BP(mean): --  RR: --  SpO2: --    Orthostatic VS  01-28-25 @ 11:05  Lying BP: 135/72 HR: 66  Sitting BP: 145/82 HR: 69  Standing BP: --/-- HR: --  Site: upper left arm  Mode: electronic  Orthostatic VS  01-28-25 @ 07:47  Lying BP: --/-- HR: --  Sitting BP: 115/98 HR: 69  Standing BP: 149/87 HR: 71  Site: --  Mode: --  Orthostatic VS  01-26-25 @ 20:35  Lying BP: --/-- HR: --  Sitting BP: 148/74 HR: 95  Standing BP: --/-- HR: --  Site: upper left arm  Mode: electronic    Lipid Panel: Date/Time: 01-02-25 @ 09:11  Cholesterol, Serum: 128  LDL Cholesterol Calculated: 53  HDL Cholesterol, Serum: 46  Total Cholesterol/HDL Ration Measurement: --  Triglycerides, Serum: 174

## 2025-01-28 NOTE — BH INPATIENT PSYCHIATRY PROGRESS NOTE - NSBHASSESSSUMMFT_PSY_ALL_CORE
Ms. Gross is a 73yo female, , retired (), domiciled at home with , no children; PMHx HTN, HLD; PPHx d/o ANSON and MDD, 9 past psych admissions starting in 2019 (last [facility] April 2024 for worsening anxiety and depression), ECT at [facility] 2023, OP at [facility]; 1 past SA (OD on 9 pills of unknown medication in 2019), no h/o NSSIB, no h/o violence/aggression/legal issues, past marijuana use; initially admitted to [facility] 2W on 12/27 for increasing anxiety and SI w/o plans in the context of medication adjustments and psychosocial stressors.     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     On initial 2S assessment pt dysphoric, anxious, hopeless, and preoccupied with somatic sxs. There are elements of active passivity, severe sensitivity to feelings of inadequacy and complicated interpersonal relationships (, friends, providers). 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 Provided pt with psychoeducation regarding limitations of medications and need to focus on therapeutic interventions however she has limited insight into this and is intent on finding the right medication(s). For now will continue to slowly taper off nortriptyline.     1/6: Prominent treatment interfering behaviors including active passivity and rejection sensitivity. For now will continue nortriptyline taper. Considering atypical antidepressant trial. Pt reporting chronic b/l LE edema, will encourage her to use compression stocking and elevate legs, no indication for diuretic at this time.     1/7: Preoccupied with med side effects and physical sxs, but more positive about potential med trials. Will decrease qAM gabapentin 300mg to 200mg but otherwise will attempt to limit med changes other than nortriptyline taper. Will also attempt to discourage use of zofran PRNs given past difficulties tolerating multiple serotonergic medications.   1/8 Patient unchanged anxious ruminative unable to use coping skilss. Will dc NTP increase Remeron  1/9 Somatic anxious unable to utilize coping techniques. Plan behavior plan initiated, cont to titrate Remeorn. Added methylfolate for augmentation  1/10 Reports malaise, could be a viral illness vs somatic anxiety symptoms; otherwise tolerating treatment well    1/13: Dysphoric, tearful, anxious but denies SI/HI, discussed med plans and seeks reassurance.  Will increase Remeron to 37.5mg QHS.  1/14: Remains dysphoric, anxious, somatically focused, responds well to support and reassurance provided.  Will increase methylfolate to 15mg daily, provided zofran 4mg PO daily prn nausea, Lidoderm patch prn sciatic pain, will monitor tolerability and consider further titration of Remeron.  1/15: Anxious, depressed, somatic, seeks reassurance on exam, will increase Remeron to 45mg QHS tonight.  Pt agrees with plan as stated.  1/16: Anxious, ruminative but reports improvement, less somatic, tolerating Remeron 45mg and methylfolate 15mg, considering augmentation with Wellbutrin.   1/17: Dysphoric, anxious, denies SI, seeks support, reassurance, will change to regular tablet Klonopin formulation from disintegrating, continue current meds.  1/21 About same will start Wellbutrin to augment mirtazapine. Will change Zofran to pepcid to minimize serotonergic meds  1/22 Tolerating Wellbutrin con current dose. Will alllow Pepcid 10mg q6 prn with max three doses per 24 hours hopefully will seek less when feeling better  1/23 Gi focused today , cont meds will increase Wellbutrin and patient was seen by medicine who increased Protonix  1/24 Depressed, anxious somatic will increase Wellbutrin to 75mg/d, medicine increase protonix  1/25 No major improvement cont meds avoid making multiple changes which makes her anxious and more symptomatic  1/26 No change cont med tiral. Consider some form of neuropsych eval if subtle cog decline playing role in anxiety and treatment refractoriness  1/27 No major changes plan titrate bupropion to 100mg SR daily  1/28 No real change will increase Wellbutrin to 100mg SR daily add Peridex as she was on this as outpatient for oral hygiene  PLAN:   1. Legals: admitted to 2W on 9.13. 1/2 pt transferred to .  2. Safety: routine obs appropriate as no current active SI/SIB/HI/VI on unit.   3. Psychiatric  - c/w Nortriptyline 10mg qHS with plans to discontinue at pt request due to reports of tremors, worsening anxiety, difficulty sleeping at home doses (50mg to 25mg on 12/27, 12.5mg on 1/3). Will defer further medication changes until Nortriptyline is d/c'd given pt concern for side effects/past difficulty tolerating serotonergic meds.   - c/w Remeron 45mg PO QHS for mood/anxiety on 1/15 (home med).   - c/w Klonopin 0.5mg TID for anxiety (home med).   - Decreased Gabapentin 300mg BID to 200mg qAM/300mg qHS for anxiety and neuropathy due to pt concern for sedation. Home dose Gabapentin 300mg TID.   - Melatonin 1mg qHS for insomnia.   - PRNs           - For anxiety: Klonopin wafer 0.25 mg q8h. d/c'd PRN gabapentin to simplify meds.            - For agitation: PO Zyprexa 2.5mg TID.            - STAT IM Zyprexa 2.5mg if there are imminent risk to self/others due to severe agitation.   4. Therapy: I/G/M therapy as indicated.   5. Medical: admission labs and EKG reviewed and wnl.   #Dependent edema  - chronic b/l LE edema likely 2/2 chronic venous insufficiency/deconditioning. No cardiac history (multiple cards workups have been negative).   - encourage pt to utilize compression stockings, elevation, appropriate physical therapy/exercise. No indication for diuretics/attempting to discourage polypharmacy.   #HTN  - c/w Metoprolol 50mg bid (home med) - per notes this is for palpitations 2/2 anxiety managed by outpatient cardiologist.   #HLD  - c/w Nexletol 180mg qHS (home med).   - c/w IM Grdbexa771op q2 weeks (last given 1/2) (home med).   #GERD  - c/w Pantoprazole 40mg qD (home med).   #Vit B12 deficiency   - pt due for injection per chart, for now started on PO 1000mcg qD.   #Nausea  - PRN PO Zofran 4mg TID (home med) --> lower to qD with attempts to d/c PRN. Will add maalox PRN given pt report that nausea is often 2/2 indigestion depending on diet.   6. Collateral: 1/3 Updated  Yehuda at [phone] who is agreeable with plan above.   7. Disposition: pending clinical improvement; likely return to previous provider at [facility] vs partial.

## 2025-01-29 PROCEDURE — 99232 SBSQ HOSP IP/OBS MODERATE 35: CPT

## 2025-01-29 PROCEDURE — 90834 PSYTX W PT 45 MINUTES: CPT

## 2025-01-29 RX ADMIN — BUPROPION HYDROBROMIDE 100 MILLIGRAM(S): 522 TABLET, EXTENDED RELEASE ORAL at 08:46

## 2025-01-29 RX ADMIN — CLONAZEPAM 0.5 MILLIGRAM(S): 0.5 TABLET ORAL at 20:20

## 2025-01-29 RX ADMIN — METOPROLOL SUCCINATE 50 MILLIGRAM(S): 50 TABLET, EXTENDED RELEASE ORAL at 20:45

## 2025-01-29 RX ADMIN — MIRTAZAPINE 45 MILLIGRAM(S): 30 TABLET, FILM COATED ORAL at 20:45

## 2025-01-29 RX ADMIN — CLONAZEPAM 0.5 MILLIGRAM(S): 0.5 TABLET ORAL at 05:48

## 2025-01-29 RX ADMIN — GABAPENTIN 300 MILLIGRAM(S): 400 CAPSULE ORAL at 20:20

## 2025-01-29 RX ADMIN — Medication 40 MILLIGRAM(S): at 20:47

## 2025-01-29 RX ADMIN — CLONAZEPAM 0.5 MILLIGRAM(S): 0.5 TABLET ORAL at 12:35

## 2025-01-29 RX ADMIN — Medication 15 MILLILITER(S): at 08:47

## 2025-01-29 RX ADMIN — Medication 40 MILLIGRAM(S): at 05:49

## 2025-01-29 RX ADMIN — Medication 1 MILLIGRAM(S): at 20:45

## 2025-01-29 RX ADMIN — CYANOCOBALAMIN 1000 MICROGRAM(S): 1000 INJECTION INTRAMUSCULAR; SUBCUTANEOUS at 08:47

## 2025-01-29 RX ADMIN — METOPROLOL SUCCINATE 50 MILLIGRAM(S): 50 TABLET, EXTENDED RELEASE ORAL at 08:46

## 2025-01-29 RX ADMIN — Medication 15 MILLILITER(S): at 20:45

## 2025-01-29 RX ADMIN — GABAPENTIN 200 MILLIGRAM(S): 400 CAPSULE ORAL at 08:46

## 2025-01-29 NOTE — BH INPATIENT PSYCHIATRY PROGRESS NOTE - NSBHCHARTREVIEWVS_PSY_A_CORE FT
Vital Signs Last 24 Hrs  T(C): 36.8 (01-29-25 @ 07:49), Max: 36.8 (01-29-25 @ 07:49)  T(F): 98.2 (01-29-25 @ 07:49), Max: 98.2 (01-29-25 @ 07:49)  HR: 68 (01-28-25 @ 20:33) (68 - 68)  BP: 121/73 (01-28-25 @ 20:33) (121/73 - 121/73)  BP(mean): --  RR: --  SpO2: --    Orthostatic VS  01-29-25 @ 07:49  Lying BP: --/-- HR: --  Sitting BP: 143/87 HR: 68  Standing BP: 150/79 HR: 74  Site: --  Mode: --  Orthostatic VS  01-28-25 @ 11:05  Lying BP: 135/72 HR: 66  Sitting BP: 145/82 HR: 69  Standing BP: --/-- HR: --  Site: upper left arm  Mode: electronic  Orthostatic VS  01-28-25 @ 07:47  Lying BP: --/-- HR: --  Sitting BP: 115/98 HR: 69  Standing BP: 149/87 HR: 71  Site: --  Mode: --

## 2025-01-29 NOTE — BH INPATIENT PSYCHIATRY PROGRESS NOTE - NSBHMETABOLIC_PSY_ALL_CORE_FT
BMI: BMI (kg/m2): 37.5 (12-27-24 @ 15:12)  HbA1c: A1C with Estimated Average Glucose Result: 5.5 % (01-02-25 @ 09:11)    Glucose:   BP: 121/73 (01-28-25 @ 20:33) (121/73 - 149/75)Vital Signs Last 24 Hrs  T(C): 36.8 (01-29-25 @ 07:49), Max: 36.8 (01-29-25 @ 07:49)  T(F): 98.2 (01-29-25 @ 07:49), Max: 98.2 (01-29-25 @ 07:49)  HR: 68 (01-28-25 @ 20:33) (68 - 68)  BP: 121/73 (01-28-25 @ 20:33) (121/73 - 121/73)  BP(mean): --  RR: --  SpO2: --    Orthostatic VS  01-29-25 @ 07:49  Lying BP: --/-- HR: --  Sitting BP: 143/87 HR: 68  Standing BP: 150/79 HR: 74  Site: --  Mode: --  Orthostatic VS  01-28-25 @ 11:05  Lying BP: 135/72 HR: 66  Sitting BP: 145/82 HR: 69  Standing BP: --/-- HR: --  Site: upper left arm  Mode: electronic  Orthostatic VS  01-28-25 @ 07:47  Lying BP: --/-- HR: --  Sitting BP: 115/98 HR: 69  Standing BP: 149/87 HR: 71  Site: --  Mode: --    Lipid Panel: Date/Time: 01-02-25 @ 09:11  Cholesterol, Serum: 128  LDL Cholesterol Calculated: 53  HDL Cholesterol, Serum: 46  Total Cholesterol/HDL Ration Measurement: --  Triglycerides, Serum: 174

## 2025-01-30 PROCEDURE — 99232 SBSQ HOSP IP/OBS MODERATE 35: CPT

## 2025-01-30 RX ORDER — CLONAZEPAM 0.5 MG/1
0.5 TABLET ORAL
Refills: 0 | Status: DISCONTINUED | OUTPATIENT
Start: 2025-01-30 | End: 2025-02-06

## 2025-01-30 RX ADMIN — EVOLOCUMAB 140 MILLIGRAM(S): 140 INJECTION, SOLUTION SUBCUTANEOUS at 09:30

## 2025-01-30 RX ADMIN — Medication 40 MILLIGRAM(S): at 20:49

## 2025-01-30 RX ADMIN — BUPROPION HYDROBROMIDE 100 MILLIGRAM(S): 522 TABLET, EXTENDED RELEASE ORAL at 08:54

## 2025-01-30 RX ADMIN — Medication 650 MILLIGRAM(S): at 16:22

## 2025-01-30 RX ADMIN — GABAPENTIN 200 MILLIGRAM(S): 400 CAPSULE ORAL at 09:01

## 2025-01-30 RX ADMIN — Medication 15 MILLILITER(S): at 20:41

## 2025-01-30 RX ADMIN — CLONAZEPAM 0.5 MILLIGRAM(S): 0.5 TABLET ORAL at 06:18

## 2025-01-30 RX ADMIN — CLONAZEPAM 0.5 MILLIGRAM(S): 0.5 TABLET ORAL at 20:47

## 2025-01-30 RX ADMIN — MIRTAZAPINE 45 MILLIGRAM(S): 30 TABLET, FILM COATED ORAL at 20:42

## 2025-01-30 RX ADMIN — CYANOCOBALAMIN 1000 MICROGRAM(S): 1000 INJECTION INTRAMUSCULAR; SUBCUTANEOUS at 08:54

## 2025-01-30 RX ADMIN — METOPROLOL SUCCINATE 50 MILLIGRAM(S): 50 TABLET, EXTENDED RELEASE ORAL at 20:42

## 2025-01-30 RX ADMIN — Medication 15 MILLILITER(S): at 08:54

## 2025-01-30 RX ADMIN — CLONAZEPAM 0.5 MILLIGRAM(S): 0.5 TABLET ORAL at 12:24

## 2025-01-30 RX ADMIN — Medication 650 MILLIGRAM(S): at 14:43

## 2025-01-30 RX ADMIN — GABAPENTIN 300 MILLIGRAM(S): 400 CAPSULE ORAL at 20:42

## 2025-01-30 RX ADMIN — Medication 40 MILLIGRAM(S): at 06:17

## 2025-01-30 RX ADMIN — Medication 10 MILLIGRAM(S): at 09:02

## 2025-01-30 RX ADMIN — METOPROLOL SUCCINATE 50 MILLIGRAM(S): 50 TABLET, EXTENDED RELEASE ORAL at 08:54

## 2025-01-30 RX ADMIN — Medication 10 MILLIGRAM(S): at 15:02

## 2025-01-30 RX ADMIN — Medication 1 MILLIGRAM(S): at 20:42

## 2025-01-30 NOTE — BH INPATIENT PSYCHIATRY PROGRESS NOTE - NSBHCHARTREVIEWVS_PSY_A_CORE FT
Vital Signs Last 24 Hrs  T(C): 36.6 (01-30-25 @ 08:01), Max: 36.6 (01-30-25 @ 08:01)  T(F): 97.9 (01-30-25 @ 08:01), Max: 97.9 (01-30-25 @ 08:01)  HR: 75 (01-29-25 @ 20:45) (75 - 75)  BP: 137/75 (01-29-25 @ 20:45) (137/75 - 137/75)  BP(mean): --  RR: --  SpO2: --    Orthostatic VS  01-30-25 @ 08:01  Lying BP: --/-- HR: --  Sitting BP: 144/76 HR: 91  Standing BP: 145/86 HR: 99  Site: upper left arm  Mode: electronic  Orthostatic VS  01-29-25 @ 07:49  Lying BP: --/-- HR: --  Sitting BP: 143/87 HR: 68  Standing BP: 150/79 HR: 74  Site: --  Mode: --

## 2025-01-30 NOTE — BH INPATIENT PSYCHIATRY PROGRESS NOTE - CURRENT MEDICATION
MEDICATIONS  (STANDING):  buPROPion SR (12-Hour) 100 milliGRAM(s) Oral daily  chlorhexidine 0.12% Liquid 15 milliLiter(s) Swish and Spit two times a day  clonazePAM  Tablet 0.5 milliGRAM(s) Oral <User Schedule>  cyanocobalamin 1000 MICROGram(s) Oral daily  evolocumab Injectable 140 milliGRAM(s) SubCutaneous every 2 weeks  gabapentin 200 milliGRAM(s) Oral <User Schedule>  gabapentin 300 milliGRAM(s) Oral at bedtime  l-methylfolate 15 milliGRAM(s) Oral daily  melatonin. 1 milliGRAM(s) Oral at bedtime  metoprolol succinate ER 50 milliGRAM(s) Oral two times a day  mirtazapine 45 milliGRAM(s) Oral at bedtime  Nexletol 180 mg 1 Tablet(s) 1 Tablet(s) Oral daily  pantoprazole    Tablet 40 milliGRAM(s) Oral <User Schedule>    MEDICATIONS  (PRN):  acetaminophen     Tablet .. 650 milliGRAM(s) Oral every 6 hours PRN Temp greater or equal to 38C (100.4F), Mild Pain (1 - 3), Moderate Pain (4 - 6)  clonazePAM  Tablet 0.25 milliGRAM(s) Oral every 8 hours PRN anxiety  famotidine    Tablet 10 milliGRAM(s) Oral every 6 hours PRN acid reflux symptroms  lidocaine   4% Patch 1 Patch Transdermal daily PRN sciatica pain

## 2025-01-30 NOTE — BH INPATIENT PSYCHIATRY PROGRESS NOTE - NSBHMETABOLIC_PSY_ALL_CORE_FT
BMI: BMI (kg/m2): 37.5 (12-27-24 @ 15:12)  HbA1c: A1C with Estimated Average Glucose Result: 5.5 % (01-02-25 @ 09:11)    Glucose:   BP: 137/75 (01-29-25 @ 20:45) (121/73 - 149/75)Vital Signs Last 24 Hrs  T(C): 36.6 (01-30-25 @ 08:01), Max: 36.6 (01-30-25 @ 08:01)  T(F): 97.9 (01-30-25 @ 08:01), Max: 97.9 (01-30-25 @ 08:01)  HR: 75 (01-29-25 @ 20:45) (75 - 75)  BP: 137/75 (01-29-25 @ 20:45) (137/75 - 137/75)  BP(mean): --  RR: --  SpO2: --    Orthostatic VS  01-30-25 @ 08:01  Lying BP: --/-- HR: --  Sitting BP: 144/76 HR: 91  Standing BP: 145/86 HR: 99  Site: upper left arm  Mode: electronic  Orthostatic VS  01-29-25 @ 07:49  Lying BP: --/-- HR: --  Sitting BP: 143/87 HR: 68  Standing BP: 150/79 HR: 74  Site: --  Mode: --    Lipid Panel: Date/Time: 01-02-25 @ 09:11  Cholesterol, Serum: 128  LDL Cholesterol Calculated: 53  HDL Cholesterol, Serum: 46  Total Cholesterol/HDL Ration Measurement: --  Triglycerides, Serum: 174

## 2025-01-30 NOTE — BH INPATIENT PSYCHIATRY PROGRESS NOTE - NSBHASSESSSUMMFT_PSY_ALL_CORE
Ms. Gross is a 73yo female, , retired (), domiciled at home with , no children; PMHx HTN, HLD; PPHx d/o ANSON and MDD, 9 past psych admissions starting in 2019 (last [facility] April 2024 for worsening anxiety and depression), ECT at [facility] 2023, OP at [facility]; 1 past SA (OD on 9 pills of unknown medication in 2019), no h/o NSSIB, no h/o violence/aggression/legal issues, past marijuana use; initially admitted to [facility] 2W on 12/27 for increasing anxiety and SI w/o plans in the context of medication adjustments and psychosocial stressors.     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     On initial 2S assessment pt dysphoric, anxious, hopeless, and preoccupied with somatic sxs. There are elements of active passivity, severe sensitivity to feelings of inadequacy and complicated interpersonal relationships (, friends, providers). 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 Provided pt with psychoeducation regarding limitations of medications and need to focus on therapeutic interventions however she has limited insight into this and is intent on finding the right medication(s). For now will continue to slowly taper off nortriptyline.     1/6: Prominent treatment interfering behaviors including active passivity and rejection sensitivity. For now will continue nortriptyline taper. Considering atypical antidepressant trial. Pt reporting chronic b/l LE edema, will encourage her to use compression stocking and elevate legs, no indication for diuretic at this time.     1/7: Preoccupied with med side effects and physical sxs, but more positive about potential med trials. Will decrease qAM gabapentin 300mg to 200mg but otherwise will attempt to limit med changes other than nortriptyline taper. Will also attempt to discourage use of zofran PRNs given past difficulties tolerating multiple serotonergic medications.   1/8 Patient unchanged anxious ruminative unable to use coping skilss. Will dc NTP increase Remeron  1/9 Somatic anxious unable to utilize coping techniques. Plan behavior plan initiated, cont to titrate Remeorn. Added methylfolate for augmentation  1/10 Reports malaise, could be a viral illness vs somatic anxiety symptoms; otherwise tolerating treatment well    1/13: Dysphoric, tearful, anxious but denies SI/HI, discussed med plans and seeks reassurance.  Will increase Remeron to 37.5mg QHS.  1/14: Remains dysphoric, anxious, somatically focused, responds well to support and reassurance provided.  Will increase methylfolate to 15mg daily, provided zofran 4mg PO daily prn nausea, Lidoderm patch prn sciatic pain, will monitor tolerability and consider further titration of Remeron.  1/15: Anxious, depressed, somatic, seeks reassurance on exam, will increase Remeron to 45mg QHS tonight.  Pt agrees with plan as stated.  1/16: Anxious, ruminative but reports improvement, less somatic, tolerating Remeron 45mg and methylfolate 15mg, considering augmentation with Wellbutrin.   1/17: Dysphoric, anxious, denies SI, seeks support, reassurance, will change to regular tablet Klonopin formulation from disintegrating, continue current meds.  1/21 About same will start Wellbutrin to augment mirtazapine. Will change Zofran to pepcid to minimize serotonergic meds  1/22 Tolerating Wellbutrin con current dose. Will alllow Pepcid 10mg q6 prn with max three doses per 24 hours hopefully will seek less when feeling better  1/23 Gi focused today , cont meds will increase Wellbutrin and patient was seen by medicine who increased Protonix  1/24 Depressed, anxious somatic will increase Wellbutrin to 75mg/d, medicine increase protonix  1/25 No major improvement cont meds avoid making multiple changes which makes her anxious and more symptomatic  1/26 No change cont med tiral. Consider some form of neuropsych eval if subtle cog decline playing role in anxiety and treatment refractoriness  1/27 No major changes plan titrate bupropion to 100mg SR daily  1/28 No real change will increase Wellbutrin to 100mg SR daily add Peridex as she was on this as outpatient for oral hygiene  1/29 No major change suspect tremor may be due to anxiety then blamed on meds, patient encouraged to cont meds rather than reduce or dc and she is agreeable, also told if  wellbutrin sig improved depression but causes minor tremor that may be acceptable risk benefit  1/30 Look sl brighter today but does tend to fluctuate. Cont  current meds without change in dose, if tolerating plan increase Wellbutrin to 150mg  PLAN:   1. Legals: admitted to 2W on 9.13. 1/2 pt transferred to .  2. Safety: routine obs appropriate as no current active SI/SIB/HI/VI on unit.   3. Psychiatric  - c/w Nortriptyline 10mg qHS with plans to discontinue at pt request due to reports of tremors, worsening anxiety, difficulty sleeping at home doses (50mg to 25mg on 12/27, 12.5mg on 1/3). Will defer further medication changes until Nortriptyline is d/c'd given pt concern for side effects/past difficulty tolerating serotonergic meds.   - c/w Remeron 45mg PO QHS for mood/anxiety on 1/15 (home med).   - c/w Klonopin 0.5mg TID for anxiety (home med).   - Decreased Gabapentin 300mg BID to 200mg qAM/300mg qHS for anxiety and neuropathy due to pt concern for sedation. Home dose Gabapentin 300mg TID.   - Melatonin 1mg qHS for insomnia.   - PRNs           - For anxiety: Klonopin wafer 0.25 mg q8h. d/c'd PRN gabapentin to simplify meds.            - For agitation: PO Zyprexa 2.5mg TID.            - STAT IM Zyprexa 2.5mg if there are imminent risk to self/others due to severe agitation.   4. Therapy: I/G/M therapy as indicated.   5. Medical: admission labs and EKG reviewed and wnl.   #Dependent edema  - chronic b/l LE edema likely 2/2 chronic venous insufficiency/deconditioning. No cardiac history (multiple cards workups have been negative).   - encourage pt to utilize compression stockings, elevation, appropriate physical therapy/exercise. No indication for diuretics/attempting to discourage polypharmacy.   #HTN  - c/w Metoprolol 50mg bid (home med) - per notes this is for palpitations 2/2 anxiety managed by outpatient cardiologist.   #HLD  - c/w Nexletol 180mg qHS (home med).   - c/w IM [id number] q2 weeks (last given 1/2) (home med).   #GERD  - c/w Pantoprazole 40mg qD (home med).   #Vit B12 deficiency   - pt due for injection per chart, for now started on PO 1000mcg qD.   #Nausea  - PRN PO Zofran 4mg TID (home med) --> lower to qD with attempts to d/c PRN. Will add maalox PRN given pt report that nausea is often 2/2 indigestion depending on diet.   6. Collateral: 1/3 Updated  Yehuda at [phone] who is agreeable with plan above.   7. Disposition: pending clinical improvement; likely return to previous provider at [facility] vs partial.

## 2025-01-31 PROCEDURE — 90834 PSYTX W PT 45 MINUTES: CPT

## 2025-01-31 PROCEDURE — 99232 SBSQ HOSP IP/OBS MODERATE 35: CPT

## 2025-01-31 RX ORDER — CLONAZEPAM 0.5 MG/1
0.25 TABLET ORAL EVERY 8 HOURS
Refills: 0 | Status: DISCONTINUED | OUTPATIENT
Start: 2025-01-31 | End: 2025-02-06

## 2025-01-31 RX ADMIN — Medication 15 MILLILITER(S): at 18:04

## 2025-01-31 RX ADMIN — BUPROPION HYDROBROMIDE 100 MILLIGRAM(S): 522 TABLET, EXTENDED RELEASE ORAL at 09:35

## 2025-01-31 RX ADMIN — CLONAZEPAM 0.5 MILLIGRAM(S): 0.5 TABLET ORAL at 12:33

## 2025-01-31 RX ADMIN — Medication 40 MILLIGRAM(S): at 06:00

## 2025-01-31 RX ADMIN — Medication 15 MILLILITER(S): at 09:36

## 2025-01-31 RX ADMIN — GABAPENTIN 300 MILLIGRAM(S): 400 CAPSULE ORAL at 20:46

## 2025-01-31 RX ADMIN — Medication 1 MILLIGRAM(S): at 20:46

## 2025-01-31 RX ADMIN — METOPROLOL SUCCINATE 50 MILLIGRAM(S): 50 TABLET, EXTENDED RELEASE ORAL at 20:46

## 2025-01-31 RX ADMIN — MIRTAZAPINE 45 MILLIGRAM(S): 30 TABLET, FILM COATED ORAL at 20:46

## 2025-01-31 RX ADMIN — Medication 40 MILLIGRAM(S): at 20:46

## 2025-01-31 RX ADMIN — METOPROLOL SUCCINATE 50 MILLIGRAM(S): 50 TABLET, EXTENDED RELEASE ORAL at 09:35

## 2025-01-31 RX ADMIN — CLONAZEPAM 0.5 MILLIGRAM(S): 0.5 TABLET ORAL at 20:46

## 2025-01-31 RX ADMIN — CYANOCOBALAMIN 1000 MICROGRAM(S): 1000 INJECTION INTRAMUSCULAR; SUBCUTANEOUS at 09:35

## 2025-01-31 RX ADMIN — GABAPENTIN 200 MILLIGRAM(S): 400 CAPSULE ORAL at 09:34

## 2025-01-31 RX ADMIN — CLONAZEPAM 0.5 MILLIGRAM(S): 0.5 TABLET ORAL at 06:00

## 2025-01-31 RX ADMIN — Medication 10 MILLIGRAM(S): at 15:49

## 2025-01-31 NOTE — BH INPATIENT PSYCHIATRY PROGRESS NOTE - NSBHCHARTREVIEWVS_PSY_A_CORE FT
Vital Signs Last 24 Hrs  T(C): 36.2 (01-31-25 @ 07:48), Max: 36.2 (01-31-25 @ 07:48)  T(F): 97.2 (01-31-25 @ 07:48), Max: 97.2 (01-31-25 @ 07:48)  HR: 68 (01-30-25 @ 20:56) (68 - 68)  BP: 119/58 (01-30-25 @ 20:56) (119/58 - 119/58)  BP(mean): --  RR: --  SpO2: --    Orthostatic VS  01-31-25 @ 07:48  Lying BP: 128/82 HR: 64  Sitting BP: 136/79 HR: 69  Standing BP: --/-- HR: --  Site: --  Mode: --  Orthostatic VS  01-30-25 @ 08:01  Lying BP: --/-- HR: --  Sitting BP: 144/76 HR: 91  Standing BP: 145/86 HR: 99  Site: upper left arm  Mode: electronic

## 2025-01-31 NOTE — BH INPATIENT PSYCHIATRY PROGRESS NOTE - CURRENT MEDICATION
MEDICATIONS  (STANDING):  buPROPion SR (12-Hour) 100 milliGRAM(s) Oral daily  chlorhexidine 0.12% Liquid 15 milliLiter(s) Swish and Spit two times a day  clonazePAM  Tablet 0.5 milliGRAM(s) Oral <User Schedule>  cyanocobalamin 1000 MICROGram(s) Oral daily  evolocumab Injectable 140 milliGRAM(s) SubCutaneous every 2 weeks  gabapentin 300 milliGRAM(s) Oral at bedtime  gabapentin 200 milliGRAM(s) Oral <User Schedule>  l-methylfolate 15 milliGRAM(s) Oral daily  melatonin. 1 milliGRAM(s) Oral at bedtime  metoprolol succinate ER 50 milliGRAM(s) Oral two times a day  mirtazapine 45 milliGRAM(s) Oral at bedtime  Nexletol 180 mg 1 Tablet(s) 1 Tablet(s) Oral daily  pantoprazole    Tablet 40 milliGRAM(s) Oral <User Schedule>    MEDICATIONS  (PRN):  acetaminophen     Tablet .. 650 milliGRAM(s) Oral every 6 hours PRN Temp greater or equal to 38C (100.4F), Mild Pain (1 - 3), Moderate Pain (4 - 6)  clonazePAM Oral Disintegrating Tablet 0.25 milliGRAM(s) Oral every 8 hours PRN anxiety  famotidine    Tablet 10 milliGRAM(s) Oral every 6 hours PRN acid reflux symptroms  lidocaine   4% Patch 1 Patch Transdermal daily PRN sciatica pain

## 2025-01-31 NOTE — BH INPATIENT PSYCHIATRY PROGRESS NOTE - MSE UNSTRUCTURED FT
Awake and alert. Engaged, cooperative  SPEECH: Normal rate, tone, and volume.  MOTOR: mild PMR. No tremors or other abnormal movements noted. Stable gait  Mood: anxious  AFFECT: congruent, stable, intense, constricted  THOUGHT PROCESS: Linear but with obsessive, ruminative pattern   THOUGHT CONTENT: Denies SI or HI; no evidence of delusions. Somatically preoccupied today with nausea   INSIGHT: Poor. Though tentativels acknowledges some of her physical complaints may be from anxiety  JUDGMENT: Fair.  IMPULSE CONTROL: Intact on unit.

## 2025-01-31 NOTE — BH INPATIENT PSYCHIATRY PROGRESS NOTE - NSBHMETABOLIC_PSY_ALL_CORE_FT
BMI: BMI (kg/m2): 37.5 (12-27-24 @ 15:12)  HbA1c: A1C with Estimated Average Glucose Result: 5.5 % (01-02-25 @ 09:11)    Glucose:   BP: 119/58 (01-30-25 @ 20:56) (119/58 - 137/75)Vital Signs Last 24 Hrs  T(C): 36.2 (01-31-25 @ 07:48), Max: 36.2 (01-31-25 @ 07:48)  T(F): 97.2 (01-31-25 @ 07:48), Max: 97.2 (01-31-25 @ 07:48)  HR: 68 (01-30-25 @ 20:56) (68 - 68)  BP: 119/58 (01-30-25 @ 20:56) (119/58 - 119/58)  BP(mean): --  RR: --  SpO2: --    Orthostatic VS  01-31-25 @ 07:48  Lying BP: 128/82 HR: 64  Sitting BP: 136/79 HR: 69  Standing BP: --/-- HR: --  Site: --  Mode: --  Orthostatic VS  01-30-25 @ 08:01  Lying BP: --/-- HR: --  Sitting BP: 144/76 HR: 91  Standing BP: 145/86 HR: 99  Site: upper left arm  Mode: electronic    Lipid Panel: Date/Time: 01-02-25 @ 09:11  Cholesterol, Serum: 128  LDL Cholesterol Calculated: 53  HDL Cholesterol, Serum: 46  Total Cholesterol/HDL Ration Measurement: --  Triglycerides, Serum: 174

## 2025-01-31 NOTE — BH INPATIENT PSYCHIATRY PROGRESS NOTE - PRN MEDS
MEDICATIONS  (PRN):  acetaminophen     Tablet .. 650 milliGRAM(s) Oral every 6 hours PRN Temp greater or equal to 38C (100.4F), Mild Pain (1 - 3), Moderate Pain (4 - 6)  clonazePAM Oral Disintegrating Tablet 0.25 milliGRAM(s) Oral every 8 hours PRN anxiety  famotidine    Tablet 10 milliGRAM(s) Oral every 6 hours PRN acid reflux symptroms  lidocaine   4% Patch 1 Patch Transdermal daily PRN sciatica pain

## 2025-01-31 NOTE — BH INPATIENT PSYCHIATRY PROGRESS NOTE - NSBHASSESSSUMMFT_PSY_ALL_CORE
Ms. Gross is a 73yo female, , retired (), domiciled at home with , no children; PMHx HTN, HLD; PPHx d/o ANSON and MDD, 9 past psych admissions starting in 2019 (last [facility] April 2024 for worsening anxiety and depression), ECT at [facility] 2023, OP at [facility]; 1 past SA (OD on 9 pills of unknown medication in 2019), no h/o NSSIB, no h/o violence/aggression/legal issues, past marijuana use; initially admitted to [facility] 2W on 12/27 for increasing anxiety and SI w/o plans in the context of medication adjustments and psychosocial stressors.     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     On initial 2S assessment pt dysphoric, anxious, hopeless, and preoccupied with somatic sxs. There are elements of active passivity, severe sensitivity to feelings of inadequacy and complicated interpersonal relationships (, friends, providers). 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 Provided pt with psychoeducation regarding limitations of medications and need to focus on therapeutic interventions however she has limited insight into this and is intent on finding the right medication(s). For now will continue to slowly taper off nortriptyline.     1/6: Prominent treatment interfering behaviors including active passivity and rejection sensitivity. For now will continue nortriptyline taper. Considering atypical antidepressant trial. Pt reporting chronic b/l LE edema, will encourage her to use compression stocking and elevate legs, no indication for diuretic at this time.     1/7: Preoccupied with med side effects and physical sxs, but more positive about potential med trials. Will decrease qAM gabapentin 300mg to 200mg but otherwise will attempt to limit med changes other than nortriptyline taper. Will also attempt to discourage use of zofran PRNs given past difficulties tolerating multiple serotonergic medications.   1/8 Patient unchanged anxious ruminative unable to use coping skilss. Will dc NTP increase Remeron  1/9 Somatic anxious unable to utilize coping techniques. Plan behavior plan initiated, cont to titrate Remeorn. Added methylfolate for augmentation  1/10 Reports malaise, could be a viral illness vs somatic anxiety symptoms; otherwise tolerating treatment well    1/13: Dysphoric, tearful, anxious but denies SI/HI, discussed med plans and seeks reassurance.  Will increase Remeron to 37.5mg QHS.  1/14: Remains dysphoric, anxious, somatically focused, responds well to support and reassurance provided.  Will increase methylfolate to 15mg daily, provided zofran 4mg PO daily prn nausea, Lidoderm patch prn sciatic pain, will monitor tolerability and consider further titration of Remeron.  1/15: Anxious, depressed, somatic, seeks reassurance on exam, will increase Remeron to 45mg QHS tonight.  Pt agrees with plan as stated.  1/16: Anxious, ruminative but reports improvement, less somatic, tolerating Remeron 45mg and methylfolate 15mg, considering augmentation with Wellbutrin.   1/17: Dysphoric, anxious, denies SI, seeks support, reassurance, will change to regular tablet Klonopin formulation from disintegrating, continue current meds.  1/21 About same will start Wellbutrin to augment mirtazapine. Will change Zofran to pepcid to minimize serotonergic meds  1/22 Tolerating Wellbutrin con current dose. Will alllow Pepcid 10mg q6 prn with max three doses per 24 hours hopefully will seek less when feeling better  1/23 Gi focused today , cont meds will increase Wellbutrin and patient was seen by medicine who increased Protonix  1/24 Depressed, anxious somatic will increase Wellbutrin to 75mg/d, medicine increase protonix  1/25 No major improvement cont meds avoid making multiple changes which makes her anxious and more symptomatic  1/26 No change cont med tiral. Consider some form of neuropsych eval if subtle cog decline playing role in anxiety and treatment refractoriness  1/27 No major changes plan titrate bupropion to 100mg SR daily  1/28 No real change will increase Wellbutrin to 100mg SR daily add Peridex as she was on this as outpatient for oral hygiene  1/29 No major change suspect tremor may be due to anxiety then blamed on meds, patient encouraged to cont meds rather than reduce or dc and she is agreeable, also told if  wellbutrin sig improved depression but causes minor tremor that may be acceptable risk benefit  1/30 Look sl brighter today but does tend to fluctuate. Cont  current meds without change in dose, if tolerating plan increase Wellbutrin to 150mg  1/31 SLight gains able to tolerate Wellbutrin lack of known se complared with TCA is reassuring to her. Discussed TMS with patient and spouse  PLAN:   1. Legals: admitted to 2W on 9.13. 1/2 pt transferred to .  2. Safety: routine obs appropriate as no current active SI/SIB/HI/VI on unit.   3. Psychiatric  - c/w Nortriptyline 10mg qHS with plans to discontinue at pt request due to reports of tremors, worsening anxiety, difficulty sleeping at home doses (50mg to 25mg on 12/27, 12.5mg on 1/3). Will defer further medication changes until Nortriptyline is d/c'd given pt concern for side effects/past difficulty tolerating serotonergic meds.   - c/w Remeron 45mg PO QHS for mood/anxiety on 1/15 (home med).   - c/w Klonopin 0.5mg TID for anxiety (home med).   - Decreased Gabapentin 300mg BID to 200mg qAM/300mg qHS for anxiety and neuropathy due to pt concern for sedation. Home dose Gabapentin 300mg TID.   - Melatonin 1mg qHS for insomnia.   - PRNs           - For anxiety: Klonopin wafer 0.25 mg q8h. d/c'd PRN gabapentin to simplify meds.            - For agitation: PO Zyprexa 2.5mg TID.            - STAT IM Zyprexa 2.5mg if there are imminent risk to self/others due to severe agitation.   4. Therapy: I/G/M therapy as indicated.   5. Medical: admission labs and EKG reviewed and wnl.   #Dependent edema  - chronic b/l LE edema likely 2/2 chronic venous insufficiency/deconditioning. No cardiac history (multiple cards workups have been negative).   - encourage pt to utilize compression stockings, elevation, appropriate physical therapy/exercise. No indication for diuretics/attempting to discourage polypharmacy.   #HTN  - c/w Metoprolol 50mg bid (home med) - per notes this is for palpitations 2/2 anxiety managed by outpatient cardiologist.   #HLD  - c/w Nexletol 180mg qHS (home med).   - c/w IM [id number] q2 weeks (last given 1/2) (home med).   #GERD  - c/w Pantoprazole 40mg qD (home med).   #Vit B12 deficiency   - pt due for injection per chart, for now started on PO 1000mcg qD.   #Nausea  - PRN PO Zofran 4mg TID (home med) --> lower to qD with attempts to d/c PRN. Will add maalox PRN given pt report that nausea is often 2/2 indigestion depending on diet.   6. Collateral: 1/3 Updated  Yehuda at [phone] who is agreeable with plan above.   7. Disposition: pending clinical improvement; likely return to previous provider at [facility] vs partial.

## 2025-02-01 RX ORDER — MAGNESIUM, ALUMINUM HYDROXIDE 200-200 MG
30 TABLET,CHEWABLE ORAL ONCE
Refills: 0 | Status: COMPLETED | OUTPATIENT
Start: 2025-02-01 | End: 2025-02-01

## 2025-02-01 RX ADMIN — Medication 40 MILLIGRAM(S): at 06:32

## 2025-02-01 RX ADMIN — Medication 10 MILLIGRAM(S): at 09:40

## 2025-02-01 RX ADMIN — GABAPENTIN 300 MILLIGRAM(S): 400 CAPSULE ORAL at 20:24

## 2025-02-01 RX ADMIN — METOPROLOL SUCCINATE 50 MILLIGRAM(S): 50 TABLET, EXTENDED RELEASE ORAL at 09:40

## 2025-02-01 RX ADMIN — Medication 30 MILLILITER(S): at 12:50

## 2025-02-01 RX ADMIN — Medication 15 MILLILITER(S): at 09:39

## 2025-02-01 RX ADMIN — BUPROPION HYDROBROMIDE 100 MILLIGRAM(S): 522 TABLET, EXTENDED RELEASE ORAL at 09:39

## 2025-02-01 RX ADMIN — CLONAZEPAM 0.5 MILLIGRAM(S): 0.5 TABLET ORAL at 20:24

## 2025-02-01 RX ADMIN — CYANOCOBALAMIN 1000 MICROGRAM(S): 1000 INJECTION INTRAMUSCULAR; SUBCUTANEOUS at 09:41

## 2025-02-01 RX ADMIN — GABAPENTIN 200 MILLIGRAM(S): 400 CAPSULE ORAL at 09:40

## 2025-02-01 RX ADMIN — CLONAZEPAM 0.5 MILLIGRAM(S): 0.5 TABLET ORAL at 06:31

## 2025-02-01 RX ADMIN — Medication 1 MILLIGRAM(S): at 20:57

## 2025-02-01 RX ADMIN — CLONAZEPAM 0.5 MILLIGRAM(S): 0.5 TABLET ORAL at 12:49

## 2025-02-01 RX ADMIN — MIRTAZAPINE 45 MILLIGRAM(S): 30 TABLET, FILM COATED ORAL at 20:57

## 2025-02-01 RX ADMIN — Medication 15 MILLILITER(S): at 17:41

## 2025-02-01 RX ADMIN — METOPROLOL SUCCINATE 50 MILLIGRAM(S): 50 TABLET, EXTENDED RELEASE ORAL at 20:58

## 2025-02-02 RX ADMIN — MIRTAZAPINE 45 MILLIGRAM(S): 30 TABLET, FILM COATED ORAL at 20:50

## 2025-02-02 RX ADMIN — CLONAZEPAM 0.5 MILLIGRAM(S): 0.5 TABLET ORAL at 13:05

## 2025-02-02 RX ADMIN — CLONAZEPAM 0.5 MILLIGRAM(S): 0.5 TABLET ORAL at 20:49

## 2025-02-02 RX ADMIN — CLONAZEPAM 0.5 MILLIGRAM(S): 0.5 TABLET ORAL at 06:19

## 2025-02-02 RX ADMIN — GABAPENTIN 300 MILLIGRAM(S): 400 CAPSULE ORAL at 20:50

## 2025-02-02 RX ADMIN — CYANOCOBALAMIN 1000 MICROGRAM(S): 1000 INJECTION INTRAMUSCULAR; SUBCUTANEOUS at 09:55

## 2025-02-02 RX ADMIN — METOPROLOL SUCCINATE 50 MILLIGRAM(S): 50 TABLET, EXTENDED RELEASE ORAL at 20:50

## 2025-02-02 RX ADMIN — BUPROPION HYDROBROMIDE 100 MILLIGRAM(S): 522 TABLET, EXTENDED RELEASE ORAL at 09:56

## 2025-02-02 RX ADMIN — Medication 15 MILLILITER(S): at 17:42

## 2025-02-02 RX ADMIN — Medication 1 MILLIGRAM(S): at 20:50

## 2025-02-02 RX ADMIN — METOPROLOL SUCCINATE 50 MILLIGRAM(S): 50 TABLET, EXTENDED RELEASE ORAL at 09:56

## 2025-02-02 RX ADMIN — Medication 10 MILLIGRAM(S): at 09:56

## 2025-02-02 RX ADMIN — Medication 40 MILLIGRAM(S): at 20:50

## 2025-02-02 RX ADMIN — GABAPENTIN 200 MILLIGRAM(S): 400 CAPSULE ORAL at 09:55

## 2025-02-03 PROCEDURE — 90834 PSYTX W PT 45 MINUTES: CPT

## 2025-02-03 RX ORDER — BUPROPION HYDROBROMIDE 522 MG/1
150 TABLET, EXTENDED RELEASE ORAL ONCE
Refills: 0 | Status: COMPLETED | OUTPATIENT
Start: 2025-02-03 | End: 2025-02-03

## 2025-02-03 RX ORDER — BUPROPION HYDROBROMIDE 522 MG/1
150 TABLET, EXTENDED RELEASE ORAL DAILY
Refills: 0 | Status: DISCONTINUED | OUTPATIENT
Start: 2025-02-04 | End: 2025-02-19

## 2025-02-03 RX ADMIN — Medication 40 MILLIGRAM(S): at 21:27

## 2025-02-03 RX ADMIN — METOPROLOL SUCCINATE 50 MILLIGRAM(S): 50 TABLET, EXTENDED RELEASE ORAL at 08:28

## 2025-02-03 RX ADMIN — BUPROPION HYDROBROMIDE 100 MILLIGRAM(S): 522 TABLET, EXTENDED RELEASE ORAL at 08:28

## 2025-02-03 RX ADMIN — CLONAZEPAM 0.5 MILLIGRAM(S): 0.5 TABLET ORAL at 05:54

## 2025-02-03 RX ADMIN — CLONAZEPAM 0.5 MILLIGRAM(S): 0.5 TABLET ORAL at 21:28

## 2025-02-03 RX ADMIN — CLONAZEPAM 0.5 MILLIGRAM(S): 0.5 TABLET ORAL at 12:47

## 2025-02-03 RX ADMIN — Medication 10 MILLIGRAM(S): at 16:33

## 2025-02-03 RX ADMIN — BUPROPION HYDROBROMIDE 150 MILLIGRAM(S): 522 TABLET, EXTENDED RELEASE ORAL at 09:34

## 2025-02-03 RX ADMIN — Medication 15 MILLILITER(S): at 08:28

## 2025-02-03 RX ADMIN — Medication 10 MILLIGRAM(S): at 08:52

## 2025-02-03 RX ADMIN — GABAPENTIN 200 MILLIGRAM(S): 400 CAPSULE ORAL at 08:27

## 2025-02-03 RX ADMIN — MIRTAZAPINE 45 MILLIGRAM(S): 30 TABLET, FILM COATED ORAL at 21:27

## 2025-02-03 RX ADMIN — CYANOCOBALAMIN 1000 MICROGRAM(S): 1000 INJECTION INTRAMUSCULAR; SUBCUTANEOUS at 08:27

## 2025-02-03 RX ADMIN — GABAPENTIN 300 MILLIGRAM(S): 400 CAPSULE ORAL at 21:27

## 2025-02-03 RX ADMIN — METOPROLOL SUCCINATE 50 MILLIGRAM(S): 50 TABLET, EXTENDED RELEASE ORAL at 21:27

## 2025-02-03 NOTE — BH INPATIENT PSYCHIATRY PROGRESS NOTE - NSBHCHARTREVIEWVS_PSY_A_CORE FT
Vital Signs Last 24 Hrs  T(C): 36.8 (02-04-25 @ 05:49), Max: 36.8 (02-04-25 @ 05:49)  T(F): 98.3 (02-04-25 @ 05:49), Max: 98.3 (02-04-25 @ 05:49)  HR: 70 (02-04-25 @ 05:49) (70 - 70)  BP: --  BP(mean): --  RR: --  SpO2: --    Orthostatic VS  02-04-25 @ 05:49  Lying BP: --/-- HR: --  Sitting BP: 133/69 HR: --  Standing BP: 126/56 HR: --  Site: --  Mode: --  Orthostatic VS  02-03-25 @ 20:54  Lying BP: --/-- HR: --  Sitting BP: 136/65 HR: 71  Standing BP: 135/83 HR: 74  Site: --  Mode: --  Orthostatic VS  02-03-25 @ 07:31  Lying BP: --/-- HR: --  Sitting BP: 121/64 HR: 61  Standing BP: 130/70 HR: 64  Site: --  Mode: --

## 2025-02-03 NOTE — BH INPATIENT PSYCHIATRY PROGRESS NOTE - NSBHMETABOLIC_PSY_ALL_CORE_FT
BMI: BMI (kg/m2): 37.5 (12-27-24 @ 15:12)  HbA1c: A1C with Estimated Average Glucose Result: 5.5 % (01-02-25 @ 09:11)    Glucose:   BP: 128/81 (02-02-25 @ 20:28) (128/81 - 145/81)Vital Signs Last 24 Hrs  T(C): 36.8 (02-04-25 @ 05:49), Max: 36.8 (02-04-25 @ 05:49)  T(F): 98.3 (02-04-25 @ 05:49), Max: 98.3 (02-04-25 @ 05:49)  HR: 70 (02-04-25 @ 05:49) (70 - 70)  BP: --  BP(mean): --  RR: --  SpO2: --    Orthostatic VS  02-04-25 @ 05:49  Lying BP: --/-- HR: --  Sitting BP: 133/69 HR: --  Standing BP: 126/56 HR: --  Site: --  Mode: --  Orthostatic VS  02-03-25 @ 20:54  Lying BP: --/-- HR: --  Sitting BP: 136/65 HR: 71  Standing BP: 135/83 HR: 74  Site: --  Mode: --  Orthostatic VS  02-03-25 @ 07:31  Lying BP: --/-- HR: --  Sitting BP: 121/64 HR: 61  Standing BP: 130/70 HR: 64  Site: --  Mode: --    Lipid Panel: Date/Time: 01-02-25 @ 09:11  Cholesterol, Serum: 128  LDL Cholesterol Calculated: 53  HDL Cholesterol, Serum: 46  Total Cholesterol/HDL Ration Measurement: --  Triglycerides, Serum: 174

## 2025-02-03 NOTE — BH INPATIENT PSYCHIATRY PROGRESS NOTE - CURRENT MEDICATION
MEDICATIONS  (STANDING):  buPROPion XL (24-Hour) 150 milliGRAM(s) Oral daily  chlorhexidine 0.12% Liquid 15 milliLiter(s) Swish and Spit <User Schedule>  clonazePAM  Tablet 0.5 milliGRAM(s) Oral <User Schedule>  cyanocobalamin 1000 MICROGram(s) Oral daily  evolocumab Injectable 140 milliGRAM(s) SubCutaneous every 2 weeks  gabapentin 300 milliGRAM(s) Oral at bedtime  gabapentin 200 milliGRAM(s) Oral <User Schedule>  l-methylfolate 15 milliGRAM(s) Oral daily  melatonin. 1 milliGRAM(s) Oral at bedtime  metoprolol succinate ER 50 milliGRAM(s) Oral two times a day  mirtazapine 45 milliGRAM(s) Oral at bedtime  Nexletol 180 mg 1 Tablet(s) 1 Tablet(s) Oral daily  pantoprazole    Tablet 40 milliGRAM(s) Oral <User Schedule>    MEDICATIONS  (PRN):  acetaminophen     Tablet .. 650 milliGRAM(s) Oral every 6 hours PRN Temp greater or equal to 38C (100.4F), Mild Pain (1 - 3), Moderate Pain (4 - 6)  aluminum hydroxide/magnesium hydroxide/simethicone Suspension 30 milliLiter(s) Oral every 6 hours PRN Dyspepsia  clonazePAM Oral Disintegrating Tablet 0.25 milliGRAM(s) Oral every 8 hours PRN anxiety  famotidine    Tablet 10 milliGRAM(s) Oral every 6 hours PRN acid reflux symptroms  lidocaine   4% Patch 1 Patch Transdermal daily PRN sciatica pain

## 2025-02-03 NOTE — BH INPATIENT PSYCHIATRY PROGRESS NOTE - PRN MEDS
MEDICATIONS  (PRN):  acetaminophen     Tablet .. 650 milliGRAM(s) Oral every 6 hours PRN Temp greater or equal to 38C (100.4F), Mild Pain (1 - 3), Moderate Pain (4 - 6)  aluminum hydroxide/magnesium hydroxide/simethicone Suspension 30 milliLiter(s) Oral every 6 hours PRN Dyspepsia  clonazePAM Oral Disintegrating Tablet 0.25 milliGRAM(s) Oral every 8 hours PRN anxiety  famotidine    Tablet 10 milliGRAM(s) Oral every 6 hours PRN acid reflux symptroms  lidocaine   4% Patch 1 Patch Transdermal daily PRN sciatica pain

## 2025-02-03 NOTE — BH INPATIENT PSYCHIATRY PROGRESS NOTE - NSBHASSESSSUMMFT_PSY_ALL_CORE
Ms. Gross is a 73yo female, , retired (), domiciled at home with , no children; PMHx HTN, HLD; PPHx d/o ANSON and MDD, 9 past psych admissions starting in 2019 (last [facility] April 2024 for worsening anxiety and depression), ECT at [facility] 2023, OP at [facility]; 1 past SA (OD on 9 pills of unknown medication in 2019), no h/o NSSIB, no h/o violence/aggression/legal issues, past marijuana use; initially admitted to [facility] 2W on 12/27 for increasing anxiety and SI w/o plans in the context of medication adjustments and psychosocial stressors.     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     On initial 2S assessment pt dysphoric, anxious, hopeless, and preoccupied with somatic sxs. There are elements of active passivity, severe sensitivity to feelings of inadequacy and complicated interpersonal relationships (, friends, providers). 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 Provided pt with psychoeducation regarding limitations of medications and need to focus on therapeutic interventions however she has limited insight into this and is intent on finding the right medication(s). For now will continue to slowly taper off nortriptyline.     1/6: Prominent treatment interfering behaviors including active passivity and rejection sensitivity. For now will continue nortriptyline taper. Considering atypical antidepressant trial. Pt reporting chronic b/l LE edema, will encourage her to use compression stocking and elevate legs, no indication for diuretic at this time.     1/7: Preoccupied with med side effects and physical sxs, but more positive about potential med trials. Will decrease qAM gabapentin 300mg to 200mg but otherwise will attempt to limit med changes other than nortriptyline taper. Will also attempt to discourage use of zofran PRNs given past difficulties tolerating multiple serotonergic medications.   1/8 Patient unchanged anxious ruminative unable to use coping skilss. Will dc NTP increase Remeron  1/9 Somatic anxious unable to utilize coping techniques. Plan behavior plan initiated, cont to titrate Remeorn. Added methylfolate for augmentation  1/10 Reports malaise, could be a viral illness vs somatic anxiety symptoms; otherwise tolerating treatment well    1/13: Dysphoric, tearful, anxious but denies SI/HI, discussed med plans and seeks reassurance.  Will increase Remeron to 37.5mg QHS.  1/14: Remains dysphoric, anxious, somatically focused, responds well to support and reassurance provided.  Will increase methylfolate to 15mg daily, provided zofran 4mg PO daily prn nausea, Lidoderm patch prn sciatic pain, will monitor tolerability and consider further titration of Remeron.  1/15: Anxious, depressed, somatic, seeks reassurance on exam, will increase Remeron to 45mg QHS tonight.  Pt agrees with plan as stated.  1/16: Anxious, ruminative but reports improvement, less somatic, tolerating Remeron 45mg and methylfolate 15mg, considering augmentation with Wellbutrin.   1/17: Dysphoric, anxious, denies SI, seeks support, reassurance, will change to regular tablet Klonopin formulation from disintegrating, continue current meds.  1/21 About same will start Wellbutrin to augment mirtazapine. Will change Zofran to pepcid to minimize serotonergic meds  1/22 Tolerating Wellbutrin con current dose. Will alllow Pepcid 10mg q6 prn with max three doses per 24 hours hopefully will seek less when feeling better  1/23 Gi focused today , cont meds will increase Wellbutrin and patient was seen by medicine who increased Protonix  1/24 Depressed, anxious somatic will increase Wellbutrin to 75mg/d, medicine increase protonix  1/25 No major improvement cont meds avoid making multiple changes which makes her anxious and more symptomatic  1/26 No change cont med tiral. Consider some form of neuropsych eval if subtle cog decline playing role in anxiety and treatment refractoriness  1/27 No major changes plan titrate bupropion to 100mg SR daily  1/28 No real change will increase Wellbutrin to 100mg SR daily add Peridex as she was on this as outpatient for oral hygiene  1/29 No major change suspect tremor may be due to anxiety then blamed on meds, patient encouraged to cont meds rather than reduce or dc and she is agreeable, also told if  wellbutrin sig improved depression but causes minor tremor that may be acceptable risk benefit  1/30 Look sl brighter today but does tend to fluctuate. Cont  current meds without change in dose, if tolerating plan increase Wellbutrin to 150mg  1/31 SLight gains able to tolerate Wellbutrin lack of known se compared with TCA is reassuring to her. Discussed TMS with patient and spouse  2/3 Looks a little better, alternate complaints between and anxiety and depression  PLAN:   1. Legals: admitted to 2 on 9.13. 1/2 pt transferred to .  2. Safety: routine obs appropriate as no current active SI/SIB/HI/VI on unit.   3. Psychiatric  - c/w Nortriptyline 10mg qHS with plans to discontinue at pt request due to reports of tremors, worsening anxiety, difficulty sleeping at home doses (50mg to 25mg on 12/27, 12.5mg on 1/3). Will defer further medication changes until Nortriptyline is d/c'd given pt concern for side effects/past difficulty tolerating serotonergic meds.   - c/w Remeron 45mg PO QHS for mood/anxiety on 1/15 (home med).   - c/w Klonopin 0.5mg TID for anxiety (home med).   - Decreased Gabapentin 300mg BID to 200mg qAM/300mg qHS for anxiety and neuropathy due to pt concern for sedation. Home dose Gabapentin 300mg TID.   - Melatonin 1mg qHS for insomnia.   - PRNs           - For anxiety: Klonopin wafer 0.25 mg q8h. d/c'd PRN gabapentin to simplify meds.            - For agitation: PO Zyprexa 2.5mg TID.            - STAT IM Zyprexa 2.5mg if there are imminent risk to self/others due to severe agitation.   4. Therapy: I/G/M therapy as indicated.   5. Medical: admission labs and EKG reviewed and wnl.   #Dependent edema  - chronic b/l LE edema likely 2/2 chronic venous insufficiency/deconditioning. No cardiac history (multiple cards workups have been negative).   - encourage pt to utilize compression stockings, elevation, appropriate physical therapy/exercise. No indication for diuretics/attempting to discourage polypharmacy.   #HTN  - c/w Metoprolol 50mg bid (home med) - per notes this is for palpitations 2/2 anxiety managed by outpatient cardiologist.   #HLD  - c/w Nexletol 180mg qHS (home med).   - c/w IM [id number] q2 weeks (last given 1/2) (home med).   #GERD  - c/w Pantoprazole 40mg qD (home med).   #Vit B12 deficiency   - pt due for injection per chart, for now started on PO 1000mcg qD.   #Nausea  - PRN PO Zofran 4mg TID (home med) --> lower to qD with attempts to d/c PRN. Will add maalox PRN given pt report that nausea is often 2/2 indigestion depending on diet.   6. Collateral: 1/3 Updated  Yehuda at [phone] who is agreeable with plan above.   7. Disposition: pending clinical improvement; likely return to previous provider at [facility] vs partial.

## 2025-02-03 NOTE — BH INPATIENT PSYCHIATRY PROGRESS NOTE - MSE UNSTRUCTURED FT
Awake and alert. Engaged, cooperative  SPEECH: Normal rate, tone, and volume.  MOTOR: mild PMR. No tremors or other abnormal movements noted. Stable gait  Mood: anxious  AFFECT: congruent, stable, intense, constricted  THOUGHT PROCESS: Linear but with obsessive, ruminative pattern   THOUGHT CONTENT: Denies SI or HI; no evidence of delusions. Somatically preoccupied , expresses what if type worries such as what I feel better but then that feeling goes away when I go her  INSIGHT: Poor. Though tentatively acknowledges some of her physical complaints may be from anxiety  JUDGMENT: Fair.  IMPULSE CONTROL: Intact on unit.

## 2025-02-04 PROCEDURE — 99232 SBSQ HOSP IP/OBS MODERATE 35: CPT

## 2025-02-04 RX ORDER — B1/B2/B3/B5/B6/B12/VIT C/FOLIC 500-0.5 MG
1 TABLET ORAL DAILY
Refills: 0 | Status: DISCONTINUED | OUTPATIENT
Start: 2025-02-05 | End: 2025-02-19

## 2025-02-04 RX ORDER — MAGNESIUM, ALUMINUM HYDROXIDE 200-200 MG
30 TABLET,CHEWABLE ORAL EVERY 6 HOURS
Refills: 0 | Status: DISCONTINUED | OUTPATIENT
Start: 2025-02-04 | End: 2025-02-19

## 2025-02-04 RX ORDER — B1/B2/B3/B5/B6/B12/VIT C/FOLIC 500-0.5 MG
1 TABLET ORAL ONCE
Refills: 0 | Status: COMPLETED | OUTPATIENT
Start: 2025-02-04 | End: 2025-02-04

## 2025-02-04 RX ADMIN — BUPROPION HYDROBROMIDE 150 MILLIGRAM(S): 522 TABLET, EXTENDED RELEASE ORAL at 08:31

## 2025-02-04 RX ADMIN — Medication 15 MILLILITER(S): at 08:31

## 2025-02-04 RX ADMIN — CLONAZEPAM 0.5 MILLIGRAM(S): 0.5 TABLET ORAL at 12:02

## 2025-02-04 RX ADMIN — Medication 10 MILLIGRAM(S): at 08:43

## 2025-02-04 RX ADMIN — CYANOCOBALAMIN 1000 MICROGRAM(S): 1000 INJECTION INTRAMUSCULAR; SUBCUTANEOUS at 08:31

## 2025-02-04 RX ADMIN — Medication 1 TABLET(S): at 12:02

## 2025-02-04 RX ADMIN — CLONAZEPAM 0.5 MILLIGRAM(S): 0.5 TABLET ORAL at 21:27

## 2025-02-04 RX ADMIN — METOPROLOL SUCCINATE 50 MILLIGRAM(S): 50 TABLET, EXTENDED RELEASE ORAL at 08:31

## 2025-02-04 RX ADMIN — GABAPENTIN 200 MILLIGRAM(S): 400 CAPSULE ORAL at 08:31

## 2025-02-04 RX ADMIN — CLONAZEPAM 0.5 MILLIGRAM(S): 0.5 TABLET ORAL at 06:46

## 2025-02-04 RX ADMIN — GABAPENTIN 300 MILLIGRAM(S): 400 CAPSULE ORAL at 21:27

## 2025-02-04 RX ADMIN — MIRTAZAPINE 45 MILLIGRAM(S): 30 TABLET, FILM COATED ORAL at 21:28

## 2025-02-04 RX ADMIN — Medication 15 MILLILITER(S): at 17:00

## 2025-02-04 RX ADMIN — METOPROLOL SUCCINATE 50 MILLIGRAM(S): 50 TABLET, EXTENDED RELEASE ORAL at 21:27

## 2025-02-04 RX ADMIN — Medication 40 MILLIGRAM(S): at 21:27

## 2025-02-04 RX ADMIN — Medication 1 MILLIGRAM(S): at 21:27

## 2025-02-04 NOTE — BH INPATIENT PSYCHIATRY PROGRESS NOTE - NSBHASSESSSUMMFT_PSY_ALL_CORE
Ms. Gross is a 75yo female, , retired (), domiciled at home with , no children; PMHx HTN, HLD; PPHx d/o ANSON and MDD, 9 past psych admissions starting in 2019 (last [facility] April 2024 for worsening anxiety and depression), ECT at [facility]23, OP at [facility]; 1 past SA (OD on 9 pills of unknown medication in 2019), no h/o NSSIB, no h/o violence/aggression/legal issues, past marijuana use; initially admitted to [facility] 2W on 12/27 for increasing anxiety and SI w/o plans in the context of medication adjustments and psychosocial stressors.     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     On initial 2S assessment pt dysphoric, anxious, hopeless, and preoccupied with somatic sxs. There are elements of active passivity, severe sensitivity to feelings of inadequacy and complicated interpersonal relationships (, friends, providers). 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 Provided pt with psychoeducation regarding limitations of medications and need to focus on therapeutic interventions however she has limited insight into this and is intent on finding the right medication(s). For now will continue to slowly taper off nortriptyline.     1/6: Prominent treatment interfering behaviors including active passivity and rejection sensitivity. For now will continue nortriptyline taper. Considering atypical antidepressant trial. Pt reporting chronic b/l LE edema, will encourage her to use compression stocking and elevate legs, no indication for diuretic at this time.     1/7: Preoccupied with med side effects and physical sxs, but more positive about potential med trials. Will decrease qAM gabapentin 300mg to 200mg but otherwise will attempt to limit med changes other than nortriptyline taper. Will also attempt to discourage use of zofran PRNs given past difficulties tolerating multiple serotonergic medications.   1/8 Patient unchanged anxious ruminative unable to use coping skilss. Will dc NTP increase Remeron  1/9 Somatic anxious unable to utilize coping techniques. Plan behavior plan initiated, cont to titrate Remeorn. Added methylfolate for augmentation  1/10 Reports malaise, could be a viral illness vs somatic anxiety symptoms; otherwise tolerating treatment well    1/13: Dysphoric, tearful, anxious but denies SI/HI, discussed med plans and seeks reassurance.  Will increase Remeron to 37.5mg QHS.  1/14: Remains dysphoric, anxious, somatically focused, responds well to support and reassurance provided.  Will increase methylfolate to 15mg daily, provided zofran 4mg PO daily prn nausea, Lidoderm patch prn sciatic pain, will monitor tolerability and consider further titration of Remeron.  1/15: Anxious, depressed, somatic, seeks reassurance on exam, will increase Remeron to 45mg QHS tonight.  Pt agrees with plan as stated.  1/16: Anxious, ruminative but reports improvement, less somatic, tolerating Remeron 45mg and methylfolate 15mg, considering augmentation with Wellbutrin.   1/17: Dysphoric, anxious, denies SI, seeks support, reassurance, will change to regular tablet Klonopin formulation from disintegrating, continue current meds.  1/21 About same will start Wellbutrin to augment mirtazapine. Will change Zofran to pepcid to minimize serotonergic meds  1/22 Tolerating Wellbutrin con current dose. Will alllow Pepcid 10mg q6 prn with max three doses per 24 hours hopefully will seek less when feeling better  1/23 Gi focused today , cont meds will increase Wellbutrin and patient was seen by medicine who increased Protonix  1/24 Depressed, anxious somatic will increase Wellbutrin to 75mg/d, medicine increase protonix  1/25 No major improvement cont meds avoid making multiple changes which makes her anxious and more symptomatic  1/26 No change cont med tiral. Consider some form of neuropsych eval if subtle cog decline playing role in anxiety and treatment refractoriness  1/27 No major changes plan titrate bupropion to 100mg SR daily  1/28 No real change will increase Wellbutrin to 100mg SR daily add Peridex as she was on this as outpatient for oral hygiene  1/29 No major change suspect tremor may be due to anxiety then blamed on meds, patient encouraged to cont meds rather than reduce or dc and she is agreeable, also told if  wellbutrin sig improved depression but causes minor tremor that may be acceptable risk benefit  1/30 Look sl brighter today but does tend to fluctuate. Cont  current meds without change in dose, if tolerating plan increase Wellbutrin to 150mg  1/31 SLight gains able to tolerate Wellbutrin lack of known se compared with TCA is reassuring to her. Discussed TMS with patient and spouse  2/3 Looks a little better, alternate complaints between and anxiety and depression  2/4 Modest gains, remains somatic , often shifting complaints. Will add Maalox prn suspect she will request Zofran which for now trying to avoid  PLAN:   1. Legals: admitted to 2 on 9.13. 1/2 pt transferred to .  2. Safety: routine obs appropriate as no current active SI/SIB/HI/VI on unit.   3. Psychiatric  - c/w Nortriptyline 10mg qHS with plans to discontinue at pt request due to reports of tremors, worsening anxiety, difficulty sleeping at home doses (50mg to 25mg on 12/27, 12.5mg on 1/3). Will defer further medication changes until Nortriptyline is d/c'd given pt concern for side effects/past difficulty tolerating serotonergic meds.   - c/w Remeron 45mg PO QHS for mood/anxiety on 1/15 (home med).   - c/w Klonopin 0.5mg TID for anxiety (home med).   - Decreased Gabapentin 300mg BID to 200mg qAM/300mg qHS for anxiety and neuropathy due to pt concern for sedation. Home dose Gabapentin 300mg TID.   - Melatonin 1mg qHS for insomnia.   - PRNs           - For anxiety: Klonopin wafer 0.25 mg q8h. d/c'd PRN gabapentin to simplify meds.            - For agitation: PO Zyprexa 2.5mg TID.            - STAT IM Zyprexa 2.5mg if there are imminent risk to self/others due to severe agitation.   4. Therapy: I/G/M therapy as indicated.   5. Medical: admission labs and EKG reviewed and wnl.   #Dependent edema  - chronic b/l LE edema likely 2/2 chronic venous insufficiency/deconditioning. No cardiac history (multiple cards workups have been negative).   - encourage pt to utilize compression stockings, elevation, appropriate physical therapy/exercise. No indication for diuretics/attempting to discourage polypharmacy.   #HTN  - c/w Metoprolol 50mg bid (home med) - per notes this is for palpitations 2/2 anxiety managed by outpatient cardiologist.   #HLD  - c/w Nexletol 180mg qHS (home med).   - c/w IM [id number] q2 weeks (last given 1/2) (home med).   #GERD  - c/w Pantoprazole 40mg qD (home med).   #Vit B12 deficiency   - pt due for injection per chart, for now started on PO 1000mcg qD.   #Nausea  - PRN PO Zofran 4mg TID (home med) --> lower to qD with attempts to d/c PRN. Will add maalox PRN given pt report that nausea is often 2/2 indigestion depending on diet.   6. Collateral: 1/3 Updated  Yehuda at [phone] who is agreeable with plan above.   7. Disposition: pending clinical improvement; likely return to previous provider at [facility] vs partial.

## 2025-02-05 PROCEDURE — 90832 PSYTX W PT 30 MINUTES: CPT

## 2025-02-05 PROCEDURE — 99232 SBSQ HOSP IP/OBS MODERATE 35: CPT

## 2025-02-05 RX ORDER — FLUTICASONE PROPIONATE 50 UG/1
1 SPRAY, METERED NASAL
Refills: 0 | Status: DISCONTINUED | OUTPATIENT
Start: 2025-02-05 | End: 2025-02-19

## 2025-02-05 RX ORDER — FLUTICASONE PROPIONATE 50 UG/1
1 SPRAY, METERED NASAL ONCE
Refills: 0 | Status: COMPLETED | OUTPATIENT
Start: 2025-02-05 | End: 2025-02-05

## 2025-02-05 RX ADMIN — Medication 1 TABLET(S): at 09:39

## 2025-02-05 RX ADMIN — CYANOCOBALAMIN 1000 MICROGRAM(S): 1000 INJECTION INTRAMUSCULAR; SUBCUTANEOUS at 09:39

## 2025-02-05 RX ADMIN — Medication 40 MILLIGRAM(S): at 21:39

## 2025-02-05 RX ADMIN — BUPROPION HYDROBROMIDE 150 MILLIGRAM(S): 522 TABLET, EXTENDED RELEASE ORAL at 09:39

## 2025-02-05 RX ADMIN — CLONAZEPAM 0.5 MILLIGRAM(S): 0.5 TABLET ORAL at 21:39

## 2025-02-05 RX ADMIN — GABAPENTIN 300 MILLIGRAM(S): 400 CAPSULE ORAL at 21:39

## 2025-02-05 RX ADMIN — METOPROLOL SUCCINATE 50 MILLIGRAM(S): 50 TABLET, EXTENDED RELEASE ORAL at 09:39

## 2025-02-05 RX ADMIN — GABAPENTIN 200 MILLIGRAM(S): 400 CAPSULE ORAL at 09:39

## 2025-02-05 RX ADMIN — CLONAZEPAM 0.5 MILLIGRAM(S): 0.5 TABLET ORAL at 12:17

## 2025-02-05 RX ADMIN — Medication 1 MILLIGRAM(S): at 21:39

## 2025-02-05 RX ADMIN — FLUTICASONE PROPIONATE 1 SPRAY(S): 50 SPRAY, METERED NASAL at 12:55

## 2025-02-05 RX ADMIN — Medication 40 MILLIGRAM(S): at 06:17

## 2025-02-05 RX ADMIN — Medication 10 MILLIGRAM(S): at 12:54

## 2025-02-05 RX ADMIN — MIRTAZAPINE 45 MILLIGRAM(S): 30 TABLET, FILM COATED ORAL at 21:39

## 2025-02-05 RX ADMIN — METOPROLOL SUCCINATE 50 MILLIGRAM(S): 50 TABLET, EXTENDED RELEASE ORAL at 21:39

## 2025-02-05 RX ADMIN — CLONAZEPAM 0.5 MILLIGRAM(S): 0.5 TABLET ORAL at 06:17

## 2025-02-05 RX ADMIN — Medication 15 MILLILITER(S): at 17:01

## 2025-02-05 RX ADMIN — Medication 15 MILLILITER(S): at 09:39

## 2025-02-05 NOTE — BH INPATIENT PSYCHIATRY PROGRESS NOTE - NSBHMETABOLIC_PSY_ALL_CORE_FT
BMI: BMI (kg/m2): 37.5 (12-27-24 @ 15:12)  HbA1c: A1C with Estimated Average Glucose Result: 5.5 % (01-02-25 @ 09:11)    Glucose:   BP: 121/70 (02-05-25 @ 07:51) (121/70 - 128/81)Vital Signs Last 24 Hrs  T(C): 37.1 (02-05-25 @ 07:51), Max: 37.1 (02-05-25 @ 07:51)  T(F): 98.8 (02-05-25 @ 07:51), Max: 98.8 (02-05-25 @ 07:51)  HR: --  BP: 121/70 (02-05-25 @ 07:51) (121/70 - 121/70)  BP(mean): 62 (02-05-25 @ 07:51) (62 - 62)  RR: --  SpO2: --    Orthostatic VS  02-04-25 @ 21:02  Lying BP: --/-- HR: --  Sitting BP: 139/75 HR: 67  Standing BP: --/-- HR: --  Site: --  Mode: --  Orthostatic VS  02-04-25 @ 05:49  Lying BP: --/-- HR: --  Sitting BP: 133/69 HR: --  Standing BP: 126/56 HR: --  Site: --  Mode: --  Orthostatic VS  02-03-25 @ 20:54  Lying BP: --/-- HR: --  Sitting BP: 136/65 HR: 71  Standing BP: 135/83 HR: 74  Site: --  Mode: --    Lipid Panel: Date/Time: 01-02-25 @ 09:11  Cholesterol, Serum: 128  LDL Cholesterol Calculated: 53  HDL Cholesterol, Serum: 46  Total Cholesterol/HDL Ration Measurement: --  Triglycerides, Serum: 174

## 2025-02-05 NOTE — BH INPATIENT PSYCHIATRY PROGRESS NOTE - NSBHCHARTREVIEWVS_PSY_A_CORE FT
Vital Signs Last 24 Hrs  T(C): 37.1 (02-05-25 @ 07:51), Max: 37.1 (02-05-25 @ 07:51)  T(F): 98.8 (02-05-25 @ 07:51), Max: 98.8 (02-05-25 @ 07:51)  HR: --  BP: 121/70 (02-05-25 @ 07:51) (121/70 - 121/70)  BP(mean): 62 (02-05-25 @ 07:51) (62 - 62)  RR: --  SpO2: --    Orthostatic VS  02-04-25 @ 21:02  Lying BP: --/-- HR: --  Sitting BP: 139/75 HR: 67  Standing BP: --/-- HR: --  Site: --  Mode: --  Orthostatic VS  02-04-25 @ 05:49  Lying BP: --/-- HR: --  Sitting BP: 133/69 HR: --  Standing BP: 126/56 HR: --  Site: --  Mode: --  Orthostatic VS  02-03-25 @ 20:54  Lying BP: --/-- HR: --  Sitting BP: 136/65 HR: 71  Standing BP: 135/83 HR: 74  Site: --  Mode: --

## 2025-02-05 NOTE — BH INPATIENT PSYCHIATRY PROGRESS NOTE - CURRENT MEDICATION
MEDICATIONS  (STANDING):  buPROPion XL (24-Hour) 150 milliGRAM(s) Oral daily  chlorhexidine 0.12% Liquid 15 milliLiter(s) Swish and Spit <User Schedule>  clonazePAM  Tablet 0.5 milliGRAM(s) Oral <User Schedule>  cyanocobalamin 1000 MICROGram(s) Oral daily  evolocumab Injectable 140 milliGRAM(s) SubCutaneous every 2 weeks  fluticasone propionate 50 MICROgram(s)/spray Nasal Spray 1 Spray(s) Both Nostrils two times a day  gabapentin 300 milliGRAM(s) Oral at bedtime  gabapentin 200 milliGRAM(s) Oral <User Schedule>  l-methylfolate 15 milliGRAM(s) Oral daily  melatonin. 1 milliGRAM(s) Oral at bedtime  metoprolol succinate ER 50 milliGRAM(s) Oral two times a day  mirtazapine 45 milliGRAM(s) Oral at bedtime  multivitamin 1 Tablet(s) Oral daily  Nexletol 180 mg 1 Tablet(s) 1 Tablet(s) Oral daily  pantoprazole    Tablet 40 milliGRAM(s) Oral <User Schedule>    MEDICATIONS  (PRN):  acetaminophen     Tablet .. 650 milliGRAM(s) Oral every 6 hours PRN Temp greater or equal to 38C (100.4F), Mild Pain (1 - 3), Moderate Pain (4 - 6)  aluminum hydroxide/magnesium hydroxide/simethicone Suspension 30 milliLiter(s) Oral every 6 hours PRN Dyspepsia  clonazePAM Oral Disintegrating Tablet 0.25 milliGRAM(s) Oral every 8 hours PRN anxiety  famotidine    Tablet 10 milliGRAM(s) Oral every 6 hours PRN acid reflux symptroms  lidocaine   4% Patch 1 Patch Transdermal daily PRN sciatica pain

## 2025-02-05 NOTE — BH INPATIENT PSYCHIATRY PROGRESS NOTE - NSBHASSESSSUMMFT_PSY_ALL_CORE
Ms. Gross is a 73yo female, , retired (), domiciled at home with , no children; PMHx HTN, HLD; PPHx d/o ANSON and MDD, 9 past psych admissions starting in 2019 (last [facility] April 2024 for worsening anxiety and depression), ECT at [facility] 2023, OP at [facility]; 1 past SA (OD on 9 pills of unknown medication in 2019), no h/o NSSIB, no h/o violence/aggression/legal issues, past marijuana use; initially admitted to [facility] 2W on 12/27 for increasing anxiety and SI w/o plans in the context of medication adjustments and psychosocial stressors.     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     On initial 2S assessment pt dysphoric, anxious, hopeless, and preoccupied with somatic sxs. There are elements of active passivity, severe sensitivity to feelings of inadequacy and complicated interpersonal relationships (, friends, providers). 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 Provided pt with psychoeducation regarding limitations of medications and need to focus on therapeutic interventions however she has limited insight into this and is intent on finding the right medication(s). For now will continue to slowly taper off nortriptyline.     1/6: Prominent treatment interfering behaviors including active passivity and rejection sensitivity. For now will continue nortriptyline taper. Considering atypical antidepressant trial. Pt reporting chronic b/l LE edema, will encourage her to use compression stocking and elevate legs, no indication for diuretic at this time.     1/7: Preoccupied with med side effects and physical sxs, but more positive about potential med trials. Will decrease qAM gabapentin 300mg to 200mg but otherwise will attempt to limit med changes other than nortriptyline taper. Will also attempt to discourage use of zofran PRNs given past difficulties tolerating multiple serotonergic medications.   1/8 Patient unchanged anxious ruminative unable to use coping skilss. Will dc NTP increase Remeron  1/9 Somatic anxious unable to utilize coping techniques. Plan behavior plan initiated, cont to titrate Remeorn. Added methylfolate for augmentation  1/10 Reports malaise, could be a viral illness vs somatic anxiety symptoms; otherwise tolerating treatment well    1/13: Dysphoric, tearful, anxious but denies SI/HI, discussed med plans and seeks reassurance.  Will increase Remeron to 37.5mg QHS.  1/14: Remains dysphoric, anxious, somatically focused, responds well to support and reassurance provided.  Will increase methylfolate to 15mg daily, provided zofran 4mg PO daily prn nausea, Lidoderm patch prn sciatic pain, will monitor tolerability and consider further titration of Remeron.  1/15: Anxious, depressed, somatic, seeks reassurance on exam, will increase Remeron to 45mg QHS tonight.  Pt agrees with plan as stated.  1/16: Anxious, ruminative but reports improvement, less somatic, tolerating Remeron 45mg and methylfolate 15mg, considering augmentation with Wellbutrin.   1/17: Dysphoric, anxious, denies SI, seeks support, reassurance, will change to regular tablet Klonopin formulation from disintegrating, continue current meds.  1/21 About same will start Wellbutrin to augment mirtazapine. Will change Zofran to pepcid to minimize serotonergic meds  1/22 Tolerating Wellbutrin con current dose. Will alllow Pepcid 10mg q6 prn with max three doses per 24 hours hopefully will seek less when feeling better  1/23 Gi focused today , cont meds will increase Wellbutrin and patient was seen by medicine who increased Protonix  1/24 Depressed, anxious somatic will increase Wellbutrin to 75mg/d, medicine increase protonix  1/25 No major improvement cont meds avoid making multiple changes which makes her anxious and more symptomatic  1/26 No change cont med tiral. Consider some form of neuropsych eval if subtle cog decline playing role in anxiety and treatment refractoriness  1/27 No major changes plan titrate bupropion to 100mg SR daily  1/28 No real change will increase Wellbutrin to 100mg SR daily add Peridex as she was on this as outpatient for oral hygiene  1/29 No major change suspect tremor may be due to anxiety then blamed on meds, patient encouraged to cont meds rather than reduce or dc and she is agreeable, also told if  wellbutrin sig improved depression but causes minor tremor that may be acceptable risk benefit  1/30 Look sl brighter today but does tend to fluctuate. Cont  current meds without change in dose, if tolerating plan increase Wellbutrin to 150mg  1/31 SLight gains able to tolerate Wellbutrin lack of known se compared with TCA is reassuring to her. Discussed TMS with patient and spouse  2/3 Looks a little better, alternate complaints between and anxiety and depression  2/4 Modest gains, remains somatic , often shifting complaints. Will add Maalox prn suspect she will request Zofran which for now trying to avoid  2/5 Reports some improvement and not appearing as agitated, cont meds will add Flonase  PLAN:   1. Legals: admitted to 2 on 9.13. 1/2 pt transferred to .  2. Safety: routine obs appropriate as no current active SI/SIB/HI/VI on unit.   3. Psychiatric  - c/w Nortriptyline 10mg qHS with plans to discontinue at pt request due to reports of tremors, worsening anxiety, difficulty sleeping at home doses (50mg to 25mg on 12/27, 12.5mg on 1/3). Will defer further medication changes until Nortriptyline is d/c'd given pt concern for side effects/past difficulty tolerating serotonergic meds.   - c/w Remeron 45mg PO QHS for mood/anxiety on 1/15 (home med).   - c/w Klonopin 0.5mg TID for anxiety (home med).   - Decreased Gabapentin 300mg BID to 200mg qAM/300mg qHS for anxiety and neuropathy due to pt concern for sedation. Home dose Gabapentin 300mg TID.   - Melatonin 1mg qHS for insomnia.   - PRNs           - For anxiety: Klonopin wafer 0.25 mg q8h. d/c'd PRN gabapentin to simplify meds.            - For agitation: PO Zyprexa 2.5mg TID.            - STAT IM Zyprexa 2.5mg if there are imminent risk to self/others due to severe agitation.   4. Therapy: I/G/M therapy as indicated.   5. Medical: admission labs and EKG reviewed and wnl.   #Dependent edema  - chronic b/l LE edema likely 2/2 chronic venous insufficiency/deconditioning. No cardiac history (multiple cards workups have been negative).   - encourage pt to utilize compression stockings, elevation, appropriate physical therapy/exercise. No indication for diuretics/attempting to discourage polypharmacy.   #HTN  - c/w Metoprolol 50mg bid (home med) - per notes this is for palpitations 2/2 anxiety managed by outpatient cardiologist.   #HLD  - c/w Nexletol 180mg qHS (home med).   - c/w IM [id number] q2 weeks (last given 1/2) (home med).   #GERD  - c/w Pantoprazole 40mg qD (home med).   #Vit B12 deficiency   - pt due for injection per chart, for now started on PO 1000mcg qD.   #Nausea  - PRN PO Zofran 4mg TID (home med) --> lower to qD with attempts to d/c PRN. Will add maalox PRN given pt report that nausea is often 2/2 indigestion depending on diet.   6. Collateral: 1/3 Updated  Yehuda at [phone] who is agreeable with plan above.   7. Disposition: pending clinical improvement; likely return to previous provider at [facility] vs partial.

## 2025-02-06 PROCEDURE — 99232 SBSQ HOSP IP/OBS MODERATE 35: CPT

## 2025-02-06 RX ORDER — CLONAZEPAM 0.5 MG/1
0.5 TABLET ORAL
Refills: 0 | Status: DISCONTINUED | OUTPATIENT
Start: 2025-02-06 | End: 2025-02-13

## 2025-02-06 RX ORDER — CLONAZEPAM 0.5 MG/1
0.25 TABLET ORAL EVERY 8 HOURS
Refills: 0 | Status: DISCONTINUED | OUTPATIENT
Start: 2025-02-06 | End: 2025-02-13

## 2025-02-06 RX ADMIN — GABAPENTIN 300 MILLIGRAM(S): 400 CAPSULE ORAL at 21:13

## 2025-02-06 RX ADMIN — Medication 15 MILLILITER(S): at 08:54

## 2025-02-06 RX ADMIN — Medication 40 MILLIGRAM(S): at 21:13

## 2025-02-06 RX ADMIN — METOPROLOL SUCCINATE 50 MILLIGRAM(S): 50 TABLET, EXTENDED RELEASE ORAL at 21:13

## 2025-02-06 RX ADMIN — Medication 650 MILLIGRAM(S): at 14:16

## 2025-02-06 RX ADMIN — Medication 650 MILLIGRAM(S): at 15:16

## 2025-02-06 RX ADMIN — MIRTAZAPINE 45 MILLIGRAM(S): 30 TABLET, FILM COATED ORAL at 21:13

## 2025-02-06 RX ADMIN — Medication 15 MILLILITER(S): at 18:03

## 2025-02-06 RX ADMIN — FLUTICASONE PROPIONATE 1 SPRAY(S): 50 SPRAY, METERED NASAL at 22:26

## 2025-02-06 RX ADMIN — CLONAZEPAM 0.5 MILLIGRAM(S): 0.5 TABLET ORAL at 21:13

## 2025-02-06 RX ADMIN — CYANOCOBALAMIN 1000 MICROGRAM(S): 1000 INJECTION INTRAMUSCULAR; SUBCUTANEOUS at 08:53

## 2025-02-06 RX ADMIN — Medication 40 MILLIGRAM(S): at 06:39

## 2025-02-06 RX ADMIN — GABAPENTIN 200 MILLIGRAM(S): 400 CAPSULE ORAL at 08:52

## 2025-02-06 RX ADMIN — CLONAZEPAM 0.5 MILLIGRAM(S): 0.5 TABLET ORAL at 06:38

## 2025-02-06 RX ADMIN — BUPROPION HYDROBROMIDE 150 MILLIGRAM(S): 522 TABLET, EXTENDED RELEASE ORAL at 08:52

## 2025-02-06 RX ADMIN — CLONAZEPAM 0.5 MILLIGRAM(S): 0.5 TABLET ORAL at 12:44

## 2025-02-06 RX ADMIN — METOPROLOL SUCCINATE 50 MILLIGRAM(S): 50 TABLET, EXTENDED RELEASE ORAL at 08:54

## 2025-02-06 RX ADMIN — FLUTICASONE PROPIONATE 1 SPRAY(S): 50 SPRAY, METERED NASAL at 08:54

## 2025-02-06 RX ADMIN — Medication 1 TABLET(S): at 08:53

## 2025-02-06 RX ADMIN — Medication 1 MILLIGRAM(S): at 21:13

## 2025-02-06 NOTE — BH INPATIENT PSYCHIATRY PROGRESS NOTE - NSBHCHARTREVIEWVS_PSY_A_CORE FT
Vital Signs Last 24 Hrs  T(C): 36.6 (02-06-25 @ 07:39), Max: 36.6 (02-06-25 @ 07:39)  T(F): 97.9 (02-06-25 @ 07:39), Max: 97.9 (02-06-25 @ 07:39)  HR: 67 (02-05-25 @ 20:40) (67 - 67)  BP: 104/65 (02-05-25 @ 20:40) (104/65 - 104/65)  BP(mean): --  RR: --  SpO2: --    Orthostatic VS  02-06-25 @ 07:39  Lying BP: --/-- HR: --  Sitting BP: 142/73 HR: 67  Standing BP: 142/80 HR: 69  Site: --  Mode: --  Orthostatic VS  02-04-25 @ 21:02  Lying BP: --/-- HR: --  Sitting BP: 139/75 HR: 67  Standing BP: --/-- HR: --  Site: --  Mode: --

## 2025-02-06 NOTE — BH INPATIENT PSYCHIATRY PROGRESS NOTE - NSBHMETABOLIC_PSY_ALL_CORE_FT
BMI: BMI (kg/m2): 37.5 (12-27-24 @ 15:12)  HbA1c: A1C with Estimated Average Glucose Result: 5.5 % (01-02-25 @ 09:11)    Glucose:   BP: 104/65 (02-05-25 @ 20:40) (104/65 - 121/70)Vital Signs Last 24 Hrs  T(C): 36.6 (02-06-25 @ 07:39), Max: 36.6 (02-06-25 @ 07:39)  T(F): 97.9 (02-06-25 @ 07:39), Max: 97.9 (02-06-25 @ 07:39)  HR: 67 (02-05-25 @ 20:40) (67 - 67)  BP: 104/65 (02-05-25 @ 20:40) (104/65 - 104/65)  BP(mean): --  RR: --  SpO2: --    Orthostatic VS  02-06-25 @ 07:39  Lying BP: --/-- HR: --  Sitting BP: 142/73 HR: 67  Standing BP: 142/80 HR: 69  Site: --  Mode: --  Orthostatic VS  02-04-25 @ 21:02  Lying BP: --/-- HR: --  Sitting BP: 139/75 HR: 67  Standing BP: --/-- HR: --  Site: --  Mode: --    Lipid Panel: Date/Time: 01-02-25 @ 09:11  Cholesterol, Serum: 128  LDL Cholesterol Calculated: 53  HDL Cholesterol, Serum: 46  Total Cholesterol/HDL Ration Measurement: --  Triglycerides, Serum: 174

## 2025-02-06 NOTE — BH INPATIENT PSYCHIATRY PROGRESS NOTE - NSBHFUPINTERVALHXFT_PSY_A_CORE
Pt is seen and evaluated for follow up for MDD, ANSON.  Chart is reviewed and case is discussed with treatment team.  No issues overnight.  Pt has been med adherent.  Patient with stable vitals, eating sleeping okay , remains med focused

## 2025-02-06 NOTE — BH INPATIENT PSYCHIATRY PROGRESS NOTE - NSBHASSESSSUMMFT_PSY_ALL_CORE
Ms. Gross is a 75yo female, , retired (), domiciled at home with , no children; PMHx HTN, HLD; PPHx d/o ANSON and MDD, 9 past psych admissions starting in 2019 (last [facility] April 2024 for worsening anxiety and depression), ECT at [facility] 2023, OP at [facility]; 1 past SA (OD on 9 pills of unknown medication in 2019), no h/o NSSIB, no h/o violence/aggression/legal issues, past marijuana use; initially admitted to [facility] 2W on 12/27 for increasing anxiety and SI w/o plans in the context of medication adjustments and psychosocial stressors.     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     On initial 2S assessment pt dysphoric, anxious, hopeless, and preoccupied with somatic sxs. There are elements of active passivity, severe sensitivity to feelings of inadequacy and complicated interpersonal relationships (, friends, providers). 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 Provided pt with psychoeducation regarding limitations of medications and need to focus on therapeutic interventions however she has limited insight into this and is intent on finding the right medication(s). For now will continue to slowly taper off nortriptyline.     1/6: Prominent treatment interfering behaviors including active passivity and rejection sensitivity. For now will continue nortriptyline taper. Considering atypical antidepressant trial. Pt reporting chronic b/l LE edema, will encourage her to use compression stocking and elevate legs, no indication for diuretic at this time.     1/7: Preoccupied with med side effects and physical sxs, but more positive about potential med trials. Will decrease qAM gabapentin 300mg to 200mg but otherwise will attempt to limit med changes other than nortriptyline taper. Will also attempt to discourage use of zofran PRNs given past difficulties tolerating multiple serotonergic medications.   1/8 Patient unchanged anxious ruminative unable to use coping skilss. Will dc NTP increase Remeron  1/9 Somatic anxious unable to utilize coping techniques. Plan behavior plan initiated, cont to titrate Remeorn. Added methylfolate for augmentation  1/10 Reports malaise, could be a viral illness vs somatic anxiety symptoms; otherwise tolerating treatment well    1/13: Dysphoric, tearful, anxious but denies SI/HI, discussed med plans and seeks reassurance.  Will increase Remeron to 37.5mg QHS.  1/14: Remains dysphoric, anxious, somatically focused, responds well to support and reassurance provided.  Will increase methylfolate to 15mg daily, provided zofran 4mg PO daily prn nausea, Lidoderm patch prn sciatic pain, will monitor tolerability and consider further titration of Remeron.  1/15: Anxious, depressed, somatic, seeks reassurance on exam, will increase Remeron to 45mg QHS tonight.  Pt agrees with plan as stated.  1/16: Anxious, ruminative but reports improvement, less somatic, tolerating Remeron 45mg and methylfolate 15mg, considering augmentation with Wellbutrin.   1/17: Dysphoric, anxious, denies SI, seeks support, reassurance, will change to regular tablet Klonopin formulation from disintegrating, continue current meds.  1/21 About same will start Wellbutrin to augment mirtazapine. Will change Zofran to pepcid to minimize serotonergic meds  1/22 Tolerating Wellbutrin con current dose. Will alllow Pepcid 10mg q6 prn with max three doses per 24 hours hopefully will seek less when feeling better  1/23 Gi focused today , cont meds will increase Wellbutrin and patient was seen by medicine who increased Protonix  1/24 Depressed, anxious somatic will increase Wellbutrin to 75mg/d, medicine increase protonix  1/25 No major improvement cont meds avoid making multiple changes which makes her anxious and more symptomatic  1/26 No change cont med tiral. Consider some form of neuropsych eval if subtle cog decline playing role in anxiety and treatment refractoriness  1/27 No major changes plan titrate bupropion to 100mg SR daily  1/28 No real change will increase Wellbutrin to 100mg SR daily add Peridex as she was on this as outpatient for oral hygiene  1/29 No major change suspect tremor may be due to anxiety then blamed on meds, patient encouraged to cont meds rather than reduce or dc and she is agreeable, also told if  wellbutrin sig improved depression but causes minor tremor that may be acceptable risk benefit  1/30 Look sl brighter today but does tend to fluctuate. Cont  current meds without change in dose, if tolerating plan increase Wellbutrin to 150mg  1/31 SLight gains able to tolerate Wellbutrin lack of known se compared with TCA is reassuring to her. Discussed TMS with patient and spouse  2/3 Looks a little better, alternate complaints between and anxiety and depression  2/4 Modest gains, remains somatic , often shifting complaints. Will add Maalox prn suspect she will request Zofran which for now trying to avoid  2/5 Reports some improvement and not appearing as agitated, cont meds will add Flonase  2/6 Patient less intensely anxious, remains somatic, need much refocusing to avoid her attributing pre existing complaints to side effects of new meds  PLAN:   1. Legals: admitted to  on 9.13. 1/2 pt transferred to .  2. Safety: routine obs appropriate as no current active SI/SIB/HI/VI on unit.   3. Psychiatric  - c/w Nortriptyline 10mg qHS with plans to discontinue at pt request due to reports of tremors, worsening anxiety, difficulty sleeping at home doses (50mg to 25mg on 12/27, 12.5mg on 1/3). Will defer further medication changes until Nortriptyline is d/c'd given pt concern for side effects/past difficulty tolerating serotonergic meds.   - c/w Remeron 45mg PO QHS for mood/anxiety on 1/15 (home med).   - c/w Klonopin 0.5mg TID for anxiety (home med).   - Decreased Gabapentin 300mg BID to 200mg qAM/300mg qHS for anxiety and neuropathy due to pt concern for sedation. Home dose Gabapentin 300mg TID.   - Melatonin 1mg qHS for insomnia.   - PRNs           - For anxiety: Klonopin wafer 0.25 mg q8h. d/c'd PRN gabapentin to simplify meds.            - For agitation: PO Zyprexa 2.5mg TID.            - STAT IM Zyprexa 2.5mg if there are imminent risk to self/others due to severe agitation.   4. Therapy: I/G/M therapy as indicated.   5. Medical: admission labs and EKG reviewed and wnl.   #Dependent edema  - chronic b/l LE edema likely 2/2 chronic venous insufficiency/deconditioning. No cardiac history (multiple cards workups have been negative).   - encourage pt to utilize compression stockings, elevation, appropriate physical therapy/exercise. No indication for diuretics/attempting to discourage polypharmacy.   #HTN  - c/w Metoprolol 50mg bid (home med) - per notes this is for palpitations 2/2 anxiety managed by outpatient cardiologist.   #HLD  - c/w Nexletol 180mg qHS (home med).   - c/w IM [id number] q2 weeks (last given 1/2) (home med).   #GERD  - c/w Pantoprazole 40mg qD (home med).   #Vit B12 deficiency   - pt due for injection per chart, for now started on PO 1000mcg qD.   #Nausea  - PRN PO Zofran 4mg TID (home med) --> lower to qD with attempts to d/c PRN. Will add maalox PRN given pt report that nausea is often 2/2 indigestion depending on diet.   6. Collateral: 1/3 Updated  Yehuda at [phone] who is agreeable with plan above.   7. Disposition: pending clinical improvement; likely return to previous provider at [facility] vs partial.

## 2025-02-07 PROCEDURE — 90834 PSYTX W PT 45 MINUTES: CPT

## 2025-02-07 RX ORDER — ERGOCALCIFEROL 1.25 MG/1
50000 CAPSULE ORAL
Refills: 0 | Status: DISCONTINUED | OUTPATIENT
Start: 2025-02-08 | End: 2025-02-19

## 2025-02-07 RX ADMIN — BUPROPION HYDROBROMIDE 150 MILLIGRAM(S): 522 TABLET, EXTENDED RELEASE ORAL at 09:39

## 2025-02-07 RX ADMIN — CLONAZEPAM 0.5 MILLIGRAM(S): 0.5 TABLET ORAL at 06:18

## 2025-02-07 RX ADMIN — MIRTAZAPINE 45 MILLIGRAM(S): 30 TABLET, FILM COATED ORAL at 21:32

## 2025-02-07 RX ADMIN — Medication 1 MILLIGRAM(S): at 21:32

## 2025-02-07 RX ADMIN — CLONAZEPAM 0.5 MILLIGRAM(S): 0.5 TABLET ORAL at 13:35

## 2025-02-07 RX ADMIN — Medication 1 TABLET(S): at 09:38

## 2025-02-07 RX ADMIN — GABAPENTIN 300 MILLIGRAM(S): 400 CAPSULE ORAL at 21:32

## 2025-02-07 RX ADMIN — FLUTICASONE PROPIONATE 1 SPRAY(S): 50 SPRAY, METERED NASAL at 21:32

## 2025-02-07 RX ADMIN — GABAPENTIN 200 MILLIGRAM(S): 400 CAPSULE ORAL at 09:38

## 2025-02-07 RX ADMIN — Medication 40 MILLIGRAM(S): at 06:27

## 2025-02-07 RX ADMIN — Medication 15 MILLILITER(S): at 17:22

## 2025-02-07 RX ADMIN — CYANOCOBALAMIN 1000 MICROGRAM(S): 1000 INJECTION INTRAMUSCULAR; SUBCUTANEOUS at 09:38

## 2025-02-07 RX ADMIN — Medication 40 MILLIGRAM(S): at 21:32

## 2025-02-07 RX ADMIN — FLUTICASONE PROPIONATE 1 SPRAY(S): 50 SPRAY, METERED NASAL at 09:37

## 2025-02-07 RX ADMIN — Medication 15 MILLILITER(S): at 09:37

## 2025-02-07 RX ADMIN — CLONAZEPAM 0.5 MILLIGRAM(S): 0.5 TABLET ORAL at 21:31

## 2025-02-07 RX ADMIN — METOPROLOL SUCCINATE 50 MILLIGRAM(S): 50 TABLET, EXTENDED RELEASE ORAL at 09:38

## 2025-02-07 RX ADMIN — METOPROLOL SUCCINATE 50 MILLIGRAM(S): 50 TABLET, EXTENDED RELEASE ORAL at 21:32

## 2025-02-07 NOTE — BH INPATIENT PSYCHIATRY PROGRESS NOTE - NSBHMETABOLIC_PSY_ALL_CORE_FT
-- DO NOT REPLY / DO NOT REPLY ALL --  -- Message is from the [facility]--    COVID-19 Universal Screening: N/A - Not about scheduling    General Patient Message      Reason for Call: Patient is calling to return a call he received a call regarding his results, please advise.     Caller Information       Type Contact Phone    10/02/2020 05:14 PM CDT Phone (Incoming) Angel Castro (Self) [phone] (M)          Alternative phone number: n/a    Turnaround time given to caller:   \"This message will be sent to [state Provider's name]. The clinical team will fulfill your request as soon as they review your message when the office opens on Monday (or after the holiday).\"     BMI: BMI (kg/m2): 37.5 (12-27-24 @ 15:12)  HbA1c: A1C with Estimated Average Glucose Result: 5.5 % (01-02-25 @ 09:11)    Glucose:   BP: 139/74 (02-06-25 @ 20:49) (104/65 - 139/74)Vital Signs Last 24 Hrs  T(C): 36.7 (02-07-25 @ 07:43), Max: 36.7 (02-07-25 @ 07:43)  T(F): 98 (02-07-25 @ 07:43), Max: 98 (02-07-25 @ 07:43)  HR: 68 (02-06-25 @ 20:49) (68 - 68)  BP: 139/74 (02-06-25 @ 20:49) (139/74 - 139/74)  BP(mean): --  RR: --  SpO2: --    Orthostatic VS  02-07-25 @ 07:43  Lying BP: 139/75 HR: 67  Sitting BP: 135/89 HR: 70  Standing BP: --/-- HR: --  Site: --  Mode: --  Orthostatic VS  02-06-25 @ 07:39  Lying BP: --/-- HR: --  Sitting BP: 142/73 HR: 67  Standing BP: 142/80 HR: 69  Site: --  Mode: --    Lipid Panel: Date/Time: 01-02-25 @ 09:11  Cholesterol, Serum: 128  LDL Cholesterol Calculated: 53  HDL Cholesterol, Serum: 46  Total Cholesterol/HDL Ration Measurement: --  Triglycerides, Serum: 174

## 2025-02-07 NOTE — BH INPATIENT PSYCHIATRY PROGRESS NOTE - NSBHCHARTREVIEWVS_PSY_A_CORE FT
Vital Signs Last 24 Hrs  T(C): 36.7 (02-07-25 @ 07:43), Max: 36.7 (02-07-25 @ 07:43)  T(F): 98 (02-07-25 @ 07:43), Max: 98 (02-07-25 @ 07:43)  HR: 68 (02-06-25 @ 20:49) (68 - 68)  BP: 139/74 (02-06-25 @ 20:49) (139/74 - 139/74)  BP(mean): --  RR: --  SpO2: --    Orthostatic VS  02-07-25 @ 07:43  Lying BP: 139/75 HR: 67  Sitting BP: 135/89 HR: 70  Standing BP: --/-- HR: --  Site: --  Mode: --  Orthostatic VS  02-06-25 @ 07:39  Lying BP: --/-- HR: --  Sitting BP: 142/73 HR: 67  Standing BP: 142/80 HR: 69  Site: --  Mode: --

## 2025-02-07 NOTE — BH INPATIENT PSYCHIATRY PROGRESS NOTE - NSBHASSESSSUMMFT_PSY_ALL_CORE
Ms. Gross is a 73yo female, , retired (), domiciled at home with , no children; PMHx HTN, HLD; PPHx d/o ANSON and MDD, 9 past psych admissions starting in 2019 (last [facility] April 2024 for worsening anxiety and depression), ECT at [facility] 2023, OP at [facility]; 1 past SA (OD on 9 pills of unknown medication in 2019), no h/o NSSIB, no h/o violence/aggression/legal issues, past marijuana use; initially admitted to [facility] 2W on 12/27 for increasing anxiety and SI w/o plans in the context of medication adjustments and psychosocial stressors.     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     On initial 2S assessment pt dysphoric, anxious, hopeless, and preoccupied with somatic sxs. There are elements of active passivity, severe sensitivity to feelings of inadequacy and complicated interpersonal relationships (, friends, providers). 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 Provided pt with psychoeducation regarding limitations of medications and need to focus on therapeutic interventions however she has limited insight into this and is intent on finding the right medication(s). For now will continue to slowly taper off nortriptyline.     1/6: Prominent treatment interfering behaviors including active passivity and rejection sensitivity. For now will continue nortriptyline taper. Considering atypical antidepressant trial. Pt reporting chronic b/l LE edema, will encourage her to use compression stocking and elevate legs, no indication for diuretic at this time.     1/7: Preoccupied with med side effects and physical sxs, but more positive about potential med trials. Will decrease qAM gabapentin 300mg to 200mg but otherwise will attempt to limit med changes other than nortriptyline taper. Will also attempt to discourage use of zofran PRNs given past difficulties tolerating multiple serotonergic medications.   1/8 Patient unchanged anxious ruminative unable to use coping skilss. Will dc NTP increase Remeron  1/9 Somatic anxious unable to utilize coping techniques. Plan behavior plan initiated, cont to titrate Remeorn. Added methylfolate for augmentation  1/10 Reports malaise, could be a viral illness vs somatic anxiety symptoms; otherwise tolerating treatment well    1/13: Dysphoric, tearful, anxious but denies SI/HI, discussed med plans and seeks reassurance.  Will increase Remeron to 37.5mg QHS.  1/14: Remains dysphoric, anxious, somatically focused, responds well to support and reassurance provided.  Will increase methylfolate to 15mg daily, provided zofran 4mg PO daily prn nausea, Lidoderm patch prn sciatic pain, will monitor tolerability and consider further titration of Remeron.  1/15: Anxious, depressed, somatic, seeks reassurance on exam, will increase Remeron to 45mg QHS tonight.  Pt agrees with plan as stated.  1/16: Anxious, ruminative but reports improvement, less somatic, tolerating Remeron 45mg and methylfolate 15mg, considering augmentation with Wellbutrin.   1/17: Dysphoric, anxious, denies SI, seeks support, reassurance, will change to regular tablet Klonopin formulation from disintegrating, continue current meds.  1/21 About same will start Wellbutrin to augment mirtazapine. Will change Zofran to pepcid to minimize serotonergic meds  1/22 Tolerating Wellbutrin con current dose. Will alllow Pepcid 10mg q6 prn with max three doses per 24 hours hopefully will seek less when feeling better  1/23 Gi focused today , cont meds will increase Wellbutrin and patient was seen by medicine who increased Protonix  1/24 Depressed, anxious somatic will increase Wellbutrin to 75mg/d, medicine increase protonix  1/25 No major improvement cont meds avoid making multiple changes which makes her anxious and more symptomatic  1/26 No change cont med tiral. Consider some form of neuropsych eval if subtle cog decline playing role in anxiety and treatment refractoriness  1/27 No major changes plan titrate bupropion to 100mg SR daily  1/28 No real change will increase Wellbutrin to 100mg SR daily add Peridex as she was on this as outpatient for oral hygiene  1/29 No major change suspect tremor may be due to anxiety then blamed on meds, patient encouraged to cont meds rather than reduce or dc and she is agreeable, also told if  wellbutrin sig improved depression but causes minor tremor that may be acceptable risk benefit  1/30 Look sl brighter today but does tend to fluctuate. Cont  current meds without change in dose, if tolerating plan increase Wellbutrin to 150mg  1/31 SLight gains able to tolerate Wellbutrin lack of known se compared with TCA is reassuring to her. Discussed TMS with patient and spouse  2/3 Looks a little better, alternate complaints between and anxiety and depression  2/4 Modest gains, remains somatic , often shifting complaints. Will add Maalox prn suspect she will request Zofran which for now trying to avoid  2/5 Reports some improvement and not appearing as agitated, cont meds will add Flonase  2/6 Patient less intensely anxious, remains somatic, need much refocusing to avoid her attributing pre existing complaints to side effects of new meds  2/7 Some gains, remains somatic. Spoke with her GI Dr Flores [phone] who feels nausea most anxiety, suggested compazine as last resort if desire to avoid possible excess sertonergic effects from Zofran. Cont meds, add vit D as per home regimen  PLAN:   1. Legals: admitted to  on 9.13. 1/2 pt transferred to .  2. Safety: routine obs appropriate as no current active SI/SIB/HI/VI on unit.   3. Psychiatric  - c/w Nortriptyline 10mg qHS with plans to discontinue at pt request due to reports of tremors, worsening anxiety, difficulty sleeping at home doses (50mg to 25mg on 12/27, 12.5mg on 1/3). Will defer further medication changes until Nortriptyline is d/c'd given pt concern for side effects/past difficulty tolerating serotonergic meds.   - c/w Remeron 45mg PO QHS for mood/anxiety on 1/15 (home med).   - c/w Klonopin 0.5mg TID for anxiety (home med).   - Decreased Gabapentin 300mg BID to 200mg qAM/300mg qHS for anxiety and neuropathy due to pt concern for sedation. Home dose Gabapentin 300mg TID.   - Melatonin 1mg qHS for insomnia.   - PRNs           - For anxiety: Klonopin wafer 0.25 mg q8h. d/c'd PRN gabapentin to simplify meds.            - For agitation: PO Zyprexa 2.5mg TID.            - STAT IM Zyprexa 2.5mg if there are imminent risk to self/others due to severe agitation.   4. Therapy: I/G/M therapy as indicated.   5. Medical: admission labs and EKG reviewed and wnl.   #Dependent edema  - chronic b/l LE edema likely 2/2 chronic venous insufficiency/deconditioning. No cardiac history (multiple cards workups have been negative).   - encourage pt to utilize compression stockings, elevation, appropriate physical therapy/exercise. No indication for diuretics/attempting to discourage polypharmacy.   #HTN  - c/w Metoprolol 50mg bid (home med) - per notes this is for palpitations 2/2 anxiety managed by outpatient cardiologist.   #HLD  - c/w Nexletol 180mg qHS (home med).   - c/w IM [id number] q2 weeks (last given 1/2) (home med).   #GERD  - c/w Pantoprazole 40mg qD (home med).   #Vit B12 deficiency   - pt due for injection per chart, for now started on PO 1000mcg qD.   #Nausea  - PRN PO Zofran 4mg TID (home med) --> lower to qD with attempts to d/c PRN. Will add maalox PRN given pt report that nausea is often 2/2 indigestion depending on diet.   6. Collateral: 1/3 Updated  Yehuda at [phone] who is agreeable with plan above.   7. Disposition: pending clinical improvement; likely return to previous provider at [facility] vs partial.

## 2025-02-08 PROCEDURE — 99232 SBSQ HOSP IP/OBS MODERATE 35: CPT

## 2025-02-08 RX ADMIN — LIDOCAINE HYDROCHLORIDE 1 PATCH: 20 JELLY TOPICAL at 12:40

## 2025-02-08 RX ADMIN — CLONAZEPAM 0.5 MILLIGRAM(S): 0.5 TABLET ORAL at 21:09

## 2025-02-08 RX ADMIN — ERGOCALCIFEROL 50000 UNIT(S): 1.25 CAPSULE ORAL at 09:32

## 2025-02-08 RX ADMIN — FLUTICASONE PROPIONATE 1 SPRAY(S): 50 SPRAY, METERED NASAL at 09:31

## 2025-02-08 RX ADMIN — Medication 40 MILLIGRAM(S): at 06:12

## 2025-02-08 RX ADMIN — Medication 40 MILLIGRAM(S): at 21:10

## 2025-02-08 RX ADMIN — CLONAZEPAM 0.5 MILLIGRAM(S): 0.5 TABLET ORAL at 06:13

## 2025-02-08 RX ADMIN — GABAPENTIN 300 MILLIGRAM(S): 400 CAPSULE ORAL at 21:10

## 2025-02-08 RX ADMIN — GABAPENTIN 200 MILLIGRAM(S): 400 CAPSULE ORAL at 09:32

## 2025-02-08 RX ADMIN — BUPROPION HYDROBROMIDE 150 MILLIGRAM(S): 522 TABLET, EXTENDED RELEASE ORAL at 09:33

## 2025-02-08 RX ADMIN — CYANOCOBALAMIN 1000 MICROGRAM(S): 1000 INJECTION INTRAMUSCULAR; SUBCUTANEOUS at 09:33

## 2025-02-08 RX ADMIN — METOPROLOL SUCCINATE 50 MILLIGRAM(S): 50 TABLET, EXTENDED RELEASE ORAL at 21:09

## 2025-02-08 RX ADMIN — Medication 1 MILLIGRAM(S): at 21:09

## 2025-02-08 RX ADMIN — Medication 1 TABLET(S): at 09:32

## 2025-02-08 RX ADMIN — Medication 15 MILLILITER(S): at 09:32

## 2025-02-08 RX ADMIN — METOPROLOL SUCCINATE 50 MILLIGRAM(S): 50 TABLET, EXTENDED RELEASE ORAL at 09:32

## 2025-02-08 RX ADMIN — MIRTAZAPINE 45 MILLIGRAM(S): 30 TABLET, FILM COATED ORAL at 21:09

## 2025-02-08 RX ADMIN — CLONAZEPAM 0.5 MILLIGRAM(S): 0.5 TABLET ORAL at 12:59

## 2025-02-08 NOTE — BH INPATIENT PSYCHIATRY PROGRESS NOTE - MSE UNSTRUCTURED FT
Awake and alert. Engaged, cooperative  SPEECH: Normal rate, tone, and volume.  MOTOR: mild PMR. No tremors or other abnormal movements noted. Stable gait  Mood: anxious  AFFECT: congruent, stable, intense, constricted  THOUGHT PROCESS: Linear but with obsessive, ruminative pattern   THOUGHT CONTENT: Denies SI or HI; no evidence of delusions. Somatically preoccupied , expresses what if type worries such as what I feel better but then that feeling goes away when I go her. Worries if bodily sensations are se even when she had them before starting med  INSIGHT: Poor. Though tentatively acknowledges some of her physical complaints may be from anxiety  JUDGMENT: Fair.  IMPULSE CONTROL: Intact on unit.

## 2025-02-08 NOTE — BH INPATIENT PSYCHIATRY PROGRESS NOTE - NSBHASSESSSUMMFT_PSY_ALL_CORE
Ms. Gross is a 75yo female, , retired (), domiciled at home with , no children; PMHx HTN, HLD; PPHx d/o ANSON and MDD, 9 past psych admissions starting in 2019 (last [facility] April 2024 for worsening anxiety and depression), ECT at [facility] 2023, OP at [facility]; 1 past SA (OD on 9 pills of unknown medication in 2019), no h/o NSSIB, no h/o violence/aggression/legal issues, past marijuana use; initially admitted to [facility] 2W on 12/27 for increasing anxiety and SI w/o plans in the context of medication adjustments and psychosocial stressors.     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     On initial 2S assessment pt dysphoric, anxious, hopeless, and preoccupied with somatic sxs. There are elements of active passivity, severe sensitivity to feelings of inadequacy and complicated interpersonal relationships (, friends, providers). 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 Provided pt with psychoeducation regarding limitations of medications and need to focus on therapeutic interventions however she has limited insight into this and is intent on finding the right medication(s). For now will continue to slowly taper off nortriptyline.     1/6: Prominent treatment interfering behaviors including active passivity and rejection sensitivity. For now will continue nortriptyline taper. Considering atypical antidepressant trial. Pt reporting chronic b/l LE edema, will encourage her to use compression stocking and elevate legs, no indication for diuretic at this time.     1/7: Preoccupied with med side effects and physical sxs, but more positive about potential med trials. Will decrease qAM gabapentin 300mg to 200mg but otherwise will attempt to limit med changes other than nortriptyline taper. Will also attempt to discourage use of zofran PRNs given past difficulties tolerating multiple serotonergic medications.   1/8 Patient unchanged anxious ruminative unable to use coping skilss. Will dc NTP increase Remeron  1/9 Somatic anxious unable to utilize coping techniques. Plan behavior plan initiated, cont to titrate Remeorn. Added methylfolate for augmentation  1/10 Reports malaise, could be a viral illness vs somatic anxiety symptoms; otherwise tolerating treatment well    1/13: Dysphoric, tearful, anxious but denies SI/HI, discussed med plans and seeks reassurance.  Will increase Remeron to 37.5mg QHS.  1/14: Remains dysphoric, anxious, somatically focused, responds well to support and reassurance provided.  Will increase methylfolate to 15mg daily, provided zofran 4mg PO daily prn nausea, Lidoderm patch prn sciatic pain, will monitor tolerability and consider further titration of Remeron.  1/15: Anxious, depressed, somatic, seeks reassurance on exam, will increase Remeron to 45mg QHS tonight.  Pt agrees with plan as stated.  1/16: Anxious, ruminative but reports improvement, less somatic, tolerating Remeron 45mg and methylfolate 15mg, considering augmentation with Wellbutrin.   1/17: Dysphoric, anxious, denies SI, seeks support, reassurance, will change to regular tablet Klonopin formulation from disintegrating, continue current meds.  1/21 About same will start Wellbutrin to augment mirtazapine. Will change Zofran to pepcid to minimize serotonergic meds  1/22 Tolerating Wellbutrin con current dose. Will alllow Pepcid 10mg q6 prn with max three doses per 24 hours hopefully will seek less when feeling better  1/23 Gi focused today , cont meds will increase Wellbutrin and patient was seen by medicine who increased Protonix  1/24 Depressed, anxious somatic will increase Wellbutrin to 75mg/d, medicine increase protonix  1/25 No major improvement cont meds avoid making multiple changes which makes her anxious and more symptomatic  1/26 No change cont med tiral. Consider some form of neuropsych eval if subtle cog decline playing role in anxiety and treatment refractoriness  1/27 No major changes plan titrate bupropion to 100mg SR daily  1/28 No real change will increase Wellbutrin to 100mg SR daily add Peridex as she was on this as outpatient for oral hygiene  1/29 No major change suspect tremor may be due to anxiety then blamed on meds, patient encouraged to cont meds rather than reduce or dc and she is agreeable, also told if  wellbutrin sig improved depression but causes minor tremor that may be acceptable risk benefit  1/30 Look sl brighter today but does tend to fluctuate. Cont  current meds without change in dose, if tolerating plan increase Wellbutrin to 150mg  1/31 SLight gains able to tolerate Wellbutrin lack of known se compared with TCA is reassuring to her. Discussed TMS with patient and spouse  2/3 Looks a little better, alternate complaints between and anxiety and depression  2/4 Modest gains, remains somatic , often shifting complaints. Will add Maalox prn suspect she will request Zofran which for now trying to avoid  2/5 Reports some improvement and not appearing as agitated, cont meds will add Flonase  2/6 Patient less intensely anxious, remains somatic, need much refocusing to avoid her attributing pre existing complaints to side effects of new meds  2/7 Some gains, remains somatic. Spoke with her GI Dr Flores [phone] who feels nausea most anxiety, suggested compazine as last resort if desire to avoid possible excess sertonergic effects from Zofran. Cont meds, add vit D as per home regimen  2/8 Anxious , prominent symtpom misattribution cont current med trial and attempts to reassure patient  PLAN:   1. Legals: admitted to  on 9.13. 1/2 pt transferred to .  2. Safety: routine obs appropriate as no current active SI/SIB/HI/VI on unit.   3. Psychiatric  - c/w Nortriptyline 10mg qHS with plans to discontinue at pt request due to reports of tremors, worsening anxiety, difficulty sleeping at home doses (50mg to 25mg on 12/27, 12.5mg on 1/3). Will defer further medication changes until Nortriptyline is d/c'd given pt concern for side effects/past difficulty tolerating serotonergic meds.   - c/w Remeron 45mg PO QHS for mood/anxiety on 1/15 (home med).   - c/w Klonopin 0.5mg TID for anxiety (home med).   - Decreased Gabapentin 300mg BID to 200mg qAM/300mg qHS for anxiety and neuropathy due to pt concern for sedation. Home dose Gabapentin 300mg TID.   - Melatonin 1mg qHS for insomnia.   - PRNs           - For anxiety: Klonopin wafer 0.25 mg q8h. d/c'd PRN gabapentin to simplify meds.            - For agitation: PO Zyprexa 2.5mg TID.            - STAT IM Zyprexa 2.5mg if there are imminent risk to self/others due to severe agitation.   4. Therapy: I/G/M therapy as indicated.   5. Medical: admission labs and EKG reviewed and wnl.   #Dependent edema  - chronic b/l LE edema likely 2/2 chronic venous insufficiency/deconditioning. No cardiac history (multiple cards workups have been negative).   - encourage pt to utilize compression stockings, elevation, appropriate physical therapy/exercise. No indication for diuretics/attempting to discourage polypharmacy.   #HTN  - c/w Metoprolol 50mg bid (home med) - per notes this is for palpitations 2/2 anxiety managed by outpatient cardiologist.   #HLD  - c/w Nexletol 180mg qHS (home med).   - c/w IM [id number] q2 weeks (last given 1/2) (home med).   #GERD  - c/w Pantoprazole 40mg qD (home med).   #Vit B12 deficiency   - pt due for injection per chart, for now started on PO 1000mcg qD.   #Nausea  - PRN PO Zofran 4mg TID (home med) --> lower to qD with attempts to d/c PRN. Will add maalox PRN given pt report that nausea is often 2/2 indigestion depending on diet.   6. Collateral: 1/3 Updated  Yehuda at [phone] who is agreeable with plan above.   7. Disposition: pending clinical improvement; likely return to previous provider at [facility] vs partial.

## 2025-02-08 NOTE — BH INPATIENT PSYCHIATRY PROGRESS NOTE - NSBHCHARTREVIEWVS_PSY_A_CORE FT
Vital Signs Last 24 Hrs  T(C): 36.8 (02-08-25 @ 07:51), Max: 36.8 (02-08-25 @ 07:51)  T(F): 98.2 (02-08-25 @ 07:51), Max: 98.2 (02-08-25 @ 07:51)  HR: --  BP: --  BP(mean): --  RR: --  SpO2: --    Orthostatic VS  02-08-25 @ 07:51  Lying BP: --/-- HR: --  Sitting BP: 154/81 HR: 63  Standing BP: 139/80 HR: 67  Site: --  Mode: --  Orthostatic VS  02-07-25 @ 20:28  Lying BP: --/-- HR: --  Sitting BP: 143/65 HR: 72  Standing BP: --/-- HR: --  Site: --  Mode: --  Orthostatic VS  02-07-25 @ 07:43  Lying BP: 139/75 HR: 67  Sitting BP: 135/89 HR: 70  Standing BP: --/-- HR: --  Site: --  Mode: --

## 2025-02-08 NOTE — BH INPATIENT PSYCHIATRY PROGRESS NOTE - NSBHFUPINTERVALHXFT_PSY_A_CORE
Pt is seen and evaluated for follow up for MDD, ANSON.  Chart is reviewed and case is discussed with treatment team.  No issues overnight.  Pt has been med adherent.  Patient with stable vitals, eating sleeping okay , remains med focused afebrile no cough sneezing sore throat

## 2025-02-08 NOTE — BH INPATIENT PSYCHIATRY PROGRESS NOTE - NSBHMETABOLIC_PSY_ALL_CORE_FT
BMI: BMI (kg/m2): 37.5 (12-27-24 @ 15:12)  HbA1c: A1C with Estimated Average Glucose Result: 5.5 % (01-02-25 @ 09:11)    Glucose:   BP: 139/74 (02-06-25 @ 20:49) (104/65 - 139/74)Vital Signs Last 24 Hrs  T(C): 36.8 (02-08-25 @ 07:51), Max: 36.8 (02-08-25 @ 07:51)  T(F): 98.2 (02-08-25 @ 07:51), Max: 98.2 (02-08-25 @ 07:51)  HR: --  BP: --  BP(mean): --  RR: --  SpO2: --    Orthostatic VS  02-08-25 @ 07:51  Lying BP: --/-- HR: --  Sitting BP: 154/81 HR: 63  Standing BP: 139/80 HR: 67  Site: --  Mode: --  Orthostatic VS  02-07-25 @ 20:28  Lying BP: --/-- HR: --  Sitting BP: 143/65 HR: 72  Standing BP: --/-- HR: --  Site: --  Mode: --  Orthostatic VS  02-07-25 @ 07:43  Lying BP: 139/75 HR: 67  Sitting BP: 135/89 HR: 70  Standing BP: --/-- HR: --  Site: --  Mode: --    Lipid Panel: Date/Time: 01-02-25 @ 09:11  Cholesterol, Serum: 128  LDL Cholesterol Calculated: 53  HDL Cholesterol, Serum: 46  Total Cholesterol/HDL Ration Measurement: --  Triglycerides, Serum: 174

## 2025-02-08 NOTE — BH INPATIENT PSYCHIATRY PROGRESS NOTE - CURRENT MEDICATION
MEDICATIONS  (STANDING):  buPROPion XL (24-Hour) 150 milliGRAM(s) Oral daily  chlorhexidine 0.12% Liquid 15 milliLiter(s) Swish and Spit <User Schedule>  clonazePAM  Tablet 0.5 milliGRAM(s) Oral <User Schedule>  cyanocobalamin 1000 MICROGram(s) Oral daily  ergocalciferol 47214 Unit(s) Oral <User Schedule>  evolocumab Injectable 140 milliGRAM(s) SubCutaneous every 2 weeks  fluticasone propionate 50 MICROgram(s)/spray Nasal Spray 1 Spray(s) Both Nostrils two times a day  gabapentin 300 milliGRAM(s) Oral at bedtime  gabapentin 200 milliGRAM(s) Oral <User Schedule>  l-methylfolate 15 milliGRAM(s) Oral daily  melatonin. 1 milliGRAM(s) Oral at bedtime  metoprolol succinate ER 50 milliGRAM(s) Oral two times a day  mirtazapine 45 milliGRAM(s) Oral at bedtime  multivitamin 1 Tablet(s) Oral daily  Nexletol 180 mg 1 Tablet(s) 1 Tablet(s) Oral daily  pantoprazole    Tablet 40 milliGRAM(s) Oral <User Schedule>    MEDICATIONS  (PRN):  acetaminophen     Tablet .. 650 milliGRAM(s) Oral every 6 hours PRN Temp greater or equal to 38C (100.4F), Mild Pain (1 - 3), Moderate Pain (4 - 6)  aluminum hydroxide/magnesium hydroxide/simethicone Suspension 30 milliLiter(s) Oral every 6 hours PRN Dyspepsia  clonazePAM Oral Disintegrating Tablet 0.25 milliGRAM(s) Oral every 8 hours PRN anxiety  famotidine    Tablet 10 milliGRAM(s) Oral every 6 hours PRN acid reflux symptroms  lidocaine   4% Patch 1 Patch Transdermal daily PRN sciatica pain

## 2025-02-09 PROCEDURE — 99232 SBSQ HOSP IP/OBS MODERATE 35: CPT

## 2025-02-09 RX ADMIN — GABAPENTIN 300 MILLIGRAM(S): 400 CAPSULE ORAL at 20:58

## 2025-02-09 RX ADMIN — CLONAZEPAM 0.5 MILLIGRAM(S): 0.5 TABLET ORAL at 06:49

## 2025-02-09 RX ADMIN — CLONAZEPAM 0.5 MILLIGRAM(S): 0.5 TABLET ORAL at 13:02

## 2025-02-09 RX ADMIN — Medication 1 TABLET(S): at 10:34

## 2025-02-09 RX ADMIN — METOPROLOL SUCCINATE 50 MILLIGRAM(S): 50 TABLET, EXTENDED RELEASE ORAL at 20:58

## 2025-02-09 RX ADMIN — Medication 40 MILLIGRAM(S): at 06:49

## 2025-02-09 RX ADMIN — Medication 1 MILLIGRAM(S): at 20:58

## 2025-02-09 RX ADMIN — METOPROLOL SUCCINATE 50 MILLIGRAM(S): 50 TABLET, EXTENDED RELEASE ORAL at 10:34

## 2025-02-09 RX ADMIN — MIRTAZAPINE 45 MILLIGRAM(S): 30 TABLET, FILM COATED ORAL at 20:58

## 2025-02-09 RX ADMIN — GABAPENTIN 200 MILLIGRAM(S): 400 CAPSULE ORAL at 10:34

## 2025-02-09 RX ADMIN — CLONAZEPAM 0.5 MILLIGRAM(S): 0.5 TABLET ORAL at 20:59

## 2025-02-09 RX ADMIN — Medication 10 MILLIGRAM(S): at 10:10

## 2025-02-09 RX ADMIN — CYANOCOBALAMIN 1000 MICROGRAM(S): 1000 INJECTION INTRAMUSCULAR; SUBCUTANEOUS at 10:34

## 2025-02-09 RX ADMIN — Medication 15 MILLILITER(S): at 17:02

## 2025-02-09 RX ADMIN — Medication 40 MILLIGRAM(S): at 20:58

## 2025-02-09 RX ADMIN — BUPROPION HYDROBROMIDE 150 MILLIGRAM(S): 522 TABLET, EXTENDED RELEASE ORAL at 10:34

## 2025-02-09 RX ADMIN — FLUTICASONE PROPIONATE 1 SPRAY(S): 50 SPRAY, METERED NASAL at 10:35

## 2025-02-09 RX ADMIN — CLONAZEPAM 0.25 MILLIGRAM(S): 0.5 TABLET ORAL at 18:09

## 2025-02-09 NOTE — BH INPATIENT PSYCHIATRY PROGRESS NOTE - NSBHFUPINTERVALHXFT_PSY_A_CORE
11/21/2022      Veronique Callahan  [address] [id number]        Dear Ms. Callahan,    This letter is to inform you that your biopsies from your recent colonoscopy are benign (non cancerous).  It is recommended to return for your next colonoscopy in 5 years. Please call the [facility] at [phone] if you have any questions or concerns.     Sincerely,        Dr. Jose Zuniga  Gastroenterology   [id number] [place] [address]   Pt is seen and evaluated for follow up for MDD, ANSON.  Chart is reviewed and case is discussed with treatment team.  No issues overnight.  Pt has been med adherent.  Patient with stable vitals, eating sleeping okay , remains med and se focused. Asks same questions to MD nurse and peersVSS

## 2025-02-09 NOTE — BH INPATIENT PSYCHIATRY PROGRESS NOTE - NSBHMETABOLIC_PSY_ALL_CORE_FT
BMI: BMI (kg/m2): 37.5 (12-27-24 @ 15:12)  HbA1c: A1C with Estimated Average Glucose Result: 5.5 % (01-02-25 @ 09:11)    Glucose:   BP: 122/65 (02-08-25 @ 21:22) (122/65 - 139/74)Vital Signs Last 24 Hrs  T(C): 36.4 (02-09-25 @ 07:49), Max: 36.6 (02-08-25 @ 13:23)  T(F): 97.6 (02-09-25 @ 07:49), Max: 97.8 (02-08-25 @ 13:23)  HR: 68 (02-08-25 @ 21:22) (68 - 68)  BP: 122/65 (02-08-25 @ 21:22) (122/65 - 122/65)  BP(mean): --  RR: --  SpO2: --    Orthostatic VS  02-09-25 @ 07:49  Lying BP: 145/82 HR: 65  Sitting BP: 143/72 HR: 67  Standing BP: --/-- HR: --  Site: --  Mode: --  Orthostatic VS  02-08-25 @ 07:51  Lying BP: --/-- HR: --  Sitting BP: 154/81 HR: 63  Standing BP: 139/80 HR: 67  Site: --  Mode: --  Orthostatic VS  02-07-25 @ 20:28  Lying BP: --/-- HR: --  Sitting BP: 143/65 HR: 72  Standing BP: --/-- HR: --  Site: --  Mode: --    Lipid Panel: Date/Time: 01-02-25 @ 09:11  Cholesterol, Serum: 128  LDL Cholesterol Calculated: 53  HDL Cholesterol, Serum: 46  Total Cholesterol/HDL Ration Measurement: --  Triglycerides, Serum: 174

## 2025-02-09 NOTE — BH INPATIENT PSYCHIATRY PROGRESS NOTE - CURRENT MEDICATION
MEDICATIONS  (STANDING):  buPROPion XL (24-Hour) 150 milliGRAM(s) Oral daily  chlorhexidine 0.12% Liquid 15 milliLiter(s) Swish and Spit <User Schedule>  clonazePAM  Tablet 0.5 milliGRAM(s) Oral <User Schedule>  cyanocobalamin 1000 MICROGram(s) Oral daily  ergocalciferol 41021 Unit(s) Oral <User Schedule>  evolocumab Injectable 140 milliGRAM(s) SubCutaneous every 2 weeks  fluticasone propionate 50 MICROgram(s)/spray Nasal Spray 1 Spray(s) Both Nostrils two times a day  gabapentin 300 milliGRAM(s) Oral at bedtime  gabapentin 200 milliGRAM(s) Oral <User Schedule>  l-methylfolate 15 milliGRAM(s) Oral daily  melatonin. 1 milliGRAM(s) Oral at bedtime  metoprolol succinate ER 50 milliGRAM(s) Oral two times a day  mirtazapine 45 milliGRAM(s) Oral at bedtime  multivitamin 1 Tablet(s) Oral daily  Nexletol 180 mg 1 Tablet(s) 1 Tablet(s) Oral daily  pantoprazole    Tablet 40 milliGRAM(s) Oral <User Schedule>    MEDICATIONS  (PRN):  acetaminophen     Tablet .. 650 milliGRAM(s) Oral every 6 hours PRN Temp greater or equal to 38C (100.4F), Mild Pain (1 - 3), Moderate Pain (4 - 6)  aluminum hydroxide/magnesium hydroxide/simethicone Suspension 30 milliLiter(s) Oral every 6 hours PRN Dyspepsia  clonazePAM Oral Disintegrating Tablet 0.25 milliGRAM(s) Oral every 8 hours PRN anxiety  famotidine    Tablet 10 milliGRAM(s) Oral every 6 hours PRN acid reflux symptroms  lidocaine   4% Patch 1 Patch Transdermal daily PRN sciatica pain

## 2025-02-09 NOTE — BH INPATIENT PSYCHIATRY PROGRESS NOTE - NSBHASSESSSUMMFT_PSY_ALL_CORE
Ms. Gross is a 75yo female, , retired (), domiciled at home with , no children; PMHx HTN, HLD; PPHx d/o ANSON and MDD, 9 past psych admissions starting in 2019 (last [facility] April 2024 for worsening anxiety and depression), ECT at [facility] 2023, OP at [facility]; 1 past SA (OD on 9 pills of unknown medication in 2019), no h/o NSSIB, no h/o violence/aggression/legal issues, past marijuana use; initially admitted to [facility] 2W on 12/27 for increasing anxiety and SI w/o plans in the context of medication adjustments and psychosocial stressors.     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     On initial 2S assessment pt dysphoric, anxious, hopeless, and preoccupied with somatic sxs. There are elements of active passivity, severe sensitivity to feelings of inadequacy and complicated interpersonal relationships (, friends, providers). 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 Provided pt with psychoeducation regarding limitations of medications and need to focus on therapeutic interventions however she has limited insight into this and is intent on finding the right medication(s). For now will continue to slowly taper off nortriptyline.     1/6: Prominent treatment interfering behaviors including active passivity and rejection sensitivity. For now will continue nortriptyline taper. Considering atypical antidepressant trial. Pt reporting chronic b/l LE edema, will encourage her to use compression stocking and elevate legs, no indication for diuretic at this time.     1/7: Preoccupied with med side effects and physical sxs, but more positive about potential med trials. Will decrease qAM gabapentin 300mg to 200mg but otherwise will attempt to limit med changes other than nortriptyline taper. Will also attempt to discourage use of zofran PRNs given past difficulties tolerating multiple serotonergic medications.   1/8 Patient unchanged anxious ruminative unable to use coping skilss. Will dc NTP increase Remeron  1/9 Somatic anxious unable to utilize coping techniques. Plan behavior plan initiated, cont to titrate Remeorn. Added methylfolate for augmentation  1/10 Reports malaise, could be a viral illness vs somatic anxiety symptoms; otherwise tolerating treatment well    1/13: Dysphoric, tearful, anxious but denies SI/HI, discussed med plans and seeks reassurance.  Will increase Remeron to 37.5mg QHS.  1/14: Remains dysphoric, anxious, somatically focused, responds well to support and reassurance provided.  Will increase methylfolate to 15mg daily, provided zofran 4mg PO daily prn nausea, Lidoderm patch prn sciatic pain, will monitor tolerability and consider further titration of Remeron.  1/15: Anxious, depressed, somatic, seeks reassurance on exam, will increase Remeron to 45mg QHS tonight.  Pt agrees with plan as stated.  1/16: Anxious, ruminative but reports improvement, less somatic, tolerating Remeron 45mg and methylfolate 15mg, considering augmentation with Wellbutrin.   1/17: Dysphoric, anxious, denies SI, seeks support, reassurance, will change to regular tablet Klonopin formulation from disintegrating, continue current meds.  1/21 About same will start Wellbutrin to augment mirtazapine. Will change Zofran to pepcid to minimize serotonergic meds  1/22 Tolerating Wellbutrin con current dose. Will alllow Pepcid 10mg q6 prn with max three doses per 24 hours hopefully will seek less when feeling better  1/23 Gi focused today , cont meds will increase Wellbutrin and patient was seen by medicine who increased Protonix  1/24 Depressed, anxious somatic will increase Wellbutrin to 75mg/d, medicine increase protonix  1/25 No major improvement cont meds avoid making multiple changes which makes her anxious and more symptomatic  1/26 No change cont med tiral. Consider some form of neuropsych eval if subtle cog decline playing role in anxiety and treatment refractoriness  1/27 No major changes plan titrate bupropion to 100mg SR daily  1/28 No real change will increase Wellbutrin to 100mg SR daily add Peridex as she was on this as outpatient for oral hygiene  1/29 No major change suspect tremor may be due to anxiety then blamed on meds, patient encouraged to cont meds rather than reduce or dc and she is agreeable, also told if  wellbutrin sig improved depression but causes minor tremor that may be acceptable risk benefit  1/30 Look sl brighter today but does tend to fluctuate. Cont  current meds without change in dose, if tolerating plan increase Wellbutrin to 150mg  1/31 SLight gains able to tolerate Wellbutrin lack of known se compared with TCA is reassuring to her. Discussed TMS with patient and spouse  2/3 Looks a little better, alternate complaints between and anxiety and depression  2/4 Modest gains, remains somatic , often shifting complaints. Will add Maalox prn suspect she will request Zofran which for now trying to avoid  2/5 Reports some improvement and not appearing as agitated, cont meds will add Flonase  2/6 Patient less intensely anxious, remains somatic, need much refocusing to avoid her attributing pre existing complaints to side effects of new meds  2/7 Some gains, remains somatic. Spoke with her GI Dr Flores [phone] who feels nausea most anxiety, suggested compazine as last resort if desire to avoid possible excess serotonergic effects from Zofran. Cont meds, add vit D as per home regimen  2/8 Anxious , prominent symptom misattribution cont current med trial and attempts to reassure patient  2/9 Dysphoric, anxious, somatic and se focused Cont current meds, cont to work to help patient recognize somatic complaints as component of anxiety and not requiring med lowering or dc  PLAN:   1. Legals: admitted to  on 9.13. 1/2 pt transferred to .  2. Safety: routine obs appropriate as no current active SI/SIB/HI/VI on unit.   3. Psychiatric  - c/w Nortriptyline 10mg qHS with plans to discontinue at pt request due to reports of tremors, worsening anxiety, difficulty sleeping at home doses (50mg to 25mg on 12/27, 12.5mg on 1/3). Will defer further medication changes until Nortriptyline is d/c'd given pt concern for side effects/past difficulty tolerating serotonergic meds.   - c/w Remeron 45mg PO QHS for mood/anxiety on 1/15 (home med).   - c/w Klonopin 0.5mg TID for anxiety (home med).   - Decreased Gabapentin 300mg BID to 200mg qAM/300mg qHS for anxiety and neuropathy due to pt concern for sedation. Home dose Gabapentin 300mg TID.   - Melatonin 1mg qHS for insomnia.   - PRNs           - For anxiety: Klonopin wafer 0.25 mg q8h. d/c'd PRN gabapentin to simplify meds.            - For agitation: PO Zyprexa 2.5mg TID.            - STAT IM Zyprexa 2.5mg if there are imminent risk to self/others due to severe agitation.   4. Therapy: I/G/M therapy as indicated.   5. Medical: admission labs and EKG reviewed and wnl.   #Dependent edema  - chronic b/l LE edema likely 2/2 chronic venous insufficiency/deconditioning. No cardiac history (multiple cards workups have been negative).   - encourage pt to utilize compression stockings, elevation, appropriate physical therapy/exercise. No indication for diuretics/attempting to discourage polypharmacy.   #HTN  - c/w Metoprolol 50mg bid (home med) - per notes this is for palpitations 2/2 anxiety managed by outpatient cardiologist.   #HLD  - c/w Nexletol 180mg qHS (home med).   - c/w IM [id number] q2 weeks (last given 1/2) (home med).   #GERD  - c/w Pantoprazole 40mg qD (home med).   #Vit B12 deficiency   - pt due for injection per chart, for now started on PO 1000mcg qD.   #Nausea  - PRN PO Zofran 4mg TID (home med) --> lower to qD with attempts to d/c PRN. Will add maalox PRN given pt report that nausea is often 2/2 indigestion depending on diet.   6. Collateral: 1/3 Updated  Yehuda at [phone] who is agreeable with plan above.   7. Disposition: pending clinical improvement; likely return to previous provider at [facility] vs partial.

## 2025-02-09 NOTE — BH INPATIENT PSYCHIATRY PROGRESS NOTE - NSBHCHARTREVIEWVS_PSY_A_CORE FT
Vital Signs Last 24 Hrs  T(C): 36.4 (02-09-25 @ 07:49), Max: 36.6 (02-08-25 @ 13:23)  T(F): 97.6 (02-09-25 @ 07:49), Max: 97.8 (02-08-25 @ 13:23)  HR: 68 (02-08-25 @ 21:22) (68 - 68)  BP: 122/65 (02-08-25 @ 21:22) (122/65 - 122/65)  BP(mean): --  RR: --  SpO2: --    Orthostatic VS  02-09-25 @ 07:49  Lying BP: 145/82 HR: 65  Sitting BP: 143/72 HR: 67  Standing BP: --/-- HR: --  Site: --  Mode: --  Orthostatic VS  02-08-25 @ 07:51  Lying BP: --/-- HR: --  Sitting BP: 154/81 HR: 63  Standing BP: 139/80 HR: 67  Site: --  Mode: --  Orthostatic VS  02-07-25 @ 20:28  Lying BP: --/-- HR: --  Sitting BP: 143/65 HR: 72  Standing BP: --/-- HR: --  Site: --  Mode: --

## 2025-02-10 PROCEDURE — 99232 SBSQ HOSP IP/OBS MODERATE 35: CPT

## 2025-02-10 PROCEDURE — 90834 PSYTX W PT 45 MINUTES: CPT

## 2025-02-10 RX ADMIN — CLONAZEPAM 0.5 MILLIGRAM(S): 0.5 TABLET ORAL at 21:15

## 2025-02-10 RX ADMIN — CYANOCOBALAMIN 1000 MICROGRAM(S): 1000 INJECTION INTRAMUSCULAR; SUBCUTANEOUS at 09:30

## 2025-02-10 RX ADMIN — FLUTICASONE PROPIONATE 1 SPRAY(S): 50 SPRAY, METERED NASAL at 21:15

## 2025-02-10 RX ADMIN — GABAPENTIN 300 MILLIGRAM(S): 400 CAPSULE ORAL at 21:16

## 2025-02-10 RX ADMIN — Medication 40 MILLIGRAM(S): at 06:55

## 2025-02-10 RX ADMIN — MIRTAZAPINE 45 MILLIGRAM(S): 30 TABLET, FILM COATED ORAL at 21:16

## 2025-02-10 RX ADMIN — Medication 1 TABLET(S): at 09:30

## 2025-02-10 RX ADMIN — Medication 40 MILLIGRAM(S): at 21:19

## 2025-02-10 RX ADMIN — CLONAZEPAM 0.5 MILLIGRAM(S): 0.5 TABLET ORAL at 06:55

## 2025-02-10 RX ADMIN — FLUTICASONE PROPIONATE 1 SPRAY(S): 50 SPRAY, METERED NASAL at 09:29

## 2025-02-10 RX ADMIN — BUPROPION HYDROBROMIDE 150 MILLIGRAM(S): 522 TABLET, EXTENDED RELEASE ORAL at 09:30

## 2025-02-10 RX ADMIN — CLONAZEPAM 0.5 MILLIGRAM(S): 0.5 TABLET ORAL at 13:04

## 2025-02-10 RX ADMIN — Medication 15 MILLILITER(S): at 18:26

## 2025-02-10 RX ADMIN — GABAPENTIN 200 MILLIGRAM(S): 400 CAPSULE ORAL at 09:30

## 2025-02-10 RX ADMIN — CLONAZEPAM 0.25 MILLIGRAM(S): 0.5 TABLET ORAL at 18:24

## 2025-02-10 RX ADMIN — Medication 30 MILLILITER(S): at 17:04

## 2025-02-10 RX ADMIN — Medication 1 MILLIGRAM(S): at 21:16

## 2025-02-10 RX ADMIN — METOPROLOL SUCCINATE 50 MILLIGRAM(S): 50 TABLET, EXTENDED RELEASE ORAL at 09:30

## 2025-02-10 RX ADMIN — METOPROLOL SUCCINATE 50 MILLIGRAM(S): 50 TABLET, EXTENDED RELEASE ORAL at 21:16

## 2025-02-10 NOTE — BH INPATIENT PSYCHIATRY PROGRESS NOTE - NSBHMETABOLIC_PSY_ALL_CORE_FT
BMI: BMI (kg/m2): 37.5 (12-27-24 @ 15:12)  HbA1c: A1C with Estimated Average Glucose Result: 5.5 % (01-02-25 @ 09:11)    Glucose:   BP: 105/61 (02-09-25 @ 20:40) (105/61 - 122/65)Vital Signs Last 24 Hrs  T(C): 36.8 (02-10-25 @ 07:37), Max: 36.8 (02-10-25 @ 07:37)  T(F): 98.2 (02-10-25 @ 07:37), Max: 98.2 (02-10-25 @ 07:37)  HR: 65 (02-09-25 @ 20:40) (65 - 65)  BP: 105/61 (02-09-25 @ 20:40) (105/61 - 105/61)  BP(mean): --  RR: --  SpO2: --    Orthostatic VS  02-10-25 @ 07:37  Lying BP: --/-- HR: --  Sitting BP: 150/84 HR: 70  Standing BP: 158/86 HR: 73  Site: --  Mode: --  Orthostatic VS  02-09-25 @ 07:49  Lying BP: 145/82 HR: 65  Sitting BP: 143/72 HR: 67  Standing BP: --/-- HR: --  Site: --  Mode: --    Lipid Panel: Date/Time: 01-02-25 @ 09:11  Cholesterol, Serum: 128  LDL Cholesterol Calculated: 53  HDL Cholesterol, Serum: 46  Total Cholesterol/HDL Ration Measurement: --  Triglycerides, Serum: 174

## 2025-02-10 NOTE — BH INPATIENT PSYCHIATRY PROGRESS NOTE - CURRENT MEDICATION
MEDICATIONS  (STANDING):  buPROPion XL (24-Hour) 150 milliGRAM(s) Oral daily  chlorhexidine 0.12% Liquid 15 milliLiter(s) Swish and Spit <User Schedule>  clonazePAM  Tablet 0.5 milliGRAM(s) Oral <User Schedule>  cyanocobalamin 1000 MICROGram(s) Oral daily  ergocalciferol 66696 Unit(s) Oral <User Schedule>  evolocumab Injectable 140 milliGRAM(s) SubCutaneous every 2 weeks  fluticasone propionate 50 MICROgram(s)/spray Nasal Spray 1 Spray(s) Both Nostrils two times a day  gabapentin 300 milliGRAM(s) Oral at bedtime  gabapentin 200 milliGRAM(s) Oral <User Schedule>  l-methylfolate 15 milliGRAM(s) Oral daily  melatonin. 1 milliGRAM(s) Oral at bedtime  metoprolol succinate ER 50 milliGRAM(s) Oral two times a day  mirtazapine 45 milliGRAM(s) Oral at bedtime  multivitamin 1 Tablet(s) Oral daily  Nexletol 180 mg 1 Tablet(s) 1 Tablet(s) Oral daily  pantoprazole    Tablet 40 milliGRAM(s) Oral <User Schedule>    MEDICATIONS  (PRN):  acetaminophen     Tablet .. 650 milliGRAM(s) Oral every 6 hours PRN Temp greater or equal to 38C (100.4F), Mild Pain (1 - 3), Moderate Pain (4 - 6)  aluminum hydroxide/magnesium hydroxide/simethicone Suspension 30 milliLiter(s) Oral every 6 hours PRN Dyspepsia  clonazePAM Oral Disintegrating Tablet 0.25 milliGRAM(s) Oral every 8 hours PRN anxiety  famotidine    Tablet 10 milliGRAM(s) Oral every 6 hours PRN acid reflux symptroms  lidocaine   4% Patch 1 Patch Transdermal daily PRN sciatica pain

## 2025-02-10 NOTE — BH INPATIENT PSYCHIATRY PROGRESS NOTE - NSBHFUPINTERVALHXFT_PSY_A_CORE
Pt is seen and evaluated for follow up for MDD, ANSON.  Chart is reviewed and case is discussed with treatment team.  No issues overnight.  Pt has been med adherent.  Patient with stable vitals, eating sleeping okay , remains med and se focused. Asks same questions to MD nurse and peersVSS

## 2025-02-10 NOTE — BH INPATIENT PSYCHIATRY PROGRESS NOTE - NSBHASSESSSUMMFT_PSY_ALL_CORE
Ms. Gross is a 75yo female, , retired (), domiciled at home with , no children; PMHx HTN, HLD; PPHx d/o ANSON and MDD, 9 past psych admissions starting in 2019 (last [facility] April 2024 for worsening anxiety and depression), ECT at [facility] 2023, OP at [facility]; 1 past SA (OD on 9 pills of unknown medication in 2019), no h/o NSSIB, no h/o violence/aggression/legal issues, past marijuana use; initially admitted to [facility] 2W on 12/27 for increasing anxiety and SI w/o plans in the context of medication adjustments and psychosocial stressors.     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     On initial 2S assessment pt dysphoric, anxious, hopeless, and preoccupied with somatic sxs. There are elements of active passivity, severe sensitivity to feelings of inadequacy and complicated interpersonal relationships (, friends, providers). 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 Provided pt with psychoeducation regarding limitations of medications and need to focus on therapeutic interventions however she has limited insight into this and is intent on finding the right medication(s). For now will continue to slowly taper off nortriptyline.     1/6: Prominent treatment interfering behaviors including active passivity and rejection sensitivity. For now will continue nortriptyline taper. Considering atypical antidepressant trial. Pt reporting chronic b/l LE edema, will encourage her to use compression stocking and elevate legs, no indication for diuretic at this time.     1/7: Preoccupied with med side effects and physical sxs, but more positive about potential med trials. Will decrease qAM gabapentin 300mg to 200mg but otherwise will attempt to limit med changes other than nortriptyline taper. Will also attempt to discourage use of zofran PRNs given past difficulties tolerating multiple serotonergic medications.   1/8 Patient unchanged anxious ruminative unable to use coping skilss. Will dc NTP increase Remeron  1/9 Somatic anxious unable to utilize coping techniques. Plan behavior plan initiated, cont to titrate Remeorn. Added methylfolate for augmentation  1/10 Reports malaise, could be a viral illness vs somatic anxiety symptoms; otherwise tolerating treatment well    1/13: Dysphoric, tearful, anxious but denies SI/HI, discussed med plans and seeks reassurance.  Will increase Remeron to 37.5mg QHS.  1/14: Remains dysphoric, anxious, somatically focused, responds well to support and reassurance provided.  Will increase methylfolate to 15mg daily, provided zofran 4mg PO daily prn nausea, Lidoderm patch prn sciatic pain, will monitor tolerability and consider further titration of Remeron.  1/15: Anxious, depressed, somatic, seeks reassurance on exam, will increase Remeron to 45mg QHS tonight.  Pt agrees with plan as stated.  1/16: Anxious, ruminative but reports improvement, less somatic, tolerating Remeron 45mg and methylfolate 15mg, considering augmentation with Wellbutrin.   1/17: Dysphoric, anxious, denies SI, seeks support, reassurance, will change to regular tablet Klonopin formulation from disintegrating, continue current meds.  1/21 About same will start Wellbutrin to augment mirtazapine. Will change Zofran to pepcid to minimize serotonergic meds  1/22 Tolerating Wellbutrin con current dose. Will alllow Pepcid 10mg q6 prn with max three doses per 24 hours hopefully will seek less when feeling better  1/23 Gi focused today , cont meds will increase Wellbutrin and patient was seen by medicine who increased Protonix  1/24 Depressed, anxious somatic will increase Wellbutrin to 75mg/d, medicine increase protonix  1/25 No major improvement cont meds avoid making multiple changes which makes her anxious and more symptomatic  1/26 No change cont med tiral. Consider some form of neuropsych eval if subtle cog decline playing role in anxiety and treatment refractoriness  1/27 No major changes plan titrate bupropion to 100mg SR daily  1/28 No real change will increase Wellbutrin to 100mg SR daily add Peridex as she was on this as outpatient for oral hygiene  1/29 No major change suspect tremor may be due to anxiety then blamed on meds, patient encouraged to cont meds rather than reduce or dc and she is agreeable, also told if  wellbutrin sig improved depression but causes minor tremor that may be acceptable risk benefit  1/30 Look sl brighter today but does tend to fluctuate. Cont  current meds without change in dose, if tolerating plan increase Wellbutrin to 150mg  1/31 SLight gains able to tolerate Wellbutrin lack of known se compared with TCA is reassuring to her. Discussed TMS with patient and spouse  2/3 Looks a little better, alternate complaints between and anxiety and depression  2/4 Modest gains, remains somatic , often shifting complaints. Will add Maalox prn suspect she will request Zofran which for now trying to avoid  2/5 Reports some improvement and not appearing as agitated, cont meds will add Flonase  2/6 Patient less intensely anxious, remains somatic, need much refocusing to avoid her attributing pre existing complaints to side effects of new meds  2/7 Some gains, remains somatic. Spoke with her GI Dr Flores [phone] who feels nausea most anxiety, suggested compazine as last resort if desire to avoid possible excess serotonergic effects from Zofran. Cont meds, add vit D as per home regimen  2/8 Anxious , prominent symptom misattribution cont current med trial and attempts to reassure patient  2/9 Dysphoric, anxious, somatic and se focused Cont current meds, cont to work to help patient recognize somatic complaints as component of anxiety and not requiring med lowering or dc  2/10 Patient reports some improvemen in mood and anxiety despite continuing to appear anxious and somatic about med se. COnt current meds will recheck labs for reassurance primarily  - PRN PO Zofran 4mg TID (home med) --> lower to qD with attempts to d/c PRN. Will add maalox PRN given pt report that nausea is often 2/2 indigestion depending on diet.   6. Collateral: 1/3 Updated  Yehuda at [phone] who is agreeable with plan above.   7. Disposition: pending clinical improvement; likely return to previous provider at [facility] vs partial.

## 2025-02-10 NOTE — BH INPATIENT PSYCHIATRY PROGRESS NOTE - NSBHCHARTREVIEWVS_PSY_A_CORE FT
Vital Signs Last 24 Hrs  T(C): 36.8 (02-10-25 @ 07:37), Max: 36.8 (02-10-25 @ 07:37)  T(F): 98.2 (02-10-25 @ 07:37), Max: 98.2 (02-10-25 @ 07:37)  HR: 65 (02-09-25 @ 20:40) (65 - 65)  BP: 105/61 (02-09-25 @ 20:40) (105/61 - 105/61)  BP(mean): --  RR: --  SpO2: --    Orthostatic VS  02-10-25 @ 07:37  Lying BP: --/-- HR: --  Sitting BP: 150/84 HR: 70  Standing BP: 158/86 HR: 73  Site: --  Mode: --  Orthostatic VS  02-09-25 @ 07:49  Lying BP: 145/82 HR: 65  Sitting BP: 143/72 HR: 67  Standing BP: --/-- HR: --  Site: --  Mode: --

## 2025-02-11 LAB
ALBUMIN SERPL ELPH-MCNC: 4.4 G/DL — SIGNIFICANT CHANGE UP (ref 3.3–5)
ALP SERPL-CCNC: 80 U/L — SIGNIFICANT CHANGE UP (ref 40–120)
ALT FLD-CCNC: 14 U/L — SIGNIFICANT CHANGE UP (ref 4–33)
ANION GAP SERPL CALC-SCNC: 12 MMOL/L — SIGNIFICANT CHANGE UP (ref 7–14)
AST SERPL-CCNC: 19 U/L — SIGNIFICANT CHANGE UP (ref 4–32)
BASOPHILS # BLD AUTO: 0.06 K/UL — SIGNIFICANT CHANGE UP (ref 0–0.2)
BASOPHILS NFR BLD AUTO: 0.8 % — SIGNIFICANT CHANGE UP (ref 0–2)
BILIRUB SERPL-MCNC: 0.4 MG/DL — SIGNIFICANT CHANGE UP (ref 0.2–1.2)
BUN SERPL-MCNC: 10 MG/DL — SIGNIFICANT CHANGE UP (ref 7–23)
CALCIUM SERPL-MCNC: 9.8 MG/DL — SIGNIFICANT CHANGE UP (ref 8.4–10.5)
CHLORIDE SERPL-SCNC: 106 MMOL/L — SIGNIFICANT CHANGE UP (ref 98–107)
CO2 SERPL-SCNC: 25 MMOL/L — SIGNIFICANT CHANGE UP (ref 22–31)
CREAT SERPL-MCNC: 0.85 MG/DL — SIGNIFICANT CHANGE UP (ref 0.5–1.3)
EGFR: 71 ML/MIN/1.73M2 — SIGNIFICANT CHANGE UP
EOSINOPHIL # BLD AUTO: 0.3 K/UL — SIGNIFICANT CHANGE UP (ref 0–0.5)
EOSINOPHIL NFR BLD AUTO: 4 % — SIGNIFICANT CHANGE UP (ref 0–6)
GLUCOSE SERPL-MCNC: 108 MG/DL — HIGH (ref 70–99)
HCT VFR BLD CALC: 46.1 % — HIGH (ref 34.5–45)
HGB BLD-MCNC: 14.6 G/DL — SIGNIFICANT CHANGE UP (ref 11.5–15.5)
IANC: 3.95 K/UL — SIGNIFICANT CHANGE UP (ref 1.8–7.4)
IMM GRANULOCYTES NFR BLD AUTO: 0.1 % — SIGNIFICANT CHANGE UP (ref 0–0.9)
LYMPHOCYTES # BLD AUTO: 2.72 K/UL — SIGNIFICANT CHANGE UP (ref 1–3.3)
LYMPHOCYTES # BLD AUTO: 36.5 % — SIGNIFICANT CHANGE UP (ref 13–44)
MCHC RBC-ENTMCNC: 28.2 PG — SIGNIFICANT CHANGE UP (ref 27–34)
MCHC RBC-ENTMCNC: 31.7 G/DL — LOW (ref 32–36)
MCV RBC AUTO: 89.2 FL — SIGNIFICANT CHANGE UP (ref 80–100)
MONOCYTES # BLD AUTO: 0.41 K/UL — SIGNIFICANT CHANGE UP (ref 0–0.9)
MONOCYTES NFR BLD AUTO: 5.5 % — SIGNIFICANT CHANGE UP (ref 2–14)
NEUTROPHILS # BLD AUTO: 3.95 K/UL — SIGNIFICANT CHANGE UP (ref 1.8–7.4)
NEUTROPHILS NFR BLD AUTO: 53.1 % — SIGNIFICANT CHANGE UP (ref 43–77)
NRBC # BLD AUTO: 0 K/UL — SIGNIFICANT CHANGE UP (ref 0–0)
NRBC # FLD: 0 K/UL — SIGNIFICANT CHANGE UP (ref 0–0)
NRBC BLD AUTO-RTO: 0 /100 WBCS — SIGNIFICANT CHANGE UP (ref 0–0)
PLATELET # BLD AUTO: 267 K/UL — SIGNIFICANT CHANGE UP (ref 150–400)
POTASSIUM SERPL-MCNC: 4.5 MMOL/L — SIGNIFICANT CHANGE UP (ref 3.5–5.3)
POTASSIUM SERPL-SCNC: 4.5 MMOL/L — SIGNIFICANT CHANGE UP (ref 3.5–5.3)
PROT SERPL-MCNC: 6.8 G/DL — SIGNIFICANT CHANGE UP (ref 6–8.3)
RBC # BLD: 5.17 M/UL — SIGNIFICANT CHANGE UP (ref 3.8–5.2)
RBC # FLD: 13.2 % — SIGNIFICANT CHANGE UP (ref 10.3–14.5)
SODIUM SERPL-SCNC: 143 MMOL/L — SIGNIFICANT CHANGE UP (ref 135–145)
WBC # BLD: 7.45 K/UL — SIGNIFICANT CHANGE UP (ref 3.8–10.5)
WBC # FLD AUTO: 7.45 K/UL — SIGNIFICANT CHANGE UP (ref 3.8–10.5)

## 2025-02-11 PROCEDURE — 99232 SBSQ HOSP IP/OBS MODERATE 35: CPT

## 2025-02-11 RX ADMIN — CLONAZEPAM 0.5 MILLIGRAM(S): 0.5 TABLET ORAL at 21:02

## 2025-02-11 RX ADMIN — Medication 40 MILLIGRAM(S): at 06:18

## 2025-02-11 RX ADMIN — Medication 1 MILLIGRAM(S): at 20:46

## 2025-02-11 RX ADMIN — Medication 40 MILLIGRAM(S): at 20:46

## 2025-02-11 RX ADMIN — FLUTICASONE PROPIONATE 1 SPRAY(S): 50 SPRAY, METERED NASAL at 20:46

## 2025-02-11 RX ADMIN — FLUTICASONE PROPIONATE 1 SPRAY(S): 50 SPRAY, METERED NASAL at 08:34

## 2025-02-11 RX ADMIN — Medication 15 MILLILITER(S): at 08:33

## 2025-02-11 RX ADMIN — CLONAZEPAM 0.5 MILLIGRAM(S): 0.5 TABLET ORAL at 12:22

## 2025-02-11 RX ADMIN — Medication 1 TABLET(S): at 08:33

## 2025-02-11 RX ADMIN — METOPROLOL SUCCINATE 50 MILLIGRAM(S): 50 TABLET, EXTENDED RELEASE ORAL at 20:46

## 2025-02-11 RX ADMIN — METOPROLOL SUCCINATE 50 MILLIGRAM(S): 50 TABLET, EXTENDED RELEASE ORAL at 08:33

## 2025-02-11 RX ADMIN — BUPROPION HYDROBROMIDE 150 MILLIGRAM(S): 522 TABLET, EXTENDED RELEASE ORAL at 08:33

## 2025-02-11 RX ADMIN — MIRTAZAPINE 45 MILLIGRAM(S): 30 TABLET, FILM COATED ORAL at 20:46

## 2025-02-11 RX ADMIN — CYANOCOBALAMIN 1000 MICROGRAM(S): 1000 INJECTION INTRAMUSCULAR; SUBCUTANEOUS at 08:33

## 2025-02-11 RX ADMIN — CLONAZEPAM 0.5 MILLIGRAM(S): 0.5 TABLET ORAL at 06:18

## 2025-02-11 RX ADMIN — CLONAZEPAM 0.25 MILLIGRAM(S): 0.5 TABLET ORAL at 18:16

## 2025-02-11 RX ADMIN — GABAPENTIN 300 MILLIGRAM(S): 400 CAPSULE ORAL at 20:17

## 2025-02-11 RX ADMIN — Medication 15 MILLILITER(S): at 17:07

## 2025-02-11 RX ADMIN — GABAPENTIN 200 MILLIGRAM(S): 400 CAPSULE ORAL at 08:33

## 2025-02-11 NOTE — BH INPATIENT PSYCHIATRY PROGRESS NOTE - NSBHMETABOLIC_PSY_ALL_CORE_FT
BMI: BMI (kg/m2): 37.5 (12-27-24 @ 15:12)  HbA1c: A1C with Estimated Average Glucose Result: 5.5 % (01-02-25 @ 09:11)    Glucose:   BP: 129/65 (02-10-25 @ 21:45) (105/61 - 129/65)Vital Signs Last 24 Hrs  T(C): 36.6 (02-11-25 @ 07:52), Max: 37.1 (02-10-25 @ 21:45)  T(F): 97.9 (02-11-25 @ 07:52), Max: 98.8 (02-10-25 @ 21:45)  HR: 67 (02-10-25 @ 21:45) (67 - 67)  BP: 129/65 (02-10-25 @ 21:45) (129/65 - 129/65)  BP(mean): --  RR: 17 (02-10-25 @ 21:45) (17 - 17)  SpO2: 96% (02-10-25 @ 21:45) (96% - 96%)    Orthostatic VS  02-11-25 @ 07:52  Lying BP: 141/73 HR: 63  Sitting BP: 133/83 HR: 64  Standing BP: --/-- HR: --  Site: --  Mode: --  Orthostatic VS  02-10-25 @ 07:37  Lying BP: --/-- HR: --  Sitting BP: 150/84 HR: 70  Standing BP: 158/86 HR: 73  Site: --  Mode: --    Lipid Panel: Date/Time: 01-02-25 @ 09:11  Cholesterol, Serum: 128  LDL Cholesterol Calculated: 53  HDL Cholesterol, Serum: 46  Total Cholesterol/HDL Ration Measurement: --  Triglycerides, Serum: 174

## 2025-02-11 NOTE — BH INPATIENT PSYCHIATRY PROGRESS NOTE - NSBHFUPINTERVALHXFT_PSY_A_CORE
Pt is seen and evaluated for follow up for MDD, ANSON.  Chart is reviewed and case is discussed with treatment team.  No issues overnight.  Pt has been med adherent.  Patient with stable vitals, eating sleeping okay , remains med and se focused. Asks same questions to MD nurse and peersVSS, labs unremarkable

## 2025-02-11 NOTE — BH INPATIENT PSYCHIATRY PROGRESS NOTE - NSBHASSESSSUMMFT_PSY_ALL_CORE
Ms. Gross is a 75yo female, , retired (), domiciled at home with , no children; PMHx HTN, HLD; PPHx d/o ANSON and MDD, 9 past psych admissions starting in 2019 (last [facility] April 2024 for worsening anxiety and depression), ECT at [facility] 2023, OP at [facility]; 1 past SA (OD on 9 pills of unknown medication in 2019), no h/o NSSIB, no h/o violence/aggression/legal issues, past marijuana use; initially admitted to [facility] 2W on 12/27 for increasing anxiety and SI w/o plans in the context of medication adjustments and psychosocial stressors.     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     On initial 2S assessment pt dysphoric, anxious, hopeless, and preoccupied with somatic sxs. There are elements of active passivity, severe sensitivity to feelings of inadequacy and complicated interpersonal relationships (, friends, providers). 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 Provided pt with psychoeducation regarding limitations of medications and need to focus on therapeutic interventions however she has limited insight into this and is intent on finding the right medication(s). For now will continue to slowly taper off nortriptyline.     1/6: Prominent treatment interfering behaviors including active passivity and rejection sensitivity. For now will continue nortriptyline taper. Considering atypical antidepressant trial. Pt reporting chronic b/l LE edema, will encourage her to use compression stocking and elevate legs, no indication for diuretic at this time.     1/7: Preoccupied with med side effects and physical sxs, but more positive about potential med trials. Will decrease qAM gabapentin 300mg to 200mg but otherwise will attempt to limit med changes other than nortriptyline taper. Will also attempt to discourage use of zofran PRNs given past difficulties tolerating multiple serotonergic medications.   1/8 Patient unchanged anxious ruminative unable to use coping skilss. Will dc NTP increase Remeron  1/9 Somatic anxious unable to utilize coping techniques. Plan behavior plan initiated, cont to titrate Remeorn. Added methylfolate for augmentation  1/10 Reports malaise, could be a viral illness vs somatic anxiety symptoms; otherwise tolerating treatment well    1/13: Dysphoric, tearful, anxious but denies SI/HI, discussed med plans and seeks reassurance.  Will increase Remeron to 37.5mg QHS.  1/14: Remains dysphoric, anxious, somatically focused, responds well to support and reassurance provided.  Will increase methylfolate to 15mg daily, provided zofran 4mg PO daily prn nausea, Lidoderm patch prn sciatic pain, will monitor tolerability and consider further titration of Remeron.  1/15: Anxious, depressed, somatic, seeks reassurance on exam, will increase Remeron to 45mg QHS tonight.  Pt agrees with plan as stated.  1/16: Anxious, ruminative but reports improvement, less somatic, tolerating Remeron 45mg and methylfolate 15mg, considering augmentation with Wellbutrin.   1/17: Dysphoric, anxious, denies SI, seeks support, reassurance, will change to regular tablet Klonopin formulation from disintegrating, continue current meds.  1/21 About same will start Wellbutrin to augment mirtazapine. Will change Zofran to pepcid to minimize serotonergic meds  1/22 Tolerating Wellbutrin con current dose. Will alllow Pepcid 10mg q6 prn with max three doses per 24 hours hopefully will seek less when feeling better  1/23 Gi focused today , cont meds will increase Wellbutrin and patient was seen by medicine who increased Protonix  1/24 Depressed, anxious somatic will increase Wellbutrin to 75mg/d, medicine increase protonix  1/25 No major improvement cont meds avoid making multiple changes which makes her anxious and more symptomatic  1/26 No change cont med tiral. Consider some form of neuropsych eval if subtle cog decline playing role in anxiety and treatment refractoriness  1/27 No major changes plan titrate bupropion to 100mg SR daily  1/28 No real change will increase Wellbutrin to 100mg SR daily add Peridex as she was on this as outpatient for oral hygiene  1/29 No major change suspect tremor may be due to anxiety then blamed on meds, patient encouraged to cont meds rather than reduce or dc and she is agreeable, also told if  wellbutrin sig improved depression but causes minor tremor that may be acceptable risk benefit  1/30 Look sl brighter today but does tend to fluctuate. Cont  current meds without change in dose, if tolerating plan increase Wellbutrin to 150mg  1/31 SLight gains able to tolerate Wellbutrin lack of known se compared with TCA is reassuring to her. Discussed TMS with patient and spouse  2/3 Looks a little better, alternate complaints between and anxiety and depression  2/4 Modest gains, remains somatic , often shifting complaints. Will add Maalox prn suspect she will request Zofran which for now trying to avoid  2/5 Reports some improvement and not appearing as agitated, cont meds will add Flonase  2/6 Patient less intensely anxious, remains somatic, need much refocusing to avoid her attributing pre existing complaints to side effects of new meds  2/7 Some gains, remains somatic. Spoke with her GI Dr Flores [phone] who feels nausea most anxiety, suggested compazine as last resort if desire to avoid possible excess serotonergic effects from Zofran. Cont meds, add vit D as per home regimen  2/8 Anxious , prominent symptom misattribution cont current med trial and attempts to reassure patient  2/9 Dysphoric, anxious, somatic and se focused Cont current meds, cont to work to help patient recognize somatic complaints as component of anxiety and not requiring med lowering or dc  2/10 Patient reports some improvement in mood and anxiety despite continuing to appear anxious and somatic about med se. COnt current meds will recheck labs for reassurance primarily  2/11 Partial gains cont current meds, consider increasing Wellbutrin concenr is she may experience minor se and want to go off  - PRN PO Zofran 4mg TID (home med) --> lower to qD with attempts to d/c PRN. Will add maalox PRN given pt report that nausea is often 2/2 indigestion depending on diet.   6. Collateral: 1/3 Updated  Yehuda at [phone] who is agreeable with plan above.   7. Disposition: pending clinical improvement; likely return to previous provider at [facility] vs partial.

## 2025-02-11 NOTE — BH INPATIENT PSYCHIATRY PROGRESS NOTE - NSBHCHARTREVIEWVS_PSY_A_CORE FT
Vital Signs Last 24 Hrs  T(C): 36.6 (02-11-25 @ 07:52), Max: 37.1 (02-10-25 @ 21:45)  T(F): 97.9 (02-11-25 @ 07:52), Max: 98.8 (02-10-25 @ 21:45)  HR: 67 (02-10-25 @ 21:45) (67 - 67)  BP: 129/65 (02-10-25 @ 21:45) (129/65 - 129/65)  BP(mean): --  RR: 17 (02-10-25 @ 21:45) (17 - 17)  SpO2: 96% (02-10-25 @ 21:45) (96% - 96%)    Orthostatic VS  02-11-25 @ 07:52  Lying BP: 141/73 HR: 63  Sitting BP: 133/83 HR: 64  Standing BP: --/-- HR: --  Site: --  Mode: --  Orthostatic VS  02-10-25 @ 07:37  Lying BP: --/-- HR: --  Sitting BP: 150/84 HR: 70  Standing BP: 158/86 HR: 73  Site: --  Mode: --

## 2025-02-11 NOTE — BH INPATIENT PSYCHIATRY PROGRESS NOTE - CURRENT MEDICATION
MEDICATIONS  (STANDING):  buPROPion XL (24-Hour) 150 milliGRAM(s) Oral daily  chlorhexidine 0.12% Liquid 15 milliLiter(s) Swish and Spit <User Schedule>  clonazePAM  Tablet 0.5 milliGRAM(s) Oral <User Schedule>  cyanocobalamin 1000 MICROGram(s) Oral daily  ergocalciferol 87780 Unit(s) Oral <User Schedule>  evolocumab Injectable 140 milliGRAM(s) SubCutaneous every 2 weeks  fluticasone propionate 50 MICROgram(s)/spray Nasal Spray 1 Spray(s) Both Nostrils two times a day  gabapentin 300 milliGRAM(s) Oral at bedtime  gabapentin 200 milliGRAM(s) Oral <User Schedule>  l-methylfolate 15 milliGRAM(s) Oral daily  melatonin. 1 milliGRAM(s) Oral at bedtime  metoprolol succinate ER 50 milliGRAM(s) Oral two times a day  mirtazapine 45 milliGRAM(s) Oral at bedtime  multivitamin 1 Tablet(s) Oral daily  Nexletol 180 mg 1 Tablet(s) 1 Tablet(s) Oral daily  pantoprazole    Tablet 40 milliGRAM(s) Oral <User Schedule>    MEDICATIONS  (PRN):  acetaminophen     Tablet .. 650 milliGRAM(s) Oral every 6 hours PRN Temp greater or equal to 38C (100.4F), Mild Pain (1 - 3), Moderate Pain (4 - 6)  aluminum hydroxide/magnesium hydroxide/simethicone Suspension 30 milliLiter(s) Oral every 6 hours PRN Dyspepsia  clonazePAM Oral Disintegrating Tablet 0.25 milliGRAM(s) Oral every 8 hours PRN anxiety  famotidine    Tablet 10 milliGRAM(s) Oral every 6 hours PRN acid reflux symptroms  lidocaine   4% Patch 1 Patch Transdermal daily PRN sciatica pain

## 2025-02-12 PROCEDURE — 90834 PSYTX W PT 45 MINUTES: CPT

## 2025-02-12 PROCEDURE — 99232 SBSQ HOSP IP/OBS MODERATE 35: CPT

## 2025-02-12 RX ADMIN — Medication 40 MILLIGRAM(S): at 20:36

## 2025-02-12 RX ADMIN — GABAPENTIN 300 MILLIGRAM(S): 400 CAPSULE ORAL at 20:12

## 2025-02-12 RX ADMIN — Medication 1 MILLIGRAM(S): at 20:36

## 2025-02-12 RX ADMIN — METOPROLOL SUCCINATE 50 MILLIGRAM(S): 50 TABLET, EXTENDED RELEASE ORAL at 20:37

## 2025-02-12 RX ADMIN — Medication 1 TABLET(S): at 08:23

## 2025-02-12 RX ADMIN — BUPROPION HYDROBROMIDE 150 MILLIGRAM(S): 522 TABLET, EXTENDED RELEASE ORAL at 08:22

## 2025-02-12 RX ADMIN — CYANOCOBALAMIN 1000 MICROGRAM(S): 1000 INJECTION INTRAMUSCULAR; SUBCUTANEOUS at 08:22

## 2025-02-12 RX ADMIN — FLUTICASONE PROPIONATE 1 SPRAY(S): 50 SPRAY, METERED NASAL at 20:37

## 2025-02-12 RX ADMIN — CLONAZEPAM 0.5 MILLIGRAM(S): 0.5 TABLET ORAL at 20:13

## 2025-02-12 RX ADMIN — CLONAZEPAM 0.5 MILLIGRAM(S): 0.5 TABLET ORAL at 06:17

## 2025-02-12 RX ADMIN — CLONAZEPAM 0.5 MILLIGRAM(S): 0.5 TABLET ORAL at 12:10

## 2025-02-12 RX ADMIN — Medication 15 MILLILITER(S): at 08:24

## 2025-02-12 RX ADMIN — MIRTAZAPINE 45 MILLIGRAM(S): 30 TABLET, FILM COATED ORAL at 20:37

## 2025-02-12 RX ADMIN — Medication 40 MILLIGRAM(S): at 06:17

## 2025-02-12 RX ADMIN — GABAPENTIN 200 MILLIGRAM(S): 400 CAPSULE ORAL at 08:23

## 2025-02-12 RX ADMIN — Medication 30 MILLILITER(S): at 09:12

## 2025-02-12 RX ADMIN — FLUTICASONE PROPIONATE 1 SPRAY(S): 50 SPRAY, METERED NASAL at 08:24

## 2025-02-12 RX ADMIN — METOPROLOL SUCCINATE 50 MILLIGRAM(S): 50 TABLET, EXTENDED RELEASE ORAL at 08:23

## 2025-02-12 NOTE — BH INPATIENT PSYCHIATRY PROGRESS NOTE - NSBHMETABOLIC_PSY_ALL_CORE_FT
BMI: BMI (kg/m2): 37.5 (12-27-24 @ 15:12)  HbA1c: A1C with Estimated Average Glucose Result: 5.5 % (01-02-25 @ 09:11)    Glucose:   BP: 112/71 (02-11-25 @ 20:33) (105/61 - 129/65)Vital Signs Last 24 Hrs  T(C): 36.2 (02-12-25 @ 07:38), Max: 36.2 (02-12-25 @ 07:38)  T(F): 97.1 (02-12-25 @ 07:38), Max: 97.1 (02-12-25 @ 07:38)  HR: --  BP: 112/71 (02-11-25 @ 20:33) (112/71 - 112/71)  BP(mean): 68 (02-11-25 @ 20:33) (68 - 68)  RR: --  SpO2: --    Orthostatic VS  02-12-25 @ 07:38  Lying BP: --/-- HR: --  Sitting BP: 161/66 HR: 90  Standing BP: --/-- HR: --  Site: --  Mode: --  Orthostatic VS  02-11-25 @ 07:52  Lying BP: 141/73 HR: 63  Sitting BP: 133/83 HR: 64  Standing BP: --/-- HR: --  Site: --  Mode: --    Lipid Panel: Date/Time: 01-02-25 @ 09:11  Cholesterol, Serum: 128  LDL Cholesterol Calculated: 53  HDL Cholesterol, Serum: 46  Total Cholesterol/HDL Ration Measurement: --  Triglycerides, Serum: 174

## 2025-02-12 NOTE — BH INPATIENT PSYCHIATRY PROGRESS NOTE - NSBHFUPINTERVALCCFT_PSY_A_CORE
Denies jitteriness from meds, expresses what if type worries, c/o transient chills, tremors, feels 6Am protlonix given on empty stomach makes her nauseous

## 2025-02-12 NOTE — BH INPATIENT PSYCHIATRY PROGRESS NOTE - NSBHCHARTREVIEWVS_PSY_A_CORE FT
Vital Signs Last 24 Hrs  T(C): 36.2 (02-12-25 @ 07:38), Max: 36.2 (02-12-25 @ 07:38)  T(F): 97.1 (02-12-25 @ 07:38), Max: 97.1 (02-12-25 @ 07:38)  HR: --  BP: 112/71 (02-11-25 @ 20:33) (112/71 - 112/71)  BP(mean): 68 (02-11-25 @ 20:33) (68 - 68)  RR: --  SpO2: --    Orthostatic VS  02-12-25 @ 07:38  Lying BP: --/-- HR: --  Sitting BP: 161/66 HR: 90  Standing BP: --/-- HR: --  Site: --  Mode: --  Orthostatic VS  02-11-25 @ 07:52  Lying BP: 141/73 HR: 63  Sitting BP: 133/83 HR: 64  Standing BP: --/-- HR: --  Site: --  Mode: --

## 2025-02-12 NOTE — BH INPATIENT PSYCHIATRY PROGRESS NOTE - CURRENT MEDICATION
MEDICATIONS  (STANDING):  buPROPion XL (24-Hour) 150 milliGRAM(s) Oral daily  chlorhexidine 0.12% Liquid 15 milliLiter(s) Swish and Spit <User Schedule>  clonazePAM  Tablet 0.5 milliGRAM(s) Oral <User Schedule>  cyanocobalamin 1000 MICROGram(s) Oral daily  ergocalciferol 49810 Unit(s) Oral <User Schedule>  evolocumab Injectable 140 milliGRAM(s) SubCutaneous every 2 weeks  fluticasone propionate 50 MICROgram(s)/spray Nasal Spray 1 Spray(s) Both Nostrils two times a day  gabapentin 300 milliGRAM(s) Oral at bedtime  gabapentin 200 milliGRAM(s) Oral <User Schedule>  l-methylfolate 15 milliGRAM(s) Oral daily  melatonin. 1 milliGRAM(s) Oral at bedtime  metoprolol succinate ER 50 milliGRAM(s) Oral two times a day  mirtazapine 45 milliGRAM(s) Oral at bedtime  multivitamin 1 Tablet(s) Oral daily  Nexletol 180 mg 1 Tablet(s) 1 Tablet(s) Oral daily  pantoprazole    Tablet 40 milliGRAM(s) Oral <User Schedule>    MEDICATIONS  (PRN):  acetaminophen     Tablet .. 650 milliGRAM(s) Oral every 6 hours PRN Temp greater or equal to 38C (100.4F), Mild Pain (1 - 3), Moderate Pain (4 - 6)  aluminum hydroxide/magnesium hydroxide/simethicone Suspension 30 milliLiter(s) Oral every 6 hours PRN Dyspepsia  clonazePAM Oral Disintegrating Tablet 0.25 milliGRAM(s) Oral every 8 hours PRN anxiety  famotidine    Tablet 10 milliGRAM(s) Oral every 6 hours PRN acid reflux symptroms  lidocaine   4% Patch 1 Patch Transdermal daily PRN sciatica pain

## 2025-02-12 NOTE — BH INPATIENT PSYCHIATRY PROGRESS NOTE - NSBHASSESSSUMMFT_PSY_ALL_CORE
Ms. Gross is a 75yo female, , retired (), domiciled at home with , no children; PMHx HTN, HLD; PPHx d/o ANSON and MDD, 9 past psych admissions starting in 2019 (last [facility] April 2024 for worsening anxiety and depression), ECT at [facility] 2023, OP at [facility]; 1 past SA (OD on 9 pills of unknown medication in 2019), no h/o NSSIB, no h/o violence/aggression/legal issues, past marijuana use; initially admitted to [facility] 2W on 12/27 for increasing anxiety and SI w/o plans in the context of medication adjustments and psychosocial stressors.     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     On initial 2S assessment pt dysphoric, anxious, hopeless, and preoccupied with somatic sxs. There are elements of active passivity, severe sensitivity to feelings of inadequacy and complicated interpersonal relationships (, friends, providers). 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 Provided pt with psychoeducation regarding limitations of medications and need to focus on therapeutic interventions however she has limited insight into this and is intent on finding the right medication(s). For now will continue to slowly taper off nortriptyline.     1/6: Prominent treatment interfering behaviors including active passivity and rejection sensitivity. For now will continue nortriptyline taper. Considering atypical antidepressant trial. Pt reporting chronic b/l LE edema, will encourage her to use compression stocking and elevate legs, no indication for diuretic at this time.     1/7: Preoccupied with med side effects and physical sxs, but more positive about potential med trials. Will decrease qAM gabapentin 300mg to 200mg but otherwise will attempt to limit med changes other than nortriptyline taper. Will also attempt to discourage use of zofran PRNs given past difficulties tolerating multiple serotonergic medications.   1/8 Patient unchanged anxious ruminative unable to use coping skilss. Will dc NTP increase Remeron  1/9 Somatic anxious unable to utilize coping techniques. Plan behavior plan initiated, cont to titrate Remeorn. Added methylfolate for augmentation  1/10 Reports malaise, could be a viral illness vs somatic anxiety symptoms; otherwise tolerating treatment well    1/13: Dysphoric, tearful, anxious but denies SI/HI, discussed med plans and seeks reassurance.  Will increase Remeron to 37.5mg QHS.  1/14: Remains dysphoric, anxious, somatically focused, responds well to support and reassurance provided.  Will increase methylfolate to 15mg daily, provided zofran 4mg PO daily prn nausea, Lidoderm patch prn sciatic pain, will monitor tolerability and consider further titration of Remeron.  1/15: Anxious, depressed, somatic, seeks reassurance on exam, will increase Remeron to 45mg QHS tonight.  Pt agrees with plan as stated.  1/16: Anxious, ruminative but reports improvement, less somatic, tolerating Remeron 45mg and methylfolate 15mg, considering augmentation with Wellbutrin.   1/17: Dysphoric, anxious, denies SI, seeks support, reassurance, will change to regular tablet Klonopin formulation from disintegrating, continue current meds.  1/21 About same will start Wellbutrin to augment mirtazapine. Will change Zofran to pepcid to minimize serotonergic meds  1/22 Tolerating Wellbutrin con current dose. Will alllow Pepcid 10mg q6 prn with max three doses per 24 hours hopefully will seek less when feeling better  1/23 Gi focused today , cont meds will increase Wellbutrin and patient was seen by medicine who increased Protonix  1/24 Depressed, anxious somatic will increase Wellbutrin to 75mg/d, medicine increase protonix  1/25 No major improvement cont meds avoid making multiple changes which makes her anxious and more symptomatic  1/26 No change cont med tiral. Consider some form of neuropsych eval if subtle cog decline playing role in anxiety and treatment refractoriness  1/27 No major changes plan titrate bupropion to 100mg SR daily  1/28 No real change will increase Wellbutrin to 100mg SR daily add Peridex as she was on this as outpatient for oral hygiene  1/29 No major change suspect tremor may be due to anxiety then blamed on meds, patient encouraged to cont meds rather than reduce or dc and she is agreeable, also told if  wellbutrin sig improved depression but causes minor tremor that may be acceptable risk benefit  1/30 Look sl brighter today but does tend to fluctuate. Cont  current meds without change in dose, if tolerating plan increase Wellbutrin to 150mg  1/31 SLight gains able to tolerate Wellbutrin lack of known se compared with TCA is reassuring to her. Discussed TMS with patient and spouse  2/3 Looks a little better, alternate complaints between and anxiety and depression  2/4 Modest gains, remains somatic , often shifting complaints. Will add Maalox prn suspect she will request Zofran which for now trying to avoid  2/5 Reports some improvement and not appearing as agitated, cont meds will add Flonase  2/6 Patient less intensely anxious, remains somatic, need much refocusing to avoid her attributing pre existing complaints to side effects of new meds  2/7 Some gains, remains somatic. Spoke with her GI Dr Flores [phone] who feels nausea most anxiety, suggested compazine as last resort if desire to avoid possible excess serotonergic effects from Zofran. Cont meds, add vit D as per home regimen  2/8 Anxious , prominent symptom misattribution cont current med trial and attempts to reassure patient  2/9 Dysphoric, anxious, somatic and se focused Cont current meds, cont to work to help patient recognize somatic complaints as component of anxiety and not requiring med lowering or dc  2/10 Patient reports some improvement in mood and anxiety despite continuing to appear anxious and somatic about med se. COnt current meds will recheck labs for reassurance primarily  2/11 Partial gains cont current meds, consider increasing Wellbutrin concenr is she may experience minor se and want to go off  2/12 Remains worriness, somatic med focused. COnt Wellbutrin trial consider increase to 200mg/d  will change 6AM Protonix to 9AM

## 2025-02-13 PROCEDURE — 99232 SBSQ HOSP IP/OBS MODERATE 35: CPT

## 2025-02-13 RX ORDER — CLONAZEPAM 0.5 MG/1
0.25 TABLET ORAL EVERY 8 HOURS
Refills: 0 | Status: DISCONTINUED | OUTPATIENT
Start: 2025-02-13 | End: 2025-02-19

## 2025-02-13 RX ORDER — CLONAZEPAM 0.5 MG/1
0.5 TABLET ORAL
Refills: 0 | Status: DISCONTINUED | OUTPATIENT
Start: 2025-02-13 | End: 2025-02-19

## 2025-02-13 RX ADMIN — FLUTICASONE PROPIONATE 1 SPRAY(S): 50 SPRAY, METERED NASAL at 08:24

## 2025-02-13 RX ADMIN — Medication 15 MILLILITER(S): at 08:24

## 2025-02-13 RX ADMIN — EVOLOCUMAB 140 MILLIGRAM(S): 140 INJECTION, SOLUTION SUBCUTANEOUS at 08:31

## 2025-02-13 RX ADMIN — MIRTAZAPINE 45 MILLIGRAM(S): 30 TABLET, FILM COATED ORAL at 20:20

## 2025-02-13 RX ADMIN — CLONAZEPAM 0.25 MILLIGRAM(S): 0.5 TABLET ORAL at 09:42

## 2025-02-13 RX ADMIN — METOPROLOL SUCCINATE 50 MILLIGRAM(S): 50 TABLET, EXTENDED RELEASE ORAL at 08:22

## 2025-02-13 RX ADMIN — METOPROLOL SUCCINATE 50 MILLIGRAM(S): 50 TABLET, EXTENDED RELEASE ORAL at 20:20

## 2025-02-13 RX ADMIN — Medication 1 TABLET(S): at 08:22

## 2025-02-13 RX ADMIN — Medication 30 MILLILITER(S): at 16:54

## 2025-02-13 RX ADMIN — CLONAZEPAM 0.5 MILLIGRAM(S): 0.5 TABLET ORAL at 05:58

## 2025-02-13 RX ADMIN — Medication 40 MILLIGRAM(S): at 20:20

## 2025-02-13 RX ADMIN — BUPROPION HYDROBROMIDE 150 MILLIGRAM(S): 522 TABLET, EXTENDED RELEASE ORAL at 08:21

## 2025-02-13 RX ADMIN — GABAPENTIN 200 MILLIGRAM(S): 400 CAPSULE ORAL at 08:22

## 2025-02-13 RX ADMIN — Medication 1 MILLIGRAM(S): at 20:20

## 2025-02-13 RX ADMIN — CYANOCOBALAMIN 1000 MICROGRAM(S): 1000 INJECTION INTRAMUSCULAR; SUBCUTANEOUS at 08:22

## 2025-02-13 RX ADMIN — CLONAZEPAM 0.5 MILLIGRAM(S): 0.5 TABLET ORAL at 12:24

## 2025-02-13 RX ADMIN — GABAPENTIN 300 MILLIGRAM(S): 400 CAPSULE ORAL at 20:20

## 2025-02-13 RX ADMIN — Medication 40 MILLIGRAM(S): at 08:22

## 2025-02-13 RX ADMIN — Medication 30 MILLILITER(S): at 09:42

## 2025-02-13 RX ADMIN — CLONAZEPAM 0.5 MILLIGRAM(S): 0.5 TABLET ORAL at 20:19

## 2025-02-13 NOTE — BH INPATIENT PSYCHIATRY PROGRESS NOTE - NSBHCHARTREVIEWVS_PSY_A_CORE FT
Vital Signs Last 24 Hrs  T(C): 36.8 (02-13-25 @ 08:05), Max: 36.8 (02-12-25 @ 20:47)  T(F): 98.2 (02-13-25 @ 08:05), Max: 98.2 (02-12-25 @ 20:47)  HR: --  BP: --  BP(mean): --  RR: --  SpO2: --    Orthostatic VS  02-13-25 @ 08:05  Lying BP: 119/64 HR: 81  Sitting BP: 124/61 HR: 79  Standing BP: --/-- HR: --  Site: upper left arm  Mode: electronic  Orthostatic VS  02-12-25 @ 20:47  Lying BP: --/-- HR: --  Sitting BP: 128/65 HR: 66  Standing BP: --/-- HR: --  Site: --  Mode: --  Orthostatic VS  02-12-25 @ 07:38  Lying BP: --/-- HR: --  Sitting BP: 161/66 HR: 90  Standing BP: --/-- HR: --  Site: --  Mode: --

## 2025-02-13 NOTE — BH DISCHARGE NOTE NURSING/SOCIAL WORK/PSYCH REHAB - DISCHARGE INSTRUCTIONS AFTERCARE APPOINTMENTS
In order to check the location, date, or time of your aftercare appointment, please refer to your Discharge Instructions Document given to you upon leaving the hospital.  If you have lost the instructions please call [phone]

## 2025-02-13 NOTE — BH DISCHARGE NOTE NURSING/SOCIAL WORK/PSYCH REHAB - NSDCADDINFO1FT_PSY_ALL_CORE
Your appointment with Dr. Monge on 2/25/25 is for intake only. Your future appointments will be with .

## 2025-02-13 NOTE — BH INPATIENT PSYCHIATRY PROGRESS NOTE - CURRENT MEDICATION
MEDICATIONS  (STANDING):  buPROPion XL (24-Hour) 150 milliGRAM(s) Oral daily  chlorhexidine 0.12% Liquid 15 milliLiter(s) Swish and Spit <User Schedule>  clonazePAM  Tablet 0.5 milliGRAM(s) Oral <User Schedule>  cyanocobalamin 1000 MICROGram(s) Oral daily  ergocalciferol 98052 Unit(s) Oral <User Schedule>  evolocumab Injectable 140 milliGRAM(s) SubCutaneous every 2 weeks  fluticasone propionate 50 MICROgram(s)/spray Nasal Spray 1 Spray(s) Both Nostrils two times a day  gabapentin 300 milliGRAM(s) Oral at bedtime  gabapentin 200 milliGRAM(s) Oral <User Schedule>  l-methylfolate 15 milliGRAM(s) Oral daily  melatonin. 1 milliGRAM(s) Oral at bedtime  metoprolol succinate ER 50 milliGRAM(s) Oral two times a day  mirtazapine 45 milliGRAM(s) Oral at bedtime  multivitamin 1 Tablet(s) Oral daily  Nexletol 180 mg 1 Tablet(s) 1 Tablet(s) Oral daily  pantoprazole    Tablet 40 milliGRAM(s) Oral <User Schedule>    MEDICATIONS  (PRN):  acetaminophen     Tablet .. 650 milliGRAM(s) Oral every 6 hours PRN Temp greater or equal to 38C (100.4F), Mild Pain (1 - 3), Moderate Pain (4 - 6)  aluminum hydroxide/magnesium hydroxide/simethicone Suspension 30 milliLiter(s) Oral every 6 hours PRN Dyspepsia  clonazePAM Oral Disintegrating Tablet 0.25 milliGRAM(s) Oral every 8 hours PRN anxiety  famotidine    Tablet 10 milliGRAM(s) Oral every 6 hours PRN acid reflux symptroms  lidocaine   4% Patch 1 Patch Transdermal daily PRN sciatica pain

## 2025-02-13 NOTE — BH INPATIENT PSYCHIATRY PROGRESS NOTE - NSBHASSESSSUMMFT_PSY_ALL_CORE
Ms. Gross is a 73yo female, , retired (), domiciled at home with , no children; PMHx HTN, HLD; PPHx d/o ANSON and MDD, 9 past psych admissions starting in 2019 (last [facility] April 2024 for worsening anxiety and depression), ECT at [facility] 2023, OP at [facility]; 1 past SA (OD on 9 pills of unknown medication in 2019), no h/o NSSIB, no h/o violence/aggression/legal issues, past marijuana use; initially admitted to [facility] 2W on 12/27 for increasing anxiety and SI w/o plans in the context of medication adjustments and psychosocial stressors.     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     On initial 2S assessment pt dysphoric, anxious, hopeless, and preoccupied with somatic sxs. There are elements of active passivity, severe sensitivity to feelings of inadequacy and complicated interpersonal relationships (, friends, providers). 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 Provided pt with psychoeducation regarding limitations of medications and need to focus on therapeutic interventions however she has limited insight into this and is intent on finding the right medication(s). For now will continue to slowly taper off nortriptyline.     1/6: Prominent treatment interfering behaviors including active passivity and rejection sensitivity. For now will continue nortriptyline taper. Considering atypical antidepressant trial. Pt reporting chronic b/l LE edema, will encourage her to use compression stocking and elevate legs, no indication for diuretic at this time.     1/7: Preoccupied with med side effects and physical sxs, but more positive about potential med trials. Will decrease qAM gabapentin 300mg to 200mg but otherwise will attempt to limit med changes other than nortriptyline taper. Will also attempt to discourage use of zofran PRNs given past difficulties tolerating multiple serotonergic medications.   1/8 Patient unchanged anxious ruminative unable to use coping skilss. Will dc NTP increase Remeron  1/9 Somatic anxious unable to utilize coping techniques. Plan behavior plan initiated, cont to titrate Remeorn. Added methylfolate for augmentation  1/10 Reports malaise, could be a viral illness vs somatic anxiety symptoms; otherwise tolerating treatment well    1/13: Dysphoric, tearful, anxious but denies SI/HI, discussed med plans and seeks reassurance.  Will increase Remeron to 37.5mg QHS.  1/14: Remains dysphoric, anxious, somatically focused, responds well to support and reassurance provided.  Will increase methylfolate to 15mg daily, provided zofran 4mg PO daily prn nausea, Lidoderm patch prn sciatic pain, will monitor tolerability and consider further titration of Remeron.  1/15: Anxious, depressed, somatic, seeks reassurance on exam, will increase Remeron to 45mg QHS tonight.  Pt agrees with plan as stated.  1/16: Anxious, ruminative but reports improvement, less somatic, tolerating Remeron 45mg and methylfolate 15mg, considering augmentation with Wellbutrin.   1/17: Dysphoric, anxious, denies SI, seeks support, reassurance, will change to regular tablet Klonopin formulation from disintegrating, continue current meds.  1/21 About same will start Wellbutrin to augment mirtazapine. Will change Zofran to pepcid to minimize serotonergic meds  1/22 Tolerating Wellbutrin con current dose. Will alllow Pepcid 10mg q6 prn with max three doses per 24 hours hopefully will seek less when feeling better  1/23 Gi focused today , cont meds will increase Wellbutrin and patient was seen by medicine who increased Protonix  1/24 Depressed, anxious somatic will increase Wellbutrin to 75mg/d, medicine increase protonix  1/25 No major improvement cont meds avoid making multiple changes which makes her anxious and more symptomatic  1/26 No change cont med tiral. Consider some form of neuropsych eval if subtle cog decline playing role in anxiety and treatment refractoriness  1/27 No major changes plan titrate bupropion to 100mg SR daily  1/28 No real change will increase Wellbutrin to 100mg SR daily add Peridex as she was on this as outpatient for oral hygiene  1/29 No major change suspect tremor may be due to anxiety then blamed on meds, patient encouraged to cont meds rather than reduce or dc and she is agreeable, also told if  wellbutrin sig improved depression but causes minor tremor that may be acceptable risk benefit  1/30 Look sl brighter today but does tend to fluctuate. Cont  current meds without change in dose, if tolerating plan increase Wellbutrin to 150mg  1/31 SLight gains able to tolerate Wellbutrin lack of known se compared with TCA is reassuring to her. Discussed TMS with patient and spouse  2/3 Looks a little better, alternate complaints between and anxiety and depression  2/4 Modest gains, remains somatic , often shifting complaints. Will add Maalox prn suspect she will request Zofran which for now trying to avoid  2/5 Reports some improvement and not appearing as agitated, cont meds will add Flonase  2/6 Patient less intensely anxious, remains somatic, need much refocusing to avoid her attributing pre existing complaints to side effects of new meds  2/7 Some gains, remains somatic. Spoke with her GI Dr Flores [phone] who feels nausea most anxiety, suggested compazine as last resort if desire to avoid possible excess serotonergic effects from Zofran. Cont meds, add vit D as per home regimen  2/8 Anxious , prominent symptom misattribution cont current med trial and attempts to reassure patient  2/9 Dysphoric, anxious, somatic and se focused Cont current meds, cont to work to help patient recognize somatic complaints as component of anxiety and not requiring med lowering or dc  2/10 Patient reports some improvement in mood and anxiety despite continuing to appear anxious and somatic about med se. COnt current meds will recheck labs for reassurance primarily  2/11 Partial gains cont current meds, consider increasing Wellbutrin concern is she may experience minor se and want to go off  2/12 Remains worriness, somatic med focused. Cont Wellbutrin trial consider increase to 200mg/d  will change 6AM Protonix to 9AM  2/13 Patient states changing protonix time dosesn't help with GI complaints. Feels better with anxiety overall and mood but having anxiety specifically related to discharge issues. Will cont current meds. suspect increasing Wellbutrin may generate more med/s.e. worries than benefit

## 2025-02-13 NOTE — BH DISCHARGE NOTE NURSING/SOCIAL WORK/PSYCH REHAB - NSDCPRRECOMMEND_PSY_ALL_CORE
Patient is scheduled to be discharged today to her previous residence. Writer encouraged patient to maintain medication compliance, to utilize her coping skills and to participate in structured leisure activities.

## 2025-02-13 NOTE — BH INPATIENT PSYCHIATRY PROGRESS NOTE - NSBHMETABOLIC_PSY_ALL_CORE_FT
BMI: BMI (kg/m2): 37.5 (12-27-24 @ 15:12)  HbA1c: A1C with Estimated Average Glucose Result: 5.5 % (01-02-25 @ 09:11)    Glucose:   BP: 112/71 (02-11-25 @ 20:33) (112/71 - 129/65)Vital Signs Last 24 Hrs  T(C): 36.8 (02-13-25 @ 08:05), Max: 36.8 (02-12-25 @ 20:47)  T(F): 98.2 (02-13-25 @ 08:05), Max: 98.2 (02-12-25 @ 20:47)  HR: --  BP: --  BP(mean): --  RR: --  SpO2: --    Orthostatic VS  02-13-25 @ 08:05  Lying BP: 119/64 HR: 81  Sitting BP: 124/61 HR: 79  Standing BP: --/-- HR: --  Site: upper left arm  Mode: electronic  Orthostatic VS  02-12-25 @ 20:47  Lying BP: --/-- HR: --  Sitting BP: 128/65 HR: 66  Standing BP: --/-- HR: --  Site: --  Mode: --  Orthostatic VS  02-12-25 @ 07:38  Lying BP: --/-- HR: --  Sitting BP: 161/66 HR: 90  Standing BP: --/-- HR: --  Site: --  Mode: --    Lipid Panel: Date/Time: 01-02-25 @ 09:11  Cholesterol, Serum: 128  LDL Cholesterol Calculated: 53  HDL Cholesterol, Serum: 46  Total Cholesterol/HDL Ration Measurement: --  Triglycerides, Serum: 174

## 2025-02-13 NOTE — BH DISCHARGE NOTE NURSING/SOCIAL WORK/PSYCH REHAB - PATIENT PORTAL LINK FT
You can access the FollowMyHealth Patient Portal offered by [facility] by registering at the following website: [email or web address]. By joining [facility]’s FollowMyHealth portal, you will also be able to view your health information using other applications (apps) compatible with our system.

## 2025-02-13 NOTE — BH DISCHARGE NOTE NURSING/SOCIAL WORK/PSYCH REHAB - NSCDUDCCRISIS_PSY_A_CORE
[facility]  1 [phone]) [facility] ([phone])  Text "WELL" to [phone]  Website: [email or web address] [phone] ([phone]) Website: [email or web address]/.  [facility]  1 ([phone]) [facility] ([phone])/.  [facility]  [address] [place] [id number] [phone]   Hours:  Monday through Friday from 9 AM to 3 PM

## 2025-02-13 NOTE — BH DISCHARGE NOTE NURSING/SOCIAL WORK/PSYCH REHAB - NSBHDCADDR1FT_A_CORE
[facility] [id number] [place] [address], [place] [address]. [address]nd Floor.

## 2025-02-13 NOTE — BH DISCHARGE NOTE NURSING/SOCIAL WORK/PSYCH REHAB - NSDCPRGOAL_PSY_ALL_CORE
Writer initially presented with symptoms of depression, anxiety and feelings of hopelessness. Patient's initial goals included increasing her hope in recovery, increasing her coping skills and decreasing anxiety. Patient made some minor to moderate gains towards her rehab goals as evidenced by patient's decreased anxiety. Patient also demonstrated daily compliance with her medications.     ***Patient completed a safety plan.

## 2025-02-14 PROCEDURE — 99232 SBSQ HOSP IP/OBS MODERATE 35: CPT

## 2025-02-14 PROCEDURE — 90834 PSYTX W PT 45 MINUTES: CPT

## 2025-02-14 RX ORDER — GABAPENTIN 400 MG/1
100 CAPSULE ORAL ONCE
Refills: 0 | Status: COMPLETED | OUTPATIENT
Start: 2025-02-14 | End: 2025-02-14

## 2025-02-14 RX ORDER — GABAPENTIN 400 MG/1
300 CAPSULE ORAL
Refills: 0 | Status: DISCONTINUED | OUTPATIENT
Start: 2025-02-14 | End: 2025-02-19

## 2025-02-14 RX ADMIN — GABAPENTIN 300 MILLIGRAM(S): 400 CAPSULE ORAL at 20:49

## 2025-02-14 RX ADMIN — CLONAZEPAM 0.5 MILLIGRAM(S): 0.5 TABLET ORAL at 13:20

## 2025-02-14 RX ADMIN — Medication 1 MILLIGRAM(S): at 20:48

## 2025-02-14 RX ADMIN — Medication 1 TABLET(S): at 09:54

## 2025-02-14 RX ADMIN — CLONAZEPAM 0.5 MILLIGRAM(S): 0.5 TABLET ORAL at 20:49

## 2025-02-14 RX ADMIN — CLONAZEPAM 0.25 MILLIGRAM(S): 0.5 TABLET ORAL at 09:53

## 2025-02-14 RX ADMIN — Medication 40 MILLIGRAM(S): at 20:48

## 2025-02-14 RX ADMIN — FLUTICASONE PROPIONATE 1 SPRAY(S): 50 SPRAY, METERED NASAL at 09:52

## 2025-02-14 RX ADMIN — METOPROLOL SUCCINATE 50 MILLIGRAM(S): 50 TABLET, EXTENDED RELEASE ORAL at 09:55

## 2025-02-14 RX ADMIN — CLONAZEPAM 0.5 MILLIGRAM(S): 0.5 TABLET ORAL at 06:51

## 2025-02-14 RX ADMIN — Medication 15 MILLILITER(S): at 17:50

## 2025-02-14 RX ADMIN — CYANOCOBALAMIN 1000 MICROGRAM(S): 1000 INJECTION INTRAMUSCULAR; SUBCUTANEOUS at 09:55

## 2025-02-14 RX ADMIN — MIRTAZAPINE 45 MILLIGRAM(S): 30 TABLET, FILM COATED ORAL at 20:48

## 2025-02-14 RX ADMIN — METOPROLOL SUCCINATE 50 MILLIGRAM(S): 50 TABLET, EXTENDED RELEASE ORAL at 20:49

## 2025-02-14 RX ADMIN — GABAPENTIN 100 MILLIGRAM(S): 400 CAPSULE ORAL at 10:45

## 2025-02-14 RX ADMIN — Medication 10 MILLIGRAM(S): at 17:55

## 2025-02-14 RX ADMIN — BUPROPION HYDROBROMIDE 150 MILLIGRAM(S): 522 TABLET, EXTENDED RELEASE ORAL at 09:54

## 2025-02-14 RX ADMIN — Medication 30 MILLILITER(S): at 10:45

## 2025-02-14 RX ADMIN — GABAPENTIN 200 MILLIGRAM(S): 400 CAPSULE ORAL at 09:54
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
## 2025-02-14 NOTE — BH PSYCHOLOGY - CLINICIAN PSYCHOTHERAPY NOTE - NSTXDEPRESPROGRES_PSY_ALL_CORE
No Change
Improving
No Change

## 2025-02-14 NOTE — BH PSYCHOLOGY - CLINICIAN PSYCHOTHERAPY NOTE - NSTXANXDATETRGT_PSY_ALL_CORE
14-Jan-2025
10-Feb-2025
10-Feb-2025
14-Jan-2025
10-Feb-2025
14-Jan-2025
10-Feb-2025
10-Feb-2025
14-Jan-2025
10-Feb-2025

## 2025-02-14 NOTE — BH PSYCHOLOGY - CLINICIAN PSYCHOTHERAPY NOTE - NSBHPTASSESSDT_PSY_A_CORE
03-Feb-2025 14:15
15-Ronn-2025 15:55
07-Jan-2025 11:30
12-Feb-2025 17:10
10-Feb-2025 14:45
17-Jan-2025 11:30
24-Jan-2025 14:30
09-Jan-2025 16:47
05-Feb-2025 15:25
14-Feb-2025 11:15
31-Jan-2025 11:30
29-Jan-2025 14:20
21-Jan-2025 10:30
27-Jan-2025 10:50
13-Jan-2025 12:30
07-Feb-2025 10:20

## 2025-02-14 NOTE — BH PSYCHOLOGY - CLINICIAN PSYCHOTHERAPY NOTE - NSBHPSYCHOLBILLFAM_PSY_A_CORE
[id number] - 38 to 52 minutes
[id number] - 38 to 52 minutes
[id number] - 38 to 52 minutes
[id number] - 38 to 52 minutes
[id number] - 16 to 37 minutes
01175 - 16 to 37 minutes
[id number] - 16 to 37 minutes
11450 - 38 to 52 minutes
[id number] - 38 to 52 minutes
[id number] - 38 to 52 minutes
[id number] - 16 to 37 minutes
[id number] - 16 to 37 minutes
17822 - 38 to 52 minutes
[id number] - 38 to 52 minutes
[id number] - 16 to 37 minutes
[id number] - 38 to 52 minutes

## 2025-02-14 NOTE — BH PSYCHOLOGY - CLINICIAN PSYCHOTHERAPY NOTE - NSTXCOPEDATETRGT_PSY_ALL_CORE
18-Feb-2025
23-Jan-2025
05-Feb-2025
05-Feb-2025
29-Jan-2025
14-Jan-2025
10-Feb-2025
10-Feb-2025
18-Feb-2025
29-Jan-2025
10-Feb-2025
17-Jan-2025
23-Jan-2025
14-Jan-2025
10-Feb-2025
17-Jan-2025

## 2025-02-14 NOTE — BH PSYCHOLOGY - CLINICIAN PSYCHOTHERAPY NOTE - NSBHPSYCHOLDURATION_PSY_A_CORE
45 minutes
30 minutes
45 minutes
45 minutes
30 minutes
30 minutes
45 minutes
30 minutes
45 minutes
30 minutes
30 minutes

## 2025-02-14 NOTE — BH INPATIENT PSYCHIATRY PROGRESS NOTE - NSBHMETABOLIC_PSY_ALL_CORE_FT
BMI: BMI (kg/m2): 37.5 (12-27-24 @ 15:12)  HbA1c: A1C with Estimated Average Glucose Result: 5.5 % (01-02-25 @ 09:11)    Glucose:   BP: 140/74 (02-13-25 @ 20:44) (112/71 - 140/74)Vital Signs Last 24 Hrs  T(C): 36.7 (02-14-25 @ 07:29), Max: 36.7 (02-14-25 @ 07:29)  T(F): 98 (02-14-25 @ 07:29), Max: 98 (02-14-25 @ 07:29)  HR: 72 (02-13-25 @ 20:44) (72 - 72)  BP: 140/74 (02-13-25 @ 20:44) (140/74 - 140/74)  BP(mean): --  RR: --  SpO2: --    Orthostatic VS  02-14-25 @ 09:32  Lying BP: --/-- HR: --  Sitting BP: 140/88 HR: 72  Standing BP: 134/72 HR: 75  Site: --  Mode: --  Orthostatic VS  02-14-25 @ 07:29  Lying BP: 144/72 HR: 66  Sitting BP: 119/68 HR: 69  Standing BP: --/-- HR: --  Site: --  Mode: --  Orthostatic VS  02-13-25 @ 08:05  Lying BP: 119/64 HR: 81  Sitting BP: 124/61 HR: 79  Standing BP: --/-- HR: --  Site: upper left arm  Mode: electronic  Orthostatic VS  02-12-25 @ 20:47  Lying BP: --/-- HR: --  Sitting BP: 128/65 HR: 66  Standing BP: --/-- HR: --  Site: --  Mode: --    Lipid Panel: Date/Time: 01-02-25 @ 09:11  Cholesterol, Serum: 128  LDL Cholesterol Calculated: 53  HDL Cholesterol, Serum: 46  Total Cholesterol/HDL Ration Measurement: --  Triglycerides, Serum: 174

## 2025-02-14 NOTE — BH INPATIENT PSYCHIATRY PROGRESS NOTE - CURRENT MEDICATION
MEDICATIONS  (STANDING):  buPROPion XL (24-Hour) 150 milliGRAM(s) Oral daily  chlorhexidine 0.12% Liquid 15 milliLiter(s) Swish and Spit <User Schedule>  clonazePAM  Tablet 0.5 milliGRAM(s) Oral <User Schedule>  cyanocobalamin 1000 MICROGram(s) Oral daily  ergocalciferol 13053 Unit(s) Oral <User Schedule>  evolocumab Injectable 140 milliGRAM(s) SubCutaneous every 2 weeks  fluticasone propionate 50 MICROgram(s)/spray Nasal Spray 1 Spray(s) Both Nostrils two times a day  gabapentin 300 milliGRAM(s) Oral two times a day  l-methylfolate 15 milliGRAM(s) Oral daily  melatonin. 1 milliGRAM(s) Oral at bedtime  metoprolol succinate ER 50 milliGRAM(s) Oral two times a day  mirtazapine 45 milliGRAM(s) Oral at bedtime  multivitamin 1 Tablet(s) Oral daily  Nexletol 180 mg 1 Tablet(s) 1 Tablet(s) Oral daily  pantoprazole    Tablet 40 milliGRAM(s) Oral <User Schedule>    MEDICATIONS  (PRN):  acetaminophen     Tablet .. 650 milliGRAM(s) Oral every 6 hours PRN Temp greater or equal to 38C (100.4F), Mild Pain (1 - 3), Moderate Pain (4 - 6)  aluminum hydroxide/magnesium hydroxide/simethicone Suspension 30 milliLiter(s) Oral every 6 hours PRN Dyspepsia  clonazePAM Oral Disintegrating Tablet 0.25 milliGRAM(s) Oral every 8 hours PRN anxiety  famotidine    Tablet 10 milliGRAM(s) Oral every 6 hours PRN acid reflux symptroms  lidocaine   4% Patch 1 Patch Transdermal daily PRN sciatica pain

## 2025-02-14 NOTE — BH PSYCHOLOGY - CLINICIAN PSYCHOTHERAPY NOTE - NSTXDEPRESGOAL_PSY_ALL_CORE
Attend and participate in at least 2 groups daily despite low mood/energy
Will identify 2 coping skills that assist in improving mood
Will identify 2 coping skills that assist in improving mood
Attend and participate in at least 2 groups daily despite low mood/energy
Will identify 2 coping skills that assist in improving mood
Attend and participate in at least 2 groups daily despite low mood/energy
Will identify 2 coping skills that assist in improving mood
Will identify 2 coping skills that assist in improving mood
Attend and participate in at least 2 groups daily despite low mood/energy
Will identify 2 coping skills that assist in improving mood
Will identify 2 coping skills that assist in improving mood
Attend and participate in at least 2 groups daily despite low mood/energy
Attend and participate in at least 2 groups daily despite low mood/energy
Will identify 2 coping skills that assist in improving mood

## 2025-02-14 NOTE — BH INPATIENT PSYCHIATRY PROGRESS NOTE - NSBHFUPINTERVALCCFT_PSY_A_CORE
Denies jitteriness from meds, expresses what if type worries,  denies chills. Says she didn't sleep well worry writer would dc Klonopin

## 2025-02-14 NOTE — BH PSYCHOLOGY - CLINICIAN PSYCHOTHERAPY NOTE - NSTXANXPROGRES_PSY_ALL_CORE
No Change
No Change
Improving
No Change

## 2025-02-14 NOTE — BH PSYCHOLOGY - CLINICIAN PSYCHOTHERAPY NOTE - NSBHPSYCHOLPROBS_PSY_ALL_CORE
Anxiety/Depression

## 2025-02-14 NOTE — BH PSYCHOLOGY - CLINICIAN PSYCHOTHERAPY NOTE - NSTXCOPEPROGRES_PSY_ALL_CORE
Improving
Improving
No Change
Improving
No Change
Improving
No Change
No Change
Improving
Improving
No Change
No Change

## 2025-02-14 NOTE — BH PSYCHOLOGY - CLINICIAN PSYCHOTHERAPY NOTE - NSBHPSYCHOLASSESSPROV_PSY_A_CORE
Licensed Psychologist

## 2025-02-14 NOTE — BH PSYCHOLOGY - CLINICIAN PSYCHOTHERAPY NOTE - NSTXDCOTHRPROGRES_PSY_ALL_CORE
Improving
No Change
Improving
No Change
Improving
No Change

## 2025-02-14 NOTE — BH INPATIENT PSYCHIATRY PROGRESS NOTE - NSBHCHARTREVIEWVS_PSY_A_CORE FT
Vital Signs Last 24 Hrs  T(C): 36.7 (02-14-25 @ 07:29), Max: 36.7 (02-14-25 @ 07:29)  T(F): 98 (02-14-25 @ 07:29), Max: 98 (02-14-25 @ 07:29)  HR: 72 (02-13-25 @ 20:44) (72 - 72)  BP: 140/74 (02-13-25 @ 20:44) (140/74 - 140/74)  BP(mean): --  RR: --  SpO2: --    Orthostatic VS  02-14-25 @ 09:32  Lying BP: --/-- HR: --  Sitting BP: 140/88 HR: 72  Standing BP: 134/72 HR: 75  Site: --  Mode: --  Orthostatic VS  02-14-25 @ 07:29  Lying BP: 144/72 HR: 66  Sitting BP: 119/68 HR: 69  Standing BP: --/-- HR: --  Site: --  Mode: --  Orthostatic VS  02-13-25 @ 08:05  Lying BP: 119/64 HR: 81  Sitting BP: 124/61 HR: 79  Standing BP: --/-- HR: --  Site: upper left arm  Mode: electronic  Orthostatic VS  02-12-25 @ 20:47  Lying BP: --/-- HR: --  Sitting BP: 128/65 HR: 66  Standing BP: --/-- HR: --  Site: --  Mode: --

## 2025-02-14 NOTE — BH PSYCHOLOGY - CLINICIAN PSYCHOTHERAPY NOTE - NSBHPSYCHOLGOALS_PSY_A_CORE
Decrease symptoms/Improve level of independent functioning/Treatment compliance

## 2025-02-14 NOTE — BH PSYCHOLOGY - CLINICIAN PSYCHOTHERAPY NOTE - TOKEN PULL-DIAGNOSIS
Primary Diagnosis:  ANSON (generalized anxiety disorder) [F41.1]        Problem Dx:   MDD (major depressive disorder), recurrent episode, severe [F33.2]      
Primary Diagnosis:  ANSON (generalized anxiety disorder) [F41.1]        Problem Dx:   MDD (major depressive disorder), recurrent episode, severe [F33.2]      
Primary Diagnosis:  ANSON (generalized anxiety disorder) [F41.1]        Problem Dx:   
Primary Diagnosis:  ANSON (generalized anxiety disorder) [F41.1]        Problem Dx:   MDD (major depressive disorder), recurrent episode, severe [F33.2]      
Primary Diagnosis:  ANSON (generalized anxiety disorder) [F41.1]        Problem Dx:   
Primary Diagnosis:  ANSON (generalized anxiety disorder) [F41.1]        Problem Dx:   MDD (major depressive disorder), recurrent episode, severe [F33.2]      
Primary Diagnosis:  ANSON (generalized anxiety disorder) [F41.1]        Problem Dx:   MDD (major depressive disorder), recurrent episode, severe [F33.2]      
Primary Diagnosis:  ANSON (generalized anxiety disorder) [F41.1]        Problem Dx:   
Primary Diagnosis:  ANSON (generalized anxiety disorder) [F41.1]        Problem Dx:   MDD (major depressive disorder), recurrent episode, severe [F33.2]

## 2025-02-14 NOTE — BH PSYCHOLOGY - CLINICIAN PSYCHOTHERAPY NOTE - NSBHPSYCHOLPARTICIP_PSY_A_CORE
Fully engaged
Partially engaged
Fully engaged
Partially engaged
Fully engaged

## 2025-02-14 NOTE — BH PSYCHOLOGY - CLINICIAN PSYCHOTHERAPY NOTE - NSBHPSYCHOLRESPONSE_PSY_A_CORE
Insight displayed/Accepted support

## 2025-02-14 NOTE — BH PSYCHOLOGY - CLINICIAN PSYCHOTHERAPY NOTE - NSTXANXGOAL_PSY_ALL_CORE
Be able to perform ADLs and maintain safety despite anxiety/panic daily
Identify and practice 3 coping skills to manage anxiety
Identify and practice 3 coping skills to manage anxiety
Be able to perform ADLs and maintain safety despite anxiety/panic daily
Identify and practice 3 coping skills to manage anxiety
Be able to perform ADLs and maintain safety despite anxiety/panic daily
Identify and practice 3 coping skills to manage anxiety
Be able to perform ADLs and maintain safety despite anxiety/panic daily
Identify and practice 3 coping skills to manage anxiety

## 2025-02-14 NOTE — BH PSYCHOLOGY - CLINICIAN PSYCHOTHERAPY NOTE - NSBHPSYCHOLDISCUSS_PSY_A_CORE
Discussion with collaborating staff took place since patient's last visit

## 2025-02-14 NOTE — BH PSYCHOLOGY - CLINICIAN PSYCHOTHERAPY NOTE - NSTXDCOTHRGOAL_PSY_ALL_CORE
The pt will be discharged home where she resides with her  and [facility]. The pt refused referral to the [facility] program.
The pt will be discharged home where she resides with her  and [facility]. The pt declines referral to GP. Consideration of TMS has been addressed with pt who is ambivalent about this recommendation.
The pt will be discharged home where she resides with her  and [facility]/u. The pt declines referral to GP or for TMS.
The pt will be discharged home where she resides with her  and [facility]. Consideration of referral to the GP will be explored as pt's  now is retired and available to transport pt.
The pt will be discharged home where she resides with her  and [facility]/u. The pt declines referral to GP or for TMS.
The pt will be discharged home where she resides with her  and will be [facility]. The pt has declined referral to GP and [facility].
The pt will be discharged home where she resides with her  and [facility]. The pt declines referral to GP. Consideration of TMS has been addressed with pt who is ambivalent about this recommendation.
The pt will be discharged back to her home where she resides with her  and [facility]. The pt declines recommendation of referral to [facility].
The pt will be discharged home where she resides with her  and [facility]. Consideration of referral to the GP will be explored as pt's  now is retired and available to transport pt.
The pt will be discharged home where she resides with her  and [facility]. The pt refused referral to the [facility] program.
The pt will be discharged back to her home where she resides with her  and [facility]. The pt declines recommendation of referral to [facility].
The pt will be discharged home where she resides with her  and will be [facility]. The pt has declined referral to GP and [facility].
The pt will be discharged back to her home where she resides with her  and [facility]. The pt declines recommendation of referral to [facility].
The pt will be discharged home where she resides with her  and [facility]/u. The pt declines referral to GP or for TMS.

## 2025-02-14 NOTE — BH INPATIENT PSYCHIATRY PROGRESS NOTE - NSBHASSESSSUMMFT_PSY_ALL_CORE
Ms. Gross is a 75yo female, , retired (), domiciled at home with , no children; PMHx HTN, HLD; PPHx d/o ANSON and MDD, 9 past psych admissions starting in 2019 (last [facility] April 2024 for worsening anxiety and depression), ECT at [facility] 2023, OP at [facility]; 1 past SA (OD on 9 pills of unknown medication in 2019), no h/o NSSIB, no h/o violence/aggression/legal issues, past marijuana use; initially admitted to [facility] 2W on 12/27 for increasing anxiety and SI w/o plans in the context of medication adjustments and psychosocial stressors.     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     On initial 2S assessment pt dysphoric, anxious, hopeless, and preoccupied with somatic sxs. There are elements of active passivity, severe sensitivity to feelings of inadequacy and complicated interpersonal relationships (, friends, providers). 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 Provided pt with psychoeducation regarding limitations of medications and need to focus on therapeutic interventions however she has limited insight into this and is intent on finding the right medication(s). For now will continue to slowly taper off nortriptyline.     1/6: Prominent treatment interfering behaviors including active passivity and rejection sensitivity. For now will continue nortriptyline taper. Considering atypical antidepressant trial. Pt reporting chronic b/l LE edema, will encourage her to use compression stocking and elevate legs, no indication for diuretic at this time.     1/7: Preoccupied with med side effects and physical sxs, but more positive about potential med trials. Will decrease qAM gabapentin 300mg to 200mg but otherwise will attempt to limit med changes other than nortriptyline taper. Will also attempt to discourage use of zofran PRNs given past difficulties tolerating multiple serotonergic medications.   1/8 Patient unchanged anxious ruminative unable to use coping skilss. Will dc NTP increase Remeron  1/9 Somatic anxious unable to utilize coping techniques. Plan behavior plan initiated, cont to titrate Remeorn. Added methylfolate for augmentation  1/10 Reports malaise, could be a viral illness vs somatic anxiety symptoms; otherwise tolerating treatment well    1/13: Dysphoric, tearful, anxious but denies SI/HI, discussed med plans and seeks reassurance.  Will increase Remeron to 37.5mg QHS.  1/14: Remains dysphoric, anxious, somatically focused, responds well to support and reassurance provided.  Will increase methylfolate to 15mg daily, provided zofran 4mg PO daily prn nausea, Lidoderm patch prn sciatic pain, will monitor tolerability and consider further titration of Remeron.  1/15: Anxious, depressed, somatic, seeks reassurance on exam, will increase Remeron to 45mg QHS tonight.  Pt agrees with plan as stated.  1/16: Anxious, ruminative but reports improvement, less somatic, tolerating Remeron 45mg and methylfolate 15mg, considering augmentation with Wellbutrin.   1/17: Dysphoric, anxious, denies SI, seeks support, reassurance, will change to regular tablet Klonopin formulation from disintegrating, continue current meds.  1/21 About same will start Wellbutrin to augment mirtazapine. Will change Zofran to pepcid to minimize serotonergic meds  1/22 Tolerating Wellbutrin con current dose. Will alllow Pepcid 10mg q6 prn with max three doses per 24 hours hopefully will seek less when feeling better  1/23 Gi focused today , cont meds will increase Wellbutrin and patient was seen by medicine who increased Protonix  1/24 Depressed, anxious somatic will increase Wellbutrin to 75mg/d, medicine increase protonix  1/25 No major improvement cont meds avoid making multiple changes which makes her anxious and more symptomatic  1/26 No change cont med tiral. Consider some form of neuropsych eval if subtle cog decline playing role in anxiety and treatment refractoriness  1/27 No major changes plan titrate bupropion to 100mg SR daily  1/28 No real change will increase Wellbutrin to 100mg SR daily add Peridex as she was on this as outpatient for oral hygiene  1/29 No major change suspect tremor may be due to anxiety then blamed on meds, patient encouraged to cont meds rather than reduce or dc and she is agreeable, also told if  wellbutrin sig improved depression but causes minor tremor that may be acceptable risk benefit  1/30 Look sl brighter today but does tend to fluctuate. Cont  current meds without change in dose, if tolerating plan increase Wellbutrin to 150mg  1/31 SLight gains able to tolerate Wellbutrin lack of known se compared with TCA is reassuring to her. Discussed TMS with patient and spouse  2/3 Looks a little better, alternate complaints between and anxiety and depression  2/4 Modest gains, remains somatic , often shifting complaints. Will add Maalox prn suspect she will request Zofran which for now trying to avoid  2/5 Reports some improvement and not appearing as agitated, cont meds will add Flonase  2/6 Patient less intensely anxious, remains somatic, need much refocusing to avoid her attributing pre existing complaints to side effects of new meds  2/7 Some gains, remains somatic. Spoke with her GI Dr Flores [phone] who feels nausea most anxiety, suggested compazine as last resort if desire to avoid possible excess serotonergic effects from Zofran. Cont meds, add vit D as per home regimen  2/8 Anxious , prominent symptom misattribution cont current med trial and attempts to reassure patient  2/9 Dysphoric, anxious, somatic and se focused Cont current meds, cont to work to help patient recognize somatic complaints as component of anxiety and not requiring med lowering or dc  2/10 Patient reports some improvement in mood and anxiety despite continuing to appear anxious and somatic about med se. COnt current meds will recheck labs for reassurance primarily  2/11 Partial gains cont current meds, consider increasing Wellbutrin concern is she may experience minor se and want to go off  2/12 Remains worrisome somatic med focused. Cont Wellbutrin trial consider increase to 200mg/d  will change 6AM Protonix to 9AM  2/13 Patient states changing protonix time dossn't help with GI complaints. Feels better with anxiety overall and mood but having anxiety specifically related to discharge issues. Will cont current meds. suspect increasing Wellbutrin may generate more med/s.e. worries than benefit  2/14 Was improving with med regimen but discussions about dc have engendered pre dc anxiety that is clouding clinical response. Will sl increase gabapentin and cont to provide supportive interventions and encourage use of coping skills. Feel risk benefit of increasing Wellbutrin may not be favorable

## 2025-02-14 NOTE — BH PSYCHOLOGY - CLINICIAN PSYCHOTHERAPY NOTE - NSTXDEPRESDATETRGT_PSY_ALL_CORE
10-Feb-2025
14-Jan-2025
10-Feb-2025
14-Jan-2025
10-Feb-2025
14-Jan-2025
14-Jan-2025
10-Feb-2025
14-Jan-2025

## 2025-02-14 NOTE — BH PSYCHOLOGY - CLINICIAN PSYCHOTHERAPY NOTE - NSTXDCOTHRDATETRGT_PSY_ALL_CORE
14-Jan-2025
31-Jan-2025
12-Feb-2025
14-Jan-2025
14-Jan-2025
23-Jan-2025
31-Jan-2025
12-Feb-2025
19-Feb-2025
23-Jan-2025
06-Feb-2025
31-Jan-2025
19-Feb-2025
14-Jan-2025
06-Feb-2025
12-Feb-2025

## 2025-02-14 NOTE — BH PSYCHOLOGY - CLINICIAN PSYCHOTHERAPY NOTE - NSTXDCOTHRDATEEST_PSY_ALL_CORE
24-Jan-2025
24-Jan-2025
12-Feb-2025
16-Jan-2025
05-Feb-2025
06-Jan-2025
06-Jan-2025
05-Feb-2025
05-Feb-2025
30-Jan-2025
12-Feb-2025
24-Jan-2025
06-Jan-2025
06-Jan-2025
16-Jan-2025
30-Jan-2025

## 2025-02-14 NOTE — BH PSYCHOLOGY - CLINICIAN PSYCHOTHERAPY NOTE - NSTXCOPEDATEEST_PSY_ALL_CORE
16-Jan-2025
29-Jan-2025
03-Feb-2025
11-Feb-2025
22-Jan-2025
10-Ronn-2025
29-Jan-2025
03-Feb-2025
22-Jan-2025
03-Feb-2025
10-Ronn-2025
16-Jan-2025
03-Feb-2025
11-Feb-2025

## 2025-02-14 NOTE — BH PSYCHOLOGY - CLINICIAN PSYCHOTHERAPY NOTE - NSBHPSYCHOLINT_PSY_A_CORE
Cognitive/behavioral therapy/Encourage medication compliance/Supportive therapy/Treatment compliance encouraged
Cognitive/behavioral therapy/Dynamic issues addressed/Encourage medication compliance/Supportive therapy/Treatment compliance encouraged
Cognitive/behavioral therapy/Dynamic issues addressed/Encourage medication compliance/Supportive therapy/Treatment compliance encouraged
Cognitive/behavioral therapy/Encourage medication compliance/Supportive therapy/Treatment compliance encouraged
Cognitive/behavioral therapy/Supportive therapy/Treatment compliance encouraged
Cognitive/behavioral therapy/Encourage medication compliance/Supportive therapy/Treatment compliance encouraged
Cognitive/behavioral therapy/Dynamic issues addressed/Encourage medication compliance/Problem-solving techniques discussed/Supportive therapy/Treatment compliance encouraged
Cognitive/behavioral therapy/Encourage medication compliance/Referred to physician/Supportive therapy/Treatment compliance encouraged
Cognitive/behavioral therapy/Encourage medication compliance/Referred to physician/Supportive therapy/Treatment compliance encouraged
Cognitive/behavioral therapy/Supportive therapy/Treatment compliance encouraged
Cognitive/behavioral therapy/Encourage medication compliance/Supportive therapy/Treatment compliance encouraged
Cognitive/behavioral therapy/Encourage medication compliance/Supportive therapy/Treatment compliance encouraged

## 2025-02-14 NOTE — BH PSYCHOLOGY - CLINICIAN PSYCHOTHERAPY NOTE - NSTXANXDATEEST_PSY_ALL_CORE
28-Dec-2024
28-Dec-2024
03-Feb-2025
28-Dec-2024
28-Dec-2024
03-Feb-2025
28-Dec-2024
03-Feb-2025
28-Dec-2024
28-Dec-2024
03-Feb-2025
28-Dec-2024

## 2025-02-15 PROCEDURE — 99231 SBSQ HOSP IP/OBS SF/LOW 25: CPT

## 2025-02-15 RX ADMIN — Medication 1 TABLET(S): at 09:43

## 2025-02-15 RX ADMIN — CLONAZEPAM 0.5 MILLIGRAM(S): 0.5 TABLET ORAL at 13:23

## 2025-02-15 RX ADMIN — MIRTAZAPINE 45 MILLIGRAM(S): 30 TABLET, FILM COATED ORAL at 21:02

## 2025-02-15 RX ADMIN — METOPROLOL SUCCINATE 50 MILLIGRAM(S): 50 TABLET, EXTENDED RELEASE ORAL at 09:43

## 2025-02-15 RX ADMIN — Medication 10 MILLIGRAM(S): at 09:41

## 2025-02-15 RX ADMIN — GABAPENTIN 300 MILLIGRAM(S): 400 CAPSULE ORAL at 09:42

## 2025-02-15 RX ADMIN — FLUTICASONE PROPIONATE 1 SPRAY(S): 50 SPRAY, METERED NASAL at 09:42

## 2025-02-15 RX ADMIN — CLONAZEPAM 0.5 MILLIGRAM(S): 0.5 TABLET ORAL at 06:35

## 2025-02-15 RX ADMIN — BUPROPION HYDROBROMIDE 150 MILLIGRAM(S): 522 TABLET, EXTENDED RELEASE ORAL at 09:42

## 2025-02-15 RX ADMIN — METOPROLOL SUCCINATE 50 MILLIGRAM(S): 50 TABLET, EXTENDED RELEASE ORAL at 21:02

## 2025-02-15 RX ADMIN — CLONAZEPAM 0.5 MILLIGRAM(S): 0.5 TABLET ORAL at 21:02

## 2025-02-15 RX ADMIN — GABAPENTIN 300 MILLIGRAM(S): 400 CAPSULE ORAL at 21:02

## 2025-02-15 RX ADMIN — Medication 30 MILLILITER(S): at 16:50

## 2025-02-15 RX ADMIN — Medication 15 MILLILITER(S): at 09:44

## 2025-02-15 RX ADMIN — Medication 30 MILLILITER(S): at 09:41

## 2025-02-15 RX ADMIN — ERGOCALCIFEROL 50000 UNIT(S): 1.25 CAPSULE ORAL at 13:23

## 2025-02-15 RX ADMIN — Medication 1 MILLIGRAM(S): at 21:01

## 2025-02-15 RX ADMIN — CYANOCOBALAMIN 1000 MICROGRAM(S): 1000 INJECTION INTRAMUSCULAR; SUBCUTANEOUS at 09:43

## 2025-02-15 NOTE — BH INPATIENT PSYCHIATRY PROGRESS NOTE - NSBHMETABOLIC_PSY_ALL_CORE_FT
BMI: BMI (kg/m2): 37.5 (12-27-24 @ 15:12)  HbA1c: A1C with Estimated Average Glucose Result: 5.5 % (01-02-25 @ 09:11)    Glucose:   BP: 118/73 (02-14-25 @ 20:54) (118/73 - 140/74)Vital Signs Last 24 Hrs  T(C): 36.5 (02-15-25 @ 07:59), Max: 36.5 (02-15-25 @ 07:59)  T(F): 97.7 (02-15-25 @ 07:59), Max: 97.7 (02-15-25 @ 07:59)  HR: 67 (02-14-25 @ 20:54) (67 - 67)  BP: 118/73 (02-14-25 @ 20:54) (118/73 - 118/73)  BP(mean): --  RR: --  SpO2: 99% (02-14-25 @ 20:54) (99% - 99%)    Orthostatic VS  02-15-25 @ 07:59  Lying BP: --/-- HR: --  Sitting BP: 143/81 HR: 63  Standing BP: 136/73 HR: 62  Site: --  Mode: --  Orthostatic VS  02-14-25 @ 09:32  Lying BP: --/-- HR: --  Sitting BP: 140/88 HR: 72  Standing BP: 134/72 HR: 75  Site: --  Mode: --  Orthostatic VS  02-14-25 @ 07:29  Lying BP: 144/72 HR: 66  Sitting BP: 119/68 HR: 69  Standing BP: --/-- HR: --  Site: --  Mode: --    Lipid Panel: Date/Time: 01-02-25 @ 09:11  Cholesterol, Serum: 128  LDL Cholesterol Calculated: 53  HDL Cholesterol, Serum: 46  Total Cholesterol/HDL Ration Measurement: --  Triglycerides, Serum: 174

## 2025-02-15 NOTE — BH INPATIENT PSYCHIATRY PROGRESS NOTE - CURRENT MEDICATION
MEDICATIONS  (STANDING):  buPROPion XL (24-Hour) 150 milliGRAM(s) Oral daily  chlorhexidine 0.12% Liquid 15 milliLiter(s) Swish and Spit <User Schedule>  clonazePAM  Tablet 0.5 milliGRAM(s) Oral <User Schedule>  cyanocobalamin 1000 MICROGram(s) Oral daily  ergocalciferol 00890 Unit(s) Oral <User Schedule>  evolocumab Injectable 140 milliGRAM(s) SubCutaneous every 2 weeks  fluticasone propionate 50 MICROgram(s)/spray Nasal Spray 1 Spray(s) Both Nostrils two times a day  gabapentin 300 milliGRAM(s) Oral two times a day  l-methylfolate 15 milliGRAM(s) Oral daily  melatonin. 1 milliGRAM(s) Oral at bedtime  metoprolol succinate ER 50 milliGRAM(s) Oral two times a day  mirtazapine 45 milliGRAM(s) Oral at bedtime  multivitamin 1 Tablet(s) Oral daily  Nexletol 180 mg 1 Tablet(s) 1 Tablet(s) Oral daily  pantoprazole    Tablet 40 milliGRAM(s) Oral <User Schedule>    MEDICATIONS  (PRN):  acetaminophen     Tablet .. 650 milliGRAM(s) Oral every 6 hours PRN Temp greater or equal to 38C (100.4F), Mild Pain (1 - 3), Moderate Pain (4 - 6)  aluminum hydroxide/magnesium hydroxide/simethicone Suspension 30 milliLiter(s) Oral every 6 hours PRN Dyspepsia  clonazePAM Oral Disintegrating Tablet 0.25 milliGRAM(s) Oral every 8 hours PRN anxiety  famotidine    Tablet 10 milliGRAM(s) Oral every 6 hours PRN acid reflux symptroms  lidocaine   4% Patch 1 Patch Transdermal daily PRN sciatica pain

## 2025-02-15 NOTE — BH INPATIENT PSYCHIATRY PROGRESS NOTE - MSE UNSTRUCTURED FT
Awake and alert. Affect depressed. Speech fluent. +PMR. TP coherent. No delusions. No perceptual disturbance. Fair insight. No suicidal ideations.

## 2025-02-15 NOTE — BH INPATIENT PSYCHIATRY PROGRESS NOTE - NSBHASSESSSUMMFT_PSY_ALL_CORE
Ms. Gross is a 73yo female, , retired (), domiciled at home with , no children; PMHx HTN, HLD; PPHx d/o ANSON and MDD, 9 past psych admissions starting in 2019 (last [facility] April 2024 for worsening anxiety and depression), ECT at [facility] 2023, OP at [facility]; 1 past SA (OD on 9 pills of unknown medication in 2019), no h/o NSSIB, no h/o violence/aggression/legal issues, past marijuana use; initially admitted to [facility] 2W on 12/27 for increasing anxiety and SI w/o plans in the context of medication adjustments and psychosocial stressors.     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     On initial 2S assessment pt dysphoric, anxious, hopeless, and preoccupied with somatic sxs. There are elements of active passivity, severe sensitivity to feelings of inadequacy and complicated interpersonal relationships (, friends, providers). 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 Provided pt with psychoeducation regarding limitations of medications and need to focus on therapeutic interventions however she has limited insight into this and is intent on finding the right medication(s). For now will continue to slowly taper off nortriptyline.     1/6: Prominent treatment interfering behaviors including active passivity and rejection sensitivity. For now will continue nortriptyline taper. Considering atypical antidepressant trial. Pt reporting chronic b/l LE edema, will encourage her to use compression stocking and elevate legs, no indication for diuretic at this time.     1/7: Preoccupied with med side effects and physical sxs, but more positive about potential med trials. Will decrease qAM gabapentin 300mg to 200mg but otherwise will attempt to limit med changes other than nortriptyline taper. Will also attempt to discourage use of zofran PRNs given past difficulties tolerating multiple serotonergic medications.   1/8 Patient unchanged anxious ruminative unable to use coping skilss. Will dc NTP increase Remeron  1/9 Somatic anxious unable to utilize coping techniques. Plan behavior plan initiated, cont to titrate Remeorn. Added methylfolate for augmentation  1/10 Reports malaise, could be a viral illness vs somatic anxiety symptoms; otherwise tolerating treatment well    1/13: Dysphoric, tearful, anxious but denies SI/HI, discussed med plans and seeks reassurance.  Will increase Remeron to 37.5mg QHS.  1/14: Remains dysphoric, anxious, somatically focused, responds well to support and reassurance provided.  Will increase methylfolate to 15mg daily, provided zofran 4mg PO daily prn nausea, Lidoderm patch prn sciatic pain, will monitor tolerability and consider further titration of Remeron.  1/15: Anxious, depressed, somatic, seeks reassurance on exam, will increase Remeron to 45mg QHS tonight.  Pt agrees with plan as stated.  1/16: Anxious, ruminative but reports improvement, less somatic, tolerating Remeron 45mg and methylfolate 15mg, considering augmentation with Wellbutrin.   1/17: Dysphoric, anxious, denies SI, seeks support, reassurance, will change to regular tablet Klonopin formulation from disintegrating, continue current meds.  1/21 About same will start Wellbutrin to augment mirtazapine. Will change Zofran to pepcid to minimize serotonergic meds  1/22 Tolerating Wellbutrin con current dose. Will alllow Pepcid 10mg q6 prn with max three doses per 24 hours hopefully will seek less when feeling better  1/23 Gi focused today , cont meds will increase Wellbutrin and patient was seen by medicine who increased Protonix  1/24 Depressed, anxious somatic will increase Wellbutrin to 75mg/d, medicine increase protonix  1/25 No major improvement cont meds avoid making multiple changes which makes her anxious and more symptomatic  1/26 No change cont med tiral. Consider some form of neuropsych eval if subtle cog decline playing role in anxiety and treatment refractoriness  1/27 No major changes plan titrate bupropion to 100mg SR daily  1/28 No real change will increase Wellbutrin to 100mg SR daily add Peridex as she was on this as outpatient for oral hygiene  1/29 No major change suspect tremor may be due to anxiety then blamed on meds, patient encouraged to cont meds rather than reduce or dc and she is agreeable, also told if  wellbutrin sig improved depression but causes minor tremor that may be acceptable risk benefit  1/30 Look sl brighter today but does tend to fluctuate. Cont  current meds without change in dose, if tolerating plan increase Wellbutrin to 150mg  1/31 SLight gains able to tolerate Wellbutrin lack of known se compared with TCA is reassuring to her. Discussed TMS with patient and spouse  2/3 Looks a little better, alternate complaints between and anxiety and depression  2/4 Modest gains, remains somatic , often shifting complaints. Will add Maalox prn suspect she will request Zofran which for now trying to avoid  2/5 Reports some improvement and not appearing as agitated, cont meds will add Flonase  2/6 Patient less intensely anxious, remains somatic, need much refocusing to avoid her attributing pre existing complaints to side effects of new meds  2/7 Some gains, remains somatic. Spoke with her GI Dr Flores [phone] who feels nausea most anxiety, suggested compazine as last resort if desire to avoid possible excess serotonergic effects from Zofran. Cont meds, add vit D as per home regimen  2/8 Anxious , prominent symptom misattribution cont current med trial and attempts to reassure patient  2/9 Dysphoric, anxious, somatic and se focused Cont current meds, cont to work to help patient recognize somatic complaints as component of anxiety and not requiring med lowering or dc  2/10 Patient reports some improvement in mood and anxiety despite continuing to appear anxious and somatic about med se. COnt current meds will recheck labs for reassurance primarily  2/11 Partial gains cont current meds, consider increasing Wellbutrin concern is she may experience minor se and want to go off  2/12 Remains worrisome somatic med focused. Cont Wellbutrin trial consider increase to 200mg/d  will change 6AM Protonix to 9AM  2/13 Patient states changing protonix time dossn't help with GI complaints. Feels better with anxiety overall and mood but having anxiety specifically related to discharge issues. Will cont current meds. suspect increasing Wellbutrin may generate more med/s.e. worries than benefit  2/14 Was improving with med regimen but discussions about dc have engendered pre dc anxiety that is clouding clinical response. Will sl increase gabapentin and cont to provide supportive interventions and encourage use of coping skills. Feel risk benefit of increasing Wellbutrin may not be favorable  2/15: Some residual anxiety, improving , no suicidal ideations.

## 2025-02-15 NOTE — BH INPATIENT PSYCHIATRY PROGRESS NOTE - NSBHCHARTREVIEWVS_PSY_A_CORE FT
Vital Signs Last 24 Hrs  T(C): 36.5 (02-15-25 @ 07:59), Max: 36.5 (02-15-25 @ 07:59)  T(F): 97.7 (02-15-25 @ 07:59), Max: 97.7 (02-15-25 @ 07:59)  HR: 67 (02-14-25 @ 20:54) (67 - 67)  BP: 118/73 (02-14-25 @ 20:54) (118/73 - 118/73)  BP(mean): --  RR: --  SpO2: 99% (02-14-25 @ 20:54) (99% - 99%)    Orthostatic VS  02-15-25 @ 07:59  Lying BP: --/-- HR: --  Sitting BP: 143/81 HR: 63  Standing BP: 136/73 HR: 62  Site: --  Mode: --  Orthostatic VS  02-14-25 @ 09:32  Lying BP: --/-- HR: --  Sitting BP: 140/88 HR: 72  Standing BP: 134/72 HR: 75  Site: --  Mode: --  Orthostatic VS  02-14-25 @ 07:29  Lying BP: 144/72 HR: 66  Sitting BP: 119/68 HR: 69  Standing BP: --/-- HR: --  Site: --  Mode: --   Stable

## 2025-02-15 NOTE — BH INPATIENT PSYCHIATRY PROGRESS NOTE - NSBHFUPINTERVALHXFT_PSY_A_CORE
Pt is seen and evaluated for follow up for MDD, ANSON. Nervous about discharge. Reports that she is improved. AVSS

## 2025-02-16 PROCEDURE — 99231 SBSQ HOSP IP/OBS SF/LOW 25: CPT

## 2025-02-16 RX ADMIN — Medication 10 MILLIGRAM(S): at 18:44

## 2025-02-16 RX ADMIN — CLONAZEPAM 0.5 MILLIGRAM(S): 0.5 TABLET ORAL at 06:04

## 2025-02-16 RX ADMIN — BUPROPION HYDROBROMIDE 150 MILLIGRAM(S): 522 TABLET, EXTENDED RELEASE ORAL at 09:00

## 2025-02-16 RX ADMIN — GABAPENTIN 300 MILLIGRAM(S): 400 CAPSULE ORAL at 09:00

## 2025-02-16 RX ADMIN — MIRTAZAPINE 45 MILLIGRAM(S): 30 TABLET, FILM COATED ORAL at 20:24

## 2025-02-16 RX ADMIN — Medication 1 TABLET(S): at 09:00

## 2025-02-16 RX ADMIN — FLUTICASONE PROPIONATE 1 SPRAY(S): 50 SPRAY, METERED NASAL at 09:01

## 2025-02-16 RX ADMIN — GABAPENTIN 300 MILLIGRAM(S): 400 CAPSULE ORAL at 20:08

## 2025-02-16 RX ADMIN — Medication 15 MILLILITER(S): at 17:01

## 2025-02-16 RX ADMIN — Medication 40 MILLIGRAM(S): at 20:24

## 2025-02-16 RX ADMIN — METOPROLOL SUCCINATE 50 MILLIGRAM(S): 50 TABLET, EXTENDED RELEASE ORAL at 20:24

## 2025-02-16 RX ADMIN — CYANOCOBALAMIN 1000 MICROGRAM(S): 1000 INJECTION INTRAMUSCULAR; SUBCUTANEOUS at 09:00

## 2025-02-16 RX ADMIN — Medication 1 MILLIGRAM(S): at 20:24

## 2025-02-16 RX ADMIN — METOPROLOL SUCCINATE 50 MILLIGRAM(S): 50 TABLET, EXTENDED RELEASE ORAL at 09:00

## 2025-02-16 RX ADMIN — Medication 10 MILLIGRAM(S): at 09:32

## 2025-02-16 RX ADMIN — Medication 30 MILLILITER(S): at 16:57

## 2025-02-16 RX ADMIN — FLUTICASONE PROPIONATE 1 SPRAY(S): 50 SPRAY, METERED NASAL at 20:24

## 2025-02-16 RX ADMIN — Medication 15 MILLILITER(S): at 09:51

## 2025-02-16 RX ADMIN — CLONAZEPAM 0.5 MILLIGRAM(S): 0.5 TABLET ORAL at 20:24

## 2025-02-16 RX ADMIN — CLONAZEPAM 0.5 MILLIGRAM(S): 0.5 TABLET ORAL at 12:07

## 2025-02-16 NOTE — BH INPATIENT PSYCHIATRY PROGRESS NOTE - NSBHASSESSSUMMFT_PSY_ALL_CORE
Ms. Gross is a 75yo female, , retired (), domiciled at home with , no children; PMHx HTN, HLD; PPHx d/o ANSON and MDD, 9 past psych admissions starting in 2019 (last [facility] April 2024 for worsening anxiety and depression), ECT at [facility] 2023, OP at [facility]; 1 past SA (OD on 9 pills of unknown medication in 2019), no h/o NSSIB, no h/o violence/aggression/legal issues, past marijuana use; initially admitted to [facility] 2W on 12/27 for increasing anxiety and SI w/o plans in the context of medication adjustments and psychosocial stressors.     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     On initial 2S assessment pt dysphoric, anxious, hopeless, and preoccupied with somatic sxs. There are elements of active passivity, severe sensitivity to feelings of inadequacy and complicated interpersonal relationships (, friends, providers). 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 Provided pt with psychoeducation regarding limitations of medications and need to focus on therapeutic interventions however she has limited insight into this and is intent on finding the right medication(s). For now will continue to slowly taper off nortriptyline.     1/6: Prominent treatment interfering behaviors including active passivity and rejection sensitivity. For now will continue nortriptyline taper. Considering atypical antidepressant trial. Pt reporting chronic b/l LE edema, will encourage her to use compression stocking and elevate legs, no indication for diuretic at this time.     1/7: Preoccupied with med side effects and physical sxs, but more positive about potential med trials. Will decrease qAM gabapentin 300mg to 200mg but otherwise will attempt to limit med changes other than nortriptyline taper. Will also attempt to discourage use of zofran PRNs given past difficulties tolerating multiple serotonergic medications.   1/8 Patient unchanged anxious ruminative unable to use coping skilss. Will dc NTP increase Remeron  1/9 Somatic anxious unable to utilize coping techniques. Plan behavior plan initiated, cont to titrate Remeorn. Added methylfolate for augmentation  1/10 Reports malaise, could be a viral illness vs somatic anxiety symptoms; otherwise tolerating treatment well    1/13: Dysphoric, tearful, anxious but denies SI/HI, discussed med plans and seeks reassurance.  Will increase Remeron to 37.5mg QHS.  1/14: Remains dysphoric, anxious, somatically focused, responds well to support and reassurance provided.  Will increase methylfolate to 15mg daily, provided zofran 4mg PO daily prn nausea, Lidoderm patch prn sciatic pain, will monitor tolerability and consider further titration of Remeron.  1/15: Anxious, depressed, somatic, seeks reassurance on exam, will increase Remeron to 45mg QHS tonight.  Pt agrees with plan as stated.  1/16: Anxious, ruminative but reports improvement, less somatic, tolerating Remeron 45mg and methylfolate 15mg, considering augmentation with Wellbutrin.   1/17: Dysphoric, anxious, denies SI, seeks support, reassurance, will change to regular tablet Klonopin formulation from disintegrating, continue current meds.  1/21 About same will start Wellbutrin to augment mirtazapine. Will change Zofran to pepcid to minimize serotonergic meds  1/22 Tolerating Wellbutrin con current dose. Will alllow Pepcid 10mg q6 prn with max three doses per 24 hours hopefully will seek less when feeling better  1/23 Gi focused today , cont meds will increase Wellbutrin and patient was seen by medicine who increased Protonix  1/24 Depressed, anxious somatic will increase Wellbutrin to 75mg/d, medicine increase protonix  1/25 No major improvement cont meds avoid making multiple changes which makes her anxious and more symptomatic  1/26 No change cont med tiral. Consider some form of neuropsych eval if subtle cog decline playing role in anxiety and treatment refractoriness  1/27 No major changes plan titrate bupropion to 100mg SR daily  1/28 No real change will increase Wellbutrin to 100mg SR daily add Peridex as she was on this as outpatient for oral hygiene  1/29 No major change suspect tremor may be due to anxiety then blamed on meds, patient encouraged to cont meds rather than reduce or dc and she is agreeable, also told if  wellbutrin sig improved depression but causes minor tremor that may be acceptable risk benefit  1/30 Look sl brighter today but does tend to fluctuate. Cont  current meds without change in dose, if tolerating plan increase Wellbutrin to 150mg  1/31 SLight gains able to tolerate Wellbutrin lack of known se compared with TCA is reassuring to her. Discussed TMS with patient and spouse  2/3 Looks a little better, alternate complaints between and anxiety and depression  2/4 Modest gains, remains somatic , often shifting complaints. Will add Maalox prn suspect she will request Zofran which for now trying to avoid  2/5 Reports some improvement and not appearing as agitated, cont meds will add Flonase  2/6 Patient less intensely anxious, remains somatic, need much refocusing to avoid her attributing pre existing complaints to side effects of new meds  2/7 Some gains, remains somatic. Spoke with her GI Dr Flores [phone] who feels nausea most anxiety, suggested compazine as last resort if desire to avoid possible excess serotonergic effects from Zofran. Cont meds, add vit D as per home regimen  2/8 Anxious , prominent symptom misattribution cont current med trial and attempts to reassure patient  2/9 Dysphoric, anxious, somatic and se focused Cont current meds, cont to work to help patient recognize somatic complaints as component of anxiety and not requiring med lowering or dc  2/10 Patient reports some improvement in mood and anxiety despite continuing to appear anxious and somatic about med se. COnt current meds will recheck labs for reassurance primarily  2/11 Partial gains cont current meds, consider increasing Wellbutrin concern is she may experience minor se and want to go off  2/12 Remains worrisome somatic med focused. Cont Wellbutrin trial consider increase to 200mg/d  will change 6AM Protonix to 9AM  2/13 Patient states changing protonix time dossn't help with GI complaints. Feels better with anxiety overall and mood but having anxiety specifically related to discharge issues. Will cont current meds. suspect increasing Wellbutrin may generate more med/s.e. worries than benefit  2/14 Was improving with med regimen but discussions about dc have engendered pre dc anxiety that is clouding clinical response. Will sl increase gabapentin and cont to provide supportive interventions and encourage use of coping skills. Feel risk benefit of increasing Wellbutrin may not be favorable  2/15-2/16: Some residual anxiety, improving , with continued GI complaints

## 2025-02-16 NOTE — BH INPATIENT PSYCHIATRY PROGRESS NOTE - CURRENT MEDICATION
MEDICATIONS  (STANDING):  buPROPion XL (24-Hour) 150 milliGRAM(s) Oral daily  chlorhexidine 0.12% Liquid 15 milliLiter(s) Swish and Spit <User Schedule>  clonazePAM  Tablet 0.5 milliGRAM(s) Oral <User Schedule>  cyanocobalamin 1000 MICROGram(s) Oral daily  ergocalciferol 31967 Unit(s) Oral <User Schedule>  evolocumab Injectable 140 milliGRAM(s) SubCutaneous every 2 weeks  fluticasone propionate 50 MICROgram(s)/spray Nasal Spray 1 Spray(s) Both Nostrils two times a day  gabapentin 300 milliGRAM(s) Oral two times a day  l-methylfolate 15 milliGRAM(s) Oral daily  melatonin. 1 milliGRAM(s) Oral at bedtime  metoprolol succinate ER 50 milliGRAM(s) Oral two times a day  mirtazapine 45 milliGRAM(s) Oral at bedtime  multivitamin 1 Tablet(s) Oral daily  Nexletol 180 mg 1 Tablet(s) 1 Tablet(s) Oral daily  pantoprazole    Tablet 40 milliGRAM(s) Oral <User Schedule>    MEDICATIONS  (PRN):  acetaminophen     Tablet .. 650 milliGRAM(s) Oral every 6 hours PRN Temp greater or equal to 38C (100.4F), Mild Pain (1 - 3), Moderate Pain (4 - 6)  aluminum hydroxide/magnesium hydroxide/simethicone Suspension 30 milliLiter(s) Oral every 6 hours PRN Dyspepsia  clonazePAM Oral Disintegrating Tablet 0.25 milliGRAM(s) Oral every 8 hours PRN anxiety  famotidine    Tablet 10 milliGRAM(s) Oral every 6 hours PRN acid reflux symptroms  lidocaine   4% Patch 1 Patch Transdermal daily PRN sciatica pain

## 2025-02-16 NOTE — BH INPATIENT PSYCHIATRY PROGRESS NOTE - MSE UNSTRUCTURED FT
Awake and alert. Affect depressed and anxious. Speech fluent. +PMR. TP coherent. TC focused on "bad taste in my mouth, maybe it is my gums." No delusions. No perceptual disturbance. Fair insight. No suicidal ideations.

## 2025-02-16 NOTE — BH INPATIENT PSYCHIATRY PROGRESS NOTE - NSBHCHARTREVIEWVS_PSY_A_CORE FT
Vital Signs Last 24 Hrs  T(C): 36.7 (02-16-25 @ 07:59), Max: 36.7 (02-16-25 @ 07:59)  T(F): 98 (02-16-25 @ 07:59), Max: 98 (02-16-25 @ 07:59)  HR: 63 (02-15-25 @ 21:20) (63 - 63)  BP: 113/66 (02-15-25 @ 21:20) (113/66 - 113/66)  BP(mean): --  RR: --  SpO2: --    Orthostatic VS  02-16-25 @ 07:59  Lying BP: --/-- HR: --  Sitting BP: 135/61 HR: 65  Standing BP: 129/66 HR: 64  Site: --  Mode: --  Orthostatic VS  02-15-25 @ 07:59  Lying BP: --/-- HR: --  Sitting BP: 143/81 HR: 63  Standing BP: 136/73 HR: 62  Site: --  Mode: --

## 2025-02-16 NOTE — BH INPATIENT PSYCHIATRY PROGRESS NOTE - NSBHMETABOLIC_PSY_ALL_CORE_FT
BMI: BMI (kg/m2): 37.5 (12-27-24 @ 15:12)  HbA1c: A1C with Estimated Average Glucose Result: 5.5 % (01-02-25 @ 09:11)    Glucose:   BP: 113/66 (02-15-25 @ 21:20) (113/66 - 140/74)Vital Signs Last 24 Hrs  T(C): 36.7 (02-16-25 @ 07:59), Max: 36.7 (02-16-25 @ 07:59)  T(F): 98 (02-16-25 @ 07:59), Max: 98 (02-16-25 @ 07:59)  HR: 63 (02-15-25 @ 21:20) (63 - 63)  BP: 113/66 (02-15-25 @ 21:20) (113/66 - 113/66)  BP(mean): --  RR: --  SpO2: --    Orthostatic VS  02-16-25 @ 07:59  Lying BP: --/-- HR: --  Sitting BP: 135/61 HR: 65  Standing BP: 129/66 HR: 64  Site: --  Mode: --  Orthostatic VS  02-15-25 @ 07:59  Lying BP: --/-- HR: --  Sitting BP: 143/81 HR: 63  Standing BP: 136/73 HR: 62  Site: --  Mode: --    Lipid Panel: Date/Time: 01-02-25 @ 09:11  Cholesterol, Serum: 128  LDL Cholesterol Calculated: 53  HDL Cholesterol, Serum: 46  Total Cholesterol/HDL Ration Measurement: --  Triglycerides, Serum: 174

## 2025-02-16 NOTE — BH PSYCHOLOGY - CLINICIAN PSYCHOTHERAPY NOTE - NSHPLANGLIMITEDENGLISH_GEN_A_CORE
No
No
The patient is Stable - Low risk of patient condition declining or worsening    Shift Goals  Clinical Goals: gastric mobility  Patient Goals: nausea control  Family Goals: sena    Progress made toward(s) clinical / shift goals:    Problem: HEMODYNAMIC STATUS  Goal: Stable Vital Signs and Fluid Balance  Outcome: Progressing     Problem: Gastrointestinal Irritability  Goal: Nausea and vomiting will be absent or improve  Outcome: Progressing       Patient is not progressing towards the following goals:      
No

## 2025-02-17 PROCEDURE — 99231 SBSQ HOSP IP/OBS SF/LOW 25: CPT

## 2025-02-17 RX ADMIN — BUPROPION HYDROBROMIDE 150 MILLIGRAM(S): 522 TABLET, EXTENDED RELEASE ORAL at 08:59

## 2025-02-17 RX ADMIN — CYANOCOBALAMIN 1000 MICROGRAM(S): 1000 INJECTION INTRAMUSCULAR; SUBCUTANEOUS at 08:59

## 2025-02-17 RX ADMIN — GABAPENTIN 300 MILLIGRAM(S): 400 CAPSULE ORAL at 20:46

## 2025-02-17 RX ADMIN — MIRTAZAPINE 45 MILLIGRAM(S): 30 TABLET, FILM COATED ORAL at 20:46

## 2025-02-17 RX ADMIN — Medication 30 MILLILITER(S): at 16:25

## 2025-02-17 RX ADMIN — CLONAZEPAM 0.25 MILLIGRAM(S): 0.5 TABLET ORAL at 14:53

## 2025-02-17 RX ADMIN — Medication 1 TABLET(S): at 08:59

## 2025-02-17 RX ADMIN — Medication 40 MILLIGRAM(S): at 20:49

## 2025-02-17 RX ADMIN — CLONAZEPAM 0.5 MILLIGRAM(S): 0.5 TABLET ORAL at 20:46

## 2025-02-17 RX ADMIN — FLUTICASONE PROPIONATE 1 SPRAY(S): 50 SPRAY, METERED NASAL at 08:59

## 2025-02-17 RX ADMIN — CLONAZEPAM 0.5 MILLIGRAM(S): 0.5 TABLET ORAL at 12:19

## 2025-02-17 RX ADMIN — CLONAZEPAM 0.5 MILLIGRAM(S): 0.5 TABLET ORAL at 06:19

## 2025-02-17 RX ADMIN — METOPROLOL SUCCINATE 50 MILLIGRAM(S): 50 TABLET, EXTENDED RELEASE ORAL at 08:59

## 2025-02-17 RX ADMIN — Medication 1 MILLIGRAM(S): at 20:46

## 2025-02-17 RX ADMIN — METOPROLOL SUCCINATE 50 MILLIGRAM(S): 50 TABLET, EXTENDED RELEASE ORAL at 20:46

## 2025-02-17 RX ADMIN — GABAPENTIN 300 MILLIGRAM(S): 400 CAPSULE ORAL at 08:59

## 2025-02-17 RX ADMIN — Medication 40 MILLIGRAM(S): at 08:59

## 2025-02-17 NOTE — BH INPATIENT PSYCHIATRY PROGRESS NOTE - NSBHASSESSSUMMFT_PSY_ALL_CORE
Ms. Gross is a 73yo female, , retired (), domiciled at home with , no children; PMHx HTN, HLD; PPHx d/o ANSON and MDD, 9 past psych admissions starting in 2019 (last [facility] April 2024 for worsening anxiety and depression), ECT at [facility] 2023, OP at [facility]; 1 past SA (OD on 9 pills of unknown medication in 2019), no h/o NSSIB, no h/o violence/aggression/legal issues, past marijuana use; initially admitted to [facility] 2W on 12/27 for increasing anxiety and SI w/o plans in the context of medication adjustments and psychosocial stressors.     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     On initial 2S assessment pt dysphoric, anxious, hopeless, and preoccupied with somatic sxs. There are elements of active passivity, severe sensitivity to feelings of inadequacy and complicated interpersonal relationships (, friends, providers). 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 Provided pt with psychoeducation regarding limitations of medications and need to focus on therapeutic interventions however she has limited insight into this and is intent on finding the right medication(s). For now will continue to slowly taper off nortriptyline.     1/6: Prominent treatment interfering behaviors including active passivity and rejection sensitivity. For now will continue nortriptyline taper. Considering atypical antidepressant trial. Pt reporting chronic b/l LE edema, will encourage her to use compression stocking and elevate legs, no indication for diuretic at this time.     1/7: Preoccupied with med side effects and physical sxs, but more positive about potential med trials. Will decrease qAM gabapentin 300mg to 200mg but otherwise will attempt to limit med changes other than nortriptyline taper. Will also attempt to discourage use of zofran PRNs given past difficulties tolerating multiple serotonergic medications.   1/8 Patient unchanged anxious ruminative unable to use coping skilss. Will dc NTP increase Remeron  1/9 Somatic anxious unable to utilize coping techniques. Plan behavior plan initiated, cont to titrate Remeorn. Added methylfolate for augmentation  1/10 Reports malaise, could be a viral illness vs somatic anxiety symptoms; otherwise tolerating treatment well    1/13: Dysphoric, tearful, anxious but denies SI/HI, discussed med plans and seeks reassurance.  Will increase Remeron to 37.5mg QHS.  1/14: Remains dysphoric, anxious, somatically focused, responds well to support and reassurance provided.  Will increase methylfolate to 15mg daily, provided zofran 4mg PO daily prn nausea, Lidoderm patch prn sciatic pain, will monitor tolerability and consider further titration of Remeron.  1/15: Anxious, depressed, somatic, seeks reassurance on exam, will increase Remeron to 45mg QHS tonight.  Pt agrees with plan as stated.  1/16: Anxious, ruminative but reports improvement, less somatic, tolerating Remeron 45mg and methylfolate 15mg, considering augmentation with Wellbutrin.   1/17: Dysphoric, anxious, denies SI, seeks support, reassurance, will change to regular tablet Klonopin formulation from disintegrating, continue current meds.  1/21 About same will start Wellbutrin to augment mirtazapine. Will change Zofran to pepcid to minimize serotonergic meds  1/22 Tolerating Wellbutrin con current dose. Will alllow Pepcid 10mg q6 prn with max three doses per 24 hours hopefully will seek less when feeling better  1/23 Gi focused today , cont meds will increase Wellbutrin and patient was seen by medicine who increased Protonix  1/24 Depressed, anxious somatic will increase Wellbutrin to 75mg/d, medicine increase protonix  1/25 No major improvement cont meds avoid making multiple changes which makes her anxious and more symptomatic  1/26 No change cont med tiral. Consider some form of neuropsych eval if subtle cog decline playing role in anxiety and treatment refractoriness  1/27 No major changes plan titrate bupropion to 100mg SR daily  1/28 No real change will increase Wellbutrin to 100mg SR daily add Peridex as she was on this as outpatient for oral hygiene  1/29 No major change suspect tremor may be due to anxiety then blamed on meds, patient encouraged to cont meds rather than reduce or dc and she is agreeable, also told if  wellbutrin sig improved depression but causes minor tremor that may be acceptable risk benefit  1/30 Look sl brighter today but does tend to fluctuate. Cont  current meds without change in dose, if tolerating plan increase Wellbutrin to 150mg  1/31 SLight gains able to tolerate Wellbutrin lack of known se compared with TCA is reassuring to her. Discussed TMS with patient and spouse  2/3 Looks a little better, alternate complaints between and anxiety and depression  2/4 Modest gains, remains somatic , often shifting complaints. Will add Maalox prn suspect she will request Zofran which for now trying to avoid  2/5 Reports some improvement and not appearing as agitated, cont meds will add Flonase  2/6 Patient less intensely anxious, remains somatic, need much refocusing to avoid her attributing pre existing complaints to side effects of new meds  2/7 Some gains, remains somatic. Spoke with her GI Dr Flores [phone] who feels nausea most anxiety, suggested compazine as last resort if desire to avoid possible excess serotonergic effects from Zofran. Cont meds, add vit D as per home regimen  2/8 Anxious , prominent symptom misattribution cont current med trial and attempts to reassure patient  2/9 Dysphoric, anxious, somatic and se focused Cont current meds, cont to work to help patient recognize somatic complaints as component of anxiety and not requiring med lowering or dc  2/10 Patient reports some improvement in mood and anxiety despite continuing to appear anxious and somatic about med se. COnt current meds will recheck labs for reassurance primarily  2/11 Partial gains cont current meds, consider increasing Wellbutrin concern is she may experience minor se and want to go off  2/12 Remains worrisome somatic med focused. Cont Wellbutrin trial consider increase to 200mg/d  will change 6AM Protonix to 9AM  2/13 Patient states changing protonix time dossn't help with GI complaints. Feels better with anxiety overall and mood but having anxiety specifically related to discharge issues. Will cont current meds. suspect increasing Wellbutrin may generate more med/s.e. worries than benefit  2/14 Was improving with med regimen but discussions about dc have engendered pre dc anxiety that is clouding clinical response. Will sl increase gabapentin and cont to provide supportive interventions and encourage use of coping skills. Feel risk benefit of increasing Wellbutrin may not be favorable  2/15-2/17: Some residual anxiety, improving , with continued GI complaints that improved

## 2025-02-17 NOTE — BH INPATIENT PSYCHIATRY PROGRESS NOTE - NSBHMETABOLIC_PSY_ALL_CORE_FT
BMI: BMI (kg/m2): 37.5 (12-27-24 @ 15:12)  HbA1c: A1C with Estimated Average Glucose Result: 5.5 % (01-02-25 @ 09:11)    Glucose:   BP: 113/66 (02-15-25 @ 21:20) (113/66 - 118/73)Vital Signs Last 24 Hrs  T(C): 36.7 (02-17-25 @ 07:46), Max: 36.8 (02-16-25 @ 20:47)  T(F): 98 (02-17-25 @ 07:46), Max: 98.2 (02-16-25 @ 20:47)  HR: --  BP: --  BP(mean): --  RR: --  SpO2: --    Orthostatic VS  02-17-25 @ 07:46  Lying BP: --/-- HR: --  Sitting BP: 139/75 HR: 64  Standing BP: 143/76 HR: 70  Site: upper left arm  Mode: electronic  Orthostatic VS  02-16-25 @ 20:47  Lying BP: --/-- HR: --  Sitting BP: 126/63 HR: 66  Standing BP: --/-- HR: --  Site: --  Mode: --  Orthostatic VS  02-16-25 @ 07:59  Lying BP: --/-- HR: --  Sitting BP: 135/61 HR: 65  Standing BP: 129/66 HR: 64  Site: --  Mode: --    Lipid Panel: Date/Time: 01-02-25 @ 09:11  Cholesterol, Serum: 128  LDL Cholesterol Calculated: 53  HDL Cholesterol, Serum: 46  Total Cholesterol/HDL Ration Measurement: --  Triglycerides, Serum: 174

## 2025-02-17 NOTE — BH INPATIENT PSYCHIATRY PROGRESS NOTE - CURRENT MEDICATION
MEDICATIONS  (STANDING):  buPROPion XL (24-Hour) 150 milliGRAM(s) Oral daily  chlorhexidine 0.12% Liquid 15 milliLiter(s) Swish and Spit <User Schedule>  clonazePAM  Tablet 0.5 milliGRAM(s) Oral <User Schedule>  cyanocobalamin 1000 MICROGram(s) Oral daily  ergocalciferol 40991 Unit(s) Oral <User Schedule>  evolocumab Injectable 140 milliGRAM(s) SubCutaneous every 2 weeks  fluticasone propionate 50 MICROgram(s)/spray Nasal Spray 1 Spray(s) Both Nostrils two times a day  gabapentin 300 milliGRAM(s) Oral two times a day  l-methylfolate 15 milliGRAM(s) Oral daily  melatonin. 1 milliGRAM(s) Oral at bedtime  metoprolol succinate ER 50 milliGRAM(s) Oral two times a day  mirtazapine 45 milliGRAM(s) Oral at bedtime  multivitamin 1 Tablet(s) Oral daily  Nexletol 180 mg 1 Tablet(s) 1 Tablet(s) Oral daily  pantoprazole    Tablet 40 milliGRAM(s) Oral <User Schedule>    MEDICATIONS  (PRN):  acetaminophen     Tablet .. 650 milliGRAM(s) Oral every 6 hours PRN Temp greater or equal to 38C (100.4F), Mild Pain (1 - 3), Moderate Pain (4 - 6)  aluminum hydroxide/magnesium hydroxide/simethicone Suspension 30 milliLiter(s) Oral every 6 hours PRN Dyspepsia  clonazePAM Oral Disintegrating Tablet 0.25 milliGRAM(s) Oral every 8 hours PRN anxiety  famotidine    Tablet 10 milliGRAM(s) Oral every 6 hours PRN acid reflux symptroms  lidocaine   4% Patch 1 Patch Transdermal daily PRN sciatica pain

## 2025-02-17 NOTE — BH INPATIENT PSYCHIATRY PROGRESS NOTE - NSBHCHARTREVIEWVS_PSY_A_CORE FT
Vital Signs Last 24 Hrs  T(C): 36.7 (02-17-25 @ 07:46), Max: 36.8 (02-16-25 @ 20:47)  T(F): 98 (02-17-25 @ 07:46), Max: 98.2 (02-16-25 @ 20:47)  HR: --  BP: --  BP(mean): --  RR: --  SpO2: --    Orthostatic VS  02-17-25 @ 07:46  Lying BP: --/-- HR: --  Sitting BP: 139/75 HR: 64  Standing BP: 143/76 HR: 70  Site: upper left arm  Mode: electronic  Orthostatic VS  02-16-25 @ 20:47  Lying BP: --/-- HR: --  Sitting BP: 126/63 HR: 66  Standing BP: --/-- HR: --  Site: --  Mode: --  Orthostatic VS  02-16-25 @ 07:59  Lying BP: --/-- HR: --  Sitting BP: 135/61 HR: 65  Standing BP: 129/66 HR: 64  Site: --  Mode: --

## 2025-02-17 NOTE — BH INPATIENT PSYCHIATRY PROGRESS NOTE - MSE UNSTRUCTURED FT
Awake and alert. Affect depressed. Speech fluent. +PMR. TP coherent. TC less somatic today. No delusions. No perceptual disturbance. Fair insight. No suicidal ideations.  Imaging Studies/Medications

## 2025-02-18 PROCEDURE — 99232 SBSQ HOSP IP/OBS MODERATE 35: CPT

## 2025-02-18 RX ORDER — MIRTAZAPINE 30 MG/1
1 TABLET, FILM COATED ORAL
Qty: 30 | Refills: 0
Start: 2025-02-18 | End: 2025-03-19

## 2025-02-18 RX ORDER — CLONAZEPAM 0.5 MG/1
1 TABLET ORAL
Qty: 90 | Refills: 0
Start: 2025-02-18 | End: 2025-03-19

## 2025-02-18 RX ORDER — CYANOCOBALAMIN 1000 UG/ML
1 INJECTION INTRAMUSCULAR; SUBCUTANEOUS
Qty: 30 | Refills: 0
Start: 2025-02-18 | End: 2025-03-19

## 2025-02-18 RX ORDER — CLONAZEPAM 0.5 MG/1
1 TABLET ORAL
Qty: 5 | Refills: 0
Start: 2025-02-18 | End: 2025-03-19

## 2025-02-18 RX ORDER — METOPROLOL SUCCINATE 50 MG/1
1 TABLET, EXTENDED RELEASE ORAL
Qty: 60 | Refills: 0
Start: 2025-02-18 | End: 2025-03-19

## 2025-02-18 RX ORDER — GABAPENTIN 400 MG/1
1 CAPSULE ORAL
Qty: 60 | Refills: 0
Start: 2025-02-18 | End: 2025-03-19

## 2025-02-18 RX ORDER — EVOLOCUMAB 140 MG/ML
1 INJECTION, SOLUTION SUBCUTANEOUS
Qty: 0 | Refills: 0 | DISCHARGE
Start: 2025-02-18

## 2025-02-18 RX ORDER — CLONAZEPAM 0.5 MG/1
1 TABLET ORAL
Qty: 5 | Refills: 0
Start: 2025-02-18 | End: 2025-02-22

## 2025-02-18 RX ORDER — FLUTICASONE PROPIONATE 50 UG/1
1 SPRAY, METERED NASAL
Qty: 1 | Refills: 0
Start: 2025-02-18 | End: 2025-03-19

## 2025-02-18 RX ORDER — MELATONIN 5 MG
1 TABLET ORAL
Qty: 30 | Refills: 0
Start: 2025-02-18 | End: 2025-03-19

## 2025-02-18 RX ORDER — BEMPEDOIC ACID 180 MG/1
1 TABLET, FILM COATED ORAL
Qty: 0 | Refills: 0 | DISCHARGE

## 2025-02-18 RX ORDER — B1/B2/B3/B5/B6/B12/VIT C/FOLIC 500-0.5 MG
1 TABLET ORAL
Qty: 0 | Refills: 0 | DISCHARGE
Start: 2025-02-18

## 2025-02-18 RX ORDER — LEVOMEFOLATE CALCIUM, PYRIDOXAL 5-PHOSPHATE, AND METHYLCOBALAMIN 15; 35; 2 MG/1; MG/1; MG/1
2 TABLET, COATED ORAL
Qty: 60 | Refills: 0
Start: 2025-02-18 | End: 2025-03-19

## 2025-02-18 RX ORDER — ERGOCALCIFEROL 1.25 MG/1
1 CAPSULE ORAL
Qty: 4 | Refills: 0
Start: 2025-02-18 | End: 2025-03-19

## 2025-02-18 RX ORDER — MAGNESIUM, ALUMINUM HYDROXIDE 200-200 MG
30 TABLET,CHEWABLE ORAL
Qty: 0 | Refills: 0 | DISCHARGE
Start: 2025-02-18

## 2025-02-18 RX ORDER — BUPROPION HYDROBROMIDE 522 MG/1
1 TABLET, EXTENDED RELEASE ORAL
Qty: 30 | Refills: 0
Start: 2025-02-18 | End: 2025-03-19

## 2025-02-18 RX ORDER — LIDOCAINE HYDROCHLORIDE 20 MG/ML
1 JELLY TOPICAL
Qty: 0 | Refills: 0 | DISCHARGE
Start: 2025-02-18

## 2025-02-18 RX ADMIN — METOPROLOL SUCCINATE 50 MILLIGRAM(S): 50 TABLET, EXTENDED RELEASE ORAL at 09:34

## 2025-02-18 RX ADMIN — BUPROPION HYDROBROMIDE 150 MILLIGRAM(S): 522 TABLET, EXTENDED RELEASE ORAL at 09:33

## 2025-02-18 RX ADMIN — CYANOCOBALAMIN 1000 MICROGRAM(S): 1000 INJECTION INTRAMUSCULAR; SUBCUTANEOUS at 09:33

## 2025-02-18 RX ADMIN — METOPROLOL SUCCINATE 50 MILLIGRAM(S): 50 TABLET, EXTENDED RELEASE ORAL at 20:03

## 2025-02-18 RX ADMIN — CLONAZEPAM 0.5 MILLIGRAM(S): 0.5 TABLET ORAL at 06:36

## 2025-02-18 RX ADMIN — Medication 30 MILLILITER(S): at 16:01

## 2025-02-18 RX ADMIN — Medication 40 MILLIGRAM(S): at 20:02

## 2025-02-18 RX ADMIN — Medication 40 MILLIGRAM(S): at 09:34

## 2025-02-18 RX ADMIN — Medication 1 TABLET(S): at 09:34

## 2025-02-18 RX ADMIN — Medication 15 MILLILITER(S): at 17:21

## 2025-02-18 RX ADMIN — Medication 15 MILLILITER(S): at 09:34

## 2025-02-18 RX ADMIN — CLONAZEPAM 0.5 MILLIGRAM(S): 0.5 TABLET ORAL at 20:03

## 2025-02-18 RX ADMIN — Medication 1 MILLIGRAM(S): at 20:02

## 2025-02-18 RX ADMIN — FLUTICASONE PROPIONATE 1 SPRAY(S): 50 SPRAY, METERED NASAL at 09:34

## 2025-02-18 RX ADMIN — GABAPENTIN 300 MILLIGRAM(S): 400 CAPSULE ORAL at 09:33

## 2025-02-18 RX ADMIN — GABAPENTIN 300 MILLIGRAM(S): 400 CAPSULE ORAL at 20:02

## 2025-02-18 RX ADMIN — MIRTAZAPINE 45 MILLIGRAM(S): 30 TABLET, FILM COATED ORAL at 20:03

## 2025-02-18 RX ADMIN — CLONAZEPAM 0.5 MILLIGRAM(S): 0.5 TABLET ORAL at 12:39

## 2025-02-18 NOTE — BH INPATIENT PSYCHIATRY PROGRESS NOTE - MSE UNSTRUCTURED FT
Awake and alert.Mood anxious affect constricted but still with some range. Speech fluent. Nl activity no sig tremor TP coherent. TC less somatic today. Mainly focused on dc issues No delusions. No perceptual disturbance. Fair insight. No suicidal ideations. Oriented x3. Some insight

## 2025-02-18 NOTE — BH INPATIENT PSYCHIATRY PROGRESS NOTE - NSBHFUPINTERVALHXFT_PSY_A_CORE
Pt is seen and evaluated for follow up for MDD. GI complaints improved. Patient attended group and did well, participated, tried to help others. VSS Eating sleeping okay

## 2025-02-18 NOTE — BH INPATIENT PSYCHIATRY PROGRESS NOTE - CURRENT MEDICATION
MEDICATIONS  (STANDING):  buPROPion XL (24-Hour) 150 milliGRAM(s) Oral daily  chlorhexidine 0.12% Liquid 15 milliLiter(s) Swish and Spit <User Schedule>  clonazePAM  Tablet 0.5 milliGRAM(s) Oral <User Schedule>  cyanocobalamin 1000 MICROGram(s) Oral daily  ergocalciferol 29455 Unit(s) Oral <User Schedule>  evolocumab Injectable 140 milliGRAM(s) SubCutaneous every 2 weeks  fluticasone propionate 50 MICROgram(s)/spray Nasal Spray 1 Spray(s) Both Nostrils two times a day  gabapentin 300 milliGRAM(s) Oral two times a day  l-methylfolate 15 milliGRAM(s) Oral daily  melatonin. 1 milliGRAM(s) Oral at bedtime  metoprolol succinate ER 50 milliGRAM(s) Oral two times a day  mirtazapine 45 milliGRAM(s) Oral at bedtime  multivitamin 1 Tablet(s) Oral daily  Nexletol 180 mg 1 Tablet(s) 1 Tablet(s) Oral daily  pantoprazole    Tablet 40 milliGRAM(s) Oral <User Schedule>    MEDICATIONS  (PRN):  acetaminophen     Tablet .. 650 milliGRAM(s) Oral every 6 hours PRN Temp greater or equal to 38C (100.4F), Mild Pain (1 - 3), Moderate Pain (4 - 6)  aluminum hydroxide/magnesium hydroxide/simethicone Suspension 30 milliLiter(s) Oral every 6 hours PRN Dyspepsia  clonazePAM Oral Disintegrating Tablet 0.25 milliGRAM(s) Oral every 8 hours PRN anxiety  famotidine    Tablet 10 milliGRAM(s) Oral every 6 hours PRN acid reflux symptroms  lidocaine   4% Patch 1 Patch Transdermal daily PRN sciatica pain

## 2025-02-18 NOTE — BH INPATIENT PSYCHIATRY PROGRESS NOTE - NSBHCHARTREVIEWVS_PSY_A_CORE FT
Vital Signs Last 24 Hrs  T(C): 36.5 (02-18-25 @ 07:44), Max: 36.5 (02-18-25 @ 07:44)  T(F): 97.7 (02-18-25 @ 07:44), Max: 97.7 (02-18-25 @ 07:44)  HR: 68 (02-17-25 @ 21:05) (68 - 68)  BP: 113/63 (02-17-25 @ 21:05) (113/63 - 113/63)  BP(mean): --  RR: --  SpO2: --    Orthostatic VS  02-18-25 @ 07:44  Lying BP: --/-- HR: --  Sitting BP: 127/62 HR: 66  Standing BP: 147/69 HR: 69  Site: --  Mode: --  Orthostatic VS  02-17-25 @ 07:46  Lying BP: --/-- HR: --  Sitting BP: 139/75 HR: 64  Standing BP: 143/76 HR: 70  Site: upper left arm  Mode: electronic  Orthostatic VS  02-16-25 @ 20:47  Lying BP: --/-- HR: --  Sitting BP: 126/63 HR: 66  Standing BP: --/-- HR: --  Site: --  Mode: --

## 2025-02-18 NOTE — BH PSYCHOLOGY - GROUP THERAPY NOTE - TOKEN PULL-DIAGNOSIS
Primary Diagnosis:  ANSON (generalized anxiety disorder) [F41.1]        Problem Dx:   MDD (major depressive disorder), recurrent episode, severe [F33.2]      

## 2025-02-18 NOTE — BH PSYCHOLOGY - GROUP THERAPY NOTE - NSPSYCHOLGRPCOGGOAL_PSY_A_CORE
reduce reaction to psychosocial stressors/recognize triggers for onset of symptoms/develop improved problem solving skills
reduce reaction to psychosocial stressors/develop improved problem solving skills/prevent relapse of symptoms
reduce reaction to psychosocial stressors/recognize triggers for onset of symptoms/develop improved problem solving skills/prevent relapse of symptoms

## 2025-02-18 NOTE — BH PSYCHOLOGY - GROUP THERAPY NOTE - NSPSYCHOLGRPCOGPROB_PSY_A_CORE
anxiety/high reactivity to psychosocial stressor/impaired problem solving skills/lack of behaviors to reduce symptoms
anger/anxiety/dysphoria/high reactivity to psychosocial stressor/irritability/isolation/poor insight into triggers for symptoms
anxiety/dysphoria/high reactivity to psychosocial stressor/impaired problem solving skills

## 2025-02-18 NOTE — BH INPATIENT PSYCHIATRY PROGRESS NOTE - NSBHMETABOLIC_PSY_ALL_CORE_FT
BMI: BMI (kg/m2): 37.5 (12-27-24 @ 15:12)  HbA1c: A1C with Estimated Average Glucose Result: 5.5 % (01-02-25 @ 09:11)    Glucose:   BP: 113/63 (02-17-25 @ 21:05) (113/63 - 113/66)Vital Signs Last 24 Hrs  T(C): 36.5 (02-18-25 @ 07:44), Max: 36.5 (02-18-25 @ 07:44)  T(F): 97.7 (02-18-25 @ 07:44), Max: 97.7 (02-18-25 @ 07:44)  HR: 68 (02-17-25 @ 21:05) (68 - 68)  BP: 113/63 (02-17-25 @ 21:05) (113/63 - 113/63)  BP(mean): --  RR: --  SpO2: --    Orthostatic VS  02-18-25 @ 07:44  Lying BP: --/-- HR: --  Sitting BP: 127/62 HR: 66  Standing BP: 147/69 HR: 69  Site: --  Mode: --  Orthostatic VS  02-17-25 @ 07:46  Lying BP: --/-- HR: --  Sitting BP: 139/75 HR: 64  Standing BP: 143/76 HR: 70  Site: upper left arm  Mode: electronic  Orthostatic VS  02-16-25 @ 20:47  Lying BP: --/-- HR: --  Sitting BP: 126/63 HR: 66  Standing BP: --/-- HR: --  Site: --  Mode: --    Lipid Panel: Date/Time: 01-02-25 @ 09:11  Cholesterol, Serum: 128  LDL Cholesterol Calculated: 53  HDL Cholesterol, Serum: 46  Total Cholesterol/HDL Ration Measurement: --  Triglycerides, Serum: 174

## 2025-02-18 NOTE — BH PSYCHOLOGY - GROUP THERAPY NOTE - NSPSYCHOLGRPCOGINT_PSY_A_CORE
explain the connection between health habits and stress vulnerability/group members provided support/group members suggested positive behaviors/emotion regulation skills taught/mindfulness skills taught
group members provided support/group members suggested positive behaviors/emotion regulation skills taught/explore reducing vulnerability to stress/behavioral distress tolerance skills taught/mindfulness skills taught
explain the connection between health habits and stress vulnerability/group members provided support/group members suggested positive behaviors/emotion regulation skills taught/explore reducing vulnerability to stress/behavioral distress tolerance skills taught/mindfulness skills taught

## 2025-02-18 NOTE — BH PSYCHOLOGY - GROUP THERAPY NOTE - NSPSYCHOLGRPCOGPT_PSY_A_CORE
participated in exercise/shared negative coping experience/shared positive coping experience

## 2025-02-18 NOTE — BH INPATIENT PSYCHIATRY PROGRESS NOTE - NSBHASSESSSUMMFT_PSY_ALL_CORE
Ms. Gross is a 73yo female, , retired (), domiciled at home with , no children; PMHx HTN, HLD; PPHx d/o ANSON and MDD, 9 past psych admissions starting in 2019 (last [facility] April 2024 for worsening anxiety and depression), ECT at [facility] 2023, OP at [facility]; 1 past SA (OD on 9 pills of unknown medication in 2019), no h/o NSSIB, no h/o violence/aggression/legal issues, past marijuana use; initially admitted to [facility] 2W on 12/27 for increasing anxiety and SI w/o plans in the context of medication adjustments and psychosocial stressors.     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     On initial 2S assessment pt dysphoric, anxious, hopeless, and preoccupied with somatic sxs. There are elements of active passivity, severe sensitivity to feelings of inadequacy and complicated interpersonal relationships (, friends, providers). 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 Provided pt with psychoeducation regarding limitations of medications and need to focus on therapeutic interventions however she has limited insight into this and is intent on finding the right medication(s). For now will continue to slowly taper off nortriptyline.     1/6: Prominent treatment interfering behaviors including active passivity and rejection sensitivity. For now will continue nortriptyline taper. Considering atypical antidepressant trial. Pt reporting chronic b/l LE edema, will encourage her to use compression stocking and elevate legs, no indication for diuretic at this time.     1/7: Preoccupied with med side effects and physical sxs, but more positive about potential med trials. Will decrease qAM gabapentin 300mg to 200mg but otherwise will attempt to limit med changes other than nortriptyline taper. Will also attempt to discourage use of zofran PRNs given past difficulties tolerating multiple serotonergic medications.   1/8 Patient unchanged anxious ruminative unable to use coping skilss. Will dc NTP increase Remeron  1/9 Somatic anxious unable to utilize coping techniques. Plan behavior plan initiated, cont to titrate Remeorn. Added methylfolate for augmentation  1/10 Reports malaise, could be a viral illness vs somatic anxiety symptoms; otherwise tolerating treatment well    1/13: Dysphoric, tearful, anxious but denies SI/HI, discussed med plans and seeks reassurance.  Will increase Remeron to 37.5mg QHS.  1/14: Remains dysphoric, anxious, somatically focused, responds well to support and reassurance provided.  Will increase methylfolate to 15mg daily, provided zofran 4mg PO daily prn nausea, Lidoderm patch prn sciatic pain, will monitor tolerability and consider further titration of Remeron.  1/15: Anxious, depressed, somatic, seeks reassurance on exam, will increase Remeron to 45mg QHS tonight.  Pt agrees with plan as stated.  1/16: Anxious, ruminative but reports improvement, less somatic, tolerating Remeron 45mg and methylfolate 15mg, considering augmentation with Wellbutrin.   1/17: Dysphoric, anxious, denies SI, seeks support, reassurance, will change to regular tablet Klonopin formulation from disintegrating, continue current meds.  1/21 About same will start Wellbutrin to augment mirtazapine. Will change Zofran to pepcid to minimize serotonergic meds  1/22 Tolerating Wellbutrin con current dose. Will alllow Pepcid 10mg q6 prn with max three doses per 24 hours hopefully will seek less when feeling better  1/23 Gi focused today , cont meds will increase Wellbutrin and patient was seen by medicine who increased Protonix  1/24 Depressed, anxious somatic will increase Wellbutrin to 75mg/d, medicine increase protonix  1/25 No major improvement cont meds avoid making multiple changes which makes her anxious and more symptomatic  1/26 No change cont med tiral. Consider some form of neuropsych eval if subtle cog decline playing role in anxiety and treatment refractoriness  1/27 No major changes plan titrate bupropion to 100mg SR daily  1/28 No real change will increase Wellbutrin to 100mg SR daily add Peridex as she was on this as outpatient for oral hygiene  1/29 No major change suspect tremor may be due to anxiety then blamed on meds, patient encouraged to cont meds rather than reduce or dc and she is agreeable, also told if  wellbutrin sig improved depression but causes minor tremor that may be acceptable risk benefit  1/30 Look sl brighter today but does tend to fluctuate. Cont  current meds without change in dose, if tolerating plan increase Wellbutrin to 150mg  1/31 SLight gains able to tolerate Wellbutrin lack of known se compared with TCA is reassuring to her. Discussed TMS with patient and spouse  2/3 Looks a little better, alternate complaints between and anxiety and depression  2/4 Modest gains, remains somatic , often shifting complaints. Will add Maalox prn suspect she will request Zofran which for now trying to avoid  2/5 Reports some improvement and not appearing as agitated, cont meds will add Flonase  2/6 Patient less intensely anxious, remains somatic, need much refocusing to avoid her attributing pre existing complaints to side effects of new meds  2/7 Some gains, remains somatic. Spoke with her GI Dr Flores [phone] who feels nausea most anxiety, suggested compazine as last resort if desire to avoid possible excess serotonergic effects from Zofran. Cont meds, add vit D as per home regimen  2/8 Anxious , prominent symptom misattribution cont current med trial and attempts to reassure patient  2/9 Dysphoric, anxious, somatic and se focused Cont current meds, cont to work to help patient recognize somatic complaints as component of anxiety and not requiring med lowering or dc  2/10 Patient reports some improvement in mood and anxiety despite continuing to appear anxious and somatic about med se. COnt current meds will recheck labs for reassurance primarily  2/11 Partial gains cont current meds, consider increasing Wellbutrin concern is she may experience minor se and want to go off  2/12 Remains worrisome somatic med focused. Cont Wellbutrin trial consider increase to 200mg/d  will change 6AM Protonix to 9AM  2/13 Patient states changing protonix time dossn't help with GI complaints. Feels better with anxiety overall and mood but having anxiety specifically related to discharge issues. Will cont current meds. suspect increasing Wellbutrin may generate more med/s.e. worries than benefit  2/14 Was improving with med regimen but discussions about dc have engendered pre dc anxiety that is clouding clinical response. Will sl increase gabapentin and cont to provide supportive interventions and encourage use of coping skills. Feel risk benefit of increasing Wellbutrin may not be favorable  2/15-2/17: Some residual anxiety, improving , with continued GI complaints that improved   2/18 Patient with partial improvement. Not psychotic, no SI. PLan if no change dc in AM cont to help her patterns of thicking that can be addressed therapeutically in addition to taking meds

## 2025-02-19 VITALS — TEMPERATURE: 98 F

## 2025-02-19 PROCEDURE — 99232 SBSQ HOSP IP/OBS MODERATE 35: CPT

## 2025-02-19 RX ADMIN — FLUTICASONE PROPIONATE 1 SPRAY(S): 50 SPRAY, METERED NASAL at 08:50

## 2025-02-19 RX ADMIN — METOPROLOL SUCCINATE 50 MILLIGRAM(S): 50 TABLET, EXTENDED RELEASE ORAL at 08:49

## 2025-02-19 RX ADMIN — Medication 40 MILLIGRAM(S): at 08:49

## 2025-02-19 RX ADMIN — CLONAZEPAM 0.5 MILLIGRAM(S): 0.5 TABLET ORAL at 06:48

## 2025-02-19 RX ADMIN — CYANOCOBALAMIN 1000 MICROGRAM(S): 1000 INJECTION INTRAMUSCULAR; SUBCUTANEOUS at 08:49

## 2025-02-19 RX ADMIN — Medication 30 MILLILITER(S): at 08:49

## 2025-02-19 RX ADMIN — Medication 1 TABLET(S): at 08:49

## 2025-02-19 RX ADMIN — BUPROPION HYDROBROMIDE 150 MILLIGRAM(S): 522 TABLET, EXTENDED RELEASE ORAL at 08:49

## 2025-02-19 RX ADMIN — GABAPENTIN 300 MILLIGRAM(S): 400 CAPSULE ORAL at 08:48

## 2025-02-19 NOTE — BH INPATIENT PSYCHIATRY PROGRESS NOTE - NSTXDEPRESGOAL_PSY_ALL_CORE
Will identify 2 coping skills that assist in improving mood
Attend and participate in at least 2 groups daily despite low mood/energy
Attend and participate in at least 2 groups daily despite low mood/energy
Will identify 2 coping skills that assist in improving mood
Attend and participate in at least 2 groups daily despite low mood/energy
Will identify 2 coping skills that assist in improving mood
Attend and participate in at least 2 groups daily despite low mood/energy
Will identify 2 coping skills that assist in improving mood
Will identify 2 coping skills that assist in improving mood
Attend and participate in at least 2 groups daily despite low mood/energy
Will identify 2 coping skills that assist in improving mood
Attend and participate in at least 2 groups daily despite low mood/energy
Will identify 2 coping skills that assist in improving mood
Attend and participate in at least 2 groups daily despite low mood/energy
Will identify 2 coping skills that assist in improving mood
Attend and participate in at least 2 groups daily despite low mood/energy
Will identify 2 coping skills that assist in improving mood
Attend and participate in at least 2 groups daily despite low mood/energy
Will identify 2 coping skills that assist in improving mood
Attend and participate in at least 2 groups daily despite low mood/energy
Will identify 2 coping skills that assist in improving mood
Will identify 2 coping skills that assist in improving mood
Attend and participate in at least 2 groups daily despite low mood/energy
Will identify 2 coping skills that assist in improving mood

## 2025-02-19 NOTE — BH INPATIENT PSYCHIATRY PROGRESS NOTE - MSE UNSTRUCTURED FT
Patient well dressed groomed wearing some make up. Patient cooperative. Nl speech and activity, no readily detectable tremor. Mood anxious but much less sad. Patient focused on discharge related issues , questions about meds. Worries will outpatient psychiatrist know when and if she needs a med change.  Somatically focused. She has no delusions, no hallucinations. Denies SI, HI no hopelessness. Thinking is somewhat ruminative on above subjects but can be reassured and redirected. Oriented x3. Partial insight, not impsulsive

## 2025-02-19 NOTE — BH INPATIENT PSYCHIATRY PROGRESS NOTE - NSTXDCOTHRGOAL_PSY_ALL_CORE
The pt will be discharged home where she resides with her  and [facility]. Consideration of referral to the GP will be explored as pt's  now is retired and available to transport pt.
The pt will be discharged back to her home where she resides with her  and [facility]. The pt declines recommendation of referral to [facility].
The pt will be discharged home where she resides with her  and [facility] for [facility] psychiatric f/u. The pt declines referral to GP or for TMS.
The pt will be discharged home where she resides with her  and [facility]. The pt refused referral to the [facility] program.
The pt will be discharged home where she resides with her  and [facility]. The pt declines referral to GP. Consideration of TMS has been addressed with pt who is ambivalent about this recommendation.
The pt will be discharged home where she resides with her  and will be [facility]. The pt has declined referral to GP and [facility].
Patient will report improved mood and insight into coping skills for symptoms, and with no SIIP.
The pt will be discharged back to her home where she resides with her  and [facility]. The pt declines recommendation of referral to [facility].
The pt will be discharged back to her home where she resides with her  and [facility]. The pt declines recommendation of referral to [facility].
The pt will be discharged home where she resides with her  and [facility]. The pt declines referral to GP. Consideration of TMS has been addressed with pt who is ambivalent about this recommendation.
The pt will be discharged home where she resides with her  and [facility]/u. The pt declines referral to GP or for TMS.
The pt will be discharged home where she resides with her  and [facility]. Consideration of referral to the GP will be explored as pt's  now is retired and available to transport pt.
Patient will report improved mood and insight into coping skills for symptoms, and with no SIIP.
The pt will be discharged home where she resides with her  and [facility]. Consideration of referral to the GP will be explored as pt's  now is retired and available to transport pt.
The pt will be discharged back to her home where she resides with her  and [facility]. The pt declines recommendation of referral to [facility].
The pt will be discharged home where she resides with her  and will be [facility]. The pt has declined referral to GP and [facility].
The pt will be discharged back to her home where she resides with her  and [facility]. The pt declines recommendation of referral to [facility].
The pt will be discharged home where she resides with her  and [facility]. The pt declines referral to GP. Consideration of TMS has been addressed with pt who is ambivalent about this recommendation.
The pt will be discharged back to her home where she resides with her  and [facility]. The pt declines recommendation of referral to [facility].
The pt will be discharged home where she resides with her  and [facility]/u. The pt declines referral to GP or for TMS.
Patient will report improved mood and insight into coping skills for symptoms, and with no SIIP.
The pt will be discharged home where she resides with her  and [facility]. The pt refused referral to the [facility] program.
The pt will be discharged home where she resides with her  and [facility] f/u. The pt declines referral to GP or for TMS.
The pt will be discharged home where she resides with her  and will be [facility]. The pt has declined referral to GP and [facility].
The pt will be discharged home where she resides with her  and [facility]. The pt refused referral to the [facility] program.
The pt will be discharged home where she resides with her  and will be [facility]. The pt has declined referral to GP and [facility].
The pt will be discharged home where she resides with her  and [facility]. Consideration of referral to the GP will be explored as pt's  now is retired and available to transport pt.
The pt will be discharged home where she resides with her  and [facility]. The pt refused referral to the [facility] program.
The pt will be discharged home where she resides with her  and will be referred [facility]. The pt has declined referral to GP and [facility].
The pt will be discharged home where she resides with her  and [facility]. Consideration of referral to the GP will be explored as pt's  now is retired and available to transport pt.
Patient will report improved mood and insight into coping skills for symptoms, and with no SIIP.
The pt will be discharged home where she resides with her  and will be [facility].
The pt will be discharged home where she resides with her  and referred [facility]. Consideration of referral to the GP will be explored as pt's  now is retired and available to transport pt.
The pt will be discharged home where she resides with her  and [facility] f/u. The pt declines referral to GP or for TMS.
The pt will be discharged home where she resides with her  and [facility]. The pt refused referral to the [facility] program.
The pt will be discharged home where she resides with her  and will be [facility].
Patient will report improved mood and insight into coping skills for symptoms, and with no SIIP.
The pt will be discharged home where she resides with her  and [facility]. Consideration of referral to the GP will be explored as pt's  now is retired and available to transport pt.
The pt will be discharged home where she resides with her  and [facility]. Consideration of referral to the GP will be explored as pt's  now is retired and available to transport pt.
Patient will report improved mood and insight into coping skills for symptoms, and with no SIIP.
The pt will be discharged home where she resides with her  and [facility]. The pt declines referral to GP. Consideration of TMS has been addressed with pt who is ambivalent about this recommendation.
The pt will be discharged home where she resides with her  and [facility]/u. The pt declines referral to GP or for TMS.
The pt will be discharged home where she resides with her  and [facility] f/u. The pt declines referral to GP or for TMS.
The pt will be discharged back to her home where she resides with her  and [facility]. The pt declines recommendation of referral to [facility].

## 2025-02-19 NOTE — BH INPATIENT PSYCHIATRY PROGRESS NOTE - NSDCCRITERIA_PSY_ALL_CORE
CGI<3

## 2025-02-19 NOTE — BH INPATIENT PSYCHIATRY PROGRESS NOTE - NSTXPROBDCOTHR_PSY_ALL_CORE
DISCHARGE ISSUE - OTHER

## 2025-02-19 NOTE — BH INPATIENT PSYCHIATRY PROGRESS NOTE - NSICDXBHPRIMARYDX_PSY_ALL_CORE
ANSON (generalized anxiety disorder)   F41.1  

## 2025-02-19 NOTE — BH INPATIENT PSYCHIATRY PROGRESS NOTE - NSTXDEPRESPROGRES_PSY_ALL_CORE
No Change
Met - goal discontinued
No Change

## 2025-02-19 NOTE — BH INPATIENT PSYCHIATRY PROGRESS NOTE - CURRENT MEDICATION
MEDICATIONS  (STANDING):  buPROPion XL (24-Hour) 150 milliGRAM(s) Oral daily  chlorhexidine 0.12% Liquid 15 milliLiter(s) Swish and Spit <User Schedule>  clonazePAM  Tablet 0.5 milliGRAM(s) Oral <User Schedule>  cyanocobalamin 1000 MICROGram(s) Oral daily  ergocalciferol 76790 Unit(s) Oral <User Schedule>  evolocumab Injectable 140 milliGRAM(s) SubCutaneous every 2 weeks  fluticasone propionate 50 MICROgram(s)/spray Nasal Spray 1 Spray(s) Both Nostrils two times a day  gabapentin 300 milliGRAM(s) Oral two times a day  l-methylfolate 15 milliGRAM(s) Oral daily  melatonin. 1 milliGRAM(s) Oral at bedtime  metoprolol succinate ER 50 milliGRAM(s) Oral two times a day  mirtazapine 45 milliGRAM(s) Oral at bedtime  multivitamin 1 Tablet(s) Oral daily  Nexletol 180 mg 1 Tablet(s) 1 Tablet(s) Oral daily  pantoprazole    Tablet 40 milliGRAM(s) Oral <User Schedule>    MEDICATIONS  (PRN):  acetaminophen     Tablet .. 650 milliGRAM(s) Oral every 6 hours PRN Temp greater or equal to 38C (100.4F), Mild Pain (1 - 3), Moderate Pain (4 - 6)  aluminum hydroxide/magnesium hydroxide/simethicone Suspension 30 milliLiter(s) Oral every 6 hours PRN Dyspepsia  clonazePAM Oral Disintegrating Tablet 0.25 milliGRAM(s) Oral every 8 hours PRN anxiety  famotidine    Tablet 10 milliGRAM(s) Oral every 6 hours PRN acid reflux symptroms  lidocaine   4% Patch 1 Patch Transdermal daily PRN sciatica pain

## 2025-02-19 NOTE — BH INPATIENT PSYCHIATRY PROGRESS NOTE - NSTXDCOTHRINTERMD_PSY_ALL_CORE
Optimize medications for mood/anxiety, discuss additional trials including augmentation. Encourage pt to engage in therapeutic interventions. Provide psychoeducation and coordinate care with interdisciplinary treatment and outpatient providers. 

## 2025-02-19 NOTE — BH INPATIENT PSYCHIATRY PROGRESS NOTE - NSTXANXDATETRGT_PSY_ALL_CORE
10-Feb-2025
10-Feb-2025
14-Jan-2025
14-Jan-2025
10-Feb-2025
09-Jan-2025
09-Jan-2025
14-Jan-2025
09-Jan-2025
14-Jan-2025
14-Jan-2025
10-Feb-2025
14-Jan-2025
10-Feb-2025
10-Feb-2025
14-Jan-2025
04-Jan-2025
14-Jan-2025
10-Feb-2025
09-Jan-2025
10-Feb-2025
14-Jan-2025
14-Jan-2025
10-Feb-2025
10-Feb-2025
09-Jan-2025
10-Feb-2025
10-Feb-2025
14-Jan-2025
10-Feb-2025
10-Feb-2025
04-Jan-2025
04-Jan-2025
10-Feb-2025
04-Jan-2025
09-Jan-2025

## 2025-02-19 NOTE — BH INPATIENT PSYCHIATRY PROGRESS NOTE - NSTXANXPROGRES_PSY_ALL_CORE
No Change
Met - goal discontinued
No Change

## 2025-02-19 NOTE — BH INPATIENT PSYCHIATRY DISCHARGE NOTE - DESCRIPTION
Patient is  and domiciled with   Intermediate Repair And Flap Additional Text (Will Appearing After The Standard Complex Repair Text): The intermediate repair was not sufficient to completely close the primary defect. The remaining additional defect was repaired with the flap mentioned below.

## 2025-02-19 NOTE — BH INPATIENT PSYCHIATRY PROGRESS NOTE - NSTXPROBCOPE_PSY_ALL_CORE
COPING, INEFFECTIVE

## 2025-02-19 NOTE — BH INPATIENT PSYCHIATRY PROGRESS NOTE - NSBHATTESTTYPEVISIT_PSY_A_CORE
Attending Only
GIFTY without on-site Attending supervision
GIFTY without on-site Attending supervision
Attending Only
On-site Attending supervising GIFTY (99XXX codes)
Attending Only
On-site Attending supervising GIFTY (99XXX codes)
Attending Only
Attending with Resident/Fellow/Student

## 2025-02-19 NOTE — BH INPATIENT PSYCHIATRY PROGRESS NOTE - NSICDXBHSECONDARYDX_PSY_ALL_CORE
MDD (major depressive disorder), recurrent episode, severe   F33.2  

## 2025-02-19 NOTE — BH INPATIENT PSYCHIATRY PROGRESS NOTE - NSTXANXINTERMD_PSY_ALL_CORE
Optimize medications for mood/anxiety, discuss additional trials including augmentation. Encourage pt to engage in therapeutic interventions. 

## 2025-02-19 NOTE — BH INPATIENT PSYCHIATRY DISCHARGE NOTE - NSDCMRMEDTOKEN_GEN_ALL_CORE_FT
aluminum hydroxide-magnesium hydroxide 200 mg-200 mg/5 mL oral suspension: 30 milliliter(s) orally every 6 hours As needed Dyspepsia  buPROPion 150 mg/24 hours (XL) oral tablet, extended release: 1 tab(s) orally once a day  chlorhexidine 0.12% mucous membrane liquid: 10 milliliter(s) mucous membrane 2 times a day rinse and spit  clonazePAM 0.25 mg oral tablet, disintegratin tab(s) orally once a day as needed for  anxiety MDD: 0.25 mg  clonazePAM 0.5 mg oral tablet: 1 tab(s) orally 3 times a day MDD: 1.5mg  cyanocobalamin 1000 mcg oral tablet: 1 tab(s) orally once a day  evolocumab 140 mg/mL subcutaneous solution: 140 milligram(s) subcutaneously every 2 weeks  famotidine 10 mg oral tablet: 1 tab(s) orally once a day as needed for acid reflux symptroms  fluticasone 50 mcg/inh nasal spray: 1 spray(s) nasal 2 times a day  gabapentin 300 mg oral capsule: 1 cap(s) orally 2 times a day  l-methylfolate 7.5 mg oral tablet: 2 tab(s) orally once a day  lidocaine 4% topical film: Apply topically to affected area once a day as needed for  moderate pain apply to affected area remove at bedtime  melatonin 1 mg oral tablet: 1 tab(s) orally once a day (at bedtime)  metoprolol succinate 50 mg oral tablet, extended release: 1 tab(s) orally 2 times a day  mirtazapine 45 mg oral tablet: 1 tab(s) orally once a day (at bedtime)  Multiple Vitamins oral tablet: 1 tab(s) orally once a day  Nexletol 180 mg oral tablet: 1 tab(s) orally once a day (at bedtime)  pantoprazole 40 mg oral delayed release tablet: 1 tab(s) orally 2 times a day  Vitamin D2 1.25 mg (50,000 intl units) oral capsule: 1 cap(s) orally once a week

## 2025-02-19 NOTE — BH INPATIENT PSYCHIATRY DISCHARGE NOTE - HPI (INCLUDE ILLNESS QUALITY, SEVERITY, DURATION, TIMING, CONTEXT, MODIFYING FACTORS, ASSOCIATED SIGNS AND SYMPTOMS)
Ms. Gross is a 73yo female, , retired (), domiciled at home with , prior psychiatric diagnosis of ANSON and MDD, 6 previous inpatient psychiatric hospitalizations at [facility] ,( [facility] from 3/28/23-4/29/23, and more recent admission to [facility] from 5/10-6/12/23 for worsening of anxiety, depression and passive SI) and last admission to [facility] from 4/16-5/31/24 for worsening of anxiety and depression. Pt. has a prior SA ( Oding on pills) in 11/2019 by overdosing on pills, no history of NSSIB, no history of violence/aggression, no hx of legal issues, hx of marijuana use, PMHx of HTN, hyperlipidemia. Pt. seen today for follow up.    Patient was refer form the psychiatric geriatric clinic as the patient reported increase depression and anxiety accompanied with passive SI. Patient is voluntary. Patient was seen and evaluated in the group room by her self. Patient presented as very anxious, forgetful and a poor historian. She repeated the same questions over and over. Patient was not oriented to date as she said that it was 02/28 and said that the next president will be Priyank Gomez. she was able to remember once I told her that this information was not accurate. Patient said that her memory is not good and she has been more anxious to the point that she is not able to function. she wakes up in the morning with a lot of anxiety and she doesn't know what to do. Patient has been taking more Klonopin and this is not helping. She tried Gabapentin 300mg tid and this made her more anxious. She is not able to tolerate the Nortriptyline and wants to be off this medication. She reports has tremors, anxiety, increase ruminations and has problems sleeping. She denies symptoms of psychosis, denies SI at the moment of the assessment and denies any safety concerns. Patient said that she has been complaint with the her medications and the list was verify with the pharmacy  [facility] . Patient is on:  Klonopin 0.5mg BID and 0.25mg in the AM and noon  Nexletol 180mg at bedtime   Pantoprazole 40mg am   Metoprolol 50mg bid   Gabapentin 300mg tid  Remeron 22.5mg bedtime   Nortryptiline 50mg bedtime   Repatha IM 140mg every 2 weeks

## 2025-02-19 NOTE — BH INPATIENT PSYCHIATRY PROGRESS NOTE - NSTXCOPEGOAL_PSY_ALL_CORE
Identify and utilize 2 coping skills that meet their needs

## 2025-02-19 NOTE — BH INPATIENT PSYCHIATRY PROGRESS NOTE - NSBHCHARTREVIEWVS_PSY_A_CORE FT
Vital Signs Last 24 Hrs  T(C): 36.8 (02-19-25 @ 07:28), Max: 36.8 (02-19-25 @ 07:28)  T(F): 98.2 (02-19-25 @ 07:28), Max: 98.2 (02-19-25 @ 07:28)  HR: --  BP: --  BP(mean): --  RR: --  SpO2: --    Orthostatic VS  02-19-25 @ 07:28  Lying BP: 136/62 HR: 66  Sitting BP: 138/74 HR: 67  Standing BP: --/-- HR: --  Site: --  Mode: --  Orthostatic VS  02-18-25 @ 20:54  Lying BP: --/-- HR: --  Sitting BP: 128/89 HR: 67  Standing BP: --/-- HR: --  Site: --  Mode: --  Orthostatic VS  02-18-25 @ 07:44  Lying BP: --/-- HR: --  Sitting BP: 127/62 HR: 66  Standing BP: 147/69 HR: 69  Site: --  Mode: --

## 2025-02-19 NOTE — BH INPATIENT PSYCHIATRY PROGRESS NOTE - NSTXPROBANX_PSY_ALL_CORE
ANXIETY/PANIC/FEAR

## 2025-02-19 NOTE — BH INPATIENT PSYCHIATRY PROGRESS NOTE - NSTXDEPRESDATEEST_PSY_ALL_CORE
03-Feb-2025

## 2025-02-19 NOTE — BH INPATIENT PSYCHIATRY DISCHARGE NOTE - NSDCCPCAREPLAN_GEN_ALL_CORE_FT
PRINCIPAL DISCHARGE DIAGNOSIS  Diagnosis: Recurrent major depression  Assessment and Plan of Treatment:       SECONDARY DISCHARGE DIAGNOSES  Diagnosis: Anxiety, generalized  Assessment and Plan of Treatment:

## 2025-02-19 NOTE — BH INPATIENT PSYCHIATRY PROGRESS NOTE - NSTREATMENTCERTY_PSY_ALL_CORE

## 2025-02-19 NOTE — BH INPATIENT PSYCHIATRY PROGRESS NOTE - NSTXANXGOAL_PSY_ALL_CORE
Be able to perform ADLs and maintain safety despite anxiety/panic daily
Identify and practice 3 coping skills to manage anxiety
Be able to perform ADLs and maintain safety despite anxiety/panic daily
Identify and practice 3 coping skills to manage anxiety
Identify and practice 3 coping skills to manage anxiety
Be able to perform ADLs and maintain safety despite anxiety/panic daily
Identify and practice 3 coping skills to manage anxiety
Be able to perform ADLs and maintain safety despite anxiety/panic daily
Identify and practice 3 coping skills to manage anxiety
Be able to perform ADLs and maintain safety despite anxiety/panic daily
Identify and practice 3 coping skills to manage anxiety
Identify and practice 3 coping skills to manage anxiety
Be able to perform ADLs and maintain safety despite anxiety/panic daily
Identify and practice 3 coping skills to manage anxiety
Identify and practice 3 coping skills to manage anxiety
Be able to perform ADLs and maintain safety despite anxiety/panic daily
Identify and practice 3 coping skills to manage anxiety
Be able to perform ADLs and maintain safety despite anxiety/panic daily
Identify and practice 3 coping skills to manage anxiety
Be able to perform ADLs and maintain safety despite anxiety/panic daily
Identify and practice 3 coping skills to manage anxiety
Identify and practice 3 coping skills to manage anxiety
Be able to perform ADLs and maintain safety despite anxiety/panic daily
Be able to perform ADLs and maintain safety despite anxiety/panic daily
Identify and practice 3 coping skills to manage anxiety
Be able to perform ADLs and maintain safety despite anxiety/panic daily
Identify and practice 3 coping skills to manage anxiety
Identify and practice 3 coping skills to manage anxiety
Be able to perform ADLs and maintain safety despite anxiety/panic daily
Identify and practice 3 coping skills to manage anxiety

## 2025-02-19 NOTE — BH INPATIENT PSYCHIATRY DISCHARGE NOTE - HOSPITAL COURSE
Patient presented as anxious dysphoric, very somatic. Has tendency to experience bodily sensation caused by anxiety as arising from medication side effects. She had frequent complaints of nausea for which she may have been overusing Zofran at home. here Zofran was avoided as in past when combined with other psych meds she had serotonergic side effects including jitteriness, sweating. She claimed she had multiple side effects from nortriptyline though none of her complaints were clearly attributable to it. NTP was tapered off and Remeron was titrated to 45mg at hs and bupropion was added as augmentation strategy. Given lack of cardiac effects of buproprion she was able to be reassured that she could take it at home without having to worry about  side effects that would have to be assessed via vitals, EKG's blood levels. She frequently requested guaranteed that she could stay indefinitely through multiple med trials if necessary to achieve remission. She placed high value on getting on finding an ideal combination of medications but unfortunately despite much effort had limited ability to engage in therapy that would help her address cognition distortions that generated much. At same time she placed high value on medication she also had dread of side effects and symptom misattribution. Other modalities were brought up including ECT, TMS but she continued to favor quest to find ideal medication and was reluctant to consider neuromodulation approaches. She had improvement in mood and anxiety on Remeron and Wellbutrin 150mg XL. She was so terrified of possible side effects of raising Wellbutrin to 200mg including non dangerous side effects like tremor that decision was made that risk benefit of increasing dose was not favorable and she agreed to in any case to consider increasing dose after more time on current dose at home. She was offered partial hospital program but refused based on transportation though spouse said he would drive her every day and also on prioritizing upcoming dental work. She was kept on Klonopin and gabapentin at doses similar to home doses. For nausea she was given increase in Protonix which she claimed paradoxically increased nausea and Pepcid and Maalox prn. She said she planned to resume Zofran at home. Her outpatient GI Dr Flores was contacted and he suggested prn compazine but this was avoided given all the other meds she was taking. Additionally Deplin was added as augmentation at 15mg. Her insurance wont pay so family is aware they can obtain reasonable cost non prescription version on line or at their own drug store. She improved but had pre discharged anxiety where she ruminated if she is doing right thing by leaving, by waiting on increasing Wellbutrin, wanted reassurance she coud be readmitted just to increase Wellbutrin from 150 to 200mg per day. Throughout stay she denied Si, psychotic symptoms. She ate and slept well. Given prior hx of OD souse was advised to maintain custody of her medications.

## 2025-02-19 NOTE — BH INPATIENT PSYCHIATRY PROGRESS NOTE - NSTXCOPEDATETRGT_PSY_ALL_CORE
17-Jan-2025
23-Jan-2025
05-Feb-2025
10-Feb-2025
18-Feb-2025
26-Feb-2025
18-Feb-2025
05-Feb-2025
29-Jan-2025
09-Jan-2025
09-Jan-2025
29-Jan-2025
09-Jan-2025
10-Feb-2025
05-Feb-2025
10-Feb-2025
09-Jan-2025
29-Jan-2025
05-Feb-2025
18-Feb-2025
23-Jan-2025
29-Jan-2025
17-Jan-2025
23-Jan-2025
10-Feb-2025
09-Jan-2025
18-Feb-2025
25-Feb-2025
09-Jan-2025
10-Feb-2025
18-Feb-2025
23-Jan-2025
18-Feb-2025
14-Jan-2025
18-Feb-2025
29-Jan-2025
17-Jan-2025
10-Feb-2025
29-Jan-2025
29-Jan-2025
14-Jan-2025
05-Feb-2025

## 2025-02-19 NOTE — BH INPATIENT PSYCHIATRY PROGRESS NOTE - NSBHADMITIPREASONDETAILS_PSY_A_CORE FT
worsening anxiety leading to severe impairments in functioning/inability to care for self. 

## 2025-02-19 NOTE — BH INPATIENT PSYCHIATRY PROGRESS NOTE - NSBHCONSULTIPREASON_PSY_A_CORE
other reason

## 2025-02-19 NOTE — BH INPATIENT PSYCHIATRY PROGRESS NOTE - NSBHATTESTBILLING_PSY_A_CORE
[id number]-Absytvnfwf OBS or IP - moderate complexity OR 35-49 mins
48258-Uzhsfjkeqj OBS or IP - moderate complexity OR 35-49 mins
93216-Ivbpakqist OBS or IP - moderate complexity OR 35-49 mins
13054-Musldhfwcd OBS or IP - moderate complexity OR 35-49 mins
82807-Ppdzfvuljo OBS or IP - moderate complexity OR 35-49 mins
15751-Xwpathpsms OBS or IP - moderate complexity OR 35-49 mins
58465-Sstuenegcu OBS or IP - moderate complexity OR 35-49 mins
61811-Sgzcibrcbk OBS or IP - moderate complexity OR 35-49 mins
53022-Ouogtngmjk OBS or IP - low complexity OR 25-34 mins
79423-Ndwdrbdxoj OBS or IP - moderate complexity OR 35-49 mins
43287-Ecezcicdex OBS or IP - low complexity OR 25-34 mins
75089-Vpohaixwju OBS or IP - moderate complexity OR 35-49 mins
49069-Fgpeinjsze OBS or IP - moderate complexity OR 35-49 mins
61773-Sphvnksmbi OBS or IP - moderate complexity OR 35-49 mins
40012-Rknxrkgbmy OBS or IP - moderate complexity OR 35-49 mins
74607-Uqxpdptscp OBS or IP - moderate complexity OR 35-49 mins
90208-Orizwokxgb OBS or IP - moderate complexity OR 35-49 mins
98246-Mwvwubztlk OBS or IP - moderate complexity OR 35-49 mins
17889-Xiqrkezglv OBS or IP - moderate complexity OR 35-49 mins
68437-Ivsqieylsc OBS or IP - moderate complexity OR 35-49 mins
06194-Jdleawsiuc OBS or IP - low complexity OR 25-34 mins
28624-Cjpgbatmvk OBS or IP - moderate complexity OR 35-49 mins
34265-Elsxnnyvdd OBS or IP - moderate complexity OR 35-49 mins
81315-Chqcowossj OBS or IP - low complexity OR 25-34 mins
47814-Samkynojgz OBS or IP - low complexity OR 25-34 mins
21502-Bjpbhhatgf OBS or IP - moderate complexity OR 35-49 mins
79263-Msizcsqwfm OBS or IP - moderate complexity OR 35-49 mins
19259-Bfsehxcnfh OBS or IP - moderate complexity OR 35-49 mins
35516-Hwecognwny OBS or IP - moderate complexity OR 35-49 mins
02797-Yijlxkjett OBS or IP - moderate complexity OR 35-49 mins
09238-Mkrcrfadwu OBS or IP - moderate complexity OR 35-49 mins
52000-Wrchxwyfnk OBS or IP - moderate complexity OR 35-49 mins
49042-Mksskebomi OBS or IP - moderate complexity OR 35-49 mins
02764-Mildqyfcnf OBS or IP - moderate complexity OR 35-49 mins
78542-Lacokukzhj OBS or IP - moderate complexity OR 35-49 mins
73213-Wfgsiqakze OBS or IP - moderate complexity OR 35-49 mins
19332-Amhvijcjrw OBS or IP - moderate complexity OR 35-49 mins
19904-Oathlnrpii OBS or IP - moderate complexity OR 35-49 mins
88653-Agezpuysvw OBS or IP - moderate complexity OR 35-49 mins
65989-Oqqurjeoad OBS or IP - moderate complexity OR 35-49 mins
43834-Gcxkwhsmti OBS or IP - moderate complexity OR 35-49 mins
62656-Civtowzbze OBS or IP - moderate complexity OR 35-49 mins
73110-Brrnrxnrco OBS or IP - moderate complexity OR 35-49 mins
04527-Udwpjxuowy OBS or IP - moderate complexity OR 35-49 mins
30652-Plzpqfpbqw OBS or IP - moderate complexity OR 35-49 mins
82105-Idmkdbyyuo OBS or IP - moderate complexity OR 35-49 mins

## 2025-02-19 NOTE — BH INPATIENT PSYCHIATRY PROGRESS NOTE - NSTXDCOTHRDATETRGT_PSY_ALL_CORE
06-Jan-2025
06-Jan-2025
31-Jan-2025
12-Feb-2025
13-Jan-2025
12-Feb-2025
19-Feb-2025
23-Jan-2025
14-Jan-2025
23-Jan-2025
14-Jan-2025
31-Jan-2025
19-Feb-2025
23-Jan-2025
06-Feb-2025
14-Jan-2025
06-Feb-2025
14-Jan-2025
06-Feb-2025
23-Jan-2025
06-Jan-2025
31-Jan-2025
23-Jan-2025
06-Jan-2025
31-Jan-2025
13-Jan-2025
31-Jan-2025
12-Feb-2025
12-Feb-2025
19-Feb-2025
19-Feb-2025
31-Jan-2025
06-Feb-2025
12-Feb-2025
14-Jan-2025
31-Jan-2025
19-Feb-2025
06-Jan-2025
06-Jan-2025
12-Feb-2025
12-Feb-2025
14-Jan-2025
19-Feb-2025
19-Feb-2025

## 2025-02-19 NOTE — BH INPATIENT PSYCHIATRY DISCHARGE NOTE - NSBHMETABOLIC_PSY_ALL_CORE_FT
BMI: BMI (kg/m2): 37.5 (12-27-24 @ 15:12)  HbA1c: A1C with Estimated Average Glucose Result: 5.5 % (01-02-25 @ 09:11)    Glucose:   BP: 113/63 (02-17-25 @ 21:05) (113/63 - 113/63)Vital Signs Last 24 Hrs  T(C): 36.8 (02-19-25 @ 07:28), Max: 36.8 (02-19-25 @ 07:28)  T(F): 98.2 (02-19-25 @ 07:28), Max: 98.2 (02-19-25 @ 07:28)  HR: --  BP: --  BP(mean): --  RR: --  SpO2: --    Orthostatic VS  02-19-25 @ 07:28  Lying BP: 136/62 HR: 66  Sitting BP: 138/74 HR: 67  Standing BP: --/-- HR: --  Site: --  Mode: --  Orthostatic VS  02-18-25 @ 20:54  Lying BP: --/-- HR: --  Sitting BP: 128/89 HR: 67  Standing BP: --/-- HR: --  Site: --  Mode: --  Orthostatic VS  02-18-25 @ 07:44  Lying BP: --/-- HR: --  Sitting BP: 127/62 HR: 66  Standing BP: 147/69 HR: 69  Site: --  Mode: --  Orthostatic VS  02-17-25 @ 07:46  Lying BP: --/-- HR: --  Sitting BP: 139/75 HR: 64  Standing BP: 143/76 HR: 70  Site: upper left arm  Mode: electronic    Lipid Panel: Date/Time: 01-02-25 @ 09:11  Cholesterol, Serum: 128  LDL Cholesterol Calculated: 53  HDL Cholesterol, Serum: 46  Total Cholesterol/HDL Ration Measurement: --  Triglycerides, Serum: 174

## 2025-02-19 NOTE — BH INPATIENT PSYCHIATRY PROGRESS NOTE - NSBHFUPMEDSE_PSY_A_CORE
None known
Yes
Yes
None known
Yes
None known
Yes
Yes
None known
Yes
None known
Yes
None known

## 2025-02-19 NOTE — BH INPATIENT PSYCHIATRY PROGRESS NOTE - NSTXPROBDEPRES_PSY_ALL_CORE
DEPRESSIVE SYMPTOMS

## 2025-02-19 NOTE — BH INPATIENT PSYCHIATRY PROGRESS NOTE - NSBHCONTPROVIDER_PSY_ALL_CORE
Yes...

## 2025-02-19 NOTE — BH INPATIENT PSYCHIATRY PROGRESS NOTE - NSTXANXDATEEST_PSY_ALL_CORE
28-Dec-2024
28-Dec-2024
03-Feb-2025
28-Dec-2024
03-Feb-2025
28-Dec-2024
03-Feb-2025
03-Feb-2025
28-Dec-2024
03-Feb-2025
28-Dec-2024
03-Feb-2025
03-Feb-2025
28-Dec-2024
03-Feb-2025
28-Dec-2024
03-Feb-2025
03-Feb-2025
28-Dec-2024
03-Feb-2025
28-Dec-2024
03-Feb-2025
03-Feb-2025
28-Dec-2024

## 2025-02-19 NOTE — BH INPATIENT PSYCHIATRY PROGRESS NOTE - NSTXDCOTHRPROGRES_PSY_ALL_CORE
No Change
Improving
No Change
No Change
Improving
No Change
Improving
Met - goal discontinued
No Change
No Change
Improving
No Change
Improving
Improving
No Change
Improving
No Change
Improving
Improving
No Change
Improving
No Change

## 2025-02-19 NOTE — BH INPATIENT PSYCHIATRY PROGRESS NOTE - NSBHMETABOLICLABS_PSY_ALL_CORE
Labs within last 12 months

## 2025-02-19 NOTE — BH INPATIENT PSYCHIATRY PROGRESS NOTE - NSTXCOPEPROGRES_PSY_ALL_CORE
No Change
Improving
No Change
Improving
Improving
No Change
Improving
No Change
Improving
Improving
No Change
Improving
Improving
No Change
Improving
Improving
No Change
Met - goal discontinued
Improving
No Change
Improving
Improving
No Change
No Change
Improving
No Change

## 2025-02-19 NOTE — BH INPATIENT PSYCHIATRY PROGRESS NOTE - NSBHMETABOLIC_PSY_ALL_CORE_FT
BMI: BMI (kg/m2): 37.5 (12-27-24 @ 15:12)  HbA1c: A1C with Estimated Average Glucose Result: 5.5 % (01-02-25 @ 09:11)    Glucose:   BP: 113/63 (02-17-25 @ 21:05) (113/63 - 113/63)Vital Signs Last 24 Hrs  T(C): 36.8 (02-19-25 @ 07:28), Max: 36.8 (02-19-25 @ 07:28)  T(F): 98.2 (02-19-25 @ 07:28), Max: 98.2 (02-19-25 @ 07:28)  HR: --  BP: --  BP(mean): --  RR: --  SpO2: --    Orthostatic VS  02-19-25 @ 07:28  Lying BP: 136/62 HR: 66  Sitting BP: 138/74 HR: 67  Standing BP: --/-- HR: --  Site: --  Mode: --  Orthostatic VS  02-18-25 @ 20:54  Lying BP: --/-- HR: --  Sitting BP: 128/89 HR: 67  Standing BP: --/-- HR: --  Site: --  Mode: --  Orthostatic VS  02-18-25 @ 07:44  Lying BP: --/-- HR: --  Sitting BP: 127/62 HR: 66  Standing BP: 147/69 HR: 69  Site: --  Mode: --    Lipid Panel: Date/Time: 01-02-25 @ 09:11  Cholesterol, Serum: 128  LDL Cholesterol Calculated: 53  HDL Cholesterol, Serum: 46  Total Cholesterol/HDL Ration Measurement: --  Triglycerides, Serum: 174

## 2025-02-19 NOTE — BH INPATIENT PSYCHIATRY PROGRESS NOTE - NSTXDEPRESDATETRGT_PSY_ALL_CORE
10-Feb-2025
10-Feb-2025
14-Jan-2025
10-Feb-2025
10-Feb-2025
14-Jan-2025
09-Jan-2025
14-Jan-2025
10-Feb-2025
14-Jan-2025
10-Feb-2025
09-Jan-2025
09-Jan-2025
10-Feb-2025
14-Jan-2025
10-Feb-2025
09-Jan-2025
14-Jan-2025
10-Feb-2025
14-Jan-2025
09-Jan-2025
10-Feb-2025
14-Jan-2025
09-Jan-2025
14-Jan-2025
10-Feb-2025
14-Jan-2025
10-Feb-2025
14-Jan-2025
14-Jan-2025
10-Feb-2025

## 2025-02-19 NOTE — BH INPATIENT PSYCHIATRY PROGRESS NOTE - NSTXCOPEDATEEST_PSY_ALL_CORE
10-Ronn-2025
29-Jan-2025
29-Jan-2025
10-Ronn-2025
29-Jan-2025
03-Feb-2025
03-Feb-2025
22-Jan-2025
22-Jan-2025
11-Feb-2025
03-Feb-2025
11-Feb-2025
22-Jan-2025
11-Feb-2025
16-Jan-2025
03-Feb-2025
19-Feb-2025
16-Jan-2025
11-Feb-2025
29-Jan-2025
10-Ronn-2025
22-Jan-2025
22-Jan-2025
11-Feb-2025
22-Jan-2025
16-Jan-2025
22-Jan-2025
29-Jan-2025
03-Feb-2025
11-Feb-2025
18-Feb-2025
11-Feb-2025
03-Feb-2025
16-Jan-2025

## 2025-02-19 NOTE — BH INPATIENT PSYCHIATRY DISCHARGE NOTE - OTHER PAST PSYCHIATRIC HISTORY (INCLUDE DETAILS REGARDING ONSET, COURSE OF ILLNESS, INPATIENT/OUTPATIENT TREATMENT)
Pt. is a 72 y/o old female, domiciled with spouse, retired with history of MDD and ANSNO with multiple psych hospitalizatins (most recently April 2024 on [facility]), had prior SA by overdosing on pills (11/2019) following death of parents. currently treated by [facility] outpatient psychiatrist Dr. Kamara.

## 2025-02-19 NOTE — BH INPATIENT PSYCHIATRY PROGRESS NOTE - NSBHASSESSSUMMFT_PSY_ALL_CORE
Ms. Gross is a 73yo female, , retired (), domiciled at home with , no children; PMHx HTN, HLD; PPHx d/o ANSON and MDD, 9 past psych admissions starting in 2019 (last [facility] April 2024 for worsening anxiety and depression), ECT at [facility] 2023, OP at [facility]; 1 past SA (OD on 9 pills of unknown medication in 2019), no h/o NSSIB, no h/o violence/aggression/legal issues, past marijuana use; initially admitted to [facility] 2W on 12/27 for increasing anxiety and SI w/o plans in the context of medication adjustments and psychosocial stressors.     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     On initial 2S assessment pt dysphoric, anxious, hopeless, and preoccupied with somatic sxs. There are elements of active passivity, severe sensitivity to feelings of inadequacy and complicated interpersonal relationships (, friends, providers). 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 Provided pt with psychoeducation regarding limitations of medications and need to focus on therapeutic interventions however she has limited insight into this and is intent on finding the right medication(s). For now will continue to slowly taper off nortriptyline.     1/6: Prominent treatment interfering behaviors including active passivity and rejection sensitivity. For now will continue nortriptyline taper. Considering atypical antidepressant trial. Pt reporting chronic b/l LE edema, will encourage her to use compression stocking and elevate legs, no indication for diuretic at this time.     1/7: Preoccupied with med side effects and physical sxs, but more positive about potential med trials. Will decrease qAM gabapentin 300mg to 200mg but otherwise will attempt to limit med changes other than nortriptyline taper. Will also attempt to discourage use of zofran PRNs given past difficulties tolerating multiple serotonergic medications.   1/8 Patient unchanged anxious ruminative unable to use coping skilss. Will dc NTP increase Remeron  1/9 Somatic anxious unable to utilize coping techniques. Plan behavior plan initiated, cont to titrate Remeorn. Added methylfolate for augmentation  1/10 Reports malaise, could be a viral illness vs somatic anxiety symptoms; otherwise tolerating treatment well    1/13: Dysphoric, tearful, anxious but denies SI/HI, discussed med plans and seeks reassurance.  Will increase Remeron to 37.5mg QHS.  1/14: Remains dysphoric, anxious, somatically focused, responds well to support and reassurance provided.  Will increase methylfolate to 15mg daily, provided zofran 4mg PO daily prn nausea, Lidoderm patch prn sciatic pain, will monitor tolerability and consider further titration of Remeron.  1/15: Anxious, depressed, somatic, seeks reassurance on exam, will increase Remeron to 45mg QHS tonight.  Pt agrees with plan as stated.  1/16: Anxious, ruminative but reports improvement, less somatic, tolerating Remeron 45mg and methylfolate 15mg, considering augmentation with Wellbutrin.   1/17: Dysphoric, anxious, denies SI, seeks support, reassurance, will change to regular tablet Klonopin formulation from disintegrating, continue current meds.  1/21 About same will start Wellbutrin to augment mirtazapine. Will change Zofran to pepcid to minimize serotonergic meds  1/22 Tolerating Wellbutrin con current dose. Will alllow Pepcid 10mg q6 prn with max three doses per 24 hours hopefully will seek less when feeling better  1/23 Gi focused today , cont meds will increase Wellbutrin and patient was seen by medicine who increased Protonix  1/24 Depressed, anxious somatic will increase Wellbutrin to 75mg/d, medicine increase protonix  1/25 No major improvement cont meds avoid making multiple changes which makes her anxious and more symptomatic  1/26 No change cont med tiral. Consider some form of neuropsych eval if subtle cog decline playing role in anxiety and treatment refractoriness  1/27 No major changes plan titrate bupropion to 100mg SR daily  1/28 No real change will increase Wellbutrin to 100mg SR daily add Peridex as she was on this as outpatient for oral hygiene  1/29 No major change suspect tremor may be due to anxiety then blamed on meds, patient encouraged to cont meds rather than reduce or dc and she is agreeable, also told if  wellbutrin sig improved depression but causes minor tremor that may be acceptable risk benefit  1/30 Look sl brighter today but does tend to fluctuate. Cont  current meds without change in dose, if tolerating plan increase Wellbutrin to 150mg  1/31 SLight gains able to tolerate Wellbutrin lack of known se compared with TCA is reassuring to her. Discussed TMS with patient and spouse  2/3 Looks a little better, alternate complaints between and anxiety and depression  2/4 Modest gains, remains somatic , often shifting complaints. Will add Maalox prn suspect she will request Zofran which for now trying to avoid  2/5 Reports some improvement and not appearing as agitated, cont meds will add Flonase  2/6 Patient less intensely anxious, remains somatic, need much refocusing to avoid her attributing pre existing complaints to side effects of new meds  2/7 Some gains, remains somatic. Spoke with her GI Dr Flores [phone] who feels nausea most anxiety, suggested compazine as last resort if desire to avoid possible excess serotonergic effects from Zofran. Cont meds, add vit D as per home regimen  2/8 Anxious , prominent symptom misattribution cont current med trial and attempts to reassure patient  2/9 Dysphoric, anxious, somatic and se focused Cont current meds, cont to work to help patient recognize somatic complaints as component of anxiety and not requiring med lowering or dc  2/10 Patient reports some improvement in mood and anxiety despite continuing to appear anxious and somatic about med se. COnt current meds will recheck labs for reassurance primarily  2/11 Partial gains cont current meds, consider increasing Wellbutrin concern is she may experience minor se and want to go off  2/12 Remains worrisome somatic med focused. Cont Wellbutrin trial consider increase to 200mg/d  will change 6AM Protonix to 9AM  2/13 Patient states changing protonix time doesn't help with GI complaints. Feels better with anxiety overall and mood but having anxiety specifically related to discharge issues. Will cont current meds. suspect increasing Wellbutrin may generate more med/s.e. worries than benefit  2/14 Was improving with med regimen but discussions about dc have engendered pre dc anxiety that is clouding clinical response. Will sl increase gabapentin and cont to provide supportive interventions and encourage use of coping skills. Feel risk benefit of increasing Wellbutrin may not be favorable  2/15-2/17: Some residual anxiety, improving , with continued GI complaints that improved   2/18 Patient with partial improvement. Not psychotic, no SI. Plan if no change dc in AM cont to help her patterns of thicking that can be addressed therapeutically in addition to taking meds  2/19 Patient with MDD , ANSON patient has achieved partial response, mood is better, anxiety did improve but likely is transiently increased around time of discharge. Patient  does not have psychotic symptoms denies SI/HI. She is worried about future but hopeful. Patient has hx small OD in 2019. Despite this she is consider low short term risk in that she has had improvement in mood, denies SI, is future oriented, has good support system is compliant tand help seeking. Will f/u in geriatric clinic. She decline partial hospital but this can be re-evaluated

## 2025-03-20 NOTE — ED PROVIDER NOTE - OBJECTIVE STATEMENT
Hi,  Requesting a pt/inr home draw to be done around 3/27. Please call the pt to set up. Script is in the chart.     Thank you.     69 y/o F w/ pmhx of depression, anxiety previous suicidal behavior and HTN presents today stating she does not want to live anymore. In the past she tried to commit suicide by taking all the medications in her cabinet then she was found passed out on the ground by her . Now her current suicidal plan is the same as before to overdose on any meds in her house. Pt also c/o palpitations and flushing in the morning. Pt also states she thinks "someone is trying to kill me". States she has been compliant with her medication of lexapro and remeron. Pt states she does not feel safe and wants to be admitted to the hospital. Denies recent f/c, n/v, cp, sob, abd pain. Symptoms are worse in the morning. 71 y/o F w/ pmhx of depression, anxiety previous suicidal behavior and HTN presents today stating she does not want to live anymore. In the past she tried to commit suicide by taking all the medications in her cabinet then she was found passed out on the ground by her . Now her current suicidal plan is the same as before to overdose on any meds in her house. Pt also c/o palpitations and flushing in the morning. Pt also states she thinks "someone is trying to kill me". States she has been compliant with her medication of lexapro and remeron. Pt states she does not feel safe and wants to be admitted to the hospital. Denies recent f/c, n/v, cp, sob, abd pain. Symptoms are worse in the morning.    Attending/Theodore; 71 yo F as described above, h/o HTN, anxiety d/o with suicidal attempts in the past (medication) p/w worsening symptoms and suicidal ideation. While reviewing her symptoms pt states she wakes up with acute palp, anxiety, diaphoresis that improves by afternoon/evening along with flushing. no change in urinary/bowel habits. She states to having a CT-head in 2019. No recent changes in mediations, lives with her .

## 2025-03-24 ENCOUNTER — EMERGENCY (EMERGENCY)
Facility: HOSPITAL | Age: 75
LOS: 0 days | Discharge: HOME | End: 2025-03-24
Attending: STUDENT IN AN ORGANIZED HEALTH CARE EDUCATION/TRAINING PROGRAM
Payer: MEDICARE

## 2025-03-24 VITALS
DIASTOLIC BLOOD PRESSURE: 85 MMHG | RESPIRATION RATE: 16 BRPM | OXYGEN SATURATION: 98 % | TEMPERATURE: 98 F | HEART RATE: 73 BPM | SYSTOLIC BLOOD PRESSURE: 141 MMHG

## 2025-03-24 VITALS
HEIGHT: 61 IN | HEART RATE: 74 BPM | TEMPERATURE: 98 F | RESPIRATION RATE: 18 BRPM | SYSTOLIC BLOOD PRESSURE: 149 MMHG | DIASTOLIC BLOOD PRESSURE: 84 MMHG | OXYGEN SATURATION: 96 % | WEIGHT: 199.96 LBS

## 2025-03-24 DIAGNOSIS — R11.0 NAUSEA: ICD-10-CM

## 2025-03-24 DIAGNOSIS — Z88.2 ALLERGY STATUS TO SULFONAMIDES: ICD-10-CM

## 2025-03-24 DIAGNOSIS — E78.5 HYPERLIPIDEMIA, UNSPECIFIED: ICD-10-CM

## 2025-03-24 DIAGNOSIS — R42 DIZZINESS AND GIDDINESS: ICD-10-CM

## 2025-03-24 DIAGNOSIS — R26.81 UNSTEADINESS ON FEET: ICD-10-CM

## 2025-03-24 DIAGNOSIS — I10 ESSENTIAL (PRIMARY) HYPERTENSION: ICD-10-CM

## 2025-03-24 DIAGNOSIS — Z98.890 OTHER SPECIFIED POSTPROCEDURAL STATES: Chronic | ICD-10-CM

## 2025-03-24 DIAGNOSIS — Z88.0 ALLERGY STATUS TO PENICILLIN: ICD-10-CM

## 2025-03-24 LAB
ALBUMIN SERPL ELPH-MCNC: 3.8 G/DL — SIGNIFICANT CHANGE UP (ref 3.3–5)
ALP SERPL-CCNC: 104 U/L — SIGNIFICANT CHANGE UP (ref 40–120)
ALT FLD-CCNC: 21 U/L — SIGNIFICANT CHANGE UP (ref 12–78)
ANION GAP SERPL CALC-SCNC: 0 MMOL/L — LOW (ref 5–17)
AST SERPL-CCNC: 15 U/L — SIGNIFICANT CHANGE UP (ref 15–37)
BASOPHILS # BLD AUTO: 0.05 K/UL — SIGNIFICANT CHANGE UP (ref 0–0.2)
BASOPHILS NFR BLD AUTO: 0.6 % — SIGNIFICANT CHANGE UP (ref 0–2)
BILIRUB SERPL-MCNC: 0.4 MG/DL — SIGNIFICANT CHANGE UP (ref 0.2–1.2)
BUN SERPL-MCNC: 8 MG/DL — SIGNIFICANT CHANGE UP (ref 7–23)
CALCIUM SERPL-MCNC: 9.3 MG/DL — SIGNIFICANT CHANGE UP (ref 8.5–10.1)
CHLORIDE SERPL-SCNC: 110 MMOL/L — HIGH (ref 96–108)
CO2 SERPL-SCNC: 28 MMOL/L — SIGNIFICANT CHANGE UP (ref 22–31)
CREAT SERPL-MCNC: 0.73 MG/DL — SIGNIFICANT CHANGE UP (ref 0.5–1.3)
EGFR: 86 ML/MIN/1.73M2 — SIGNIFICANT CHANGE UP
EGFR: 86 ML/MIN/1.73M2 — SIGNIFICANT CHANGE UP
EOSINOPHIL # BLD AUTO: 0.21 K/UL — SIGNIFICANT CHANGE UP (ref 0–0.5)
EOSINOPHIL NFR BLD AUTO: 2.6 % — SIGNIFICANT CHANGE UP (ref 0–6)
GLUCOSE SERPL-MCNC: 105 MG/DL — HIGH (ref 70–99)
HCT VFR BLD CALC: 44.7 % — SIGNIFICANT CHANGE UP (ref 34.5–45)
HGB BLD-MCNC: 14.5 G/DL — SIGNIFICANT CHANGE UP (ref 11.5–15.5)
IMM GRANULOCYTES NFR BLD AUTO: 0.5 % — SIGNIFICANT CHANGE UP (ref 0–0.9)
LYMPHOCYTES # BLD AUTO: 2.73 K/UL — SIGNIFICANT CHANGE UP (ref 1–3.3)
LYMPHOCYTES # BLD AUTO: 34.4 % — SIGNIFICANT CHANGE UP (ref 13–44)
MCHC RBC-ENTMCNC: 28.3 PG — SIGNIFICANT CHANGE UP (ref 27–34)
MCHC RBC-ENTMCNC: 32.4 G/DL — SIGNIFICANT CHANGE UP (ref 32–36)
MCV RBC AUTO: 87.1 FL — SIGNIFICANT CHANGE UP (ref 80–100)
MONOCYTES # BLD AUTO: 0.44 K/UL — SIGNIFICANT CHANGE UP (ref 0–0.9)
MONOCYTES NFR BLD AUTO: 5.5 % — SIGNIFICANT CHANGE UP (ref 2–14)
NEUTROPHILS # BLD AUTO: 4.46 K/UL — SIGNIFICANT CHANGE UP (ref 1.8–7.4)
NEUTROPHILS NFR BLD AUTO: 56.4 % — SIGNIFICANT CHANGE UP (ref 43–77)
NRBC BLD AUTO-RTO: 0 /100 WBCS — SIGNIFICANT CHANGE UP (ref 0–0)
PLATELET # BLD AUTO: 218 K/UL — SIGNIFICANT CHANGE UP (ref 150–400)
POTASSIUM SERPL-MCNC: 3.8 MMOL/L — SIGNIFICANT CHANGE UP (ref 3.5–5.3)
POTASSIUM SERPL-SCNC: 3.8 MMOL/L — SIGNIFICANT CHANGE UP (ref 3.5–5.3)
PROT SERPL-MCNC: 6.9 GM/DL — SIGNIFICANT CHANGE UP (ref 6–8.3)
RBC # BLD: 5.13 M/UL — SIGNIFICANT CHANGE UP (ref 3.8–5.2)
RBC # FLD: 13.2 % — SIGNIFICANT CHANGE UP (ref 10.3–14.5)
SODIUM SERPL-SCNC: 138 MMOL/L — SIGNIFICANT CHANGE UP (ref 135–145)
TROPONIN I, HIGH SENSITIVITY RESULT: 6.6 NG/L — SIGNIFICANT CHANGE UP
WBC # BLD: 7.93 K/UL — SIGNIFICANT CHANGE UP (ref 3.8–10.5)
WBC # FLD AUTO: 7.93 K/UL — SIGNIFICANT CHANGE UP (ref 3.8–10.5)

## 2025-03-24 PROCEDURE — 99285 EMERGENCY DEPT VISIT HI MDM: CPT

## 2025-03-24 PROCEDURE — 70450 CT HEAD/BRAIN W/O DYE: CPT | Mod: 26

## 2025-03-24 PROCEDURE — 93010 ELECTROCARDIOGRAM REPORT: CPT

## 2025-03-24 RX ORDER — DIAZEPAM 2 MG/1
2 TABLET ORAL ONCE
Refills: 0 | Status: DISCONTINUED | OUTPATIENT
Start: 2025-03-24 | End: 2025-03-24

## 2025-03-24 RX ORDER — METOCLOPRAMIDE HCL 10 MG
10 TABLET ORAL ONCE
Refills: 0 | Status: COMPLETED | OUTPATIENT
Start: 2025-03-24 | End: 2025-03-24

## 2025-03-24 RX ORDER — MECLIZINE HCL 12.5 MG
25 TABLET ORAL ONCE
Refills: 0 | Status: COMPLETED | OUTPATIENT
Start: 2025-03-24 | End: 2025-03-24

## 2025-03-24 RX ORDER — METOCLOPRAMIDE HCL 10 MG
1 TABLET ORAL
Qty: 1 | Refills: 0
Start: 2025-03-24

## 2025-03-24 RX ORDER — ONDANSETRON HCL/PF 4 MG/2 ML
4 VIAL (ML) INJECTION ONCE
Refills: 0 | Status: COMPLETED | OUTPATIENT
Start: 2025-03-24 | End: 2025-03-24

## 2025-03-24 RX ORDER — MECLIZINE HCL 12.5 MG
25 TABLET ORAL ONCE
Refills: 0 | Status: DISCONTINUED | OUTPATIENT
Start: 2025-03-24 | End: 2025-03-24

## 2025-03-24 RX ADMIN — Medication 10 MILLIGRAM(S): at 21:00

## 2025-03-24 RX ADMIN — DIAZEPAM 2 MILLIGRAM(S): 2 TABLET ORAL at 20:59

## 2025-03-24 RX ADMIN — Medication 1000 MILLILITER(S): at 18:27

## 2025-03-24 RX ADMIN — Medication 25 MILLIGRAM(S): at 18:25

## 2025-03-24 RX ADMIN — Medication 4 MILLIGRAM(S): at 22:18

## 2025-03-24 NOTE — ED PROVIDER NOTE - NS ED ATTENDING STATEMENT MOD
Attending Only Diphenhydramine is a 1st generation H1-antagonist, and in overdose can cause an sedation, antimuscarinic toxidrome (delirium, agitation, carphologia, tachycardia, mydriasis, urinary retention, dry mucosa, flushed face, hypoactive bowel sounds, seizures) and QRS prolongation.    No signs of anticholinergic toxicity at this time. No urinary retention, tachycardia, dryness, delirium, or mydriasis during 6 hour obs.     Recommendations  - Cleared from acute toxicological standpoint  - Further medical and/or psychiatric care per primary team    Thank you for involving us in the care of this patient. Assessment and plan discussed with toxicology attending Dr. Jaxson Stevenson. Please do not hesitate to reach out to the toxicology team for any further questions or concerns.    The On-Call Toxicology Fellow can be reached 24/7 via Pager #[phone]  Please send a 10 digit call back # as [place] cover multiple hospitals    Shravan Felix MD  Toxicology Fellow  PGY-5

## 2025-03-24 NOTE — ED PROVIDER NOTE - CLINICAL SUMMARY MEDICAL DECISION MAKING FREE TEXT BOX
73 y/o F with PMH HTN, HLD, anxiety, here today w/ dizziness. Patient reports a recent dental procedure on 3/18 and was placed on clinda and azithro afterwards to prevent infection. States she has been feeling dizzy and nauseous since starting the medications. No vomiting or diarrhea. No fever or chills. Reports she feels "off balance" and unsteady on her feet, States she skipped a dose of the medication and felt better.    In the ED, vitals stable.    GENERAL: Awake, alert, NAD  HEENT: NC/AT, moist mucous membranes  LUNGS: CTAB, no wheezes or crackles   CARDIAC: RRR, no m/r/g  ABDOMEN: Soft, non tender, non distended, no rebound, no guarding  BACK: No midline spinal tenderness, no CVA tenderness  EXT: No edema, no calf tenderness, 2+ DP pulses bilaterally, no deformities.  NEURO: A&Ox3. Moving all extremities.  SKIN: Warm and dry. No rash.  PSYCH: Normal affect.    Exam as above. Plan for labs and CT imaging in setting of dizziness. Will treat symptomatically.    Labs and CT WNL. Patient observed ambulating to the bathroom w/o difficulty. Reports she has some nausea. Will send reglan prescription to the pharmacy. She does not want to stay in the hospital to see PT as she already has PT. Reports she has a GI to f/u with.

## 2025-03-24 NOTE — ED ADULT NURSE NOTE - OBJECTIVE STATEMENT
74yF A&Ox3 presenting to ED with dizziness. pt reports she recently had dental procedure done on 3/18 (veneers implanted) and was subsequently placed on abx. pt states shortly after the procedure she started experiencing a sense of imbalance and a "buzzing/humming" in her ear. pt feels unsteady on her feet and that gait is affected. hx anxiety and depression. pt denies chest pain, sob, weakness, blurred vision, N+T, increased work of breathing, abd pain, N+V+D, back pain, h/a, recent fever/cough,  symptoms.

## 2025-03-24 NOTE — ED PROVIDER NOTE - PATIENT PORTAL LINK FT
You can access the FollowMyHealth Patient Portal offered by [facility] by registering at the following website: [email or web address]. By joining [employer]’s FollowMyHealth portal, you will also be able to view your health information using other applications (apps) compatible with our system.

## 2025-03-24 NOTE — ED ADULT TRIAGE NOTE - CHIEF COMPLAINT QUOTE
pt c/o dizziness and "humming" sound in  left ear after taking clindamycin after dental implant procedure on marche 18th. denies chest pain or headache. history of  anxiety and depression . fs 89

## 2025-03-24 NOTE — ED ADULT NURSE NOTE - NSFALLRISKINTERV_ED_ALL_ED

## 2025-03-24 NOTE — ED ADULT NURSE NOTE - NSICDXFAMILYHX_GEN_ALL_CORE_FT
FAMILY HISTORY:  Aunt  Still living? Unknown  Family history of TIAs, Age at diagnosis: Age Unknown

## 2025-03-24 NOTE — ED ADULT NURSE NOTE - CHIEF COMPLAINT
Discussed with Ada KEANE Who scheduled patient for 2/4/19.    The patient is a 74y Female complaining of dizziness.

## 2025-03-24 NOTE — ED PROVIDER NOTE - NSFOLLOWUPINSTRUCTIONS_ED_ALL_ED_FT
You were seen in the ED for dizziness.    Reglan was sent to the pharmacy which can help with dizziness and nausea.    Follow up with your primary doctor.    Dizziness can manifest as a feeling of unsteadiness or light-headedness. You may feel like you are about to faint. This condition can be caused by a number of things, including medicines, dehydration, or illness. Drink enough fluid to keep your urine clear or pale yellow. Do not drink alcohol and limit your caffeine intake. Avoid quick or sudden movements.  Rise slowly from chairs and steady yourself until you feel okay. In the morning, first sit up on the side of the bed.    SEEK IMMEDIATE MEDICAL CARE IF YOU HAVE ANY OF THE FOLLOWING SYMPTOMS: vomiting, changes in your vision or speech, weakness in your arms or legs, trouble speaking or swallowing, chest pain, abdominal pain, shortness of breath, sweating, bleeding, headache, neck pain, or fever.

## 2025-04-07 NOTE — ED BEHAVIORAL HEALTH ASSESSMENT NOTE - HAVE YOU BEEN THINKING ABOUT HOW YOU MIGHT DO THIS?
Problem: Adult Behavioral Health Plan of Care  Goal: Plan of Care Review  Outcome: Progressing  Flowsheets (Taken 4/6/2025 2309)  Patient Agreement with Plan of Care: agrees  Plan of Care Reviewed With: patient  Goal: Patient-Specific Goal (Individualization)  Outcome: Progressing  Flowsheets (Taken 4/6/2025 2309)  Patient Personal Strengths: independent living skills  Patient Vulnerabilities: substance abuse/addiction  Goal: Adheres to Safety Considerations for Self and Others  Outcome: Progressing  Flowsheets (Taken 4/6/2025 2309)  Adheres to Safety Considerations for Self and Others: making progress toward outcome  Intervention: Develop and Maintain Individualized Safety Plan  Flowsheets (Taken 4/6/2025 2309)  Safety Measures: safety rounds completed  Goal: Absence of New-Onset Illness or Injury  Outcome: Progressing  Intervention: Identify and Manage Fall Risk  Flowsheets (Taken 4/6/2025 2309)  Safety Measures: safety rounds completed  Intervention: Prevent Skin Injury  Flowsheets (Taken 4/6/2025 2309)  Device Skin Pressure Protection: adhesive use limited  Intervention: Prevent VTE (Venous Thromboembolism)  Flowsheets (Taken 4/6/2025 2309)  VTE Prevention/Management: fluids promoted  Intervention: Prevent Infection  Flowsheets (Taken 4/6/2025 2309)  Infection Prevention: rest/sleep promoted  Goal: Optimized Coping Skills in Response to Life Stressors  Outcome: Progressing  Flowsheets (Taken 4/6/2025 2309)  Optimized Coping Skills in Response to Life Stressors: making progress toward outcome  Intervention: Promote Effective Coping Strategies  Flowsheets (Taken 4/6/2025 2309)  Supportive Measures: self-care encouraged  Goal: Develops/Participates in Therapeutic [place] to Support Successful Transition  Outcome: Progressing  Flowsheets (Taken 4/6/2025 2309)  Develops/Participates in Therapeutic [place] to Support Successful Transition: making progress toward outcome  Intervention: Foster Therapeutic  [place]  Flowsheets (Taken 4/6/2025 2309)  Trust Relationship/Rapport: care explained  Intervention: Mutually Develop Transition Plan  Flowsheets (Taken 4/6/2025 2309)  Transition Support: follow-up care discussed  Goal: Rounds/Family Conference  Outcome: Progressing     Problem: Psychotic Signs/Symptoms  Goal: Improved Behavioral Control (Psychotic Signs/Symptoms)  Outcome: Progressing  Intervention: Manage Behavior  Flowsheets (Taken 4/6/2025 2309)  De-Escalation Techniques: quiet time facilitated  Goal: Optimal Cognitive Function (Psychotic Signs/Symptoms)  Outcome: Progressing  Flowsheets (Taken 4/6/2025 2309)  Mutually Determined Action Steps (Optimal Cognitive Function): participates in attention training  Intervention: Support and Promote Cognitive Ability  Flowsheets (Taken 4/6/2025 2309)  Trust Relationship/Rapport: care explained  Goal: Increased Participation and Engagement (Psychotic Signs/Symptoms)  Outcome: Progressing  Flowsheets (Taken 4/6/2025 2309)  Mutually Determined Action Steps (Increased Participation and Engagement): identifies symptoms triggers  Intervention: Facilitate Participation and Engagement  Flowsheets (Taken 4/6/2025 2309)  Supportive Measures: self-care encouraged  Diversional Activity: television  Goal: Improved Mood Symptoms (Psychotic Signs/Symptoms)  Outcome: Progressing  Flowsheets (Taken 4/6/2025 2309)  Mutually Determined Action Steps (Improved Mood Symptoms): acknowledges progress  Intervention: Optimize Emotion and Mood  Flowsheets (Taken 4/6/2025 2309)  Supportive Measures: self-care encouraged  Diversional Activity: television  Goal: Improved Psychomotor Symptoms (Psychotic Signs/Symptoms)  Outcome: Progressing  Flowsheets (Taken 4/6/2025 2309)  Mutually Determined Action Steps (Improved Psychomotor Symptoms): adheres to medication regimen  Intervention: Manage Psychomotor Movement  Flowsheets (Taken 4/6/2025 2309)  Activity (Behavioral Health): activity adjusted per  tolerance  Diversional Activity: television  Goal: Decreased Sensory Symptoms (Psychotic Signs/Symptoms)  Outcome: Progressing  Flowsheets (Taken 4/6/2025 2309)  Mutually Determined Action Steps (Decreased Sensory Symptoms): adheres to medication regimen  Intervention: Minimize and Manage Sensory Impairment  Flowsheets (Taken 4/6/2025 2309)  Sensory Stimulation Regulation: quiet environment promoted  Goal: Improved Sleep (Psychotic Signs/Symptoms)  Outcome: Progressing  Flowsheets (Taken 4/6/2025 2309)  Mutually Determined Action Steps (Improved Sleep): sleeps 4-6 hours at night  Intervention: Promote Healthy Sleep Hygiene  Flowsheets (Taken 4/6/2025 2309)  Sleep Hygiene Promotion:   awakenings minimized   regular sleep pattern promoted  Goal: Enhanced Social, Occupational or Functional Skills (Psychotic Signs/Symptoms)  Outcome: Progressing  Flowsheets (Taken 4/6/2025 2309)  Mutually Determined Action Steps (Enhanced Social, Occupational or Functional Skills): participates in social skills training  Intervention: Promote Social, Occupational and Functional Ability  Flowsheets (Taken 4/6/2025 2309)  Trust Relationship/Rapport: care explained  Social Functional Ability Promotion: autonomy promoted     Problem: Excessive Substance Use  Goal: Optimized Energy Level (Excessive Substance Use)  Outcome: Progressing  Flowsheets (Taken 4/6/2025 2309)  Mutually Determined Action Steps (Optimized Energy Level): grooms self without prompting  Intervention: Optimize Energy Level  Flowsheets (Taken 4/6/2025 2309)  Activity (Behavioral Health): activity adjusted per tolerance  Diversional Activity: television  Goal: Improved Behavioral Control (Excessive Substance Use)  Outcome: Progressing  Flowsheets (Taken 4/6/2025 2309)  Mutually Determined Action Steps (Improved Behavioral Control): identifies major stressors  Intervention: Promote Behavior and Impulse Control  Flowsheets (Taken 4/6/2025 2309)  Behavior Management:  behavioral plan reviewed  Goal: Increased Participation and Engagement (Excessive Substance Use)  Outcome: Progressing  Flowsheets (Taken 4/6/2025 2309)  Mutually Determined Action Steps (Increased Participation and Engagement): discusses ongoing recovery plan  Intervention: Facilitate Participation and Engagement  Flowsheets (Taken 4/6/2025 2309)  Supportive Measures: self-care encouraged  Diversional Activity: television  Goal: Improved Physiologic Symptoms (Excessive Substance Use)  Outcome: Progressing  Flowsheets (Taken 4/6/2025 2309)  Mutually Determined Action Steps (Improved Physiologic Symptoms): discusses use pattern  Intervention: Optimize Physiologic Function  Flowsheets (Taken 4/6/2025 2309)  Oral Nutrition Promotion: rest periods promoted  Nutrition Interventions: food preferences provided  Goal: Enhanced Social, Occupational or Functional Skills (Excessive Substance Use)  Outcome: Progressing  Flowsheets (Taken 4/6/2025 2309)  Mutually Determined Action Steps (Enhanced Social, Occupational or Functional Skills): participates in social skills training  Intervention: Promote Social, Occupational and Functional Ability  Flowsheets (Taken 4/6/2025 2309)  Trust Relationship/Rapport: care explained  Social Functional Ability Promotion: autonomy promoted   AAO X 3. Anxious but mood improving. Guarded. Fair eye contact noted. Denies pain at present. Minimally interacting with peers and staff. Compliant with meds. Participated in groups today. Continue plan of care and provide a safe and therapeutic environment. Every fifteen minute rounding continued for safety.   Yes

## 2025-06-09 NOTE — BH INPATIENT PSYCHIATRY PROGRESS NOTE - MSE UNSTRUCTURED FT
[facility]  ER consult    Admit Date: 6/9/2025  Reason for Consultation: abdominal pain, +cholelithaisis    HPI:  Courtney Cortez is a 75 y.o. female w/ very long hx as below and on plavix presents to the ED w/ one week of crampy abd pain and diarrhea x2 days.  No n/v.  She has no pain at present.  She also has no pain after eating a fatty meal.  Alk phos is elevated but no other LFTs or bili.  WBC is nl.   Likely on plavix for multiple endovascular revascularizations, pt not sure why she is on it.      US  IMPRESSION:  1. Cholelithiasis with gallbladder distention but without biliary ductal  dilatation..  2. Heterogeneous echotexture of liver parenchyma, making evaluation of the liver  difficult sonographically. Further evaluation would require dedicated hepatic  MRI or CT.           Patient Active Problem List    Diagnosis Date Noted    Calculus of gallbladder without cholecystitis without obstruction 06/09/2025    Cellulitis of right lower extremity 12/18/2023    Encephalopathy acute 12/18/2023    Acute osteomyelitis of right calcaneus (HCC) 12/18/2023    Enterococcal infection 12/18/2023    Infection caused by Enterobacter cloacae 12/18/2023    Anaerobic bacterial infection 12/18/2023    Hypothyroidism 12/18/2023    Metabolic encephalopathy 12/18/2023    Obesity (BMI 30-39.9) 12/18/2023    Septicemia (HCC) 12/09/2023    Spinal stenosis of lumbar region without neurogenic claudication 02/25/2023    Cervicalgia 02/25/2023    Peripheral vascular disease 12/30/2022    Recurrent major depressive disorder, in partial remission 12/30/2022    LOCKHART (nonalcoholic steatohepatitis) 11/15/2018    Severe obesity (BMI 35.0-39.9) with comorbidity (HCC) 05/30/2018    Type 2 diabetes mellitus with diabetic peripheral angiopathy without gangrene, with long-term current use of insulin (HCC) 10/28/2015    Renal insufficiency 10/23/2014    Hypothyroidism, acquired, autoimmune 11/20/2009    Mixed hyperlipidemia 11/20/2009     Awake and alert. Engaged, cooperative  SPEECH: Normal rate, tone, and volume.  MOTOR: mild PMR. No tremors or other abnormal movements noted. Stable gait  Mood: anxious  AFFECT: congruent, stable, intense, constricted  THOUGHT PROCESS: Linear but with obsessive, ruminative pattern   THOUGHT CONTENT: Denies SI or HI; no evidence of delusions. Somatically preoccupied  COGNITION: Grossly intact.  INSIGHT: Poor.  JUDGMENT: Fair.  IMPULSE CONTROL: Intact on unit.

## 2025-06-12 NOTE — ED BEHAVIORAL HEALTH ASSESSMENT NOTE - SUICIDE ATTEMPT:
How Many Skin Cancers Have You Had?: more than one What Is The Reason For Today's Visit?: History of Non-Melanoma Skin Cancer When Was Your Last Cancer Diagnosed?: 2024 Yes > 3 months ago

## 2025-06-16 NOTE — BH PSYCHOLOGY - CLINICIAN PSYCHOTHERAPY NOTE - NSTXANXGOAL_PSY_ALL_CORE
Called and spoke with Stephanie. capri was appropriate and approved. Stephanie will call with any worsen symptoms.   
Identify and practice 3 coping skills to manage anxiety
Report a reduction in panic attacks and improving mood and confidence
Identify and practice 3 coping skills to manage anxiety
Report a reduction in panic attacks and improving mood and confidence
Identify and practice 3 coping skills to manage anxiety
Report a reduction in panic attacks and improving mood and confidence
Report a reduction in panic attacks and improving mood and confidence
Identify and practice 3 coping skills to manage anxiety
Identify and practice 3 coping skills to manage anxiety

## 2025-07-01 NOTE — ED ADULT TRIAGE NOTE - ACCOMPANIED BY
"Tommy Reis" Juarez was seen and treated in our emergency department on 7/1/2025.  He may return to work on 07/03/2025.       If you have any questions or concerns, please don't hesitate to call.      Molly Genao PA" Self